# Patient Record
Sex: MALE | Employment: UNEMPLOYED | URBAN - METROPOLITAN AREA
[De-identification: names, ages, dates, MRNs, and addresses within clinical notes are randomized per-mention and may not be internally consistent; named-entity substitution may affect disease eponyms.]

---

## 2018-02-15 ENCOUNTER — COMMUNICATION - HEALTHEAST (OUTPATIENT)
Dept: SCHEDULING | Facility: CLINIC | Age: 43
End: 2018-02-15

## 2018-02-22 ENCOUNTER — OFFICE VISIT - HEALTHEAST (OUTPATIENT)
Dept: FAMILY MEDICINE | Facility: CLINIC | Age: 43
End: 2018-02-22

## 2018-02-22 DIAGNOSIS — Z23 NEED FOR VACCINATION: ICD-10-CM

## 2018-02-22 DIAGNOSIS — I10 ESSENTIAL HYPERTENSION: ICD-10-CM

## 2018-02-22 DIAGNOSIS — R79.89 ELEVATED LFTS: ICD-10-CM

## 2018-02-22 LAB
ALBUMIN SERPL-MCNC: 4.5 G/DL (ref 3.5–5)
ALP SERPL-CCNC: 105 U/L (ref 45–120)
ALT SERPL W P-5'-P-CCNC: 235 U/L (ref 0–45)
AST SERPL W P-5'-P-CCNC: 265 U/L (ref 0–40)
BILIRUB DIRECT SERPL-MCNC: 0.2 MG/DL
BILIRUB SERPL-MCNC: 0.5 MG/DL (ref 0–1)
PROT SERPL-MCNC: 8.1 G/DL (ref 6–8)

## 2018-02-22 RX ORDER — ALBUTEROL SULFATE 90 UG/1
2 AEROSOL, METERED RESPIRATORY (INHALATION) EVERY 4 HOURS PRN
Status: SHIPPED | COMMUNITY
Start: 2015-11-22 | End: 2022-05-04

## 2018-02-22 RX ORDER — METHADONE HYDROCHLORIDE 10 MG/ML
84 CONCENTRATE ORAL DAILY
Status: ON HOLD | COMMUNITY
Start: 2018-02-22 | End: 2021-12-06

## 2018-02-22 ASSESSMENT — MIFFLIN-ST. JEOR: SCORE: 1735.39

## 2018-02-23 ENCOUNTER — COMMUNICATION - HEALTHEAST (OUTPATIENT)
Dept: FAMILY MEDICINE | Facility: CLINIC | Age: 43
End: 2018-02-23

## 2018-02-23 LAB
HBV SURFACE AG SERPL QL IA: NEGATIVE
HCV AB SERPL QL IA: NEGATIVE

## 2018-03-01 ENCOUNTER — COMMUNICATION - HEALTHEAST (OUTPATIENT)
Dept: TELEHEALTH | Facility: CLINIC | Age: 43
End: 2018-03-01

## 2018-03-01 ENCOUNTER — HOSPITAL ENCOUNTER (OUTPATIENT)
Dept: ULTRASOUND IMAGING | Facility: CLINIC | Age: 43
Discharge: HOME | End: 2018-03-01

## 2018-03-01 DIAGNOSIS — R79.89 ELEVATED LFTS: ICD-10-CM

## 2018-03-05 ENCOUNTER — COMMUNICATION - HEALTHEAST (OUTPATIENT)
Dept: FAMILY MEDICINE | Facility: CLINIC | Age: 43
End: 2018-03-05

## 2018-03-06 ENCOUNTER — COMMUNICATION - HEALTHEAST (OUTPATIENT)
Dept: FAMILY MEDICINE | Facility: CLINIC | Age: 43
End: 2018-03-06

## 2018-03-22 ENCOUNTER — COMMUNICATION - HEALTHEAST (OUTPATIENT)
Dept: FAMILY MEDICINE | Facility: CLINIC | Age: 43
End: 2018-03-22

## 2018-03-22 DIAGNOSIS — I10 ESSENTIAL HYPERTENSION: ICD-10-CM

## 2018-05-21 ENCOUNTER — COMMUNICATION - HEALTHEAST (OUTPATIENT)
Dept: FAMILY MEDICINE | Facility: CLINIC | Age: 43
End: 2018-05-21

## 2018-05-21 DIAGNOSIS — I10 ESSENTIAL HYPERTENSION: ICD-10-CM

## 2018-05-22 RX ORDER — LISINOPRIL 40 MG/1
40 TABLET ORAL DAILY
Qty: 90 TABLET | Refills: 0 | Status: ON HOLD | OUTPATIENT
Start: 2018-05-22 | End: 2021-12-06

## 2018-08-27 ENCOUNTER — COMMUNICATION - HEALTHEAST (OUTPATIENT)
Dept: FAMILY MEDICINE | Facility: CLINIC | Age: 43
End: 2018-08-27

## 2018-08-27 DIAGNOSIS — I10 ESSENTIAL HYPERTENSION: ICD-10-CM

## 2019-02-06 ENCOUNTER — COMMUNICATION - HEALTHEAST (OUTPATIENT)
Dept: SCHEDULING | Facility: CLINIC | Age: 44
End: 2019-02-06

## 2021-05-28 ENCOUNTER — RECORDS - HEALTHEAST (OUTPATIENT)
Dept: ADMINISTRATIVE | Facility: CLINIC | Age: 46
End: 2021-05-28

## 2021-06-01 VITALS — WEIGHT: 183.7 LBS | HEIGHT: 71 IN | BODY MASS INDEX: 25.72 KG/M2

## 2021-06-16 PROBLEM — G89.29 CHRONIC BACK PAIN: Status: ACTIVE | Noted: 2018-02-22

## 2021-06-16 PROBLEM — I10 ESSENTIAL HYPERTENSION: Status: ACTIVE | Noted: 2018-02-22

## 2021-06-16 PROBLEM — M54.9 CHRONIC BACK PAIN: Status: ACTIVE | Noted: 2018-02-22

## 2021-06-16 PROBLEM — R79.89 ELEVATED LFTS: Status: ACTIVE | Noted: 2018-02-22

## 2021-06-16 NOTE — PROGRESS NOTES
"Assessment/Plan:     1. Essential hypertension  Blood pressure continues to be elevated.  Increase dose to 20 mg once daily.  Follow-up in 3-4 weeks for recheck.  - lisinopril (PRINIVIL,ZESTRIL) 20 MG tablet; Take 1 tablet (20 mg total) by mouth daily.  Dispense: 30 tablet; Refill: 1    2. Elevated LFTs  Repeat LFTs and screen for hepatitis B and C.  I did encourage patient to decrease his alcohol consumption significantly.  - Hepatic Profile  - Hepatitis C Antibody (Anti-HCV)  - Hepatitis B Surface Antigen (HBsAG)    3. Need for vaccination  - Tdap vaccine,  6yo or older,  IM        Subjective:     Jared North is a 42 y.o. male who presents for follow-up regarding recent ER visit.  Patient presented to the ER with complaints of chest pain and high blood pressure.  They ruled out ACS.  His EKG showed an incomplete right bundle branch block.  Patient states that he gets left-sided chest pain that comes and goes.  Symptoms usually start with an irregular heartbeat, then he gets \"jabs\" of pains to the left chest.  This lasts for approximately 1 hour, and then goes away.  He currently denies any chest pain.  Denies any diaphoresis or shortness of breath.  Patient was started on lisinopril, 5 mg in the ED.  He is tolerating this well.  He has never previously been on hypertensive medications.    Patient goes to a pain clinic [facility] in [place] for methadone treatment.  He receives 84 mg daily.  Patient denies any illegal drug use.  He drinks 3 passes of wine per day as a stress reliever.  Patient works as an .    Patient was found to have elevated LFTs incidentally in the ED.  No previous lab work to compare.  He denies any abdominal pain, jaundice, or N/V/D.  He denies any IV drug use, and receives regular drug testing at the methadone clinic.      The following portions of the patient's history were reviewed and updated as appropriate: allergies, current medications, past family " "history, past medical history, past social history, past surgical history and problem list.    Review of Systems  Pertinent items are noted in HPI.     Objective:     BP (!) 139/103 (Patient Site: Right Arm, Patient Position: Sitting, Cuff Size: Adult Large)  Pulse (!) 106  Temp 98.9  F (37.2  C) (Oral)   Ht 5' 11\" (1.803 m)  Wt 183 lb 11.2 oz (83.3 kg)  SpO2 99%  BMI 25.62 kg/m2    General Appearance: Alert, cooperative, no distress, appears stated age  Lungs: Clear to auscultation bilaterally, respirations unlabored  Heart: Regular rate and rhythm, S1 and S2 normal, no murmur, rub, or gallop   Abdomen: Soft, non-tender, bowel sounds active all four quadrants,  no masses, no organomegaly  Psychologic: appropriate affective, answers all of my questions appropriately. No hallucinations, delusion, or suicidal ideations.    Chloe Bennett, NP-C    "

## 2021-06-23 NOTE — TELEPHONE ENCOUNTER
Triage call: has previously seen Chloe Bennett    Patient calling with high blood pressure, has stopped taking his Lisinopril on his own and currently not taking any medication for blood pressure  184/124- yesterday morning   At lunch today 160/110   Intermittent chest pain and dizzy and shortness of breath- symptoms present now.     Triaged to be seen in the ER, reviewed location of ER with patient and provided address to [facility] and told him to go there now since he is having cardiac symptoms. Unsure if patient will follow advice as he would not state clearly that he was going but tried to impress upon him the importance of getting there as soon as he could.     Anastasia Alvarez RN BSBA [facility] Triage/Med Refill 2/6/2019 12:37 PM    Reason for Disposition    Systolic BP >= 160 OR Diastolic >= 100, and any cardiac or neurologic symptoms (e.g., chest pain, difficulty breathing, unsteady gait, blurred vision)    Protocols used: HIGH BLOOD PRESSURE-A-OH

## 2021-07-03 NOTE — ADDENDUM NOTE
Addendum Note by Chloe Bennett NP at 2/23/2018 10:30 AM     Author: Chloe Bennett NP Service: -- Author Type: Nurse Practitioner    Filed: 2/23/2018 10:30 AM Encounter Date: 2/22/2018 Status: Signed    : Chloe Bennett NP (Nurse Practitioner)    Addended by: CHLOE BENNETT on: 2/23/2018 10:30 AM        Modules accepted: Orders

## 2021-07-24 ENCOUNTER — HEALTH MAINTENANCE LETTER (OUTPATIENT)
Age: 46
End: 2021-07-24

## 2021-09-18 ENCOUNTER — HEALTH MAINTENANCE LETTER (OUTPATIENT)
Age: 46
End: 2021-09-18

## 2021-11-24 ENCOUNTER — APPOINTMENT (OUTPATIENT)
Dept: CT IMAGING | Facility: CLINIC | Age: 46
End: 2021-11-24
Attending: EMERGENCY MEDICINE
Payer: COMMERCIAL

## 2021-11-24 ENCOUNTER — HOSPITAL ENCOUNTER (INPATIENT)
Facility: CLINIC | Age: 46
LOS: 11 days | Discharge: OTHER INSTITUTION | End: 2021-12-05
Attending: EMERGENCY MEDICINE | Admitting: INTERNAL MEDICINE
Payer: COMMERCIAL

## 2021-11-24 DIAGNOSIS — U07.1 PNEUMONIA DUE TO 2019 NOVEL CORONAVIRUS: ICD-10-CM

## 2021-11-24 DIAGNOSIS — J12.82 PNEUMONIA DUE TO 2019 NOVEL CORONAVIRUS: ICD-10-CM

## 2021-11-24 DIAGNOSIS — J96.01 ACUTE RESPIRATORY FAILURE WITH HYPOXIA (H): ICD-10-CM

## 2021-11-24 LAB
ABO/RH(D): NORMAL
ALBUMIN SERPL-MCNC: 2.9 G/DL (ref 3.5–5)
ALP SERPL-CCNC: 81 U/L (ref 45–120)
ALT SERPL W P-5'-P-CCNC: 53 U/L (ref 0–45)
ANION GAP SERPL CALCULATED.3IONS-SCNC: 8 MMOL/L (ref 5–18)
APTT PPP: 35 SECONDS (ref 22–38)
AST SERPL W P-5'-P-CCNC: 105 U/L (ref 0–40)
BASOPHILS # BLD AUTO: 0 10E3/UL (ref 0–0.2)
BASOPHILS NFR BLD AUTO: 0 %
BILIRUB DIRECT SERPL-MCNC: 0.2 MG/DL
BILIRUB SERPL-MCNC: 0.5 MG/DL (ref 0–1)
BUN SERPL-MCNC: 15 MG/DL (ref 8–22)
C REACTIVE PROTEIN LHE: 12.3 MG/DL (ref 0–0.8)
CALCIUM SERPL-MCNC: 8.1 MG/DL (ref 8.5–10.5)
CHLORIDE BLD-SCNC: 95 MMOL/L (ref 98–107)
CO2 SERPL-SCNC: 28 MMOL/L (ref 22–31)
CREAT SERPL-MCNC: 0.99 MG/DL (ref 0.7–1.3)
D DIMER PPP FEU-MCNC: 1.24 UG/ML FEU (ref 0–0.5)
EOSINOPHIL # BLD AUTO: 0 10E3/UL (ref 0–0.7)
EOSINOPHIL NFR BLD AUTO: 0 %
ERYTHROCYTE [DISTWIDTH] IN BLOOD BY AUTOMATED COUNT: 13.1 % (ref 10–15)
FIBRINOGEN PPP-MCNC: 695 MG/DL (ref 170–490)
GFR SERPL CREATININE-BSD FRML MDRD: >90 ML/MIN/1.73M2
GLUCOSE BLD-MCNC: 139 MG/DL (ref 70–125)
GLUCOSE BLDC GLUCOMTR-MCNC: 165 MG/DL (ref 70–99)
HCT VFR BLD AUTO: 42.5 % (ref 40–53)
HGB BLD-MCNC: 14.4 G/DL (ref 13.3–17.7)
HOLD SPECIMEN: NORMAL
IMM GRANULOCYTES # BLD: 0.1 10E3/UL
IMM GRANULOCYTES NFR BLD: 1 %
INR PPP: 1.08 (ref 0.85–1.15)
LYMPHOCYTES # BLD AUTO: 0.4 10E3/UL (ref 0.8–5.3)
LYMPHOCYTES NFR BLD AUTO: 8 %
MCH RBC QN AUTO: 29.3 PG (ref 26.5–33)
MCHC RBC AUTO-ENTMCNC: 33.9 G/DL (ref 31.5–36.5)
MCV RBC AUTO: 87 FL (ref 78–100)
MONOCYTES # BLD AUTO: 0.4 10E3/UL (ref 0–1.3)
MONOCYTES NFR BLD AUTO: 9 %
NEUTROPHILS # BLD AUTO: 3.7 10E3/UL (ref 1.6–8.3)
NEUTROPHILS NFR BLD AUTO: 82 %
NRBC # BLD AUTO: 0 10E3/UL
NRBC BLD AUTO-RTO: 0 /100
PLATELET # BLD AUTO: 128 10E3/UL (ref 150–450)
POTASSIUM BLD-SCNC: 4 MMOL/L (ref 3.5–5)
PROCALCITONIN SERPL-MCNC: 0.5 NG/ML (ref 0–0.49)
PROT SERPL-MCNC: 6.5 G/DL (ref 6–8)
RBC # BLD AUTO: 4.91 10E6/UL (ref 4.4–5.9)
SODIUM SERPL-SCNC: 131 MMOL/L (ref 136–145)
SPECIMEN EXPIRATION DATE: NORMAL
TROPONIN I SERPL-MCNC: 0.01 NG/ML (ref 0–0.29)
WBC # BLD AUTO: 4.5 10E3/UL (ref 4–11)

## 2021-11-24 PROCEDURE — 71275 CT ANGIOGRAPHY CHEST: CPT

## 2021-11-24 PROCEDURE — 86141 C-REACTIVE PROTEIN HS: CPT | Performed by: INTERNAL MEDICINE

## 2021-11-24 PROCEDURE — 86141 C-REACTIVE PROTEIN HS: CPT | Performed by: EMERGENCY MEDICINE

## 2021-11-24 PROCEDURE — 250N000009 HC RX 250: Performed by: EMERGENCY MEDICINE

## 2021-11-24 PROCEDURE — 84484 ASSAY OF TROPONIN QUANT: CPT | Performed by: EMERGENCY MEDICINE

## 2021-11-24 PROCEDURE — 999N000205 HC STATISTICAL VASC ACCESS NURSE TIME, 46-60 MINUTES

## 2021-11-24 PROCEDURE — 99223 1ST HOSP IP/OBS HIGH 75: CPT | Performed by: INTERNAL MEDICINE

## 2021-11-24 PROCEDURE — 96367 TX/PROPH/DG ADDL SEQ IV INF: CPT

## 2021-11-24 PROCEDURE — 36569 INSJ PICC 5 YR+ W/O IMAGING: CPT

## 2021-11-24 PROCEDURE — 85025 COMPLETE CBC W/AUTO DIFF WBC: CPT | Performed by: EMERGENCY MEDICINE

## 2021-11-24 PROCEDURE — 83615 LACTATE (LD) (LDH) ENZYME: CPT | Performed by: INTERNAL MEDICINE

## 2021-11-24 PROCEDURE — 83036 HEMOGLOBIN GLYCOSYLATED A1C: CPT | Performed by: INTERNAL MEDICINE

## 2021-11-24 PROCEDURE — 250N000011 HC RX IP 250 OP 636: Performed by: EMERGENCY MEDICINE

## 2021-11-24 PROCEDURE — 85379 FIBRIN DEGRADATION QUANT: CPT | Performed by: INTERNAL MEDICINE

## 2021-11-24 PROCEDURE — 999N000157 HC STATISTIC RCP TIME EA 10 MIN

## 2021-11-24 PROCEDURE — 96365 THER/PROPH/DIAG IV INF INIT: CPT

## 2021-11-24 PROCEDURE — 96375 TX/PRO/DX INJ NEW DRUG ADDON: CPT

## 2021-11-24 PROCEDURE — 85025 COMPLETE CBC W/AUTO DIFF WBC: CPT | Performed by: INTERNAL MEDICINE

## 2021-11-24 PROCEDURE — 85610 PROTHROMBIN TIME: CPT | Performed by: INTERNAL MEDICINE

## 2021-11-24 PROCEDURE — XW043E5 INTRODUCTION OF REMDESIVIR ANTI-INFECTIVE INTO CENTRAL VEIN, PERCUTANEOUS APPROACH, NEW TECHNOLOGY GROUP 5: ICD-10-PCS | Performed by: FAMILY MEDICINE

## 2021-11-24 PROCEDURE — 93005 ELECTROCARDIOGRAM TRACING: CPT | Performed by: EMERGENCY MEDICINE

## 2021-11-24 PROCEDURE — 84145 PROCALCITONIN (PCT): CPT | Performed by: EMERGENCY MEDICINE

## 2021-11-24 PROCEDURE — 999N000185 HC STATISTIC TRANSPORT TIME EA 15 MIN

## 2021-11-24 PROCEDURE — 94660 CPAP INITIATION&MGMT: CPT

## 2021-11-24 PROCEDURE — 99291 CRITICAL CARE FIRST HOUR: CPT | Mod: 25

## 2021-11-24 PROCEDURE — 3E0G76Z INTRODUCTION OF NUTRITIONAL SUBSTANCE INTO UPPER GI, VIA NATURAL OR ARTIFICIAL OPENING: ICD-10-PCS | Performed by: FAMILY MEDICINE

## 2021-11-24 PROCEDURE — 272N000452 HC KIT SHRLOCK 5FR POWER PICC TRIPLE LUMEN

## 2021-11-24 PROCEDURE — 250N000009 HC RX 250: Performed by: INTERNAL MEDICINE

## 2021-11-24 PROCEDURE — 85379 FIBRIN DEGRADATION QUANT: CPT | Performed by: EMERGENCY MEDICINE

## 2021-11-24 PROCEDURE — 200N000001 HC R&B ICU

## 2021-11-24 PROCEDURE — 258N000003 HC RX IP 258 OP 636: Performed by: EMERGENCY MEDICINE

## 2021-11-24 PROCEDURE — 36415 COLL VENOUS BLD VENIPUNCTURE: CPT | Performed by: EMERGENCY MEDICINE

## 2021-11-24 PROCEDURE — 250N000011 HC RX IP 250 OP 636: Performed by: INTERNAL MEDICINE

## 2021-11-24 PROCEDURE — 80053 COMPREHEN METABOLIC PANEL: CPT | Performed by: INTERNAL MEDICINE

## 2021-11-24 PROCEDURE — 86900 BLOOD TYPING SEROLOGIC ABO: CPT | Performed by: INTERNAL MEDICINE

## 2021-11-24 PROCEDURE — 80053 COMPREHEN METABOLIC PANEL: CPT | Performed by: EMERGENCY MEDICINE

## 2021-11-24 PROCEDURE — 85730 THROMBOPLASTIN TIME PARTIAL: CPT | Performed by: INTERNAL MEDICINE

## 2021-11-24 PROCEDURE — 85384 FIBRINOGEN ACTIVITY: CPT | Performed by: INTERNAL MEDICINE

## 2021-11-24 PROCEDURE — 258N000003 HC RX IP 258 OP 636: Performed by: INTERNAL MEDICINE

## 2021-11-24 RX ORDER — MULTIPLE VITAMINS W/ MINERALS TAB 9MG-400MCG
1 TAB ORAL DAILY
COMMUNITY
End: 2022-08-11

## 2021-11-24 RX ORDER — BUPRENORPHINE AND NALOXONE 8; 2 MG/1; MG/1
1 FILM, SOLUBLE BUCCAL; SUBLINGUAL DAILY
Status: ON HOLD | COMMUNITY
End: 2022-01-24

## 2021-11-24 RX ORDER — LISINOPRIL 40 MG/1
40 TABLET ORAL DAILY
Status: DISCONTINUED | OUTPATIENT
Start: 2021-11-25 | End: 2021-11-25

## 2021-11-24 RX ORDER — ONDANSETRON 4 MG/1
4 TABLET, ORALLY DISINTEGRATING ORAL EVERY 6 HOURS PRN
Status: DISCONTINUED | OUTPATIENT
Start: 2021-11-24 | End: 2021-12-05 | Stop reason: HOSPADM

## 2021-11-24 RX ORDER — NICOTINE POLACRILEX 4 MG
15-30 LOZENGE BUCCAL
Status: DISCONTINUED | OUTPATIENT
Start: 2021-11-24 | End: 2021-12-05 | Stop reason: HOSPADM

## 2021-11-24 RX ORDER — DEXAMETHASONE SODIUM PHOSPHATE 10 MG/ML
6 INJECTION, SOLUTION INTRAMUSCULAR; INTRAVENOUS DAILY
Status: COMPLETED | OUTPATIENT
Start: 2021-11-25 | End: 2021-12-04

## 2021-11-24 RX ORDER — IOPAMIDOL 755 MG/ML
100 INJECTION, SOLUTION INTRAVASCULAR ONCE
Status: COMPLETED | OUTPATIENT
Start: 2021-11-24 | End: 2021-11-24

## 2021-11-24 RX ORDER — BUPROPION HYDROCHLORIDE 300 MG/1
300 TABLET ORAL EVERY MORNING
Status: ON HOLD | COMMUNITY
End: 2022-01-24

## 2021-11-24 RX ORDER — ALBUTEROL SULFATE 90 UG/1
2-4 AEROSOL, METERED RESPIRATORY (INHALATION) EVERY 4 HOURS PRN
Status: DISCONTINUED | OUTPATIENT
Start: 2021-11-24 | End: 2021-12-05 | Stop reason: HOSPADM

## 2021-11-24 RX ORDER — DEXAMETHASONE SODIUM PHOSPHATE 10 MG/ML
6 INJECTION, SOLUTION INTRAMUSCULAR; INTRAVENOUS ONCE
Status: COMPLETED | OUTPATIENT
Start: 2021-11-24 | End: 2021-11-24

## 2021-11-24 RX ORDER — ONDANSETRON 2 MG/ML
4 INJECTION INTRAMUSCULAR; INTRAVENOUS EVERY 6 HOURS PRN
Status: DISCONTINUED | OUTPATIENT
Start: 2021-11-24 | End: 2021-12-05 | Stop reason: HOSPADM

## 2021-11-24 RX ORDER — DEXTROSE MONOHYDRATE 25 G/50ML
25-50 INJECTION, SOLUTION INTRAVENOUS
Status: DISCONTINUED | OUTPATIENT
Start: 2021-11-24 | End: 2021-12-05 | Stop reason: HOSPADM

## 2021-11-24 RX ORDER — ARIPIPRAZOLE 5 MG/1
5 TABLET ORAL DAILY
Status: ON HOLD | COMMUNITY
End: 2021-12-31

## 2021-11-24 RX ORDER — GABAPENTIN 400 MG/1
800 CAPSULE ORAL 3 TIMES DAILY
Status: ON HOLD | COMMUNITY
End: 2022-01-24

## 2021-11-24 RX ORDER — METHADONE HYDROCHLORIDE 10 MG/ML
84 CONCENTRATE ORAL DAILY
Status: CANCELLED | OUTPATIENT
Start: 2021-11-24

## 2021-11-24 RX ORDER — HYDROXYZINE PAMOATE 25 MG/1
25-50 CAPSULE ORAL
Status: ON HOLD | COMMUNITY
End: 2022-01-24

## 2021-11-24 RX ORDER — LIDOCAINE 40 MG/G
CREAM TOPICAL
Status: ACTIVE | OUTPATIENT
Start: 2021-11-24 | End: 2021-11-27

## 2021-11-24 RX ADMIN — TOCILIZUMAB 800 MG: 20 INJECTION, SOLUTION, CONCENTRATE INTRAVENOUS at 16:38

## 2021-11-24 RX ADMIN — FAMOTIDINE 20 MG: 10 INJECTION, SOLUTION INTRAVENOUS at 20:20

## 2021-11-24 RX ADMIN — REMDESIVIR 200 MG: 100 INJECTION, POWDER, LYOPHILIZED, FOR SOLUTION INTRAVENOUS at 17:34

## 2021-11-24 RX ADMIN — SODIUM CHLORIDE 50 ML: 9 INJECTION, SOLUTION INTRAVENOUS at 18:19

## 2021-11-24 RX ADMIN — ENOXAPARIN SODIUM 50 MG: 100 INJECTION SUBCUTANEOUS at 23:40

## 2021-11-24 RX ADMIN — DEXAMETHASONE SODIUM PHOSPHATE 6 MG: 10 INJECTION INTRAMUSCULAR; INTRAVENOUS at 13:47

## 2021-11-24 RX ADMIN — IOPAMIDOL 100 ML: 755 INJECTION, SOLUTION INTRAVENOUS at 14:31

## 2021-11-24 RX ADMIN — LIDOCAINE HYDROCHLORIDE 3 ML: 10 INJECTION, SOLUTION EPIDURAL; INFILTRATION; INTRACAUDAL; PERINEURAL at 17:55

## 2021-11-24 ASSESSMENT — ACTIVITIES OF DAILY LIVING (ADL)
DRESSING/BATHING_DIFFICULTY: NO
DIFFICULTY_COMMUNICATING: NO
FALL_HISTORY_WITHIN_LAST_SIX_MONTHS: NO
DEPENDENT_IADLS:: INDEPENDENT
HEARING_DIFFICULTY_OR_DEAF: NO
ADLS_ACUITY_SCORE: 7
DIFFICULTY_EATING/SWALLOWING: NO
ADLS_ACUITY_SCORE: 7
WEAR_GLASSES_OR_BLIND: NO
ADLS_ACUITY_SCORE: 7
ADLS_ACUITY_SCORE: 3
ADLS_ACUITY_SCORE: 7
TOILETING_ISSUES: NO
CONCENTRATING,_REMEMBERING_OR_MAKING_DECISIONS_DIFFICULTY: NO
ADLS_ACUITY_SCORE: 7
DOING_ERRANDS_INDEPENDENTLY_DIFFICULTY: NO
ADLS_ACUITY_SCORE: 7
WALKING_OR_CLIMBING_STAIRS_DIFFICULTY: NO

## 2021-11-24 ASSESSMENT — MIFFLIN-ST. JEOR: SCORE: 1793.9

## 2021-11-24 NOTE — ED NOTES
PICC at bedside.  Patient states that he doesn't like wearing the bipap mask. Encourage patient to continue to tolerate mask due to he de sats very quickly with out it. States understanding

## 2021-11-24 NOTE — ED PROVIDER NOTES
EMERGENCY DEPARTMENT ENCOUNTER      NAME: Jared North  AGE: 45 year old male  YOB: 1975  MRN: [id number]  EVALUATION DATE & TIME: 2021  1:19 PM    PCP: Sal Marie    ED PROVIDER: Mele Herrera M.D.      Chief Complaint   Patient presents with     Covid Concern     Shortness of Breath       FINAL IMPRESSION:  1. Acute respiratory failure with hypoxia (H)    2. Pneumonia due to 2019 novel coronavirus        ED COURSE & MEDICAL DECISION MAKIN year old male presents to the Emergency Department for evaluation of shortness of breath.  He is severely hypoxemic when he arrives to the emergency department with oxygen saturations in the 40s in triage.  He was placed on BiPAP with improvement to mid 90s on fairly high settings with 100% FiO2 and  pressures of 12/6.  CT confirms Covid pneumonia consistent with his diagnosis on .  Lab evaluation generally stable with nothing much to remarked upon.  Procalcitonin on the upper limit of normal however nothing suggestive of bacterial pneumonia on his chest CT.  He was given a dose of dexamethasone as well as remdesivir and Tocilizumab after consultation with the intensivist.  there are no immediate ICU beds available, we are expecting he likely will be admitted here later in the evening.  Patient generally stable on high settings of BiPAP, will continue this for now and continue serial exams. Given likely prolonged ICU course, need for frequent blood draws and robust IV access, requested PICC line placement. Patient will be signed out to oncoming ED provider pending hospital bed availability.    1:14 PM I met with the patient, obtained history, performed an initial exam, and discussed options and plan for diagnostics and treatment here in the ED. PPE worn including N95 mask, face shield, surgical gloves, surgical gown.  3:53 PM I spoke with the intensivist Dr. Redman.    Critical Care  Performed by: Mele Herrera MD  Authorized by:  Mele Herrera MD     Total critical care time: 50 minutes  Critical care time was exclusive of separately billable procedures and treating other patients.  Critical care was necessary to treat or prevent imminent or life-threatening deterioration of the following conditions: Respiratory failure requiring noninvasive ventilation  Critical care was time spent personally by me on the following activities: development of treatment plan with patient or surrogate, discussions with consultants, examination of patient, evaluation of patient's response to treatment, obtaining history from patient or surrogate, ordering and performing treatments and interventions, ordering and review of laboratory studies, ordering and review of radiographic studies and re-evaluation of patient's condition, this excludes any separately billable procedures.      MEDICATIONS GIVEN IN THE EMERGENCY:  Medications   tocilizumab (ACTEMRA) 800 mg in sodium chloride 0.9 % 140 mL infusion (800 mg Intravenous New Bag 11/24/21 1638)   remdesivir 200 mg in sodium chloride 0.9 % 250 mL intermittent infusion (has no administration in time range)     Followed by   0.9% sodium chloride BOLUS (has no administration in time range)   remdesivir 100 mg in sodium chloride 0.9 % 250 mL intermittent infusion (has no administration in time range)     And   0.9% sodium chloride BOLUS (has no administration in time range)   lidocaine 1 % 0.1-5 mL (has no administration in time range)   lidocaine (LMX4) cream (has no administration in time range)   sodium chloride (PF) 0.9% PF flush 10-40 mL (has no administration in time range)   dexamethasone PF (DECADRON) injection 6 mg (6 mg Intravenous Given 11/24/21 1347)   iopamidol (ISOVUE-370) solution 100 mL (100 mLs Intravenous Given 11/24/21 1431)       NEW PRESCRIPTIONS STARTED AT TODAY'S ER VISIT  New Prescriptions    No medications on file           =================================================================    HPI    Patient information was obtained from: Patient    Use of : N/A        Jared North is a 45 year old male with a pertinent history of opioid dependence, hypertension who presents to this ED by walk in for evaluation of shortness of breath. Patient reports that for the past week he has been experiencing progressively worsening shortness of breath, weakness, and is not eating or drinking much. Patient was tested positive for COVID-19 on 11/19 (5 days ago) at [facility]. He currently endorses chest tightness, but denies explicit chest pain.    Endorses shortness of breath, weakness, loss of appetite (decreased food and fluid intake), chest tightness.    Denies chest pain, leg pain or swelling, vomiting.      REVIEW OF SYSTEMS   All systems reviewed and negative except as noted in HPI.    PAST MEDICAL HISTORY:  History reviewed. No pertinent past medical history.    PAST SURGICAL HISTORY:  History reviewed. No pertinent surgical history.        CURRENT MEDICATIONS:    Current Facility-Administered Medications   Medication     remdesivir 200 mg in sodium chloride 0.9 % 250 mL intermittent infusion    Followed by     0.9% sodium chloride BOLUS     [START ON 11/25/2021] remdesivir 100 mg in sodium chloride 0.9 % 250 mL intermittent infusion    And     [START ON 11/25/2021] 0.9% sodium chloride BOLUS     lidocaine (LMX4) cream     lidocaine 1 % 0.1-5 mL     sodium chloride (PF) 0.9% PF flush 10-40 mL     tocilizumab (ACTEMRA) 800 mg in sodium chloride 0.9 % 140 mL infusion     Current Outpatient Medications   Medication     albuterol (PROAIR HFA;PROVENTIL HFA;VENTOLIN HFA) 90 mcg/actuation inhaler     lisinopril (PRINIVIL,ZESTRIL) 40 MG tablet     methadone (DOLOPHINE) 50 mg/5 mL solution         ALLERGIES:  No Known Allergies    FAMILY HISTORY:  Family History   Problem Relation Age of Onset     Diabetes Mother      Heart Disease  Mother      Hypertension Mother      Hypertension Maternal Grandmother      Diabetes Maternal Grandfather      Hypertension Maternal Grandfather        SOCIAL HISTORY:   Social History     Socioeconomic History     Marital status:      Spouse name: Not on file     Number of children: Not on file     Years of education: Not on file     Highest education level: Not on file   Occupational History     Not on file   Tobacco Use     Smoking status: Never Smoker     Smokeless tobacco: Current User   Substance and Sexual Activity     Alcohol use: Yes     Alcohol/week: 21.0 standard drinks     Drug use: No     Sexual activity: Not on file   Other Topics Concern     Not on file   Social History Narrative     Not on file     Social Determinants of Health     Financial Resource Strain: Not on file   Food Insecurity: Not on file   Transportation Needs: Not on file   Physical Activity: Not on file   Stress: Not on file   Social Connections: Not on file   Intimate Partner Violence: Not on file   Housing Stability: Not on file       VITALS:  /72   Pulse 80   Temp 100.2  F (37.9  C) (Temporal)   Resp 29   Wt 97.5 kg (215 lb)   SpO2 93%   BMI 29.99 kg/m      PHYSICAL EXAM    Constitutional: Ill-appearing young male patient, sitting up in bed, mild respiratory distress  HENT: Normocephalic, Atraumatic. Neck Supple.  Eyes: EOMI, Conjunctiva normal.  Respiratory: Mildly tachypneic on supplemental oxygen via nonrebreather.  Coarse lung sounds bilateral bases.  Cardiovascular: Normal heart rate, Regular rhythm. No peripheral edema.  Abdomen: Soft, nontender.  Musculoskeletal: Good range of motion in all major joints. No major deformities noted.  Integument: Warm, Dry.  Neurologic: Alert & awake, Normal motor function, Normal sensory function, No focal deficits noted.   Psychiatric: Cooperative. Affect appropriate.     LAB:  All pertinent labs reviewed and interpreted.  Labs Ordered and Resulted from Time of ED Arrival  to Time of ED Departure   BASIC METABOLIC PANEL - Abnormal       Result Value    Sodium 131 (*)     Potassium 4.0      Chloride 95 (*)     Carbon Dioxide (CO2) 28      Anion Gap 8      Urea Nitrogen 15      Creatinine 0.99      Calcium 8.1 (*)     Glucose 139 (*)     GFR Estimate >90     HEPATIC FUNCTION PANEL - Abnormal    Bilirubin Total 0.5      Bilirubin Direct 0.2      Protein Total 6.5      Albumin 2.9 (*)     Alkaline Phosphatase 81       (*)     ALT 53 (*)    CRP INFLAMMATION - Abnormal    CRP 12.3 (*)    PROCALCITONIN - Abnormal    Procalcitonin 0.50 (*)    D DIMER QUANTITATIVE - Abnormal    D-Dimer Quantitative 1.24 (*)    CBC WITH PLATELETS AND DIFFERENTIAL - Abnormal    WBC Count 4.5      RBC Count 4.91      Hemoglobin 14.4      Hematocrit 42.5      MCV 87      MCH 29.3      MCHC 33.9      RDW 13.1      Platelet Count 128 (*)     % Neutrophils 82      % Lymphocytes 8      % Monocytes 9      % Eosinophils 0      % Basophils 0      % Immature Granulocytes 1      NRBCs per 100 WBC 0      Absolute Neutrophils 3.7      Absolute Lymphocytes 0.4 (*)     Absolute Monocytes 0.4      Absolute Eosinophils 0.0      Absolute Basophils 0.0      Absolute Immature Granulocytes 0.1 (*)     Absolute NRBCs 0.0     TROPONIN I - Normal    Troponin I 0.01         RADIOLOGY:  Reviewed all pertinent imaging. Please see official radiology report.  CT Chest Pulmonary Embolism w Contrast   Final Result   IMPRESSION:   1.  No pulmonary embolus.   2.  COVID pneumonia.   3.  Likely reactive mediastinal lymph nodes.   4.  Hepatic steatosis.          EKG:    Performed at: 1319    Impression: Sinus tachycardia, otherwise normal ECG    Rate: 102  Rhythm: Sinus  Axis: Normal  TX Interval: 144  QRS Interval: 94  QTc Interval: 471  ST Changes: None significant  Comparison: Compared to February 15, 2018, no significant change    I have independently reviewed and interpreted the EKG(s) documented above.        IBenedict  Pedro, am serving as a scribe to document services personally performed by Dr. Mele Herrera, based on my observation and the provider's statements to me. I, Mele Herrera MD attest that Benedict Vasquez is acting in a scribe capacity, has observed my performance of the services and has documented them in accordance with my direction.    Mele Herrera M.D.  Emergency Medicine  [facility] EMERGENCY ROOM  [address] [address]  [phone]  Dept: [phone]     Mele Herrera MD  11/24/21 8919

## 2021-11-24 NOTE — ED TRIAGE NOTES
Patient Covid + on nov 19th, unable to eat, headache, hypoxic, RT called for patient.  44% on RA in triage, patient placed on Oxymask 25L in triage.  Aiyana James RN.......11/24/2021 1:19 PM

## 2021-11-24 NOTE — PROGRESS NOTES
Called to assess pt.  Upon arrival, pt on 15 LPM Oxymask, o2 sats 94%, RR-38.  Pt placed on BIPAP, 12/6, 16, 100%, o2 sats 93%  Will continue to monitor pt.  Pt transported to CT on BIPAP and back to ER without incident.

## 2021-11-24 NOTE — ED NOTES
Patient states ok to speak with Jama North [phone] which is his exwife. Updated on plan of care.  Complaining of nares being dry and running. O2 sats 95% on bipap. Briefly removed mask so patient could blow his nose and sats fell to 84-88%.  Returned to 95% with reapplication of bipap mask

## 2021-11-25 LAB
ALBUMIN SERPL-MCNC: 2.6 G/DL (ref 3.5–5)
ALBUMIN SERPL-MCNC: 2.7 G/DL (ref 3.5–5)
ALP SERPL-CCNC: 69 U/L (ref 45–120)
ALP SERPL-CCNC: 72 U/L (ref 45–120)
ALT SERPL W P-5'-P-CCNC: 57 U/L (ref 0–45)
ALT SERPL W P-5'-P-CCNC: 59 U/L (ref 0–45)
ANION GAP SERPL CALCULATED.3IONS-SCNC: 12 MMOL/L (ref 5–18)
ANION GAP SERPL CALCULATED.3IONS-SCNC: 8 MMOL/L (ref 5–18)
AST SERPL W P-5'-P-CCNC: 101 U/L (ref 0–40)
AST SERPL W P-5'-P-CCNC: 88 U/L (ref 0–40)
BASOPHILS # BLD AUTO: 0 10E3/UL (ref 0–0.2)
BASOPHILS NFR BLD AUTO: 0 %
BILIRUB DIRECT SERPL-MCNC: 0.2 MG/DL
BILIRUB SERPL-MCNC: 0.4 MG/DL (ref 0–1)
BILIRUB SERPL-MCNC: 0.4 MG/DL (ref 0–1)
BUN SERPL-MCNC: 16 MG/DL (ref 8–22)
BUN SERPL-MCNC: 20 MG/DL (ref 8–22)
C REACTIVE PROTEIN LHE: 10.4 MG/DL (ref 0–0.8)
C REACTIVE PROTEIN LHE: 11.7 MG/DL (ref 0–0.8)
CALCIUM SERPL-MCNC: 8.1 MG/DL (ref 8.5–10.5)
CALCIUM SERPL-MCNC: 8.2 MG/DL (ref 8.5–10.5)
CHLORIDE BLD-SCNC: 100 MMOL/L (ref 98–107)
CHLORIDE BLD-SCNC: 99 MMOL/L (ref 98–107)
CO2 SERPL-SCNC: 28 MMOL/L (ref 22–31)
CO2 SERPL-SCNC: 30 MMOL/L (ref 22–31)
CREAT SERPL-MCNC: 0.76 MG/DL (ref 0.7–1.3)
CREAT SERPL-MCNC: 0.79 MG/DL (ref 0.7–1.3)
D DIMER PPP FEU-MCNC: 0.9 UG/ML FEU (ref 0–0.5)
D DIMER PPP FEU-MCNC: 0.93 UG/ML FEU (ref 0–0.5)
EOSINOPHIL # BLD AUTO: 0 10E3/UL (ref 0–0.7)
EOSINOPHIL NFR BLD AUTO: 0 %
ERYTHROCYTE [DISTWIDTH] IN BLOOD BY AUTOMATED COUNT: 13.2 % (ref 10–15)
ERYTHROCYTE [DISTWIDTH] IN BLOOD BY AUTOMATED COUNT: 13.2 % (ref 10–15)
FIBRINOGEN PPP-MCNC: 708 MG/DL (ref 170–490)
GFR SERPL CREATININE-BSD FRML MDRD: >90 ML/MIN/1.73M2
GFR SERPL CREATININE-BSD FRML MDRD: >90 ML/MIN/1.73M2
GLUCOSE BLD-MCNC: 136 MG/DL (ref 70–125)
GLUCOSE BLD-MCNC: 142 MG/DL (ref 70–125)
GLUCOSE BLDC GLUCOMTR-MCNC: 124 MG/DL (ref 70–99)
GLUCOSE BLDC GLUCOMTR-MCNC: 126 MG/DL (ref 70–99)
GLUCOSE BLDC GLUCOMTR-MCNC: 127 MG/DL (ref 70–99)
GLUCOSE BLDC GLUCOMTR-MCNC: 132 MG/DL (ref 70–99)
GLUCOSE BLDC GLUCOMTR-MCNC: 144 MG/DL (ref 70–99)
GLUCOSE BLDC GLUCOMTR-MCNC: 151 MG/DL (ref 70–99)
HBA1C MFR BLD: 5.8 %
HCT VFR BLD AUTO: 41.2 % (ref 40–53)
HCT VFR BLD AUTO: 41.9 % (ref 40–53)
HGB BLD-MCNC: 14 G/DL (ref 13.3–17.7)
HGB BLD-MCNC: 14.1 G/DL (ref 13.3–17.7)
IMM GRANULOCYTES # BLD: 0 10E3/UL
IMM GRANULOCYTES NFR BLD: 1 %
LDH SERPL L TO P-CCNC: 1033 U/L (ref 125–220)
LYMPHOCYTES # BLD AUTO: 0.4 10E3/UL (ref 0.8–5.3)
LYMPHOCYTES NFR BLD AUTO: 17 %
MCH RBC QN AUTO: 29.2 PG (ref 26.5–33)
MCH RBC QN AUTO: 29.6 PG (ref 26.5–33)
MCHC RBC AUTO-ENTMCNC: 33.4 G/DL (ref 31.5–36.5)
MCHC RBC AUTO-ENTMCNC: 34.2 G/DL (ref 31.5–36.5)
MCV RBC AUTO: 87 FL (ref 78–100)
MCV RBC AUTO: 88 FL (ref 78–100)
MONOCYTES # BLD AUTO: 0.2 10E3/UL (ref 0–1.3)
MONOCYTES NFR BLD AUTO: 9 %
NEUTROPHILS # BLD AUTO: 1.8 10E3/UL (ref 1.6–8.3)
NEUTROPHILS NFR BLD AUTO: 73 %
NRBC # BLD AUTO: 0 10E3/UL
NRBC BLD AUTO-RTO: 0 /100
PLATELET # BLD AUTO: 131 10E3/UL (ref 150–450)
PLATELET # BLD AUTO: 134 10E3/UL (ref 150–450)
POTASSIUM BLD-SCNC: 4.6 MMOL/L (ref 3.5–5)
POTASSIUM BLD-SCNC: 4.6 MMOL/L (ref 3.5–5)
PROT SERPL-MCNC: 6.1 G/DL (ref 6–8)
PROT SERPL-MCNC: 6.2 G/DL (ref 6–8)
RBC # BLD AUTO: 4.76 10E6/UL (ref 4.4–5.9)
RBC # BLD AUTO: 4.79 10E6/UL (ref 4.4–5.9)
SODIUM SERPL-SCNC: 137 MMOL/L (ref 136–145)
SODIUM SERPL-SCNC: 140 MMOL/L (ref 136–145)
TROPONIN I SERPL-MCNC: <0.01 NG/ML (ref 0–0.29)
WBC # BLD AUTO: 2.4 10E3/UL (ref 4–11)
WBC # BLD AUTO: 3 10E3/UL (ref 4–11)

## 2021-11-25 PROCEDURE — 999N000157 HC STATISTIC RCP TIME EA 10 MIN

## 2021-11-25 PROCEDURE — 200N000001 HC R&B ICU

## 2021-11-25 PROCEDURE — 99291 CRITICAL CARE FIRST HOUR: CPT | Performed by: INTERNAL MEDICINE

## 2021-11-25 PROCEDURE — 94660 CPAP INITIATION&MGMT: CPT

## 2021-11-25 PROCEDURE — 99233 SBSQ HOSP IP/OBS HIGH 50: CPT | Performed by: INTERNAL MEDICINE

## 2021-11-25 PROCEDURE — 250N000011 HC RX IP 250 OP 636: Performed by: INTERNAL MEDICINE

## 2021-11-25 PROCEDURE — 85027 COMPLETE CBC AUTOMATED: CPT | Performed by: INTERNAL MEDICINE

## 2021-11-25 PROCEDURE — 250N000009 HC RX 250: Performed by: INTERNAL MEDICINE

## 2021-11-25 PROCEDURE — 250N000013 HC RX MED GY IP 250 OP 250 PS 637: Performed by: INTERNAL MEDICINE

## 2021-11-25 PROCEDURE — 82248 BILIRUBIN DIRECT: CPT | Performed by: INTERNAL MEDICINE

## 2021-11-25 PROCEDURE — 258N000003 HC RX IP 258 OP 636: Performed by: INTERNAL MEDICINE

## 2021-11-25 PROCEDURE — 85384 FIBRINOGEN ACTIVITY: CPT | Performed by: INTERNAL MEDICINE

## 2021-11-25 PROCEDURE — 36592 COLLECT BLOOD FROM PICC: CPT | Performed by: INTERNAL MEDICINE

## 2021-11-25 PROCEDURE — 80053 COMPREHEN METABOLIC PANEL: CPT | Performed by: INTERNAL MEDICINE

## 2021-11-25 PROCEDURE — 86141 C-REACTIVE PROTEIN HS: CPT | Performed by: INTERNAL MEDICINE

## 2021-11-25 PROCEDURE — 85379 FIBRIN DEGRADATION QUANT: CPT | Performed by: INTERNAL MEDICINE

## 2021-11-25 PROCEDURE — 250N000012 HC RX MED GY IP 250 OP 636 PS 637: Performed by: INTERNAL MEDICINE

## 2021-11-25 PROCEDURE — 84484 ASSAY OF TROPONIN QUANT: CPT | Performed by: INTERNAL MEDICINE

## 2021-11-25 RX ORDER — ARIPIPRAZOLE 5 MG/1
5 TABLET ORAL DAILY
Status: DISCONTINUED | OUTPATIENT
Start: 2021-11-25 | End: 2021-12-05 | Stop reason: HOSPADM

## 2021-11-25 RX ORDER — FUROSEMIDE 10 MG/ML
20 INJECTION INTRAMUSCULAR; INTRAVENOUS ONCE
Status: COMPLETED | OUTPATIENT
Start: 2021-11-25 | End: 2021-11-25

## 2021-11-25 RX ORDER — GABAPENTIN 400 MG/1
800 CAPSULE ORAL 3 TIMES DAILY
Status: DISCONTINUED | OUTPATIENT
Start: 2021-11-25 | End: 2021-11-28

## 2021-11-25 RX ORDER — BUPROPION HYDROCHLORIDE 150 MG/1
300 TABLET ORAL EVERY MORNING
Status: DISCONTINUED | OUTPATIENT
Start: 2021-11-25 | End: 2021-11-28

## 2021-11-25 RX ORDER — BUPRENORPHINE AND NALOXONE 8; 2 MG/1; MG/1
1 FILM, SOLUBLE BUCCAL; SUBLINGUAL DAILY
Status: DISCONTINUED | OUTPATIENT
Start: 2021-11-25 | End: 2021-12-05

## 2021-11-25 RX ORDER — HYDROXYZINE HYDROCHLORIDE 25 MG/1
25-50 TABLET, FILM COATED ORAL
Status: DISCONTINUED | OUTPATIENT
Start: 2021-11-25 | End: 2021-12-05 | Stop reason: HOSPADM

## 2021-11-25 RX ORDER — NOREPINEPHRINE BITARTRATE 0.02 MG/ML
.01-.6 INJECTION, SOLUTION INTRAVENOUS CONTINUOUS
Status: DISCONTINUED | OUTPATIENT
Start: 2021-11-25 | End: 2021-11-29

## 2021-11-25 RX ORDER — LISINOPRIL 10 MG/1
10 TABLET ORAL DAILY
Status: DISCONTINUED | OUTPATIENT
Start: 2021-11-26 | End: 2021-12-05

## 2021-11-25 RX ADMIN — DEXAMETHASONE SODIUM PHOSPHATE 6 MG: 10 INJECTION INTRAMUSCULAR; INTRAVENOUS at 13:07

## 2021-11-25 RX ADMIN — REMDESIVIR 100 MG: 100 INJECTION, POWDER, LYOPHILIZED, FOR SOLUTION INTRAVENOUS at 18:06

## 2021-11-25 RX ADMIN — FAMOTIDINE 20 MG: 10 INJECTION, SOLUTION INTRAVENOUS at 08:51

## 2021-11-25 RX ADMIN — FUROSEMIDE 20 MG: 10 INJECTION, SOLUTION INTRAMUSCULAR; INTRAVENOUS at 16:33

## 2021-11-25 RX ADMIN — IPRATROPIUM BROMIDE AND ALBUTEROL 2 PUFF: 20; 100 SPRAY, METERED RESPIRATORY (INHALATION) at 13:07

## 2021-11-25 RX ADMIN — GABAPENTIN 800 MG: 400 CAPSULE ORAL at 21:08

## 2021-11-25 RX ADMIN — SODIUM CHLORIDE 500 ML: 9 INJECTION, SOLUTION INTRAVENOUS at 15:13

## 2021-11-25 RX ADMIN — GABAPENTIN 800 MG: 400 CAPSULE ORAL at 08:51

## 2021-11-25 RX ADMIN — SODIUM CHLORIDE 50 ML: 9 INJECTION, SOLUTION INTRAVENOUS at 18:07

## 2021-11-25 RX ADMIN — INSULIN ASPART 1 UNITS: 100 INJECTION, SOLUTION INTRAVENOUS; SUBCUTANEOUS at 00:17

## 2021-11-25 RX ADMIN — ENOXAPARIN SODIUM 50 MG: 100 INJECTION SUBCUTANEOUS at 13:07

## 2021-11-25 RX ADMIN — INSULIN ASPART 1 UNITS: 100 INJECTION, SOLUTION INTRAVENOUS; SUBCUTANEOUS at 04:34

## 2021-11-25 RX ADMIN — IPRATROPIUM BROMIDE AND ALBUTEROL 2 PUFF: 20; 100 SPRAY, METERED RESPIRATORY (INHALATION) at 21:09

## 2021-11-25 RX ADMIN — BUPROPION HYDROCHLORIDE 300 MG: 150 TABLET, EXTENDED RELEASE ORAL at 08:51

## 2021-11-25 RX ADMIN — BUPRENORPHINE AND NALOXONE 1 FILM: 8; 2 FILM, SOLUBLE BUCCAL; SUBLINGUAL at 08:51

## 2021-11-25 RX ADMIN — GABAPENTIN 800 MG: 400 CAPSULE ORAL at 13:07

## 2021-11-25 RX ADMIN — INSULIN ASPART 1 UNITS: 100 INJECTION, SOLUTION INTRAVENOUS; SUBCUTANEOUS at 23:58

## 2021-11-25 RX ADMIN — IPRATROPIUM BROMIDE AND ALBUTEROL 2 PUFF: 20; 100 SPRAY, METERED RESPIRATORY (INHALATION) at 16:34

## 2021-11-25 ASSESSMENT — ACTIVITIES OF DAILY LIVING (ADL)
ADLS_ACUITY_SCORE: 5
ADLS_ACUITY_SCORE: 6
ADLS_ACUITY_SCORE: 3
ADLS_ACUITY_SCORE: 5
ADLS_ACUITY_SCORE: 6
ADLS_ACUITY_SCORE: 6
ADLS_ACUITY_SCORE: 5
ADLS_ACUITY_SCORE: 6
ADLS_ACUITY_SCORE: 6
ADLS_ACUITY_SCORE: 3
ADLS_ACUITY_SCORE: 6
ADLS_ACUITY_SCORE: 5
ADLS_ACUITY_SCORE: 5
ADLS_ACUITY_SCORE: 6
ADLS_ACUITY_SCORE: 5
ADLS_ACUITY_SCORE: 3
ADLS_ACUITY_SCORE: 6
ADLS_ACUITY_SCORE: 5

## 2021-11-25 ASSESSMENT — MIFFLIN-ST. JEOR
SCORE: 1788.9
SCORE: 1802.51

## 2021-11-25 NOTE — PROGRESS NOTES
Patient remains on BiPAP 12/6, RR 16, FiO2 100%. Saturations 92-94%     Will continue to monitor.      Che Barksdale, RT

## 2021-11-25 NOTE — PLAN OF CARE
A&O. Remains on BiPAP 100%, satting 90-93%. Desats quickly to low 80s when off mask. Hypotensive this afternoon (MAPs 60-67), 500ml NS bolus given. Lisinopril held. Accucheck q4, no coverage. NPO except ice chips, meds. Using urinal. R PICC. Agreeable to laying prone, has been high on sides.  Ex-wife Bertram updated; she is the only person of contact that pt approved to give info (mom and sister have called).

## 2021-11-25 NOTE — PROGRESS NOTES
"[facility] ACUTE PAIN SERVICE Chart Check     Date of Admission:  11/24/2021  Date of Consult (When I saw the patient): 11/25/21  Physician requesting consult: Dr. Hidalgo   Reason for consult: suboxone patient   Primary Care Physician: Sal sam     Assessment/Plan:     Jared North is a 46 year old male who was admitted on 11/24/2021.  Pain team was asked to see the patient for methadone? suboxone use. History of chronic pain, methadone use (unsure if current?), suboxone use, HTN  presented with worsening shortness of breath, positive for Covid.     Covid pneumonia with severe acute respiratory failure hypoxia .   Per Med rec:  Methadone_unable to verify if pt is currently taking. According to his ex-wife, couldn't find a prescription bottle at home. We called [facility] and there was no record of methadone. Clinic summary note from 3/21 \"former addict/on Methadone\" The nurse asked pt if he takes Methadone, pt replied only takes \"Suboxone not methadone\"  GgggGGGGGGGg    Opioid Induced Respiratory Depression Risk Assessment:?high: hypoxia currently  (Low 0-1; Moderate 2-4; or High >4 or >/= 3 if two of the risk factors are age > 60 and opioid naive) due to the following risk factors: ROSSY, COPD/Asthma/pulmonary disease, CHF, renal dysfunction, hepatic dysfunction, Obesity, Smoker, Age>60, >2 opioid therapies, concomitant CNS depressants, opioid naive status, or post surgical ?     Will not see today:  PAIN MANAGEMENT SERVICE CHART CHECK  This patient's chart has been reviewed by the Pain Service. It has been determined that no change is necessary to the pain management regimen at this time.   Unable to speak with patient due to BIPAP. Did speak to ERYN Csepedes who reports that patient having no severe pain cues. Home Suboxone reordered. It appears that patient used to be on methadone but is now on Suboxone. Indication unclear (reports that this is for pain vs opioid dependence. However, previous " dose of methadone of 84mg daily would lean towards opioid dependence dosing, as well as his current Suboxone Rx from addiction clinic, below).  Will discuss more with patient as he is able to talk. Did confirm with exwife, as did med rec [facility], that patient is not apparently on methadone currently.  Methadone dispense report in Epic requires patient consent to be signed.  No s/s of opioid withdrawal this AM per Rubina. Maintain currently ordered meds. PMT will revisit tomorrow.     PLAN; continue current home medications, PMT will call [place] and surrounding methadone clinics tomorrow to ensure that is is correct that patient is not currently on methadone  Per Suboxone Rx, patient sees     [facility] (addiction services) for his Suboxone  [phone]  [email or web address]  [address] [address]  [phone]  Hours: 8 AM-5 PM      1) Pain is consistent with acute on chronic pain..  Labs and imaging indicated elevated LFT. Treatment plan includes standard COVID treatement.. Patient is understanding of the plan. All questions and concerns addressed to patient's satisfaction.   2)Multimodal Medication Therapy  Topical:  none  NSAID'S: CrCl 133ml/min. None, is on dexamthasone daily  Muscle Relaxants:  none  Adjuvants: gabapentin 800mg TID, atarax 25-50mg qhsprn  Antidepressants/anxiolytics:  Wellbutrin  qam, abilify daily  Opioids :Suboxone 8-2: 1 film daily  IV Pain medication:  none  Pharmacy recommendations:    3)Non-medication interventions:   Acupuncture consult, Integrative consult - if patient agreeable   4)Constipation Prophylaxis: Scheduled and prn:  None current  5) Care Teams:    -Opioid prescriber has been [employer] [place] PMP pulled from system on 11/25/21. This indicates chronic gabapentin and Suboxone use. Most recent dispense of both = 11/11/21.   Discharge Recommendations - We recommend prescribing the following at the time of discharge: TBD.  Do not  recommend opioids for ongoing management, if warranted would limit to < 3-5 day supply at discharge, continue adjuvants/topicals if effective for acute pain. Intranasal Naloxone : not dispensed per records  Follow up PCP.       History of Present Illness (HPI):       Jared North is a 46 year old old male who presented for COVID pneumonia.  Past medical history as above. The pain is reported to be acute  on chronic  Per [place]  review, the patient does  have an opioid tolerance. Opioid induced side effects noted and include: sedation, nausea, and constipation, sleep dysfunction, sleep apnea, and disease of addiction: denies, was on methadone, now on suboxone for pain?.      Reviewed medical record, labs, imaging, ED note, and care everywhere.   Per ED visit to [facility] on 11/19:   Your symptoms today are due to a positive COVID-19 infection.    Your blood work was otherwise unremarkable. Your head CT was unremarkable. Your chest x-ray was clear.    For treatment of fever and pain:  Tylenol 1000 mg 4 times a day (every 6 hours) - no more than 4000 mg per day  Ibuprofen 600 mg 4 times a day (every 6 hours) - no more than 3200mg per day  I would suggest you alternate the Tylenol and Ibuprofen so you are taking of the medications every 3 hours          Past pain treatments have included: gabapentin, methadone      Home pain medications/psych medications/anticoagulation medications include: Suboxone, gabapentin, Wellbutrin, Abilify    Last UDS:  None recent     Medical History   PAST MEDICAL HISTORY: History reviewed. No pertinent past medical history.    PAST SURGICAL HISTORY:   Past Surgical History:   Procedure Laterality Date     PICC TRIPLE LUMEN PLACEMENT  11/24/2021            FAMILY HISTORY:   Family History   Problem Relation Age of Onset     Diabetes Mother      Heart Disease Mother      Hypertension Mother      Hypertension Maternal Grandmother      Diabetes Maternal Grandfather      Hypertension Maternal  Grandfather        SOCIAL HISTORY:   Social History     Tobacco Use     Smoking status: Never Smoker     Smokeless tobacco: Current User   Substance Use Topics     Alcohol use: Yes     Alcohol/week: 21.0 standard drinks        [facility]  Tobacco:  reports that he has never smoked. He uses smokeless tobacco.  Alcohol:  reports current alcohol use of about 21.0 standard drinks of alcohol per week.  Illicit drugs:  reports no history of drug use.    Allergies  No Known Allergies      Labs: Reviewed   Recent Results (from the past 24 hour(s))   D dimer quantitative    Collection Time: 11/24/21  1:40 PM   Result Value Ref Range    D-Dimer Quantitative 1.24 (H) 0.00 - 0.50 ug/mL FEU   Extra Blue Top Tube    Collection Time: 11/24/21  1:40 PM   Result Value Ref Range    Hold Specimen     Basic metabolic panel    Collection Time: 11/24/21  1:41 PM   Result Value Ref Range    Sodium 131 (L) 136 - 145 mmol/L    Potassium 4.0 3.5 - 5.0 mmol/L    Chloride 95 (L) 98 - 107 mmol/L    Carbon Dioxide (CO2) 28 22 - 31 mmol/L    Anion Gap 8 5 - 18 mmol/L    Urea Nitrogen 15 8 - 22 mg/dL    Creatinine 0.99 0.70 - 1.30 mg/dL    Calcium 8.1 (L) 8.5 - 10.5 mg/dL    Glucose 139 (H) 70 - 125 mg/dL    GFR Estimate >90 >60 mL/min/1.73m2   Hepatic function panel    Collection Time: 11/24/21  1:41 PM   Result Value Ref Range    Bilirubin Total 0.5 0.0 - 1.0 mg/dL    Bilirubin Direct 0.2 <=0.5 mg/dL    Protein Total 6.5 6.0 - 8.0 g/dL    Albumin 2.9 (L) 3.5 - 5.0 g/dL    Alkaline Phosphatase 81 45 - 120 U/L     (H) 0 - 40 U/L    ALT 53 (H) 0 - 45 U/L   Troponin I (now)    Collection Time: 11/24/21  1:41 PM   Result Value Ref Range    Troponin I 0.01 0.00 - 0.29 ng/mL   CRP inflammation    Collection Time: 11/24/21  1:41 PM   Result Value Ref Range    CRP 12.3 (H) 0.0-<0.8 mg/dL   Extra Blood Culture Bottle    Collection Time: 11/24/21  1:41 PM   Result Value Ref Range    Hold Specimen JIC    Extra Green Top (Lithium  Heparin) Tube    Collection Time: 11/24/21  1:41 PM   Result Value Ref Range    Hold Specimen     Extra Purple Top Tube    Collection Time: 11/24/21  1:41 PM   Result Value Ref Range    Hold Specimen     Extra Heparinized Syringe    Collection Time: 11/24/21  1:41 PM   Result Value Ref Range    Hold Specimen JIC    Extra Green Top (Lithium Heparin) ON ICE    Collection Time: 11/24/21  1:41 PM   Result Value Ref Range    Hold Specimen JIC    CBC with platelets and differential    Collection Time: 11/24/21  1:41 PM   Result Value Ref Range    WBC Count 4.5 4.0 - 11.0 10e3/uL    RBC Count 4.91 4.40 - 5.90 10e6/uL    Hemoglobin 14.4 13.3 - 17.7 g/dL    Hematocrit 42.5 40.0 - 53.0 %    MCV 87 78 - 100 fL    MCH 29.3 26.5 - 33.0 pg    MCHC 33.9 31.5 - 36.5 g/dL    RDW 13.1 10.0 - 15.0 %    Platelet Count 128 (L) 150 - 450 10e3/uL    % Neutrophils 82 %    % Lymphocytes 8 %    % Monocytes 9 %    % Eosinophils 0 %    % Basophils 0 %    % Immature Granulocytes 1 %    NRBCs per 100 WBC 0 <1 /100    Absolute Neutrophils 3.7 1.6 - 8.3 10e3/uL    Absolute Lymphocytes 0.4 (L) 0.8 - 5.3 10e3/uL    Absolute Monocytes 0.4 0.0 - 1.3 10e3/uL    Absolute Eosinophils 0.0 0.0 - 0.7 10e3/uL    Absolute Basophils 0.0 0.0 - 0.2 10e3/uL    Absolute Immature Granulocytes 0.1 (H) <=0.0 10e3/uL    Absolute NRBCs 0.0 10e3/uL   Procalcitonin    Collection Time: 11/24/21  1:44 PM   Result Value Ref Range    Procalcitonin 0.50 (H) 0.00 - 0.49 ng/mL   Glucose by meter    Collection Time: 11/24/21 10:16 PM   Result Value Ref Range    GLUCOSE BY METER POCT 165 (H) 70 - 99 mg/dL   Comprehensive metabolic panel    Collection Time: 11/24/21 11:37 PM   Result Value Ref Range    Sodium 137 136 - 145 mmol/L    Potassium 4.6 3.5 - 5.0 mmol/L    Chloride 99 98 - 107 mmol/L    Carbon Dioxide (CO2) 30 22 - 31 mmol/L    Anion Gap 8 5 - 18 mmol/L    Urea Nitrogen 16 8 - 22 mg/dL    Creatinine 0.76 0.70 - 1.30 mg/dL    Calcium 8.1 (L) 8.5 - 10.5 mg/dL    Glucose  142 (H) 70 - 125 mg/dL    Alkaline Phosphatase 72 45 - 120 U/L     (H) 0 - 40 U/L    ALT 59 (H) 0 - 45 U/L    Protein Total 6.2 6.0 - 8.0 g/dL    Albumin 2.7 (L) 3.5 - 5.0 g/dL    Bilirubin Total 0.4 0.0 - 1.0 mg/dL    GFR Estimate >90 >60 mL/min/1.73m2   CRP inflammation    Collection Time: 11/24/21 11:37 PM   Result Value Ref Range    CRP 11.7 (H) 0.0-<0.8 mg/dL   ABO and Rh    Collection Time: 11/24/21 11:37 PM   Result Value Ref Range    ABO/RH(D) A POS     SPECIMEN EXPIRATION DATE [id number]    CBC with platelets and differential    Collection Time: 11/24/21 11:37 PM   Result Value Ref Range    WBC Count 2.4 (L) 4.0 - 11.0 10e3/uL    RBC Count 4.76 4.40 - 5.90 10e6/uL    Hemoglobin 14.1 13.3 - 17.7 g/dL    Hematocrit 41.2 40.0 - 53.0 %    MCV 87 78 - 100 fL    MCH 29.6 26.5 - 33.0 pg    MCHC 34.2 31.5 - 36.5 g/dL    RDW 13.2 10.0 - 15.0 %    Platelet Count 134 (L) 150 - 450 10e3/uL    % Neutrophils 73 %    % Lymphocytes 17 %    % Monocytes 9 %    % Eosinophils 0 %    % Basophils 0 %    % Immature Granulocytes 1 %    NRBCs per 100 WBC 0 <1 /100    Absolute Neutrophils 1.8 1.6 - 8.3 10e3/uL    Absolute Lymphocytes 0.4 (L) 0.8 - 5.3 10e3/uL    Absolute Monocytes 0.2 0.0 - 1.3 10e3/uL    Absolute Eosinophils 0.0 0.0 - 0.7 10e3/uL    Absolute Basophils 0.0 0.0 - 0.2 10e3/uL    Absolute Immature Granulocytes 0.0 <=0.0 10e3/uL    Absolute NRBCs 0.0 10e3/uL   INR    Collection Time: 11/24/21 11:39 PM   Result Value Ref Range    INR 1.08 0.85 - 1.15   Partial thromboplastin time    Collection Time: 11/24/21 11:39 PM   Result Value Ref Range    aPTT 35 22 - 38 Seconds   Fibrinogen activity    Collection Time: 11/24/21 11:39 PM   Result Value Ref Range    Fibrinogen Activity 695 (H) 170 - 490 mg/dL   D dimer quantitative    Collection Time: 11/24/21 11:39 PM   Result Value Ref Range    D-Dimer Quantitative 0.93 (H) 0.00 - 0.50 ug/mL FEU   Glucose by meter    Collection Time: 11/25/21 12:15 AM   Result Value Ref  Range    GLUCOSE BY METER POCT 151 (H) 70 - 99 mg/dL   Glucose by meter    Collection Time: 11/25/21  4:31 AM   Result Value Ref Range    GLUCOSE BY METER POCT 144 (H) 70 - 99 mg/dL   Fibrinogen activity    Collection Time: 11/25/21  4:33 AM   Result Value Ref Range    Fibrinogen Activity 708 (H) 170 - 490 mg/dL   D dimer quantitative    Collection Time: 11/25/21  4:33 AM   Result Value Ref Range    D-Dimer Quantitative 0.90 (H) 0.00 - 0.50 ug/mL FEU   Comprehensive metabolic panel    Collection Time: 11/25/21  4:33 AM   Result Value Ref Range    Sodium 140 136 - 145 mmol/L    Potassium 4.6 3.5 - 5.0 mmol/L    Chloride 100 98 - 107 mmol/L    Carbon Dioxide (CO2) 28 22 - 31 mmol/L    Anion Gap 12 5 - 18 mmol/L    Urea Nitrogen 20 8 - 22 mg/dL    Creatinine 0.79 0.70 - 1.30 mg/dL    Calcium 8.2 (L) 8.5 - 10.5 mg/dL    Glucose 136 (H) 70 - 125 mg/dL    Alkaline Phosphatase 69 45 - 120 U/L    AST 88 (H) 0 - 40 U/L    ALT 57 (H) 0 - 45 U/L    Protein Total 6.1 6.0 - 8.0 g/dL    Albumin 2.6 (L) 3.5 - 5.0 g/dL    Bilirubin Total 0.4 0.0 - 1.0 mg/dL    GFR Estimate >90 >60 mL/min/1.73m2   CBC with platelets    Collection Time: 11/25/21  4:33 AM   Result Value Ref Range    WBC Count 3.0 (L) 4.0 - 11.0 10e3/uL    RBC Count 4.79 4.40 - 5.90 10e6/uL    Hemoglobin 14.0 13.3 - 17.7 g/dL    Hematocrit 41.9 40.0 - 53.0 %    MCV 88 78 - 100 fL    MCH 29.2 26.5 - 33.0 pg    MCHC 33.4 31.5 - 36.5 g/dL    RDW 13.2 10.0 - 15.0 %    Platelet Count 131 (L) 150 - 450 10e3/uL   CRP inflammation    Collection Time: 11/25/21  4:33 AM   Result Value Ref Range    CRP 10.4 (H) 0.0-<0.8 mg/dL   Bilirubin direct    Collection Time: 11/25/21  4:33 AM   Result Value Ref Range    Bilirubin Direct 0.2 <=0.5 mg/dL     Angely Green, PHarmD  Acute Care Pain Management Program  [facility] ([place], [place], [place])  Monday-Friday 8a-4p   Page via online paging system or call [phone]

## 2021-11-25 NOTE — CONSULTS
[facility]:  CRITICAL CARE CONSULT NOTE     Date / Time of Admission:  11/24/2021  1:19 PM    ID: Jared North is a 46 year old male, unvaccinated against COVID-19, with history of polysubstance abuse and chronic pain/lower back pain now on Suboxone, mood disorder/depression, essential hypertension, sleep disorder breathing, mild intermittent asthma, knee pain, and recent diagnosis of COVID-19 viral infection on 11/19/2021 who presented to [facility] on 11/24/2021 with worsening shortness of breath, admitted to the ICU with COVID-19 viral pneumonia and severe acute respiratory failure with hypoxia requiring noninvasive ventilation.          Changes for today: 1.   Transferred to the ICU from the ED last night, dependence on NIPPV.  2. Discussed proning with the patient.  3. If remains completely dependent on NIPPV in the next 24/48 hours, will plan for intubation.  4. Keep NPO - discussed with patient.        Assessment/Plan:   Neurologic: History of polysubstance abuse and chronic pain/lower back pain now on Suboxone.  Mood disorder/depression.    Continue home meds: abilify, suboxone 8-2 mg, gabapentin 3x/day    Pain meds PRN     Pulmonary: Acute respiratory failure with hypoxia, acute respiratory distress syndrome 2/2 COVID-19.  CT chest 11/24 extensive groundglass consolidative bilateral pulmonary opacities.  COVID-19 viral pneumonia.  Mild intermittent asthma.  He reports using inhaler once weekly at baseline. Trace left pleural effusion.  Noted on CT scan. Give lasix 11/25.    Wean supplemental O2 as tolerated; goal O2 sat > 92%.  HOB > 30 degrees to limit aspiration risk.      NIPPV - 12/6, FiO2 100%.      High risk of intubation, discussed with the patient and ex-wife    Continue Combivent 2 puffs 4 times per day.  If unable to tolerate inhaler, will switch to nebulizer.    abx and diuresis as below.     Cardiovascular: Benign essential hypertension.  On lisinopril at  baseline.    Cardiac monitoring.  SBP >= 90 mmHg, MAP >= 65 mmHg.  EKG PRN.    Continue home lisinopril      GI/: Transaminitis.  Likely from the viral infection.    Nutrition: NPO.     Last BM:  None..  Meds: None.    GI prophylaxis: PPI.     Renal: No issues.      Monitor I/O's.  Electrolyte repletion PRN.  Avoid/limit nephrotoxic agents.    IVFs: Saline lock.    Had trace left pleural effusion on CT - give Lasix 20 mg IV x 1.     Heme/Onc: Leukopenia.,  Thrombocytopenia.  COVID-19 associated coagulopathy.  D-dimer 1.24 on 11/24.    DVT prophylaxis: SCDs.  Lovenox 0.5 mg/kg subcu 2X/day.     Endocrine: Hyperglycemia, mild.  No history of diabetes.  Secondary to stress and steroid use.  Hemoglobin A1c 5.8% 11/24.    FSBG Q4H, Aspart insulin SS (high insulin resistance).  Hypoglycemia protocol.     Infectious diseases: COVID-19 viral infection.  PCR positive on 11/19.  Hospitalized on 11/24.  CRP 12.3, PCT 0.5 on 11/24.  Patient received tocilizumab 8 mg/kg on 11/24.    Procalcitonin equivocal.  Repeat procalcitonin tomorrow.  If has fever, initiate broad-spectrum antibiotics.    Continue dexamethasone 6 mg daily x10 days (start 11/24)    Continue remdesivir daily x5 days (start 11/24)    Rheum/Musculoskeletal: Chronic lumbar pain and knee pain.  Patient being evaluated by orthopedic surgery for surgical intervention.    Activity:  Bedrest    Risk Factors Present on Admission:  Clinically Significant Risk Factors Present on Admission                   Lines/Drains/Tubes:  RUE PICC central line, placed 11/24     Code Status:  Full Code     The patient and/or the family was educated about the above plan of care and indicated understanding.    I extensively discussed with the patient and separately with his ex-wife, who is his next decision maker.  High risk of potential intubation.     This patient is considered critically ill and requires ICU level of care due to acute respiratory failure with hypoxia requiring  noninvasive ventilation.     Total Critical Care time, not including separate billable procedure time: 65 minutes.    Kendra Redman MD, MPH  Pulmonary/Critical Care Medicine  11/25/2021   1:59 PM         ICU DAILY CHECKLIST:   ICU DAILY CHECKLIST           Can patient transfer out of MICU? no    FAST HUG:    Feeding:  no.  Patient is receiving NPO    Wei: no  Analgesia/Sedation: yes; PRN   Thromboembolic prophylaxis: yes; Mode:  Lovenox and SCDs  HOB>30:  yes  Stress Ulcer Protocol Active: yes; Mode: PPI  Glycemic Control: Any glucose > 180 no; Mode of Insulin Therapy: Sliding Scale Insulin    INTUBATED:  Can patient have daily waking:  n/a  Can patient have spontaneous breathing trial:  n/a    Restraints? no    PHYSICAL THERAPY AND MOBILITY:  Can patient have PT and mobility trial: no  Activity: Bedrest     Chief Complaint Chief Complaint   Patient presents with     Covid Concern     Shortness of Breath             HPI:   Jared North is a 46 year old male, unvaccinated against COVID-19, with history of polysubstance abuse and chronic pain/lower back pain now on Suboxone, mood disorder/depression, essential hypertension, sleep disorder breathing, mild intermittent asthma, knee pain, and recent diagnosis of COVID-19 viral infection on 11/19/2021 who presented to [facility] on 11/24/2021 with worsening shortness of breath, admitted to the ICU with COVID-19 viral pneumonia and severe acute respiratory failure with hypoxia requiring noninvasive ventilation.  History is provided by the patient, review of medical records.    In brief, patient has been sick for about 1 week prior to evaluation.  He endorsed having significant fatigue, mild shortness of breath, intermittent cough.  Occasional wheezing.  He was seen at the [place] ED on 11/19, tested positive for Covid 19 at that time.  Ex-wife endorsed concerns, patient came to [facility] ED for evaluation.  O2 sat 44% on room air in triage.  Initially placed  on oxygen mask, then noninvasive ventilation.  CT scan with extensive groundglass opacities, no pulmonary embolism.  He was given Decadron, remdesivir, and I provided dose of tocilizumab during his ED course.  PICC line placed.  He was transferred to the ICU for continued care.  Overnight, had declined proning.  Remains on FiO2 100%, noninvasive ventilation.        Review of Systems:   Review of Systems:  The Review of Systems is negative other than noted in the HPI        Medical/Surgical History:   History reviewed. No pertinent past medical history.   Past Surgical History:   Procedure Laterality Date     PICC TRIPLE LUMEN PLACEMENT  11/24/2021                 Allergies/Medications:   Allergies:   No Known Allergies    OUTPATIENT Medications:  Medications Prior to Admission   Medication Sig Dispense Refill Last Dose     albuterol (PROAIR HFA;PROVENTIL HFA;VENTOLIN HFA) 90 mcg/actuation inhaler Inhale 2 puffs into the lungs every 4 hours as needed    11/23/2021 at Unknown time     ARIPiprazole (ABILIFY) 5 MG tablet Take 5 mg by mouth daily   11/23/2021 at Unknown time     buprenorphine HCl-naloxone HCl (SUBOXONE) 8-2 MG per film Place 1 Film under the tongue daily   11/24/2021 at Unknown time     buPROPion (WELLBUTRIN XL) 300 MG 24 hr tablet Take 300 mg by mouth every morning   11/23/2021 at Unknown time     gabapentin (NEURONTIN) 400 MG capsule Take 800 mg by mouth 3 times daily   11/23/2021 at Unknown time     hydrOXYzine (VISTARIL) 25 MG capsule Take 25-50 mg by mouth nightly as needed for itching   Past Week at Unknown time     lisinopril (PRINIVIL,ZESTRIL) 40 MG tablet [LISINOPRIL (PRINIVIL,ZESTRIL) 40 MG TABLET] Take 1 tablet (40 mg total) by mouth daily. (Patient taking differently: Take 10 mg by mouth daily ) 90 tablet 0 11/23/2021 at Unknown time     multivitamin w/minerals (CERTAVITE/ANTIOXIDANTS) tablet Take 1 tablet by mouth daily   11/23/2021 at Unknown time     methadone (DOLOPHINE) 50 mg/5 mL  solution [METHADONE (DOLOPHINE) 50 MG/5 ML SOLUTION] Take 84 mg by mouth daily. (Patient not taking: Reported on 2021)   Not Taking at Unknown time       INPATIENT Medications: Continuous Infusions:    INPATIENT Medications: Scheduled Medications:    remdesivir  100 mg Intravenous Q24H    And     sodium chloride 0.9%  50 mL Intravenous Q24H     ARIPiprazole  5 mg Oral Daily     buprenorphine HCl-naloxone HCl  1 Film Sublingual Daily     buPROPion  300 mg Oral QAM     dexamethasone  6 mg Intravenous Daily     enoxaparin ANTICOAGULANT  0.5 mg/kg Subcutaneous Q12H     gabapentin  800 mg Oral TID     insulin aspart  1-12 Units Subcutaneous Q4H     ipratropium-albuterol  2 puff Inhalation 4x Daily     lisinopril  40 mg Oral Daily     pantoprazole (PROTONIX) IV  40 mg Intravenous Daily with breakfast         Family History:     Social History:   Reviewed:    Parents: Father .  Mother alive.    Siblings: 1 sibling, alive.    Children: 2 kids, alive.    Other: No history of cancer, asthma/COPD, autoimmune disorder, venous thromboembolic disease  Family History   Problem Relation Age of Onset     Diabetes Mother      Heart Disease Mother      Hypertension Mother      Hypertension Maternal Grandmother      Diabetes Maternal Grandfather      Hypertension Maternal Grandfather     Reviewed:    Tobacco: Denies current/prior use    Alcohol: Prior heavy use, quit 1.5 years ago    Drugs: Prior drug abuse    Other: Not working.  .  Social History     Socioeconomic History     Marital status:      Spouse name: Not on file     Number of children: Not on file     Years of education: Not on file     Highest education level: Not on file   Occupational History     Not on file   Tobacco Use     Smoking status: Never Smoker     Smokeless tobacco: Current User   Substance and Sexual Activity     Alcohol use: Yes     Alcohol/week: 21.0 standard drinks     Drug use: No     Sexual activity: Not on file   Other  "Topics Concern     Not on file   Social History Narrative     Not on file     Social Determinants of Health     Financial Resource Strain: Not on file   Food Insecurity: Not on file   Transportation Needs: Not on file   Physical Activity: Not on file   Stress: Not on file   Social Connections: Not on file   Intimate Partner Violence: Not on file   Housing Stability: Not on file              Objective:   Vitals:  /64   Pulse 64   Temp 97.3  F (36.3  C) (Axillary)   Resp 26   Ht 1.803 m (5' 11\")   Wt 90.5 kg (199 lb 9.6 oz)   SpO2 93%   BMI 27.84 kg/m    Vent: FiO2 (%): 100 %  Resp: 26    GEN: Pleasant male, sitting up in the bed, no acute distress  HEENT: Normocephalic, atraumatic.  Extraoccular eye movements intact, anicteric sclera. Moist mucous membranes.  On full facemask.  NECK: Supple.    PULM: On noninvasive ventilation.  Non-labored breathing.  No use of accessory muscles.  Diminished breath sounds at bases, fine and story rales at bases.  CVS: Regular rate and rhythm.  Normal S1, S2.  No rubs, murmurs, or gallops.    ABDOMEN: Normoactive bowel sounds.  Non-tender to palpation.  Non-distended.    EXTREMITES:  No clubbing, cyanosis, or edema.    NEURO:  Awake.  Oriented to person, place, time and situation.  Cranial nerves 2-12 grossly intact.  Moving all extremities.      Intake/Output: I/O last 3 completed shifts:  In: 440 [IV Piggyback:440]  Out: 500 [Urine:500]        Pertinent Studies:   All laboratory data reviewed  Serum Glucose range:   Recent Labs   Lab 11/25/21  1309 11/25/21  0854 11/25/21  0433 11/25/21  0431   * 126* 136* 144*     ABG: No lab results found in last 7 days.  CBC:   Recent Labs   Lab 11/25/21  0433 11/24/21  2337 11/24/21  1341   WBC 3.0* 2.4* 4.5   HGB 14.0 14.1 14.4   HCT 41.9 41.2 42.5   MCV 88 87 87   * 134* 128*   NEUTROPHIL  --  73 82   LYMPH  --  17 8   MONOCYTE  --  9 9   EOSINOPHIL  --  0 0     Chemistry:   Recent Labs   Lab 11/25/21  0433 " 11/24/21  2337 11/24/21  1341    137 131*   POTASSIUM 4.6 4.6 4.0   CHLORIDE 100 99 95*   CO2 28 30 28   BUN 20 16 15   CR 0.79 0.76 0.99   GFRESTIMATED >90 >90 >90   TRUONG 8.2* 8.1* 8.1*   PROTTOTAL 6.1 6.2 6.5   ALBUMIN 2.6* 2.7* 2.9*   AST 88* 101* 105*   ALT 57* 59* 53*   ALKPHOS 69 72 81   BILITOTAL 0.4 0.4 0.5     Coags:  Recent Labs   Lab 11/24/21  2339   INR 1.08   PTT 35     Cardiac Markers:  Recent Labs   Lab 11/25/21  0433   TROPONINI <0.01        Microbiology:  COVID-19 PCR, 11/19: Positive        Cardiology/Radiology:   Cardiac: All cardiac studies reviewed by me.    EKG: Reviewed    TTE: None    Radiology:  All imaging studies reviewed by me.    CT chest PE study, 11/24:  FINDINGS: ANGIOGRAM CHEST: No pulmonary artery filling defect. Aorta is normal in caliber.  LUNGS AND PLEURA: Extensive groundglass consolidative bilateral pulmonary opacities. Trace left effusion. MEDIASTINUM/AXILLAE: Heart size is normal scattered borderline enlarged mediastinal lymph nodes with the largest in the right lower paratracheal chain measuring 1.1 x 2.2 cm (series 5/29).  CORONARY ARTERY CALCIFICATION: None.  UPPER ABDOMEN: Hepatic steatosis.  MUSCULOSKELETAL: Normal. IMPRESSION: 1.  No pulmonary embolus. 2.  COVID pneumonia. 3.  Likely reactive mediastinal  lymph nodes. 4.  Hepatic steatosis.

## 2021-11-25 NOTE — CONSULTS
Care Management Initial Consult    General Information  Assessment completed with: Spouse or significant other, Spoke with ex-wife Jama  Type of CM/SW Visit: Initial Assessment    Primary Care Provider verified and updated as needed: Yes   Readmission within the last 30 days:        Reason for Consult: discharge planning  Advance Care Planning: Advance Care Planning Reviewed:  (Declined [employer] information)     General Information Comments: Lives with ex-wife Jama [phone]    Communication Assessment  Patient's communication style: spoken language (English or Bilingual)    Hearing Difficulty or Deaf: no   Wear Glasses or Blind: no    Cognitive  Cognitive/Neuro/Behavioral: WDL                      Living Environment:   People in home: significant other  Ex-wife Jama  Current living Arrangements: house      Able to return to prior arrangements: other (see comments) (Pending clinical progress)       Family/Social Support:  Care provided by: self  Provides care for: no one, unable/limited ability to care for self  Marital Status:   Significant Other       Jama  Description of Support System: Supportive,Involved    Support Assessment: Adequate family and caregiver support    Current Resources:   Patient receiving home care services: No     Community Resources: [employer],Financial/Insurance  Equipment currently used at home: none  Supplies currently used at home: None    Employment/Financial:  Employment Status: unemployed        Financial Concerns: No concerns identified   Referral to Financial Counselor: No       Lifestyle & Psychosocial Needs:  Social Determinants of Health     Tobacco Use: High Risk     Smoking Tobacco Use: Never Smoker     Smokeless Tobacco Use: Current User   Alcohol Use: Not on file   Financial Resource Strain: Not on file   Food Insecurity: Not on file   Transportation Needs: Not on file   Physical Activity: Not on file   Stress: Not on file   Social  Connections: Not on file   Intimate Partner Violence: Not on file   Depression: Not on file   Housing Stability: Not on file       Functional Status:  Prior to admission patient needed assistance:   Dependent ADLs:: Independent  Dependent IADLs:: Independent  Assesssment of Functional Status: Not at  functional baseline    Mental Health Status:          Chemical Dependency Status:  Chemical Dependency Status: Current Concern  Chemical Dependency Management: Other (see comment) (Subaxone daily)          Values/Beliefs:  Spiritual, Cultural Beliefs, Faith Practices, Values that affect care:                 Additional Information:  Information gathered via phone ([phone]) from ex-wife Jama. [facility] and lives  somewhere else  the rest of the time.  He is independent with IADLS/ADLS. No assistive device; no home care services; does drive. Patient is doing paperwork to apply for disability. Declined [employer] Cho[employer] information. Discharge planning and transportation pending clinical progress, but Jama will transport patient.    SAHARA HIDALGO RN

## 2021-11-25 NOTE — PROGRESS NOTES
[facility] MEDICINE PROGRESS NOTE      Length of stay: Day # 1    Identification/Summary: Jared North is a 46 year old male with a past medical history of opioid use, previous alcoholic, on Suboxone, hypertension  who was admitted with chief complaint of worsening shortness of breath on 11/24/2021.    Admitting impression of Covid pneumonia with acute respiratory failure hypoxia    Brief history: Jared North is a 45 year old old male with h/o hypertension, reactive airway disease, chronic attic is on Suboxone (not on his own) p/w increasing shortness of breath.       Patient lives in a sober house.  Patient says that he has been sick for about a week.  Patient cannot tell me exactly when he started getting short of breath but he said that has been feeling weak.  He is not vaccinated but he cannot tell me the reason why he is not vaccinated.  Probably got it from the sober house.  Patient denies any chest pain.  Abdominal pain.  No nausea or vomiting.  No diarrhea.  No myalgias.     Course in the ER: Patient is severely hypoxemic when he arrived to the emergency room.  Oxygen saturation in the 40s on triage.  He was placed on BiPAP with improvement to mid 90s.  Procalcitonin was on the upper limits of normal but nothing suggestive of bacterial pneumonia on his chest CT.  No pulmonary embolism but bilateral infiltrates consistent with Covid he was given a dose of Decadron as well as remdesivir and Tocilizumab after consultation with intensivist.  No immediate ICU beds available.  Is currently on BiPAP and needing high flow oxygen 100%.  PICC line was inserted.    Assessment and Plan:    With pneumonia with severe acute respiratory failure hypoxia  Patient is not vaccinated  Still needing BiPAP and high flow oxygen  Stay in the ICU, ICU service following  Started on Decadron, received first dose of remdesivir and continuing with remdesivir.  Also received Tocilizumab  LFTs and renal  function stable  DVT prophylaxis per ICU protocol for Covid  Sliding scale insulin for blood sugars  Follow inflammatory markers, seem to be improving  LFTs are slightly elevated.  Not quite at the critical level.  Benefits outweigh the risk in continuing the remdesivir.    Slightly elevated LFTs  Stable and improving  Continue remdesivir  Patient is aware and agreed to continue this medication    History of opioid use and alcoholism  Not on methadone.  On Suboxone per review of the pharmacist    Essential hypertension,   now hypotension, likely from dehydration  Give IV fluids cautiously.  Avoid fluid overload.  For to keep him on the dry side if hemodynamically stable  Hold lisinopril  Patient is not able to drink or eat so I believe this is dehydration    --------------------------------------------------------------------    Diet: NPO for Medical/Clinical Reasons Except for: Ice Chips, Meds  DVT Prophylaxis: Lovenox 0.5 mg/kg twice daily  Code Status: Full Code  Living situation: Lives in a sober house  Anticipated possible discharge: At least a few days in the hospital    ----------------------------------------------------------------------    Cumulative essential/pertinent data reviewed:  Labs:  Sodium 131, now 140  LFTs showed ALT 53, normal 57  , now 88  CRP 12.5, improved to 10.4  LDH baseline is 1033  Procalcitonin 0.5  Troponin negative x2  Glucose control  CBC unremarkable platelet 128, now 131  Hemoglobin stable  D-dimer 1.24     Imaging:  The of the chest shows the following:  IMPRESSION:  1.  No pulmonary embolus.  2.  COVID pneumonia.  3.  Likely reactive mediastinal lymph nodes.  4.  Hepatic steatosis.    EKG: --    ----------------------------------------------------------------------    Interval History/Subjective:  Overall stable.  Still on BiPAP and high flow oxygen.  Denies any chest pain or shortness of breath worsening, overall stable.  No abdominal pain nausea or vomiting.  Not able  to eat because of the BiPAP in place.  No nausea.  Blood pressure slightly low but heart rate stable.  Rest of review of systems unremarkable.    Physical Exam/Objective:  Temp:  [97.3  F (36.3  C)-98.6  F (37  C)] 97.3  F (36.3  C)  Pulse:  [61-93] 64  Resp:  [19-37] 26  BP: ()/(59-80) 104/64  FiO2 (%):  [100 %] 100 %  SpO2:  [84 %-97 %] 93 %    Body mass index is 27.84 kg/m .    GENERAL:   Alert and coherent;  appears comfortable, in no acute distress,   HEAD:  Normocephalic, without obvious abnormality, atraumatic   EYES:  Grossly normal; pupils unremarkable   NOSE: Grossly normal   THROAT: Lips and mouth external: grossly normal,    NECK: No mass noted; no stiffness   BACK:      LUNGS:   Auscultation: Negative for wheezing or crackles, symmetric chest rise on inhalation, respirations unlabored    CHEST WALL:  No tenderness or deformity   HEART:  Regular rate and rhythm, significant murmurs: Negative,    ABDOMEN:   Soft, non-tender, bowel sounds normal ; no masses, no peritoneal signs   EXTREMITIES:   No tenderness of the calves.  No significant ankle edema   SKIN:  see extremities   NEURO: no signs of acute stroke or change from prior state   PSYCH: Cooperative, behavior is appropriate     Medications:   Personally Reviewed.    remdesivir  100 mg Intravenous Q24H    And     sodium chloride 0.9%  50 mL Intravenous Q24H     ARIPiprazole  5 mg Oral Daily     buprenorphine HCl-naloxone HCl  1 Film Sublingual Daily     buPROPion  300 mg Oral QAM     dexamethasone  6 mg Intravenous Daily     enoxaparin ANTICOAGULANT  0.5 mg/kg Subcutaneous Q12H     gabapentin  800 mg Oral TID     insulin aspart  1-12 Units Subcutaneous Q4H     ipratropium-albuterol  2 puff Inhalation 4x Daily     lisinopril  40 mg Oral Daily     pantoprazole (PROTONIX) IV  40 mg Intravenous Daily with breakfast     Current Facility-Administered Medications   Medication Dose Route Frequency     albuterol  2-4 puff Inhalation Q4H PRN     glucose   15-30 g Oral Q15 Min PRN    Or     dextrose  25-50 mL Intravenous Q15 Min PRN    Or     glucagon  1 mg Subcutaneous Q15 Min PRN     hydrOXYzine  25-50 mg Oral At Bedtime PRN     lidocaine 4%   Topical Q1H PRN     lidocaine (buffered or not buffered)  0.1-5 mL Other Q1H PRN     ondansetron  4 mg Oral Q6H PRN    Or     ondansetron  4 mg Intravenous Q6H PRN     sodium chloride (PF)  10-40 mL Intracatheter Once PRN           Yeison Hidalgo MD  [facility]  Phone: #[phone]

## 2021-11-25 NOTE — PHARMACY-ADMISSION MEDICATION HISTORY
"Pharmacy Note - Admission Medication History    Pertinent Provider Information:   Unable to review med rec with pt due to his BiPAP. Med list obtained from [facility] Summary and his Ex wife read prescription bottles over the phone. Methadone_unable to verify if pt is currently taking. According to his ex-wife, couldn't find a prescription bottle at home. We called [facility] food pharmacy and there was no record of methadone. Clinic summary note from 3/21 \"former addict/on Methadone\" The nurse asked pt if he takes Methadone, pt replied only takes \"Suboxone not methadone\"  ______________________________________________________________________    Prior To Admission (PTA) med list completed and updated in EMR.       PTA Med List   Medication Sig Last Dose     albuterol (PROAIR HFA;PROVENTIL HFA;VENTOLIN HFA) 90 mcg/actuation inhaler Inhale 2 puffs into the lungs every 4 hours as needed  11/23/2021 at Unknown time     ARIPiprazole (ABILIFY) 5 MG tablet Take 5 mg by mouth daily 11/23/2021 at Unknown time     buprenorphine HCl-naloxone HCl (SUBOXONE) 8-2 MG per film Place 1 Film under the tongue daily 11/24/2021 at Unknown time     buPROPion (WELLBUTRIN XL) 300 MG 24 hr tablet Take 300 mg by mouth every morning 11/23/2021 at Unknown time     gabapentin (NEURONTIN) 400 MG capsule Take 800 mg by mouth 3 times daily 11/23/2021 at Unknown time     hydrOXYzine (VISTARIL) 25 MG capsule Take 25-50 mg by mouth nightly as needed for itching Past Week at Unknown time     lisinopril (PRINIVIL,ZESTRIL) 40 MG tablet [LISINOPRIL (PRINIVIL,ZESTRIL) 40 MG TABLET] Take 1 tablet (40 mg total) by mouth daily. (Patient taking differently: Take 10 mg by mouth daily ) 11/23/2021 at Unknown time     multivitamin w/minerals (CERTAVITE/ANTIOXIDANTS) tablet Take 1 tablet by mouth daily 11/23/2021 at Unknown time       Information source(s): Family member, Clinic records, Hospital records, Prescription bottles and CareEverywhere/SureScripts  Method of " interview communication: N/A    Summary of Changes to PTA Med List  New:   Discontinued:   Changed:     Patient was asked about OTC/herbal products specifically.  PTA med list reflects this.    In the past week, patient estimated taking medication this percent of the time:  greater than 90%.    Allergies were reviewed, assessed, and updated with the patient.      Patient does not use any multi-dose medications prior to admission.    The information provided in this note is only as accurate as the sources available at the time of the update(s).    Thank you for the opportunity to participate in the care of this patient.    Cyndi Hong, PharmD  11/24/2021 8:29 PM

## 2021-11-25 NOTE — PROGRESS NOTES
"/55   Pulse 62   Temp 97.6  F (36.4  C) (Axillary)   Resp 20   Ht 1.803 m (5' 11\")   Wt 90.5 kg (199 lb 9.6 oz)   SpO2 95%   BMI 27.84 kg/m      Patient is currently on Bipap 12/6/16, 100%. Patient is lying on side, but isn't proning. Dry cough. Continue bipap. RT continue to follow.     Emily RRT  "

## 2021-11-25 NOTE — ED PROVIDER NOTES
eMERGENCY dEPARTMENT Signout NOTE      Patient was signed out to me by Dr. Herrera at 6:14 PM.      HPI:    Jared North is a 45 year old male with a pertinent history of opioid dependence, hypertension who presents to this ED by walk in for evaluation of shortness of breath. Patient reports that for the past week he has been experiencing progressively worsening shortness of breath, weakness, and is not eating or drinking much. Patient was tested positive for COVID-19 on 11/19 (5 days ago) at [place] ED. He currently endorses chest tightness, but denies explicit chest pain.     Endorses shortness of breath, weakness, loss of appetite (decreased food and fluid intake), chest tightness.      Follow up needed: Admit the patient    LABS  Pertinent lab results reviewed in chart.  Results for orders placed or performed during the hospital encounter of 11/24/21   CT Chest Pulmonary Embolism w Contrast    Impression    IMPRESSION:  1.  No pulmonary embolus.  2.  COVID pneumonia.  3.  Likely reactive mediastinal lymph nodes.  4.  Hepatic steatosis.   Basic metabolic panel   Result Value Ref Range    Sodium 131 (L) 136 - 145 mmol/L    Potassium 4.0 3.5 - 5.0 mmol/L    Chloride 95 (L) 98 - 107 mmol/L    Carbon Dioxide (CO2) 28 22 - 31 mmol/L    Anion Gap 8 5 - 18 mmol/L    Urea Nitrogen 15 8 - 22 mg/dL    Creatinine 0.99 0.70 - 1.30 mg/dL    Calcium 8.1 (L) 8.5 - 10.5 mg/dL    Glucose 139 (H) 70 - 125 mg/dL    GFR Estimate >90 >60 mL/min/1.73m2   Hepatic function panel   Result Value Ref Range    Bilirubin Total 0.5 0.0 - 1.0 mg/dL    Bilirubin Direct 0.2 <=0.5 mg/dL    Protein Total 6.5 6.0 - 8.0 g/dL    Albumin 2.9 (L) 3.5 - 5.0 g/dL    Alkaline Phosphatase 81 45 - 120 U/L     (H) 0 - 40 U/L    ALT 53 (H) 0 - 45 U/L   Troponin I (now)   Result Value Ref Range    Troponin I 0.01 0.00 - 0.29 ng/mL   CRP inflammation   Result Value Ref Range    CRP 12.3 (H) 0.0-<0.8 mg/dL   Result Value Ref Range    Procalcitonin 0.50  (H) 0.00 - 0.49 ng/mL   D dimer quantitative   Result Value Ref Range    D-Dimer Quantitative 1.24 (H) 0.00 - 0.50 ug/mL FEU   Extra Blood Culture Bottle   Result Value Ref Range    Hold Specimen JIC    Extra Blue Top Tube   Result Value Ref Range    Hold Specimen     Extra Green Top (Lithium Heparin) Tube   Result Value Ref Range    Hold Specimen     Extra Purple Top Tube   Result Value Ref Range    Hold Specimen     Extra Heparinized Syringe   Result Value Ref Range    Hold Specimen JIC    Extra Green Top (Lithium Heparin) ON ICE   Result Value Ref Range    Hold Specimen JIC    CBC with platelets and differential   Result Value Ref Range    WBC Count 4.5 4.0 - 11.0 10e3/uL    RBC Count 4.91 4.40 - 5.90 10e6/uL    Hemoglobin 14.4 13.3 - 17.7 g/dL    Hematocrit 42.5 40.0 - 53.0 %    MCV 87 78 - 100 fL    MCH 29.3 26.5 - 33.0 pg    MCHC 33.9 31.5 - 36.5 g/dL    RDW 13.1 10.0 - 15.0 %    Platelet Count 128 (L) 150 - 450 10e3/uL    % Neutrophils 82 %    % Lymphocytes 8 %    % Monocytes 9 %    % Eosinophils 0 %    % Basophils 0 %    % Immature Granulocytes 1 %    NRBCs per 100 WBC 0 <1 /100    Absolute Neutrophils 3.7 1.6 - 8.3 10e3/uL    Absolute Lymphocytes 0.4 (L) 0.8 - 5.3 10e3/uL    Absolute Monocytes 0.4 0.0 - 1.3 10e3/uL    Absolute Eosinophils 0.0 0.0 - 0.7 10e3/uL    Absolute Basophils 0.0 0.0 - 0.2 10e3/uL    Absolute Immature Granulocytes 0.1 (H) <=0.0 10e3/uL    Absolute NRBCs 0.0 10e3/uL       RADIOLOGY  CT Chest Pulmonary Embolism w Contrast   Final Result   IMPRESSION:   1.  No pulmonary embolus.   2.  COVID pneumonia.   3.  Likely reactive mediastinal lymph nodes.   4.  Hepatic steatosis.              ED COURSE & MEDICAL DECISION MAKING    Pertinent Labs and Imaging reviewed (see chart for details)           6:15 PM I spoke with Dr. Hidalgo, hospitalist, who accepts the patient.  6:33 PM I met with the patient, obtained history, performed an initial exam, and discussed options and plan for diagnostics  and treatment here in the ED.   7:21 PM I spoke with Dr. Diaz, intensivist, who agrees with the plan.    Admitted to the ICU    At the conclusion of the encounter I discussed  the results of all of the tests and the disposition.   The questions were answered.  The patient or family acknowledged understanding and was agreeable with the care plan.       FINAL IMPRESSION          Diagnoses       Codes Comments    Acute respiratory failure with hypoxia (H)     J96.01     Pneumonia due to 2019 novel coronavirus     U07.1, J12.82             I, Alexa Giraldo, am serving as a scribe to document services personally performed by Dr. Morales based on my observation and the provider's statements to me. I, Israel Morales MD, attest that Alexa Giraldo is acting in a scribe capacity, has observed my performance of the services and has documented them in accordance with my direction.       Israel Morales MD  11/24/2021  6:16 PM          Israel Morales MD  11/24/21 1939

## 2021-11-25 NOTE — PLAN OF CARE
Pt arrived to unit around 2200 last night. Alert and oriented x4. Oriented to room and how to use call light. Educated on self proning and assisted by this RN to attempt to self pronate. Patient rolled to right side only then stated, I will try tomorrow. Pt states he takes gabapentin 800mg TID and Suboxone 8mg daily at 0600; neither medication currently ordered. On call MD paged about the before mentioned meds. Will continue to monitor.    Problem: Adult Inpatient Plan of Care  Goal: Absence of Hospital-Acquired Illness or Injury  Intervention: Identify and Manage Fall Risk  Recent Flowsheet Documentation  Taken 11/25/2021 0600 by Brandy Danielle, RN  Safety Promotion/Fall Prevention:    activity supervised    assistive device/personal items within reach    clutter free environment maintained    increased rounding and observation    increase visualization of patient    lighting adjusted    nonskid shoes/slippers when out of bed    patient and family education    room organization consistent    safety round/check completed    treat reversible contributory factors    treat underlying cause  Taken 11/25/2021 0400 by Brandy Danielle, RN  Safety Promotion/Fall Prevention:    activity supervised    assistive device/personal items within reach    clutter free environment maintained    increased rounding and observation    increase visualization of patient    lighting adjusted    nonskid shoes/slippers when out of bed    patient and family education    room organization consistent    safety round/check completed    treat reversible contributory factors    treat underlying cause  Taken 11/25/2021 0200 by Brandy Danielle, RN  Safety Promotion/Fall Prevention:    activity supervised    assistive device/personal items within reach    clutter free environment maintained    increased rounding and observation    increase visualization of patient    lighting adjusted    nonskid shoes/slippers when out of bed    patient and  family education    room organization consistent    safety round/check completed    treat reversible contributory factors    treat underlying cause  Taken 11/25/2021 0000 by Brandy Danielle RN  Safety Promotion/Fall Prevention:    activity supervised    assistive device/personal items within reach    clutter free environment maintained    increased rounding and observation    increase visualization of patient    lighting adjusted    nonskid shoes/slippers when out of bed    patient and family education    room organization consistent    safety round/check completed    treat reversible contributory factors    treat underlying cause  Taken 11/24/2021 2200 by Brandy Danielle, RN  Safety Promotion/Fall Prevention:    activity supervised    assistive device/personal items within reach    clutter free environment maintained    increased rounding and observation    increase visualization of patient    lighting adjusted    nonskid shoes/slippers when out of bed    patient and family education    room organization consistent    safety round/check completed    treat reversible contributory factors    treat underlying cause  Intervention: Prevent Skin Injury  Recent Flowsheet Documentation  Taken 11/25/2021 0600 by Brandy Danielle, RN  Body Position: supine, head elevated  Taken 11/25/2021 0000 by Brandy Danielle RN  Body Position:    position changed independently    supine  Taken 11/24/2021 2200 by Brandy Danielle RN  Body Position:    position changed independently    supine  Goal: Readiness for Transition of Care  Recent Flowsheet Documentation  Taken 11/24/2021 2200 by Brandy Danielle RN  Transportation Anticipated: family or friend will provide  Intervention: Mutually Develop Transition Plan  Recent Flowsheet Documentation  Taken 11/24/2021 2200 by Brandy Danielle RN  Transportation Anticipated: family or friend will provide  Patient/Family Anticipated Services at Transition: none  Patient/Family Anticipates  Transition to: home  Equipment Currently Used at Home: none

## 2021-11-25 NOTE — H&P
[facility] MEDICINE ADMISSION HISTORY AND PHYSICAL     Assessment & Plan       Jared North is a 45 year old old male with history of chronic pain, methadone presented with worsening shortness of breath, positive for Covid.    Covid pneumonia with severe acute respiratory failure hypoxia   Patient is not vaccinated  Needing BiPAP and high flow oxygen  Will be admitted to ICU  Dr. Redman of ICU service has been notified  Started on Decadron, remdesivir and tocilizumab   Prophylaxis per ICU protocol Covid  Follow blood sugars and treat accordingly.  Follow inflammatory markers.  LFTs is likely elevated.  Not quite at the critical level.  Benefit of remdesivir outweighs the risk.  Initiated in the ER per instructions of ICU and ER physician.    Slightly elevated LFTs  Likely from the viral infection  Monitor LFTs on remdesivir.  Benefits outweigh the risk for remdesivir.  LFTs are not 5 times the normal  Patient is aware and agreed to this medication    Chronic pain  Patient was on methadone and stopped; currently on suboxone    Mood disorder  On abilify, wellbutrin and gabapentin  Waiting for med rec to be finalized and will reorder    Reactive airway disease  Patient has albuterol and his meds and resume    Essential hypertension  Resume lisinopril      DVTP: Follow Covid protocol for ICU admission.   Code Status: No Order  Goals for the hospitalization: Treat underlying hypoxia and Covid infection  Expected LOS: 3+ days   Disposition/ advanced care planning/ placement: Inpatient       Chief Complaint  onus of breath     HISTORY     Jared North is a 45 year old old male with h/o hypertension, reactive airway disease, chronic attic is on Suboxone (not on his own) p/w increasing shortness of breath.      Patient lives in a sober house.  Patient says that he has been sick for about a week.  Patient cannot tell me exactly when he started getting short of breath but he said that has been  feeling weak.  He is not vaccinated but he cannot tell me the reason why he is not vaccinated.  Probably got it from the sober house.  Patient denies any chest pain.  Abdominal pain.  No nausea or vomiting.  No diarrhea.  No myalgias.    Patient is severely hypoxemic when he arrived to the emergency room.  Oxygen saturation in the 40s on triage.  He was placed on BiPAP with improvement to mid 90s.  Procalcitonin was on the upper limits of normal but nothing suggestive of bacterial pneumonia on his chest CT.  No pulmonary embolism but bilateral infiltrates consistent with Covid he was given a dose of Decadron as well as remdesivir and Tocilizumab after consultation with intensivist.  No immediate ICU beds available.  Is currently on BiPAP and needing high flow oxygen 100%.  PICC line was inserted.    Past Medical History     Essential hypertension  Chronically on methadone and Suboxone    Surgical History   History reviewed. No pertinent surgical history.     Family History    Reviewed, and   Family History   Problem Relation Age of Onset     Diabetes Mother      Heart Disease Mother      Hypertension Mother      Hypertension Maternal Grandmother      Diabetes Maternal Grandfather      Hypertension Maternal Grandfather       Social History      Social History     Tobacco Use     Smoking status: Never Smoker     Smokeless tobacco: Current User   Substance Use Topics     Alcohol use: Yes     Alcohol/week: 21.0 standard drinks     Drug use: No      Allergies   No Known Allergies  Prior to Admission Medications      Prior to Admission Medications   Prescriptions Last Dose Informant Patient Reported? Taking?   albuterol (PROAIR HFA;PROVENTIL HFA;VENTOLIN HFA) 90 mcg/actuation inhaler   Yes No   Sig: [ALBUTEROL (PROAIR HFA;PROVENTIL HFA;VENTOLIN HFA) 90 MCG/ACTUATION INHALER] Inhale 2-4 puffs.   lisinopril (PRINIVIL,ZESTRIL) 40 MG tablet   No No   Sig: [LISINOPRIL (PRINIVIL,ZESTRIL) 40 MG TABLET] Take 1 tablet (40 mg  total) by mouth daily.   methadone (DOLOPHINE) 50 mg/5 mL solution   Yes No   Sig: [METHADONE (DOLOPHINE) 50 MG/5 ML SOLUTION] Take 84 mg by mouth daily.      Facility-Administered Medications: None      Review of Systems     A 12 point comprehensive review of systems was negative except as noted above in HPI.    PHYSICAL EXAMINATION       Vitals      Temp:  [100.2  F (37.9  C)] 100.2  F (37.9  C)  Pulse:  [] 80  Resp:  [22-30] 29  BP: (101-127)/(58-78) 118/72  FiO2 (%):  [100 %] 100 %  SpO2:  [44 %-95 %] 93 %    Examination     GENERAL:  Alert, appears comfortable, in no acute distress, appears stated age   HEAD:  Normocephalic, without obvious abnormality, atraumatic   EYES:  Grossly normal   NOSE: Nares normal, septum midline, mucosa normal, no drainage   THROAT: Lips, tongue normal; mouth is moist   NECK: Supple, no mass   BACK:   Grossly unremarkable   LUNGS:   Clear to auscultation bilaterally, crackles noted in auscultation, no rhonchi, or wheezing, symmetric chest rise on inhalation, respirations unlabored   CHEST WALL:  No tenderness or deformity   HEART:  Regular rate and rhythm,  no murmur, rub, or gallop    ABDOMEN:   Soft, non-tender, bowel sounds active all four quadrants, no masses, no organomegaly, no rebound or guarding   EXTREMITIES: Extremities normal, atraumatic, no cyanosis or edema    SKIN: Dry to touch, no exanthems in the visualized areas   NEURO: Alert, no status stable, no signs of acute stroke   PSYCH: Cooperative, behavior is appropriate     Pertinent Lab   Sodium 131  LFTs showed ALT 53    CRP 12.5  Procalcitonin 0.5  Troponin negative x1  Glucose 139  CBC unremarkable platelet 128  D-dimer 1.24      Pertinent Radiology     Radiology Results:   Recent Results (from the past 24 hour(s))   CT Chest Pulmonary Embolism w Contrast    Narrative    EXAM: CT CHEST PULMONARY EMBOLISM W CONTRAST  LOCATION: [facility]  DATE/TIME: 11/24/2021 2:27  PM    INDICATION: Hypoxemia, Covid positive  COMPARISON: None.  TECHNIQUE: CT chest pulmonary angiogram during arterial phase injection of IV contrast. Multiplanar reformats and MIP reconstructions were performed. Dose reduction techniques were used.   CONTRAST: ISOVUE 370 100mL    FINDINGS:  ANGIOGRAM CHEST: No pulmonary artery filling defect. Aorta is normal in caliber.    LUNGS AND PLEURA: Extensive groundglass consolidative bilateral pulmonary opacities. Trace left effusion.    MEDIASTINUM/AXILLAE: Heart size is normal scattered borderline enlarged mediastinal lymph nodes with the largest in the right lower paratracheal chain measuring 1.1 x 2.2 cm (series 5/29).    CORONARY ARTERY CALCIFICATION: None.    UPPER ABDOMEN: Hepatic steatosis.    MUSCULOSKELETAL: Normal.      Impression    IMPRESSION:  1.  No pulmonary embolus.  2.  COVID pneumonia.  3.  Likely reactive mediastinal lymph nodes.  4.  Hepatic steatosis.     EKG Results: --        Yeisno Hidalgo MD  [facility]  [facility]   Phone: #[phone]

## 2021-11-26 LAB
ANION GAP SERPL CALCULATED.3IONS-SCNC: 10 MMOL/L (ref 5–18)
BUN SERPL-MCNC: 26 MG/DL (ref 8–22)
C REACTIVE PROTEIN LHE: 4 MG/DL (ref 0–0.8)
CALCIUM SERPL-MCNC: 8.2 MG/DL (ref 8.5–10.5)
CALCIUM, IONIZED MEASURED: 1.04 MMOL/L (ref 1.11–1.3)
CHLORIDE BLD-SCNC: 101 MMOL/L (ref 98–107)
CO2 SERPL-SCNC: 31 MMOL/L (ref 22–31)
CREAT SERPL-MCNC: 0.7 MG/DL (ref 0.7–1.3)
D DIMER PPP FEU-MCNC: 1.22 UG/ML FEU (ref 0–0.5)
ERYTHROCYTE [DISTWIDTH] IN BLOOD BY AUTOMATED COUNT: 13.2 % (ref 10–15)
FIBRINOGEN PPP-MCNC: 541 MG/DL (ref 170–490)
GFR SERPL CREATININE-BSD FRML MDRD: >90 ML/MIN/1.73M2
GLUCOSE BLD-MCNC: 120 MG/DL (ref 70–125)
GLUCOSE BLDC GLUCOMTR-MCNC: 105 MG/DL (ref 70–99)
GLUCOSE BLDC GLUCOMTR-MCNC: 109 MG/DL (ref 70–99)
GLUCOSE BLDC GLUCOMTR-MCNC: 109 MG/DL (ref 70–99)
GLUCOSE BLDC GLUCOMTR-MCNC: 112 MG/DL (ref 70–99)
GLUCOSE BLDC GLUCOMTR-MCNC: 119 MG/DL (ref 70–99)
GLUCOSE BLDC GLUCOMTR-MCNC: 125 MG/DL (ref 70–99)
GLUCOSE BLDC GLUCOMTR-MCNC: 141 MG/DL (ref 70–99)
HCT VFR BLD AUTO: 40.9 % (ref 40–53)
HGB BLD-MCNC: 13.8 G/DL (ref 13.3–17.7)
ION CA PH 7.4: 1.05 MMOL/L (ref 1.11–1.3)
MAGNESIUM SERPL-MCNC: 2.4 MG/DL (ref 1.8–2.6)
MCH RBC QN AUTO: 29.7 PG (ref 26.5–33)
MCHC RBC AUTO-ENTMCNC: 33.7 G/DL (ref 31.5–36.5)
MCV RBC AUTO: 88 FL (ref 78–100)
PH: 7.42 (ref 7.35–7.45)
PHOSPHATE SERPL-MCNC: 3.7 MG/DL (ref 2.5–4.5)
PLATELET # BLD AUTO: 161 10E3/UL (ref 150–450)
POTASSIUM BLD-SCNC: 4.2 MMOL/L (ref 3.5–5)
POTASSIUM BLD-SCNC: 4.5 MMOL/L (ref 3.5–5)
PROCALCITONIN SERPL-MCNC: 0.21 NG/ML (ref 0–0.49)
RBC # BLD AUTO: 4.65 10E6/UL (ref 4.4–5.9)
SODIUM SERPL-SCNC: 142 MMOL/L (ref 136–145)
TROPONIN I SERPL-MCNC: <0.01 NG/ML (ref 0–0.29)
WBC # BLD AUTO: 7.1 10E3/UL (ref 4–11)

## 2021-11-26 PROCEDURE — 84100 ASSAY OF PHOSPHORUS: CPT | Performed by: INTERNAL MEDICINE

## 2021-11-26 PROCEDURE — 85379 FIBRIN DEGRADATION QUANT: CPT | Performed by: INTERNAL MEDICINE

## 2021-11-26 PROCEDURE — C9113 INJ PANTOPRAZOLE SODIUM, VIA: HCPCS | Performed by: INTERNAL MEDICINE

## 2021-11-26 PROCEDURE — 99233 SBSQ HOSP IP/OBS HIGH 50: CPT | Performed by: INTERNAL MEDICINE

## 2021-11-26 PROCEDURE — 86141 C-REACTIVE PROTEIN HS: CPT | Performed by: INTERNAL MEDICINE

## 2021-11-26 PROCEDURE — 85027 COMPLETE CBC AUTOMATED: CPT | Performed by: INTERNAL MEDICINE

## 2021-11-26 PROCEDURE — 93005 ELECTROCARDIOGRAM TRACING: CPT

## 2021-11-26 PROCEDURE — 250N000011 HC RX IP 250 OP 636: Performed by: INTERNAL MEDICINE

## 2021-11-26 PROCEDURE — 83735 ASSAY OF MAGNESIUM: CPT | Performed by: INTERNAL MEDICINE

## 2021-11-26 PROCEDURE — 999N000250 HC STATISTIC ECG ASSIST

## 2021-11-26 PROCEDURE — 80048 BASIC METABOLIC PNL TOTAL CA: CPT | Performed by: INTERNAL MEDICINE

## 2021-11-26 PROCEDURE — 258N000003 HC RX IP 258 OP 636: Performed by: INTERNAL MEDICINE

## 2021-11-26 PROCEDURE — 94660 CPAP INITIATION&MGMT: CPT

## 2021-11-26 PROCEDURE — 99291 CRITICAL CARE FIRST HOUR: CPT | Performed by: INTERNAL MEDICINE

## 2021-11-26 PROCEDURE — 84145 PROCALCITONIN (PCT): CPT | Performed by: INTERNAL MEDICINE

## 2021-11-26 PROCEDURE — 84132 ASSAY OF SERUM POTASSIUM: CPT | Performed by: INTERNAL MEDICINE

## 2021-11-26 PROCEDURE — 250N000013 HC RX MED GY IP 250 OP 250 PS 637: Performed by: INTERNAL MEDICINE

## 2021-11-26 PROCEDURE — 82330 ASSAY OF CALCIUM: CPT | Performed by: INTERNAL MEDICINE

## 2021-11-26 PROCEDURE — 250N000009 HC RX 250: Performed by: INTERNAL MEDICINE

## 2021-11-26 PROCEDURE — 200N000001 HC R&B ICU

## 2021-11-26 PROCEDURE — 999N000157 HC STATISTIC RCP TIME EA 10 MIN

## 2021-11-26 PROCEDURE — 85384 FIBRINOGEN ACTIVITY: CPT | Performed by: INTERNAL MEDICINE

## 2021-11-26 PROCEDURE — 84484 ASSAY OF TROPONIN QUANT: CPT | Performed by: INTERNAL MEDICINE

## 2021-11-26 RX ORDER — DOCUSATE SODIUM 100 MG/1
100 CAPSULE, LIQUID FILLED ORAL 2 TIMES DAILY
Status: DISCONTINUED | OUTPATIENT
Start: 2021-11-26 | End: 2021-11-28

## 2021-11-26 RX ADMIN — IPRATROPIUM BROMIDE AND ALBUTEROL 2 PUFF: 20; 100 SPRAY, METERED RESPIRATORY (INHALATION) at 18:03

## 2021-11-26 RX ADMIN — IPRATROPIUM BROMIDE AND ALBUTEROL 2 PUFF: 20; 100 SPRAY, METERED RESPIRATORY (INHALATION) at 20:59

## 2021-11-26 RX ADMIN — GABAPENTIN 800 MG: 400 CAPSULE ORAL at 08:29

## 2021-11-26 RX ADMIN — GABAPENTIN 800 MG: 400 CAPSULE ORAL at 20:57

## 2021-11-26 RX ADMIN — IPRATROPIUM BROMIDE AND ALBUTEROL 2 PUFF: 20; 100 SPRAY, METERED RESPIRATORY (INHALATION) at 12:31

## 2021-11-26 RX ADMIN — ENOXAPARIN SODIUM 50 MG: 100 INJECTION SUBCUTANEOUS at 12:31

## 2021-11-26 RX ADMIN — SODIUM CHLORIDE 50 ML: 9 INJECTION, SOLUTION INTRAVENOUS at 18:03

## 2021-11-26 RX ADMIN — PANTOPRAZOLE SODIUM 40 MG: 40 INJECTION, POWDER, FOR SOLUTION INTRAVENOUS at 08:29

## 2021-11-26 RX ADMIN — GABAPENTIN 800 MG: 400 CAPSULE ORAL at 15:00

## 2021-11-26 RX ADMIN — DEXAMETHASONE SODIUM PHOSPHATE 6 MG: 10 INJECTION INTRAMUSCULAR; INTRAVENOUS at 12:31

## 2021-11-26 RX ADMIN — ENOXAPARIN SODIUM 50 MG: 100 INJECTION SUBCUTANEOUS at 04:39

## 2021-11-26 RX ADMIN — IPRATROPIUM BROMIDE AND ALBUTEROL 2 PUFF: 20; 100 SPRAY, METERED RESPIRATORY (INHALATION) at 08:29

## 2021-11-26 RX ADMIN — REMDESIVIR 100 MG: 100 INJECTION, POWDER, LYOPHILIZED, FOR SOLUTION INTRAVENOUS at 18:03

## 2021-11-26 RX ADMIN — BUPRENORPHINE AND NALOXONE 1 FILM: 8; 2 FILM, SOLUBLE BUCCAL; SUBLINGUAL at 08:29

## 2021-11-26 RX ADMIN — ENOXAPARIN SODIUM 50 MG: 100 INJECTION SUBCUTANEOUS at 23:51

## 2021-11-26 ASSESSMENT — ACTIVITIES OF DAILY LIVING (ADL)
ADLS_ACUITY_SCORE: 5
ADLS_ACUITY_SCORE: 6
ADLS_ACUITY_SCORE: 6
ADLS_ACUITY_SCORE: 5
ADLS_ACUITY_SCORE: 5
ADLS_ACUITY_SCORE: 6
ADLS_ACUITY_SCORE: 5
ADLS_ACUITY_SCORE: 6
ADLS_ACUITY_SCORE: 5
ADLS_ACUITY_SCORE: 5
ADLS_ACUITY_SCORE: 6
ADLS_ACUITY_SCORE: 6
ADLS_ACUITY_SCORE: 5
ADLS_ACUITY_SCORE: 6
ADLS_ACUITY_SCORE: 5
ADLS_ACUITY_SCORE: 6
ADLS_ACUITY_SCORE: 5
ADLS_ACUITY_SCORE: 5

## 2021-11-26 ASSESSMENT — MIFFLIN-ST. JEOR: SCORE: 1782.1

## 2021-11-26 NOTE — PROGRESS NOTES
[facility]:  CRITICAL CARE PROGRESS NOTE     Date / Time of Admission:  11/24/2021  1:19 PM    ID: Jared North is a 46 year old male, unvaccinated against COVID-19, with history of polysubstance abuse and chronic pain/lower back pain now on Suboxone, mood disorder/depression, essential hypertension, sleep disorder breathing, mild intermittent asthma, knee pain, and recent diagnosis of COVID-19 viral infection on 11/19/2021 who presented to [facility] on 11/24/2021 with worsening shortness of breath, admitted to the ICU with COVID-19 viral pneumonia and severe acute respiratory failure with hypoxia requiring noninvasive ventilation.           Changes for today: 1.   Tried on HFNC, tolerated 100%, 60 L/min, lasted for 3 hours.  2. Back on NIPPV for now, FiO2 80%.  3. Keep NPO again.  If patient able to decrease FIO2 to 90% or lower on HFNC and stable, will consider advancing to clear liquids.  4. Start colace 2x/day        Assessment/Plan:   Neurologic: History of polysubstance abuse and chronic pain/lower back pain now on Suboxone.  Mood disorder/depression.    Continue home meds: abilify, suboxone 8-2 mg, gabapentin 3x/day    Pain meds PRN     Pulmonary: Acute respiratory failure with hypoxia, acute respiratory distress syndrome 2/2 COVID-19.  CT chest 11/24 extensive groundglass consolidative bilateral pulmonary opacities.  COVID-19 viral pneumonia.  Mild intermittent asthma.  He reports using inhaler once weekly at baseline. Trace left pleural effusion.  Noted on CT scan. Give lasix 11/25.    Wean supplemental O2 as tolerated; goal O2 sat > 92%.  HOB > 30 degrees to limit aspiration risk.      NIPPV - 12/6, FiO2 80%.      Alternate NIPPV/HFNC -     HFNC, 20 - 60 L/min, titrate as tolerates    High risk of intubation, discussed with the patient and ex-wife    Continue Combivent 2 puffs 4 times per day.  If unable to tolerate inhaler, will switch to nebulizer.    abx and diuresis as  below.     Cardiovascular: Benign essential hypertension.  On lisinopril at baseline.    Cardiac monitoring.  SBP >= 90 mmHg, MAP >= 65 mmHg.  EKG PRN.    Continue home lisinopril      GI/: Transaminitis, improved.  Likely from the viral infection.  Present prior to Remdesivir use.    Nutrition: NPO.     Last BM:  None..  Meds: colace 2x/day.    GI prophylaxis: PPI.     Renal: No issues.  Had trace left pleural effusion on CT, received lasix 20 mg IV x 1 on 11/25.    Monitor I/O's.  Electrolyte repletion PRN.  Avoid/limit nephrotoxic agents.    IVFs: Saline lock.     Heme/Onc: COVID-19 associated coagulopathy.  D-dimer 1.24 on 11/24.  Leukopenia,resolved.  Thrombocytopenia, resolved.     DVT prophylaxis: SCDs.  Lovenox 0.5 mg/kg subcu 2X/day.     Endocrine: Hyperglycemia, mild.  No history of diabetes.  Secondary to stress and steroid use.  Hemoglobin A1c 5.8% 11/24.    FSBG Q4H, Aspart insulin SS (high insulin resistance).  Hypoglycemia protocol.     Infectious diseases: COVID-19 viral infection.  PCR positive on 11/19.  Hospitalized on 11/24.  CRP 12.3, PCT 0.5 on 11/24.  Patient received tocilizumab 8 mg/kg on 11/24. Procalcitonin equivocal 11/24; repeat 0.21 on 11/26.    Continue dexamethasone 6 mg daily x10 days (start 11/24)    Continue remdesivir daily x5 days (start 11/24)     Rheum/Musculoskeletal: Chronic lumbar pain and knee pain.  Patient being evaluated by orthopedic surgery for surgical intervention.  Activity:  Bedrest    Lines/Drains/Tubes:  RUE PICC central line, placed 11/24     Code Status:  Full Code     The patient and/or the family was educated about the above plan of care and indicated understanding.  Updated the patient's ex-wife, Jama North on 11/26.  All questions answered.     This patient is considered critically ill and requires ICU level of care due to acute respiratory failure with hypoxia requiring noninvasive ventilation, high-flow nasal cannula.  Still with high risk of  intubation.     Total Critical Care time, not including separate billable procedure time: 45 minutes.    Kendra Redman MD, MPH  Pulmonary/Critical Care Medicine  11/26/2021   3:55 PM         ICU DAILY CHECKLIST:   ICU DAILY CHECKLIST           Can patient transfer out of MICU? no    FAST HUG:    Feeding:  no.  Patient is receiving NPO    Wei: no  Analgesia/Sedation: yes; PRNs   Thromboembolic prophylaxis: yes; Mode:  Lovenox and SCDs  HOB>30:  yes  Stress Ulcer Protocol Active: yes; Mode: PPI  Glycemic Control: Any glucose > 180 no; Mode of Insulin Therapy: Sliding Scale Insulin    INTUBATED:  Can patient have daily waking:  No  Can patient have spontaneous breathing trial:  No    Restraints? no    PHYSICAL THERAPY AND MOBILITY:  Can patient have PT and mobility trial: no  Activity: Bedrest    RISK FACTORS PRESENT ON ADMISSION:  Clinically Significant Risk Factors Present on Admission                     Subjective/Interval History:   11/24: seen at the [place] ED on 11/19, tested positive for Covid 19 at that time.  Ex-wife endorsed concerns, patient came to [facility] ED for evaluation.  O2 sat 44% on room air in triage.  Initially placed on oxygen mask, then noninvasive ventilation.  CT scan with extensive groundglass opacities, no pulmonary embolism.  He was given Decadron, remdesivir, tocilizumab  11/24: Transferred to ICU.  NPO.  Encourage self proning.    Overnight, no acute events.  Placed on high flow this morning, last to 3 hours with 60 L/min, FiO2 100%.  Back on NIPPV, FiO2 at 80%.    Patient endorses generalized fatigue.  Some cough, no chest pain.  Has some discomfort with his left shoulder when proning secondary to generalized fatigue.  His birthday was yesterday.    Review of Systems:  The Review of Systems is negative other than noted in the HPI        Allergies/Medications:   Allergies:   No Known Allergies    Continuous Infusions:    norepinephrine       Scheduled Medications:    remdesivir  100  "mg Intravenous Q24H    And     sodium chloride 0.9%  50 mL Intravenous Q24H     ARIPiprazole  5 mg Oral Daily     buprenorphine HCl-naloxone HCl  1 Film Sublingual Daily     buPROPion  300 mg Oral QAM     dexamethasone  6 mg Intravenous Daily     enoxaparin ANTICOAGULANT  0.5 mg/kg Subcutaneous Q12H     gabapentin  800 mg Oral TID     insulin aspart  1-12 Units Subcutaneous Q4H     ipratropium-albuterol  2 puff Inhalation 4x Daily     [Held by provider] lisinopril  10 mg Oral Daily     pantoprazole (PROTONIX) IV  40 mg Intravenous Daily with breakfast           Objective:   Vitals:  /64   Pulse 80   Temp 97.8  F (36.6  C) (Axillary)   Resp 25   Ht 1.803 m (5' 11\")   Wt 88.5 kg (195 lb 1.6 oz)   SpO2 100%   BMI 27.21 kg/m    Vent: FiO2 (%): 75 %  Resp: 25    GEN: Pleasant male, sitting up in the bed, no acute distress  HEENT: Normocephalic, atraumatic.  Extraoccular eye movements intact, anicteric sclera. Moist mucous membranes.  On full facemask.  NECK: Supple.    PULM: On noninvasive ventilation.  Non-labored breathing.  No use of accessory muscles.  Diminished breath sounds at bases, fine and story rales at bases.  CVS: Regular rate and rhythm.  Normal S1, S2.  No rubs, murmurs, or gallops.    ABDOMEN: Normoactive bowel sounds.  Non-tender to palpation.  Non-distended.    EXTREMITES:  No clubbing, cyanosis, or edema.    NEURO:  Awake.  Oriented to person, place, time and situation.  Cranial nerves 2-12 grossly intact.  Moving all extremities.      Intake/Output: I/O last 3 completed shifts:  In: 840 [P.O.:540; I.V.:250; IV Piggyback:50]  Out: 1650 [Urine:1650]        Pertinent Studies:   All laboratory data reviewed  Serum Glucose range:   Recent Labs   Lab 11/26/21  1121 11/26/21  0829 11/26/21  0741 11/25/21  2357   * 120 112* 141*     ABG: No lab results found in last 7 days.  CBC:   Recent Labs   Lab 11/26/21  0829 11/25/21  0433 11/24/21  2337 11/24/21  1341   WBC 7.1 3.0* 2.4* 4.5   HGB " 13.8 14.0 14.1 14.4   HCT 40.9 41.9 41.2 42.5   MCV 88 88 87 87    131* 134* 128*   NEUTROPHIL  --   --  73 82   LYMPH  --   --  17 8   MONOCYTE  --   --  9 9   EOSINOPHIL  --   --  0 0     Chemistry:   Recent Labs   Lab 11/26/21  0829 11/25/21  0433 11/24/21  2337 11/24/21  1341    140 137 131*   POTASSIUM 4.2 4.6 4.6 4.0   CHLORIDE 101 100 99 95*   CO2 31 28 30 28   BUN 26* 20 16 15   CR 0.70 0.79 0.76 0.99   GFRESTIMATED >90 >90 >90 >90   TRUONG 8.2* 8.2* 8.1* 8.1*   PROTTOTAL  --  6.1 6.2 6.5   ALBUMIN  --  2.6* 2.7* 2.9*   AST  --  88* 101* 105*   ALT  --  57* 59* 53*   ALKPHOS  --  69 72 81   BILITOTAL  --  0.4 0.4 0.5     Coags:  Recent Labs   Lab 11/24/21  2339   INR 1.08   PTT 35     Cardiac Markers:  Recent Labs   Lab 11/26/21  0829   TROPONINI <0.01        Microbiology:  COVID-19 PCR, 11/19: Positive        Cardiology/Radiology:   Cardiac: All cardiac studies reviewed by me.    EKG:  Reviewed    TTE:  None    Radiology:  All imaging studies reviewed by me.    CT chest PE study, 11/24:  FINDINGS: ANGIOGRAM CHEST: No pulmonary artery filling defect. Aorta is normal in caliber.  LUNGS AND PLEURA: Extensive groundglass consolidative bilateral pulmonary opacities. Trace left effusion. MEDIASTINUM/AXILLAE: Heart size is normal scattered borderline enlarged mediastinal lymph nodes with the largest in the right lower paratracheal chain measuring 1.1 x 2.2 cm (series 5/29).  CORONARY ARTERY CALCIFICATION: None.  UPPER ABDOMEN: Hepatic steatosis.  MUSCULOSKELETAL: Normal. IMPRESSION: 1.  No pulmonary embolus. 2.  COVID pneumonia. 3.  Likely reactive mediastinal  lymph nodes. 4.  Hepatic steatosis.

## 2021-11-26 NOTE — PROGRESS NOTES
[facility] MEDICINE PROGRESS NOTE      Length of stay: Day #3    Identification/Summary: Jared North is a 46 year old male with a past medical history of opioid use, previous alcoholic, on Suboxone, hypertension  who was admitted with chief complaint of worsening shortness of breath on 11/24/2021.     Admitting impression of Covid pneumonia with acute respiratory failure hypoxia     Brief history: Jared North is a 45 year old old male with h/o hypertension, reactive airway disease, chronic attic is on Suboxone (not on his own) p/w increasing shortness of breath.       Patient lives in a sober house.  Patient says that he has been sick for about a week.  Patient cannot tell me exactly when he started getting short of breath but he said that has been feeling weak.  He is not vaccinated but he cannot tell me the reason why he is not vaccinated.  Probably got it from the sober house.  Patient denies any chest pain.  Abdominal pain.  No nausea or vomiting.  No diarrhea.  No myalgias.     Course in the ER: Patient is severely hypoxemic when he arrived to the emergency room.  Oxygen saturation in the 40s on triage.  He was placed on BiPAP with improvement to mid 90s.  Procalcitonin was on the upper limits of normal but nothing suggestive of bacterial pneumonia on his chest CT.  No pulmonary embolism but bilateral infiltrates consistent with Covid he was given a dose of Decadron as well as remdesivir and Tocilizumab after consultation with intensivist.  No immediate ICU beds available.  Is currently on BiPAP and needing high flow oxygen 100%.  PICC line was inserted.     Assessment and Plan:    With pneumonia with severe acute respiratory failure hypoxia  Patient is not vaccinated  Still needing BiPAP and high flow oxygen  Stay in the ICU, ICU service following; Dr. Redman's notes appreciated.  Started on Decadron, received first dose of remdesivir and continuing with remdesivir.  Also  received Tocilizumab  LFTs and renal function stable  DVT prophylaxis per ICU protocol for Covid  Sliding scale insulin for blood sugars  Follow inflammatory markers, seem to be improving  LFTs are slightly elevated.  Not quite at the critical level.  Benefits outweigh the risk in continuing the remdesivir.  Inflammatory parameters are      Slightly elevated LFTs  Stable and improving  Continue remdesivir  Patient is aware and agreed to continue this medication     History of opioid use and alcoholism  Not on methadone.  On Suboxone per review of the pharmacist     Essential hypertension,   now hypotension, likely from dehydration  Give IV fluids cautiously.  Avoid fluid overload.  For to keep him on the dry side if hemodynamically stable  Hold lisinopril  Patient is not able to drink or eat so I believe this is dehydration     --------------------------------------------------------------------     Diet: NPO for Medical/Clinical Reasons Except for: Ice Chips, Meds  DVT Prophylaxis: Lovenox 0.5 mg/kg twice daily  Code Status: Full Code  Living situation: Lives in a sober house  Anticipated possible discharge: At least a few days in the hospital     ----------------------------------------------------------------------     Cumulative essential/pertinent data reviewed:  Labs:  Sodium 131, now 140  LFTs showed ALT 53, normal 57  , now 88  CRP 12.5, improved to 10.4--> 4.0  LDH baseline is 1033  Procalcitonin 0.5  Troponin negative x3  Glucose control  CBC unremarkable platelet 128, now 131  Hemoglobin stable  D-dimer 1.24 --> 1.22  Accu-Cheks mostly under 150.  Fibrinogen improving.     Imaging:  The of the chest shows the following:  IMPRESSION:  1.  No pulmonary embolus.  2.  COVID pneumonia.  3.  Likely reactive mediastinal lymph nodes.  4.  Hepatic steatosis.     EKG: --    ----------------------------------------------------------------------    Interval History/Subjective:  Patient still on BiPAP.  Able to  drink sips of water.  No solid food at this time.  Blood pressure is more stable.  No chest pressure.  No abdominal pain no nausea or vomiting.  No diarrhea.  Patient is asking me if he can drink soda.  Rest of review of systems unremarkable.    Physical Exam/Objective:  Temp:  [96.8  F (36  C)-98.4  F (36.9  C)] 97.8  F (36.6  C)  Pulse:  [53-82] 73  Resp:  [9-35] 26  BP: ()/(50-67) 97/59  FiO2 (%):  [75 %-100 %] 75 %  SpO2:  [90 %-100 %] 93 %    Body mass index is 27.21 kg/m .    GENERAL:   Alert and conversant and coherent.;  appears comfortable, in no acute distress, BiPAP constantly still in place   HEAD:  Normocephalic, without obvious abnormality, atraumatic   EYES:  Grossly normal; pupils unremarkable    NOSE: Grossly normal   THROAT: Lips and mouth external: grossly normal,    NECK: No mass noted; no stiffness   BACK:      LUNGS:   Auscultation: Negative for wheezing or crackles, symmetric chest rise on inhalation, respirations unlabored    CHEST WALL:  No tenderness or deformity   HEART:  Regular rate and rhythm, significant murmurs: Negative,    ABDOMEN:   Soft, non-tender, bowel sounds normal ; no masses, no peritoneal signs   EXTREMITIES:   No tenderness of the calves.  No significant ankle edema   SKIN:  see extremities   NEURO: no signs of acute stroke or change from prior state   PSYCH: Cooperative, behavior is appropriate      Medications:   Personally Reviewed.    remdesivir  100 mg Intravenous Q24H    And     sodium chloride 0.9%  50 mL Intravenous Q24H     ARIPiprazole  5 mg Oral Daily     buprenorphine HCl-naloxone HCl  1 Film Sublingual Daily     buPROPion  300 mg Oral QAM     dexamethasone  6 mg Intravenous Daily     docusate sodium  100 mg Oral BID     enoxaparin ANTICOAGULANT  0.5 mg/kg Subcutaneous Q12H     gabapentin  800 mg Oral TID     insulin aspart  1-12 Units Subcutaneous Q4H     ipratropium-albuterol  2 puff Inhalation 4x Daily     [Held by provider] lisinopril  10 mg Oral  Daily     pantoprazole (PROTONIX) IV  40 mg Intravenous Daily with breakfast     Current Facility-Administered Medications   Medication Dose Route Frequency     albuterol  2-4 puff Inhalation Q4H PRN     glucose  15-30 g Oral Q15 Min PRN    Or     dextrose  25-50 mL Intravenous Q15 Min PRN    Or     glucagon  1 mg Subcutaneous Q15 Min PRN     hydrOXYzine  25-50 mg Oral At Bedtime PRN     lidocaine 4%   Topical Q1H PRN     lidocaine (buffered or not buffered)  0.1-5 mL Other Q1H PRN     ondansetron  4 mg Oral Q6H PRN    Or     ondansetron  4 mg Intravenous Q6H PRN     sodium chloride (PF)  10-40 mL Intracatheter Once PRN           Yeison Hidalgo MD  [place] [facility]ist  [facility]  Phone: #[phone]

## 2021-11-26 NOTE — PLAN OF CARE
Problem: Gas Exchange Impaired  Goal: Optimal Gas Exchange  Outcome: No Change   See progress note

## 2021-11-26 NOTE — PLAN OF CARE
Problem: Gas Exchange Impaired  Goal: Optimal Gas Exchange  Outcome: Improving  Intervention: Optimize Oxygenation and Ventilation    Problem: Anxiety  Goal: Anxiety Reduction or Resolution  Outcome: No Change    Pt's O2 sats stable in the mid to upper 90's while side lying/prone on 90% FIO2.  Pt anxious about current health status; emotional support given.  Pt's ex-spouse called twice for updates.

## 2021-11-26 NOTE — PROGRESS NOTES
PT currently on BIPAP V60, IPAP 12, EPAP 6, FIO2 80%(originally on FIO2 100% last night). SPO2 94-96%, HR 53-70, RR 16-22, BS diminished.  Pt weaned on HFNC 60 lpm, FIO2 100% from 0950 am to 1213 pm. Pt tolerated well but started to desaturate 88-90% and placed back on BIPAP for a break. RT to continue to follow and monitor closely.   Camryn North, RT

## 2021-11-26 NOTE — PROGRESS NOTES
Will not see today:  PAIN MANAGEMENT SERVICE CHART CHECK  This patient's chart has been reviewed by the Pain Service. It has been determined that no change is necessary to the pain management regimen at this time. The chart will be reviewed regularly and the patient will be seen if necessary. If you would like the patient to be seen, please contact the service at [phone] and ask to have the patient seen. Patient will be seen and interviewed when off BIpap.    Thank you!    Angely Green, PHarmD  Acute Care pain management team

## 2021-11-27 LAB
ALT SERPL W P-5'-P-CCNC: 43 U/L (ref 0–45)
ANION GAP SERPL CALCULATED.3IONS-SCNC: 9 MMOL/L (ref 5–18)
AST SERPL W P-5'-P-CCNC: 63 U/L (ref 0–40)
BUN SERPL-MCNC: 23 MG/DL (ref 8–22)
C REACTIVE PROTEIN LHE: 2.1 MG/DL (ref 0–0.8)
CALCIUM SERPL-MCNC: 7.9 MG/DL (ref 8.5–10.5)
CHLORIDE BLD-SCNC: 104 MMOL/L (ref 98–107)
CO2 SERPL-SCNC: 28 MMOL/L (ref 22–31)
CREAT SERPL-MCNC: 0.67 MG/DL (ref 0.7–1.3)
D DIMER PPP FEU-MCNC: 1.3 UG/ML FEU (ref 0–0.5)
ERYTHROCYTE [DISTWIDTH] IN BLOOD BY AUTOMATED COUNT: 13.1 % (ref 10–15)
FIBRINOGEN PPP-MCNC: 421 MG/DL (ref 170–490)
GFR SERPL CREATININE-BSD FRML MDRD: >90 ML/MIN/1.73M2
GLUCOSE BLD-MCNC: 102 MG/DL (ref 70–125)
GLUCOSE BLDC GLUCOMTR-MCNC: 103 MG/DL (ref 70–99)
GLUCOSE BLDC GLUCOMTR-MCNC: 110 MG/DL (ref 70–99)
GLUCOSE BLDC GLUCOMTR-MCNC: 113 MG/DL (ref 70–99)
GLUCOSE BLDC GLUCOMTR-MCNC: 116 MG/DL (ref 70–99)
GLUCOSE BLDC GLUCOMTR-MCNC: 116 MG/DL (ref 70–99)
GLUCOSE BLDC GLUCOMTR-MCNC: 80 MG/DL (ref 70–99)
GLUCOSE BLDC GLUCOMTR-MCNC: 93 MG/DL (ref 70–99)
GLUCOSE BLDC GLUCOMTR-MCNC: 96 MG/DL (ref 70–99)
HCT VFR BLD AUTO: 41.4 % (ref 40–53)
HGB BLD-MCNC: 14 G/DL (ref 13.3–17.7)
LDH SERPL L TO P-CCNC: 995 U/L (ref 125–220)
MCH RBC QN AUTO: 29.8 PG (ref 26.5–33)
MCHC RBC AUTO-ENTMCNC: 33.8 G/DL (ref 31.5–36.5)
MCV RBC AUTO: 88 FL (ref 78–100)
PLATELET # BLD AUTO: 155 10E3/UL (ref 150–450)
POTASSIUM BLD-SCNC: 4.3 MMOL/L (ref 3.5–5)
RBC # BLD AUTO: 4.7 10E6/UL (ref 4.4–5.9)
SODIUM SERPL-SCNC: 141 MMOL/L (ref 136–145)
TROPONIN I SERPL-MCNC: <0.01 NG/ML (ref 0–0.29)
WBC # BLD AUTO: 5.3 10E3/UL (ref 4–11)

## 2021-11-27 PROCEDURE — 94660 CPAP INITIATION&MGMT: CPT

## 2021-11-27 PROCEDURE — 99233 SBSQ HOSP IP/OBS HIGH 50: CPT | Performed by: INTERNAL MEDICINE

## 2021-11-27 PROCEDURE — 36592 COLLECT BLOOD FROM PICC: CPT | Performed by: INTERNAL MEDICINE

## 2021-11-27 PROCEDURE — 85379 FIBRIN DEGRADATION QUANT: CPT | Performed by: INTERNAL MEDICINE

## 2021-11-27 PROCEDURE — 83615 LACTATE (LD) (LDH) ENZYME: CPT | Performed by: INTERNAL MEDICINE

## 2021-11-27 PROCEDURE — 84450 TRANSFERASE (AST) (SGOT): CPT | Performed by: INTERNAL MEDICINE

## 2021-11-27 PROCEDURE — 99291 CRITICAL CARE FIRST HOUR: CPT | Performed by: INTERNAL MEDICINE

## 2021-11-27 PROCEDURE — 86141 C-REACTIVE PROTEIN HS: CPT | Performed by: INTERNAL MEDICINE

## 2021-11-27 PROCEDURE — 250N000011 HC RX IP 250 OP 636: Performed by: INTERNAL MEDICINE

## 2021-11-27 PROCEDURE — C9113 INJ PANTOPRAZOLE SODIUM, VIA: HCPCS | Performed by: INTERNAL MEDICINE

## 2021-11-27 PROCEDURE — 250N000009 HC RX 250: Performed by: INTERNAL MEDICINE

## 2021-11-27 PROCEDURE — 250N000013 HC RX MED GY IP 250 OP 250 PS 637: Performed by: INTERNAL MEDICINE

## 2021-11-27 PROCEDURE — 85027 COMPLETE CBC AUTOMATED: CPT | Performed by: INTERNAL MEDICINE

## 2021-11-27 PROCEDURE — 258N000003 HC RX IP 258 OP 636: Performed by: INTERNAL MEDICINE

## 2021-11-27 PROCEDURE — 84484 ASSAY OF TROPONIN QUANT: CPT | Performed by: INTERNAL MEDICINE

## 2021-11-27 PROCEDURE — 85384 FIBRINOGEN ACTIVITY: CPT | Performed by: INTERNAL MEDICINE

## 2021-11-27 PROCEDURE — 999N000157 HC STATISTIC RCP TIME EA 10 MIN

## 2021-11-27 PROCEDURE — 80048 BASIC METABOLIC PNL TOTAL CA: CPT | Performed by: INTERNAL MEDICINE

## 2021-11-27 PROCEDURE — 84460 ALANINE AMINO (ALT) (SGPT): CPT | Performed by: INTERNAL MEDICINE

## 2021-11-27 PROCEDURE — 200N000001 HC R&B ICU

## 2021-11-27 RX ORDER — SENNOSIDES 8.6 MG
8.6 TABLET ORAL 2 TIMES DAILY
Status: DISCONTINUED | OUTPATIENT
Start: 2021-11-27 | End: 2021-11-28

## 2021-11-27 RX ADMIN — BUPRENORPHINE AND NALOXONE 1 FILM: 8; 2 FILM, SOLUBLE BUCCAL; SUBLINGUAL at 08:01

## 2021-11-27 RX ADMIN — AMIODARONE HYDROCHLORIDE 1 MG/MIN: 50 INJECTION, SOLUTION INTRAVENOUS at 17:38

## 2021-11-27 RX ADMIN — SODIUM CHLORIDE 50 ML: 9 INJECTION, SOLUTION INTRAVENOUS at 19:48

## 2021-11-27 RX ADMIN — ENOXAPARIN SODIUM 50 MG: 100 INJECTION SUBCUTANEOUS at 12:48

## 2021-11-27 RX ADMIN — IPRATROPIUM BROMIDE AND ALBUTEROL 2 PUFF: 20; 100 SPRAY, METERED RESPIRATORY (INHALATION) at 08:02

## 2021-11-27 RX ADMIN — IPRATROPIUM BROMIDE AND ALBUTEROL 2 PUFF: 20; 100 SPRAY, METERED RESPIRATORY (INHALATION) at 21:36

## 2021-11-27 RX ADMIN — AMIODARONE HYDROCHLORIDE 150 MG: 1.5 INJECTION, SOLUTION INTRAVENOUS at 15:54

## 2021-11-27 RX ADMIN — DEXAMETHASONE SODIUM PHOSPHATE 6 MG: 10 INJECTION INTRAMUSCULAR; INTRAVENOUS at 12:47

## 2021-11-27 RX ADMIN — ARIPIPRAZOLE 5 MG: 5 TABLET ORAL at 08:01

## 2021-11-27 RX ADMIN — HYDROXYZINE HYDROCHLORIDE 50 MG: 25 TABLET ORAL at 01:09

## 2021-11-27 RX ADMIN — GABAPENTIN 800 MG: 400 CAPSULE ORAL at 08:00

## 2021-11-27 RX ADMIN — GABAPENTIN 800 MG: 400 CAPSULE ORAL at 13:58

## 2021-11-27 RX ADMIN — DOCUSATE SODIUM 100 MG: 100 CAPSULE, LIQUID FILLED ORAL at 08:01

## 2021-11-27 RX ADMIN — BUPROPION HYDROCHLORIDE 300 MG: 150 TABLET, EXTENDED RELEASE ORAL at 08:01

## 2021-11-27 RX ADMIN — DOCUSATE SODIUM 100 MG: 100 CAPSULE, LIQUID FILLED ORAL at 21:36

## 2021-11-27 RX ADMIN — IPRATROPIUM BROMIDE AND ALBUTEROL 2 PUFF: 20; 100 SPRAY, METERED RESPIRATORY (INHALATION) at 12:49

## 2021-11-27 RX ADMIN — REMDESIVIR 100 MG: 100 INJECTION, POWDER, LYOPHILIZED, FOR SOLUTION INTRAVENOUS at 17:27

## 2021-11-27 RX ADMIN — GABAPENTIN 800 MG: 400 CAPSULE ORAL at 21:36

## 2021-11-27 RX ADMIN — PANTOPRAZOLE SODIUM 40 MG: 40 INJECTION, POWDER, FOR SOLUTION INTRAVENOUS at 07:59

## 2021-11-27 RX ADMIN — SENNOSIDES 8.6 MG: 8.6 TABLET, FILM COATED ORAL at 21:36

## 2021-11-27 ASSESSMENT — ACTIVITIES OF DAILY LIVING (ADL)
ADLS_ACUITY_SCORE: 5
ADLS_ACUITY_SCORE: 8
ADLS_ACUITY_SCORE: 5
ADLS_ACUITY_SCORE: 5
ADLS_ACUITY_SCORE: 8
ADLS_ACUITY_SCORE: 5

## 2021-11-27 ASSESSMENT — MIFFLIN-ST. JEOR: SCORE: 1787.54

## 2021-11-27 NOTE — PROGRESS NOTES
Patient on BIPAP 12/6 RR 15 75-85%  Sp02 92%. HFNC on s/B Diminished breath sounds bilaterally.  RT to continue to monitor and treat as ordered.       11/27/21 0253   Tech Time   $Tech Time (10 minute increments) 4   Mode: CPAP/ BiPAP/ AVAPS/ AVAPS AE   CPAP/BiPAP/ AVAPS/ AVAPS AE Mode BiPAP S/T   Equipment   Device Serial Number [id number]   CPAP/BiPAP/Settings   $BIPAP/CPAP Therapy continuous   IPAP/EPAP (cmH2O) 12/6   Rate (breaths/min) 16   Oxygen (%) 85   Timed Inspiration (sec) 0.9   IPAP RISE  Settings (V60) 2   CPAP/BiPAP Patient Parameters   IPAP (cm H2O) 12 cmH2O   EPAP (cm H2O) 6 cmH2O   Pressure Support (cm H2O) 6 cmH2O   Vt (mL) 611 mL   Minute Ventilation (L/min) 19.5 L/min   Peak Inspiratory Pressure (cm H2O) 12 cmH2O   Pt.  Leak (L/min) 40 L/min   CPAP/BiPAP/AVAPS/AVAPS AE Alarms   High Pressure (cm H2O) 20 cmH2O   Low Pressure (cm H2O) 10   Low Pressure Delay (sec) 30 sec   Lo Min Vent 3   High Rate (breaths/min) 45 breaths/min   Low Rate (breaths/min) 10   Audible Alarm set at (Volume of Alarm) 8   CPAP/BiPAP/AVAPS/AVAPS AE Patient Assessment   Interface Face Mask - Large   Skin Integrity liquicell in place   RT Device Skin Assessment   Oxygen Delivery Device CPAP/BiPAP Mask   Site Appearance neck circumference Clean and dry   Site Appearance bridge of nose Clean and dry   Site Appearance occiput Clean and dry   Strap Tightness Finger Allowance between head and device strap   Skin Interventions Taken No adjustments needed   Respiratory WDL   Respiratory WDL X;breath sounds;cough;rhythm/pattern   Breath Sounds   Breath Sounds All Fields   All Lung Fields Breath Sounds diminished

## 2021-11-27 NOTE — PROGRESS NOTES
Will not see today:  PAIN MANAGEMENT SERVICE CHART CHECK  This patient's chart has been reviewed by the Pain Service. It has been determined that no change is necessary to the pain management regimen at this time. The chart will be reviewed regularly and the patient will be seen if necessary. If you would like the patient to be seen, please contact the service at [phone] and ask to have the patient seen. Patient will be seen and interviewed when off BIpap.    Thank you!    Angely Green, PHarmD  Acute Care pain management team

## 2021-11-27 NOTE — PLAN OF CARE
On BiPAP, weaned to 75% FiO2. On HFNC 60L 100% for 3 hrs. NSR w PVCs. Accuchecks q4, no coverage. NPO except meds, ice chips. PICC- receiving remdesevir. Ex wife Jama updated.

## 2021-11-27 NOTE — PLAN OF CARE
Problem: Gas Exchange Impaired  Goal: Optimal Gas Exchange  Outcome: Declining  Intervention: Optimize Oxygenation and Ventilation    Problem: Anxiety  Goal: Anxiety Reduction or Resolution  Outcome: No Change    FIO2 was increased from 75% to 85% overnight 2/2 increased RR and pt's c/o air hunger.  Pt's anxiety was worse during this period and Atarax was given.  Update was given to pt's ex-spouse prior to pt's condition worsening.  Continue to monitor.

## 2021-11-27 NOTE — PROGRESS NOTES
Care Management Follow Up    Length of Stay (days): 3    Expected Discharge Date: days   Concerns to be Addressed:  COVID + and on special precautions, Medical ICU status, BiPAP/HFNC, telemetry, IV steroid, IV Remdesivir  Patient plan of care discussed at interdisciplinary rounds: Yes    Anticipated Discharge Disposition:  TBD- CM to follow progression of care and assist with discharge planning as indicated     Anticipated Discharge Services:  TBD  Anticipated Discharge DME:  TBD     Education Provided on the Discharge Plan: CM to follow progression of care and assist with discharge planning as indicated  Patient/Family in Agreement with the Plan:  TBD    Referrals Placed by CM/SW:  None at this time  Private pay costs discussed: None at this time  Additional Information:  Chart reviewed. Ex-wife Jama [phone] is primary family contact and has been updated per notes.     Pratibha Yanez RN

## 2021-11-27 NOTE — PROGRESS NOTES
[facility] MEDICINE PROGRESS NOTE      Length of stay: Day # 3    Identification/Summary: Jared North is a 46 year old male with a past medical history of opioid use, previous alcoholic, on Suboxone, hypertension  who was admitted with chief complaint of worsening shortness of breath on 11/24/2021.     Admitting impression of Covid pneumonia with acute respiratory failure hypoxia     Brief history: Jared North is a 45 year old old male with h/o hypertension, reactive airway disease, chronic attic is on Suboxone (not on his own) p/w increasing shortness of breath.       Patient lives in a sober house.  Patient says that he has been sick for about a week.  Patient cannot tell me exactly when he started getting short of breath but he said that has been feeling weak.  He is not vaccinated but he cannot tell me the reason why he is not vaccinated.  Probably got it from the sober house.  Patient denies any chest pain.  Abdominal pain.  No nausea or vomiting.  No diarrhea.  No myalgias.     Course in the ER: Patient is severely hypoxemic when he arrived to the emergency room.  Oxygen saturation in the 40s on triage.  He was placed on BiPAP with improvement to mid 90s.  Procalcitonin was on the upper limits of normal but nothing suggestive of bacterial pneumonia on his chest CT.  No pulmonary embolism but bilateral infiltrates consistent with Covid he was given a dose of Decadron as well as remdesivir and Tocilizumab after consultation with intensivist.  No immediate ICU beds available.  Is currently on BiPAP and needing high flow oxygen 100%.  PICC line was inserted.     Assessment and Plan:    Covid pneumonia with severe acute respiratory failure hypoxia  Patient is not vaccinated  Was on BiPAP and high flow oxygen 60 L/min FiO2 95%--> he is off the BiPAP now.  Stay in the ICU, ICU service following; Dr. Redman's notes appreciated.  Started on Decadron today is day 4  Continuing with  remdesivir day 4.  LFTs and creatinine stable  Also received Tocilizumab  LFTs and renal function stable  DVT prophylaxis per ICU protocol for Covid  Sliding scale insulin for blood sugars  Follow inflammatory markers, seem to be improving  LFTs are slightly elevated.  Not quite at the critical level.  Benefits outweigh the risk in continuing the remdesivir.  Inflammatory parameters are     New onset atrial fibrillation, mild RVR  At rate mostly less than 110, blood pressure in the 90s  On the hypotensive side so we will avoid any beta-blocker or Cardizem at this time unless his heart rate goes very high.  Get echocardiogram.  Rule out Covid cardiomyopathy  Troponin negative x3  Denies any chest pain     Slightly elevated LFTs, improving  Continue remdesivir  Patient is aware and agreed to continue this medication     History of opioid use and alcoholism  Not on methadone.  On Suboxone per review of the pharmacist     Essential hypertension,   now hypotension, likely from dehydration  Give IV fluids cautiously.  Avoid fluid overload.  For to keep him on the dry side if hemodynamically stable  Hold lisinopril  Patient is not able to drink or eat so I believe this is dehydration     --------------------------------------------------------------------     Diet: NPO for Medical/Clinical Reasons Except for: Ice Chips, Meds  DVT Prophylaxis: Lovenox 0.5 mg/kg twice daily  Code Status: Full Code  Living situation: Lives in a sober house  Anticipated possible discharge: At least a few days in the hospital     ----------------------------------------------------------------------     Cumulative essential/pertinent data reviewed:  Labs:  Sodium 131, now 140  LFTs showed ALT 53, normal 57  , now 88  Procalcitonin 0.5  Troponin negative x3  Glucose control  CBC unremarkable and stable  Hemoglobin stable  D-dimer 1.24 --> 1.22  Accu-Cheks mostly under 150.  Fibrinogen improving.  CRP 12.5, improved to 10.4--> 4.0-->  2.1  LDH baseline is 1033--> 995     Imaging:  The of the chest shows the following:  IMPRESSION:  1.  No pulmonary embolus.  2.  COVID pneumonia.  3.  Likely reactive mediastinal lymph nodes.  4.  Hepatic steatosis.     EKG: --  Telemetry reportedly showed episode of atrial fibrillation with RVR.  Patient still in A. fib with heart rate in the 100-110      ----------------------------------------------------------------------    Interval History/Subjective:  Alert and coherent.  Looks comfortable.  He is on a nasal cannula at this time.  Not on a BiPAP.  Denies any chest pressure.  No abdominal pain.  Able to eat better now that the BiPAP is gone.  Able to drink fluids.  Denies any nausea vomiting or diarrhea.  Rest of review of systems unremarkable.    Physical Exam/Objective:  Temp:  [96.9  F (36.1  C)-98.6  F (37  C)] 97.7  F (36.5  C)  Pulse:  [] 99  Resp:  [20-37] 24  BP: ()/(57-74) 99/62  FiO2 (%):  [75 %-95 %] 95 %  SpO2:  [90 %-100 %] 91 %    Body mass index is 27.38 kg/m .    GENERAL:   Alert coherent and conversant, wearing nasal cannula.;  appears comfortable, in no acute distress,   HEAD:  Normocephalic, without obvious abnormality, atraumatic   EYES:  Grossly normal; pupils unremarkable    NOSE: Grossly normal   THROAT: Lips and mouth external: grossly normal,    NECK: No mass noted; no stiffness   BACK:      LUNGS:   Auscultation: Negative for wheezing or crackles, symmetric chest rise on inhalation, respirations unlabored    CHEST WALL:  No tenderness or deformity   HEART:  Regular rate and rhythm, significant murmurs: Negative,    ABDOMEN:   Soft, non-tender, bowel sounds normal ; no masses, no peritoneal signs   EXTREMITIES:   No tenderness of the calves.  No significant ankle edema   SKIN:  see extremities   NEURO: no signs of acute stroke or change from prior state   PSYCH: Cooperative, behavior is appropriate     Medications:   Personally Reviewed.    remdesivir  100 mg Intravenous  Q24H    And     sodium chloride 0.9%  50 mL Intravenous Q24H     ARIPiprazole  5 mg Oral Daily     buprenorphine HCl-naloxone HCl  1 Film Sublingual Daily     buPROPion  300 mg Oral QAM     dexamethasone  6 mg Intravenous Daily     docusate sodium  100 mg Oral BID     enoxaparin ANTICOAGULANT  0.5 mg/kg Subcutaneous Q12H     gabapentin  800 mg Oral TID     insulin aspart  1-12 Units Subcutaneous Q4H     ipratropium-albuterol  2 puff Inhalation 4x Daily     [Held by provider] lisinopril  10 mg Oral Daily     pantoprazole (PROTONIX) IV  40 mg Intravenous Daily with breakfast     sennosides  8.6 mg Oral BID     Current Facility-Administered Medications   Medication Dose Route Frequency     albuterol  2-4 puff Inhalation Q4H PRN     glucose  15-30 g Oral Q15 Min PRN    Or     dextrose  25-50 mL Intravenous Q15 Min PRN    Or     glucagon  1 mg Subcutaneous Q15 Min PRN     hydrOXYzine  25-50 mg Oral At Bedtime PRN     lidocaine 4%   Topical Q1H PRN     lidocaine (buffered or not buffered)  0.1-5 mL Other Q1H PRN     ondansetron  4 mg Oral Q6H PRN    Or     ondansetron  4 mg Intravenous Q6H PRN     sodium chloride (PF)  10-40 mL Intracatheter Once PRN           Yeison Hidalgo MD  [facility]  [facility]  Phone: #[phone]

## 2021-11-27 NOTE — PROGRESS NOTES
[facility]:  CRITICAL CARE PROGRESS NOTE     Date / Time of Admission:  11/24/2021  1:19 PM    ID: Jared North is a 46 year old male, unvaccinated against COVID-19, with history of polysubstance abuse and chronic pain/lower back pain now on Suboxone, mood disorder/depression, essential hypertension, sleep disorder breathing, mild intermittent asthma, knee pain, and recent diagnosis of COVID-19 viral infection on 11/19/2021 who presented to [facility] on 11/24/2021 with worsening shortness of breath, admitted to the ICU with COVID-19 viral pneumonia and severe acute respiratory failure with hypoxia requiring noninvasive ventilation.           Changes for today: 1.   Place on HFNC this morning.  On FiO2 95%.   Try to alternative several times today between HFNC/NIPPV.    2. Atrial fibrillation, Hrs high 90s - 100s.  BP sluggish, will begin amiodarone bolus/infusion.  3. Mental (infusion as needed for BP goals.  Holding lisinopril.  4. Will try to give some small amounts of ensure clear supplements for nutritional support and see how he tolerates.   5. No BM.  Give senna 2x/day.    6. Keep on bedrest.         Assessment/Plan:   Neurologic: History of polysubstance abuse and chronic pain/lower back pain now on Suboxone.  Mood disorder/depression.    Continue home meds: abilify, suboxone 8-2 mg, gabapentin 3x/day    Pain meds PRN     Pulmonary: Acute respiratory failure with hypoxia, acute respiratory distress syndrome 2/2 COVID-19.  CT chest 11/24 extensive groundglass consolidative bilateral pulmonary opacities.  COVID-19 viral pneumonia.  Mild intermittent asthma.  He reports using inhaler once weekly at baseline. Trace left pleural effusion.  Noted on CT scan. Give lasix 11/25.    Wean supplemental O2 as tolerated; goal O2 sat > 92%.  HOB > 30 degrees to limit aspiration risk.      NIPPV - 12/6, FiO2 85%.      Alternate NIPPV/HFNC today - try to extend HFNC use.    HFNC, 20 - 60 L/min,  titrate as tolerates    High risk of intubation, discussed with the patient and ex-wife    Encourage self-proning or side proning as tolerates:  Assist patient with self-proning as long as tolerated day and night.  Goal between 30 min - 4 hours each time, 2-4 times/day.  Patient SHOULD NOT self-prone within 60 minutes of eating.  HOLD any proning attempts if there is hemodynamic instability or active nausea/vomiting.    Continue Combivent 2 puffs 4 times per day.  If unable to tolerate inhaler, will switch to nebulizer.    abx and diuresis as below.     Cardiovascular:  Atrial fibrillation with intermittent RVR.  Went into A. fib on 11/26, initially rate controlled.  Now with heart rates in the 90s to 110s.  Beginning amiodarone on 11/27.  Benign essential hypertension.  On lisinopril at baseline.    Cardiac monitoring.  SBP >= 90 mmHg, MAP >= 65 mmHg.  EKG PRN.    Hold home lisinopril  due to low normal BP.    Initiate amiodarone bolus plus infusion.    Phenylephrine infusion as needed for BP goals.     GI/: Transaminitis, improved.  Likely from the viral infection.  Present prior to Remdesivir use.    Follow-up AST, ALT level    Nutrition: NPO.  Will try some ensure clear for nutrition and see how he tolerates.    Last BM:  None..  Meds: colace 2x/day.  Start Senna 2x/day.    GI prophylaxis: PPI.     Renal: No issues.  Had trace left pleural effusion on CT, received lasix 20 mg IV x 1 on 11/25.    Monitor I/O's.  Electrolyte repletion PRN.  Avoid/limit nephrotoxic agents.    IVFs: Saline lock.     Heme/Onc: COVID-19 associated coagulopathy.  D-dimer 1.24 on 11/24.  Leukopenia,resolved.  Thrombocytopenia, resolved.     DVT prophylaxis: SCDs.  Lovenox 0.5 mg/kg subcu 2X/day.     Endocrine: Hyperglycemia, mild.  No history of diabetes.  Secondary to stress and steroid use.  Hemoglobin A1c 5.8% 11/24.    FSBG Q4H, Aspart insulin SS (high insulin resistance).  Hypoglycemia protocol.     Infectious diseases: COVID-19  viral infection.  PCR positive on 11/19.  Hospitalized on 11/24.  CRP 12.3, PCT 0.5 on 11/24.  Patient received tocilizumab 8 mg/kg on 11/24. Procalcitonin equivocal 11/24; repeat 0.21 on 11/26.    Continue dexamethasone 6 mg daily x10 days (start 11/24)    Continue remdesivir daily x5 days (start 11/24)     Rheum/Musculoskeletal: Chronic lumbar pain and knee pain.  Patient being evaluated by orthopedic surgery for surgical intervention.    Activity:  Bedrest    Lines/Drains/Tubes:  ANA PICC central line, placed 11/24     Code Status:  Full Code     The patient and/or the family was educated about the above plan of care and indicated understanding.  Updated the patient's ex-wife, Jama North on 11/27.  Conversation truncated due to ongoing emergencies in ICU.     This patient is considered critically ill and requires ICU level of care due to acute respiratory failure with hypoxia requiring noninvasive ventilation, high-flow nasal cannula.  Still with high risk of intubation.     Total Critical Care time, not including separate billable procedure time: 40 minutes.    Kendra Redman MD, MPH  Pulmonary/Critical Care Medicine  11/27/2021   3:18 PM         ICU DAILY CHECKLIST:   ICU DAILY CHECKLIST           Can patient transfer out of MICU? no    FAST HUG:    Feeding:  no.  Patient is receiving NPO    Wei: no  Analgesia/Sedation: yes; PRNs   Thromboembolic prophylaxis: yes; Mode:  Lovenox and SCDs  HOB>30:  yes  Stress Ulcer Protocol Active: yes; Mode: PPI  Glycemic Control: Any glucose > 180 no; Mode of Insulin Therapy: Sliding Scale Insulin    INTUBATED:  Can patient have daily waking:  No  Can patient have spontaneous breathing trial:  No    Restraints? no    PHYSICAL THERAPY AND MOBILITY:  Can patient have PT and mobility trial: no  Activity: Bedrest    RISK FACTORS PRESENT ON ADMISSION:  Clinically Significant Risk Factors Present on Admission                     Subjective/Interval History:   11/24: seen at the  "[facility] ED on 11/19, tested positive for Covid 19 at that time.  Ex-wife endorsed concerns, patient came to [facility] ED for evaluation.  O2 sat 44% on room air in triage.  Initially placed on oxygen mask, then noninvasive ventilation.  CT scan with extensive groundglass opacities, no pulmonary embolism.  He was given Decadron, remdesivir, tocilizumab  11/24: Transferred to ICU.  NPO.  Encourage self proning.  11/25:  NIPPV, 60 L/min, FiO2 100%.  Back on NIPPV, FiO2 at 80%.  11/26: Tried HFNC, 100%, 60 L/min for 3 hours.  Then back in NIPPV, FiO2 80%-85%.     No overnight events.  On noninvasive ventilation.    Placed on high flow nasal cannula this morning.  Attitude/spirits improved.    Review of Systems:  The Review of Systems is negative other than noted in the HPI        Allergies/Medications:   Allergies:   No Known Allergies    Continuous Infusions:    norepinephrine       Scheduled Medications:    remdesivir  100 mg Intravenous Q24H    And     sodium chloride 0.9%  50 mL Intravenous Q24H     ARIPiprazole  5 mg Oral Daily     buprenorphine HCl-naloxone HCl  1 Film Sublingual Daily     buPROPion  300 mg Oral QAM     dexamethasone  6 mg Intravenous Daily     docusate sodium  100 mg Oral BID     enoxaparin ANTICOAGULANT  0.5 mg/kg Subcutaneous Q12H     gabapentin  800 mg Oral TID     insulin aspart  1-12 Units Subcutaneous Q4H     ipratropium-albuterol  2 puff Inhalation 4x Daily     [Held by provider] lisinopril  10 mg Oral Daily     pantoprazole (PROTONIX) IV  40 mg Intravenous Daily with breakfast     sennosides  8.6 mg Oral BID           Objective:   Vitals:  BP 99/62 (BP Location: Left arm)   Pulse 100   Temp 97.7  F (36.5  C)   Resp 29   Ht 1.803 m (5' 11\")   Wt 89 kg (196 lb 4.8 oz)   SpO2 90%   BMI 27.38 kg/m    Vent: FiO2 (%): 85 %  Resp: 29    GEN: Pleasant male, sitting up in the bed, no acute distress  HEENT: Normocephalic, atraumatic.  Extraoccular eye movements intact, anicteric sclera. Moist " mucous membranes.  On high flow nasal cannula.  NECK: Supple.    PULM: Non-labored breathing.  No use of accessory muscles.  Diminished breath sounds at bases, fine inspiratory rales at bases.  CVS: Regular rate and rhythm.  Normal S1, S2.  No rubs, murmurs, or gallops.    ABDOMEN: Normoactive bowel sounds.  Non-tender to palpation.  Non-distended.    EXTREMITES:  No clubbing, cyanosis, or edema.    NEURO:  Awake.  Oriented to person, place, time and situation.  Cranial nerves 2-12 grossly intact.  Moving all extremities.      Intake/Output: I/O last 3 completed shifts:  In: 470 [P.O.:170; I.V.:300]  Out: 1125 [Urine:1125]        Pertinent Studies:   All laboratory data reviewed  Serum Glucose range:   Recent Labs   Lab 11/27/21  1236 11/27/21  0821 11/27/21 0456 11/27/21  0422   GLC 93 80 102 96     ABG:   Recent Labs   Lab 11/26/21  1859   PH 7.42     CBC:   Recent Labs   Lab 11/27/21 0456 11/26/21  0829 11/25/21  0433 11/24/21 2337 11/24/21  1341   WBC 5.3 7.1 3.0* 2.4* 4.5   HGB 14.0 13.8 14.0 14.1 14.4   HCT 41.4 40.9 41.9 41.2 42.5   MCV 88 88 88 87 87    161 131* 134* 128*   NEUTROPHIL  --   --   --  73 82   LYMPH  --   --   --  17 8   MONOCYTE  --   --   --  9 9   EOSINOPHIL  --   --   --  0 0     Chemistry:   Recent Labs   Lab 11/27/21  0456 11/26/21  1859 11/26/21  0829 11/25/21  0433 11/24/21  2337 11/24/21  1341     --  142 140 137 131*   POTASSIUM 4.3 4.5 4.2 4.6 4.6 4.0   CHLORIDE 104  --  101 100 99 95*   CO2 28  --  31 28 30 28   BUN 23*  --  26* 20 16 15   CR 0.67*  --  0.70 0.79 0.76 0.99   GFRESTIMATED >90  --  >90 >90 >90 >90   TRUONG 7.9*  --  8.2* 8.2* 8.1* 8.1*   MAG  --  2.4  --   --   --   --    PROTTOTAL  --   --   --  6.1 6.2 6.5   ALBUMIN  --   --   --  2.6* 2.7* 2.9*   AST 63*  --   --  88* 101* 105*   ALT 43  --   --  57* 59* 53*   ALKPHOS  --   --   --  69 72 81   BILITOTAL  --   --   --  0.4 0.4 0.5     Coags:  Recent Labs   Lab 11/24/21  2339   INR 1.08   PTT 35      Cardiac Markers:  Recent Labs   Lab 11/27/21  0456   TROPONINI <0.01        Microbiology:  COVID-19 PCR, 11/19: Positive        Cardiology/Radiology:   Cardiac: All cardiac studies reviewed by me.    EKG:  Reviewed    TTE:  None    Radiology:  All imaging studies reviewed by me.    CT chest PE study, 11/24:  FINDINGS: ANGIOGRAM CHEST: No pulmonary artery filling defect. Aorta is normal in caliber.  LUNGS AND PLEURA: Extensive groundglass consolidative bilateral pulmonary opacities. Trace left effusion. MEDIASTINUM/AXILLAE: Heart size is normal scattered borderline enlarged mediastinal lymph nodes with the largest in the right lower paratracheal chain measuring 1.1 x 2.2 cm (series 5/29).  CORONARY ARTERY CALCIFICATION: None.  UPPER ABDOMEN: Hepatic steatosis.  MUSCULOSKELETAL: Normal. IMPRESSION: 1.  No pulmonary embolus. 2.  COVID pneumonia. 3.  Likely reactive mediastinal  lymph nodes. 4.  Hepatic steatosis.

## 2021-11-27 NOTE — PROVIDER NOTIFICATION
Pt had been NSR all day; this evening noticed some ekg changes on monitor. 12-lead obtained; shows a-fib. Rate 80s. Dr Redman notified, labs ordered. Will continue to monitor.

## 2021-11-28 ENCOUNTER — ANESTHESIA EVENT (OUTPATIENT)
Dept: INTENSIVE CARE | Facility: CLINIC | Age: 46
End: 2021-11-28
Payer: COMMERCIAL

## 2021-11-28 ENCOUNTER — ANESTHESIA (OUTPATIENT)
Dept: INTENSIVE CARE | Facility: CLINIC | Age: 46
End: 2021-11-28
Payer: COMMERCIAL

## 2021-11-28 ENCOUNTER — APPOINTMENT (OUTPATIENT)
Dept: RADIOLOGY | Facility: CLINIC | Age: 46
End: 2021-11-28
Attending: INTERNAL MEDICINE
Payer: COMMERCIAL

## 2021-11-28 ENCOUNTER — APPOINTMENT (OUTPATIENT)
Dept: CARDIOLOGY | Facility: CLINIC | Age: 46
End: 2021-11-28
Attending: INTERNAL MEDICINE
Payer: COMMERCIAL

## 2021-11-28 LAB
ALBUMIN SERPL-MCNC: 2.7 G/DL (ref 3.5–5)
ALP SERPL-CCNC: 63 U/L (ref 45–120)
ALT SERPL W P-5'-P-CCNC: 75 U/L (ref 0–45)
ANION GAP SERPL CALCULATED.3IONS-SCNC: 9 MMOL/L (ref 5–18)
AST SERPL W P-5'-P-CCNC: 92 U/L (ref 0–40)
BASE EXCESS BLDA CALC-SCNC: -1.3 MMOL/L
BILIRUB SERPL-MCNC: 0.6 MG/DL (ref 0–1)
BUN SERPL-MCNC: 22 MG/DL (ref 8–22)
C REACTIVE PROTEIN LHE: 1 MG/DL (ref 0–0.8)
CALCIUM SERPL-MCNC: 7.5 MG/DL (ref 8.5–10.5)
CHLORIDE BLD-SCNC: 108 MMOL/L (ref 98–107)
CO2 SERPL-SCNC: 26 MMOL/L (ref 22–31)
COHGB MFR BLD: 92.9 % (ref 96–97)
CREAT SERPL-MCNC: 0.72 MG/DL (ref 0.7–1.3)
ERYTHROCYTE [DISTWIDTH] IN BLOOD BY AUTOMATED COUNT: 13.2 % (ref 10–15)
GFR SERPL CREATININE-BSD FRML MDRD: >90 ML/MIN/1.73M2
GLUCOSE BLD-MCNC: 101 MG/DL (ref 70–125)
GLUCOSE BLDC GLUCOMTR-MCNC: 107 MG/DL (ref 70–99)
GLUCOSE BLDC GLUCOMTR-MCNC: 116 MG/DL (ref 70–99)
GLUCOSE BLDC GLUCOMTR-MCNC: 131 MG/DL (ref 70–99)
GLUCOSE BLDC GLUCOMTR-MCNC: 141 MG/DL (ref 70–99)
GLUCOSE BLDC GLUCOMTR-MCNC: 144 MG/DL (ref 70–99)
GLUCOSE BLDC GLUCOMTR-MCNC: 88 MG/DL (ref 70–99)
HCO3 BLD-SCNC: 23 MMOL/L (ref 23–29)
HCT VFR BLD AUTO: 44.5 % (ref 40–53)
HGB BLD-MCNC: 14.9 G/DL (ref 13.3–17.7)
LDH SERPL L TO P-CCNC: 1018 U/L (ref 125–220)
LVEF ECHO: NORMAL
MAGNESIUM SERPL-MCNC: 2.1 MG/DL (ref 1.8–2.6)
MCH RBC QN AUTO: 29.5 PG (ref 26.5–33)
MCHC RBC AUTO-ENTMCNC: 33.5 G/DL (ref 31.5–36.5)
MCV RBC AUTO: 88 FL (ref 78–100)
OXYHGB MFR BLD: 91.9 % (ref 96–97)
PCO2 BLD: 46 MM HG (ref 35–45)
PH BLD: 7.34 [PH] (ref 7.37–7.44)
PLATELET # BLD AUTO: 175 10E3/UL (ref 150–450)
PO2 BLD: 75 MM HG (ref 80–90)
POTASSIUM BLD-SCNC: 4.2 MMOL/L (ref 3.5–5)
PROT SERPL-MCNC: 5.4 G/DL (ref 6–8)
RBC # BLD AUTO: 5.05 10E6/UL (ref 4.4–5.9)
SODIUM SERPL-SCNC: 143 MMOL/L (ref 136–145)
TEMPERATURE: 37 DEGREES C
WBC # BLD AUTO: 8.5 10E3/UL (ref 4–11)

## 2021-11-28 PROCEDURE — 250N000013 HC RX MED GY IP 250 OP 250 PS 637: Performed by: INTERNAL MEDICINE

## 2021-11-28 PROCEDURE — 83735 ASSAY OF MAGNESIUM: CPT | Performed by: INTERNAL MEDICINE

## 2021-11-28 PROCEDURE — 99207 PR CDG-CHARGE REQUIRED MANUAL ENTRY: CPT | Performed by: INTERNAL MEDICINE

## 2021-11-28 PROCEDURE — 250N000011 HC RX IP 250 OP 636

## 2021-11-28 PROCEDURE — 94644 CONT INHLJ TX 1ST HOUR: CPT

## 2021-11-28 PROCEDURE — 370N000003 HC ANESTHESIA WARD SERVICE

## 2021-11-28 PROCEDURE — 36620 INSERTION CATHETER ARTERY: CPT | Performed by: INTERNAL MEDICINE

## 2021-11-28 PROCEDURE — 99292 CRITICAL CARE ADDL 30 MIN: CPT | Mod: 25 | Performed by: INTERNAL MEDICINE

## 2021-11-28 PROCEDURE — 255N000002 HC RX 255 OP 636: Performed by: HOSPITALIST

## 2021-11-28 PROCEDURE — 200N000001 HC R&B ICU

## 2021-11-28 PROCEDURE — 83615 LACTATE (LD) (LDH) ENZYME: CPT | Performed by: INTERNAL MEDICINE

## 2021-11-28 PROCEDURE — 250N000009 HC RX 250: Performed by: INTERNAL MEDICINE

## 2021-11-28 PROCEDURE — 99291 CRITICAL CARE FIRST HOUR: CPT | Mod: 25 | Performed by: INTERNAL MEDICINE

## 2021-11-28 PROCEDURE — 250N000011 HC RX IP 250 OP 636: Performed by: INTERNAL MEDICINE

## 2021-11-28 PROCEDURE — 94645 CONT INHLJ TX EACH ADDL HOUR: CPT

## 2021-11-28 PROCEDURE — 82040 ASSAY OF SERUM ALBUMIN: CPT | Performed by: INTERNAL MEDICINE

## 2021-11-28 PROCEDURE — 250N000011 HC RX IP 250 OP 636: Performed by: NURSE ANESTHETIST, CERTIFIED REGISTERED

## 2021-11-28 PROCEDURE — 86141 C-REACTIVE PROTEIN HS: CPT | Performed by: INTERNAL MEDICINE

## 2021-11-28 PROCEDURE — 5A1955Z RESPIRATORY VENTILATION, GREATER THAN 96 CONSECUTIVE HOURS: ICD-10-PCS | Performed by: INTERNAL MEDICINE

## 2021-11-28 PROCEDURE — 71045 X-RAY EXAM CHEST 1 VIEW: CPT

## 2021-11-28 PROCEDURE — 999N000157 HC STATISTIC RCP TIME EA 10 MIN

## 2021-11-28 PROCEDURE — 93306 TTE W/DOPPLER COMPLETE: CPT | Mod: 26 | Performed by: INTERNAL MEDICINE

## 2021-11-28 PROCEDURE — 82565 ASSAY OF CREATININE: CPT | Performed by: INTERNAL MEDICINE

## 2021-11-28 PROCEDURE — 85027 COMPLETE CBC AUTOMATED: CPT | Performed by: INTERNAL MEDICINE

## 2021-11-28 PROCEDURE — 250N000009 HC RX 250

## 2021-11-28 PROCEDURE — 74018 RADEX ABDOMEN 1 VIEW: CPT

## 2021-11-28 PROCEDURE — C9113 INJ PANTOPRAZOLE SODIUM, VIA: HCPCS | Performed by: INTERNAL MEDICINE

## 2021-11-28 PROCEDURE — 258N000003 HC RX IP 258 OP 636: Performed by: INTERNAL MEDICINE

## 2021-11-28 PROCEDURE — 94002 VENT MGMT INPAT INIT DAY: CPT

## 2021-11-28 PROCEDURE — 3E043XZ INTRODUCTION OF VASOPRESSOR INTO CENTRAL VEIN, PERCUTANEOUS APPROACH: ICD-10-PCS | Performed by: INTERNAL MEDICINE

## 2021-11-28 PROCEDURE — 0BH17EZ INSERTION OF ENDOTRACHEAL AIRWAY INTO TRACHEA, VIA NATURAL OR ARTIFICIAL OPENING: ICD-10-PCS | Performed by: INTERNAL MEDICINE

## 2021-11-28 PROCEDURE — 82805 BLOOD GASES W/O2 SATURATION: CPT | Performed by: INTERNAL MEDICINE

## 2021-11-28 PROCEDURE — 99232 SBSQ HOSP IP/OBS MODERATE 35: CPT | Performed by: HOSPITALIST

## 2021-11-28 PROCEDURE — 94660 CPAP INITIATION&MGMT: CPT

## 2021-11-28 RX ORDER — MIDAZOLAM HCL IN 0.9 % NACL/PF 1 MG/ML
1-12 PLASTIC BAG, INJECTION (ML) INTRAVENOUS CONTINUOUS
Status: DISCONTINUED | OUTPATIENT
Start: 2021-11-28 | End: 2021-12-05 | Stop reason: HOSPADM

## 2021-11-28 RX ORDER — VECURONIUM BROMIDE 1 MG/ML
10 INJECTION, POWDER, LYOPHILIZED, FOR SOLUTION INTRAVENOUS EVERY 4 HOURS PRN
Status: DISCONTINUED | OUTPATIENT
Start: 2021-11-28 | End: 2021-12-01

## 2021-11-28 RX ORDER — AMINO AC/PROTEIN HYDR/WHEY PRO 10G-100/30
2 LIQUID (ML) ORAL
Status: DISCONTINUED | OUTPATIENT
Start: 2021-11-28 | End: 2021-11-30

## 2021-11-28 RX ORDER — BUPROPION HYDROCHLORIDE 75 MG/1
150 TABLET ORAL 2 TIMES DAILY
Status: DISCONTINUED | OUTPATIENT
Start: 2021-11-29 | End: 2021-12-05 | Stop reason: HOSPADM

## 2021-11-28 RX ORDER — DEXMEDETOMIDINE HYDROCHLORIDE 4 UG/ML
0.2-0.7 INJECTION, SOLUTION INTRAVENOUS CONTINUOUS
Status: DISCONTINUED | OUTPATIENT
Start: 2021-11-28 | End: 2021-11-28

## 2021-11-28 RX ORDER — VECURONIUM BROMIDE 1 MG/ML
10 INJECTION, POWDER, LYOPHILIZED, FOR SOLUTION INTRAVENOUS ONCE
Status: COMPLETED | OUTPATIENT
Start: 2021-11-28 | End: 2021-11-28

## 2021-11-28 RX ORDER — CHLORHEXIDINE GLUCONATE ORAL RINSE 1.2 MG/ML
15 SOLUTION DENTAL EVERY 12 HOURS
Status: DISCONTINUED | OUTPATIENT
Start: 2021-11-28 | End: 2021-12-05 | Stop reason: HOSPADM

## 2021-11-28 RX ORDER — PROPOFOL 10 MG/ML
INJECTION, EMULSION INTRAVENOUS PRN
Status: DISCONTINUED | OUTPATIENT
Start: 2021-11-28 | End: 2021-11-28

## 2021-11-28 RX ORDER — GABAPENTIN 250 MG/5ML
800 SOLUTION ORAL
Status: DISCONTINUED | OUTPATIENT
Start: 2021-11-28 | End: 2021-12-05 | Stop reason: HOSPADM

## 2021-11-28 RX ORDER — LACTULOSE 10 G/15ML
20 SOLUTION ORAL ONCE
Status: COMPLETED | OUTPATIENT
Start: 2021-11-28 | End: 2021-11-28

## 2021-11-28 RX ORDER — PROPOFOL 10 MG/ML
5-75 INJECTION, EMULSION INTRAVENOUS CONTINUOUS
Status: DISCONTINUED | OUTPATIENT
Start: 2021-11-28 | End: 2021-12-05 | Stop reason: HOSPADM

## 2021-11-28 RX ORDER — VECURONIUM BROMIDE 1 MG/ML
INJECTION, POWDER, LYOPHILIZED, FOR SOLUTION INTRAVENOUS
Status: DISCONTINUED
Start: 2021-11-28 | End: 2021-11-28 | Stop reason: WASHOUT

## 2021-11-28 RX ORDER — FENTANYL CITRATE 50 UG/ML
INJECTION, SOLUTION INTRAMUSCULAR; INTRAVENOUS PRN
Status: DISCONTINUED | OUTPATIENT
Start: 2021-11-28 | End: 2021-11-28

## 2021-11-28 RX ORDER — PROPOFOL 10 MG/ML
INJECTION, EMULSION INTRAVENOUS
Status: COMPLETED
Start: 2021-11-28 | End: 2021-11-28

## 2021-11-28 RX ORDER — WATER 10 ML/10ML
INJECTION INTRAMUSCULAR; INTRAVENOUS; SUBCUTANEOUS
Status: COMPLETED
Start: 2021-11-28 | End: 2021-11-28

## 2021-11-28 RX ADMIN — Medication 20 NG/KG/MIN: at 16:58

## 2021-11-28 RX ADMIN — Medication 50 MG: at 16:22

## 2021-11-28 RX ADMIN — SENNOSIDES 8.6 MG: 8.6 TABLET, FILM COATED ORAL at 08:46

## 2021-11-28 RX ADMIN — ENOXAPARIN SODIUM 50 MG: 100 INJECTION SUBCUTANEOUS at 00:06

## 2021-11-28 RX ADMIN — DEXMEDETOMIDINE HYDROCHLORIDE 0.2 MCG/KG/HR: 400 INJECTION INTRAVENOUS at 03:05

## 2021-11-28 RX ADMIN — VECURONIUM BROMIDE 10 MG: 1 INJECTION, POWDER, LYOPHILIZED, FOR SOLUTION INTRAVENOUS at 16:04

## 2021-11-28 RX ADMIN — VECURONIUM BROMIDE 10 MG: 1 INJECTION, POWDER, LYOPHILIZED, FOR SOLUTION INTRAVENOUS at 18:05

## 2021-11-28 RX ADMIN — REMDESIVIR 100 MG: 100 INJECTION, POWDER, LYOPHILIZED, FOR SOLUTION INTRAVENOUS at 18:40

## 2021-11-28 RX ADMIN — Medication 0.03 MCG/KG/MIN: at 14:20

## 2021-11-28 RX ADMIN — PANTOPRAZOLE SODIUM 40 MG: 40 INJECTION, POWDER, FOR SOLUTION INTRAVENOUS at 08:41

## 2021-11-28 RX ADMIN — PROPOFOL 40 MCG/KG/MIN: 10 INJECTION, EMULSION INTRAVENOUS at 14:19

## 2021-11-28 RX ADMIN — BUPROPION HYDROCHLORIDE 300 MG: 150 TABLET, EXTENDED RELEASE ORAL at 08:46

## 2021-11-28 RX ADMIN — Medication 100 MG: at 14:17

## 2021-11-28 RX ADMIN — Medication 100 MCG: at 15:48

## 2021-11-28 RX ADMIN — CHLORHEXIDINE GLUCONATE 15 ML: 1.2 RINSE ORAL at 20:52

## 2021-11-28 RX ADMIN — VECURONIUM BROMIDE 10 MG: 1 INJECTION, POWDER, LYOPHILIZED, FOR SOLUTION INTRAVENOUS at 14:38

## 2021-11-28 RX ADMIN — PERFLUTREN 3 ML: 6.52 INJECTION, SUSPENSION INTRAVENOUS at 11:00

## 2021-11-28 RX ADMIN — Medication 100 MCG: at 14:39

## 2021-11-28 RX ADMIN — PROPOFOL 65 MCG/KG/MIN: 10 INJECTION, EMULSION INTRAVENOUS at 21:30

## 2021-11-28 RX ADMIN — PROPOFOL 75 MCG/KG/MIN: 10 INJECTION, EMULSION INTRAVENOUS at 19:02

## 2021-11-28 RX ADMIN — CISATRACURIUM BESYLATE 3 MCG/KG/MIN: 10 INJECTION INTRAVENOUS at 18:44

## 2021-11-28 RX ADMIN — MINERAL OIL AND WHITE PETROLATUM 1 G: 30; 940 OINTMENT OPHTHALMIC at 23:55

## 2021-11-28 RX ADMIN — WATER: 1 INJECTION INTRAMUSCULAR; INTRAVENOUS; SUBCUTANEOUS at 17:05

## 2021-11-28 RX ADMIN — Medication 2 MG/HR: at 14:24

## 2021-11-28 RX ADMIN — MIDAZOLAM HYDROCHLORIDE 2 MG: 1 INJECTION, SOLUTION INTRAMUSCULAR; INTRAVENOUS at 15:50

## 2021-11-28 RX ADMIN — PROPOFOL 100 MG: 10 INJECTION, EMULSION INTRAVENOUS at 14:17

## 2021-11-28 RX ADMIN — GABAPENTIN 800 MG: 250 SUSPENSION ORAL at 16:43

## 2021-11-28 RX ADMIN — LACTULOSE 10 G: 10 SOLUTION ORAL at 16:42

## 2021-11-28 RX ADMIN — ENOXAPARIN SODIUM 50 MG: 100 INJECTION SUBCUTANEOUS at 11:51

## 2021-11-28 RX ADMIN — Medication 50 MG: at 14:40

## 2021-11-28 RX ADMIN — MIDAZOLAM HYDROCHLORIDE 2 MG: 1 INJECTION, SOLUTION INTRAMUSCULAR; INTRAVENOUS at 14:26

## 2021-11-28 RX ADMIN — DEXAMETHASONE SODIUM PHOSPHATE 6 MG: 10 INJECTION INTRAMUSCULAR; INTRAVENOUS at 11:53

## 2021-11-28 RX ADMIN — INSULIN ASPART 1 UNITS: 100 INJECTION, SOLUTION INTRAVENOUS; SUBCUTANEOUS at 16:44

## 2021-11-28 RX ADMIN — SODIUM CHLORIDE 50 ML: 9 INJECTION, SOLUTION INTRAVENOUS at 19:30

## 2021-11-28 RX ADMIN — Medication 75 MCG/HR: at 14:21

## 2021-11-28 RX ADMIN — BUPRENORPHINE AND NALOXONE 1 FILM: 8; 2 FILM, SOLUBLE BUCCAL; SUBLINGUAL at 08:43

## 2021-11-28 RX ADMIN — INSULIN ASPART 1 UNITS: 100 INJECTION, SOLUTION INTRAVENOUS; SUBCUTANEOUS at 23:55

## 2021-11-28 RX ADMIN — Medication 20 MG: at 15:48

## 2021-11-28 RX ADMIN — DOCUSATE SODIUM 100 MG: 50 LIQUID ORAL at 16:42

## 2021-11-28 RX ADMIN — GABAPENTIN 800 MG: 400 CAPSULE ORAL at 08:46

## 2021-11-28 RX ADMIN — DOCUSATE SODIUM 100 MG: 100 CAPSULE, LIQUID FILLED ORAL at 08:46

## 2021-11-28 RX ADMIN — IPRATROPIUM BROMIDE AND ALBUTEROL 2 PUFF: 20; 100 SPRAY, METERED RESPIRATORY (INHALATION) at 11:53

## 2021-11-28 RX ADMIN — PROPOFOL 75 MCG/KG/MIN: 10 INJECTION, EMULSION INTRAVENOUS at 16:23

## 2021-11-28 RX ADMIN — PROPOFOL 50 MG: 10 INJECTION, EMULSION INTRAVENOUS at 14:18

## 2021-11-28 RX ADMIN — ARIPIPRAZOLE 5 MG: 5 TABLET ORAL at 08:46

## 2021-11-28 RX ADMIN — DEXMEDETOMIDINE HYDROCHLORIDE 0.6 MCG/KG/HR: 400 INJECTION INTRAVENOUS at 11:06

## 2021-11-28 RX ADMIN — Medication 2 PACKET: at 18:44

## 2021-11-28 ASSESSMENT — ACTIVITIES OF DAILY LIVING (ADL)
ADLS_ACUITY_SCORE: 7
ADLS_ACUITY_SCORE: 7
ADLS_ACUITY_SCORE: 9
ADLS_ACUITY_SCORE: 8
ADLS_ACUITY_SCORE: 7
ADLS_ACUITY_SCORE: 9
ADLS_ACUITY_SCORE: 8
ADLS_ACUITY_SCORE: 7
ADLS_ACUITY_SCORE: 8
ADLS_ACUITY_SCORE: 8
ADLS_ACUITY_SCORE: 7

## 2021-11-28 ASSESSMENT — MIFFLIN-ST. JEOR: SCORE: 1769.4

## 2021-11-28 NOTE — PLAN OF CARE
Problem: Risk for Delirium  Goal: Improved Sleep  Outcome: No Change     Problem: Adult Inpatient Plan of Care  Goal: Plan of Care Review  Outcome: Declining     Problem: Gas Exchange Impaired  Goal: Optimal Gas Exchange  Outcome: Declining  Intervention: Optimize Oxygenation and Ventilation  Recent Flowsheet Documentation  Taken 11/28/2021 1200 by Uma Verduzco, RN  Head of Bed (HOB) Positioning: HOB at 20-30 degrees  Taken 11/28/2021 0900 by Uma Verduzco RN  Head of Bed (HOB) Positioning: HOB at 20-30 degrees  Pt was  on the bipap for most of the day, intubated at 1400, See mar for meds . sedated blood sugars WNL /OG placed

## 2021-11-28 NOTE — ADDENDUM NOTE
Addendum  created 11/28/21 1512 by Delphine Burgos MD    Attestation recorded in Intraprocedure, Intraprocedure Attestations filed

## 2021-11-28 NOTE — PROGRESS NOTES
Pt remains on and off Bipap. 12/6 16 85%. HFNC 60lpm 94%. RT will continue to monitor.    Alexa Brush, RT

## 2021-11-28 NOTE — PROCEDURES
[facility]    Arterial line placement    Date/Time: 11/28/2021 3:15 PM  Performed by: Kendra Redman MD  Authorized by: Kendra Redman MD       UNIVERSAL PROTOCOL   Site Marked: No  Prior Images Obtained and Reviewed:  Yes  Required items: Required blood products, implants, devices and special equipment available    Patient identity confirmed:  Arm band and hospital-assigned identification number  Patient was reevaluated immediately before administering moderate or deep sedation or anesthesia  Confirmation Checklist:  Patient's identity using two indicators, relevant allergies, correct equipment/implants were available and procedure was appropriate and matched the consent or emergent situation  Time out: Immediately prior to the procedure a time out was called    Universal Protocol: the Joint Commission Universal Protocol was followed      PROCEDURE    Length of time physician/provider present for 1:1 monitoring during sedation: 0        ARTERIAL LINE INSERTION PROCEDURE NOTE  (NON-OR)    Procedure Date: 11/28/2021   Performing Physician: Kendra Redman MD, MPH    Procedure:  Insertion of Arterial Line/Catheter:  Left Radial Artery with Ultrasound Guidance.    Pre-Procedure Diagnosis:  SEPTIC SHOCK and RESPIRATORY FAILURE  Post-Procedure Diagnosis:  Same as Pre-Procedure Diagnosis    Indications:  HYPOTENSION, RESPIRATORY FAILURE and HEMODYNAMIC SHOCK    Consent:  Procedure was done as an emergency : Yes  The risks, benefits, complications, treatment options, and expected outcomes were NOT discussed with the patient/family due to the emergent nature of the procedure.  The risks and potential complications of their problem and purposed procedure include but are not limited to infection, bleeding, pain,  the need for additional procedures, and nerve and vessel injury.      Procedure Details:   A Time Out was performed. The patient was identified as Jared North with Date of Birth  1975, and MRN [id number].  The procedure was verified as Arterial Line Insertion..  The site of the procedure was properly noted/marked.     An Crow test was not indicated before the procedure.  Under sterile conditions the skin over the LEFT RADIAL ARTERY was prepped with Chlorhexidine and covered with a sterile drape.  Strict sterile conditions were maintained, and  work cap, mask, sterile gown, and sterile gloves for the procedure.  Local anesthetic with Lidocaine 1% was NOT infiltrated into the skin and subcutaneous tissues.  Using ultrasound guidance in the sterile field, an 18-gauge needle was then inserted into the artery.  A guide wire was then passed easily through the needle and the needle was removed.  The guide wire was visualized within the artery using ultrasound in both the vertical and longitudinal directions. A 20 gauge single lumen catheter was then inserted into the vessel over the guide wire.  The guide wire was removed, pulsatile blood return and catheter was attached to the A-line circuit.  The catheter was sutured into place at 12 cm and an occlusive sterile dressing applied.      Number of attempts:  1    Estimated Blood Loss:  3 mL.    Complications:  NONE.  The patient tolerated the procedure well.     Condition: unstable    ________________________________________________________________________  Kendra Redman MD, MPH  Pulmonary & Critical Care Medicine  Pager: [phone]  11/28/2021  3:30 PM

## 2021-11-28 NOTE — ANESTHESIA PROCEDURE NOTES
Airway       Patient location during procedure: ICU       Procedure Start/Stop Times: 11/28/2021 2:19 PM and 11/28/2021 2:22 PM  Staff -        Anesthesiologist:  Delphine Burgos MD       CRNA: Freda Blandon APRN CRNA       Performed By: CRNA  Consent for Airway        Urgency: emergent       Consent: The procedure was performed in an emergent situation.  Report Obtained from Primary Care Team       History regarding most recent potassium obtained: Yes       History regarding presence/absence of renal failure obtained:Yes       History regarding stroke/CVA obtained:Yes       History regarding presence/absence of NM disorder: YesIndications and Patient Condition       Indications for airway management: respiratory insufficiency       Mallampati: Not Assessed     Induction type:intravenous       Mask difficulty assessment: 0 - not attempted    Final Airway Details       Final airway type: endotracheal airway       Successful airway: ETT - single  Endotracheal Airway Details        ETT size (mm): 8.0       Cuffed: yes       Successful intubation technique: video laryngoscopy       VL Blade Size: Glidescope 4       Grade View of Cords: 1       Position: Center       Measured from: gums/teeth       Secured at (cm): 22    Post intubation assessment        ETT secured, Vent settings by primary/ICU team, Primary/ICU team to review CXR, Sedation to be ordered by primary/ICU team and No apparent complications       Placement verified by: capnometry, equal breath sounds and chest rise        Number of attempts at approach: 1       Secured with: commercial tube martinez       Ease of procedure: easy       Dentition: Intact

## 2021-11-28 NOTE — PROGRESS NOTES
[facility]:  CRITICAL CARE PROGRESS NOTE     Date / Time of Admission:  11/24/2021  1:19 PM    ID: Jared North is a 46 year old male, unvaccinated against COVID-19, with history of polysubstance abuse and chronic pain/lower back pain now on Suboxone, mood disorder/depression, essential hypertension, sleep disorder breathing, mild intermittent asthma, knee pain, and recent diagnosis of COVID-19 viral infection on 11/19/2021 who presented to [facility] on 11/24/2021 with worsening shortness of breath, admitted to the ICU with COVID-19 viral pneumonia and severe acute respiratory failure with hypoxia requiring noninvasive ventilation.           Changes for today: 1.   Unable to tolerate HFNC, placed back on NIPPV requiring Precedex infusion for management.  2. Due to persistent NIPPV use with minimal HFNC tolerance, intubated today.  3. No difficulties with intubation, tolerated.    4. Propofol, versed, fentanyl gtt, initiated.  Vecuronium 10 mg IV x 1 also given post procedure.  5. ETT appeared high and I could hear an air leak.  We advanced it to 26 cm at the teeth.  Had to reinflate the balloon twice.    6. Hypoxic with FIO2 100%, start inhaled Veletri.  7. Placed arterial line.  8. Norepinephrine gtt started for BP goals.   9. Stopped amiodarone - in sinus rhythm, Hrs 60s.  10. P/F ratio 76 on ABG.  Initiate prone ventilation.  11. No BM.  Give lactulose x 1.        Assessment/Plan:   Neurologic:  Acute metabolic encephalopathy.   In the setting of therapeutic sedation.  History of polysubstance abuse and chronic pain/lower back pain now on Suboxone.  Hold suboxone on 11/28 after intubation. Mood disorder/depression.    Vecuronium 10 mg IV Q4H PRN for ventilator synchrony.  May need Nimbex gtt.     Propofol, fentanyl, midazolam gtt as needed for sedation.  RASS goal -4/-5.    Continue home meds: abilify, gabapentin 3x/day    Pain meds PRN     Pulmonary: Acute respiratory failure with  hypoxia, acute respiratory distress syndrome 2/2 COVID-19.  CT chest 11/24 extensive groundglass consolidative bilateral pulmonary opacities.  On NIPPV.  Had trace left pleural effusion on CT, received lasix 20 mg IV x 1 on 11/25.  COVID-19 viral pneumonia.  Mild intermittent asthma.  He reports using inhaler once weekly at baseline. Trace left pleural effusion.  Noted on CT scan. Give lasix 11/25.    Wean supplemental O2 as tolerated; goal O2 sat > 92%.  HOB > 30 degrees to limit aspiration risk.      Vent: CMV, RR 24,  mL (6 mL/kg IBW), PEEP 14, FiO2 100%.     Goal Pplat less than 30, goal driving pressure less than 15.    ABG now, P/F ratio 75.      Initiate inhaled epoprostenol.  Initiate prone ventilation.    Discontinue Combivent.    abx and diuresis as below.     Cardiovascular:  Circulatory shock.  In the setting of sepsis, vasoplegia from therapeutic sedation.  Has history of essential hypertension, on lisinopril at baseline.  Atrial fibrillation with intermittent RVR, resolved.  Went into A. fib on 11/26, initially rate controlled.  Initiated amiodarone on 11/27.  Converted to normal sinus rhythm after intubation on 11/28.      Cardiac monitoring.  SBP >= 90 mmHg, MAP >= 65 mmHg.  EKG PRN.    Norepinephrine as needed for BP goals.    Discontinue amiodarone for now.    TTE obtained from primary team earlier due to A. fib, follow-up study.     GI/: Transaminitis, improved.  Likely from the viral infection.  Present prior to Remdesivir use.  Slow transit constipation.    OG tube evaluated, in appropriate position.    Nutrition: NPO.  Nutrition consult for trickle feeding.  FWF 60 mL Q4H.    Last BM:  None..  Meds: colace 2x/day.  Senna 2x/day.  Give lactulose.    GI prophylaxis: PPI.     Renal:  Hyperchloremia.      Monitor I/O's.  Electrolyte repletion PRN.  Avoid/limit nephrotoxic agents.    IVFs: Saline lock.    FWF as above.     Heme/Onc: COVID-19 associated coagulopathy.  D-dimer 1.24 on 11/24.   Leukopenia,resolved.  Thrombocytopenia, resolved.     DVT prophylaxis: SCDs.  Lovenox 0.5 mg/kg subcu 2X/day.     Endocrine: Hyperglycemia, mild.  No history of diabetes.  Secondary to stress and steroid use.  Hemoglobin A1c 5.8% 11/24.    FSBG Q4H, Aspart insulin SS (high insulin resistance).  Hypoglycemia protocol.     Infectious diseases: COVID-19 viral infection.  PCR positive on 11/19.  Hospitalized on 11/24.  CRP 12.3, PCT 0.5 on 11/24.  Patient received tocilizumab 8 mg/kg on 11/24. Procalcitonin equivocal 11/24; repeat 0.21 on 11/26.    Continue dexamethasone 6 mg daily x10 days (start 11/24)    Continue remdesivir daily x5 days (start 11/24)     Rheum/Musculoskeletal: Chronic lumbar pain and knee pain.  Patient being evaluated by orthopedic surgery for surgical intervention.    Activity:  Bedrest    Lines/Drains/Tubes:  RUE PICC central line, placed 11/24     Code Status:  Full Code     The patient and/or the family was educated about the above plan of care and indicated understanding.  Updated the patient's ex-wife, Jama North on 11/28 both before and after intubation.     This patient is considered critically ill and requires ICU level of care due to acute respiratory failure with hypoxia, acute respiratory distress syndrome requiring mechanical ventilation and proning; circulatory shock on vasopressors.     Total Critical Care time, not including separate billable procedure time: 90 minutes.    Kendra Redman MD, MPH  Pulmonary/Critical Care Medicine  11/28/2021   3:34 PM         ICU DAILY CHECKLIST:   ICU DAILY CHECKLIST           Can patient transfer out of MICU? no    FAST HUG:    Feeding:  no.  Patient is receiving NPO    Wei: no  Analgesia/Sedation: yes; Propofol, Midazolam and Fentanyl   Thromboembolic prophylaxis: yes; Mode:  Lovenox and SCDs  HOB>30:  yes  Stress Ulcer Protocol Active: yes; Mode: PPI  Glycemic Control: Any glucose > 180 no; Mode of Insulin Therapy: Sliding Scale  Insulin    INTUBATED:  Can patient have daily waking:  No  Can patient have spontaneous breathing trial:  No    Restraints? yes    PHYSICAL THERAPY AND MOBILITY:  Can patient have PT and mobility trial: no  Activity: Bedrest    RISK FACTORS PRESENT ON ADMISSION:  Clinically Significant Risk Factors Present on Admission                     Subjective/Interval History:   11/24: seen at the [place] ED on 11/19, tested positive for Covid 19 at that time.  Ex-wife endorsed concerns, patient came to [facility] ED for evaluation.  O2 sat 44% on room air in triage.  Initially placed on oxygen mask, then noninvasive ventilation.  CT scan with extensive groundglass opacities, no pulmonary embolism.  He was given Decadron, remdesivir, tocilizumab  11/24: Transferred to ICU.  NPO.  Encourage self proning.  11/25:  NIPPV, 60 L/min, FiO2 100%.  Back on NIPPV, FiO2 at 80%.  11/26: Tried HFNC, 100%, 60 L/min for 3 hours.  Then back in NIPPV, FiO2 80%-85%.   Flipped into rate-controlled Afib.  11/27:  HFNC 95%, 60 L/min for about 6 hours.  Back on NIPPV in early evening.  Started amiodarone bolus/gtt.     Overnight, was on HFNC as he refused to go back on NIPPV.  O2 sats to 86%.  Placed on NIPPV.  Precedex gtt started.      Discussed intubation with patient, extensive discussions with family also.   Intubated with anesthesia team.    A line placed.  ETT seemed high, we advanced it.  Had to reinflate balloon a couple times.    OG tube placed. Wei catheter placed.  Sedating patient for goal to prone.  Started epoprostenol.   Followedup with ex-wife afterwards.    Review of Systems:  The Review of Systems is negative other than noted in the HPI        Allergies/Medications:   Allergies:   No Known Allergies    Continuous Infusions:    amiodarone 0.5 mg/min (11/28/21 0842)     dexmedetomidine 0.6 mcg/kg/hr (11/28/21 1106)     epoprostenol (VELETRI) 20 mcg/mL in sterile water inhalation solution       fentaNYL 75 mcg/hr (11/28/21 3625)  "    propofol (DIPRIVAN) infusion 40 mcg/kg/min (11/28/21 1419)    And     - MEDICATION INSTRUCTIONS -       midazolam 2 mg/hr (11/28/21 1424)     norepinephrine 0.06 mcg/kg/min (11/28/21 1432)     phenylephrine       Scheduled Medications:    remdesivir  100 mg Intravenous Q24H    And     sodium chloride 0.9%  50 mL Intravenous Q24H     ARIPiprazole  5 mg Oral Daily     artificial tears  1 inch Both Eyes Q8H     [Held by provider] buprenorphine HCl-naloxone HCl  1 Film Sublingual Daily     [START ON 11/29/2021] buPROPion  150 mg Oral or Feeding Tube BID     chlorhexidine  15 mL Mouth/Throat Q12H     dexamethasone  6 mg Intravenous Daily     docusate  100 mg Oral or Feeding Tube BID     enoxaparin ANTICOAGULANT  0.5 mg/kg Subcutaneous Q12H     gabapentin  800 mg Oral TID     insulin aspart  1-12 Units Subcutaneous Q4H     ipratropium-albuterol  2 puff Inhalation 4x Daily     [Held by provider] lisinopril  10 mg Oral Daily     [START ON 11/29/2021] pantoprazole  40 mg Per Feeding Tube QAM AC    Or     [START ON 11/29/2021] pantoprazole (PROTONIX) IV  40 mg Intravenous QAM AC     sennosides  8.6 mg Oral BID           Objective:   Vitals:  BP 96/64   Pulse 54   Temp 98.4  F (36.9  C) (Oral)   Resp 28   Ht 1.803 m (5' 11\")   Wt 87.2 kg (192 lb 4.8 oz)   SpO2 91%   BMI 26.82 kg/m    Vent: Ventilation Mode: CMV/AC  (Continuous Mandatory Ventilation/ Assist Control)  FiO2 (%): 85 %  Rate Set (breaths/minute): 24 breaths/min  Tidal Volume Set (mL): 450 mL  PEEP (cm H2O): 14 cmH2O  Oxygen Concentration (%): 100 %  Resp: 28    GEN: Intubated, sedated.    HEENT: Normocephalic, atraumatic.  Pupils small/reactive, anicteric sclera. Endotracheal tube.  NECK: Supple.    PULM:  Mechanical ventilation.  Induced tachypnea.  Diminished breath sounds, no wheezing.   CVS: Regular rate and rhythm.  Normal S1, S2.  No rubs, murmurs, or gallops.    ABDOMEN: Hypoactive bowel sounds.  Non-distended.    EXTREMITES:  No clubbing, " cyanosis, or edema.    NEURO:   Intubated, sedated.  Prior to intubation, A&O x4, moving all extremities.    Intake/Output: I/O last 3 completed shifts:  In: 883.37 [P.O.:300; I.V.:583.37]  Out: 350 [Urine:350]        Pertinent Studies:   All laboratory data reviewed  Serum Glucose range:   Recent Labs   Lab 11/28/21  1200 11/28/21  0755 11/28/21  0509 11/28/21  0351   * 107* 101 88     ABG:   Recent Labs   Lab 11/26/21 1859   PH 7.42     CBC:   Recent Labs   Lab 11/28/21  0509 11/27/21  0456 11/26/21  0829 11/25/21  0433 11/24/21  2337 11/24/21  1341   WBC 8.5 5.3 7.1   < > 2.4* 4.5   HGB 14.9 14.0 13.8   < > 14.1 14.4   HCT 44.5 41.4 40.9   < > 41.2 42.5   MCV 88 88 88   < > 87 87    155 161   < > 134* 128*   NEUTROPHIL  --   --   --   --  73 82   LYMPH  --   --   --   --  17 8   MONOCYTE  --   --   --   --  9 9   EOSINOPHIL  --   --   --   --  0 0    < > = values in this interval not displayed.     Chemistry:   Recent Labs   Lab 11/28/21  0509 11/27/21 0456 11/26/21 1859 11/26/21  0829 11/25/21  0433 11/24/21  2337    141  --  142 140 137   POTASSIUM 4.2 4.3 4.5 4.2 4.6 4.6   CHLORIDE 108* 104  --  101 100 99   CO2 26 28  --  31 28 30   BUN 22 23*  --  26* 20 16   CR 0.72 0.67*  --  0.70 0.79 0.76   GFRESTIMATED >90 >90  --  >90 >90 >90   TRUONG 7.5* 7.9*  --  8.2* 8.2* 8.1*   MAG 2.1  --  2.4  --   --   --    PROTTOTAL 5.4*  --   --   --  6.1 6.2   ALBUMIN 2.7*  --   --   --  2.6* 2.7*   AST 92* 63*  --   --  88* 101*   ALT 75* 43  --   --  57* 59*   ALKPHOS 63  --   --   --  69 72   BILITOTAL 0.6  --   --   --  0.4 0.4     Coags:  Recent Labs   Lab 11/24/21  2339   INR 1.08   PTT 35     Cardiac Markers:  Recent Labs   Lab 11/27/21  0456   TROPONINI <0.01        Microbiology:  COVID-19 PCR, 11/19: Positive        Cardiology/Radiology:   Cardiac: All cardiac studies reviewed by me.    EKG:  Reviewed    TTE, 11/28:  Pending    Radiology:  All imaging studies reviewed by me.    CXR, 11/28:  Single AP view of the chest was obtained. Endotracheal tube tip projects over mid thoracic trachea approximately 4 cm from the felipa. Enteric tube crosses the diaphragm with the distal tip outside the field-of-view. Right upper extremity PICC tip projects over low SVC. Stable cardiomediastinal silhouette. Bilateral basilar predominant patchy airspace pulmonary opacities, worrisome for infection including atypical infection like COVID-19, significantly worsened as compared to 11/19/2021 x-ray. No significant pleural effusion or pneumothorax.    Abdomen X-Ray, 11/28: Enteric suction tube with the tip and side-port within the mid gastric lumen. Nonobstructive bowel gas pattern. No definite free air. Persistent diffuse bibasilar pulmonary airspace opacities.     CT chest PE study, 11/24:  FINDINGS: ANGIOGRAM CHEST: No pulmonary artery filling defect. Aorta is normal in caliber.  LUNGS AND PLEURA: Extensive groundglass consolidative bilateral pulmonary opacities. Trace left effusion. MEDIASTINUM/AXILLAE: Heart size is normal scattered borderline enlarged mediastinal lymph nodes with the largest in the right lower paratracheal chain measuring 1.1 x 2.2 cm (series 5/29).  CORONARY ARTERY CALCIFICATION: None.  UPPER ABDOMEN: Hepatic steatosis.  MUSCULOSKELETAL: Normal. IMPRESSION: 1.  No pulmonary embolus. 2.  COVID pneumonia. 3.  Likely reactive mediastinal  lymph nodes. 4.  Hepatic steatosis.

## 2021-11-28 NOTE — PROGRESS NOTES
[facility] MEDICINE PROGRESS NOTE      Length of stay: Day # 3    Identification/Summary: Jared North is a 46 year old male with a past medical history of opioid use, previous alcoholic, on Suboxone, hypertension  who was admitted with chief complaint of worsening shortness of breath on 11/24/2021.     Admitting impression of Covid pneumonia with acute respiratory failure hypoxia     Brief history: Jared North is a 45 year old old male with h/o hypertension, reactive airway disease, chronic attic is on Suboxone (not on his own) p/w increasing shortness of breath.       Patient lives in a sober house.  Patient says that he has been sick for about a week.  Patient cannot tell me exactly when he started getting short of breath but he said that has been feeling weak.  He is not vaccinated but he cannot tell me the reason why he is not vaccinated.  Probably got it from the sober house.  Patient denies any chest pain.  Abdominal pain.  No nausea or vomiting.  No diarrhea.  No myalgias.     Course in the ER: Patient is severely hypoxemic when he arrived to the emergency room.  Oxygen saturation in the 40s on triage.  He was placed on BiPAP with improvement to mid 90s.  Procalcitonin was on the upper limits of normal but nothing suggestive of bacterial pneumonia on his chest CT.  No pulmonary embolism but bilateral infiltrates consistent with Covid he was given a dose of Decadron as well as remdesivir and Tocilizumab after consultation with intensivist.  No immediate ICU beds available.  PICC line was inserted.    Patient new to me today. [facility] [place] [place] will continue to follow patient peripherally until patient is stable enough to be transferred out of ICU. Remains BiPAP-dependent and now at 85% FIO2. Discussed with intensivist, RT, RN; plan by ICU is for intubation later today. Patient is very anxious.      Assessment and Plan:    Covid pneumonia with severe acute respiratory failure  hypoxia  Patient is not vaccinated  Was on BiPAP and high flow oxygen 60 L/min FiO2 95%--> he is off the BiPAP now.  Stay in the ICU, ICU service following; Dr. Redman's notes appreciated.  Started on Decadron today is day 4  Continuing with remdesivir day 4.  LFTs and creatinine stable  Also received Tocilizumab  LFTs and renal function stable  DVT prophylaxis per ICU protocol for Covid  Sliding scale insulin for blood sugars  Follow inflammatory markers, seem to be improving  LFTs are slightly elevated but well less than x5 ULN. Benefits outweigh the risk in continuing the remdesivir.  Inflammatory parameters are   Remains BiPAP-dependent and now at 85% FIO2.    New onset atrial fibrillation, mild RVR  At rate mostly less than 110, blood pressure in the 90s  On the hypotensive side so we will avoid any beta-blocker or Cardizem at this time unless his heart rate goes very high.  Get echocardiogram.  Rule out Covid cardiomyopathy  Troponin negative x3  Denies any chest pain     Slightly elevated LFTs, improving  Continue remdesivir  LFTs are slightly elevated but well less than x5 ULN. Benefits outweigh the risk in continuing the remdesivir. Patient is aware and agreed to continue this medication     History of opioid use and alcoholism  Not on methadone.  On Suboxone per review of the pharmacist     Essential hypertension,   now hypotension, likely from dehydration  Give IV fluids cautiously.  Avoid fluid overload.  For to keep him on the dry side if hemodynamically stable  Hold lisinopril  Patient is not able to drink or eat so I believe this is dehydration     --------------------------------------------------------------------     Diet: NPO for Medical/Clinical Reasons Except for: Ice Chips, Meds  DVT Prophylaxis: Lovenox 0.5 mg/kg twice daily  Code Status: Full Code  Living situation: Lives in a sober house  Anticipated possible discharge: At least a few days in the  hospital     ----------------------------------------------------------------------     Cumulative essential/pertinent data reviewed:  Labs:  Procalcitonin 0.5  Troponin negative x3  Glucose control  CBC unremarkable and stable  Hemoglobin stable  D-dimer 1.24 --> 1.22  Accu-Cheks mostly under 150.  Fibrinogen improving.  CRP 12.5, improved to 10.4--> 4.0--> 2.1  LDH baseline is 1033--> 995     Imaging:  The of the chest shows the following:  IMPRESSION:  1.  No pulmonary embolus.  2.  COVID pneumonia.  3.  Likely reactive mediastinal lymph nodes.  4.  Hepatic steatosis.     EKG: --  Telemetry reportedly showed episode of atrial fibrillation with RVR.  Patient still in A. fib with heart rate in the 100-110      ----------------------------------------------------------------------    Interval History/Subjective:  Ongoing agitation and restlessness reported, and O2 needs increasing,    Discussed with intensivist, RT, RN; plan by ICU is for intubation later today. Patient is very anxious while on BiPAP.     Physical Exam/Objective:  Temp:  [97.5  F (36.4  C)-98.9  F (37.2  C)] 98.2  F (36.8  C)  Pulse:  [] 80  Resp:  [8-58] 27  BP: ()/(60-75) 102/70  FiO2 (%):  [85 %-100 %] 85 %  SpO2:  [81 %-96 %] 92 %    Body mass index is 26.82 kg/m .    GENERAL:  Alert, appears comfortable, in no acute distress, appears stated age, anxious, on BiPAP   HEAD:  Normocephalic, without obvious abnormality, atraumatic   EYES:  Conjunctiva/corneas clear, no scleral icterus, EOM's appears intact grossly   NOSE: Nares normal, septum midline, mucosa normal, no drainage   THROAT: Lips, mucosa, and tongue appear normal   NECK: Symmetrical, trachea appears midline   BACK:   Symmetric, no curvature noted   LUNGS:   Symmetric chest rise on inhalation, respirations unlabored   CHEST WALL:  No apparent deformity   HEART:  No thrills or heaves visualized   ABDOMEN:   No masses or organomegaly apparent; non-scaphoid   EXTREMITIES:  Extremities appear normal, atraumatic, no cyanosis   SKIN: No exanthems in the visualized areas   NEURO: Alert and at baseline reported mentation, moves all four extremities freely and spontaneously   PSYCH: Cooperative, behavior is notable for severe anxiety       Personally Reviewed.    remdesivir  100 mg Intravenous Q24H    And     sodium chloride 0.9%  50 mL Intravenous Q24H     ARIPiprazole  5 mg Oral Daily     buprenorphine HCl-naloxone HCl  1 Film Sublingual Daily     buPROPion  300 mg Oral QAM     dexamethasone  6 mg Intravenous Daily     docusate sodium  100 mg Oral BID     enoxaparin ANTICOAGULANT  0.5 mg/kg Subcutaneous Q12H     gabapentin  800 mg Oral TID     insulin aspart  1-12 Units Subcutaneous Q4H     ipratropium-albuterol  2 puff Inhalation 4x Daily     [Held by provider] lisinopril  10 mg Oral Daily     pantoprazole (PROTONIX) IV  40 mg Intravenous Daily with breakfast     sennosides  8.6 mg Oral BID     Current Facility-Administered Medications   Medication Dose Route Frequency     albuterol  2-4 puff Inhalation Q4H PRN     glucose  15-30 g Oral Q15 Min PRN    Or     dextrose  25-50 mL Intravenous Q15 Min PRN    Or     glucagon  1 mg Subcutaneous Q15 Min PRN     hydrOXYzine  25-50 mg Oral At Bedtime PRN     ondansetron  4 mg Oral Q6H PRN    Or     ondansetron  4 mg Intravenous Q6H PRN           Yefri Diehl MD  Internal Medicine  [facility]  Phone: #[phone]

## 2021-11-28 NOTE — PLAN OF CARE
Problem: Adult Inpatient Plan of Care  Goal: Absence of Hospital-Acquired Illness or Injury  Intervention: Identify and Manage Fall Risk  Recent Flowsheet Documentation  Taken 11/27/2021 0800 by Radha Oscar, RN  Safety Promotion/Fall Prevention: room near nurse's station     Problem: Adult Inpatient Plan of Care  Goal: Absence of Hospital-Acquired Illness or Injury  Intervention: Prevent Skin Injury  Recent Flowsheet Documentation  Taken 11/27/2021 0800 by Radha Oscar, RN  Body Position:   left   position changed independently  Pt on Hi Flow at 60L and 75%O2. Pt did dangle at bedside o2 sat maintained at 90%. Did stand with pt at bedside and sats decreased to 84%. Turning pt every few hours to keep off back. Pt does have intermittent non productive cough. Pt tolerating a liquid diet would appreciate regular diet tomorrow. Pt using purwick and had multiple bm's today after lactulose. Plan for pt to be on BIPAP overnight.

## 2021-11-28 NOTE — CONSULTS
"CLINICAL NUTRITION SERVICES  -  ASSESSMENT NOTE    RECOMMENDATIONS FOR MD/PROVIDER TO ORDER: None   Recommendations Ordered by Registered Dietitian (RD): Trophic feeds   Future/Additional Recommendations:   Adjust TF & modulars per Propofol rate & Volume   Malnutrition:  Moderate Malnutrition     REASON FOR ASSESSMENT  Jared North  is a 46 year old male seen by Registered Dietitian for Provider Order - Registered Dietitian to Assess and Order TF per Medical Nutrition Therapy Protocol  Dietitian consult.  Initiate trickle feeding.    Current propofol order provides 1035 Kcals/day    CURRENT TF  ORDER  Diet Order: NPO - Start TF  Nutrition Support:     Vital High Protein 1.0 TF at 15  ml/hr + free water flushes 60 mls every 4 hours    Prosource 6 packets daily     Propofol running 39.2 mls/hour        CHO PRO FIBER Free H2O CALORIES   TF 40 31 0 301 360   Prosource TF  x 6 - 66 -   240   Water flushes       360 -   Propofol - - -   1035   TOTALS 40 97 g 0 661 1635     TF, modulars &  Diprivan  meets 82% assessed (minimum) calorie needs and 100% (minimum) assessed protein needs    LABS  BG  mg/dL    MEDICATIONS  Medications reviewed    ANTHROPOMETRICS  Height: 180.3 cm (5' 11\")  Most Recent Weight: 87.2 kg (192 lb 4.8 oz)    IBW: 172 lbs (78 kg)  % IBW: 112%  BMI: 26.8 Overweight BMI 25-29.9    Weight History: Significant weight loss of 30 lbs past 9 months (13 %BW)   Wt Readings from Last 20 Encounters:   11/28/21 87.2 kg (192 lb 4.8 oz)   02/22/18 83.3 kg (183 lb 11.2 oz)   03/03/21         101.1 kg (222 lb 12.8 oz)   02/04/21          99.1 kg  (218 lb 8 oz)     Dosing Weight:  80 kg (adjusted weight)      ASSESSED NUTRITION NEEDS  Estimated Energy Needs: 2000 - 2400  kcals/day (20 - 25 kcals/kg)  Justification: Critical illness  Estimated Protein Needs: 96 -120 grams protein/day (1.2 - 1.5 grams of pro/kg)  Justification: Critical illness  Estimated Fluid Needs: 2400 + mL/day (30 mL/kg)   Justification: " Maintenance    PHYSICAL FINDINGS  See malnutrition section below.  NFPE not performed - Patient in COVID Isolation     MALNUTRITION:  % Weight Loss:  > 13% in 9 months (moderate malnutrition)  % Intake:  </= 50% for >/= 5 days (severe malnutrition)  Subcutaneous Fat Loss:  Not assessed  Muscle Loss:  Not assessed  Fluid Retention:  None noted    Malnutrition Diagnosis: Moderate malnutrition  In Context of:  Acute illness or injury    NUTRITION DIAGNOSIS    Difficulty swallowing and inadequate oral intake related to Mechanical ventilation as evidenced by patient sedated on Vent requiring enteral TF nutrition support      Malnutrition related to negligible PO intake with Acute illness as evidenced by documented poor PO intake since admit and significant weight loss of 30 lbs past 9 months (13 %BW)     INTERVENTIONS  Implementation  EN Composition, EN Schedule and Feeding Tube Flush  Enteral Nutrition - Initiate Trickle feeds     Goals    Patient will tolerate enteral TF without s&s of aspiration  Patient will tolerate enteral TF as evidenced by BG < 150 mg/dL and RV less than 500 mls  Meet assessed needs via enteral TF    Maintain weight while hospitalized     MONITORING AND EVALUATION:  Progress towards goals will be monitored and evaluated per protocol and Practice Guidelines   Monitor Enteral Nutrition intake, Weight and Biochemical data

## 2021-11-28 NOTE — PLAN OF CARE
Problem: Anxiety  Goal: Anxiety Reduction or Resolution  Outcome: No Change  Started on Precedex drip for anxiety when wearing Bipap mask continuously.  Currently running at 0.6 mcg/kg/hr with little effect on his anxiety level.       Problem: Dysrhythmia  Goal: Normalized Cardiac Rhythm  Outcome: No Change  Patient a-fib on tele monitor.  Amiodorone drip running at 0.5 mg/min.    Problem: Gas Exchange Impaired  Goal: Optimal Gas Exchange  Outcome: Declining  Patient is to wear Bipap continuously per MD order as he no longer can tolerate HFNC without his sats dropping and his respiratory rate climbing up into the 40's.      All cares performed as ordered and explained.  Will continue to follow plan of care.

## 2021-11-28 NOTE — PROGRESS NOTES
Patient unable to tolerate HFNC with spo2 85%.  Pt on BIPAP 12/6 75%.  Attempted to increase pressure to 12/8 pt became agitated stating he couldn't breath.  Pt started on sedation.  RT to continue to monitor and treat as ordered.           11/28/21 0315   Tech Time   $Tech Time (10 minute increments) 4   Mode: CPAP/ BiPAP/ AVAPS/ AVAPS AE   CPAP/BiPAP/ AVAPS/ AVAPS AE Mode BiPAP S/T   Equipment   Device Serial Number [id number]   CPAP/BiPAP/Settings   $BIPAP/CPAP Therapy continuous   IPAP/EPAP (cmH2O) 12/6   Rate (breaths/min) 16   Oxygen (%) 85   Timed Inspiration (sec) 0.9   IPAP RISE  Settings (V60) 2   CPAP/BiPAP Patient Parameters   IPAP (cm H2O) 12 cmH2O   EPAP (cm H2O) 6 cmH2O   Pressure Support (cm H2O) 6 cmH2O   RR Total (breaths/min) 33 breaths/min   Vt (mL) 700 mL   Minute Ventilation (L/min) 23 L/min   Peak Inspiratory Pressure (cm H2O) 13 cmH2O   Pt.  Leak (L/min) 32 L/min   CPAP/BiPAP/AVAPS/AVAPS AE Alarms   High Pressure (cm H2O) 20 cmH2O   Low Pressure (cm H2O) 10   Low Pressure Delay (sec) 30 sec   Lo Min Vent 3   High Rate (breaths/min) 45 breaths/min   Low Rate (breaths/min) 10   Audible Alarm set at (Volume of Alarm) 8   CPAP/BiPAP/AVAPS/AVAPS AE Patient Assessment   Interface Face Mask - Large   Skin Integrity   (gelpad in place)   RT Device Skin Assessment   Oxygen Delivery Device CPAP/BiPAP Mask   Site Appearance neck circumference Clean and dry   Site Appearance nares (outer) Clean and dry   Site Appearance nares (inner) Clean and dry   Site Appearance bridge of nose Clean and dry   Site Appearance of ears Clean and dry   Site Appearance occiput Clean and dry   Strap Tightness Finger Allowance between head and device strap   Skin Interventions Taken Strap adjusted to allow 1 finger between skin and device strap   Respiratory WDL   Respiratory WDL X;breath sounds;cough;effort/expansion;rhythm/pattern   Rhythm/Pattern, Respiratory dyspnea on exertion;tachypneic   Breath Sounds   Breath Sounds All  Fields   All Lung Fields Breath Sounds diminished

## 2021-11-28 NOTE — PLAN OF CARE
Problem: Dysrhythmia  Goal: Normalized Cardiac Rhythm  Outcome: No Change  Intervention: Monitor and Manage Cardiac Rhythm Effect    Problem: Gas Exchange Impaired  Goal: Optimal Gas Exchange  Outcome: No Change  Intervention: Optimize Oxygenation and Ventilation    Problem: Oral Intake Inadequate  Goal: Improved Oral Intake  Outcome: No Change    Pt remains in A-fib.  Amiodarone now infusing at 0.5 mg/min.  Pt currently on HFNC 100%/60L, but plans to go back on Bipap later tonight.  O2 sats stable if pt is either side lying or prone.  Pt currently drinking water only.  Continue to monitor.

## 2021-11-28 NOTE — PLAN OF CARE
Problem: Adult Inpatient Plan of Care  Goal: Absence of Hospital-Acquired Illness or Injury  Intervention: Prevent Skin Injury  Recent Flowsheet Documentation  Taken 11/27/2021 0800 by Radha Oscar RN  Body Position:   left   position changed independently     Problem: Adult Inpatient Plan of Care  Goal: Plan of Care Review  Outcome: Improving   Pt maintaining O2 between 89-92% between High flow and BIPAP.Pt does desat very quickly with ay movement or even switching O2 devices. Pt able to drink ensure now along with ice chips. Pt did tolerate high flow for about 5 hours. Pt remains bedrest but is able to reposition self.

## 2021-11-29 PROBLEM — I48.91 ATRIAL FIBRILLATION WITH RAPID VENTRICULAR RESPONSE (H): Status: ACTIVE | Noted: 2021-11-29

## 2021-11-29 LAB
ANION GAP SERPL CALCULATED.3IONS-SCNC: 11 MMOL/L (ref 5–18)
ATRIAL RATE - MUSE: 80 BPM
BASE EXCESS BLDA CALC-SCNC: -0.7 MMOL/L
BASE EXCESS BLDA CALC-SCNC: -0.7 MMOL/L
BASE EXCESS BLDA CALC-SCNC: 0 MMOL/L
BUN SERPL-MCNC: 27 MG/DL (ref 8–22)
C REACTIVE PROTEIN LHE: 0.6 MG/DL (ref 0–0.8)
CALCIUM SERPL-MCNC: 8.3 MG/DL (ref 8.5–10.5)
CHLORIDE BLD-SCNC: 109 MMOL/L (ref 98–107)
CO2 SERPL-SCNC: 21 MMOL/L (ref 22–31)
COHGB MFR BLD: 100 % (ref 96–97)
COHGB MFR BLD: 96.2 % (ref 96–97)
COHGB MFR BLD: 99.5 % (ref 96–97)
CREAT SERPL-MCNC: 0.76 MG/DL (ref 0.7–1.3)
DIASTOLIC BLOOD PRESSURE - MUSE: NORMAL MMHG
ERYTHROCYTE [DISTWIDTH] IN BLOOD BY AUTOMATED COUNT: 13.6 % (ref 10–15)
GFR SERPL CREATININE-BSD FRML MDRD: >90 ML/MIN/1.73M2
GLUCOSE BLD-MCNC: 135 MG/DL (ref 70–125)
GLUCOSE BLDC GLUCOMTR-MCNC: 121 MG/DL (ref 70–99)
GLUCOSE BLDC GLUCOMTR-MCNC: 122 MG/DL (ref 70–99)
GLUCOSE BLDC GLUCOMTR-MCNC: 128 MG/DL (ref 70–99)
GLUCOSE BLDC GLUCOMTR-MCNC: 146 MG/DL (ref 70–99)
GLUCOSE BLDC GLUCOMTR-MCNC: 165 MG/DL (ref 70–99)
HCO3 BLD-SCNC: 23 MMOL/L (ref 23–29)
HCO3 BLD-SCNC: 24 MMOL/L (ref 23–29)
HCO3 BLD-SCNC: 24 MMOL/L (ref 23–29)
HCT VFR BLD AUTO: 47.1 % (ref 40–53)
HGB BLD-MCNC: 16.3 G/DL (ref 13.3–17.7)
INTERPRETATION ECG - MUSE: NORMAL
MAGNESIUM SERPL-MCNC: 2.4 MG/DL (ref 1.8–2.6)
MCH RBC QN AUTO: 30.5 PG (ref 26.5–33)
MCHC RBC AUTO-ENTMCNC: 34.6 G/DL (ref 31.5–36.5)
MCV RBC AUTO: 88 FL (ref 78–100)
O2/TOTAL GAS SETTING VFR VENT: 100 %
O2/TOTAL GAS SETTING VFR VENT: 100 %
O2/TOTAL GAS SETTING VFR VENT: 90 %
OXYHGB MFR BLD: 95 % (ref 96–97)
OXYHGB MFR BLD: 98.4 % (ref 96–97)
OXYHGB MFR BLD: >98.5 % (ref 96–97)
P AXIS - MUSE: NORMAL DEGREES
PCO2 BLD: 47 MM HG (ref 35–45)
PCO2 BLD: 47 MM HG (ref 35–45)
PCO2 BLD: 52 MM HG (ref 35–45)
PEEP: 14 CM H2O
PEEP: 14 CM H2O
PEEP: 16 CM H2O
PH BLD: 7.3 [PH] (ref 7.37–7.44)
PH BLD: 7.34 [PH] (ref 7.37–7.44)
PH BLD: 7.35 [PH] (ref 7.37–7.44)
PLATELET # BLD AUTO: 205 10E3/UL (ref 150–450)
PO2 BLD: 157 MM HG (ref 80–90)
PO2 BLD: 325 MM HG (ref 80–90)
PO2 BLD: 92 MM HG (ref 80–90)
POTASSIUM BLD-SCNC: 4.8 MMOL/L (ref 3.5–5)
PR INTERVAL - MUSE: NORMAL MS
PROCALCITONIN SERPL-MCNC: 0.17 NG/ML (ref 0–0.49)
QRS DURATION - MUSE: 96 MS
QT - MUSE: 406 MS
QTC - MUSE: 496 MS
R AXIS - MUSE: -10 DEGREES
RATE: 24 RR/MIN
RATE: 24 RR/MIN
RATE: 26 RR/MIN
RBC # BLD AUTO: 5.34 10E6/UL (ref 4.4–5.9)
SODIUM SERPL-SCNC: 141 MMOL/L (ref 136–145)
SYSTOLIC BLOOD PRESSURE - MUSE: NORMAL MMHG
T AXIS - MUSE: 15 DEGREES
TEMPERATURE: 37 DEGREES C
VENTILATION MODE: ABNORMAL
VENTILATION MODE: AC
VENTILATION MODE: AC
VENTILATOR TIDAL VOLUME: 450 ML
VENTRICULAR RATE- MUSE: 90 BPM
WBC # BLD AUTO: 13.3 10E3/UL (ref 4–11)

## 2021-11-29 PROCEDURE — 250N000009 HC RX 250: Performed by: HOSPITALIST

## 2021-11-29 PROCEDURE — 83735 ASSAY OF MAGNESIUM: CPT | Performed by: HOSPITALIST

## 2021-11-29 PROCEDURE — 250N000011 HC RX IP 250 OP 636: Performed by: INTERNAL MEDICINE

## 2021-11-29 PROCEDURE — 250N000009 HC RX 250: Performed by: INTERNAL MEDICINE

## 2021-11-29 PROCEDURE — 36592 COLLECT BLOOD FROM PICC: CPT | Performed by: INTERNAL MEDICINE

## 2021-11-29 PROCEDURE — 80048 BASIC METABOLIC PNL TOTAL CA: CPT | Performed by: INTERNAL MEDICINE

## 2021-11-29 PROCEDURE — 82805 BLOOD GASES W/O2 SATURATION: CPT | Performed by: INTERNAL MEDICINE

## 2021-11-29 PROCEDURE — 84145 PROCALCITONIN (PCT): CPT | Performed by: INTERNAL MEDICINE

## 2021-11-29 PROCEDURE — 258N000003 HC RX IP 258 OP 636: Performed by: INTERNAL MEDICINE

## 2021-11-29 PROCEDURE — 200N000001 HC R&B ICU

## 2021-11-29 PROCEDURE — 99233 SBSQ HOSP IP/OBS HIGH 50: CPT | Performed by: HOSPITALIST

## 2021-11-29 PROCEDURE — 258N000003 HC RX IP 258 OP 636: Performed by: HOSPITALIST

## 2021-11-29 PROCEDURE — 999N000157 HC STATISTIC RCP TIME EA 10 MIN

## 2021-11-29 PROCEDURE — 86141 C-REACTIVE PROTEIN HS: CPT | Performed by: INTERNAL MEDICINE

## 2021-11-29 PROCEDURE — C9113 INJ PANTOPRAZOLE SODIUM, VIA: HCPCS | Performed by: INTERNAL MEDICINE

## 2021-11-29 PROCEDURE — 94003 VENT MGMT INPAT SUBQ DAY: CPT

## 2021-11-29 PROCEDURE — 85027 COMPLETE CBC AUTOMATED: CPT | Performed by: INTERNAL MEDICINE

## 2021-11-29 PROCEDURE — 99291 CRITICAL CARE FIRST HOUR: CPT | Performed by: INTERNAL MEDICINE

## 2021-11-29 PROCEDURE — 250N000013 HC RX MED GY IP 250 OP 250 PS 637: Performed by: INTERNAL MEDICINE

## 2021-11-29 PROCEDURE — 250N000013 HC RX MED GY IP 250 OP 250 PS 637: Performed by: HOSPITALIST

## 2021-11-29 PROCEDURE — 94645 CONT INHLJ TX EACH ADDL HOUR: CPT

## 2021-11-29 RX ORDER — POLYETHYLENE GLYCOL 3350 17 G/17G
17 POWDER, FOR SOLUTION ORAL DAILY
Status: DISCONTINUED | OUTPATIENT
Start: 2021-11-29 | End: 2021-12-05 | Stop reason: HOSPADM

## 2021-11-29 RX ORDER — FUROSEMIDE 10 MG/ML
20 INJECTION INTRAMUSCULAR; INTRAVENOUS ONCE
Status: COMPLETED | OUTPATIENT
Start: 2021-11-29 | End: 2021-11-29

## 2021-11-29 RX ORDER — LACTULOSE 10 G/15ML
10 SOLUTION ORAL ONCE
Status: COMPLETED | OUTPATIENT
Start: 2021-11-29 | End: 2021-11-29

## 2021-11-29 RX ORDER — BISACODYL 10 MG
10 SUPPOSITORY, RECTAL RECTAL ONCE
Status: COMPLETED | OUTPATIENT
Start: 2021-11-29 | End: 2021-11-29

## 2021-11-29 RX ORDER — BISACODYL 10 MG
10 SUPPOSITORY, RECTAL RECTAL DAILY PRN
Status: DISCONTINUED | OUTPATIENT
Start: 2021-11-29 | End: 2021-12-05 | Stop reason: HOSPADM

## 2021-11-29 RX ORDER — METOCLOPRAMIDE HYDROCHLORIDE 5 MG/ML
10 INJECTION INTRAMUSCULAR; INTRAVENOUS EVERY 6 HOURS
Status: DISCONTINUED | OUTPATIENT
Start: 2021-11-29 | End: 2021-12-05 | Stop reason: HOSPADM

## 2021-11-29 RX ORDER — LACTULOSE 10 G/15ML
20 SOLUTION ORAL ONCE
Status: COMPLETED | OUTPATIENT
Start: 2021-11-29 | End: 2021-11-29

## 2021-11-29 RX ADMIN — LACTULOSE 20 G: 10 SOLUTION ORAL at 10:04

## 2021-11-29 RX ADMIN — PROPOFOL 50 MCG/KG/MIN: 10 INJECTION, EMULSION INTRAVENOUS at 01:16

## 2021-11-29 RX ADMIN — PANTOPRAZOLE SODIUM 40 MG: 40 INJECTION, POWDER, FOR SOLUTION INTRAVENOUS at 06:45

## 2021-11-29 RX ADMIN — CHLORHEXIDINE GLUCONATE 15 ML: 1.2 RINSE ORAL at 19:11

## 2021-11-29 RX ADMIN — ENOXAPARIN SODIUM 50 MG: 100 INJECTION SUBCUTANEOUS at 00:45

## 2021-11-29 RX ADMIN — Medication 100 MCG: at 02:13

## 2021-11-29 RX ADMIN — Medication 2 PACKET: at 12:47

## 2021-11-29 RX ADMIN — SENNOSIDES 5 ML: 8.8 LIQUID ORAL at 08:01

## 2021-11-29 RX ADMIN — PROPOFOL 55 MCG/KG/MIN: 10 INJECTION, EMULSION INTRAVENOUS at 16:34

## 2021-11-29 RX ADMIN — METOCLOPRAMIDE HYDROCHLORIDE 10 MG: 5 INJECTION INTRAMUSCULAR; INTRAVENOUS at 20:57

## 2021-11-29 RX ADMIN — SENNOSIDES 5 ML: 8.8 LIQUID ORAL at 02:04

## 2021-11-29 RX ADMIN — PROPOFOL 50 MCG/KG/MIN: 10 INJECTION, EMULSION INTRAVENOUS at 23:47

## 2021-11-29 RX ADMIN — DOCUSATE SODIUM 100 MG: 50 LIQUID ORAL at 08:01

## 2021-11-29 RX ADMIN — FUROSEMIDE 20 MG: 10 INJECTION, SOLUTION INTRAMUSCULAR; INTRAVENOUS at 17:32

## 2021-11-29 RX ADMIN — Medication 20 NG/KG/MIN: at 03:31

## 2021-11-29 RX ADMIN — METOCLOPRAMIDE HYDROCHLORIDE 10 MG: 5 INJECTION INTRAMUSCULAR; INTRAVENOUS at 15:37

## 2021-11-29 RX ADMIN — DEXAMETHASONE SODIUM PHOSPHATE 6 MG: 10 INJECTION INTRAMUSCULAR; INTRAVENOUS at 12:46

## 2021-11-29 RX ADMIN — PROPOFOL 55 MCG/KG/MIN: 10 INJECTION, EMULSION INTRAVENOUS at 09:43

## 2021-11-29 RX ADMIN — SODIUM CHLORIDE 500 ML: 9 INJECTION, SOLUTION INTRAVENOUS at 13:24

## 2021-11-29 RX ADMIN — METOCLOPRAMIDE HYDROCHLORIDE 10 MG: 5 INJECTION INTRAMUSCULAR; INTRAVENOUS at 10:15

## 2021-11-29 RX ADMIN — ENOXAPARIN SODIUM 50 MG: 100 INJECTION SUBCUTANEOUS at 12:46

## 2021-11-29 RX ADMIN — MINERAL OIL AND WHITE PETROLATUM 1 G: 30; 940 OINTMENT OPHTHALMIC at 20:06

## 2021-11-29 RX ADMIN — PROPOFOL 50 MCG/KG/MIN: 10 INJECTION, EMULSION INTRAVENOUS at 20:15

## 2021-11-29 RX ADMIN — GABAPENTIN 800 MG: 250 SUSPENSION ORAL at 08:01

## 2021-11-29 RX ADMIN — GABAPENTIN 800 MG: 250 SUSPENSION ORAL at 12:46

## 2021-11-29 RX ADMIN — PROPOFOL 60 MCG/KG/MIN: 10 INJECTION, EMULSION INTRAVENOUS at 06:43

## 2021-11-29 RX ADMIN — Medication 200 MCG/HR: at 02:12

## 2021-11-29 RX ADMIN — Medication 9 MG/HR: at 09:03

## 2021-11-29 RX ADMIN — Medication 225 MCG/HR: at 13:23

## 2021-11-29 RX ADMIN — MINERAL OIL AND WHITE PETROLATUM 1 G: 30; 940 OINTMENT OPHTHALMIC at 04:28

## 2021-11-29 RX ADMIN — GABAPENTIN 800 MG: 250 SUSPENSION ORAL at 17:51

## 2021-11-29 RX ADMIN — INSULIN ASPART 1 UNITS: 100 INJECTION, SOLUTION INTRAVENOUS; SUBCUTANEOUS at 15:42

## 2021-11-29 RX ADMIN — Medication 9 MG/HR: at 19:11

## 2021-11-29 RX ADMIN — Medication 2 PACKET: at 17:32

## 2021-11-29 RX ADMIN — ARIPIPRAZOLE 5 MG: 5 TABLET ORAL at 08:01

## 2021-11-29 RX ADMIN — BISACODYL 10 MG: 10 SUPPOSITORY RECTAL at 10:04

## 2021-11-29 RX ADMIN — MINERAL OIL AND WHITE PETROLATUM 1 G: 30; 940 OINTMENT OPHTHALMIC at 12:46

## 2021-11-29 RX ADMIN — NOREPINEPHRINE BITARTRATE 0.01 MCG/KG/MIN: 1 INJECTION, SOLUTION, CONCENTRATE INTRAVENOUS at 19:31

## 2021-11-29 RX ADMIN — BUPROPION HYDROCHLORIDE 150 MG: 75 TABLET, FILM COATED ORAL at 20:07

## 2021-11-29 RX ADMIN — ENOXAPARIN SODIUM 50 MG: 100 INJECTION SUBCUTANEOUS at 23:47

## 2021-11-29 RX ADMIN — INSULIN ASPART 2 UNITS: 100 INJECTION, SOLUTION INTRAVENOUS; SUBCUTANEOUS at 19:18

## 2021-11-29 RX ADMIN — MIDAZOLAM HYDROCHLORIDE 2 MG: 1 INJECTION, SOLUTION INTRAMUSCULAR; INTRAVENOUS at 02:20

## 2021-11-29 RX ADMIN — Medication 2 PACKET: at 08:01

## 2021-11-29 RX ADMIN — LACTULOSE 10 G: 10 SOLUTION ORAL at 10:15

## 2021-11-29 RX ADMIN — Medication 8 MG/HR: at 00:10

## 2021-11-29 RX ADMIN — PROPOFOL 65 MCG/KG/MIN: 10 INJECTION, EMULSION INTRAVENOUS at 04:18

## 2021-11-29 RX ADMIN — CISATRACURIUM BESYLATE 4 MCG/KG/MIN: 10 INJECTION INTRAVENOUS at 13:12

## 2021-11-29 RX ADMIN — Medication 20 NG/KG/MIN: at 16:01

## 2021-11-29 RX ADMIN — PROPOFOL 55 MCG/KG/MIN: 10 INJECTION, EMULSION INTRAVENOUS at 13:18

## 2021-11-29 RX ADMIN — CHLORHEXIDINE GLUCONATE 15 ML: 1.2 RINSE ORAL at 07:57

## 2021-11-29 RX ADMIN — POLYETHYLENE GLYCOL 3350 17 G: 17 POWDER, FOR SOLUTION ORAL at 10:04

## 2021-11-29 ASSESSMENT — ACTIVITIES OF DAILY LIVING (ADL)
ADLS_ACUITY_SCORE: 13
ADLS_ACUITY_SCORE: 11
ADLS_ACUITY_SCORE: 13
ADLS_ACUITY_SCORE: 13
ADLS_ACUITY_SCORE: 9
ADLS_ACUITY_SCORE: 9
ADLS_ACUITY_SCORE: 13
ADLS_ACUITY_SCORE: 11
ADLS_ACUITY_SCORE: 11
ADLS_ACUITY_SCORE: 9
ADLS_ACUITY_SCORE: 11
ADLS_ACUITY_SCORE: 13
ADLS_ACUITY_SCORE: 9
ADLS_ACUITY_SCORE: 13
ADLS_ACUITY_SCORE: 9

## 2021-11-29 ASSESSMENT — MIFFLIN-ST. JEOR: SCORE: 1776.13

## 2021-11-29 NOTE — PROGRESS NOTES
Will not see today:  PAIN MANAGEMENT SERVICE CHART CHECK    This patient's chart has been reviewed by the Pain Service. Patient currently in ICU, intubated, Covid positive.  Pain Service will sign off.  Please re consult if we can be of additional assistance.  Thank you!    Marina NAGEL, CNS-BC, DNP  Acute Care Pain Management Program  [facility] ([facility], [place], [place])   With questions call [phone]  Preference if for [facility]  Click HERE to page [facility]

## 2021-11-29 NOTE — PHARMACY-CONSULT NOTE
Pharmacy Tube Feeding Consult    Medication reviewed for administration by feeding tube and for potential food/drug interactions.    Recommendation: No changes are needed at this time.     Pharmacy will continue to follow as new medications are ordered.    Sanjuana Levi PharmD

## 2021-11-29 NOTE — PLAN OF CARE
Pt on paralytic Nimbex and sedation fentanyl gtt , propofol gtt and versed gtt since intubated yesterday 11/28/2021; pt proned, BIS upper 20-30s.

## 2021-11-29 NOTE — PROGRESS NOTES
CLINICAL NUTRITION SERVICES - REASSESSMENT NOTE      EVALUATION OF THE PROGRESS TOWARD GOALS   Diet: NPO  Order: NPO - Start TF  Nutrition Support:     Vital High Protein 1.0 TF at 15  ml/hr + free water flushes 60 mls every 4 hours    Prosource 6 packets daily     Propofol running 28.8ml/hr       CHO PRO FIBER Free H2O CALORIES   TF 40 31 0 301 360   Prosource TF  x 6 - 66 -   240   Water flushes       360 -   Propofol - - -   760   TOTALS 40 97 g 0 661 1360        pt is proning     ANTHROPOMETRICS  Admission wt: 98kg  Most Recent Weight: 89kg      GI Status  Hypoactive   No BM documented       LABS  Reviewed; BUN-27, Ca-8.3, Gluc-135, 121    MEDICATIONS  Reviewed      NUTRITION DIAGNOSIS    Difficulty swallowing and inadequate oral intake related to Mechanical ventilation as evidenced by patient sedated on Vent requiring enteral TF nutrition support       Malnutrition related to negligible PO intake with Acute illness as evidenced by documented poor PO intake since admit and significant weight loss of 30 lbs past 9 months (13 %BW      Goals       Patient will tolerate enteral TF without s&s of aspiration-met    Patient will tolerate enteral TF as evidenced by BG < 150 mg/dL and RV less than 500 mls-met    Meet assessed needs via enteral TF  -progressing    Maintain weight while hospitalized -wt is down       INTERVENTIONS  Implementation  Plan to keep TF at current rate until having regular BMs per ICU rounds       Monitoring/Evaluation  Progress toward goals will be monitored and evaluated per protocol.

## 2021-11-29 NOTE — PROGRESS NOTES
RT PROGRESS NOTE    VENT DAY# 2     CURRENT SETTINGS:   Ventilation Mode: CMV/AC  (Continuous Mandatory Ventilation/ Assist Control)  FiO2 (%): 100 %  Rate Set (breaths/minute): 24 breaths/min  Tidal Volume Set (mL): 450 mL  PEEP (cm H2O): 14 cmH2O  Oxygen Concentration (%): (S) 100 %  Resp: 24      PATIENT PARAMETERS:  PIP 28  Pplat:  24  Pmean:  19  Secretions:  Scant cloudy/clear.   02 Sats:  94%    ETT SIZE 8.0 Secured at 26 cm at teeth/gums    Respiratory Medications: Veletri continuous     ABG: @1809 pH 7.5; pCO2 47; pO2 157; HCO3 24, %O2 Sat 98,  on 90%.    NOTE / SHIFT SUMMARY:   Patient was supined today around 11 am. Patient was retaped. Patient was reproned. RT continue to follow.     Emily Torres, RT

## 2021-11-29 NOTE — PLAN OF CARE
Problem: Adult Inpatient Plan of Care  Goal: Absence of Hospital-Acquired Illness or Injury  Intervention: Identify and Manage Fall Risk  Recent Flowsheet Documentation  Taken 11/29/2021 1200 by Snow Lane RN  Safety Promotion/Fall Prevention:   clutter free environment maintained   fall prevention program maintained   room near nurse's station  Taken 11/29/2021 0800 by Snow Lane RN  Safety Promotion/Fall Prevention:   clutter free environment maintained   fall prevention program maintained   room near nurse's station  Taken 11/29/2021 0400 by Snow Lane RN  Safety Promotion/Fall Prevention:   clutter free environment maintained   fall prevention program maintained   room near nurse's station   Pt deeply sedated/ on paralytic; no falls this shift      Problem: Adult Inpatient Plan of Care  Goal: Absence of Hospital-Acquired Illness or Injury  Intervention: Prevent Skin Injury  Recent Flowsheet Documentation  Taken 11/29/2021 1345 by Snow Lane RN  Body Position:   turned   left  Taken 11/29/2021 1230 by Snow Lane RN  Body Position:   turned   right  Taken 11/29/2021 1200 by Snow Lane RN  Body Position:   prone   head repositioned, left  Taken 11/29/2021 1045 by Snow Lane RN  Body Position: supine  Taken 11/29/2021 1000 by Snow Lane RN  Body Position:   prone   head repositioned, right  Taken 11/29/2021 0800 by Snow Lane RN  Body Position:   prone   head repositioned, left  Taken 11/29/2021 0600 by Snow Lane RN  Body Position:   prone   head repositioned, right   turned  Taken 11/29/2021 0400 by Snow Lane RN  Body Position:   prone   head repositioned, left   Skin intact, repositioning at least q 2hr and prn      Problem: Gas Exchange Impaired  Goal: Optimal Gas Exchange  Outcome: Declining  Intervention: Optimize Oxygenation and Ventilation  Recent Flowsheet Documentation  Taken 11/29/2021 1345 by Snow Lane  RN  Head of Bed ([facility]) Positioning: HOB at 30 degrees  Taken 11/29/2021 1230 by Snow Lane RN  Head of Bed ([facility]) Positioning: HOB at 30 degrees  Taken 11/29/2021 1045 by Snow Lane RN  Head of Bed ([facility]) Positioning: HOB at 30 degrees   Pt remains intubated;  observe with repositioning in bed/turns, that pt drops BP, requiring Levophed gtt to keep MAP= or >65; unable to wean Levophed off.  With activity of perineal care/ cleaning pt after incontinent of BM, pt desat to 87% on 80% FIO2;  RT increased FIO2 to 100% to get SpO2 up to 90-92%.  ABG drawn around 1400 done on 100% FIO2.       Problem: Dysrhythmia  Goal: Normalized Cardiac Rhythm  Outcome: No Change   Pt remain in normal sinus rhythm; no ectopy observed.      Problem: Constipation  Goal: Effective Bowel Elimination  Outcome: Improving   Pt had not had a BM since admission; dr Redman aware; receive orders for lactulose, miralax and suppository, in addition to stool softeners ordered.  This afternoon, pt incontinent of moderate size loose brown BM.

## 2021-11-29 NOTE — PROGRESS NOTES
[facility]:  CRITICAL CARE PROGRESS NOTE     Date / Time of Admission:  11/24/2021  1:19 PM    ID: Jared North is a 46 year old male, unvaccinated against COVID-19, with history of polysubstance abuse and chronic pain/lower back pain now on Suboxone, mood disorder/depression, essential hypertension, sleep disorder breathing, mild intermittent asthma, knee pain, and recent diagnosis of COVID-19 viral infection on 11/19/2021 who presented to [facility] on 11/24/2021 with worsening shortness of breath, admitted to the ICU with COVID-19 viral pneumonia and severe acute respiratory failure with hypoxia requiring noninvasive ventilation.           Changes for today: 1.   Supinated today, FiO2 up from 80% to 100%.  Proning this afternoon.  2. Increase PEEP from 14 to 16, increase RR from 24 to 26.  3. Check triglyceride level in AM.  4. Give Lasix 20 mg IV x 1 given RV findings.  Goal I/O negative overnight.  5. Initiated enteral feeding -trickle feeding.    6. No BM.  Give suppository, lactulose.  Reglan 10 mg IV x 1.   7. WBC up - check CRP, ET aspirate.  8. If becomes febrile, will begin empiric Abx and send blood cultures.        Assessment/Plan:   Neurologic:  Acute metabolic encephalopathy.   In the setting of therapeutic sedation.  History of polysubstance abuse and chronic pain/lower back pain now on Suboxone.  Hold suboxone on 11/28 after intubation. Mood disorder/depression.    Vecuronium 10 mg IV Q4H PRN for ventilator synchrony.  May need Nimbex gtt.     Propofol, fentanyl, midazolam gtt as needed for sedation.  RASS goal -4/-5.    Continue home meds: abilify, gabapentin 3x/day    Pain meds PRN     Pulmonary: Acute respiratory failure with hypoxia, acute respiratory distress syndrome 2/2 COVID-19.  CT chest 11/24 extensive groundglass consolidative bilateral pulmonary opacities.  On NIPPV.  Had trace left pleural effusion on CT, received lasix 20 mg IV x 1 on 11/25.  COVID-19  viral pneumonia.  Mild intermittent asthma.  He reports using inhaler once weekly at baseline. Trace left pleural effusion.  Noted on CT scan. Give lasix 11/25.    Wean supplemental O2 as tolerated; goal O2 sat > 92%.  HOB > 30 degrees to limit aspiration risk.      Vent: CMV, RR 24,  mL (6 mL/kg IBW), PEEP 14, FiO2 100%.     Goal Pplat less than 30, goal driving pressure less than 15.    Increase PEEP to 16 and RR to 26.    ABG 2x/day.  Supine this morning.  Given rise in FiO2, will plan to reprone.    Continue inhaled epoprostenol - full strength.     abx and diuresis as below.     Cardiovascular:  Circulatory shock.  In the setting of sepsis, vasoplegia from therapeutic sedation.  Has history of essential hypertension, on lisinopril at baseline.  Paroxysmal atrial fibrillation.  Went into A. fib on 11/26, initially rate controlled.  Initiated amiodarone on 11/27.  Converted to normal sinus rhythm after intubation on 11/28.  Amio gtt stopped.  Mild RV dysfunction.  TTE 11/28 with + pressure overload, moderate RV dilation, mild RV dysfunction.  LVEF 60-65%.    Cardiac monitoring.  SBP >= 90 mmHg, MAP >= 65 mmHg.  EKG PRN.    Norepinephrine as needed for BP goals.     GI/: Transaminitis, improved.  Likely from the viral infection.  Present prior to Remdesivir use. Moderate protein-calorie malnutrition.  Slow transit constipation.    Monitor for escalating gastric residuals due to paralytic use.    Nutrition: Enteral feeding - initiate trickle feeding.  FWF 60 mL Q4H.    Last BM:  None..  Meds: colace 2x/day.  Senna 2x/day.  Give lactulose, senna.  Reglan x 1.    GI prophylaxis: PPI.     Renal:  Hyperchloremia.      Monitor I/O's.  Electrolyte repletion PRN.  Avoid/limit nephrotoxic agents.    IVFs: Saline lock.    Give Lasix 20 mg IV x 1 given RV findings.  Goal I/O negative overnight.     Heme/Onc: COVID-19 associated coagulopathy.  D-dimer 1.24 on 11/24.  Leukopenia,resolved.  Thrombocytopenia, resolved.      DVT prophylaxis: SCDs.  Lovenox 0.5 mg/kg subcu 2X/day.     Endocrine: Hyperglycemia, mild.  No history of diabetes.  Secondary to stress and steroid use.  Hemoglobin A1c 5.8% 11/24.    FSBG Q4H, Aspart insulin SS (high insulin resistance).  Hypoglycemia protocol.     Infectious diseases: COVID-19 viral infection.  PCR positive on 11/19.  Hospitalized on 11/24.  CRP 12.3, PCT 0.5 on 11/24.  Patient received tocilizumab 8 mg/kg on 11/24. Procalcitonin equivocal 11/24; repeat 0.21 on 11/26.    Continue dexamethasone 6 mg daily x10 days (start 11/24)    Continue remdesivir daily x5 days (start 11/24)    Check ET aspirate, CRP, PCT.     Rheum/Musculoskeletal: Chronic lumbar pain and knee pain.  Patient being evaluated by orthopedic surgery for surgical intervention.    Activity:  Bedrest    Lines/Drains/Tubes:  8.0 mm endotracheal tube, placed 11/29  RUE PICC central line, placed 11/24  Left radial A-line, placed 11/28  OG enteral tube, placed 11/28  Wei catheter, placed 11/28     Code Status:  Full Code     The patient and/or the family was educated about the above plan of care and indicated understanding.  Updated the patient's ex-wife, Jama North on 11/29.  All questions answered.  We discussed ECMO, patient not currently requiring this but if clinically worsens, I would call ECMO team at [facility] for evaluation.     This patient is considered critically ill and requires ICU level of care due to acute respiratory failure with hypoxia, acute respiratory distress syndrome requiring mechanical ventilation and proning; circulatory shock on vasopressors.     Total Critical Care time, not including separate billable procedure time: 55 minutes.    Kendra Redman MD, MPH  Pulmonary/Critical Care Medicine  11/29/2021   3:37 PM         ICU DAILY CHECKLIST:   ICU DAILY CHECKLIST           Can patient transfer out of MICU? no    FAST HUG:    Feeding:  yes.  Patient is receiving TUBE FEEDS    Wei: no  Analgesia/Sedation: yes;  Propofol, Midazolam and Fentanyl   Thromboembolic prophylaxis: yes; Mode:  Lovenox and SCDs  HOB>30:  yes  Stress Ulcer Protocol Active: yes; Mode: PPI  Glycemic Control: Any glucose > 180 no; Mode of Insulin Therapy: Sliding Scale Insulin    INTUBATED:  Can patient have daily waking:  No  Can patient have spontaneous breathing trial:  No    Restraints? no    PHYSICAL THERAPY AND MOBILITY:  Can patient have PT and mobility trial: no  Activity: Bedrest    RISK FACTORS PRESENT ON ADMISSION:  Clinically Significant Risk Factors Present on Admission                    Subjective/Interval History:   11/24: seen at the [place] ED on 11/19, tested positive for Covid 19 at that time.  Ex-wife endorsed concerns, patient came to [facility] ED for evaluation.  O2 sat 44% on room air in triage.  Initially placed on oxygen mask, then noninvasive ventilation.  CT scan with extensive groundglass opacities, no pulmonary embolism.  He was given Decadron, remdesivir, tocilizumab  11/24: Transferred to ICU.  NPO.  Encourage self proning.  11/25:  NIPPV, 60 L/min, FiO2 100%.  Back on NIPPV, FiO2 at 80%.  11/26: Tried HFNC, 100%, 60 L/min for 3 hours.  Then back in NIPPV, FiO2 80%-85%.   Flipped into rate-controlled Afib.  11/27:  HFNC 95%, 60 L/min for about 6 hours.  Back on NIPPV in early evening.  Started amiodarone bolus/gtt.   11/28:  Intubated.  Started Nimbex gtt.  Started Veletri.  Proned.  OG tube placed.      Overnight, gastric residuals around 200 mL.  On trickle feeding.      Review of Systems:  The Review of Systems is negative other than noted in the HPI        Allergies/Medications:   Allergies:   No Known Allergies    Continuous Infusions:    cisatracurium 4 mcg/kg/min (11/29/21 1312)     epoprostenol (VELETRI) 20 mcg/mL in sterile water inhalation solution 20 ng/kg/min (11/29/21 0331)     fentaNYL 225 mcg/hr (11/29/21 1323)     propofol (DIPRIVAN) infusion 55 mcg/kg/min (11/29/21 1318)    And     - MEDICATION  "INSTRUCTIONS -       midazolam 9 mg/hr (11/29/21 1000)     norepinephrine 0.02 mcg/kg/min (11/29/21 1400)     phenylephrine       vasopressin       Scheduled Medications:    ARIPiprazole  5 mg Oral Daily     artificial tears  1 inch Both Eyes Q8H     [Held by provider] buprenorphine HCl-naloxone HCl  1 Film Sublingual Daily     buPROPion  150 mg Oral or Feeding Tube BID     chlorhexidine  15 mL Mouth/Throat Q12H     dexamethasone  6 mg Intravenous Daily     docusate  100 mg Oral or Feeding Tube BID     enoxaparin ANTICOAGULANT  0.5 mg/kg Subcutaneous Q12H     gabapentin  800 mg Oral or Feeding Tube TID     insulin aspart  1-12 Units Subcutaneous Q4H     [Held by provider] lisinopril  10 mg Oral Daily     metoclopramide  10 mg Intravenous Q6H     pantoprazole  40 mg Per Feeding Tube QAM AC    Or     pantoprazole (PROTONIX) IV  40 mg Intravenous QAM AC     polyethylene glycol  17 g Oral or Feeding Tube Daily     protein modular  2 packet Per Feeding Tube TID w/meals     sennosides  10 mL Oral BID           Objective:   Vitals:  /70   Pulse 92   Temp 100  F (37.8  C)   Resp 24   Ht 1.803 m (5' 11\")   Wt 87.9 kg (193 lb 12.6 oz)   SpO2 93%   BMI 27.03 kg/m    Vent: Ventilation Mode: CMV/AC  (Continuous Mandatory Ventilation/ Assist Control)  FiO2 (%): 100 %  Rate Set (breaths/minute): 24 breaths/min  Tidal Volume Set (mL): 450 mL  PEEP (cm H2O): 14 cmH2O  Oxygen Concentration (%): (S) 100 %  Resp: 24    GEN: Intubated, sedated.    HEENT: Normocephalic, atraumatic.  Pupils small/reactive, anicteric sclera. Endotracheal tube.  NECK: Supple.    PULM:  Mechanical ventilation.  Induced tachypnea.  Diminished breath sounds, no wheezing.   CVS: Regular rate and rhythm.  Normal S1, S2.  No rubs, murmurs, or gallops.    ABDOMEN: Hypoactive bowel sounds.  Non-distended.    EXTREMITES:  No clubbing, cyanosis, or edema.    NEURO:   Intubated, sedated.      Intake/Output: I/O last 3 completed shifts:  In: 8360.65 " [I.V.:1652.65; NG/GT:380; IV Piggyback:500]  Out: 1770 [Urine:1770]        Pertinent Studies:   All laboratory data reviewed  Serum Glucose range:   Recent Labs   Lab 11/29/21  1258 11/29/21  0751 11/29/21  0543 11/29/21  0350   * 121* 135* 128*     ABG:   Recent Labs   Lab 11/29/21  1358 11/29/21  0544 11/28/21  1534   PH 7.30* 7.34* 7.34*   PCO2 52* 47* 46*   PO2 92* 325* 75*   HCO3 23 24 23   O2PER 100 100  --      CBC:   Recent Labs   Lab 11/29/21  0543 11/28/21  0509 11/27/21  0456 11/25/21  0433 11/24/21  2337 11/24/21  1341   WBC 13.3* 8.5 5.3   < > 2.4* 4.5   HGB 16.3 14.9 14.0   < > 14.1 14.4   HCT 47.1 44.5 41.4   < > 41.2 42.5   MCV 88 88 88   < > 87 87    175 155   < > 134* 128*   NEUTROPHIL  --   --   --   --  73 82   LYMPH  --   --   --   --  17 8   MONOCYTE  --   --   --   --  9 9   EOSINOPHIL  --   --   --   --  0 0    < > = values in this interval not displayed.     Chemistry:   Recent Labs   Lab 11/29/21  0543 11/28/21  0509 11/27/21  0456 11/26/21  1859 11/26/21  0829 11/25/21  0433 11/24/21  2337    143 141  --    < > 140 137   POTASSIUM 4.8 4.2 4.3 4.5   < > 4.6 4.6   CHLORIDE 109* 108* 104  --    < > 100 99   CO2 21* 26 28  --    < > 28 30   BUN 27* 22 23*  --    < > 20 16   CR 0.76 0.72 0.67*  --    < > 0.79 0.76   GFRESTIMATED >90 >90 >90  --    < > >90 >90   TRUONG 8.3* 7.5* 7.9*  --    < > 8.2* 8.1*   MAG 2.4 2.1  --  2.4  --   --   --    PROTTOTAL  --  5.4*  --   --   --  6.1 6.2   ALBUMIN  --  2.7*  --   --   --  2.6* 2.7*   AST  --  92* 63*  --   --  88* 101*   ALT  --  75* 43  --   --  57* 59*   ALKPHOS  --  63  --   --   --  69 72   BILITOTAL  --  0.6  --   --   --  0.4 0.4    < > = values in this interval not displayed.     Coags:  Recent Labs   Lab 11/24/21  2339   INR 1.08   PTT 35     Cardiac Markers:  Recent Labs   Lab 11/27/21  0456   TROPONINI <0.01        Microbiology:  COVID-19 PCR, 11/19: Positive        Cardiology/Radiology:   Cardiac: All cardiac studies  reviewed by me.    EKG:  Reviewed    TTE, 11/28:  Summary: 1. Left ventricular size, wall motion and function are normal. The ejection fraction is 60-65%. 2. Flattened septum is consistent with RV pressure/volume overload. 3. Mildly decreased right ventricular systolic function The right ventricle is moderately dilated. Right ventricular systolic pressure could not be approximated due to inadequate tricuspid regurgitation. No tricuspid regurgitation. RA pressure of 8 mmHg.    Radiology:  All imaging studies reviewed by me.    CXR, 11/28: Single AP view of the chest was obtained. Endotracheal tube tip projects over mid thoracic trachea approximately 4 cm from the felipa. Enteric tube crosses the diaphragm with the distal tip outside the field-of-view. Right upper extremity PICC tip projects over low SVC. Stable cardiomediastinal silhouette. Bilateral basilar predominant patchy airspace pulmonary opacities, worrisome for infection including atypical infection like COVID-19, significantly worsened as compared to 11/19/2021 x-ray. No significant pleural effusion or pneumothorax.    Abdomen X-Ray, 11/28: Enteric suction tube with the tip and side-port within the mid gastric lumen. Nonobstructive bowel gas pattern. No definite free air. Persistent diffuse bibasilar pulmonary airspace opacities.     CT chest PE study, 11/24:  FINDINGS: ANGIOGRAM CHEST: No pulmonary artery filling defect. Aorta is normal in caliber.  LUNGS AND PLEURA: Extensive groundglass consolidative bilateral pulmonary opacities. Trace left effusion. MEDIASTINUM/AXILLAE: Heart size is normal scattered borderline enlarged mediastinal lymph nodes with the largest in the right lower paratracheal chain measuring 1.1 x 2.2 cm (series 5/29).  CORONARY ARTERY CALCIFICATION: None.  UPPER ABDOMEN: Hepatic steatosis.  MUSCULOSKELETAL: Normal. IMPRESSION: 1.  No pulmonary embolus. 2.  COVID pneumonia. 3.  Likely reactive mediastinal  lymph nodes. 4.  Hepatic  steatosis.

## 2021-11-29 NOTE — PROGRESS NOTES
[facility] MEDICINE PROGRESS NOTE      Identification/Summary: Jared North is a 46 year old male with a past medical history of opioid use, previous alcoholic, on Suboxone, hypertension  who was admitted with chief complaint of worsening shortness of breath on 11/24/2021.     Admitting impression of Covid pneumonia with acute respiratory failure hypoxia     Brief history: Jared North is a 45 year old old male with h/o hypertension, reactive airway disease, chronic attic is on Suboxone (not on his own) p/w increasing shortness of breath.       Patient lives in a sober house.  Patient says that he has been sick for about a week.  Patient cannot tell me exactly when he started getting short of breath but he said that has been feeling weak.  He is not vaccinated but he cannot tell me the reason why he is not vaccinated.  Probably got it from the sober house.  Patient denies any chest pain.  Abdominal pain.  No nausea or vomiting.  No diarrhea.  No myalgias.     Course in the ER: Patient is severely hypoxemic when he arrived to the emergency room.  Oxygen saturation in the 40s on triage.  He was placed on BiPAP with improvement to mid 90s.  Procalcitonin was on the upper limits of normal but nothing suggestive of bacterial pneumonia on his chest CT.  No pulmonary embolism but bilateral infiltrates consistent with Covid he was given a dose of Decadron as well as remdesivir and Tocilizumab after consultation with intensivist.  No immediate ICU beds available.  PICC line was inserted.    [facility] [place], [place] will continue to follow patient peripherally until patient is stable enough to be transferred out of ICU. Intubated on 11/28. Now at 100% FiO2 with norepeinephrine, propofol, versed, and fentanyl gtts, proned. Discussed with acute pain management team, RN, intensivist, RT, and CM/SW.     Assessment and Plan:    Covid pneumonia with severe acute respiratory failure hypoxia requiring intubation and  "mechanical ventilation on 11/28  Patient is not vaccinated  Stay in the ICU, ICU service following; Dr. Redman's notes appreciated.  Started on Decadron  LFTs and creatinine stable  Also received Tocilizumab  LFTs and renal function stable  DVT prophylaxis per ICU protocol for Covid  Sliding scale insulin for blood sugars  Now at 100% FiO2 with norepeinephrine, propofol, versed, and fentanyl gtts, proned now.    New onset atrial fibrillation, mild RVR  At rate mostly less than 110, blood pressure in the 90s  On the hypotensive side so we will avoid any beta-blocker or Cardizem at this time unless his heart rate goes very high.  TTE  Troponin negative x3  Amiodarone utilized transient and now discontinued.   On norepinephrine.     Slightly elevated LFTs, improving  Continue remdesivir  LFTs are slightly elevated but well less than x5 ULN. Benefits outweigh the risk.     History of opioid use and alcoholism  Not on methadone.  On Suboxone per review of the pharmacist     Essential hypertension,   now hypotension, likely from dehydration  Give IV fluids cautiously.  Avoid fluid overload.  For to keep him on the dry side if hemodynamically stable     Moderate Protein-Calorie Malnutrition\  Clinical Indicators found within the medical record:  RD consult on 11/28/21 ([facility]) lists\" Moderate malnutrition\".  % Weight Loss:  > 13% in 9 months (moderate malnutrition)  % Intake:  </= 50% for >/= 5 days (severe malnutrition)  Drugs/Tx:  TPN/Tube Feedings  Procedure:    Nutrition Consult      --------------------------------------------------------------------     Diet: NPO for Medical/Clinical Reasons Except for: Ice Chips, Meds  DVT Prophylaxis: Lovenox 0.5 mg/kg twice daily per COVID protocol  Code Status: Full Code  Living situation: Lives in a sober house  Anticipated possible discharge: At least a few days in the hospital     ----------------------------------------------------------------------     Cumulative " essential/pertinent data reviewed:  Labs:  Procalcitonin 0.5  Troponin negative x3  Glucose control  CBC unremarkable and stable  Hemoglobin stable  D-dimer 1.24 --> 1.22  Accu-Cheks mostly under 150.  Fibrinogen improving.  CRP 12.5, improved to 10.4--> 4.0--> 2.1  LDH baseline is 1033--> 995     Imaging:  The of the chest shows the following:  IMPRESSION:  1.  No pulmonary embolus.  2.  COVID pneumonia.  3.  Likely reactive mediastinal lymph nodes.  4.  Hepatic steatosis.     EKG: --  Telemetry reportedly showed episode of atrial fibrillation with RVR.  Patient still in A. fib with heart rate in the 100-110      ----------------------------------------------------------------------    Interval History/Subjective:  Now intubated, on mechanical ventilation. Now at 100% FiO2 with norepeinephrine, propofol, versed, and fentanyl gtts, proned now.    Physical Exam/Objective:  Temp:  [98.1  F (36.7  C)-99.3  F (37.4  C)] 99.1  F (37.3  C)  Pulse:  [41-96] 96  Resp:  [17-48] 24  BP: ()/(44-91) 119/77  MAP:  [66 mmHg-110 mmHg] 89 mmHg  Arterial Line BP: ()/(55-88) 117/76  FiO2 (%):  [80 %-100 %] 80 %  SpO2:  [83 %-100 %] 98 %    Body mass index is 27.33 kg/m .  GENERAL:  Intubated and sedated, in no apparent distress, appears stated age   HEAD:  Normocephalic, without obvious abnormality, atraumatic   EYES:  Eyelids closed, no obvious periorbital trauma or abnormalities    NOSE: Nares appears normal, septum appears midline   THROAT: Lips, mucosa, and tongue appear normal with endotracheal tube securely in-place   NECK: Symmetrical, trachea appears midline   BACK:   Symmetric, no curvature noted   LUNGS:   Symmetric chest rise on inhalation with ventilator, respirations unlabored   CHEST WALL:  No apparent deformities   HEART:  No thrills or heaves visualized; rate appears regular on monitor   ABDOMEN:   No masses or organomegaly apparent; non-scaphoid   EXTREMITIES: Extremities appear normal, atraumatic, no  cyanosis   SKIN: No exanthems in the visualized areas   NEURO: Sedated. Moves all four extremities freely and spontaneously   PSYCH: Intubated and sedated, deferred.     Personally Reviewed.    ARIPiprazole  5 mg Oral Daily     artificial tears  1 inch Both Eyes Q8H     [Held by provider] buprenorphine HCl-naloxone HCl  1 Film Sublingual Daily     buPROPion  150 mg Oral or Feeding Tube BID     chlorhexidine  15 mL Mouth/Throat Q12H     dexamethasone  6 mg Intravenous Daily     docusate  100 mg Oral or Feeding Tube BID     enoxaparin ANTICOAGULANT  0.5 mg/kg Subcutaneous Q12H     gabapentin  800 mg Oral or Feeding Tube TID     insulin aspart  1-12 Units Subcutaneous Q4H     [Held by provider] lisinopril  10 mg Oral Daily     pantoprazole  40 mg Per Feeding Tube QAM AC    Or     pantoprazole (PROTONIX) IV  40 mg Intravenous QAM AC     protein modular  2 packet Per Feeding Tube TID w/meals     sennosides  5 mL Oral BID     Current Facility-Administered Medications   Medication Dose Route Frequency     albuterol  2-4 puff Inhalation Q4H PRN     glucose  15-30 g Oral Q15 Min PRN    Or     dextrose  25-50 mL Intravenous Q15 Min PRN    Or     glucagon  1 mg Subcutaneous Q15 Min PRN     fentaNYL   mcg Intravenous Q1H PRN     hydrOXYzine  25-50 mg Oral At Bedtime PRN     propofol  10-20 mg Intravenous Q30 Min PRN    And     - MEDICATION INSTRUCTIONS -   Does not apply Continuous PRN     midazolam  1-3 mg Intravenous Q1H PRN     ondansetron  4 mg Oral Q6H PRN    Or     ondansetron  4 mg Intravenous Q6H PRN     vecuronium  10 mg Intravenous Q4H PRN           Yefri Diehl MD  Internal Medicine  [facility]  Phone: #[phone]

## 2021-11-29 NOTE — PROGRESS NOTES
RT PROGRESS NOTE    VENT DAY# 2    CURRENT SETTINGS:   Ventilation Mode: CMV/AC  (Continuous Mandatory Ventilation/ Assist Control)  FiO2 (%): 100 %  Rate Set (breaths/minute): 24 breaths/min  Tidal Volume Set (mL): 450 mL  PEEP (cm H2O): 14 cmH2O  Oxygen Concentration (%): 100 %  Resp: 24      PATIENT PARAMETERS:  PIP 32  Pplat:  25  Pmean:  18  Compliance: 38  Secretions:  Scant to small pink  02 Sats:  99%    ETT SIZE 8 Secured at 26 cm at teeth/gums    Respiratory Medications: veletri     ABG: @1535 11/28 pH 7.34; pCO2 46; pO2 75; HCO3 23, %O2 Sat 93%, BE -1.3 on 100    NOTE / SHIFT SUMMARY:   Patient remains prone this shift.  Swim q 2 hours, ETT patent and secure with cloth tape.  Diminished bilateral breath sounds.  RT to continue to monitor and treat as ordered.     11/29/21 0350   Tech Time   $Tech Time (10 minute increments) 4   Ventilator Data   Vent Owner On Site Equipment   Vent Brand [id number]   Vent ID Vent #[id number]   Ventilation Method Invasive   Ventilation Day(s) 2   Ventilator   Ventilator Adult   Ventilator  - Settings    Ventilation Mode CMV/AC  (Continuous Mandatory Ventilation/ Assist Control)   Rate Set (breaths/minute) 24 breaths/min   Tidal Volume Set (mL) 450 mL   PEEP (cm H2O) 14 cmH2O   Oxygen Concentration (%) 100 %   Inspiratory Time (seconds) 0.8 sec   Ventilator - Patient    Patient Resp Rate  24 breaths/min   Inspiratory Pressure (cm H2O) 32 cmH2O   Mean Airway Pressure (cm H2O) 18 cmH2O   Expiratory Vt  mL 451   Minute Volume L/min 10.4 L/min   Additional Vent Settings   Plateau Pressure (cm H2O) 25 cmH2O   PEEPi (cm H2O) 14.6 cm   Ventilator Alarms   High Inspiratory Pressure Alarm (cmH2O) 10 cm H2O   High Respiratory Rate (breaths/min) 40 breaths/min   Minute Ventilation High (L) 14 L/min   Minute Ventilation Low (L) 5 L/min   Ventilator Circuit   Ventilator Humidifier  HME   Heater Temperature 37   ETT Cuffed Single 8 mm   Placement Date/Time: 11/28/21 (c) 5594   Mask Ventilation: Not  attempted  Induction Type: Intravenous  Ease of Intubation: Easy  Laryngoscopy Technique: Video laryngoscopy  Cuffed: Cuffed  ETT Type: Single  Single Lumen Tube Size: 8 mm  VL Blade Type:...   Secured at (cm) 26 cm   Measured from Teeth/Gums   Position Center   Secured by Cloth tape   Bite Block Included with Commercial tube martinez   Site Appearance Clean   Cuff Assessment Minimal leak technique  ( leak 12%)   Respiratory Secretions Assessment   Secretions Amount scant   Respiratory WDL   Respiratory WDL breath sounds   Rhythm/Pattern, Respiratory assisted mechanically   Breath Sounds   Breath Sounds All Fields   All Lung Fields Breath Sounds diminished   Oxygen Therapy   O2 Device Mechanical Ventilator   FiO2 (%) 100 %       Jolanta Thibodeaux, RT

## 2021-11-29 NOTE — PLAN OF CARE
Problem: Gas Exchange Impaired  Goal: Optimal Gas Exchange  Outcome: No Change     Problem: Device-Related Complication Risk (Mechanical Ventilation, Invasive)  Goal: Optimal Device Function  Outcome: No Change     Problem: Inability to Wean (Mechanical Ventilation, Invasive)  Goal: Mechanical Ventilation Liberation  Outcome: No Change     Problem: Ventilator-Induced Lung Injury (Mechanical Ventilation, Invasive)  Goal: Absence of Ventilator-Induced Lung Injury  Outcome: No Change

## 2021-11-30 LAB
ANION GAP SERPL CALCULATED.3IONS-SCNC: 10 MMOL/L (ref 5–18)
BASE EXCESS BLDA CALC-SCNC: 2.4 MMOL/L
BASE EXCESS BLDA CALC-SCNC: 3.2 MMOL/L
BASOPHILS # BLD AUTO: 0 10E3/UL (ref 0–0.2)
BASOPHILS NFR BLD AUTO: 0 %
BUN SERPL-MCNC: 27 MG/DL (ref 8–22)
CALCIUM SERPL-MCNC: 8.2 MG/DL (ref 8.5–10.5)
CHLORIDE BLD-SCNC: 107 MMOL/L (ref 98–107)
CO2 SERPL-SCNC: 25 MMOL/L (ref 22–31)
COHGB MFR BLD: 99.5 % (ref 96–97)
COHGB MFR BLD: 99.7 % (ref 96–97)
CREAT SERPL-MCNC: 0.84 MG/DL (ref 0.7–1.3)
EOSINOPHIL # BLD AUTO: 0.2 10E3/UL (ref 0–0.7)
EOSINOPHIL NFR BLD AUTO: 1 %
ERYTHROCYTE [DISTWIDTH] IN BLOOD BY AUTOMATED COUNT: 13.5 % (ref 10–15)
FLOW: 70 LPM
GFR SERPL CREATININE-BSD FRML MDRD: >90 ML/MIN/1.73M2
GLUCOSE BLD-MCNC: 140 MG/DL (ref 70–125)
GLUCOSE BLDC GLUCOMTR-MCNC: 123 MG/DL (ref 70–99)
GLUCOSE BLDC GLUCOMTR-MCNC: 130 MG/DL (ref 70–99)
GLUCOSE BLDC GLUCOMTR-MCNC: 131 MG/DL (ref 70–99)
GLUCOSE BLDC GLUCOMTR-MCNC: 135 MG/DL (ref 70–99)
GLUCOSE BLDC GLUCOMTR-MCNC: 140 MG/DL (ref 70–99)
GLUCOSE BLDC GLUCOMTR-MCNC: 154 MG/DL (ref 70–99)
HCO3 BLD-SCNC: 26 MMOL/L (ref 23–29)
HCO3 BLD-SCNC: 27 MMOL/L (ref 23–29)
HCT VFR BLD AUTO: 46.1 % (ref 40–53)
HGB BLD-MCNC: 15.1 G/DL (ref 13.3–17.7)
IMM GRANULOCYTES # BLD: 0.1 10E3/UL
IMM GRANULOCYTES NFR BLD: 1 %
LYMPHOCYTES # BLD AUTO: 0.6 10E3/UL (ref 0.8–5.3)
LYMPHOCYTES NFR BLD AUTO: 4 %
MAGNESIUM SERPL-MCNC: 2.2 MG/DL (ref 1.8–2.6)
MCH RBC QN AUTO: 29.3 PG (ref 26.5–33)
MCHC RBC AUTO-ENTMCNC: 32.8 G/DL (ref 31.5–36.5)
MCV RBC AUTO: 89 FL (ref 78–100)
MONOCYTES # BLD AUTO: 0.3 10E3/UL (ref 0–1.3)
MONOCYTES NFR BLD AUTO: 2 %
NEUTROPHILS # BLD AUTO: 13 10E3/UL (ref 1.6–8.3)
NEUTROPHILS NFR BLD AUTO: 92 %
NRBC # BLD AUTO: 0 10E3/UL
NRBC BLD AUTO-RTO: 0 /100
O2/TOTAL GAS SETTING VFR VENT: 70 %
O2/TOTAL GAS SETTING VFR VENT: 80 %
OXYHGB MFR BLD: 98.3 % (ref 96–97)
OXYHGB MFR BLD: 98.4 % (ref 96–97)
PCO2 BLD: 47 MM HG (ref 35–45)
PCO2 BLD: 50 MM HG (ref 35–45)
PEEP: 16 CM H2O
PEEP: 16 CM H2O
PH BLD: 7.36 [PH] (ref 7.37–7.44)
PH BLD: 7.39 [PH] (ref 7.37–7.44)
PLATELET # BLD AUTO: 228 10E3/UL (ref 150–450)
PO2 BLD: 161 MM HG (ref 80–90)
PO2 BLD: 221 MM HG (ref 80–90)
POTASSIUM BLD-SCNC: 4.5 MMOL/L (ref 3.5–5)
RATE: 26 RR/MIN
RATE: 26 RR/MIN
RBC # BLD AUTO: 5.16 10E6/UL (ref 4.4–5.9)
SODIUM SERPL-SCNC: 142 MMOL/L (ref 136–145)
TEMPERATURE: 37 DEGREES C
TEMPERATURE: 37 DEGREES C
TRIGL SERPL-MCNC: 362 MG/DL
VENTILATION MODE: ABNORMAL
VENTILATION MODE: AC
VENTILATOR TIDAL VOLUME: 450 ML
VENTILATOR TIDAL VOLUME: 450 ML
WBC # BLD AUTO: 14.3 10E3/UL (ref 4–11)

## 2021-11-30 PROCEDURE — 250N000011 HC RX IP 250 OP 636: Performed by: INTERNAL MEDICINE

## 2021-11-30 PROCEDURE — 258N000003 HC RX IP 258 OP 636: Performed by: HOSPITALIST

## 2021-11-30 PROCEDURE — 87040 BLOOD CULTURE FOR BACTERIA: CPT | Performed by: INTERNAL MEDICINE

## 2021-11-30 PROCEDURE — 250N000013 HC RX MED GY IP 250 OP 250 PS 637: Performed by: INTERNAL MEDICINE

## 2021-11-30 PROCEDURE — 83735 ASSAY OF MAGNESIUM: CPT | Performed by: HOSPITALIST

## 2021-11-30 PROCEDURE — 999N000157 HC STATISTIC RCP TIME EA 10 MIN

## 2021-11-30 PROCEDURE — 250N000009 HC RX 250: Performed by: INTERNAL MEDICINE

## 2021-11-30 PROCEDURE — 99291 CRITICAL CARE FIRST HOUR: CPT | Performed by: INTERNAL MEDICINE

## 2021-11-30 PROCEDURE — C9113 INJ PANTOPRAZOLE SODIUM, VIA: HCPCS | Performed by: INTERNAL MEDICINE

## 2021-11-30 PROCEDURE — 94645 CONT INHLJ TX EACH ADDL HOUR: CPT

## 2021-11-30 PROCEDURE — 94640 AIRWAY INHALATION TREATMENT: CPT | Mod: 76

## 2021-11-30 PROCEDURE — 99232 SBSQ HOSP IP/OBS MODERATE 35: CPT | Performed by: HOSPITALIST

## 2021-11-30 PROCEDURE — 85025 COMPLETE CBC W/AUTO DIFF WBC: CPT | Performed by: INTERNAL MEDICINE

## 2021-11-30 PROCEDURE — 94003 VENT MGMT INPAT SUBQ DAY: CPT

## 2021-11-30 PROCEDURE — 82805 BLOOD GASES W/O2 SATURATION: CPT | Performed by: INTERNAL MEDICINE

## 2021-11-30 PROCEDURE — 250N000011 HC RX IP 250 OP 636: Performed by: HOSPITALIST

## 2021-11-30 PROCEDURE — 258N000003 HC RX IP 258 OP 636: Performed by: INTERNAL MEDICINE

## 2021-11-30 PROCEDURE — 84478 ASSAY OF TRIGLYCERIDES: CPT | Performed by: INTERNAL MEDICINE

## 2021-11-30 PROCEDURE — 200N000001 HC R&B ICU

## 2021-11-30 PROCEDURE — 80048 BASIC METABOLIC PNL TOTAL CA: CPT | Performed by: INTERNAL MEDICINE

## 2021-11-30 RX ORDER — FUROSEMIDE 10 MG/ML
20 INJECTION INTRAMUSCULAR; INTRAVENOUS ONCE
Status: COMPLETED | OUTPATIENT
Start: 2021-11-30 | End: 2021-11-30

## 2021-11-30 RX ADMIN — METOCLOPRAMIDE HYDROCHLORIDE 10 MG: 5 INJECTION INTRAMUSCULAR; INTRAVENOUS at 20:44

## 2021-11-30 RX ADMIN — GABAPENTIN 800 MG: 250 SUSPENSION ORAL at 12:47

## 2021-11-30 RX ADMIN — PROPOFOL 50 MCG/KG/MIN: 10 INJECTION, EMULSION INTRAVENOUS at 06:36

## 2021-11-30 RX ADMIN — Medication 250 MCG/HR: at 09:36

## 2021-11-30 RX ADMIN — BUPROPION HYDROCHLORIDE 150 MG: 75 TABLET, FILM COATED ORAL at 09:43

## 2021-11-30 RX ADMIN — ENOXAPARIN SODIUM 50 MG: 100 INJECTION SUBCUTANEOUS at 23:39

## 2021-11-30 RX ADMIN — PROPOFOL 20 MCG/KG/MIN: 10 INJECTION, EMULSION INTRAVENOUS at 22:47

## 2021-11-30 RX ADMIN — Medication 225 MCG/HR: at 00:19

## 2021-11-30 RX ADMIN — Medication 9 MG/HR: at 05:26

## 2021-11-30 RX ADMIN — FUROSEMIDE 20 MG: 10 INJECTION, SOLUTION INTRAMUSCULAR; INTRAVENOUS at 02:55

## 2021-11-30 RX ADMIN — PROPOFOL 50 MCG/KG/MIN: 10 INJECTION, EMULSION INTRAVENOUS at 10:24

## 2021-11-30 RX ADMIN — Medication 20 NG/KG/MIN: at 12:20

## 2021-11-30 RX ADMIN — GABAPENTIN 800 MG: 250 SUSPENSION ORAL at 09:43

## 2021-11-30 RX ADMIN — CEFEPIME HYDROCHLORIDE 2 G: 2 INJECTION, POWDER, FOR SOLUTION INTRAVENOUS at 23:38

## 2021-11-30 RX ADMIN — ARIPIPRAZOLE 5 MG: 5 TABLET ORAL at 09:44

## 2021-11-30 RX ADMIN — BUPROPION HYDROCHLORIDE 150 MG: 75 TABLET, FILM COATED ORAL at 20:44

## 2021-11-30 RX ADMIN — Medication 2 PACKET: at 09:44

## 2021-11-30 RX ADMIN — MINERAL OIL AND WHITE PETROLATUM 1 G: 30; 940 OINTMENT OPHTHALMIC at 04:27

## 2021-11-30 RX ADMIN — PANTOPRAZOLE SODIUM 40 MG: 40 INJECTION, POWDER, FOR SOLUTION INTRAVENOUS at 06:36

## 2021-11-30 RX ADMIN — MINERAL OIL AND WHITE PETROLATUM 1 G: 30; 940 OINTMENT OPHTHALMIC at 13:03

## 2021-11-30 RX ADMIN — ENOXAPARIN SODIUM 50 MG: 100 INJECTION SUBCUTANEOUS at 12:47

## 2021-11-30 RX ADMIN — CHLORHEXIDINE GLUCONATE 15 ML: 1.2 RINSE ORAL at 09:44

## 2021-11-30 RX ADMIN — VANCOMYCIN HYDROCHLORIDE 1250 MG: 5 INJECTION, POWDER, LYOPHILIZED, FOR SOLUTION INTRAVENOUS at 17:33

## 2021-11-30 RX ADMIN — MINERAL OIL AND WHITE PETROLATUM 1 G: 30; 940 OINTMENT OPHTHALMIC at 20:44

## 2021-11-30 RX ADMIN — GABAPENTIN 800 MG: 250 SUSPENSION ORAL at 16:01

## 2021-11-30 RX ADMIN — PROPOFOL 20 MCG/KG/MIN: 10 INJECTION, EMULSION INTRAVENOUS at 14:55

## 2021-11-30 RX ADMIN — CHLORHEXIDINE GLUCONATE 15 ML: 1.2 RINSE ORAL at 20:43

## 2021-11-30 RX ADMIN — CEFEPIME HYDROCHLORIDE 2 G: 2 INJECTION, POWDER, FOR SOLUTION INTRAVENOUS at 15:57

## 2021-11-30 RX ADMIN — METOCLOPRAMIDE HYDROCHLORIDE 10 MG: 5 INJECTION INTRAMUSCULAR; INTRAVENOUS at 09:44

## 2021-11-30 RX ADMIN — INSULIN ASPART 1 UNITS: 100 INJECTION, SOLUTION INTRAVENOUS; SUBCUTANEOUS at 20:44

## 2021-11-30 RX ADMIN — METOCLOPRAMIDE HYDROCHLORIDE 10 MG: 5 INJECTION INTRAMUSCULAR; INTRAVENOUS at 14:31

## 2021-11-30 RX ADMIN — METOCLOPRAMIDE HYDROCHLORIDE 10 MG: 5 INJECTION INTRAMUSCULAR; INTRAVENOUS at 02:55

## 2021-11-30 RX ADMIN — PROPOFOL 50 MCG/KG/MIN: 10 INJECTION, EMULSION INTRAVENOUS at 03:32

## 2021-11-30 RX ADMIN — CISATRACURIUM BESYLATE 3 MCG/KG/MIN: 10 INJECTION INTRAVENOUS at 00:19

## 2021-11-30 RX ADMIN — DEXAMETHASONE SODIUM PHOSPHATE 6 MG: 10 INJECTION INTRAMUSCULAR; INTRAVENOUS at 12:47

## 2021-11-30 RX ADMIN — Medication 11 MG/HR: at 15:30

## 2021-11-30 RX ADMIN — INSULIN ASPART 1 UNITS: 100 INJECTION, SOLUTION INTRAVENOUS; SUBCUTANEOUS at 15:31

## 2021-11-30 RX ADMIN — Medication 20 NG/KG/MIN: at 02:36

## 2021-11-30 RX ADMIN — Medication 20 NG/KG/MIN: at 23:39

## 2021-11-30 RX ADMIN — Medication 300 MCG/HR: at 18:25

## 2021-11-30 ASSESSMENT — ACTIVITIES OF DAILY LIVING (ADL)
ADLS_ACUITY_SCORE: 13
ADLS_ACUITY_SCORE: 15
ADLS_ACUITY_SCORE: 13
ADLS_ACUITY_SCORE: 15
ADLS_ACUITY_SCORE: 13
ADLS_ACUITY_SCORE: 15
ADLS_ACUITY_SCORE: 13
ADLS_ACUITY_SCORE: 15
ADLS_ACUITY_SCORE: 15
ADLS_ACUITY_SCORE: 13
ADLS_ACUITY_SCORE: 15
ADLS_ACUITY_SCORE: 13

## 2021-11-30 NOTE — PROGRESS NOTES
"[facility] MEDICINE PROGRESS NOTE      Identification/Summary: Jared North is a 46 year old male with a past medical history of opioid use on Suboxone, previous alcoholic, on Suboxone, hypertension who was presented with worsening shortness of breath on 11/24/2021 and admitted for COVID19 vral pneumonia with acute respiratory failure hypoxia.    Intubated on 11/28 with worsening hypoxic respiratory failure as well as agitation. In addition to intensivist/pulmonologist, acute pain management team consulting given polysubstance abuse history and opioid dependence and suboxone. Today 80% FiO2 with cisatracurium, fentanyl, midazolam, and propofol infusions; off norepinephrine. Discussed with acute pain management team, RN, intensivist, RT, and CM/SW[place] [place]ally until patient is stable enough to be transferred out of ICU.     Assessment and Plan:  COVID pneumonia with severe acute respiratory failure hypoxia requiring intubation and mechanical ventilation on 11/28  Patient was not vaccinated  Started on Decadron  Also received Tocilizumab  DVT prophylaxis per ICU protocol for COVID  Sliding scale insulin for blood sugars  Today 80% FiO2.  On cisatracurium, fentanyl, midazolam, and propofol infusions; off norepinephrine.     New onset atrial fibrillation, mild RVR - resolved  Troponin negative x3  Amiodarone utilized transient and now discontinued.   No off norepinephrine.   Telemetry.    Slightly elevated LFTs, improving  Continue remdesivir.  LFTs are slightly elevated but well less than x5 ULN. Benefits outweigh the risk.     History of opioid use and alcoholism  Not on methadone.  On Suboxone per review of the pharmacist.  Acute pain team following.      Moderate Protein-Calorie Malnutrition\  Clinical Indicators found within the medical record:  RD consult on 11/28/21 ([facility]) lists\" Moderate malnutrition\".  % Weight Loss:  > 13% in 9 months " (moderate malnutrition)  % Intake:  </= 50% for >/= 5 days (severe malnutrition)  Drugs/Tx:  TPN/Tube Feedings  Procedure:    Nutrition Consult    --------------------------------------------------------------------     Diet: NPO for Medical/Clinical Reasons Except for: Ice Chips, Meds  DVT Prophylaxis: Lovenox 0.5 mg/kg twice daily per COVID protocol  Code Status: Full Code  Living situation: Lives in a sober house  Anticipated possible discharge: At least a few days in the hospital     ----------------------------------------------------------------------     Cumulative essential/pertinent data reviewed:  Labs:  Procalcitonin 0.5  Troponin negative x3  Glucose control  Hemoglobin stable  D-dimer 1.24 --> 1.22  Accu-Cheks mostly under 150.  Fibrinogen improving.  CRP 12.5, improved to 10.4--> 4.0--> 2.1  LDH baseline is 1033--> 995  CBC 14.3      Imaging:  The of the chest shows the following:  IMPRESSION:  1.  No pulmonary embolus.  2.  COVID pneumonia.  3.  Likely reactive mediastinal lymph nodes.  4.  Hepatic steatosis.     EKG: --  Telemetry reportedly showed episode of atrial fibrillation with RVR.  Patient still in A. fib with heart rate in the 100-110      ----------------------------------------------------------------------    Interval History/Subjective:  Remains intubated on mechanical ventilation. Sedated. Paralyzed.   Today 80% FiO2.    Physical Exam/Objective:  Temp:  [98.6  F (37  C)-100.6  F (38.1  C)] 98.6  F (37  C)  Pulse:  [69-97] 69  Resp:  [24-26] 26  BP: (103-130)/(69-75) 108/70  MAP:  [66 mmHg-90 mmHg] 78 mmHg  Arterial Line BP: ()/(55-73) 107/63  FiO2 (%):  [80 %-100 %] 80 %  SpO2:  [91 %-99 %] 97 %    Body mass index is 27.03 kg/m .  GENERAL:  Intubated and sedated, in no apparent distress, appears stated age   HEAD:  Normocephalic, without obvious abnormality, atraumatic   EYES:  Eyelids closed, no obvious periorbital trauma or abnormalities    NOSE: Nares appears normal, septum  appears midline   THROAT: Lips, mucosa, and tongue appear normal with endotracheal tube securely in-place   NECK: Symmetrical, trachea appears midline   BACK:   Symmetric, no curvature noted   LUNGS:   Symmetric chest rise on inhalation with ventilator, respirations unlabored   CHEST WALL:  No apparent deformities   HEART:  No thrills or heaves visualized; rate appears regular on monitor   ABDOMEN:   No masses or organomegaly apparent; non-scaphoid   EXTREMITIES: Extremities appear normal, atraumatic, no cyanosis   SKIN: No exanthems in the visualized areas   NEURO: Sedated. Moves all four extremities freely and spontaneously   PSYCH: Intubated and sedated, deferred.     Personally Reviewed.    ARIPiprazole  5 mg Oral Daily     artificial tears  1 inch Both Eyes Q8H     [Held by provider] buprenorphine HCl-naloxone HCl  1 Film Sublingual Daily     buPROPion  150 mg Oral or Feeding Tube BID     chlorhexidine  15 mL Mouth/Throat Q12H     dexamethasone  6 mg Intravenous Daily     docusate  100 mg Oral or Feeding Tube BID     enoxaparin ANTICOAGULANT  0.5 mg/kg Subcutaneous Q12H     gabapentin  800 mg Oral or Feeding Tube TID     insulin aspart  1-12 Units Subcutaneous Q4H     [Held by provider] lisinopril  10 mg Oral Daily     metoclopramide  10 mg Intravenous Q6H     pantoprazole  40 mg Per Feeding Tube QAM AC    Or     pantoprazole (PROTONIX) IV  40 mg Intravenous QAM AC     polyethylene glycol  17 g Oral or Feeding Tube Daily     protein modular  2 packet Per Feeding Tube TID w/meals     sennosides  10 mL Oral BID     Current Facility-Administered Medications   Medication Dose Route Frequency     albuterol  2-4 puff Inhalation Q4H PRN     bisacodyl  10 mg Rectal Daily PRN     glucose  15-30 g Oral Q15 Min PRN    Or     dextrose  25-50 mL Intravenous Q15 Min PRN    Or     glucagon  1 mg Subcutaneous Q15 Min PRN     fentaNYL   mcg Intravenous Q1H PRN     hydrOXYzine  25-50 mg Oral At Bedtime PRN     propofol   10-20 mg Intravenous Q30 Min PRN    And     - MEDICATION INSTRUCTIONS -   Does not apply Continuous PRN     midazolam  1-3 mg Intravenous Q1H PRN     ondansetron  4 mg Oral Q6H PRN    Or     ondansetron  4 mg Intravenous Q6H PRN     vecuronium  10 mg Intravenous Q4H PRN           Yefri Diehl MD  Internal Medicine  [facility]  Phone: #[phone]

## 2021-11-30 NOTE — PROVIDER NOTIFICATION
Remains on 26/450/+16/currently 70%, 8.0 ETT 24 at the teeth. Sx'ing scant to nothing. Supined this AM and reproned this evening with no complications.       11/30/21 1730   Tech Time   $Tech Time (10 minute increments) 3   Ventilator Data   Vent Owner On Site Equipment   Vent Brand Drager   Vent ID [id number]   Ventilation Method Invasive   Ventilator  - Settings    Ventilation Mode CMV/AC  (Continuous Mandatory Ventilation/ Assist Control)   Rate Set (breaths/minute) 26 breaths/min   Tidal Volume Set (mL) 450 mL   PEEP (cm H2O) 16 cmH2O   Oxygen Concentration (%) 70 %   Inspiratory Time (seconds) 0.8 sec   Ventilator - Patient    Inspiratory Pressure (cm H2O) 31 cmH2O   Mean Airway Pressure (cm H2O) 22 cmH2O   Expiratory Vt  mL 454   Minute Volume L/min 11.2 L/min   Ventilator Alarms   High Inspiratory Pressure Alarm (cmH2O) 40 cm H2O   High Respiratory Rate (breaths/min) 40 breaths/min   Minute Ventilation High (L) 14 L/min   Minute Ventilation Low (L) 5 L/min   Ventilator Circuit   Ventilator Humidifier  Heater   Heater Temperature 34   ETT Cuffed Single 8 mm   Placement Date/Time: 11/28/21 (c) 1433   Mask Ventilation: Not attempted  Induction Type: Intravenous  Ease of Intubation: Easy  Laryngoscopy Technique: Video laryngoscopy  Cuffed: Cuffed  ETT Type: Single  Single Lumen Tube Size: 8 mm  VL Blade Type:...   Secured at (cm) 26 cm   Measured from Teeth/Gums   Position Center   Secured by Cloth tape   Bite Block None Present   Adult Pressure Support Trial   Pulse 80   Oxygen Therapy   SpO2 90 %   O2 Device Mechanical Ventilator   FiO2 (%) 70 %

## 2021-11-30 NOTE — PLAN OF CARE
Problem: Gas Exchange Impaired  Goal: Optimal Gas Exchange  Outcome: No Change     Problem: Device-Related Complication Risk (Mechanical Ventilation, Invasive)  Goal: Optimal Device Function  Outcome: No Change     Problem: Inability to Wean (Mechanical Ventilation, Invasive)  Goal: Mechanical Ventilation Liberation  Outcome: No Change

## 2021-11-30 NOTE — PHARMACY-VANCOMYCIN DOSING SERVICE
Pharmacy Vancomycin Initial Note  Date of Service 2021  Patient's  1975  46 year old, male    Indication: Healthcare-Associated Pneumonia    Current estimated CrCl = Estimated Creatinine Clearance: 135.2 mL/min (based on SCr of 0.84 mg/dL).    Creatinine for last 3 days  2021:  5:09 AM Creatinine 0.72 mg/dL  2021:  5:43 AM Creatinine 0.76 mg/dL  2021:  5:08 AM Creatinine 0.84 mg/dL    Recent Vancomycin Level(s) for last 3 days  No results found for requested labs within last 72 hours.      Vancomycin IV Administrations (past 72 hours)      No vancomycin orders with administrations in past 72 hours.                Nephrotoxins and other renal medications (From now, onward)    Start     Dose/Rate Route Frequency Ordered Stop    21 1800  vancomycin 1250 mg in 0.9% NaCl 250 mL intermittent infusion 1,250 mg         1,250 mg  over 90 Minutes Intravenous EVERY 12 HOURS 21 1613      21 1300  vasopressin (VASOSTRICT) 20 Units in sodium chloride 0.9 % 100 mL standard conc infusion         2.4 Units/hr  12 mL/hr  Intravenous CONTINUOUS 21 1247      21 1230  norepinephrine (LEVOPHED) 16 mg in sodium chloride 0.9 % 250 mL infusion CENTRAL         0.01-0.6 mcg/kg/min × 90.5 kg  0.8-50.9 mL/hr  Intravenous CONTINUOUS 21 1215      21 1600  phenylephrine (JF-SYNEPHRINE) 50 mg in sodium chloride 0.9 % 250 mL infusion         0.1-6 mcg/kg/min × 89 kg  2.7-160.2 mL/hr  Intravenous CONTINUOUS 21 1559      21 0900  [Held by provider]  lisinopril (ZESTRIL) tablet 10 mg        (Held by provider since Thu 2021 at 1500 by Kendra Redman MD.Hold Reason: Change in Vitals.)    10 mg Oral DAILY 21 1415            Contrast Orders - past 72 hours (72h ago, onward)            Start     Dose/Rate Route Frequency Ordered Stop    21 1100  perflutren lipid microsphere (DEFINITY) injection SUSP 3 mL         3 mL Intravenous ONCE 21  1145 11/28/21 1100          [employer] Prediction of Planned Initial Vancomycin Regimen    Regimen: 1250 mg IV every 12 hours.  Start time: 16:13 on 11/30/2021  Exposure target: AUC24 (range)400-600 mg/L.hr   AUC24,ss: 495 mg/L.hr  Probability of AUC24 > 400: 72 %  Ctrough,ss: 15 mg/L  Probability of Ctrough,ss > 20: 27 %  Probability of nephrotoxicity (Lodise MOISES 2009): 10 %        Plan:  1. Start vancomycin  1250mg iv q12hr  2. Vancomycin monitoring method: AUC  3. Vancomycin therapeutic monitoring goal: 400-600 mg*h/L  4. Pharmacy will check vancomycin levels as appropriate in 1-3 Days.    5. Serum creatinine levels will be ordered daily for the first week of therapy and at least twice weekly for subsequent weeks.      Paras Allen [facility]

## 2021-11-30 NOTE — PLAN OF CARE
Problem: Gas Exchange Impaired  Goal: Optimal Gas Exchange  Outcome: No Change  Intervention: Optimize Oxygenation and Ventilation    Problem: Oral Intake Inadequate  Goal: Improved Oral Intake  Outcome: No Change     Problem: Diarrhea  Goal: Fluid and Electrolyte Balance  Outcome: Improving  Intervention: Manage Diarrhea    Problem: Cardiac Output Decreased  Goal: Effective Cardiac Output  Outcome: No Change  Intervention: Optimize Cardiac Output    Pt remains in prone position.  When swimming left, pt's BP drops and he requires more Levophed.  FIO2 was decreased to 80%.  O2 sats stable.  Pt's tube feeding remains at goal rate of 15 mL/hr with 60 mL sterile H2O flushes every 4 hours.  Tube feed residuals remain low.  Pt had several loose/watery stools.  PM stool softeners held.  Pt had good urine output 2/2 scheduled Lasix.  Continue to monitor.

## 2021-11-30 NOTE — PROGRESS NOTES
Care Management Follow Up    Length of Stay (days): 6    Expected Discharge Date: 12/06/2021     Concerns to be Addressed:       Patient plan of care discussed at interdisciplinary rounds: Yes    Anticipated Discharge Disposition:       Anticipated Discharge Services:    Anticipated Discharge DME:      Patient/family educated on Medicare website which has current facility and service quality ratings:    Education Provided on the Discharge Plan:    Patient/Family in Agreement with the Plan:      Referrals Placed by CM/SW:    Private pay costs discussed: Not applicable    Additional Information:  11:08 AM  Chart reviewed.  Pt remains vented.  CM will continue to following along and assist with discharge planning.      LINDSEY Fulton

## 2021-11-30 NOTE — PROGRESS NOTES
CLINICAL NUTRITION SERVICES - REASSESSMENT NOTE      EVALUATION OF THE PROGRESS TOWARD GOALS   Diet: NPO  Nutrition Supplement/Support  Vital HP at 15ml/hr  Pt proning  Propofol=691kal      NEW FINDINGS   Plan to advance TF rate today     ANTHROPOMETRICS  Admission wt: 97kg  Most Recent Weight: 87kg      GI Status  WDL  Had loose stools yesterday with bowel regimen    LABS  Reviewed; [id number]    MEDICATIONS  Reviewed      NUTRITION DIAGNOSIS    Difficulty swallowing and inadequate oral intake related to Mechanical ventilation as evidenced by patient sedated on Vent requiring enteral TF nutrition support       Malnutrition related to negligible PO intake with Acute illness as evidenced by documented poor PO intake since admit and significant weight loss of 30 lbs past 9 months (13 %BW       Goals:    Patient will tolerate enteral TF without s&s of aspiration-met    Patient will tolerate enteral TF as evidenced by BG < 150 mg/dL and RV less than 500 mls-met    Meet assessed needs via enteral TF  -progressing    Maintain weight while hospitalized -wt is down        INTERVENTIONS  Implementation  Continue vital HP    Increase TF by 15ml q 8hrs to goal rate of 65ml/hr and hold 1 hour prior to repositioning with 100ml fluid flush tid   =1430kcal + 691kcal via propofol=2121kcal   =124g protein  =158g CHO  =1195ml fluid + 1495ml fluid with flushes    Also give 2 packets fiber daily once tolerating goal rate     Monitoring/Evaluation  Progress toward goals will be monitored and evaluated per protocol.

## 2021-11-30 NOTE — PROGRESS NOTES
Patient remains on Vent, currently proned, and continue to receive Veletri.  Turned head every 2 hours.     Ventilation Mode: CMV/AC  (Continuous Mandatory Ventilation/ Assist Control)  FiO2 (%): 80 %  Rate Set (breaths/minute): 26 breaths/min  Tidal Volume Set (mL): 450 mL  PEEP (cm H2O): 16 cmH2O  Oxygen Concentration (%): 80 %  Resp: 26    PIP 29  Plat 26  Mean 26    Suctioned scant amount.      Recent Labs   Lab 11/29/21  1809 11/29/21  1358 11/29/21  0544 11/28/21  1534   PH 7.35* 7.30* 7.34* 7.34*   PCO2 47* 52* 47* 46*   PO2 157* 92* 325* 75*   HCO3 24 23 24 23   O2PER 90 100 100  --      Will continue to monitor.      Che [place]ks[place], RT

## 2021-11-30 NOTE — PROGRESS NOTES
[facility] Pulmonary/Critical Care Consult Team Note    Jared North,  1975, MRN [id number]  Admitting Dx: Acute respiratory failure with hypoxia (H) [J96.01]  Pneumonia due to 2019 novel coronavirus [U07.1, J12.82]  Date / Time of Admission:  2021  1:19 PM    Overnight Events:  Intake/Output last 3 shifts:  I/O last 3 completed shifts:  In: 3022.27 [I.V.:1447.27; NG/GT:695; IV Piggyback:500]  Out: 2395 [Urine:2395]  Had a BM overnight   Remains sedated and paralyzed on vent    Assessment/Plan: Jared North is a 46 year old male unvaccinated against COVID-19, with history of polysubstance abuse and chronic pain/lower back pain now on Suboxone, mood disorder/depression, essential hypertension, sleep disorder breathing, mild intermittent asthma, knee pain, and recent diagnosis of COVID-19 viral infection on 2021 who presented to [facility] on 2021 with worsening shortness of breath, admitted to the ICU with COVID-19 viral pneumonia and severe acute respiratory failure with hypoxia requiring noninvasive ventilation.      PULM/ID: COVID-19 viral pneumonia.  Mild intermittent asthma   - LAST VENT SETTINGS: Ventilation Mode: CMV/AC  (Continuous Mandatory Ventilation/ Assist Control)  FiO2 (%): (S) 70 %  Rate Set (breaths/minute): 26 breaths/min  Tidal Volume Set (mL): 450 mL  PEEP (cm H2O): 16 cmH2O  Oxygen Concentration (%): (S) 70 %  Resp: 26  - Continue inhaled epoprostenol - full strength.   - abx and diuresis as below.  - Patient received tocilizumab 8 mg/kg on   - dexamethasone 6 mg daily x10 days (start )  - remdesivir daily x5 days (start     CV: circulatory shock   - titrate levophed  - afib on , now sinus  - Monitor on tele    GI: NPO, tube feeds  -     - GI proph    Heme/Onc: COVID-19 associated coagulopathy.  D-dimer 1.24 on .   - DVT prophylaxis: SCDs.  Lovenox 0.5 mg/kg subcu 2X/day    RENAL:   - Follow BUN/Creatinine  - strict I/O's    NEURO:  Acute metabolic encephalopathy.   In the setting of therapeutic sedation.  History of polysubstance abuse and chronic pain/lower back pain now on Suboxone.  Hold suboxone on 11/28 after intubation. Mood disorder/depression.  - Vecuronium 10 mg IV Q4H PRN for ventilator synchrony.  May need Nimbex gtt.   - Propofol, fentanyl, midazolam gtt as needed for sedation.  RASS goal -4/-5.  - Continue home meds: abilify, gabapentin 3x/day    ENDO:-   - Critical Care hyperglycemic protocol  - Accuchecks and sliding scale q4    Pt has critical illness and impairs breathing on vent such as there is high probability of imminent of life threatening deterioration in the patient's condition.    Code Status: FULL CODE    Infusions:    cisatracurium 3 mcg/kg/min (11/30/21 1254)     epoprostenol (VELETRI) 20 mcg/mL in sterile water inhalation solution 20 ng/kg/min (11/30/21 1220)     fentaNYL 300 mcg/hr (11/30/21 1254)     propofol (DIPRIVAN) infusion 30 mcg/kg/min (11/30/21 1255)    And     - MEDICATION INSTRUCTIONS -       midazolam 9 mg/hr (11/30/21 1200)     norepinephrine 0.04 mcg/kg/min (11/30/21 1200)     phenylephrine       vasopressin       Lines/Drains/Tubes:  8.0 mm endotracheal tube, placed 11/29  RUE PICC central line, placed 11/24  Left radial A-line, placed 11/28  OG enteral tube, placed 11/28  Wei catheter, placed 11/28    ICU DAILY CHECKLIST           Can patient transfer out of MICU? no     FAST HUG:     Feeding:  yes.  Patient is receiving TUBE FEEDS    Wei: no  Analgesia/Sedation: yes; Propofol, Midazolam and Fentanyl   Thromboembolic prophylaxis: yes; Mode:  Lovenox and SCDs  HOB>30:  yes  Stress Ulcer Protocol Active: yes; Mode: PPI  Glycemic Control: Any glucose > 180 no; Mode of Insulin Therapy: Sliding Scale Insulin     INTUBATED:  Can patient have daily waking:  No  Can patient have spontaneous breathing trial:  No     INTUBATED:  Can patient have daily waking: no  Can patient have spontaneous breathing  "trial: no;  Does pt have restraints: MD RESTRAINT FOR NON-VIOLENT BEHAVIOR FACE TO FACE EVALUATION Patient's Immediate Situation: Patient demonstrated the following behaviors: Impulsive behavior, grabbing at support tubes/lines.  Patient's Reaction to the intervention Does patient understand the reason for restraint/seclusion? Unable to Express Medical Condition Is there any evidence of compromise of Skin integrity, Respiratory, Cardiovascular, Musculoskeletal, Hydration? No Behavioral ConditionIn consultation with the RN, is there a need to continue this restraint or seclusion? Yes See Restraint Flowsheet for complete restraint documentation and assessment.  PHYSICAL THERAPY AND MOBILITY: Can patient have PT and mobility trial: no Activity: ICU mobility protocol    Critical Care Time excluding procedures and family discussions greater than: 1 Hour    Risk Factors Present on Admission:  Clinically Significant Risk Factors Present on Admission                  Carlotta Cage DO  Pulmonary and Critical Care Attending  pgr [phone]    No Known Allergies    Meds: See MAR    Physical Exam:  /68   Pulse 84   Temp 99.7  F (37.6  C)   Resp 26   Ht 1.803 m (5' 11\")   Wt 87.9 kg (193 lb 12.6 oz)   SpO2 95%   BMI 27.03 kg/m    Intake/Output this shift:  I/O this shift:  In: 347.31 [I.V.:287.31; NG/GT:60]  Out: 330 [Urine:330]  GEN: Intubated and sedated, NAD  HEENT: et tube in place, OG tube in place  CVS: regular rhythm, no murmurs  RESP: CTA BL   ABD: Soft, No abdominal pain with palpation, no guarding, no rigidity  EXT: Warm, well perfused, no edema  NEURO:  sedated  PSYCH: sedated    Pertinent Labs: Latest lab results in EHR personally reviewed.   CMP  Recent Labs   Lab 11/30/21  0802 11/30/21  0508 11/30/21  0429 11/29/21  2350 11/29/21  0751 11/29/21  0543 11/28/21  0755 11/28/21  0509 11/27/21  0821 11/27/21  0456 11/26/21 2018 11/26/21  1859 11/25/21  0854 11/25/21  0433 11/25/21  0015 11/24/21  2337 " 11/24/21  2216 11/24/21  1341   NA  --  142  --   --   --  141  --  143  --  141  --   --    < > 140  --  137  --  131*   POTASSIUM  --  4.5  --   --   --  4.8  --  4.2  --  4.3  --  4.5   < > 4.6  --  4.6  --  4.0   CHLORIDE  --  107  --   --   --  109*  --  108*  --  104  --   --    < > 100  --  99  --  95*   CO2  --  25  --   --   --  21*  --  26  --  28  --   --    < > 28  --  30  --  28   ANIONGAP  --  10  --   --   --  11  --  9  --  9  --   --    < > 12  --  8  --  8   * 140* 131* 135*   < > 135*   < > 101   < > 102   < >  --    < > 136*   < > 142*   < > 139*   BUN  --  27*  --   --   --  27*  --  22  --  23*  --   --    < > 20  --  16  --  15   CR  --  0.84  --   --   --  0.76  --  0.72  --  0.67*  --   --    < > 0.79  --  0.76  --  0.99   GFRESTIMATED  --  >90  --   --   --  >90  --  >90  --  >90  --   --    < > >90  --  >90  --  >90   TRUONG  --  8.2*  --   --   --  8.3*  --  7.5*  --  7.9*  --   --    < > 8.2*  --  8.1*  --  8.1*   MAG  --  2.2  --   --   --  2.4  --  2.1  --   --   --  2.4  --   --   --   --   --   --    PHOS  --   --   --   --   --   --   --   --   --   --   --  3.7  --   --   --   --   --   --    PROTTOTAL  --   --   --   --   --   --   --  5.4*  --   --   --   --   --  6.1  --  6.2  --  6.5   ALBUMIN  --   --   --   --   --   --   --  2.7*  --   --   --   --   --  2.6*  --  2.7*  --  2.9*   BILITOTAL  --   --   --   --   --   --   --  0.6  --   --   --   --   --  0.4  --  0.4  --  0.5   ALKPHOS  --   --   --   --   --   --   --  63  --   --   --   --   --  69  --  72  --  81   AST  --   --   --   --   --   --   --  92*  --  63*  --   --   --  88*  --  101*  --  105*   ALT  --   --   --   --   --   --   --  75*  --  43  --   --   --  57*  --  59*  --  53*    < > = values in this interval not displayed.     CBC  Recent Labs   Lab 11/30/21  0508 11/29/21  0543 11/28/21  0509 11/27/21  0456   WBC 14.3* 13.3* 8.5 5.3   RBC 5.16 5.34 5.05 4.70   HGB 15.1 16.3 14.9 14.0   HCT 46.1 47.1  44.5 41.4   MCV 89 88 88 88   MCH 29.3 30.5 29.5 29.8   MCHC 32.8 34.6 33.5 33.8   RDW 13.5 13.6 13.2 13.1    205 175 155     INR  Recent Labs   Lab 11/24/21  2339   INR 1.08     Arterial Blood Gas  Recent Labs   Lab 11/30/21  0507 11/29/21  1809 11/29/21  1358 11/29/21  0544   PH 7.39 7.35* 7.30* 7.34*   PCO2 47* 47* 52* 47*   PO2 221* 157* 92* 325*   HCO3 27 24 23 24   O2PER 80 90 100 100       Imaging: personally reviewed.   Results for orders placed or performed during the hospital encounter of 11/24/21   CT Chest Pulmonary Embolism w Contrast    Narrative    EXAM: CT CHEST PULMONARY EMBOLISM W CONTRAST  LOCATION: [facility]  DATE/TIME: 11/24/2021 2:27 PM    INDICATION: Hypoxemia, Covid positive  COMPARISON: None.  TECHNIQUE: CT chest pulmonary angiogram during arterial phase injection of IV contrast. Multiplanar reformats and MIP reconstructions were performed. Dose reduction techniques were used.   CONTRAST: ISOVUE 370 100mL    FINDINGS:  ANGIOGRAM CHEST: No pulmonary artery filling defect. Aorta is normal in caliber.    LUNGS AND PLEURA: Extensive groundglass consolidative bilateral pulmonary opacities. Trace left effusion.    MEDIASTINUM/AXILLAE: Heart size is normal scattered borderline enlarged mediastinal lymph nodes with the largest in the right lower paratracheal chain measuring 1.1 x 2.2 cm (series 5/29).    CORONARY ARTERY CALCIFICATION: None.    UPPER ABDOMEN: Hepatic steatosis.    MUSCULOSKELETAL: Normal.      Impression    IMPRESSION:  1.  No pulmonary embolus.  2.  COVID pneumonia.  3.  Likely reactive mediastinal lymph nodes.  4.  Hepatic steatosis.   XR Chest Port 1 View    Narrative    EXAM: XR CHEST PORTABLE 1 VIEW  LOCATION: [facility]  DATE/TIME: 11/28/2021, 3:08 PM    INDICATION: Endotracheal tube positioning.  RN TO CALL WHEN READY.  COMPARISON: Chest CT on 11/24/2021 and chest x-ray on 11/19/2021.      Impression    IMPRESSION:  Single AP view of the chest was obtained. Endotracheal tube tip projects over mid thoracic trachea approximately 4 cm from the felipa. Enteric tube crosses the diaphragm with the distal tip outside the field-of-view. Right upper extremity   PICC tip projects over low SVC. Stable cardiomediastinal silhouette. Bilateral basilar predominant patchy airspace pulmonary opacities, worrisome for infection including atypical infection like COVID-19, significantly worsened as compared to 2021   x-ray. No significant pleural effusion or pneumothorax.     XR Abdomen Port 1 View    Narrative    EXAM: XR ABDOMEN PORT 1 VIEWS  LOCATION: [facility]  DATE/TIME: 2021 3:09 PM    INDICATION: Enteric tube placement, evaluate position  COMPARISON: Chest x-ray the same date      Impression    IMPRESSION: Enteric suction tube with the tip and side-port within the mid gastric lumen. Nonobstructive bowel gas pattern. No definite free air. Persistent diffuse bibasilar pulmonary airspace opacities.    Echocardiogram Complete     Value    LVEF  60-65%    Narrative    [id number]  [id number]  [id number]  [id number]^AZUCENA^MYESHA^[place] [address]     Name: TOBIAS BELL  MRN: [id number]  : 1975  Study Date: 2021 10:50 AM  Age: 46 yrs  Gender: Male  Patient Location: [facility]  Reason For Study: Atrial Fibrillation  Ordering Physician: MYESHA ZENG  Performed By: HARRY     BSA: 2.1 m2  Height: 71 in  Weight: 192 lb  HR: 70  ______________________________________________________________________________  Procedure  Definity ([facility] #31141-870) given intravenously.  ______________________________________________________________________________  Interpretation Summary     1. Left ventricular size, wall motion and function are normal. The ejection  fraction is 60-65%.  2. Flattened septum is consistent with RV pressure/volume overload.  3. Mildly decreased  right ventricular systolic function The right ventricle is  moderately dilated. Right ventricular systolic pressure could not be  approximated due to inadequate tricuspid regurgitation. No tricuspid  regurgitation. RA pressure of 8 mmHg.  ______________________________________________________________________________  Left Ventricle  Left ventricular size, wall motion and function are normal. The ejection  fraction is 60-65%. There is normal left ventricular wall thickness. Normal  left ventricular wall motion. Flattened septum is consistent with RV  pressure/volume overload.     Right Ventricle  The right ventricle is moderately dilated. Mildly decreased right ventricular  systolic function.     Atria  Normal left atrial size. The right atrium is mildly dilated.     Mitral Valve  The mitral valve is not well visualized. Mitral valve leaflets appear normal.  There is trace mitral regurgitation. There is no mitral valve stenosis.     Tricuspid Valve  Right ventricular systolic pressure could not be approximated due to  inadequate tricuspid regurgitation. No tricuspid regurgitation. IVC diameter  and respiratory changes fall into an intermediate range suggesting an RA  pressure of 8 mmHg. There is no tricuspid stenosis.     Aortic Valve  Aortic valve leaflets appear normal. There is no evidence of aortic stenosis  or clinically significant aortic regurgitation.     Pulmonic Valve  The pulmonic valve is not well seen, but is grossly normal.     Vessels  The aorta root is normal. Normal size ascending aorta. IVC diameter and  respiratory changes fall into an intermediate range suggesting an RA pressure  of 8 mmHg.     Pericardium  There is no pericardial effusion.     ______________________________________________________________________________  MMode/2D Measurements & Calculations  IVSd: 0.96 cm  LVIDd: 4.1 cm  LVIDs: 2.6 cm  LVPWd: 0.99 cm     FS: 36.4 %  LV mass(C)d: 128.8 grams  LV mass(C)dI: 62.1 grams/m2  Ao root  diam: 3.2 cm  LA dimension: 3.4 cm  asc Aorta Diam: 2.8 cm  LA/Ao: 1.0  LVOT diam: 2.1 cm  LVOT area: 3.4 cm2  LA Volume Indexed (AL/bp): 28.3 ml/m2  RWT: 0.48     Doppler Measurements & Calculations  MV E max mike: 47.7 cm/sec  MV A max mike: 40.4 cm/sec  MV E/A: 1.2     MV dec slope: 159.5 cm/sec2  MV dec time: 0.30 sec  LV V1 max P.9 mmHg  LV V1 max: 84.4 cm/sec  LV V1 VTI: 18.3 cm  SV(LVOT): 62.3 ml  SI(LVOT): 30.1 ml/m2  PA acc time: 0.07 sec  E/E': 5.3  E/E' av.7  Lateral E/e': 5.6  Medial E/e': 5.7  Peak E' Mike: 9.0 cm/sec     ______________________________________________________________________________  Report approved by: Ana Olivares 2021 12:50 PM             Patient Active Problem List   Diagnosis     Chronic back pain     Essential hypertension     Elevated LFTs     Acute respiratory failure with hypoxia (H)     Pneumonia due to 2019 novel coronavirus     Atrial fibrillation with rapid ventricular response (H)       Carlotta Cage DO  Pulmonary and Critical Care Attending  pgr [phone]

## 2021-12-01 ENCOUNTER — APPOINTMENT (OUTPATIENT)
Dept: RADIOLOGY | Facility: CLINIC | Age: 46
End: 2021-12-01
Attending: INTERNAL MEDICINE
Payer: COMMERCIAL

## 2021-12-01 LAB
ANION GAP SERPL CALCULATED.3IONS-SCNC: 6 MMOL/L (ref 5–18)
BASE EXCESS BLDA CALC-SCNC: 5.1 MMOL/L
BASE EXCESS BLDA CALC-SCNC: 5.2 MMOL/L
BUN SERPL-MCNC: 26 MG/DL (ref 8–22)
CALCIUM SERPL-MCNC: 7.6 MG/DL (ref 8.5–10.5)
CHLORIDE BLD-SCNC: 108 MMOL/L (ref 98–107)
CO2 SERPL-SCNC: 26 MMOL/L (ref 22–31)
COHGB MFR BLD: 96.4 % (ref 96–97)
COHGB MFR BLD: 98.7 % (ref 96–97)
CREAT SERPL-MCNC: 0.66 MG/DL (ref 0.7–1.3)
ERYTHROCYTE [DISTWIDTH] IN BLOOD BY AUTOMATED COUNT: 13.9 % (ref 10–15)
FLOW: 65 LPM
GFR SERPL CREATININE-BSD FRML MDRD: >90 ML/MIN/1.73M2
GLUCOSE BLD-MCNC: 129 MG/DL (ref 70–125)
GLUCOSE BLDC GLUCOMTR-MCNC: 113 MG/DL (ref 70–99)
GLUCOSE BLDC GLUCOMTR-MCNC: 125 MG/DL (ref 70–99)
GLUCOSE BLDC GLUCOMTR-MCNC: 139 MG/DL (ref 70–99)
GLUCOSE BLDC GLUCOMTR-MCNC: 139 MG/DL (ref 70–99)
GLUCOSE BLDC GLUCOMTR-MCNC: 151 MG/DL (ref 70–99)
GLUCOSE BLDC GLUCOMTR-MCNC: 154 MG/DL (ref 70–99)
HCO3 BLD-SCNC: 28 MMOL/L (ref 23–29)
HCO3 BLD-SCNC: 28 MMOL/L (ref 23–29)
HCT VFR BLD AUTO: 41 % (ref 40–53)
HGB BLD-MCNC: 13.2 G/DL (ref 13.3–17.7)
MAGNESIUM SERPL-MCNC: 2.2 MG/DL (ref 1.8–2.6)
MCH RBC QN AUTO: 29.1 PG (ref 26.5–33)
MCHC RBC AUTO-ENTMCNC: 32.2 G/DL (ref 31.5–36.5)
MCV RBC AUTO: 91 FL (ref 78–100)
O2/TOTAL GAS SETTING VFR VENT: 70 %
OXYHGB MFR BLD: 94.8 % (ref 96–97)
OXYHGB MFR BLD: 97.1 % (ref 96–97)
PCO2 BLD: 50 MM HG (ref 35–45)
PCO2 BLD: 51 MM HG (ref 35–45)
PEEP: 16 CM H2O
PEEP: 16 CM H2O
PH BLD: 7.39 [PH] (ref 7.37–7.44)
PH BLD: 7.39 [PH] (ref 7.37–7.44)
PLATELET # BLD AUTO: 206 10E3/UL (ref 150–450)
PO2 BLD: 118 MM HG (ref 80–90)
PO2 BLD: 79 MM HG (ref 80–90)
POTASSIUM BLD-SCNC: 4.8 MMOL/L (ref 3.5–5)
RATE: 26 RR/MIN
RATE: 26 RR/MIN
RBC # BLD AUTO: 4.53 10E6/UL (ref 4.4–5.9)
SODIUM SERPL-SCNC: 140 MMOL/L (ref 136–145)
TEMPERATURE: 37 DEGREES C
TEMPERATURE: 37 DEGREES C
VENTILATION MODE: ABNORMAL
VENTILATOR TIDAL VOLUME: 450 ML
VENTILATOR TIDAL VOLUME: 450 ML
WBC # BLD AUTO: 11.3 10E3/UL (ref 4–11)

## 2021-12-01 PROCEDURE — 200N000001 HC R&B ICU

## 2021-12-01 PROCEDURE — 83735 ASSAY OF MAGNESIUM: CPT | Performed by: INTERNAL MEDICINE

## 2021-12-01 PROCEDURE — 250N000011 HC RX IP 250 OP 636: Performed by: INTERNAL MEDICINE

## 2021-12-01 PROCEDURE — 94640 AIRWAY INHALATION TREATMENT: CPT | Mod: 76

## 2021-12-01 PROCEDURE — 99291 CRITICAL CARE FIRST HOUR: CPT | Performed by: INTERNAL MEDICINE

## 2021-12-01 PROCEDURE — 94003 VENT MGMT INPAT SUBQ DAY: CPT

## 2021-12-01 PROCEDURE — 80048 BASIC METABOLIC PNL TOTAL CA: CPT | Performed by: INTERNAL MEDICINE

## 2021-12-01 PROCEDURE — 258N000003 HC RX IP 258 OP 636: Performed by: INTERNAL MEDICINE

## 2021-12-01 PROCEDURE — 94645 CONT INHLJ TX EACH ADDL HOUR: CPT

## 2021-12-01 PROCEDURE — 82805 BLOOD GASES W/O2 SATURATION: CPT | Performed by: INTERNAL MEDICINE

## 2021-12-01 PROCEDURE — 250N000011 HC RX IP 250 OP 636: Performed by: HOSPITALIST

## 2021-12-01 PROCEDURE — 87186 SC STD MICRODIL/AGAR DIL: CPT | Performed by: INTERNAL MEDICINE

## 2021-12-01 PROCEDURE — 258N000003 HC RX IP 258 OP 636: Performed by: HOSPITALIST

## 2021-12-01 PROCEDURE — C9113 INJ PANTOPRAZOLE SODIUM, VIA: HCPCS | Performed by: INTERNAL MEDICINE

## 2021-12-01 PROCEDURE — 250N000009 HC RX 250: Performed by: INTERNAL MEDICINE

## 2021-12-01 PROCEDURE — 85027 COMPLETE CBC AUTOMATED: CPT | Performed by: INTERNAL MEDICINE

## 2021-12-01 PROCEDURE — 99233 SBSQ HOSP IP/OBS HIGH 50: CPT | Performed by: FAMILY MEDICINE

## 2021-12-01 PROCEDURE — 250N000013 HC RX MED GY IP 250 OP 250 PS 637: Performed by: INTERNAL MEDICINE

## 2021-12-01 PROCEDURE — 71045 X-RAY EXAM CHEST 1 VIEW: CPT

## 2021-12-01 PROCEDURE — 999N000157 HC STATISTIC RCP TIME EA 10 MIN

## 2021-12-01 PROCEDURE — 99207 PR CDG-CORRECTLY CODED, REVIEWED AND AGREE: CPT | Performed by: FAMILY MEDICINE

## 2021-12-01 RX ADMIN — MINERAL OIL AND WHITE PETROLATUM 1 G: 30; 940 OINTMENT OPHTHALMIC at 12:22

## 2021-12-01 RX ADMIN — MINERAL OIL AND WHITE PETROLATUM 1 G: 30; 940 OINTMENT OPHTHALMIC at 20:41

## 2021-12-01 RX ADMIN — VANCOMYCIN HYDROCHLORIDE 1250 MG: 5 INJECTION, POWDER, LYOPHILIZED, FOR SOLUTION INTRAVENOUS at 18:29

## 2021-12-01 RX ADMIN — Medication 12 MG/HR: at 17:16

## 2021-12-01 RX ADMIN — Medication 20 NG/KG/MIN: at 10:46

## 2021-12-01 RX ADMIN — Medication 300 MCG/HR: at 02:48

## 2021-12-01 RX ADMIN — CEFEPIME HYDROCHLORIDE 2 G: 2 INJECTION, POWDER, FOR SOLUTION INTRAVENOUS at 08:02

## 2021-12-01 RX ADMIN — ARIPIPRAZOLE 5 MG: 5 TABLET ORAL at 08:02

## 2021-12-01 RX ADMIN — VANCOMYCIN HYDROCHLORIDE 1250 MG: 5 INJECTION, POWDER, LYOPHILIZED, FOR SOLUTION INTRAVENOUS at 06:16

## 2021-12-01 RX ADMIN — PANTOPRAZOLE SODIUM 40 MG: 40 INJECTION, POWDER, FOR SOLUTION INTRAVENOUS at 06:56

## 2021-12-01 RX ADMIN — BUPROPION HYDROCHLORIDE 150 MG: 75 TABLET, FILM COATED ORAL at 20:49

## 2021-12-01 RX ADMIN — CEFEPIME HYDROCHLORIDE 2 G: 2 INJECTION, POWDER, FOR SOLUTION INTRAVENOUS at 17:16

## 2021-12-01 RX ADMIN — DEXAMETHASONE SODIUM PHOSPHATE 6 MG: 10 INJECTION INTRAMUSCULAR; INTRAVENOUS at 12:22

## 2021-12-01 RX ADMIN — PROPOFOL 20 MCG/KG/MIN: 10 INJECTION, EMULSION INTRAVENOUS at 17:15

## 2021-12-01 RX ADMIN — CHLORHEXIDINE GLUCONATE 15 ML: 1.2 RINSE ORAL at 08:02

## 2021-12-01 RX ADMIN — CISATRACURIUM BESYLATE 4.5 MCG/KG/MIN: 10 INJECTION INTRAVENOUS at 22:26

## 2021-12-01 RX ADMIN — POLYETHYLENE GLYCOL 3350 17 G: 17 POWDER, FOR SOLUTION ORAL at 08:02

## 2021-12-01 RX ADMIN — ENOXAPARIN SODIUM 50 MG: 100 INJECTION SUBCUTANEOUS at 12:22

## 2021-12-01 RX ADMIN — CISATRACURIUM BESYLATE 3 MCG/KG/MIN: 10 INJECTION INTRAVENOUS at 12:34

## 2021-12-01 RX ADMIN — METOCLOPRAMIDE HYDROCHLORIDE 10 MG: 5 INJECTION INTRAMUSCULAR; INTRAVENOUS at 02:49

## 2021-12-01 RX ADMIN — Medication 11 MG/HR: at 00:32

## 2021-12-01 RX ADMIN — BUPROPION HYDROCHLORIDE 150 MG: 75 TABLET, FILM COATED ORAL at 08:02

## 2021-12-01 RX ADMIN — PROPOFOL 20 MCG/KG/MIN: 10 INJECTION, EMULSION INTRAVENOUS at 07:55

## 2021-12-01 RX ADMIN — CHLORHEXIDINE GLUCONATE 15 ML: 1.2 RINSE ORAL at 20:46

## 2021-12-01 RX ADMIN — SENNOSIDES 10 ML: 8.8 LIQUID ORAL at 08:02

## 2021-12-01 RX ADMIN — GABAPENTIN 800 MG: 250 SUSPENSION ORAL at 17:14

## 2021-12-01 RX ADMIN — Medication 20 NG/KG/MIN: at 21:54

## 2021-12-01 RX ADMIN — CISATRACURIUM BESYLATE 3 MCG/KG/MIN: 10 INJECTION INTRAVENOUS at 00:31

## 2021-12-01 RX ADMIN — MINERAL OIL AND WHITE PETROLATUM 1 G: 30; 940 OINTMENT OPHTHALMIC at 04:24

## 2021-12-01 RX ADMIN — Medication 300 MCG/HR: at 09:41

## 2021-12-01 RX ADMIN — GABAPENTIN 800 MG: 250 SUSPENSION ORAL at 08:02

## 2021-12-01 RX ADMIN — Medication 300 MCG/HR: at 17:14

## 2021-12-01 RX ADMIN — GABAPENTIN 800 MG: 250 SUSPENSION ORAL at 12:22

## 2021-12-01 RX ADMIN — DOCUSATE SODIUM 100 MG: 50 LIQUID ORAL at 20:49

## 2021-12-01 RX ADMIN — METOCLOPRAMIDE HYDROCHLORIDE 10 MG: 5 INJECTION INTRAMUSCULAR; INTRAVENOUS at 08:02

## 2021-12-01 RX ADMIN — Medication 12 MG/HR: at 08:28

## 2021-12-01 RX ADMIN — DOCUSATE SODIUM 100 MG: 50 LIQUID ORAL at 08:02

## 2021-12-01 ASSESSMENT — ACTIVITIES OF DAILY LIVING (ADL)
ADLS_ACUITY_SCORE: 15
ADLS_ACUITY_SCORE: 13
ADLS_ACUITY_SCORE: 15
ADLS_ACUITY_SCORE: 15
ADLS_ACUITY_SCORE: 13
ADLS_ACUITY_SCORE: 15
ADLS_ACUITY_SCORE: 13
ADLS_ACUITY_SCORE: 15
ADLS_ACUITY_SCORE: 13
ADLS_ACUITY_SCORE: 13
ADLS_ACUITY_SCORE: 15
ADLS_ACUITY_SCORE: 15
ADLS_ACUITY_SCORE: 13
ADLS_ACUITY_SCORE: 15
ADLS_ACUITY_SCORE: 15

## 2021-12-01 ASSESSMENT — MIFFLIN-ST. JEOR: SCORE: 1771.67

## 2021-12-01 NOTE — PROGRESS NOTES
[facility] Pulmonary/Critical Care Consult Team Note    Jared North,  1975, MRN [id number]  Admitting Dx: Acute respiratory failure with hypoxia (H) [J96.01]  Pneumonia due to 2019 novel coronavirus [U07.1, J12.82]  Date / Time of Admission:  2021  1:19 PM    Overnight Events:  Intake/Output last 3 shifts:  I/O last 3 completed shifts:  In: 2167.11 [I.V.:1107.11; NG/GT:330]  Out: 1520 [Urine:1520]  Remains sedated and paralyzed on vent  FiO2 titrated down to 70%    Assessment/Plan: Jared North is a 46 year old male unvaccinated against COVID-19, with history of polysubstance abuse and chronic pain/lower back pain now on Suboxone, mood disorder/depression, essential hypertension, sleep disorder breathing, mild intermittent asthma, knee pain, and recent diagnosis of COVID-19 viral infection on 2021 who presented to [facility] on 2021 with worsening shortness of breath, admitted to the ICU with COVID-19 viral pneumonia and severe acute respiratory failure s/p intubation .    PULM/ID: COVID-19 viral pneumonia.  Mild intermittent asthma, Acute resp failure with hypoxia s/p intubation .  - LAST VENT SETTINGS:   Ventilation Mode: CMV/AC  (Continuous Mandatory Ventilation/ Assist Control)  FiO2 (%): 65 %  Rate Set (breaths/minute): 26 breaths/min  Tidal Volume Set (mL): 450 mL  PEEP (cm H2O): 16 cmH2O  Oxygen Concentration (%): (S) 65 %  Resp: 26  - Continue inhaled epoprostenol - full strength.   - Patient received tocilizumab 8 mg/kg on   - dexamethasone 6 mg daily x10 days (start )  - remdesivir daily x5 days (start   - PF ratio is 280  - will supine this am  - started on Cefepime and Vanc  for concerns for VAP  - will get a CXR    CV: circulatory shock   - titrate levophed, MAP >60  - afib on , now sinus  - Monitor on tele    GI: NPO, tube feeds  -     - GI proph    Heme/Onc: COVID-19 associated coagulopathy.  D-dimer 1.24 on .   - DVT  prophylaxis: SCDs.  Lovenox 0.5 mg/kg subcu 2X/day    RENAL:   - Follow BUN/Creatinine  - strict I/O's    NEURO: Acute metabolic encephalopathy.   In the setting of therapeutic sedation.  History of polysubstance abuse and chronic pain/lower back pain now on Suboxone.  Hold suboxone on 11/28 after intubation. Mood disorder/depression.  - Vecuronium 10 mg IV Q4H PRN for ventilator synchrony   - Propofol, fentanyl, midazolam gtt as needed for sedation.  RASS goal -4/-5.  - Continue home meds: abilify, gabapentin 3x/day    ENDO:-   - Critical Care hyperglycemic protocol  - Accuchecks and sliding scale q6    Pt has critical illness and impairs breathing on vent such as there is high probability of imminent of life threatening deterioration in the patient's condition.    Code Status: FULL CODE    Infusions:    cisatracurium 3 mcg/kg/min (12/01/21 0800)     epoprostenol (VELETRI) 20 mcg/mL in sterile water inhalation solution 20 ng/kg/min (12/01/21 1046)     fentaNYL 300 mcg/hr (12/01/21 0941)     propofol (DIPRIVAN) infusion 20 mcg/kg/min (12/01/21 0800)    And     - MEDICATION INSTRUCTIONS -       midazolam 12 mg/hr (12/01/21 0828)     norepinephrine 0.03 mcg/kg/min (12/01/21 0800)     phenylephrine       vasopressin       Lines/Drains/Tubes:  8.0 mm endotracheal tube, placed 11/29  RUE PICC central line, placed 11/24  Left radial A-line, placed 11/28  OG enteral tube, placed 11/28  Wei catheter, placed 11/28    ICU DAILY CHECKLIST           Can patient transfer out of MICU? no     FAST HUG:     Feeding:  yes.  Patient is receiving TUBE FEEDS    Wei: no  Analgesia/Sedation: yes; Propofol, Midazolam and Fentanyl   Thromboembolic prophylaxis: yes; Mode:  Lovenox and SCDs  HOB>30:  yes  Stress Ulcer Protocol Active: yes; Mode: PPI  Glycemic Control: Any glucose > 180 no; Mode of Insulin Therapy: Sliding Scale Insulin     INTUBATED:  Can patient have daily waking:  No  Can patient have spontaneous breathing trial:   "No     INTUBATED:  Can patient have daily waking: no  Can patient have spontaneous breathing trial: no;  Does pt have restraints: MD RESTRAINT FOR NON-VIOLENT BEHAVIOR FACE TO FACE EVALUATION Patient's Immediate Situation: Patient demonstrated the following behaviors: Impulsive behavior, grabbing at support tubes/lines.  Patient's Reaction to the intervention Does patient understand the reason for restraint/seclusion? Unable to Express Medical Condition Is there any evidence of compromise of Skin integrity, Respiratory, Cardiovascular, Musculoskeletal, Hydration? No Behavioral ConditionIn consultation with the RN, is there a need to continue this restraint or seclusion? Yes See Restraint Flowsheet for complete restraint documentation and assessment.  PHYSICAL THERAPY AND MOBILITY: Can patient have PT and mobility trial: no Activity: ICU mobility protocol    Critical Care Time excluding procedures and family discussions greater than: 1 Hour    Risk Factors Present on Admission:  Clinically Significant Risk Factors Present on Admission                  Carlotta Cage DO  Pulmonary and Critical Care Attending  pgr [phone]    No Known Allergies    Meds: See MAR    Physical Exam:  BP (!) 168/90   Pulse 70   Temp 97.5  F (36.4  C)   Resp 26   Ht 1.803 m (5' 11\")   Wt 87.5 kg (192 lb 12.8 oz)   SpO2 97%   BMI 26.89 kg/m    Intake/Output this shift:  I/O this shift:  In: 334.68 [I.V.:104.68; NG/GT:100]  Out: 150 [Urine:150]  GEN: Intubated and sedated, NAD  HEENT: et tube in place, OG tube in place  CVS: regular rhythm, no murmurs  RESP: CTA BL   ABD: Soft, No abdominal pain with palpation, no guarding, no rigidity  EXT: Warm, well perfused, no edema  NEURO:  sedated  PSYCH: sedated    Pertinent Labs: Latest lab results in EHR personally reviewed.   CMP  Recent Labs   Lab 12/01/21  0432 12/01/21  0351 11/30/21  2342 11/30/21  2024 11/30/21  0802 11/30/21  0508 11/29/21  0751 11/29/21  0543 11/28/21  0755 " 11/28/21  0509 11/27/21  0821 11/27/21  0456 11/26/21 2018 11/26/21  1859 11/25/21  0854 11/25/21  0433 11/25/21  0015 11/24/21  2337 11/24/21  2216 11/24/21  1341     --   --   --   --  142  --  141  --  143  --  141  --   --    < > 140  --  137  --  131*   POTASSIUM 4.8  --   --   --   --  4.5  --  4.8  --  4.2  --  4.3  --  4.5   < > 4.6  --  4.6  --  4.0   CHLORIDE 108*  --   --   --   --  107  --  109*  --  108*  --  104  --   --    < > 100  --  99  --  95*   CO2 26  --   --   --   --  25  --  21*  --  26  --  28  --   --    < > 28  --  30  --  28   ANIONGAP 6  --   --   --   --  10  --  11  --  9  --  9  --   --    < > 12  --  8  --  8   * 139* 130* 140*   < > 140*   < > 135*   < > 101   < > 102   < >  --    < > 136*   < > 142*   < > 139*   BUN 26*  --   --   --   --  27*  --  27*  --  22  --  23*  --   --    < > 20  --  16  --  15   CR 0.66*  --   --   --   --  0.84  --  0.76  --  0.72  --  0.67*  --   --    < > 0.79  --  0.76  --  0.99   GFRESTIMATED >90  --   --   --   --  >90  --  >90  --  >90  --  >90  --   --    < > >90  --  >90  --  >90   TRUONG 7.6*  --   --   --   --  8.2*  --  8.3*  --  7.5*  --  7.9*  --   --    < > 8.2*  --  8.1*  --  8.1*   MAG 2.2  --   --   --   --  2.2  --  2.4  --  2.1  --   --   --  2.4   < >  --   --   --   --   --    PHOS  --   --   --   --   --   --   --   --   --   --   --   --   --  3.7  --   --   --   --   --   --    PROTTOTAL  --   --   --   --   --   --   --   --   --  5.4*  --   --   --   --   --  6.1  --  6.2  --  6.5   ALBUMIN  --   --   --   --   --   --   --   --   --  2.7*  --   --   --   --   --  2.6*  --  2.7*  --  2.9*   BILITOTAL  --   --   --   --   --   --   --   --   --  0.6  --   --   --   --   --  0.4  --  0.4  --  0.5   ALKPHOS  --   --   --   --   --   --   --   --   --  63  --   --   --   --   --  69  --  72  --  81   AST  --   --   --   --   --   --   --   --   --  92*  --  63*  --   --   --  88*  --  101*  --  105*   ALT  --   --   --    --   --   --   --   --   --  75*  --  43  --   --   --  57*  --  59*  --  53*    < > = values in this interval not displayed.     CBC  Recent Labs   Lab 12/01/21  0432 11/30/21  0508 11/29/21  0543 11/28/21  0509   WBC 11.3* 14.3* 13.3* 8.5   RBC 4.53 5.16 5.34 5.05   HGB 13.2* 15.1 16.3 14.9   HCT 41.0 46.1 47.1 44.5   MCV 91 89 88 88   MCH 29.1 29.3 30.5 29.5   MCHC 32.2 32.8 34.6 33.5   RDW 13.9 13.5 13.6 13.2    228 205 175     INR  Recent Labs   Lab 11/24/21  2339   INR 1.08     Arterial Blood Gas  Recent Labs   Lab 12/01/21  0432 11/30/21  1416 11/30/21  0507 11/29/21  1809   PH 7.39 7.36* 7.39 7.35*   PCO2 50* 50* 47* 47*   PO2 118* 161* 221* 157*   HCO3 28 26 27 24   O2PER 70 70 80 90       Imaging: personally reviewed.   Results for orders placed or performed during the hospital encounter of 11/24/21   CT Chest Pulmonary Embolism w Contrast    Narrative    EXAM: CT CHEST PULMONARY EMBOLISM W CONTRAST  LOCATION: [facility]  DATE/TIME: 11/24/2021 2:27 PM    INDICATION: Hypoxemia, Covid positive  COMPARISON: None.  TECHNIQUE: CT chest pulmonary angiogram during arterial phase injection of IV contrast. Multiplanar reformats and MIP reconstructions were performed. Dose reduction techniques were used.   CONTRAST: ISOVUE 370 100mL    FINDINGS:  ANGIOGRAM CHEST: No pulmonary artery filling defect. Aorta is normal in caliber.    LUNGS AND PLEURA: Extensive groundglass consolidative bilateral pulmonary opacities. Trace left effusion.    MEDIASTINUM/AXILLAE: Heart size is normal scattered borderline enlarged mediastinal lymph nodes with the largest in the right lower paratracheal chain measuring 1.1 x 2.2 cm (series 5/29).    CORONARY ARTERY CALCIFICATION: None.    UPPER ABDOMEN: Hepatic steatosis.    MUSCULOSKELETAL: Normal.      Impression    IMPRESSION:  1.  No pulmonary embolus.  2.  COVID pneumonia.  3.  Likely reactive mediastinal lymph nodes.  4.  Hepatic steatosis.   XR Chest  Port 1 View    Narrative    EXAM: XR CHEST PORTABLE 1 VIEW  LOCATION: [facility]  DATE/TIME: 2021, 3:08 PM    INDICATION: Endotracheal tube positioning.  RN TO CALL WHEN READY.  COMPARISON: Chest CT on 2021 and chest x-ray on 2021.      Impression    IMPRESSION: Single AP view of the chest was obtained. Endotracheal tube tip projects over mid thoracic trachea approximately 4 cm from the felipa. Enteric tube crosses the diaphragm with the distal tip outside the field-of-view. Right upper extremity   PICC tip projects over low SVC. Stable cardiomediastinal silhouette. Bilateral basilar predominant patchy airspace pulmonary opacities, worrisome for infection including atypical infection like COVID-19, significantly worsened as compared to 2021   x-ray. No significant pleural effusion or pneumothorax.     XR Abdomen Port 1 View    Narrative    EXAM: XR ABDOMEN PORT 1 VIEWS  LOCATION: [facility]  DATE/TIME: 2021 3:09 PM    INDICATION: Enteric tube placement, evaluate position  COMPARISON: Chest x-ray the same date      Impression    IMPRESSION: Enteric suction tube with the tip and side-port within the mid gastric lumen. Nonobstructive bowel gas pattern. No definite free air. Persistent diffuse bibasilar pulmonary airspace opacities.    Echocardiogram Complete     Value    LVEF  60-65%    Narrative    [id number]  [id number]  [id number]  [id number]^AZUCENA^MYESHA^[place] [address]     Name: TOBIAS BELL  MRN: [id number]  : 1975  Study Date: 2021 10:50 AM  Age: 46 yrs  Gender: Male  Patient Location: [facility]  Reason For Study: Atrial Fibrillation  Ordering Physician: MYESHA ZENG  Performed By: HARRY     BSA: 2.1 m2  Height: 71 in  Weight: 192 lb  HR: 70  ______________________________________________________________________________  Procedure  Definity ([facility] #[id number]) given  intravenously.  ______________________________________________________________________________  Interpretation Summary     1. Left ventricular size, wall motion and function are normal. The ejection  fraction is 60-65%.  2. Flattened septum is consistent with RV pressure/volume overload.  3. Mildly decreased right ventricular systolic function The right ventricle is  moderately dilated. Right ventricular systolic pressure could not be  approximated due to inadequate tricuspid regurgitation. No tricuspid  regurgitation. RA pressure of 8 mmHg.  ______________________________________________________________________________  Left Ventricle  Left ventricular size, wall motion and function are normal. The ejection  fraction is 60-65%. There is normal left ventricular wall thickness. Normal  left ventricular wall motion. Flattened septum is consistent with RV  pressure/volume overload.     Right Ventricle  The right ventricle is moderately dilated. Mildly decreased right ventricular  systolic function.     Atria  Normal left atrial size. The right atrium is mildly dilated.     Mitral Valve  The mitral valve is not well visualized. Mitral valve leaflets appear normal.  There is trace mitral regurgitation. There is no mitral valve stenosis.     Tricuspid Valve  Right ventricular systolic pressure could not be approximated due to  inadequate tricuspid regurgitation. No tricuspid regurgitation. IVC diameter  and respiratory changes fall into an intermediate range suggesting an RA  pressure of 8 mmHg. There is no tricuspid stenosis.     Aortic Valve  Aortic valve leaflets appear normal. There is no evidence of aortic stenosis  or clinically significant aortic regurgitation.     Pulmonic Valve  The pulmonic valve is not well seen, but is grossly normal.     Vessels  The aorta root is normal. Normal size ascending aorta. IVC diameter and  respiratory changes fall into an intermediate range suggesting an RA pressure  of 8 mmHg.      Pericardium  There is no pericardial effusion.     ______________________________________________________________________________  MMode/2D Measurements & Calculations  IVSd: 0.96 cm  LVIDd: 4.1 cm  LVIDs: 2.6 cm  LVPWd: 0.99 cm     FS: 36.4 %  LV mass(C)d: 128.8 grams  LV mass(C)dI: 62.1 grams/m2  Ao root diam: 3.2 cm  LA dimension: 3.4 cm  asc Aorta Diam: 2.8 cm  LA/Ao: 1.0  LVOT diam: 2.1 cm  LVOT area: 3.4 cm2  LA Volume Indexed (AL/bp): 28.3 ml/m2  RWT: 0.48     Doppler Measurements & Calculations  MV E max mike: 47.7 cm/sec  MV A max mike: 40.4 cm/sec  MV E/A: 1.2     MV dec slope: 159.5 cm/sec2  MV dec time: 0.30 sec  LV V1 max P.9 mmHg  LV V1 max: 84.4 cm/sec  LV V1 VTI: 18.3 cm  SV(LVOT): 62.3 ml  SI(LVOT): 30.1 ml/m2  PA acc time: 0.07 sec  E/E': 5.3  E/E' av.7  Lateral E/e': 5.6  Medial E/e': 5.7  Peak E' Mike: 9.0 cm/sec     ______________________________________________________________________________  Report approved by: Ana Olivares 2021 12:50 PM             Patient Active Problem List   Diagnosis     Chronic back pain     Essential hypertension     Elevated LFTs     Acute respiratory failure with hypoxia (H)     Pneumonia due to 2019 novel coronavirus     Atrial fibrillation with rapid ventricular response (H)       Carlotta Cage,   Pulmonary and Critical Care Attending  pgr [phone]

## 2021-12-01 NOTE — PLAN OF CARE
Problem: Cardiac Output Decreased  Goal: Effective Cardiac Output  Outcome: No Change  Intervention: Optimize Cardiac Output    Problem: Diarrhea  Goal: Fluid and Electrolyte Balance  Outcome: Improving  Intervention: Manage Diarrhea    Problem: Oral Intake Inadequate  Goal: Improved Oral Intake  Outcome: Improving     Problem: Gas Exchange Impaired  Goal: Optimal Gas Exchange  Outcome: No Change  Intervention: Optimize Oxygenation and Ventilation    Pt remained on the same vent settings through the night.  O2 sats mid 90's.  Lung sounds coarse/diminished; scant amount of sputum suctioned.  Pt had one loose stool last evening.  Stool softeners were held.  TF now at goal rate of 65 mL/hr; residuals low.  Pt's BP usually drops temporarily with repositioning.  During the 0600 repositioning, pt's HR dropped into the 40's and BP became elevated.  Levophed was decreased and sedation was temporarily increased.  Pt recovered after a short period.  Continue to monitor.

## 2021-12-01 NOTE — PROGRESS NOTES
Patient remains on vent, prone position.  He is receiving Veletri and head turns every 2 hours.  Suctioning small amount     Ventilation Mode: CMV/AC  (Continuous Mandatory Ventilation/ Assist Control)  FiO2 (%): 70 %  Rate Set (breaths/minute): 26 breaths/min  Tidal Volume Set (mL): 450 mL  PEEP (cm H2O): 16 cmH2O  Oxygen Concentration (%): 70 %  Resp: 26    Recent Labs   Lab 12/01/21  0432 11/30/21  1416 11/30/21  0507 11/29/21  1809   PH 7.39 7.36* 7.39 7.35*   PCO2 50* 50* 47* 47*   PO2 118* 161* 221* 157*   HCO3 28 26 27 24   O2PER 70 70 80 90      Will continue to monitor.      Che Barksdale, RT

## 2021-12-01 NOTE — PLAN OF CARE
Problem: Gas Exchange Impaired  Goal: Optimal Gas Exchange  Outcome: Improving    Problem: Constipation  Goal: Effective Bowel Elimination  Outcome: Improving     Patient paralyzed/sedated. Rass -5. +bowel movements overnight last night, no BM on day shift today. Patient repositioned to supine from prone position this morning around 10am and repositioned back to prone this evening around 1700. Oxygen saturations >90% on 70% FiO2. Patient tolerated repositions and swimming movements well. Febrile this afternoon, IV antibiotics ordered and started. Tube feeds increased to 30mL/hr this afternoon with a goal of 65ml/hr; patient tolerating well. Wei CDI, patent, adequate output.

## 2021-12-01 NOTE — PROGRESS NOTES
RT PROGRESS NOTE    VENT DAY# 4    CURRENT SETTINGS:   Ventilation Mode: CMV/AC  (Continuous Mandatory Ventilation/ Assist Control)  FiO2 (%): 65 %  Rate Set (breaths/minute): 26 breaths/min  Tidal Volume Set (mL): 450 mL  PEEP (cm H2O): 16 cmH2O  Oxygen Concentration (%): 65 %  Resp: 26      PATIENT PARAMETERS:  PIP 30  Pplat:  25  Pmean:  20  Secretions:  Cloudy/tan   02 Sats:  92%    ETT SIZE 8.0 Secured at 26 cm at teeth/gums    Respiratory Medications: Veletri continuous     ABG: @1424 pH 7.39; pCO2 51; pO2 79; HCO3 28, %O2 Sat 94.8, BE 5.2 on 65%    NOTE / SHIFT SUMMARY:   Patient was supined this morning, retaped and reproned around 1700. Breath sounds are clear/diminished. RT continue to follow.     Emily Torres, RT

## 2021-12-01 NOTE — PROGRESS NOTES
CLINICAL NUTRITION SERVICES - REASSESSMENT NOTE      EVALUATION OF THE PROGRESS TOWARD GOALS   TF now  Vital HP at 65ml/hr and hold 1 hour prior to repositioning with 100ml fluid flush tid   =1430kcal (with holds) 1560kcal without holds + 277kcal via propofol=1707kcal (propofol down from yesterday)  =124g protein  =158g CHO  =1195ml fluid + 1495ml fluid with flushes     Also give 2 packets fiber daily once tolerating goal rate        ANTHROPOMETRICS  Admission wt: 97kg  Most Recent Weight: 87kg      GI Status  Smear of stool noted via nursing report last night     LABS  Reviewed; BUN-26, Cr-.66, Gluc-139,129    MEDICATIONS  Reviewed  Drips propofol, norepinephrine    NUTRITION DIAGNOSIS    Difficulty swallowing and inadequate oral intake related to Mechanical ventilation as evidenced by patient sedated on Vent requiring enteral TF nutrition support       Malnutrition related to negligible PO intake with Acute illness as evidenced by documented poor PO intake since admit and significant weight loss of 30 lbs past 9 months (13 %BW        Goals:    Patient will tolerate enteral TF without s&s of aspiration-met    Patient will tolerate enteral TF as evidenced by BG < 150 mg/dL and RV less than 500 mls-met    Meet assessed needs via enteral TF  -progressing    Maintain weight while hospitalized -wt is down       INTERVENTIONS  Implementation  Plan to start reglan if not having regular bowel movements   Due to decrease in propofol plan to switch to promote to meet est needs   Change to promote at 65ml/hr and increase to 85ml/hr once has a BM    If continuous   =2040kcal  =126g protein  =265g carb  =53g fat  =0g fiber  =1711ml fluid (2111ml fluid with flushes)     Change to a formulation with fiber when having regular bowel movements     Monitoring/Evaluation  Progress toward goals will be monitored and evaluated per protocol.

## 2021-12-01 NOTE — PROGRESS NOTES
[facility] MEDICINE PROGRESS NOTE      Identification/Summary:   Jared North is a 46 year old male with a past medical history of opioid use on Suboxone, previous alcoholic, on Suboxone, hypertension who was presented with worsening shortness of breath on 11/24/2021 and admitted for COVID19 vral pneumonia with acute respiratory failure hypoxia.     Intubated on 11/28 with worsening hypoxic respiratory failure as well as agitation. In addition to intensivist/pulmonologist, acute pain management team consulting given polysubstance abuse history and opioid dependence and suboxone. Improving FiO2 with cisatracurium, fentanyl, midazolam, and propofol infusions; off norepinephrine. Discussed with acute pain management team, RN, intensivist, RT, and CM/SW.[facility] [place] continue to follow patient peripherally until patient is stable enough to be transferred out of ICU.     Assessment and Plan:  COVID pneumonia with severe acute respiratory failure hypoxia requiring intubation and mechanical ventilation on 11/28  Patient was not vaccinated  Started on Decadron  Also received Tocilizumab  DVT prophylaxis per ICU protocol for COVID  Sliding scale insulin for blood sugars  Improved FiO2.  On cisatracurium, fentanyl, midazolam, and propofol infusions; off norepinephrine.      New onset atrial fibrillation, mild RVR - resolved  Troponin negative x3  Amiodarone utilized transient and now discontinued.   No off norepinephrine.   Telemetry.     Slightly elevated LFTs, improving  Continue remdesivir.  LFTs are slightly elevated but well less than x5 ULN. Benefits outweigh the risk.     History of opioid use and alcoholism  Not on methadone.  On Suboxone per review of the pharmacist.  Acute pain team following.      Moderate Protein-Calorie Malnutrition    Diet: NPO for Medical/Clinical Reasons Except for: NPO but receiving Tube Feeding  Adult Formula Drip Feeding: Continuous Promote; Orogastric tube; Goal  Rate: keep at 65 but once has a BM increase to 85ml/hr; mL/hr; Medication - Feeding Tube Flush Frequency: At least 15-30 mL water before and after medication administration and ...  DVT Prophylaxis: Lovenox  Code Status: Full Code    Anticipated possible discharge in several days    Tomy Snell MD  Hospitalist  [facility]  [facility]  Phone: #[phone]    Interval History/Subjective:  Discussed directly with nursing staff.  Slightly improved.  Paralyzed.  Sedated.    Physical Exam/Objective:  Temp:  [97  F (36.1  C)-100.6  F (38.1  C)] 98.8  F (37.1  C)  Pulse:  [48-83] 69  Resp:  [26-31] 26  BP: (118-168)/(71-90) 168/90  MAP:  [43 mmHg-113 mmHg] 77 mmHg  Arterial Line BP: ()/(29-89) 114/61  FiO2 (%):  [65 %-70 %] 65 %  SpO2:  [90 %-99 %] 91 %  Body mass index is 26.89 kg/m .    GENERAL:  Alert, appears comfortable, in no acute distress, appears stated age   HEAD:  Normocephalic, without obvious abnormality, atraumatic   NECK: Supple, symmetrical, trachea midline   LUNGS:   Clear to auscultation bilaterally, no rales, rhonchi, or wheezing, symmetric chest rise on inhalation, respirations unlabored   HEART:  Regular rate and rhythm, S1 and S2 normal, no murmur, rub, or gallop    EXTREMITIES: Extremities normal, atraumatic, no cyanosis or edema    SKIN: Dry to touch, no exanthems in the visualized areas     Data reviewed today: I personally reviewed all new medications, labs, imaging/diagnostics reports over the past 24 hours. Pertinent findings include:    Imaging:   Recent Results (from the past 24 hour(s))   XR Chest Port 1 View    Narrative    EXAM: XR CHEST PORT 1 VIEW  LOCATION: [facility]  DATE/TIME: 12/1/2021 12:27 PM    INDICATION: new PNA?  COMPARISON: 11/28/2021       Impression    IMPRESSION: An endotracheal tube tip is 3.8 cm from the felipa. An NG or OG tube terminates off the field-of-view. A right PICC is present. Increased  opacities at the right lung base are concerning for new pneumonia. Left lung opacities have decreased.   No pneumothorax. The heart is not well assessed.        Labs:  Most Recent 3 CBC's:Recent Labs   Lab Test 12/01/21  0432 11/30/21  0508 11/29/21  0543   WBC 11.3* 14.3* 13.3*   HGB 13.2* 15.1 16.3   MCV 91 89 88    228 205     Most Recent 3 BMP's:Recent Labs   Lab Test 12/01/21  1424 12/01/21  0754 12/01/21  0432 11/30/21  0802 11/30/21  0508 11/29/21  0751 11/29/21  0543   NA  --   --  140  --  142  --  141   POTASSIUM  --   --  4.8  --  4.5  --  4.8   CHLORIDE  --   --  108*  --  107  --  109*   CO2  --   --  26  --  25  --  21*   BUN  --   --  26*  --  27*  --  27*   CR  --   --  0.66*  --  0.84  --  0.76   ANIONGAP  --   --  6  --  10  --  11   TRUONG  --   --  7.6*  --  8.2*  --  8.3*   * 113* 129*   < > 140*   < > 135*    < > = values in this interval not displayed.     Most Recent 2 LFT's:Recent Labs   Lab Test 11/28/21  0509 11/27/21  0456 11/25/21  0433   AST 92* 63* 88*   ALT 75* 43 57*   ALKPHOS 63  --  69   BILITOTAL 0.6  --  0.4     Most Recent 3 INR's:Recent Labs   Lab Test 11/24/21  2339   INR 1.08       Medications:   Personally Reviewed.  Medications     cisatracurium 4 mcg/kg/min (12/01/21 1440)     epoprostenol (VELETRI) 20 mcg/mL in sterile water inhalation solution 20 ng/kg/min (12/01/21 1046)     fentaNYL 300 mcg/hr (12/01/21 1400)     propofol (DIPRIVAN) infusion 20 mcg/kg/min (12/01/21 1400)    And     - MEDICATION INSTRUCTIONS -       midazolam 12 mg/hr (12/01/21 1400)     norepinephrine 0.03 mcg/kg/min (12/01/21 1400)     vasopressin         ARIPiprazole  5 mg Oral Daily     artificial tears  1 inch Both Eyes Q8H     [Held by provider] buprenorphine HCl-naloxone HCl  1 Film Sublingual Daily     buPROPion  150 mg Oral or Feeding Tube BID     ceFEPIme (MAXIPIME) IV  2 g Intravenous Q8H     chlorhexidine  15 mL Mouth/Throat Q12H     dexamethasone  6 mg Intravenous Daily      docusate  100 mg Oral or Feeding Tube BID     enoxaparin ANTICOAGULANT  0.5 mg/kg Subcutaneous Q12H     gabapentin  800 mg Oral or Feeding Tube TID     insulin aspart  1-12 Units Subcutaneous Q6H     [Held by provider] lisinopril  10 mg Oral Daily     [Held by provider] metoclopramide  10 mg Intravenous Q6H     pantoprazole  40 mg Per Feeding Tube QAM AC    Or     pantoprazole (PROTONIX) IV  40 mg Intravenous QAM AC     polyethylene glycol  17 g Oral or Feeding Tube Daily     sennosides  10 mL Oral BID     vancomycin  1,250 mg Intravenous Q12H

## 2021-12-02 LAB
ALBUMIN SERPL-MCNC: 2.4 G/DL (ref 3.5–5)
ALP SERPL-CCNC: 47 U/L (ref 45–120)
ALT SERPL W P-5'-P-CCNC: 31 U/L (ref 0–45)
ANION GAP SERPL CALCULATED.3IONS-SCNC: 6 MMOL/L (ref 5–18)
AST SERPL W P-5'-P-CCNC: 26 U/L (ref 0–40)
BASE EXCESS BLDA CALC-SCNC: 7.5 MMOL/L
BASE EXCESS BLDA CALC-SCNC: 9.2 MMOL/L
BILIRUB DIRECT SERPL-MCNC: 0.1 MG/DL
BILIRUB SERPL-MCNC: 0.3 MG/DL (ref 0–1)
BUN SERPL-MCNC: 20 MG/DL (ref 8–22)
CALCIUM SERPL-MCNC: 7.6 MG/DL (ref 8.5–10.5)
CHLORIDE BLD-SCNC: 109 MMOL/L (ref 98–107)
CO2 SERPL-SCNC: 29 MMOL/L (ref 22–31)
COHGB MFR BLD: 97.9 % (ref 96–97)
COHGB MFR BLD: 98.8 % (ref 96–97)
CREAT SERPL-MCNC: 0.6 MG/DL (ref 0.7–1.3)
ERYTHROCYTE [DISTWIDTH] IN BLOOD BY AUTOMATED COUNT: 14.3 % (ref 10–15)
GFR SERPL CREATININE-BSD FRML MDRD: >90 ML/MIN/1.73M2
GLUCOSE BLD-MCNC: 132 MG/DL (ref 70–125)
GLUCOSE BLDC GLUCOMTR-MCNC: 127 MG/DL (ref 70–99)
GLUCOSE BLDC GLUCOMTR-MCNC: 131 MG/DL (ref 70–99)
GLUCOSE BLDC GLUCOMTR-MCNC: 133 MG/DL (ref 70–99)
GLUCOSE BLDC GLUCOMTR-MCNC: 147 MG/DL (ref 70–99)
GLUCOSE BLDC GLUCOMTR-MCNC: 153 MG/DL (ref 70–99)
HCO3 BLD-SCNC: 30 MMOL/L (ref 23–29)
HCO3 BLD-SCNC: 32 MMOL/L (ref 23–29)
HCT VFR BLD AUTO: 38.6 % (ref 40–53)
HGB BLD-MCNC: 12.2 G/DL (ref 13.3–17.7)
MAGNESIUM SERPL-MCNC: 2.2 MG/DL (ref 1.8–2.6)
MCH RBC QN AUTO: 29.3 PG (ref 26.5–33)
MCHC RBC AUTO-ENTMCNC: 31.6 G/DL (ref 31.5–36.5)
MCV RBC AUTO: 93 FL (ref 78–100)
O2/TOTAL GAS SETTING VFR VENT: 65 %
O2/TOTAL GAS SETTING VFR VENT: 65 %
OXYHGB MFR BLD: 96.4 % (ref 96–97)
OXYHGB MFR BLD: 97.4 % (ref 96–97)
PCO2 BLD: 50 MM HG (ref 35–45)
PCO2 BLD: 54 MM HG (ref 35–45)
PEEP: 16 CM H2O
PEEP: 16 CM H2O
PH BLD: 7.39 [PH] (ref 7.37–7.44)
PH BLD: 7.44 [PH] (ref 7.37–7.44)
PLATELET # BLD AUTO: 205 10E3/UL (ref 150–450)
PO2 BLD: 122 MM HG (ref 80–90)
PO2 BLD: 92 MM HG (ref 80–90)
POTASSIUM BLD-SCNC: 4.9 MMOL/L (ref 3.5–5)
PROT SERPL-MCNC: 5 G/DL (ref 6–8)
RATE: 26 RR/MIN
RATE: 26 RR/MIN
RBC # BLD AUTO: 4.16 10E6/UL (ref 4.4–5.9)
SODIUM SERPL-SCNC: 144 MMOL/L (ref 136–145)
TEMPERATURE: 37 DEGREES C
TEMPERATURE: 37 DEGREES C
VANCOMYCIN SERPL-MCNC: 8.1 MG/L
VENTILATION MODE: ABNORMAL
VENTILATOR TIDAL VOLUME: 450 ML
VENTILATOR TIDAL VOLUME: 450 ML
WBC # BLD AUTO: 8.1 10E3/UL (ref 4–11)

## 2021-12-02 PROCEDURE — 99291 CRITICAL CARE FIRST HOUR: CPT | Performed by: INTERNAL MEDICINE

## 2021-12-02 PROCEDURE — 82805 BLOOD GASES W/O2 SATURATION: CPT | Performed by: INTERNAL MEDICINE

## 2021-12-02 PROCEDURE — C9113 INJ PANTOPRAZOLE SODIUM, VIA: HCPCS | Performed by: INTERNAL MEDICINE

## 2021-12-02 PROCEDURE — 258N000003 HC RX IP 258 OP 636: Performed by: INTERNAL MEDICINE

## 2021-12-02 PROCEDURE — 99232 SBSQ HOSP IP/OBS MODERATE 35: CPT | Performed by: FAMILY MEDICINE

## 2021-12-02 PROCEDURE — 85027 COMPLETE CBC AUTOMATED: CPT | Performed by: INTERNAL MEDICINE

## 2021-12-02 PROCEDURE — 258N000003 HC RX IP 258 OP 636: Performed by: HOSPITALIST

## 2021-12-02 PROCEDURE — 250N000013 HC RX MED GY IP 250 OP 250 PS 637: Performed by: INTERNAL MEDICINE

## 2021-12-02 PROCEDURE — 94003 VENT MGMT INPAT SUBQ DAY: CPT

## 2021-12-02 PROCEDURE — 94640 AIRWAY INHALATION TREATMENT: CPT | Mod: 76

## 2021-12-02 PROCEDURE — 250N000011 HC RX IP 250 OP 636: Performed by: INTERNAL MEDICINE

## 2021-12-02 PROCEDURE — 82248 BILIRUBIN DIRECT: CPT | Performed by: FAMILY MEDICINE

## 2021-12-02 PROCEDURE — 83735 ASSAY OF MAGNESIUM: CPT | Performed by: INTERNAL MEDICINE

## 2021-12-02 PROCEDURE — 250N000009 HC RX 250: Performed by: INTERNAL MEDICINE

## 2021-12-02 PROCEDURE — 200N000001 HC R&B ICU

## 2021-12-02 PROCEDURE — 999N000157 HC STATISTIC RCP TIME EA 10 MIN

## 2021-12-02 PROCEDURE — 250N000011 HC RX IP 250 OP 636: Performed by: HOSPITALIST

## 2021-12-02 PROCEDURE — 80202 ASSAY OF VANCOMYCIN: CPT | Performed by: FAMILY MEDICINE

## 2021-12-02 RX ORDER — OXYCODONE HCL 20 MG/ML
10 CONCENTRATE, ORAL ORAL EVERY 4 HOURS
Status: DISCONTINUED | OUTPATIENT
Start: 2021-12-02 | End: 2021-12-05 | Stop reason: HOSPADM

## 2021-12-02 RX ORDER — FUROSEMIDE 10 MG/ML
40 INJECTION INTRAMUSCULAR; INTRAVENOUS ONCE
Status: COMPLETED | OUTPATIENT
Start: 2021-12-02 | End: 2021-12-02

## 2021-12-02 RX ORDER — LORAZEPAM 2 MG/ML
4 CONCENTRATE ORAL EVERY 4 HOURS
Status: DISCONTINUED | OUTPATIENT
Start: 2021-12-02 | End: 2021-12-05 | Stop reason: HOSPADM

## 2021-12-02 RX ADMIN — OXYCODONE HYDROCHLORIDE 10 MG: 100 SOLUTION ORAL at 11:42

## 2021-12-02 RX ADMIN — LORAZEPAM 4 MG: 2 LIQUID ORAL at 19:41

## 2021-12-02 RX ADMIN — CHLORHEXIDINE GLUCONATE 15 ML: 1.2 RINSE ORAL at 19:41

## 2021-12-02 RX ADMIN — BUPROPION HYDROCHLORIDE 150 MG: 75 TABLET, FILM COATED ORAL at 08:06

## 2021-12-02 RX ADMIN — Medication 300 MCG/HR: at 09:36

## 2021-12-02 RX ADMIN — CHLORHEXIDINE GLUCONATE 15 ML: 1.2 RINSE ORAL at 08:05

## 2021-12-02 RX ADMIN — Medication 20 NG/KG/MIN: at 09:38

## 2021-12-02 RX ADMIN — PROPOFOL 20 MCG/KG/MIN: 10 INJECTION, EMULSION INTRAVENOUS at 23:44

## 2021-12-02 RX ADMIN — GABAPENTIN 800 MG: 250 SUSPENSION ORAL at 08:06

## 2021-12-02 RX ADMIN — CEFEPIME HYDROCHLORIDE 2 G: 2 INJECTION, POWDER, FOR SOLUTION INTRAVENOUS at 15:17

## 2021-12-02 RX ADMIN — CISATRACURIUM BESYLATE 4 MCG/KG/MIN: 10 INJECTION INTRAVENOUS at 06:08

## 2021-12-02 RX ADMIN — DOCUSATE SODIUM 100 MG: 50 LIQUID ORAL at 20:13

## 2021-12-02 RX ADMIN — DOCUSATE SODIUM 100 MG: 50 LIQUID ORAL at 08:05

## 2021-12-02 RX ADMIN — OXYCODONE HYDROCHLORIDE 10 MG: 100 SOLUTION ORAL at 15:17

## 2021-12-02 RX ADMIN — Medication 20 NG/KG/MIN: at 20:00

## 2021-12-02 RX ADMIN — DEXAMETHASONE SODIUM PHOSPHATE 6 MG: 10 INJECTION INTRAMUSCULAR; INTRAVENOUS at 12:42

## 2021-12-02 RX ADMIN — CEFEPIME HYDROCHLORIDE 2 G: 2 INJECTION, POWDER, FOR SOLUTION INTRAVENOUS at 00:06

## 2021-12-02 RX ADMIN — BUPROPION HYDROCHLORIDE 150 MG: 75 TABLET, FILM COATED ORAL at 20:13

## 2021-12-02 RX ADMIN — MINERAL OIL AND WHITE PETROLATUM 1 G: 30; 940 OINTMENT OPHTHALMIC at 19:51

## 2021-12-02 RX ADMIN — ENOXAPARIN SODIUM 50 MG: 100 INJECTION SUBCUTANEOUS at 11:42

## 2021-12-02 RX ADMIN — Medication 300 MCG/HR: at 01:47

## 2021-12-02 RX ADMIN — Medication 12 MG/HR: at 00:37

## 2021-12-02 RX ADMIN — PROPOFOL 20 MCG/KG/MIN: 10 INJECTION, EMULSION INTRAVENOUS at 00:05

## 2021-12-02 RX ADMIN — ENOXAPARIN SODIUM 50 MG: 100 INJECTION SUBCUTANEOUS at 00:06

## 2021-12-02 RX ADMIN — VANCOMYCIN HYDROCHLORIDE 1250 MG: 5 INJECTION, POWDER, LYOPHILIZED, FOR SOLUTION INTRAVENOUS at 05:09

## 2021-12-02 RX ADMIN — MINERAL OIL AND WHITE PETROLATUM 1 G: 30; 940 OINTMENT OPHTHALMIC at 04:58

## 2021-12-02 RX ADMIN — OXYCODONE HYDROCHLORIDE 10 MG: 100 SOLUTION ORAL at 23:44

## 2021-12-02 RX ADMIN — VANCOMYCIN HYDROCHLORIDE 1750 MG: 5 INJECTION, POWDER, LYOPHILIZED, FOR SOLUTION INTRAVENOUS at 17:22

## 2021-12-02 RX ADMIN — CISATRACURIUM BESYLATE 5.5 MCG/KG/MIN: 10 INJECTION INTRAVENOUS at 22:23

## 2021-12-02 RX ADMIN — PROPOFOL 20 MCG/KG/MIN: 10 INJECTION, EMULSION INTRAVENOUS at 06:08

## 2021-12-02 RX ADMIN — Medication 11 MG/HR: at 09:38

## 2021-12-02 RX ADMIN — Medication 300 MCG/HR: at 17:20

## 2021-12-02 RX ADMIN — LORAZEPAM 4 MG: 2 LIQUID ORAL at 23:43

## 2021-12-02 RX ADMIN — OXYCODONE HYDROCHLORIDE 10 MG: 100 SOLUTION ORAL at 19:41

## 2021-12-02 RX ADMIN — MINERAL OIL AND WHITE PETROLATUM 1 G: 30; 940 OINTMENT OPHTHALMIC at 11:49

## 2021-12-02 RX ADMIN — PROPOFOL 20 MCG/KG/MIN: 10 INJECTION, EMULSION INTRAVENOUS at 15:27

## 2021-12-02 RX ADMIN — SENNOSIDES 10 ML: 8.8 LIQUID ORAL at 09:33

## 2021-12-02 RX ADMIN — FUROSEMIDE 40 MG: 10 INJECTION, SOLUTION INTRAVENOUS at 12:42

## 2021-12-02 RX ADMIN — BISACODYL 10 MG: 10 SUPPOSITORY RECTAL at 07:31

## 2021-12-02 RX ADMIN — ARIPIPRAZOLE 5 MG: 5 TABLET ORAL at 08:06

## 2021-12-02 RX ADMIN — LORAZEPAM 4 MG: 2 LIQUID ORAL at 11:24

## 2021-12-02 RX ADMIN — GABAPENTIN 800 MG: 250 SUSPENSION ORAL at 16:17

## 2021-12-02 RX ADMIN — LORAZEPAM 4 MG: 2 LIQUID ORAL at 15:17

## 2021-12-02 RX ADMIN — SENNOSIDES 10 ML: 8.8 LIQUID ORAL at 20:13

## 2021-12-02 RX ADMIN — PANTOPRAZOLE SODIUM 40 MG: 40 INJECTION, POWDER, FOR SOLUTION INTRAVENOUS at 06:07

## 2021-12-02 RX ADMIN — GABAPENTIN 800 MG: 250 SUSPENSION ORAL at 11:42

## 2021-12-02 RX ADMIN — CEFEPIME HYDROCHLORIDE 2 G: 2 INJECTION, POWDER, FOR SOLUTION INTRAVENOUS at 08:06

## 2021-12-02 RX ADMIN — CISATRACURIUM BESYLATE 4.5 MCG/KG/MIN: 10 INJECTION INTRAVENOUS at 15:31

## 2021-12-02 RX ADMIN — Medication 50 MCG: at 13:13

## 2021-12-02 RX ADMIN — Medication 11 MG/HR: at 17:20

## 2021-12-02 RX ADMIN — POLYETHYLENE GLYCOL 3350 17 G: 17 POWDER, FOR SOLUTION ORAL at 08:06

## 2021-12-02 ASSESSMENT — ACTIVITIES OF DAILY LIVING (ADL)
ADLS_ACUITY_SCORE: 13
ADLS_ACUITY_SCORE: 13
ADLS_ACUITY_SCORE: 15
ADLS_ACUITY_SCORE: 13

## 2021-12-02 NOTE — PROGRESS NOTES
[facility] Pulmonary/Critical Care Consult Team Note    Jared North,  1975, MRN [id number]  Admitting Dx: Acute respiratory failure with hypoxia (H) [J96.01]  Pneumonia due to 2019 novel coronavirus [U07.1, J12.82]  Date / Time of Admission:  2021  1:19 PM    Overnight Events:  Intake/Output last 3 shifts:  I/O last 3 completed shifts:  In: 3141.6 [I.V.:1101.6; NG/GT:480]  Out: 1465 [Urine:1465]  Remains sedated and paralyzed on vent  FiO2 titrated down to 65%  Prone overnight  Remains on levophed    Assessment/Plan: Jared North is a 46 year old male unvaccinated against COVID-19, with history of polysubstance abuse and chronic pain/lower back pain now on Suboxone, mood disorder/depression, essential hypertension, sleep disorder breathing, mild intermittent asthma, knee pain, and recent diagnosis of COVID-19 viral infection on 2021 who presented to [facility] on 2021 with worsening shortness of breath, admitted to the ICU with COVID-19 viral pneumonia and severe acute respiratory failure s/p intubation .    PULM/ID: COVID-19 viral pneumonia.  Mild intermittent asthma, Acute resp failure with hypoxia s/p intubation .  - LAST VENT SETTINGS:   Ventilation Mode: CMV/AC  (Continuous Mandatory Ventilation/ Assist Control)  FiO2 (%): 65 %  Rate Set (breaths/minute): 26 breaths/min  Tidal Volume Set (mL): 450 mL  PEEP (cm H2O): 16 cmH2O  Oxygen Concentration (%): 65 %  Resp: 26  - Continue inhaled epoprostenol - full strength.   - Patient received tocilizumab 8 mg/kg on   - dexamethasone 6 mg daily x10 days (start )  - remdesivir daily x5 days (start   - PF ratio is 187  - will supine this am  - started on Cefepime and Vanc  for concerns for VAP  - CXR with increased infiltrate on right    CV: circulatory shock   - titrate levophed, MAP >65  - afib on , now sinus  - Monitor on tele    GI: NPO, tube feeds  -     - GI proph    Heme/Onc: COVID-19  associated coagulopathy.  D-dimer 1.24 on 11/24.   - DVT prophylaxis: SCDs.  Lovenox 0.5 mg/kg subcu 2X/day    RENAL:   - Follow BUN/Creatinine  - strict I/O's  - will give one dose of lasix 40mg now    NEURO: Acute metabolic encephalopathy.   In the setting of therapeutic sedation.  History of polysubstance abuse and chronic pain/lower back pain now on Suboxone.  Hold suboxone on 11/28 after intubation. Mood disorder/depression.  - Propofol, fentanyl, midazolam gtt as needed for sedation.    - RASS goal -4/-5.  - Continue home meds: abilify, gabapentin 3x/day  - adding Oxy and ativan through OG tube    ENDO:-   - Critical Care hyperglycemic protocol  - Accuchecks and sliding scale q6    Pt has critical illness and impairs breathing on vent such as there is high probability of imminent of life threatening deterioration in the patient's condition.    Code Status: FULL CODE    Infusions:    cisatracurium 4.5 mcg/kg/min (12/02/21 1000)     epoprostenol (VELETRI) 20 mcg/mL in sterile water inhalation solution 20 ng/kg/min (12/02/21 0938)     fentaNYL 300 mcg/hr (12/02/21 1000)     propofol (DIPRIVAN) infusion 20 mcg/kg/min (12/02/21 1000)    And     - MEDICATION INSTRUCTIONS -       midazolam 11 mg/hr (12/02/21 1000)     norepinephrine 0.03 mcg/kg/min (12/02/21 1000)     vasopressin       Lines/Drains/Tubes:  8.0 mm endotracheal tube, placed 11/29  RUE PICC central line, placed 11/24  Left radial A-line, placed 11/28  OG enteral tube, placed 11/28  Wei catheter, placed 11/28    ICU DAILY CHECKLIST           Can patient transfer out of MICU? no     FAST HUG:     Feeding:  yes.  Patient is receiving TUBE FEEDS    Wei: no  Analgesia/Sedation: yes; Propofol, Midazolam and Fentanyl   Thromboembolic prophylaxis: yes; Mode:  Lovenox and SCDs  HOB>30:  yes  Stress Ulcer Protocol Active: yes; Mode: PPI  Glycemic Control: Any glucose > 180 no; Mode of Insulin Therapy: Sliding Scale Insulin     INTUBATED:  Can patient have  "daily waking:  No  Can patient have spontaneous breathing trial:  No     INTUBATED:  Can patient have daily waking: no  Can patient have spontaneous breathing trial: no;  Does pt have restraints: MD RESTRAINT FOR NON-VIOLENT BEHAVIOR FACE TO FACE EVALUATION Patient's Immediate Situation: Patient demonstrated the following behaviors: Impulsive behavior, grabbing at support tubes/lines.  Patient's Reaction to the intervention Does patient understand the reason for restraint/seclusion? Unable to Express Medical Condition Is there any evidence of compromise of Skin integrity, Respiratory, Cardiovascular, Musculoskeletal, Hydration? No Behavioral ConditionIn consultation with the RN, is there a need to continue this restraint or seclusion? Yes See Restraint Flowsheet for complete restraint documentation and assessment.  PHYSICAL THERAPY AND MOBILITY: Can patient have PT and mobility trial: no Activity: ICU mobility protocol    Critical Care Time excluding procedures and family discussions greater than: 1 Hour    Risk Factors Present on Admission:  Clinically Significant Risk Factors Present on Admission                  Carlotta Cage DO  Pulmonary and Critical Care Attending  pgr [phone]    No Known Allergies    Meds: See MAR    Physical Exam:  BP (!) 168/90   Pulse 64   Temp 98.4  F (36.9  C) (Esophageal)   Resp 26   Ht 1.803 m (5' 11\")   Wt 87.5 kg (192 lb 12.8 oz)   SpO2 98%   BMI 26.89 kg/m    Intake/Output this shift:  I/O this shift:  In: 612.82 [I.V.:165.32; NG/GT:220]  Out: 280 [Urine:280]  GEN: Intubated and sedated, NAD  HEENT: et tube in place, OG tube in place  CVS: regular rhythm, no murmurs  RESP: CTA BL   ABD: Soft, No abdominal pain with palpation, no guarding, no rigidity  EXT: Warm, well perfused, 1+edema  NEURO:  sedated  PSYCH: sedated    Pertinent Labs: Latest lab results in EHR personally reviewed.   CMP  Recent Labs   Lab 12/02/21  0806 12/02/21  0506 12/02/21  0416 12/02/21  0021 " 12/01/21  0754 12/01/21  0432 11/30/21  0802 11/30/21  0508 11/29/21 0751 11/29/21  0543 11/28/21  0755 11/28/21  0509 11/27/21 0821 11/27/21 0456 11/26/21 2018 11/26/21  1859   NA  --  144  --   --   --  140  --  142  --  141  --  143  --  141  --   --    POTASSIUM  --  4.9  --   --   --  4.8  --  4.5  --  4.8  --  4.2  --  4.3  --  4.5   CHLORIDE  --  109*  --   --   --  108*  --  107  --  109*  --  108*  --  104  --   --    CO2  --  29  --   --   --  26  --  25  --  21*  --  26  --  28  --   --    ANIONGAP  --  6  --   --   --  6  --  10  --  11  --  9  --  9  --   --    * 132* 127* 131*   < > 129*   < > 140*   < > 135*   < > 101   < > 102   < >  --    BUN  --  20  --   --   --  26*  --  27*  --  27*  --  22  --  23*  --   --    CR  --  0.60*  --   --   --  0.66*  --  0.84  --  0.76  --  0.72  --  0.67*  --   --    GFRESTIMATED  --  >90  --   --   --  >90  --  >90  --  >90  --  >90  --  >90  --   --    TRUONG  --  7.6*  --   --   --  7.6*  --  8.2*  --  8.3*  --  7.5*  --  7.9*  --   --    MAG  --  2.2  --   --   --  2.2  --  2.2  --  2.4  --  2.1  --   --   --  2.4   PHOS  --   --   --   --   --   --   --   --   --   --   --   --   --   --   --  3.7   PROTTOTAL  --  5.0*  --   --   --   --   --   --   --   --   --  5.4*  --   --   --   --    ALBUMIN  --  2.4*  --   --   --   --   --   --   --   --   --  2.7*  --   --   --   --    BILITOTAL  --  0.3  --   --   --   --   --   --   --   --   --  0.6  --   --   --   --    ALKPHOS  --  47  --   --   --   --   --   --   --   --   --  63  --   --   --   --    AST  --  26  --   --   --   --   --   --   --   --   --  92*  --  63*  --   --    ALT  --  31  --   --   --   --   --   --   --   --   --  75*  --  43  --   --     < > = values in this interval not displayed.     CBC  Recent Labs   Lab 12/02/21  0506 12/01/21  0432 11/30/21  0508 11/29/21  0543   WBC 8.1 11.3* 14.3* 13.3*   RBC 4.16* 4.53 5.16 5.34   HGB 12.2* 13.2* 15.1 16.3   HCT 38.6* 41.0 46.1 47.1    MCV 93 91 89 88   MCH 29.3 29.1 29.3 30.5   MCHC 31.6 32.2 32.8 34.6   RDW 14.3 13.9 13.5 13.6    206 228 205     INR  No lab results found in last 7 days.  Arterial Blood Gas  Recent Labs   Lab 12/02/21  0506 12/01/21  1428 12/01/21  0432 11/30/21  1416 11/30/21  0507   PH 7.39 7.39 7.39 7.36* 7.39   PCO2 54* 51* 50* 50* 47*   PO2 122* 79* 118* 161* 221*   HCO3 30* 28 28 26 27   O2PER 65  --  70 70 80       Imaging: personally reviewed.   Results for orders placed or performed during the hospital encounter of 11/24/21   CT Chest Pulmonary Embolism w Contrast    Narrative    EXAM: CT CHEST PULMONARY EMBOLISM W CONTRAST  LOCATION: [facility]  DATE/TIME: 11/24/2021 2:27 PM    INDICATION: Hypoxemia, Covid positive  COMPARISON: None.  TECHNIQUE: CT chest pulmonary angiogram during arterial phase injection of IV contrast. Multiplanar reformats and MIP reconstructions were performed. Dose reduction techniques were used.   CONTRAST: ISOVUE 370 100mL    FINDINGS:  ANGIOGRAM CHEST: No pulmonary artery filling defect. Aorta is normal in caliber.    LUNGS AND PLEURA: Extensive groundglass consolidative bilateral pulmonary opacities. Trace left effusion.    MEDIASTINUM/AXILLAE: Heart size is normal scattered borderline enlarged mediastinal lymph nodes with the largest in the right lower paratracheal chain measuring 1.1 x 2.2 cm (series 5/29).    CORONARY ARTERY CALCIFICATION: None.    UPPER ABDOMEN: Hepatic steatosis.    MUSCULOSKELETAL: Normal.      Impression    IMPRESSION:  1.  No pulmonary embolus.  2.  COVID pneumonia.  3.  Likely reactive mediastinal lymph nodes.  4.  Hepatic steatosis.   XR Chest Port 1 View    Narrative    EXAM: XR CHEST PORTABLE 1 VIEW  LOCATION: [facility]  DATE/TIME: 11/28/2021, 3:08 PM    INDICATION: Endotracheal tube positioning.  RN TO CALL WHEN READY.  COMPARISON: Chest CT on 11/24/2021 and chest x-ray on 11/19/2021.      Impression     IMPRESSION: Single AP view of the chest was obtained. Endotracheal tube tip projects over mid thoracic trachea approximately 4 cm from the felipa. Enteric tube crosses the diaphragm with the distal tip outside the field-of-view. Right upper extremity   PICC tip projects over low SVC. Stable cardiomediastinal silhouette. Bilateral basilar predominant patchy airspace pulmonary opacities, worrisome for infection including atypical infection like COVID-19, significantly worsened as compared to 2021   x-ray. No significant pleural effusion or pneumothorax.     XR Abdomen Port 1 View    Narrative    EXAM: XR ABDOMEN PORT 1 VIEWS  LOCATION: [facility]  DATE/TIME: 2021 3:09 PM    INDICATION: Enteric tube placement, evaluate position  COMPARISON: Chest x-ray the same date      Impression    IMPRESSION: Enteric suction tube with the tip and side-port within the mid gastric lumen. Nonobstructive bowel gas pattern. No definite free air. Persistent diffuse bibasilar pulmonary airspace opacities.    Echocardiogram Complete     Value    LVEF  60-65%    Narrative    [id number]  [id number]  [id number]  [id number]^AZUCENA^MYESHA^[place] [address]     Name: TOBIAS BELL  MRN: [id number]  : 1975  Study Date: 2021 10:50 AM  Age: 46 yrs  Gender: Male  Patient Location: [facility]  Reason For Study: Atrial Fibrillation  Ordering Physician: MYESHA ZENG  Performed By: HARRY     BSA: 2.1 m2  Height: 71 in  Weight: 192 lb  HR: 70  ______________________________________________________________________________  Procedure  Definity (NDC #59900-549) given intravenously.  ______________________________________________________________________________  Interpretation Summary     1. Left ventricular size, wall motion and function are normal. The ejection  fraction is 60-65%.  2. Flattened septum is consistent with RV pressure/volume overload.  3. Mildly  decreased right ventricular systolic function The right ventricle is  moderately dilated. Right ventricular systolic pressure could not be  approximated due to inadequate tricuspid regurgitation. No tricuspid  regurgitation. RA pressure of 8 mmHg.  ______________________________________________________________________________  Left Ventricle  Left ventricular size, wall motion and function are normal. The ejection  fraction is 60-65%. There is normal left ventricular wall thickness. Normal  left ventricular wall motion. Flattened septum is consistent with RV  pressure/volume overload.     Right Ventricle  The right ventricle is moderately dilated. Mildly decreased right ventricular  systolic function.     Atria  Normal left atrial size. The right atrium is mildly dilated.     Mitral Valve  The mitral valve is not well visualized. Mitral valve leaflets appear normal.  There is trace mitral regurgitation. There is no mitral valve stenosis.     Tricuspid Valve  Right ventricular systolic pressure could not be approximated due to  inadequate tricuspid regurgitation. No tricuspid regurgitation. IVC diameter  and respiratory changes fall into an intermediate range suggesting an RA  pressure of 8 mmHg. There is no tricuspid stenosis.     Aortic Valve  Aortic valve leaflets appear normal. There is no evidence of aortic stenosis  or clinically significant aortic regurgitation.     Pulmonic Valve  The pulmonic valve is not well seen, but is grossly normal.     Vessels  The aorta root is normal. Normal size ascending aorta. IVC diameter and  respiratory changes fall into an intermediate range suggesting an RA pressure  of 8 mmHg.     Pericardium  There is no pericardial effusion.     ______________________________________________________________________________  MMode/2D Measurements & Calculations  IVSd: 0.96 cm  LVIDd: 4.1 cm  LVIDs: 2.6 cm  LVPWd: 0.99 cm     FS: 36.4 %  LV mass(C)d: 128.8 grams  LV mass(C)dI: 62.1  grams/m2  Ao root diam: 3.2 cm  LA dimension: 3.4 cm  asc Aorta Diam: 2.8 cm  LA/Ao: 1.0  LVOT diam: 2.1 cm  LVOT area: 3.4 cm2  LA Volume Indexed (AL/bp): 28.3 ml/m2  RWT: 0.48     Doppler Measurements & Calculations  MV E max mike: 47.7 cm/sec  MV A max mike: 40.4 cm/sec  MV E/A: 1.2     MV dec slope: 159.5 cm/sec2  MV dec time: 0.30 sec  LV V1 max P.9 mmHg  LV V1 max: 84.4 cm/sec  LV V1 VTI: 18.3 cm  SV(LVOT): 62.3 ml  SI(LVOT): 30.1 ml/m2  PA acc time: 0.07 sec  E/E': 5.3  E/E' av.7  Lateral E/e': 5.6  Medial E/e': 5.7  Peak E' Mike: 9.0 cm/sec     ______________________________________________________________________________  Report approved by: Ana Olivares 2021 12:50 PM             Patient Active Problem List   Diagnosis     Chronic back pain     Essential hypertension     Elevated LFTs     Acute respiratory failure with hypoxia (H)     Pneumonia due to 2019 novel coronavirus     Atrial fibrillation with rapid ventricular response (H)       Carlotta Cage DO  Pulmonary and Critical Care Attending  pgr [phone]

## 2021-12-02 NOTE — PLAN OF CARE
Problem: Gas Exchange Impaired  Goal: Optimal Gas Exchange  Outcome: Improving    Problem: Constipation  Goal: Effective Bowel Elimination  Outcome: No Change     Patient paralyzed/sedated. Rass -5. No BM on day shift, bowel regimen medications given. Patient repositioned to supine from prone position this morning around 10am and repositioned back to prone this evening around 1715. Oxygen saturations >90% now on 65% FiO2. Patient tolerated repositions and swimming movements well this morning. After prone reposition this evening patient did have a hypotension event that lasted ~20 minutes. Vitally stable. Afebrile today. Tube feeds at rate of 65ml/hr and to be increased to goal rate of 85ml/hr after BM. Ewi CDI, patent, adequate output.

## 2021-12-02 NOTE — PLAN OF CARE
Problem: Adult Inpatient Plan of Care  Goal: Readiness for Transition of Care  Outcome: No Change     Problem: Gas Exchange Impaired  Goal: Optimal Gas Exchange  Outcome: No Change     Problem: Constipation  Goal: Effective Bowel Elimination  Outcome: No Change     Patient remains intubated, sedated, and paralyzed.  Gtts being titrated to maintain RASS -5 and TOF 2/4.  Added oxy and ativan scheduled.  MAP goal >65.  Norepi being titrated.  Vent settings per intensivist.  Minimal secretions.  Wei in place with adequate output.  1x lasix given.  PRN suppository given this AM with scheduled bowel meds. Had one loose stool today.  OG with TF running at increased rate of 85/hr.  100cc flushes q8h.  Blood sugars q6h. No skin issues.  Supinated at ~1100.  [id number].  Tmax 100.2 this evening.  Family on ipad and updated throughout shift.

## 2021-12-02 NOTE — PHARMACY-VANCOMYCIN DOSING SERVICE
Pharmacy Vancomycin Note  Date of Service 2021  Patient's  1975   46 year old, male    Indication: Healthcare-Associated Pneumonia  Day of Therapy: 3  Current vancomycin regimen:  1250 mg IV q12h  Current vancomycin monitoring method: AUC  Current vancomycin therapeutic monitoring goal: 400-600 mg*h/L    Insight[employer] Prediction of Current Vancomycin Regimen  Loading dose: N/A  Regimen: 1250 mg IV every 12 hours.  Start time: 17:09 on 2021  Exposure target: AUC24 (range)400-600 mg/L.hr   AUC24,ss: 322 mg/L.hr  Probability of AUC24 > 400: 14 %  Ctrough,ss: 7 mg/L  Probability of Ctrough,ss > 20: 0 %  Probability of nephrotoxicity (Lodise MOISES ): 4 %      Current estimated CrCl = Estimated Creatinine Clearance: 188.4 mL/min (A) (based on SCr of 0.6 mg/dL (L)).    Creatinine for last 3 days  2021:  5:08 AM Creatinine 0.84 mg/dL  2021:  4:32 AM Creatinine 0.66 mg/dL  2021:  5:06 AM Creatinine 0.60 mg/dL    Recent Vancomycin Levels (past 3 days)  2021: 11:48 AM Vancomycin 8.1 mg/L    Vancomycin IV Administrations (past 72 hours)                   vancomycin 1250 mg in 0.9% NaCl 250 mL intermittent infusion 1,250 mg (mg) 1,250 mg New Bag 21 0509     1,250 mg New Bag 21 1829     1,250 mg New Bag  0616     1,250 mg New Bag 21 1733                Nephrotoxins and other renal medications (From now, onward)    Start     Dose/Rate Route Frequency Ordered Stop    21 1200  furosemide (LASIX) injection 40 mg         40 mg  over 1-3 Minutes Intravenous ONCE 21 1156      21 1800  vancomycin 1250 mg in 0.9% NaCl 250 mL intermittent infusion 1,250 mg         1,250 mg  over 90 Minutes Intravenous EVERY 12 HOURS 21 1613      21 1300  vasopressin (VASOSTRICT) 20 Units in sodium chloride 0.9 % 100 mL standard conc infusion         2.4 Units/hr  12 mL/hr  Intravenous CONTINUOUS 21 1247      21 1230  norepinephrine (LEVOPHED) 16 mg in  sodium chloride 0.9 % 250 mL infusion CENTRAL         0.01-0.6 mcg/kg/min × 90.5 kg  0.8-50.9 mL/hr  Intravenous CONTINUOUS 11/29/21 1215      11/26/21 0900  [Held by provider]  lisinopril (ZESTRIL) tablet 10 mg        (Held by provider since u 11/25/2021 at 1500 by Kendra Redman MD.Hold Reason: Change in Vitals.)    10 mg Oral DAILY 11/25/21 1415               Contrast Orders - past 72 hours (72h ago, onward)            None          Interpretation of levels and current regimen:  Vancomycin level is reflective of AUC less than 400 (322)    Has serum creatinine changed greater than 50% in last 72 hours: No    Urine output:  unable to determine    Renal Function: Stable    InsightRX Prediction of Planned New Vancomycin Regimen  Loading dose: N/A  Regimen: 1750 mg IV every 12 hours.  Start time: 17:09 on 12/02/2021  Exposure target: AUC24 (range)400-600 mg/L.hr   AUC24,ss: 451 mg/L.hr  Probability of AUC24 > 400: 73 %  Ctrough,ss: 10.2 mg/L  Probability of Ctrough,ss > 20: 3 %  Probability of nephrotoxicity (Lodise MOISES 2009): 6 %      Plan:  1. Increase Dose to 1750mg Q12H  2. Vancomycin monitoring method: AUC  3. Vancomycin therapeutic monitoring goal: 400-600 mg*h/L  4. Pharmacy will check vancomycin levels as appropriate in 1-3 Days.  5. Serum creatinine levels will be ordered daily for the first week of therapy and at least twice weekly for subsequent weeks.    [facility], located within [facility]

## 2021-12-02 NOTE — PROGRESS NOTES
[facility] MEDICINE PROGRESS NOTE      Identification/Summary:   Jared North is a 46 year old male with a past medical history of opioid use on Suboxone, previous alcoholic, on Suboxone, hypertension who was presented with worsening shortness of breath on 11/24/2021 and admitted for COVID19 vral pneumonia with acute respiratory failure hypoxia.     Intubated on 11/28 with worsening hypoxic respiratory failure as well as agitation. In addition to intensivist/pulmonologist, acute pain management team consulting given polysubstance abuse history and opioid dependence and suboxone. Improving FiO2 with cisatracurium, fentanyl, midazolam, and propofol infusions; off norepinephrine. Discussed with acute pain management team, RN, intensivist, RT, and CM/SW.[facility] will continue to follow patient peripherally until patient is stable enough to be transferred out of ICU. Per nursing staff improving slowly.     Assessment and Plan:  COVID pneumonia with severe acute respiratory failure hypoxia requiring intubation and mechanical ventilation on 11/28  Patient was not vaccinated  Started on Decadron  Also received Tocilizumab  DVT prophylaxis per ICU protocol for COVID  Sliding scale insulin for blood sugars  Improved FiO2.       New onset atrial fibrillation, mild RVR - resolved  Troponin negative x3  Amiodarone utilized transient and now discontinued.   No off norepinephrine.   Telemetry.     Slightly elevated LFTs, improving  Continue remdesivir.  LFTs are slightly elevated but well less than x5 ULN. Benefits outweigh the risk.     History of opioid use and alcoholism  Not on methadone.  On Suboxone per review of the pharmacist.  Acute pain team following.      Moderate Protein-Calorie Malnutrition      Diet: NPO for Medical/Clinical Reasons Except for: NPO but receiving Tube Feeding  Adult Formula Drip Feeding: Continuous Promote; Orogastric tube; Goal Rate: keep at 65 but once has a BM increase to  85ml/hr; mL/hr; Medication - Feeding Tube Flush Frequency: At least 15-30 mL water before and after medication administration and ...  DVT Prophylaxis: Lovenox  Code Status: Full Code    Anticipated possible discharge in several days    Tomy Snell MD  Hospitalist  [facility]  [facility]  Phone: #[phone]    Interval History/Subjective:  Attended rounds and discussed directly with nursing staff. Still on antibiotics for presumed VAT. Getting diuresis. Still on with 4 pressors etc.    Physical Exam/Objective:  Temp:  [97.9  F (36.6  C)-100.4  F (38  C)] 97.9  F (36.6  C)  Pulse:  [64-81] 76  Resp:  [26] 26  MAP:  [66 mmHg-94 mmHg] 69 mmHg  Arterial Line BP: (105-143)/(50-73) 114/54  FiO2 (%):  [65 %] 65 %  SpO2:  [91 %-98 %] 98 %  Body mass index is 26.89 kg/m .    GENERAL:  Alert, appears comfortable, in no acute distress, appears stated age   HEAD:  Normocephalic, without obvious abnormality, atraumatic   NECK: Supple, symmetrical, trachea midline   BACK:   Symmetric, no curvature, ROM normal   LUNGS:   Clear to auscultation bilaterally, no rales, rhonchi, or wheezing, symmetric chest rise on inhalation, respirations unlabored   CHEST WALL:  No tenderness or deformity   HEART:  Regular rate and rhythm, S1 and S2 normal, no murmur, rub, or gallop    ABDOMEN:   Soft, non-tender, bowel sounds active all four quadrants, no masses, no organomegaly, no rebound or guarding   EXTREMITIES: Extremities normal, atraumatic, no cyanosis or edema    SKIN: Dry to touch, no exanthems in the visualized areas     Data reviewed today: I personally reviewed all new medications, labs, imaging/diagnostics reports over the past 24 hours. Pertinent findings include:    Imaging:   Recent Results (from the past 24 hour(s))   XR Chest Port 1 View    Narrative    EXAM: XR CHEST PORT 1 VIEW  LOCATION: [facility]  DATE/TIME: 12/1/2021 12:27 PM    INDICATION: new  PNA?  COMPARISON: 11/28/2021       Impression    IMPRESSION: An endotracheal tube tip is 3.8 cm from the felipa. An NG or OG tube terminates off the field-of-view. A right PICC is present. Increased opacities at the right lung base are concerning for new pneumonia. Left lung opacities have decreased.   No pneumothorax. The heart is not well assessed.        Labs:  Most Recent 3 CBC's:Recent Labs   Lab Test 12/02/21  0506 12/01/21  0432 11/30/21  0508   WBC 8.1 11.3* 14.3*   HGB 12.2* 13.2* 15.1   MCV 93 91 89    206 228     Most Recent 3 BMP's:Recent Labs   Lab Test 12/02/21  0806 12/02/21  0506 12/02/21  0416 12/01/21  0754 12/01/21  0432 11/30/21  0802 11/30/21  0508   NA  --  144  --   --  140  --  142   POTASSIUM  --  4.9  --   --  4.8  --  4.5   CHLORIDE  --  109*  --   --  108*  --  107   CO2  --  29  --   --  26  --  25   BUN  --  20  --   --  26*  --  27*   CR  --  0.60*  --   --  0.66*  --  0.84   ANIONGAP  --  6  --   --  6  --  10   TRUONG  --  7.6*  --   --  7.6*  --  8.2*   * 132* 127*   < > 129*   < > 140*    < > = values in this interval not displayed.     Most Recent 2 LFT's:Recent Labs   Lab Test 12/02/21  0506 11/28/21  0509   AST 26 92*   ALT 31 75*   ALKPHOS 47 63   BILITOTAL 0.3 0.6     Most Recent 3 INR's:Recent Labs   Lab Test 11/24/21  2339   INR 1.08       Medications:   Personally Reviewed.  Medications     cisatracurium 4.5 mcg/kg/min (12/02/21 1000)     epoprostenol (VELETRI) 20 mcg/mL in sterile water inhalation solution 20 ng/kg/min (12/02/21 0938)     fentaNYL 300 mcg/hr (12/02/21 1000)     propofol (DIPRIVAN) infusion 20 mcg/kg/min (12/02/21 1000)    And     - MEDICATION INSTRUCTIONS -       midazolam 11 mg/hr (12/02/21 1000)     norepinephrine 0.02 mcg/kg/min (12/02/21 1122)     vasopressin         ARIPiprazole  5 mg Oral Daily     artificial tears  1 inch Both Eyes Q8H     [Held by provider] buprenorphine HCl-naloxone HCl  1 Film Sublingual Daily     buPROPion  150 mg  Oral or Feeding Tube BID     ceFEPIme (MAXIPIME) IV  2 g Intravenous Q8H     chlorhexidine  15 mL Mouth/Throat Q12H     dexamethasone  6 mg Intravenous Daily     docusate  100 mg Oral or Feeding Tube BID     enoxaparin ANTICOAGULANT  0.5 mg/kg Subcutaneous Q12H     furosemide  40 mg Intravenous Once     gabapentin  800 mg Oral or Feeding Tube TID     insulin aspart  1-12 Units Subcutaneous Q6H     [Held by provider] lisinopril  10 mg Oral Daily     LORazepam  4 mg Oral Q4H     [Held by provider] metoclopramide  10 mg Intravenous Q6H     oxyCODONE  10 mg Oral Q4H     pantoprazole  40 mg Per Feeding Tube QAM AC    Or     pantoprazole (PROTONIX) IV  40 mg Intravenous QAM AC     polyethylene glycol  17 g Oral or Feeding Tube Daily     sennosides  10 mL Oral BID     vancomycin  1,250 mg Intravenous Q12H

## 2021-12-02 NOTE — PROGRESS NOTES
RT PROGRESS NOTE    VENT DAY# 5    CURRENT SETTINGS:   Ventilation Mode: CMV/AC  (Continuous Mandatory Ventilation/ Assist Control)  FiO2 (%): 65 %  Rate Set (breaths/minute): 26 breaths/min  Tidal Volume Set (mL): 450 mL  PEEP (cm H2O): 16 cmH2O  Oxygen Concentration (%): 65 %  Resp: 26      PATIENT PARAMETERS:  PIP 30  Pplat:  26  Pmean:  20  Secretions:  Small amount of cloudy/tan secrections.  02 Sats:  95%    ETT SIZE 8 Secured at 26 cm at teeth/gums      ABG: @1358 pH 7.44; pCO2 50; pO2 92; HCO3 32, %O2 Sat 96, BE 9.2 on 65%    NOTE / SHIFT SUMMARY:   Patient was supined this morning. Breath sounds are diminished. Veletri continuous full strength. RT continue to follow.     Emily Torres, RT

## 2021-12-02 NOTE — PROVIDER NOTIFICATION
Pt proned and head turned Q2     12/02/21 0500   Tech Time   $Tech Time (10 minute increments) 3   Ventilator Data   Vent Owner On Site Equipment   Vent Brand Nadine   Vent ID [id number]   Ventilation Method Invasive   Ventilator  - Settings    Ventilation Mode CMV/AC  (Continuous Mandatory Ventilation/ Assist Control)   Rate Set (breaths/minute) 26 breaths/min   Tidal Volume Set (mL) 450 mL   PEEP (cm H2O) 16 cmH2O   Oxygen Concentration (%) 65 %   Inspiratory Time (seconds) 0.8 sec   Ventilator - Patient    Patient Resp Rate  26 breaths/min   Inspiratory Pressure (cm H2O) 29 cmH2O   Mean Airway Pressure (cm H2O) 20 cmH2O   Expiratory Vt  mL 454   Minute Volume L/min 10.7 L/min   Additional Vent Settings   Plateau Pressure (cm H2O) 26 cmH2O   Ventilator Arm In Place Yes   Ventilator Alarms   High Inspiratory Pressure Alarm (cmH2O) 40 cm H2O   High Respiratory Rate (breaths/min) 40 breaths/min   Minute Ventilation High (L) 14 L/min   Minute Ventilation Low (L) 5 L/min   Ventilator Circuit   Ventilator Humidifier  Heater   Heater Temperature 36.1   ETT Cuffed Single 8 mm   Placement Date/Time: 11/28/21 (c) 1704   Mask Ventilation: Not attempted  Induction Type: Intravenous  Ease of Intubation: Easy  Laryngoscopy Technique: Video laryngoscopy  Cuffed: Cuffed  ETT Type: Single  Single Lumen Tube Size: 8 mm  VL Blade Type:...   Secured at (cm) 26 cm   Measured from Teeth/Gums   Position Right   Secured by Cloth tape   Bite Block None Present   Site Appearance Clean;Dry   Suction   Suction Method ETT;oral   Oral Suction mouth   Breath Sounds   Breath Sounds All Fields   All Lung Fields Breath Sounds Posterior:;coarse;diminished   Adult Pressure Support Trial   Pulse 71   Oxygen Therapy   SpO2 96 %

## 2021-12-03 LAB
ANION GAP SERPL CALCULATED.3IONS-SCNC: 3 MMOL/L (ref 5–18)
BASE EXCESS BLDA CALC-SCNC: 9.5 MMOL/L
BASE EXCESS BLDA CALC-SCNC: 9.6 MMOL/L
BUN SERPL-MCNC: 25 MG/DL (ref 8–22)
CALCIUM SERPL-MCNC: 8 MG/DL (ref 8.5–10.5)
CHLORIDE BLD-SCNC: 107 MMOL/L (ref 98–107)
CO2 SERPL-SCNC: 32 MMOL/L (ref 22–31)
COHGB MFR BLD: 95.7 % (ref 96–97)
COHGB MFR BLD: 97.7 % (ref 96–97)
CREAT SERPL-MCNC: 0.62 MG/DL (ref 0.7–1.3)
ERYTHROCYTE [DISTWIDTH] IN BLOOD BY AUTOMATED COUNT: 14.5 % (ref 10–15)
GFR SERPL CREATININE-BSD FRML MDRD: >90 ML/MIN/1.73M2
GLUCOSE BLD-MCNC: 147 MG/DL (ref 70–125)
GLUCOSE BLDC GLUCOMTR-MCNC: 101 MG/DL (ref 70–99)
GLUCOSE BLDC GLUCOMTR-MCNC: 112 MG/DL (ref 70–99)
GLUCOSE BLDC GLUCOMTR-MCNC: 133 MG/DL (ref 70–99)
GLUCOSE BLDC GLUCOMTR-MCNC: 137 MG/DL (ref 70–99)
HCO3 BLD-SCNC: 32 MMOL/L (ref 23–29)
HCO3 BLD-SCNC: 32 MMOL/L (ref 23–29)
HCT VFR BLD AUTO: 35.5 % (ref 40–53)
HGB BLD-MCNC: 11 G/DL (ref 13.3–17.7)
MAGNESIUM SERPL-MCNC: 2 MG/DL (ref 1.8–2.6)
MCH RBC QN AUTO: 29.2 PG (ref 26.5–33)
MCHC RBC AUTO-ENTMCNC: 31 G/DL (ref 31.5–36.5)
MCV RBC AUTO: 94 FL (ref 78–100)
O2/TOTAL GAS SETTING VFR VENT: 0.8 %
O2/TOTAL GAS SETTING VFR VENT: 65 %
OXYHGB MFR BLD: 94.2 % (ref 96–97)
OXYHGB MFR BLD: 96 % (ref 96–97)
PCO2 BLD: 55 MM HG (ref 35–45)
PCO2 BLD: 57 MM HG (ref 35–45)
PEEP: 16 CM H2O
PEEP: 16 CM H2O
PH BLD: 7.39 [PH] (ref 7.37–7.44)
PH BLD: 7.4 [PH] (ref 7.37–7.44)
PLATELET # BLD AUTO: 161 10E3/UL (ref 150–450)
PO2 BLD: 75 MM HG (ref 80–90)
PO2 BLD: 96 MM HG (ref 80–90)
POTASSIUM BLD-SCNC: 4.4 MMOL/L (ref 3.5–5)
RATE: 24 RR/MIN
RATE: 26 RR/MIN
RBC # BLD AUTO: 3.77 10E6/UL (ref 4.4–5.9)
SODIUM SERPL-SCNC: 142 MMOL/L (ref 136–145)
TEMPERATURE: 37 DEGREES C
TEMPERATURE: 37 DEGREES C
VENTILATION MODE: ABNORMAL
VENTILATOR TIDAL VOLUME: 450 ML
WBC # BLD AUTO: 7.3 10E3/UL (ref 4–11)

## 2021-12-03 PROCEDURE — 250N000013 HC RX MED GY IP 250 OP 250 PS 637: Performed by: INTERNAL MEDICINE

## 2021-12-03 PROCEDURE — 94003 VENT MGMT INPAT SUBQ DAY: CPT

## 2021-12-03 PROCEDURE — 82805 BLOOD GASES W/O2 SATURATION: CPT | Performed by: INTERNAL MEDICINE

## 2021-12-03 PROCEDURE — 250N000009 HC RX 250: Performed by: INTERNAL MEDICINE

## 2021-12-03 PROCEDURE — 999N000157 HC STATISTIC RCP TIME EA 10 MIN

## 2021-12-03 PROCEDURE — 85027 COMPLETE CBC AUTOMATED: CPT | Performed by: INTERNAL MEDICINE

## 2021-12-03 PROCEDURE — 250N000011 HC RX IP 250 OP 636: Performed by: INTERNAL MEDICINE

## 2021-12-03 PROCEDURE — 200N000001 HC R&B ICU

## 2021-12-03 PROCEDURE — 94645 CONT INHLJ TX EACH ADDL HOUR: CPT

## 2021-12-03 PROCEDURE — 99291 CRITICAL CARE FIRST HOUR: CPT | Performed by: INTERNAL MEDICINE

## 2021-12-03 PROCEDURE — 258N000003 HC RX IP 258 OP 636: Performed by: INTERNAL MEDICINE

## 2021-12-03 PROCEDURE — 250N000011 HC RX IP 250 OP 636: Performed by: FAMILY MEDICINE

## 2021-12-03 PROCEDURE — 99232 SBSQ HOSP IP/OBS MODERATE 35: CPT | Performed by: FAMILY MEDICINE

## 2021-12-03 PROCEDURE — 83735 ASSAY OF MAGNESIUM: CPT | Performed by: FAMILY MEDICINE

## 2021-12-03 PROCEDURE — 80048 BASIC METABOLIC PNL TOTAL CA: CPT | Performed by: INTERNAL MEDICINE

## 2021-12-03 PROCEDURE — C9113 INJ PANTOPRAZOLE SODIUM, VIA: HCPCS | Performed by: INTERNAL MEDICINE

## 2021-12-03 RX ORDER — FUROSEMIDE 10 MG/ML
40 INJECTION INTRAMUSCULAR; INTRAVENOUS EVERY 24 HOURS
Status: DISCONTINUED | OUTPATIENT
Start: 2021-12-03 | End: 2021-12-05 | Stop reason: HOSPADM

## 2021-12-03 RX ORDER — MAGNESIUM SULFATE HEPTAHYDRATE 40 MG/ML
2 INJECTION, SOLUTION INTRAVENOUS ONCE
Status: COMPLETED | OUTPATIENT
Start: 2021-12-03 | End: 2021-12-03

## 2021-12-03 RX ADMIN — PROPOFOL 20 MCG/KG/MIN: 10 INJECTION, EMULSION INTRAVENOUS at 20:37

## 2021-12-03 RX ADMIN — VANCOMYCIN HYDROCHLORIDE 1750 MG: 5 INJECTION, POWDER, LYOPHILIZED, FOR SOLUTION INTRAVENOUS at 06:01

## 2021-12-03 RX ADMIN — BUPROPION HYDROCHLORIDE 150 MG: 75 TABLET, FILM COATED ORAL at 08:18

## 2021-12-03 RX ADMIN — CEFEPIME HYDROCHLORIDE 2 G: 2 INJECTION, POWDER, FOR SOLUTION INTRAVENOUS at 08:19

## 2021-12-03 RX ADMIN — LORAZEPAM 4 MG: 2 LIQUID ORAL at 15:30

## 2021-12-03 RX ADMIN — CEFEPIME HYDROCHLORIDE 2 G: 2 INJECTION, POWDER, FOR SOLUTION INTRAVENOUS at 15:30

## 2021-12-03 RX ADMIN — GABAPENTIN 800 MG: 250 SUSPENSION ORAL at 17:29

## 2021-12-03 RX ADMIN — LORAZEPAM 4 MG: 2 LIQUID ORAL at 06:32

## 2021-12-03 RX ADMIN — ENOXAPARIN SODIUM 50 MG: 100 INJECTION SUBCUTANEOUS at 00:14

## 2021-12-03 RX ADMIN — LORAZEPAM 4 MG: 2 LIQUID ORAL at 11:48

## 2021-12-03 RX ADMIN — LORAZEPAM 4 MG: 2 LIQUID ORAL at 20:36

## 2021-12-03 RX ADMIN — OXYCODONE HYDROCHLORIDE 10 MG: 100 SOLUTION ORAL at 20:35

## 2021-12-03 RX ADMIN — OXYCODONE HYDROCHLORIDE 10 MG: 100 SOLUTION ORAL at 06:32

## 2021-12-03 RX ADMIN — CISATRACURIUM BESYLATE 5.5 MCG/KG/MIN: 10 INJECTION INTRAVENOUS at 19:12

## 2021-12-03 RX ADMIN — VANCOMYCIN HYDROCHLORIDE 1750 MG: 5 INJECTION, POWDER, LYOPHILIZED, FOR SOLUTION INTRAVENOUS at 17:29

## 2021-12-03 RX ADMIN — Medication 300 MCG/HR: at 18:32

## 2021-12-03 RX ADMIN — MINERAL OIL AND WHITE PETROLATUM 1 G: 30; 940 OINTMENT OPHTHALMIC at 20:32

## 2021-12-03 RX ADMIN — Medication 300 MCG/HR: at 09:43

## 2021-12-03 RX ADMIN — PROPOFOL 20 MCG/KG/MIN: 10 INJECTION, EMULSION INTRAVENOUS at 13:10

## 2021-12-03 RX ADMIN — CEFEPIME HYDROCHLORIDE 2 G: 2 INJECTION, POWDER, FOR SOLUTION INTRAVENOUS at 00:15

## 2021-12-03 RX ADMIN — MAGNESIUM SULFATE HEPTAHYDRATE 2 G: 40 INJECTION, SOLUTION INTRAVENOUS at 08:18

## 2021-12-03 RX ADMIN — CISATRACURIUM BESYLATE 5.5 MCG/KG/MIN: 10 INJECTION INTRAVENOUS at 11:49

## 2021-12-03 RX ADMIN — MINERAL OIL AND WHITE PETROLATUM 1 G: 30; 940 OINTMENT OPHTHALMIC at 04:13

## 2021-12-03 RX ADMIN — BUPROPION HYDROCHLORIDE 150 MG: 75 TABLET, FILM COATED ORAL at 20:35

## 2021-12-03 RX ADMIN — DEXAMETHASONE SODIUM PHOSPHATE 6 MG: 10 INJECTION INTRAMUSCULAR; INTRAVENOUS at 13:11

## 2021-12-03 RX ADMIN — OXYCODONE HYDROCHLORIDE 10 MG: 100 SOLUTION ORAL at 11:48

## 2021-12-03 RX ADMIN — Medication 20 NG/KG/MIN: at 08:27

## 2021-12-03 RX ADMIN — PANTOPRAZOLE SODIUM 40 MG: 40 INJECTION, POWDER, FOR SOLUTION INTRAVENOUS at 06:32

## 2021-12-03 RX ADMIN — Medication 11 MG/HR: at 01:42

## 2021-12-03 RX ADMIN — OXYCODONE HYDROCHLORIDE 10 MG: 100 SOLUTION ORAL at 02:30

## 2021-12-03 RX ADMIN — Medication 12 MG/HR: at 19:12

## 2021-12-03 RX ADMIN — OXYCODONE HYDROCHLORIDE 10 MG: 100 SOLUTION ORAL at 15:30

## 2021-12-03 RX ADMIN — DOCUSATE SODIUM 100 MG: 50 LIQUID ORAL at 20:45

## 2021-12-03 RX ADMIN — Medication 20 NG/KG/MIN: at 17:41

## 2021-12-03 RX ADMIN — LORAZEPAM 4 MG: 2 LIQUID ORAL at 02:30

## 2021-12-03 RX ADMIN — FUROSEMIDE 40 MG: 10 INJECTION, SOLUTION INTRAMUSCULAR; INTRAVENOUS at 15:16

## 2021-12-03 RX ADMIN — CHLORHEXIDINE GLUCONATE 15 ML: 1.2 RINSE ORAL at 08:18

## 2021-12-03 RX ADMIN — MINERAL OIL AND WHITE PETROLATUM 1 G: 30; 940 OINTMENT OPHTHALMIC at 11:49

## 2021-12-03 RX ADMIN — ARIPIPRAZOLE 5 MG: 5 TABLET ORAL at 08:18

## 2021-12-03 RX ADMIN — GABAPENTIN 800 MG: 250 SUSPENSION ORAL at 11:48

## 2021-12-03 RX ADMIN — PROPOFOL 20 MCG/KG/MIN: 10 INJECTION, EMULSION INTRAVENOUS at 06:18

## 2021-12-03 RX ADMIN — CHLORHEXIDINE GLUCONATE 15 ML: 1.2 RINSE ORAL at 20:35

## 2021-12-03 RX ADMIN — ENOXAPARIN SODIUM 50 MG: 100 INJECTION SUBCUTANEOUS at 11:48

## 2021-12-03 RX ADMIN — Medication 300 MCG/HR: at 01:39

## 2021-12-03 RX ADMIN — SENNOSIDES 10 ML: 8.8 LIQUID ORAL at 20:35

## 2021-12-03 RX ADMIN — POLYETHYLENE GLYCOL 3350 17 G: 17 POWDER, FOR SOLUTION ORAL at 08:18

## 2021-12-03 RX ADMIN — Medication 11 MG/HR: at 09:43

## 2021-12-03 RX ADMIN — CISATRACURIUM BESYLATE 5.5 MCG/KG/MIN: 10 INJECTION INTRAVENOUS at 05:03

## 2021-12-03 RX ADMIN — GABAPENTIN 800 MG: 250 SUSPENSION ORAL at 09:43

## 2021-12-03 ASSESSMENT — ACTIVITIES OF DAILY LIVING (ADL)
ADLS_ACUITY_SCORE: 15

## 2021-12-03 ASSESSMENT — MIFFLIN-ST. JEOR: SCORE: 1783

## 2021-12-03 NOTE — PROGRESS NOTES
CLINICAL NUTRITION SERVICES - REASSESSMENT NOTE     Nutrition Prescription    RECOMMENDATIONS FOR MDs/PROVIDERS TO ORDER:  If stools loosen up over the weekend change formula to Promote with fiber        EVALUATION OF THE PROGRESS TOWARD GOALS   Diet: NPO  Nutrition Supplement/Support Promote at 85ml/hr  Intake:   If continuous   =2040kcal  =126g protein  =265g carb  =53g fat  =0g fiber  =1711ml fluid (2111ml fluid with flushes)           ANTHROPOMETRICS  Admission wt: 97kg  Most Recent Weight: 88kg      GI Status  Had a BM yesterday     LABS  Reviewed; BUN-25, Cr-.62    MEDICATIONS  Reviewed      NUTRITION DIAGNOSIS    Difficulty swallowing and inadequate oral intake related to Mechanical ventilation as evidenced by patient sedated on Vent requiring enteral TF nutrition support       Malnutrition related to negligible PO intake with Acute illness as evidenced by documented poor PO intake since admit and significant weight loss of 30 lbs past 9 months (13 %BW        Goals:    Patient will tolerate enteral TF without s&s of aspiration-met    Patient will tolerate enteral TF as evidenced by BG < 150 mg/dL and RV less than 500 mls-met    Meet assessed needs via enteral TF  -met    Maintain weight while hospitalized -wt is down       INTERVENTIONS  Implementation  No changes at this time. Once having regular BM will switch to promote with fiber       Monitoring/Evaluation  Progress toward goals will be monitored and evaluated per protocol.

## 2021-12-03 NOTE — PROGRESS NOTES
[facility] Pulmonary/Critical Care Consult Team Note    Jared North,  1975, MRN [id number]  Admitting Dx: Acute respiratory failure with hypoxia (H) [J96.01]  Pneumonia due to 2019 novel coronavirus [U07.1, J12.82]  Date / Time of Admission:  2021  1:19 PM    Overnight Events:  Intake/Output last 3 shifts:  I/O last 3 completed shifts:  In: 3655.47 [I.V.:1502.97; NG/GT:530]  Out: 2345 [Urine:2345]  Remains sedated and paralyzed on vent  FiO2 titrated down to 65%  supined yesterday morning and stayed supined overnight, will prone this am  Remains on levophed    Assessment/Plan: Jared North is a 46 year old male unvaccinated against COVID-19, with history of polysubstance abuse and chronic pain/lower back pain now on Suboxone, mood disorder/depression, essential hypertension, sleep disorder breathing, mild intermittent asthma, knee pain, and recent diagnosis of COVID-19 viral infection on 2021 who presented to [facility] on 2021 with worsening shortness of breath, admitted to the ICU with COVID-19 viral pneumonia and severe acute respiratory failure s/p intubation .    PULM/ID: COVID-19 viral pneumonia.  Mild intermittent asthma, Acute resp failure with hypoxia s/p intubation .  - LAST VENT SETTINGS:   Ventilation Mode: CMV/AC  (Continuous Mandatory Ventilation/ Assist Control)  FiO2 (%): 75 %  Rate Set (breaths/minute): 26 breaths/min  Tidal Volume Set (mL): 450 mL  PEEP (cm H2O): 16 cmH2O  Oxygen Concentration (%): 80 %  Peak Inspiratory Pressure (cm H2O) (Drager Kristen): 32  Resp: 26  - Continue inhaled epoprostenol - full strength.   - Patient received tocilizumab 8 mg/kg on   - dexamethasone 6 mg daily x10 days (start )  - remdesivir daily x5 days (start   - PF ratio is 115  - will prone this am  - started on Cefepime and Vanc  for concerns for VAP  - CXR with increased infiltrate on right    CV: circulatory shock   - titrate levophed, MAP  >65  - afib on 11/26, now sinus  - Monitor on tele    GI: NPO, tube feeds  -     - GI proph    Heme/Onc: COVID-19 associated coagulopathy.  D-dimer 1.24 on 11/24.   - DVT prophylaxis: SCDs.  Lovenox 0.5 mg/kg subcu 2X/day    RENAL:   - Follow BUN/Creatinine  - strict I/O's  - will start lasix 40mg daily    NEURO: Acute metabolic encephalopathy.   In the setting of therapeutic sedation.  History of polysubstance abuse and chronic pain/lower back pain now on Suboxone.  Hold suboxone on 11/28 after intubation. Mood disorder/depression.  - Propofol, fentanyl, midazolam gtt as needed for sedation.    - RASS goal -4/-5.  - Continue home meds: abilify, gabapentin 3x/day  - continue Oxy and ativan through OG tube    ENDO:-   - Critical Care hyperglycemic protocol  - Accuchecks and sliding scale q6    Pt has critical illness and impairs breathing on vent such as there is high probability of imminent of life threatening deterioration in the patient's condition.    Code Status: FULL CODE    Infusions:    cisatracurium 5.5 mcg/kg/min (12/03/21 1149)     epoprostenol (VELETRI) 20 mcg/mL in sterile water inhalation solution 20 ng/kg/min (12/03/21 0827)     fentaNYL 300 mcg/hr (12/03/21 0943)     propofol (DIPRIVAN) infusion 20 mcg/kg/min (12/03/21 1310)    And     - MEDICATION INSTRUCTIONS -       midazolam 12 mg/hr (12/03/21 0955)     norepinephrine 0.04 mcg/kg/min (12/03/21 1206)     vasopressin       Lines/Drains/Tubes:  8.0 mm endotracheal tube, placed 11/29  RUE PICC central line, placed 11/24  Left radial A-line, placed 11/28  OG enteral tube, placed 11/28  Wei catheter, placed 11/28    ICU DAILY CHECKLIST           Can patient transfer out of MICU? no     FAST HUG:     Feeding:  yes.  Patient is receiving TUBE FEEDS    Wei: no  Analgesia/Sedation: yes; Propofol, Midazolam and Fentanyl   Thromboembolic prophylaxis: yes; Mode:  Lovenox and SCDs  HOB>30:  yes  Stress Ulcer Protocol Active: yes; Mode: PPI  Glycemic  "Control: Any glucose > 180 no; Mode of Insulin Therapy: Sliding Scale Insulin     INTUBATED:  Can patient have daily waking:  No  Can patient have spontaneous breathing trial:  No     INTUBATED:  Can patient have daily waking: no  Can patient have spontaneous breathing trial: no;  Does pt have restraints: MD RESTRAINT FOR NON-VIOLENT BEHAVIOR FACE TO FACE EVALUATION Patient's Immediate Situation: Patient demonstrated the following behaviors: Impulsive behavior, grabbing at support tubes/lines.  Patient's Reaction to the intervention Does patient understand the reason for restraint/seclusion? Unable to Express Medical Condition Is there any evidence of compromise of Skin integrity, Respiratory, Cardiovascular, Musculoskeletal, Hydration? No Behavioral ConditionIn consultation with the RN, is there a need to continue this restraint or seclusion? Yes See Restraint Flowsheet for complete restraint documentation and assessment.  PHYSICAL THERAPY AND MOBILITY: Can patient have PT and mobility trial: no Activity: ICU mobility protocol    Critical Care Time excluding procedures and family discussions greater than: 1 Hour    Risk Factors Present on Admission:  Clinically Significant Risk Factors Present on Admission                  Carlotta Cage DO  Pulmonary and Critical Care Attending  pgr [phone]    No Known Allergies    Meds: See MAR    Physical Exam:  /55   Pulse 75   Temp 99.4  F (37.4  C) (Axillary)   Resp 26   Ht 1.803 m (5' 11\")   Wt 88.6 kg (195 lb 4.8 oz)   SpO2 95%   BMI 27.24 kg/m    Intake/Output this shift:  I/O this shift:  In: 918.75 [I.V.:458.75; NG/GT:120]  Out: 295 [Urine:295]  GEN: Intubated and sedated, NAD  HEENT: et tube in place, OG tube in place  CVS: regular rhythm, no murmurs faint heart sounds posteriorly   RESP: CTA BL posteriorly  ABD: proned  EXT: Warm, well perfused, 1+edema  NEURO:  sedated  PSYCH: sedated    Pertinent Labs: Latest lab results in EHR personally reviewed. "   CMP  Recent Labs   Lab 12/03/21  0500 12/03/21  0219 12/02/21  1935 12/02/21  1427 12/02/21  0806 12/02/21  0506 12/01/21  0754 12/01/21  0432 11/30/21  0802 11/30/21  0508 11/28/21  0755 11/28/21  0509 11/27/21 0821 11/27/21 0456 11/26/21 2018 11/26/21  1859     --   --   --   --  144  --  140  --  142   < > 143  --  141   < >  --    POTASSIUM 4.4  --   --   --   --  4.9  --  4.8  --  4.5   < > 4.2  --  4.3  --  4.5   CHLORIDE 107  --   --   --   --  109*  --  108*  --  107   < > 108*  --  104   < >  --    CO2 32*  --   --   --   --  29  --  26  --  25   < > 26  --  28   < >  --    ANIONGAP 3*  --   --   --   --  6  --  6  --  10   < > 9  --  9   < >  --    * 137* 153* 133*   < > 132*   < > 129*   < > 140*   < > 101   < > 102   < >  --    BUN 25*  --   --   --   --  20  --  26*  --  27*   < > 22  --  23*   < >  --    CR 0.62*  --   --   --   --  0.60*  --  0.66*  --  0.84   < > 0.72  --  0.67*   < >  --    GFRESTIMATED >90  --   --   --   --  >90  --  >90  --  >90   < > >90  --  >90   < >  --    TRUONG 8.0*  --   --   --   --  7.6*  --  7.6*  --  8.2*   < > 7.5*  --  7.9*   < >  --    MAG 2.0  --   --   --   --  2.2  --  2.2  --  2.2   < > 2.1  --   --   --  2.4   PHOS  --   --   --   --   --   --   --   --   --   --   --   --   --   --   --  3.7   PROTTOTAL  --   --   --   --   --  5.0*  --   --   --   --   --  5.4*  --   --   --   --    ALBUMIN  --   --   --   --   --  2.4*  --   --   --   --   --  2.7*  --   --   --   --    BILITOTAL  --   --   --   --   --  0.3  --   --   --   --   --  0.6  --   --   --   --    ALKPHOS  --   --   --   --   --  47  --   --   --   --   --  63  --   --   --   --    AST  --   --   --   --   --  26  --   --   --   --   --  92*  --  63*  --   --    ALT  --   --   --   --   --  31  --   --   --   --   --  75*  --  43  --   --     < > = values in this interval not displayed.     CBC  Recent Labs   Lab 12/03/21  0500 12/02/21  0506 12/01/21  0432 11/30/21  0508   WBC 7.3  8.1 11.3* 14.3*   RBC 3.77* 4.16* 4.53 5.16   HGB 11.0* 12.2* 13.2* 15.1   HCT 35.5* 38.6* 41.0 46.1   MCV 94 93 91 89   MCH 29.2 29.3 29.1 29.3   MCHC 31.0* 31.6 32.2 32.8   RDW 14.5 14.3 13.9 13.5    205 206 228     INR  No lab results found in last 7 days.  Arterial Blood Gas  Recent Labs   Lab 12/03/21  0500 12/02/21  1358 12/02/21  0506 12/01/21  1428 12/01/21  0432   PH 7.40 7.44 7.39 7.39 7.39   PCO2 55* 50* 54* 51* 50*   PO2 75* 92* 122* 79* 118*   HCO3 32* 32* 30* 28 28   O2PER 65 65 65  --  70       Imaging: personally reviewed.   Results for orders placed or performed during the hospital encounter of 11/24/21   CT Chest Pulmonary Embolism w Contrast    Narrative    EXAM: CT CHEST PULMONARY EMBOLISM W CONTRAST  LOCATION: [facility]  DATE/TIME: 11/24/2021 2:27 PM    INDICATION: Hypoxemia, Covid positive  COMPARISON: None.  TECHNIQUE: CT chest pulmonary angiogram during arterial phase injection of IV contrast. Multiplanar reformats and MIP reconstructions were performed. Dose reduction techniques were used.   CONTRAST: ISOVUE 370 100mL    FINDINGS:  ANGIOGRAM CHEST: No pulmonary artery filling defect. Aorta is normal in caliber.    LUNGS AND PLEURA: Extensive groundglass consolidative bilateral pulmonary opacities. Trace left effusion.    MEDIASTINUM/AXILLAE: Heart size is normal scattered borderline enlarged mediastinal lymph nodes with the largest in the right lower paratracheal chain measuring 1.1 x 2.2 cm (series 5/29).    CORONARY ARTERY CALCIFICATION: None.    UPPER ABDOMEN: Hepatic steatosis.    MUSCULOSKELETAL: Normal.      Impression    IMPRESSION:  1.  No pulmonary embolus.  2.  COVID pneumonia.  3.  Likely reactive mediastinal lymph nodes.  4.  Hepatic steatosis.   XR Chest Port 1 View    Narrative    EXAM: XR CHEST PORTABLE 1 VIEW  LOCATION: [facility]  DATE/TIME: 11/28/2021, 3:08 PM    INDICATION: Endotracheal tube positioning.  RN TO  CALL WHEN READY.  COMPARISON: Chest CT on 2021 and chest x-ray on 2021.      Impression    IMPRESSION: Single AP view of the chest was obtained. Endotracheal tube tip projects over mid thoracic trachea approximately 4 cm from the felipa. Enteric tube crosses the diaphragm with the distal tip outside the field-of-view. Right upper extremity   PICC tip projects over low SVC. Stable cardiomediastinal silhouette. Bilateral basilar predominant patchy airspace pulmonary opacities, worrisome for infection including atypical infection like COVID-19, significantly worsened as compared to 2021   x-ray. No significant pleural effusion or pneumothorax.     XR Abdomen Port 1 View    Narrative    EXAM: XR ABDOMEN PORT 1 VIEWS  LOCATION: [facility]  DATE/TIME: 2021 3:09 PM    INDICATION: Enteric tube placement, evaluate position  COMPARISON: Chest x-ray the same date      Impression    IMPRESSION: Enteric suction tube with the tip and side-port within the mid gastric lumen. Nonobstructive bowel gas pattern. No definite free air. Persistent diffuse bibasilar pulmonary airspace opacities.    Echocardiogram Complete     Value    LVEF  60-65%    Narrative    [id number]  [id number]  [id number]  [id number]^AZUCENA^MYESHA^[place] [address]     Name: TOBIAS BELL  MRN: [id number]  : 1975  Study Date: 2021 10:50 AM  Age: 46 yrs  Gender: Male  Patient Location: [facility]  Reason For Study: Atrial Fibrillation  Ordering Physician: MYESHA ZENG  Performed By: HARRY     BSA: 2.1 m2  Height: 71 in  Weight: 192 lb  HR: 70  ______________________________________________________________________________  Procedure  Definity (NDC #[id number]) given intravenously.  ______________________________________________________________________________  Interpretation Summary     1. Left ventricular size, wall motion and function are normal. The  ejection  fraction is 60-65%.  2. Flattened septum is consistent with RV pressure/volume overload.  3. Mildly decreased right ventricular systolic function The right ventricle is  moderately dilated. Right ventricular systolic pressure could not be  approximated due to inadequate tricuspid regurgitation. No tricuspid  regurgitation. RA pressure of 8 mmHg.  ______________________________________________________________________________  Left Ventricle  Left ventricular size, wall motion and function are normal. The ejection  fraction is 60-65%. There is normal left ventricular wall thickness. Normal  left ventricular wall motion. Flattened septum is consistent with RV  pressure/volume overload.     Right Ventricle  The right ventricle is moderately dilated. Mildly decreased right ventricular  systolic function.     Atria  Normal left atrial size. The right atrium is mildly dilated.     Mitral Valve  The mitral valve is not well visualized. Mitral valve leaflets appear normal.  There is trace mitral regurgitation. There is no mitral valve stenosis.     Tricuspid Valve  Right ventricular systolic pressure could not be approximated due to  inadequate tricuspid regurgitation. No tricuspid regurgitation. IVC diameter  and respiratory changes fall into an intermediate range suggesting an RA  pressure of 8 mmHg. There is no tricuspid stenosis.     Aortic Valve  Aortic valve leaflets appear normal. There is no evidence of aortic stenosis  or clinically significant aortic regurgitation.     Pulmonic Valve  The pulmonic valve is not well seen, but is grossly normal.     Vessels  The aorta root is normal. Normal size ascending aorta. IVC diameter and  respiratory changes fall into an intermediate range suggesting an RA pressure  of 8 mmHg.     Pericardium  There is no pericardial effusion.     ______________________________________________________________________________  MMode/2D Measurements & Calculations  IVSd: 0.96  cm  LVIDd: 4.1 cm  LVIDs: 2.6 cm  LVPWd: 0.99 cm     FS: 36.4 %  LV mass(C)d: 128.8 grams  LV mass(C)dI: 62.1 grams/m2  Ao root diam: 3.2 cm  LA dimension: 3.4 cm  asc Aorta Diam: 2.8 cm  LA/Ao: 1.0  LVOT diam: 2.1 cm  LVOT area: 3.4 cm2  LA Volume Indexed (AL/bp): 28.3 ml/m2  RWT: 0.48     Doppler Measurements & Calculations  MV E max mike: 47.7 cm/sec  MV A max mike: 40.4 cm/sec  MV E/A: 1.2     MV dec slope: 159.5 cm/sec2  MV dec time: 0.30 sec  LV V1 max P.9 mmHg  LV V1 max: 84.4 cm/sec  LV V1 VTI: 18.3 cm  SV(LVOT): 62.3 ml  SI(LVOT): 30.1 ml/m2  PA acc time: 0.07 sec  E/E': 5.3  E/E' av.7  Lateral E/e': 5.6  Medial E/e': 5.7  Peak E' Mike: 9.0 cm/sec     ______________________________________________________________________________  Report approved by: Ana Olivares 2021 12:50 PM             Patient Active Problem List   Diagnosis     Chronic back pain     Essential hypertension     Elevated LFTs     Acute respiratory failure with hypoxia (H)     Pneumonia due to 2019 novel coronavirus     Atrial fibrillation with rapid ventricular response (H)       Carlotta Cage DO  Pulmonary and Critical Care Attending  pgr [phone]

## 2021-12-03 NOTE — PROGRESS NOTES
RT PROGRESS NOTE    VENT DAY# 6    CURRENT SETTINGS:   Ventilation Mode: CMV/AC  (Continuous Mandatory Ventilation/ Assist Control)  FiO2 (%): 65 %  Rate Set (breaths/minute): 26 breaths/min  Tidal Volume Set (mL): 450 mL  PEEP (cm H2O): 16 cmH2O  Oxygen Concentration (%): 65 %  Resp: 26      PATIENT PARAMETERS:  PIP 34  Pplat:  29  Pmean:  21  Compliance: 29.3  Secretions:  Scant tan/yellow thick  02 Sats:  95%    ETT SIZE 8.0 Secured at 26 cm at teeth/gums    Respiratory Medications: Veletri     Results for TOBIAS BELL SOLA (MRN [id number]) as of 12/3/2021 06:00   Ref. Range 12/3/2021 05:00   pH Arterial Latest Ref Range: 7.37 - 7.44  7.40   pCO2 Arterial Latest Ref Range: 35 - 45 mm Hg 55 (H)   PO2 Arterial Latest Ref Range: 80 - 90 mm Hg 75 (L)   Bicarbonate Arterial Latest Ref Range: 23 - 29 mmol/L 32 (H)   Base Excess Art Latest Ref Range:   mmol/L 9.5   FIO2 Unknown 65   Oxyhemoglobin Latest Ref Range: 96.0 - 97.0 % 94.2 (L)       NOTE / SHIFT SUMMARY:   Pt remains supine and resting comfortably. RT to continue to follow  Sarita Clemens RT

## 2021-12-03 NOTE — PLAN OF CARE
Problem: Adult Inpatient Plan of Care  Goal: Plan of Care Review  Outcome: Improving     Problem: Constipation  Goal: Effective Bowel Elimination  Outcome: Improving   Pt remains intubated/sedated/paralyzed. Titrating versed and prop to RASS goal of -4/-5 and BISS goal 20-60. Titrating levo to map goal 65. Titrating fent to CPOT goal. Titrating nimbex to TOF goal of 2/4. See MAR. NSR on monitor. TF running at goal 85mL/hr. Accuchecks completed q6. Wei draining. Turning q2 to promote skin integrity and prevent skin breakdown.

## 2021-12-03 NOTE — PROGRESS NOTES
12/03/21 1225   Ventilator Data   Vent Owner On Site Equipment   Vent Brand Drager   Vent ID [id number]   Ventilation Method Invasive   Ventilator Start - Date 11/27/21   Ventilator   Ventilator Adult   Ventilator  - Settings    Ventilation Mode CMV/AC  (Continuous Mandatory Ventilation/ Assist Control)   Rate Set (breaths/minute) 26 breaths/min   Tidal Volume Set (mL) 450 mL   PEEP (cm H2O) 16 cmH2O   Oxygen Concentration (%) 80 %   Peak Inspiratory Pressure (cm H2O) (Drager Kristen) 32   Inspiratory Time (seconds) 0.75 sec   Ventilator - Patient    Patient Resp Rate  26 breaths/min   Inspiratory Pressure (cm H2O) 32 cmH2O   Mean Airway Pressure (cm H2O) 21 cmH2O   Expiratory Vt  mL 448   Minute Volume L/min 11.3 L/min   Additional Vent Settings   Plateau Pressure (cm H2O) 28 cmH2O   Ventilator Alarms   High Inspiratory Pressure Alarm (cmH2O) 45 cm H2O   Low Inspiratory Pressure (cm H2O) 31 cm H2O   High Respiratory Rate (breaths/min) 40 breaths/min   Minute Ventilation High (L) 14 L/min   Minute Ventilation Low (L) 5 L/min   Ventilator Circuit   Heater Temperature 36.9   ETT Cuffed Single 8 mm   Placement Date/Time: 11/28/21 (c) 1432   Mask Ventilation: Not attempted  Induction Type: Intravenous  Ease of Intubation: Easy  Laryngoscopy Technique: Video laryngoscopy  Cuffed: Cuffed  ETT Type: Single  Single Lumen Tube Size: 8 mm  VL Blade Type:...   Secured at (cm) 26 cm   Measured from Teeth/Gums   Position Center   Secured by Cloth tape   Breath Sounds   Breath Sounds All Fields   All Lung Fields Breath Sounds coarse;diminished    Veletri changed approx 0830. Pt stable t/o this shift. Pt proned approx 1130.Elly Ball, RT

## 2021-12-04 LAB
ANION GAP SERPL CALCULATED.3IONS-SCNC: 4 MMOL/L (ref 5–18)
BACTERIA ASPIRATE CULT: ABNORMAL
BACTERIA ASPIRATE CULT: ABNORMAL
BASE EXCESS BLDA CALC-SCNC: 10.3 MMOL/L
BASE EXCESS BLDA CALC-SCNC: 10.7 MMOL/L
BUN SERPL-MCNC: 23 MG/DL (ref 8–22)
CALCIUM SERPL-MCNC: 7.8 MG/DL (ref 8.5–10.5)
CHLORIDE BLD-SCNC: 103 MMOL/L (ref 98–107)
CO2 SERPL-SCNC: 33 MMOL/L (ref 22–31)
COHGB MFR BLD: 96 % (ref 96–97)
COHGB MFR BLD: 98.4 % (ref 96–97)
CREAT SERPL-MCNC: 0.56 MG/DL (ref 0.7–1.3)
ERYTHROCYTE [DISTWIDTH] IN BLOOD BY AUTOMATED COUNT: 14.5 % (ref 10–15)
GFR SERPL CREATININE-BSD FRML MDRD: >90 ML/MIN/1.73M2
GLUCOSE BLD-MCNC: 144 MG/DL (ref 70–125)
GLUCOSE BLDC GLUCOMTR-MCNC: 114 MG/DL (ref 70–99)
GLUCOSE BLDC GLUCOMTR-MCNC: 120 MG/DL (ref 70–99)
GLUCOSE BLDC GLUCOMTR-MCNC: 134 MG/DL (ref 70–99)
GLUCOSE BLDC GLUCOMTR-MCNC: 135 MG/DL (ref 70–99)
HCO3 BLD-SCNC: 33 MMOL/L (ref 23–29)
HCO3 BLD-SCNC: 33 MMOL/L (ref 23–29)
HCT VFR BLD AUTO: 32.7 % (ref 40–53)
HGB BLD-MCNC: 10.9 G/DL (ref 13.3–17.7)
MAGNESIUM SERPL-MCNC: 2.2 MG/DL (ref 1.8–2.6)
MCH RBC QN AUTO: 31.1 PG (ref 26.5–33)
MCHC RBC AUTO-ENTMCNC: 33.3 G/DL (ref 31.5–36.5)
MCV RBC AUTO: 93 FL (ref 78–100)
O2/TOTAL GAS SETTING VFR VENT: 0.6 %
O2/TOTAL GAS SETTING VFR VENT: 0.65 %
OXYHGB MFR BLD: 94.4 % (ref 96–97)
OXYHGB MFR BLD: 96.8 % (ref 96–97)
PCO2 BLD: 51 MM HG (ref 35–45)
PCO2 BLD: 52 MM HG (ref 35–45)
PEEP: 16 CM H2O
PEEP: 16 CM H2O
PH BLD: 7.44 [PH] (ref 7.37–7.44)
PH BLD: 7.44 [PH] (ref 7.37–7.44)
PLATELET # BLD AUTO: 162 10E3/UL (ref 150–450)
PO2 BLD: 75 MM HG (ref 80–90)
PO2 BLD: 96 MM HG (ref 80–90)
POTASSIUM BLD-SCNC: 4.6 MMOL/L (ref 3.5–5)
RATE: 24 RR/MIN
RATE: 24 RR/MIN
RBC # BLD AUTO: 3.51 10E6/UL (ref 4.4–5.9)
SODIUM SERPL-SCNC: 140 MMOL/L (ref 136–145)
TEMPERATURE: 37 DEGREES C
TEMPERATURE: 37 DEGREES C
VENTILATOR TIDAL VOLUME: 450 ML
VENTILATOR TIDAL VOLUME: 450 ML
WBC # BLD AUTO: 8.8 10E3/UL (ref 4–11)

## 2021-12-04 PROCEDURE — 258N000003 HC RX IP 258 OP 636: Performed by: INTERNAL MEDICINE

## 2021-12-04 PROCEDURE — 250N000013 HC RX MED GY IP 250 OP 250 PS 637: Performed by: INTERNAL MEDICINE

## 2021-12-04 PROCEDURE — 250N000011 HC RX IP 250 OP 636: Performed by: INTERNAL MEDICINE

## 2021-12-04 PROCEDURE — 94003 VENT MGMT INPAT SUBQ DAY: CPT

## 2021-12-04 PROCEDURE — 82805 BLOOD GASES W/O2 SATURATION: CPT | Performed by: INTERNAL MEDICINE

## 2021-12-04 PROCEDURE — 83735 ASSAY OF MAGNESIUM: CPT | Performed by: FAMILY MEDICINE

## 2021-12-04 PROCEDURE — 250N000009 HC RX 250: Performed by: INTERNAL MEDICINE

## 2021-12-04 PROCEDURE — 99232 SBSQ HOSP IP/OBS MODERATE 35: CPT | Performed by: FAMILY MEDICINE

## 2021-12-04 PROCEDURE — 99291 CRITICAL CARE FIRST HOUR: CPT | Performed by: INTERNAL MEDICINE

## 2021-12-04 PROCEDURE — 94644 CONT INHLJ TX 1ST HOUR: CPT

## 2021-12-04 PROCEDURE — 94645 CONT INHLJ TX EACH ADDL HOUR: CPT

## 2021-12-04 PROCEDURE — 200N000001 HC R&B ICU

## 2021-12-04 PROCEDURE — C9113 INJ PANTOPRAZOLE SODIUM, VIA: HCPCS | Performed by: INTERNAL MEDICINE

## 2021-12-04 PROCEDURE — 85027 COMPLETE CBC AUTOMATED: CPT | Performed by: INTERNAL MEDICINE

## 2021-12-04 PROCEDURE — 80048 BASIC METABOLIC PNL TOTAL CA: CPT | Performed by: INTERNAL MEDICINE

## 2021-12-04 PROCEDURE — 999N000157 HC STATISTIC RCP TIME EA 10 MIN

## 2021-12-04 RX ADMIN — OXYCODONE HYDROCHLORIDE 10 MG: 100 SOLUTION ORAL at 11:58

## 2021-12-04 RX ADMIN — Medication 300 MCG/HR: at 09:37

## 2021-12-04 RX ADMIN — LORAZEPAM 4 MG: 2 LIQUID ORAL at 05:05

## 2021-12-04 RX ADMIN — LORAZEPAM 4 MG: 2 LIQUID ORAL at 08:32

## 2021-12-04 RX ADMIN — OXYCODONE HYDROCHLORIDE 10 MG: 100 SOLUTION ORAL at 16:21

## 2021-12-04 RX ADMIN — ENOXAPARIN SODIUM 50 MG: 100 INJECTION SUBCUTANEOUS at 00:29

## 2021-12-04 RX ADMIN — Medication 300 MCG/HR: at 17:27

## 2021-12-04 RX ADMIN — GABAPENTIN 800 MG: 250 SUSPENSION ORAL at 16:21

## 2021-12-04 RX ADMIN — LORAZEPAM 4 MG: 2 LIQUID ORAL at 00:28

## 2021-12-04 RX ADMIN — CISATRACURIUM BESYLATE 5.5 MCG/KG/MIN: 10 INJECTION INTRAVENOUS at 17:26

## 2021-12-04 RX ADMIN — POLYETHYLENE GLYCOL 3350 17 G: 17 POWDER, FOR SOLUTION ORAL at 08:32

## 2021-12-04 RX ADMIN — MINERAL OIL AND WHITE PETROLATUM 1 G: 30; 940 OINTMENT OPHTHALMIC at 21:56

## 2021-12-04 RX ADMIN — MINERAL OIL AND WHITE PETROLATUM 1 G: 30; 940 OINTMENT OPHTHALMIC at 12:21

## 2021-12-04 RX ADMIN — CEFEPIME HYDROCHLORIDE 2 G: 2 INJECTION, POWDER, FOR SOLUTION INTRAVENOUS at 08:32

## 2021-12-04 RX ADMIN — CHLORHEXIDINE GLUCONATE 15 ML: 1.2 RINSE ORAL at 21:57

## 2021-12-04 RX ADMIN — ENOXAPARIN SODIUM 50 MG: 100 INJECTION SUBCUTANEOUS at 11:56

## 2021-12-04 RX ADMIN — PROPOFOL 40 MCG/KG/MIN: 10 INJECTION, EMULSION INTRAVENOUS at 02:46

## 2021-12-04 RX ADMIN — Medication 20 NG/KG/MIN: at 16:22

## 2021-12-04 RX ADMIN — SENNOSIDES 10 ML: 8.8 LIQUID ORAL at 21:58

## 2021-12-04 RX ADMIN — OXYCODONE HYDROCHLORIDE 10 MG: 100 SOLUTION ORAL at 00:28

## 2021-12-04 RX ADMIN — LORAZEPAM 4 MG: 2 LIQUID ORAL at 16:21

## 2021-12-04 RX ADMIN — Medication 20 NG/KG/MIN: at 05:07

## 2021-12-04 RX ADMIN — Medication 300 MCG/HR: at 01:15

## 2021-12-04 RX ADMIN — OXYCODONE HYDROCHLORIDE 10 MG: 100 SOLUTION ORAL at 05:05

## 2021-12-04 RX ADMIN — GABAPENTIN 800 MG: 250 SUSPENSION ORAL at 11:56

## 2021-12-04 RX ADMIN — LORAZEPAM 4 MG: 2 LIQUID ORAL at 11:56

## 2021-12-04 RX ADMIN — SENNOSIDES 10 ML: 8.8 LIQUID ORAL at 08:32

## 2021-12-04 RX ADMIN — DOCUSATE SODIUM 100 MG: 50 LIQUID ORAL at 08:32

## 2021-12-04 RX ADMIN — FUROSEMIDE 40 MG: 10 INJECTION, SOLUTION INTRAMUSCULAR; INTRAVENOUS at 13:45

## 2021-12-04 RX ADMIN — MINERAL OIL AND WHITE PETROLATUM 1 G: 30; 940 OINTMENT OPHTHALMIC at 05:04

## 2021-12-04 RX ADMIN — PROPOFOL 40 MCG/KG/MIN: 10 INJECTION, EMULSION INTRAVENOUS at 22:00

## 2021-12-04 RX ADMIN — PROPOFOL 40 MCG/KG/MIN: 10 INJECTION, EMULSION INTRAVENOUS at 09:32

## 2021-12-04 RX ADMIN — PANTOPRAZOLE SODIUM 40 MG: 40 INJECTION, POWDER, FOR SOLUTION INTRAVENOUS at 21:57

## 2021-12-04 RX ADMIN — BUPROPION HYDROCHLORIDE 150 MG: 75 TABLET, FILM COATED ORAL at 09:33

## 2021-12-04 RX ADMIN — ARIPIPRAZOLE 5 MG: 5 TABLET ORAL at 09:33

## 2021-12-04 RX ADMIN — OXYCODONE HYDROCHLORIDE 10 MG: 100 SOLUTION ORAL at 08:32

## 2021-12-04 RX ADMIN — BUPROPION HYDROCHLORIDE 150 MG: 75 TABLET, FILM COATED ORAL at 21:58

## 2021-12-04 RX ADMIN — CEFEPIME HYDROCHLORIDE 2 G: 2 INJECTION, POWDER, FOR SOLUTION INTRAVENOUS at 16:21

## 2021-12-04 RX ADMIN — CEFEPIME HYDROCHLORIDE 2 G: 2 INJECTION, POWDER, FOR SOLUTION INTRAVENOUS at 00:22

## 2021-12-04 RX ADMIN — PROPOFOL 40 MCG/KG/MIN: 10 INJECTION, EMULSION INTRAVENOUS at 06:42

## 2021-12-04 RX ADMIN — LORAZEPAM 4 MG: 2 LIQUID ORAL at 21:58

## 2021-12-04 RX ADMIN — DOCUSATE SODIUM 100 MG: 50 LIQUID ORAL at 21:57

## 2021-12-04 RX ADMIN — VANCOMYCIN HYDROCHLORIDE 1750 MG: 5 INJECTION, POWDER, LYOPHILIZED, FOR SOLUTION INTRAVENOUS at 05:57

## 2021-12-04 RX ADMIN — OXYCODONE HYDROCHLORIDE 10 MG: 100 SOLUTION ORAL at 21:58

## 2021-12-04 RX ADMIN — GABAPENTIN 800 MG: 250 SUSPENSION ORAL at 08:32

## 2021-12-04 RX ADMIN — CISATRACURIUM BESYLATE 5.5 MCG/KG/MIN: 10 INJECTION INTRAVENOUS at 02:42

## 2021-12-04 RX ADMIN — PROPOFOL 40 MCG/KG/MIN: 10 INJECTION, EMULSION INTRAVENOUS at 13:45

## 2021-12-04 RX ADMIN — Medication 12 MG/HR: at 20:52

## 2021-12-04 RX ADMIN — CHLORHEXIDINE GLUCONATE 15 ML: 1.2 RINSE ORAL at 08:32

## 2021-12-04 RX ADMIN — DEXAMETHASONE SODIUM PHOSPHATE 6 MG: 10 INJECTION INTRAMUSCULAR; INTRAVENOUS at 13:45

## 2021-12-04 RX ADMIN — PROPOFOL 40 MCG/KG/MIN: 10 INJECTION, EMULSION INTRAVENOUS at 17:27

## 2021-12-04 RX ADMIN — Medication 12 MG/HR: at 09:38

## 2021-12-04 RX ADMIN — Medication 12 MG/HR: at 02:47

## 2021-12-04 ASSESSMENT — ACTIVITIES OF DAILY LIVING (ADL)
ADLS_ACUITY_SCORE: 15

## 2021-12-04 NOTE — PROVIDER NOTIFICATION
12/04/21 1531   Ventilator  - Settings    Ventilation Mode CMV/AC  (Continuous Mandatory Ventilation/ Assist Control)   Rate Set (breaths/minute) 26 breaths/min   Tidal Volume Set (mL) 450 mL   PEEP (cm H2O) 16 cmH2O   Oxygen Concentration (%) 60 %   Ventilator - Patient    Patient Resp Rate  26 breaths/min   Inspiratory Pressure (cm H2O) 31 cmH2O   Mean Airway Pressure (cm H2O) 21 cmH2O   Expiratory Vt  mL 459   Minute Volume L/min 11.6 L/min   Adult Pressure Support Trial   Pulse 65   Oxygen Therapy   SpO2 97 %     The patient continues on mechanical ventilation. We were able to supinate for a couple hours, he is back proned now.

## 2021-12-04 NOTE — PLAN OF CARE
RASS -5. Fent @ 300, versed @ 12, prop @ 40. BISS 30s. Nimbex @ 5.5.  FiO2 on vent weaned to 60% and satting mid 90s. NSR 60-70s. ART line positional.  Norepi back on @ 0.01 after proning  OG- TF @ goal 85ml/hr w 100ml H2O q8.   Supine [id number] and currently re-proned.  Ex-wife Jama updated.

## 2021-12-04 NOTE — PROGRESS NOTES
[facility] MEDICINE PROGRESS NOTE      Identification/Summary:   Jared North is a 46 year old male with a past medical history of opioid use on Suboxone, previous alcoholic, on Suboxone, hypertension who was presented with worsening shortness of breath on 11/24/2021 and admitted for COVID19 vral pneumonia with acute respiratory failure hypoxia.     Intubated on 11/28 with worsening hypoxic respiratory failure as well as agitation. In addition to intensivist/pulmonologist, acute pain management team consulting given polysubstance abuse history and opioid dependence and suboxone. Improving FiO2 with cisatracurium, fentanyl, midazolam, and propofol infusions; off norepinephrine. Discussed with acute pain management team, RN, intensivist, RT, and CM/SW.[facility] [place]ally until patient is stable enough to be transferred out of ICU. Per nursing staff improving slowly.  Discussed directly with intensivist.     Assessment and Plan:  COVID pneumonia with severe acute respiratory failure hypoxia requiring intubation and mechanical ventilation on 11/28  Patient was not vaccinated  Started on Decadron  Also received Tocilizumab  DVT prophylaxis per ICU protocol for COVID  Sliding scale insulin for blood sugars  Improved FiO2.  Proned again     New onset atrial fibrillation, mild RVR - resolved  Troponin negative x3  Amiodarone utilized transient and now discontinued.   No off norepinephrine.   Telemetry.     Slightly elevated LFTs, improving  Continue remdesivir.  LFTs are slightly elevated but well less than x5 ULN. Benefits outweigh the risk.     History of opioid use and alcoholism  Not on methadone.  On Suboxone per review of the pharmacist.  Acute pain team following.      Moderate Protein-Calorie Malnutrition    Diet: NPO for Medical/Clinical Reasons Except for: NPO but receiving Tube Feeding  Adult Formula Drip Feeding: Continuous Promote; Orogastric tube;  Goal Rate: keep at 65 but once has a BM increase to 85ml/hr; mL/hr; Medication - Feeding Tube Flush Frequency: At least 15-30 mL water before and after medication administration and ...  DVT Prophylaxis:  Enoxaparin (Lovenox) SQ  Code Status: Full Code    Anticipated possible discharge in several    Tomy Snell MD  [facility]  Phone: #[phone]    Interval History/Subjective:  Intubated sedated.  Discussed directly.    Physical Exam/Objective:  Temp:  [98  F (36.7  C)-99.3  F (37.4  C)] 98.4  F (36.9  C)  Pulse:  [56-91] 56  Resp:  [22-26] 22  MAP:  [70 mmHg-115 mmHg] 104 mmHg  Arterial Line BP: (115-178)/(54-84) 146/79  FiO2 (%):  [60 %-80 %] 60 %  SpO2:  [95 %-99 %] 97 %  Body mass index is 27.24 kg/m .    GENERAL:  Alert, appears comfortable, in no acute distress, appears stated age   HEAD:  Normocephalic, without obvious abnormality, atraumatic   EYES:  PERRL, conjunctiva/corneas clear, no scleral icterus, EOM's intact   THROAT: Lips, mucosa, and tongue normal; teeth and gums normal, mouth moist   NECK: Supple, symmetrical, trachea midline   BACK:   Symmetric, no curvature, ROM normal   LUNGS:   Clear to auscultation bilaterally, no rales, rhonchi, or wheezing, symmetric chest rise on inhalation, respirations unlabored   HEART:  Regular rate and rhythm, S1 and S2 normal, no murmur, rub, or gallop    ABDOMEN:   Soft, non-tender, bowel sounds active all four quadrants, no masses, no organomegaly, no rebound or guarding   EXTREMITIES: Extremities normal, atraumatic, no cyanosis or edema    SKIN: Dry to touch, no exanthems in the visualized areas   NEURO: Alert, oriented x3, moves all four extremities freely   PSYCH: Cooperative, behavior is appropriate      Data reviewed today: I personally reviewed all new medications, labs, imaging/diagnostics reports over the past 24 hours. Pertinent findings include:    Imaging:   No results found for this or  any previous visit (from the past 24 hour(s)).    Labs:  Most Recent 3 CBC's:Recent Labs   Lab Test 12/04/21  0521 12/03/21  0500 12/02/21  0506   WBC 8.8 7.3 8.1   HGB 10.9* 11.0* 12.2*   MCV 93 94 93    161 205     Most Recent 3 BMP's:Recent Labs   Lab Test 12/04/21  1342 12/04/21  0617 12/04/21  0521 12/03/21  1310 12/03/21  0500 12/02/21  0806 12/02/21  0506   NA  --   --  140  --  142  --  144   POTASSIUM  --   --  4.6  --  4.4  --  4.9   CHLORIDE  --   --  103  --  107  --  109*   CO2  --   --  33*  --  32*  --  29   BUN  --   --  23*  --  25*  --  20   CR  --   --  0.56*  --  0.62*  --  0.60*   ANIONGAP  --   --  4*  --  3*  --  6   TRUONG  --   --  7.8*  --  8.0*  --  7.6*   * 120* 144*   < > 147*   < > 132*    < > = values in this interval not displayed.     Most Recent 2 LFT's:Recent Labs   Lab Test 12/02/21  0506 11/28/21  0509   AST 26 92*   ALT 31 75*   ALKPHOS 47 63   BILITOTAL 0.3 0.6     Most Recent 3 INR's:Recent Labs   Lab Test 11/24/21  2339   INR 1.08       Medications:   Personally Reviewed.  Medications     cisatracurium 5.5 mcg/kg/min (12/04/21 0600)     epoprostenol (VELETRI) 20 mcg/mL in sterile water inhalation solution 20 ng/kg/min (12/04/21 0507)     fentaNYL 300 mcg/hr (12/04/21 0937)     propofol (DIPRIVAN) infusion 40 mcg/kg/min (12/04/21 1345)    And     - MEDICATION INSTRUCTIONS -       midazolam 12 mg/hr (12/04/21 0938)     norepinephrine Stopped (12/03/21 2053)     vasopressin         ARIPiprazole  5 mg Oral Daily     artificial tears  1 inch Both Eyes Q8H     [Held by provider] buprenorphine HCl-naloxone HCl  1 Film Sublingual Daily     buPROPion  150 mg Oral or Feeding Tube BID     ceFEPIme (MAXIPIME) IV  2 g Intravenous Q8H     chlorhexidine  15 mL Mouth/Throat Q12H     docusate  100 mg Oral or Feeding Tube BID     enoxaparin ANTICOAGULANT  0.5 mg/kg Subcutaneous Q12H     furosemide  40 mg Intravenous Q24H     gabapentin  800 mg Oral or Feeding Tube TID     insulin  aspart  1-12 Units Subcutaneous Q6H     [Held by provider] lisinopril  10 mg Oral Daily     LORazepam  4 mg Oral Q4H     [Held by provider] metoclopramide  10 mg Intravenous Q6H     oxyCODONE  10 mg Oral Q4H     pantoprazole  40 mg Per Feeding Tube QAM AC    Or     pantoprazole (PROTONIX) IV  40 mg Intravenous QAM AC     polyethylene glycol  17 g Oral or Feeding Tube Daily     sennosides  10 mL Oral BID     1}

## 2021-12-04 NOTE — PROGRESS NOTES
[facility] Pulmonary/Critical Care Consult Team Note    Jared North,  1975, MRN [id number]  Admitting Dx: Acute respiratory failure with hypoxia (H) [J96.01]  Pneumonia due to 2019 novel coronavirus [U07.1, J12.82]  Date / Time of Admission:  2021  1:19 PM    Overnight Events:  Intake/Output last 3 shifts:  I/O last 3 completed shifts:  In: 4540.86 [I.V.:2480.86; NG/GT:1210]  Out: 3035 [Urine:3035]  Remains sedated and paralyzed on vent  FiO2 titrated down to 65%  proned overnight  Remains on levophed    Assessment/Plan: Jared North is a 46 year old male unvaccinated against COVID-19, with history of polysubstance abuse and chronic pain/lower back pain now on Suboxone, mood disorder/depression, essential hypertension, sleep disorder breathing, mild intermittent asthma, knee pain, and recent diagnosis of COVID-19 viral infection on 2021 who presented to [facility] on 2021 with worsening shortness of breath, admitted to the ICU with COVID-19 viral pneumonia and severe acute respiratory failure s/p intubation .    PULM/ID: COVID-19 viral pneumonia.  Mild intermittent asthma, Acute resp failure with hypoxia s/p intubation .  - LAST VENT SETTINGS:   Ventilation Mode: CMV/AC  (Continuous Mandatory Ventilation/ Assist Control)  FiO2 (%): 60 %  Rate Set (breaths/minute): 26 breaths/min  Tidal Volume Set (mL): 450 mL  PEEP (cm H2O): 16 cmH2O  Oxygen Concentration (%): 65 %  Peak Inspiratory Pressure (cm H2O) (Drager Kristen): 32  Resp: 26  - Continue inhaled epoprostenol - full strength.   - Patient received tocilizumab 8 mg/kg on   - dexamethasone 6 mg daily x10 days (start )  - remdesivir daily x5 days (start   - PF ratio is 148  - will supine this am  - started on Cefepime and Vanc  for concerns for VAP - will discontinue Vanc  - culture with E Coli     CV: circulatory shock   - titrate levophed, MAP >65  - afib on , now sinus  - Monitor on  tele    GI: NPO, tube feeds  -     - GI proph    Heme/Onc: COVID-19 associated coagulopathy.  D-dimer 1.24 on 11/24.   - DVT prophylaxis: SCDs.  Lovenox 0.5 mg/kg subcu 2X/day    RENAL:   - Follow BUN/Creatinine  - strict I/O's  - continue lasix 40mg daily (12/3)    NEURO: Acute metabolic encephalopathy.   In the setting of therapeutic sedation.  History of polysubstance abuse and chronic pain/lower back pain now on Suboxone.  Hold suboxone on 11/28 after intubation. Mood disorder/depression.  - Propofol, fentanyl, midazolam gtt as needed for sedation.    - RASS goal -4/-5.  - Continue home meds: abilify, gabapentin 3x/day  - continue Oxy and ativan through OG tube    ENDO:-   - Critical Care hyperglycemic protocol  - Accuchecks and sliding scale q6    Pt has critical illness and impairs breathing on vent such as there is high probability of imminent of life threatening deterioration in the patient's condition.    Code Status: FULL CODE    Infusions:    cisatracurium 5.5 mcg/kg/min (12/04/21 0600)     epoprostenol (VELETRI) 20 mcg/mL in sterile water inhalation solution 20 ng/kg/min (12/04/21 0507)     fentaNYL 300 mcg/hr (12/04/21 0937)     propofol (DIPRIVAN) infusion 40 mcg/kg/min (12/04/21 0932)    And     - MEDICATION INSTRUCTIONS -       midazolam 12 mg/hr (12/04/21 0938)     norepinephrine Stopped (12/03/21 2053)     vasopressin       Lines/Drains/Tubes:  8.0 mm endotracheal tube, placed 11/29  RUE PICC central line, placed 11/24  Left radial A-line, placed 11/28  OG enteral tube, placed 11/28  Wei catheter, placed 11/28    ICU DAILY CHECKLIST           Can patient transfer out of MICU? no     FAST HUG:     Feeding:  yes.  Patient is receiving TUBE FEEDS    Wei: no  Analgesia/Sedation: yes; Propofol, Midazolam and Fentanyl   Thromboembolic prophylaxis: yes; Mode:  Lovenox and SCDs  HOB>30:  yes  Stress Ulcer Protocol Active: yes; Mode: PPI  Glycemic Control: Any glucose > 180 no; Mode of Insulin Therapy:  "Sliding Scale Insulin     INTUBATED:  Can patient have daily waking:  No  Can patient have spontaneous breathing trial:  No     INTUBATED:  Can patient have daily waking: no  Can patient have spontaneous breathing trial: no;  Does pt have restraints: MD RESTRAINT FOR NON-VIOLENT BEHAVIOR FACE TO FACE EVALUATION Patient's Immediate Situation: Patient demonstrated the following behaviors: Impulsive behavior, grabbing at support tubes/lines.  Patient's Reaction to the intervention Does patient understand the reason for restraint/seclusion? Unable to Express Medical Condition Is there any evidence of compromise of Skin integrity, Respiratory, Cardiovascular, Musculoskeletal, Hydration? No Behavioral ConditionIn consultation with the RN, is there a need to continue this restraint or seclusion? Yes See Restraint Flowsheet for complete restraint documentation and assessment.  PHYSICAL THERAPY AND MOBILITY: Can patient have PT and mobility trial: no Activity: ICU mobility protocol    Critical Care Time excluding procedures and family discussions greater than: 1 Hour    Risk Factors Present on Admission:  Clinically Significant Risk Factors Present on Admission                  Carlotta Cage DO  Pulmonary and Critical Care Attending  pgr [phone]    No Known Allergies    Meds: See MAR    Physical Exam:  /55   Pulse 64   Temp 98  F (36.7  C) (Axillary)   Resp 26   Ht 1.803 m (5' 11\")   Wt 88.6 kg (195 lb 4.8 oz)   SpO2 97%   BMI 27.24 kg/m    Intake/Output this shift:  I/O this shift:  In: 925.51 [I.V.:315.51; NG/GT:100]  Out: 250 [Urine:250]  GEN: Intubated and sedated, NAD  HEENT: et tube in place, OG tube in place  CVS: regular rhythm, no murmurs faint heart sounds posteriorly   RESP: CTA BL posteriorly  ABD: proned  EXT: Warm, well perfused, 1+edema  NEURO:  sedated  PSYCH: sedated    Pertinent Labs: Latest lab results in EHR personally reviewed.   CMP  Recent Labs   Lab 12/04/21  0617 12/04/21  0521 " 12/04/21  0005 12/03/21 2052 12/03/21  1310 12/03/21  0500 12/02/21  0806 12/02/21  0506 12/01/21  0754 12/01/21  0432 11/28/21  0755 11/28/21  0509   NA  --  140  --   --   --  142  --  144  --  140   < > 143   POTASSIUM  --  4.6  --   --   --  4.4  --  4.9  --  4.8   < > 4.2   CHLORIDE  --  103  --   --   --  107  --  109*  --  108*   < > 108*   CO2  --  33*  --   --   --  32*  --  29  --  26   < > 26   ANIONGAP  --  4*  --   --   --  3*  --  6  --  6   < > 9   * 144* 135* 133*   < > 147*   < > 132*   < > 129*   < > 101   BUN  --  23*  --   --   --  25*  --  20  --  26*   < > 22   CR  --  0.56*  --   --   --  0.62*  --  0.60*  --  0.66*   < > 0.72   GFRESTIMATED  --  >90  --   --   --  >90  --  >90  --  >90   < > >90   TRUONG  --  7.8*  --   --   --  8.0*  --  7.6*  --  7.6*   < > 7.5*   MAG  --  2.2  --   --   --  2.0  --  2.2  --  2.2   < > 2.1   PROTTOTAL  --   --   --   --   --   --   --  5.0*  --   --   --  5.4*   ALBUMIN  --   --   --   --   --   --   --  2.4*  --   --   --  2.7*   BILITOTAL  --   --   --   --   --   --   --  0.3  --   --   --  0.6   ALKPHOS  --   --   --   --   --   --   --  47  --   --   --  63   AST  --   --   --   --   --   --   --  26  --   --   --  92*   ALT  --   --   --   --   --   --   --  31  --   --   --  75*    < > = values in this interval not displayed.     CBC  Recent Labs   Lab 12/04/21  0521 12/03/21  0500 12/02/21  0506 12/01/21  0432   WBC 8.8 7.3 8.1 11.3*   RBC 3.51* 3.77* 4.16* 4.53   HGB 10.9* 11.0* 12.2* 13.2*   HCT 32.7* 35.5* 38.6* 41.0   MCV 93 94 93 91   MCH 31.1 29.2 29.3 29.1   MCHC 33.3 31.0* 31.6 32.2   RDW 14.5 14.5 14.3 13.9    161 205 206     INR  No lab results found in last 7 days.  Arterial Blood Gas  Recent Labs   Lab 12/04/21  0828 12/03/21  1310 12/03/21  0500 12/02/21  1358   PH 7.44 7.39 7.40 7.44   PCO2 52* 57* 55* 50*   PO2 96* 96* 75* 92*   HCO3 33* 32* 32* 32*   O2PER 0.65 0.8 65 65       Imaging: personally reviewed.   Results for  orders placed or performed during the hospital encounter of 11/24/21   CT Chest Pulmonary Embolism w Contrast    Narrative    EXAM: CT CHEST PULMONARY EMBOLISM W CONTRAST  LOCATION: [facility]  DATE/TIME: 11/24/2021 2:27 PM    INDICATION: Hypoxemia, Covid positive  COMPARISON: None.  TECHNIQUE: CT chest pulmonary angiogram during arterial phase injection of IV contrast. Multiplanar reformats and MIP reconstructions were performed. Dose reduction techniques were used.   CONTRAST: ISOVUE 370 100mL    FINDINGS:  ANGIOGRAM CHEST: No pulmonary artery filling defect. Aorta is normal in caliber.    LUNGS AND PLEURA: Extensive groundglass consolidative bilateral pulmonary opacities. Trace left effusion.    MEDIASTINUM/AXILLAE: Heart size is normal scattered borderline enlarged mediastinal lymph nodes with the largest in the right lower paratracheal chain measuring 1.1 x 2.2 cm (series 5/29).    CORONARY ARTERY CALCIFICATION: None.    UPPER ABDOMEN: Hepatic steatosis.    MUSCULOSKELETAL: Normal.      Impression    IMPRESSION:  1.  No pulmonary embolus.  2.  COVID pneumonia.  3.  Likely reactive mediastinal lymph nodes.  4.  Hepatic steatosis.   XR Chest Port 1 View    Narrative    EXAM: XR CHEST PORTABLE 1 VIEW  LOCATION: [facility]  DATE/TIME: 11/28/2021, 3:08 PM    INDICATION: Endotracheal tube positioning.  RN TO CALL WHEN READY.  COMPARISON: Chest CT on 11/24/2021 and chest x-ray on 11/19/2021.      Impression    IMPRESSION: Single AP view of the chest was obtained. Endotracheal tube tip projects over mid thoracic trachea approximately 4 cm from the felipa. Enteric tube crosses the diaphragm with the distal tip outside the field-of-view. Right upper extremity   PICC tip projects over low SVC. Stable cardiomediastinal silhouette. Bilateral basilar predominant patchy airspace pulmonary opacities, worrisome for infection including atypical infection like COVID-19,  significantly worsened as compared to 11/19/2021   x-ray. No significant pleural effusion or pneumothorax.     XR Abdomen Port 1 View    Narrative    EXAM: XR ABDOMEN PORT 1 VIEWS  LOCATION: [facility]  DATE/TIME: 11/28/2021 3:09 PM    INDICATION: Enteric tube placement, evaluate position  COMPARISON: Chest x-ray the same date      Impression    IMPRESSION: Enteric suction tube with the tip and side-port within the mid gastric lumen. Nonobstructive bowel gas pattern. No definite free air. Persistent diffuse bibasilar pulmonary airspace opacities.                            Patient Active Problem List   Diagnosis     Chronic back pain     Essential hypertension     Elevated LFTs     Acute respiratory failure with hypoxia (H)     Pneumonia due to 2019 novel coronavirus     Atrial fibrillation with rapid ventricular response (H)       Carlotta Cage DO  Pulmonary and Critical Care Attending  pgr [phone]

## 2021-12-04 NOTE — PROGRESS NOTES
Pt remain on vent with settings;          12/04/21 0584   Tech Time   $Tech Time (10 minute increments) 6   Ventilator Data   Vent Owner On Site Equipment   Vent Brand CARRIE   Vent ID [id number]   Ventilation Method Invasive   Ventilation Day(s) 7   Ventilator   Ventilator Adult   Ventilator  - Settings    Ventilation Mode CMV/AC  (Continuous Mandatory Ventilation/ Assist Control)   Rate Set (breaths/minute) 26 breaths/min   Tidal Volume Set (mL) 450 mL   PEEP (cm H2O) 16 cmH2O   Oxygen Concentration (%) 65 %   Inspiratory Time (seconds) 0.75 sec   Ventilator - Patient    Patient Resp Rate  26 breaths/min   Inspiratory Pressure (cm H2O) 32 cmH2O   Mean Airway Pressure (cm H2O) 22 cmH2O   Expiratory Vt  mL 456   Minute Volume L/min 9.17 L/min   Additional Vent Settings   Plateau Pressure (cm H2O) 28 cmH2O   Ventilator Arm In Place Yes   Ventilator Alarms   High Inspiratory Pressure Alarm (cmH2O) 45 cm H2O   High Respiratory Rate (breaths/min) 40 breaths/min   Minute Ventilation High (L) 14 L/min   Minute Ventilation Low (L) 5 L/min   Ventilator Circuit   Ventilator Humidifier  Heater   Heater Temperature 37.2   ETT Cuffed Single 8 mm   Placement Date/Time: 11/28/21 (c) 2095   Mask Ventilation: Not attempted  Induction Type: Intravenous  Ease of Intubation: Easy  Laryngoscopy Technique: Video laryngoscopy  Cuffed: Cuffed  ETT Type: Single  Single Lumen Tube Size: 8 mm  VL Blade Type:...   Secured at (cm) 26 cm   Measured from Teeth/Gums   Position Center   Secured by Cloth tape   Bite Block None Present   Site Appearance Clean   Cuff Assessment Minimal leak technique   Safety Measures Manual resuscitator/PEEP valve in room   RT Device Skin Assessment   Oxygen Delivery Device ETT [place]   Site Appearance neck circumference Clean and dry   Site Appearance nares (outer) Clean and dry   Site Appearance nares (inner) Clean and dry   Site Appearance lips Clean and dry   Site Appearance bridge of nose Clean and dry   Site  Appearance occiput Clean and dry   Strap Tightness No Finger Allowance between skin and device strap   Skin Interventions Taken No adjustments needed   Suction   Suction Method ETT   Endotrachial Suction in-line suction catheter (closed)   Oxygen with Suction (%) 100   Signs of Intolerance (Suction) none   Respiratory Secretions Assessment   Secretions Amount small   Secretions Color tan;yellow   Respiratory WDL   Ambu at Bedside present   Breath Sounds   Breath Sounds All Fields   All Lung Fields Breath Sounds coarse   Adult Weaning Assessment   Head of bed - greater than or equal to 30 degrees Yes     .Katheryn Rivera, RT

## 2021-12-05 ENCOUNTER — APPOINTMENT (OUTPATIENT)
Dept: RADIOLOGY | Facility: HOSPITAL | Age: 46
End: 2021-12-05
Attending: INTERNAL MEDICINE
Payer: COMMERCIAL

## 2021-12-05 ENCOUNTER — HOSPITAL ENCOUNTER (INPATIENT)
Facility: HOSPITAL | Age: 46
LOS: 26 days | Discharge: NURSING, REHAB OR LONG-TERM CARE | End: 2021-12-31
Attending: INTERNAL MEDICINE | Admitting: INTERNAL MEDICINE
Payer: COMMERCIAL

## 2021-12-05 VITALS
HEART RATE: 65 BPM | OXYGEN SATURATION: 98 % | SYSTOLIC BLOOD PRESSURE: 112 MMHG | BODY MASS INDEX: 27.61 KG/M2 | HEIGHT: 71 IN | WEIGHT: 197.2 LBS | DIASTOLIC BLOOD PRESSURE: 55 MMHG | RESPIRATION RATE: 26 BRPM | TEMPERATURE: 97 F

## 2021-12-05 DIAGNOSIS — U07.1 ACUTE RESPIRATORY DISTRESS SYNDROME (ARDS) DUE TO COVID-19 VIRUS (H): Primary | ICD-10-CM

## 2021-12-05 DIAGNOSIS — J80 ACUTE RESPIRATORY DISTRESS SYNDROME (ARDS) DUE TO COVID-19 VIRUS (H): Primary | ICD-10-CM

## 2021-12-05 LAB
ANION GAP SERPL CALCULATED.3IONS-SCNC: 6 MMOL/L (ref 5–18)
BACTERIA BLD CULT: NO GROWTH
BASE EXCESS BLDA CALC-SCNC: 11.5 MMOL/L
BASE EXCESS BLDA CALC-SCNC: 13.4 MMOL/L
BASE EXCESS BLDA CALC-SCNC: 13.5 MMOL/L
BUN SERPL-MCNC: 25 MG/DL (ref 8–22)
CALCIUM SERPL-MCNC: 8.1 MG/DL (ref 8.5–10.5)
CALCIUM, IONIZED MEASURED: 1.09 MMOL/L (ref 1.11–1.3)
CHLORIDE BLD-SCNC: 102 MMOL/L (ref 98–107)
CO2 SERPL-SCNC: 32 MMOL/L (ref 22–31)
COHGB MFR BLD: 98.4 % (ref 96–97)
COHGB MFR BLD: 98.6 % (ref 96–97)
COHGB MFR BLD: 99 % (ref 96–97)
CREAT SERPL-MCNC: 0.56 MG/DL (ref 0.7–1.3)
ERYTHROCYTE [DISTWIDTH] IN BLOOD BY AUTOMATED COUNT: 14.2 % (ref 10–15)
GFR SERPL CREATININE-BSD FRML MDRD: >90 ML/MIN/1.73M2
GLUCOSE BLD-MCNC: 134 MG/DL (ref 70–125)
GLUCOSE BLDC GLUCOMTR-MCNC: 121 MG/DL (ref 70–99)
GLUCOSE BLDC GLUCOMTR-MCNC: 130 MG/DL (ref 70–99)
GLUCOSE BLDC GLUCOMTR-MCNC: 136 MG/DL (ref 70–99)
HCO3 BLD-SCNC: 34 MMOL/L (ref 23–29)
HCO3 BLD-SCNC: 35 MMOL/L (ref 23–29)
HCO3 BLD-SCNC: 36 MMOL/L (ref 23–29)
HCT VFR BLD AUTO: 33.8 % (ref 40–53)
HGB BLD-MCNC: 10.6 G/DL (ref 13.3–17.7)
ION CA PH 7.4: 1.1 MMOL/L (ref 1.11–1.3)
LACTATE SERPL-SCNC: 0.8 MMOL/L (ref 0.7–2)
MAGNESIUM SERPL-MCNC: 2.2 MG/DL (ref 1.8–2.6)
MCH RBC QN AUTO: 29.1 PG (ref 26.5–33)
MCHC RBC AUTO-ENTMCNC: 31.4 G/DL (ref 31.5–36.5)
MCV RBC AUTO: 93 FL (ref 78–100)
O2/TOTAL GAS SETTING VFR VENT: 100 %
O2/TOTAL GAS SETTING VFR VENT: 50 %
O2/TOTAL GAS SETTING VFR VENT: 55 %
OXYHGB MFR BLD: 96.9 % (ref 96–97)
OXYHGB MFR BLD: 97 % (ref 96–97)
OXYHGB MFR BLD: 97.2 % (ref 96–97)
PCO2 BLD: 51 MM HG (ref 35–45)
PCO2 BLD: 57 MM HG (ref 35–45)
PCO2 BLD: 60 MM HG (ref 35–45)
PEEP: 16 CM H2O
PH BLD: 7.41 [PH] (ref 7.37–7.44)
PH BLD: 7.41 [PH] (ref 7.37–7.44)
PH BLD: 7.47 [PH] (ref 7.37–7.44)
PH: 7.42 (ref 7.35–7.45)
PLATELET # BLD AUTO: 174 10E3/UL (ref 150–450)
PO2 BLD: 110 MM HG (ref 80–90)
PO2 BLD: 115 MM HG (ref 80–90)
PO2 BLD: 96 MM HG (ref 80–90)
POTASSIUM BLD-SCNC: 4.9 MMOL/L (ref 3.5–5)
RATE: 16 RR/MIN
RATE: 26 RR/MIN
RATE: 26 RR/MIN
RBC # BLD AUTO: 3.64 10E6/UL (ref 4.4–5.9)
SODIUM SERPL-SCNC: 140 MMOL/L (ref 136–145)
TEMPERATURE: 37 DEGREES C
VENTILATION MODE: ABNORMAL
VENTILATOR TIDAL VOLUME: 450 ML
WBC # BLD AUTO: 10.5 10E3/UL (ref 4–11)

## 2021-12-05 PROCEDURE — 272N000272 HC CONTINUOUS NEBULIZER MICRO PUMP

## 2021-12-05 PROCEDURE — 80048 BASIC METABOLIC PNL TOTAL CA: CPT | Performed by: INTERNAL MEDICINE

## 2021-12-05 PROCEDURE — 94645 CONT INHLJ TX EACH ADDL HOUR: CPT

## 2021-12-05 PROCEDURE — 99291 CRITICAL CARE FIRST HOUR: CPT | Performed by: INTERNAL MEDICINE

## 2021-12-05 PROCEDURE — 82330 ASSAY OF CALCIUM: CPT | Performed by: INTERNAL MEDICINE

## 2021-12-05 PROCEDURE — C9113 INJ PANTOPRAZOLE SODIUM, VIA: HCPCS | Performed by: INTERNAL MEDICINE

## 2021-12-05 PROCEDURE — 99207 PR APP CREDIT; MD BILLING SHARED VISIT: CPT | Performed by: INTERNAL MEDICINE

## 2021-12-05 PROCEDURE — 250N000013 HC RX MED GY IP 250 OP 250 PS 637: Performed by: INTERNAL MEDICINE

## 2021-12-05 PROCEDURE — 71045 X-RAY EXAM CHEST 1 VIEW: CPT

## 2021-12-05 PROCEDURE — 250N000011 HC RX IP 250 OP 636: Performed by: INTERNAL MEDICINE

## 2021-12-05 PROCEDURE — 250N000009 HC RX 250: Performed by: INTERNAL MEDICINE

## 2021-12-05 PROCEDURE — 250N000009 HC RX 250: Performed by: HOSPITALIST

## 2021-12-05 PROCEDURE — 99232 SBSQ HOSP IP/OBS MODERATE 35: CPT | Performed by: FAMILY MEDICINE

## 2021-12-05 PROCEDURE — 82805 BLOOD GASES W/O2 SATURATION: CPT | Performed by: INTERNAL MEDICINE

## 2021-12-05 PROCEDURE — 83735 ASSAY OF MAGNESIUM: CPT | Performed by: INTERNAL MEDICINE

## 2021-12-05 PROCEDURE — 258N000003 HC RX IP 258 OP 636: Performed by: INTERNAL MEDICINE

## 2021-12-05 PROCEDURE — 999N000157 HC STATISTIC RCP TIME EA 10 MIN

## 2021-12-05 PROCEDURE — 258N000003 HC RX IP 258 OP 636: Performed by: HOSPITALIST

## 2021-12-05 PROCEDURE — 83605 ASSAY OF LACTIC ACID: CPT | Performed by: INTERNAL MEDICINE

## 2021-12-05 PROCEDURE — 200N000001 HC R&B ICU

## 2021-12-05 PROCEDURE — 85027 COMPLETE CBC AUTOMATED: CPT | Performed by: INTERNAL MEDICINE

## 2021-12-05 PROCEDURE — 84145 PROCALCITONIN (PCT): CPT | Performed by: INTERNAL MEDICINE

## 2021-12-05 PROCEDURE — 85610 PROTHROMBIN TIME: CPT | Performed by: INTERNAL MEDICINE

## 2021-12-05 PROCEDURE — 36592 COLLECT BLOOD FROM PICC: CPT | Performed by: INTERNAL MEDICINE

## 2021-12-05 PROCEDURE — 94003 VENT MGMT INPAT SUBQ DAY: CPT

## 2021-12-05 RX ORDER — DEXTROSE MONOHYDRATE 25 G/50ML
25-50 INJECTION, SOLUTION INTRAVENOUS
Status: DISCONTINUED | OUTPATIENT
Start: 2021-12-05 | End: 2021-12-21

## 2021-12-05 RX ORDER — GABAPENTIN 250 MG/5ML
800 SOLUTION ORAL
Status: CANCELLED | OUTPATIENT
Start: 2021-12-05

## 2021-12-05 RX ORDER — NICOTINE POLACRILEX 4 MG
15-30 LOZENGE BUCCAL
Status: DISCONTINUED | OUTPATIENT
Start: 2021-12-05 | End: 2021-12-21

## 2021-12-05 RX ORDER — BISACODYL 10 MG
10 SUPPOSITORY, RECTAL RECTAL DAILY PRN
Status: DISCONTINUED | OUTPATIENT
Start: 2021-12-05 | End: 2021-12-31 | Stop reason: HOSPADM

## 2021-12-05 RX ORDER — POLYETHYLENE GLYCOL 3350 17 G/17G
17 POWDER, FOR SOLUTION ORAL DAILY
Status: CANCELLED | OUTPATIENT
Start: 2021-12-05

## 2021-12-05 RX ORDER — ONDANSETRON 4 MG/1
4 TABLET, ORALLY DISINTEGRATING ORAL EVERY 6 HOURS PRN
Status: DISCONTINUED | OUTPATIENT
Start: 2021-12-05 | End: 2021-12-31 | Stop reason: HOSPADM

## 2021-12-05 RX ORDER — ARIPIPRAZOLE 5 MG/1
5 TABLET ORAL DAILY
Status: CANCELLED | OUTPATIENT
Start: 2021-12-05

## 2021-12-05 RX ORDER — NICOTINE POLACRILEX 4 MG
15-30 LOZENGE BUCCAL
Status: CANCELLED | OUTPATIENT
Start: 2021-12-05

## 2021-12-05 RX ORDER — DEXMEDETOMIDINE HYDROCHLORIDE 4 UG/ML
.2-1.5 INJECTION, SOLUTION INTRAVENOUS CONTINUOUS
Status: DISCONTINUED | OUTPATIENT
Start: 2021-12-06 | End: 2021-12-08

## 2021-12-05 RX ORDER — FUROSEMIDE 10 MG/ML
40 INJECTION INTRAMUSCULAR; INTRAVENOUS EVERY 24 HOURS
Status: DISCONTINUED | OUTPATIENT
Start: 2021-12-06 | End: 2021-12-10

## 2021-12-05 RX ORDER — HYDROXYZINE HYDROCHLORIDE 25 MG/1
25-50 TABLET, FILM COATED ORAL
Status: CANCELLED | OUTPATIENT
Start: 2021-12-05

## 2021-12-05 RX ORDER — GABAPENTIN 250 MG/5ML
800 SOLUTION ORAL
Status: DISCONTINUED | OUTPATIENT
Start: 2021-12-06 | End: 2021-12-06

## 2021-12-05 RX ORDER — BUPROPION HYDROCHLORIDE 150 MG/1
150 TABLET, EXTENDED RELEASE ORAL 2 TIMES DAILY
Status: DISCONTINUED | OUTPATIENT
Start: 2021-12-06 | End: 2021-12-06 | Stop reason: CLARIF

## 2021-12-05 RX ORDER — OXYCODONE HCL 5 MG/5 ML
10 SOLUTION, ORAL ORAL EVERY 4 HOURS
Status: DISCONTINUED | OUTPATIENT
Start: 2021-12-06 | End: 2021-12-06

## 2021-12-05 RX ORDER — BISACODYL 10 MG
10 SUPPOSITORY, RECTAL RECTAL DAILY PRN
Status: CANCELLED | OUTPATIENT
Start: 2021-12-05

## 2021-12-05 RX ORDER — DEXTROSE MONOHYDRATE 25 G/50ML
25-50 INJECTION, SOLUTION INTRAVENOUS
Status: CANCELLED | OUTPATIENT
Start: 2021-12-05

## 2021-12-05 RX ORDER — PROPOFOL 10 MG/ML
5-75 INJECTION, EMULSION INTRAVENOUS CONTINUOUS
Status: CANCELLED | OUTPATIENT
Start: 2021-12-05

## 2021-12-05 RX ORDER — METOCLOPRAMIDE HYDROCHLORIDE 5 MG/ML
10 INJECTION INTRAMUSCULAR; INTRAVENOUS EVERY 6 HOURS
Status: CANCELLED | OUTPATIENT
Start: 2021-12-05

## 2021-12-05 RX ORDER — LORAZEPAM 2 MG/ML
4 CONCENTRATE ORAL EVERY 4 HOURS
Status: DISCONTINUED | OUTPATIENT
Start: 2021-12-06 | End: 2021-12-06

## 2021-12-05 RX ORDER — CHLORHEXIDINE GLUCONATE ORAL RINSE 1.2 MG/ML
15 SOLUTION DENTAL EVERY 12 HOURS
Status: CANCELLED | OUTPATIENT
Start: 2021-12-05

## 2021-12-05 RX ORDER — DEXTROSE MONOHYDRATE 25 G/50ML
25-50 INJECTION, SOLUTION INTRAVENOUS
Status: DISCONTINUED | OUTPATIENT
Start: 2021-12-05 | End: 2021-12-31 | Stop reason: HOSPADM

## 2021-12-05 RX ORDER — NICOTINE POLACRILEX 4 MG
15-30 LOZENGE BUCCAL
Status: DISCONTINUED | OUTPATIENT
Start: 2021-12-05 | End: 2021-12-31 | Stop reason: HOSPADM

## 2021-12-05 RX ORDER — CHLORHEXIDINE GLUCONATE ORAL RINSE 1.2 MG/ML
15 SOLUTION DENTAL EVERY 12 HOURS
Status: DISCONTINUED | OUTPATIENT
Start: 2021-12-06 | End: 2021-12-15

## 2021-12-05 RX ORDER — OXYCODONE HCL 20 MG/ML
10 CONCENTRATE, ORAL ORAL EVERY 4 HOURS
Status: CANCELLED | OUTPATIENT
Start: 2021-12-05

## 2021-12-05 RX ORDER — PROCHLORPERAZINE 25 MG
25 SUPPOSITORY, RECTAL RECTAL EVERY 12 HOURS PRN
Status: DISCONTINUED | OUTPATIENT
Start: 2021-12-05 | End: 2021-12-31 | Stop reason: HOSPADM

## 2021-12-05 RX ORDER — MIDAZOLAM HCL IN 0.9 % NACL/PF 1 MG/ML
1-12 PLASTIC BAG, INJECTION (ML) INTRAVENOUS CONTINUOUS
Status: CANCELLED | OUTPATIENT
Start: 2021-12-05

## 2021-12-05 RX ORDER — PROCHLORPERAZINE MALEATE 10 MG
10 TABLET ORAL EVERY 6 HOURS PRN
Status: DISCONTINUED | OUTPATIENT
Start: 2021-12-05 | End: 2021-12-31 | Stop reason: HOSPADM

## 2021-12-05 RX ORDER — POLYETHYLENE GLYCOL 3350 17 G/17G
17 POWDER, FOR SOLUTION ORAL DAILY PRN
Status: DISCONTINUED | OUTPATIENT
Start: 2021-12-05 | End: 2021-12-31 | Stop reason: HOSPADM

## 2021-12-05 RX ORDER — AMOXICILLIN 250 MG
2 CAPSULE ORAL 2 TIMES DAILY
Status: DISCONTINUED | OUTPATIENT
Start: 2021-12-06 | End: 2021-12-06 | Stop reason: ALTCHOICE

## 2021-12-05 RX ORDER — ALBUTEROL SULFATE 90 UG/1
2-4 AEROSOL, METERED RESPIRATORY (INHALATION) EVERY 4 HOURS PRN
Status: CANCELLED | OUTPATIENT
Start: 2021-12-05

## 2021-12-05 RX ORDER — FUROSEMIDE 10 MG/ML
40 INJECTION INTRAMUSCULAR; INTRAVENOUS EVERY 24 HOURS
Status: CANCELLED | OUTPATIENT
Start: 2021-12-06

## 2021-12-05 RX ORDER — ONDANSETRON 4 MG/1
4 TABLET, ORALLY DISINTEGRATING ORAL EVERY 6 HOURS PRN
Status: CANCELLED | OUTPATIENT
Start: 2021-12-05

## 2021-12-05 RX ORDER — ONDANSETRON 2 MG/ML
4 INJECTION INTRAMUSCULAR; INTRAVENOUS EVERY 6 HOURS PRN
Status: DISCONTINUED | OUTPATIENT
Start: 2021-12-05 | End: 2021-12-31 | Stop reason: HOSPADM

## 2021-12-05 RX ORDER — MIDAZOLAM HCL IN 0.9 % NACL/PF 1 MG/ML
1-12 PLASTIC BAG, INJECTION (ML) INTRAVENOUS CONTINUOUS
Status: DISCONTINUED | OUTPATIENT
Start: 2021-12-06 | End: 2021-12-08

## 2021-12-05 RX ORDER — NOREPINEPHRINE BITARTRATE 0.02 MG/ML
.01-.6 INJECTION, SOLUTION INTRAVENOUS CONTINUOUS
Status: DISCONTINUED | OUTPATIENT
Start: 2021-12-06 | End: 2021-12-08

## 2021-12-05 RX ORDER — BUPROPION HYDROCHLORIDE 75 MG/1
150 TABLET ORAL 2 TIMES DAILY
Status: CANCELLED | OUTPATIENT
Start: 2021-12-05

## 2021-12-05 RX ORDER — LORAZEPAM 2 MG/ML
4 CONCENTRATE ORAL EVERY 4 HOURS
Status: CANCELLED | OUTPATIENT
Start: 2021-12-05

## 2021-12-05 RX ORDER — ONDANSETRON 2 MG/ML
4 INJECTION INTRAMUSCULAR; INTRAVENOUS EVERY 6 HOURS PRN
Status: CANCELLED | OUTPATIENT
Start: 2021-12-05

## 2021-12-05 RX ORDER — ARIPIPRAZOLE ORAL 1 MG/ML
5 SOLUTION ORAL DAILY
Status: DISCONTINUED | OUTPATIENT
Start: 2021-12-06 | End: 2021-12-06

## 2021-12-05 RX ADMIN — LORAZEPAM 4 MG: 2 LIQUID ORAL at 20:19

## 2021-12-05 RX ADMIN — Medication 12 MG/HR: at 04:42

## 2021-12-05 RX ADMIN — MINERAL OIL AND WHITE PETROLATUM 1 G: 30; 940 OINTMENT OPHTHALMIC at 20:17

## 2021-12-05 RX ADMIN — CHLORHEXIDINE GLUCONATE 15 ML: 1.2 RINSE ORAL at 08:11

## 2021-12-05 RX ADMIN — PROPOFOL 40 MCG/KG/MIN: 10 INJECTION, EMULSION INTRAVENOUS at 14:39

## 2021-12-05 RX ADMIN — NOREPINEPHRINE BITARTRATE 0.02 MCG/KG/MIN: 1 INJECTION, SOLUTION, CONCENTRATE INTRAVENOUS at 20:32

## 2021-12-05 RX ADMIN — SENNOSIDES 10 ML: 8.8 LIQUID ORAL at 08:12

## 2021-12-05 RX ADMIN — CISATRACURIUM BESYLATE 5.5 MCG/KG/MIN: 10 INJECTION INTRAVENOUS at 01:48

## 2021-12-05 RX ADMIN — GABAPENTIN 800 MG: 250 SUSPENSION ORAL at 08:11

## 2021-12-05 RX ADMIN — PROPOFOL 40 MCG/KG/MIN: 10 INJECTION, EMULSION INTRAVENOUS at 03:46

## 2021-12-05 RX ADMIN — CISATRACURIUM BESYLATE 5.5 MCG/KG/MIN: 10 INJECTION INTRAVENOUS at 20:17

## 2021-12-05 RX ADMIN — MINERAL OIL AND WHITE PETROLATUM 1 G: 30; 940 OINTMENT OPHTHALMIC at 04:41

## 2021-12-05 RX ADMIN — CEFEPIME HYDROCHLORIDE 2 G: 2 INJECTION, POWDER, FOR SOLUTION INTRAVENOUS at 00:08

## 2021-12-05 RX ADMIN — PROPOFOL 40 MCG/KG/MIN: 10 INJECTION, EMULSION INTRAVENOUS at 10:21

## 2021-12-05 RX ADMIN — Medication 12 MG/HR: at 20:38

## 2021-12-05 RX ADMIN — PROPOFOL 40 MCG/KG/MIN: 10 INJECTION, EMULSION INTRAVENOUS at 06:40

## 2021-12-05 RX ADMIN — Medication 20 NG/KG/MIN: at 03:31

## 2021-12-05 RX ADMIN — CHLORHEXIDINE GLUCONATE 15 ML: 1.2 RINSE ORAL at 20:19

## 2021-12-05 RX ADMIN — BUPROPION HYDROCHLORIDE 150 MG: 75 TABLET, FILM COATED ORAL at 20:20

## 2021-12-05 RX ADMIN — LORAZEPAM 4 MG: 2 LIQUID ORAL at 16:04

## 2021-12-05 RX ADMIN — Medication 300 MCG/HR: at 08:41

## 2021-12-05 RX ADMIN — POLYETHYLENE GLYCOL 3350 17 G: 17 POWDER, FOR SOLUTION ORAL at 08:11

## 2021-12-05 RX ADMIN — CISATRACURIUM BESYLATE 5.5 MCG/KG/MIN: 10 INJECTION INTRAVENOUS at 13:13

## 2021-12-05 RX ADMIN — ARIPIPRAZOLE 5 MG: 5 TABLET ORAL at 08:11

## 2021-12-05 RX ADMIN — OXYCODONE HYDROCHLORIDE 10 MG: 100 SOLUTION ORAL at 11:24

## 2021-12-05 RX ADMIN — DOCUSATE SODIUM 100 MG: 50 LIQUID ORAL at 20:19

## 2021-12-05 RX ADMIN — LORAZEPAM 4 MG: 2 LIQUID ORAL at 11:24

## 2021-12-05 RX ADMIN — OXYCODONE HYDROCHLORIDE 10 MG: 100 SOLUTION ORAL at 20:19

## 2021-12-05 RX ADMIN — BUPROPION HYDROCHLORIDE 150 MG: 75 TABLET, FILM COATED ORAL at 08:11

## 2021-12-05 RX ADMIN — GABAPENTIN 800 MG: 250 SUSPENSION ORAL at 11:24

## 2021-12-05 RX ADMIN — Medication 20 NG/KG/MIN: at 15:27

## 2021-12-05 RX ADMIN — CEFEPIME HYDROCHLORIDE 2 G: 2 INJECTION, POWDER, FOR SOLUTION INTRAVENOUS at 16:04

## 2021-12-05 RX ADMIN — LORAZEPAM 4 MG: 2 LIQUID ORAL at 04:42

## 2021-12-05 RX ADMIN — Medication 12 MG/HR: at 12:23

## 2021-12-05 RX ADMIN — CISATRACURIUM BESYLATE 5.5 MCG/KG/MIN: 10 INJECTION INTRAVENOUS at 06:41

## 2021-12-05 RX ADMIN — OXYCODONE HYDROCHLORIDE 10 MG: 100 SOLUTION ORAL at 00:13

## 2021-12-05 RX ADMIN — LORAZEPAM 4 MG: 2 LIQUID ORAL at 08:12

## 2021-12-05 RX ADMIN — PROPOFOL 40 MCG/KG/MIN: 10 INJECTION, EMULSION INTRAVENOUS at 22:27

## 2021-12-05 RX ADMIN — CEFEPIME HYDROCHLORIDE 2 G: 2 INJECTION, POWDER, FOR SOLUTION INTRAVENOUS at 08:12

## 2021-12-05 RX ADMIN — MINERAL OIL AND WHITE PETROLATUM 1 G: 30; 940 OINTMENT OPHTHALMIC at 11:19

## 2021-12-05 RX ADMIN — DOCUSATE SODIUM 100 MG: 50 LIQUID ORAL at 08:11

## 2021-12-05 RX ADMIN — Medication 300 MCG/HR: at 00:47

## 2021-12-05 RX ADMIN — ENOXAPARIN SODIUM 50 MG: 100 INJECTION SUBCUTANEOUS at 00:07

## 2021-12-05 RX ADMIN — OXYCODONE HYDROCHLORIDE 10 MG: 100 SOLUTION ORAL at 08:12

## 2021-12-05 RX ADMIN — OXYCODONE HYDROCHLORIDE 10 MG: 100 SOLUTION ORAL at 16:04

## 2021-12-05 RX ADMIN — LORAZEPAM 4 MG: 2 LIQUID ORAL at 00:14

## 2021-12-05 RX ADMIN — PANTOPRAZOLE SODIUM 40 MG: 40 INJECTION, POWDER, FOR SOLUTION INTRAVENOUS at 06:40

## 2021-12-05 RX ADMIN — ENOXAPARIN SODIUM 50 MG: 100 INJECTION SUBCUTANEOUS at 11:10

## 2021-12-05 RX ADMIN — FUROSEMIDE 40 MG: 10 INJECTION, SOLUTION INTRAMUSCULAR; INTRAVENOUS at 13:04

## 2021-12-05 RX ADMIN — GABAPENTIN 800 MG: 250 SUSPENSION ORAL at 16:11

## 2021-12-05 RX ADMIN — Medication 300 MCG/HR: at 16:22

## 2021-12-05 RX ADMIN — SENNOSIDES 10 ML: 8.8 LIQUID ORAL at 20:19

## 2021-12-05 RX ADMIN — PROPOFOL 40 MCG/KG/MIN: 10 INJECTION, EMULSION INTRAVENOUS at 18:50

## 2021-12-05 RX ADMIN — OXYCODONE HYDROCHLORIDE 10 MG: 100 SOLUTION ORAL at 04:42

## 2021-12-05 ASSESSMENT — ACTIVITIES OF DAILY LIVING (ADL)
ADLS_ACUITY_SCORE: 15

## 2021-12-05 ASSESSMENT — MIFFLIN-ST. JEOR: SCORE: 1791.62

## 2021-12-05 NOTE — PROGRESS NOTES
[facility] MEDICINE PROGRESS NOTE      Identification/Summary:   Jared North is a 46 year old male with a past medical history of opioid use on Suboxone, previous alcoholic, on Suboxone, hypertension who was presented with worsening shortness of breath on 11/24/2021 and admitted for COVID19 vral pneumonia with acute respiratory failure hypoxia.     Intubated on 11/28 with worsening hypoxic respiratory failure as well as agitation. In addition to intensivist/pulmonologist, acute pain management team consulting given polysubstance abuse history and opioid dependence and suboxone. Improving FiO2 with cisatracurium, fentanyl, midazolam, and propofol infusions; off norepinephrine. Discussed with acute pain management team, RN, intensivist, RT, and CM/SW.[facility] will continue to follow patient peripherally until patient is stable enough to be transferred out of ICU. Per nursing staff improving slowly.  Discussed directly with intensivist.     Assessment and Plan:  COVID pneumonia with severe acute respiratory failure hypoxia requiring intubation and mechanical ventilation on 11/28  Patient was not vaccinated  Started on Decadron  Also received Tocilizumab  DVT prophylaxis per ICU protocol for COVID  Sliding scale insulin for blood sugars  Improved FiO2.  Proned again     New onset atrial fibrillation, mild RVR - resolved  Troponin negative x3  Amiodarone utilized transient and now discontinued.   No off norepinephrine.   Telemetry.     Slightly elevated LFTs, improving  Continue remdesivir.  LFTs are slightly elevated but well less than x5 ULN. Benefits outweigh the risk.     History of opioid use and alcoholism  Not on methadone.  On Suboxone per review of the pharmacist.  Acute pain team following.      Moderate Protein-Calorie Malnutrition     Diet: NPO for Medical/Clinical Reasons Except for: NPO but receiving Tube Feeding  Adult Formula Drip Feeding: Continuous Promote; Orogastric tube;  Goal Rate: keep at 65 but once has a BM increase to 85ml/hr; mL/hr; Medication - Feeding Tube Flush Frequency: At least 15-30 mL water before and after medication administration and ...  DVT Prophylaxis:  Enoxaparin (Lovenox) SQ  Code Status: Full Code     Anticipated possible discharge in several  Diet: NPO for Medical/Clinical Reasons Except for: NPO but receiving Tube Feeding  Adult Formula Drip Feeding: Continuous Promote; Orogastric tube; Goal Rate: keep at 65 but once has a BM increase to 85ml/hr; mL/hr; Medication - Feeding Tube Flush Frequency: At least 15-30 mL water before and after medication administration and ...  DVT Prophylaxis:  Enoxaparin (Lovenox) SQ  Code Status: Full Code    Anticipated possible discharge in several days    Tomy Snell MD  [facility]  Phone: #[phone]    Interval History/Subjective:  Patient is doing okay with minimal change per ICU team.  Pronating and supinating essentially daily.  Discussed directly with nursing.    Physical Exam/Objective:  Temp:  [98.7  F (37.1  C)-99.3  F (37.4  C)] 99.1  F (37.3  C)  Pulse:  [56-68] 66  Resp:  [22-26] 26  MAP:  [55 mmHg-104 mmHg] 67 mmHg  Arterial Line BP: ()/(43-79) 118/52  FiO2 (%):  [60 %] 60 %  SpO2:  [96 %-100 %] 96 %  Body mass index is 27.5 kg/m .    GENERAL:  Alert, appears comfortable, in no acute distress, appears stated age   HEAD:  Normocephalic, without obvious abnormality, atraumatic   EYES:  PERRL, conjunctiva/corneas clear, no scleral icterus, EOM's intact   NOSE: Nares normal, septum midline, mucosa normal, no drainage   NECK: Supple, symmetrical, trachea midline   BACK:   Symmetric, no curvature, ROM normal   LUNGS:   Clear to auscultation bilaterally, no rales, rhonchi, or wheezing, symmetric chest rise on inhalation, respirations unlabored   CHEST WALL:  No tenderness or deformity   HEART:  Regular rate and rhythm, S1 and S2 normal, no murmur,  rub, or gallop    ABDOMEN:   Soft, non-tender, bowel sounds active all four quadrants, no masses, no organomegaly, no rebound or guarding   EXTREMITIES: Extremities normal, atraumatic, no cyanosis or edema    SKIN: Dry to touch, no exanthems in the visualized areas   NEURO: Alert, oriented x3, moves all four extremities freely   PSYCH: Cooperative, behavior is appropriate      Data reviewed today: I personally reviewed all new medications, labs, imaging/diagnostics reports over the past 24 hours. Pertinent findings include:    Imaging:   No results found for this or any previous visit (from the past 24 hour(s)).    Labs:  Most Recent 3 CBC's:Recent Labs   Lab Test 12/05/21  0354 12/04/21  0521 12/03/21  0500   WBC 10.5 8.8 7.3   HGB 10.6* 10.9* 11.0*   MCV 93 93 94    162 161     Most Recent 3 BMP's:Recent Labs   Lab Test 12/05/21  0820 12/05/21  0354 12/05/21  0209 12/04/21  0617 12/04/21  0521 12/03/21  1310 12/03/21  0500   NA  --  140  --   --  140  --  142   POTASSIUM  --  4.9  --   --  4.6  --  4.4   CHLORIDE  --  102  --   --  103  --  107   CO2  --  32*  --   --  33*  --  32*   BUN  --  25*  --   --  23*  --  25*   CR  --  0.56*  --   --  0.56*  --  0.62*   ANIONGAP  --  6  --   --  4*  --  3*   TRUONG  --  8.1*  --   --  7.8*  --  8.0*   * 134* 136*   < > 144*   < > 147*    < > = values in this interval not displayed.     Most Recent 2 LFT's:Recent Labs   Lab Test 12/02/21  0506 11/28/21  0509   AST 26 92*   ALT 31 75*   ALKPHOS 47 63   BILITOTAL 0.3 0.6     Most Recent 3 INR's:Recent Labs   Lab Test 11/24/21  2339   INR 1.08       Medications:   Personally Reviewed.  Medications     cisatracurium 5.5 mcg/kg/min (12/05/21 0800)     epoprostenol (VELETRI) 20 mcg/mL in sterile water inhalation solution 20 ng/kg/min (12/05/21 0331)     fentaNYL 300 mcg/hr (12/05/21 0841)     propofol (DIPRIVAN) infusion 40 mcg/kg/min (12/05/21 1021)    And     - MEDICATION INSTRUCTIONS -       midazolam 12 mg/hr  (12/05/21 1223)     norepinephrine 0.01 mcg/kg/min (12/05/21 0800)     vasopressin         ARIPiprazole  5 mg Oral Daily     artificial tears  1 inch Both Eyes Q8H     [Held by provider] buprenorphine HCl-naloxone HCl  1 Film Sublingual Daily     buPROPion  150 mg Oral or Feeding Tube BID     ceFEPIme (MAXIPIME) IV  2 g Intravenous Q8H     chlorhexidine  15 mL Mouth/Throat Q12H     docusate  100 mg Oral or Feeding Tube BID     enoxaparin ANTICOAGULANT  0.5 mg/kg Subcutaneous Q12H     furosemide  40 mg Intravenous Q24H     gabapentin  800 mg Oral or Feeding Tube TID     insulin aspart  1-12 Units Subcutaneous Q6H     [Held by provider] lisinopril  10 mg Oral Daily     LORazepam  4 mg Oral Q4H     [Held by provider] metoclopramide  10 mg Intravenous Q6H     oxyCODONE  10 mg Oral Q4H     pantoprazole  40 mg Per Feeding Tube QAM AC    Or     pantoprazole (PROTONIX) IV  40 mg Intravenous QAM AC     polyethylene glycol  17 g Oral or Feeding Tube Daily     sennosides  10 mL Oral BID

## 2021-12-05 NOTE — PROGRESS NOTES
Pt remain on vent with settings;     12/05/21 0325   Tech Time   $Tech Time (10 minute increments) 6   Ventilator Data   Vent Owner On Site Equipment   Vent Brand CARRIE   Vent ID [id number]   Ventilation Method Invasive   Ventilation Day(s) 8   $Vent Subsequent Subsequent   Ventilator   Ventilator Adult   Ventilator  - Settings    Ventilation Mode CMV/AC  (Continuous Mandatory Ventilation/ Assist Control)   Rate Set (breaths/minute) 26 breaths/min   Tidal Volume Set (mL) 450 mL   PEEP (cm H2O) 16 cmH2O   Oxygen Concentration (%) 55 %   Inspiratory Time (seconds) 0.75 sec   Ventilator - Patient    Patient Resp Rate  26 breaths/min   Inspiratory Pressure (cm H2O) 31 cmH2O   Mean Airway Pressure (cm H2O) 21 cmH2O   Expiratory Vt  mL 439   Minute Volume L/min 11.6 L/min   Additional Vent Settings   Plateau Pressure (cm H2O) 27 cmH2O   Ventilator Arm In Place Yes   Ventilator Alarms   High Inspiratory Pressure Alarm (cmH2O) 45 cm H2O   High Respiratory Rate (breaths/min) 40 breaths/min   Minute Ventilation High (L) 14 L/min   Minute Ventilation Low (L) 5 L/min   Ventilator Circuit   Ventilator Humidifier  Heater   ETT Cuffed Single 8 mm   Placement Date/Time: 11/28/21 (c) 0380   Mask Ventilation: Not attempted  Induction Type: Intravenous  Ease of Intubation: Easy  Laryngoscopy Technique: Video laryngoscopy  Cuffed: Cuffed  ETT Type: Single  Single Lumen Tube Size: 8 mm  VL Blade Type:...   Secured at (cm) 26 cm   Measured from Teeth/Gums   Position Center   Secured by Cloth tape   Bite Block None Present   Site Appearance Clean   Cuff Assessment Minimal leak technique   Safety Measures Manual resuscitator/PEEP valve in room   RT Device Skin Assessment   Oxygen Delivery Device ETT [place]   Site Appearance neck circumference Clean and dry   Site Appearance nares (outer) Clean and dry   Site Appearance nares (inner) Clean and dry   Site Appearance lips Clean and dry   Site Appearance bridge of nose Clean and dry   Site  Appearance of ears Clean and dry   Site Appearance occiput Clean and dry   Strap Tightness No Finger Allowance between skin and device strap   Skin Interventions Taken No adjustments needed   Suction   Suction Method ETT   Endotrachial Suction in-line suction catheter (closed)   Oxygen with Suction (%) 100   Signs of Intolerance (Suction) none   Respiratory Secretions Assessment   Secretions Amount small   Secretions Color tan;white   Secretions Characteristics thick   Respiratory WDL   Ambu at Bedside present   Breath Sounds   Breath Sounds All Fields   All Lung Fields Breath Sounds coarse;diminished   Adult Weaning Assessment   Head of bed - greater than or equal to 30 degrees Yes     Will continue to monitor and assess pt.    Katheryn Rivera, RT

## 2021-12-05 NOTE — PROGRESS NOTES
[facility] Pulmonary/Critical Care Consult Team Note    Jared North,  1975, MRN [id number]  Admitting Dx: Acute respiratory failure with hypoxia (H) [J96.01]  Pneumonia due to 2019 novel coronavirus [U07.1, J12.82]  Date / Time of Admission:  2021  1:19 PM    Overnight Events:  Intake/Output last 3 shifts:  I/O last 3 completed shifts:  In: 3723.35 [I.V.:1383.35; NG/GT:1490]  Out: 3130 [Urine:3130]  Remains sedated and paralyzed on vent  FiO2 titrated down to 55%  proned overnight  Remains on levophed    Assessment/Plan: Jared North is a 46 year old male unvaccinated against COVID-19, with history of polysubstance abuse and chronic pain/lower back pain now on Suboxone, mood disorder/depression, essential hypertension, sleep disorder breathing, mild intermittent asthma, knee pain, and recent diagnosis of COVID-19 viral infection on 2021 who presented to [facility] on 2021 with worsening shortness of breath, admitted to the ICU with COVID-19 viral pneumonia and severe acute respiratory failure s/p intubation .    PULM/ID: COVID-19 viral pneumonia.  Mild intermittent asthma, Acute resp failure with hypoxia s/p intubation .  - LAST VENT SETTINGS:   Ventilation Mode: CMV/AC  (Continuous Mandatory Ventilation/ Assist Control)  FiO2 (%): 60 %  Rate Set (breaths/minute): 26 breaths/min  Tidal Volume Set (mL): 450 mL  PEEP (cm H2O): 16 cmH2O  Oxygen Concentration (%): (S) 55 %  Resp: 26  - Continue inhaled epoprostenol - full strength.   - Patient received tocilizumab 8 mg/kg on   - dexamethasone 6 mg daily x10 days (start )  - remdesivir daily x5 days (start   - PF ratio is 209  - will supine this am  - started on Cefepime and Vanc  for concerns for VAP - discontinued Vanc   - culture with E Coli and Proteus     CV: circulatory shock   - titrate levophed, MAP >65  - afib on , now sinus  - Monitor on tele    GI: NPO, tube feeds  -     - GI  proph    Heme/Onc: COVID-19 associated coagulopathy.  D-dimer 1.24 on 11/24.   - DVT prophylaxis: SCDs.  Lovenox 0.5 mg/kg subcu 2X/day    RENAL:   - Follow BUN/Creatinine  - strict I/O's  - continue lasix 40mg daily (12/3)    NEURO: therapeutic sedation.    - History of polysubstance abuse and chronic pain/lower back pain now on Suboxone.  Hold suboxone on 11/28 after intubation. Mood disorder/depression.  - Propofol, fentanyl, midazolam gtt as needed for sedation.    - nimbex gtt   - RASS goal -4/-5.  - Continue home meds: abilify, gabapentin 3x/day  - continue Oxy and ativan through OG tube    ENDO:-   - Critical Care hyperglycemic protocol  - Accuchecks and sliding scale q6    Pt has critical illness and impairs breathing on vent such as there is high probability of imminent of life threatening deterioration in the patient's condition.    Code Status: FULL CODE    Infusions:    cisatracurium 5.5 mcg/kg/min (12/05/21 0800)     epoprostenol (VELETRI) 20 mcg/mL in sterile water inhalation solution 20 ng/kg/min (12/05/21 0331)     fentaNYL 300 mcg/hr (12/05/21 0841)     propofol (DIPRIVAN) infusion 40 mcg/kg/min (12/05/21 1021)    And     - MEDICATION INSTRUCTIONS -       midazolam 12 mg/hr (12/05/21 0800)     norepinephrine 0.01 mcg/kg/min (12/05/21 0800)     vasopressin       Lines/Drains/Tubes:  8.0 mm endotracheal tube, placed 11/29  RUE PICC central line, placed 11/24  Left radial A-line, placed 11/28  OG enteral tube, placed 11/28  Wei catheter, placed 11/28    ICU DAILY CHECKLIST           Can patient transfer out of MICU? no     FAST HUG:     Feeding:  yes.  Patient is receiving TUBE FEEDS    Wei: no  Analgesia/Sedation: yes; Propofol, Midazolam and Fentanyl   Thromboembolic prophylaxis: yes; Mode:  Lovenox and SCDs  HOB>30:  yes  Stress Ulcer Protocol Active: yes; Mode: PPI  Glycemic Control: Any glucose > 180 no; Mode of Insulin Therapy: Sliding Scale Insulin     INTUBATED:  Can patient have daily  "waking:  No  Can patient have spontaneous breathing trial:  No     INTUBATED:  Can patient have daily waking: no  Can patient have spontaneous breathing trial: no;  Does pt have restraints: MD RESTRAINT FOR NON-VIOLENT BEHAVIOR FACE TO FACE EVALUATION Patient's Immediate Situation: Patient demonstrated the following behaviors: Impulsive behavior, grabbing at support tubes/lines.  Patient's Reaction to the intervention Does patient understand the reason for restraint/seclusion? Unable to Express Medical Condition Is there any evidence of compromise of Skin integrity, Respiratory, Cardiovascular, Musculoskeletal, Hydration? No Behavioral ConditionIn consultation with the RN, is there a need to continue this restraint or seclusion? Yes See Restraint Flowsheet for complete restraint documentation and assessment.  PHYSICAL THERAPY AND MOBILITY: Can patient have PT and mobility trial: no Activity: ICU mobility protocol    Critical Care Time excluding procedures and family discussions greater than: 1 Hour    Risk Factors Present on Admission:  Clinically Significant Risk Factors Present on Admission                  Carlotta Cage DO  Pulmonary and Critical Care Attending  pgr [phone]    No Known Allergies    Meds: See MAR    Physical Exam:  /55   Pulse 66   Temp 99.1  F (37.3  C) (Axillary)   Resp 26   Ht 1.803 m (5' 11\")   Wt 89.4 kg (197 lb 3.2 oz)   SpO2 96%   BMI 27.50 kg/m    Intake/Output this shift:  I/O this shift:  In: 296.01 [I.V.:111.01; NG/GT:100]  Out: 250 [Urine:250]  GEN: Intubated and sedated, NAD  HEENT: et tube in place, OG tube in place  CVS: a-line with waveform  RESP: CTA BL posteriorly  ABD: proned  EXT: Warm, well perfused, 1+edema  NEURO:  sedated  PSYCH: sedated    Pertinent Labs: Latest lab results in EHR personally reviewed.   CMP  Recent Labs   Lab 12/05/21  0833 12/05/21  0820 12/05/21  0354 12/05/21  0209 12/04/21  2022 12/04/21  0617 12/04/21  0521 12/03/21  1310 " 12/03/21  0500 12/02/21  0806 12/02/21  0506   NA  --   --  140  --   --   --  140  --  142  --  144   POTASSIUM  --   --  4.9  --   --   --  4.6  --  4.4  --  4.9   CHLORIDE  --   --  102  --   --   --  103  --  107  --  109*   CO2  --   --  32*  --   --   --  33*  --  32*  --  29   ANIONGAP  --   --  6  --   --   --  4*  --  3*  --  6   GLC  --  121* 134* 136* 134*   < > 144*   < > 147*   < > 132*   BUN  --   --  25*  --   --   --  23*  --  25*  --  20   CR  --   --  0.56*  --   --   --  0.56*  --  0.62*  --  0.60*   GFRESTIMATED  --   --  >90  --   --   --  >90  --  >90  --  >90   TRUONG  --   --  8.1*  --   --   --  7.8*  --  8.0*  --  7.6*   MAG 2.2  --   --   --   --   --  2.2  --  2.0  --  2.2   PROTTOTAL  --   --   --   --   --   --   --   --   --   --  5.0*   ALBUMIN  --   --   --   --   --   --   --   --   --   --  2.4*   BILITOTAL  --   --   --   --   --   --   --   --   --   --  0.3   ALKPHOS  --   --   --   --   --   --   --   --   --   --  47   AST  --   --   --   --   --   --   --   --   --   --  26   ALT  --   --   --   --   --   --   --   --   --   --  31    < > = values in this interval not displayed.     CBC  Recent Labs   Lab 12/05/21  0354 12/04/21 0521 12/03/21  0500 12/02/21  0506   WBC 10.5 8.8 7.3 8.1   RBC 3.64* 3.51* 3.77* 4.16*   HGB 10.6* 10.9* 11.0* 12.2*   HCT 33.8* 32.7* 35.5* 38.6*   MCV 93 93 94 93   MCH 29.1 31.1 29.2 29.3   MCHC 31.4* 33.3 31.0* 31.6   RDW 14.2 14.5 14.5 14.3    162 161 205     INR  No lab results found in last 7 days.  Arterial Blood Gas  Recent Labs   Lab 12/05/21  0833 12/04/21  1343 12/04/21  0828 12/03/21  1310   PH 7.41 7.44 7.44 7.39   PCO2 57* 51* 52* 57*   PO2 115* 75* 96* 96*   HCO3 34* 33* 33* 32*   O2PER 55 0.6 0.65 0.8       Imaging: personally reviewed.   Results for orders placed or performed during the hospital encounter of 11/24/21   CT Chest Pulmonary Embolism w Contrast    Narrative    EXAM: CT CHEST PULMONARY EMBOLISM W CONTRAST  LOCATION:  [facility]  DATE/TIME: 11/24/2021 2:27 PM    INDICATION: Hypoxemia, Covid positive  COMPARISON: None.  TECHNIQUE: CT chest pulmonary angiogram during arterial phase injection of IV contrast. Multiplanar reformats and MIP reconstructions were performed. Dose reduction techniques were used.   CONTRAST: ISOVUE 370 100mL    FINDINGS:  ANGIOGRAM CHEST: No pulmonary artery filling defect. Aorta is normal in caliber.    LUNGS AND PLEURA: Extensive groundglass consolidative bilateral pulmonary opacities. Trace left effusion.    MEDIASTINUM/AXILLAE: Heart size is normal scattered borderline enlarged mediastinal lymph nodes with the largest in the right lower paratracheal chain measuring 1.1 x 2.2 cm (series 5/29).    CORONARY ARTERY CALCIFICATION: None.    UPPER ABDOMEN: Hepatic steatosis.    MUSCULOSKELETAL: Normal.      Impression    IMPRESSION:  1.  No pulmonary embolus.  2.  COVID pneumonia.  3.  Likely reactive mediastinal lymph nodes.  4.  Hepatic steatosis.   XR Chest Port 1 View    Narrative    EXAM: XR CHEST PORTABLE 1 VIEW  LOCATION: [facility]  DATE/TIME: 11/28/2021, 3:08 PM    INDICATION: Endotracheal tube positioning.  RN TO CALL WHEN READY.  COMPARISON: Chest CT on 11/24/2021 and chest x-ray on 11/19/2021.      Impression    IMPRESSION: Single AP view of the chest was obtained. Endotracheal tube tip projects over mid thoracic trachea approximately 4 cm from the felipa. Enteric tube crosses the diaphragm with the distal tip outside the field-of-view. Right upper extremity   PICC tip projects over low SVC. Stable cardiomediastinal silhouette. Bilateral basilar predominant patchy airspace pulmonary opacities, worrisome for infection including atypical infection like COVID-19, significantly worsened as compared to 11/19/2021   x-ray. No significant pleural effusion or pneumothorax.     XR Abdomen Port 1 View    Narrative    EXAM: XR ABDOMEN PORT 1 VIEWS  LOCATION:  [facility]  DATE/TIME: 11/28/2021 3:09 PM    INDICATION: Enteric tube placement, evaluate position  COMPARISON: Chest x-ray the same date      Impression    IMPRESSION: Enteric suction tube with the tip and side-port within the mid gastric lumen. Nonobstructive bowel gas pattern. No definite free air. Persistent diffuse bibasilar pulmonary airspace opacities.                            Patient Active Problem List   Diagnosis     Chronic back pain     Essential hypertension     Elevated LFTs     Acute respiratory failure with hypoxia (H)     Pneumonia due to 2019 novel coronavirus     Atrial fibrillation with rapid ventricular response (H)       Carlotta Cage DO  Pulmonary and Critical Care Attending  pgr [phone]

## 2021-12-06 LAB
ANION GAP SERPL CALCULATED.3IONS-SCNC: 6 MMOL/L (ref 5–18)
ANION GAP SERPL CALCULATED.3IONS-SCNC: 7 MMOL/L (ref 5–18)
ATRIAL RATE - MUSE: 102 BPM
BASE EXCESS BLDA CALC-SCNC: 12.8 MMOL/L
BASE EXCESS BLDA CALC-SCNC: 13.3 MMOL/L
BUN SERPL-MCNC: 25 MG/DL (ref 8–22)
BUN SERPL-MCNC: 26 MG/DL (ref 8–22)
CALCIUM SERPL-MCNC: 8 MG/DL (ref 8.5–10.5)
CALCIUM SERPL-MCNC: 8.6 MG/DL (ref 8.5–10.5)
CALCIUM, IONIZED MEASURED: 1.15 MMOL/L (ref 1.11–1.3)
CHLORIDE BLD-SCNC: 100 MMOL/L (ref 98–107)
CHLORIDE BLD-SCNC: 100 MMOL/L (ref 98–107)
CO2 SERPL-SCNC: 34 MMOL/L (ref 22–31)
CO2 SERPL-SCNC: 35 MMOL/L (ref 22–31)
COHGB MFR BLD: 98.9 % (ref 96–97)
COHGB MFR BLD: 99.7 % (ref 96–97)
CREAT SERPL-MCNC: 0.58 MG/DL (ref 0.7–1.3)
CREAT SERPL-MCNC: 0.62 MG/DL (ref 0.7–1.3)
DIASTOLIC BLOOD PRESSURE - MUSE: NORMAL MMHG
ERYTHROCYTE [DISTWIDTH] IN BLOOD BY AUTOMATED COUNT: 14.2 % (ref 10–15)
ERYTHROCYTE [DISTWIDTH] IN BLOOD BY AUTOMATED COUNT: 14.6 % (ref 10–15)
GFR SERPL CREATININE-BSD FRML MDRD: >90 ML/MIN/1.73M2
GFR SERPL CREATININE-BSD FRML MDRD: >90 ML/MIN/1.73M2
GLUCOSE BLD-MCNC: 95 MG/DL (ref 70–125)
GLUCOSE BLD-MCNC: 98 MG/DL (ref 70–125)
GLUCOSE BLDC GLUCOMTR-MCNC: 105 MG/DL (ref 70–99)
GLUCOSE BLDC GLUCOMTR-MCNC: 115 MG/DL (ref 70–99)
GLUCOSE BLDC GLUCOMTR-MCNC: 119 MG/DL (ref 70–99)
GLUCOSE BLDC GLUCOMTR-MCNC: 123 MG/DL (ref 70–99)
GLUCOSE BLDC GLUCOMTR-MCNC: 161 MG/DL (ref 70–99)
GLUCOSE BLDC GLUCOMTR-MCNC: 96 MG/DL (ref 70–99)
GLUCOSE BLDC GLUCOMTR-MCNC: 96 MG/DL (ref 70–99)
HCO3 BLD-SCNC: 35 MMOL/L (ref 23–29)
HCO3 BLD-SCNC: 35 MMOL/L (ref 23–29)
HCT VFR BLD AUTO: 34.2 % (ref 40–53)
HCT VFR BLD AUTO: 35.1 % (ref 40–53)
HGB BLD-MCNC: 11 G/DL (ref 13.3–17.7)
HGB BLD-MCNC: 11.2 G/DL (ref 13.3–17.7)
INR PPP: 0.96 (ref 0.85–1.15)
INTERPRETATION ECG - MUSE: NORMAL
ION CA PH 7.4: 1.18 MMOL/L (ref 1.11–1.3)
MAGNESIUM SERPL-MCNC: 2.1 MG/DL (ref 1.8–2.6)
MAGNESIUM SERPL-MCNC: 2.2 MG/DL (ref 1.8–2.6)
MCH RBC QN AUTO: 29.6 PG (ref 26.5–33)
MCH RBC QN AUTO: 30.3 PG (ref 26.5–33)
MCHC RBC AUTO-ENTMCNC: 31.9 G/DL (ref 31.5–36.5)
MCHC RBC AUTO-ENTMCNC: 32.2 G/DL (ref 31.5–36.5)
MCV RBC AUTO: 93 FL (ref 78–100)
MCV RBC AUTO: 94 FL (ref 78–100)
O2/TOTAL GAS SETTING VFR VENT: 0.5 %
O2/TOTAL GAS SETTING VFR VENT: 80 %
OXYHGB MFR BLD: 97.6 % (ref 96–97)
OXYHGB MFR BLD: 97.7 % (ref 96–97)
P AXIS - MUSE: 53 DEGREES
PCO2 BLD: 51 MM HG (ref 35–45)
PCO2 BLD: 57 MM HG (ref 35–45)
PEEP: 16 CM H2O
PEEP: 16 CM H2O
PH BLD: 7.43 [PH] (ref 7.37–7.44)
PH BLD: 7.46 [PH] (ref 7.37–7.44)
PH: 7.44 (ref 7.35–7.45)
PLATELET # BLD AUTO: 177 10E3/UL (ref 150–450)
PLATELET # BLD AUTO: 180 10E3/UL (ref 150–450)
PO2 BLD: 120 MM HG (ref 80–90)
PO2 BLD: 152 MM HG (ref 80–90)
POTASSIUM BLD-SCNC: 4.3 MMOL/L (ref 3.5–5)
POTASSIUM BLD-SCNC: 4.3 MMOL/L (ref 3.5–5)
PR INTERVAL - MUSE: 144 MS
PROCALCITONIN SERPL-MCNC: 0.07 NG/ML (ref 0–0.49)
QRS DURATION - MUSE: 94 MS
QT - MUSE: 362 MS
QTC - MUSE: 471 MS
R AXIS - MUSE: -21 DEGREES
RATE: 24 RR/MIN
RATE: 26 RR/MIN
RBC # BLD AUTO: 3.63 10E6/UL (ref 4.4–5.9)
RBC # BLD AUTO: 3.78 10E6/UL (ref 4.4–5.9)
SODIUM SERPL-SCNC: 140 MMOL/L (ref 136–145)
SODIUM SERPL-SCNC: 142 MMOL/L (ref 136–145)
SYSTOLIC BLOOD PRESSURE - MUSE: NORMAL MMHG
T AXIS - MUSE: 34 DEGREES
TEMPERATURE: 37 DEGREES C
TEMPERATURE: 37 DEGREES C
VENTILATION MODE: ABNORMAL
VENTILATION MODE: AC
VENTILATOR TIDAL VOLUME: 450 ML
VENTILATOR TIDAL VOLUME: 450 ML
VENTRICULAR RATE- MUSE: 102 BPM
WBC # BLD AUTO: 10.6 10E3/UL (ref 4–11)
WBC # BLD AUTO: 9.6 10E3/UL (ref 4–11)

## 2021-12-06 PROCEDURE — 85027 COMPLETE CBC AUTOMATED: CPT | Performed by: INTERNAL MEDICINE

## 2021-12-06 PROCEDURE — 200N000001 HC R&B ICU

## 2021-12-06 PROCEDURE — 250N000011 HC RX IP 250 OP 636: Performed by: INTERNAL MEDICINE

## 2021-12-06 PROCEDURE — 250N000012 HC RX MED GY IP 250 OP 636 PS 637: Performed by: INTERNAL MEDICINE

## 2021-12-06 PROCEDURE — 258N000003 HC RX IP 258 OP 636: Performed by: INTERNAL MEDICINE

## 2021-12-06 PROCEDURE — 94003 VENT MGMT INPAT SUBQ DAY: CPT

## 2021-12-06 PROCEDURE — 80048 BASIC METABOLIC PNL TOTAL CA: CPT | Performed by: INTERNAL MEDICINE

## 2021-12-06 PROCEDURE — 999N000157 HC STATISTIC RCP TIME EA 10 MIN

## 2021-12-06 PROCEDURE — 83735 ASSAY OF MAGNESIUM: CPT | Performed by: INTERNAL MEDICINE

## 2021-12-06 PROCEDURE — 250N000013 HC RX MED GY IP 250 OP 250 PS 637: Performed by: INTERNAL MEDICINE

## 2021-12-06 PROCEDURE — 82805 BLOOD GASES W/O2 SATURATION: CPT | Performed by: INTERNAL MEDICINE

## 2021-12-06 PROCEDURE — 250N000009 HC RX 250: Performed by: INTERNAL MEDICINE

## 2021-12-06 PROCEDURE — 82330 ASSAY OF CALCIUM: CPT | Performed by: INTERNAL MEDICINE

## 2021-12-06 PROCEDURE — 94645 CONT INHLJ TX EACH ADDL HOUR: CPT

## 2021-12-06 PROCEDURE — 99291 CRITICAL CARE FIRST HOUR: CPT | Performed by: INTERNAL MEDICINE

## 2021-12-06 RX ORDER — POLYETHYLENE GLYCOL 3350 17 G/17G
17 POWDER, FOR SOLUTION ORAL 2 TIMES DAILY
Status: DISCONTINUED | OUTPATIENT
Start: 2021-12-06 | End: 2021-12-31 | Stop reason: HOSPADM

## 2021-12-06 RX ORDER — DEXTROSE MONOHYDRATE 100 MG/ML
INJECTION, SOLUTION INTRAVENOUS CONTINUOUS PRN
Status: DISCONTINUED | OUTPATIENT
Start: 2021-12-06 | End: 2021-12-31 | Stop reason: HOSPADM

## 2021-12-06 RX ORDER — BUPROPION HYDROCHLORIDE 100 MG/1
100 TABLET ORAL 3 TIMES DAILY
Status: DISCONTINUED | OUTPATIENT
Start: 2021-12-06 | End: 2021-12-09

## 2021-12-06 RX ORDER — OXYCODONE HCL 5 MG/5 ML
10 SOLUTION, ORAL ORAL EVERY 4 HOURS
Status: DISCONTINUED | OUTPATIENT
Start: 2021-12-06 | End: 2021-12-09

## 2021-12-06 RX ORDER — GABAPENTIN 250 MG/5ML
800 SOLUTION ORAL
Status: DISCONTINUED | OUTPATIENT
Start: 2021-12-06 | End: 2021-12-16

## 2021-12-06 RX ORDER — LORAZEPAM 2 MG/ML
2 CONCENTRATE ORAL EVERY 4 HOURS
Status: DISCONTINUED | OUTPATIENT
Start: 2021-12-06 | End: 2021-12-06

## 2021-12-06 RX ORDER — LISINOPRIL 10 MG/1
10 TABLET ORAL DAILY
Status: ON HOLD | COMMUNITY
End: 2022-01-24

## 2021-12-06 RX ORDER — ACETAMINOPHEN 325 MG/1
650 TABLET ORAL EVERY 4 HOURS PRN
Status: DISCONTINUED | OUTPATIENT
Start: 2021-12-06 | End: 2021-12-31 | Stop reason: HOSPADM

## 2021-12-06 RX ORDER — CALCIUM CHLORIDE 100 MG/ML
1 INJECTION INTRAVENOUS; INTRAVENTRICULAR ONCE
Status: COMPLETED | OUTPATIENT
Start: 2021-12-06 | End: 2021-12-06

## 2021-12-06 RX ORDER — LACTULOSE 10 G/15ML
20 SOLUTION ORAL ONCE
Status: COMPLETED | OUTPATIENT
Start: 2021-12-06 | End: 2021-12-06

## 2021-12-06 RX ORDER — ARIPIPRAZOLE ORAL 1 MG/ML
5 SOLUTION ORAL DAILY
Status: DISCONTINUED | OUTPATIENT
Start: 2021-12-07 | End: 2021-12-09

## 2021-12-06 RX ORDER — LORAZEPAM 2 MG/ML
2 CONCENTRATE ORAL EVERY 4 HOURS
Status: DISCONTINUED | OUTPATIENT
Start: 2021-12-06 | End: 2021-12-09

## 2021-12-06 RX ORDER — ACETAMINOPHEN 650 MG/1
650 SUPPOSITORY RECTAL EVERY 4 HOURS PRN
Status: DISCONTINUED | OUTPATIENT
Start: 2021-12-06 | End: 2021-12-31 | Stop reason: HOSPADM

## 2021-12-06 RX ORDER — CEFTRIAXONE 2 G/1
2 INJECTION, POWDER, FOR SOLUTION INTRAMUSCULAR; INTRAVENOUS EVERY 24 HOURS
Status: COMPLETED | OUTPATIENT
Start: 2021-12-06 | End: 2021-12-10

## 2021-12-06 RX ADMIN — POLYETHYLENE GLYCOL 3350 17 G: 17 POWDER, FOR SOLUTION ORAL at 20:41

## 2021-12-06 RX ADMIN — DOCUSATE SODIUM 100 MG: 50 LIQUID ORAL at 20:42

## 2021-12-06 RX ADMIN — LORAZEPAM 4 MG: 2 LIQUID ORAL at 01:46

## 2021-12-06 RX ADMIN — LORAZEPAM 2 MG: 2 LIQUID ORAL at 20:04

## 2021-12-06 RX ADMIN — Medication 100 MCG: at 13:11

## 2021-12-06 RX ADMIN — MINERAL OIL AND WHITE PETROLATUM: 150; 830 OINTMENT OPHTHALMIC at 13:53

## 2021-12-06 RX ADMIN — DEXMEDETOMIDINE HYDROCHLORIDE 0.6 MCG/KG/HR: 400 INJECTION INTRAVENOUS at 08:18

## 2021-12-06 RX ADMIN — DEXTROSE MONOHYDRATE 5.5 MCG/KG/MIN: 50 INJECTION, SOLUTION INTRAVENOUS at 03:44

## 2021-12-06 RX ADMIN — Medication 50 MCG: at 20:49

## 2021-12-06 RX ADMIN — OXYCODONE HYDROCHLORIDE 10 MG: 5 SOLUTION ORAL at 01:56

## 2021-12-06 RX ADMIN — Medication 100 MCG: at 17:58

## 2021-12-06 RX ADMIN — LORAZEPAM 2 MG: 2 LIQUID ORAL at 16:33

## 2021-12-06 RX ADMIN — Medication 300 MCG/HR: at 09:41

## 2021-12-06 RX ADMIN — Medication 20 NG/KG/MIN: at 01:34

## 2021-12-06 RX ADMIN — Medication 40 MG: at 06:44

## 2021-12-06 RX ADMIN — Medication 11 MG/HR: at 05:05

## 2021-12-06 RX ADMIN — DEXTROSE MONOHYDRATE 5.5 MCG/KG/MIN: 50 INJECTION, SOLUTION INTRAVENOUS at 14:03

## 2021-12-06 RX ADMIN — FUROSEMIDE 40 MG: 10 INJECTION, SOLUTION INTRAMUSCULAR; INTRAVENOUS at 08:17

## 2021-12-06 RX ADMIN — LORAZEPAM 4 MG: 2 LIQUID ORAL at 04:54

## 2021-12-06 RX ADMIN — Medication 300 MCG/HR: at 16:46

## 2021-12-06 RX ADMIN — Medication 100 MCG: at 09:35

## 2021-12-06 RX ADMIN — LORAZEPAM 4 MG: 2 LIQUID ORAL at 13:13

## 2021-12-06 RX ADMIN — Medication 300 MCG/HR: at 23:44

## 2021-12-06 RX ADMIN — OXYCODONE HYDROCHLORIDE 10 MG: 5 SOLUTION ORAL at 22:06

## 2021-12-06 RX ADMIN — MINERAL OIL AND WHITE PETROLATUM: 150; 830 OINTMENT OPHTHALMIC at 20:03

## 2021-12-06 RX ADMIN — Medication 800 MG: at 13:27

## 2021-12-06 RX ADMIN — BUPROPION HYDROCHLORIDE 100 MG: 100 TABLET, FILM COATED ORAL at 20:42

## 2021-12-06 RX ADMIN — CHLORHEXIDINE GLUCONATE 0.12% ORAL RINSE 15 ML: 1.2 LIQUID ORAL at 20:04

## 2021-12-06 RX ADMIN — DEXMEDETOMIDINE HYDROCHLORIDE 0.8 MCG/KG/HR: 400 INJECTION INTRAVENOUS at 15:13

## 2021-12-06 RX ADMIN — DEXMEDETOMIDINE HYDROCHLORIDE 1 MCG/KG/HR: 400 INJECTION INTRAVENOUS at 20:03

## 2021-12-06 RX ADMIN — Medication 12 MG/HR: at 13:27

## 2021-12-06 RX ADMIN — Medication 100 MCG: at 08:00

## 2021-12-06 RX ADMIN — Medication 0.01 MCG/KG/MIN: at 09:45

## 2021-12-06 RX ADMIN — CEFEPIME HYDROCHLORIDE 2 G: 2 INJECTION, POWDER, FOR SOLUTION INTRAVENOUS at 01:39

## 2021-12-06 RX ADMIN — ACETAMINOPHEN 650 MG: 650 SOLUTION ORAL at 13:13

## 2021-12-06 RX ADMIN — OXYCODONE HYDROCHLORIDE 10 MG: 5 SOLUTION ORAL at 13:13

## 2021-12-06 RX ADMIN — LORAZEPAM 2 MG: 2 LIQUID ORAL at 23:47

## 2021-12-06 RX ADMIN — Medication 300 MCG/HR: at 02:03

## 2021-12-06 RX ADMIN — MIDAZOLAM HYDROCHLORIDE 2 MG: 1 INJECTION, SOLUTION INTRAMUSCULAR; INTRAVENOUS at 16:39

## 2021-12-06 RX ADMIN — Medication 800 MG: at 10:30

## 2021-12-06 RX ADMIN — POLYETHYLENE GLYCOL 3350 17 G: 17 POWDER, FOR SOLUTION ORAL at 08:17

## 2021-12-06 RX ADMIN — MIDAZOLAM HYDROCHLORIDE 2 MG: 1 INJECTION, SOLUTION INTRAMUSCULAR; INTRAVENOUS at 20:49

## 2021-12-06 RX ADMIN — ACETAMINOPHEN 650 MG: 650 SOLUTION ORAL at 08:49

## 2021-12-06 RX ADMIN — DEXMEDETOMIDINE HYDROCHLORIDE 0.2 MCG/KG/HR: 400 INJECTION INTRAVENOUS at 01:29

## 2021-12-06 RX ADMIN — MIDAZOLAM HYDROCHLORIDE 2 MG: 1 INJECTION, SOLUTION INTRAMUSCULAR; INTRAVENOUS at 18:05

## 2021-12-06 RX ADMIN — Medication 12 MG/HR: at 21:32

## 2021-12-06 RX ADMIN — OXYCODONE HYDROCHLORIDE 10 MG: 5 SOLUTION ORAL at 17:36

## 2021-12-06 RX ADMIN — SENNOSIDES 10 ML: 8.8 LIQUID ORAL at 10:54

## 2021-12-06 RX ADMIN — GABAPENTIN 800 MG: 250 SUSPENSION ORAL at 16:34

## 2021-12-06 RX ADMIN — OXYCODONE HYDROCHLORIDE 10 MG: 5 SOLUTION ORAL at 04:55

## 2021-12-06 RX ADMIN — DOCUSATE SODIUM 100 MG: 50 LIQUID ORAL at 10:54

## 2021-12-06 RX ADMIN — ENOXAPARIN SODIUM 50 MG: 100 INJECTION SUBCUTANEOUS at 21:33

## 2021-12-06 RX ADMIN — INSULIN ASPART 1 UNITS: 100 INJECTION, SOLUTION INTRAVENOUS; SUBCUTANEOUS at 13:27

## 2021-12-06 RX ADMIN — CEFTRIAXONE SODIUM 2 G: 2 INJECTION, POWDER, FOR SOLUTION INTRAMUSCULAR; INTRAVENOUS at 09:41

## 2021-12-06 RX ADMIN — Medication 10 NG/KG/MIN: at 16:26

## 2021-12-06 RX ADMIN — CHLORHEXIDINE GLUCONATE 0.12% ORAL RINSE 15 ML: 1.2 LIQUID ORAL at 08:17

## 2021-12-06 RX ADMIN — LACTULOSE 20 G: 10 SOLUTION ORAL at 09:40

## 2021-12-06 RX ADMIN — MINERAL OIL AND WHITE PETROLATUM: 150; 830 OINTMENT OPHTHALMIC at 03:31

## 2021-12-06 RX ADMIN — CALCIUM CHLORIDE 1 G: 100 INJECTION, SOLUTION INTRAVENOUS at 02:02

## 2021-12-06 RX ADMIN — ENOXAPARIN SODIUM 50 MG: 100 INJECTION SUBCUTANEOUS at 10:54

## 2021-12-06 RX ADMIN — LORAZEPAM 4 MG: 2 LIQUID ORAL at 08:17

## 2021-12-06 RX ADMIN — SENNOSIDES 10 ML: 8.8 LIQUID ORAL at 20:42

## 2021-12-06 RX ADMIN — OXYCODONE HYDROCHLORIDE 10 MG: 5 SOLUTION ORAL at 08:17

## 2021-12-06 RX ADMIN — MIDAZOLAM HYDROCHLORIDE 2 MG: 1 INJECTION, SOLUTION INTRAMUSCULAR; INTRAVENOUS at 13:11

## 2021-12-06 RX ADMIN — ARIPIPRAZOLE 5 MG: 1 SOLUTION ORAL at 10:30

## 2021-12-06 RX ADMIN — Medication 100 MCG: at 16:38

## 2021-12-06 RX ADMIN — DEXTROSE MONOHYDRATE 6.5 MCG/KG/MIN: 50 INJECTION, SOLUTION INTRAVENOUS at 17:48

## 2021-12-06 RX ADMIN — DEXTROSE MONOHYDRATE 7 MCG/KG/MIN: 50 INJECTION, SOLUTION INTRAVENOUS at 23:47

## 2021-12-06 ASSESSMENT — ACTIVITIES OF DAILY LIVING (ADL)
ADLS_ACUITY_SCORE: 22
ADLS_ACUITY_SCORE: 12
DEPENDENT_IADLS:: INDEPENDENT
ADLS_ACUITY_SCORE: 24
ADLS_ACUITY_SCORE: 22
ADLS_ACUITY_SCORE: 24
ADLS_ACUITY_SCORE: 24
ADLS_ACUITY_SCORE: 22
ADLS_ACUITY_SCORE: 24
ADLS_ACUITY_SCORE: 22
ADLS_ACUITY_SCORE: 22
ADLS_ACUITY_SCORE: 24
ADLS_ACUITY_SCORE: 22
ADLS_ACUITY_SCORE: 24
ADLS_ACUITY_SCORE: 12
ADLS_ACUITY_SCORE: 24
ADLS_ACUITY_SCORE: 22
ADLS_ACUITY_SCORE: 24
ADLS_ACUITY_SCORE: 22

## 2021-12-06 ASSESSMENT — MIFFLIN-ST. JEOR: SCORE: 1885.13

## 2021-12-06 NOTE — PLAN OF CARE
NSR. Lungs course/diminished. Patient remains intubated, sedated, and paralyzed. BIS within goal range. Turned supine this afternoon and patient tolerated well. Wei catheter intact. OG with tube feeding at goal; patient tolerating feedings. Plan to transfer to [facility] this evening. Report given to oncoming RN. Will continue to monitor closely.

## 2021-12-06 NOTE — PROVIDER NOTIFICATION
Pt BP and HR are quite labile. Temp 100.2. Dr. Redman notified in rounds. Tylenol given and increased sedation and fentanyl.

## 2021-12-06 NOTE — CONSULTS
"CLINICAL NUTRITION SERVICES - ASSESSMENT NOTE     Nutrition Prescription    RECOMMENDATIONS FOR MDs/PROVIDERS TO ORDER:      Malnutrition Status:    Moderate noted 11/28 at     Recommendations already ordered by Registered Dietitian (RD):  After pt has BM can start trickle feeding.  Promote at 15 ml/hr continuous.  FWF 30 ml every 6 hrs..  This to provide  360 ml formula, 360 calories, 22 g protein, 47 g carb, 302 ml free water, 120 ml flush, 422 ml total free water    Future/Additional Recommendations:  Ability to increase TF.     REASON FOR ASSESSMENT  Jared North is a/an 46 year old male assessed by the dietitian for Provider Order - Registered Dietitian to Assess and Order TF per Medical Nutrition Therapy Protocol    NUTRITION HISTORY  Pt transferred from  on 12/6.  Pt intubated 11/28, trickle TF started 11/29 and advanced toward goal the following day.  RD noted moderate malnutrition 11/28/21.  Gastric FT placed 11/28.  Pt prone this am.      CURRENT NUTRITION ORDERS  Diet: NPO      LABS  Na 140  K 4.3  Creat 0.58  Labs reviewed    MEDICATIONS  IVs-nimbex, precedes, veletri, fentanyl, versed  Lasix, miralax, senokot, colace, oxycodone  Medications reviewed    ANTHROPOMETRICS  Height: 180.3 cm (5' 11\")  Most Recent Weight: 98.8 kg (217 lb 13 oz)  12/6  Note 20 lb difference in wt from  on 12/5 to [facility] 12/6.  BMI: Obesity Grade I BMI 30-34.9  Weight History:   Wt Readings from Last 6 Encounters:   12/06/21 98.8 kg (217 lb 13 oz)   12/05/21 89.4 kg (197 lb 3.2 oz)   02/22/18 83.3 kg (183 lb 11.2 oz)   196 lb 9.6 oz 11/24/21- admission  197 lb 3.2 oz 12/5/21-WW.  222 lb 12.8 oz 3/3/21    Dosing Weight: 78.2 kg, IBW    ASSESSED NUTRITION NEEDS  Estimated Energy Needs: 7576-0642 kcals/day (22-25)  Justification: critically ill and Obese  Estimated Protein Needs: 117-156 grams protein/day (1.5 - 2 grams of pro/kg)  Justification: critically ill and Obesity guidelines  Estimated Fluid Needs: 4209-0002 " mL/day (1 mL/kcal)   Justification: Maintenance    PHYSICAL FINDINGS  See malnutrition section below.  Pt in COVID isolation precautions.    Per 12/6 chest x-ray: Enteric tube tip below the diaphragm.        MALNUTRITION: notd 11/28  % Weight Loss:  13% in 9 months  % Intake:  </= 50% for >/= 5 days (severe malnutrition)  Subcutaneous Fat Loss:  unable  Muscle Loss:  unable  Fluid Retention:  Trace per charting    Malnutrition Diagnosis: Moderate malnutrition  In Context of:  Acute illness or injury    NUTRITION DIAGNOSIS  Malnutrition related to acute illness as evidenced by wt loss, decreased po intake  Swallow difficulty r/t acute illness evidenced by intubation      INTERVENTIONS  Implementation     Enteral Nutrition - Initiate trickle feeding today after pt has a BM.      Goals  Tolerate TF at goal rate  Meet nutrition needs  Electrolytes WNL     Monitoring/Evaluation  Progress toward goals will be monitored and evaluated per protocol.

## 2021-12-06 NOTE — CONSULTS
Care Management Initial Consult    General Information  Assessment completed with: Other (Ex spouse)Jama  Type of CM/SW Visit: Initial Assessment    Primary Care Provider verified and updated as needed: Yes   Readmission within the last 30 days: other (see comments) (Transferred from [facility])      Reason for Consult: discharge planning  Advance Care Planning: Advance Care Planning Reviewed: other (comment) (Intubated, need to follow up when appropriate )          Communication Assessment  Patient's communication style:               Cognitive  Cognitive/Neuro/Behavioral: .WDL except (Intubated )  Level of Consciousness: sedated  Arousal Level: unresponsive           Speech: endotracheal tube    Living Environment:   People in home: other (see comments) (Lives with ex spouse half of the time)     Current living Arrangements: house      Able to return to prior arrangements: other (see comments) (Pending needs at discharge)       Family/Social Support:  Care provided by: self  Provides care for: no one  Marital Status:   Other (specify) (Ex-spouse)          Description of Support System: Supportive,Involved    Support Assessment: Adequate family and caregiver support    Current Resources:   Patient receiving home care services: No     Community Resources: None  Equipment currently used at home: none  Supplies currently used at home: None    Employment/Financial:  Employment Status: other (see comments) (Working on disability paperwork prior to intubation)        Financial Concerns: No concerns identified   Referral to Financial Counselor: No       Lifestyle & Psychosocial Needs:  Social Determinants of Health     Tobacco Use: High Risk     Smoking Tobacco Use: Never Smoker     Smokeless Tobacco Use: Current User   Alcohol Use: Not on file   Financial Resource Strain: Not on file   Food Insecurity: Not on file   Transportation Needs: Not on file   Physical Activity: Not on file   Stress: Not on file   Social  Connections: Not on file   Intimate Partner Violence: Not on file   Depression: Not on file   Housing Stability: Not on file       Functional Status:  Prior to admission patient needed assistance:   Dependent ADLs:: Independent  Dependent IADLs:: Independent       Mental Health Status:          Chemical Dependency Status:                Values/Beliefs:  Spiritual, Cultural Beliefs, Bahai Practices, Values that affect care: no               Additional Information:  Patient transferred from [facility]. COVID-19 positive, Intubated and vented. Spoke with patients Ex-spouse to obtain baseline data. Patient lives with Ex-spouse Jaam for part of the time. She reports he is normally independent at baseline. If able Jama is able to provide care in her home post discharge. Discussed all potential discharge options including LTACH, TCU/Acute Rehab, or home with home care depending on Robbie progressions and needs at discharge. No further questions from Jama at this time. Will keep Jama updated once closer to discharge and better idea of plan is known. Transport to be determined. Care manager to follow.     Agnes Starkey RN

## 2021-12-06 NOTE — PROGRESS NOTES
Patient to transport to [facility], report given to charge RT at [facility].  Pt discharged to [facility].     12/05/21 5800   Tech Time   $Tech Time (10 minute increments) 6   Ventilator Data   Vent Owner On Site Equipment   Vent Brand CARRIE   Vent ID [id number]   Ventilation Method Invasive   Ventilator   Ventilator Adult   Ventilator  - Settings    Ventilation Mode CMV/AC  (Continuous Mandatory Ventilation/ Assist Control)   Rate Set (breaths/minute) 26 breaths/min   Tidal Volume Set (mL) 450 mL   PEEP (cm H2O) 16 cmH2O   Oxygen Concentration (%) 50 %   Inspiratory Time (seconds) 0.75 sec   Ventilator - Patient    Patient Resp Rate  26 breaths/min   Inspiratory Pressure (cm H2O) 34 cmH2O   Mean Airway Pressure (cm H2O) 22 cmH2O   Expiratory Vt  mL 460   Minute Volume L/min 11.7 L/min   Additional Vent Settings   Plateau Pressure (cm H2O) 28 cmH2O   Ventilator Arm In Place Yes   PEEPi (cm H2O) 17 cm   Ventilator Alarms   High Inspiratory Pressure Alarm (cmH2O) 45 cm H2O   High Respiratory Rate (breaths/min) 40 breaths/min   Minute Ventilation High (L) 14 L/min   Minute Ventilation Low (L) 5 L/min   Ventilator Circuit   Ventilator Humidifier  Heater   Heater Temperature 37   ETT Cuffed Single 8 mm   Placement Date/Time: 11/28/21 (c) 9089   Mask Ventilation: Not attempted  Induction Type: Intravenous  Ease of Intubation: Easy  Laryngoscopy Technique: Video laryngoscopy  Cuffed: Cuffed  ETT Type: Single  Single Lumen Tube Size: 8 mm  VL Blade Type:...   Secured at (cm) 26 cm   Measured from Teeth/Gums   Position Center   Secured by Commercial tube soto   Bite Block None Present   Site Appearance Clean   Tube Care Site care done   Cuff Assessment Minimal leak technique   RT Device Skin Assessment   Oxygen Delivery Device ETT Soto   Site Appearance neck circumference Clean and dry   Site Appearance nares (outer) Clean and dry   Site Appearance nares (inner) Clean and dry   Site Appearance lips Clean and dry   Site Appearance bridge  of nose Clean and dry   Site Appearance of ears Clean and dry   Site Appearance occiput Clean and dry   Strap Tightness Finger Allowance between head and device strap   Skin Interventions Taken Strap adjusted to allow 1 finger between skin and device strap   Suction   Suction Method ETT   Nasal Suction nare, left;nare, right;suction catheter (open)   Oral Suction mouth   Endotrachial Suction in-line suction catheter (closed)   Oxygen with Suction (%) 100   Signs of Intolerance (Suction) none   Respiratory Secretions Assessment   Secretions Amount small   Secretions Color cloudy   Secretions Characteristics thick   Breath Sounds   Breath Sounds All Fields   All Lung Fields Breath Sounds diminished  (scattered coarse)   Adult Pressure Support Trial   Pulse 65   Oxygen Therapy   SpO2 97 %

## 2021-12-06 NOTE — PLAN OF CARE
RT PROGRESS NOTE    VENT DAY# 9    CURRENT SETTINGS:   Ventilation Mode: CMV/AC  (Continuous Mandatory Ventilation/ Assist Control)  FiO2 (%): (S) 50 %  Rate Set (breaths/minute): 24 breaths/min  Tidal Volume Set (mL): 450 mL  PEEP (cm H2O): 16 cmH2O  Oxygen Concentration (%): 50 %  Resp: 24    12/6 changes made today per MD.  RR decreased from 26 to 24  Decreased Veletri to quater  strength    FIO2 is down to 50%, Sats mid to high 90's.    PATIENT PARAMETERS:  PIP 32  Pplat:  28  Pmean:  21  SBT: No     Secretions:  Moderate white  02 Sats:  97%    ETT SIZE 8.0 Secured at 26 cm at teeth/gums    Respiratory Medications: Half strength Veletri     ABG: @1520 pH 7.43; pCO2 57; pO2 120; HCO3 35, on 50%    NOTE / SHIFT SUMMARY:   Patient remains on full ventilator support. No weaning done this shift. Patient remains proned, on PEEP of 16, half strength veletri. Will continue with current therapy and assess as needed.    Santo Jerez, RT

## 2021-12-06 NOTE — PLAN OF CARE
Problem: Risk for Delirium  Goal: Optimal Coping  Outcome: No Change  Goal: Improved Behavioral Control  Outcome: No Change  Goal: Improved Attention and Thought Clarity  Outcome: No Change

## 2021-12-06 NOTE — PROGRESS NOTES
Brief intensivist note  12/5/2021     Patient arrived to the ICU at [facility], supine, SpO2 in upper 90s on 100% FiO2, PEEP 16.   No issues during transfer.    HPI: 45 yo unvaccinated man w/ h/o chronic pain, opioid abuse on suboxone, depression, & positive COVID test on 11/19, admitted to [facility] on 11/24 w/ severe COVID pneumonia. He was intubated 11/28 w/ COVID ARDS, & was started on neuromuscular blockade, pronation, & Flolan. He has been getting diuresed & fed enterally. He was diagnosed w/ VAP on 12/1 & started on cefepime + vancomycin. His sputum resulted w/ pan-sensitive P.mirabilis & E.coli, & vancomycin was stopped 12/4. He was supinated on the morning of 12/5 ().     There were no vitals taken for this visit.  General: intubated  man, no distress  Pulm: coarse b/l mechanical breath sounds  CV: RRR, no m/r/g, extremities warm to touch  Abd: soft, ND, normoactive bowel sounds  Extremities: no peripheral edema  Neuro: sedated, paralyzed    Recommendations/Plan:     RASS -5, continue w/ fentanyl + midazolam, exchange propofol to Precedex, continue scheduled oxycodone & lorazepam    Continue Nimbex gtt    Check CXR & AXR to make sure that his tubes have not migrated    Check ABG    Anticipate proning overnight    Additional critical care time of 30 minutes spent on direct patient care this evening.    Sofía Quintana MD  Pulmonary and Critical Care

## 2021-12-06 NOTE — PROGRESS NOTES
Respiratory Care Note     Patient arrived outside hospital intubated with ET tube 8.0 tied 26 @ teeth. Confirmed with bilateral breath sound. CXR and arterial blod gas post transport is pending.     Placed on mechanical ventilator with the same settings.Patinet tolerating well. Based on result of blood gas and CXR may or may not prone per provider plan.    Ventilation Mode: CMV/AC  (Continuous Mandatory Ventilation/ Assist Control)  FiO2 (%): 80 %  Rate Set (breaths/minute): 26 breaths/min  Tidal Volume Set (mL): 450 mL  PEEP (cm H2O): 16 cmH2O  Oxygen Concentration (%): 80 %  Resp: 26

## 2021-12-06 NOTE — PROGRESS NOTES
ICU Update Note    An ICU Bed was found available to take a transfer at [facility]. We are over capacity at our facility and will therefore transfer the pt to that open and staffed ICU bed.   Discussed the rationale with pts decision maker Jama including the risks of transfer.  She agrees to the transfer.    She requests a phone call when the pt arrives at the other facility  Sign out given to accepting ICU MD.    Carlotta Cage DO  Pulmonary and Critical Care Attending  [facility] [phone]

## 2021-12-06 NOTE — PHARMACY-ADMISSION MEDICATION HISTORY
PTA med list was updated at [facility] on 11/24/21.  Contact pharmacy if any questions/concerns.      Sheila BoydD

## 2021-12-06 NOTE — PROGRESS NOTES
Family was updated on status of patient  post transport from [facility] to [facility].

## 2021-12-06 NOTE — PROGRESS NOTES
[facility]:  CRITICAL CARE PROGRESS NOTE     Date / Time of Admission:  11/24/2021  1:19 PM    ID: Jared North is a 46 year old male, unvaccinated against COVID-19, with history of polysubstance abuse and chronic pain/lower back pain now on Suboxone, mood disorder/depression, essential hypertension, sleep disorder breathing, mild intermittent asthma, knee pain, and recent diagnosis of COVID-19 viral infection on 11/19/2021 who presented to [facility] on 11/24/2021 with worsening shortness of breath, admitted to the ICU with COVID-19 viral pneumonia and severe acute respiratory failure with hypoxia requiring noninvasive ventilation.  Course complicated by ARDS requiring intubation 11/28, multi-organism bacterial VAP (Proteus, E. Coli) and circulatory shock.         Changes for today: 1.   Keep prone today - was proned at 3 am.  2. P/F ratio 217 on FiO2 70%.  Reduce Veletri to 1/2 strength.   3. Reduce RR from 26 to 24.   Repeat ABG this afternoon.  4. Switch cefepime to ceftriaxone x 5 more days, complete 10 day course.  5. Start Miralax 2x/day, give lactulose x 1.   6. Nutrition consult - will initiate enteral feeding if has BM.        Assessment/Plan:   Neurologic:  Acute metabolic encephalopathy.   In the setting of therapeutic sedation.  History of polysubstance abuse and chronic pain/lower back pain now on Suboxone.  Hold suboxone on 11/28 after intubation. Mood disorder/depression.    Continue Nimbex gtt. TOF goal 2/4. BIS monitoring.      Precedex, fentanyl, midazolam gtt as needed for sedation.  RASS goal -4/-5.    Continue home meds: abilify, bupropion, gabapentin 3x/day    Adjust ativan from 4 mg to 2 mg Q4H, continue oxycodone 10 mg Q4H    Pain meds PRN     Pulmonary: Acute respiratory failure with hypoxia, acute respiratory distress syndrome.  Secondary to COVID19 and superimposed VAP.   CT chest 11/24 extensive groundglass consolidative bilateral pulmonary opacities.   Failed HFNC/NIPPV, intubated 11/28.  Had trace left pleural effusion on CT, diuresed.  COVID-19 viral pneumonia.  Mild intermittent asthma without exacerbation.  He reports using inhaler once weekly at baseline. Trace left pleural effusion.  Noted on CT scan, diuresed.     Wean supplemental O2 as tolerated; goal O2 sat 92-96%.  HOB > 30 degrees to limit aspiration risk.      Vent: CMV, RR 26 => 24,  mL (6 mL/kg IBW), PEEP 16, FiO2 70%.     Goal Pplat less than 30, goal driving pressure less than 15.    Reduce RR from 26 to 24.    ABG 2x/day.  Keep prone today.    Continue inhaled epoprostenol - reduced from full strength to 1/2 strength today    abx and diuresis as below.     Cardiovascular:  Circulatory shock.  In the setting of sepsis, vasoplegia from therapeutic sedation.  Has history of essential hypertension, on lisinopril at baseline.  Paroxysmal atrial fibrillation.  Went into A. fib on 11/26, initially rate controlled.  Initiated amiodarone on 11/27.  Converted to normal sinus rhythm after intubation on 11/28.  Amio gtt stopped.  Mild RV dysfunction.  TTE 11/28 with + pressure overload, moderate RV dilation, mild RV dysfunction.  LVEF 60-65%.    Cardiac monitoring.  SBP >= 90 mmHg, MAP >= 65 mmHg.  EKG PRN.    Norepinephrine as needed for BP goals.     GI/: Transaminitis, resolved.  Likely from the viral infection.  Present prior to Remdesivir use. Moderate protein-calorie malnutrition.  Slow transit constipation.    Monitor for escalating gastric residuals due to paralytic use.    Nutrition: Enteral feeding - initiate trickle feeding of has BM today.  FWF 30 mL Q6H.    Last BM:  12/2 Meds: colace 2x/day.  Senna 2x/day.  Give lactulose, start miralax 2x/day.    GI prophylaxis: PPI.     Renal:  Metabolic alkalosis.      Monitor I/O's.  Electrolyte repletion PRN.  Avoid/limit nephrotoxic agents.    IVFs: Saline lock.    Continue Lasix 40 mg IV daily; monitor for contraction alkalosis.     Heme/Onc:  COVID-19 associated coagulopathy.  D-dimer 1.24 on 11/24.  Leukopenia,resolved.  Thrombocytopenia, resolved.     DVT prophylaxis: SCDs.  Lovenox 0.5 mg/kg subcu 2X/day.     Endocrine: Hyperglycemia, mild.  No history of diabetes.  Secondary to stress and steroid use.  Hemoglobin A1c 5.8% 11/24.    FSBG Q4H, Aspart insulin SS (high insulin resistance).  Hypoglycemia protocol.     Infectious diseases: COVID-19 viral infection.  PCR positive on 11/19.  Hospitalized on 11/24.  CRP 12.3, PCT 0.5 on 11/24.  s/p decadron x 10 days (start 11/24), Remdesivir x 5 days (start 11/24), s/p tocilizumab 8 mg/kg on 11/24.  Multi-organism ventilator associated pneumonia.  ET aspirate 12/1 + 1+ E. Coli and 1+ Proteus mirabalis.  Cefepime IV on 12/1.    Follow-up cultures.    Discontinue cefepime, start ceftriaxone (start 12/6) x 5 more days to complete 10-day course.     Rheum/Musculoskeletal: Chronic lumbar pain and knee pain.  Patient being evaluated by orthopedic surgery for surgical intervention.    Activity:  Bedrest    Lines/Drains/Tubes:  8.0 mm endotracheal tube, placed 11/28  RUE PICC central line, placed 11/24  Left radial A-line, placed 11/28  OG enteral tube, placed 11/28  Wei catheter, placed 11/28     Code Status:  Full Code     The patient and/or the family was educated about the above plan of care and indicated understanding.  Updated the patient's ex-wife, Jama North on 12/6  All questions answered.  Will follow-up tomorrow.     This patient is considered critically ill and requires ICU level of care due to acute respiratory failure with hypoxia, acute respiratory distress syndrome requiring mechanical ventilation and proning; circulatory shock on vasopressors.     Total Critical Care time, not including separate billable procedure time: 60 minutes.    Kendra Redman MD, MPH  Pulmonary/Critical Care Medicine  12/06/2021   2:27 PM         ICU DAILY CHECKLIST:   ICU DAILY CHECKLIST           Can patient transfer out  of MICU? no    FAST HUG:    Feeding:  yes.  Patient is receiving NPO    Wei: no  Analgesia/Sedation: yes; Dexmedetomidine, Midazolam and Fentanyl   Thromboembolic prophylaxis: yes; Mode:  Lovenox and SCDs  HOB>30:  yes  Stress Ulcer Protocol Active: yes; Mode: PPI  Glycemic Control: Any glucose > 180 no; Mode of Insulin Therapy: Sliding Scale Insulin    INTUBATED:  Can patient have daily waking:  No  Can patient have spontaneous breathing trial:  No    Restraints? no    PHYSICAL THERAPY AND MOBILITY:  Can patient have PT and mobility trial: no  Activity: Bedrest    RISK FACTORS PRESENT ON ADMISSION:  Clinically Significant Risk Factors Present on Admission              # Non-Severe Malnutrition, POA: based on Weight loss;Reduced intake (12/06/21 1300)         Subjective/Interval History:   11/24: seen at the [place] ED on 11/19, tested positive for Covid 19 at that time.  Ex-wife endorsed concerns, patient came to [facility] ED for evaluation.  O2 sat 44% on room air in triage.  Initially placed on oxygen mask, then noninvasive ventilation.  CT scan with extensive groundglass opacities, no pulmonary embolism.  He was given Decadron, remdesivir, tocilizumab  11/24: Transferred to ICU.  NPO.  Encourage self proning.  11/25:  NIPPV, 60 L/min, FiO2 100%.  Back on NIPPV, FiO2 at 80%.  11/26: Tried HFNC, 100%, 60 L/min for 3 hours.  Then back in NIPPV, FiO2 80%-85%.   Flipped into rate-controlled Afib.  11/27:  HFNC 95%, 60 L/min for about 6 hours.  Back on NIPPV in early evening.  Started amiodarone bolus/gtt.   11/28:  Intubated.  Started Nimbex gtt.  Started Veletri.  Proned.  OG tube placed.    11/29: Gastric feeding held.   11/30: WBC rising, check BCx.  Started vanco, cefepime.  12/1:  ET aspirate + 1+ Proteus mirabilis, 1+ Escherichia coli.  Started Cefepime/Vanco.    Patient transferred from [facility] ICU to [facility] ICU last night.  CXR obtained.  Patient proned at 3 am.      Review of Systems:  The  "Review of Systems is negative other than noted in the HPI        Allergies/Medications:   Allergies:   No Known Allergies    Continuous Infusions:    cisatracurium 5.5 mcg/kg/min (12/06/21 1403)     dexmedetomidine 0.8 mcg/kg/hr (12/06/21 1400)     dextrose       epoprostenol (VELETRI) 20 mcg/mL in sterile water inhalation solution 10 ng/kg/min (12/06/21 0815)     fentaNYL 300 mcg/hr (12/06/21 1400)     midazolam 12 mg/hr (12/06/21 1400)     norepinephrine 0.02 mcg/kg/min (12/06/21 1400)     - MEDICATION INSTRUCTIONS -       Scheduled Medications:    ARIPiprazole  5 mg Oral Daily     artificial tears   Both Eyes Q8H     buPROPion  150 mg Oral BID     cefTRIAXone  2 g Intravenous Q24H     chlorhexidine  15 mL Mouth/Throat Q12H     sennosides  10 mL Per Feeding Tube BID    And     docusate  100 mg Per Feeding Tube BID     enoxaparin ANTICOAGULANT  50 mg Subcutaneous Q12H     furosemide  40 mg Intravenous Q24H     gabapentin  800 mg Oral TID     [START ON 12/7/2021] influenza quadrivalent (PF) vacc  0.5 mL Intramuscular Prior to discharge     insulin aspart  1-12 Units Subcutaneous Q4H     LORazepam  4 mg Oral Q4H     oxyCODONE  10 mg Oral Q4H     pantoprazole  40 mg Per Feeding Tube QAM AC    Or     pantoprazole (PROTONIX) IV  40 mg Intravenous QAM AC     polyethylene glycol  17 g Oral or Feeding Tube BID           Objective:   Vitals:  /69   Pulse 61   Temp 99.9  F (37.7  C) (Oral)   Resp 24   Ht 1.803 m (5' 11\")   Wt 98.8 kg (217 lb 13 oz)   SpO2 95%   BMI 30.38 kg/m    Vent: Ventilation Mode: CMV/AC  (Continuous Mandatory Ventilation/ Assist Control)  FiO2 (%): (S) 50 %  Rate Set (breaths/minute): 24 breaths/min  Tidal Volume Set (mL): 450 mL  PEEP (cm H2O): 16 cmH2O  Oxygen Concentration (%): 50 %  Resp: 24    GEN: Intubated, sedated, proned.  HEENT: Normocephalic, atraumatic.  Pupils small/reactive, anicteric sclera. Endotracheal tube.  NECK: Supple.    PULM:  Mechanical ventilation.   Diminished " breath sounds, no wheezing.   CVS: Regular rate and rhythm.  Normal S1, S2.  No rubs, murmurs, or gallops.    ABDOMEN: Hypoactive bowel sounds.  Non-distended.    EXTREMITES:  No clubbing, cyanosis.  1+ pitting edema of feet.   NEURO:   Intubated, sedated.      Intake/Output: I/O last 3 completed shifts:  In: 270.35 [I.V.:240.35; NG/GT:30]  Out: 870 [Urine:870]        Pertinent Studies:   All laboratory data reviewed  Serum Glucose range:   Recent Labs   Lab 12/06/21  1310 12/06/21  0800 12/06/21  0516 12/06/21  0405   * 96 95 96     ABG:   Recent Labs   Lab 12/06/21  0516 12/06/21 0359 12/05/21 2347 12/05/21 2346 12/05/21  1550   PH 7.44 7.46* 7.42 7.41 7.47*   PCO2  --  51*  --  60* 51*   PO2  --  152*  --  110* 96*   HCO3  --  35*  --  35* 36*   O2PER  --  80  --  100 50     CBC:   Recent Labs   Lab 12/06/21  0516 12/05/21  2347 12/05/21 0354 12/01/21  0432 11/30/21  0508   WBC 9.6 10.6 10.5   < > 14.3*   HGB 11.2* 11.0* 10.6*   < > 15.1   HCT 35.1* 34.2* 33.8*   < > 46.1   MCV 93 94 93   < > 89    180 174   < > 228   NEUTROPHIL  --   --   --   --  92   LYMPH  --   --   --   --  4   MONOCYTE  --   --   --   --  2   EOSINOPHIL  --   --   --   --  1    < > = values in this interval not displayed.     Chemistry:   Recent Labs   Lab 12/06/21  0516 12/05/21  2347 12/05/21  0833 12/05/21  0354 12/03/21  0500 12/02/21  0506    142  --  140   < > 144   POTASSIUM 4.3 4.3  --  4.9   < > 4.9   CHLORIDE 100 100  --  102   < > 109*   CO2 34* 35*  --  32*   < > 29   BUN 25* 26*  --  25*   < > 20   CR 0.58* 0.62*  --  0.56*   < > 0.60*   GFRESTIMATED >90 >90  --  >90   < > >90   TRUONG 8.6 8.0*  --  8.1*   < > 7.6*   MAG 2.1 2.2 2.2  --    < > 2.2   PROTTOTAL  --   --   --   --   --  5.0*   ALBUMIN  --   --   --   --   --  2.4*   AST  --   --   --   --   --  26   ALT  --   --   --   --   --  31   ALKPHOS  --   --   --   --   --  47   BILITOTAL  --   --   --   --   --  0.3    < > = values in this interval  not displayed.     Coags:  Recent Labs   Lab 12/05/21  2346   INR 0.96     Cardiac Markers:  No results for input(s): CKTOTAL, TROPONINI in the last 168 hours.     Microbiology:  ET aspirate, 12/1: 1+ Proteus mirabilis, 1+ Escherichia coli   Blood culture x 1, 11/30: negative.  COVID-19 PCR, 11/19: Positive        Cardiology/Radiology:   Cardiac: All cardiac studies reviewed by me.    EKG:  Reviewed    TTE, 11/28:  Summary: 1. Left ventricular size, wall motion and function are normal. The ejection fraction is 60-65%. 2. Flattened septum is consistent with RV pressure/volume overload. 3. Mildly decreased right ventricular systolic function The right ventricle is moderately dilated. Right ventricular systolic pressure could not be approximated due to inadequate tricuspid regurgitation. No tricuspid regurgitation. RA pressure of 8 mmHg.    Radiology:  All imaging studies reviewed by me.    CXR, 12/6: Endotracheal tube tip 4.5 cm above the felipa. Enteric tube tip below the diaphragm. Right PICC line tip over the low SVC. Heart size within normal limits. Diffuse bilateral pulmonary infiltrates. Overall, the infiltrates appear mildly progressed compared to the prior study. No significant bony abnormalities. Remainder unremarkable.    Abdomen X-Ray, 11/28: Enteric suction tube with the tip and side-port within the mid gastric lumen. Nonobstructive bowel gas pattern. No definite free air. Persistent diffuse bibasilar pulmonary airspace opacities.     CT chest PE study, 11/24:  FINDINGS: ANGIOGRAM CHEST: No pulmonary artery filling defect. Aorta is normal in caliber.  LUNGS AND PLEURA: Extensive groundglass consolidative bilateral pulmonary opacities. Trace left effusion. MEDIASTINUM/AXILLAE: Heart size is normal scattered borderline enlarged mediastinal lymph nodes with the largest in the right lower paratracheal chain measuring 1.1 x 2.2 cm (series 5/29).  CORONARY ARTERY CALCIFICATION: None.  UPPER ABDOMEN: Hepatic  steatosis.  MUSCULOSKELETAL: Normal. IMPRESSION: 1.  No pulmonary embolus. 2.  COVID pneumonia. 3.  Likely reactive mediastinal  lymph nodes. 4.  Hepatic steatosis.

## 2021-12-07 LAB
ALBUMIN SERPL-MCNC: 2.7 G/DL (ref 3.5–5)
ALP SERPL-CCNC: 54 U/L (ref 45–120)
ALT SERPL W P-5'-P-CCNC: 111 U/L (ref 0–45)
ANION GAP SERPL CALCULATED.3IONS-SCNC: 3 MMOL/L (ref 5–18)
AST SERPL W P-5'-P-CCNC: 46 U/L (ref 0–40)
BASE EXCESS BLDA CALC-SCNC: 11.2 MMOL/L
BASE EXCESS BLDA CALC-SCNC: 12.2 MMOL/L
BILIRUB DIRECT SERPL-MCNC: 0.2 MG/DL
BILIRUB SERPL-MCNC: 0.6 MG/DL (ref 0–1)
BUN SERPL-MCNC: 25 MG/DL (ref 8–22)
CALCIUM SERPL-MCNC: 8.2 MG/DL (ref 8.5–10.5)
CHLORIDE BLD-SCNC: 101 MMOL/L (ref 98–107)
CO2 SERPL-SCNC: 35 MMOL/L (ref 22–31)
COHGB MFR BLD: 98.6 % (ref 96–97)
COHGB MFR BLD: 98.7 % (ref 96–97)
CREAT SERPL-MCNC: 0.59 MG/DL (ref 0.7–1.3)
ERYTHROCYTE [DISTWIDTH] IN BLOOD BY AUTOMATED COUNT: 13.8 % (ref 10–15)
ERYTHROCYTE [DISTWIDTH] IN BLOOD BY AUTOMATED COUNT: 14.5 % (ref 10–15)
GFR SERPL CREATININE-BSD FRML MDRD: >90 ML/MIN/1.73M2
GLUCOSE BLD-MCNC: 123 MG/DL (ref 70–125)
GLUCOSE BLDC GLUCOMTR-MCNC: 111 MG/DL (ref 70–99)
GLUCOSE BLDC GLUCOMTR-MCNC: 120 MG/DL (ref 70–99)
GLUCOSE BLDC GLUCOMTR-MCNC: 122 MG/DL (ref 70–99)
GLUCOSE BLDC GLUCOMTR-MCNC: 129 MG/DL (ref 70–99)
GLUCOSE BLDC GLUCOMTR-MCNC: 132 MG/DL (ref 70–99)
GLUCOSE BLDC GLUCOMTR-MCNC: 158 MG/DL (ref 70–99)
HCO3 BLD-SCNC: 33 MMOL/L (ref 23–29)
HCO3 BLD-SCNC: 34 MMOL/L (ref 23–29)
HCT VFR BLD AUTO: 36.4 % (ref 40–53)
HCT VFR BLD AUTO: 38.9 % (ref 40–53)
HGB BLD-MCNC: 11.9 G/DL (ref 13.3–17.7)
HGB BLD-MCNC: 12.3 G/DL (ref 13.3–17.7)
LACTATE SERPL-SCNC: 1.2 MMOL/L (ref 0.7–2)
MAGNESIUM SERPL-MCNC: 2.3 MG/DL (ref 1.8–2.6)
MCH RBC QN AUTO: 29.3 PG (ref 26.5–33)
MCH RBC QN AUTO: 30 PG (ref 26.5–33)
MCHC RBC AUTO-ENTMCNC: 31.6 G/DL (ref 31.5–36.5)
MCHC RBC AUTO-ENTMCNC: 32.7 G/DL (ref 31.5–36.5)
MCV RBC AUTO: 92 FL (ref 78–100)
MCV RBC AUTO: 93 FL (ref 78–100)
O2/TOTAL GAS SETTING VFR VENT: 0.5 %
O2/TOTAL GAS SETTING VFR VENT: 50 %
OXYHGB MFR BLD: 96.7 % (ref 96–97)
OXYHGB MFR BLD: 97.3 % (ref 96–97)
PCO2 BLD: 52 MM HG (ref 35–45)
PCO2 BLD: 62 MM HG (ref 35–45)
PEEP: 16 CM H2O
PEEP: 16 CM H2O
PH BLD: 7.38 [PH] (ref 7.37–7.44)
PH BLD: 7.45 [PH] (ref 7.37–7.44)
PLATELET # BLD AUTO: 201 10E3/UL (ref 150–450)
PLATELET # BLD AUTO: 244 10E3/UL (ref 150–450)
PO2 BLD: 104 MM HG (ref 80–90)
PO2 BLD: 118 MM HG (ref 80–90)
POTASSIUM BLD-SCNC: 4.5 MMOL/L (ref 3.5–5)
POTASSIUM BLD-SCNC: 4.9 MMOL/L (ref 3.5–5)
PROT SERPL-MCNC: 5.5 G/DL (ref 6–8)
RATE: 20 RR/MIN
RATE: 24 RR/MIN
RBC # BLD AUTO: 3.97 10E6/UL (ref 4.4–5.9)
RBC # BLD AUTO: 4.2 10E6/UL (ref 4.4–5.9)
SODIUM SERPL-SCNC: 139 MMOL/L (ref 136–145)
TEMPERATURE: 37 DEGREES C
TEMPERATURE: 37 DEGREES C
VENTILATION MODE: ABNORMAL
VENTILATION MODE: AC
VENTILATOR TIDAL VOLUME: 450 ML
VENTILATOR TIDAL VOLUME: 450 ML
WBC # BLD AUTO: 11.1 10E3/UL (ref 4–11)
WBC # BLD AUTO: 8.2 10E3/UL (ref 4–11)

## 2021-12-07 PROCEDURE — 250N000013 HC RX MED GY IP 250 OP 250 PS 637: Performed by: INTERNAL MEDICINE

## 2021-12-07 PROCEDURE — 258N000003 HC RX IP 258 OP 636: Performed by: INTERNAL MEDICINE

## 2021-12-07 PROCEDURE — 83735 ASSAY OF MAGNESIUM: CPT | Performed by: INTERNAL MEDICINE

## 2021-12-07 PROCEDURE — 83605 ASSAY OF LACTIC ACID: CPT | Performed by: INTERNAL MEDICINE

## 2021-12-07 PROCEDURE — 200N000001 HC R&B ICU

## 2021-12-07 PROCEDURE — 82805 BLOOD GASES W/O2 SATURATION: CPT | Performed by: INTERNAL MEDICINE

## 2021-12-07 PROCEDURE — 250N000009 HC RX 250: Performed by: INTERNAL MEDICINE

## 2021-12-07 PROCEDURE — 85027 COMPLETE CBC AUTOMATED: CPT | Performed by: INTERNAL MEDICINE

## 2021-12-07 PROCEDURE — 250N000009 HC RX 250

## 2021-12-07 PROCEDURE — 999N000157 HC STATISTIC RCP TIME EA 10 MIN

## 2021-12-07 PROCEDURE — 94003 VENT MGMT INPAT SUBQ DAY: CPT

## 2021-12-07 PROCEDURE — 250N000011 HC RX IP 250 OP 636: Performed by: INTERNAL MEDICINE

## 2021-12-07 PROCEDURE — 84132 ASSAY OF SERUM POTASSIUM: CPT | Performed by: INTERNAL MEDICINE

## 2021-12-07 PROCEDURE — 82248 BILIRUBIN DIRECT: CPT | Performed by: INTERNAL MEDICINE

## 2021-12-07 PROCEDURE — 94645 CONT INHLJ TX EACH ADDL HOUR: CPT

## 2021-12-07 PROCEDURE — 99291 CRITICAL CARE FIRST HOUR: CPT | Performed by: INTERNAL MEDICINE

## 2021-12-07 PROCEDURE — 80053 COMPREHEN METABOLIC PANEL: CPT | Performed by: INTERNAL MEDICINE

## 2021-12-07 RX ORDER — LISINOPRIL 5 MG/1
10 TABLET ORAL DAILY
Status: DISCONTINUED | OUTPATIENT
Start: 2021-12-07 | End: 2021-12-10

## 2021-12-07 RX ORDER — HYDRALAZINE HYDROCHLORIDE 20 MG/ML
10 INJECTION INTRAMUSCULAR; INTRAVENOUS EVERY 4 HOURS PRN
Status: DISCONTINUED | OUTPATIENT
Start: 2021-12-07 | End: 2021-12-15

## 2021-12-07 RX ORDER — METOPROLOL TARTRATE 1 MG/ML
5 INJECTION, SOLUTION INTRAVENOUS EVERY 4 HOURS PRN
Status: DISCONTINUED | OUTPATIENT
Start: 2021-12-07 | End: 2021-12-08

## 2021-12-07 RX ORDER — FUROSEMIDE 10 MG/ML
40 INJECTION INTRAMUSCULAR; INTRAVENOUS ONCE
Status: DISCONTINUED | OUTPATIENT
Start: 2021-12-07 | End: 2021-12-08

## 2021-12-07 RX ORDER — METOPROLOL TARTRATE 1 MG/ML
INJECTION, SOLUTION INTRAVENOUS
Status: COMPLETED
Start: 2021-12-07 | End: 2021-12-07

## 2021-12-07 RX ORDER — METOPROLOL TARTRATE 25 MG/1
25 TABLET, FILM COATED ORAL 2 TIMES DAILY
Status: DISCONTINUED | OUTPATIENT
Start: 2021-12-07 | End: 2021-12-10

## 2021-12-07 RX ADMIN — DEXTROSE MONOHYDRATE 7 MCG/KG/MIN: 50 INJECTION, SOLUTION INTRAVENOUS at 22:06

## 2021-12-07 RX ADMIN — LORAZEPAM 2 MG: 2 LIQUID ORAL at 19:40

## 2021-12-07 RX ADMIN — OXYCODONE HYDROCHLORIDE 10 MG: 5 SOLUTION ORAL at 17:40

## 2021-12-07 RX ADMIN — POLYETHYLENE GLYCOL 3350 17 G: 17 POWDER, FOR SOLUTION ORAL at 08:04

## 2021-12-07 RX ADMIN — ENOXAPARIN SODIUM 50 MG: 100 INJECTION SUBCUTANEOUS at 22:00

## 2021-12-07 RX ADMIN — DEXMEDETOMIDINE HYDROCHLORIDE 1.5 MCG/KG/HR: 400 INJECTION INTRAVENOUS at 14:19

## 2021-12-07 RX ADMIN — MIDAZOLAM HYDROCHLORIDE 2 MG: 1 INJECTION, SOLUTION INTRAMUSCULAR; INTRAVENOUS at 08:58

## 2021-12-07 RX ADMIN — DEXMEDETOMIDINE HYDROCHLORIDE 1.5 MCG/KG/HR: 400 INJECTION INTRAVENOUS at 16:45

## 2021-12-07 RX ADMIN — Medication 300 MCG/HR: at 08:11

## 2021-12-07 RX ADMIN — Medication 100 MCG: at 10:11

## 2021-12-07 RX ADMIN — Medication 100 MCG: at 08:58

## 2021-12-07 RX ADMIN — DEXTROSE MONOHYDRATE 7 MCG/KG/MIN: 50 INJECTION, SOLUTION INTRAVENOUS at 05:57

## 2021-12-07 RX ADMIN — METOPROLOL TARTRATE 25 MG: 25 TABLET, FILM COATED ORAL at 12:32

## 2021-12-07 RX ADMIN — SENNOSIDES 10 ML: 8.8 LIQUID ORAL at 20:04

## 2021-12-07 RX ADMIN — DEXMEDETOMIDINE HYDROCHLORIDE 1.5 MCG/KG/HR: 400 INJECTION INTRAVENOUS at 22:46

## 2021-12-07 RX ADMIN — INSULIN ASPART 1 UNITS: 100 INJECTION, SOLUTION INTRAVENOUS; SUBCUTANEOUS at 12:44

## 2021-12-07 RX ADMIN — Medication 12 MG/HR: at 05:58

## 2021-12-07 RX ADMIN — FUROSEMIDE 40 MG: 10 INJECTION, SOLUTION INTRAMUSCULAR; INTRAVENOUS at 08:05

## 2021-12-07 RX ADMIN — METOPROLOL TARTRATE 5 MG: 5 INJECTION INTRAVENOUS at 09:29

## 2021-12-07 RX ADMIN — Medication 12 MG/HR: at 22:16

## 2021-12-07 RX ADMIN — OXYCODONE HYDROCHLORIDE 10 MG: 5 SOLUTION ORAL at 05:57

## 2021-12-07 RX ADMIN — MINERAL OIL AND WHITE PETROLATUM: 150; 830 OINTMENT OPHTHALMIC at 12:32

## 2021-12-07 RX ADMIN — Medication 12 MG/HR: at 14:19

## 2021-12-07 RX ADMIN — Medication 100 MCG: at 19:46

## 2021-12-07 RX ADMIN — Medication 100 MCG: at 07:30

## 2021-12-07 RX ADMIN — MIDAZOLAM HYDROCHLORIDE 2 MG: 1 INJECTION, SOLUTION INTRAMUSCULAR; INTRAVENOUS at 06:17

## 2021-12-07 RX ADMIN — LORAZEPAM 2 MG: 2 LIQUID ORAL at 08:05

## 2021-12-07 RX ADMIN — CEFTRIAXONE SODIUM 2 G: 2 INJECTION, POWDER, FOR SOLUTION INTRAMUSCULAR; INTRAVENOUS at 09:57

## 2021-12-07 RX ADMIN — MIDAZOLAM HYDROCHLORIDE 2 MG: 1 INJECTION, SOLUTION INTRAMUSCULAR; INTRAVENOUS at 10:11

## 2021-12-07 RX ADMIN — DOCUSATE SODIUM 100 MG: 50 LIQUID ORAL at 08:05

## 2021-12-07 RX ADMIN — Medication 100 MCG: at 18:27

## 2021-12-07 RX ADMIN — MINERAL OIL AND WHITE PETROLATUM: 150; 830 OINTMENT OPHTHALMIC at 19:40

## 2021-12-07 RX ADMIN — HYDRALAZINE HYDROCHLORIDE 10 MG: 20 INJECTION INTRAMUSCULAR; INTRAVENOUS at 09:57

## 2021-12-07 RX ADMIN — DOCUSATE SODIUM 100 MG: 50 LIQUID ORAL at 20:04

## 2021-12-07 RX ADMIN — GABAPENTIN 800 MG: 250 SUSPENSION ORAL at 16:36

## 2021-12-07 RX ADMIN — Medication 300 MCG/HR: at 22:15

## 2021-12-07 RX ADMIN — DEXMEDETOMIDINE HYDROCHLORIDE 1.2 MCG/KG/HR: 400 INJECTION INTRAVENOUS at 00:39

## 2021-12-07 RX ADMIN — CHLORHEXIDINE GLUCONATE 0.12% ORAL RINSE 15 ML: 1.2 LIQUID ORAL at 19:40

## 2021-12-07 RX ADMIN — BUPROPION HYDROCHLORIDE 100 MG: 100 TABLET, FILM COATED ORAL at 14:19

## 2021-12-07 RX ADMIN — OXYCODONE HYDROCHLORIDE 10 MG: 5 SOLUTION ORAL at 22:00

## 2021-12-07 RX ADMIN — OXYCODONE HYDROCHLORIDE 10 MG: 5 SOLUTION ORAL at 01:48

## 2021-12-07 RX ADMIN — CHLORHEXIDINE GLUCONATE 0.12% ORAL RINSE 15 ML: 1.2 LIQUID ORAL at 08:05

## 2021-12-07 RX ADMIN — LISINOPRIL 10 MG: 5 TABLET ORAL at 08:52

## 2021-12-07 RX ADMIN — DEXTROSE MONOHYDRATE 7 MCG/KG/MIN: 50 INJECTION, SOLUTION INTRAVENOUS at 10:41

## 2021-12-07 RX ADMIN — LORAZEPAM 2 MG: 2 LIQUID ORAL at 12:32

## 2021-12-07 RX ADMIN — DEXTROSE MONOHYDRATE 7 MCG/KG/MIN: 50 INJECTION, SOLUTION INTRAVENOUS at 16:33

## 2021-12-07 RX ADMIN — MIDAZOLAM HYDROCHLORIDE 2 MG: 1 INJECTION, SOLUTION INTRAMUSCULAR; INTRAVENOUS at 07:40

## 2021-12-07 RX ADMIN — Medication 300 MCG/HR: at 14:19

## 2021-12-07 RX ADMIN — Medication 50 MCG: at 11:02

## 2021-12-07 RX ADMIN — ARIPIPRAZOLE 5 MG: 1 SOLUTION ORAL at 08:52

## 2021-12-07 RX ADMIN — LORAZEPAM 2 MG: 2 LIQUID ORAL at 16:35

## 2021-12-07 RX ADMIN — POLYETHYLENE GLYCOL 3350 17 G: 17 POWDER, FOR SOLUTION ORAL at 20:04

## 2021-12-07 RX ADMIN — Medication 0.1 MCG/KG/MIN: at 15:40

## 2021-12-07 RX ADMIN — SENNOSIDES 10 ML: 8.8 LIQUID ORAL at 08:05

## 2021-12-07 RX ADMIN — DEXMEDETOMIDINE HYDROCHLORIDE 1.5 MCG/KG/HR: 400 INJECTION INTRAVENOUS at 19:45

## 2021-12-07 RX ADMIN — DEXMEDETOMIDINE HYDROCHLORIDE 1 MCG/KG/HR: 400 INJECTION INTRAVENOUS at 04:15

## 2021-12-07 RX ADMIN — MIDAZOLAM HYDROCHLORIDE 2 MG: 1 INJECTION, SOLUTION INTRAMUSCULAR; INTRAVENOUS at 12:30

## 2021-12-07 RX ADMIN — OXYCODONE HYDROCHLORIDE 10 MG: 5 SOLUTION ORAL at 09:57

## 2021-12-07 RX ADMIN — BUPROPION HYDROCHLORIDE 100 MG: 100 TABLET, FILM COATED ORAL at 20:04

## 2021-12-07 RX ADMIN — DEXMEDETOMIDINE HYDROCHLORIDE 1.5 MCG/KG/HR: 400 INJECTION INTRAVENOUS at 11:01

## 2021-12-07 RX ADMIN — LORAZEPAM 2 MG: 2 LIQUID ORAL at 04:16

## 2021-12-07 RX ADMIN — ENOXAPARIN SODIUM 50 MG: 100 INJECTION SUBCUTANEOUS at 11:01

## 2021-12-07 RX ADMIN — MINERAL OIL AND WHITE PETROLATUM: 150; 830 OINTMENT OPHTHALMIC at 04:30

## 2021-12-07 RX ADMIN — BUPROPION HYDROCHLORIDE 100 MG: 100 TABLET, FILM COATED ORAL at 09:28

## 2021-12-07 RX ADMIN — OXYCODONE HYDROCHLORIDE 10 MG: 5 SOLUTION ORAL at 14:19

## 2021-12-07 RX ADMIN — Medication 40 MG: at 08:05

## 2021-12-07 RX ADMIN — Medication 100 MCG: at 12:30

## 2021-12-07 RX ADMIN — SODIUM CHLORIDE 150 ML: 9 INJECTION, SOLUTION INTRAVENOUS at 15:35

## 2021-12-07 RX ADMIN — GABAPENTIN 800 MG: 250 SUSPENSION ORAL at 09:30

## 2021-12-07 RX ADMIN — DEXMEDETOMIDINE HYDROCHLORIDE 1.2 MCG/KG/HR: 400 INJECTION INTRAVENOUS at 08:11

## 2021-12-07 RX ADMIN — GABAPENTIN 800 MG: 250 SUSPENSION ORAL at 12:34

## 2021-12-07 ASSESSMENT — ACTIVITIES OF DAILY LIVING (ADL)
ADLS_ACUITY_SCORE: 24
ADLS_ACUITY_SCORE: 22
ADLS_ACUITY_SCORE: 24

## 2021-12-07 NOTE — PROGRESS NOTES
ICU Update:    Patient suddenly hypotensive without medication or position change. Given 150cc bolus, levophed started, sedation temporarily held and then resumed.    Will hold levophed, check lactate, CBC.    Tatianna Murillo MD

## 2021-12-07 NOTE — PLAN OF CARE
Problem: Dysrhythmia (Heart Failure)  Goal: Stable Heart Rate and Rhythm  Outcome: No Change     Problem: Hypertension Acute  Goal: Blood Pressure Within Desired Range  Outcome: No Change     Problem: Ventilator-Induced Lung Injury (Mechanical Ventilation, Invasive)  Goal: Absence of Ventilator-Induced Lung Injury  Outcome: No Change  Intervention: Prevent Ventilator-Associated Pneumonia  Recent Flowsheet Documentation  Taken 12/7/2021 0500 by Cass Lewis, RN  Head of Bed (HOB) Positioning: HOB at 30 degrees  Taken 12/7/2021 0400 by Cass Lewis, RN  VAP Prevention Bundle:   HOB elevation maintained   oral care regularly provided   stress ulcer prophylaxis provided   VTE prophylaxis provided   vent circuit breaks minimized  Oral Care:   swabbed with antiseptic solution   lip lubricant applied  VAP Prevention Contraindications: neuromuscular blockade in use  VAP Prevention Measures: not completed (see contraindications)  Taken 12/7/2021 0200 by Cass Lewis, RN    Pt remains,intubated, sedated and paralyzed.  Maintaining O2 sat 96-97 with head turns but BP remains labile. SBP brief 200s but non-sustained. Remains SR- ST to one-teens. Stooling in small amounts. Afebrile.

## 2021-12-07 NOTE — PLAN OF CARE
Problem: Adult Inpatient Plan of Care  Goal: Plan of Care Review  Outcome: Improving  Flowsheets (Taken 12/6/2021 1819)  Outcome Summary: Down to 50% FiO2 and 1/4 strength veletri. BP/HR very labile, especially with cares and head turn to the right. Better with increased precedex and bolus with fent and versed prior to cares. Updated Jama, ex spouse this am. IPad set up in room.

## 2021-12-07 NOTE — PROGRESS NOTES
Care Management Follow Up    Length of Stay (days): 2    Expected Discharge Date: pending     Concerns to be Addressed: covid care        Patient plan of care discussed at interdisciplinary rounds: Yes    Anticipated Discharge Disposition:  TBD     Anticipated Discharge Services:  TBD  Anticipated Discharge DME:  [facility]         Additional Information:  Patient covid positive 11/19. Intubated. History of polysubstance abuse and chronic pain on Suboxone.    Covid unvaccinated.    Assessment History: Patient lives at ex-spouse [facility] half time and lives  somewhere else  the rest of the time.  He is independent with IADLS/ADLS. No assistive device; no home care services; does drive. Patient was working on paperwork to apply for disability.     Final discharge plan pending progression and recommendations.         Leena Jordan RN

## 2021-12-07 NOTE — SIGNIFICANT EVENT
Sudden onset hypotension, systolics in 50s. Started NS bolus, levophed and placed sedation on hold per Dr. Murillo.     Remains on levo, sedation restarted. Labs sent.

## 2021-12-07 NOTE — PLAN OF CARE
Problem: Communication Impairment (Mechanical Ventilation, Invasive)  Goal: Effective Communication  Outcome: No Change     Problem: Device-Related Complication Risk (Mechanical Ventilation, Invasive)  Goal: Optimal Device Function  Outcome: No Change     Problem: Inability to Wean (Mechanical Ventilation, Invasive)  Goal: Mechanical Ventilation Liberation  Outcome: No Change     Problem: Nutrition Impairment (Mechanical Ventilation, Invasive)  Goal: Optimal Nutrition Delivery  Outcome: No Change     Problem: Skin and Tissue Injury (Mechanical Ventilation, Invasive)  Goal: Absence of Device-Related Skin and Tissue Injury  Outcome: No Change     Problem: Ventilator-Induced Lung Injury (Mechanical Ventilation, Invasive)  Goal: Absence of Ventilator-Induced Lung Injury  Outcome: No Change     Problem: ARDS (Acute Respiratory Distress Syndrome)  Goal: Effective Oxygenation  Outcome: No Change  Piter Monsalve, RT

## 2021-12-07 NOTE — PROGRESS NOTES
[facility]:  CRITICAL CARE PROGRESS NOTE     Date / Time of Admission:  11/24/2021  1:19 PM    ID: Jared North is a 46 year old male, unvaccinated against COVID-19, with history of polysubstance abuse and chronic pain/lower back pain now on Suboxone, mood disorder/depression, essential hypertension, sleep disorder breathing, mild intermittent asthma, knee pain, and recent diagnosis of COVID-19 viral infection on 11/19/2021 who presented to [facility] on 11/24/2021 with worsening shortness of breath, admitted to the ICU with COVID-19 viral pneumonia and severe acute respiratory failure with hypoxia requiring noninvasive ventilation.  Course complicated by ARDS requiring intubation 11/28, multi-organism bacterial VAP (Proteus, E. Coli) and circulatory shock.         Changes for today: 1.   Supinate this morning.  Recheck ABG @ 3 pm, anticipate reprone this afternoon.  2. P/F ratio 208.  Stop Veletri.  Keep PEEP 16 for now.  3. Decrease RR from 24 to 20.    4. Restart lisinopril 10 mg daily per home dose.   5. Metoprolol IV, hydralazine IV PRN ordered - still hypertensive.  Started metoprolol 25 mg 2x/day.  6. Versed 2 mg, fentanyl 50 mcg IV x 1 given for hypertension also.  7. Keeping trickle feeding for today, monitor residuals.  8. Don't hold stool softeners today.  9. Give additional Lasix dose this afternoon.        Assessment/Plan:   Neurologic:  Acute metabolic encephalopathy.   In the setting of therapeutic sedation.  History of polysubstance abuse and chronic pain/lower back pain now on Suboxone.  Hold suboxone on 11/28 after intubation. Mood disorder/depression.    Continue Nimbex gtt. TOF goal 2/4. BIS monitoring.      Precedex, fentanyl, midazolam gtt as needed for sedation.  RASS goal -4/-5.    Continue home meds: abilify, bupropion, gabapentin 3x/day    Continue ativan 2 mg Q4H, continue oxycodone 10 mg Q4H    Pain meds PRN     Pulmonary: Acute respiratory failure with  hypoxia, acute respiratory distress syndrome.  Secondary to COVID19 and superimposed VAP.   CT chest 11/24 extensive groundglass consolidative bilateral pulmonary opacities.  Failed HFNC/NIPPV, intubated 11/28.  Had trace left pleural effusion on CT, diuresed.  COVID-19 viral pneumonia.  Mild intermittent asthma without exacerbation.  He reports using inhaler once weekly at baseline. Trace left pleural effusion.  Noted on CT scan, diuresed.     Wean supplemental O2 as tolerated; goal O2 sat 92-96%.  HOB > 30 degrees to limit aspiration risk.      Vent: CMV, RR 24 => 20,  mL (6 mL/kg IBW), PEEP 16, FiO2 70%.     Goal Pplat less than 30, goal driving pressure less than 15.    Reduce RR from 24 to 240.    Discontinue inhaled Veletri.    P/F ratio 208.  ABG 2x/day.  Supinate this morning.  Anticipate reprone later today.    abx and diuresis as below.     Cardiovascular:  Hypertensive urgency.  Off vasopressors, now hypertensive.  Has history of essential hypertension, on lisinopril at baseline.  Paroxysmal atrial fibrillation.  Went into A. fib on 11/26, initially rate controlled.  Initiated amiodarone on 11/27.  Converted to normal sinus rhythm after intubation on 11/28.  Amio gtt stopped.  Mild RV dysfunction.  TTE 11/28 with + pressure overload, moderate RV dilation, mild RV dysfunction.  LVEF 60-65%.    Cardiac monitoring.  SBP >= 90 mmHg, MAP >= 65 mmHg.  EKG PRN.    Norepinephrine as needed for BP goals.    Restart lisinopril 10 mg daily per home dose.     Metoprolol IV, hydralazine IV PRN ordered - still hypertensive.  Started metoprolol 25 mg 2x/day.    Versed 2 mg, fentanyl 50 mcg IV x 1 given for hypertension also.     GI/: Transaminitis.  Likely from the viral infection.  Present prior to Remdesivir use.  Slight rise on 12/7. Moderate protein-calorie malnutrition.  Slow transit constipation.    Repeat AST, ALT in AM.    Nutrition: Enteral feeding - initiate trickle feeding of has BM today.  FWF 30 mL  Q6H.    Last BM:  12/2 Meds: colace 2x/day.  Senna 2x/day.  Give lactulose, start miralax 2x/day.    GI prophylaxis: PPI.     Renal:  Metabolic alkalosis.      Monitor I/O's.  Electrolyte repletion PRN.  Avoid/limit nephrotoxic agents.    IVFs: Saline lock.    Continue Lasix 40 mg IV daily; monitor for contraction alkalosis.     Heme/Onc: COVID-19 associated coagulopathy.  D-dimer 1.24 on 11/24.  Leukopenia,resolved.  Thrombocytopenia, resolved.     DVT prophylaxis: SCDs.  Lovenox 0.5 mg/kg subcu 2X/day.     Endocrine: Hyperglycemia, mild.  No history of diabetes.  Secondary to stress and steroid use.  Hemoglobin A1c 5.8% 11/24.    FSBG Q4H, Aspart insulin SS (high insulin resistance).  Hypoglycemia protocol.     Infectious diseases: COVID-19 viral infection.  PCR positive on 11/19.  Hospitalized on 11/24.  CRP 12.3, PCT 0.5 on 11/24.  s/p decadron x 10 days (start 11/24), Remdesivir x 5 days (start 11/24), s/p tocilizumab 8 mg/kg on 11/24.  Multi-organism ventilator associated pneumonia.  ET aspirate 12/1 + 1+ E. Coli and 1+ Proteus mirabalis.  Cefepime IV on 12/1-12/6, switched to ceftriaxone IV on 12/6.    Follow-up cultures.    Continue ceftriaxone (start 12/6) x 5 days to complete 10-day course.     Rheum/Musculoskeletal: Chronic lumbar pain and knee pain.  Patient being evaluated by orthopedic surgery for surgical intervention.    Activity:  Bedrest    Lines/Drains/Tubes:  8.0 mm endotracheal tube, placed 11/28  RUE PICC central line, placed 11/24  Left radial A-line, placed 11/28  OG enteral tube, placed 11/28  Wei catheter, placed 11/28     Code Status:  Full Code     The patient and/or the family was educated about the above plan of care and indicated understanding.  Updated the patient's ex-wife, aJma North on 12/7  All questions answered.     This patient is considered critically ill and requires ICU level of care due to acute respiratory failure with hypoxia, acute respiratory distress syndrome  requiring mechanical ventilation and proning.     Total Critical Care time, not including separate billable procedure time: 55 minutes.    Kendra Redman MD, MPH  Pulmonary/Critical Care Medicine  12/07/2021   11:59 AM         ICU DAILY CHECKLIST:   ICU DAILY CHECKLIST           Can patient transfer out of MICU? no    FAST HUG:    Feeding:  yes.  Patient is receiving NPO    Wei: no  Analgesia/Sedation: yes; Dexmedetomidine, Midazolam and Fentanyl   Thromboembolic prophylaxis: yes; Mode:  Lovenox and SCDs  HOB>30:  yes  Stress Ulcer Protocol Active: yes; Mode: PPI  Glycemic Control: Any glucose > 180 no; Mode of Insulin Therapy: Sliding Scale Insulin    INTUBATED:  Can patient have daily waking:  No  Can patient have spontaneous breathing trial:  No    Restraints? no    PHYSICAL THERAPY AND MOBILITY:  Can patient have PT and mobility trial: no  Activity: Bedrest    RISK FACTORS PRESENT ON ADMISSION:  Clinically Significant Risk Factors Present on Admission           # Non-Severe Malnutrition, POA: based on Weight loss;Reduced intake (12/06/21 1300)         Subjective/Interval History:   11/24: seen at the [place] ED on 11/19, tested positive for Covid 19 at that time.  Ex-wife endorsed concerns, patient came to [facility] ED for evaluation.  O2 sat 44% on room air in triage.  Initially placed on oxygen mask, then noninvasive ventilation.  CT scan with extensive groundglass opacities, no pulmonary embolism.  He was given Decadron, remdesivir, tocilizumab  11/24: Transferred to ICU.  NPO.  Encourage self proning.  11/25:  NIPPV, 60 L/min, FiO2 100%.  Back on NIPPV, FiO2 at 80%.  11/26: Tried HFNC, 100%, 60 L/min for 3 hours.  Then back in NIPPV, FiO2 80%-85%.   Flipped into rate-controlled Afib.  11/27:  HFNC 95%, 60 L/min for about 6 hours.  Back on NIPPV in early evening.  Started amiodarone bolus/gtt.   11/28:  Intubated.  Started Nimbex gtt.  Started Veletri.  Proned.  OG tube placed.    11/29: Gastric feeding  "held.   11/30: WBC rising, check BCx.  Started vanco, cefepime.  12/1:  ET aspirate + 1+ Proteus mirabilis, 1+ Escherichia coli.  Started Cefepime/Vanco.  12/5:  Transferred from [facility] ICU to [facility] ICU last night.    12/6: Proned at 3 am, kept prone during day.  Stopped Cefepime, started ceftriaxone.     Review of Systems:  The Review of Systems is negative other than noted in the HPI        Allergies/Medications:   Allergies:   No Known Allergies    Continuous Infusions:    cisatracurium 7 mcg/kg/min (12/07/21 1041)     dexmedetomidine 1.5 mcg/kg/hr (12/07/21 1101)     dextrose       fentaNYL 300 mcg/hr (12/07/21 1000)     midazolam 12 mg/hr (12/07/21 1000)     norepinephrine Stopped (12/07/21 0500)     - MEDICATION INSTRUCTIONS -       Scheduled Medications:    ARIPiprazole  5 mg Per Feeding Tube Daily     artificial tears   Both Eyes Q8H     buPROPion  100 mg Per Feeding Tube TID     cefTRIAXone  2 g Intravenous Q24H     chlorhexidine  15 mL Mouth/Throat Q12H     sennosides  10 mL Per Feeding Tube BID    And     docusate  100 mg Per Feeding Tube BID     enoxaparin ANTICOAGULANT  50 mg Subcutaneous Q12H     furosemide  40 mg Intravenous Q24H     gabapentin  800 mg Per Feeding Tube TID     influenza quadrivalent (PF) vacc  0.5 mL Intramuscular Prior to discharge     insulin aspart  1-12 Units Subcutaneous Q4H     lisinopril  10 mg Oral or Feeding Tube Daily     LORazepam  2 mg Per Feeding Tube Q4H     metoprolol tartrate  25 mg Oral or Feeding Tube BID     oxyCODONE  10 mg Per Feeding Tube Q4H     pantoprazole  40 mg Per Feeding Tube QAM AC    Or     pantoprazole (PROTONIX) IV  40 mg Intravenous QAM AC     polyethylene glycol  17 g Oral or Feeding Tube BID           Objective:   Vitals:  BP (!) 172/100   Pulse 105   Temp 98.9  F (37.2  C) (Oral)   Resp 21   Ht 1.803 m (5' 11\")   Wt 98.8 kg (217 lb 13 oz)   SpO2 95%   BMI 30.38 kg/m    Vent: Ventilation Mode: CMV/AC  (Continuous Mandatory " Ventilation/ Assist Control)  FiO2 (%): 50 %  Rate Set (breaths/minute): 24 breaths/min  Tidal Volume Set (mL): 450 mL  PEEP (cm H2O): 16 cmH2O  Oxygen Concentration (%): 50 %  Resp: 21    GEN: Intubated, sedated.  HEENT: Normocephalic, atraumatic.  Pupils small/reactive, anicteric sclera. Endotracheal tube.  NECK: Supple.    PULM:  Mechanical ventilation.   Diminished breath sounds, no wheezing.   CVS: Regular rate and rhythm.  Normal S1, S2.  No rubs, murmurs, or gallops.    ABDOMEN: Hypoactive bowel sounds.  Non-distended.    EXTREMITES:  No clubbing, cyanosis.  1+ pitting edema of feet.   NEURO:   Intubated, sedated.      Intake/Output: I/O last 3 completed shifts:  In: 2186.02 [I.V.:1474.02; NG/GT:712]  Out: 3745 [Urine:3745]        Pertinent Studies:   All laboratory data reviewed  Serum Glucose range:   Recent Labs   Lab 12/07/21  0825 12/07/21 0438 12/07/21  0421 12/06/21  2354   * 123 120* 122*     ABG:   Recent Labs   Lab 12/07/21 0438 12/06/21  1520 12/06/21  0516 12/06/21  0359   PH 7.45* 7.43 7.44 7.46*   PCO2 52* 57*  --  51*   PO2 104* 120*  --  152*   HCO3 34* 35*  --  35*   O2PER 50 0.5  --  80     CBC:   Recent Labs   Lab 12/07/21 0438 12/06/21  0516 12/05/21  2347   WBC 8.2 9.6 10.6   HGB 11.9* 11.2* 11.0*   HCT 36.4* 35.1* 34.2*   MCV 92 93 94    177 180     Chemistry:   Recent Labs   Lab 12/07/21  0438 12/06/21  0516 12/05/21  2347 12/03/21  0500 12/02/21  0506    140 142   < > 144   POTASSIUM 4.5 4.3 4.3   < > 4.9   CHLORIDE 101 100 100   < > 109*   CO2 35* 34* 35*   < > 29   BUN 25* 25* 26*   < > 20   CR 0.59* 0.58* 0.62*   < > 0.60*   GFRESTIMATED >90 >90 >90   < > >90   TRUONG 8.2* 8.6 8.0*   < > 7.6*   MAG 2.3 2.1 2.2   < > 2.2   PROTTOTAL 5.5*  --   --   --  5.0*   ALBUMIN 2.7*  --   --   --  2.4*   AST 46*  --   --   --  26   *  --   --   --  31   ALKPHOS 54  --   --   --  47   BILITOTAL 0.6  --   --   --  0.3    < > = values in this interval not displayed.      Coags:  Recent Labs   Lab 12/05/21  2346   INR 0.96     Cardiac Markers:  No results for input(s): CKTOTAL, TROPONINI in the last 168 hours.     Microbiology:  ET aspirate, 12/1: 1+ Proteus mirabilis, 1+ Escherichia coli   Blood culture x 1, 11/30: negative.  COVID-19 PCR, 11/19: Positive        Cardiology/Radiology:   Cardiac: All cardiac studies reviewed by me.    EKG:  Reviewed    TTE, 11/28:  Summary: 1. Left ventricular size, wall motion and function are normal. The ejection fraction is 60-65%. 2. Flattened septum is consistent with RV pressure/volume overload. 3. Mildly decreased right ventricular systolic function The right ventricle is moderately dilated. Right ventricular systolic pressure could not be approximated due to inadequate tricuspid regurgitation. No tricuspid regurgitation. RA pressure of 8 mmHg.    Radiology:  All imaging studies reviewed by me.    CXR, 12/6: Endotracheal tube tip 4.5 cm above the felipa. Enteric tube tip below the diaphragm. Right PICC line tip over the low SVC. Heart size within normal limits. Diffuse bilateral pulmonary infiltrates. Overall, the infiltrates appear mildly progressed compared to the prior study. No significant bony abnormalities. Remainder unremarkable.    Abdomen X-Ray, 11/28: Enteric suction tube with the tip and side-port within the mid gastric lumen. Nonobstructive bowel gas pattern. No definite free air. Persistent diffuse bibasilar pulmonary airspace opacities.     CT chest PE study, 11/24:  FINDINGS: ANGIOGRAM CHEST: No pulmonary artery filling defect. Aorta is normal in caliber.  LUNGS AND PLEURA: Extensive groundglass consolidative bilateral pulmonary opacities. Trace left effusion. MEDIASTINUM/AXILLAE: Heart size is normal scattered borderline enlarged mediastinal lymph nodes with the largest in the right lower paratracheal chain measuring 1.1 x 2.2 cm (series 5/29).  CORONARY ARTERY CALCIFICATION: None.  UPPER ABDOMEN: Hepatic steatosis.   MUSCULOSKELETAL: Normal. IMPRESSION: 1.  No pulmonary embolus. 2.  COVID pneumonia. 3.  Likely reactive mediastinal  lymph nodes. 4.  Hepatic steatosis.

## 2021-12-07 NOTE — PROGRESS NOTES
RT PROGRESS NOTE    VENT DAY# 10    CURRENT SETTINGS:   Ventilation Mode: CMV/AC  (Continuous Mandatory Ventilation/ Assist Control)  FiO2 (%): 50 %  Rate Set (breaths/minute): 20 breaths/min  Tidal Volume Set (mL): 450 mL  PEEP (cm H2O): 16 cmH2O  Oxygen Concentration (%): 50 %  Resp: 20      PATIENT PARAMETERS:  PIP 32   Pplat:  29  Pmean:  18  SBT: No     Secretions:  scant  02 Sats:  94%    ETT SIZE 8.0 Secured at 26 cm at teeth/gums    Respiratory Medications: None       NOTE / SHIFT SUMMARY:   Patient continues on mechanical ventilation. No weaning performed today. RT will continue to follow.    Piter Monsalve, RT

## 2021-12-07 NOTE — PROGRESS NOTES
ETT # 8.0 secured 26 cm at the teeth. Pt remains on Veletri, vented/full support. Current settings: AC 24 450 50% 16. Head/arms Q2H repositioned without incident. BS diminished. RT following.    Edvin Henderson, RT

## 2021-12-08 LAB
ALT SERPL W P-5'-P-CCNC: 113 U/L (ref 0–45)
ANION GAP SERPL CALCULATED.3IONS-SCNC: 5 MMOL/L (ref 5–18)
AST SERPL W P-5'-P-CCNC: 44 U/L (ref 0–40)
BASE EXCESS BLDA CALC-SCNC: 10.8 MMOL/L
BASE EXCESS BLDA CALC-SCNC: 13.3 MMOL/L
BUN SERPL-MCNC: 26 MG/DL (ref 8–22)
CALCIUM SERPL-MCNC: 8.8 MG/DL (ref 8.5–10.5)
CHLORIDE BLD-SCNC: 102 MMOL/L (ref 98–107)
CO2 SERPL-SCNC: 34 MMOL/L (ref 22–31)
COHGB MFR BLD: 97.4 % (ref 96–97)
COHGB MFR BLD: 98 % (ref 96–97)
CREAT SERPL-MCNC: 0.62 MG/DL (ref 0.7–1.3)
ERYTHROCYTE [DISTWIDTH] IN BLOOD BY AUTOMATED COUNT: 14.3 % (ref 10–15)
GFR SERPL CREATININE-BSD FRML MDRD: >90 ML/MIN/1.73M2
GLUCOSE BLD-MCNC: 141 MG/DL (ref 70–125)
GLUCOSE BLDC GLUCOMTR-MCNC: 121 MG/DL (ref 70–99)
GLUCOSE BLDC GLUCOMTR-MCNC: 126 MG/DL (ref 70–99)
GLUCOSE BLDC GLUCOMTR-MCNC: 128 MG/DL (ref 70–99)
GLUCOSE BLDC GLUCOMTR-MCNC: 130 MG/DL (ref 70–99)
GLUCOSE BLDC GLUCOMTR-MCNC: 132 MG/DL (ref 70–99)
GLUCOSE BLDC GLUCOMTR-MCNC: 134 MG/DL (ref 70–99)
GLUCOSE BLDC GLUCOMTR-MCNC: 149 MG/DL (ref 70–99)
HCO3 BLD-SCNC: 32 MMOL/L (ref 23–29)
HCO3 BLD-SCNC: 35 MMOL/L (ref 23–29)
HCT VFR BLD AUTO: 38.9 % (ref 40–53)
HGB BLD-MCNC: 12.4 G/DL (ref 13.3–17.7)
MAGNESIUM SERPL-MCNC: 2.3 MG/DL (ref 1.8–2.6)
MCH RBC QN AUTO: 30 PG (ref 26.5–33)
MCHC RBC AUTO-ENTMCNC: 31.9 G/DL (ref 31.5–36.5)
MCV RBC AUTO: 94 FL (ref 78–100)
O2/TOTAL GAS SETTING VFR VENT: 0.5 %
O2/TOTAL GAS SETTING VFR VENT: 0.5 %
OXYHGB MFR BLD: 95.8 % (ref 96–97)
OXYHGB MFR BLD: 96.5 % (ref 96–97)
PCO2 BLD: 55 MM HG (ref 35–45)
PCO2 BLD: 66 MM HG (ref 35–45)
PEEP: 14 CM H2O
PEEP: 14 CM H2O
PH BLD: 7.35 [PH] (ref 7.37–7.44)
PH BLD: 7.44 [PH] (ref 7.37–7.44)
PLATELET # BLD AUTO: 197 10E3/UL (ref 150–450)
PO2 BLD: 103 MM HG (ref 80–90)
PO2 BLD: 87 MM HG (ref 80–90)
POTASSIUM BLD-SCNC: 4.3 MMOL/L (ref 3.5–5)
RATE: 20 RR/MIN
RATE: 20 RR/MIN
RBC # BLD AUTO: 4.13 10E6/UL (ref 4.4–5.9)
SODIUM SERPL-SCNC: 141 MMOL/L (ref 136–145)
TEMPERATURE: 37 DEGREES C
TEMPERATURE: 37 DEGREES C
TRIGL SERPL-MCNC: 244 MG/DL
VENTILATION MODE: ABNORMAL
VENTILATION MODE: AC
VENTILATOR TIDAL VOLUME: 450 ML
WBC # BLD AUTO: 9.8 10E3/UL (ref 4–11)

## 2021-12-08 PROCEDURE — 84460 ALANINE AMINO (ALT) (SGPT): CPT | Performed by: INTERNAL MEDICINE

## 2021-12-08 PROCEDURE — 258N000003 HC RX IP 258 OP 636: Performed by: INTERNAL MEDICINE

## 2021-12-08 PROCEDURE — 250N000011 HC RX IP 250 OP 636: Performed by: INTERNAL MEDICINE

## 2021-12-08 PROCEDURE — 99291 CRITICAL CARE FIRST HOUR: CPT | Performed by: INTERNAL MEDICINE

## 2021-12-08 PROCEDURE — 84450 TRANSFERASE (AST) (SGOT): CPT | Performed by: INTERNAL MEDICINE

## 2021-12-08 PROCEDURE — 80048 BASIC METABOLIC PNL TOTAL CA: CPT | Performed by: INTERNAL MEDICINE

## 2021-12-08 PROCEDURE — 200N000001 HC R&B ICU

## 2021-12-08 PROCEDURE — 250N000013 HC RX MED GY IP 250 OP 250 PS 637: Performed by: INTERNAL MEDICINE

## 2021-12-08 PROCEDURE — 999N000157 HC STATISTIC RCP TIME EA 10 MIN

## 2021-12-08 PROCEDURE — 82805 BLOOD GASES W/O2 SATURATION: CPT | Performed by: INTERNAL MEDICINE

## 2021-12-08 PROCEDURE — 250N000011 HC RX IP 250 OP 636

## 2021-12-08 PROCEDURE — 83735 ASSAY OF MAGNESIUM: CPT | Performed by: INTERNAL MEDICINE

## 2021-12-08 PROCEDURE — 36592 COLLECT BLOOD FROM PICC: CPT | Performed by: INTERNAL MEDICINE

## 2021-12-08 PROCEDURE — 250N000009 HC RX 250: Performed by: INTERNAL MEDICINE

## 2021-12-08 PROCEDURE — 84478 ASSAY OF TRIGLYCERIDES: CPT | Performed by: INTERNAL MEDICINE

## 2021-12-08 PROCEDURE — 3E043XZ INTRODUCTION OF VASOPRESSOR INTO CENTRAL VEIN, PERCUTANEOUS APPROACH: ICD-10-PCS | Performed by: INTERNAL MEDICINE

## 2021-12-08 PROCEDURE — 85014 HEMATOCRIT: CPT | Performed by: INTERNAL MEDICINE

## 2021-12-08 PROCEDURE — 94003 VENT MGMT INPAT SUBQ DAY: CPT

## 2021-12-08 RX ORDER — HYDRALAZINE HYDROCHLORIDE 20 MG/ML
10 INJECTION INTRAMUSCULAR; INTRAVENOUS EVERY 6 HOURS PRN
Status: DISCONTINUED | OUTPATIENT
Start: 2021-12-08 | End: 2021-12-31 | Stop reason: HOSPADM

## 2021-12-08 RX ORDER — METOPROLOL TARTRATE 1 MG/ML
10 INJECTION, SOLUTION INTRAVENOUS EVERY 4 HOURS PRN
Status: DISCONTINUED | OUTPATIENT
Start: 2021-12-08 | End: 2021-12-31

## 2021-12-08 RX ORDER — PROPOFOL 10 MG/ML
5-20 INJECTION, EMULSION INTRAVENOUS CONTINUOUS
Status: DISCONTINUED | OUTPATIENT
Start: 2021-12-08 | End: 2021-12-10

## 2021-12-08 RX ORDER — PROPOFOL 10 MG/ML
INJECTION, EMULSION INTRAVENOUS
Status: COMPLETED
Start: 2021-12-08 | End: 2021-12-08

## 2021-12-08 RX ORDER — NOREPINEPHRINE BITARTRATE 0.02 MG/ML
.01-.6 INJECTION, SOLUTION INTRAVENOUS CONTINUOUS
Status: DISCONTINUED | OUTPATIENT
Start: 2021-12-08 | End: 2021-12-21

## 2021-12-08 RX ORDER — MIDAZOLAM HCL IN 0.9 % NACL/PF 1 MG/ML
1-12 PLASTIC BAG, INJECTION (ML) INTRAVENOUS CONTINUOUS
Status: DISCONTINUED | OUTPATIENT
Start: 2021-12-08 | End: 2021-12-10

## 2021-12-08 RX ORDER — DEXMEDETOMIDINE HYDROCHLORIDE 4 UG/ML
.2-1.5 INJECTION, SOLUTION INTRAVENOUS CONTINUOUS
Status: DISCONTINUED | OUTPATIENT
Start: 2021-12-08 | End: 2021-12-10

## 2021-12-08 RX ADMIN — OXYCODONE HYDROCHLORIDE 10 MG: 5 SOLUTION ORAL at 05:48

## 2021-12-08 RX ADMIN — CHLORHEXIDINE GLUCONATE 0.12% ORAL RINSE 15 ML: 1.2 LIQUID ORAL at 08:28

## 2021-12-08 RX ADMIN — BUPROPION HYDROCHLORIDE 100 MG: 100 TABLET, FILM COATED ORAL at 09:54

## 2021-12-08 RX ADMIN — ACETAMINOPHEN 650 MG: 650 SOLUTION ORAL at 12:30

## 2021-12-08 RX ADMIN — Medication 20 MG: at 16:33

## 2021-12-08 RX ADMIN — POLYETHYLENE GLYCOL 3350 17 G: 17 POWDER, FOR SOLUTION ORAL at 20:00

## 2021-12-08 RX ADMIN — Medication 300 MCG/HR: at 20:08

## 2021-12-08 RX ADMIN — LORAZEPAM 2 MG: 2 LIQUID ORAL at 15:33

## 2021-12-08 RX ADMIN — DEXTROSE MONOHYDRATE 6.5 MCG/KG/MIN: 50 INJECTION, SOLUTION INTRAVENOUS at 08:50

## 2021-12-08 RX ADMIN — Medication 12 MG/HR: at 14:45

## 2021-12-08 RX ADMIN — Medication 100 MCG: at 09:53

## 2021-12-08 RX ADMIN — ACETAMINOPHEN 650 MG: 650 SOLUTION ORAL at 19:50

## 2021-12-08 RX ADMIN — GABAPENTIN 800 MG: 250 SUSPENSION ORAL at 09:54

## 2021-12-08 RX ADMIN — MINERAL OIL AND WHITE PETROLATUM: 150; 830 OINTMENT OPHTHALMIC at 04:03

## 2021-12-08 RX ADMIN — ACETAMINOPHEN 650 MG: 650 SOLUTION ORAL at 02:12

## 2021-12-08 RX ADMIN — Medication 100 MCG: at 22:00

## 2021-12-08 RX ADMIN — OXYCODONE HYDROCHLORIDE 10 MG: 5 SOLUTION ORAL at 09:54

## 2021-12-08 RX ADMIN — SENNOSIDES 10 ML: 8.8 LIQUID ORAL at 20:00

## 2021-12-08 RX ADMIN — Medication 300 MCG/HR: at 05:51

## 2021-12-08 RX ADMIN — FUROSEMIDE 40 MG: 10 INJECTION, SOLUTION INTRAMUSCULAR; INTRAVENOUS at 08:29

## 2021-12-08 RX ADMIN — HYDRALAZINE HYDROCHLORIDE 10 MG: 20 INJECTION INTRAMUSCULAR; INTRAVENOUS at 04:59

## 2021-12-08 RX ADMIN — DEXMEDETOMIDINE HYDROCHLORIDE 1.5 MCG/KG/HR: 400 INJECTION INTRAVENOUS at 04:34

## 2021-12-08 RX ADMIN — DEXMEDETOMIDINE HYDROCHLORIDE 1.5 MCG/KG/HR: 400 INJECTION INTRAVENOUS at 01:32

## 2021-12-08 RX ADMIN — Medication 12 MG/HR: at 16:29

## 2021-12-08 RX ADMIN — Medication 100 MCG: at 15:33

## 2021-12-08 RX ADMIN — NICARDIPINE HYDROCHLORIDE 2.5 MG/HR: 0.2 INJECTION INTRAVENOUS at 09:49

## 2021-12-08 RX ADMIN — PROPOFOL 10 MCG/KG/MIN: 10 INJECTION, EMULSION INTRAVENOUS at 16:29

## 2021-12-08 RX ADMIN — ARIPIPRAZOLE 5 MG: 1 SOLUTION ORAL at 09:56

## 2021-12-08 RX ADMIN — DEXMEDETOMIDINE HYDROCHLORIDE 1.5 MCG/KG/HR: 400 INJECTION INTRAVENOUS at 14:45

## 2021-12-08 RX ADMIN — DEXMEDETOMIDINE HYDROCHLORIDE 1.5 MCG/KG/HR: 400 INJECTION INTRAVENOUS at 21:36

## 2021-12-08 RX ADMIN — Medication 100 MCG: at 04:12

## 2021-12-08 RX ADMIN — OXYCODONE HYDROCHLORIDE 10 MG: 5 SOLUTION ORAL at 14:47

## 2021-12-08 RX ADMIN — Medication 100 MCG: at 16:36

## 2021-12-08 RX ADMIN — GABAPENTIN 800 MG: 250 SUSPENSION ORAL at 12:29

## 2021-12-08 RX ADMIN — DEXMEDETOMIDINE HYDROCHLORIDE 1.5 MCG/KG/HR: 400 INJECTION INTRAVENOUS at 18:45

## 2021-12-08 RX ADMIN — LORAZEPAM 2 MG: 2 LIQUID ORAL at 08:28

## 2021-12-08 RX ADMIN — Medication 12 MG/HR: at 06:36

## 2021-12-08 RX ADMIN — OXYCODONE HYDROCHLORIDE 10 MG: 5 SOLUTION ORAL at 21:36

## 2021-12-08 RX ADMIN — DEXTROSE MONOHYDRATE 7 MCG/KG/MIN: 50 INJECTION, SOLUTION INTRAVENOUS at 03:39

## 2021-12-08 RX ADMIN — Medication 300 MCG/HR: at 12:21

## 2021-12-08 RX ADMIN — OXYCODONE HYDROCHLORIDE 10 MG: 5 SOLUTION ORAL at 17:58

## 2021-12-08 RX ADMIN — Medication 100 MCG: at 20:27

## 2021-12-08 RX ADMIN — LORAZEPAM 2 MG: 2 LIQUID ORAL at 12:29

## 2021-12-08 RX ADMIN — DEXMEDETOMIDINE HYDROCHLORIDE 1.5 MCG/KG/HR: 400 INJECTION INTRAVENOUS at 06:50

## 2021-12-08 RX ADMIN — Medication 100 MCG: at 11:28

## 2021-12-08 RX ADMIN — CEFTRIAXONE SODIUM 2 G: 2 INJECTION, POWDER, FOR SOLUTION INTRAMUSCULAR; INTRAVENOUS at 09:54

## 2021-12-08 RX ADMIN — LORAZEPAM 2 MG: 2 LIQUID ORAL at 19:50

## 2021-12-08 RX ADMIN — Medication 12 MG/HR: at 22:13

## 2021-12-08 RX ADMIN — ENOXAPARIN SODIUM 50 MG: 100 INJECTION SUBCUTANEOUS at 09:54

## 2021-12-08 RX ADMIN — DEXMEDETOMIDINE HYDROCHLORIDE 1.5 MCG/KG/HR: 400 INJECTION INTRAVENOUS at 08:29

## 2021-12-08 RX ADMIN — Medication 100 MCG: at 08:15

## 2021-12-08 RX ADMIN — DEXMEDETOMIDINE HYDROCHLORIDE 1.5 MCG/KG/HR: 400 INJECTION INTRAVENOUS at 16:28

## 2021-12-08 RX ADMIN — Medication 20 MG: at 20:28

## 2021-12-08 RX ADMIN — Medication 100 MCG: at 02:59

## 2021-12-08 RX ADMIN — Medication 20 MG: at 22:09

## 2021-12-08 RX ADMIN — Medication 0.06 MCG/KG/MIN: at 18:16

## 2021-12-08 RX ADMIN — METOPROLOL TARTRATE 10 MG: 5 INJECTION INTRAVENOUS at 05:45

## 2021-12-08 RX ADMIN — PROPOFOL 20 MCG/KG/MIN: 10 INJECTION, EMULSION INTRAVENOUS at 22:20

## 2021-12-08 RX ADMIN — Medication 100 MCG: at 05:11

## 2021-12-08 RX ADMIN — METOPROLOL TARTRATE 5 MG: 5 INJECTION INTRAVENOUS at 04:13

## 2021-12-08 RX ADMIN — LORAZEPAM 2 MG: 2 LIQUID ORAL at 00:03

## 2021-12-08 RX ADMIN — GABAPENTIN 800 MG: 250 SUSPENSION ORAL at 17:58

## 2021-12-08 RX ADMIN — DEXMEDETOMIDINE HYDROCHLORIDE 1.5 MCG/KG/HR: 400 INJECTION INTRAVENOUS at 12:56

## 2021-12-08 RX ADMIN — INSULIN ASPART 1 UNITS: 100 INJECTION, SOLUTION INTRAVENOUS; SUBCUTANEOUS at 04:02

## 2021-12-08 RX ADMIN — Medication 40 MG: at 08:28

## 2021-12-08 RX ADMIN — BUPROPION HYDROCHLORIDE 100 MG: 100 TABLET, FILM COATED ORAL at 14:47

## 2021-12-08 RX ADMIN — ENOXAPARIN SODIUM 50 MG: 100 INJECTION SUBCUTANEOUS at 21:36

## 2021-12-08 RX ADMIN — OXYCODONE HYDROCHLORIDE 10 MG: 5 SOLUTION ORAL at 02:12

## 2021-12-08 RX ADMIN — BUPROPION HYDROCHLORIDE 100 MG: 100 TABLET, FILM COATED ORAL at 20:00

## 2021-12-08 RX ADMIN — CHLORHEXIDINE GLUCONATE 0.12% ORAL RINSE 15 ML: 1.2 LIQUID ORAL at 19:50

## 2021-12-08 RX ADMIN — DOCUSATE SODIUM 100 MG: 50 LIQUID ORAL at 20:00

## 2021-12-08 RX ADMIN — LORAZEPAM 2 MG: 2 LIQUID ORAL at 03:40

## 2021-12-08 RX ADMIN — LORAZEPAM 2 MG: 2 LIQUID ORAL at 23:48

## 2021-12-08 ASSESSMENT — ACTIVITIES OF DAILY LIVING (ADL)
ADLS_ACUITY_SCORE: 24
ADLS_ACUITY_SCORE: 22
ADLS_ACUITY_SCORE: 24
ADLS_ACUITY_SCORE: 22
ADLS_ACUITY_SCORE: 24

## 2021-12-08 ASSESSMENT — MIFFLIN-ST. JEOR: SCORE: 1870.13

## 2021-12-08 NOTE — PLAN OF CARE
Problem: Communication Impairment (Mechanical Ventilation, Invasive)  Goal: Effective Communication  Outcome: Improving     Problem: Device-Related Complication Risk (Mechanical Ventilation, Invasive)  Goal: Optimal Device Function  Outcome: Improving     Problem: Inability to Wean (Mechanical Ventilation, Invasive)  Goal: Mechanical Ventilation Liberation  Outcome: Improving    JN COVID RT PROGRESS NOTE    DATA:    VENT DAY#  11    CURRENT SETTINGS:  VENT SETTINGS   A/C, 20, 450, +14        FIO2:   50    PATIENT PARAMETERS:    PIP 30  Pplat:  27  Pmean:  19  SBT: N/A  SECRETIONS:  Large/yellow/thick  02 SATS:  91-98%    ETT SIZE 8.0  Secured at  26 cm at teeth    Securing device changed / tube re-taped date: N/A    Respiratory Medications: N/A     ABG: Results for ARABELLA TOBIAS SOLA (MRN [id number]) as of 12/8/2021 18:22   Ref. Range 12/8/2021 14:59   pH Arterial Latest Ref Range: 7.37 - 7.44  7.44   pCO2 Arterial Latest Ref Range: 35 - 45 mm Hg 55 (H)   PO2 Arterial Latest Ref Range: 80 - 90 mm Hg 87   Bicarbonate Arterial Latest Ref Range: 23 - 29 mmol/L 35 (H)   Base Excess Art Latest Ref Range:   mmol/L 13.3   FIO2 Unknown 0.5   Oxyhemoglobin Latest Ref Range: 96.0 - 97.0 % 95.8 (L)       NOTE / PLAN:   Cont with plan. Pt was supinated this morning and remains supine based on ABG's results.

## 2021-12-08 NOTE — PLAN OF CARE
Problem: Adult Inpatient Plan of Care  Goal: Optimal Comfort and Wellbeing  Outcome: No Change     Problem: Device-Related Complication Risk (Mechanical Ventilation, Invasive)  Goal: Optimal Device Function  Outcome: No Change  Intervention: Optimize Device Care and Function  Recent Flowsheet Documentation  Taken 12/8/2021 0400 by Faith Melgar RN  Airway Safety Measures: all equipment/monitors on and audible  Taken 12/8/2021 0000 by Faith Melgar RN  Airway Safety Measures: all equipment/monitors on and audible  Taken 12/7/2021 2000 by Faith Melgar RN  Airway Safety Measures: all equipment/monitors on and audible     Problem: Skin and Tissue Injury (Mechanical Ventilation, Invasive)  Goal: Absence of Device-Related Skin and Tissue Injury  Outcome: No Change  Intervention: Maintain Skin and Tissue Health  Recent Flowsheet Documentation  Taken 12/8/2021 0400 by Faith Melgar RN  Device Skin Pressure Protection:   absorbent pad utilized/changed   adhesive use limited   positioning supports utilized   pressure points protected   skin-to-device areas padded   skin-to-skin areas padded  Taken 12/8/2021 0000 by Faith Melgar RN  Device Skin Pressure Protection:   absorbent pad utilized/changed   adhesive use limited   positioning supports utilized   pressure points protected   skin-to-device areas padded   skin-to-skin areas padded  Taken 12/7/2021 2000 by Faith Melgar RN  Device Skin Pressure Protection:   absorbent pad utilized/changed   adhesive use limited   positioning supports utilized   pressure points protected   skin-to-device areas padded   skin-to-skin areas padded    Patient prone all shift, head turns q 2 hrs with RT. BP very labile, prn meds to keep within parameters. Low grade temp, tylenol given. Pt has had 3 small loose brown BM's so far this shift. Tube feeding residuals 100-160cc.

## 2021-12-08 NOTE — PROGRESS NOTES
[facility] [place]:  CRITICAL CARE PROGRESS NOTE     Date / Time of Admission:  11/24/2021  1:19 PM    ID: Jared North is a 46 year old male, unvaccinated against COVID-19, with history of polysubstance abuse and chronic pain/lower back pain now on Suboxone, mood disorder/depression, essential hypertension, sleep disorder breathing, mild intermittent asthma, knee pain, and recent diagnosis of COVID-19 viral infection on 11/19/2021 who presented to [facility] on 11/24/2021 with worsening shortness of breath, admitted to the ICU with COVID-19 viral pneumonia and severe acute respiratory failure with hypoxia requiring noninvasive ventilation.  Course complicated by ARDS requiring intubation 11/28, multi-organism bacterial VAP (Proteus, E. Coli) and circulatory shock.         Changes for today: 1.   Weaning off paralytic today.  With reduced sedation - patient followed minimal commands.   2. Reduce RASS goal from -5 to -3/-4 and assess tolerance.    3. P/F ratio 206 while prone this morning.   Repeat ABG P/F 174 this afternoon while supine.  4. Will hold off on proning today and monitor response.   5. Keep PEEP 14 today - last PEEP reduction was yesterday.  If stable, will reduce PEEP tomorrow.  6. Hemodynamics labile.  Nicardipine gtt ordered.   7. Received lasix IV today already - placed hold order.   8. Advancing enteral feeding today.   9. Holding stool softeners this morning, plan to give in afternoon.  Rectal tube placed.        Assessment/Plan:   Neurologic:  Acute metabolic encephalopathy.   In the setting of therapeutic sedation.  Reduced sedation 12/8, pt followed commands.  On paralytic infusion 11/28 - 12/8.  History of polysubstance abuse and chronic pain/lower back pain now on Suboxone.  Hold suboxone on 11/28 after intubation. Mood disorder/depression.    Weaning off paralytic today.  With reduced sedation - patient followed minimal commands.     Reduce RASS goal from -5 to -3/-4  and assess tolerance.      Precedex, fentanyl, midazolam gtt as needed for sedation.  RASS goal -3/-4.    Addition of low-dose propofol gtt to help with sedation.  Check TGs level now and every 48 hours.    Continue home meds: abilify, bupropion, gabapentin 3x/day    Continue ativan 2 mg Q4H, continue oxycodone 10 mg Q4H    Pain meds PRN     Pulmonary: Acute respiratory failure with hypoxia, acute respiratory distress syndrome.  Secondary to COVID19 and superimposed VAP.   CT chest 11/24 extensive groundglass consolidative bilateral pulmonary opacities.  Failed HFNC/NIPPV, intubated 11/28.  Had trace left pleural effusion on CT, diuresed.  Inhaled Veletri 11/28- 12/7.  Paralytics 11/28-12/8.  COVID-19 viral pneumonia.  Mild intermittent asthma without exacerbation.  He reports using inhaler once weekly at baseline. Trace left pleural effusion.  Noted on CT scan, diuresed.     Wean supplemental O2 as tolerated; goal O2 sat 92-96%.  HOB > 30 degrees to limit aspiration risk.      Vent: CMV, RR 20,  mL (6.5 mL/kg IBW), PEEP 14, FiO2 50%.     Goal Pplat less than 30, goal driving pressure less than 15.    Keep PEEP 14 today - last PEEP reduction was yesterday.  If stable, will reduce PEEP tomorrow.    P/F ratio 206 while prone, 174 while supine in the afternoon.    ABG 2x/day.  Supinate this morning.  Will hold off on proning this afternoon due to facial edema/wounds.    Abx and diuresis as below.     Cardiovascular:  Circulatory shock/hypertensive urgency.  Back on vasopressors, hypotensive.  Has history of essential hypertension, on lisinopril at baseline.  Unclear cause of lability in BP trends.  Following commands w/ reduced sedation 12/8 so less concerned for neurologic cause. Paroxysmal atrial fibrillation.  Went into A. fib on 11/26, initially rate controlled.  Initiated amiodarone on 11/27.  Converted to normal sinus rhythm after intubation on 11/28.  Amio gtt stopped.  Mild RV dysfunction.  TTE 11/28 with  + pressure overload, moderate RV dilation, mild RV dysfunction.  LVEF 60-65%.    Cardiac monitoring.  SBP >= 90 mmHg, MAP >= 65 mmHg.  EKG PRN.    Norepinephrine as needed for BP goals.    Nicardipine gtt added today in event of hypertension again.    Holding lisinopril, metoprolol given BP lability.    Received Lasix IV this morning - hold for now.  ? If contributing to BP trends.    If BP's still fluctuating, consider head CT for evaluation.     GI/: Transaminitis, stable.  Likely from the viral infection.  Present prior to Remdesivir use.  Slight rise on 12/7 but stable 12/8. Moderate protein-calorie malnutrition.  Slow transit constipation.    Nutrition: Enteral feeding - advance enteral feeding today.  FWF 30 mL Q6H.    Last BM:  12/8 Meds: colace 2x/day.  Senna 2x/day, miralax 2x/day.  Rectal tube placed.    GI prophylaxis: PPI.     Renal:  Metabolic alkalosis.      Monitor I/O's.  Electrolyte repletion PRN.  Avoid/limit nephrotoxic agents.    IVFs: Saline lock.    Received Lasix IV this morning - hold for now.      Heme/Onc: COVID-19 associated coagulopathy.  D-dimer 1.24 on 11/24.  Leukopenia,resolved.  Thrombocytopenia, resolved.     DVT prophylaxis: SCDs.  Lovenox 0.5 mg/kg subcu 2X/day.     Endocrine: Hyperglycemia, mild.  No history of diabetes.  Secondary to stress and steroid use.  Hemoglobin A1c 5.8% 11/24.    FSBG Q4H, Aspart insulin SS (high insulin resistance).  Hypoglycemia protocol.     Infectious diseases: COVID-19 viral infection.  PCR positive on 11/19.  Hospitalized on 11/24.  CRP 12.3, PCT 0.5 on 11/24.  s/p decadron x 10 days (start 11/24), Remdesivir x 5 days (start 11/24), s/p tocilizumab 8 mg/kg on 11/24.  Multi-organism ventilator associated pneumonia.  ET aspirate 12/1 + 1+ E. Coli and 1+ Proteus mirabalis.  Cefepime IV on 12/1-12/6, switched to ceftriaxone IV on 12/6.    Follow-up cultures.    Continue ceftriaxone (start 12/6) x 5 days to complete 10-day  course.     Rheum/Musculoskeletal: Chronic lumbar pain and knee pain.  Patient being evaluated by orthopedic surgery for surgical intervention.    Activity:  Bedrest    Lines/Drains/Tubes:  8.0 mm endotracheal tube, placed 11/28  RUE PICC central line, placed 11/24  Left radial A-line, placed 11/28  OG enteral tube, placed 11/28  Wei catheter, placed 11/28     Code Status:  Full Code     The patient and/or the family was educated about the above plan of care and indicated understanding.  Updated the patient's ex-wife, Jama North on 12/8  All questions answered.     This patient is considered critically ill and requires ICU level of care due to acute respiratory failure with hypoxia, acute respiratory distress syndrome requiring mechanical ventilation; circulatory shock on vasopressors     Total Critical Care time, not including separate billable procedure time: 50 minutes.    Kendra Redman MD, MPH  Pulmonary/Critical Care Medicine  12/08/2021   3:22 PM         ICU DAILY CHECKLIST:   ICU DAILY CHECKLIST           Can patient transfer out of MICU? no    FAST HUG:    Feeding:  yes.  Patient is receiving TUBE FEEDS    Wei: no  Analgesia/Sedation: yes; Dexmedetomidine, Midazolam and Fentanyl   Thromboembolic prophylaxis: yes; Mode:  Lovenox and SCDs  HOB>30:  yes  Stress Ulcer Protocol Active: yes; Mode: PPI  Glycemic Control: Any glucose > 180 no; Mode of Insulin Therapy: Sliding Scale Insulin    INTUBATED:  Can patient have daily waking:  No  Can patient have spontaneous breathing trial:  No    Restraints? no    PHYSICAL THERAPY AND MOBILITY:  Can patient have PT and mobility trial: no  Activity: Bedrest    RISK FACTORS PRESENT ON ADMISSION:  Clinically Significant Risk Factors Present on Admission           # Non-Severe Malnutrition, POA: based on Weight loss;Reduced intake (12/06/21 1300)         Subjective/Interval History:   11/24: seen at the [place] ED on 11/19, tested positive for Covid 19 at that time.   Ex-wife endorsed concerns, patient came to [facility] ED for evaluation.  O2 sat 44% on room air in triage.  Initially placed on oxygen mask, then noninvasive ventilation.  CT scan with extensive groundglass opacities, no pulmonary embolism.  He was given Decadron, remdesivir, tocilizumab  11/24: Transferred to ICU.  NPO.  Encourage self proning.  11/25:  NIPPV, 60 L/min, FiO2 100%.  Back on NIPPV, FiO2 at 80%.  11/26: Tried HFNC, 100%, 60 L/min for 3 hours.  Then back in NIPPV, FiO2 80%-85%.   Flipped into rate-controlled Afib.  11/27:  HFNC 95%, 60 L/min for about 6 hours.  Back on NIPPV in early evening.  Started amiodarone bolus/gtt.   11/28:  Intubated.  Started Nimbex gtt.  Started Veletri.  Proned.  OG tube placed.    11/29: Gastric feeding held.   11/30: WBC rising, check BCx.  Started vanco, cefepime.  12/1:  ET aspirate + 1+ Proteus mirabilis, 1+ Escherichia coli.  Started Cefepime/Vanco.  12/5:  Transferred from [facility] ICU to [facility] ICU last night.    12/6: Proned at 3 am, kept prone during day.  Stopped Cefepime, started ceftriaxone.   12/7:  Stopped Veletri. Labile BP.  Hypertensive, started lisinopril/metoprolol.  Then hypotensive, started norepi gtt.   PEEP from 16 to 14.  Supine/proned.  Started trickle feeds.     Overnight, had BP fluctuations with turns.    Multiple bowel movements.  Rectal tube placed.     Review of Systems:  The Review of Systems is negative other than noted in the HPI        Allergies/Medications:   Allergies:   No Known Allergies    Continuous Infusions:    dexmedetomidine 1.5 mcg/kg/hr (12/08/21 1445)     dextrose       fentaNYL 300 mcg/hr (12/08/21 1445)     midazolam 12 mg/hr (12/08/21 1445)     niCARdipine Stopped (12/08/21 1020)     norepinephrine 0.04 mcg/kg/min (12/08/21 1503)     - MEDICATION INSTRUCTIONS -       Scheduled Medications:    ARIPiprazole  5 mg Per Feeding Tube Daily     buPROPion  100 mg Per Feeding Tube TID     cefTRIAXone  2 g Intravenous  "Q24H     chlorhexidine  15 mL Mouth/Throat Q12H     sennosides  10 mL Per Feeding Tube BID    And     docusate  100 mg Per Feeding Tube BID     enoxaparin ANTICOAGULANT  50 mg Subcutaneous Q12H     [Held by provider] furosemide  40 mg Intravenous Q24H     gabapentin  800 mg Per Feeding Tube TID     influenza quadrivalent (PF) vacc  0.5 mL Intramuscular Prior to discharge     insulin aspart  1-12 Units Subcutaneous Q4H     [Held by provider] lisinopril  10 mg Oral or Feeding Tube Daily     LORazepam  2 mg Per Feeding Tube Q4H     [Held by provider] metoprolol tartrate  25 mg Oral or Feeding Tube BID     oxyCODONE  10 mg Per Feeding Tube Q4H     pantoprazole  40 mg Per Feeding Tube QAM AC    Or     pantoprazole (PROTONIX) IV  40 mg Intravenous QAM AC     polyethylene glycol  17 g Oral or Feeding Tube BID           Objective:   Vitals:  BP (!) 164/98   Pulse 101   Temp 99.8  F (37.7  C) (Oral)   Resp 20   Ht 1.803 m (5' 11\")   Wt 97.3 kg (214 lb 8.1 oz)   SpO2 96%   BMI 29.92 kg/m    Vent: Ventilation Mode: CMV/AC  (Continuous Mandatory Ventilation/ Assist Control)  FiO2 (%): 50 %  Rate Set (breaths/minute): 20 breaths/min  Tidal Volume Set (mL): 500 mL  PEEP (cm H2O): 14 cmH2O  Oxygen Concentration (%): 50 %  Resp: 20    GEN: Intubated, sedated.  HEENT: Normocephalic, atraumatic.  Pupils small/reactive, anicteric sclera. Endotracheal tube.  NECK: Supple.    PULM:  Mechanical ventilation.   Diminished breath sounds, no wheezing.   CVS: Regular rate and rhythm.  Normal S1, S2.  No rubs, murmurs, or gallops.    ABDOMEN: Hypoactive bowel sounds.  Non-distended.    EXTREMITES:  No clubbing, cyanosis.  1+ pitting edema of feet.   NEURO:   Intubated, sedated.      Intake/Output: I/O last 3 completed shifts:  In: 2723.55 [I.V.:1998.55; NG/GT:540]  Out: 4645 [Urine:4645]        Pertinent Studies:   All laboratory data reviewed  Serum Glucose range:   Recent Labs   Lab 12/08/21  1225 12/08/21  0840 12/08/21  0401 " 12/08/21  0358   * 134* 141* 149*     ABG:   Recent Labs   Lab 12/08/21  1459 12/08/21  0400 12/07/21  1439   PH 7.44 7.35* 7.38   PCO2 55* 66* 62*   PO2 87 103* 118*   HCO3 35* 32* 33*   O2PER 0.5 0.5 0.5     CBC:   Recent Labs   Lab 12/08/21  0401 12/07/21  1555 12/07/21  0438   WBC 9.8 11.1* 8.2   HGB 12.4* 12.3* 11.9*   HCT 38.9* 38.9* 36.4*   MCV 94 93 92    244 201     Chemistry:   Recent Labs   Lab 12/08/21  0401 12/07/21  1439 12/07/21  0438 12/06/21  0516 12/03/21  0500 12/02/21  0506     --  139 140   < > 144   POTASSIUM 4.3 4.9 4.5 4.3   < > 4.9   CHLORIDE 102  --  101 100   < > 109*   CO2 34*  --  35* 34*   < > 29   BUN 26*  --  25* 25*   < > 20   CR 0.62*  --  0.59* 0.58*   < > 0.60*   GFRESTIMATED >90  --  >90 >90   < > >90   TRUONG 8.8  --  8.2* 8.6   < > 7.6*   MAG 2.3  --  2.3 2.1   < > 2.2   PROTTOTAL  --   --  5.5*  --   --  5.0*   ALBUMIN  --   --  2.7*  --   --  2.4*   AST 44*  --  46*  --   --  26   *  --  111*  --   --  31   ALKPHOS  --   --  54  --   --  47   BILITOTAL  --   --  0.6  --   --  0.3    < > = values in this interval not displayed.     Coags:  Recent Labs   Lab 12/05/21  2346   INR 0.96     Cardiac Markers:  No results for input(s): CKTOTAL, TROPONINI in the last 168 hours.     Microbiology:  ET aspirate, 12/1: 1+ Proteus mirabilis, 1+ Escherichia coli   Blood culture x 1, 11/30: negative.  COVID-19 PCR, 11/19: Positive        Cardiology/Radiology:   Cardiac: All cardiac studies reviewed by me.    EKG:  Reviewed    TTE, 11/28:  Summary: 1. Left ventricular size, wall motion and function are normal. The ejection fraction is 60-65%. 2. Flattened septum is consistent with RV pressure/volume overload. 3. Mildly decreased right ventricular systolic function The right ventricle is moderately dilated. Right ventricular systolic pressure could not be approximated due to inadequate tricuspid regurgitation. No tricuspid regurgitation. RA pressure of 8  mmHg.    Radiology:  All imaging studies reviewed by me.    CXR, 12/5: Endotracheal tube tip 4.5 cm above the felipa. Enteric tube tip below the diaphragm. Right PICC line tip over the low SVC. Heart size within normal limits. Diffuse bilateral pulmonary infiltrates. Overall, the infiltrates appear mildly progressed compared to the prior study. No significant bony abnormalities. Remainder unremarkable.    Abdomen X-Ray, 11/28: Enteric suction tube with the tip and side-port within the mid gastric lumen. Nonobstructive bowel gas pattern. No definite free air. Persistent diffuse bibasilar pulmonary airspace opacities.     CT chest PE study, 11/24:  FINDINGS: ANGIOGRAM CHEST: No pulmonary artery filling defect. Aorta is normal in caliber.  LUNGS AND PLEURA: Extensive groundglass consolidative bilateral pulmonary opacities. Trace left effusion. MEDIASTINUM/AXILLAE: Heart size is normal scattered borderline enlarged mediastinal lymph nodes with the largest in the right lower paratracheal chain measuring 1.1 x 2.2 cm (series 5/29).  CORONARY ARTERY CALCIFICATION: None.  UPPER ABDOMEN: Hepatic steatosis.  MUSCULOSKELETAL: Normal. IMPRESSION: 1.  No pulmonary embolus. 2.  COVID pneumonia. 3.  Likely reactive mediastinal  lymph nodes. 4.  Hepatic steatosis.

## 2021-12-08 NOTE — PROVIDER NOTIFICATION
Hypotensive, up to 0.1 on levophed. Temp 100.2.     Notified Dr. Redman. Decreasing paralytics. Continue to monitor.

## 2021-12-08 NOTE — PROGRESS NOTES
MD RESTRAINT FOR NON-VIOLENT BEHAVIOR FACE TO FACE EVALUATION  Progress Note  Restraint Application    I recognize that restraints are physical and/or chemical interventions intended to restrict a person's movements. Restraints are currently needed to ensure the safety of this patient and/or others. My clinical rationale appears below.    PATIENT EVALUATION  Patient evaluated on 12/08/2021 at 3:08 PM to confirm the need for medical restraints.  --  Category/Type of Restraint:  NON-VIOLENT  --  Patient's Immediate Situation:  Patient demonstrated the following behaviors: Pulling/tugging at invasive lines or tubes and does not respond to verbal/non-verbal redirection  --  Patient's Reaction to the intervention:  Does patient understand the reason for restraint/seclusion? No  --  Medical Condition:  Is there any evidence of compromise of Skin integrity, Respiratory, Cardiovascular, Musculoskeletal, Hydration? Yes  Skin integrity, Respiratory, Cardiovascular and Musculoskeletal  --  Root Cause of the Behavior:  Acute respiratory failure, acute encephalopathy related to therapeutic sedation  --  Behavioral Condition:  In consultation with the RN, is there a need to continue this restraint or seclusion? Yes  --  Criteria for Release from the Restraint:  Patient is calm, listens to verbal redirection, and stops attempting to pull out lines/tubes    See Restraint Flowsheet for complete restraint documentation and assessment.    Kendra Redman MD, MPH  Pulmonary & Critical Care Medicine  12/08/2021  3:08 PM

## 2021-12-08 NOTE — PLAN OF CARE
Problem: Communication Impairment (Mechanical Ventilation, Invasive)  Goal: Effective Communication  Outcome: No Change     Problem: Inability to Wean (Mechanical Ventilation, Invasive)  Goal: Mechanical Ventilation Liberation  Outcome: No Change     Problem: Dysrhythmia (Heart Failure)  Goal: Stable Heart Rate and Rhythm  Outcome: No Change     Problem: Adult Inpatient Plan of Care  Goal: Plan of Care Review  Outcome: Improving  Flowsheets (Taken 12/7/2021 0295)  Outcome Summary: Veletri off, peep to 14. Supine at 1000, prone at 1800. Hypotensive episode treated. Hypertensive since proned, bolused for pain and sedation. Off levo now. Had BM>     Problem: Nutrition Impairment (Mechanical Ventilation, Invasive)  Goal: Optimal Nutrition Delivery  Outcome: Improving     Problem: Fluid Imbalance (Heart Failure)  Goal: Fluid Balance  Outcome: Improving

## 2021-12-08 NOTE — PROVIDER NOTIFICATION
Updated Dr. Cage re: patient last train of four was 4/4. BIS in goal, patient respiratory rate at goal. No movement noted in patient. MD ok with not titrating med's further to achieve 2/4 Train of four.

## 2021-12-08 NOTE — PROGRESS NOTES
RT PROGRESS NOTE    VENT DAY# 11    CURRENT SETTINGS:   Ventilation Mode: CMV/AC  (Continuous Mandatory Ventilation/ Assist Control)  FiO2 (%): 50 %  Rate Set (breaths/minute): 20 breaths/min  Tidal Volume Set (mL): 500 mL  PEEP (cm H2O): 14 cmH2O  Oxygen Concentration (%): 50 %  Resp: 20      PATIENT PARAMETERS:  PIP 29  Pplat:  28  Pmean:  18  Compliance: Good    ETT SIZE 8.0 Secured at 26 cm at teeth/gums    Respiratory Medications: none    NOTE / SHIFT SUMMARY:   Remain on the vent, prone and Q2 head turn    Tianna Wynn, RT

## 2021-12-08 NOTE — PROVIDER NOTIFICATION
Notified Dr. Cage of pt elevated 's/80's. Patient had received prn lopressor, prn hydralazine. Boluses of versed and Fentanly. Also did head position change earlier as patient seemed to tolerate head to the left better. No PRN or interventions were effective. Received new orders for increased dose of IV  Lopressor.  Dose given. Will continue to monitor.

## 2021-12-08 NOTE — PROGRESS NOTES
Nutrition Therapy Brief Note     Nutrition Prescription    RECOMMENDATIONS FOR MDs/PROVIDERS TO ORDER:      Malnutrition Status:    Moderate noted 11/28 at [facility]    Recommendations already ordered by Registered Dietitian (RD):  Gradual TF increase toward goal  Increase TF 10 ml every 8hrs  to goal 80 ml/hr continuous.  FWF 80 ml every 6 hrs.  Promote @ goal of  80ml/hr  (1920ml/day)  will provide: 1920 kcals, 119 g PRO, 1610 ml free H20, 249 g CHO, and 0 g fiber daily, 320 ml flushes, 1930 ml total free water.     Future/Additional Recommendations:       EVALUATION OF THE PROGRESS TOWARD GOALS   Diet: NPO  Nutrition Support: Promote trickle rate 15 ml/hr (started 12/6)  FWF 30 ml every 6 hrs  Intake:  360 ml formula, 360 calories, 22 g protein, 47 g carb, 302 ml free water, 120 ml flush, 422 ml total free water      NEW FINDINGS   Pt had multi BMs overnight, rectal tube to be placed.  Pt proned overnight, to turn to supine after rounds.

## 2021-12-09 LAB
ANION GAP SERPL CALCULATED.3IONS-SCNC: 5 MMOL/L (ref 5–18)
BASE EXCESS BLDA CALC-SCNC: 10.2 MMOL/L
BASE EXCESS BLDA CALC-SCNC: 12 MMOL/L
BUN SERPL-MCNC: 20 MG/DL (ref 8–22)
CALCIUM SERPL-MCNC: 8.5 MG/DL (ref 8.5–10.5)
CHLORIDE BLD-SCNC: 105 MMOL/L (ref 98–107)
CO2 SERPL-SCNC: 34 MMOL/L (ref 22–31)
COHGB MFR BLD: 96.4 % (ref 96–97)
COHGB MFR BLD: 96.7 % (ref 96–97)
CREAT SERPL-MCNC: 0.61 MG/DL (ref 0.7–1.3)
ERYTHROCYTE [DISTWIDTH] IN BLOOD BY AUTOMATED COUNT: 14.7 % (ref 10–15)
GFR SERPL CREATININE-BSD FRML MDRD: >90 ML/MIN/1.73M2
GLUCOSE BLD-MCNC: 124 MG/DL (ref 70–125)
GLUCOSE BLDC GLUCOMTR-MCNC: 113 MG/DL (ref 70–99)
GLUCOSE BLDC GLUCOMTR-MCNC: 117 MG/DL (ref 70–99)
GLUCOSE BLDC GLUCOMTR-MCNC: 118 MG/DL (ref 70–99)
GLUCOSE BLDC GLUCOMTR-MCNC: 128 MG/DL (ref 70–99)
GLUCOSE BLDC GLUCOMTR-MCNC: 128 MG/DL (ref 70–99)
HCO3 BLD-SCNC: 33 MMOL/L (ref 23–29)
HCO3 BLD-SCNC: 34 MMOL/L (ref 23–29)
HCT VFR BLD AUTO: 36 % (ref 40–53)
HGB BLD-MCNC: 11.3 G/DL (ref 13.3–17.7)
MAGNESIUM SERPL-MCNC: 2.1 MG/DL (ref 1.8–2.6)
MCH RBC QN AUTO: 30 PG (ref 26.5–33)
MCHC RBC AUTO-ENTMCNC: 31.4 G/DL (ref 31.5–36.5)
MCV RBC AUTO: 96 FL (ref 78–100)
O2/TOTAL GAS SETTING VFR VENT: 0.4 %
O2/TOTAL GAS SETTING VFR VENT: 50 %
OXYHGB MFR BLD: 94.1 % (ref 96–97)
OXYHGB MFR BLD: 94.5 % (ref 96–97)
PCO2 BLD: 53 MM HG (ref 35–45)
PCO2 BLD: 55 MM HG (ref 35–45)
PEEP: 12 CM H2O
PEEP: 14 CM H2O
PH BLD: 7.41 [PH] (ref 7.37–7.44)
PH BLD: 7.44 [PH] (ref 7.37–7.44)
PLATELET # BLD AUTO: 200 10E3/UL (ref 150–450)
PO2 BLD: 75 MM HG (ref 80–90)
PO2 BLD: 79 MM HG (ref 80–90)
POTASSIUM BLD-SCNC: 3.8 MMOL/L (ref 3.5–5)
PSV (LAB BLOOD GAS): ABNORMAL
RATE: 20 RR/MIN
RATE: 20 RR/MIN
RBC # BLD AUTO: 3.77 10E6/UL (ref 4.4–5.9)
SODIUM SERPL-SCNC: 144 MMOL/L (ref 136–145)
TEMPERATURE: 37 DEGREES C
TEMPERATURE: 37 DEGREES C
TRIGL SERPL-MCNC: 246 MG/DL
VENTILATION MODE: ABNORMAL
VENTILATION MODE: ABNORMAL
VENTILATOR TIDAL VOLUME: 450 ML
VENTILATOR TIDAL VOLUME: 450 ML
WBC # BLD AUTO: 9.5 10E3/UL (ref 4–11)

## 2021-12-09 PROCEDURE — 84478 ASSAY OF TRIGLYCERIDES: CPT | Performed by: INTERNAL MEDICINE

## 2021-12-09 PROCEDURE — 258N000003 HC RX IP 258 OP 636: Performed by: INTERNAL MEDICINE

## 2021-12-09 PROCEDURE — 250N000013 HC RX MED GY IP 250 OP 250 PS 637: Performed by: INTERNAL MEDICINE

## 2021-12-09 PROCEDURE — 83735 ASSAY OF MAGNESIUM: CPT | Performed by: INTERNAL MEDICINE

## 2021-12-09 PROCEDURE — 99255 IP/OBS CONSLTJ NEW/EST HI 80: CPT | Performed by: NURSE PRACTITIONER

## 2021-12-09 PROCEDURE — 94003 VENT MGMT INPAT SUBQ DAY: CPT

## 2021-12-09 PROCEDURE — 250N000011 HC RX IP 250 OP 636: Performed by: INTERNAL MEDICINE

## 2021-12-09 PROCEDURE — 80048 BASIC METABOLIC PNL TOTAL CA: CPT | Performed by: INTERNAL MEDICINE

## 2021-12-09 PROCEDURE — 250N000013 HC RX MED GY IP 250 OP 250 PS 637: Performed by: NURSE PRACTITIONER

## 2021-12-09 PROCEDURE — 85014 HEMATOCRIT: CPT | Performed by: INTERNAL MEDICINE

## 2021-12-09 PROCEDURE — 200N000001 HC R&B ICU

## 2021-12-09 PROCEDURE — 99291 CRITICAL CARE FIRST HOUR: CPT | Performed by: INTERNAL MEDICINE

## 2021-12-09 PROCEDURE — 999N000157 HC STATISTIC RCP TIME EA 10 MIN

## 2021-12-09 PROCEDURE — 82805 BLOOD GASES W/O2 SATURATION: CPT | Performed by: INTERNAL MEDICINE

## 2021-12-09 PROCEDURE — 250N000009 HC RX 250: Performed by: INTERNAL MEDICINE

## 2021-12-09 PROCEDURE — 250N000011 HC RX IP 250 OP 636: Performed by: NURSE PRACTITIONER

## 2021-12-09 RX ORDER — OXYCODONE HCL 5 MG/5 ML
20 SOLUTION, ORAL ORAL EVERY 4 HOURS
Status: DISCONTINUED | OUTPATIENT
Start: 2021-12-09 | End: 2021-12-25 | Stop reason: ALTCHOICE

## 2021-12-09 RX ORDER — QUETIAPINE FUMARATE 25 MG/1
100 TABLET, FILM COATED ORAL 4 TIMES DAILY PRN
Status: DISCONTINUED | OUTPATIENT
Start: 2021-12-09 | End: 2021-12-27

## 2021-12-09 RX ORDER — LORAZEPAM 2 MG/ML
4 CONCENTRATE ORAL EVERY 4 HOURS
Status: DISCONTINUED | OUTPATIENT
Start: 2021-12-09 | End: 2021-12-27

## 2021-12-09 RX ORDER — QUETIAPINE FUMARATE 25 MG/1
50 TABLET, FILM COATED ORAL 4 TIMES DAILY
Status: DISCONTINUED | OUTPATIENT
Start: 2021-12-10 | End: 2021-12-10

## 2021-12-09 RX ORDER — ARIPIPRAZOLE ORAL 1 MG/ML
10 SOLUTION ORAL DAILY
Status: DISCONTINUED | OUTPATIENT
Start: 2021-12-10 | End: 2021-12-09

## 2021-12-09 RX ORDER — LACTULOSE 10 G/15ML
20 SOLUTION ORAL ONCE
Status: COMPLETED | OUTPATIENT
Start: 2021-12-09 | End: 2021-12-09

## 2021-12-09 RX ORDER — BUPROPION HYDROCHLORIDE 100 MG/1
100 TABLET ORAL 3 TIMES DAILY
Status: DISCONTINUED | OUTPATIENT
Start: 2021-12-09 | End: 2021-12-21

## 2021-12-09 RX ORDER — ARIPIPRAZOLE ORAL 1 MG/ML
5 SOLUTION ORAL ONCE
Status: COMPLETED | OUTPATIENT
Start: 2021-12-09 | End: 2021-12-09

## 2021-12-09 RX ORDER — OLANZAPINE 10 MG/2ML
10 INJECTION, POWDER, FOR SOLUTION INTRAMUSCULAR 3 TIMES DAILY
Status: COMPLETED | OUTPATIENT
Start: 2021-12-09 | End: 2021-12-10

## 2021-12-09 RX ORDER — ARIPIPRAZOLE ORAL 1 MG/ML
5 SOLUTION ORAL DAILY
Status: DISCONTINUED | OUTPATIENT
Start: 2021-12-10 | End: 2021-12-16

## 2021-12-09 RX ORDER — DIPHENHYDRAMINE HYDROCHLORIDE 50 MG/ML
50 INJECTION INTRAMUSCULAR; INTRAVENOUS 3 TIMES DAILY
Status: COMPLETED | OUTPATIENT
Start: 2021-12-09 | End: 2021-12-10

## 2021-12-09 RX ADMIN — OXYCODONE HYDROCHLORIDE 20 MG: 5 SOLUTION ORAL at 10:04

## 2021-12-09 RX ADMIN — Medication 100 MCG: at 03:25

## 2021-12-09 RX ADMIN — Medication 100 MCG: at 04:34

## 2021-12-09 RX ADMIN — POLYETHYLENE GLYCOL 3350 17 G: 17 POWDER, FOR SOLUTION ORAL at 08:01

## 2021-12-09 RX ADMIN — GABAPENTIN 800 MG: 250 SUSPENSION ORAL at 16:53

## 2021-12-09 RX ADMIN — Medication 100 MCG: at 00:37

## 2021-12-09 RX ADMIN — OLANZAPINE 10 MG: 10 INJECTION, POWDER, FOR SOLUTION INTRAMUSCULAR at 21:18

## 2021-12-09 RX ADMIN — CHLORHEXIDINE GLUCONATE 0.12% ORAL RINSE 15 ML: 1.2 LIQUID ORAL at 07:47

## 2021-12-09 RX ADMIN — Medication 300 MCG/HR: at 10:29

## 2021-12-09 RX ADMIN — DEXMEDETOMIDINE HYDROCHLORIDE 1.5 MCG/KG/HR: 400 INJECTION INTRAVENOUS at 03:14

## 2021-12-09 RX ADMIN — PROPOFOL 10 MCG/KG/MIN: 10 INJECTION, EMULSION INTRAVENOUS at 08:03

## 2021-12-09 RX ADMIN — DEXMEDETOMIDINE HYDROCHLORIDE 1.5 MCG/KG/HR: 400 INJECTION INTRAVENOUS at 08:45

## 2021-12-09 RX ADMIN — Medication 50 MCG: at 08:24

## 2021-12-09 RX ADMIN — ACETAMINOPHEN 650 MG: 650 SOLUTION ORAL at 12:42

## 2021-12-09 RX ADMIN — DIPHENHYDRAMINE HYDROCHLORIDE 50 MG: 50 INJECTION, SOLUTION INTRAMUSCULAR; INTRAVENOUS at 21:17

## 2021-12-09 RX ADMIN — BUPROPION HYDROCHLORIDE 100 MG: 100 TABLET, FILM COATED ORAL at 16:53

## 2021-12-09 RX ADMIN — GABAPENTIN 800 MG: 250 SUSPENSION ORAL at 09:15

## 2021-12-09 RX ADMIN — Medication 40 MG: at 07:47

## 2021-12-09 RX ADMIN — Medication 100 MCG: at 10:33

## 2021-12-09 RX ADMIN — PROPOFOL 20 MCG/KG/MIN: 10 INJECTION, EMULSION INTRAVENOUS at 16:44

## 2021-12-09 RX ADMIN — LORAZEPAM 2 MG: 2 LIQUID ORAL at 07:48

## 2021-12-09 RX ADMIN — LORAZEPAM 4 MG: 2 LIQUID ORAL at 21:01

## 2021-12-09 RX ADMIN — ENOXAPARIN SODIUM 50 MG: 100 INJECTION SUBCUTANEOUS at 21:17

## 2021-12-09 RX ADMIN — LACTULOSE 20 G: 10 SOLUTION ORAL at 12:42

## 2021-12-09 RX ADMIN — Medication 100 MCG: at 09:34

## 2021-12-09 RX ADMIN — ACETAMINOPHEN 650 MG: 650 SOLUTION ORAL at 02:06

## 2021-12-09 RX ADMIN — Medication 0.04 MCG/KG/MIN: at 15:21

## 2021-12-09 RX ADMIN — OXYCODONE HYDROCHLORIDE 20 MG: 5 SOLUTION ORAL at 13:09

## 2021-12-09 RX ADMIN — POLYETHYLENE GLYCOL 3350 17 G: 17 POWDER, FOR SOLUTION ORAL at 21:19

## 2021-12-09 RX ADMIN — DIPHENHYDRAMINE HYDROCHLORIDE 50 MG: 50 INJECTION, SOLUTION INTRAMUSCULAR; INTRAVENOUS at 12:52

## 2021-12-09 RX ADMIN — DOCUSATE SODIUM 100 MG: 50 LIQUID ORAL at 08:02

## 2021-12-09 RX ADMIN — PROPOFOL 20 MCG/KG/MIN: 10 INJECTION, EMULSION INTRAVENOUS at 23:23

## 2021-12-09 RX ADMIN — ENOXAPARIN SODIUM 50 MG: 100 INJECTION SUBCUTANEOUS at 09:17

## 2021-12-09 RX ADMIN — LORAZEPAM 4 MG: 2 LIQUID ORAL at 23:57

## 2021-12-09 RX ADMIN — Medication 12 MG/HR: at 23:11

## 2021-12-09 RX ADMIN — CHLORHEXIDINE GLUCONATE 0.12% ORAL RINSE 15 ML: 1.2 LIQUID ORAL at 21:16

## 2021-12-09 RX ADMIN — Medication 20 MG: at 09:51

## 2021-12-09 RX ADMIN — BUPROPION HYDROCHLORIDE 100 MG: 100 TABLET, FILM COATED ORAL at 12:42

## 2021-12-09 RX ADMIN — SENNOSIDES 10 ML: 8.8 LIQUID ORAL at 21:16

## 2021-12-09 RX ADMIN — Medication 20 MG: at 12:43

## 2021-12-09 RX ADMIN — BUPROPION HYDROCHLORIDE 100 MG: 100 TABLET, FILM COATED ORAL at 08:02

## 2021-12-09 RX ADMIN — Medication 300 MCG/HR: at 17:55

## 2021-12-09 RX ADMIN — LORAZEPAM 4 MG: 2 LIQUID ORAL at 12:02

## 2021-12-09 RX ADMIN — DEXMEDETOMIDINE HYDROCHLORIDE 1.5 MCG/KG/HR: 400 INJECTION INTRAVENOUS at 11:42

## 2021-12-09 RX ADMIN — GABAPENTIN 800 MG: 250 SUSPENSION ORAL at 12:02

## 2021-12-09 RX ADMIN — LORAZEPAM 2 MG: 2 LIQUID ORAL at 04:05

## 2021-12-09 RX ADMIN — Medication 12 MG/HR: at 14:36

## 2021-12-09 RX ADMIN — OXYCODONE HYDROCHLORIDE 10 MG: 5 SOLUTION ORAL at 01:51

## 2021-12-09 RX ADMIN — Medication 10 MG: at 03:26

## 2021-12-09 RX ADMIN — DEXMEDETOMIDINE HYDROCHLORIDE 1.5 MCG/KG/HR: 400 INJECTION INTRAVENOUS at 06:10

## 2021-12-09 RX ADMIN — OXYCODONE HYDROCHLORIDE 20 MG: 5 SOLUTION ORAL at 19:01

## 2021-12-09 RX ADMIN — SENNOSIDES 10 ML: 8.8 LIQUID ORAL at 08:02

## 2021-12-09 RX ADMIN — Medication 20 MG: at 04:59

## 2021-12-09 RX ADMIN — DEXMEDETOMIDINE HYDROCHLORIDE 1.5 MCG/KG/HR: 400 INJECTION INTRAVENOUS at 00:15

## 2021-12-09 RX ADMIN — OXYCODONE HYDROCHLORIDE 10 MG: 5 SOLUTION ORAL at 06:03

## 2021-12-09 RX ADMIN — Medication 100 MCG: at 12:05

## 2021-12-09 RX ADMIN — ACETAMINOPHEN 650 MG: 650 SOLUTION ORAL at 16:47

## 2021-12-09 RX ADMIN — ARIPIPRAZOLE 5 MG: 1 SOLUTION ORAL at 10:19

## 2021-12-09 RX ADMIN — CEFTRIAXONE SODIUM 2 G: 2 INJECTION, POWDER, FOR SOLUTION INTRAMUSCULAR; INTRAVENOUS at 09:17

## 2021-12-09 RX ADMIN — Medication 12 MG/HR: at 06:10

## 2021-12-09 RX ADMIN — OLANZAPINE 10 MG: 10 INJECTION, POWDER, FOR SOLUTION INTRAMUSCULAR at 12:55

## 2021-12-09 RX ADMIN — DEXMEDETOMIDINE HYDROCHLORIDE 1.5 MCG/KG/HR: 400 INJECTION INTRAVENOUS at 17:47

## 2021-12-09 RX ADMIN — DEXMEDETOMIDINE HYDROCHLORIDE 1.5 MCG/KG/HR: 400 INJECTION INTRAVENOUS at 20:58

## 2021-12-09 RX ADMIN — ARIPIPRAZOLE 5 MG: 1 SOLUTION ORAL at 08:01

## 2021-12-09 RX ADMIN — Medication 20 MG: at 10:22

## 2021-12-09 RX ADMIN — Medication 10 MG: at 06:46

## 2021-12-09 RX ADMIN — DEXMEDETOMIDINE HYDROCHLORIDE 1.5 MCG/KG/HR: 400 INJECTION INTRAVENOUS at 14:34

## 2021-12-09 RX ADMIN — LORAZEPAM 4 MG: 2 LIQUID ORAL at 15:31

## 2021-12-09 RX ADMIN — Medication 300 MCG/HR: at 03:14

## 2021-12-09 RX ADMIN — OXYCODONE HYDROCHLORIDE 20 MG: 5 SOLUTION ORAL at 21:20

## 2021-12-09 ASSESSMENT — ACTIVITIES OF DAILY LIVING (ADL)
ADLS_ACUITY_SCORE: 24

## 2021-12-09 ASSESSMENT — MIFFLIN-ST. JEOR: SCORE: 1842.13

## 2021-12-09 NOTE — PLAN OF CARE
Problem: Adult Inpatient Plan of Care  Goal: Optimal Comfort and Wellbeing  Outcome: Improving     Problem: Adult Inpatient Plan of Care  Goal: Absence of Hospital-Acquired Illness or Injury  I  Problem: Risk for Delirium  Goal: Improved Behavioral Control  Outcome: Improving   Restless all am. Propofol, precedex and fentanyl all at max rates. Dr Redman informed. Given prn propofol 20 mcg x3. Given prn Fentanyl 100 mcg x3. Given Versed prn 2 mg x3. Scheduled ativan and oxycodone increased/doubled. After Zyprexa and benadryl given IV, calm RASS -2. HR went from 110-120's to 70-80's. Appears comfortable,

## 2021-12-09 NOTE — CONSULTS
INITIAL PSYCHIATRIC CONSULTATION  This note was created with the help of Dragon dictation system. Grammatical and typing errors are not intentional.            REASON FOR REQUEST: High sedative needs, on multiple behavioral meds as outpatient, guidance on which ones to escalate      ASSESSMENT/RECOMMENDATIONS/PLAN :   Metabolic encephalopathy exacerbated in the context of COVID-19 pneumonia,  ICU, mechanical ventilation.  Cognitive and behavioral changes with agitation and restlessness.  Major depressive disorder recurrent by history.  Narcotic dependence by history.    Mood disorder due to history of chronic back pain.    Recommendations:  Decrease Abilify to 5 mg daily  Wellbutrin 100 mg TID 0900, 1300, 1700. (HS  dose may interfere with sleep. )  Gabapentin 800 mg 3 times a day: Continue  Consider following medications to help mitigate behavioral escalation when ICU sedation is decreased.  For behavioral aggression and agitation:   Olanzapine 10 mg 3 times a day IV x3 doses.    To be given with Benadryl 50 mg 3 times a day IV x3 doses.  Seroquel 50 mg QID.   Seroquel 100 mg 4 times a day per feeding tube as needed.  Should aforementioned medications fail to minimize behavioral and mood escalation, will consider adding either Depakote or Keppra along with Seroquel.   Case discussed with Dr Redman. I will be following for further management and adjustment of psychotropics as his care progresses.     MENTAL STATUS EXAMINATION:   Appearance: Stated age, Laying in bed.  On mechanical ventilation  Speech: Somnolent  Behavior: Needing restraints  Thought Process: Somnolent.  Thought content: Does not show hallucinations, delusions, paranoia, no restlessness.  Thought Formation: No loosening of associations, no flight of ideas.  Judgment: Impaired.  Insight :Impaired.  Attention : Somnolent  Memory: Depressed  Fund Of Knowledge: Depressed  Affect: Flat  Mood: somnolent  Alert and Oriented: x 1  Comprehension:  "Depressed   Generative thought content: None  Language: Difficult to assess, not engaging in any conversation. As noted in admission note he was conversing.  Gait and Ambulation: Not tested.    BP 93/56   Pulse 100   Temp 99.6  F (37.6  C) (Oral)   Resp 26   Ht 1.803 m (5' 11\")   Wt 94.5 kg (208 lb 5.4 oz)   SpO2 92%   BMI 29.06 kg/m        HISTORY OF PRESENT ILLNESS:   Presenting history to include: Per [facility] [place]/Specialists:     Jared North is a 45 year old old male with h/o hypertension, reactive airway disease, chronic attic is on Suboxone (not on his own) p/w increasing shortness of breath.  Patient lives in a sober house.  Patient says that he has been sick for about a week.  Patient cannot tell me exactly when he started getting short of breath but he said that has been feeling weak.  He is not vaccinated but he cannot tell me the reason why he is not vaccinated.  Probably got it from the sober house.  Patient denies any chest pain.  Abdominal pain.  No nausea or vomiting.  No diarrhea.  No myalgias.   Patient is severely hypoxemic when he arrived to the emergency room.  Oxygen saturation in the 40s on triage.  He was placed on BiPAP with improvement to mid 90s.  Procalcitonin was on the upper limits of normal but nothing suggestive of bacterial pneumonia on his chest CT.  No pulmonary embolism but bilateral infiltrates consistent with Covid he was given a dose of Decadron as well as remdesivir and Tocilizumab after consultation with intensivist.  No immediate ICU beds available.  Is currently on BiPAP and needing high flow oxygen 100%.  PICC line was inserted.    Patient is intubated needing ICU sedation protocol.  Patient continues to pulling, tugging at invasive lines and tubes, not following directions not following commands nor retaining any information at this time.  Behavioral agitation, restlessness escalate with decreasing sedation.  History was obtained from the chart review.  Patient's history " "is significant for depression, anxiety, narcotic dependence and currently on multiple psychotropic medications to include but not limited to Abilify 5 mg daily, Suboxone, bupropion 300 mg in the morning, gabapentin 800 mg 3 times a day, hydroxyzine 25-50 by mouth nightly.  He is followed by psychiatric provider on an outpatient basis.  Currently resides in a sober home.  It would appear that he was feeling sick for a week before coming to hospital and believed that he contracted COVID from the sober house.  Patient is unvaccinated.    Review of Systems:As per HPI. Remainders of 12 point review of systems negative.  Psychiatric ROS:    Patient is not providing any meaningful information.        PFSH reviewed  and not pertinent to chief complaint/reason for visit  /60 (BP Location: Right arm)   Pulse 90   Temp 99.4  F (37.4  C) (Oral)   Resp 20   Ht 1.803 m (5' 11\")   Wt 94.5 kg (208 lb 5.4 oz)   SpO2 94%   BMI 29.06 kg/m    No results found for: AMPHET, PCP, BARBIT, OXYCODONE, THC, ETOH  @24HOURRESULTS@  Recent Results (from the past 72 hour(s))   Glucose by meter    Collection Time: 12/06/21  8:00 AM   Result Value Ref Range    GLUCOSE BY METER POCT 96 70 - 99 mg/dL   Glucose by meter    Collection Time: 12/06/21  1:10 PM   Result Value Ref Range    GLUCOSE BY METER POCT 161 (H) 70 - 99 mg/dL   Blood gas arterial    Collection Time: 12/06/21  3:20 PM   Result Value Ref Range    pH Arterial 7.43 7.37 - 7.44    pCO2 Arterial 57 (H) 35 - 45 mm Hg    pO2 Arterial 120 (H) 80 - 90 mm Hg    Bicarbonate Arterial 35 (H) 23 - 29 mmol/L    O2 Sat, Arterial 98.9 (H) 96.0 - 97.0 %    Oxyhemoglobin 97.6 (H) 96.0 - 97.0 %    Base Excess/Deficit (+/-) 13.3   mmol/L    Ventilation Mode ac     Rate 24 rr/min    FIO2 0.5     Peep 16 cm H2O    Sample Stabilized Temperature 37.0 degrees C    Ventilator Tidal Volume 450 mL   Glucose by meter    Collection Time: 12/06/21  4:36 PM   Result Value Ref Range    GLUCOSE BY METER " POCT 123 (H) 70 - 99 mg/dL   Glucose by meter    Collection Time: 12/06/21  8:10 PM   Result Value Ref Range    GLUCOSE BY METER POCT 115 (H) 70 - 99 mg/dL   Glucose by meter    Collection Time: 12/06/21 11:54 PM   Result Value Ref Range    GLUCOSE BY METER POCT 122 (H) 70 - 99 mg/dL   Glucose by meter    Collection Time: 12/07/21  4:21 AM   Result Value Ref Range    GLUCOSE BY METER POCT 120 (H) 70 - 99 mg/dL   Basic metabolic panel    Collection Time: 12/07/21  4:38 AM   Result Value Ref Range    Sodium 139 136 - 145 mmol/L    Potassium 4.5 3.5 - 5.0 mmol/L    Chloride 101 98 - 107 mmol/L    Carbon Dioxide (CO2) 35 (H) 22 - 31 mmol/L    Anion Gap 3 (L) 5 - 18 mmol/L    Urea Nitrogen 25 (H) 8 - 22 mg/dL    Creatinine 0.59 (L) 0.70 - 1.30 mg/dL    Calcium 8.2 (L) 8.5 - 10.5 mg/dL    Glucose 123 70 - 125 mg/dL    GFR Estimate >90 >60 mL/min/1.73m2   CBC with platelets    Collection Time: 12/07/21  4:38 AM   Result Value Ref Range    WBC Count 8.2 4.0 - 11.0 10e3/uL    RBC Count 3.97 (L) 4.40 - 5.90 10e6/uL    Hemoglobin 11.9 (L) 13.3 - 17.7 g/dL    Hematocrit 36.4 (L) 40.0 - 53.0 %    MCV 92 78 - 100 fL    MCH 30.0 26.5 - 33.0 pg    MCHC 32.7 31.5 - 36.5 g/dL    RDW 13.8 10.0 - 15.0 %    Platelet Count 201 150 - 450 10e3/uL   Magnesium    Collection Time: 12/07/21  4:38 AM   Result Value Ref Range    Magnesium 2.3 1.8 - 2.6 mg/dL   Blood gas arterial    Collection Time: 12/07/21  4:38 AM   Result Value Ref Range    pH Arterial 7.45 (H) 7.37 - 7.44    pCO2 Arterial 52 (H) 35 - 45 mm Hg    pO2 Arterial 104 (H) 80 - 90 mm Hg    Bicarbonate Arterial 34 (H) 23 - 29 mmol/L    O2 Sat, Arterial 98.6 (H) 96.0 - 97.0 %    Oxyhemoglobin 97.3 (H) 96.0 - 97.0 %    Base Excess/Deficit (+/-) 12.2   mmol/L    Ventilation Mode VC-AC     Rate 24 rr/min    FIO2 50     Peep 16 cm H2O    Sample Stabilized Temperature 37.0 degrees C    Ventilator Tidal Volume 450 mL   Hepatic panel    Collection Time: 12/07/21  4:38 AM   Result Value Ref  Range    Bilirubin Total 0.6 0.0 - 1.0 mg/dL    Bilirubin Direct 0.2 <=0.5 mg/dL    Protein Total 5.5 (L) 6.0 - 8.0 g/dL    Albumin 2.7 (L) 3.5 - 5.0 g/dL    Alkaline Phosphatase 54 45 - 120 U/L    AST 46 (H) 0 - 40 U/L     (H) 0 - 45 U/L   Glucose by meter    Collection Time: 12/07/21  8:25 AM   Result Value Ref Range    GLUCOSE BY METER POCT 111 (H) 70 - 99 mg/dL   Glucose by meter    Collection Time: 12/07/21 12:44 PM   Result Value Ref Range    GLUCOSE BY METER POCT 158 (H) 70 - 99 mg/dL   Blood gas arterial    Collection Time: 12/07/21  2:39 PM   Result Value Ref Range    pH Arterial 7.38 7.37 - 7.44    pCO2 Arterial 62 (H) 35 - 45 mm Hg    pO2 Arterial 118 (H) 80 - 90 mm Hg    Bicarbonate Arterial 33 (H) 23 - 29 mmol/L    O2 Sat, Arterial 98.7 (H) 96.0 - 97.0 %    Oxyhemoglobin 96.7 96.0 - 97.0 %    Base Excess/Deficit (+/-) 11.2   mmol/L    Ventilation Mode ac     Rate 20 rr/min    FIO2 0.5     Peep 16 cm H2O    Sample Stabilized Temperature 37.0 degrees C    Ventilator Tidal Volume 450 mL   Potassium    Collection Time: 12/07/21  2:39 PM   Result Value Ref Range    Potassium 4.9 3.5 - 5.0 mmol/L   CBC with platelets    Collection Time: 12/07/21  3:55 PM   Result Value Ref Range    WBC Count 11.1 (H) 4.0 - 11.0 10e3/uL    RBC Count 4.20 (L) 4.40 - 5.90 10e6/uL    Hemoglobin 12.3 (L) 13.3 - 17.7 g/dL    Hematocrit 38.9 (L) 40.0 - 53.0 %    MCV 93 78 - 100 fL    MCH 29.3 26.5 - 33.0 pg    MCHC 31.6 31.5 - 36.5 g/dL    RDW 14.5 10.0 - 15.0 %    Platelet Count 244 150 - 450 10e3/uL   Lactic acid whole blood    Collection Time: 12/07/21  3:55 PM   Result Value Ref Range    Lactic Acid 1.2 0.7 - 2.0 mmol/L   Glucose by meter    Collection Time: 12/07/21  4:01 PM   Result Value Ref Range    GLUCOSE BY METER POCT 132 (H) 70 - 99 mg/dL   Glucose by meter    Collection Time: 12/07/21  8:02 PM   Result Value Ref Range    GLUCOSE BY METER POCT 129 (H) 70 - 99 mg/dL   Glucose by meter    Collection Time: 12/08/21  12:01 AM   Result Value Ref Range    GLUCOSE BY METER POCT 132 (H) 70 - 99 mg/dL   Glucose by meter    Collection Time: 12/08/21  3:58 AM   Result Value Ref Range    GLUCOSE BY METER POCT 149 (H) 70 - 99 mg/dL   Blood gas arterial    Collection Time: 12/08/21  4:00 AM   Result Value Ref Range    pH Arterial 7.35 (L) 7.37 - 7.44    pCO2 Arterial 66 (H) 35 - 45 mm Hg    pO2 Arterial 103 (H) 80 - 90 mm Hg    Bicarbonate Arterial 32 (H) 23 - 29 mmol/L    O2 Sat, Arterial 98.0 (H) 96.0 - 97.0 %    Oxyhemoglobin 96.5 96.0 - 97.0 %    Base Excess/Deficit (+/-) 10.8   mmol/L    Ventilation Mode vc ac     Rate 20 rr/min    FIO2 0.5     Peep 14 cm H2O    Sample Stabilized Temperature 37.0 degrees C    Ventilator Tidal Volume 450 mL   Basic metabolic panel    Collection Time: 12/08/21  4:01 AM   Result Value Ref Range    Sodium 141 136 - 145 mmol/L    Potassium 4.3 3.5 - 5.0 mmol/L    Chloride 102 98 - 107 mmol/L    Carbon Dioxide (CO2) 34 (H) 22 - 31 mmol/L    Anion Gap 5 5 - 18 mmol/L    Urea Nitrogen 26 (H) 8 - 22 mg/dL    Creatinine 0.62 (L) 0.70 - 1.30 mg/dL    Calcium 8.8 8.5 - 10.5 mg/dL    Glucose 141 (H) 70 - 125 mg/dL    GFR Estimate >90 >60 mL/min/1.73m2   CBC with platelets    Collection Time: 12/08/21  4:01 AM   Result Value Ref Range    WBC Count 9.8 4.0 - 11.0 10e3/uL    RBC Count 4.13 (L) 4.40 - 5.90 10e6/uL    Hemoglobin 12.4 (L) 13.3 - 17.7 g/dL    Hematocrit 38.9 (L) 40.0 - 53.0 %    MCV 94 78 - 100 fL    MCH 30.0 26.5 - 33.0 pg    MCHC 31.9 31.5 - 36.5 g/dL    RDW 14.3 10.0 - 15.0 %    Platelet Count 197 150 - 450 10e3/uL   Magnesium    Collection Time: 12/08/21  4:01 AM   Result Value Ref Range    Magnesium 2.3 1.8 - 2.6 mg/dL   AST    Collection Time: 12/08/21  4:01 AM   Result Value Ref Range    AST 44 (H) 0 - 40 U/L   ALT    Collection Time: 12/08/21  4:01 AM   Result Value Ref Range     (H) 0 - 45 U/L   Triglycerides    Collection Time: 12/08/21  4:01 AM   Result Value Ref Range    Triglycerides  244 (H) <=149 mg/dL   Glucose by meter    Collection Time: 12/08/21  8:40 AM   Result Value Ref Range    GLUCOSE BY METER POCT 134 (H) 70 - 99 mg/dL   Glucose by meter    Collection Time: 12/08/21 12:25 PM   Result Value Ref Range    GLUCOSE BY METER POCT 128 (H) 70 - 99 mg/dL   Blood gas arterial    Collection Time: 12/08/21  2:59 PM   Result Value Ref Range    pH Arterial 7.44 7.37 - 7.44    pCO2 Arterial 55 (H) 35 - 45 mm Hg    pO2 Arterial 87 80 - 90 mm Hg    Bicarbonate Arterial 35 (H) 23 - 29 mmol/L    O2 Sat, Arterial 97.4 (H) 96.0 - 97.0 %    Oxyhemoglobin 95.8 (L) 96.0 - 97.0 %    Base Excess/Deficit (+/-) 13.3   mmol/L    Ventilation Mode ac     Rate 20 rr/min    FIO2 0.5     Peep 14 cm H2O    Sample Stabilized Temperature 37.0 degrees C   Glucose by meter    Collection Time: 12/08/21  3:39 PM   Result Value Ref Range    GLUCOSE BY METER POCT 130 (H) 70 - 99 mg/dL   Glucose by meter    Collection Time: 12/08/21  7:54 PM   Result Value Ref Range    GLUCOSE BY METER POCT 121 (H) 70 - 99 mg/dL   Glucose by meter    Collection Time: 12/08/21 11:45 PM   Result Value Ref Range    GLUCOSE BY METER POCT 126 (H) 70 - 99 mg/dL   Glucose by meter    Collection Time: 12/09/21  3:58 AM   Result Value Ref Range    GLUCOSE BY METER POCT 128 (H) 70 - 99 mg/dL   Basic metabolic panel    Collection Time: 12/09/21  4:13 AM   Result Value Ref Range    Sodium 144 136 - 145 mmol/L    Potassium 3.8 3.5 - 5.0 mmol/L    Chloride 105 98 - 107 mmol/L    Carbon Dioxide (CO2) 34 (H) 22 - 31 mmol/L    Anion Gap 5 5 - 18 mmol/L    Urea Nitrogen 20 8 - 22 mg/dL    Creatinine 0.61 (L) 0.70 - 1.30 mg/dL    Calcium 8.5 8.5 - 10.5 mg/dL    Glucose 124 70 - 125 mg/dL    GFR Estimate >90 >60 mL/min/1.73m2   CBC with platelets    Collection Time: 12/09/21  4:13 AM   Result Value Ref Range    WBC Count 9.5 4.0 - 11.0 10e3/uL    RBC Count 3.77 (L) 4.40 - 5.90 10e6/uL    Hemoglobin 11.3 (L) 13.3 - 17.7 g/dL    Hematocrit 36.0 (L) 40.0 - 53.0 %     MCV 96 78 - 100 fL    MCH 30.0 26.5 - 33.0 pg    MCHC 31.4 (L) 31.5 - 36.5 g/dL    RDW 14.7 10.0 - 15.0 %    Platelet Count 200 150 - 450 10e3/uL   Magnesium    Collection Time: 12/09/21  4:13 AM   Result Value Ref Range    Magnesium 2.1 1.8 - 2.6 mg/dL   Blood gas arterial    Collection Time: 12/09/21  4:13 AM   Result Value Ref Range    pH Arterial 7.44 7.37 - 7.44    pCO2 Arterial 53 (H) 35 - 45 mm Hg    pO2 Arterial 75 (L) 80 - 90 mm Hg    Bicarbonate Arterial 34 (H) 23 - 29 mmol/L    O2 Sat, Arterial 96.4 96.0 - 97.0 %    Oxyhemoglobin 94.1 (L) 96.0 - 97.0 %    Base Excess/Deficit (+/-) 12.0   mmol/L    Ventilation Mode vc ac     Rate 20 rr/min    FIO2 0.4     Peep 14 cm H2O    Sample Stabilized Temperature 37.0 degrees C    Ventilator Tidal Volume 450 mL   Triglycerides    Collection Time: 12/09/21  4:13 AM   Result Value Ref Range    Triglycerides 246 (H) <=149 mg/dL       PMH: No past medical history on file.        Current Medications:Scheduled Meds:    ARIPiprazole  5 mg Per Feeding Tube Daily     buPROPion  100 mg Per Feeding Tube TID     cefTRIAXone  2 g Intravenous Q24H     chlorhexidine  15 mL Mouth/Throat Q12H     sennosides  10 mL Per Feeding Tube BID    And     docusate  100 mg Per Feeding Tube BID     enoxaparin ANTICOAGULANT  50 mg Subcutaneous Q12H     [Held by provider] furosemide  40 mg Intravenous Q24H     gabapentin  800 mg Per Feeding Tube TID     influenza quadrivalent (PF) vacc  0.5 mL Intramuscular Prior to discharge     insulin aspart  1-12 Units Subcutaneous Q4H     [Held by provider] lisinopril  10 mg Oral or Feeding Tube Daily     LORazepam  2 mg Per Feeding Tube Q4H     [Held by provider] metoprolol tartrate  25 mg Oral or Feeding Tube BID     oxyCODONE  10 mg Per Feeding Tube Q4H     pantoprazole  40 mg Per Feeding Tube QAM AC    Or     pantoprazole (PROTONIX) IV  40 mg Intravenous QAM AC     polyethylene glycol  17 g Oral or Feeding Tube BID     Continuous Infusions:     dexmedetomidine 1.5 mcg/kg/hr (12/09/21 0610)     dextrose       fentaNYL 300 mcg/hr (12/09/21 0600)     propofol (DIPRIVAN) infusion 10 mcg/kg/min (12/09/21 0600)    And     - MEDICATION INSTRUCTIONS -       midazolam 12 mg/hr (12/09/21 0610)     niCARdipine Stopped (12/08/21 1020)     norepinephrine 0.02 mcg/kg/min (12/09/21 0630)     - MEDICATION INSTRUCTIONS -       PRN Meds:.acetaminophen, acetaminophen **OR** acetaminophen, bisacodyl, dextrose, glucose **OR** dextrose **OR** glucagon, glucose **OR** dextrose **OR** glucagon, fentaNYL, hydrALAZINE, hydrALAZINE, propofol (DIPRIVAN) infusion **AND** propofol **AND** CK total **AND** Triglycerides **AND** - MEDICATION INSTRUCTIONS - **AND** Notify Physician, metoprolol, midazolam, ondansetron **OR** ondansetron, - MEDICATION INSTRUCTIONS -, polyethylene glycol, prochlorperazine **OR** prochlorperazine **OR** prochlorperazine                Family History: PERSONALLY REVIEWED.  Family History   Problem Relation Age of Onset     Diabetes Mother      Heart Disease Mother      Hypertension Mother      Hypertension Maternal Grandmother      Diabetes Maternal Grandfather      Hypertension Maternal Grandfather      Pertinent Family hx not pertinent to Chief Complaint or reason for visit.     Social History:  PERSONALLY REVIEWED.  Social History     Socioeconomic History     Marital status:      Spouse name: Not on file     Number of children: Not on file     Years of education: Not on file     Highest education level: Not on file   Occupational History     Not on file   Tobacco Use     Smoking status: Never Smoker     Smokeless tobacco: Current User   Substance and Sexual Activity     Alcohol use: Yes     Alcohol/week: 21.0 standard drinks     Drug use: No     Sexual activity: Not on file   Other Topics Concern     Not on file   Social History Narrative     Not on file     Social Determinants of Health     Financial Resource Strain: Not on file   Food Insecurity:  Not on file   Transportation Needs: Not on file   Physical Activity: Not on file   Stress: Not on file   Social Connections: Not on file   Intimate Partner Violence: Not on file   Housing Stability: Not on file    not pertinent to Chief Complaint or reason for visit.             Allergies as of 06/01/2014 Reviewed     Review of Systems:As per HPI. Remainders of 12 point review of systems negative.    Review of Pertinent Laboratory:      PERSONALLY REVIEWED.    Physical Exam: Temp:  [98.8  F (37.1  C)-101  F (38.3  C)] 99.4  F (37.4  C)  Pulse:  [] 90  Resp:  [13-34] 20  BP: (105)/(60) 105/60  MAP:  [45 mmHg-121 mmHg] 71 mmHg  Arterial Line BP: ()/(32-94) 115/55  FiO2 (%):  [40 %-50 %] 40 %  SpO2:  [91 %-99 %] 94 %   Vitals: reviewed in chart     Physical exam as per medical team: reviewed in chart      diagnoses, risk and benefits of medications discussed with staff. Care coordination with care management team.   Thank you for this consultation.       Jessi Hoyos; NP  [facility]        This note was created with the help of Dragon dictation system. Grammatical and typing errors are not intentional.

## 2021-12-09 NOTE — PROGRESS NOTES
[facility]:  CRITICAL CARE PROGRESS NOTE     Date / Time of Admission:  11/24/2021  1:19 PM    ID: Jared North is a 46 year old male, unvaccinated against COVID-19, with history of polysubstance abuse and chronic pain/lower back pain now on Suboxone, mood disorder/depression, essential hypertension, sleep disorder breathing, mild intermittent asthma, knee pain, and recent diagnosis of COVID-19 viral infection on 11/19/2021 who presented to [facility] on 11/24/2021 with worsening shortness of breath, admitted to the ICU with COVID-19 viral pneumonia and severe acute respiratory failure with hypoxia requiring noninvasive ventilation.  Course complicated by ARDS requiring intubation 11/28, multi-organism bacterial VAP (Proteus, E. Coli) and circulatory shock.         Changes for today: 1.   Behavioral health consult pending.    2. Not opening eyes but increased movements.    3. Triglycerides 246.  Continue low-dose propofol gtt.  4. Increased Abilify from 5 mg to 10 mg daily.  5. Increased Ativan from 2 mg to 4 mg Q4H.  6. Increased Oxycodone from 10 mg to 20 mg Q4H.   7. P/F ratio 187.5 while supine this morning.  Will keep supine for now.  Repeat ABG this afternoon.  8. Reduce PEEP from 14 to 12.   9. Holding Lasix for today (last dose given 12/8)  10. Advancing enteral feeding today.   11. Work on daily bowel movement with increased opiates and advancement of enteral feeding.  12. Give lactulose x 1.        Assessment/Plan:   Neurologic:  Acute metabolic encephalopathy.   In the setting of therapeutic sedation.  Reduced sedation 12/8, pt followed commands.  On paralytic infusion 11/28 - 12/8.  History of polysubstance abuse and chronic pain/lower back pain now on Suboxone.  Hold suboxone on 11/28 after intubation. Mood disorder/depression.    Precedex, fentanyl, midazolam, propofol gtt as needed for sedation.  RASS goal -3/-4.    Low-dose propofol gtt to help with sedation.  Check TGs  level every 48 hours.    Continue home meds: abilify, bupropion, gabapentin 3x/day    Increased Abilify from 5 mg to 10 mg daily.    Increased Ativan from 2 mg to 4 mg Q4H.    Increased Oxycodone from 10 mg to 20 mg Q4H.     Behavioral Health consultation to assist with additional sedatives with patient and also with underlying mood disorder, home meds on-board.    Pain meds PRN     Pulmonary: Acute respiratory failure with hypoxia, acute respiratory distress syndrome.  Secondary to COVID19 and superimposed VAP.   CT chest 11/24 extensive groundglass consolidative bilateral pulmonary opacities.  Failed HFNC/NIPPV, intubated 11/28.  Had trace left pleural effusion on CT, diuresed.  Inhaled Veletri 11/28- 12/7.  Paralytics 11/28-12/8.  ABG 12/8 while prone vs supine: PF ratio 206 while prone, PF ratio 174 while supine, both on FiO2 50% and PEEP 14.  COVID-19 viral pneumonia.  Mild intermittent asthma without exacerbation.  He reports using inhaler once weekly at baseline. Trace left pleural effusion.  Noted on CT scan, diuresed.     Wean supplemental O2 as tolerated; goal O2 sat 92-96%.  HOB > 30 degrees to limit aspiration risk.      Vent: CMV, RR 20,  mL (6.5 mL/kg IBW), PEEP 14, FiO2 40-50%.     Goal Pplat less than 30, goal driving pressure less than 15.    Reduce PEEP from 14 to 12 today.     P/F ratio 187.5 while supine.  Repeat ABG this afternoon.    Abx and diuresis as below.     Cardiovascular:  Circulatory shock/hypertensive urgency.  Back on vasopressors, hypotensive.  Has history of essential hypertension, on lisinopril at baseline.  Unclear cause of lability in BP trends.  Following commands w/ reduced sedation 12/8 so less concerned for neurologic cause. Paroxysmal atrial fibrillation.  Went into A. fib on 11/26, initially rate controlled.  Initiated amiodarone on 11/27.  Converted to normal sinus rhythm after intubation on 11/28.  Amio gtt stopped.  Mild RV dysfunction.  TTE 11/28 with + pressure  overload, moderate RV dilation, mild RV dysfunction.  LVEF 60-65%.    Cardiac monitoring.  SBP >= 90 mmHg, MAP >= 65 mmHg.  EKG PRN.    Norepinephrine gtt, nicardipine gtt as needed for BP goals.    Holding lisinopril, metoprolol given BP lability.    Pending sedative changes, might start scheduled metoprolol vs labetalol.     If BP's still fluctuating, consider head CT for evaluation.     GI/: Transaminitis, stable.  Likely from the viral infection.  Present prior to Remdesivir use.  Slight rise on 12/7 but stable 12/8. Moderate protein-calorie malnutrition.  Slow transit constipation.    Nutrition: Enteral feeding - advance enteral feeding today.  FWF 30 mL Q6H.    Last BM:  12/8. Meds: colace 2x/day.  Senna 2x/day, miralax 2x/day.  Rectal tube placed.      Minimal output in rectal tube since yesterday.  Give additional lactulose x 1 today.    GI prophylaxis: PPI.     Renal:  Metabolic alkalosis.      Monitor I/O's.  Electrolyte repletion PRN.  Avoid/limit nephrotoxic agents.    IVFs: Saline lock.    Received Lasix IV this morning - hold for now.      Heme/Onc: COVID-19 associated coagulopathy.  D-dimer 1.24 on 11/24.  Leukopenia,resolved.  Thrombocytopenia, resolved.     DVT prophylaxis: SCDs.  Lovenox 0.5 mg/kg subcu 2X/day.     Endocrine: Hyperglycemia, mild.  No history of diabetes.  Secondary to stress and steroid use.  Hemoglobin A1c 5.8% 11/24.    FSBG Q4H, Aspart insulin SS (high insulin resistance).  Hypoglycemia protocol.     Infectious diseases: COVID-19 viral infection.  PCR positive on 11/19.  Hospitalized on 11/24.  CRP 12.3, PCT 0.5 on 11/24.  s/p decadron x 10 days (start 11/24), Remdesivir x 5 days (start 11/24), s/p tocilizumab 8 mg/kg on 11/24.  Multi-organism ventilator associated pneumonia.  ET aspirate 12/1 + 1+ E. Coli and 1+ Proteus mirabalis.  Cefepime IV on 12/1-12/6, switched to ceftriaxone IV on 12/6.    Continue ceftriaxone (start 12/6) x 5 days to complete 10-day  course.     Rheum/Musculoskeletal: Chronic lumbar pain and knee pain.  Patient being evaluated by orthopedic surgery for surgical intervention.    Activity:  Bedrest    Lines/Drains/Tubes:  8.0 mm endotracheal tube, placed 11/28  RUE PICC central line, placed 11/24  Left radial A-line, placed 11/28  OG enteral tube, placed 11/28  Wei catheter, placed 11/28     Code Status:  Full Code     The patient and/or the family was educated about the above plan of care and indicated understanding.  Updated the patient's ex-wife, Jama North on 12/9  All questions answered.     This patient is considered critically ill and requires ICU level of care due to acute respiratory failure with hypoxia, acute respiratory distress syndrome requiring mechanical ventilation; circulatory shock on vasopressors     Total Critical Care time, not including separate billable procedure time: 55 minutes.    Kendra Redman MD, MPH  Pulmonary/Critical Care Medicine  12/09/2021   11:29 AM         ICU DAILY CHECKLIST:   ICU DAILY CHECKLIST           Can patient transfer out of MICU? no    FAST HUG:    Feeding:  yes.  Patient is receiving TUBE FEEDS    Wei: no  Analgesia/Sedation: yes; Propofol, Dexmedetomidine, Midazolam and Fentanyl   Thromboembolic prophylaxis: yes; Mode:  Lovenox and SCDs  HOB>30:  yes  Stress Ulcer Protocol Active: yes; Mode: PPI  Glycemic Control: Any glucose > 180 no; Mode of Insulin Therapy: Sliding Scale Insulin    INTUBATED:  Can patient have daily waking:  No  Can patient have spontaneous breathing trial:  No    Restraints? no    PHYSICAL THERAPY AND MOBILITY:  Can patient have PT and mobility trial: no  Activity: Bedrest    RISK FACTORS PRESENT ON ADMISSION:  Clinically Significant Risk Factors Present on Admission           # Non-Severe Malnutrition, POA: based on Weight loss;Reduced intake (12/06/21 1300)         Subjective/Interval History:   11/24: seen at the [facility] ED on 11/19, tested positive for Covid 19 at that  time.  Ex-wife endorsed concerns, patient came to [facility] ED for evaluation.  O2 sat 44% on room air in triage.  Initially placed on oxygen mask, then noninvasive ventilation.  CT scan with extensive groundglass opacities, no pulmonary embolism.  He was given Decadron, remdesivir, tocilizumab  11/24: Transferred to ICU.  NPO.  Encourage self proning.  11/25:  NIPPV, 60 L/min, FiO2 100%.  Back on NIPPV, FiO2 at 80%.  11/26: Tried HFNC, 100%, 60 L/min for 3 hours.  Then back in NIPPV, FiO2 80%-85%.   Flipped into rate-controlled Afib.  11/27:  HFNC 95%, 60 L/min for about 6 hours.  Back on NIPPV in early evening.  Started amiodarone bolus/gtt.   11/28:  Intubated.  Started Nimbex gtt.  Started Veletri.  Proned.  OG tube placed.    11/29: Gastric feeding held.   11/30: WBC rising, check BCx.  Started vanco, cefepime.  12/1:  ET aspirate + 1+ Proteus mirabilis, 1+ Escherichia coli.  Started Cefepime/Vanco.  12/5:  Transferred from [facility] ICU to [facility] ICU last night.    12/6: Proned at 3 am, kept prone during day.  Stopped Cefepime, started ceftriaxone.   12/7:  Stopped Veletri. Labile BP.  Hypertensive, started lisinopril/metoprolol.  Then hypotensive, started norepi gtt.   PEEP from 16 to 14.  Supine/proned.  Started trickle feeds.   12/8:  Rectal tube.  Stopped paralytics.  Supinated, kept supine!  Labile BP related to RASS levels.      Overnight, no issues.  This morning, more arousability/movement.    FiO2 40% to 50% with movements    Review of Systems:  The Review of Systems is negative other than noted in the HPI        Allergies/Medications:   Allergies:   No Known Allergies    Continuous Infusions:    dexmedetomidine 1.5 mcg/kg/hr (12/09/21 0845)     dextrose       fentaNYL 300 mcg/hr (12/09/21 1029)     propofol (DIPRIVAN) infusion 20 mcg/kg/min (12/09/21 0915)    And     - MEDICATION INSTRUCTIONS -       midazolam 12 mg/hr (12/09/21 0610)     niCARdipine Stopped (12/08/21 1020)      "norepinephrine 0.01 mcg/kg/min (12/09/21 0844)     - MEDICATION INSTRUCTIONS -       Scheduled Medications:    [START ON 12/10/2021] ARIPiprazole  10 mg Per Feeding Tube Daily     buPROPion  100 mg Per Feeding Tube TID     cefTRIAXone  2 g Intravenous Q24H     chlorhexidine  15 mL Mouth/Throat Q12H     sennosides  10 mL Per Feeding Tube BID    And     docusate  100 mg Per Feeding Tube BID     enoxaparin ANTICOAGULANT  50 mg Subcutaneous Q12H     [Held by provider] furosemide  40 mg Intravenous Q24H     gabapentin  800 mg Per Feeding Tube TID     influenza quadrivalent (PF) vacc  0.5 mL Intramuscular Prior to discharge     insulin aspart  1-12 Units Subcutaneous Q4H     [Held by provider] lisinopril  10 mg Oral or Feeding Tube Daily     LORazepam  4 mg Per Feeding Tube Q4H     [Held by provider] metoprolol tartrate  25 mg Oral or Feeding Tube BID     oxyCODONE  20 mg Per Feeding Tube Q4H     pantoprazole  40 mg Per Feeding Tube QAM AC    Or     pantoprazole (PROTONIX) IV  40 mg Intravenous QAM AC     polyethylene glycol  17 g Oral or Feeding Tube BID           Objective:   Vitals:  BP 93/56   Pulse 100   Temp 99.6  F (37.6  C) (Oral)   Resp 26   Ht 1.803 m (5' 11\")   Wt 94.5 kg (208 lb 5.4 oz)   SpO2 92%   BMI 29.06 kg/m    Vent: Ventilation Mode: CMV/AC  (Continuous Mandatory Ventilation/ Assist Control)  FiO2 (%): 50 %  Rate Set (breaths/minute): 20 breaths/min  Tidal Volume Set (mL): 450 mL  PEEP (cm H2O): (S) 12 cmH2O  Oxygen Concentration (%): 40 %  Peak Inspiratory Pressure (cm H2O) (Drager Kristen): 26  Resp: 26    GEN: Intubated, sedated.  Moving arms with deep stimulation.  HEENT: Normocephalic, atraumatic.  Pupils small/reactive, anicteric sclera. Endotracheal tube.  NECK: Supple.    PULM:  Mechanical ventilation.   Diminished breath sounds, no wheezing.   CVS: Regular rate and rhythm.  Normal S1, S2.  No rubs, murmurs, or gallops.    ABDOMEN: Hypoactive bowel sounds.  Non-distended.    EXTREMITES:  No " clubbing, cyanosis.  1+ pitting edema of feet.   NEURO:   Intubated, sedated.      Intake/Output: I/O last 3 completed shifts:  In: 3212.71 [I.V.:2062.71; NG/GT:910]  Out: 3850 [Urine:3850]        Pertinent Studies:   All laboratory data reviewed  Serum Glucose range:   Recent Labs   Lab 12/09/21  0737 12/09/21  0413 12/09/21  0358 12/08/21  2345   * 124 128* 126*     ABG:   Recent Labs   Lab 12/09/21  0413 12/08/21  1459 12/08/21  0400   PH 7.44 7.44 7.35*   PCO2 53* 55* 66*   PO2 75* 87 103*   HCO3 34* 35* 32*   O2PER 0.4 0.5 0.5     CBC:   Recent Labs   Lab 12/09/21  0413 12/08/21  0401 12/07/21  1555   WBC 9.5 9.8 11.1*   HGB 11.3* 12.4* 12.3*   HCT 36.0* 38.9* 38.9*   MCV 96 94 93    197 244     Chemistry:   Recent Labs   Lab 12/09/21  0413 12/08/21  0401 12/07/21  1439 12/07/21  0438    141  --  139   POTASSIUM 3.8 4.3 4.9 4.5   CHLORIDE 105 102  --  101   CO2 34* 34*  --  35*   BUN 20 26*  --  25*   CR 0.61* 0.62*  --  0.59*   GFRESTIMATED >90 >90  --  >90   TRUONG 8.5 8.8  --  8.2*   MAG 2.1 2.3  --  2.3   PROTTOTAL  --   --   --  5.5*   ALBUMIN  --   --   --  2.7*   AST  --  44*  --  46*   ALT  --  113*  --  111*   ALKPHOS  --   --   --  54   BILITOTAL  --   --   --  0.6     Coags:  Recent Labs   Lab 12/05/21  2346   INR 0.96     Cardiac Markers:  No results for input(s): CKTOTAL, TROPONINI in the last 168 hours.     Microbiology:  ET aspirate, 12/1: 1+ Proteus mirabilis, 1+ Escherichia coli   Blood culture x 1, 11/30: negative.  COVID-19 PCR, 11/19: Positive        Cardiology/Radiology:   Cardiac: All cardiac studies reviewed by me.    EKG:  Reviewed    TTE, 11/28:  Summary: 1. Left ventricular size, wall motion and function are normal. The ejection fraction is 60-65%. 2. Flattened septum is consistent with RV pressure/volume overload. 3. Mildly decreased right ventricular systolic function The right ventricle is moderately dilated. Right ventricular systolic pressure could not be  approximated due to inadequate tricuspid regurgitation. No tricuspid regurgitation. RA pressure of 8 mmHg.    Radiology:  All imaging studies reviewed by me.    CXR, 12/6: Endotracheal tube tip 4.5 cm above the felipa. Enteric tube tip below the diaphragm. Right PICC line tip over the low SVC. Heart size within normal limits. Diffuse bilateral pulmonary infiltrates. Overall, the infiltrates appear mildly progressed compared to the prior study. No significant bony abnormalities. Remainder unremarkable.    Abdomen X-Ray, 11/28: Enteric suction tube with the tip and side-port within the mid gastric lumen. Nonobstructive bowel gas pattern. No definite free air. Persistent diffuse bibasilar pulmonary airspace opacities.     CT chest PE study, 11/24:  FINDINGS: ANGIOGRAM CHEST: No pulmonary artery filling defect. Aorta is normal in caliber.  LUNGS AND PLEURA: Extensive groundglass consolidative bilateral pulmonary opacities. Trace left effusion. MEDIASTINUM/AXILLAE: Heart size is normal scattered borderline enlarged mediastinal lymph nodes with the largest in the right lower paratracheal chain measuring 1.1 x 2.2 cm (series 5/29).  CORONARY ARTERY CALCIFICATION: None.  UPPER ABDOMEN: Hepatic steatosis.  MUSCULOSKELETAL: Normal. IMPRESSION: 1.  No pulmonary embolus. 2.  COVID pneumonia. 3.  Likely reactive mediastinal  lymph nodes. 4.  Hepatic steatosis.

## 2021-12-09 NOTE — PROGRESS NOTES
RT PROGRESS NOTE    VENT DAY# 12    CURRENT SETTINGS:   Ventilation Mode: CMV/AC  (Continuous Mandatory Ventilation/ Assist Control)  FiO2 (%): 40 %  Rate Set (breaths/minute): 20 breaths/min  Tidal Volume Set (mL): 450 mL  PEEP (cm H2O): 14 cmH2O  Oxygen Concentration (%): 40 %  Peak Inspiratory Pressure (cm H2O) (Drager Kristen): 26  Resp: 27      PATIENT PARAMETERS:  PIP 27  Pplat:  25  Pmean:  17  Secretions:  Scant amount of clear/white ETT secretions  02 Sats:  94-97%    ETT SIZE 8.0 Secured at 26 cm at teeth/gums    Respiratory Medications: none    ABG: Results for TOBIAS BELL (MRN [id number]) as of 12/9/2021 05:39   Ref. Range 12/9/2021 04:13   pH Arterial Latest Ref Range: 7.37 - 7.44  7.44   pCO2 Arterial Latest Ref Range: 35 - 45 mm Hg 53 (H)   PO2 Arterial Latest Ref Range: 80 - 90 mm Hg 75 (L)   Bicarbonate Arterial Latest Ref Range: 23 - 29 mmol/L 34 (H)   Base Excess Art Latest Ref Range:   mmol/L 12.0   FIO2 Unknown 0.4   Oxyhemoglobin Latest Ref Range: 96.0 - 97.0 % 94.1 (L)     NOTE / SHIFT SUMMARY:   Patient remains intubated on full mechanical vent support. No significant events overnight. Will continue to follow.    Leida Burns, RT

## 2021-12-09 NOTE — PROGRESS NOTES
Care Management Follow Up    Length of Stay (days): 4    Expected Discharge Date: multiple days      Concerns to be Addressed:       Patient plan of care discussed at interdisciplinary rounds: Yes    Anticipated Discharge Disposition:       Anticipated Discharge Services:    Anticipated Discharge DME:      Patient/family educated on Medicare website which has current facility and service quality ratings:    Education Provided on the Discharge Plan:    Patient/Family in Agreement with the Plan:      Referrals Placed by CM/SW:    Private pay costs discussed: Not applicable    Additional Information:  CM following for needs at discharge. Currently intubated       Marilee Mcdowell RN

## 2021-12-09 NOTE — PROGRESS NOTES
Pulmonary/Critical Care Medicine update    Ex-wife updated me that patient has either stopped taking Abilify or Wellbutrin independently.  Unclear which one but thinks it might be the Wellbutrin.  States that the drug made him have palpitations and also made him more agitated.    I stated that he has been on it for almost 2 weeks (since admission), but I will let our Behavioral Health Team know.     Kendra Redman MD, MPH  Pulmonary & Critical Care Medicine  12/09/2021  1:22 PM

## 2021-12-09 NOTE — PROGRESS NOTES
MD RESTRAINT FOR NON-VIOLENT BEHAVIOR FACE TO FACE EVALUATION  Progress Note  Restraint Application    I recognize that restraints are physical and/or chemical interventions intended to restrict a person's movements. Restraints are currently needed to ensure the safety of this patient and/or others. My clinical rationale appears below.    PATIENT EVALUATION  Patient evaluated on 12/09/2021 at 8:52 AM to confirm the need for medical restraints.  --  Category/Type of Restraint:  NON-VIOLENT  --  Patient's Immediate Situation:  Patient demonstrated the following behaviors: Pulling/tugging at invasive lines or tubes and does not respond to verbal/non-verbal redirection  --  Patient's Reaction to the intervention:  Does patient understand the reason for restraint/seclusion? No  --  Medical Condition:  Is there any evidence of compromise of Skin integrity, Respiratory, Cardiovascular, Musculoskeletal, Hydration? Yes  Skin integrity, Respiratory, Cardiovascular and Musculoskeletal  --  Root Cause of the Behavior:  Acute respiratory failure, acute encephalopathy related to therapeutic sedation  --  Behavioral Condition:  In consultation with the RN, is there a need to continue this restraint or seclusion? Yes  --  Criteria for Release from the Restraint:  Patient is calm, listens to verbal redirection, and stops attempting to pull out lines/tubes    See Restraint Flowsheet for complete restraint documentation and assessment.    Kendra Redman MD, MPH  Pulmonary & Critical Care Medicine  12/09/2021  8:52 AM

## 2021-12-09 NOTE — PROGRESS NOTES
RT PROGRESS NOTE    VENT DAY # 12    CURRENT SETTINGS:   Ventilation Mode: CMV/AC  (Continuous Mandatory Ventilation/ Assist Control)  FiO2 (%): 50 %  Rate Set (breaths/minute): 20 breaths/min  Tidal Volume Set (mL): 450 mL  PEEP (cm H2O): (S) 12 cmH2O  Oxygen Concentration (%): 40 %  Peak Inspiratory Pressure (cm H2O) (Drager Kristen): 26  Resp: 23      PATIENT PARAMETERS:  PIP: 33  Pplat: 26  Pmean:      SBT: Not ready for weaning trial at this time.   Secretions: Mod thick white secretions.    02 Sats:  93-94%    ETT SIZE 8.0 Secured at 26 cm at teeth/gums    Respiratory Medications: None     ABG: @14:40 pm, pH 7.41; pCO2 55 ; pO2 79; HCO3 33,Sat 94.5% , on 50%    NOTE / SHIFT SUMMARY:   Pt continue on full support and shows no sign of any respiratory distress. Will continue to follow.     Lencho Lao, RRT

## 2021-12-09 NOTE — PLAN OF CARE
Problem: Inability to Wean (Mechanical Ventilation, Invasive)  Goal: Mechanical Ventilation Liberation  Outcome: No Change  Intervention: Promote Extubation and Mechanical Ventilation Liberation  Recent Flowsheet Documentation  Taken 12/9/2021 0400 by Faith Melgar RN  Sleep/Rest Enhancement:   awakenings minimized   noise level reduced   regular sleep/rest pattern promoted  Medication Review/Management: medications reviewed  Taken 12/9/2021 0000 by Faith Melgar RN  Sleep/Rest Enhancement:   awakenings minimized   noise level reduced   regular sleep/rest pattern promoted  Medication Review/Management: medications reviewed  Taken 12/8/2021 2000 by Faith Melgar RN  Sleep/Rest Enhancement:   awakenings minimized   noise level reduced   regular sleep/rest pattern promoted  Medication Review/Management: medications reviewed     Problem: Adult Inpatient Plan of Care  Goal: Absence of Hospital-Acquired Illness or Injury  Outcome: Improving  Intervention: Identify and Manage Fall Risk  Recent Flowsheet Documentation  Taken 12/9/2021 0400 by Faith Melgar RN  Safety Promotion/Fall Prevention:   activity supervised   clutter free environment maintained   fall prevention program maintained   increased rounding and observation   increase visualization of patient   lighting adjusted   patient and family education   room near nurse's station   safety round/check completed  Taken 12/9/2021 0000 by Faith Melgar RN  Safety Promotion/Fall Prevention:   activity supervised   clutter free environment maintained   fall prevention program maintained   increased rounding and observation   increase visualization of patient   lighting adjusted   patient and family education   room near nurse's station   safety round/check completed  Taken 12/8/2021 2000 by Faith Melgar RN  Safety Promotion/Fall Prevention:   activity supervised   clutter free environment maintained   fall prevention program maintained    increased rounding and observation   increase visualization of patient   lighting adjusted   patient and family education   room near nurse's station   safety round/check completed  Intervention: Prevent Skin Injury  Recent Flowsheet Documentation  Taken 12/9/2021 0400 by Faith Melgar RN  Body Position:   turned   right  Taken 12/9/2021 0200 by Faith Melgar RN  Body Position:   turned   supine  Taken 12/9/2021 0000 by Faith Melgar RN  Body Position:   turned   right  Taken 12/8/2021 2200 by Faith Melgar RN  Body Position:   turned   left  Taken 12/8/2021 2000 by Faith Melgar RN  Body Position:   turned   supine  Intervention: Prevent and Manage VTE (Venous Thromboembolism) Risk  Recent Flowsheet Documentation  Taken 12/9/2021 0400 by Faith Melgar RN  VTE Prevention/Management:   pneumatic compression device   anticoagulant therapy maintained  Taken 12/9/2021 0000 by Faith Melgar RN  VTE Prevention/Management:   pneumatic compression device   anticoagulant therapy maintained  Taken 12/8/2021 2000 by Faith Melgar RN  VTE Prevention/Management:   pneumatic compression device   anticoagulant therapy maintained  Intervention: Prevent Infection  Recent Flowsheet Documentation  Taken 12/9/2021 0400 by Faith Melgar RN  Infection Prevention:   cohorting utilized   environmental surveillance performed   equipment surfaces disinfected   hand hygiene promoted   personal protective equipment utilized   rest/sleep promoted   single patient room provided   visitors restricted/screened  Taken 12/9/2021 0000 by Faith Melgar RN  Infection Prevention:   cohorting utilized   environmental surveillance performed   equipment surfaces disinfected   hand hygiene promoted   personal protective equipment utilized   rest/sleep promoted   single patient room provided   visitors restricted/screened  Taken 12/8/2021 2000 by Faith Melgar RN  Infection Prevention:   cohorting utilized    environmental surveillance performed   equipment surfaces disinfected   hand hygiene promoted   personal protective equipment utilized   rest/sleep promoted   single patient room provided   visitors restricted/screened     Problem: Nutrition Impairment (Mechanical Ventilation, Invasive)  Goal: Optimal Nutrition Delivery  Outcome: Improving     Problem: ARDS (Acute Respiratory Distress Syndrome)  Goal: Effective Oxygenation  Outcome: Improving  Intervention: Optimize Oxygenation, Ventilation and Perfusion  Recent Flowsheet Documentation  Taken 12/9/2021 0400 by Faith Melgar RN  Lung Protection Measures:   fluid excess minimized   ventilator synchrony promoted  Airway/Ventilation Management:   airway patency maintained   calming measures promoted  Taken 12/9/2021 0000 by Faith Melgar RN  Lung Protection Measures:   fluid excess minimized   ventilator synchrony promoted  Airway/Ventilation Management:   airway patency maintained   calming measures promoted  Taken 12/8/2021 2000 by Faith Melgar RN  Lung Protection Measures:   fluid excess minimized   ventilator synchrony promoted  Airway/Ventilation Management:   airway patency maintained   calming measures promoted  Patient tube feeding increased q 8 hrs, currently at 45 ml, due to increase at 1200 by 10 cc. 80 cc water flushes q 6 hrs. When sedation is decreased pt becomes tachycardic, has thrashing head movement and leg movements. Patient medicated per PRN orders with boluses of fentanyl, Versed, and propofol.  Turned and reposition q 2 hrs

## 2021-12-10 LAB
ANION GAP SERPL CALCULATED.3IONS-SCNC: 7 MMOL/L (ref 5–18)
BASE EXCESS BLDA CALC-SCNC: 10.1 MMOL/L
BASE EXCESS BLDA CALC-SCNC: 8.9 MMOL/L
BASE EXCESS BLDA CALC-SCNC: 9.5 MMOL/L
BUN SERPL-MCNC: 14 MG/DL (ref 8–22)
CALCIUM SERPL-MCNC: 8.2 MG/DL (ref 8.5–10.5)
CHLORIDE BLD-SCNC: 108 MMOL/L (ref 98–107)
CO2 SERPL-SCNC: 31 MMOL/L (ref 22–31)
COHGB MFR BLD: 93.8 % (ref 96–97)
COHGB MFR BLD: 97.3 % (ref 96–97)
COHGB MFR BLD: 98.6 % (ref 96–97)
CREAT SERPL-MCNC: 0.53 MG/DL (ref 0.7–1.3)
ERYTHROCYTE [DISTWIDTH] IN BLOOD BY AUTOMATED COUNT: 14.7 % (ref 10–15)
FLOW: ABNORMAL
FLOW: ABNORMAL
GFR SERPL CREATININE-BSD FRML MDRD: >90 ML/MIN/1.73M2
GLUCOSE BLD-MCNC: 117 MG/DL (ref 70–125)
GLUCOSE BLDC GLUCOMTR-MCNC: 112 MG/DL (ref 70–99)
GLUCOSE BLDC GLUCOMTR-MCNC: 118 MG/DL (ref 70–99)
GLUCOSE BLDC GLUCOMTR-MCNC: 118 MG/DL (ref 70–99)
GLUCOSE BLDC GLUCOMTR-MCNC: 127 MG/DL (ref 70–99)
GLUCOSE BLDC GLUCOMTR-MCNC: 86 MG/DL (ref 70–99)
HCO3 BLD-SCNC: 31 MMOL/L (ref 23–29)
HCO3 BLD-SCNC: 32 MMOL/L (ref 23–29)
HCO3 BLD-SCNC: 33 MMOL/L (ref 23–29)
HCT VFR BLD AUTO: 31.9 % (ref 40–53)
HGB BLD-MCNC: 10.1 G/DL (ref 13.3–17.7)
MAGNESIUM SERPL-MCNC: 2 MG/DL (ref 1.8–2.6)
MCH RBC QN AUTO: 30.2 PG (ref 26.5–33)
MCHC RBC AUTO-ENTMCNC: 31.7 G/DL (ref 31.5–36.5)
MCV RBC AUTO: 96 FL (ref 78–100)
O2/TOTAL GAS SETTING VFR VENT: 50 %
O2/TOTAL GAS SETTING VFR VENT: 50 %
O2/TOTAL GAS SETTING VFR VENT: 65 %
OXYHGB MFR BLD: 91.9 % (ref 96–97)
OXYHGB MFR BLD: 95.7 % (ref 96–97)
OXYHGB MFR BLD: 96.7 % (ref 96–97)
PCO2 BLD: 51 MM HG (ref 35–45)
PCO2 BLD: 52 MM HG (ref 35–45)
PCO2 BLD: 52 MM HG (ref 35–45)
PEEP: 12 CM H2O
PH BLD: 7.41 [PH] (ref 7.37–7.44)
PH BLD: 7.42 [PH] (ref 7.37–7.44)
PH BLD: 7.43 [PH] (ref 7.37–7.44)
PLATELET # BLD AUTO: 156 10E3/UL (ref 150–450)
PO2 BLD: 65 MM HG (ref 80–90)
PO2 BLD: 83 MM HG (ref 80–90)
PO2 BLD: 98 MM HG (ref 80–90)
POTASSIUM BLD-SCNC: 3.7 MMOL/L (ref 3.5–5)
PSV (LAB BLOOD GAS): ABNORMAL
PSV (LAB BLOOD GAS): ABNORMAL
RATE: 18 RR/MIN
RATE: 18 RR/MIN
RATE: 20 RR/MIN
RBC # BLD AUTO: 3.34 10E6/UL (ref 4.4–5.9)
SODIUM SERPL-SCNC: 146 MMOL/L (ref 136–145)
TEMPERATURE: 37 DEGREES C
VENTILATION MODE: ABNORMAL
VENTILATOR TIDAL VOLUME: 450 ML
VENTILATOR TIDAL VOLUME: 540 ML
VENTILATOR TIDAL VOLUME: 540 ML
WBC # BLD AUTO: 8.3 10E3/UL (ref 4–11)

## 2021-12-10 PROCEDURE — 250N000011 HC RX IP 250 OP 636: Performed by: NURSE PRACTITIONER

## 2021-12-10 PROCEDURE — 250N000009 HC RX 250: Performed by: INTERNAL MEDICINE

## 2021-12-10 PROCEDURE — 82805 BLOOD GASES W/O2 SATURATION: CPT | Performed by: INTERNAL MEDICINE

## 2021-12-10 PROCEDURE — 200N000001 HC R&B ICU

## 2021-12-10 PROCEDURE — 258N000003 HC RX IP 258 OP 636: Performed by: INTERNAL MEDICINE

## 2021-12-10 PROCEDURE — 250N000011 HC RX IP 250 OP 636: Performed by: INTERNAL MEDICINE

## 2021-12-10 PROCEDURE — 83735 ASSAY OF MAGNESIUM: CPT | Performed by: INTERNAL MEDICINE

## 2021-12-10 PROCEDURE — 250N000013 HC RX MED GY IP 250 OP 250 PS 637: Performed by: INTERNAL MEDICINE

## 2021-12-10 PROCEDURE — 94003 VENT MGMT INPAT SUBQ DAY: CPT

## 2021-12-10 PROCEDURE — 999N000157 HC STATISTIC RCP TIME EA 10 MIN

## 2021-12-10 PROCEDURE — 85027 COMPLETE CBC AUTOMATED: CPT | Performed by: INTERNAL MEDICINE

## 2021-12-10 PROCEDURE — 250N000013 HC RX MED GY IP 250 OP 250 PS 637: Performed by: NURSE PRACTITIONER

## 2021-12-10 PROCEDURE — 99232 SBSQ HOSP IP/OBS MODERATE 35: CPT | Performed by: NURSE PRACTITIONER

## 2021-12-10 PROCEDURE — 80048 BASIC METABOLIC PNL TOTAL CA: CPT | Performed by: INTERNAL MEDICINE

## 2021-12-10 PROCEDURE — 99291 CRITICAL CARE FIRST HOUR: CPT | Performed by: INTERNAL MEDICINE

## 2021-12-10 RX ORDER — QUETIAPINE FUMARATE 25 MG/1
100 TABLET, FILM COATED ORAL 4 TIMES DAILY
Status: COMPLETED | OUTPATIENT
Start: 2021-12-10 | End: 2021-12-13

## 2021-12-10 RX ORDER — PROPOFOL 10 MG/ML
5-20 INJECTION, EMULSION INTRAVENOUS CONTINUOUS
Status: DISCONTINUED | OUTPATIENT
Start: 2021-12-10 | End: 2021-12-11

## 2021-12-10 RX ORDER — DIPHENHYDRAMINE HYDROCHLORIDE 50 MG/ML
25 INJECTION INTRAMUSCULAR; INTRAVENOUS EVERY 6 HOURS
Status: COMPLETED | OUTPATIENT
Start: 2021-12-10 | End: 2021-12-12

## 2021-12-10 RX ORDER — MIDAZOLAM HCL IN 0.9 % NACL/PF 1 MG/ML
1-12 PLASTIC BAG, INJECTION (ML) INTRAVENOUS CONTINUOUS
Status: DISCONTINUED | OUTPATIENT
Start: 2021-12-10 | End: 2021-12-12

## 2021-12-10 RX ORDER — DEXMEDETOMIDINE HYDROCHLORIDE 4 UG/ML
.2-1.5 INJECTION, SOLUTION INTRAVENOUS CONTINUOUS
Status: DISCONTINUED | OUTPATIENT
Start: 2021-12-10 | End: 2021-12-12

## 2021-12-10 RX ORDER — HALOPERIDOL 5 MG/ML
2 INJECTION INTRAMUSCULAR EVERY 6 HOURS
Status: COMPLETED | OUTPATIENT
Start: 2021-12-10 | End: 2021-12-12

## 2021-12-10 RX ADMIN — BUPROPION HYDROCHLORIDE 100 MG: 100 TABLET, FILM COATED ORAL at 14:06

## 2021-12-10 RX ADMIN — QUETIAPINE 100 MG: 25 TABLET ORAL at 21:10

## 2021-12-10 RX ADMIN — HALOPERIDOL LACTATE 2 MG: 5 INJECTION, SOLUTION INTRAMUSCULAR at 21:30

## 2021-12-10 RX ADMIN — OXYCODONE HYDROCHLORIDE 20 MG: 5 SOLUTION ORAL at 06:27

## 2021-12-10 RX ADMIN — ARIPIPRAZOLE 5 MG: 1 SOLUTION ORAL at 08:02

## 2021-12-10 RX ADMIN — CEFTRIAXONE SODIUM 2 G: 2 INJECTION, POWDER, FOR SOLUTION INTRAMUSCULAR; INTRAVENOUS at 09:21

## 2021-12-10 RX ADMIN — ENOXAPARIN SODIUM 50 MG: 100 INJECTION SUBCUTANEOUS at 21:25

## 2021-12-10 RX ADMIN — DIPHENHYDRAMINE HYDROCHLORIDE 50 MG: 50 INJECTION, SOLUTION INTRAMUSCULAR; INTRAVENOUS at 08:03

## 2021-12-10 RX ADMIN — DEXMEDETOMIDINE HYDROCHLORIDE 1.5 MCG/KG/HR: 4 INJECTION, SOLUTION INTRAVENOUS at 18:33

## 2021-12-10 RX ADMIN — SENNOSIDES 10 ML: 8.8 LIQUID ORAL at 08:02

## 2021-12-10 RX ADMIN — BUPROPION HYDROCHLORIDE 100 MG: 100 TABLET, FILM COATED ORAL at 17:13

## 2021-12-10 RX ADMIN — DEXMEDETOMIDINE HYDROCHLORIDE 1.5 MCG/KG/HR: 400 INJECTION INTRAVENOUS at 09:20

## 2021-12-10 RX ADMIN — CHLORHEXIDINE GLUCONATE 0.12% ORAL RINSE 15 ML: 1.2 LIQUID ORAL at 07:36

## 2021-12-10 RX ADMIN — PROPOFOL 20 MCG/KG/MIN: 10 INJECTION, EMULSION INTRAVENOUS at 07:34

## 2021-12-10 RX ADMIN — DEXMEDETOMIDINE HYDROCHLORIDE 1.5 MCG/KG/HR: 4 INJECTION, SOLUTION INTRAVENOUS at 15:42

## 2021-12-10 RX ADMIN — DEXMEDETOMIDINE HYDROCHLORIDE 1.5 MCG/KG/HR: 400 INJECTION INTRAVENOUS at 06:24

## 2021-12-10 RX ADMIN — Medication 300 MCG/HR: at 17:25

## 2021-12-10 RX ADMIN — Medication 10 MG/HR: at 17:40

## 2021-12-10 RX ADMIN — CHLORHEXIDINE GLUCONATE 0.12% ORAL RINSE 15 ML: 1.2 LIQUID ORAL at 21:26

## 2021-12-10 RX ADMIN — LORAZEPAM 4 MG: 2 LIQUID ORAL at 15:49

## 2021-12-10 RX ADMIN — Medication 12 MG/HR: at 06:33

## 2021-12-10 RX ADMIN — Medication 300 MCG/HR: at 01:56

## 2021-12-10 RX ADMIN — QUETIAPINE 50 MG: 25 TABLET, FILM COATED ORAL at 08:03

## 2021-12-10 RX ADMIN — DEXMEDETOMIDINE HYDROCHLORIDE 1.5 MCG/KG/HR: 400 INJECTION INTRAVENOUS at 00:01

## 2021-12-10 RX ADMIN — QUETIAPINE 100 MG: 25 TABLET ORAL at 17:13

## 2021-12-10 RX ADMIN — DIPHENHYDRAMINE HYDROCHLORIDE 25 MG: 50 INJECTION, SOLUTION INTRAMUSCULAR; INTRAVENOUS at 14:07

## 2021-12-10 RX ADMIN — DOCUSATE SODIUM 100 MG: 50 LIQUID ORAL at 08:02

## 2021-12-10 RX ADMIN — DEXMEDETOMIDINE HYDROCHLORIDE 1.5 MCG/KG/HR: 4 INJECTION, SOLUTION INTRAVENOUS at 21:47

## 2021-12-10 RX ADMIN — LORAZEPAM 4 MG: 2 LIQUID ORAL at 04:29

## 2021-12-10 RX ADMIN — Medication 100 MCG: at 17:52

## 2021-12-10 RX ADMIN — OXYCODONE HYDROCHLORIDE 10 MG: 5 SOLUTION ORAL at 17:13

## 2021-12-10 RX ADMIN — LORAZEPAM 4 MG: 2 LIQUID ORAL at 21:09

## 2021-12-10 RX ADMIN — POLYETHYLENE GLYCOL 3350 17 G: 17 POWDER, FOR SOLUTION ORAL at 08:03

## 2021-12-10 RX ADMIN — Medication 300 MCG/HR: at 10:24

## 2021-12-10 RX ADMIN — BUPROPION HYDROCHLORIDE 100 MG: 100 TABLET, FILM COATED ORAL at 08:02

## 2021-12-10 RX ADMIN — QUETIAPINE 100 MG: 25 TABLET ORAL at 14:06

## 2021-12-10 RX ADMIN — Medication 100 MCG: at 00:17

## 2021-12-10 RX ADMIN — Medication 100 MCG: at 11:34

## 2021-12-10 RX ADMIN — Medication 0.1 MCG/KG/MIN: at 17:57

## 2021-12-10 RX ADMIN — HALOPERIDOL LACTATE 2 MG: 5 INJECTION, SOLUTION INTRAMUSCULAR at 15:49

## 2021-12-10 RX ADMIN — GABAPENTIN 800 MG: 250 SUSPENSION ORAL at 17:24

## 2021-12-10 RX ADMIN — LORAZEPAM 4 MG: 2 LIQUID ORAL at 07:34

## 2021-12-10 RX ADMIN — Medication 20 MG: at 10:35

## 2021-12-10 RX ADMIN — OXYCODONE HYDROCHLORIDE 20 MG: 5 SOLUTION ORAL at 14:07

## 2021-12-10 RX ADMIN — Medication 40 MG: at 06:31

## 2021-12-10 RX ADMIN — ENOXAPARIN SODIUM 50 MG: 100 INJECTION SUBCUTANEOUS at 09:21

## 2021-12-10 RX ADMIN — DEXMEDETOMIDINE HYDROCHLORIDE 1.5 MCG/KG/HR: 400 INJECTION INTRAVENOUS at 03:15

## 2021-12-10 RX ADMIN — OXYCODONE HYDROCHLORIDE 20 MG: 5 SOLUTION ORAL at 21:10

## 2021-12-10 RX ADMIN — DEXMEDETOMIDINE HYDROCHLORIDE 1.5 MCG/KG/HR: 400 INJECTION INTRAVENOUS at 12:27

## 2021-12-10 RX ADMIN — DIPHENHYDRAMINE HYDROCHLORIDE 25 MG: 50 INJECTION, SOLUTION INTRAMUSCULAR; INTRAVENOUS at 21:07

## 2021-12-10 RX ADMIN — DOCUSATE SODIUM 100 MG: 50 LIQUID ORAL at 21:10

## 2021-12-10 RX ADMIN — GABAPENTIN 800 MG: 250 SUSPENSION ORAL at 11:25

## 2021-12-10 RX ADMIN — LORAZEPAM 4 MG: 2 LIQUID ORAL at 11:55

## 2021-12-10 RX ADMIN — PROPOFOL 15 MCG/KG/MIN: 10 INJECTION, EMULSION INTRAVENOUS at 21:24

## 2021-12-10 RX ADMIN — GABAPENTIN 800 MG: 250 SUSPENSION ORAL at 08:02

## 2021-12-10 RX ADMIN — HALOPERIDOL LACTATE 2 MG: 5 INJECTION, SOLUTION INTRAMUSCULAR at 10:25

## 2021-12-10 RX ADMIN — OXYCODONE HYDROCHLORIDE 20 MG: 5 SOLUTION ORAL at 01:57

## 2021-12-10 RX ADMIN — OXYCODONE HYDROCHLORIDE 20 MG: 5 SOLUTION ORAL at 09:22

## 2021-12-10 RX ADMIN — SENNOSIDES 10 ML: 8.8 LIQUID ORAL at 21:10

## 2021-12-10 RX ADMIN — OLANZAPINE 10 MG: 10 INJECTION, POWDER, FOR SOLUTION INTRAMUSCULAR at 08:02

## 2021-12-10 ASSESSMENT — ACTIVITIES OF DAILY LIVING (ADL)
ADLS_ACUITY_SCORE: 24
ADLS_ACUITY_SCORE: 26
ADLS_ACUITY_SCORE: 24
ADLS_ACUITY_SCORE: 26
ADLS_ACUITY_SCORE: 24
ADLS_ACUITY_SCORE: 24
ADLS_ACUITY_SCORE: 26
ADLS_ACUITY_SCORE: 24
ADLS_ACUITY_SCORE: 26
ADLS_ACUITY_SCORE: 24

## 2021-12-10 NOTE — PLAN OF CARE
Problem: Adult Inpatient Plan of Care  Goal: Optimal Comfort and Wellbeing  Outcome: No Change     Problem: Inability to Wean (Mechanical Ventilation, Invasive)  Goal: Mechanical Ventilation Liberation  Outcome: Improving    FiO2 changed from 60 to 50%. RR  to 18 and peep decreased to 12. Afternoon ABG ph 7.43 PCO2 51 PO2 83.  CPOT 0 and RASS -3 except between 1030 and 1145 coughing and restless. Given Bolus of Versed and Fentanyl with relief.  Family updated via phone and IPAD on.

## 2021-12-10 NOTE — PROGRESS NOTES
Care Management Follow Up    Length of Stay (days): 5      Expected Discharge Date: pending     Concerns to be Addressed: covid care        Patient plan of care discussed at interdisciplinary rounds: Yes     Anticipated Discharge Disposition:  TBD     Anticipated Discharge Services:  TBD  Anticipated Discharge DME:  [facility]           Additional Information:  Patient covid positive 11/19. Intubated. History of polysubstance abuse and chronic pain on Suboxone. Psychiatry consult.     Covid unvaccinated.     Assessment History: Patient lives at ex-spouse [facility] half time and lives  somewhere else  the rest of the time.  He is independent with IADLS/ADLS. No assistive device; no home care services; does drive. Patient was working on paperwork to apply for disability.      Final discharge plan pending progression and recommendations.        Leena Jordan RN

## 2021-12-10 NOTE — PROGRESS NOTES
RT PROGRESS NOTE    VENT DAY # 13    CURRENT SETTINGS:   Ventilation Mode: CMV/AC  (Continuous Mandatory Ventilation/ Assist Control)  FiO2 (%): (S) 50 %  Rate Set (breaths/minute): 20 breaths/min  Tidal Volume Set (mL): 450 mL  PEEP (cm H2O): 12 cmH2O  Oxygen Concentration (%): (S) 50 %  Resp: 18      PATIENT PARAMETERS:  PIP: 27  Pplat: 18   Pmean: 14    SBT: Not ready for weaning trial at this time.   Secretions: Mod thick white/pale yellow secretions.   02 Sats: 96-97%%    ETT SIZE 8.0 Secured at 26 cm at teeth/gums    Respiratory Medications: None     ABG: @14:31 pH 7.43; pCO2 51; pO2 83; HCO3 33, O2 Sat 95.7%, on 50%    NOTE / SHIFT SUMMARY:   Pt remains on full support with no obvious respiratory distress, recent blood gas as above. Will continue to monitor closely.     Lencho Lao, RRT

## 2021-12-10 NOTE — PROGRESS NOTES
ICU PROGRESS NOTE:    Assessment/Plan:  46 year old male, unvaccinated against COVID-19, with history of polysubstance abuse and chronic pain/lower back pain on suboxone, mood disorder/depression, HTN, ROSSY mild intermittent asthma, presented with respiratory failure/COVID ARDS intubated on 11/28. Initially requiring proning/paralysis, veletri, now down trending vent needs, issues with agitation, VAP.    Changes for Today:    Lighten RASS goal to -2 to -3, starting with versed wean    50%/+12    Vt liberalized to 7 ml/kg      CV:  - Hx HTN, on meds  - Has had labile Bps, intermittent pressor needs due to vasoplegia  - Afib with RVR earlier in admission, resolved  - TTE 11/28 with + pressure overload, moderate RV dilation, mild RV dysfunction.  LVEF 60-65%    Norepinephrine gtt, nicardipine gtt as needed for BP goals.    RESP:  - Acute hypoxemic respiratory failure due to COVID pneumonia and ARDS, intubated 11/28  - Hx of mild asthma on prn albuterol  - Polymicrobial VAP, on abx  - No longer requiring paralysis, proning, or veletri    Goal Pplat less than 30, goal driving pressure less than 15.    Currently on 50%/+12. Continue to wean today, will reduce PEEP later if possible    Pplt well below goal. Liberalize Vt to ~7 ml/kg for comfort/synchrony.     Abx per ID    Continue net negative for ARDS care. (I/O -4L >, no lasix today)    RENAL:  No acute issues  Receiving prn lasix intermittently  Increase free water today for mild hyperNa    ID:  - COVID-19 viral infection, PCR positive on 11/19.    - s/p decadron x 10 days, remdesivir x 5 days, s/p tocilizumab   - Multi-organism ventilator associated pneumonia - E. Coli and proteus    Continue ceftriaxone (start 12/6) x 5 days to complete 10-day course.    GI:  - Transaminitis, stable. Likely from the viral infection.  Present prior to Remdesivir use  Enteral feeding - advance enteral feeding today.  FWF 30 mL Q6H.  - Last BM - 12/9    NEURO:  - History of polysubstance  "abuse and chronic pain/lower back pain on suboxone  - High sedative needs, enteral regimen all increased yesterday    Continue scheduled ativan, oxycodone, benadryl, haldol    Continued on home meds: abilify, bupropion, gabapentin 3x/day    Appreciate psych recommendations    Will plan to reduce RASS goal to -2 to -3 today    Check QTc tomorrow     HEMATOLOGIC:  COVID-19 associated coagulopathy: continue LMWH ppx  Stable counts, continue to monitor    ENDOCRINE:  No history of diabetes    FSBG Q4H, Aspart insulin SS (high insulin resistance).  Hypoglycemia protocol.      ICU Checklist:  Feeding: tube feedings    Lines/Tubes:  8.0 mm endotracheal tube, placed 11/28  RUE PICC central line, placed 11/24  Left radial A-line, placed 11/28  Wei catheter, placed 11/28    Prophylaxis:    Thromboembolic: LMWH     Stress Ulcer: PPI    VAP: peridex      Restraints? yes      DISPOSITION: ICU    CODE STATUS: Full Code    FAMILY COMMUNICATION: Will contact later today for update.       Total Critical Care Time: 49 minutes    Upon evaluation, this patient is critically ill with a high probability of imminent life threatening deterioration due to acute hypoxemic respiratory failure due to ARS from COVID pneumonia, ventilator associated pneumonia, on mechanical ventilation which required my direct personal management.      No Lloyd MD  Pulmonary and Critical Care Medicine  [facility]  Office: [phone]    ----------------------------    Overnight events:  No acute events, stable    Subjective:  Unable to obtain    Objective:  Physical Exam:  Ventilation Mode: CMV/AC  (Continuous Mandatory Ventilation/ Assist Control)  FiO2 (%): (S) 55 %  Rate Set (breaths/minute): 20 breaths/min  Tidal Volume Set (mL): 450 mL  PEEP (cm H2O): 12 cmH2O  Oxygen Concentration (%): 55 %  Resp: 20    BP 93/56   Pulse 82   Temp 99.7  F (37.6  C) (Oral)   Resp 20   Ht 1.803 m (5' 11\")   Wt 94.5 kg (208 lb 5.4 oz)   SpO2 100%   BMI " 29.06 kg/m      Intake/Output last 3 shifts:  I/O last 3 completed shifts:  In: 3215.83 [I.V.:1875.83; NG/GT:960]  Out: 2775 [Urine:1675; Stool:1100]  Intake/Output this shift:  No intake/output data recorded.    Physical Exam  Gen: Intubated, sedated, no distress  HEENT: no OP lesions, no TITA  CV: RRR, no m/g/r  Resp: CTAB  Abd: soft, nontender, BS+  Neuro: PERRL, nonfocal  Ext: no edema    LABS:  Reviewed in detail    IMAGING/STUDIES:  12/5/21 CXR:  Endotracheal tube tip 4.5 cm above the felipa. Enteric tube tip below the diaphragm. Right PICC line tip over the low SVC. Heart size within normal limits. Diffuse bilateral pulmonary infiltrates. Overall, the infiltrates appear mildly progressed compared to the prior study. No significant bony abnormalities. Remainder unremarkable.      PROVIDER RESTRAINT FOR NON-VIOLENT BEHAVIOR FACE TO FACE EVALUATION    Patient's Immediate Situation:  Patient demonstrated the following behaviors: pulling lines/tubes    Patient's Reaction to the intervention:  Does patient understand the reason for restraint/seclusion? Unable to understand    Medical Condition:  Is there any evidence of compromise of Skin integrity, Respiratory, Cardiovascular, Musculoskeletal, Hydration? No    Behavioral Condition:  In consultation with the RN, is there a need to continue this restraint or seclusion? Yes    See Restraint Flowsheet for complete restraint documentation and assessment.

## 2021-12-10 NOTE — PROGRESS NOTES
Psychiatric Progress Note  Metabolic encephalopathy exacerbated in the context of COVID-19 pneumonia,  ICU, mechanical ventilation.  Cognitive and behavioral changes with agitation and restlessness.  Major depressive disorder recurrent by history.  Narcotic dependence by history.    Mood disorder due to history of chronic back pain.     Recommendations:  Decrease Abilify to 5 mg daily  Wellbutrin 100 mg TID 0900, 1300, 1700. (HS  dose may interfere with sleep. )  Gabapentin 800 mg 3 times a day: Continue  Consider following medications to help mitigate behavioral escalation when ICU sedation is decreased.  For behavioral aggression and agitation:   Haldol 2 mg Q6H x 8 doses  To be given with Benadryl 50 mg Q6H IV x 8 doses.  Increase Seroquel 100 mg QID.   Seroquel 100 mg 4 times a day per feeding tube as needed.  Should aforementioned medications fail to minimize behavioral and mood escalation, will consider adding either Depakote or Keppra along with Seroquel.   Case discussed with Dr Redman. I will be following for further management and adjustment of psychotropics as his care progresses.     SUBJECTIVE:  Patient had had agitated moments requiring high dose ICU sedation needing medications changes and augmentation. Resting, sedated.     MENTAL STATUS EXAMINATION:   Appearance:sedated.  On mechanical ventilation  Speech: Somnolent  Behavior: Needing restraints  Thought Process: Somnolent.  Thought content: Does not show hallucinations, delusions, paranoia, no restlessness.  Thought Formation: No loosening of associations, no flight of ideas.  Judgment: Impaired.  Insight :Impaired.  Attention : Somnolent  Memory: Depressed  Fund Of Knowledge: Depressed  Affect: Flat  Mood: somnolent  Alert and Oriented: x 1  Comprehension: Depressed   Generative thought content: None  Language: Difficult to assess, not engaging in any conversation. As noted in admission note he was conversing.  Gait and Ambulation: Not  tested.  Vital signs in last 24 hours  Temp:  [98.6  F (37  C)-101.2  F (38.4  C)] 99.2  F (37.3  C)  Pulse:  [] 89  Resp:  [17-34] 23  MAP:  [55 mmHg-103 mmHg] 76 mmHg  Arterial Line BP: ()/(40-76) 132/58  FiO2 (%):  [50 %-65 %] 55 %  SpO2:  [88 %-100 %] 97 %

## 2021-12-10 NOTE — PROGRESS NOTES
RT PROGRESS NOTE    VENT DAY# 13    CURRENT SETTINGS:   Ventilation Mode: CMV/AC  (Continuous Mandatory Ventilation/ Assist Control)  FiO2 (%): 65 %  Rate Set (breaths/minute): 20 breaths/min  Tidal Volume Set (mL): 450 mL  PEEP (cm H2O): 12 cmH2O  Oxygen Concentration (%): 65 %  Resp: 22      PATIENT PARAMETERS:  PIP 28  Pplat:  25  Pmean:  17  Secretions: Moderate amount of thick white/yellow ETT secretions  02 Sats:  98-99%    ETT SIZE 8.0 Secured at 26 cm at teeth/gums    Respiratory Medications: none     ABG: Results for TOBIAS BELL (MRN [id number]) as of 12/10/2021 05:58   Ref. Range 12/10/2021 04:47   pH Arterial Latest Ref Range: 7.37 - 7.44  7.41   pCO2 Arterial Latest Ref Range: 35 - 45 mm Hg 52 (H)   PO2 Arterial Latest Ref Range: 80 - 90 mm Hg 98 (H)   Bicarbonate Arterial Latest Ref Range: 23 - 29 mmol/L 31 (H)   Base Excess Art Latest Ref Range:   mmol/L 8.9   FIO2 Unknown 65   Oxyhemoglobin Latest Ref Range: 96.0 - 97.0 % 96.7     NOTE / SHIFT SUMMARY:  Patient remains intubated on full vent support. Will continue to monitor and assess.     Leida Burns, RT

## 2021-12-10 NOTE — PLAN OF CARE
Problem: Inability to Wean (Mechanical Ventilation, Invasive)  Goal: Mechanical Ventilation Liberation  Outcome: Improving  Intervention: Promote Extubation and Mechanical Ventilation Liberation  Recent Flowsheet Documentation  Taken 12/10/2021 0400 by Amber oCyle, RN  Medication Review/Management: medications reviewed  Taken 12/10/2021 0000 by Abmer Coyle, RN  Medication Review/Management: medications reviewed     Problem: Skin and Tissue Injury (Mechanical Ventilation, Invasive)  Goal: Absence of Device-Related Skin and Tissue Injury  Outcome: Improving       Amber Coyle, RN

## 2021-12-11 ENCOUNTER — APPOINTMENT (OUTPATIENT)
Dept: RADIOLOGY | Facility: HOSPITAL | Age: 46
End: 2021-12-11
Attending: INTERNAL MEDICINE
Payer: COMMERCIAL

## 2021-12-11 LAB
ANION GAP SERPL CALCULATED.3IONS-SCNC: 3 MMOL/L (ref 5–18)
BASE EXCESS BLDA CALC-SCNC: 10.6 MMOL/L
BASE EXCESS BLDA CALC-SCNC: 9.2 MMOL/L
BUN SERPL-MCNC: 12 MG/DL (ref 8–22)
CALCIUM SERPL-MCNC: 8.1 MG/DL (ref 8.5–10.5)
CHLORIDE BLD-SCNC: 109 MMOL/L (ref 98–107)
CO2 SERPL-SCNC: 33 MMOL/L (ref 22–31)
COHGB MFR BLD: 93.7 % (ref 96–97)
COHGB MFR BLD: 98 % (ref 96–97)
CREAT SERPL-MCNC: 0.52 MG/DL (ref 0.7–1.3)
ERYTHROCYTE [DISTWIDTH] IN BLOOD BY AUTOMATED COUNT: 14.7 % (ref 10–15)
FLOW: ABNORMAL
GFR SERPL CREATININE-BSD FRML MDRD: >90 ML/MIN/1.73M2
GLUCOSE BLD-MCNC: 116 MG/DL (ref 70–125)
GLUCOSE BLDC GLUCOMTR-MCNC: 106 MG/DL (ref 70–99)
GLUCOSE BLDC GLUCOMTR-MCNC: 108 MG/DL (ref 70–99)
GLUCOSE BLDC GLUCOMTR-MCNC: 117 MG/DL (ref 70–99)
GLUCOSE BLDC GLUCOMTR-MCNC: 128 MG/DL (ref 70–99)
GLUCOSE BLDC GLUCOMTR-MCNC: 129 MG/DL (ref 70–99)
GLUCOSE BLDC GLUCOMTR-MCNC: 135 MG/DL (ref 70–99)
GLUCOSE BLDC GLUCOMTR-MCNC: 94 MG/DL (ref 70–99)
HCO3 BLD-SCNC: 32 MMOL/L (ref 23–29)
HCO3 BLD-SCNC: 33 MMOL/L (ref 23–29)
HCT VFR BLD AUTO: 32.6 % (ref 40–53)
HGB BLD-MCNC: 10.3 G/DL (ref 13.3–17.7)
MAGNESIUM SERPL-MCNC: 1.9 MG/DL (ref 1.8–2.6)
MCH RBC QN AUTO: 30 PG (ref 26.5–33)
MCHC RBC AUTO-ENTMCNC: 31.6 G/DL (ref 31.5–36.5)
MCV RBC AUTO: 95 FL (ref 78–100)
O2/TOTAL GAS SETTING VFR VENT: 50 %
O2/TOTAL GAS SETTING VFR VENT: 50 %
OXYHGB MFR BLD: 92.2 % (ref 96–97)
OXYHGB MFR BLD: 96.4 % (ref 96–97)
PCO2 BLD: 51 MM HG (ref 35–45)
PCO2 BLD: 52 MM HG (ref 35–45)
PEEP: 12 CM H2O
PEEP: 8 CM H2O
PH BLD: 7.43 [PH] (ref 7.37–7.44)
PH BLD: 7.44 [PH] (ref 7.37–7.44)
PLATELET # BLD AUTO: 148 10E3/UL (ref 150–450)
PO2 BLD: 63 MM HG (ref 80–90)
PO2 BLD: 91 MM HG (ref 80–90)
POTASSIUM BLD-SCNC: 4.1 MMOL/L (ref 3.5–5)
PSV (LAB BLOOD GAS): ABNORMAL
RATE: 18 RR/MIN
RATE: 18 RR/MIN
RBC # BLD AUTO: 3.43 10E6/UL (ref 4.4–5.9)
SODIUM SERPL-SCNC: 145 MMOL/L (ref 136–145)
TEMPERATURE: 37 DEGREES C
TEMPERATURE: 37 DEGREES C
TRIGL SERPL-MCNC: 156 MG/DL
VENTILATION MODE: ABNORMAL
VENTILATION MODE: ABNORMAL
VENTILATOR TIDAL VOLUME: 520 ML
VENTILATOR TIDAL VOLUME: 540 ML
WBC # BLD AUTO: 6 10E3/UL (ref 4–11)

## 2021-12-11 PROCEDURE — 84478 ASSAY OF TRIGLYCERIDES: CPT | Performed by: INTERNAL MEDICINE

## 2021-12-11 PROCEDURE — 80048 BASIC METABOLIC PNL TOTAL CA: CPT | Performed by: INTERNAL MEDICINE

## 2021-12-11 PROCEDURE — 250N000009 HC RX 250: Performed by: INTERNAL MEDICINE

## 2021-12-11 PROCEDURE — 85027 COMPLETE CBC AUTOMATED: CPT | Performed by: INTERNAL MEDICINE

## 2021-12-11 PROCEDURE — 99291 CRITICAL CARE FIRST HOUR: CPT | Performed by: INTERNAL MEDICINE

## 2021-12-11 PROCEDURE — 250N000013 HC RX MED GY IP 250 OP 250 PS 637: Performed by: NURSE PRACTITIONER

## 2021-12-11 PROCEDURE — 250N000013 HC RX MED GY IP 250 OP 250 PS 637: Performed by: INTERNAL MEDICINE

## 2021-12-11 PROCEDURE — C9113 INJ PANTOPRAZOLE SODIUM, VIA: HCPCS | Performed by: INTERNAL MEDICINE

## 2021-12-11 PROCEDURE — 250N000011 HC RX IP 250 OP 636: Performed by: INTERNAL MEDICINE

## 2021-12-11 PROCEDURE — 71045 X-RAY EXAM CHEST 1 VIEW: CPT

## 2021-12-11 PROCEDURE — 94003 VENT MGMT INPAT SUBQ DAY: CPT

## 2021-12-11 PROCEDURE — 82805 BLOOD GASES W/O2 SATURATION: CPT | Performed by: INTERNAL MEDICINE

## 2021-12-11 PROCEDURE — 999N000157 HC STATISTIC RCP TIME EA 10 MIN

## 2021-12-11 PROCEDURE — 83735 ASSAY OF MAGNESIUM: CPT | Performed by: INTERNAL MEDICINE

## 2021-12-11 PROCEDURE — 258N000003 HC RX IP 258 OP 636: Performed by: INTERNAL MEDICINE

## 2021-12-11 PROCEDURE — 200N000001 HC R&B ICU

## 2021-12-11 PROCEDURE — 250N000011 HC RX IP 250 OP 636: Performed by: NURSE PRACTITIONER

## 2021-12-11 RX ORDER — FUROSEMIDE 10 MG/ML
20 INJECTION INTRAMUSCULAR; INTRAVENOUS ONCE
Status: COMPLETED | OUTPATIENT
Start: 2021-12-11 | End: 2021-12-11

## 2021-12-11 RX ORDER — PROPOFOL 10 MG/ML
5-40 INJECTION, EMULSION INTRAVENOUS CONTINUOUS
Status: DISCONTINUED | OUTPATIENT
Start: 2021-12-11 | End: 2021-12-12

## 2021-12-11 RX ADMIN — HALOPERIDOL LACTATE 2 MG: 5 INJECTION, SOLUTION INTRAMUSCULAR at 21:06

## 2021-12-11 RX ADMIN — Medication 250 MCG/HR: at 10:58

## 2021-12-11 RX ADMIN — ENOXAPARIN SODIUM 50 MG: 100 INJECTION SUBCUTANEOUS at 09:14

## 2021-12-11 RX ADMIN — DIPHENHYDRAMINE HYDROCHLORIDE 25 MG: 50 INJECTION, SOLUTION INTRAMUSCULAR; INTRAVENOUS at 07:31

## 2021-12-11 RX ADMIN — LORAZEPAM 4 MG: 2 LIQUID ORAL at 12:25

## 2021-12-11 RX ADMIN — FUROSEMIDE 20 MG: 10 INJECTION, SOLUTION INTRAMUSCULAR; INTRAVENOUS at 19:06

## 2021-12-11 RX ADMIN — Medication 0.06 MCG/KG/MIN: at 07:47

## 2021-12-11 RX ADMIN — CHLORHEXIDINE GLUCONATE 0.12% ORAL RINSE 15 ML: 1.2 LIQUID ORAL at 07:31

## 2021-12-11 RX ADMIN — ENOXAPARIN SODIUM 50 MG: 100 INJECTION SUBCUTANEOUS at 21:06

## 2021-12-11 RX ADMIN — DEXMEDETOMIDINE HYDROCHLORIDE 1.5 MCG/KG/HR: 4 INJECTION, SOLUTION INTRAVENOUS at 21:05

## 2021-12-11 RX ADMIN — LORAZEPAM 4 MG: 2 LIQUID ORAL at 07:31

## 2021-12-11 RX ADMIN — PROPOFOL 20 MCG/KG/MIN: 10 INJECTION, EMULSION INTRAVENOUS at 16:52

## 2021-12-11 RX ADMIN — LORAZEPAM 4 MG: 2 LIQUID ORAL at 05:13

## 2021-12-11 RX ADMIN — Medication 8 MG/HR: at 03:26

## 2021-12-11 RX ADMIN — Medication 8 MG/HR: at 19:57

## 2021-12-11 RX ADMIN — OXYCODONE HYDROCHLORIDE 20 MG: 5 SOLUTION ORAL at 13:53

## 2021-12-11 RX ADMIN — BUPROPION HYDROCHLORIDE 100 MG: 100 TABLET, FILM COATED ORAL at 16:16

## 2021-12-11 RX ADMIN — BUPROPION HYDROCHLORIDE 100 MG: 100 TABLET, FILM COATED ORAL at 08:04

## 2021-12-11 RX ADMIN — PROPOFOL 25 MCG/KG/MIN: 10 INJECTION, EMULSION INTRAVENOUS at 21:05

## 2021-12-11 RX ADMIN — HALOPERIDOL LACTATE 2 MG: 5 INJECTION, SOLUTION INTRAMUSCULAR at 09:15

## 2021-12-11 RX ADMIN — DEXMEDETOMIDINE HYDROCHLORIDE 1.5 MCG/KG/HR: 4 INJECTION, SOLUTION INTRAVENOUS at 03:25

## 2021-12-11 RX ADMIN — SENNOSIDES 10 ML: 8.8 LIQUID ORAL at 20:11

## 2021-12-11 RX ADMIN — Medication 100 MCG: at 15:25

## 2021-12-11 RX ADMIN — ARIPIPRAZOLE 5 MG: 1 SOLUTION ORAL at 08:04

## 2021-12-11 RX ADMIN — DEXMEDETOMIDINE HYDROCHLORIDE 1.5 MCG/KG/HR: 4 INJECTION, SOLUTION INTRAVENOUS at 15:30

## 2021-12-11 RX ADMIN — DIPHENHYDRAMINE HYDROCHLORIDE 25 MG: 50 INJECTION, SOLUTION INTRAMUSCULAR; INTRAVENOUS at 01:00

## 2021-12-11 RX ADMIN — QUETIAPINE 100 MG: 25 TABLET ORAL at 08:04

## 2021-12-11 RX ADMIN — QUETIAPINE 100 MG: 25 TABLET ORAL at 20:11

## 2021-12-11 RX ADMIN — DEXMEDETOMIDINE HYDROCHLORIDE 1.5 MCG/KG/HR: 4 INJECTION, SOLUTION INTRAVENOUS at 12:23

## 2021-12-11 RX ADMIN — Medication 275 MCG/HR: at 20:00

## 2021-12-11 RX ADMIN — GABAPENTIN 800 MG: 250 SUSPENSION ORAL at 08:04

## 2021-12-11 RX ADMIN — HALOPERIDOL LACTATE 2 MG: 5 INJECTION, SOLUTION INTRAMUSCULAR at 05:13

## 2021-12-11 RX ADMIN — BUPROPION HYDROCHLORIDE 100 MG: 100 TABLET, FILM COATED ORAL at 12:26

## 2021-12-11 RX ADMIN — OXYCODONE HYDROCHLORIDE 20 MG: 5 SOLUTION ORAL at 05:13

## 2021-12-11 RX ADMIN — LORAZEPAM 4 MG: 2 LIQUID ORAL at 20:11

## 2021-12-11 RX ADMIN — GABAPENTIN 800 MG: 250 SUSPENSION ORAL at 16:16

## 2021-12-11 RX ADMIN — DEXMEDETOMIDINE HYDROCHLORIDE 1.5 MCG/KG/HR: 4 INJECTION, SOLUTION INTRAVENOUS at 00:22

## 2021-12-11 RX ADMIN — Medication 275 MCG/HR: at 02:03

## 2021-12-11 RX ADMIN — OXYCODONE HYDROCHLORIDE 20 MG: 5 SOLUTION ORAL at 01:01

## 2021-12-11 RX ADMIN — POLYETHYLENE GLYCOL 3350 17 G: 17 POWDER, FOR SOLUTION ORAL at 08:04

## 2021-12-11 RX ADMIN — GABAPENTIN 800 MG: 250 SUSPENSION ORAL at 12:25

## 2021-12-11 RX ADMIN — DIPHENHYDRAMINE HYDROCHLORIDE 25 MG: 50 INJECTION, SOLUTION INTRAMUSCULAR; INTRAVENOUS at 13:53

## 2021-12-11 RX ADMIN — DEXMEDETOMIDINE HYDROCHLORIDE 1.5 MCG/KG/HR: 4 INJECTION, SOLUTION INTRAVENOUS at 09:13

## 2021-12-11 RX ADMIN — DEXMEDETOMIDINE HYDROCHLORIDE 1.5 MCG/KG/HR: 4 INJECTION, SOLUTION INTRAVENOUS at 06:05

## 2021-12-11 RX ADMIN — PANTOPRAZOLE SODIUM 40 MG: 40 INJECTION, POWDER, FOR SOLUTION INTRAVENOUS at 06:19

## 2021-12-11 RX ADMIN — DEXMEDETOMIDINE HYDROCHLORIDE 1.5 MCG/KG/HR: 4 INJECTION, SOLUTION INTRAVENOUS at 18:25

## 2021-12-11 RX ADMIN — QUETIAPINE 100 MG: 25 TABLET ORAL at 16:16

## 2021-12-11 RX ADMIN — DOCUSATE SODIUM 100 MG: 50 LIQUID ORAL at 20:11

## 2021-12-11 RX ADMIN — CHLORHEXIDINE GLUCONATE 0.12% ORAL RINSE 15 ML: 1.2 LIQUID ORAL at 19:51

## 2021-12-11 RX ADMIN — QUETIAPINE 100 MG: 25 TABLET ORAL at 12:25

## 2021-12-11 RX ADMIN — DOCUSATE SODIUM 100 MG: 50 LIQUID ORAL at 08:03

## 2021-12-11 RX ADMIN — LORAZEPAM 4 MG: 2 LIQUID ORAL at 16:15

## 2021-12-11 RX ADMIN — DIPHENHYDRAMINE HYDROCHLORIDE 25 MG: 50 INJECTION, SOLUTION INTRAMUSCULAR; INTRAVENOUS at 19:54

## 2021-12-11 RX ADMIN — HALOPERIDOL LACTATE 2 MG: 5 INJECTION, SOLUTION INTRAMUSCULAR at 16:12

## 2021-12-11 RX ADMIN — SENNOSIDES 10 ML: 8.8 LIQUID ORAL at 08:03

## 2021-12-11 RX ADMIN — OXYCODONE HYDROCHLORIDE 20 MG: 5 SOLUTION ORAL at 21:06

## 2021-12-11 RX ADMIN — LORAZEPAM 4 MG: 2 LIQUID ORAL at 01:00

## 2021-12-11 RX ADMIN — OXYCODONE HYDROCHLORIDE 20 MG: 5 SOLUTION ORAL at 09:15

## 2021-12-11 RX ADMIN — OXYCODONE HYDROCHLORIDE 20 MG: 5 SOLUTION ORAL at 17:49

## 2021-12-11 RX ADMIN — PROPOFOL 15 MCG/KG/MIN: 10 INJECTION, EMULSION INTRAVENOUS at 06:05

## 2021-12-11 RX ADMIN — Medication 0.06 MCG/KG/MIN: at 23:06

## 2021-12-11 ASSESSMENT — ACTIVITIES OF DAILY LIVING (ADL)
ADLS_ACUITY_SCORE: 26

## 2021-12-11 ASSESSMENT — MIFFLIN-ST. JEOR: SCORE: 1831.13

## 2021-12-11 NOTE — PLAN OF CARE
Problem: Nutrition Impairment (Mechanical Ventilation, Invasive)  Goal: Optimal Nutrition Delivery  Outcome: Improving     Problem: Dysrhythmia (Heart Failure)  Goal: Stable Heart Rate and Rhythm  Outcome: Improving     Normal Sinus rhythm in the 90's. Occasional PVC.  TF at goal. Tolerating. Residuals 375 and 250.    Andreina Cr RN

## 2021-12-11 NOTE — PLAN OF CARE
Problem: ARDS (Acute Respiratory Distress Syndrome)  Goal: Effective Oxygenation  Outcome: No Change   PF ratio this am 182. FIO2 at 50% peep of 12.   Problem: Risk for Delirium  Goal: Improved Behavioral Control  Outcome: Improving  Lowered rate of fentanyl gtt and versed gtt to keep RASS -2 to -3.

## 2021-12-11 NOTE — PLAN OF CARE
Problem: Device-Related Complication Risk (Mechanical Ventilation, Invasive)  Goal: Optimal Device Function  Outcome: No Change     Problem: Inability to Wean (Mechanical Ventilation, Invasive)  Goal: Mechanical Ventilation Liberation  Outcome: No Change     Problem: Skin and Tissue Injury (Mechanical Ventilation, Invasive)  Goal: Absence of Device-Related Skin and Tissue Injury  Outcome: No Change

## 2021-12-11 NOTE — PROGRESS NOTES
RT PROGRESS NOTE    VENT DAY# 14    CURRENT SETTINGS:   Ventilation Mode: CMV/AC  (Continuous Mandatory Ventilation/ Assist Control)  FiO2 (%): 50 %  Rate Set (breaths/minute): 18 breaths/min  Tidal Volume Set (mL): 540 mL  PEEP (cm H2O): 10 cmH2O  Oxygen Concentration (%): 50 %  Resp: 18      PATIENT PARAMETERS:  PIP 29  Pplat:  28  Pmean:  16  Compliance: 55  SBT: NO, weaning peep and fio2      Secretions:  Thin white secretions  02 Sats:  97%    ETT SIZE 8.0 Secured at 26 cm at teeth/gums    Respiratory Medications: none     ABG: @1345 pH 7.43; pCO2 51; pO2 63; HCO3 32, %O2 Sat 93.7 on on above settings    NOTE / SHIFT SUMMARY:   Peep decreased to 10 cm today, fio2 increased to 60 % for dec sats .  Breath sounds coarse.    Bailey SELBY Patricia, RT

## 2021-12-11 NOTE — PROGRESS NOTES
RT PROGRESS NOTE    VENT DAY# 14    CURRENT SETTINGS:   Ventilation Mode: CMV/AC  (Continuous Mandatory Ventilation/ Assist Control)  FiO2 (%): 50 %  Rate Set (breaths/minute): 18 breaths/min  Tidal Volume Set (mL): 540 mL  PEEP (cm H2O): 12 cmH2O  Oxygen Concentration (%): 50 %  Resp: 18      PATIENT PARAMETERS:  PIP 29  Pplat:  27  Pmean:  16  Secretions:  Scant amount of thick white ETT secretions  02 Sats:  95-98%    ETT SIZE 8.0 Secured at 26 cm at teeth/gums    Respiratory Medications: none     ABG: Results for TOBIAS BELL (MRN [id number]) as of 12/11/2021 05:47   Ref. Range 12/11/2021 05:22   pH Arterial Latest Ref Range: 7.37 - 7.44  7.44   pCO2 Arterial Latest Ref Range: 35 - 45 mm Hg 52 (H)   PO2 Arterial Latest Ref Range: 80 - 90 mm Hg 91 (H)   Bicarbonate Arterial Latest Ref Range: 23 - 29 mmol/L 33 (H)   Base Excess Art Latest Ref Range:   mmol/L 10.6   FIO2 Unknown 50   Oxyhemoglobin Latest Ref Range: 96.0 - 97.0 % 96.4     NOTE / SHIFT SUMMARY:   Patient remains intubated on full vent support. No significant events overnight. Will continue to monitor and assess.     Leida Burns, RT

## 2021-12-11 NOTE — PROGRESS NOTES
ICU PROGRESS NOTE:    Assessment/Plan:  46 year old male, unvaccinated against COVID-19, with history of polysubstance abuse and chronic pain/lower back pain on suboxone, mood disorder/depression, HTN, ROSSY mild intermittent asthma, presented with respiratory failure/COVID ARDS intubated on 11/28. Initially requiring proning/paralysis, veletri, now down trending vent needs, issues with agitation, VAP.    Changes for Today:    RASS goal -1 to -2, working on reducing versed    PEEP reduced to 10      CV:  - Hx HTN, on meds  - Has had labile Bps, intermittent pressor needs due to vasoplegia  - Afib with RVR earlier in admission, resolved  - TTE 11/28 with + pressure overload, moderate RV dilation, mild RV dysfunction.  LVEF 60-65%    Norepinephrine gtt, nicardipine gtt as needed for BP goals.    RESP:  - Acute hypoxemic respiratory failure due to COVID pneumonia and ARDS, intubated 11/28  - Hx of mild asthma on prn albuterol  - Polymicrobial VAP, on abx  - No longer requiring paralysis, proning, or veletri    Goal Pplat less than 30, goal driving pressure less than 15.    Currently on 50%/+10.     Pplt well below goal. Liberalize Vt to ~7 ml/kg for comfort/synchrony.     Abx per ID    Continue net negative for ARDS care. (I/O -4L >, no lasix today)    RENAL:  No acute issues  Receiving prn lasix intermittently  Increase free water today for mild hyperNa    ID:  - COVID-19 viral infection, PCR positive on 11/19.    - s/p decadron x 10 days, remdesivir x 5 days, s/p tocilizumab   - Multi-organism ventilator associated pneumonia - E. Coli and proteus    Completed total 10 day course abx (cefepime/ceftriaxone)    GI:  - Transaminitis, stable. Likely from the viral infection.  Present prior to Remdesivir use  - Enteral feeding - tolerating  FWF   - Last BM - 12/9    NEURO:  - History of polysubstance abuse and chronic pain/lower back pain on suboxone  - High sedative needs, enteral regimen all increased yesterday    Continue  "scheduled ativan, oxycodone, benadryl, haldol    Continued on home meds: abilify, bupropion, gabapentin 3x/day    Appreciate psych recommendations    RASS goal -1 to -2    HEMATOLOGIC:  COVID-19 associated coagulopathy: continue LMWH ppx  Stable counts, continue to monitor    ENDOCRINE:  No history of diabetes    FSBG Q4H, Aspart insulin SS (high insulin resistance).  Hypoglycemia protocol.      ICU Checklist:  Feeding: tube feedings    Lines/Tubes:  8.0 mm endotracheal tube, placed 11/28  RUE PICC central line, placed 11/24  Left radial A-line, placed 11/28  Wei catheter, placed 11/28    Prophylaxis:    Thromboembolic: LMWH     Stress Ulcer: PPI    VAP: peridex      Restraints? yes      DISPOSITION: ICU    CODE STATUS: Full Code    FAMILY COMMUNICATION: Will contact later today for update.       Total Critical Care Time: 48 minutes    Upon evaluation, this patient is critically ill with a high probability of imminent life threatening deterioration due to acute hypoxemic respiratory failure due to ARS from COVID pneumonia, ventilator associated pneumonia, on mechanical ventilation which required my direct personal management.      No Lloyd MD  Pulmonary and Critical Care Medicine  [facility]  Office: [phone]    ----------------------------    Overnight events:  No acute events, stable    Subjective:  Unable to obtain    Objective:  Physical Exam:  Ventilation Mode: CMV/AC  (Continuous Mandatory Ventilation/ Assist Control)  FiO2 (%): 50 %  Rate Set (breaths/minute): 18 breaths/min  Tidal Volume Set (mL): 540 mL  PEEP (cm H2O): 12 cmH2O  Oxygen Concentration (%): 50 %  Resp: 18    BP 93/56   Pulse 74   Temp 99.8  F (37.7  C) (Oral)   Resp 18   Ht 1.803 m (5' 11\")   Wt 93.4 kg (205 lb 14.6 oz)   SpO2 97%   BMI 28.72 kg/m      Intake/Output last 3 shifts:  I/O last 3 completed shifts:  In: 4841.55 [I.V.:1919.55; NG/GT:1020]  Out: 3885 [Urine:3135; Stool:750]  Intake/Output this shift:  I/O " this shift:  In: 535.79 [I.V.:175.79; NG/GT:200]  Out: 170 [Urine:170]    Physical Exam  Gen: Intubated, sedated, no distress  HEENT: no OP lesions, no TITA  CV: RRR, no m/g/r  Resp: CTAB/L, diminished  Abd: soft, nontender, BS+  Neuro: PERRL, nonfocal  Ext: no edema    LABS:  Reviewed in detail    IMAGING/STUDIES:  12/5/21 CXR:  Endotracheal tube tip 4.5 cm above the felipa. Enteric tube tip below the diaphragm. Right PICC line tip over the low SVC. Heart size within normal limits. Diffuse bilateral pulmonary infiltrates. Overall, the infiltrates appear mildly progressed compared to the prior study. No significant bony abnormalities. Remainder unremarkable.      PROVIDER RESTRAINT FOR NON-VIOLENT BEHAVIOR FACE TO FACE EVALUATION    Patient's Immediate Situation:  Patient demonstrated the following behaviors: pulling lines/tubes    Patient's Reaction to the intervention:  Does patient understand the reason for restraint/seclusion? Unable to understand    Medical Condition:  Is there any evidence of compromise of Skin integrity, Respiratory, Cardiovascular, Musculoskeletal, Hydration? No    Behavioral Condition:  In consultation with the RN, is there a need to continue this restraint or seclusion? Yes    See Restraint Flowsheet for complete restraint documentation and assessment.

## 2021-12-11 NOTE — PLAN OF CARE
Problem: Adult Inpatient Plan of Care  Goal: Optimal Comfort and Wellbeing  Outcome: No Change     Problem: Inability to Wean (Mechanical Ventilation, Invasive)  Goal: Mechanical Ventilation Liberation  Outcome: Improving  Peep decreased from 12 to 10 and then to 8 today. 1500 ABGs pending. Oxygen saturations consistently > 92%. CPOT 0 and RASS -3 with present sedation. Opens eyes during cares but does not follow commands. Family updated via phone and IPAD on for them.

## 2021-12-12 LAB
ANION GAP SERPL CALCULATED.3IONS-SCNC: 2 MMOL/L (ref 5–18)
BASE EXCESS BLDA CALC-SCNC: 10.2 MMOL/L
BASE EXCESS BLDA CALC-SCNC: 10.7 MMOL/L
BUN SERPL-MCNC: 14 MG/DL (ref 8–22)
CALCIUM SERPL-MCNC: 8.6 MG/DL (ref 8.5–10.5)
CHLORIDE BLD-SCNC: 104 MMOL/L (ref 98–107)
CO2 SERPL-SCNC: 35 MMOL/L (ref 22–31)
COHGB MFR BLD: 94.2 % (ref 96–97)
COHGB MFR BLD: 97.5 % (ref 96–97)
CREAT SERPL-MCNC: 0.58 MG/DL (ref 0.7–1.3)
ERYTHROCYTE [DISTWIDTH] IN BLOOD BY AUTOMATED COUNT: 14.6 % (ref 10–15)
FLOW: ABNORMAL
GFR SERPL CREATININE-BSD FRML MDRD: >90 ML/MIN/1.73M2
GLUCOSE BLD-MCNC: 126 MG/DL (ref 70–125)
GLUCOSE BLDC GLUCOMTR-MCNC: 106 MG/DL (ref 70–99)
GLUCOSE BLDC GLUCOMTR-MCNC: 107 MG/DL (ref 70–99)
GLUCOSE BLDC GLUCOMTR-MCNC: 118 MG/DL (ref 70–99)
GLUCOSE BLDC GLUCOMTR-MCNC: 125 MG/DL (ref 70–99)
GLUCOSE BLDC GLUCOMTR-MCNC: 132 MG/DL (ref 70–99)
GLUCOSE BLDC GLUCOMTR-MCNC: 137 MG/DL (ref 70–99)
HCO3 BLD-SCNC: 33 MMOL/L (ref 23–29)
HCO3 BLD-SCNC: 33 MMOL/L (ref 23–29)
HCT VFR BLD AUTO: 35.4 % (ref 40–53)
HGB BLD-MCNC: 11.1 G/DL (ref 13.3–17.7)
MAGNESIUM SERPL-MCNC: 1.9 MG/DL (ref 1.8–2.6)
MCH RBC QN AUTO: 29.7 PG (ref 26.5–33)
MCHC RBC AUTO-ENTMCNC: 31.4 G/DL (ref 31.5–36.5)
MCV RBC AUTO: 95 FL (ref 78–100)
O2/TOTAL GAS SETTING VFR VENT: 40 %
O2/TOTAL GAS SETTING VFR VENT: 50 %
OXYHGB MFR BLD: 92.5 % (ref 96–97)
OXYHGB MFR BLD: 95.9 % (ref 96–97)
PCO2 BLD: 51 MM HG (ref 35–45)
PCO2 BLD: 57 MM HG (ref 35–45)
PEEP: 10 CM H2O
PEEP: 8 CM H2O
PH BLD: 7.4 [PH] (ref 7.37–7.44)
PH BLD: 7.44 [PH] (ref 7.37–7.44)
PLATELET # BLD AUTO: 163 10E3/UL (ref 150–450)
PO2 BLD: 68 MM HG (ref 80–90)
PO2 BLD: 90 MM HG (ref 80–90)
POTASSIUM BLD-SCNC: 4.2 MMOL/L (ref 3.5–5)
PSV (LAB BLOOD GAS): ABNORMAL
RATE: 18 RR/MIN
RATE: 18 RR/MIN
RBC # BLD AUTO: 3.74 10E6/UL (ref 4.4–5.9)
SODIUM SERPL-SCNC: 141 MMOL/L (ref 136–145)
TEMPERATURE: 37 DEGREES C
TEMPERATURE: 37 DEGREES C
VENTILATION MODE: ABNORMAL
VENTILATION MODE: AC
VENTILATOR TIDAL VOLUME: 520 ML
VENTILATOR TIDAL VOLUME: 520 ML
WBC # BLD AUTO: 7.5 10E3/UL (ref 4–11)

## 2021-12-12 PROCEDURE — 83735 ASSAY OF MAGNESIUM: CPT | Performed by: INTERNAL MEDICINE

## 2021-12-12 PROCEDURE — 250N000011 HC RX IP 250 OP 636: Performed by: INTERNAL MEDICINE

## 2021-12-12 PROCEDURE — 250N000009 HC RX 250: Performed by: INTERNAL MEDICINE

## 2021-12-12 PROCEDURE — 258N000003 HC RX IP 258 OP 636: Performed by: INTERNAL MEDICINE

## 2021-12-12 PROCEDURE — 250N000013 HC RX MED GY IP 250 OP 250 PS 637: Performed by: NURSE PRACTITIONER

## 2021-12-12 PROCEDURE — 82805 BLOOD GASES W/O2 SATURATION: CPT | Performed by: INTERNAL MEDICINE

## 2021-12-12 PROCEDURE — C9113 INJ PANTOPRAZOLE SODIUM, VIA: HCPCS | Performed by: INTERNAL MEDICINE

## 2021-12-12 PROCEDURE — 200N000001 HC R&B ICU

## 2021-12-12 PROCEDURE — 999N000157 HC STATISTIC RCP TIME EA 10 MIN

## 2021-12-12 PROCEDURE — 250N000013 HC RX MED GY IP 250 OP 250 PS 637: Performed by: INTERNAL MEDICINE

## 2021-12-12 PROCEDURE — 99291 CRITICAL CARE FIRST HOUR: CPT | Performed by: INTERNAL MEDICINE

## 2021-12-12 PROCEDURE — 85027 COMPLETE CBC AUTOMATED: CPT | Performed by: INTERNAL MEDICINE

## 2021-12-12 PROCEDURE — 250N000011 HC RX IP 250 OP 636: Performed by: NURSE PRACTITIONER

## 2021-12-12 PROCEDURE — 94003 VENT MGMT INPAT SUBQ DAY: CPT

## 2021-12-12 PROCEDURE — 80048 BASIC METABOLIC PNL TOTAL CA: CPT | Performed by: INTERNAL MEDICINE

## 2021-12-12 RX ORDER — MIDAZOLAM HCL IN 0.9 % NACL/PF 1 MG/ML
1-5 PLASTIC BAG, INJECTION (ML) INTRAVENOUS CONTINUOUS
Status: DISCONTINUED | OUTPATIENT
Start: 2021-12-12 | End: 2021-12-12

## 2021-12-12 RX ORDER — PROPOFOL 10 MG/ML
5-40 INJECTION, EMULSION INTRAVENOUS CONTINUOUS
Status: DISCONTINUED | OUTPATIENT
Start: 2021-12-12 | End: 2021-12-13

## 2021-12-12 RX ORDER — DEXMEDETOMIDINE HYDROCHLORIDE 4 UG/ML
.2-1.5 INJECTION, SOLUTION INTRAVENOUS CONTINUOUS
Status: DISCONTINUED | OUTPATIENT
Start: 2021-12-12 | End: 2021-12-13

## 2021-12-12 RX ADMIN — DEXMEDETOMIDINE HYDROCHLORIDE 1.5 MCG/KG/HR: 400 INJECTION INTRAVENOUS at 16:11

## 2021-12-12 RX ADMIN — PROPOFOL 25 MCG/KG/MIN: 10 INJECTION, EMULSION INTRAVENOUS at 18:16

## 2021-12-12 RX ADMIN — DEXMEDETOMIDINE HYDROCHLORIDE 1.5 MCG/KG/HR: 4 INJECTION, SOLUTION INTRAVENOUS at 06:17

## 2021-12-12 RX ADMIN — DOCUSATE SODIUM 100 MG: 50 LIQUID ORAL at 08:31

## 2021-12-12 RX ADMIN — DIPHENHYDRAMINE HYDROCHLORIDE 25 MG: 50 INJECTION, SOLUTION INTRAMUSCULAR; INTRAVENOUS at 01:29

## 2021-12-12 RX ADMIN — LORAZEPAM 4 MG: 2 LIQUID ORAL at 21:40

## 2021-12-12 RX ADMIN — ARIPIPRAZOLE 5 MG: 1 SOLUTION ORAL at 08:30

## 2021-12-12 RX ADMIN — DIPHENHYDRAMINE HYDROCHLORIDE 25 MG: 50 INJECTION, SOLUTION INTRAMUSCULAR; INTRAVENOUS at 07:33

## 2021-12-12 RX ADMIN — QUETIAPINE 100 MG: 25 TABLET ORAL at 08:31

## 2021-12-12 RX ADMIN — OXYCODONE HYDROCHLORIDE 20 MG: 5 SOLUTION ORAL at 06:03

## 2021-12-12 RX ADMIN — SENNOSIDES 10 ML: 8.8 LIQUID ORAL at 08:31

## 2021-12-12 RX ADMIN — BUPROPION HYDROCHLORIDE 100 MG: 100 TABLET, FILM COATED ORAL at 16:10

## 2021-12-12 RX ADMIN — CHLORHEXIDINE GLUCONATE 0.12% ORAL RINSE 15 ML: 1.2 LIQUID ORAL at 07:32

## 2021-12-12 RX ADMIN — OXYCODONE HYDROCHLORIDE 20 MG: 5 SOLUTION ORAL at 01:30

## 2021-12-12 RX ADMIN — DEXMEDETOMIDINE HYDROCHLORIDE 1.5 MCG/KG/HR: 4 INJECTION, SOLUTION INTRAVENOUS at 03:17

## 2021-12-12 RX ADMIN — Medication 20 MG: at 12:38

## 2021-12-12 RX ADMIN — Medication 50 MCG: at 16:00

## 2021-12-12 RX ADMIN — GABAPENTIN 800 MG: 250 SUSPENSION ORAL at 16:36

## 2021-12-12 RX ADMIN — Medication 175 MCG/HR: at 16:23

## 2021-12-12 RX ADMIN — DEXMEDETOMIDINE HYDROCHLORIDE 1.5 MCG/KG/HR: 400 INJECTION INTRAVENOUS at 19:08

## 2021-12-12 RX ADMIN — OXYCODONE HYDROCHLORIDE 20 MG: 5 SOLUTION ORAL at 18:15

## 2021-12-12 RX ADMIN — Medication 20 MG: at 17:10

## 2021-12-12 RX ADMIN — QUETIAPINE 100 MG: 25 TABLET ORAL at 16:10

## 2021-12-12 RX ADMIN — POLYETHYLENE GLYCOL 3350 17 G: 17 POWDER, FOR SOLUTION ORAL at 08:31

## 2021-12-12 RX ADMIN — OXYCODONE HYDROCHLORIDE 20 MG: 5 SOLUTION ORAL at 09:38

## 2021-12-12 RX ADMIN — CHLORHEXIDINE GLUCONATE 0.12% ORAL RINSE 15 ML: 1.2 LIQUID ORAL at 21:40

## 2021-12-12 RX ADMIN — LORAZEPAM 4 MG: 2 LIQUID ORAL at 03:39

## 2021-12-12 RX ADMIN — BUPROPION HYDROCHLORIDE 100 MG: 100 TABLET, FILM COATED ORAL at 12:23

## 2021-12-12 RX ADMIN — ENOXAPARIN SODIUM 50 MG: 100 INJECTION SUBCUTANEOUS at 21:39

## 2021-12-12 RX ADMIN — PANTOPRAZOLE SODIUM 40 MG: 40 INJECTION, POWDER, FOR SOLUTION INTRAVENOUS at 06:04

## 2021-12-12 RX ADMIN — HALOPERIDOL LACTATE 2 MG: 5 INJECTION, SOLUTION INTRAMUSCULAR at 03:39

## 2021-12-12 RX ADMIN — LORAZEPAM 4 MG: 2 LIQUID ORAL at 07:32

## 2021-12-12 RX ADMIN — PROPOFOL 20 MCG/KG/MIN: 10 INJECTION, EMULSION INTRAVENOUS at 12:26

## 2021-12-12 RX ADMIN — LORAZEPAM 4 MG: 2 LIQUID ORAL at 16:10

## 2021-12-12 RX ADMIN — ENOXAPARIN SODIUM 50 MG: 100 INJECTION SUBCUTANEOUS at 09:38

## 2021-12-12 RX ADMIN — DEXMEDETOMIDINE HYDROCHLORIDE 1.5 MCG/KG/HR: 400 INJECTION INTRAVENOUS at 21:41

## 2021-12-12 RX ADMIN — OXYCODONE HYDROCHLORIDE 20 MG: 5 SOLUTION ORAL at 21:40

## 2021-12-12 RX ADMIN — LORAZEPAM 4 MG: 2 LIQUID ORAL at 12:23

## 2021-12-12 RX ADMIN — OXYCODONE HYDROCHLORIDE 20 MG: 5 SOLUTION ORAL at 13:39

## 2021-12-12 RX ADMIN — DEXMEDETOMIDINE HYDROCHLORIDE 1.5 MCG/KG/HR: 400 INJECTION INTRAVENOUS at 09:35

## 2021-12-12 RX ADMIN — QUETIAPINE 100 MG: 25 TABLET ORAL at 21:40

## 2021-12-12 RX ADMIN — QUETIAPINE 100 MG: 25 TABLET ORAL at 12:23

## 2021-12-12 RX ADMIN — LORAZEPAM 4 MG: 2 LIQUID ORAL at 00:35

## 2021-12-12 RX ADMIN — Medication 250 MCG/HR: at 05:22

## 2021-12-12 RX ADMIN — BUPROPION HYDROCHLORIDE 100 MG: 100 TABLET, FILM COATED ORAL at 08:30

## 2021-12-12 RX ADMIN — Medication 0.07 MCG/KG/MIN: at 10:15

## 2021-12-12 RX ADMIN — Medication 4 MG/HR: at 08:33

## 2021-12-12 RX ADMIN — GABAPENTIN 800 MG: 250 SUSPENSION ORAL at 12:23

## 2021-12-12 RX ADMIN — PROPOFOL 20 MCG/KG/MIN: 10 INJECTION, EMULSION INTRAVENOUS at 03:37

## 2021-12-12 RX ADMIN — GABAPENTIN 800 MG: 250 SUSPENSION ORAL at 08:36

## 2021-12-12 RX ADMIN — DEXMEDETOMIDINE HYDROCHLORIDE 1.5 MCG/KG/HR: 400 INJECTION INTRAVENOUS at 12:45

## 2021-12-12 ASSESSMENT — ACTIVITIES OF DAILY LIVING (ADL)
ADLS_ACUITY_SCORE: 22
ADLS_ACUITY_SCORE: 24
ADLS_ACUITY_SCORE: 22
ADLS_ACUITY_SCORE: 24
ADLS_ACUITY_SCORE: 26
ADLS_ACUITY_SCORE: 24
ADLS_ACUITY_SCORE: 26
ADLS_ACUITY_SCORE: 24
ADLS_ACUITY_SCORE: 22
ADLS_ACUITY_SCORE: 26
ADLS_ACUITY_SCORE: 22
ADLS_ACUITY_SCORE: 24

## 2021-12-12 NOTE — PROGRESS NOTES
RT PROGRESS NOTE    VENT DAY# 15    CURRENT SETTINGS:  Ventilation Mode: CMV/AC  (Continuous Mandatory Ventilation/ Assist Control)  FiO2 (%): 50 %  Rate Set (breaths/minute): 18 breaths/min  Tidal Volume Set (mL): 520 mL  PEEP (cm H2O): 10 cmH2O  Oxygen Concentration (%): 50 %  Resp: 18     PATIENT PARAMETERS:  PIP 29  Pplat:  26  Pmean:  15  Compliance: 46.5  SBT: no   Secretions:  Sx small to moderate thick pale tan  02 Sats:  98%    ETT SIZE 8.0 Secured at 26 cm at teeth/gums    Respiratory Medications: n/a     Results for TOBIAS BELL SOLA (MRN [id number]) as of 12/12/2021 04:57   Ref. Range 12/12/2021 03:56   pH Arterial Latest Ref Range: 7.37 - 7.44  7.40   pCO2 Arterial Latest Ref Range: 35 - 45 mm Hg 57 (H)   PO2 Arterial Latest Ref Range: 80 - 90 mm Hg 90   Bicarbonate Arterial Latest Ref Range: 23 - 29 mmol/L 33 (H)   Base Excess Art Latest Ref Range:   mmol/L 10.7   FIO2 Unknown 50       NOTE / SHIFT SUMMARY:   Pt remains on full vent support.  Breath sounds clear-diminishrd.  No changes to vent settings.     Michel Tran, RT

## 2021-12-12 NOTE — PLAN OF CARE
Problem: ARDS (Acute Respiratory Distress Syndrome)  Goal: Effective Oxygenation  Outcome: No Change      Problem: Risk for Delirium  Goal: Improved Behavioral Control  Outcome: Improving  Started to go down on versed gtt and fentanyl gtt to keep RASS -2 to -3.   Will continue to monitor.

## 2021-12-12 NOTE — PLAN OF CARE
Problem: ARDS (Acute Respiratory Distress Syndrome)  Goal: Effective Oxygenation  Outcome: No Change     Episode this afternoon of of desaturating mid to high 80's , 's, restless, prolonged expiration. Propofol, versed, and fentanyl increased. Dr. Lloyd notified. New orders for chest xray and lasix. Improving. Andreina Cr RN

## 2021-12-12 NOTE — PROGRESS NOTES
ICU PROGRESS NOTE:    Assessment/Plan:  46 year old male, unvaccinated against COVID-19, with history of polysubstance abuse and chronic pain/lower back pain on suboxone, mood disorder/depression, HTN, ROSSY mild intermittent asthma, presented with respiratory failure/COVID ARDS intubated on 11/28. Initially requiring proning/paralysis, veletri, now down trending vent needs, issues with agitation, VAP.    Changes for Today:    RASS goal -1 to -2, reducing versed/fent    PEEP reduced to 8      CV:  - Hx HTN, on meds  - Has had labile Bps, intermittent pressor needs due to vasoplegia  - Afib with RVR earlier in admission, resolved  - TTE 11/28 with + pressure overload, moderate RV dilation, mild RV dysfunction.  LVEF 60-65%    Norepinephrine gtt, nicardipine gtt as needed for BP goals.    RESP:  - Acute hypoxemic respiratory failure due to COVID pneumonia and ARDS, intubated 11/28  - Hx of mild asthma on prn albuterol  - Polymicrobial VAP, on abx  - No longer requiring paralysis, proning, or veletri    Goal Pplat less than 30, goal driving pressure less than 15.    Currently on 50%/+10. Drop PEEP to 8 today     Pplt well below goal. Liberalized Vt to ~7 ml/kg for comfort/synchrony.     Abx per ID    Continue net negative for ARDS care. (I/O -5.5L, lasix yest)    RENAL:  No acute issues  Receiving prn lasix intermittently, good response to 20 mg x 1 yest  On free water today for mild hyperNa    ID:  - COVID-19 viral infection, PCR positive on 11/19.    - s/p decadron x 10 days, remdesivir x 5 days, s/p tocilizumab   - Multi-organism ventilator associated pneumonia - E. Coli and proteus    Completed total 10 day course abx (cefepime/ceftriaxone)    Currently no evidence of new infection    GI:  - Transaminitis, stable. Likely from the viral infection.  Present prior to Remdesivir use  - Enteral feeding - tolerating  FWF   - Rectal tube in place, watery stool, hold qowel meds    NEURO:  - History of polysubstance abuse and  "chronic pain/lower back pain on suboxone  - High sedative needs, on a significant enteral regimen    Fentanyl/versed (weaning), low dose propofol.  yest    Add back precedex if needed, bradycardia with this prior    Continue scheduled ativan, oxycodone, benadryl, haldol    Continued on home meds: abilify, bupropion, gabapentin 3x/day    Appreciate psych recommendations    RASS goal -1 to -2    HEMATOLOGIC:  COVID-19 associated coagulopathy: continue LMWH ppx  Stable counts, continue to monitor    ENDOCRINE:  No history of diabetes    FSBG Q4H, Aspart insulin SS (high insulin resistance).  Hypoglycemia protocol.      ICU Checklist:  Feeding: tube feedings    Lines/Tubes:  8.0 mm endotracheal tube, placed 11/28  RUE PICC central line, placed 11/24  Left radial A-line, placed 11/28  Wei catheter, placed 11/28    Prophylaxis:    Thromboembolic: LMWH     Stress Ulcer: PPI    VAP: peridex      Restraints? yes      DISPOSITION: ICU    CODE STATUS: Full Code    FAMILY COMMUNICATION: Will contact later today for update.       Total Critical Care Time: 44 minutes    Upon evaluation, this patient is critically ill with a high probability of imminent life threatening deterioration due to acute hypoxemic respiratory failure due to ARS from COVID pneumonia, ventilator associated pneumonia, on mechanical ventilation which required my direct personal management.      No Lloyd MD  Pulmonary and Critical Care Medicine  [facility]  Office: [phone]    ----------------------------    Overnight events:  No acute events, stable    Subjective:  Unable to obtain    Objective:  Physical Exam:  Ventilation Mode: CMV/AC  (Continuous Mandatory Ventilation/ Assist Control)  FiO2 (%): 50 %  Rate Set (breaths/minute): 18 breaths/min  Tidal Volume Set (mL): 520 mL  PEEP (cm H2O): 10 cmH2O  Oxygen Concentration (%): 50 %  Resp: 18    BP 93/56   Pulse 70   Temp 99.4  F (37.4  C) (Oral)   Resp 18   Ht 1.803 m (5' 11\")   " Wt 93.4 kg (205 lb 14.6 oz)   SpO2 99%   BMI 28.72 kg/m      Intake/Output last 3 shifts:  I/O last 3 completed shifts:  In: 4321.97 [I.V.:1834.97; NG/GT:770]  Out: 6085 [Urine:5610; Stool:475]  Intake/Output this shift:  No intake/output data recorded.    Physical Exam  Gen: Intubated, sedated, no distress  HEENT: no OP lesions, no TITA  CV: RRR, no m/g/r  Resp: CTAB/L, diminished  Abd: soft, nontender, BS+  Neuro: PERRL, nonfocal  Ext: no edema, warm    LABS:  Reviewed in detail    IMAGING/STUDIES:  12/10/21 CXR:  Extensive bilateral pulmonary infiltrates have worsened. Endotracheal tube tip 3.4 cm proximal to the felipa. Nasogastric tube and stable right PICC.     IMPRESSION:   1. Worsening bilateral pulmonary infiltrates.     2. Stable appliances.      PROVIDER RESTRAINT FOR NON-VIOLENT BEHAVIOR FACE TO FACE EVALUATION    Patient's Immediate Situation:  Patient demonstrated the following behaviors: pulling lines/tubes    Patient's Reaction to the intervention:  Does patient understand the reason for restraint/seclusion? Unable to understand    Medical Condition:  Is there any evidence of compromise of Skin integrity, Respiratory, Cardiovascular, Musculoskeletal, Hydration? No    Behavioral Condition:  In consultation with the RN, is there a need to continue this restraint or seclusion? Yes    See Restraint Flowsheet for complete restraint documentation and assessment.

## 2021-12-12 NOTE — PROGRESS NOTES
RT PROGRESS NOTE    VENT DAY# 15    CURRENT SETTINGS:   Ventilation Mode: CMV/AC  (Continuous Mandatory Ventilation/ Assist Control)  FiO2 (%): 50 %  Rate Set (breaths/minute): 18 breaths/min  Tidal Volume Set (mL): 520 mL  PEEP (cm H2O): 10 cmH2O  Oxygen Concentration (%): 40 %  Resp: 18      PATIENT PARAMETERS:  PIP 33  Pplat:  28  Pmean:  15  Compliance: 24  SBT: NO, weaning peep and fio2, decreasing sedation       Secretions:  White secretions  02 Sats:  99%    ETT SIZE 8.0 Secured at 26 cm at teeth/gums    Respiratory Medications: none     ABG: @1445 pH 7.44; pCO2 51; pO2 68; HCO3 3394, %O2 Sat 91, BE 10 on above settings    NOTE / SHIFT SUMMARY:   Cont current cares    Bailey Novoap, RT

## 2021-12-12 NOTE — PLAN OF CARE
Problem: Communication Impairment (Mechanical Ventilation, Invasive)  Goal: Effective Communication  Outcome: No Change     Problem: Device-Related Complication Risk (Mechanical Ventilation, Invasive)  Goal: Optimal Device Function  Outcome: No Change     Problem: Inability to Wean (Mechanical Ventilation, Invasive)  Goal: Mechanical Ventilation Liberation  Outcome: No Change

## 2021-12-13 LAB
ANION GAP SERPL CALCULATED.3IONS-SCNC: 4 MMOL/L (ref 5–18)
BASE EXCESS BLDA CALC-SCNC: 7.9 MMOL/L
BUN SERPL-MCNC: 14 MG/DL (ref 8–22)
CALCIUM SERPL-MCNC: 8.9 MG/DL (ref 8.5–10.5)
CHLORIDE BLD-SCNC: 106 MMOL/L (ref 98–107)
CO2 SERPL-SCNC: 31 MMOL/L (ref 22–31)
COHGB MFR BLD: 97.1 % (ref 96–97)
CREAT SERPL-MCNC: 0.59 MG/DL (ref 0.7–1.3)
ERYTHROCYTE [DISTWIDTH] IN BLOOD BY AUTOMATED COUNT: 14.7 % (ref 10–15)
GFR SERPL CREATININE-BSD FRML MDRD: >90 ML/MIN/1.73M2
GLUCOSE BLD-MCNC: 149 MG/DL (ref 70–125)
GLUCOSE BLDC GLUCOMTR-MCNC: 105 MG/DL (ref 70–99)
GLUCOSE BLDC GLUCOMTR-MCNC: 108 MG/DL (ref 70–99)
GLUCOSE BLDC GLUCOMTR-MCNC: 112 MG/DL (ref 70–99)
GLUCOSE BLDC GLUCOMTR-MCNC: 113 MG/DL (ref 70–99)
GLUCOSE BLDC GLUCOMTR-MCNC: 128 MG/DL (ref 70–99)
GLUCOSE BLDC GLUCOMTR-MCNC: 129 MG/DL (ref 70–99)
GLUCOSE BLDC GLUCOMTR-MCNC: 137 MG/DL (ref 70–99)
HCO3 BLD-SCNC: 30 MMOL/L (ref 23–29)
HCT VFR BLD AUTO: 36.5 % (ref 40–53)
HGB BLD-MCNC: 11.6 G/DL (ref 13.3–17.7)
MAGNESIUM SERPL-MCNC: 2 MG/DL (ref 1.8–2.6)
MCH RBC QN AUTO: 30.1 PG (ref 26.5–33)
MCHC RBC AUTO-ENTMCNC: 31.8 G/DL (ref 31.5–36.5)
MCV RBC AUTO: 95 FL (ref 78–100)
O2/TOTAL GAS SETTING VFR VENT: 40 %
OXYHGB MFR BLD: 95.7 % (ref 96–97)
PCO2 BLD: 53 MM HG (ref 35–45)
PEEP: 8 CM H2O
PH BLD: 7.4 [PH] (ref 7.37–7.44)
PLATELET # BLD AUTO: 177 10E3/UL (ref 150–450)
PO2 BLD: 87 MM HG (ref 80–90)
POTASSIUM BLD-SCNC: 4.3 MMOL/L (ref 3.5–5)
RATE: 18 RR/MIN
RBC # BLD AUTO: 3.86 10E6/UL (ref 4.4–5.9)
SODIUM SERPL-SCNC: 141 MMOL/L (ref 136–145)
TEMPERATURE: 37 DEGREES C
TRIGL SERPL-MCNC: 174 MG/DL
VENTILATION MODE: AC
VENTILATOR TIDAL VOLUME: 520 ML
WBC # BLD AUTO: 7 10E3/UL (ref 4–11)

## 2021-12-13 PROCEDURE — 250N000011 HC RX IP 250 OP 636: Performed by: INTERNAL MEDICINE

## 2021-12-13 PROCEDURE — 250N000011 HC RX IP 250 OP 636

## 2021-12-13 PROCEDURE — 94003 VENT MGMT INPAT SUBQ DAY: CPT

## 2021-12-13 PROCEDURE — 83735 ASSAY OF MAGNESIUM: CPT | Performed by: INTERNAL MEDICINE

## 2021-12-13 PROCEDURE — 84478 ASSAY OF TRIGLYCERIDES: CPT | Performed by: INTERNAL MEDICINE

## 2021-12-13 PROCEDURE — 250N000009 HC RX 250: Performed by: INTERNAL MEDICINE

## 2021-12-13 PROCEDURE — C9113 INJ PANTOPRAZOLE SODIUM, VIA: HCPCS | Performed by: INTERNAL MEDICINE

## 2021-12-13 PROCEDURE — 250N000013 HC RX MED GY IP 250 OP 250 PS 637: Performed by: NURSE PRACTITIONER

## 2021-12-13 PROCEDURE — 85027 COMPLETE CBC AUTOMATED: CPT | Performed by: INTERNAL MEDICINE

## 2021-12-13 PROCEDURE — 250N000013 HC RX MED GY IP 250 OP 250 PS 637: Performed by: INTERNAL MEDICINE

## 2021-12-13 PROCEDURE — 99291 CRITICAL CARE FIRST HOUR: CPT | Performed by: INTERNAL MEDICINE

## 2021-12-13 PROCEDURE — 250N000011 HC RX IP 250 OP 636: Performed by: NURSE PRACTITIONER

## 2021-12-13 PROCEDURE — 200N000001 HC R&B ICU

## 2021-12-13 PROCEDURE — 258N000003 HC RX IP 258 OP 636: Performed by: INTERNAL MEDICINE

## 2021-12-13 PROCEDURE — 80048 BASIC METABOLIC PNL TOTAL CA: CPT | Performed by: INTERNAL MEDICINE

## 2021-12-13 PROCEDURE — 82805 BLOOD GASES W/O2 SATURATION: CPT | Performed by: INTERNAL MEDICINE

## 2021-12-13 PROCEDURE — 999N000157 HC STATISTIC RCP TIME EA 10 MIN

## 2021-12-13 RX ORDER — PROPOFOL 10 MG/ML
5-75 INJECTION, EMULSION INTRAVENOUS CONTINUOUS
Status: DISCONTINUED | OUTPATIENT
Start: 2021-12-14 | End: 2021-12-15

## 2021-12-13 RX ORDER — PROPOFOL 10 MG/ML
INJECTION, EMULSION INTRAVENOUS
Status: COMPLETED
Start: 2021-12-13 | End: 2021-12-13

## 2021-12-13 RX ORDER — DEXMEDETOMIDINE HYDROCHLORIDE 4 UG/ML
.2-1.5 INJECTION, SOLUTION INTRAVENOUS CONTINUOUS
Status: DISCONTINUED | OUTPATIENT
Start: 2021-12-13 | End: 2021-12-28

## 2021-12-13 RX ORDER — AMINO AC/PROTEIN HYDR/WHEY PRO 10G-100/30
1 LIQUID (ML) ORAL 2 TIMES DAILY
Status: DISCONTINUED | OUTPATIENT
Start: 2021-12-13 | End: 2021-12-20

## 2021-12-13 RX ADMIN — DEXMEDETOMIDINE HYDROCHLORIDE 1.5 MCG/KG/HR: 400 INJECTION INTRAVENOUS at 06:29

## 2021-12-13 RX ADMIN — CHLORHEXIDINE GLUCONATE 0.12% ORAL RINSE 15 ML: 1.2 LIQUID ORAL at 08:54

## 2021-12-13 RX ADMIN — Medication 100 MCG: at 18:42

## 2021-12-13 RX ADMIN — OXYCODONE HYDROCHLORIDE 20 MG: 5 SOLUTION ORAL at 17:47

## 2021-12-13 RX ADMIN — Medication 100 MCG: at 17:55

## 2021-12-13 RX ADMIN — BUPROPION HYDROCHLORIDE 100 MG: 100 TABLET, FILM COATED ORAL at 08:52

## 2021-12-13 RX ADMIN — Medication 20 MG: at 13:04

## 2021-12-13 RX ADMIN — BUPROPION HYDROCHLORIDE 100 MG: 100 TABLET, FILM COATED ORAL at 12:05

## 2021-12-13 RX ADMIN — OXYCODONE HYDROCHLORIDE 20 MG: 5 SOLUTION ORAL at 13:20

## 2021-12-13 RX ADMIN — GABAPENTIN 800 MG: 250 SUSPENSION ORAL at 16:49

## 2021-12-13 RX ADMIN — GABAPENTIN 800 MG: 250 SUSPENSION ORAL at 10:03

## 2021-12-13 RX ADMIN — GABAPENTIN 800 MG: 250 SUSPENSION ORAL at 12:05

## 2021-12-13 RX ADMIN — QUETIAPINE 100 MG: 25 TABLET ORAL at 08:53

## 2021-12-13 RX ADMIN — Medication 100 MCG: at 13:57

## 2021-12-13 RX ADMIN — QUETIAPINE FUMARATE 100 MG: 25 TABLET ORAL at 13:20

## 2021-12-13 RX ADMIN — Medication 0.05 MCG/KG/MIN: at 00:31

## 2021-12-13 RX ADMIN — PROPOFOL 10 MCG/KG/MIN: 10 INJECTION, EMULSION INTRAVENOUS at 23:54

## 2021-12-13 RX ADMIN — DEXMEDETOMIDINE HYDROCHLORIDE 1.5 MCG/KG/HR: 400 INJECTION INTRAVENOUS at 00:29

## 2021-12-13 RX ADMIN — CHLORHEXIDINE GLUCONATE 0.12% ORAL RINSE 15 ML: 1.2 LIQUID ORAL at 20:00

## 2021-12-13 RX ADMIN — LORAZEPAM 4 MG: 2 LIQUID ORAL at 08:53

## 2021-12-13 RX ADMIN — OXYCODONE HYDROCHLORIDE 20 MG: 5 SOLUTION ORAL at 21:29

## 2021-12-13 RX ADMIN — OXYCODONE HYDROCHLORIDE 20 MG: 5 SOLUTION ORAL at 05:28

## 2021-12-13 RX ADMIN — DEXMEDETOMIDINE HYDROCHLORIDE 1.5 MCG/KG/HR: 4 INJECTION, SOLUTION INTRAVENOUS at 15:32

## 2021-12-13 RX ADMIN — Medication 175 MCG/HR: at 17:57

## 2021-12-13 RX ADMIN — ENOXAPARIN SODIUM 50 MG: 100 INJECTION SUBCUTANEOUS at 21:29

## 2021-12-13 RX ADMIN — LORAZEPAM 4 MG: 2 LIQUID ORAL at 23:35

## 2021-12-13 RX ADMIN — DEXMEDETOMIDINE HYDROCHLORIDE 1.5 MCG/KG/HR: 400 INJECTION INTRAVENOUS at 09:47

## 2021-12-13 RX ADMIN — QUETIAPINE 100 MG: 25 TABLET ORAL at 12:04

## 2021-12-13 RX ADMIN — BUPROPION HYDROCHLORIDE 100 MG: 100 TABLET, FILM COATED ORAL at 16:49

## 2021-12-13 RX ADMIN — Medication 100 MCG: at 13:03

## 2021-12-13 RX ADMIN — Medication 1 PACKET: at 20:05

## 2021-12-13 RX ADMIN — DEXMEDETOMIDINE HYDROCHLORIDE 1.5 MCG/KG/HR: 400 INJECTION INTRAVENOUS at 12:41

## 2021-12-13 RX ADMIN — QUETIAPINE 100 MG: 25 TABLET ORAL at 20:05

## 2021-12-13 RX ADMIN — MIDAZOLAM HYDROCHLORIDE 2 MG: 1 INJECTION, SOLUTION INTRAMUSCULAR; INTRAVENOUS at 23:34

## 2021-12-13 RX ADMIN — QUETIAPINE FUMARATE 100 MG: 25 TABLET ORAL at 23:34

## 2021-12-13 RX ADMIN — LORAZEPAM 4 MG: 2 LIQUID ORAL at 00:10

## 2021-12-13 RX ADMIN — ARIPIPRAZOLE 5 MG: 1 SOLUTION ORAL at 08:53

## 2021-12-13 RX ADMIN — MIDAZOLAM HYDROCHLORIDE 2 MG: 1 INJECTION, SOLUTION INTRAMUSCULAR; INTRAVENOUS at 22:33

## 2021-12-13 RX ADMIN — PROPOFOL 30 MCG/KG/MIN: 10 INJECTION, EMULSION INTRAVENOUS at 00:43

## 2021-12-13 RX ADMIN — DEXMEDETOMIDINE HYDROCHLORIDE 1.5 MCG/KG/HR: 400 INJECTION INTRAVENOUS at 03:35

## 2021-12-13 RX ADMIN — ENOXAPARIN SODIUM 50 MG: 100 INJECTION SUBCUTANEOUS at 09:00

## 2021-12-13 RX ADMIN — OXYCODONE HYDROCHLORIDE 20 MG: 5 SOLUTION ORAL at 09:00

## 2021-12-13 RX ADMIN — LORAZEPAM 4 MG: 2 LIQUID ORAL at 15:24

## 2021-12-13 RX ADMIN — OXYCODONE HYDROCHLORIDE 20 MG: 5 SOLUTION ORAL at 00:56

## 2021-12-13 RX ADMIN — DEXMEDETOMIDINE HYDROCHLORIDE 1.5 MCG/KG/HR: 4 INJECTION, SOLUTION INTRAVENOUS at 18:38

## 2021-12-13 RX ADMIN — Medication 175 MCG/HR: at 05:50

## 2021-12-13 RX ADMIN — MIDAZOLAM HYDROCHLORIDE 2 MG: 1 INJECTION, SOLUTION INTRAMUSCULAR; INTRAVENOUS at 20:14

## 2021-12-13 RX ADMIN — QUETIAPINE 100 MG: 25 TABLET ORAL at 16:48

## 2021-12-13 RX ADMIN — LORAZEPAM 4 MG: 2 LIQUID ORAL at 04:19

## 2021-12-13 RX ADMIN — PANTOPRAZOLE SODIUM 40 MG: 40 INJECTION, POWDER, FOR SOLUTION INTRAVENOUS at 05:32

## 2021-12-13 RX ADMIN — MIDAZOLAM HYDROCHLORIDE 2 MG: 1 INJECTION, SOLUTION INTRAMUSCULAR; INTRAVENOUS at 18:09

## 2021-12-13 RX ADMIN — PROPOFOL 30 MCG/KG/MIN: 10 INJECTION, EMULSION INTRAVENOUS at 06:26

## 2021-12-13 RX ADMIN — LORAZEPAM 4 MG: 2 LIQUID ORAL at 20:05

## 2021-12-13 RX ADMIN — QUETIAPINE FUMARATE 100 MG: 25 TABLET ORAL at 00:22

## 2021-12-13 RX ADMIN — LORAZEPAM 4 MG: 2 LIQUID ORAL at 12:04

## 2021-12-13 RX ADMIN — Medication 100 MCG: at 00:00

## 2021-12-13 RX ADMIN — Medication 100 MCG: at 23:02

## 2021-12-13 RX ADMIN — PROPOFOL 20 MCG/KG/MIN: 10 INJECTION, EMULSION INTRAVENOUS at 11:07

## 2021-12-13 RX ADMIN — DEXMEDETOMIDINE HYDROCHLORIDE 1.5 MCG/KG/HR: 4 INJECTION, SOLUTION INTRAVENOUS at 21:55

## 2021-12-13 ASSESSMENT — ACTIVITIES OF DAILY LIVING (ADL)
ADLS_ACUITY_SCORE: 22

## 2021-12-13 ASSESSMENT — MIFFLIN-ST. JEOR: SCORE: 1796.13

## 2021-12-13 NOTE — PROGRESS NOTES
Care Management Follow Up    Length of Stay (days): 8    Expected Discharge Date: To be determined.      Concerns to be Addressed:   ICU level of care, due to alteration in respiratory status secondary to Covid-19, requiring oral intubation/vent support and drips.    Patient plan of care discussed at interdisciplinary rounds: Yes  Follow up from rounds/notes:   Lighten sedation, wean as tolerated.     Anticipated Discharge Disposition:  Discharge goal is pending response to treatment, medical needs and mobility closer to discharge.      Anticipated Discharge Services:  To be determined by destination, patient/family preferences, medical needs and mobility status closer to the time of discharge.  Anticipated Discharge DME:  To be determined.     Patient/family educated on Medicare website which has current facility and service quality ratings:  NA  Education Provided on the Discharge Plan:  Per team  Patient/Family in Agreement with the Plan:  Yes    Referrals Placed by CM/SW:  None  Private pay costs discussed: Not applicable     Additional Information:  Patient lives with his ex-spouse, Jama, part of the time. She reports he is normally independent at baseline. If necessary, Jama would be able to provide care in her home post discharge (pending patient's needs). Previous care manager discussed all potential discharge options including LTACH, TCU/Acute Rehab, or home with home care depending on Jared's progressions and needs at discharge. CM to keep Jama updated once closer to discharge and better idea of plan is known. Transport to be determined. Care manager to follow.   Of note, patient has not been vaccinated for Covid-19 (no record on file).    Marilee Farias RN

## 2021-12-13 NOTE — PROGRESS NOTES
Desaturation of 89% noted with PEEP of 5 and fiO2 50%. PEEP increased back up to 8. Oxygen saturation at 90% currently.     Marysol Lee, RT

## 2021-12-13 NOTE — PLAN OF CARE
Problem: Inability to Wean (Mechanical Ventilation, Invasive)  Goal: Mechanical Ventilation Liberation  Outcome: No Change     Problem: Ventilator-Induced Lung Injury (Mechanical Ventilation, Invasive)  Goal: Absence of Ventilator-Induced Lung Injury  Outcome: No Change     Problem: Device-Related Complication Risk (Mechanical Ventilation, Invasive)  Goal: Optimal Device Function  Outcome: Improving     Problem: Skin and Tissue Injury (Mechanical Ventilation, Invasive)  Goal: Absence of Device-Related Skin and Tissue Injury  Outcome: Improving    Marysol Lee, RT

## 2021-12-13 NOTE — PLAN OF CARE
Problem: Adult Inpatient Plan of Care  Goal: Plan of Care Review  Outcome: Improving  Goal: Patient-Specific Goal (Individualized)  Outcome: Improving  Goal: Absence of Hospital-Acquired Illness or Injury  Outcome: Improving  Intervention: Identify and Manage Fall Risk  Recent Flowsheet Documentation  Taken 12/12/2021 2000 by Home Holly RN  Safety Promotion/Fall Prevention:   check orthostatic blood pressure   safety round/check completed  Taken 12/12/2021 1600 by Home Holly RN  Safety Promotion/Fall Prevention:   check orthostatic blood pressure   safety round/check completed  Intervention: Prevent Skin Injury  Recent Flowsheet Documentation  Taken 12/12/2021 2200 by Home Holly RN  Body Position:   left   turned  Taken 12/12/2021 2000 by Home Holly RN  Body Position:   right   turned  Taken 12/12/2021 1800 by Home Holly RN  Body Position:   turned   supine, legs elevated   supine, head elevated  Taken 12/12/2021 1600 by Home Holly RN  Body Position:   right   turned  Intervention: Prevent and Manage VTE (Venous Thromboembolism) Risk  Recent Flowsheet Documentation  Taken 12/12/2021 2000 by Home Holly RN  VTE Prevention/Management: anticoagulant therapy maintained  Taken 12/12/2021 1600 by Home Holly RN  VTE Prevention/Management: anticoagulant therapy maintained  Intervention: Prevent Infection  Recent Flowsheet Documentation  Taken 12/12/2021 2000 by Home Holly RN  Infection Prevention:   equipment surfaces disinfected   hand hygiene promoted   personal protective equipment utilized   rest/sleep promoted   single patient room provided   visitors restricted/screened  Taken 12/12/2021 1600 by Home Holly RN  Infection Prevention:   equipment surfaces disinfected   hand hygiene promoted   personal protective equipment utilized   rest/sleep promoted   single patient room provided   visitors restricted/screened  Goal: Optimal  Comfort and Wellbeing  Outcome: Improving  Goal: Readiness for Transition of Care  Outcome: Improving     Problem: Risk for Delirium  Goal: Optimal Coping  Outcome: Improving  Goal: Improved Behavioral Control  Outcome: Improving  Goal: Improved Attention and Thought Clarity  Outcome: Improving  Goal: Improved Sleep  Outcome: Improving     Problem: Communication Impairment (Mechanical Ventilation, Invasive)  Goal: Effective Communication  Outcome: Improving     Problem: Device-Related Complication Risk (Mechanical Ventilation, Invasive)  Goal: Optimal Device Function  Outcome: Improving  Intervention: Optimize Device Care and Function  Recent Flowsheet Documentation  Taken 12/12/2021 2000 by Home Holly RN  Airway Safety Measures:   manual resuscitator/mask/valve in room   suction at bedside   suction equipment   oxygen flowmeter  Taken 12/12/2021 1600 by Home Holly RN  Airway Safety Measures:   manual resuscitator/mask/valve in room   suction at bedside   suction equipment   oxygen flowmeter     Problem: Inability to Wean (Mechanical Ventilation, Invasive)  Goal: Mechanical Ventilation Liberation  Outcome: Improving     Problem: Nutrition Impairment (Mechanical Ventilation, Invasive)  Goal: Optimal Nutrition Delivery  Outcome: Improving     Problem: Skin and Tissue Injury (Mechanical Ventilation, Invasive)  Goal: Absence of Device-Related Skin and Tissue Injury  Outcome: Improving     Problem: Ventilator-Induced Lung Injury (Mechanical Ventilation, Invasive)  Goal: Absence of Ventilator-Induced Lung Injury  Outcome: Improving  Intervention: Prevent Ventilator-Associated Pneumonia  Recent Flowsheet Documentation  Taken 12/12/2021 2200 by Home Holly RN  Oral Care: swabbed with antiseptic solution  Head of Bed (HOB) Positioning: HOB at 30 degrees  Taken 12/12/2021 2000 by Home Holly RN  VAP Prevention Bundle:   HOB elevation maintained   oral care regularly provided  Oral Care:  swabbed with antiseptic solution  Head of Bed (HOB) Positioning: HOB at 30 degrees  Taken 12/12/2021 1800 by Home Holly RN  Head of Bed (HOB) Positioning: HOB at 30 degrees  Taken 12/12/2021 1600 by Home Hloly RN  VAP Prevention Bundle:   HOB elevation maintained   oral care regularly provided  Oral Care: swabbed with antiseptic solution  Head of Bed (HOB) Positioning: HOB at 30 degrees     Problem: ARDS (Acute Respiratory Distress Syndrome)  Goal: Effective Oxygenation  Outcome: Improving     Problem: Dysrhythmia (Heart Failure)  Goal: Stable Heart Rate and Rhythm  Outcome: Improving     Problem: Fluid Imbalance (Heart Failure)  Goal: Fluid Balance  Outcome: Improving     RASS anywhere from -3 to +2. Gets agitated, hypertensive, and de-sats w/ oral cares. PRN fentanyl and propofol bump to keep comfortable. Opens eyes to voice and oral care but not able to follow commands. Attemps to pull lines despite education. Labile pressure- continues to remain on Levo 0.07. Spouse Jama updated- she reported that pt has hx of difficult weaning off of sedation and extubation and has needed reassurance from family in the past.

## 2021-12-13 NOTE — PROGRESS NOTES
RT PROGRESS NOTE    VENT DAY# 16    CURRENT SETTINGS:   Ventilation Mode: CMV/AC  (Continuous Mandatory Ventilation/ Assist Control)  FiO2 (%): 40 %  Rate Set (breaths/minute): 18 breaths/min  Tidal Volume Set (mL): 520 mL  PEEP (cm H2O): 10 cmH2O  Oxygen Concentration (%): 40 %  Resp: 18      PATIENT PARAMETERS:  PIP 34  Pplat:  28  Pmean:  14  Compliance: 27  SBT: no   Secretions:  Sx small amount thick white  02 Sats:  95%    ETT SIZE 8.0 Secured at 26 cm at teeth/gums      Results for TOBIAS BELL (MRN [id number]) as of 12/13/2021 07:30   Ref. Range 12/13/2021 04:28   pH Arterial Latest Ref Range: 7.37 - 7.44  7.40   pCO2 Arterial Latest Ref Range: 35 - 45 mm Hg 53 (H)   PO2 Arterial Latest Ref Range: 80 - 90 mm Hg 87   Bicarbonate Arterial Latest Ref Range: 23 - 29 mmol/L 30 (H)   Base Excess Art Latest Ref Range:   mmol/L 7.9   FIO2 Unknown 40       NOTE / SHIFT SUMMARY:   Pt remains on full vent support.  Breath sounds coarse.  No changes to vent settings.      Michel Tran, RT

## 2021-12-13 NOTE — PROGRESS NOTES
RT PROGRESS NOTE     VENT DAY# 16     CURRENT SETTINGS:  Ventilation Mode: CMV/AC  (Continuous Mandatory Ventilation/ Assist Control)  FiO2 (%): 50 %  Rate Set (breaths/minute): 18 breaths/min  Tidal Volume Set (mL): 520 mL  PEEP (cm H2O): (S) 8 cmH2O  Oxygen Concentration (%): 40 %  Resp: 18     PATIENT PARAMETERS:  PIP 33  Pplat:  28  Pmean:  15  Compliance: 24  SBT: No, PEEP and sedation                  Secretions:  Moderate white secretions  02 Sats:  90%     ETT SIZE 8.0 Secured at 26 cm at teeth/gums     Respiratory Medications: none      ABG:   Ref. Range 12/13/2021 04:28   pH Arterial Latest Ref Range: 7.37 - 7.44  7.40   pCO2 Arterial Latest Ref Range: 35 - 45 mm Hg 53 (H)   PO2 Arterial Latest Ref Range: 80 - 90 mm Hg 87   Bicarbonate Arterial Latest Ref Range: 23 - 29 mmol/L 30 (H)     NOTE / SHIFT SUMMARY:  Pt remains on full mechanical ventilator support. Desaturation noted in the high 80s when PEEP was decreased to 5. PEEP is now back at 8. Continue to monitor and titrate oxygen as able.     Marysol Lee, RT

## 2021-12-13 NOTE — PLAN OF CARE
Problem: Risk for Delirium  Goal: Improved Behavioral Control  Outcome: No Change  Versed gtt turned off. Now on fentanyl, precedex and propofol gtts to keep RASS -1 to -2. Gave prn seroquel x 1 for restlessness.   Levophed gtt still on.     Problem: ARDS (Acute Respiratory Distress Syndrome)  Goal: Effective Oxygenation  Outcome: Improving  PF ratio this am 217.50. Coarse diminished breath sounds.   Will continue to wean off sedation as tolerated.

## 2021-12-13 NOTE — PROGRESS NOTES
CRITICAL CARE PROGRESS NOTE:    Assessment/Plan:  46 year old male, unvaccinated against COVID-19, with history of polysubstance abuse and chronic pain/lower back pain on suboxone, mood disorder/depression, HTN, ROSSY mild intermittent asthma, presented with respiratory failure/COVID ARDS intubated on 11/28. Initially requiring proning/paralysis, veletri, now down trending vent needs, issues with agitation, VAP.    RESP:  Acute respiratory failure with hypoxemia due to covid ARDS. Prolonged, day 16 on vent. Approaching minimal vent settings. Hoping to SBT soon. No longer requiring paralysis, proning or veletri.     Cont LPV, Vt is ~7cc/kg IBW OK for now    Pplat >30, DP goal 10-15    Titrate FiO2 for goal SpO2 88-92% or PaO2 55 mm Hg or greater.    decr PEEP to 5 today.    Daily sedation vacation for SBT    Will need to discus trach/PEG with family if unable to extubate this week.     Stop checking ABGs as pf ratio now >200     CV:  Shock, circulatory. Low grade, likely vasoplegia from sedation. Possible septic component due to VAP. Echo 11/28: LVEF 60-65%, flattened septum c/w RV pressure/vol overload, mildling decr RV function with mod dilatation. No valve issues. Afib/RVR earlier in admission resolved    MAP >65, wean NE as able    Not requiring cardene recently.    NEURO:  Encephalopathy, likely toxic/metabolic. weakness and deconditioning. Anxiety complicating picture. On suboxone for hx of opioid dependence.    Tylenol prn pain    Off propofol and versed    Cont fent + dex, RASS goal 0 to -1    Cont gabapentin, wellbutrin, abilify, oxycodone, lorazepam, seroquel at previous doses    GI:  No issues, tolerating TF    Cont TF and advance as tolerated    Bowel regimen prn    IV PPI    RENAL:  No issues, normal renal function. At risk for YAIMA.    Avoid nephrotoxins    Maintain lange    ID:  Severe covid-19 infection, improving. Proteus + e. Coli VAP dx on sputum from 12/1, pan-sensitive.     S/p decadron, remdesivir  and toci x1    Completed 10 days of cefepime/CTX for VAP    Monitor off abx    HEMATOLOGIC:  hypercoag due to covid0-19. Stable mild anemia.     hgb >7    LMWH 0.5mg/kg bid per covid ICU guidelines    ENDOCRINE:  No issues    FSBg checks, insulin sliding scale/drip per ICU protocol    ICU PROPHYLAXIS:    peridex    IV PPI    LMWH 0.5mg/kg bid    CODE STATUS, DISPOSITION, FAMILY COMMUNICATION: will update family later today.  Full code    Lines/Drains/Tubes:  PICC 11/28  Art line radial, 11/28  OG tube  Wei  Rectal tube  ETT 8mm (day 16 on vent)    Restraints  Progress Note  Restraint Application    I recognize that restraints are physical and/or chemical interventions intended to restrict a person's movements. Restraints are currently needed to ensure the safety of this patient and/or others. My clinical rationale appears below.    Category/Type of Restraint     Non Violent:  Soft limb restraint x2  --  Behavior  Pulling at tubes/lines  --  Root Cause of the Behavior  Sedation/intubation  --  Less-Restrictive Measures that Failed  Non Violent Measures:  Close Observation  --  Response to the Restraint  Patient unable to pull at tubes/lines  --  Criteria for Release from the Restraint  Patient calm and off sedation    Morales (Geo) MD Melissa  [facility]  Pager [phone]  Clinic [phone]  Fax [phone]     Overnight events:  Stable night  Approaching minimal vent settings  NE @0.06 -> off later in the day  Awakens to voice and following some commands    PEEP 10 -> 8 this morning SpO2 mid 90s    Sedation: dex, fent, prop. midaz off 12/12    Subjective:  As above  Denies pain    Objective:  Physical Exam:  Vent settings for last 24 hours:  Ventilation Mode: CMV/AC  (Continuous Mandatory Ventilation/ Assist Control)  FiO2 (%): 40 %  Rate Set (breaths/minute): 18 breaths/min  Tidal Volume Set (mL): 520 mL  PEEP (cm H2O): 8 cmH2O  Oxygen Concentration (%): 40  "%  Resp: 24      BP 91/55   Pulse (!) 127   Temp 98.9  F (37.2  C)   Resp 24   Ht 1.803 m (5' 11\")   Wt 89.9 kg (198 lb 3.1 oz)   SpO2 93%   BMI 27.64 kg/m      Intake/Output last 3 shifts:  I/O last 3 completed shifts:  In: 4044.31 [I.V.:2054.31; NG/GT:1350]  Out: 5640 [Urine:5640]  Intake/Output this shift:  I/O this shift:  In: 348.7 [I.V.:288.7; NG/GT:60]  Out: 850 [Emesis/NG output:850]    Physical Exam  Gen: intubated, sedated. Awakens to voice, follows some commands.  HEENT: no OP lesions, no TITA  CV: RRR, no m/g/r  Resp: clear ant no w/r/r   Abd: soft, nontender, BS+  Neuro: PERRL, nonfocal  Ext: no edema    LAB:  Recent Labs   Lab 12/13/21 0428   WBC 7.0   HGB 11.6*   HCT 36.5*        Recent Labs   Lab 12/13/21  0428 12/12/21  0356 12/11/21  0522 12/09/21  0413 12/08/21  0401 12/07/21  0438    141 145   < > 141 139   CO2 31 35* 33*   < > 34* 35*   BUN 14 14 12   < > 26* 25*   ALKPHOS  --   --   --   --   --  54   ALT  --   --   --   --  113* 111*   AST  --   --   --   --  44* 46*    < > = values in this interval not displayed.     Micro  PCT normal on 12/5  Sputum 12/1: proteus + e. Coli, pan-sensitive.     No current outpatient medications on file.       Critical care attestation: 45 minutes spent managing the following issues: acute respiratory failure requiring intubation/IMV, circulatory shock requiring continuous vasopressor infusions, severe covid-19 infection with ARDS, hypoxemic resp failure/prolonged. Toxic/metabolic encephalopathy. High risk for organ deterioration and death requiring ICU level care.    "

## 2021-12-13 NOTE — PROGRESS NOTES
"CLINICAL NUTRITION SERVICES - REASSESSMENT NOTE     Nutrition Prescription    RECOMMENDATIONS FOR MDs/PROVIDERS TO ORDER:      Malnutrition Status:    Moderate noted 11/28 at [facility]    Recommendations already ordered by Registered Dietitian (RD):  Adjust TF goal rate to 60 ml/hr, supplement Prosource 1 pkt 2x/day.  Promote @ goal of  60ml/hr  (1440ml/day)  will provide: 1520 kcals, 111 g PRO, 1208 ml free H20, 187 g CHO, and 0 g fiber, 320 ml flushes daily.    Future/Additional Recommendations:  TF/propofol     EVALUATION OF THE PROGRESS TOWARD GOALS   Diet: NPO  Nutrition Support: Promote goal rate 80 ml/hr  FWF 80 ml every 6 hrs.  Intake: 1920 kcals, 119 g PRO, 1610 ml free H20, 249 g CHO, and 0 g fiber daily, 320 ml flushes, 1930 ml total free water.    Propofol at current rate to provide 460 calories/day    NEW FINDINGS   Discussed pt in team rounds.  Propofol restarted. Bowel meds have been held, no rectal tube output recorded in 24 hrs.    ANTHROPOMETRICS  Height: 180.3 cm (5' 11\")  Most Recent Weight: 98.8 kg (217 lb 13 oz)  12/6  Most Recent Weight: 89.9 kg (198 lb 3.1 oz)    Weight History:   Wt Readings from Last 6 Encounters:   12/13/21 89.9 kg (198 lb 3.1 oz)   12/05/21 89.4 kg (197 lb 3.2 oz)   02/22/18 83.3 kg (183 lb 11.2 oz)   12/5 wt at [facility].  20 lb gain in one day with transfer to [facility] (?)  -7 L per I/Os. (pt on diuretic from 12/4 to 12/11)    Dosing Weight: 78.2 kg, IBW     ASSESSED NUTRITION NEEDS  Estimated Energy Needs: 8150-0692 kcals/day (22-25)  Justification: critically ill and Obese  Estimated Protein Needs: 117-156 grams protein/day (1.5 - 2 grams of pro/kg)  Justification: critically ill and Obesity guidelines  Estimated Fluid Needs: 2933-8093 mL/day (1 mL/kcal)   Justification: Maintenance    PHYSICAL FINDINGS  See malnutrition section below.  COVID isolation    GI CONCERNS  0 rectal tube output in 24 hrs.  LBM noted as 12/13.  OG    LABS  Na 141  K 4.3  Creat 0.59    FSBG " 105-137  Reviewed    MEDICATIONS  IVs-propofol, levophed, fentanyl, precedex  Oxycodone, colace-held, miralax-held, senokot-held  Reviewed      MALNUTRITION: noted 11/28  % Weight Loss:  13% in 9 months  % Intake:  </= 50% for >/= 5 days (severe malnutrition)  Subcutaneous Fat Loss:  unable  Muscle Loss:  unable  Fluid Retention:  Trace per charting     Malnutrition Diagnosis: Moderate malnutrition  In Context of:  Acute illness or injury    Goals   Tolerate TF at goal rate-met  Meet nutrition needs-met  Electrolytes WNL-met    CURRENT NUTRITION DIAGNOSIS  Malnutrition related to acute illness as evidenced by wt loss, decreased po intake  Swallow difficulty r/t acute illness evidenced by intubation      INTERVENTIONS  Implementation  Enteral Nutrition - Modify rate, propofol providing some calories.    Monitoring/Evaluation  Progress toward goals will be monitored and evaluated per protocol.

## 2021-12-13 NOTE — PROGRESS NOTES
PEEP was decreased from 10 to 8 this morning. SpO2 97%. Peak pressure 32, plateau pressure 25.    Marysol Lee, RT

## 2021-12-14 ENCOUNTER — APPOINTMENT (OUTPATIENT)
Dept: RADIOLOGY | Facility: HOSPITAL | Age: 46
End: 2021-12-14
Attending: INTERNAL MEDICINE
Payer: COMMERCIAL

## 2021-12-14 LAB
ALBUMIN UR-MCNC: NEGATIVE MG/DL
ANION GAP SERPL CALCULATED.3IONS-SCNC: 8 MMOL/L (ref 5–18)
APPEARANCE UR: CLEAR
BASOPHILS # BLD AUTO: 0 10E3/UL (ref 0–0.2)
BASOPHILS NFR BLD AUTO: 0 %
BILIRUB UR QL STRIP: NEGATIVE
BUN SERPL-MCNC: 15 MG/DL (ref 8–22)
C REACTIVE PROTEIN LHE: 3.3 MG/DL (ref 0–0.8)
CALCIUM SERPL-MCNC: 8.8 MG/DL (ref 8.5–10.5)
CHLORIDE BLD-SCNC: 101 MMOL/L (ref 98–107)
CO2 SERPL-SCNC: 28 MMOL/L (ref 22–31)
COLOR UR AUTO: COLORLESS
CREAT SERPL-MCNC: 0.62 MG/DL (ref 0.7–1.3)
EOSINOPHIL # BLD AUTO: 0.4 10E3/UL (ref 0–0.7)
EOSINOPHIL NFR BLD AUTO: 4 %
ERYTHROCYTE [DISTWIDTH] IN BLOOD BY AUTOMATED COUNT: 14.8 % (ref 10–15)
GFR SERPL CREATININE-BSD FRML MDRD: >90 ML/MIN/1.73M2
GLUCOSE BLD-MCNC: 101 MG/DL (ref 70–125)
GLUCOSE BLDC GLUCOMTR-MCNC: 107 MG/DL (ref 70–99)
GLUCOSE BLDC GLUCOMTR-MCNC: 134 MG/DL (ref 70–99)
GLUCOSE UR STRIP-MCNC: NEGATIVE MG/DL
HCT VFR BLD AUTO: 35.8 % (ref 40–53)
HGB BLD-MCNC: 11.3 G/DL (ref 13.3–17.7)
HGB UR QL STRIP: NEGATIVE
IMM GRANULOCYTES # BLD: 0.1 10E3/UL
IMM GRANULOCYTES NFR BLD: 0 %
KETONES UR STRIP-MCNC: NEGATIVE MG/DL
LEUKOCYTE ESTERASE UR QL STRIP: NEGATIVE
LYMPHOCYTES # BLD AUTO: 0.5 10E3/UL (ref 0.8–5.3)
LYMPHOCYTES NFR BLD AUTO: 4 %
MAGNESIUM SERPL-MCNC: 1.7 MG/DL (ref 1.8–2.6)
MCH RBC QN AUTO: 29.4 PG (ref 26.5–33)
MCHC RBC AUTO-ENTMCNC: 31.6 G/DL (ref 31.5–36.5)
MCV RBC AUTO: 93 FL (ref 78–100)
MONOCYTES # BLD AUTO: 0.6 10E3/UL (ref 0–1.3)
MONOCYTES NFR BLD AUTO: 5 %
NEUTROPHILS # BLD AUTO: 9.9 10E3/UL (ref 1.6–8.3)
NEUTROPHILS NFR BLD AUTO: 87 %
NITRATE UR QL: NEGATIVE
NRBC # BLD AUTO: 0 10E3/UL
NRBC BLD AUTO-RTO: 0 /100
PH UR STRIP: 6 [PH] (ref 5–7)
PLATELET # BLD AUTO: 168 10E3/UL (ref 150–450)
POTASSIUM BLD-SCNC: 4.4 MMOL/L (ref 3.5–5)
PROCALCITONIN SERPL-MCNC: 0.15 NG/ML (ref 0–0.49)
RBC # BLD AUTO: 3.84 10E6/UL (ref 4.4–5.9)
SODIUM SERPL-SCNC: 137 MMOL/L (ref 136–145)
SP GR UR STRIP: 1.01 (ref 1–1.03)
UROBILINOGEN UR STRIP-MCNC: <2 MG/DL
WBC # BLD AUTO: 11.4 10E3/UL (ref 4–11)

## 2021-12-14 PROCEDURE — 83735 ASSAY OF MAGNESIUM: CPT | Performed by: INTERNAL MEDICINE

## 2021-12-14 PROCEDURE — 36592 COLLECT BLOOD FROM PICC: CPT | Performed by: INTERNAL MEDICINE

## 2021-12-14 PROCEDURE — 87077 CULTURE AEROBIC IDENTIFY: CPT | Performed by: INTERNAL MEDICINE

## 2021-12-14 PROCEDURE — 200N000001 HC R&B ICU

## 2021-12-14 PROCEDURE — 86141 C-REACTIVE PROTEIN HS: CPT | Performed by: INTERNAL MEDICINE

## 2021-12-14 PROCEDURE — 250N000013 HC RX MED GY IP 250 OP 250 PS 637: Performed by: INTERNAL MEDICINE

## 2021-12-14 PROCEDURE — 250N000011 HC RX IP 250 OP 636: Performed by: INTERNAL MEDICINE

## 2021-12-14 PROCEDURE — 84145 PROCALCITONIN (PCT): CPT | Performed by: INTERNAL MEDICINE

## 2021-12-14 PROCEDURE — 81003 URINALYSIS AUTO W/O SCOPE: CPT | Performed by: INTERNAL MEDICINE

## 2021-12-14 PROCEDURE — 87149 DNA/RNA DIRECT PROBE: CPT | Performed by: INTERNAL MEDICINE

## 2021-12-14 PROCEDURE — 999N000157 HC STATISTIC RCP TIME EA 10 MIN

## 2021-12-14 PROCEDURE — 250N000009 HC RX 250: Performed by: INTERNAL MEDICINE

## 2021-12-14 PROCEDURE — 99232 SBSQ HOSP IP/OBS MODERATE 35: CPT | Performed by: NURSE PRACTITIONER

## 2021-12-14 PROCEDURE — 71045 X-RAY EXAM CHEST 1 VIEW: CPT

## 2021-12-14 PROCEDURE — 80048 BASIC METABOLIC PNL TOTAL CA: CPT | Performed by: NURSE PRACTITIONER

## 2021-12-14 PROCEDURE — 85018 HEMOGLOBIN: CPT | Performed by: INTERNAL MEDICINE

## 2021-12-14 PROCEDURE — 87106 FUNGI IDENTIFICATION YEAST: CPT | Performed by: INTERNAL MEDICINE

## 2021-12-14 PROCEDURE — 99291 CRITICAL CARE FIRST HOUR: CPT | Performed by: INTERNAL MEDICINE

## 2021-12-14 PROCEDURE — 258N000003 HC RX IP 258 OP 636: Performed by: INTERNAL MEDICINE

## 2021-12-14 PROCEDURE — 87086 URINE CULTURE/COLONY COUNT: CPT | Performed by: INTERNAL MEDICINE

## 2021-12-14 PROCEDURE — 250N000013 HC RX MED GY IP 250 OP 250 PS 637: Performed by: NURSE PRACTITIONER

## 2021-12-14 RX ORDER — HALOPERIDOL 2 MG/ML
2 SOLUTION ORAL 3 TIMES DAILY
Status: DISCONTINUED | OUTPATIENT
Start: 2021-12-14 | End: 2021-12-16

## 2021-12-14 RX ORDER — DIPHENHYDRAMINE HCL 12.5MG/5ML
50 LIQUID (ML) ORAL 3 TIMES DAILY
Status: DISCONTINUED | OUTPATIENT
Start: 2021-12-14 | End: 2021-12-14

## 2021-12-14 RX ORDER — DIPHENHYDRAMINE HCL 12.5 MG/5ML
50 SOLUTION ORAL
Status: DISCONTINUED | OUTPATIENT
Start: 2021-12-14 | End: 2021-12-16

## 2021-12-14 RX ADMIN — PROPOFOL 65 MCG/KG/MIN: 10 INJECTION, EMULSION INTRAVENOUS at 07:25

## 2021-12-14 RX ADMIN — Medication 200 MCG/HR: at 04:08

## 2021-12-14 RX ADMIN — DEXMEDETOMIDINE HYDROCHLORIDE 1.5 MCG/KG/HR: 4 INJECTION, SOLUTION INTRAVENOUS at 21:56

## 2021-12-14 RX ADMIN — TRAZODONE HYDROCHLORIDE 75 MG: 50 TABLET ORAL at 20:11

## 2021-12-14 RX ADMIN — CHLORHEXIDINE GLUCONATE 0.12% ORAL RINSE 15 ML: 1.2 LIQUID ORAL at 20:10

## 2021-12-14 RX ADMIN — DIPHENHYDRAMINE HYDROCHLORIDE 50 MG: 12.5 SOLUTION ORAL at 09:26

## 2021-12-14 RX ADMIN — LORAZEPAM 4 MG: 2 LIQUID ORAL at 12:10

## 2021-12-14 RX ADMIN — PROPOFOL 65 MCG/KG/MIN: 10 INJECTION, EMULSION INTRAVENOUS at 04:42

## 2021-12-14 RX ADMIN — LORAZEPAM 4 MG: 2 LIQUID ORAL at 20:10

## 2021-12-14 RX ADMIN — Medication 175 MCG/HR: at 16:35

## 2021-12-14 RX ADMIN — LORAZEPAM 4 MG: 2 LIQUID ORAL at 23:55

## 2021-12-14 RX ADMIN — PROPOFOL 25 MCG/KG/MIN: 10 INJECTION, EMULSION INTRAVENOUS at 21:11

## 2021-12-14 RX ADMIN — DEXMEDETOMIDINE HYDROCHLORIDE 1.5 MCG/KG/HR: 4 INJECTION, SOLUTION INTRAVENOUS at 06:55

## 2021-12-14 RX ADMIN — BUPROPION HYDROCHLORIDE 100 MG: 100 TABLET, FILM COATED ORAL at 12:10

## 2021-12-14 RX ADMIN — OXYCODONE HYDROCHLORIDE 20 MG: 5 SOLUTION ORAL at 09:24

## 2021-12-14 RX ADMIN — MIDAZOLAM HYDROCHLORIDE 2 MG: 1 INJECTION, SOLUTION INTRAMUSCULAR; INTRAVENOUS at 03:36

## 2021-12-14 RX ADMIN — TRAZODONE HYDROCHLORIDE 75 MG: 50 TABLET ORAL at 14:08

## 2021-12-14 RX ADMIN — HALOPERIDOL 2 MG: 2 SOLUTION ORAL at 14:08

## 2021-12-14 RX ADMIN — DEXMEDETOMIDINE HYDROCHLORIDE 1.5 MCG/KG/HR: 4 INJECTION, SOLUTION INTRAVENOUS at 00:39

## 2021-12-14 RX ADMIN — GABAPENTIN 800 MG: 250 SUSPENSION ORAL at 09:24

## 2021-12-14 RX ADMIN — LORAZEPAM 4 MG: 2 LIQUID ORAL at 03:38

## 2021-12-14 RX ADMIN — TRAZODONE HYDROCHLORIDE 75 MG: 50 TABLET ORAL at 09:25

## 2021-12-14 RX ADMIN — BUPROPION HYDROCHLORIDE 100 MG: 100 TABLET, FILM COATED ORAL at 09:26

## 2021-12-14 RX ADMIN — ARIPIPRAZOLE 5 MG: 1 SOLUTION ORAL at 09:24

## 2021-12-14 RX ADMIN — Medication 40 MG: at 05:39

## 2021-12-14 RX ADMIN — OXYCODONE HYDROCHLORIDE 20 MG: 5 SOLUTION ORAL at 14:12

## 2021-12-14 RX ADMIN — Medication 100 MCG: at 04:14

## 2021-12-14 RX ADMIN — HALOPERIDOL 2 MG: 2 SOLUTION ORAL at 09:26

## 2021-12-14 RX ADMIN — PROPOFOL 35 MCG/KG/MIN: 10 INJECTION, EMULSION INTRAVENOUS at 14:06

## 2021-12-14 RX ADMIN — OXYCODONE HYDROCHLORIDE 20 MG: 5 SOLUTION ORAL at 05:38

## 2021-12-14 RX ADMIN — Medication 0.02 MCG/KG/MIN: at 03:42

## 2021-12-14 RX ADMIN — LORAZEPAM 4 MG: 2 LIQUID ORAL at 16:37

## 2021-12-14 RX ADMIN — BUPROPION HYDROCHLORIDE 100 MG: 100 TABLET, FILM COATED ORAL at 16:47

## 2021-12-14 RX ADMIN — ACETAMINOPHEN 650 MG: 650 SOLUTION ORAL at 00:04

## 2021-12-14 RX ADMIN — Medication 100 MCG: at 01:25

## 2021-12-14 RX ADMIN — ENOXAPARIN SODIUM 50 MG: 100 INJECTION SUBCUTANEOUS at 09:26

## 2021-12-14 RX ADMIN — ACETAMINOPHEN 650 MG: 650 SOLUTION ORAL at 04:18

## 2021-12-14 RX ADMIN — DEXMEDETOMIDINE HYDROCHLORIDE 1.5 MCG/KG/HR: 4 INJECTION, SOLUTION INTRAVENOUS at 19:01

## 2021-12-14 RX ADMIN — GABAPENTIN 800 MG: 250 SUSPENSION ORAL at 12:18

## 2021-12-14 RX ADMIN — DEXMEDETOMIDINE HYDROCHLORIDE 1.5 MCG/KG/HR: 4 INJECTION, SOLUTION INTRAVENOUS at 03:44

## 2021-12-14 RX ADMIN — PROPOFOL 65 MCG/KG/MIN: 10 INJECTION, EMULSION INTRAVENOUS at 10:01

## 2021-12-14 RX ADMIN — ENOXAPARIN SODIUM 50 MG: 100 INJECTION SUBCUTANEOUS at 22:06

## 2021-12-14 RX ADMIN — LORAZEPAM 4 MG: 2 LIQUID ORAL at 09:24

## 2021-12-14 RX ADMIN — CHLORHEXIDINE GLUCONATE 0.12% ORAL RINSE 15 ML: 1.2 LIQUID ORAL at 09:24

## 2021-12-14 RX ADMIN — Medication 100 MCG: at 16:57

## 2021-12-14 RX ADMIN — Medication 1 PACKET: at 20:10

## 2021-12-14 RX ADMIN — QUETIAPINE FUMARATE 100 MG: 25 TABLET ORAL at 04:14

## 2021-12-14 RX ADMIN — MIDAZOLAM HYDROCHLORIDE 2 MG: 1 INJECTION, SOLUTION INTRAMUSCULAR; INTRAVENOUS at 04:37

## 2021-12-14 RX ADMIN — DEXMEDETOMIDINE HYDROCHLORIDE 1.5 MCG/KG/HR: 4 INJECTION, SOLUTION INTRAVENOUS at 13:01

## 2021-12-14 RX ADMIN — DEXMEDETOMIDINE HYDROCHLORIDE 1.5 MCG/KG/HR: 4 INJECTION, SOLUTION INTRAVENOUS at 09:56

## 2021-12-14 RX ADMIN — OXYCODONE HYDROCHLORIDE 20 MG: 5 SOLUTION ORAL at 17:59

## 2021-12-14 RX ADMIN — Medication 100 MCG: at 03:05

## 2021-12-14 RX ADMIN — DIPHENHYDRAMINE HYDROCHLORIDE 50 MG: 12.5 SOLUTION ORAL at 12:11

## 2021-12-14 RX ADMIN — DIPHENHYDRAMINE HYDROCHLORIDE 50 MG: 12.5 SOLUTION ORAL at 16:37

## 2021-12-14 RX ADMIN — HALOPERIDOL 2 MG: 2 SOLUTION ORAL at 20:11

## 2021-12-14 RX ADMIN — DEXMEDETOMIDINE HYDROCHLORIDE 1.5 MCG/KG/HR: 4 INJECTION, SOLUTION INTRAVENOUS at 16:10

## 2021-12-14 RX ADMIN — Medication 1 PACKET: at 09:24

## 2021-12-14 RX ADMIN — OXYCODONE HYDROCHLORIDE 20 MG: 5 SOLUTION ORAL at 01:28

## 2021-12-14 RX ADMIN — OXYCODONE HYDROCHLORIDE 20 MG: 5 SOLUTION ORAL at 22:06

## 2021-12-14 RX ADMIN — Medication 100 MCG: at 00:02

## 2021-12-14 RX ADMIN — GABAPENTIN 800 MG: 250 SUSPENSION ORAL at 16:42

## 2021-12-14 ASSESSMENT — ACTIVITIES OF DAILY LIVING (ADL)
ADLS_ACUITY_SCORE: 22

## 2021-12-14 NOTE — PROGRESS NOTES
"Psychiatric Progress Note  Metabolic encephalopathy exacerbated in the context of COVID-19 pneumonia,  ICU, mechanical ventilation.  Cognitive and behavioral changes with agitation and restlessness.  Major depressive disorder recurrent by history.  Narcotic dependence by history.    Mood disorder due to history of chronic back pain.     Recommendations:  Decrease Abilify to 5 mg daily  Wellbutrin 100 mg TID 0900, 1300, 1700. (HS  dose may interfere with sleep. )  Gabapentin 800 mg 3 times a day: Continue  Consider following medications to help mitigate behavioral escalation when ICU sedation is decreased.  For behavioral aggression and agitation:   Haldol 2 mg TID   To be given with Benadryl 50 mg TID  Disocntinue Seroquel: not effective in managing agitation, still requiring ICU sedation protocol.  Following for further management and adjustment of psychotropics as his care progresses.     SUBJECTIVE:  Sedated, not agitated.    MENTAL STATUS EXAMINATION:   Appearance:sedated.  On mechanical ventilation  Speech: Somnolent  Behavior: Needing restraints  Thought Process: Somnolent.  Thought content: Does not show hallucinations, delusions, paranoia, no restlessness.  Thought Formation: No loosening of associations, no flight of ideas.  Judgment: Impaired.  Insight :Impaired.  Attention : Somnolent  Memory: Depressed  Fund Of Knowledge: Depressed  Affect: Flat  Mood: somnolent  Alert and Oriented: x 1  Comprehension: Depressed   Generative thought content: None  Language: Difficult to assess, not engaging in any conversation. As noted in admission note he was conversing.  Gait and Ambulation: Not tested.  Vital signs in last 24 hours  /67   Pulse 83   Temp (!) 102.4  F (39.1  C) (Oral)   Resp 24   Ht 1.803 m (5' 11\")   Wt 89.9 kg (198 lb 3.1 oz)   SpO2 94%   BMI 27.64 kg/m                "

## 2021-12-14 NOTE — PLAN OF CARE
Problem: Risk for Delirium  Goal: Improved Behavioral Control  Outcome: No Change  Very agitated this shift, given fentanyl IV boluses multiple times, versed 2 mg IV prn few times, seroquel prn given 2x, restarted propofol gtt rate at 65 mcg/hr. Also on scheduled oxycodone and lorazepam.   Highest temp of 102.4F, tylenol given 2x this shift, ice packs applied. Blood cultures ans sputum culture done. CXR done.   Will continue to monitor.

## 2021-12-14 NOTE — PROGRESS NOTES
Respiratory Care Note    Ventilation Mode: CMV/AC  (Continuous Mandatory Ventilation/ Assist Control)  FiO2 (%): 50 %  Rate Set (breaths/minute): 18 breaths/min  Tidal Volume Set (mL): 520 mL  PEEP (cm H2O): 8 cmH2O  Oxygen Concentration (%): 50 %  Resp: 24    Pt Parameters:     PIP: 27  Pplat: 26  Vte: 529  RR: 18  Pmean: 14  MVe: 9.13  Secretions: scant clear      Reji Prince, RT

## 2021-12-14 NOTE — DISCHARGE SUMMARY
[facility] MEDICINE  DISCHARGE SUMMARY     Primary Care Physician: Sal Marie  Admission Date: 11/24/2021   Discharge Provider: Tomy Snell MD Discharge Date: 12/14/2021   Diet: NPO is intubated   Code Status: Full Code   Activity: Bedrest is intubated and sedated        Condition at Discharge: Guarded     REASON FOR PRESENTATION(See Admission Note for Details)   Shortness of breath    PRINCIPAL & ACTIVE DISCHARGE DIAGNOSES     Active Problems:    Elevated LFTs    Acute respiratory failure with hypoxia (H)    Pneumonia due to 2019 novel coronavirus    Atrial fibrillation with rapid ventricular response (H)      PENDING LABS     Unresulted Labs Ordered in the Past 30 Days of this Admission     No orders found from 10/25/2021 to 11/25/2021.            PROCEDURES ( this hospitalization only)          RECOMMENDATIONS TO OUTPATIENT PROVIDER FOR F/U VISIT     Recommendations to follow pending discharge from [facility].    DISPOSITION     [facility]    SUMMARY OF HOSPITAL COURSE:      COVID-19 pneumonia with severe acute respiratory failure.  Unfortunate 45-year-old male with a history of chronic pain, methadone use, and known Covid positive status presented with worsening shortness of breath.  Patient was admitted and started on Covid protocols and had progressive respiratory failure ultimately resulting in intubation Nov 28, 2021.  He then developed ventilator associated pneumonia and ARDS.  Patient was managed by the ICU service.  On multiple sedating medications antibiotics steroids etc.  Ultimately transferred to [facility] due to the ICU being at over capacity at [facility].  Patient's transfer was from ICU to ICU and directly managed by the ICU attendings.  Please see their notes from 12/5/2021 and 12/6/2021 for more details.  Patient was still intubated at the time of discharge.  New onset A. fib with RVR.  Amiodarone used and then  later discontinued.  Patient had negative troponins.  Elevated transaminases.  Suspect Covid related.  Trend.  History of opioid use and alcoholism.  Reportedly on Suboxone.  Moderate to severe protein calorie malnutrition.    Discharge Medications with Med changes:     Discharge Medication List as of 12/5/2021 10:42 PM      CONTINUE these medications which have NOT CHANGED    Details   albuterol (PROAIR HFA;PROVENTIL HFA;VENTOLIN HFA) 90 mcg/actuation inhaler Inhale 2 puffs into the lungs every 4 hours as needed , Historical      ARIPiprazole (ABILIFY) 5 MG tablet Take 5 mg by mouth daily, Historical      buprenorphine HCl-naloxone HCl (SUBOXONE) 8-2 MG per film Place 1 Film under the tongue daily, Historical      buPROPion (WELLBUTRIN XL) 300 MG 24 hr tablet Take 300 mg by mouth every morning, Historical      gabapentin (NEURONTIN) 400 MG capsule Take 800 mg by mouth 3 times daily, Historical      hydrOXYzine (VISTARIL) 25 MG capsule Take 25-50 mg by mouth nightly as needed for itching, Historical      multivitamin w/minerals (CERTAVITE/ANTIOXIDANTS) tablet Take 1 tablet by mouth daily, Historical      lisinopril (PRINIVIL,ZESTRIL) 40 MG tablet [LISINOPRIL (PRINIVIL,ZESTRIL) 40 MG TABLET] Take 1 tablet (40 mg total) by mouth daily., Disp-90 tablet, R-0, NormalWould you let him know he needs to establish care with a primary doctor for further refills?  Thank you      methadone (DOLOPHINE) 50 mg/5 mL solution [METHADONE (DOLOPHINE) 50 MG/5 ML SOLUTION] Take 84 mg by mouth daily., Historical                   Rationale for medication changes:              Consults       VASCULAR ACCESS ADULT IP CONSULT  SOCIAL WORK IP CONSULT  PAIN MANAGEMENT ADULT IP CONSULT  INTENSIVIST IP CONSULT  NUTRITION SERVICES ADULT IP CONSULT  PHARMACY IP CONSULT  PHARMACY TO DOSE [facility]  PHARMACY IP CONSULT    Immunizations given this encounter     Most Recent Immunizations   Administered Date(s) Administered     DT (PEDS <7y) 01/01/1998      Td (Adult), Adsorbed 01/01/1998     Td,adult,historic,unspecified 01/01/1998     Tdap (Adacel,Boostrix) 02/22/2018   Pended Date(s) Pended     Influenza Vaccine IM > 6 months Valent IIV4 (Alfuria,Fluzone) 12/15/2021           Anticoagulation Information      Recent INR results: No results for input(s): INR in the last 168 hours.  Warfarin doses (if applicable) or name of other anticoagulant:       SIGNIFICANT IMAGING FINDINGS     Results for orders placed or performed during the hospital encounter of 11/24/21   CT Chest Pulmonary Embolism w Contrast    Impression    IMPRESSION:  1.  No pulmonary embolus.  2.  COVID pneumonia.  3.  Likely reactive mediastinal lymph nodes.  4.  Hepatic steatosis.   XR Chest Port 1 View    Impression    IMPRESSION: Single AP view of the chest was obtained. Endotracheal tube tip projects over mid thoracic trachea approximately 4 cm from the felipa. Enteric tube crosses the diaphragm with the distal tip outside the field-of-view. Right upper extremity   PICC tip projects over low SVC. Stable cardiomediastinal silhouette. Bilateral basilar predominant patchy airspace pulmonary opacities, worrisome for infection including atypical infection like COVID-19, significantly worsened as compared to 11/19/2021   x-ray. No significant pleural effusion or pneumothorax.     XR Abdomen Port 1 View    Impression    IMPRESSION: Enteric suction tube with the tip and side-port within the mid gastric lumen. Nonobstructive bowel gas pattern. No definite free air. Persistent diffuse bibasilar pulmonary airspace opacities.    XR Chest Port 1 View    Impression    IMPRESSION: An endotracheal tube tip is 3.8 cm from the felipa. An NG or OG tube terminates off the field-of-view. A right PICC is present. Increased opacities at the right lung base are concerning for new pneumonia. Left lung opacities have decreased.   No pneumothorax. The heart is not well assessed.    Echocardiogram Complete   Result Value  Ref Range    LVEF  60-65%        SIGNIFICANT LABORATORY FINDINGS         Discharge Orders     No discharge procedures on file.    Examination   Physical Exam   Temp:  [98  F (36.7  C)-102.4  F (39.1  C)] 98  F (36.7  C)  Pulse:  [] 78  Resp:  [15-40] 18  BP: ()/(53-71) 90/56  MAP:  [58 mmHg-292 mmHg] 64 mmHg  Arterial Line BP: ()/(45-77) 101/50  FiO2 (%):  [40 %-50 %] 45 %  SpO2:  [90 %-100 %] 94 %  Wt Readings from Last 1 Encounters:   12/13/21 89.9 kg (198 lb 3.1 oz)             Please see EMR for more detailed significant labs, imaging, consultant notes etc.    ITomy MD, personally saw the patient today and spent greater than 30 minutes discharging this patient.    Tomy Snell MD  [facility]    CC:Sal Marie

## 2021-12-14 NOTE — PROGRESS NOTES
Care Management Follow Up    Length of Stay (days): 9    Expected Discharge Date: To be determined.      Concerns to be Addressed:   ICU level of care, due to alteration in respiratory status secondary to Covid-19, requiring oral intubation/vent support and drips.    Patient plan of care discussed at interdisciplinary rounds: Yes  Follow up from rounds/notes:   Psychiatry following to assist with medication management. MD working on antibiotic plan.     Anticipated Discharge Disposition:  Discharge goal is pending response to treatment, medical needs and mobility closer to discharge.      Anticipated Discharge Services:  To be determined by destination, patient/family preferences, medical needs and mobility status closer to the time of discharge.  Anticipated Discharge DME:  To be determined.     Patient/family educated on Medicare website which has current facility and service quality ratings:  NA  Education Provided on the Discharge Plan:  Per team  Patient/Family in Agreement with the Plan:  Yes    Referrals Placed by CM/SW:  None  Private pay costs discussed: Not applicable     Additional Information:  Patient lives with his ex-spouse, Jama, part of the time. She reports he is normally independent at baseline. If necessary, Jama would be able to provide care in her home post discharge (pending patient's needs). Previous care manager discussed all potential discharge options including LTACH, TCU/Acute Rehab, or home with home care depending on Jared's progressions and needs at discharge. CM to keep Jama updated once closer to discharge and better idea of plan is known. Transport to be determined. Care manager to follow.   Of note, patient has not been vaccinated for Covid-19 (no record on file).    Marilee Farias RN

## 2021-12-14 NOTE — PROGRESS NOTES
CRITICAL CARE PROGRESS NOTE:    Assessment/Plan:  46 year old male, unvaccinated against COVID-19, with history of polysubstance abuse and chronic pain/lower back pain on suboxone, mood disorder/depression, HTN, ROSSY mild intermittent asthma, presented with respiratory failure/COVID ARDS intubated on 11/28. Initially requiring proning/paralysis, veletri, now down trending vent needs, issues with agitation, VAP.    RESP:  Acute respiratory failure with hypoxemia due to covid ARDS. Prolonged, day 16 on vent. Approaching minimal vent settings. Failed SBT 12/13. Lots of anxiety, agitation which is going to limit performance on SBTs. Per family, when he was intubated several years ago, he had a hard time getting off the vent for similar reasons. No longer requiring paralysis, proning or veletri.     Cont LPV, Vt is ~7cc/kg IBW OK for now as no longer in ARDS.     Pplat < 30, DP goal 10-15    Titrate FiO2 for goal SpO2 88-92% or PaO2 55 mm Hg or greater.    Try decreasing PEEP to 5 today    Daily sedation vacation for SBT    With his significant anxiety, agitation, poor SBT yesterday I think getting a trach/PEG done sooner rather than later makes sense. I will discuss with the family today.     CV:  Shock, circulatory. Low grade, likely vasoplegia from sedation. Possible septic component due to VAP. Echo 11/28: LVEF 60-65%, flattened septum c/w RV pressure/vol overload, mildling decr RV function with mod dilatation. No valve issues. Afib/RVR earlier in admission resolved    MAP >65, wean NE as able    Not requiring cardene recently.    NEURO:  Encephalopathy, likely toxic/metabolic. weakness and deconditioning. Anxiety complicating picture. On suboxone for hx of opioid dependence.    Tylenol prn pain    Off propofol, versed and dex    RASS goal 0 to -1    Appreciate psych input    Cont scheduled gabapentin, wellbutrin, abilify, benadryl, oxycodone, haldol, lorazepam, seroquel, trazodone per psych    GI:  No issues,  tolerating TF    Cont TF and advance as tolerated    Bowel regimen prn    IV PPI    RENAL:  No issues, normal renal function. At risk for YAIMA.    Avoid nephrotoxins    Maintain lange    ID:  Severe covid-19 infection, improving. Proteus + e. Coli VAP dx on sputum from 12/1, pan-sensitive. New fever last night, slight increased in wbc count. At risk for another VAP as he received toci    S/p decadron, remdesivir and toci x1    Completed 10 days of cefepime/CTX for VAP    Holding off on starting new abx as his vent settings are stable and clinically he is doing OK. Low threshold to restart broad-spectrum abx if he becomes stable    HEMATOLOGIC:  hypercoag due to covid0-19. Stable mild anemia.     hgb >7    LMWH 0.5mg/kg bid per covid ICU guidelines    ENDOCRINE:  No issues    FSBG checks, insulin sliding scale/drip per ICU protocol    ICU PROPHYLAXIS:    peridex    IV PPI    LMWH 0.5mg/kg bid    CODE STATUS, DISPOSITION, FAMILY COMMUNICATION:   Updated his ex-spouse Jama yesterday.  Introduced possibility of needing trach/PEG.  Full code    Lines/Drains/Tubes:  PICC 11/28  Art line radial, 11/28  OG tube  Lange  Rectal tube  ETT 8mm (day 16 on vent)    Restraints  Progress Note  Restraint Application    I recognize that restraints are physical and/or chemical interventions intended to restrict a person's movements. Restraints are currently needed to ensure the safety of this patient and/or others. My clinical rationale appears below.    Category/Type of Restraint     Non Violent:  Soft limb restraint x2  --  Behavior  Pulling at tubes/lines  --  Root Cause of the Behavior  Sedation/intubation  --  Less-Restrictive Measures that Failed  Non Violent Measures:  Close Observation  --  Response to the Restraint  Patient unable to pull at tubes/lines  --  Criteria for Release from the Restraint  Patient calm and off sedation    Morales Torres MD (Avi)  [facility] Pulmonary & Critical Care  Pager [phone]  [phone]  Clinic [phone]  Fax [phone]     Overnight events:  No major issues overnight however he is back on propofol today  Seen by psych, new psych meds added.    FiO2 0.5, PEEP 8.  Pplat 26    NE @0.04  Dex/fent/prop for sedation     Febrile to 102 overnight -> blood, sputum cultures done.  No change in vent settings, FiO2 needs -> no abx started    Subjective:  As above  Denies pain    Objective:  Physical Exam:  Vent settings for last 24 hours:  Ventilation Mode: CMV/AC  (Continuous Mandatory Ventilation/ Assist Control)  FiO2 (%): 50 %  Rate Set (breaths/minute): 18 breaths/min  Tidal Volume Set (mL): 520 mL  PEEP (cm H2O): 8 cmH2O  Oxygen Concentration (%): 50 %  Resp: 18      /69   Pulse 75   Temp 98.8  F (37.1  C) (Oral)   Resp 18   Ht 1.803 m (5' 11\")   Wt 89.9 kg (198 lb 3.1 oz)   SpO2 96%   BMI 27.64 kg/m      Intake/Output last 3 shifts:  I/O last 3 completed shifts:  In: 3868.25 [I.V.:1586.25; NG/GT:2102]  Out: 3915 [Urine:3065; Emesis/NG output:850]  Intake/Output this shift:  I/O this shift:  In: 511.66 [I.V.:262.66; NG/GT:249]  Out: 1250 [Urine:1250]    Physical Exam  Gen: intubated, sedated. Awakens to voice, follows some commands.  HEENT: no OP lesions, no TITA  CV: RRR, no m/g/r  Resp: clear ant no w/r/r   Abd: soft, nontender, BS+  Neuro: PERRL, nonfocal  Ext: no edema    LAB:  Recent Labs   Lab 12/14/21 0425   WBC 11.4*   HGB 11.3*   HCT 35.8*        Recent Labs   Lab 12/14/21  0425 12/13/21  0428 12/12/21  0356 12/09/21  0413 12/08/21  0401    141 141   < > 141   CO2 28 31 35*   < > 34*   BUN 15 14 14   < > 26*   ALT  --   --   --   --  113*   AST  --   --   --   --  44*    < > = values in this interval not displayed.     Micro  PCT normal on 12/5  Sputum 12/1: proteus + e. Coli, pan-sensitive.   Blood 12/14 pending  Sputum 12/14 pending  Urine, UA 12/14 pending    No current outpatient medications on file.       Critical care attestation: 45 minutes " spent managing the following issues: acute respiratory failure requiring intubation/IMV, circulatory shock requiring continuous vasopressor infusions, severe covid-19 infection with ARDS, hypoxemic resp failure/prolonged. Toxic/metabolic encephalopathy. High risk for organ deterioration and death requiring ICU level care.

## 2021-12-15 LAB
ANION GAP SERPL CALCULATED.3IONS-SCNC: 3 MMOL/L (ref 5–18)
BACTERIA UR CULT: NO GROWTH
BASE EXCESS BLDV CALC-SCNC: 6.9 MMOL/L
BUN SERPL-MCNC: 18 MG/DL (ref 8–22)
CALCIUM SERPL-MCNC: 8.7 MG/DL (ref 8.5–10.5)
CHLORIDE BLD-SCNC: 103 MMOL/L (ref 98–107)
CO2 SERPL-SCNC: 32 MMOL/L (ref 22–31)
CREAT SERPL-MCNC: 0.63 MG/DL (ref 0.7–1.3)
ERYTHROCYTE [DISTWIDTH] IN BLOOD BY AUTOMATED COUNT: 15 % (ref 10–15)
GFR SERPL CREATININE-BSD FRML MDRD: >90 ML/MIN/1.73M2
GLUCOSE BLD-MCNC: 120 MG/DL (ref 70–125)
GLUCOSE BLDC GLUCOMTR-MCNC: 128 MG/DL (ref 70–99)
HCO3 BLDV-SCNC: 30 MMOL/L (ref 24–30)
HCT VFR BLD AUTO: 32 % (ref 40–53)
HGB BLD-MCNC: 10.2 G/DL (ref 13.3–17.7)
LACTATE SERPL-SCNC: 0.7 MMOL/L (ref 0.7–2)
MAGNESIUM SERPL-MCNC: 1.9 MG/DL (ref 1.8–2.6)
MCH RBC QN AUTO: 30.4 PG (ref 26.5–33)
MCHC RBC AUTO-ENTMCNC: 31.9 G/DL (ref 31.5–36.5)
MCV RBC AUTO: 95 FL (ref 78–100)
OXYHGB MFR BLDV: 77 % (ref 70–75)
PCO2 BLDV: 50 MM HG (ref 35–50)
PH BLDV: 7.41 [PH] (ref 7.35–7.45)
PLATELET # BLD AUTO: 142 10E3/UL (ref 150–450)
PO2 BLDV: 42 MM HG (ref 25–47)
POTASSIUM BLD-SCNC: 4 MMOL/L (ref 3.5–5)
RBC # BLD AUTO: 3.36 10E6/UL (ref 4.4–5.9)
SAO2 % BLDV: 78.6 % (ref 70–75)
SODIUM SERPL-SCNC: 138 MMOL/L (ref 136–145)
TRIGL SERPL-MCNC: 127 MG/DL
WBC # BLD AUTO: 9.1 10E3/UL (ref 4–11)

## 2021-12-15 PROCEDURE — 200N000001 HC R&B ICU

## 2021-12-15 PROCEDURE — 87040 BLOOD CULTURE FOR BACTERIA: CPT | Performed by: INTERNAL MEDICINE

## 2021-12-15 PROCEDURE — 94660 CPAP INITIATION&MGMT: CPT

## 2021-12-15 PROCEDURE — 250N000009 HC RX 250: Performed by: INTERNAL MEDICINE

## 2021-12-15 PROCEDURE — 5A1955Z RESPIRATORY VENTILATION, GREATER THAN 96 CONSECUTIVE HOURS: ICD-10-PCS | Performed by: INTERNAL MEDICINE

## 2021-12-15 PROCEDURE — 99291 CRITICAL CARE FIRST HOUR: CPT | Performed by: INTERNAL MEDICINE

## 2021-12-15 PROCEDURE — 250N000013 HC RX MED GY IP 250 OP 250 PS 637: Performed by: NURSE PRACTITIONER

## 2021-12-15 PROCEDURE — 94003 VENT MGMT INPAT SUBQ DAY: CPT

## 2021-12-15 PROCEDURE — 250N000011 HC RX IP 250 OP 636: Performed by: INTERNAL MEDICINE

## 2021-12-15 PROCEDURE — 999N000157 HC STATISTIC RCP TIME EA 10 MIN

## 2021-12-15 PROCEDURE — 85027 COMPLETE CBC AUTOMATED: CPT | Performed by: INTERNAL MEDICINE

## 2021-12-15 PROCEDURE — 5A09357 ASSISTANCE WITH RESPIRATORY VENTILATION, LESS THAN 24 CONSECUTIVE HOURS, CONTINUOUS POSITIVE AIRWAY PRESSURE: ICD-10-PCS | Performed by: INTERNAL MEDICINE

## 2021-12-15 PROCEDURE — 83735 ASSAY OF MAGNESIUM: CPT | Performed by: INTERNAL MEDICINE

## 2021-12-15 PROCEDURE — 82805 BLOOD GASES W/O2 SATURATION: CPT | Performed by: INTERNAL MEDICINE

## 2021-12-15 PROCEDURE — 83605 ASSAY OF LACTIC ACID: CPT | Performed by: INTERNAL MEDICINE

## 2021-12-15 PROCEDURE — 250N000013 HC RX MED GY IP 250 OP 250 PS 637: Performed by: INTERNAL MEDICINE

## 2021-12-15 PROCEDURE — 80048 BASIC METABOLIC PNL TOTAL CA: CPT | Performed by: INTERNAL MEDICINE

## 2021-12-15 PROCEDURE — 84478 ASSAY OF TRIGLYCERIDES: CPT | Performed by: INTERNAL MEDICINE

## 2021-12-15 RX ORDER — HYDROMORPHONE HYDROCHLORIDE 1 MG/ML
.5-1 INJECTION, SOLUTION INTRAMUSCULAR; INTRAVENOUS; SUBCUTANEOUS EVERY 4 HOURS PRN
Status: DISCONTINUED | OUTPATIENT
Start: 2021-12-15 | End: 2021-12-31 | Stop reason: HOSPADM

## 2021-12-15 RX ORDER — LORAZEPAM 2 MG/ML
2 INJECTION INTRAMUSCULAR EVERY 6 HOURS
Status: DISCONTINUED | OUTPATIENT
Start: 2021-12-15 | End: 2021-12-16

## 2021-12-15 RX ORDER — HALOPERIDOL 5 MG/ML
2 INJECTION INTRAMUSCULAR ONCE
Status: DISCONTINUED | OUTPATIENT
Start: 2021-12-15 | End: 2021-12-15

## 2021-12-15 RX ORDER — HALOPERIDOL 5 MG/ML
5 INJECTION INTRAMUSCULAR EVERY 5 MIN PRN
Status: DISCONTINUED | OUTPATIENT
Start: 2021-12-15 | End: 2021-12-31 | Stop reason: HOSPADM

## 2021-12-15 RX ORDER — HALOPERIDOL 5 MG/ML
5 INJECTION INTRAMUSCULAR EVERY 6 HOURS PRN
Status: DISCONTINUED | OUTPATIENT
Start: 2021-12-15 | End: 2021-12-15

## 2021-12-15 RX ORDER — HALOPERIDOL 5 MG/ML
5 INJECTION INTRAMUSCULAR ONCE
Status: COMPLETED | OUTPATIENT
Start: 2021-12-15 | End: 2021-12-15

## 2021-12-15 RX ADMIN — ACETAMINOPHEN 650 MG: 650 SUPPOSITORY RECTAL at 22:29

## 2021-12-15 RX ADMIN — Medication 100 MCG: at 09:58

## 2021-12-15 RX ADMIN — OXYCODONE HYDROCHLORIDE 20 MG: 5 SOLUTION ORAL at 09:35

## 2021-12-15 RX ADMIN — DEXMEDETOMIDINE HYDROCHLORIDE 1.5 MCG/KG/HR: 4 INJECTION, SOLUTION INTRAVENOUS at 00:58

## 2021-12-15 RX ADMIN — DEXMEDETOMIDINE HYDROCHLORIDE 1.5 MCG/KG/HR: 4 INJECTION, SOLUTION INTRAVENOUS at 06:48

## 2021-12-15 RX ADMIN — Medication 175 MCG/HR: at 06:13

## 2021-12-15 RX ADMIN — DEXMEDETOMIDINE HYDROCHLORIDE 1.4 MCG/KG/HR: 4 INJECTION, SOLUTION INTRAVENOUS at 19:00

## 2021-12-15 RX ADMIN — PROPOFOL 40 MCG/KG/MIN: 10 INJECTION, EMULSION INTRAVENOUS at 10:20

## 2021-12-15 RX ADMIN — GABAPENTIN 800 MG: 250 SUSPENSION ORAL at 08:17

## 2021-12-15 RX ADMIN — DEXMEDETOMIDINE HYDROCHLORIDE 1.4 MCG/KG/HR: 4 INJECTION, SOLUTION INTRAVENOUS at 15:42

## 2021-12-15 RX ADMIN — BUPROPION HYDROCHLORIDE 100 MG: 100 TABLET, FILM COATED ORAL at 08:16

## 2021-12-15 RX ADMIN — HALOPERIDOL 2 MG: 2 SOLUTION ORAL at 13:02

## 2021-12-15 RX ADMIN — HALOPERIDOL LACTATE 5 MG: 5 INJECTION, SOLUTION INTRAMUSCULAR at 15:48

## 2021-12-15 RX ADMIN — OXYCODONE HYDROCHLORIDE 20 MG: 5 SOLUTION ORAL at 02:45

## 2021-12-15 RX ADMIN — ENOXAPARIN SODIUM 50 MG: 100 INJECTION SUBCUTANEOUS at 22:56

## 2021-12-15 RX ADMIN — OXYCODONE HYDROCHLORIDE 20 MG: 5 SOLUTION ORAL at 06:13

## 2021-12-15 RX ADMIN — HALOPERIDOL 2 MG: 2 SOLUTION ORAL at 08:16

## 2021-12-15 RX ADMIN — PROPOFOL 25 MCG/KG/MIN: 10 INJECTION, EMULSION INTRAVENOUS at 02:45

## 2021-12-15 RX ADMIN — Medication 40 MG: at 08:18

## 2021-12-15 RX ADMIN — TRAZODONE HYDROCHLORIDE 75 MG: 50 TABLET ORAL at 08:19

## 2021-12-15 RX ADMIN — BUPROPION HYDROCHLORIDE 100 MG: 100 TABLET, FILM COATED ORAL at 12:10

## 2021-12-15 RX ADMIN — LORAZEPAM 4 MG: 2 LIQUID ORAL at 08:18

## 2021-12-15 RX ADMIN — ARIPIPRAZOLE 5 MG: 1 SOLUTION ORAL at 08:17

## 2021-12-15 RX ADMIN — LORAZEPAM 4 MG: 2 LIQUID ORAL at 12:09

## 2021-12-15 RX ADMIN — TRAZODONE HYDROCHLORIDE 75 MG: 50 TABLET ORAL at 13:03

## 2021-12-15 RX ADMIN — OXYCODONE HYDROCHLORIDE 20 MG: 5 SOLUTION ORAL at 13:02

## 2021-12-15 RX ADMIN — CHLORHEXIDINE GLUCONATE 0.12% ORAL RINSE 15 ML: 1.2 LIQUID ORAL at 08:19

## 2021-12-15 RX ADMIN — Medication 1 PACKET: at 08:19

## 2021-12-15 RX ADMIN — ENOXAPARIN SODIUM 50 MG: 100 INJECTION SUBCUTANEOUS at 09:36

## 2021-12-15 RX ADMIN — DEXMEDETOMIDINE HYDROCHLORIDE 1.5 MCG/KG/HR: 4 INJECTION, SOLUTION INTRAVENOUS at 12:57

## 2021-12-15 RX ADMIN — GABAPENTIN 800 MG: 250 SUSPENSION ORAL at 12:10

## 2021-12-15 RX ADMIN — LORAZEPAM 4 MG: 2 LIQUID ORAL at 04:09

## 2021-12-15 RX ADMIN — DIPHENHYDRAMINE HYDROCHLORIDE 50 MG: 12.5 SOLUTION ORAL at 08:18

## 2021-12-15 RX ADMIN — DEXMEDETOMIDINE HYDROCHLORIDE 1.5 MCG/KG/HR: 4 INJECTION, SOLUTION INTRAVENOUS at 04:09

## 2021-12-15 RX ADMIN — HALOPERIDOL LACTATE 5 MG: 5 INJECTION, SOLUTION INTRAMUSCULAR at 22:38

## 2021-12-15 RX ADMIN — LORAZEPAM 2 MG: 2 INJECTION INTRAMUSCULAR; INTRAVENOUS at 22:56

## 2021-12-15 RX ADMIN — DIPHENHYDRAMINE HYDROCHLORIDE 50 MG: 12.5 SOLUTION ORAL at 12:10

## 2021-12-15 RX ADMIN — DEXMEDETOMIDINE HYDROCHLORIDE 1.5 MCG/KG/HR: 4 INJECTION, SOLUTION INTRAVENOUS at 09:50

## 2021-12-15 RX ADMIN — MIDAZOLAM HYDROCHLORIDE 2 MG: 1 INJECTION, SOLUTION INTRAMUSCULAR; INTRAVENOUS at 10:00

## 2021-12-15 RX ADMIN — DEXMEDETOMIDINE HYDROCHLORIDE 1.5 MCG/KG/HR: 4 INJECTION, SOLUTION INTRAVENOUS at 22:18

## 2021-12-15 ASSESSMENT — ACTIVITIES OF DAILY LIVING (ADL)
ADLS_ACUITY_SCORE: 22

## 2021-12-15 ASSESSMENT — MIFFLIN-ST. JEOR: SCORE: 1787.13

## 2021-12-15 NOTE — PROGRESS NOTES
Respiratory Care Note    Vent day #17    Ventilation Mode: CMV/AC  (Continuous Mandatory Ventilation/ Assist Control)  FiO2 (%): 40 %  Rate Set (breaths/minute): 18 breaths/min  Tidal Volume Set (mL): 520 mL  PEEP (cm H2O): 5 cmH2O  Oxygen Concentration (%): 40 %  Resp: 18    Pt Parameters:     PIP: 26  Pplat: unable to obtain.  Secretions: small yellow    Notes/plan: no recent ABG, continue to wean FiO2 as able. Weaning on hold due to pt agitation.       Reji Prince, RT

## 2021-12-15 NOTE — PROGRESS NOTES
CRITICAL CARE PROGRESS NOTE:    Assessment/Plan:  46 year old male, unvaccinated against COVID-19, with history of polysubstance abuse and chronic pain/lower back pain on suboxone, mood disorder/depression, HTN, ROSSY mild intermittent asthma, presented with respiratory failure/COVID ARDS intubated on 11/28. Initially requiring proning/paralysis, veletri, now down trending vent needs, issues with agitation, VAP.    RESP:  Acute respiratory failure with hypoxemia due to covid ARDS. Prolonged, day 16 on vent. On minimal vent settings, failing SBTs the last couple of days to extreme anxiety, agitation with high RR, HR and diaphoresis. Per family, when he was intubated several years ago, he had a hard time getting off the vent for similar reasons. No longer requiring paralysis, proning or veletri.     Cont LPV, Vt is ~7cc/kg IBW OK for now as no longer in ARDS.     Pplat < 30, DP goal 10-15    Titrate FiO2 for goal SpO2 88-92% or PaO2 55 mm Hg or greater.    Daily sedation vacation for SBT    With his significant anxiety, agitation, poor SBT yesterday I think getting a trach/PEG done sooner rather than later makes sense. I will discuss with the family today.     CV:  Shock, circulatory. Low grade, likely vasoplegia from sedation. Possible septic component due to VAP. Echo 11/28: LVEF 60-65%, flattened septum c/w RV pressure/vol overload, mildling decr RV function with mod dilatation. No valve issues. Afib/RVR earlier in admission resolved    MAP >65, wean NE as able    Not requiring cardene recently - discontinued order    NEURO:  Encephalopathy, likely toxic/metabolic. weakness and deconditioning. Anxiety complicating picture. On suboxone for hx of opioid dependence.    Tylenol prn pain    Off propofol, dex, prop. Versed off. Does poorly when sedation lightened.     RASS goal 0 to -1    Appreciate psych input    Cont scheduled gabapentin, wellbutrin, abilify, benadryl, oxycodone, haldol, lorazepam, seroquel, trazodone  per psych    GI:  No issues, tolerating TF    Cont TF and advance as tolerated    Bowel regimen prn    IV PPI    RENAL:  No issues, normal renal function. At risk for YAIMA.    Avoid nephrotoxins    Maintain lange    ID:  Severe covid-19 infection, improving. Proteus + e. Coli VAP dx on sputum from 12/1, pan-sensitive. New fever 12/14 AM, slight increased in wbc count. At risk for another VAP as he received toci. Cultures neg so far.    S/p decadron, remdesivir and toci x1    Completed 10 days of cefepime/CTX for VAP    Holding off on starting new abx as his vent settings are stable and clinically he is doing OK. Low threshold to restart broad-spectrum abx if he becomes stable    covid recovered as of today.    HEMATOLOGIC:  hypercoag due to covid-19. Stable mild anemia.     hgb >7    LMWH 0.5mg/kg bid per covid ICU guidelines    ENDOCRINE:  No issues    Not checking FSBG anymore.     ICU PROPHYLAXIS:    peridex    IV PPI    LMWH 0.5mg/kg bid    CODE STATUS, DISPOSITION, FAMILY COMMUNICATION:   Will update his ex-wife Jama later today.  I would favor proceeding with trach/PEG rather than attempting more SBT's this week. Will see what she thinks.     Lines/Drains/Tubes:  PICC 11/28  OG tube  Lange  Rectal tube  ETT 8mm (day 17 on vent)    Restraints  Progress Note  Restraint Application    I recognize that restraints are physical and/or chemical interventions intended to restrict a person's movements. Restraints are currently needed to ensure the safety of this patient and/or others. My clinical rationale appears below.    Category/Type of Restraint     Non Violent:  Soft limb restraint x2  --  Behavior  Pulling at tubes/lines  --  Root Cause of the Behavior  Sedation/intubation  --  Less-Restrictive Measures that Failed  Non Violent Measures:  Close Observation  --  Response to the Restraint  Patient unable to pull at tubes/lines  --  Criteria for Release from the Restraint  Patient calm and off sedation    Morales  "(Geo) MD Melissa  [facility] Pulmonary & Critical Care  Pager [phone]  Clinic [phone]  Fax [phone]     Overnight events:  No major issues overnight  Extremely agitated when sedation lightened this morning, RR 40s, HR 140s, diaphoretic.    Vent settings minimal.     Dex/fent/prop for sedation.  NE @0.02  Good UOP.     Subjective:  As above  Denies pain    Objective:  Physical Exam:  Vent settings for last 24 hours:  Ventilation Mode: CMV/AC  (Continuous Mandatory Ventilation/ Assist Control)  FiO2 (%): 40 %  Rate Set (breaths/minute): 18 breaths/min  Tidal Volume Set (mL): 520 mL  PEEP (cm H2O): 5 cmH2O  Oxygen Concentration (%): 50 %  Resp: 26      /60   Pulse 119   Temp 100.2  F (37.9  C) (Oral)   Resp 26   Ht 1.803 m (5' 11\")   Wt 89 kg (196 lb 3.4 oz)   SpO2 (!) 87%   BMI 27.37 kg/m      Intake/Output last 3 shifts:  I/O last 3 completed shifts:  In: 2017.6 [I.V.:955.6; NG/GT:1062]  Out: 2095 [Urine:2095]  Intake/Output this shift:  I/O this shift:  In: 1458 [I.V.:614; NG/GT:844]  Out: 850 [Urine:850]    Physical Exam  Gen: intubated, sedated. Awakens to voice, follows some commands.  HEENT: no OP lesions, no TITA  CV: RRR, no m/g/r  Resp: clear ant no w/r/r   Abd: soft, nontender, BS+  Neuro: PERRL, nonfocal  Ext: no edema    LAB:  Recent Labs   Lab 12/15/21  0431   WBC 9.1   HGB 10.2*   HCT 32.0*   *     Recent Labs   Lab 12/15/21  0431 12/14/21 0425 12/13/21 0428    137 141   CO2 32* 28 31   BUN 18 15 14     Micro  PCT normal on 12/5  Sputum 12/1: proteus + e. Coli, pan-sensitive.   Blood 12/14 pending (no gm  stain available)  Sputum 12/14 pending, gram stain neg  Urine, UA 12/14 NGTD    No current outpatient medications on file.       Critical care attestation: 45 minutes spent managing the following issues: acute respiratory failure requiring intubation/IMV, circulatory shock requiring continuous vasopressor infusions, severe covid-19 infection " with ARDS, hypoxemic resp failure/prolonged. Toxic/metabolic encephalopathy. High risk for organ deterioration and death requiring ICU level care.

## 2021-12-15 NOTE — PROGRESS NOTES
RT PROGRESS NOTE    VENT DAY# 18    CURRENT SETTINGS:   Ventilation Mode: CMV/AC  (Continuous Mandatory Ventilation/ Assist Control)  FiO2 (%): 40 %  Rate Set (breaths/minute): 18 breaths/min  Tidal Volume Set (mL): 520 mL  PEEP (cm H2O): 5 cmH2O  Oxygen Concentration (%): 50 %  Resp: 26      PATIENT PARAMETERS:  PIP 31  Pplat:  28  Pmean:  9.4  Compliance: 37  SBT: Yes, cpap 5 ps 5 40% x   Minutes, rsbi 65     Secretions:  Thick white  02 Sats:  96%    ETT SIZE 8.0 Secured at 26 cm at teeth/gums    Respiratory Medications: none         NOTE / SHIFT SUMMARY:   Pt extubated at 1515, placed on bipap after extubation 12/6/, RR 14, fio2 45   RR 32-45, sats 97%.  Pt had positive leak test when cuff deflated.  Monitor resp status closely.    Bailey SELBY Patricia, RT

## 2021-12-15 NOTE — PLAN OF CARE
Pt tolerating turns and cares without decrease in oxygen levels. O2 sats stable on 40% FIO2. Pt has been intermittently noted to be found quietly lying in bed with eyes open since 0330 this am. Gtts--Precedex at 1.5, Fentanyl at 175 and propofol at 25mcqs. Pt received scheduled medications but no additional prn sedation. High temp for night was 99.6 at 0400 this am.   Problem: Adult Inpatient Plan of Care  Goal: Absence of Hospital-Acquired Illness or Injury  Intervention: Identify and Manage Fall Risk       Problem: Device-Related Complication Risk (Mechanical Ventilation, Invasive)  Goal: Optimal Device Function  Intervention: Optimize Device Care and Function    Problem: Risk for Delirium  Goal: Optimal Coping  Outcome: Improving   Maddison Teran RN

## 2021-12-15 NOTE — PLAN OF CARE
Problem: Adult Inpatient Plan of Care  Goal: Absence of Hospital-Acquired Illness or Injury  Outcome: No Change  Intervention: Identify and Manage Fall Risk  Recent Flowsheet Documentation  Taken 12/14/2021 1600 by Sara Morales RN  Safety Promotion/Fall Prevention:   bed alarm on   fall prevention program maintained  Taken 12/14/2021 1200 by Sara Morales RN  Safety Promotion/Fall Prevention:   bed alarm on   fall prevention program maintained  Taken 12/14/2021 0800 by Sara Morales RN  Safety Promotion/Fall Prevention:   bed alarm on   fall prevention program maintained  Intervention: Prevent Skin Injury  Recent Flowsheet Documentation  Taken 12/14/2021 1600 by Sara Morales RN  Body Position:   turned   left  Taken 12/14/2021 1400 by Sara Morales RN  Body Position:   turned   right  Taken 12/14/2021 1200 by Sara Morales RN  Body Position:   turned   supine  Taken 12/14/2021 1000 by Sara Morales RN  Body Position:   turned   left  Taken 12/14/2021 0800 by Sara Morales RN  Body Position:   turned   right  Intervention: Prevent and Manage VTE (Venous Thromboembolism) Risk  Recent Flowsheet Documentation  Taken 12/14/2021 1600 by Sara Morales RN  VTE Prevention/Management:   pneumatic compression device   anticoagulant therapy maintained  Taken 12/14/2021 1200 by Sara Morales RN  VTE Prevention/Management:   pneumatic compression device   anticoagulant therapy maintained  Taken 12/14/2021 0800 by Sara Morales RN  VTE Prevention/Management:   pneumatic compression device   anticoagulant therapy maintained  Intervention: Prevent Infection  Recent Flowsheet Documentation  Taken 12/14/2021 1600 by Sara Morales RN  Infection Prevention:   environmental surveillance performed   equipment surfaces disinfected   hand hygiene promoted   personal protective equipment utilized  Taken 12/14/2021 1200 by Sara Morales RN  Infection Prevention:   environmental surveillance performed   equipment surfaces disinfected    hand hygiene promoted   personal protective equipment utilized  Taken 12/14/2021 0800 by Sara Morales, RN  Infection Prevention:   environmental surveillance performed   equipment surfaces disinfected   hand hygiene promoted   personal protective equipment utilized     Problem: Inability to Wean (Mechanical Ventilation, Invasive)  Goal: Mechanical Ventilation Liberation  Outcome: No Change   Pt had fevers overnight. Pan cultured awaiting results. Psych added several medications to help with anxiety. Plan is for SBT tomorrow

## 2021-12-15 NOTE — PLAN OF CARE
Problem: Adult Inpatient Plan of Care  Goal: Plan of Care Review  Outcome: Improving  Goal: Patient-Specific Goal (Individualized)  Outcome: Improving  Goal: Absence of Hospital-Acquired Illness or Injury  Outcome: Improving  Intervention: Identify and Manage Fall Risk  Recent Flowsheet Documentation  Taken 12/15/2021 0800 by Sara Morales RN  Safety Promotion/Fall Prevention: bed alarm on  Intervention: Prevent Skin Injury  Recent Flowsheet Documentation  Taken 12/15/2021 0800 by Sara Morales RN  Body Position:   turned   left  Intervention: Prevent and Manage VTE (Venous Thromboembolism) Risk  Recent Flowsheet Documentation  Taken 12/15/2021 0800 by Sara Morales RN  VTE Prevention/Management:   pneumatic compression device   anticoagulant therapy maintained  Intervention: Prevent Infection  Recent Flowsheet Documentation  Taken 12/15/2021 0800 by Sara Morales RN  Infection Prevention:   environmental surveillance performed   equipment surfaces disinfected   personal protective equipment utilized  Goal: Optimal Comfort and Wellbeing  Outcome: Improving  Goal: Readiness for Transition of Care  Outcome: Improving     Problem: Risk for Delirium  Goal: Optimal Coping  Outcome: Improving  Goal: Improved Behavioral Control  Outcome: Improving  Goal: Improved Attention and Thought Clarity  Outcome: Improving  Goal: Improved Sleep  Outcome: Improving     Problem: Communication Impairment (Mechanical Ventilation, Invasive)  Goal: Effective Communication  Outcome: Improving     Problem: Device-Related Complication Risk (Mechanical Ventilation, Invasive)  Goal: Optimal Device Function  Outcome: Improving  Intervention: Optimize Device Care and Function  Recent Flowsheet Documentation  Taken 12/15/2021 0800 by Sara Morales RN  Airway Safety Measures:   all equipment/monitors on and audible   manual resuscitator/mask/valve in room     Problem: Inability to Wean (Mechanical Ventilation, Invasive)  Goal: Mechanical  Ventilation Liberation  Outcome: Improving  Intervention: Promote Extubation and Mechanical Ventilation Liberation  Recent Flowsheet Documentation  Taken 12/15/2021 0800 by Sara Morales RN  Medication Review/Management: high-risk medications identified     Problem: Nutrition Impairment (Mechanical Ventilation, Invasive)  Goal: Optimal Nutrition Delivery  Outcome: Improving     Problem: Skin and Tissue Injury (Mechanical Ventilation, Invasive)  Goal: Absence of Device-Related Skin and Tissue Injury  Outcome: Improving     Problem: Ventilator-Induced Lung Injury (Mechanical Ventilation, Invasive)  Goal: Absence of Ventilator-Induced Lung Injury  Outcome: Improving  Intervention: Prevent Ventilator-Associated Pneumonia  Recent Flowsheet Documentation  Taken 12/15/2021 0800 by Sara Morales, RN  VAP Prevention Bundle:   HOB elevation maintained   oral care regularly provided   readiness to extubate assessed   sedation interruption performed   stress ulcer prophylaxis provided   VTE prophylaxis provided   vent circuit breaks minimized  Oral Care:   lip lubricant applied   swabbed with antiseptic solution  Head of Bed (HOB) Positioning: HOB at 20-30 degrees  VAP Prevention Contraindications: per provider order  VAP Prevention Measures: completed     Problem: ARDS (Acute Respiratory Distress Syndrome)  Goal: Effective Oxygenation  Outcome: Improving     Problem: Dysrhythmia (Heart Failure)  Goal: Stable Heart Rate and Rhythm  Outcome: Improving     Problem: Fluid Imbalance (Heart Failure)  Goal: Fluid Balance  Outcome: Improving   Attempted SBT this am but pt's anxiety with 's RR 40's and HTN prohibited him from even getting to SBT after turning off sedation. Attempted again this afternoon with ex-wife at bedside.

## 2021-12-16 ENCOUNTER — APPOINTMENT (OUTPATIENT)
Dept: RADIOLOGY | Facility: HOSPITAL | Age: 46
End: 2021-12-16
Attending: INTERNAL MEDICINE
Payer: COMMERCIAL

## 2021-12-16 LAB
ATRIAL RATE - MUSE: 96 BPM
BACTERIA SPEC CULT: ABNORMAL
BASE EXCESS BLDA CALC-SCNC: 2.7 MMOL/L
COHGB MFR BLD: 97.4 % (ref 96–97)
DIASTOLIC BLOOD PRESSURE - MUSE: NORMAL MMHG
ENTEROCOCCUS FAECALIS: NOT DETECTED
ENTEROCOCCUS FAECIUM: NOT DETECTED
GLUCOSE BLDC GLUCOMTR-MCNC: 110 MG/DL (ref 70–99)
GLUCOSE BLDC GLUCOMTR-MCNC: 119 MG/DL (ref 70–99)
GLUCOSE BLDC GLUCOMTR-MCNC: 126 MG/DL (ref 70–99)
GLUCOSE BLDC GLUCOMTR-MCNC: 132 MG/DL (ref 70–99)
HCO3 BLD-SCNC: 26 MMOL/L (ref 23–29)
INTERPRETATION ECG - MUSE: NORMAL
LISTERIA SPECIES (DETECTED/NOT DETECTED): NOT DETECTED
O2/TOTAL GAS SETTING VFR VENT: 50 %
OXYHGB MFR BLD: 95.2 % (ref 96–97)
P AXIS - MUSE: 47 DEGREES
PCO2 BLD: 45 MM HG (ref 35–45)
PEEP: 8 CM H2O
PH BLD: 7.39 [PH] (ref 7.37–7.44)
PO2 BLD: 86 MM HG (ref 80–90)
PR INTERVAL - MUSE: 176 MS
QRS DURATION - MUSE: 98 MS
QT - MUSE: 392 MS
QTC - MUSE: 495 MS
R AXIS - MUSE: -12 DEGREES
RATE: 18 RR/MIN
STAPHYLOCOCCUS AUREUS: NOT DETECTED
STAPHYLOCOCCUS EPIDERMIDIS: NOT DETECTED
STAPHYLOCOCCUS LUGDUNENSIS: NOT DETECTED
STAPHYLOCOCCUS SPECIES: NOT DETECTED
STREPTOCOCCUS AGALACTIAE: NOT DETECTED
STREPTOCOCCUS ANGINOSUS GROUP: NOT DETECTED
STREPTOCOCCUS PNEUMONIAE: NOT DETECTED
STREPTOCOCCUS PYOGENES: NOT DETECTED
STREPTOCOCCUS SPECIES: NOT DETECTED
SYSTOLIC BLOOD PRESSURE - MUSE: NORMAL MMHG
T AXIS - MUSE: 15 DEGREES
TEMPERATURE: 37 DEGREES C
VENTILATION MODE: ABNORMAL
VENTILATOR TIDAL VOLUME: 520 ML
VENTRICULAR RATE- MUSE: 96 BPM

## 2021-12-16 PROCEDURE — 99291 CRITICAL CARE FIRST HOUR: CPT | Mod: 25 | Performed by: INTERNAL MEDICINE

## 2021-12-16 PROCEDURE — 250N000011 HC RX IP 250 OP 636: Performed by: INTERNAL MEDICINE

## 2021-12-16 PROCEDURE — 250N000013 HC RX MED GY IP 250 OP 250 PS 637: Performed by: INTERNAL MEDICINE

## 2021-12-16 PROCEDURE — 74018 RADEX ABDOMEN 1 VIEW: CPT

## 2021-12-16 PROCEDURE — 999N000065 XR CHEST PORT 1 VIEW: Mod: 76

## 2021-12-16 PROCEDURE — 258N000003 HC RX IP 258 OP 636: Performed by: NURSE PRACTITIONER

## 2021-12-16 PROCEDURE — 0BH17EZ INSERTION OF ENDOTRACHEAL AIRWAY INTO TRACHEA, VIA NATURAL OR ARTIFICIAL OPENING: ICD-10-PCS | Performed by: INTERNAL MEDICINE

## 2021-12-16 PROCEDURE — 999N000065 XR CHEST PORT 1 VIEW

## 2021-12-16 PROCEDURE — 250N000009 HC RX 250: Performed by: INTERNAL MEDICINE

## 2021-12-16 PROCEDURE — 93005 ELECTROCARDIOGRAM TRACING: CPT

## 2021-12-16 PROCEDURE — 99232 SBSQ HOSP IP/OBS MODERATE 35: CPT | Performed by: NURSE PRACTITIONER

## 2021-12-16 PROCEDURE — 94002 VENT MGMT INPAT INIT DAY: CPT

## 2021-12-16 PROCEDURE — 999N000157 HC STATISTIC RCP TIME EA 10 MIN

## 2021-12-16 PROCEDURE — 200N000001 HC R&B ICU

## 2021-12-16 PROCEDURE — 36600 WITHDRAWAL OF ARTERIAL BLOOD: CPT

## 2021-12-16 PROCEDURE — 93010 ELECTROCARDIOGRAM REPORT: CPT | Performed by: INTERNAL MEDICINE

## 2021-12-16 PROCEDURE — 250N000011 HC RX IP 250 OP 636: Performed by: NURSE PRACTITIONER

## 2021-12-16 PROCEDURE — 82805 BLOOD GASES W/O2 SATURATION: CPT | Performed by: INTERNAL MEDICINE

## 2021-12-16 PROCEDURE — 258N000003 HC RX IP 258 OP 636: Performed by: INTERNAL MEDICINE

## 2021-12-16 PROCEDURE — 31500 INSERT EMERGENCY AIRWAY: CPT | Mod: 59 | Performed by: INTERNAL MEDICINE

## 2021-12-16 PROCEDURE — 250N000013 HC RX MED GY IP 250 OP 250 PS 637: Performed by: NURSE PRACTITIONER

## 2021-12-16 PROCEDURE — 94660 CPAP INITIATION&MGMT: CPT

## 2021-12-16 PROCEDURE — 36556 INSERT NON-TUNNEL CV CATH: CPT | Performed by: INTERNAL MEDICINE

## 2021-12-16 RX ORDER — CHLORHEXIDINE GLUCONATE ORAL RINSE 1.2 MG/ML
15 SOLUTION DENTAL EVERY 12 HOURS
Status: DISCONTINUED | OUTPATIENT
Start: 2021-12-16 | End: 2021-12-31 | Stop reason: HOSPADM

## 2021-12-16 RX ORDER — FENTANYL CITRATE 50 UG/ML
50 INJECTION, SOLUTION INTRAMUSCULAR; INTRAVENOUS ONCE
Status: COMPLETED | OUTPATIENT
Start: 2021-12-16 | End: 2021-12-16

## 2021-12-16 RX ORDER — CEFAZOLIN SODIUM 1 G/50ML
20 SOLUTION INTRAVENOUS ONCE
Status: COMPLETED | OUTPATIENT
Start: 2021-12-16 | End: 2021-12-16

## 2021-12-16 RX ORDER — HALOPERIDOL 2 MG/ML
2 SOLUTION ORAL 3 TIMES DAILY
Status: DISCONTINUED | OUTPATIENT
Start: 2021-12-16 | End: 2021-12-21

## 2021-12-16 RX ORDER — PIPERACILLIN SODIUM, TAZOBACTAM SODIUM 3; .375 G/15ML; G/15ML
3.38 INJECTION, POWDER, LYOPHILIZED, FOR SOLUTION INTRAVENOUS EVERY 8 HOURS
Status: DISCONTINUED | OUTPATIENT
Start: 2021-12-16 | End: 2021-12-21

## 2021-12-16 RX ORDER — PROPOFOL 10 MG/ML
50 INJECTION, EMULSION INTRAVENOUS ONCE
Status: DISCONTINUED | OUTPATIENT
Start: 2021-12-16 | End: 2021-12-17 | Stop reason: CLARIF

## 2021-12-16 RX ORDER — ARIPIPRAZOLE ORAL 1 MG/ML
5 SOLUTION ORAL DAILY
Status: DISCONTINUED | OUTPATIENT
Start: 2021-12-16 | End: 2021-12-21

## 2021-12-16 RX ORDER — GABAPENTIN 250 MG/5ML
800 SOLUTION ORAL 3 TIMES DAILY
Status: DISCONTINUED | OUTPATIENT
Start: 2021-12-16 | End: 2021-12-31 | Stop reason: HOSPADM

## 2021-12-16 RX ORDER — ETOMIDATE 2 MG/ML
20 INJECTION INTRAVENOUS ONCE
Status: COMPLETED | OUTPATIENT
Start: 2021-12-16 | End: 2021-12-16

## 2021-12-16 RX ORDER — DIPHENHYDRAMINE HYDROCHLORIDE 50 MG/ML
50 INJECTION INTRAMUSCULAR; INTRAVENOUS 3 TIMES DAILY
Status: DISCONTINUED | OUTPATIENT
Start: 2021-12-16 | End: 2021-12-16

## 2021-12-16 RX ORDER — HALOPERIDOL 5 MG/ML
2 INJECTION INTRAMUSCULAR 3 TIMES DAILY
Status: DISCONTINUED | OUTPATIENT
Start: 2021-12-16 | End: 2021-12-16

## 2021-12-16 RX ORDER — PROPOFOL 10 MG/ML
5-75 INJECTION, EMULSION INTRAVENOUS CONTINUOUS
Status: DISCONTINUED | OUTPATIENT
Start: 2021-12-16 | End: 2021-12-18

## 2021-12-16 RX ORDER — PIPERACILLIN SODIUM, TAZOBACTAM SODIUM 3; .375 G/15ML; G/15ML
3.38 INJECTION, POWDER, LYOPHILIZED, FOR SOLUTION INTRAVENOUS ONCE
Status: COMPLETED | OUTPATIENT
Start: 2021-12-16 | End: 2021-12-16

## 2021-12-16 RX ADMIN — Medication 25 MCG/HR: at 13:11

## 2021-12-16 RX ADMIN — HALOPERIDOL LACTATE 5 MG: 5 INJECTION, SOLUTION INTRAMUSCULAR at 06:51

## 2021-12-16 RX ADMIN — Medication 1 PACKET: at 20:01

## 2021-12-16 RX ADMIN — HALOPERIDOL LACTATE 5 MG: 5 INJECTION, SOLUTION INTRAMUSCULAR at 04:49

## 2021-12-16 RX ADMIN — PIPERACILLIN SODIUM AND TAZOBACTAM SODIUM 3.38 G: 3; .375 INJECTION, POWDER, LYOPHILIZED, FOR SOLUTION INTRAVENOUS at 06:15

## 2021-12-16 RX ADMIN — VANCOMYCIN HYDROCHLORIDE 2000 MG: 5 INJECTION, POWDER, LYOPHILIZED, FOR SOLUTION INTRAVENOUS at 01:17

## 2021-12-16 RX ADMIN — Medication 800 MG: at 17:15

## 2021-12-16 RX ADMIN — DEXMEDETOMIDINE HYDROCHLORIDE 1.5 MCG/KG/HR: 4 INJECTION, SOLUTION INTRAVENOUS at 06:40

## 2021-12-16 RX ADMIN — HALOPERIDOL LACTATE 5 MG: 5 INJECTION, SOLUTION INTRAMUSCULAR at 06:39

## 2021-12-16 RX ADMIN — VANCOMYCIN HYDROCHLORIDE 1500 MG: 5 INJECTION, POWDER, LYOPHILIZED, FOR SOLUTION INTRAVENOUS at 14:18

## 2021-12-16 RX ADMIN — Medication 800 MG: at 14:43

## 2021-12-16 RX ADMIN — HALOPERIDOL LACTATE 5 MG: 5 INJECTION, SOLUTION INTRAMUSCULAR at 07:56

## 2021-12-16 RX ADMIN — PROPOFOL 60 MCG/KG/MIN: 10 INJECTION, EMULSION INTRAVENOUS at 22:04

## 2021-12-16 RX ADMIN — LORAZEPAM 2 MG: 2 INJECTION INTRAMUSCULAR; INTRAVENOUS at 04:14

## 2021-12-16 RX ADMIN — HALOPERIDOL LACTATE 5 MG: 5 INJECTION, SOLUTION INTRAMUSCULAR at 03:55

## 2021-12-16 RX ADMIN — HYDROMORPHONE HYDROCHLORIDE 1 MG: 1 INJECTION, SOLUTION INTRAMUSCULAR; INTRAVENOUS; SUBCUTANEOUS at 03:08

## 2021-12-16 RX ADMIN — HALOPERIDOL 2 MG: 2 SOLUTION ORAL at 20:02

## 2021-12-16 RX ADMIN — HYDROMORPHONE HYDROCHLORIDE 1 MG: 1 INJECTION, SOLUTION INTRAMUSCULAR; INTRAVENOUS; SUBCUTANEOUS at 06:13

## 2021-12-16 RX ADMIN — DEXMEDETOMIDINE HYDROCHLORIDE 1.5 MCG/KG/HR: 4 INJECTION, SOLUTION INTRAVENOUS at 09:43

## 2021-12-16 RX ADMIN — HYDROMORPHONE HYDROCHLORIDE 1 MG: 1 INJECTION, SOLUTION INTRAMUSCULAR; INTRAVENOUS; SUBCUTANEOUS at 09:11

## 2021-12-16 RX ADMIN — PIPERACILLIN SODIUM AND TAZOBACTAM SODIUM 3.38 G: 3; .375 INJECTION, POWDER, LYOPHILIZED, FOR SOLUTION INTRAVENOUS at 14:45

## 2021-12-16 RX ADMIN — Medication 0.03 MCG/KG/MIN: at 18:52

## 2021-12-16 RX ADMIN — PROPOFOL 20 MCG/KG/MIN: 10 INJECTION, EMULSION INTRAVENOUS at 12:51

## 2021-12-16 RX ADMIN — DEXMEDETOMIDINE HYDROCHLORIDE 1.5 MCG/KG/HR: 4 INJECTION, SOLUTION INTRAVENOUS at 22:05

## 2021-12-16 RX ADMIN — DEXMEDETOMIDINE HYDROCHLORIDE 1.5 MCG/KG/HR: 4 INJECTION, SOLUTION INTRAVENOUS at 01:22

## 2021-12-16 RX ADMIN — PIPERACILLIN AND TAZOBACTAM 3.38 G: 3; .375 INJECTION, POWDER, LYOPHILIZED, FOR SOLUTION INTRAVENOUS at 01:11

## 2021-12-16 RX ADMIN — HALOPERIDOL LACTATE 2 MG: 5 INJECTION, SOLUTION INTRAMUSCULAR at 11:19

## 2021-12-16 RX ADMIN — PROPOFOL 60 MCG/KG/MIN: 10 INJECTION, EMULSION INTRAVENOUS at 18:53

## 2021-12-16 RX ADMIN — DIPHENHYDRAMINE HYDROCHLORIDE 50 MG: 50 INJECTION, SOLUTION INTRAMUSCULAR; INTRAVENOUS at 11:16

## 2021-12-16 RX ADMIN — DEXMEDETOMIDINE HYDROCHLORIDE 1.5 MCG/KG/HR: 4 INJECTION, SOLUTION INTRAVENOUS at 18:58

## 2021-12-16 RX ADMIN — DEXMEDETOMIDINE HYDROCHLORIDE 1.5 MCG/KG/HR: 4 INJECTION, SOLUTION INTRAVENOUS at 16:27

## 2021-12-16 RX ADMIN — OXYCODONE HYDROCHLORIDE 20 MG: 5 SOLUTION ORAL at 22:07

## 2021-12-16 RX ADMIN — Medication 50 MCG: at 16:09

## 2021-12-16 RX ADMIN — HALOPERIDOL LACTATE 5 MG: 5 INJECTION, SOLUTION INTRAMUSCULAR at 09:08

## 2021-12-16 RX ADMIN — LORAZEPAM 2 MG: 2 INJECTION INTRAMUSCULAR; INTRAVENOUS at 11:07

## 2021-12-16 RX ADMIN — TRAZODONE HYDROCHLORIDE 75 MG: 50 TABLET ORAL at 14:44

## 2021-12-16 RX ADMIN — ARIPIPRAZOLE 5 MG: 1 SOLUTION ORAL at 17:15

## 2021-12-16 RX ADMIN — LORAZEPAM 4 MG: 2 LIQUID ORAL at 17:15

## 2021-12-16 RX ADMIN — FENTANYL CITRATE 50 MCG: 50 INJECTION, SOLUTION INTRAMUSCULAR; INTRAVENOUS at 13:18

## 2021-12-16 RX ADMIN — LEVETIRACETAM 500 MG: 100 INJECTION, SOLUTION INTRAVENOUS at 22:21

## 2021-12-16 RX ADMIN — TRAZODONE HYDROCHLORIDE 75 MG: 50 TABLET ORAL at 20:02

## 2021-12-16 RX ADMIN — HALOPERIDOL LACTATE 5 MG: 5 INJECTION, SOLUTION INTRAMUSCULAR at 05:20

## 2021-12-16 RX ADMIN — DEXMEDETOMIDINE HYDROCHLORIDE 1.5 MCG/KG/HR: 4 INJECTION, SOLUTION INTRAVENOUS at 12:44

## 2021-12-16 RX ADMIN — CHLORHEXIDINE GLUCONATE 0.12% ORAL RINSE 15 ML: 1.2 LIQUID ORAL at 20:01

## 2021-12-16 RX ADMIN — Medication 100 MCG: at 13:30

## 2021-12-16 RX ADMIN — FENTANYL CITRATE 50 MCG: 50 INJECTION, SOLUTION INTRAMUSCULAR; INTRAVENOUS at 12:47

## 2021-12-16 RX ADMIN — HALOPERIDOL 2 MG: 2 SOLUTION ORAL at 14:43

## 2021-12-16 RX ADMIN — ENOXAPARIN SODIUM 50 MG: 100 INJECTION SUBCUTANEOUS at 11:08

## 2021-12-16 RX ADMIN — BUPROPION HYDROCHLORIDE 100 MG: 100 TABLET, FILM COATED ORAL at 17:14

## 2021-12-16 RX ADMIN — PROPOFOL 60 MCG/KG/MIN: 10 INJECTION, EMULSION INTRAVENOUS at 15:04

## 2021-12-16 RX ADMIN — DEXMEDETOMIDINE HYDROCHLORIDE 1.5 MCG/KG/HR: 4 INJECTION, SOLUTION INTRAVENOUS at 04:13

## 2021-12-16 RX ADMIN — OXYCODONE HYDROCHLORIDE 20 MG: 5 SOLUTION ORAL at 17:16

## 2021-12-16 RX ADMIN — PIPERACILLIN SODIUM AND TAZOBACTAM SODIUM 3.38 G: 3; .375 INJECTION, POWDER, LYOPHILIZED, FOR SOLUTION INTRAVENOUS at 22:20

## 2021-12-16 RX ADMIN — Medication 70 MG: at 12:55

## 2021-12-16 RX ADMIN — LORAZEPAM 4 MG: 2 LIQUID ORAL at 20:06

## 2021-12-16 RX ADMIN — LEVETIRACETAM 500 MG: 100 INJECTION, SOLUTION INTRAVENOUS at 11:12

## 2021-12-16 RX ADMIN — ETOMIDATE 20 MG: 2 INJECTION INTRAVENOUS at 12:48

## 2021-12-16 ASSESSMENT — ACTIVITIES OF DAILY LIVING (ADL)
ADLS_ACUITY_SCORE: 22
ADLS_ACUITY_SCORE: 24
ADLS_ACUITY_SCORE: 22
ADLS_ACUITY_SCORE: 24
ADLS_ACUITY_SCORE: 22
ADLS_ACUITY_SCORE: 22
ADLS_ACUITY_SCORE: 24
ADLS_ACUITY_SCORE: 22
ADLS_ACUITY_SCORE: 24
ADLS_ACUITY_SCORE: 22
ADLS_ACUITY_SCORE: 24
ADLS_ACUITY_SCORE: 22
ADLS_ACUITY_SCORE: 24
ADLS_ACUITY_SCORE: 22

## 2021-12-16 NOTE — PLAN OF CARE
Problem: ARDS (Acute Respiratory Distress Syndrome)  Goal: Effective Oxygenation  Outcome: Improving

## 2021-12-16 NOTE — PROCEDURES
CENTRAL LINE INSERTION PROCEDURE NOTE  (NON-OR)    Procedure Date: 12/16/2021   Performing Physician: Morales Torres MD    Procedure: insertion of right internal jugular vein triple lumen central line   Indications: vascular access    Estimated Blood Loss: minimal  Complications: none immediate    Findings: normal compressible right internal jugular vein with adjacent visualized carotid artery medially    Procedure Details:   Procedure was done as an emergency: Yes  The patient was identified as Jared North with date of birth 1975 and the procedure verified as insertion of right IJ internal jugular vein triple lumen central line. A time out was held and the above information confirmed.    Under sterile conditions the skin over the right neck was prepped with chlorhexidine and covered with a sterile drape. Strict sterile conditions were maintained: cap, mask, and sterile gloves were worn by all participants. 5 ml of Lidocaine 1% local anesthetic was infiltrated into the skin and subcutaneous tissues. Under continuous ultrasound guidance using a sterile probe cover, the right internal jugular vein was accessed with a needle. A wire was fed over the needle. The needle was removed. A nick was made in the skin adjacent to the wire with a scalpel. The tract was dilated. A pre-flushed triple-lumen catheter was placed into the vein to a depth of 18 cm. The wire was removed. The line was sutured in place with 4 sutures. A bio patch and sterile transparent dressing were placed.    Number of attempts: 1     A chest x-ray was ordered.  Location of guidewire within the right internal jugular vein was confirmed with the ultrasound.       Condition: critical and unchanged    Morales (Geo) MD Melissa  [facility] Pulmonary & Critical Care  Clinic [phone]  Fax [phone]

## 2021-12-16 NOTE — PHARMACY-VANCOMYCIN DOSING SERVICE
"Pharmacy Vancomycin Initial Note  Date of Service 2021  Patient's  1975  46 year old, male    Indication: Bacteremia  Current estimated CrCl = Estimated Creatinine Clearance: 182.5 mL/min (A) (based on SCr of 0.63 mg/dL (L)).    Creatinine for last 3 days  2021:  4:28 AM Creatinine 0.59 mg/dL  2021:  4:25 AM Creatinine 0.62 mg/dL  12/15/2021:  4:31 AM Creatinine 0.63 mg/dL    Recent Vancomycin Level(s) for last 3 days  No results found for requested labs within last 72 hours.      Vancomycin IV Administrations (past 72 hours)      No vancomycin orders with administrations in past 72 hours.                Nephrotoxins and other renal medications (From now, onward)    Start     Dose/Rate Route Frequency Ordered Stop    21 1300  vancomycin (VANCOCIN) 1,500 mg in sodium chloride 0.9 % 250 mL intermittent infusion        \"Followed by\" Linked Group Details    1,500 mg  over 90 Minutes Intravenous EVERY 12 HOURS 21 0018      21 0700  piperacillin-tazobactam (ZOSYN) 3.375 g vial to attach to  mL bag        Note to Pharmacy: Extended infusion dosing to start 6 hours after initial infusion.   \"Followed by\" Linked Group Details    3.375 g  over 240 Minutes Intravenous EVERY 8 HOURS 21 0009      21 0100  piperacillin-tazobactam (ZOSYN) 3.375 g vial to attach to  mL bag        \"Followed by\" Linked Group Details    3.375 g  over 30 Minutes Intravenous ONCE 21 0009      21 0100  vancomycin (VANCOCIN) 2,000 mg in sodium chloride 0.9 % 500 mL intermittent infusion        \"Followed by\" Linked Group Details    20 mg/kg × 89 kg  over 2 Hours Intravenous ONCE 21 0018      21 0200  norepinephrine (LEVOPHED) 4 mg in  mL infusion PREMIX         0.01-0.6 mcg/kg/min × 75.3 kg (Ideal)  2.8-169.4 mL/hr  Intravenous CONTINUOUS 21 0133            Contrast Orders - past 72 hours (72h ago, onward)            None          [employer] " Prediction of Planned Initial Vancomycin Regimen  Loading dose: 2000 mg at 01:00 12/16/2021.  Regimen: 1500 mg IV every 12 hours.  Start time: 00:19 on 12/16/2021  Exposure target: AUC24 (range)400-600 mg/L.hr   AUC24,ss: 554 mg/L.hr  Probability of AUC24 > 400: 80 %  Ctrough,ss: 16.4 mg/L  Probability of Ctrough,ss > 20: 35 %  Probability of nephrotoxicity (Lodise MOISES 2009): 12 %          Plan:  1. Start vancomycin  2000 mg x 1 then 1500mg IV q12h.   2. Vancomycin monitoring method: AUC  3. Vancomycin therapeutic monitoring goal: 400-600 mg*h/L  4. Pharmacy will check vancomycin levels as appropriate in 1-3 Days.    5. Serum creatinine levels will be ordered daily for the first week of therapy and at least twice weekly for subsequent weeks.      No Dhillon, [facility]

## 2021-12-16 NOTE — PROGRESS NOTES
CRITICAL CARE PROGRESS NOTE:    Assessment/Plan:  46 year old male, unvaccinated against COVID-19, with history of polysubstance abuse and chronic pain/lower back pain on suboxone, mood disorder/depression, HTN, ROSSY mild intermittent asthma, presented with respiratory failure/COVID ARDS intubated on 11/28. Initially requiring proning/paralysis, veletri, now down trending vent needs, issues with agitation, VAP.    RESP:  Acute respiratory failure with hypoxemia due to covid ARDS. Prolonged, day 16 on vent. On minimal vent settings, failing SBTs the last couple of days to extreme anxiety, agitation with high RR, HR and diaphoresis. Per family, when he was intubated several years ago, he had a hard time getting off the vent for similar reasons. No longer requiring paralysis, proning or veletri. Extubated 12/15 PM, did OK on bipap and with haldol for severe agitated delirium, however the following day had markedly increased WOB with RR in 50s, very agitated, not protecting airway, reintubated without issues.     Cont LPV, Vt is ~7cc/kg IBW OK for now as no longer in ARDS (placed on previous vent settings)    Pplat < 30, DP goal 10-15    Titrate FiO2 for goal SpO2 88-92% or PaO2 55 mm Hg or greater.    Check ABG post-reintubation    General surgery consult for bedside perc trach/PEG tomorrow. NPO pMN, hold LMWH tonight, check CBC, coags in AM (coags not checked since 12/5)    CV:  Shock, circulatory. Low grade, likely vasoplegia from sedation. Possible septic component due to VAP. Echo 11/28: LVEF 60-65%, flattened septum c/w RV pressure/vol overload, mildling decr RV function with mod dilatation. No valve issues. Afib/RVR earlier in admission resolved    MAP >65, wean NE as able    NEURO:  Encephalopathy, likely toxic/metabolic. 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 Anxiety complicating picture. On suboxone for hx of opioid dependence. Per his ex-wife Jama, has severe anxiety, depression,  PTSD that has been difficult to control.     Cont propofol, dex, prop for RASS goal -1 to -2    Versed stopped a few days ago but may need to add back if unable to sedate adequately.     Appreciate psych input    Cont scheduled gabapentin, wellbutrin, abilify, benadryl, haldol, oxycodone, lorazepam, trazodone per psych. Was on seroquel previously per psych but do not see that order today.    Psych added bid keppra today    GI:  No issues, tolerating TF    RD consult for TF reinitiation    Bowel regimen prn    IV PPI    RENAL:  No issues, normal renal function. At risk for YAIMA.    Avoid nephrotoxins    Maintain lange    ID:  Severe covid-19 infection, improving. Proteus + e. Coli VAP dx on sputum from 12/1, pan-sensitive. New fever 12/14 AM, slight increased in wbc count. At risk for another VAP as he received toci. Cultures neg so far. Repeat sputum cx positive for same 2 organisms. Blood cx 12/14 also + for GPC in pairs and chains, 1/2 bottles, drawn from PICC line. Unclear if contaminant or true line infection.     S/p decadron, remdesivir and toci x1    Completed 10 days of cefepime/CTX for VAP    Back on vanc, zosyn as of 12/15 overnight. Continue for now.    Follow up culture data    Remove PICC line, new RIJ TLC in place    covid recovered as of 12/15    HEMATOLOGIC:  hypercoag due to covid-19. Stable mild anemia.     hgb >7    LMWH 0.5mg/kg bid per covid ICU guidelines    ENDOCRINE:  No issues    Not checking FSBG anymore.     ICU PROPHYLAXIS:    peridex    IV PPI    LMWH 0.5mg/kg bid    CODE STATUS, DISPOSITION, FAMILY COMMUNICATION:   Will update his ex-wife Jama later today.  I would favor proceeding with trach/PEG rather than attempting more SBT's this week. Will see what she thinks.     Lines/Drains/Tubes:  PICC 11/28 - REMOVE today  NEW RIJ TLC 12/16  OG tube 12/16  Lange  Rectal tube  ETT 7.5mm (day 1 reintubation)    Restraints  Progress Note  Restraint Application    I recognize that restraints are  "physical and/or chemical interventions intended to restrict a person's movements. Restraints are currently needed to ensure the safety of this patient and/or others. My clinical rationale appears below.    Category/Type of Restraint     Non Violent:  Soft limb restraint x2  --  Behavior  Pulling at tubes/lines  --  Root Cause of the Behavior  Sedation/intubation  --  Less-Restrictive Measures that Failed  Non Violent Measures:  Close Observation  --  Response to the Restraint  Patient unable to pull at tubes/lines  --  Criteria for Release from the Restraint  Patient calm and off sedation    Morales (Geo) MD Melissa  [facility] Pulmonary & Critical Care  Pager [phone]  Clinic [phone]  Fax [phone]     Overnight events:  Extubated yesterday. reintubated today due to marked tachy[avelino, respiratory distress, inability to protect airway.   New RIJ central line placed    Started vanc/zosyn overnight due to GPC in blood.     Vent  18/520/5/0.5  Awaiting initial ABG post-reintubation  Hard to sedate after intubation    On dex, prop, fent  Multiple fent, prop boluses needed to be given     Subjective:  As above  Denies pain    Objective:  Physical Exam:  Vent settings for last 24 hours:  Ventilation Mode: CPAP/PS  (Continuous positive airway pressure with Pressure Support)  FiO2 (%): 45 %  Rate Set (breaths/minute): 18 breaths/min  Tidal Volume Set (mL): 520 mL  PEEP (cm H2O): 5 cmH2O  Pressure Support (cm H2O): 5 cmH2O  Oxygen Concentration (%): 40 %  Resp: (!) 38      BP (!) 133/94 (BP Location: Left arm)   Pulse 91   Temp 99.1  F (37.3  C) (Axillary)   Resp (!) 38   Ht 1.803 m (5' 11\")   Wt 89 kg (196 lb 3.4 oz)   SpO2 98%   BMI 27.37 kg/m      Intake/Output last 3 shifts:  I/O last 3 completed shifts:  In: 3318.09 [I.V.:2193.09; NG/GT:1125]  Out: 2970 [Urine:2970]  Intake/Output this shift:  I/O this shift:  In: 480 [I.V.:480]  Out: 350 [Urine:350]    Physical Exam  Gen: " intubated, sedated. Awakens to voice, follows some commands.  HEENT: no OP lesions, no TITA  CV: RRR, no m/g/r  Resp: clear ant no w/r/r  Abd: soft, nontender, BS+  Neuro: PERRL, nonfocal  Ext: no edema    LAB:  Recent Labs   Lab 12/15/21  0431   WBC 9.1   HGB 10.2*   HCT 32.0*   *     Recent Labs   Lab 12/15/21  0431 12/14/21  0425 12/13/21  0428    137 141   CO2 32* 28 31   BUN 18 15 14     Micro  PCT normal on 12/5 and 12/14  Sputum 12/1: proteus + e. Coli, pan-sensitive.   Blood 12/14 pending (no gm  stain available)  Sputum 12/14 + for proteus and e. Coli, speciation pending  Blood 12/14 + for GPC in pairs and chains, 1/2 bottles (PICC line only)  Urine, UA 12/14 NGTD    No current outpatient medications on file.       Critical care attestation: 45 minutes spent managing the following issues: acute respiratory failure requiring intubation/IMV, circulatory shock requiring continuous vasopressor infusions, severe covid-19 infection with ARDS, hypoxemic resp failure/prolonged. Toxic/metabolic encephalopathy. High risk for organ deterioration and death requiring ICU level care.

## 2021-12-16 NOTE — PROGRESS NOTES
Patient very restless during night shift. During first few hours, would sleep well and wake, needing occasional direction from 1:1 staff. During second half of night, patient was very restless. Would fall asleep for 1-2 minutes and then would jolt self awake and try to get out of bed or pull on bipap. Does respond to redirection. PRN medication given, but did not seem to have much effect for more than a few minutes.     When patient is sleeping, respirations are in the upper 20's. When patient is awake and anxious, RR goes all the way up to the 50's. Patient currently remains on 45% FiO2, and SpO2 remains in upper 90's even during tachypnea.     Planning to remove PICC line today d/t blood cultures. Staff was able to place one peripheral IV, but patient became increasingly anxious after.     Antibiotics started during night d/t positive blood cultures.     Ex-wife called for an update at end of night shift.  Fermin Johnson RN 12/16/2021

## 2021-12-16 NOTE — PROGRESS NOTES
RESPIRATORY CARE NOTE     Pt on V 60 BiPAP and tolerating well:  Mode:  ST  IPAP:  12  EPAP:  6  Resp. Rate:  14    FiO2:  45%    Breath sounds diminished.     HR 86; RR 26;  SpO2 97%      Michel Tran, RT

## 2021-12-16 NOTE — PLAN OF CARE
Problem: Adult Inpatient Plan of Care  Goal: Readiness for Transition of Care  Outcome: Declining   Patient continues to have 1:1 for restlessness and anxiety. This am during rounds patient RR in 40's and unable to talk down. Gave Haldol x2 and Dilaudid. Renetta Hoyos also consulted for med management. Through out the morning frequent SOB with increased RR, multiple meds given and unable to settle down. Reintubated about 1300 by Dr. Torres. Now on Precedex, Propofol and Fentanyl gtt. Patent seems calm and much more relaxed. A new triple lumen central line also placed this afternoon. OG placed as well. Portable CXR and abdomen done to confirm placements.

## 2021-12-16 NOTE — CONSULTS
"CLINICAL NUTRITION SERVICES - REASSESSMENT NOTE     Nutrition Prescription    RECOMMENDATIONS FOR MDs/PROVIDERS TO ORDER:  None    Malnutrition Status:    Moderate noted 11/28 at [facility]    Recommendations already ordered by Registered Dietitian (RD):  Restart TF at 20 ml/hr, increase by 10 ml/hr q 4 hours to goal rate to 60 ml/hr, supplement Prosource 1 pkt 2x/day.  Promote @ goal of  60ml/hr  (1440ml/day)  will provide: 1520 kcals, 111 g PRO, 1208 ml free H20, 187 g CHO, and 0 g fiber, 320 ml flushes daily.    Future/Additional Recommendations:  Adjust TF pending propofol dose     EVALUATION OF THE PROGRESS TOWARD GOALS   Diet: NPO    NEW FINDINGS   Pt extubated and NPO yesterday.   Re-intubated today with new OG. Propofol restarted - at currrent rate providing 845 rashel/day but tritrating down from initial dose    ANTHROPOMETRICS  Height: 180.3 cm (5' 11\")  Most Recent Weight: 89 kg (196 lb 3.4 oz) 12/15  12/13 89.9 kg (198 lb 3.1 oz)    Weight History:   Wt Readings from Last 6 Encounters:   12/15/21 89 kg (196 lb 3.4 oz)   12/05/21 89.4 kg (197 lb 3.2 oz)   02/22/18 83.3 kg (183 lb 11.2 oz)   12/5 wt at [facility].  20 lb gain in one day with transfer to [facility] (?)  -7 L per I/Os. (pt on diuretic from 12/4 to 12/11)    Dosing Weight: 78.2 kg, IBW     ASSESSED NUTRITION NEEDS  Estimated Energy Needs: 4734-2088 kcals/day (22-25)  Justification: critically ill and Obese  Estimated Protein Needs: 117-156 grams protein/day (1.5 - 2 grams of pro/kg)  Justification: critically ill and Obesity guidelines  Estimated Fluid Needs: 3167-0622 mL/day (1 mL/kcal)   Justification: Maintenance    PHYSICAL FINDINGS  See malnutrition section below.  COVID isolation    GI CONCERNS  0 rectal tube output in 24 hrs.  LBM noted as 12/13.  OG    LABS  Na 141  K 4.3  Creat 0.59    FSBG 105-137  Reviewed    MEDICATIONS  IVs-propofol, levophed, fentanyl, precedex  Oxycodone, colace-held, miralax-held, senokot-held, iv " abx  Reviewed    MALNUTRITION: noted 11/28  % Weight Loss:  13% in 9 months  % Intake:  </= 50% for >/= 5 days (severe malnutrition)  Subcutaneous Fat Loss:  unable  Muscle Loss:  unable  Fluid Retention:  Trace per charting     Malnutrition Diagnosis: Moderate malnutrition  In Context of:  Acute illness or injury    Goals   Tolerate TF at goal rate-restarted  Meet nutrition needs-restarted    CURRENT NUTRITION DIAGNOSIS  Malnutrition related to acute illness as evidenced by wt loss, decreased po intake  Swallow difficulty r/t acute illness evidenced by intubation      INTERVENTIONS  Implementation  Enteral Nutrition - Initiate TF with titration to goal.    Monitoring/Evaluation  Progress toward goals will be monitored and evaluated per protocol.

## 2021-12-16 NOTE — PROCEDURES
ENDOTRACHEAL INTUBATION PROCEDURE NOTE (NON-OR)    Date of Procedure:  12/16/2021  Procedural Physician: Morales Torres MD    Indication for endotracheal intubation:  airway compromise, impending respiratory failure and respiratory failure  Route: orotracheal  Sedation: etomidate 20 mg IV  Paralytic: succinylcholine 100 mg IV  Lidocaine: no  Atropine: no  Equipment: GlideScope 3 blade, 7.5-mm endotracheal tube  Cricoid Pressure: no  Number of attempts: 1  ETT location confirmed by:  by auscultation and ETCO2 monitor.    Complications: none immediate    Procedure Details:   Procedure done under emergency: yes  The patient was identified as Jared North with date of birth 1975 and the procedure verified as endotracheal intubation. A time out was held and the above information confirmed.    The oropharynx was noted to be very erythematous and edematous.  The vocal cords were visualized and the patient was intubated via the oral route. The tube was taped at 25 cm at the teeth. Post intubation examination was carried out with auscultation, end tidal carbon dioxide detector, and a stat portable chest x-ray. SpO2 jyoti was 88% during the procedure.    Condition: critical and unchanged    Morales (Geo) MD Melissa  [facility] Pulmonary [facility] Critical [facility]  Clinic [phone]  Fax [phone]

## 2021-12-16 NOTE — PROGRESS NOTES
Pt remains on bipap, wore overnight. Large mask, liquicell in place.  Settings: st 12/6, RR 14 45%  RR 32-57, , sats 98%, HR 89.    1300: pt intubated for resp distress.  RT PROGRESS NOTE    VENT DAY# 1, extubated yesterday after 18 days    CURRENT SETTINGS:   Ventilation Mode: CPAP/PS  (Continuous positive airway pressure with Pressure Support)  FiO2 (%): 45 %  Rate Set (breaths/minute): 18 breaths/min  Tidal Volume Set (mL): 520 mL  PEEP (cm H2O): 5 cmH2O  Pressure Support (cm H2O): 5 cmH2O  Oxygen Concentration (%): 40 %  Resp: (!) 38      PATIENT PARAMETERS:  PIP 45  Pplat:  unable  Pmean:  22  Compliance: 35  SBT: NO     Secretions:  Deb white  02 Sats:  99%    ETT SIZE 7.5 Secured at 25 cm at teeth/gums    Respiratory Medications: none     NOTE / SHIFT SUMMARY:   Abgs pending.      Bailey Gomez, RT

## 2021-12-16 NOTE — PLAN OF CARE
Extubated at 1515. On BiPAP 12/6 45%. Pt has a temp now of 101.2. Blood cultures sent, lactic and VBG. Pt is confused and reaching for items in the air. Remains on Dex at 1.5, and PRN haldol. D[employer]ic[employer] removed as pt was bypassing. CHG bath completed 12/15/21.

## 2021-12-16 NOTE — PROGRESS NOTES
"Care Management Follow Up    Length of Stay (days): 11    Expected Discharge Date: To be determined.      Concerns to be Addressed:   ICU level of care, due to alteration in respiratory status secondary to Covid-19, requiring oral intubation/vent support and drips:  Extubated last PM. Needing 1:1 due to combative nature (history of PTSD, polysubstance abuse): Psychiatry following to assist with medication management. IV antibiotics.   Patient plan of care discussed at interdisciplinary rounds: Yes  Follow up from rounds/notes:   Likely to need re-intubation today (done). Per MD note, \"General surgery consult for bedside perc trach/PEG tomorrow.\"    Anticipated Discharge Disposition:  Discharge goal is pending response to treatment, medical needs and mobility closer to discharge.      Anticipated Discharge Services:  To be determined by destination, patient/family preferences, medical needs and mobility status closer to the time of discharge.  Anticipated Discharge DME:  To be determined.     Patient/family educated on Medicare website which has current facility and service quality ratings:  NA  Education Provided on the Discharge Plan:  Per team  Patient/Family in Agreement with the Plan:  Yes    Referrals Placed by CM/SW:  None  Private pay costs discussed: Not applicable     Additional Information:  Patient lives with his ex-spouse, Jama, part of the time. She reports he is normally independent at baseline. If necessary, Jama would be able to provide care in her home post discharge (pending patient's needs). Previous care manager discussed all potential discharge options including LTACH, TCU/Acute Rehab, or home with home care depending on Jared's progressions and needs at discharge. CM to keep Jama updated once closer to discharge and better idea of plan is known. Per Jama, patient has a history of PTSD and has trouble weaning off the vent in the past. Transport to be determined. Care manager to follow.   Of " note, patient has not been vaccinated for Covid-19 (no record on file).    Marilee Farias RN

## 2021-12-16 NOTE — PROGRESS NOTES
"Psychiatric Progress Note  Metabolic encephalopathy exacerbated in the context of COVID-19 pneumonia,  ICU, mechanical ventilation.  Cognitive and behavioral changes with agitation and restlessness.  Major depressive disorder recurrent by history.  Narcotic dependence by history.    Mood disorder due to history of chronic back pain.     Recommendations:  Decrease Abilify to 5 mg daily  Wellbutrin 100 mg TID 0900, 1300, 1700. (HS  dose may interfere with sleep. )  Gabapentin 800 mg 3 times a day: Continue  Fentanyl 50 mg 3 times a day.  Haldol 2 mg 3 times a day.  Keppra 500 mg every 12 hours IV.     SUBJECTIVE:  Patient continues to have significant agitation when ICU sedation protocol is decreased.  Self extubated however will would need to be reintubated.  Nursing notes indicated that patient had required a one-to-one staff due to his restlessness, impulsivity, difficulty sleeping, not following directions.  MENTAL STATUS EXAMINATION:   Appearance:sedated.  On mechanical ventilation  Speech: Somnolent  Behavior: Needing restraints  Thought Process: Somnolent.  Thought content: Does not show hallucinations, delusions, paranoia, no restlessness.  Thought Formation: No loosening of associations, no flight of ideas.  Judgment: Impaired.  Insight :Impaired.  Attention : Somnolent  Memory: Depressed  Fund Of Knowledge: Depressed  Affect: Flat  Mood: somnolent  Alert and Oriented: x 1  Comprehension: Depressed   Generative thought content: None  Language: Difficult to assess, not engaging in any conversation. As noted in admission note he was conversing.  Gait and Ambulation: Not tested.  Vital signs in last 24 hours  BP (!) 133/94 (BP Location: Left arm)   Pulse 91   Temp 99.1  F (37.3  C) (Axillary)   Resp (!) 38   Ht 1.803 m (5' 11\")   Wt 89 kg (196 lb 3.4 oz)   SpO2 98%   BMI 27.37 kg/m                "

## 2021-12-17 LAB
ANION GAP SERPL CALCULATED.3IONS-SCNC: 8 MMOL/L (ref 5–18)
APTT PPP: 34 SECONDS (ref 22–38)
BUN SERPL-MCNC: 10 MG/DL (ref 8–22)
CALCIUM SERPL-MCNC: 8.6 MG/DL (ref 8.5–10.5)
CHLORIDE BLD-SCNC: 108 MMOL/L (ref 98–107)
CO2 SERPL-SCNC: 27 MMOL/L (ref 22–31)
CREAT SERPL-MCNC: 0.6 MG/DL (ref 0.7–1.3)
ERYTHROCYTE [DISTWIDTH] IN BLOOD BY AUTOMATED COUNT: 14.5 % (ref 10–15)
GFR SERPL CREATININE-BSD FRML MDRD: >90 ML/MIN/1.73M2
GLUCOSE BLD-MCNC: 116 MG/DL (ref 70–125)
HCT VFR BLD AUTO: 29.8 % (ref 40–53)
HGB BLD-MCNC: 9.5 G/DL (ref 13.3–17.7)
INR PPP: 1.23 (ref 0.85–1.15)
MAGNESIUM SERPL-MCNC: 1.9 MG/DL (ref 1.8–2.6)
MCH RBC QN AUTO: 29.8 PG (ref 26.5–33)
MCHC RBC AUTO-ENTMCNC: 31.9 G/DL (ref 31.5–36.5)
MCV RBC AUTO: 93 FL (ref 78–100)
PLATELET # BLD AUTO: 200 10E3/UL (ref 150–450)
POTASSIUM BLD-SCNC: 3.1 MMOL/L (ref 3.5–5)
POTASSIUM BLD-SCNC: 3.2 MMOL/L (ref 3.5–5)
POTASSIUM BLD-SCNC: 3.7 MMOL/L (ref 3.5–5)
RBC # BLD AUTO: 3.19 10E6/UL (ref 4.4–5.9)
SODIUM SERPL-SCNC: 143 MMOL/L (ref 136–145)
TRIGL SERPL-MCNC: 149 MG/DL
WBC # BLD AUTO: 7.4 10E3/UL (ref 4–11)

## 2021-12-17 PROCEDURE — 250N000013 HC RX MED GY IP 250 OP 250 PS 637: Performed by: INTERNAL MEDICINE

## 2021-12-17 PROCEDURE — 250N000011 HC RX IP 250 OP 636

## 2021-12-17 PROCEDURE — 85730 THROMBOPLASTIN TIME PARTIAL: CPT | Performed by: INTERNAL MEDICINE

## 2021-12-17 PROCEDURE — C9113 INJ PANTOPRAZOLE SODIUM, VIA: HCPCS | Performed by: INTERNAL MEDICINE

## 2021-12-17 PROCEDURE — 84478 ASSAY OF TRIGLYCERIDES: CPT | Performed by: INTERNAL MEDICINE

## 2021-12-17 PROCEDURE — 94003 VENT MGMT INPAT SUBQ DAY: CPT

## 2021-12-17 PROCEDURE — 84132 ASSAY OF SERUM POTASSIUM: CPT | Performed by: INTERNAL MEDICINE

## 2021-12-17 PROCEDURE — 85610 PROTHROMBIN TIME: CPT | Performed by: INTERNAL MEDICINE

## 2021-12-17 PROCEDURE — 250N000013 HC RX MED GY IP 250 OP 250 PS 637: Performed by: NURSE PRACTITIONER

## 2021-12-17 PROCEDURE — 250N000011 HC RX IP 250 OP 636: Performed by: INTERNAL MEDICINE

## 2021-12-17 PROCEDURE — 250N000009 HC RX 250: Performed by: INTERNAL MEDICINE

## 2021-12-17 PROCEDURE — 258N000003 HC RX IP 258 OP 636: Performed by: NURSE PRACTITIONER

## 2021-12-17 PROCEDURE — 80048 BASIC METABOLIC PNL TOTAL CA: CPT | Performed by: INTERNAL MEDICINE

## 2021-12-17 PROCEDURE — 250N000011 HC RX IP 250 OP 636: Performed by: NURSE PRACTITIONER

## 2021-12-17 PROCEDURE — 0DH63UZ INSERTION OF FEEDING DEVICE INTO STOMACH, PERCUTANEOUS APPROACH: ICD-10-PCS | Performed by: SURGERY

## 2021-12-17 PROCEDURE — 258N000003 HC RX IP 258 OP 636: Performed by: INTERNAL MEDICINE

## 2021-12-17 PROCEDURE — 999N000157 HC STATISTIC RCP TIME EA 10 MIN

## 2021-12-17 PROCEDURE — 31600 PLANNED TRACHEOSTOMY: CPT | Performed by: SURGERY

## 2021-12-17 PROCEDURE — 85027 COMPLETE CBC AUTOMATED: CPT | Performed by: INTERNAL MEDICINE

## 2021-12-17 PROCEDURE — 99291 CRITICAL CARE FIRST HOUR: CPT | Mod: 25 | Performed by: INTERNAL MEDICINE

## 2021-12-17 PROCEDURE — 43246 EGD PLACE GASTROSTOMY TUBE: CPT | Mod: 51 | Performed by: SURGERY

## 2021-12-17 PROCEDURE — 0B114F4 BYPASS TRACHEA TO CUTANEOUS WITH TRACHEOSTOMY DEVICE, PERCUTANEOUS ENDOSCOPIC APPROACH: ICD-10-PCS | Performed by: SURGERY

## 2021-12-17 PROCEDURE — 83735 ASSAY OF MAGNESIUM: CPT | Performed by: INTERNAL MEDICINE

## 2021-12-17 PROCEDURE — 31622 DX BRONCHOSCOPE/WASH: CPT | Performed by: INTERNAL MEDICINE

## 2021-12-17 PROCEDURE — 200N000001 HC R&B ICU

## 2021-12-17 RX ORDER — POTASSIUM CHLORIDE 29.8 MG/ML
20 INJECTION INTRAVENOUS
Status: COMPLETED | OUTPATIENT
Start: 2021-12-17 | End: 2021-12-17

## 2021-12-17 RX ADMIN — ARIPIPRAZOLE 5 MG: 1 SOLUTION ORAL at 08:07

## 2021-12-17 RX ADMIN — LORAZEPAM 4 MG: 2 LIQUID ORAL at 12:32

## 2021-12-17 RX ADMIN — PIPERACILLIN SODIUM AND TAZOBACTAM SODIUM 3.38 G: 3; .375 INJECTION, POWDER, LYOPHILIZED, FOR SOLUTION INTRAVENOUS at 14:08

## 2021-12-17 RX ADMIN — LORAZEPAM 4 MG: 2 LIQUID ORAL at 04:27

## 2021-12-17 RX ADMIN — ACETAMINOPHEN 650 MG: 650 SOLUTION ORAL at 18:17

## 2021-12-17 RX ADMIN — BUPROPION HYDROCHLORIDE 100 MG: 100 TABLET, FILM COATED ORAL at 12:32

## 2021-12-17 RX ADMIN — Medication 100 MCG: at 11:21

## 2021-12-17 RX ADMIN — Medication 40 MG: at 11:19

## 2021-12-17 RX ADMIN — Medication 1 PACKET: at 08:31

## 2021-12-17 RX ADMIN — LORAZEPAM 4 MG: 2 LIQUID ORAL at 20:26

## 2021-12-17 RX ADMIN — LEVETIRACETAM 500 MG: 100 INJECTION, SOLUTION INTRAVENOUS at 23:30

## 2021-12-17 RX ADMIN — PROPOFOL 35 MCG/KG/MIN: 10 INJECTION, EMULSION INTRAVENOUS at 15:55

## 2021-12-17 RX ADMIN — PANTOPRAZOLE SODIUM 40 MG: 40 INJECTION, POWDER, FOR SOLUTION INTRAVENOUS at 08:06

## 2021-12-17 RX ADMIN — VANCOMYCIN HYDROCHLORIDE 1500 MG: 5 INJECTION, POWDER, LYOPHILIZED, FOR SOLUTION INTRAVENOUS at 00:48

## 2021-12-17 RX ADMIN — Medication 50 MCG: at 11:15

## 2021-12-17 RX ADMIN — PIPERACILLIN SODIUM AND TAZOBACTAM SODIUM 3.38 G: 3; .375 INJECTION, POWDER, LYOPHILIZED, FOR SOLUTION INTRAVENOUS at 06:10

## 2021-12-17 RX ADMIN — LORAZEPAM 4 MG: 2 LIQUID ORAL at 08:23

## 2021-12-17 RX ADMIN — LEVETIRACETAM 500 MG: 100 INJECTION, SOLUTION INTRAVENOUS at 12:28

## 2021-12-17 RX ADMIN — DEXMEDETOMIDINE HYDROCHLORIDE 1.5 MCG/KG/HR: 4 INJECTION, SOLUTION INTRAVENOUS at 06:42

## 2021-12-17 RX ADMIN — VANCOMYCIN HYDROCHLORIDE 1500 MG: 5 INJECTION, POWDER, LYOPHILIZED, FOR SOLUTION INTRAVENOUS at 12:32

## 2021-12-17 RX ADMIN — PROPOFOL 50 MCG/KG/MIN: 10 INJECTION, EMULSION INTRAVENOUS at 12:07

## 2021-12-17 RX ADMIN — DEXMEDETOMIDINE HYDROCHLORIDE 1.5 MCG/KG/HR: 4 INJECTION, SOLUTION INTRAVENOUS at 21:59

## 2021-12-17 RX ADMIN — DEXMEDETOMIDINE HYDROCHLORIDE 1.5 MCG/KG/HR: 4 INJECTION, SOLUTION INTRAVENOUS at 04:27

## 2021-12-17 RX ADMIN — DEXMEDETOMIDINE HYDROCHLORIDE 1.5 MCG/KG/HR: 4 INJECTION, SOLUTION INTRAVENOUS at 01:16

## 2021-12-17 RX ADMIN — Medication 800 MG: at 12:32

## 2021-12-17 RX ADMIN — Medication 0.03 MCG/KG/MIN: at 15:49

## 2021-12-17 RX ADMIN — HALOPERIDOL 2 MG: 2 SOLUTION ORAL at 14:08

## 2021-12-17 RX ADMIN — PROPOFOL 60 MCG/KG/MIN: 10 INJECTION, EMULSION INTRAVENOUS at 05:13

## 2021-12-17 RX ADMIN — Medication 50 MG: at 11:42

## 2021-12-17 RX ADMIN — POTASSIUM CHLORIDE 20 MEQ: 29.8 INJECTION, SOLUTION INTRAVENOUS at 08:29

## 2021-12-17 RX ADMIN — BUPROPION HYDROCHLORIDE 100 MG: 100 TABLET, FILM COATED ORAL at 17:09

## 2021-12-17 RX ADMIN — HALOPERIDOL 2 MG: 2 SOLUTION ORAL at 08:07

## 2021-12-17 RX ADMIN — HALOPERIDOL 2 MG: 2 SOLUTION ORAL at 20:27

## 2021-12-17 RX ADMIN — OXYCODONE HYDROCHLORIDE 20 MG: 5 SOLUTION ORAL at 09:39

## 2021-12-17 RX ADMIN — DEXMEDETOMIDINE HYDROCHLORIDE 1.5 MCG/KG/HR: 4 INJECTION, SOLUTION INTRAVENOUS at 12:41

## 2021-12-17 RX ADMIN — DEXMEDETOMIDINE HYDROCHLORIDE 1.5 MCG/KG/HR: 4 INJECTION, SOLUTION INTRAVENOUS at 18:52

## 2021-12-17 RX ADMIN — Medication 800 MG: at 08:11

## 2021-12-17 RX ADMIN — LORAZEPAM 4 MG: 2 LIQUID ORAL at 23:44

## 2021-12-17 RX ADMIN — PROPOFOL 60 MCG/KG/MIN: 10 INJECTION, EMULSION INTRAVENOUS at 01:17

## 2021-12-17 RX ADMIN — DEXMEDETOMIDINE HYDROCHLORIDE 1.5 MCG/KG/HR: 4 INJECTION, SOLUTION INTRAVENOUS at 09:44

## 2021-12-17 RX ADMIN — CHLORHEXIDINE GLUCONATE 0.12% ORAL RINSE 15 ML: 1.2 LIQUID ORAL at 20:22

## 2021-12-17 RX ADMIN — TRAZODONE HYDROCHLORIDE 75 MG: 50 TABLET ORAL at 08:08

## 2021-12-17 RX ADMIN — LORAZEPAM 4 MG: 2 LIQUID ORAL at 15:17

## 2021-12-17 RX ADMIN — BUPROPION HYDROCHLORIDE 100 MG: 100 TABLET, FILM COATED ORAL at 08:07

## 2021-12-17 RX ADMIN — POTASSIUM CHLORIDE 20 MEQ: 29.8 INJECTION, SOLUTION INTRAVENOUS at 09:39

## 2021-12-17 RX ADMIN — TRAZODONE HYDROCHLORIDE 75 MG: 50 TABLET ORAL at 20:23

## 2021-12-17 RX ADMIN — OXYCODONE HYDROCHLORIDE 20 MG: 5 SOLUTION ORAL at 14:08

## 2021-12-17 RX ADMIN — CHLORHEXIDINE GLUCONATE 0.12% ORAL RINSE 15 ML: 1.2 LIQUID ORAL at 08:06

## 2021-12-17 RX ADMIN — Medication 20 MG: at 11:17

## 2021-12-17 RX ADMIN — OXYCODONE HYDROCHLORIDE 20 MG: 5 SOLUTION ORAL at 01:16

## 2021-12-17 RX ADMIN — OXYCODONE HYDROCHLORIDE 20 MG: 5 SOLUTION ORAL at 17:09

## 2021-12-17 RX ADMIN — PIPERACILLIN SODIUM AND TAZOBACTAM SODIUM 3.38 G: 3; .375 INJECTION, POWDER, LYOPHILIZED, FOR SOLUTION INTRAVENOUS at 23:35

## 2021-12-17 RX ADMIN — Medication 800 MG: at 17:09

## 2021-12-17 RX ADMIN — DEXMEDETOMIDINE HYDROCHLORIDE 1.5 MCG/KG/HR: 4 INJECTION, SOLUTION INTRAVENOUS at 15:47

## 2021-12-17 RX ADMIN — PROPOFOL 55 MCG/KG/MIN: 10 INJECTION, EMULSION INTRAVENOUS at 08:47

## 2021-12-17 RX ADMIN — TRAZODONE HYDROCHLORIDE 75 MG: 50 TABLET ORAL at 14:07

## 2021-12-17 RX ADMIN — Medication 20 MG: at 11:21

## 2021-12-17 RX ADMIN — OXYCODONE HYDROCHLORIDE 20 MG: 5 SOLUTION ORAL at 22:08

## 2021-12-17 RX ADMIN — OXYCODONE HYDROCHLORIDE 20 MG: 5 SOLUTION ORAL at 06:10

## 2021-12-17 RX ADMIN — MIDAZOLAM HYDROCHLORIDE 5 MG: 1 INJECTION, SOLUTION INTRAMUSCULAR; INTRAVENOUS at 11:25

## 2021-12-17 RX ADMIN — LORAZEPAM 4 MG: 2 LIQUID ORAL at 00:48

## 2021-12-17 ASSESSMENT — ACTIVITIES OF DAILY LIVING (ADL)
ADLS_ACUITY_SCORE: 22
ADLS_ACUITY_SCORE: 24
ADLS_ACUITY_SCORE: 22
ADLS_ACUITY_SCORE: 24
ADLS_ACUITY_SCORE: 22

## 2021-12-17 ASSESSMENT — MIFFLIN-ST. JEOR: SCORE: 1769.13

## 2021-12-17 NOTE — PROGRESS NOTES
PULMONARY / CRITICAL CARE PROGRESS NOTE    Date / Time of Admission:  12/5/2021 11:26 PM    Assessment:      Jared North is a 46 year old male, unvaccinated against COVID-19, with history of polysubstance abuse and chronic pain/lower back pain on suboxone, mood disorder/depression, HTN, ROSSY mild intermittent asthma, presented with respiratory failure/COVID ARDS intubated on 11/28. Initially requiring proning/paralysis, veletri, now down trending vent needs, issues with agitation, treated for VAP. Not tolerating weaning trials, failed extubation 12/15, re-intubated, surgery consulted for trach and PEG tube    1. Acute respiratory failure s/p intubation 11/28  Extubated on 12/15, re-intubated the same day due to severe agitation.   Schedule for trach.  2. Ventilator associated pneumonia  Sputum Cx 12/14 showed polymicrobial (Strept constellatus, E coli, proteus, staph epi, and candida parapsilosis.   Blood cultures (+) strep constellatus. Continue zosyn and vancomycin   F/u blood cultures 12/15 so far negative.   3. Hypotension due to sedation   4. Encephalopathy / angitation  5. Chronic back pain / polysubstance abuse on suboxone  6. Depression     Advance Directives:  Full code    Plan:   1. Titrate vent settings 18/520/8/35%, keep SpO2 > 90% and Plateau pressures < 32  2. Sedation to keep RASS -2 to -3, propofol, fentanyl and precedex drip  3. Continue scheduled gabapentin, wellbutrin, abilify, benadryl, haldol, oxycodone, lorazepam, trazodone per psych  4. Patient is scheduled for trach tube  5. Heplock IV fluids  6. Titrate pressors to keep MAP > 65  7. Continue diuresis  8. Abx zosyn and vancomycin   9. Tube feedings were held due to trach and PEG tube placement   10. PPI for GI prophylaxis   11. Glucose level monitoring  12. DVT prophylaxis lovenox subcutaneous was held , will resume it post procedure  13. Referral to LTAC    Please contact me if you have any questions.  Total critical care time, not  including separately billable procedure time: 45 minutes  This patient had a high probability of imminent or life threatening deterioration due to acute respiratory failure which required my direct attention, intervention and personal management.     Jude Odonnell  Pulmonary / Critical Care  12/17/2021  8:22 AM          ICU DAILY CHECKLIST                           Can patient transfer out of MICU? no    FAST HUG:    Feeding:  Feeding: noPatient is receiving NPO  For schedule procedure  Wei: yes  Analgesia/Sedation:yes  Precedex, propofol and fentanyl drip  Thromboembolic prophylaxis: Yes; Mode:  Lovenox and SCDs  HOB>30:  Yes  Stress Ulcer Protocol Active: Yes; Mode: PPI  Glycemic Control: Any glucose > 180 no; Mode of Insulin Therapy: Sliding Scale Insulin    INTUBATED:  Can patient have daily waking:  Yes  Can patient have spontaneous breathing trial:  Yes    Restraints? yes    PHYSICAL THERAPY AND MOBILITY:  Can patient have PT and mobility trial: no  Activity: bedrest    Subjective:   HPI:  Jared North is a 46 year old male, unvaccinated against COVID-19, with history of polysubstance abuse and chronic pain/lower back pain on suboxone, mood disorder/depression, HTN, ROSSY mild intermittent asthma, presented with respiratory failure/COVID ARDS intubated on 11/28. Initially requiring proning/paralysis, veletri, now down trending vent needs, issues with agitation, treated for VAP. Not tolerating weaning trials, failed extubation 12/15, re-intubated, surgery consulted for trach and PEG tube    Events overnight  - FiO2 down to 35%  - On low dose pressors  - NPO for scheduled trach and PEG tube    Allergies: Patient has no known allergies.     MEDS:  Current Facility-Administered Medications   Medication     acetaminophen (TYLENOL) solution 650 mg     acetaminophen (TYLENOL) tablet 650 mg    Or     acetaminophen (TYLENOL) Suppository 650 mg     ARIPiprazole (ABILIFY) solution 5 mg     bisacodyl  (DULCOLAX) Suppository 10 mg     buPROPion (WELLBUTRIN) tablet 100 mg     chlorhexidine (PERIDEX) 0.12 % solution 15 mL     dexmedetomidine (PRECEDEX) 400 mcg in 0.9% sodium chloride 100 mL     dextrose 10% infusion     glucose gel 15-30 g    Or     dextrose 50 % injection 25-50 mL    Or     glucagon injection 1 mg     glucose gel 15-30 g    Or     dextrose 50 % injection 25-50 mL    Or     glucagon injection 1 mg     [Held by provider] sennosides (SENOKOT) syrup 10 mL    And     [Held by provider] docusate (COLACE) 50 MG/5ML liquid 100 mg     [Held by provider] enoxaparin ANTICOAGULANT (LOVENOX) injection 50 mg     fentaNYL (SUBLIMAZE) 50 mcg/mL bolus from infusion pump 50 mcg     fentaNYL (SUBLIMAZE) infusion     gabapentin (NEURONTIN) solution 800 mg     haloperidol (HALDOL) 2 MG/ML (HIGH CONC) solution 2 mg     haloperidol lactate (HALDOL) injection 5 mg     hydrALAZINE (APRESOLINE) injection 10 mg     HYDROmorphone (PF) (DILAUDID) injection 0.5-1 mg     influenza quadrivalent (PF) vacc (FLUZONE) injection 0.5 mL     levETIRAcetam (KEPPRA) 500 mg in sodium chloride 0.9 % 100 mL intermittent infusion     LORazepam (ATIVAN) 2 MG/ML (HIGH CONC) oral solution 4 mg     metoprolol (LOPRESSOR) injection 10 mg     norepinephrine (LEVOPHED) 4 mg in  mL infusion PREMIX     ondansetron (ZOFRAN-ODT) ODT tab 4 mg    Or     ondansetron (ZOFRAN) injection 4 mg     oxyCODONE (ROXICODONE) solution 20 mg     pantoprazole (PROTONIX) 2 mg/mL suspension 40 mg    Or     pantoprazole (PROTONIX) IV push injection 40 mg     Patient is already receiving anticoagulation with heparin, enoxaparin (LOVENOX), warfarin (COUMADIN)  or other anticoagulant medication     piperacillin-tazobactam (ZOSYN) 3.375 g vial to attach to  mL bag     [Held by provider] polyethylene glycol (MIRALAX) Packet 17 g     polyethylene glycol (MIRALAX) Packet 17 g     potassium chloride 20 mEq in 50 mL intermittent infusion     prochlorperazine  "(COMPAZINE) injection 10 mg    Or     prochlorperazine (COMPAZINE) tablet 10 mg    Or     prochlorperazine (COMPAZINE) suppository 25 mg     propofol (DIPRIVAN) infusion 50 mg     propofol (DIPRIVAN) infusion     protein modular (PROSOURCE TF) 1 packet     QUEtiapine (SEROquel) tablet 100 mg     traZODone (DESYREL) half-tab 75 mg     vancomycin (VANCOCIN) 1,500 mg in sodium chloride 0.9 % 250 mL intermittent infusion       Objective:   VITALS:  /58   Pulse 69   Temp 100  F (37.8  C)   Resp 18   Ht 1.803 m (5' 11\")   Wt 87.2 kg (192 lb 3.9 oz)   SpO2 100%   BMI 26.81 kg/m    VENT:  Ventilation Mode: CMV/AC  (Continuous Mandatory Ventilation/ Assist Control)  FiO2 (%): (S) 35 %  Rate Set (breaths/minute): 18 breaths/min  Tidal Volume Set (mL): 520 mL  PEEP (cm H2O): 8 cmH2O  Oxygen Concentration (%): (S) 35 %  Resp: 18    EXAM:   Gen: sedated, difficult to arouse, vented  HEENT: pale conjunctiva, moist mucosa, ET tube  Neck: no thyromegaly, masses or JVD  Lungs: discrete ronchi both HT  CV: regular, no murmurs or gallops appreciated  Abdomen: soft, NT, BS wnl  Ext: no edema  Neuro: sedated, difficult to arouse      Data Review:  Recent Labs   Lab 12/17/21  0421 12/16/21  1959 12/16/21  1713 12/16/21  1136 12/16/21  0858 12/15/21  0431    126* 110* 132* 119* 120      12/17/2021 04:21   Sodium 143   Potassium 3.1 (L)   Chloride 108 (H)   Carbon Dioxide 27   Urea Nitrogen 10   Creatinine 0.60 (L)   GFR Estimate >90   Calcium 8.6   Anion Gap 8   Triglycerides 149   Glucose 116   WBC 7.4   Hemoglobin 9.5 (L)   Hematocrit 29.8 (L)   Platelet Count 200   RBC Count 3.19 (L)   MCV 93   MCH 29.8   MCHC 31.9   RDW 14.5   INR 1.23 (H)   PTT 34     Tracheal Culture 4+ Streptococcus constellatus Abnormal     Beta hemolytic strain   This organism is susceptible to ampicillin, penicillin, vancomycin and the cephalosporins. If treatment is required and your patient is allergic to penicillin, contact the " microbiology lab within 5 days to request susceptibility testing.   1+ Escherichia coli Abnormal        1+ Proteus mirabilis Abnormal        2+ Staphylococcus epidermidis Abnormal     Susceptibilities not routinely done   3+ Candida parapsilosis complex Abnormal     Susceptibilities not routinely done          Blood Culture Positive on the 2nd day of incubation Abnormal        Streptococcus constellatus Panic     1 of 2 bottles   Beta hemolytic strain   Group F Streptococcus          XR CHEST PORT 1 VIEW  LOCATION: [facility]  DATE/TIME: 12/16/2021 1:56 PM  INDICATION: line placement. COVID  COMPARISON: 12/16/2021 at 1305 a.m.                                 IMPRESSION: Endotracheal tube tip 3.6 cm from felipa. Right internal jugular line tip projects over the lower SVC. Right PICC tip projects most likely over the right upper atrium. Nasogastric tube tip below the film. Heart size appears normal. Unchanged diffuse bilateral pulmonary infiltrates.    By:  Jude Odonnell MD, 12/17/2021  8:22 AM    Primary Care Physician:  Sal Marie

## 2021-12-17 NOTE — PROGRESS NOTES
RT PROGRESS NOTE    VENT DAY# 2 (day 19 total)    CURRENT SETTINGS:   Ventilation Mode: CMV/AC  (Continuous Mandatory Ventilation/ Assist Control)  FiO2 (%): 40 %  Rate Set (breaths/minute): 18 breaths/min  Tidal Volume Set (mL): 520 mL  PEEP (cm H2O): 8 cmH2O  Oxygen Concentration (%): 40 %  Resp: 19      PATIENT PARAMETERS:  PIP 27  Pplat:  26  Pmean:  12  Secretions:  Small amount white/clear thin  02 Sats:  %    ETT SIZE 7.5 Secured at 25 cm at teeth/gums    Respiratory Medications: none      NOTE / SHIFT SUMMARY:   Patient remains intubated on full vent support. Will continue to monitor and assess.     Leida Burns, RT

## 2021-12-17 NOTE — PLAN OF CARE
Problem: Adult Inpatient Plan of Care  Goal: Plan of Care Review  Outcome: Improving  Goal: Patient-Specific Goal (Individualized)  Outcome: Improving  Goal: Absence of Hospital-Acquired Illness or Injury  Outcome: Improving  Intervention: Identify and Manage Fall Risk  Recent Flowsheet Documentation  Taken 12/16/2021 2000 by Home Holly RN  Safety Promotion/Fall Prevention: room near nurse's station  Taken 12/16/2021 1600 by Home Holly RN  Safety Promotion/Fall Prevention: room near nurse's station  Intervention: Prevent Skin Injury  Recent Flowsheet Documentation  Taken 12/16/2021 2200 by Home Holly RN  Body Position:   turned   left  Taken 12/16/2021 2000 by Home Holly RN  Body Position:   turned   supine, legs elevated   supine, head elevated  Taken 12/16/2021 1600 by Home Holly RN  Body Position:   turned   supine, legs elevated   supine, head elevated  Intervention: Prevent and Manage VTE (Venous Thromboembolism) Risk  Recent Flowsheet Documentation  Taken 12/16/2021 2000 by Home Holly RN  VTE Prevention/Management: pneumatic compression device  Taken 12/16/2021 1600 by Home Holly RN  VTE Prevention/Management: pneumatic compression device  Intervention: Prevent Infection  Recent Flowsheet Documentation  Taken 12/16/2021 2000 by Home Holly RN  Infection Prevention:   environmental surveillance performed   equipment surfaces disinfected   hand hygiene promoted   personal protective equipment utilized   rest/sleep promoted   single patient room provided   visitors restricted/screened  Taken 12/16/2021 1600 by Home Holly RN  Infection Prevention:   environmental surveillance performed   equipment surfaces disinfected   hand hygiene promoted   personal protective equipment utilized   rest/sleep promoted   single patient room provided   visitors restricted/screened  Goal: Optimal Comfort and Wellbeing  Outcome: Improving  Goal:  Readiness for Transition of Care  Outcome: Improving     Problem: Risk for Delirium  Goal: Optimal Coping  Outcome: Improving  Goal: Improved Behavioral Control  Outcome: Improving  Goal: Improved Attention and Thought Clarity  Outcome: Improving  Goal: Improved Sleep  Outcome: Improving     Problem: Communication Impairment (Mechanical Ventilation, Invasive)  Goal: Effective Communication  Outcome: Improving     Problem: Device-Related Complication Risk (Mechanical Ventilation, Invasive)  Goal: Optimal Device Function  Outcome: Improving  Intervention: Optimize Device Care and Function  Recent Flowsheet Documentation  Taken 12/16/2021 2000 by Home Holly RN  Airway Safety Measures:   all equipment/monitors on and audible   manual resuscitator at bedside   suction at bedside   suction regulator   suction equipment   oxygen flowmeter  Taken 12/16/2021 1600 by Home Holly RN  Airway Safety Measures:   all equipment/monitors on and audible   manual resuscitator at bedside   suction at bedside   suction regulator   suction equipment   oxygen flowmeter     Problem: Inability to Wean (Mechanical Ventilation, Invasive)  Goal: Mechanical Ventilation Liberation  Outcome: Improving     Problem: Nutrition Impairment (Mechanical Ventilation, Invasive)  Goal: Optimal Nutrition Delivery  Outcome: Improving     Problem: Skin and Tissue Injury (Mechanical Ventilation, Invasive)  Goal: Absence of Device-Related Skin and Tissue Injury  Outcome: Improving     Problem: Ventilator-Induced Lung Injury (Mechanical Ventilation, Invasive)  Goal: Absence of Ventilator-Induced Lung Injury  Outcome: Improving  Intervention: Prevent Ventilator-Associated Pneumonia  Recent Flowsheet Documentation  Taken 12/16/2021 2200 by Home Holly RN  Oral Care: swabbed with antiseptic solution  Head of Bed (HOB) Positioning: HOB at 30 degrees  Taken 12/16/2021 2000 by Home Holly RN  Oral Care: swabbed with antiseptic  solution  Head of Bed (HOB) Positioning: HOB at 30 degrees  Taken 12/16/2021 1600 by Home Holly RN  Oral Care: swabbed with antiseptic solution  Head of Bed (HOB) Positioning: HOB at 30 degrees     Problem: ARDS (Acute Respiratory Distress Syndrome)  Goal: Effective Oxygenation  Outcome: Improving     Problem: Dysrhythmia (Heart Failure)  Goal: Stable Heart Rate and Rhythm  Outcome: Improving     Problem: Fluid Imbalance (Heart Failure)  Goal: Fluid Balance  Outcome: Improving     Ex-spouse Jama updated about pt status. Will keep pt NPO in anticipation of trach tomorrow morning. Needed levo started due to MAP <65. Stable on 0.06 of Levo.

## 2021-12-17 NOTE — PROGRESS NOTES
RT PROGRESS NOTE    VENT DAY # 2    CURRENT SETTINGS:   Ventilation Mode: CMV/AC  (Continuous Mandatory Ventilation/ Assist Control)  FiO2 (%): (S) 40 %  Rate Set (breaths/minute): 18 breaths/min  Tidal Volume Set (mL): 520 mL  PEEP (cm H2O): 8 cmH2O  Oxygen Concentration (%): 35 %  Resp: 8      PATIENT PARAMETERS:  PIP 30  Pplat: 27  Pmean: 12  SBT: Presently not ready for weaning trial.  Secretions: Mod tin tan/red secretions   02 Sats: 99%    Trach tube # 7.5 Shiley, spare tube remains at bedside.     Respiratory Medications:None     ABG: @06:47 am,  pH 7.43; pCO2 47; pO2 102; HCO3 30, sats 96.9% on 45%    NOTE / SHIFT SUMMARY:   Pt did receive tracheostomy procedure with # 7.5 Shiley placed in without incident. Supplemental oxygen has been titrated down to 35%, pt still able to maintain decent saturations. RT will continue to monitor closely.     Lencho Lao, RRT

## 2021-12-17 NOTE — PROCEDURES
[facility]  Operative Note    Pre-operative diagnosis:  Acute and chronic respiratory failure with hypoxia   Post-operative diagnosis  acute on chronic respiratory failure with hypoxia   Procedure:    Surgeon: Dre Matt MD   Assistants(s):  None   Anesthesia:  Propofol and Versed   Estimated blood loss: Less than 10 ml    Total IV fluids: (See anesthesia record)   Blood transfusion: No transfusion was given during surgery   Total urine output: Not measured   Drains: None   Specimens: None   Implants: None   Findings: None.   Complications: None.   Condition: Stable       Description of procedure:  The patient was identified at bedside.  A shoulder roll was placed behind the upper back.  He was then prepped and draped in sterile fashion.  After procedural timeout confirmed the correct patient and procedures, we began by making a 2 cm incision in the neck roughly 2 cm cephalad to the sternal notch.  We then bluntly dissected down to the underlying trachea.  The cricoid cartilage was palpated and we went approximately 2-3 tracheal rings distal to this.  The existing tracheostomy tube was backed out, under bronchoscopic guidance.  We then advanced a needle through the anterior trachea again under direct visual guidance.  Once this was seen entering the lumen of the trachea, a wire was advanced into the distal trachea.  The needle was then removed and we proceeded to serially dilate over the wire.  Once dilated large enough with the [place] percutaneous tracheostomy system, a 7.5 Shiley tracheostomy tube was inserted into the trachea.  The cuff was inflated and the tracheostomy tube hooked up to the ventilator circuit.  Bronchoscopy was then performed through the tracheostomy tube confirming appropriate positioning.  The collar of the tracheostomy was then sutured to the skin with 2-0 Prolene sutures.    Once the tracheostomy was secured, we then proceeded to expose the abdomen.  The  upper abdomen was prepped with alcohol.  A bite block was then placed in the mouth and the endoscope was advanced down the oropharynx through the esophagus into the stomach which was then distended with air.  We identified a site along the greater curvature with good one-to-one motion with palpation of the anterior abdominal wall, and good transillumination.  This site was then injected with 1% lidocaine.  A 1 cm incision was then made in the skin.  Under direct visual guidance, a needle was then inserted through the abdominal wall into the stomach.  A wire was inserted through the needle and snared endoscopically, bringing the wire then out of the mouth.  Utilizing a push technique, a 20 Lao gastrostomy tube was then advanced over the wire and brought out through the anterior abdominal wall.  Repeat endoscopy was then performed to ensure good seating of the bumper against the gastric wall.  The tube sat at approximately 4 cm at the level of the skin.  The endoscope was then removed.  The bumper and clamp were then advanced over the gastrostomy tube and the tube was trimmed to an appropriate length.  Patient tolerated this procedure well.    Dre Matt MD, FACS  [phone]  [facility]  General and Bariatric Surgery

## 2021-12-17 NOTE — PROCEDURES
FIBEROPTIC BRONCHOSCOPY PROCEDURE NOTE     Date of Procedure: 12/17/2021  Performing Physician: Jude Odonnell MD  Pre-Procedure Diagnosis:     1. Acute respiratory failure   2. Prolonged mechanical ventilation. Failed extubation    Post-Procedure Diagnosis:    1. Mild thick bronchial secretion   2. No endobronchial lesions  3. S/p percutaneous tracheostomy     Procedure:  Diagnostic Flexible Fiberoptic Bronchoscopy   Indications:  Jared North is a 46 year old male with history of acute respiratory failure ARDS due to COVID19 viral infection, polysubstance abuse , chronic pain syndrome, depression. Prolonged mechanical intubation, failed extubation. Schedule for percutaneous tracheostomy.     Preop evaluation:  Procedure:  Intravenous Sedation.    Expected level:  Deep sedation  ASA Class: 3/4  Mallampati:  Not assess, patient is intubated  Anesthesia:  5 mg Versed IV,  Fentanyl and propofol drip, FiO2 was increased to 100%  Specimen:  none  Estimated Blood Loss:  minimal ml  Complications:  none    Findings:  Vocal Cords , not assess, patient is intubated   Trachea erythematous mucosa, mild trach secretions  Genevieve erythematous mucosa, mild trach secretions  Right Bronchial Tree  , erythematous mucosa, mild trach secretions, no endobronchial lesions  Left Bronchial Tree, erythematous mucosa, mild trach secretions, no endobronchial lesions    Procedure Details:   The patient was seen and the risks, benefits, complications, treatment options, and expected outcomes were discussed with WIFE   . The risks and potential complications of their problem and proposed treatment include but are not limited to infection, bleeding, pain, adverse drug reaction, pulmonary aspiration, the need for additional procedures, failure to diagnose a condition, creating a complication requiring transfusion or operation, and complication secondary to the anesthetic.  The patient/alternate (see above) concurred with the proposed  plan, giving informed consent.  The patient was identified as Jared North with Date of Birth 1975 and the procedure verified as Diagnostic Flexible Fiberoptic Bronchoscopy .  A Time Out was held and the above information confirmed.    The bronchoscope was passed through the ET tube . The scope was then passed into the trachea  Careful inspection of the tracheal lumen was accomplished. The scope was sequentially passed into the left main and then left upper and lower bronchi and segmental bronchi.   Findings and specimen details recorded above.  The scope was then withdrawn and advanced into the right main bronchus and then into the RUL, RML, and RLL bronchi and segmental bronchi.   Findings and specimen details recorded above.     The bronchoscope and ET tube were withdrawed to the high trachea level, direct visualization of anterior tracheal wall at the level of infra thyroid cartilage.     Surgery placed a percutaneous tracheostomy tube, see additional note for details. Visualization of needle going thru trachea, guidewire was placed and serial dilation of were done then placement of tracheostomy tube.     Bronchoscopy was inserted thru tracheostomy tube, tip located 4 cm above felipa, no significant bleeding from procedure. Percutaneous tracheostomy tube was secured with sutures. See surgery note.     Additional Procedures: Diagnostic bronchoscopy     The patient tolerated the procedure well.      Jude Odonnell MD, 12/17/2021 11:30 AM    Referring Physician: Carlotta Cage  Attending Physician: Jude Dunham Ca*  Primary Care Physician: Sal Marie

## 2021-12-17 NOTE — PLAN OF CARE
Problem: Adult Inpatient Plan of Care  Goal: Optimal Comfort and Wellbeing  Outcome: Improving     Problem: Inability to Wean (Mechanical Ventilation, Invasive)  Goal: Mechanical Ventilation Liberation  Outcome: Improving  Intervention: Promote Extubation and Mechanical Ventilation Liberation  Recent Flowsheet Documentation  Taken 12/17/2021 1315 by Gustavo Polanco RN  Sleep/Rest Enhancement:   awakenings minimized   consistent schedule promoted   noise level reduced  Environmental Support: calm environment promoted  Taken 12/17/2021 1215 by Gustavo Polanco RN  Sleep/Rest Enhancement:   awakenings minimized   consistent schedule promoted   noise level reduced  Environmental Support: calm environment promoted  Taken 12/17/2021 0800 by Gustavo Polanco RN  Sleep/Rest Enhancement:   awakenings minimized   consistent schedule promoted   noise level reduced  Environmental Support: calm environment promoted     Pt had bedside tracheostomy and g-tube placement done today without complication.  Weaning IV sedation as able to achieve RASS goal of -2 to -1.  Weaned levophed drip as able to achieve MAP > 65 mmHg.  G-tube ok'd for meds tonight, hold tube feed over night.  Ex-wife, Jama, updated on plan of care via phone.  Continue to monitor

## 2021-12-17 NOTE — PROGRESS NOTES
Care Management Follow Up    Length of Stay (days): 12    Expected Discharge Date: To be determined.      Concerns to be Addressed:   ICU level of care, due to alteration in respiratory status secondary to Covid-19, requiring oral intubation/vent support and drips: Psychiatry following to assist with medication management.   Patient plan of care discussed at interdisciplinary rounds: Yes  Follow up from rounds/notes:   NPO for PEG and trach planned for today (DONE).     Anticipated Discharge Disposition:  Discharge goal is pending response to treatment, medical needs and mobility closer to discharge. Likely [facility]ACH but [facility] is following along.      Anticipated Discharge Services:  To be determined by destination, patient/family preferences, medical needs and mobility status closer to the time of discharge. Consider referral to [facility] for continued vent weaning, trach management, skilled nursing, medical management and monitoring and therapy once tracheostomy is completed (referrals sent). [facility] is also following along.   Anticipated Discharge DME:  To be determined.     Patient/family educated on Medicare website which has current facility and service quality ratings:  NA  Education Provided on the Discharge Plan:  Per team  Patient/Family in Agreement with the Plan:  Yes    Referrals Placed by ANSHUL/SW:  [facility]; [facility];   Private pay costs discussed: Not applicable     Additional Information:  Patient lives with his ex-spouse, Jama, part of the time. She reports he is normally independent at baseline. If necessary, Jama would be able to provide care in her home post discharge (pending patient's needs). Previous care manager discussed all potential discharge options including LTACH, TCU/Acute Rehab, or home with home care depending on Jared's progressions and needs at discharge. CM to keep Jama updated once closer to  discharge and better idea of plan is known. Per Jama, patient has a history of PTSD and has trouble weaning off the vent in the past. Transport to be determined. Care manager to follow.   12:32 PM:  Trach and PEG placed; initial referrals made to local LTACH facilities.   3:04 PM:  Received a call from admissions with [facility] stating that they are also following along.   Of note, patient has not been vaccinated for Covid-19 (no record on file).    Marilee Farias RN

## 2021-12-17 NOTE — PROGRESS NOTES
Okay to utilize the gastrostomy tube for meds immediately.  But otherwise placed to gravity drainage, and can be used for tube feeds beginning tomorrow.    Dre Matt MD, FACS  [phone]  [facility]

## 2021-12-18 ENCOUNTER — APPOINTMENT (OUTPATIENT)
Dept: PHYSICAL THERAPY | Facility: HOSPITAL | Age: 46
End: 2021-12-18
Attending: INTERNAL MEDICINE
Payer: COMMERCIAL

## 2021-12-18 LAB
ALBUMIN SERPL-MCNC: 2.4 G/DL (ref 3.5–5)
ALP SERPL-CCNC: 59 U/L (ref 45–120)
ALT SERPL W P-5'-P-CCNC: 32 U/L (ref 0–45)
ANION GAP SERPL CALCULATED.3IONS-SCNC: 8 MMOL/L (ref 5–18)
AST SERPL W P-5'-P-CCNC: 19 U/L (ref 0–40)
BACTERIA BLD CULT: ABNORMAL
BACTERIA BLD CULT: ABNORMAL
BASOPHILS # BLD AUTO: 0 10E3/UL (ref 0–0.2)
BASOPHILS NFR BLD AUTO: 1 %
BILIRUB SERPL-MCNC: 0.6 MG/DL (ref 0–1)
BUN SERPL-MCNC: 13 MG/DL (ref 8–22)
CALCIUM SERPL-MCNC: 8.6 MG/DL (ref 8.5–10.5)
CHLORIDE BLD-SCNC: 107 MMOL/L (ref 98–107)
CO2 SERPL-SCNC: 28 MMOL/L (ref 22–31)
CREAT SERPL-MCNC: 0.61 MG/DL (ref 0.7–1.3)
EOSINOPHIL # BLD AUTO: 0.2 10E3/UL (ref 0–0.7)
EOSINOPHIL NFR BLD AUTO: 3 %
ERYTHROCYTE [DISTWIDTH] IN BLOOD BY AUTOMATED COUNT: 14.6 % (ref 10–15)
GFR SERPL CREATININE-BSD FRML MDRD: >90 ML/MIN/1.73M2
GLUCOSE BLD-MCNC: 108 MG/DL (ref 70–125)
HCT VFR BLD AUTO: 30.5 % (ref 40–53)
HGB BLD-MCNC: 9.8 G/DL (ref 13.3–17.7)
IMM GRANULOCYTES # BLD: 0 10E3/UL
IMM GRANULOCYTES NFR BLD: 1 %
LYMPHOCYTES # BLD AUTO: 1.2 10E3/UL (ref 0.8–5.3)
LYMPHOCYTES NFR BLD AUTO: 17 %
MAGNESIUM SERPL-MCNC: 1.8 MG/DL (ref 1.8–2.6)
MCH RBC QN AUTO: 30.1 PG (ref 26.5–33)
MCHC RBC AUTO-ENTMCNC: 32.1 G/DL (ref 31.5–36.5)
MCV RBC AUTO: 94 FL (ref 78–100)
MONOCYTES # BLD AUTO: 0.6 10E3/UL (ref 0–1.3)
MONOCYTES NFR BLD AUTO: 8 %
NEUTROPHILS # BLD AUTO: 5.1 10E3/UL (ref 1.6–8.3)
NEUTROPHILS NFR BLD AUTO: 70 %
NRBC # BLD AUTO: 0 10E3/UL
NRBC BLD AUTO-RTO: 0 /100
PLATELET # BLD AUTO: 198 10E3/UL (ref 150–450)
POTASSIUM BLD-SCNC: 4 MMOL/L (ref 3.5–5)
PROT SERPL-MCNC: 5.9 G/DL (ref 6–8)
RBC # BLD AUTO: 3.26 10E6/UL (ref 4.4–5.9)
SODIUM SERPL-SCNC: 143 MMOL/L (ref 136–145)
WBC # BLD AUTO: 7.1 10E3/UL (ref 4–11)

## 2021-12-18 PROCEDURE — 85025 COMPLETE CBC W/AUTO DIFF WBC: CPT | Performed by: INTERNAL MEDICINE

## 2021-12-18 PROCEDURE — 258N000003 HC RX IP 258 OP 636: Performed by: INTERNAL MEDICINE

## 2021-12-18 PROCEDURE — 250N000013 HC RX MED GY IP 250 OP 250 PS 637: Performed by: NURSE PRACTITIONER

## 2021-12-18 PROCEDURE — 97110 THERAPEUTIC EXERCISES: CPT | Mod: GP | Performed by: PHYSICAL THERAPIST

## 2021-12-18 PROCEDURE — 258N000003 HC RX IP 258 OP 636: Performed by: NURSE PRACTITIONER

## 2021-12-18 PROCEDURE — 200N000001 HC R&B ICU

## 2021-12-18 PROCEDURE — 250N000013 HC RX MED GY IP 250 OP 250 PS 637: Performed by: INTERNAL MEDICINE

## 2021-12-18 PROCEDURE — 82040 ASSAY OF SERUM ALBUMIN: CPT | Performed by: INTERNAL MEDICINE

## 2021-12-18 PROCEDURE — 250N000009 HC RX 250: Performed by: INTERNAL MEDICINE

## 2021-12-18 PROCEDURE — 999N000009 HC STATISTIC AIRWAY CARE

## 2021-12-18 PROCEDURE — 94003 VENT MGMT INPAT SUBQ DAY: CPT

## 2021-12-18 PROCEDURE — 97162 PT EVAL MOD COMPLEX 30 MIN: CPT | Mod: GP | Performed by: PHYSICAL THERAPIST

## 2021-12-18 PROCEDURE — 87077 CULTURE AEROBIC IDENTIFY: CPT | Performed by: INTERNAL MEDICINE

## 2021-12-18 PROCEDURE — 250N000011 HC RX IP 250 OP 636: Performed by: INTERNAL MEDICINE

## 2021-12-18 PROCEDURE — 99291 CRITICAL CARE FIRST HOUR: CPT | Performed by: INTERNAL MEDICINE

## 2021-12-18 PROCEDURE — 999N000157 HC STATISTIC RCP TIME EA 10 MIN

## 2021-12-18 PROCEDURE — 83735 ASSAY OF MAGNESIUM: CPT | Performed by: INTERNAL MEDICINE

## 2021-12-18 PROCEDURE — 250N000011 HC RX IP 250 OP 636: Performed by: NURSE PRACTITIONER

## 2021-12-18 PROCEDURE — 80053 COMPREHEN METABOLIC PANEL: CPT | Performed by: INTERNAL MEDICINE

## 2021-12-18 RX ADMIN — CHLORHEXIDINE GLUCONATE 0.12% ORAL RINSE 15 ML: 1.2 LIQUID ORAL at 08:05

## 2021-12-18 RX ADMIN — DEXMEDETOMIDINE HYDROCHLORIDE 1.5 MCG/KG/HR: 4 INJECTION, SOLUTION INTRAVENOUS at 06:59

## 2021-12-18 RX ADMIN — Medication 800 MG: at 08:05

## 2021-12-18 RX ADMIN — LORAZEPAM 4 MG: 2 LIQUID ORAL at 11:53

## 2021-12-18 RX ADMIN — LEVETIRACETAM 500 MG: 100 INJECTION, SOLUTION INTRAVENOUS at 10:36

## 2021-12-18 RX ADMIN — Medication 1 PACKET: at 20:55

## 2021-12-18 RX ADMIN — QUETIAPINE FUMARATE 100 MG: 25 TABLET ORAL at 22:26

## 2021-12-18 RX ADMIN — BUPROPION HYDROCHLORIDE 100 MG: 100 TABLET, FILM COATED ORAL at 08:05

## 2021-12-18 RX ADMIN — Medication 25 MCG/HR: at 07:59

## 2021-12-18 RX ADMIN — Medication 50 MCG: at 15:19

## 2021-12-18 RX ADMIN — OXYCODONE HYDROCHLORIDE 20 MG: 5 SOLUTION ORAL at 05:26

## 2021-12-18 RX ADMIN — DEXMEDETOMIDINE HYDROCHLORIDE 1.4 MCG/KG/HR: 4 INJECTION, SOLUTION INTRAVENOUS at 16:15

## 2021-12-18 RX ADMIN — LORAZEPAM 4 MG: 2 LIQUID ORAL at 05:09

## 2021-12-18 RX ADMIN — DEXMEDETOMIDINE HYDROCHLORIDE 1.5 MCG/KG/HR: 4 INJECTION, SOLUTION INTRAVENOUS at 01:05

## 2021-12-18 RX ADMIN — LORAZEPAM 4 MG: 2 LIQUID ORAL at 15:40

## 2021-12-18 RX ADMIN — PIPERACILLIN SODIUM AND TAZOBACTAM SODIUM 3.38 G: 3; .375 INJECTION, POWDER, LYOPHILIZED, FOR SOLUTION INTRAVENOUS at 15:43

## 2021-12-18 RX ADMIN — PIPERACILLIN SODIUM AND TAZOBACTAM SODIUM 3.38 G: 3; .375 INJECTION, POWDER, LYOPHILIZED, FOR SOLUTION INTRAVENOUS at 06:08

## 2021-12-18 RX ADMIN — TRAZODONE HYDROCHLORIDE 75 MG: 50 TABLET ORAL at 20:55

## 2021-12-18 RX ADMIN — HALOPERIDOL 2 MG: 2 SOLUTION ORAL at 20:55

## 2021-12-18 RX ADMIN — HALOPERIDOL 2 MG: 2 SOLUTION ORAL at 08:05

## 2021-12-18 RX ADMIN — ENOXAPARIN SODIUM 40 MG: 40 INJECTION SUBCUTANEOUS at 08:05

## 2021-12-18 RX ADMIN — TRAZODONE HYDROCHLORIDE 75 MG: 50 TABLET ORAL at 13:10

## 2021-12-18 RX ADMIN — Medication 800 MG: at 13:10

## 2021-12-18 RX ADMIN — DEXMEDETOMIDINE HYDROCHLORIDE 1.5 MCG/KG/HR: 4 INJECTION, SOLUTION INTRAVENOUS at 22:49

## 2021-12-18 RX ADMIN — BUPROPION HYDROCHLORIDE 100 MG: 100 TABLET, FILM COATED ORAL at 13:10

## 2021-12-18 RX ADMIN — DEXMEDETOMIDINE HYDROCHLORIDE 1.5 MCG/KG/HR: 4 INJECTION, SOLUTION INTRAVENOUS at 10:19

## 2021-12-18 RX ADMIN — Medication 50 MCG: at 21:50

## 2021-12-18 RX ADMIN — OXYCODONE HYDROCHLORIDE 20 MG: 5 SOLUTION ORAL at 02:36

## 2021-12-18 RX ADMIN — ARIPIPRAZOLE 5 MG: 1 SOLUTION ORAL at 08:05

## 2021-12-18 RX ADMIN — ONDANSETRON 4 MG: 2 INJECTION INTRAMUSCULAR; INTRAVENOUS at 18:07

## 2021-12-18 RX ADMIN — OXYCODONE HYDROCHLORIDE 20 MG: 5 SOLUTION ORAL at 21:57

## 2021-12-18 RX ADMIN — CHLORHEXIDINE GLUCONATE 0.12% ORAL RINSE 15 ML: 1.2 LIQUID ORAL at 20:55

## 2021-12-18 RX ADMIN — VANCOMYCIN HYDROCHLORIDE 1500 MG: 5 INJECTION, POWDER, LYOPHILIZED, FOR SOLUTION INTRAVENOUS at 13:05

## 2021-12-18 RX ADMIN — DEXMEDETOMIDINE HYDROCHLORIDE 1.4 MCG/KG/HR: 4 INJECTION, SOLUTION INTRAVENOUS at 19:38

## 2021-12-18 RX ADMIN — HALOPERIDOL 2 MG: 2 SOLUTION ORAL at 13:10

## 2021-12-18 RX ADMIN — BUPROPION HYDROCHLORIDE 100 MG: 100 TABLET, FILM COATED ORAL at 17:02

## 2021-12-18 RX ADMIN — LORAZEPAM 4 MG: 2 LIQUID ORAL at 08:05

## 2021-12-18 RX ADMIN — Medication 40 MG: at 06:59

## 2021-12-18 RX ADMIN — DEXMEDETOMIDINE HYDROCHLORIDE 1.5 MCG/KG/HR: 4 INJECTION, SOLUTION INTRAVENOUS at 03:50

## 2021-12-18 RX ADMIN — OXYCODONE HYDROCHLORIDE 20 MG: 5 SOLUTION ORAL at 18:06

## 2021-12-18 RX ADMIN — LEVETIRACETAM 500 MG: 100 INJECTION, SOLUTION INTRAVENOUS at 22:32

## 2021-12-18 RX ADMIN — OXYCODONE HYDROCHLORIDE 20 MG: 5 SOLUTION ORAL at 10:41

## 2021-12-18 RX ADMIN — Medication 800 MG: at 17:01

## 2021-12-18 RX ADMIN — DEXMEDETOMIDINE HYDROCHLORIDE 1.4 MCG/KG/HR: 4 INJECTION, SOLUTION INTRAVENOUS at 13:07

## 2021-12-18 RX ADMIN — VANCOMYCIN HYDROCHLORIDE 1500 MG: 5 INJECTION, POWDER, LYOPHILIZED, FOR SOLUTION INTRAVENOUS at 01:05

## 2021-12-18 RX ADMIN — OXYCODONE HYDROCHLORIDE 20 MG: 5 SOLUTION ORAL at 13:10

## 2021-12-18 RX ADMIN — TRAZODONE HYDROCHLORIDE 75 MG: 50 TABLET ORAL at 08:05

## 2021-12-18 RX ADMIN — LORAZEPAM 4 MG: 2 LIQUID ORAL at 20:55

## 2021-12-18 RX ADMIN — PIPERACILLIN SODIUM AND TAZOBACTAM SODIUM 3.38 G: 3; .375 INJECTION, POWDER, LYOPHILIZED, FOR SOLUTION INTRAVENOUS at 22:32

## 2021-12-18 ASSESSMENT — ACTIVITIES OF DAILY LIVING (ADL)
ADLS_ACUITY_SCORE: 24
ADLS_ACUITY_SCORE: 24
ADLS_ACUITY_SCORE: 26
ADLS_ACUITY_SCORE: 24
ADLS_ACUITY_SCORE: 26
ADLS_ACUITY_SCORE: 24
ADLS_ACUITY_SCORE: 26
ADLS_ACUITY_SCORE: 24
ADLS_ACUITY_SCORE: 26
ADLS_ACUITY_SCORE: 24
ADLS_ACUITY_SCORE: 26
ADLS_ACUITY_SCORE: 24
ADLS_ACUITY_SCORE: 26
ADLS_ACUITY_SCORE: 26

## 2021-12-18 NOTE — PROGRESS NOTES
12/18/21 1122   Quick Adds   Type of Visit Initial PT Evaluation   Living Environment   Living Environment Comments Pt with trach, on full vent support, non-verbal at this time. Information obtained via chart review. Pt lives with ex-wife part of the time.   Self-Care   Activity/Exercise/Self-Care Comment Pt independent with ADLs/IADLs.   Disability/Function   Change in Functional Status Since Onset of Current Illness/Injury yes   General Information   Onset of Illness/Injury or Date of Surgery 11/24/21   Referring Physician Morales Torres MD   Patient/Family Therapy Goals Statement (PT) None stated.   Pertinent History of Current Problem (include personal factors and/or comorbidities that impact the POC) Pt admitted with COVID.   Existing Precautions/Restrictions fall  (recovered COVID)   Range of Motion (ROM)   ROM Quick Adds ROM deficits secondary to weakness   Strength   Manual Muscle Testing Quick Adds Deficits observed during functional mobility   Bed Mobility   Comment (Bed Mobility) Deferred: pt with trach, full vent support. RN okays patient for ex/ROM.   Clinical Impression   Criteria for Skilled Therapeutic Intervention yes, treatment indicated   PT Diagnosis (PT) impaired functional mobility   Influenced by the following impairments decreased ROM, decreased strength   Functional limitations due to impairments difficulty with transfers, ambulation   Clinical Presentation Evolving/Changing   Clinical Presentation Rationale Pt presents as medically diagnosed.   Clinical Decision Making (Complexity) moderate complexity   Therapy Frequency (PT) Daily   Predicted Duration of Therapy Intervention (days/wks) 7 days   Planned Therapy Interventions (PT) balance training;bed mobility training;ROM (range of motion);strengthening;patient/family education;postural re-education;transfer training   Risk & Benefits of therapy have been explained evaluation/treatment results reviewed;participants included;patient   PT  Discharge Planning    PT Discharge Recommendation (DC Rec)   (LTACH)   PT Rationale for DC Rec Pt only able to participate in ROM/strengthening at this time. Recommend LTACH at discharge. Pt will require mechanical lift for transfers.   Total Evaluation Time   Total Evaluation Time (Minutes) 10     Ayleen Nguyen, PT  12/18/2021

## 2021-12-18 NOTE — PROGRESS NOTES
ASSESSMENT:  No diagnosis found.    Jared North is a 46 year old male who is s/p tracheostomy and PEG tube palcement on 12/17/21. No issues with trach/PEG    PLAN:  Ok to start feeds per RD through PEG  Surgery will sign off    SUBJECTIVE:   Patient intubated and sedated. Seen through window. Not bucking vent and trach is intact. No issues with trach or PEG per assigned RN      Patient Vitals for the past 24 hrs:   BP Temp Temp src Pulse Resp SpO2   12/18/21 0823 -- 99.1  F (37.3  C) Oral -- -- --   12/18/21 0811 -- -- -- -- -- 97 %   12/18/21 0645 111/74 -- -- 63 18 97 %   12/18/21 0630 113/75 -- -- 63 18 96 %   12/18/21 0615 113/74 -- -- 63 18 96 %   12/18/21 0600 111/72 99.1  F (37.3  C) Oral 63 18 96 %   12/18/21 0545 105/68 -- -- 66 19 95 %   12/18/21 0530 111/71 -- -- 64 20 94 %   12/18/21 0515 102/59 -- -- 75 23 93 %   12/18/21 0500 116/81 -- -- 90 (!) 69 93 %   12/18/21 0457 -- -- -- 78 22 92 %   12/18/21 0445 112/77 -- -- 62 19 95 %   12/18/21 0430 117/75 -- -- 66 22 93 %   12/18/21 0415 110/72 -- -- 62 19 96 %   12/18/21 0400 107/69 98.3  F (36.8  C) Axillary 67 23 96 %   12/18/21 0345 105/69 -- -- 66 19 96 %   12/18/21 0330 109/73 -- -- 64 19 96 %   12/18/21 0315 116/77 -- -- 64 19 96 %   12/18/21 0300 121/81 -- -- 63 20 97 %   12/18/21 0245 121/80 -- -- 64 19 97 %   12/18/21 0230 118/79 -- -- 67 21 96 %   12/18/21 0215 124/83 -- -- 63 19 96 %   12/18/21 0200 119/80 98.1  F (36.7  C) Oral 64 20 97 %   12/18/21 0147 -- -- -- 64 19 97 %   12/18/21 0145 116/80 -- -- 64 19 97 %   12/18/21 0130 117/79 -- -- 66 20 97 %   12/18/21 0115 118/77 -- -- 67 19 97 %   12/18/21 0100 121/82 -- -- 66 20 97 %   12/18/21 0054 -- -- -- 65 19 97 %   12/18/21 0045 127/84 -- -- 65 20 97 %   12/18/21 0030 -- -- -- 64 20 97 %   12/18/21 0015 -- -- -- 64 20 97 %   12/18/21 0000 -- 99.6  F (37.6  C) Oral 66 22 96 %   12/17/21 2345 -- -- -- 65 20 98 %   12/17/21 2330 96/57 -- -- 67 29 98 %   12/17/21 2315 120/70 -- -- 66 18 97 %    12/17/21 2300 119/68 -- -- 64 19 98 %   12/17/21 2245 119/67 -- -- 64 18 98 %   12/17/21 2230 116/65 -- -- 64 18 97 %   12/17/21 2215 112/66 -- -- 63 20 97 %   12/17/21 2200 109/64 99.3  F (37.4  C) Oral 61 20 97 %   12/17/21 2145 103/61 -- -- 67 20 98 %   12/17/21 2130 101/60 -- -- 67 19 98 %   12/17/21 2115 102/63 -- -- 67 19 97 %   12/17/21 2100 101/64 -- -- 67 19 97 %   12/17/21 2045 104/64 -- -- 66 19 97 %   12/17/21 2030 106/65 -- -- 66 21 97 %   12/17/21 2015 99/72 -- -- 64 (!) 62 96 %   12/17/21 2000 108/58 98.8  F (37.1  C) Oral 71 20 98 %   12/17/21 1947 116/72 -- -- 66 21 97 %   12/17/21 1945 116/72 -- -- 66 18 97 %   12/17/21 1930 114/72 -- -- 67 19 97 %   12/17/21 1915 112/72 -- -- 67 19 97 %   12/17/21 1900 114/71 -- -- 67 18 97 %   12/17/21 1815 112/64 -- -- 62 21 96 %   12/17/21 1800 111/66 (!) 100.5  F (38.1  C) Axillary 64 20 96 %   12/17/21 1745 112/66 -- -- 62 20 96 %   12/17/21 1730 110/66 -- -- 64 19 96 %   12/17/21 1715 112/65 -- -- 66 18 96 %   12/17/21 1700 110/62 -- -- 63 19 96 %   12/17/21 1645 107/62 -- -- 63 18 96 %   12/17/21 1630 106/62 -- -- 63 18 95 %   12/17/21 1615 113/66 -- -- 62 18 94 %   12/17/21 1600 96/54 -- -- 66 18 99 %   12/17/21 1545 102/59 -- -- 63 18 99 %   12/17/21 1530 104/62 -- -- 64 18 99 %   12/17/21 1517 -- 98.6  F (37  C) Oral -- -- --   12/17/21 1515 103/63 -- -- 64 18 99 %   12/17/21 1500 106/65 -- -- 62 18 99 %   12/17/21 1445 103/64 -- -- 62 18 99 %   12/17/21 1430 106/66 -- -- 61 18 98 %   12/17/21 1415 96/57 -- -- 67 23 96 %   12/17/21 1400 112/69 98.9  F (37.2  C) Oral 62 18 99 %   12/17/21 1345 110/69 -- -- 61 18 99 %   12/17/21 1330 112/68 -- -- 61 18 99 %   12/17/21 1315 109/67 -- -- 62 12 98 %   12/17/21 1300 108/67 -- -- 62 8 97 %   12/17/21 1245 110/66 -- -- 63 19 95 %   12/17/21 1242 -- 97.8  F (36.6  C) -- -- -- --   12/17/21 1230 108/64 -- -- 66 20 95 %   12/17/21 1217 95/49 -- -- 66 (!) 32 100 %   12/17/21 1215 -- -- -- 66 (!) 38 100 %    12/17/21 1200 118/65 -- -- 62 18 100 %   12/17/21 1145 92/53 -- -- 63 18 100 %   12/17/21 1130 111/60 -- -- 66 18 100 %   12/17/21 1115 119/72 -- -- 67 18 100 %   12/17/21 1100 105/56 -- -- 61 8 98 %   12/17/21 1045 105/58 -- -- 60 19 99 %   12/17/21 1030 104/58 -- -- 62 19 98 %   12/17/21 1015 104/63 -- -- 63 20 98 %   12/17/21 1000 106/61 -- -- 65 19 98 %   12/17/21 0945 102/58 -- -- 67 20 98 %   12/17/21 0930 103/56 -- -- 67 18 98 %   12/17/21 0915 102/55 -- -- 66 18 97 %   12/17/21 0900 103/56 -- -- 65 18 97 %         PHYSICAL EXAM:  Deferred due to covid    12/17 0700 - 12/18 0659  In: 2922.4 [I.V.:2727.4]  Out: 1143 [Urine:1143]    No results displayed because visit has over 200 results.             SHONA Sneed CNP

## 2021-12-18 NOTE — PLAN OF CARE
Problem: Adult Inpatient Plan of Care  Goal: Plan of Care Review  Outcome: No Change  Problem: ARDS (Acute Respiratory Distress Syndrome)  Goal: Effective Oxygenation  Outcome: No Change  Problem: Dysrhythmia (Heart Failure)  Goal: Stable Heart Rate and Rhythm  Outcome: No Change    RASS of -2. Patient opens eyes to voice and pain. Weaned off of levo. Tolerating well.    CPOT of 1 due to coughing, has moderate amount of secretions. Tolerating vent and calm. Given scheduled ativan and oxycodone. Trach clean, dry, and intact.    G-tube intact and patent. Flushes given with meds. G-tube clamped due to frequent medication administration, intensivist aware. Wei patent and draining.    Low grade temperatures between 98.1 to 99.6. Patient was diaphoretic.     Jaida Adams RN

## 2021-12-18 NOTE — PROGRESS NOTES
Occupational Therapy  Pt is not appropriate for skilled OT services at this time as he is unable to participate functionally in cares.  Will defer to PT for PROM needs.  Plan was discussed with PT.  Will D/C current OT orders.  Please reorder when appropriate Thank you.    12/18/2021 by Snow Garcia, OTR, OTR/L

## 2021-12-18 NOTE — PLAN OF CARE
"  Problem: Communication Impairment (Mechanical Ventilation, Invasive)  Goal: Effective Communication  12/18/2021 0402 by Génesis Interiano, RT  Outcome: Improving  12/18/2021 0251 by Génesis Interiano, RT  Outcome: Improving   Ventilation Mode: CMV/AC  (Continuous Mandatory Ventilation/ Assist Control)  FiO2 (%): 35 %  Rate Set (breaths/minute): 18 breaths/min  Tidal Volume Set (mL): 520 mL  PEEP (cm H2O): (S) 5 cmH2O  Oxygen Concentration (%): 35 %  Resp: 19    TACH: 7.5 Shiley place in 12/17/21  PIP: 37  Pmean: 18  Pplat: 21    Blood pressure 121/80, pulse 64, temperature 98.1  F (36.7  C), temperature source Oral, resp. rate 19, height 1.803 m (5' 11\"), weight 87.2 kg (192 lb 3.9 oz), SpO2 97 %.    RT note: pt. Is stable on full vent. Suctioned X 2 small thick white yellow. Continue monitoring.   "

## 2021-12-18 NOTE — PROGRESS NOTES
RT PROGRESS NOTE    VENT DAY# 3    CURRENT SETTINGS:   Ventilation Mode: CMV/AC  (Continuous Mandatory Ventilation/ Assist Control)  FiO2 (%): 35 %  Rate Set (breaths/minute): (S) 16 breaths/min  Tidal Volume Set (mL): 520 mL  PEEP (cm H2O): 5 cmH2O  Pressure Support (cm H2O): 5 cmH2O  Oxygen Concentration (%): 35 %  Resp: 23      PATIENT PARAMETERS:  PIP 30  Pplat:  19  Pmean:  11  Compliance: 23  SBT: Yes    Wean time: 30   Secretions:  Moderate, thick, white.  02 Sats:  95%    Trach tube SIZE 7.5     Respiratory Medications: None       NOTE / SHIFT SUMMARY:   Pt was stable on the vent today. PS 5/5 trial was performed today for 35 minutes. Pt did well, but ended it so not to exhaust the Pt. Rt will continue to follow.    Piter Monsalve, RT

## 2021-12-18 NOTE — PLAN OF CARE
"  Problem: Communication Impairment (Mechanical Ventilation, Invasive)  Goal: Effective Communication  Outcome: Improving     RT PROGRESS NOTE     DATA:     CURRENT SETTINGS: Ventilation Mode: CMV/AC  (Continuous Mandatory Ventilation/ Assist Control)  FiO2 (%): 35 %  Rate Set (breaths/minute): 18 breaths/min  Tidal Volume Set (mL): 520 mL  PEEP (cm H2O): (S) 5 cmH2O  Oxygen Concentration (%): 35 %  Resp: 19    Vent: day 3  PIP: 37  Pmean:18  Pplat: 21  TRACH TYPE / SIZE:  7.5 SHILEY PLACED IN 12/17/21     ACTION:             THERAPIES:   ONE             SUCTION:                           FREQUENCY:   x 2                        AMOUNT:   SMALL                         CONSISTENCY:   THICK                        COLOR:   WHITE                WEANING PHASE:   NONE                          RESPONSE:             BS:   DIMINISHED              VITAL SIGNS:   Blood pressure 121/80, pulse 64, temperature 98.1  F (36.7  C), temperature source Oral, resp. rate 19, height 1.803 m (5' 11\"), weight 87.2 kg (192 lb 3.9 oz), SpO2 97 %.                    NOTE / PLAN:  PT.  Is stable on full vent. Not weaning yet. Continue monitoring.    "

## 2021-12-18 NOTE — PROGRESS NOTES
PULMONARY / CRITICAL CARE PROGRESS NOTE    Date / Time of Admission:  12/5/2021 11:26 PM    Assessment:      Jared North is a 46 year old male, unvaccinated against COVID-19, with history of polysubstance abuse and chronic pain/lower back pain on suboxone, mood disorder/depression, HTN, ROSSY mild intermittent asthma, presented with respiratory failure/COVID ARDS intubated on 11/28. Initially requiring proning/paralysis, veletri, now down trending vent needs, issues with agitation, treated for VAP. Not tolerating weaning trials, failed extubation 12/15, re-intubated, surgery consulted for trach and PEG tube    1. Acute respiratory failure  S/p intubation 11/28, extubated on 12/15, re-intubated the same day due to severe agitation.   2. S/p tracheostomy 12/17   3. Ventilator associated pneumonia  Sputum Cx 12/14 showed polymicrobial (Strept constellatus, E coli, proteus, staph epi, and candida parapsilosis.   Blood cultures (+) strep constellatus.   F/u blood cultures 12/15 so far negative.   New trach culture was ordered. Narrow Abx to zosyn  4. Streptococcus pneumonia with bacteremia   5. Encephalopathy / angitation  6. Chronic back pain / polysubstance abuse on suboxone  7. Depression     Advance Directives:  Full code    Plan:   1. Titrate vent settings 16/520/5/35%, keep SpO2 > 90%  2. Start weaning trials per protocol   3. Sedation to keep RASS -1 to -2, discontinue propofol, titrate fentanyl and precedex drip  4. Continue scheduled gabapentin, wellbutrin, abilify, benadryl, haldol, oxycodone, lorazepam, trazodone per psych  5. Titrate pressors to keep MAP > 65  6. Continue diuresis  7. Narrow Abx to zosyn  8. Resume tube feedings   9. PPI for GI prophylaxis   10. Glucose level monitoring  11. DVT prophylaxis lovenox subcutaneous  12. Referral to LTAC    Please contact me if you have any questions.  Total critical care time, not including separately billable procedure time: 45 minutes  This patient had a high  probability of imminent or life threatening deterioration due to acute respiratory failure which required my direct attention, intervention and personal management.     Jude Odonnell  Pulmonary / Critical Care  12/18/2021 11:00 AM        ICU DAILY CHECKLIST                           Can patient transfer out of MICU? no    FAST HUG:    Feeding:  Feeding: noPatient is receiving NPO  For schedule procedure  Wei: yes  Analgesia/Sedation:yes  Precedex, propofol and fentanyl drip  Thromboembolic prophylaxis: Yes; Mode:  Lovenox and SCDs  HOB>30:  Yes  Stress Ulcer Protocol Active: Yes; Mode: PPI  Glycemic Control: Any glucose > 180 no; Mode of Insulin Therapy: Sliding Scale Insulin    INTUBATED:  Can patient have daily waking:  Yes  Can patient have spontaneous breathing trial:  Yes    Restraints? yes    PHYSICAL THERAPY AND MOBILITY:  Can patient have PT and mobility trial: no  Activity: bedrest    Subjective:   HPI:  Jared North is a 46 year old male, unvaccinated against COVID-19, with history of polysubstance abuse and chronic pain/lower back pain on suboxone, mood disorder/depression, HTN, ROSSY mild intermittent asthma, presented with respiratory failure/COVID ARDS intubated on 11/28. Initially requiring proning/paralysis, veletri, now down trending vent needs, issues with agitation, treated for VAP. Not tolerating weaning trials, failed extubation 12/15, re-intubated, surgery consulted for trach and PEG tube    Events overnight  - S/p trach and PEG tube 12/17  - FiO2 down to 35%    Allergies: Patient has no known allergies.     MEDS:  Current Facility-Administered Medications   Medication     acetaminophen (TYLENOL) solution 650 mg     acetaminophen (TYLENOL) tablet 650 mg    Or     acetaminophen (TYLENOL) Suppository 650 mg     ARIPiprazole (ABILIFY) solution 5 mg     bisacodyl (DULCOLAX) Suppository 10 mg     buPROPion (WELLBUTRIN) tablet 100 mg     chlorhexidine (PERIDEX) 0.12 % solution 15 mL      dexmedetomidine (PRECEDEX) 400 mcg in 0.9% sodium chloride 100 mL     dextrose 10% infusion     glucose gel 15-30 g    Or     dextrose 50 % injection 25-50 mL    Or     glucagon injection 1 mg     glucose gel 15-30 g    Or     dextrose 50 % injection 25-50 mL    Or     glucagon injection 1 mg     [Held by provider] sennosides (SENOKOT) syrup 10 mL    And     [Held by provider] docusate (COLACE) 50 MG/5ML liquid 100 mg     enoxaparin ANTICOAGULANT (LOVENOX) injection 40 mg     fentaNYL (SUBLIMAZE) 50 mcg/mL bolus from infusion pump 50 mcg     fentaNYL (SUBLIMAZE) infusion     gabapentin (NEURONTIN) solution 800 mg     haloperidol (HALDOL) 2 MG/ML (HIGH CONC) solution 2 mg     haloperidol lactate (HALDOL) injection 5 mg     hydrALAZINE (APRESOLINE) injection 10 mg     HYDROmorphone (PF) (DILAUDID) injection 0.5-1 mg     influenza quadrivalent (PF) vacc (FLUZONE) injection 0.5 mL     levETIRAcetam (KEPPRA) 500 mg in sodium chloride 0.9 % 100 mL intermittent infusion     LORazepam (ATIVAN) 2 MG/ML (HIGH CONC) oral solution 4 mg     metoprolol (LOPRESSOR) injection 10 mg     norepinephrine (LEVOPHED) 4 mg in  mL infusion PREMIX     ondansetron (ZOFRAN-ODT) ODT tab 4 mg    Or     ondansetron (ZOFRAN) injection 4 mg     oxyCODONE (ROXICODONE) solution 20 mg     pantoprazole (PROTONIX) 2 mg/mL suspension 40 mg    Or     pantoprazole (PROTONIX) IV push injection 40 mg     Patient is already receiving anticoagulation with heparin, enoxaparin (LOVENOX), warfarin (COUMADIN)  or other anticoagulant medication     piperacillin-tazobactam (ZOSYN) 3.375 g vial to attach to  mL bag     [Held by provider] polyethylene glycol (MIRALAX) Packet 17 g     polyethylene glycol (MIRALAX) Packet 17 g     prochlorperazine (COMPAZINE) injection 10 mg    Or     prochlorperazine (COMPAZINE) tablet 10 mg    Or     prochlorperazine (COMPAZINE) suppository 25 mg     propofol (DIPRIVAN) infusion     protein modular (PROSOURCE TF) 1  "packet     QUEtiapine (SEROquel) tablet 100 mg     traZODone (DESYREL) half-tab 75 mg     vancomycin (VANCOCIN) 1,500 mg in sodium chloride 0.9 % 250 mL intermittent infusion       Objective:   VITALS:  /78   Pulse 75   Temp 99.2  F (37.3  C) (Axillary)   Resp 24   Ht 1.803 m (5' 11\")   Wt 87.2 kg (192 lb 3.9 oz)   SpO2 94%   BMI 26.81 kg/m    VENT:  Ventilation Mode: CMV/AC  (Continuous Mandatory Ventilation/ Assist Control)  FiO2 (%): 35 %  Rate Set (breaths/minute): 18 breaths/min  Tidal Volume Set (mL): 520 mL  PEEP (cm H2O): 5 cmH2O  Oxygen Concentration (%): 35 %  Resp: 24    EXAM:   Gen: sedated, difficult to arouse, vented  HEENT: pale conjunctiva, moist mucosa  Neck: s/p trach   Lungs: discrete ronchi both HT  CV: regular, no murmurs or gallops appreciated  Abdomen: soft, NT, BS wnl, G tube   Ext: no edema  Neuro: sedated, difficult to arouse      Data Review:  Recent Labs   Lab 12/18/21  0515 12/17/21  0421 12/16/21  1959 12/16/21  1713 12/16/21  1136 12/16/21  0858    116 126* 110* 132* 119*        12/18/2021 05:15   Sodium 143   Potassium 4.0   Chloride 107   Carbon Dioxide 28   Urea Nitrogen 13   Creatinine 0.61 (L)   GFR Estimate >90   Calcium 8.6   Anion Gap 8   Magnesium 1.8   Albumin 2.4 (L)   Protein Total 5.9 (L)   Bilirubin Total 0.6   Alkaline Phosphatase 59   ALT 32   AST 19      12/18/2021 05:15   WBC 7.1   Hemoglobin 9.8 (L)   Hematocrit 30.5 (L)   Platelet Count 198     Tracheal Culture 4+ Streptococcus constellatus Abnormal     Beta hemolytic strain   This organism is susceptible to ampicillin, penicillin, vancomycin and the cephalosporins. If treatment is required and your patient is allergic to penicillin, contact the microbiology lab within 5 days to request susceptibility testing.   1+ Escherichia coli Abnormal        1+ Proteus mirabilis Abnormal        2+ Staphylococcus epidermidis Abnormal     Susceptibilities not routinely done   3+ Candida parapsilosis " complex Abnormal     Susceptibilities not routinely done          Blood Culture Positive on the 2nd day of incubation Abnormal        Streptococcus constellatus Panic     1 of 2 bottles   Beta hemolytic strain   Group F Streptococcus          XR CHEST PORT 1 VIEW  LOCATION: [facility]  DATE/TIME: 12/16/2021 1:56 PM  INDICATION: line placement. COVID  COMPARISON: 12/16/2021 at 1305 a.m.                                 IMPRESSION: Endotracheal tube tip 3.6 cm from felipa. Right internal jugular line tip projects over the lower SVC. Right PICC tip projects most likely over the right upper atrium. Nasogastric tube tip below the film. Heart size appears normal. Unchanged diffuse bilateral pulmonary infiltrates.    By:  Jude Odonnell MD, 12/18/2021 11:00 AM  Primary Care Physician:  Sal Marie

## 2021-12-19 LAB
BACTERIA BLD CULT: NO GROWTH
MAGNESIUM SERPL-MCNC: 1.8 MG/DL (ref 1.8–2.6)
POTASSIUM BLD-SCNC: 3.2 MMOL/L (ref 3.5–5)
POTASSIUM BLD-SCNC: 3.5 MMOL/L (ref 3.5–5)
TRIGL SERPL-MCNC: 141 MG/DL

## 2021-12-19 PROCEDURE — 250N000013 HC RX MED GY IP 250 OP 250 PS 637: Performed by: INTERNAL MEDICINE

## 2021-12-19 PROCEDURE — 84478 ASSAY OF TRIGLYCERIDES: CPT | Performed by: INTERNAL MEDICINE

## 2021-12-19 PROCEDURE — 94003 VENT MGMT INPAT SUBQ DAY: CPT

## 2021-12-19 PROCEDURE — 99291 CRITICAL CARE FIRST HOUR: CPT | Performed by: INTERNAL MEDICINE

## 2021-12-19 PROCEDURE — 250N000011 HC RX IP 250 OP 636: Performed by: INTERNAL MEDICINE

## 2021-12-19 PROCEDURE — 250N000011 HC RX IP 250 OP 636: Performed by: NURSE PRACTITIONER

## 2021-12-19 PROCEDURE — 250N000009 HC RX 250: Performed by: INTERNAL MEDICINE

## 2021-12-19 PROCEDURE — 250N000013 HC RX MED GY IP 250 OP 250 PS 637: Performed by: NURSE PRACTITIONER

## 2021-12-19 PROCEDURE — 999N000009 HC STATISTIC AIRWAY CARE

## 2021-12-19 PROCEDURE — 258N000003 HC RX IP 258 OP 636: Performed by: NURSE PRACTITIONER

## 2021-12-19 PROCEDURE — 200N000001 HC R&B ICU

## 2021-12-19 PROCEDURE — 84132 ASSAY OF SERUM POTASSIUM: CPT | Performed by: INTERNAL MEDICINE

## 2021-12-19 PROCEDURE — 999N000157 HC STATISTIC RCP TIME EA 10 MIN

## 2021-12-19 PROCEDURE — 83735 ASSAY OF MAGNESIUM: CPT | Performed by: INTERNAL MEDICINE

## 2021-12-19 RX ORDER — POTASSIUM CHLORIDE 20MEQ/15ML
40 LIQUID (ML) ORAL ONCE
Status: COMPLETED | OUTPATIENT
Start: 2021-12-19 | End: 2021-12-19

## 2021-12-19 RX ADMIN — HALOPERIDOL 2 MG: 2 SOLUTION ORAL at 20:54

## 2021-12-19 RX ADMIN — Medication 1 PACKET: at 08:02

## 2021-12-19 RX ADMIN — CHLORHEXIDINE GLUCONATE 0.12% ORAL RINSE 15 ML: 1.2 LIQUID ORAL at 23:53

## 2021-12-19 RX ADMIN — DEXMEDETOMIDINE HYDROCHLORIDE 1.5 MCG/KG/HR: 4 INJECTION, SOLUTION INTRAVENOUS at 04:43

## 2021-12-19 RX ADMIN — DEXMEDETOMIDINE HYDROCHLORIDE 1.5 MCG/KG/HR: 4 INJECTION, SOLUTION INTRAVENOUS at 19:18

## 2021-12-19 RX ADMIN — Medication 40 MG: at 06:30

## 2021-12-19 RX ADMIN — DEXMEDETOMIDINE HYDROCHLORIDE 1.5 MCG/KG/HR: 4 INJECTION, SOLUTION INTRAVENOUS at 07:55

## 2021-12-19 RX ADMIN — Medication 50 MCG: at 16:06

## 2021-12-19 RX ADMIN — PIPERACILLIN SODIUM AND TAZOBACTAM SODIUM 3.38 G: 3; .375 INJECTION, POWDER, LYOPHILIZED, FOR SOLUTION INTRAVENOUS at 22:21

## 2021-12-19 RX ADMIN — LEVETIRACETAM 500 MG: 100 INJECTION, SOLUTION INTRAVENOUS at 22:00

## 2021-12-19 RX ADMIN — LORAZEPAM 4 MG: 2 LIQUID ORAL at 07:56

## 2021-12-19 RX ADMIN — LORAZEPAM 4 MG: 2 LIQUID ORAL at 16:10

## 2021-12-19 RX ADMIN — Medication 800 MG: at 08:01

## 2021-12-19 RX ADMIN — OXYCODONE HYDROCHLORIDE 20 MG: 5 SOLUTION ORAL at 14:03

## 2021-12-19 RX ADMIN — DEXMEDETOMIDINE HYDROCHLORIDE 1.2 MCG/KG/HR: 4 INJECTION, SOLUTION INTRAVENOUS at 11:24

## 2021-12-19 RX ADMIN — HALOPERIDOL 2 MG: 2 SOLUTION ORAL at 08:01

## 2021-12-19 RX ADMIN — Medication 50 MCG: at 00:39

## 2021-12-19 RX ADMIN — ENOXAPARIN SODIUM 40 MG: 40 INJECTION SUBCUTANEOUS at 08:01

## 2021-12-19 RX ADMIN — DEXMEDETOMIDINE HYDROCHLORIDE 1 MCG/KG/HR: 4 INJECTION, SOLUTION INTRAVENOUS at 15:42

## 2021-12-19 RX ADMIN — LORAZEPAM 4 MG: 2 LIQUID ORAL at 23:53

## 2021-12-19 RX ADMIN — Medication 1 PACKET: at 20:55

## 2021-12-19 RX ADMIN — BUPROPION HYDROCHLORIDE 100 MG: 100 TABLET, FILM COATED ORAL at 16:10

## 2021-12-19 RX ADMIN — PIPERACILLIN SODIUM AND TAZOBACTAM SODIUM 3.38 G: 3; .375 INJECTION, POWDER, LYOPHILIZED, FOR SOLUTION INTRAVENOUS at 14:03

## 2021-12-19 RX ADMIN — LORAZEPAM 4 MG: 2 LIQUID ORAL at 12:13

## 2021-12-19 RX ADMIN — LORAZEPAM 4 MG: 2 LIQUID ORAL at 00:18

## 2021-12-19 RX ADMIN — HALOPERIDOL LACTATE 5 MG: 5 INJECTION, SOLUTION INTRAMUSCULAR at 18:43

## 2021-12-19 RX ADMIN — DEXMEDETOMIDINE HYDROCHLORIDE 1.5 MCG/KG/HR: 4 INJECTION, SOLUTION INTRAVENOUS at 01:35

## 2021-12-19 RX ADMIN — OXYCODONE HYDROCHLORIDE 20 MG: 5 SOLUTION ORAL at 10:36

## 2021-12-19 RX ADMIN — Medication 50 MCG: at 04:56

## 2021-12-19 RX ADMIN — HALOPERIDOL 2 MG: 2 SOLUTION ORAL at 13:12

## 2021-12-19 RX ADMIN — POTASSIUM CHLORIDE 40 MEQ: 1.5 SOLUTION ORAL at 06:26

## 2021-12-19 RX ADMIN — PIPERACILLIN SODIUM AND TAZOBACTAM SODIUM 3.38 G: 3; .375 INJECTION, POWDER, LYOPHILIZED, FOR SOLUTION INTRAVENOUS at 06:30

## 2021-12-19 RX ADMIN — LORAZEPAM 4 MG: 2 LIQUID ORAL at 04:44

## 2021-12-19 RX ADMIN — TRAZODONE HYDROCHLORIDE 75 MG: 50 TABLET ORAL at 08:01

## 2021-12-19 RX ADMIN — Medication 800 MG: at 16:13

## 2021-12-19 RX ADMIN — LORAZEPAM 4 MG: 2 LIQUID ORAL at 20:52

## 2021-12-19 RX ADMIN — DEXMEDETOMIDINE HYDROCHLORIDE 1.5 MCG/KG/HR: 4 INJECTION, SOLUTION INTRAVENOUS at 22:21

## 2021-12-19 RX ADMIN — ARIPIPRAZOLE 5 MG: 1 SOLUTION ORAL at 08:01

## 2021-12-19 RX ADMIN — BUPROPION HYDROCHLORIDE 100 MG: 100 TABLET, FILM COATED ORAL at 13:12

## 2021-12-19 RX ADMIN — TRAZODONE HYDROCHLORIDE 75 MG: 50 TABLET ORAL at 14:03

## 2021-12-19 RX ADMIN — LEVETIRACETAM 500 MG: 100 INJECTION, SOLUTION INTRAVENOUS at 10:36

## 2021-12-19 RX ADMIN — TRAZODONE HYDROCHLORIDE 75 MG: 50 TABLET ORAL at 20:54

## 2021-12-19 RX ADMIN — Medication 50 MCG: at 06:55

## 2021-12-19 RX ADMIN — QUETIAPINE FUMARATE 100 MG: 25 TABLET ORAL at 18:45

## 2021-12-19 RX ADMIN — CHLORHEXIDINE GLUCONATE 0.12% ORAL RINSE 15 ML: 1.2 LIQUID ORAL at 07:56

## 2021-12-19 RX ADMIN — OXYCODONE HYDROCHLORIDE 20 MG: 5 SOLUTION ORAL at 06:26

## 2021-12-19 RX ADMIN — Medication 50 MCG: at 18:27

## 2021-12-19 RX ADMIN — Medication 800 MG: at 13:15

## 2021-12-19 RX ADMIN — OXYCODONE HYDROCHLORIDE 20 MG: 5 SOLUTION ORAL at 02:23

## 2021-12-19 RX ADMIN — OXYCODONE HYDROCHLORIDE 20 MG: 5 SOLUTION ORAL at 18:45

## 2021-12-19 RX ADMIN — BUPROPION HYDROCHLORIDE 100 MG: 100 TABLET, FILM COATED ORAL at 08:01

## 2021-12-19 RX ADMIN — OXYCODONE HYDROCHLORIDE 20 MG: 5 SOLUTION ORAL at 21:37

## 2021-12-19 ASSESSMENT — ACTIVITIES OF DAILY LIVING (ADL)
ADLS_ACUITY_SCORE: 22
ADLS_ACUITY_SCORE: 24
ADLS_ACUITY_SCORE: 22
ADLS_ACUITY_SCORE: 24
ADLS_ACUITY_SCORE: 22
ADLS_ACUITY_SCORE: 22
ADLS_ACUITY_SCORE: 24
ADLS_ACUITY_SCORE: 22
ADLS_ACUITY_SCORE: 24
ADLS_ACUITY_SCORE: 22

## 2021-12-19 NOTE — PROGRESS NOTES
PULMONARY / CRITICAL CARE PROGRESS NOTE    Date / Time of Admission:  12/5/2021 11:26 PM    Assessment:      Jared North is a 46 year old male, unvaccinated against COVID-19, with history of polysubstance abuse and chronic pain/lower back pain on suboxone, mood disorder/depression, HTN, ROSSY mild intermittent asthma, presented with respiratory failure/COVID ARDS intubated on 11/28. Initially requiring proning/paralysis, veletri, now down trending vent needs, issues with agitation, treated for VAP. Not tolerating weaning trials, failed extubation 12/15, re-intubated, surgery consulted for trach and PEG tube    1. Acute respiratory failure  S/p intubation 11/28, extubated on 12/15, re-intubated the same day due to severe agitation.   2. S/p tracheostomy 12/17   3. Ventilator associated pneumonia  Sputum Cx 12/14 showed polymicrobial bacteria (Strept constellatus, E coli, proteus, staph epi, and candida parapsilosis.   Blood cultures (+) strep constellatus.   F/u blood cultures 12/15 so far negative.   New trach culture is pending. Abx zosyn  4. Streptococcus pneumonia with bacteremia   5. Encephalopathy / angitation  6. Chronic back pain / polysubstance abuse on suboxone  7. Depression     Advance Directives:  Full code    Plan:   1. Titrate vent settings 16/520/5/35%, keep SpO2 > 90%  2. Continue daily PS weaning trials  3. Sedation to keep RASS 0 to -1 , titrate precedex drip  4. Continue scheduled gabapentin, wellbutrin, abilify, benadryl, haldol, oxycodone, lorazepam, trazodone per psych  5. Titrate pressors to keep MAP > 65  6. Continue diuresis  7. Follow trach secretion culture 12/18  8. Abx zosyn  9. Resume tube feedings   10. PPI for GI prophylaxis   11. Glucose level monitoring  12. DVT prophylaxis lovenox subcutaneous  13. Referral to LTAC    Please contact me if you have any questions.  Total critical care time, not including separately billable procedure time: 45 minutes  This patient had a high  probability of imminent or life threatening deterioration due to acute respiratory failure which required my direct attention, intervention and personal management.     Jude Odonnell  Pulmonary / Critical Care  12/19/2021 11:00 AM        ICU DAILY CHECKLIST                           Can patient transfer out of MICU? no    FAST HUG:    Feeding:  Feeding: yes Patient is receiving tube feedings   Wei: yes  Analgesia/Sedation:yes  Precedex drip  Thromboembolic prophylaxis: Yes; Mode:  Lovenox and SCDs  HOB>30:  Yes  Stress Ulcer Protocol Active: Yes; Mode: PPI  Glycemic Control: Any glucose > 180 no; Mode of Insulin Therapy: Sliding Scale Insulin    INTUBATED:  Can patient have daily waking:  Yes  Can patient have spontaneous breathing trial:  Yes  Restraints? yes    PHYSICAL THERAPY AND MOBILITY:  Can patient have PT and mobility trial: no  Activity: bedrest    Subjective:   HPI:  Jared North is a 46 year old male, unvaccinated against COVID-19, with history of polysubstance abuse and chronic pain/lower back pain on suboxone, mood disorder/depression, HTN, ROSSY, mild intermittent asthma, presented with respiratory failure/COVID ARDS intubated on 11/28. Initially requiring proning/paralysis, veletri, now down trending vent needs, issues with agitation, treated for VAP. Not tolerating weaning trials, failed extubation 12/15, re-intubated, S/p trach and PEG tube 12/17    Events overnight  - Started on PS weaning trial   - Off fentanyl drip, titratting down precedex drip   - Tolerating tube feedings     Allergies: Patient has no known allergies.     MEDS:  Current Facility-Administered Medications   Medication     acetaminophen (TYLENOL) solution 650 mg     acetaminophen (TYLENOL) tablet 650 mg    Or     acetaminophen (TYLENOL) Suppository 650 mg     ARIPiprazole (ABILIFY) solution 5 mg     bisacodyl (DULCOLAX) Suppository 10 mg     buPROPion (WELLBUTRIN) tablet 100 mg     chlorhexidine (PERIDEX) 0.12 %  solution 15 mL     dexmedetomidine (PRECEDEX) 400 mcg in 0.9% sodium chloride 100 mL     dextrose 10% infusion     glucose gel 15-30 g    Or     dextrose 50 % injection 25-50 mL    Or     glucagon injection 1 mg     glucose gel 15-30 g    Or     dextrose 50 % injection 25-50 mL    Or     glucagon injection 1 mg     [Held by provider] sennosides (SENOKOT) syrup 10 mL    And     [Held by provider] docusate (COLACE) 50 MG/5ML liquid 100 mg     enoxaparin ANTICOAGULANT (LOVENOX) injection 40 mg     fentaNYL (SUBLIMAZE) 50 mcg/mL bolus from infusion pump 50 mcg     fentaNYL (SUBLIMAZE) infusion     gabapentin (NEURONTIN) solution 800 mg     haloperidol (HALDOL) 2 MG/ML (HIGH CONC) solution 2 mg     haloperidol lactate (HALDOL) injection 5 mg     hydrALAZINE (APRESOLINE) injection 10 mg     HYDROmorphone (PF) (DILAUDID) injection 0.5-1 mg     influenza quadrivalent (PF) vacc (FLUZONE) injection 0.5 mL     levETIRAcetam (KEPPRA) 500 mg in sodium chloride 0.9 % 100 mL intermittent infusion     LORazepam (ATIVAN) 2 MG/ML (HIGH CONC) oral solution 4 mg     metoprolol (LOPRESSOR) injection 10 mg     norepinephrine (LEVOPHED) 4 mg in  mL infusion PREMIX     ondansetron (ZOFRAN-ODT) ODT tab 4 mg    Or     ondansetron (ZOFRAN) injection 4 mg     oxyCODONE (ROXICODONE) solution 20 mg     pantoprazole (PROTONIX) 2 mg/mL suspension 40 mg    Or     pantoprazole (PROTONIX) IV push injection 40 mg     Patient is already receiving anticoagulation with heparin, enoxaparin (LOVENOX), warfarin (COUMADIN)  or other anticoagulant medication     piperacillin-tazobactam (ZOSYN) 3.375 g vial to attach to  mL bag     [Held by provider] polyethylene glycol (MIRALAX) Packet 17 g     polyethylene glycol (MIRALAX) Packet 17 g     prochlorperazine (COMPAZINE) injection 10 mg    Or     prochlorperazine (COMPAZINE) tablet 10 mg    Or     prochlorperazine (COMPAZINE) suppository 25 mg     protein modular (PROSOURCE TF) 1 packet      "QUEtiapine (SEROquel) tablet 100 mg     traZODone (DESYREL) half-tab 75 mg       Objective:   VITALS:  /80   Pulse 82   Temp 99  F (37.2  C) (Oral)   Resp 25   Ht 1.803 m (5' 11\")   Wt 87.2 kg (192 lb 3.9 oz)   SpO2 95%   BMI 26.81 kg/m    VENT:  Ventilation Mode: (S) CPAP/PS  (Continuous positive airway pressure with Pressure Support)  FiO2 (%): 30 %  Rate Set (breaths/minute): 16 breaths/min  Tidal Volume Set (mL): 520 mL  PEEP (cm H2O): 5 cmH2O  Pressure Support (cm H2O): 10 cmH2O  Oxygen Concentration (%): 30 %  Resp: 25    EXAM:   Gen: sedated, difficult to arouse, vented, tolerating weaning trial  HEENT: pale conjunctiva, moist mucosa  Neck: s/p trach   Lungs: discrete ronchi both HT  CV: regular, no murmurs or gallops appreciated  Abdomen: soft, NT, BS wnl, G tube   Ext: no edema  Neuro: sedated, difficult to arouse, moves all 4 extremities       Data Review:  Recent Labs   Lab 12/18/21  0515 12/17/21  0421 12/16/21  1959 12/16/21  1713 12/16/21  1136 12/16/21  0858    116 126* 110* 132* 119*      12/18/2021 05:15   Sodium 143   Potassium 4.0   Chloride 107   Carbon Dioxide 28   Urea Nitrogen 13   Creatinine 0.61 (L)   GFR Estimate >90   Calcium 8.6   Anion Gap 8   Magnesium 1.8   Albumin 2.4 (L)   Protein Total 5.9 (L)   Bilirubin Total 0.6   Alkaline Phosphatase 59   ALT 32   AST 19   Glucose 108   WBC 7.1   Hemoglobin 9.8 (L)   Hematocrit 30.5 (L)   Platelet Count 198       Tracheal Culture 4+ Streptococcus constellatus Abnormal     Beta hemolytic strain   This organism is susceptible to ampicillin, penicillin, vancomycin and the cephalosporins. If treatment is required and your patient is allergic to penicillin, contact the microbiology lab within 5 days to request susceptibility testing.   1+ Escherichia coli Abnormal        1+ Proteus mirabilis Abnormal        2+ Staphylococcus epidermidis Abnormal     Susceptibilities not routinely done   3+ Candida parapsilosis complex Abnormal   "   Susceptibilities not routinely done          Tracheal Culture 12/18 Culture in progress             Blood Culture Positive on the 2nd day of incubation Abnormal        Streptococcus constellatus Panic     1 of 2 bottles   Beta hemolytic strain   Group F Streptococcus          XR CHEST PORT 1 VIEW  LOCATION: [facility]  DATE/TIME: 12/16/2021 1:56 PM  INDICATION: line placement. COVID  COMPARISON: 12/16/2021 at 1305 a.m.                                 IMPRESSION: Endotracheal tube tip 3.6 cm from felipa. Right internal jugular line tip projects over the lower SVC. Right PICC tip projects most likely over the right upper atrium. Nasogastric tube tip below the film. Heart size appears normal. Unchanged diffuse bilateral pulmonary infiltrates.    By:  Jude Odonnell MD, 12/19/2021 11:00 AM  Primary Care Physician:  Sal Marie

## 2021-12-19 NOTE — PLAN OF CARE
Problem: Adult Inpatient Plan of Care  Goal: Plan of Care Review  Outcome: Improving     Problem: Adult Inpatient Plan of Care  Goal: Optimal Comfort and Wellbeing  Outcome: Improving     Problem: Inability to Wean (Mechanical Ventilation, Invasive)  Goal: Mechanical Ventilation Liberation  Outcome: Improving  Intervention: Promote Extubation and Mechanical Ventilation Liberation  Recent Flowsheet Documentation  Taken 12/18/2021 1630 by Gustavo Polanco RN  Sleep/Rest Enhancement:   consistent schedule promoted   family presence promoted   noise level reduced   regular sleep/rest pattern promoted  Environmental Support: calm environment promoted  Taken 12/18/2021 1200 by Gustavo Polanco RN  Sleep/Rest Enhancement:   consistent schedule promoted   family presence promoted   noise level reduced   regular sleep/rest pattern promoted  Environmental Support: calm environment promoted  Taken 12/18/2021 0800 by Gustavo Polanco RN  Sleep/Rest Enhancement:   consistent schedule promoted   family presence promoted   noise level reduced   regular sleep/rest pattern promoted  Environmental Support: calm environment promoted   Continued to wean IV sedation to reach RASS goal of -1 to 0.  PT awakens to voice and follows some simple commands. This evening pt had one episode of pink/red colored emesis resembling medications previously put down g-tube. PRN Zofran given, tube feeding placed on hold.  PT weaned for approx 30 minutes before becoming tachypnic and diaphroetic, so he was placed back on full ventilator support.  Pt had several episodes of loose stools, currently not on any bowel regimen.

## 2021-12-19 NOTE — PLAN OF CARE
Problem: Adult Inpatient Plan of Care  Goal: Plan of Care Review  Outcome: No Change  Problem: ARDS (Acute Respiratory Distress Syndrome)  Goal: Effective Oxygenation  Outcome: No Change  Problem: Dysrhythmia (Heart Failure)  Goal: Stable Heart Rate and Rhythm  Outcome: No Change     Patient agitated/restless overnight. PRNs given with minimal changes. Precedex increased to 1.5. Continues to be agitated with cares. Frequent movements in bed, kicking legs over the side, pulling against restraints. Patient redirectable, able to follow commands. RASS fluctuated between -1 to +2.     Tolerating vent but had large amount of secretions. Tolerating meds through G-tube.    Had loose BM x1. Continuing to monittor.    Jaida Adams RN

## 2021-12-19 NOTE — PLAN OF CARE
Problem: Adult Inpatient Plan of Care  Goal: Patient-Specific Goal (Individualized)  Outcome: Improving     Problem: Risk for Delirium  Goal: Improved Behavioral Control  Outcome: Improving  Intervention: Prevent and Manage Agitation  Recent Flowsheet Documentation  Taken 12/19/2021 1145 by Gustavo Polanco RN  Environment Familiarity/Consistency: daily routine followed  Taken 12/19/2021 0800 by Gustavo Polanco RN  Environment Familiarity/Consistency: daily routine followed     Pt weaned for approx 2 hours today.  Precedex adjusted to reach RASS goal 0 to -1.  Tube feeding restarted at approx 1000, increase as tolerated per orders.  VSS afebrile.  Ex wife Jama updated on plan of care via phone.

## 2021-12-19 NOTE — PLAN OF CARE
"  Problem: Communication Impairment (Mechanical Ventilation, Invasive)  Goal: Effective Communication  Outcome: Improving     RT PROGRESS NOTE     DATA:     CURRENT SETTINGS: Ventilation Mode: CMV/AC  (Continuous Mandatory Ventilation/ Assist Control)  FiO2 (%): (S) 30 %  Rate Set (breaths/minute): 16 breaths/min  Tidal Volume Set (mL): 520 mL  PEEP (cm H2O): 5 cmH2O  Pressure Support (cm H2O): 5 cmH2O  Oxygen Concentration (%): (S) 30 %  Resp: 19    Vent: day 4    PIP: 29  Pmean: 14  Pplat: 26    TRACH TYPE / SIZE:  7.5 SHILEY PLACED IN 12/17/21     ACTION:             THERAPIES:   ONE             SUCTION:                           FREQUENCY:   x 2                        AMOUNT:   large to moderate                        CONSISTENCY:   frothy/thick                        COLOR:   WHITE                WEANING PHASE:   NONE                          RESPONSE:             BS:   DIMINISHED              VITAL SIGNS:   Blood pressure 131/84, pulse 56, temperature 98.6  F (37  C), temperature source Oral, resp. rate 19, height 1.803 m (5' 11\"), weight 87.2 kg (192 lb 3.9 oz), SpO2 (S) 98 %.                    NOTE / PLAN:  PT.  Is stable on full vent. Not weaning yet. Continue monitoring.    "

## 2021-12-19 NOTE — PLAN OF CARE
RT PROGRESS NOTE    VENT DAY# 4    CURRENT SETTINGS:   Ventilation Mode: (S) CMV/AC  (Continuous Mandatory Ventilation/ Assist Control)  FiO2 (%): 30 %  Rate Set (breaths/minute): 16 breaths/min  Tidal Volume Set (mL): 520 mL  PEEP (cm H2O): 5 cmH2O  Pressure Support (cm H2O): 10 cmH2O  Oxygen Concentration (%): 30 %  Resp: (!) 33    12/19, patient weaned for 2 hrs on CPAP 10/5/30%.      PATIENT PARAMETERS:  PIP 24  Pplat:  27  Pmean:  8  SBT:   Yes, PS 10/5/30 weaned 2 hrs  Secretions:  moderate thick white  02 Sats:  96%    Trach # 7.5 Shiley    Respiratory Medications: None     ABG: none this shift    NOTE / SHIFT SUMMARY:   Patient weaned for 2 hrs on CPAP 10/5/30%. Wean terminated due to tachpnea. Will wean again tomorrow. Patient has strong cough, suctioned for moderate thick white. Will continue with current therapy & assess as needed.    Santo Jerez, RT

## 2021-12-20 ENCOUNTER — APPOINTMENT (OUTPATIENT)
Dept: PHYSICAL THERAPY | Facility: HOSPITAL | Age: 46
End: 2021-12-20
Attending: INTERNAL MEDICINE
Payer: COMMERCIAL

## 2021-12-20 LAB
ALBUMIN SERPL-MCNC: 2.5 G/DL (ref 3.5–5)
ALP SERPL-CCNC: 65 U/L (ref 45–120)
ALT SERPL W P-5'-P-CCNC: 57 U/L (ref 0–45)
ANION GAP SERPL CALCULATED.3IONS-SCNC: 6 MMOL/L (ref 5–18)
AST SERPL W P-5'-P-CCNC: 29 U/L (ref 0–40)
BASOPHILS # BLD AUTO: 0 10E3/UL (ref 0–0.2)
BASOPHILS NFR BLD AUTO: 0 %
BILIRUB SERPL-MCNC: 0.3 MG/DL (ref 0–1)
BUN SERPL-MCNC: 10 MG/DL (ref 8–22)
CALCIUM SERPL-MCNC: 8.7 MG/DL (ref 8.5–10.5)
CHLORIDE BLD-SCNC: 106 MMOL/L (ref 98–107)
CO2 SERPL-SCNC: 28 MMOL/L (ref 22–31)
CREAT SERPL-MCNC: 0.57 MG/DL (ref 0.7–1.3)
EOSINOPHIL # BLD AUTO: 0.3 10E3/UL (ref 0–0.7)
EOSINOPHIL NFR BLD AUTO: 6 %
ERYTHROCYTE [DISTWIDTH] IN BLOOD BY AUTOMATED COUNT: 14.4 % (ref 10–15)
GFR SERPL CREATININE-BSD FRML MDRD: >90 ML/MIN/1.73M2
GLUCOSE BLD-MCNC: 113 MG/DL (ref 70–125)
HCT VFR BLD AUTO: 28.9 % (ref 40–53)
HGB BLD-MCNC: 9.5 G/DL (ref 13.3–17.7)
IMM GRANULOCYTES # BLD: 0 10E3/UL
IMM GRANULOCYTES NFR BLD: 1 %
LYMPHOCYTES # BLD AUTO: 1.2 10E3/UL (ref 0.8–5.3)
LYMPHOCYTES NFR BLD AUTO: 21 %
MAGNESIUM SERPL-MCNC: 1.8 MG/DL (ref 1.8–2.6)
MCH RBC QN AUTO: 30.3 PG (ref 26.5–33)
MCHC RBC AUTO-ENTMCNC: 32.9 G/DL (ref 31.5–36.5)
MCV RBC AUTO: 92 FL (ref 78–100)
MONOCYTES # BLD AUTO: 0.4 10E3/UL (ref 0–1.3)
MONOCYTES NFR BLD AUTO: 7 %
NEUTROPHILS # BLD AUTO: 3.8 10E3/UL (ref 1.6–8.3)
NEUTROPHILS NFR BLD AUTO: 65 %
NRBC # BLD AUTO: 0 10E3/UL
NRBC BLD AUTO-RTO: 0 /100
PLATELET # BLD AUTO: 201 10E3/UL (ref 150–450)
POTASSIUM BLD-SCNC: 3.3 MMOL/L (ref 3.5–5)
POTASSIUM BLD-SCNC: 3.5 MMOL/L (ref 3.5–5)
PROT SERPL-MCNC: 5.7 G/DL (ref 6–8)
RBC # BLD AUTO: 3.14 10E6/UL (ref 4.4–5.9)
SODIUM SERPL-SCNC: 140 MMOL/L (ref 136–145)
WBC # BLD AUTO: 5.7 10E3/UL (ref 4–11)

## 2021-12-20 PROCEDURE — 84132 ASSAY OF SERUM POTASSIUM: CPT | Performed by: INTERNAL MEDICINE

## 2021-12-20 PROCEDURE — 250N000011 HC RX IP 250 OP 636: Performed by: INTERNAL MEDICINE

## 2021-12-20 PROCEDURE — 250N000009 HC RX 250: Performed by: INTERNAL MEDICINE

## 2021-12-20 PROCEDURE — 83735 ASSAY OF MAGNESIUM: CPT | Performed by: INTERNAL MEDICINE

## 2021-12-20 PROCEDURE — 999N000157 HC STATISTIC RCP TIME EA 10 MIN

## 2021-12-20 PROCEDURE — 250N000011 HC RX IP 250 OP 636: Performed by: NURSE PRACTITIONER

## 2021-12-20 PROCEDURE — 94003 VENT MGMT INPAT SUBQ DAY: CPT

## 2021-12-20 PROCEDURE — 250N000013 HC RX MED GY IP 250 OP 250 PS 637: Performed by: NURSE PRACTITIONER

## 2021-12-20 PROCEDURE — 97110 THERAPEUTIC EXERCISES: CPT | Mod: GP | Performed by: PHYSICAL THERAPIST

## 2021-12-20 PROCEDURE — 80053 COMPREHEN METABOLIC PANEL: CPT | Performed by: INTERNAL MEDICINE

## 2021-12-20 PROCEDURE — 250N000013 HC RX MED GY IP 250 OP 250 PS 637: Performed by: INTERNAL MEDICINE

## 2021-12-20 PROCEDURE — 258N000003 HC RX IP 258 OP 636: Performed by: NURSE PRACTITIONER

## 2021-12-20 PROCEDURE — 99291 CRITICAL CARE FIRST HOUR: CPT | Performed by: INTERNAL MEDICINE

## 2021-12-20 PROCEDURE — 85004 AUTOMATED DIFF WBC COUNT: CPT | Performed by: INTERNAL MEDICINE

## 2021-12-20 PROCEDURE — 200N000001 HC R&B ICU

## 2021-12-20 RX ORDER — POTASSIUM CHLORIDE 1.5 G/1.58G
40 POWDER, FOR SOLUTION ORAL ONCE
Status: COMPLETED | OUTPATIENT
Start: 2021-12-20 | End: 2021-12-20

## 2021-12-20 RX ORDER — AMINO AC/PROTEIN HYDR/WHEY PRO 10G-100/30
1 LIQUID (ML) ORAL DAILY
Status: DISCONTINUED | OUTPATIENT
Start: 2021-12-21 | End: 2021-12-22

## 2021-12-20 RX ADMIN — DEXMEDETOMIDINE HYDROCHLORIDE 1.5 MCG/KG/HR: 4 INJECTION, SOLUTION INTRAVENOUS at 19:49

## 2021-12-20 RX ADMIN — LEVETIRACETAM 500 MG: 100 INJECTION, SOLUTION INTRAVENOUS at 22:06

## 2021-12-20 RX ADMIN — OXYCODONE HYDROCHLORIDE 20 MG: 5 SOLUTION ORAL at 17:06

## 2021-12-20 RX ADMIN — OXYCODONE HYDROCHLORIDE 20 MG: 5 SOLUTION ORAL at 10:30

## 2021-12-20 RX ADMIN — POTASSIUM CHLORIDE FOR ORAL SOLUTION 40 MEQ: 1.5 POWDER, FOR SOLUTION ORAL at 06:45

## 2021-12-20 RX ADMIN — Medication 50 MCG: at 10:30

## 2021-12-20 RX ADMIN — TRAZODONE HYDROCHLORIDE 75 MG: 50 TABLET ORAL at 20:18

## 2021-12-20 RX ADMIN — HALOPERIDOL 2 MG: 2 SOLUTION ORAL at 20:18

## 2021-12-20 RX ADMIN — LORAZEPAM 4 MG: 2 LIQUID ORAL at 04:43

## 2021-12-20 RX ADMIN — Medication 50 MCG: at 07:00

## 2021-12-20 RX ADMIN — ARIPIPRAZOLE 5 MG: 1 SOLUTION ORAL at 08:39

## 2021-12-20 RX ADMIN — LORAZEPAM 4 MG: 2 LIQUID ORAL at 08:35

## 2021-12-20 RX ADMIN — DEXMEDETOMIDINE HYDROCHLORIDE 1.5 MCG/KG/HR: 4 INJECTION, SOLUTION INTRAVENOUS at 13:30

## 2021-12-20 RX ADMIN — Medication 800 MG: at 14:10

## 2021-12-20 RX ADMIN — QUETIAPINE FUMARATE 100 MG: 25 TABLET ORAL at 06:45

## 2021-12-20 RX ADMIN — OXYCODONE HYDROCHLORIDE 20 MG: 5 SOLUTION ORAL at 22:06

## 2021-12-20 RX ADMIN — Medication 40 MG: at 06:37

## 2021-12-20 RX ADMIN — Medication 800 MG: at 16:06

## 2021-12-20 RX ADMIN — BUPROPION HYDROCHLORIDE 100 MG: 100 TABLET, FILM COATED ORAL at 14:10

## 2021-12-20 RX ADMIN — HALOPERIDOL 2 MG: 2 SOLUTION ORAL at 08:38

## 2021-12-20 RX ADMIN — TRAZODONE HYDROCHLORIDE 75 MG: 50 TABLET ORAL at 08:38

## 2021-12-20 RX ADMIN — DEXMEDETOMIDINE HYDROCHLORIDE 1.5 MCG/KG/HR: 4 INJECTION, SOLUTION INTRAVENOUS at 22:24

## 2021-12-20 RX ADMIN — DEXMEDETOMIDINE HYDROCHLORIDE 1.5 MCG/KG/HR: 4 INJECTION, SOLUTION INTRAVENOUS at 04:43

## 2021-12-20 RX ADMIN — DEXMEDETOMIDINE HYDROCHLORIDE 1.5 MCG/KG/HR: 4 INJECTION, SOLUTION INTRAVENOUS at 10:30

## 2021-12-20 RX ADMIN — PIPERACILLIN SODIUM AND TAZOBACTAM SODIUM 3.38 G: 3; .375 INJECTION, POWDER, LYOPHILIZED, FOR SOLUTION INTRAVENOUS at 14:09

## 2021-12-20 RX ADMIN — OXYCODONE HYDROCHLORIDE 20 MG: 5 SOLUTION ORAL at 01:21

## 2021-12-20 RX ADMIN — BUPROPION HYDROCHLORIDE 100 MG: 100 TABLET, FILM COATED ORAL at 08:35

## 2021-12-20 RX ADMIN — LORAZEPAM 4 MG: 2 LIQUID ORAL at 20:18

## 2021-12-20 RX ADMIN — CHLORHEXIDINE GLUCONATE 0.12% ORAL RINSE 15 ML: 1.2 LIQUID ORAL at 19:49

## 2021-12-20 RX ADMIN — Medication 50 MCG: at 11:34

## 2021-12-20 RX ADMIN — DEXMEDETOMIDINE HYDROCHLORIDE 1.5 MCG/KG/HR: 4 INJECTION, SOLUTION INTRAVENOUS at 16:34

## 2021-12-20 RX ADMIN — DEXMEDETOMIDINE HYDROCHLORIDE 1.5 MCG/KG/HR: 4 INJECTION, SOLUTION INTRAVENOUS at 07:34

## 2021-12-20 RX ADMIN — OXYCODONE HYDROCHLORIDE 20 MG: 5 SOLUTION ORAL at 05:49

## 2021-12-20 RX ADMIN — CHLORHEXIDINE GLUCONATE 0.12% ORAL RINSE 15 ML: 1.2 LIQUID ORAL at 09:40

## 2021-12-20 RX ADMIN — LEVETIRACETAM 500 MG: 100 INJECTION, SOLUTION INTRAVENOUS at 11:28

## 2021-12-20 RX ADMIN — PIPERACILLIN SODIUM AND TAZOBACTAM SODIUM 3.38 G: 3; .375 INJECTION, POWDER, LYOPHILIZED, FOR SOLUTION INTRAVENOUS at 22:06

## 2021-12-20 RX ADMIN — Medication 50 MCG: at 04:45

## 2021-12-20 RX ADMIN — LORAZEPAM 4 MG: 2 LIQUID ORAL at 23:29

## 2021-12-20 RX ADMIN — Medication 50 MCG: at 06:11

## 2021-12-20 RX ADMIN — BUPROPION HYDROCHLORIDE 100 MG: 100 TABLET, FILM COATED ORAL at 16:06

## 2021-12-20 RX ADMIN — LORAZEPAM 4 MG: 2 LIQUID ORAL at 16:06

## 2021-12-20 RX ADMIN — Medication 800 MG: at 08:39

## 2021-12-20 RX ADMIN — OXYCODONE HYDROCHLORIDE 20 MG: 5 SOLUTION ORAL at 14:09

## 2021-12-20 RX ADMIN — ENOXAPARIN SODIUM 40 MG: 40 INJECTION SUBCUTANEOUS at 08:38

## 2021-12-20 RX ADMIN — TRAZODONE HYDROCHLORIDE 75 MG: 50 TABLET ORAL at 14:10

## 2021-12-20 RX ADMIN — HALOPERIDOL 2 MG: 2 SOLUTION ORAL at 14:09

## 2021-12-20 RX ADMIN — PIPERACILLIN SODIUM AND TAZOBACTAM SODIUM 3.38 G: 3; .375 INJECTION, POWDER, LYOPHILIZED, FOR SOLUTION INTRAVENOUS at 06:37

## 2021-12-20 RX ADMIN — LORAZEPAM 4 MG: 2 LIQUID ORAL at 12:22

## 2021-12-20 RX ADMIN — Medication 50 MCG: at 02:13

## 2021-12-20 RX ADMIN — Medication 1 PACKET: at 08:44

## 2021-12-20 RX ADMIN — DEXMEDETOMIDINE HYDROCHLORIDE 1.5 MCG/KG/HR: 4 INJECTION, SOLUTION INTRAVENOUS at 01:21

## 2021-12-20 ASSESSMENT — ACTIVITIES OF DAILY LIVING (ADL)
ADLS_ACUITY_SCORE: 22
ADLS_ACUITY_SCORE: 24
ADLS_ACUITY_SCORE: 22
ADLS_ACUITY_SCORE: 22

## 2021-12-20 ASSESSMENT — MIFFLIN-ST. JEOR: SCORE: 1782.1

## 2021-12-20 NOTE — PROGRESS NOTES
Trach # 7.5 Shanon. Patient remains on vent/full support and did not tolerate weaning. Changes are not made. Pt is sedated. BS coarse. RT following.    Edvin Henderson, RT

## 2021-12-20 NOTE — PLAN OF CARE
Pt has a 7.5 shiley trach; oozy bleeding at site. On 1.5 mcg of precedex and bolused with 50 mcg of fentanyl x4. Pt gets agitated at times; gave 100 mg of seroquel this AM for increased agitation and work of breathing. Opens eyes and follows commands.  Sinus tanner to sinus rhythm 50-90's.  MAP greater than 65; -130's.  PEG tube turned and promote increased to 60 ml/hr with 80 ml flush every 6 hours. Dignicare in place; 50 ml output over shift.  Adequate uop.  Replaced K this AM, mag recheck tomorrow morning.  Temp up to 99.1. vent settings 30%/300/16/5. Moderate secretions.     Problem: Adult Inpatient Plan of Care  Goal: Absence of Hospital-Acquired Illness or Injury  Intervention: Identify and Manage Fall Risk  Recent Flowsheet Documentation  Taken 12/20/2021 0400 by Hiral Brown RN  Safety Promotion/Fall Prevention: room near nurse's station  Taken 12/20/2021 0000 by Hiral Brown RN  Safety Promotion/Fall Prevention: room near nurse's station  Intervention: Prevent Skin Injury  Recent Flowsheet Documentation  Taken 12/20/2021 0600 by Hiral Brown RN  Body Position:   left   right   turned  Taken 12/20/2021 0400 by Hiral Brown RN  Body Position:   left   right   turned  Taken 12/20/2021 0200 by Hiral Brown RN  Body Position:   left   right   turned  Taken 12/20/2021 0000 by Hiral Brown RN  Body Position:   left   right   turned  Intervention: Prevent and Manage VTE (Venous Thromboembolism) Risk  Recent Flowsheet Documentation  Taken 12/20/2021 0400 by Hiral Brown RN  VTE Prevention/Management: anticoagulant therapy maintained  Taken 12/20/2021 0000 by Hiral Brown RN  VTE Prevention/Management: anticoagulant therapy maintained  Intervention: Prevent Infection  Recent Flowsheet Documentation  Taken 12/20/2021 0400 by Hiral Brown RN  Infection Prevention:   equipment surfaces disinfected   environmental surveillance performed  Taken 12/20/2021 0000 by Hiral Brown RN  Infection Prevention:    equipment surfaces disinfected   environmental surveillance performed     Problem: Risk for Delirium  Goal: Optimal Coping  Intervention: Optimize Psychosocial Adjustment to Delirium  Recent Flowsheet Documentation  Taken 12/20/2021 0400 by Hiral Brown RN  Supportive Measures:   self-care encouraged   relaxation techniques promoted  Taken 12/20/2021 0000 by Hiral Brown RN  Supportive Measures:   self-care encouraged   relaxation techniques promoted  Goal: Improved Behavioral Control  Intervention: Prevent and Manage Agitation  Recent Flowsheet Documentation  Taken 12/20/2021 0400 by Hiral Brown RN  Environment Familiarity/Consistency: daily routine followed  Taken 12/20/2021 0000 by Hiral Brown RN  Environment Familiarity/Consistency: daily routine followed  Goal: Improved Attention and Thought Clarity  Intervention: Maximize Cognitive Function  Recent Flowsheet Documentation  Taken 12/20/2021 0400 by Hiral Brown RN  Sensory Stimulation Regulation:   auditory stimulation minimized   care clustered   lighting decreased  Reorientation Measures:   calendar in view   clock in view  Taken 12/20/2021 0000 by Hiral Brown RN  Sensory Stimulation Regulation:   auditory stimulation minimized   care clustered   lighting decreased  Reorientation Measures:   calendar in view   clock in view  Goal: Improved Sleep  Intervention: Promote Sleep  Recent Flowsheet Documentation  Taken 12/20/2021 0400 by Hiral Brown RN  Sleep/Rest Enhancement:   awakenings minimized   consistent schedule promoted  Taken 12/20/2021 0000 by Hiral Brown RN  Sleep/Rest Enhancement:   awakenings minimized   consistent schedule promoted     Problem: Communication Impairment (Mechanical Ventilation, Invasive)  Goal: Effective Communication  Intervention: Ensure Effective Communication  Recent Flowsheet Documentation  Taken 12/20/2021 0400 by Hiral Brown RN  Communication Enhancement Strategies:   nonverbal strategies used   one-step  directions provided  Taken 12/20/2021 0000 by Hiral Brown RN  Communication Enhancement Strategies:   nonverbal strategies used   one-step directions provided     Problem: Device-Related Complication Risk (Mechanical Ventilation, Invasive)  Goal: Optimal Device Function  Intervention: Optimize Device Care and Function  Recent Flowsheet Documentation  Taken 12/20/2021 0400 by Hiral Brown RN  Airway Safety Measures: all equipment/monitors on and audible  Aspiration Precautions: respiratory status monitored  Taken 12/20/2021 0000 by Hiral Brown RN  Airway Safety Measures: all equipment/monitors on and audible  Aspiration Precautions: respiratory status monitored     Problem: Inability to Wean (Mechanical Ventilation, Invasive)  Goal: Mechanical Ventilation Liberation  Intervention: Promote Extubation and Mechanical Ventilation Liberation  Recent Flowsheet Documentation  Taken 12/20/2021 0400 by Hiral Brown RN  Sleep/Rest Enhancement:   awakenings minimized   consistent schedule promoted  Medication Review/Management: medications reviewed  Environmental Support: calm environment promoted  Taken 12/20/2021 0000 by Hiral Brown RN  Sleep/Rest Enhancement:   awakenings minimized   consistent schedule promoted  Medication Review/Management: medications reviewed  Environmental Support: calm environment promoted     Problem: Nutrition Impairment (Mechanical Ventilation, Invasive)  Goal: Optimal Nutrition Delivery  Intervention: Optimize Nutrition Delivery  Recent Flowsheet Documentation  Taken 12/20/2021 0400 by Hiral Brown RN  Nutrition Support Management: tube feeding rate increased  Taken 12/20/2021 0000 by Hiral Brown RN  Nutrition Support Management: tube feeding rate increased     Problem: Skin and Tissue Injury (Mechanical Ventilation, Invasive)  Goal: Absence of Device-Related Skin and Tissue Injury  Intervention: Maintain Skin and Tissue Health  Recent Flowsheet Documentation  Taken 12/20/2021 0400 by  Wells, Hiral, RN  Device Skin Pressure Protection: absorbent pad utilized/changed  Taken 12/20/2021 0000 by Hiral Brown RN  Device Skin Pressure Protection: absorbent pad utilized/changed     Problem: Ventilator-Induced Lung Injury (Mechanical Ventilation, Invasive)  Goal: Absence of Ventilator-Induced Lung Injury  Intervention: Prevent Ventilator-Associated Pneumonia  Recent Flowsheet Documentation  Taken 12/20/2021 0600 by Hiral Brown RN  Head of Bed (HOB) Positioning: HOB at 20-30 degrees  Taken 12/20/2021 0400 by Hiral Brown RN  Oral Care: lip lubricant applied  Head of Bed (HOB) Positioning: HOB at 20-30 degrees  Taken 12/20/2021 0200 by Hiral Brown RN  Head of Bed (HOB) Positioning: HOB at 20-30 degrees  Taken 12/20/2021 0000 by Hiral Brown RN  Oral Care: lip lubricant applied  Head of Bed (HOB) Positioning: HOB at 20-30 degrees     Problem: ARDS (Acute Respiratory Distress Syndrome)  Goal: Effective Oxygenation  Intervention: Optimize Oxygenation, Ventilation and Perfusion  Recent Flowsheet Documentation  Taken 12/20/2021 0400 by Hiral Brown RN  Airway/Ventilation Management: airway patency maintained  Taken 12/20/2021 0000 by Hiral Brown RN  Airway/Ventilation Management: airway patency maintained     Problem: Hypertension Acute  Goal: Blood Pressure Within Desired Range  Intervention: Normalize Blood Pressure  Recent Flowsheet Documentation  Taken 12/20/2021 0400 by Hiral Brown RN  Sensory Stimulation Regulation:   auditory stimulation minimized   care clustered   lighting decreased  Medication Review/Management: medications reviewed  Taken 12/20/2021 0000 by Hiral Brown RN  Sensory Stimulation Regulation:   auditory stimulation minimized   care clustered   lighting decreased  Medication Review/Management: medications reviewed

## 2021-12-20 NOTE — PROGRESS NOTES
Care Management Follow Up    Length of Stay (days): 15    Expected Discharge Date: 12/23/2021     Concerns to be Addressed:  ICU level of care, Covid recovered, requiring intubation/vent support, tube feedings, positive blood culture       Patient plan of care discussed at interdisciplinary rounds: Yes    Anticipated Discharge Disposition:  LTACH     Anticipated Discharge Services:  LTACH  Anticipated Discharge DME:  None    Patient/family educated on Medicare website which has current facility and service quality ratings:    Education Provided on the Discharge Plan:    Patient/Family in Agreement with the Plan:      Referrals Placed by CM/SW:  LTACH  Private pay costs discussed: Not applicable    Additional Information:  Pt has had trach and PEG placed. Pt had initial PT eval on 12/18 and per report - pt only able to participate in ROM/strengthening at this time. Recommend LTACH at discharge. Pt will require mechanical lift for transfers.     Pt had initial referrals sent to LTACHs on 12/17 and referrals are pending. Pt remains on Precedex.     Contacted Nerissa at [facility]. They do not have any beds today.  She confirms they can accept a very small amount of pts on Precedex in their ICU, but it is very limited and will likely be barrier to placement. They will continue to follow.    Contacted Opal at [facility] and she requested new referral. Referral re-faxed. She confirms they cannot accept pt with Precedex, and pt needs to be off Levophed at least 24 hrs.     Spoke with Char in Admissions at [facility]. She states pt still requires more care then they can accept, but will continue to follow.    Margie Bernal RN

## 2021-12-20 NOTE — PLAN OF CARE
Problem: Inability to Wean (Mechanical Ventilation, Invasive)  Goal: Mechanical Ventilation Liberation  Outcome: Improving     Problem: Ventilator-Induced Lung Injury (Mechanical Ventilation, Invasive)  Goal: Absence of Ventilator-Induced Lung Injury  Outcome: Improving     Problem: ARDS (Acute Respiratory Distress Syndrome)  Goal: Effective Oxygenation  Outcome: Improving     Karla Wilkins, RT

## 2021-12-20 NOTE — PROGRESS NOTES
"CLINICAL NUTRITION SERVICES - REASSESSMENT NOTE     Nutrition Prescription    RECOMMENDATIONS FOR MDs/PROVIDERS TO ORDER:      Malnutrition Status:    Moderate noted 11/28 at [facility]    Recommendations already ordered by Registered Dietitian (RD):  Adjust TF to Jevity 1.5 start at 40 ml/hr if tolerating after 4 hrs increase 10 ml/hr to goal 60 ml/hr.  Decrease prosource to 1 pkt/day.   ml every 4 hrs, 2054 ml total free water.  Jevity 1.5 Abiodun @ goal of  60ml/hr  (1440ml/day)  will provide: 2200 kcals, 102 g PRO, 1094 ml free H20, 310 g CHO, and 30 g fiber daily.    Future/Additional Recommendations:  Hydration.     EVALUATION OF THE PROGRESS TOWARD GOALS   Diet: NPO  Nutrition Support: Promote goal rate 60 ml/hr  prosource 1 pkt 2x/day  FWF 80 ml every 6 hrs.  Intake:  Promote @ goal of  60ml/hr  (1440ml/day)  will provide: 1520 kcals, 111 g PRO, 1208 ml free H20, 187 g CHO, and 0 g fiber, 320 ml flushes daily.     NEW FINDINGS   Discussed pt in team rounds.  Pt COVID recovered.  Met pt today.  12/17 trache and PEG placed.    ANTHROPOMETRICS  Height: 180.3 cm (5' 11\")  Admit wt 98.8 kg (217 lb 13 oz)  12/6  Most Recent Weight: 88.5 kg (195 lb 1.6 oz)    Wt Readings from Last 6 Encounters:   12/13/21 89.9 kg (198 lb 3.1 oz)   12/05/21 89.4 kg (197 lb 3.2 oz)   02/22/18 83.3 kg (183 lb 11.2 oz)   [facility]/5 [facility] at [facility].  20 lb gain in one day with transfer to [facility] (?)  -5 L per I/Os. (pt on diuretic from 12/4 to 12/11)    Dosing Weight: 78.2 kg, IBW     ASSESSED NUTRITION NEEDS  Increased calorie needs today  Estimated Energy Needs: 0207-3528 kcals/day (25-30)  Justification: critically ill and Obese  Estimated Protein Needs:  grams protein/day (1.2 - 2 grams of pro/kg)  Justification: acute illness  Estimated Fluid Needs: 9682-2838 mL/day (1 mL/kcal)   Justification: Maintenance  (Increased calorie needs as appears pt more active)    PHYSICAL FINDINGS  See malnutrition section below.  Pt more active, moving in " bed.    GI CONCERNS  Rectal tube with 50 ml output. (placed 12/19)  PEG 12/17    LABS  Na 140  K 3.3-replacing  Reviewed    MEDICATIONS  IVs-precedex (1434 ml IVs in past 24 hrs)  Keppra, protonix,   Reviewed    MALNUTRITION: noted 11/28  % Weight Loss:  13% in 9 months  % Intake:  </= 50% for >/= 5 days (severe malnutrition)  Subcutaneous Fat Loss:  unable  Muscle Loss:  unable  Fluid Retention:  Trace per charting     Malnutrition Diagnosis: Moderate malnutrition  In Context of:  Acute illness or injury    Goals   Tolerate TF at goal rate-met  Meet nutrition needs-Progressing (calorie needs increased today)    Nutrition Diagnosis  Swallow difficulty r/t acute illness evidenced by intubation   Evaluation: No change    INTERVENTIONS  Implementation  Enteral Nutrition - Modify change to Promote with fiber, increase provisions.    Monitoring/Evaluation  Progress toward goals will be monitored and evaluated per protocol.

## 2021-12-20 NOTE — PLAN OF CARE
Problem: Adult Inpatient Plan of Care  Goal: Absence of Hospital-Acquired Illness or Injury  Intervention: Identify and Manage Fall Risk  Recent Flowsheet Documentation  Taken 12/19/2021 2000 by Oliva Horowitz RN  Safety Promotion/Fall Prevention:   bed alarm on   room near nurse's station   increase visualization of patient   fall prevention program maintained   increased rounding and observation   room organization consistent  Taken 12/19/2021 1600 by Oliva Horowitz RN  Safety Promotion/Fall Prevention:   bed alarm on   room near nurse's station   increase visualization of patient   fall prevention program maintained   increased rounding and observation   room organization consistent  Intervention: Prevent Skin Injury  Recent Flowsheet Documentation  Taken 12/19/2021 2000 by Oliva Horowitz RN  Body Position:   left   right   turned  Taken 12/19/2021 1600 by Olvia Horowtiz RN  Body Position:   left   right   turned  Intervention: Prevent and Manage VTE (Venous Thromboembolism) Risk  Recent Flowsheet Documentation  Taken 12/19/2021 2000 by Oliva Horowitz RN  VTE Prevention/Management: anticoagulant therapy maintained  Taken 12/19/2021 1600 by Oliva Horowitz RN  VTE Prevention/Management: anticoagulant therapy maintained  Intervention: Prevent Infection  Recent Flowsheet Documentation  Taken 12/19/2021 2000 by Oliva Horowitz RN  Infection Prevention:   equipment surfaces disinfected   environmental surveillance performed   hand hygiene promoted   personal protective equipment utilized   single patient room provided  Taken 12/19/2021 1600 by Oliva Horowitz RN  Infection Prevention:   equipment surfaces disinfected   environmental surveillance performed   hand hygiene promoted   personal protective equipment utilized   single patient room provided     Problem: Communication Impairment (Mechanical Ventilation, Invasive)  Goal: Effective Communication  Intervention: Ensure  Effective Communication  Recent Flowsheet Documentation  Taken 12/19/2021 2000 by Oliva Horowitz RN  Communication Enhancement Strategies: one-step directions provided  Taken 12/19/2021 1600 by Oliva Horowitz RN  Communication Enhancement Strategies: one-step directions provided     Problem: Device-Related Complication Risk (Mechanical Ventilation, Invasive)  Goal: Optimal Device Function  Intervention: Optimize Device Care and Function  Recent Flowsheet Documentation  Taken 12/19/2021 2000 by Oliva Horowitz RN  Airway Safety Measures:   all equipment/monitors on and audible   manual resuscitator at bedside   suction at bedside   suction equipment   suction regulator   manual resuscitator/mask/valve in room  Taken 12/19/2021 1600 by Oliva Horowitz RN  Airway Safety Measures:   all equipment/monitors on and audible   manual resuscitator at bedside   suction at bedside   suction equipment   suction regulator   manual resuscitator/mask/valve in room     Problem: Inability to Wean (Mechanical Ventilation, Invasive)  Goal: Mechanical Ventilation Liberation  Intervention: Promote Extubation and Mechanical Ventilation Liberation  Recent Flowsheet Documentation  Taken 12/19/2021 2000 by Oliva Horowitz RN  Medication Review/Management: medications reviewed  Taken 12/19/2021 1600 by Oliva Horowitz RN  Medication Review/Management: medications reviewed     Problem: Ventilator-Induced Lung Injury (Mechanical Ventilation, Invasive)  Goal: Absence of Ventilator-Induced Lung Injury  Intervention: Prevent Ventilator-Associated Pneumonia  Recent Flowsheet Documentation  Taken 12/19/2021 2000 by Oliva Horowitz RN  Oral Care: lip lubricant applied  Head of Bed (HOB) Positioning: HOB at 30 degrees  Taken 12/19/2021 1600 by Oliva Horowitz RN  Oral Care: lip lubricant applied  Head of Bed (HOB) Positioning: HOB at 30 degrees     Problem: Hypertension Acute  Goal: Blood Pressure Within Desired  Range  Intervention: Normalize Blood Pressure  Recent Flowsheet Documentation  Taken 12/19/2021 2000 by Oliva Horowitz, ERYN  Medication Review/Management: medications reviewed  Taken 12/19/2021 1600 by Oliva Horowitz RN  Medication Review/Management: medications reviewed   Hand off report given to receiving RN.

## 2021-12-20 NOTE — PROGRESS NOTES
PULMONARY / CRITICAL CARE PROGRESS NOTE    Date / Time of Admission:  12/5/2021 11:26 PM    Assessment:      Jared North is a 46 year old male, unvaccinated against COVID-19, with history of polysubstance abuse and chronic pain/lower back pain on suboxone, mood disorder/depression, HTN, ROSSY mild intermittent asthma, presented with respiratory failure/COVID ARDS intubated on 11/28. Initially requiring proning/paralysis, veletri, now down trending vent needs, issues with agitation, treated for VAP. Not tolerating weaning trials, failed extubation 12/15, re-intubated, surgery consulted for trach and PEG tube    1. Acute respiratory failure  S/p intubation 11/28, extubated on 12/15, re-intubated the following today due to severe agitation and respiratory distress.  2. S/p tracheostomy 12/17   3. Ventilator associated pneumonia  Sputum Cx 12/14 showed polymicrobial bacteria (Strept constellatus, E coli, proteus, staph epi, and candida parapsilosis.   Blood cultures (+) strep constellatus.   F/u blood cultures 12/15 so far negative.   New trach culture is pending. Abx zosyn  4. Streptococcus pneumonia with bacteremia   5. Encephalopathy / angitation  6. Chronic back pain / polysubstance abuse on suboxone  7. Depression     Advance Directives:  Full code    Plan:   1. Titrate vent settings 16/520/5/35%, keep SpO2 > 90%  2. Continue daily PS weaning trials phase 1  3. Sedation to keep RASS 0 to -1 , titrate precedex drip  4. Continue scheduled gabapentin, wellbutrin, abilify, benadryl, haldol, oxycodone, lorazepam, trazodone per psych  5. Titrate pressors to keep MAP > 65  6. Follow trach secretion culture 12/18  7. Abx zosyn  8. Cont tube feedings   9. PPI for GI prophylaxis   10. Glucose level monitoring  11. DVT prophylaxis lovenox subcutaneous  12. Referral to LTACH, not ready yet as still on precedex.       Morales (Geo) MD Melissa  [facility] Pulmonary & Critical Care  Clinic [phone]  Fax [phone]          ICU DAILY CHECKLIST                           Can patient transfer out of MICU? no    FAST HUG:    Feeding:  Feeding: yes Patient is receiving tube feedings   Wei: yes  Analgesia/Sedation:yes  Precedex drip  Thromboembolic prophylaxis: Yes; Mode:  Lovenox and SCDs  HOB>30:  Yes  Stress Ulcer Protocol Active: Yes; Mode: PPI  Glycemic Control: Any glucose > 180 no; Mode of Insulin Therapy: Sliding Scale Insulin    INTUBATED:  Can patient have daily waking:  Yes  Can patient have spontaneous breathing trial:  Yes  Restraints? yes    PHYSICAL THERAPY AND MOBILITY:  Can patient have PT and mobility trial: no  Activity: bedrest    Subjective:   HPI:  Jared North is a 46 year old male, unvaccinated against COVID-19, with history of polysubstance abuse and chronic pain/lower back pain on suboxone, mood disorder/depression, HTN, ROSSY, mild intermittent asthma, presented with respiratory failure/COVID ARDS intubated on 11/28. Initially requiring proning/paralysis, veletri, now down trending vent needs, issues with agitation, treated for VAP. Not tolerating weaning trials, failed extubation 12/15, re-intubated, S/p trach and PEG tube 12/17    Events overnight  - Started on PS weaning trial   - Off fentanyl drip, titratting down precedex drip   - Tolerating tube feedings     Allergies: Patient has no known allergies.     MEDS:  Current Facility-Administered Medications   Medication     acetaminophen (TYLENOL) solution 650 mg     acetaminophen (TYLENOL) tablet 650 mg    Or     acetaminophen (TYLENOL) Suppository 650 mg     ARIPiprazole (ABILIFY) solution 5 mg     bisacodyl (DULCOLAX) Suppository 10 mg     buPROPion (WELLBUTRIN) tablet 100 mg     chlorhexidine (PERIDEX) 0.12 % solution 15 mL     dexmedetomidine (PRECEDEX) 400 mcg in 0.9% sodium chloride 100 mL     dextrose 10% infusion     glucose gel 15-30 g    Or     dextrose 50 % injection 25-50 mL    Or     glucagon injection 1 mg  "    glucose gel 15-30 g    Or     dextrose 50 % injection 25-50 mL    Or     glucagon injection 1 mg     [Held by provider] sennosides (SENOKOT) syrup 10 mL    And     [Held by provider] docusate (COLACE) 50 MG/5ML liquid 100 mg     enoxaparin ANTICOAGULANT (LOVENOX) injection 40 mg     fentaNYL (SUBLIMAZE) 50 mcg/mL bolus from infusion pump 50 mcg     fentaNYL (SUBLIMAZE) infusion     gabapentin (NEURONTIN) solution 800 mg     haloperidol (HALDOL) 2 MG/ML (HIGH CONC) solution 2 mg     haloperidol lactate (HALDOL) injection 5 mg     hydrALAZINE (APRESOLINE) injection 10 mg     HYDROmorphone (PF) (DILAUDID) injection 0.5-1 mg     influenza quadrivalent (PF) vacc (FLUZONE) injection 0.5 mL     levETIRAcetam (KEPPRA) 500 mg in sodium chloride 0.9 % 100 mL intermittent infusion     LORazepam (ATIVAN) 2 MG/ML (HIGH CONC) oral solution 4 mg     metoprolol (LOPRESSOR) injection 10 mg     norepinephrine (LEVOPHED) 4 mg in  mL infusion PREMIX     ondansetron (ZOFRAN-ODT) ODT tab 4 mg    Or     ondansetron (ZOFRAN) injection 4 mg     oxyCODONE (ROXICODONE) solution 20 mg     pantoprazole (PROTONIX) 2 mg/mL suspension 40 mg    Or     pantoprazole (PROTONIX) IV push injection 40 mg     Patient is already receiving anticoagulation with heparin, enoxaparin (LOVENOX), warfarin (COUMADIN)  or other anticoagulant medication     piperacillin-tazobactam (ZOSYN) 3.375 g vial to attach to  mL bag     [Held by provider] polyethylene glycol (MIRALAX) Packet 17 g     polyethylene glycol (MIRALAX) Packet 17 g     prochlorperazine (COMPAZINE) injection 10 mg    Or     prochlorperazine (COMPAZINE) tablet 10 mg    Or     prochlorperazine (COMPAZINE) suppository 25 mg     protein modular (PROSOURCE TF) 1 packet     QUEtiapine (SEROquel) tablet 100 mg     traZODone (DESYREL) half-tab 75 mg       Objective:   VITALS:  /84   Pulse 66   Temp 99.1  F (37.3  C) (Oral)   Resp 27   Ht 1.803 m (5' 11\")   Wt 88.5 kg (195 lb 1.6 oz) "   SpO2 95%   BMI 27.21 kg/m    VENT:  Ventilation Mode: CMV/AC  (Continuous Mandatory Ventilation/ Assist Control)  FiO2 (%): 30 %  Rate Set (breaths/minute): 16 breaths/min  Tidal Volume Set (mL): 520 mL  PEEP (cm H2O): 5 cmH2O  Pressure Support (cm H2O): 10 cmH2O  Oxygen Concentration (%): 30 %  Resp: 27    EXAM:   Gen: sedated, difficult to arouse, vented, tolerating weaning trial  HEENT: pale conjunctiva, moist mucosa  Neck: s/p trach   Lungs: discrete ronchi both HT  CV: regular, no murmurs or gallops appreciated  Abdomen: soft, NT, BS wnl, G tube   Ext: no edema  Neuro: sedated, difficult to arouse, moves all 4 extremities       Data Review:  Recent Labs   Lab 12/20/21  0453 12/18/21  0515 12/17/21  0421 12/16/21  1959 12/16/21  1713 12/16/21  1136    108 116 126* 110* 132*      12/18/2021 05:15   Sodium 143   Potassium 4.0   Chloride 107   Carbon Dioxide 28   Urea Nitrogen 13   Creatinine 0.61 (L)   GFR Estimate >90   Calcium 8.6   Anion Gap 8   Magnesium 1.8   Albumin 2.4 (L)   Protein Total 5.9 (L)   Bilirubin Total 0.6   Alkaline Phosphatase 59   ALT 32   AST 19   Glucose 108   WBC 7.1   Hemoglobin 9.8 (L)   Hematocrit 30.5 (L)   Platelet Count 198       Tracheal Culture 4+ Streptococcus constellatus Abnormal     Beta hemolytic strain   This organism is susceptible to ampicillin, penicillin, vancomycin and the cephalosporins. If treatment is required and your patient is allergic to penicillin, contact the microbiology lab within 5 days to request susceptibility testing.   1+ Escherichia coli Abnormal        1+ Proteus mirabilis Abnormal        2+ Staphylococcus epidermidis Abnormal     Susceptibilities not routinely done   3+ Candida parapsilosis complex Abnormal     Susceptibilities not routinely done          Tracheal Culture 12/18 Culture in progress             Blood Culture Positive on the 2nd day of incubation Abnormal        Streptococcus constellatus Panic     1 of 2 bottles   Beta  hemolytic strain   Group F Streptococcus          XR CHEST PORT 1 VIEW  LOCATION: [facility]  DATE/TIME: 12/16/2021 1:56 PM  INDICATION: line placement. COVID  COMPARISON: 12/16/2021 at 1305 a.m.                                 IMPRESSION: Endotracheal tube tip 3.6 cm from felipa. Right internal jugular line tip projects over the lower SVC. Right PICC tip projects most likely over the right upper atrium. Nasogastric tube tip below the film. Heart size appears normal. Unchanged diffuse bilateral pulmonary infiltrates.    Morales (Geo) MD Melissa  [facility] Pulmonary & Critical Care  Clinic [phone]  Fax [phone]      Critical care attestation: 45 minutes spent managing the following issues: acute respiratory failure requiring intubation/IMV, encephalopathy needing continuous precedex infusion.  High risk for organ deterioration and death requiring ICU level care.

## 2021-12-20 NOTE — PROGRESS NOTES
[facility]    On December 20, 2021 received referral from Margie Care Manager.  I reviewed the chart of Jared North for  potential admission to [facility].    Once the pt is off of sedation infusions and IV push Fentanyl and demonstrated consistent vent wean trials patient will be clinically appropriate for [facility].     He will require prior authorization for LTACH admission from his insurance (I will do this).     Will continue to follow for appropriateness and bed availability.    Chidi Gomez RN, BSN, HNB-BC  Utilization , RN -- LTACH

## 2021-12-20 NOTE — PROGRESS NOTES
RT PROGRESS NOTE    VENT DAY# 5    CURRENT SETTINGS:   Ventilation Mode: (S) CMV/AC  (Continuous Mandatory Ventilation/ Assist Control) (placed back on full vent support )  FiO2 (%): 30 %  Rate Set (breaths/minute): 16 breaths/min  Tidal Volume Set (mL): 520 mL  PEEP (cm H2O): 5 cmH2O  Pressure Support (cm H2O): 10 cmH2O  Oxygen Concentration (%): 30 %  Resp: 18      PATIENT PARAMETERS:  PIP 29  Pplat:  28  Compliance: 44  SBT: Yes    Wean time: 4 hrs     Failed wean due to: end of designated trial  Secretions:  moderate amount of yellow/tan secretions via trach   02 Sats:  97%    Shiley# 7.5    Respiratory Medications: none     NOTE / SHIFT SUMMARY:   Pt. weaned on CPAP 5, PS 10 for 4 hrs. BS coarse, suctioning moderate amount of secretions via trach, RT following     Karla Wilkins, RT

## 2021-12-21 ENCOUNTER — APPOINTMENT (OUTPATIENT)
Dept: OCCUPATIONAL THERAPY | Facility: HOSPITAL | Age: 46
End: 2021-12-21
Attending: INTERNAL MEDICINE
Payer: COMMERCIAL

## 2021-12-21 ENCOUNTER — APPOINTMENT (OUTPATIENT)
Dept: PHYSICAL THERAPY | Facility: HOSPITAL | Age: 46
End: 2021-12-21
Attending: INTERNAL MEDICINE
Payer: COMMERCIAL

## 2021-12-21 LAB
BACTERIA BLD CULT: NO GROWTH
MAGNESIUM SERPL-MCNC: 1.8 MG/DL (ref 1.8–2.6)
POTASSIUM BLD-SCNC: 3.5 MMOL/L (ref 3.5–5)
TRIGL SERPL-MCNC: 145 MG/DL

## 2021-12-21 PROCEDURE — 97167 OT EVAL HIGH COMPLEX 60 MIN: CPT | Mod: GO

## 2021-12-21 PROCEDURE — 200N000001 HC R&B ICU

## 2021-12-21 PROCEDURE — 250N000013 HC RX MED GY IP 250 OP 250 PS 637: Performed by: NURSE PRACTITIONER

## 2021-12-21 PROCEDURE — 83735 ASSAY OF MAGNESIUM: CPT | Performed by: INTERNAL MEDICINE

## 2021-12-21 PROCEDURE — 250N000009 HC RX 250: Performed by: INTERNAL MEDICINE

## 2021-12-21 PROCEDURE — 250N000011 HC RX IP 250 OP 636: Performed by: INTERNAL MEDICINE

## 2021-12-21 PROCEDURE — 97110 THERAPEUTIC EXERCISES: CPT | Mod: GO

## 2021-12-21 PROCEDURE — 99291 CRITICAL CARE FIRST HOUR: CPT | Performed by: NURSE PRACTITIONER

## 2021-12-21 PROCEDURE — 84132 ASSAY OF SERUM POTASSIUM: CPT | Performed by: INTERNAL MEDICINE

## 2021-12-21 PROCEDURE — 250N000013 HC RX MED GY IP 250 OP 250 PS 637: Performed by: INTERNAL MEDICINE

## 2021-12-21 PROCEDURE — 999N000157 HC STATISTIC RCP TIME EA 10 MIN

## 2021-12-21 PROCEDURE — 84478 ASSAY OF TRIGLYCERIDES: CPT | Performed by: INTERNAL MEDICINE

## 2021-12-21 PROCEDURE — 94003 VENT MGMT INPAT SUBQ DAY: CPT

## 2021-12-21 PROCEDURE — 250N000011 HC RX IP 250 OP 636: Performed by: NURSE PRACTITIONER

## 2021-12-21 PROCEDURE — 99232 SBSQ HOSP IP/OBS MODERATE 35: CPT | Performed by: NURSE PRACTITIONER

## 2021-12-21 PROCEDURE — C9113 INJ PANTOPRAZOLE SODIUM, VIA: HCPCS | Performed by: INTERNAL MEDICINE

## 2021-12-21 PROCEDURE — 258N000003 HC RX IP 258 OP 636: Performed by: NURSE PRACTITIONER

## 2021-12-21 PROCEDURE — 97535 SELF CARE MNGMENT TRAINING: CPT | Mod: GO

## 2021-12-21 PROCEDURE — 97530 THERAPEUTIC ACTIVITIES: CPT | Mod: GP | Performed by: PHYSICAL THERAPIST

## 2021-12-21 RX ORDER — QUETIAPINE FUMARATE 25 MG/1
75 TABLET, FILM COATED ORAL 3 TIMES DAILY
Status: DISCONTINUED | OUTPATIENT
Start: 2021-12-21 | End: 2021-12-31 | Stop reason: HOSPADM

## 2021-12-21 RX ORDER — ARIPIPRAZOLE ORAL 1 MG/ML
7 SOLUTION ORAL DAILY
Status: DISCONTINUED | OUTPATIENT
Start: 2021-12-22 | End: 2021-12-31 | Stop reason: HOSPADM

## 2021-12-21 RX ORDER — QUETIAPINE FUMARATE 25 MG/1
100 TABLET, FILM COATED ORAL AT BEDTIME
Status: DISCONTINUED | OUTPATIENT
Start: 2021-12-21 | End: 2021-12-31 | Stop reason: HOSPADM

## 2021-12-21 RX ORDER — QUETIAPINE FUMARATE 25 MG/1
75 TABLET, FILM COATED ORAL 3 TIMES DAILY
Status: DISCONTINUED | OUTPATIENT
Start: 2021-12-21 | End: 2021-12-21

## 2021-12-21 RX ORDER — CEFTRIAXONE 1 G/1
1 INJECTION, POWDER, FOR SOLUTION INTRAMUSCULAR; INTRAVENOUS EVERY 24 HOURS
Status: DISCONTINUED | OUTPATIENT
Start: 2021-12-21 | End: 2021-12-31 | Stop reason: HOSPADM

## 2021-12-21 RX ORDER — QUETIAPINE FUMARATE 25 MG/1
100 TABLET, FILM COATED ORAL AT BEDTIME
Status: DISCONTINUED | OUTPATIENT
Start: 2021-12-21 | End: 2021-12-21

## 2021-12-21 RX ORDER — BUPROPION HYDROCHLORIDE 100 MG/1
100 TABLET ORAL 2 TIMES DAILY
Status: DISCONTINUED | OUTPATIENT
Start: 2021-12-21 | End: 2021-12-27

## 2021-12-21 RX ORDER — SIMETHICONE 40MG/0.6ML
40 SUSPENSION, DROPS(FINAL DOSAGE FORM)(ML) ORAL EVERY 6 HOURS PRN
Status: DISCONTINUED | OUTPATIENT
Start: 2021-12-21 | End: 2021-12-31 | Stop reason: HOSPADM

## 2021-12-21 RX ADMIN — QUETIAPINE 100 MG: 25 TABLET ORAL at 20:40

## 2021-12-21 RX ADMIN — OXYCODONE HYDROCHLORIDE 20 MG: 5 SOLUTION ORAL at 21:16

## 2021-12-21 RX ADMIN — LORAZEPAM 4 MG: 2 LIQUID ORAL at 03:46

## 2021-12-21 RX ADMIN — TRAZODONE HYDROCHLORIDE 75 MG: 50 TABLET ORAL at 08:52

## 2021-12-21 RX ADMIN — Medication 800 MG: at 12:57

## 2021-12-21 RX ADMIN — OXYCODONE HYDROCHLORIDE 20 MG: 5 SOLUTION ORAL at 09:47

## 2021-12-21 RX ADMIN — BUPROPION HYDROCHLORIDE 100 MG: 100 TABLET, FILM COATED ORAL at 08:53

## 2021-12-21 RX ADMIN — ACETAMINOPHEN 650 MG: 325 TABLET ORAL at 20:51

## 2021-12-21 RX ADMIN — TRAZODONE HYDROCHLORIDE 75 MG: 50 TABLET ORAL at 20:39

## 2021-12-21 RX ADMIN — PANTOPRAZOLE SODIUM 40 MG: 40 INJECTION, POWDER, FOR SOLUTION INTRAVENOUS at 06:05

## 2021-12-21 RX ADMIN — CHLORHEXIDINE GLUCONATE 0.12% ORAL RINSE 15 ML: 1.2 LIQUID ORAL at 08:52

## 2021-12-21 RX ADMIN — LORAZEPAM 4 MG: 2 LIQUID ORAL at 08:52

## 2021-12-21 RX ADMIN — LORAZEPAM 4 MG: 2 LIQUID ORAL at 15:32

## 2021-12-21 RX ADMIN — HALOPERIDOL 2 MG: 2 SOLUTION ORAL at 08:52

## 2021-12-21 RX ADMIN — LEVETIRACETAM 500 MG: 100 INJECTION, SOLUTION INTRAVENOUS at 22:34

## 2021-12-21 RX ADMIN — QUETIAPINE 75 MG: 25 TABLET ORAL at 12:57

## 2021-12-21 RX ADMIN — DEXMEDETOMIDINE HYDROCHLORIDE 0.8 MCG/KG/HR: 4 INJECTION, SOLUTION INTRAVENOUS at 15:31

## 2021-12-21 RX ADMIN — LORAZEPAM 4 MG: 2 LIQUID ORAL at 23:27

## 2021-12-21 RX ADMIN — OXYCODONE HYDROCHLORIDE 20 MG: 5 SOLUTION ORAL at 13:40

## 2021-12-21 RX ADMIN — BUPROPION HYDROCHLORIDE 100 MG: 100 TABLET, FILM COATED ORAL at 17:11

## 2021-12-21 RX ADMIN — DEXMEDETOMIDINE HYDROCHLORIDE 1.5 MCG/KG/HR: 4 INJECTION, SOLUTION INTRAVENOUS at 01:51

## 2021-12-21 RX ADMIN — PIPERACILLIN SODIUM AND TAZOBACTAM SODIUM 3.38 G: 3; .375 INJECTION, POWDER, LYOPHILIZED, FOR SOLUTION INTRAVENOUS at 06:04

## 2021-12-21 RX ADMIN — HYDROMORPHONE HYDROCHLORIDE 1 MG: 1 INJECTION, SOLUTION INTRAMUSCULAR; INTRAVENOUS; SUBCUTANEOUS at 18:54

## 2021-12-21 RX ADMIN — OXYCODONE HYDROCHLORIDE 20 MG: 5 SOLUTION ORAL at 05:20

## 2021-12-21 RX ADMIN — Medication 800 MG: at 09:20

## 2021-12-21 RX ADMIN — DEXMEDETOMIDINE HYDROCHLORIDE 1 MCG/KG/HR: 4 INJECTION, SOLUTION INTRAVENOUS at 21:32

## 2021-12-21 RX ADMIN — METOPROLOL TARTRATE 5 MG: 5 INJECTION INTRAVENOUS at 20:38

## 2021-12-21 RX ADMIN — HYDROMORPHONE HYDROCHLORIDE 1 MG: 1 INJECTION, SOLUTION INTRAMUSCULAR; INTRAVENOUS; SUBCUTANEOUS at 22:33

## 2021-12-21 RX ADMIN — OXYCODONE HYDROCHLORIDE 20 MG: 5 SOLUTION ORAL at 01:51

## 2021-12-21 RX ADMIN — Medication 800 MG: at 17:11

## 2021-12-21 RX ADMIN — ENOXAPARIN SODIUM 40 MG: 40 INJECTION SUBCUTANEOUS at 08:52

## 2021-12-21 RX ADMIN — ARIPIPRAZOLE 5 MG: 1 SOLUTION ORAL at 08:52

## 2021-12-21 RX ADMIN — LEVETIRACETAM 500 MG: 100 INJECTION, SOLUTION INTRAVENOUS at 11:25

## 2021-12-21 RX ADMIN — QUETIAPINE FUMARATE 100 MG: 25 TABLET ORAL at 08:52

## 2021-12-21 RX ADMIN — SIMETHICONE 40 MG: 20 SUSPENSION/ DROPS ORAL at 03:46

## 2021-12-21 RX ADMIN — QUETIAPINE FUMARATE 100 MG: 25 TABLET ORAL at 23:38

## 2021-12-21 RX ADMIN — CEFTRIAXONE SODIUM 1 G: 1 INJECTION, POWDER, FOR SOLUTION INTRAMUSCULAR; INTRAVENOUS at 11:28

## 2021-12-21 RX ADMIN — DEXMEDETOMIDINE HYDROCHLORIDE 1 MCG/KG/HR: 4 INJECTION, SOLUTION INTRAVENOUS at 11:39

## 2021-12-21 RX ADMIN — TRAZODONE HYDROCHLORIDE 75 MG: 50 TABLET ORAL at 13:42

## 2021-12-21 RX ADMIN — DEXMEDETOMIDINE HYDROCHLORIDE 1.2 MCG/KG/HR: 4 INJECTION, SOLUTION INTRAVENOUS at 05:24

## 2021-12-21 RX ADMIN — QUETIAPINE 75 MG: 25 TABLET ORAL at 17:11

## 2021-12-21 RX ADMIN — CHLORHEXIDINE GLUCONATE 0.12% ORAL RINSE 15 ML: 1.2 LIQUID ORAL at 20:38

## 2021-12-21 RX ADMIN — HYDROMORPHONE HYDROCHLORIDE 0.5 MG: 1 INJECTION, SOLUTION INTRAMUSCULAR; INTRAVENOUS; SUBCUTANEOUS at 11:21

## 2021-12-21 RX ADMIN — Medication 1 PACKET: at 08:54

## 2021-12-21 RX ADMIN — LORAZEPAM 4 MG: 2 LIQUID ORAL at 11:25

## 2021-12-21 RX ADMIN — LORAZEPAM 4 MG: 2 LIQUID ORAL at 20:51

## 2021-12-21 RX ADMIN — OXYCODONE HYDROCHLORIDE 20 MG: 5 SOLUTION ORAL at 17:22

## 2021-12-21 RX ADMIN — Medication 50 MCG: at 07:45

## 2021-12-21 ASSESSMENT — ACTIVITIES OF DAILY LIVING (ADL)
ADLS_ACUITY_SCORE: 24
ADLS_ACUITY_SCORE: 22
ADLS_ACUITY_SCORE: 21
ADLS_ACUITY_SCORE: 22
ADLS_ACUITY_SCORE: 21
ADLS_ACUITY_SCORE: 22
ADLS_ACUITY_SCORE: 21
ADLS_ACUITY_SCORE: 22
ADLS_ACUITY_SCORE: 24
ADLS_ACUITY_SCORE: 21
ADLS_ACUITY_SCORE: 22
ADLS_ACUITY_SCORE: 22

## 2021-12-21 ASSESSMENT — MIFFLIN-ST. JEOR: SCORE: 1787.09

## 2021-12-21 NOTE — CONSULTS
INITIAL PSYCHIATRIC CONSULTATION  This note was created with the help of Dragon dictation system. Grammatical and typing errors are not intentional.            REASON FOR REQUEST: severe agitation ARDS trach/vent  trying to get off precedex drip      ASSESSMENT/RECOMMENDATIONS/PLAN :   Metabolic encephalopathy exacerbated in the context of COVID-19 pneumonia,  ICU, mechanical ventilation.  Cognitive and behavioral changes with agitation and restlessness.  Major depressive disorder recurrent by history.  Narcotic dependence by history.    Mood disorder due to history of chronic back pain.     Recommendations:  Decrease Abilify to 7 mg daily  Wellbutrin 100 mg TID 0900,  1700. (HS  dose may interfere with sleep. )  Gabapentin 800 mg 3 times a day: Continue  Seroquel 75 mg TID and 100 mg at bedtime.  Consider following medications to help mitigate behavioral escalation when ICU sedation is decreased.  For behavioral aggression and agitation:   Seroquel 100 mg 4 times a day per feeding tube as needed.    MENTAL STATUS EXAMINATION:   Intubated, sedated, Mechanical ventilation.  Appearance: NAF, Sedated  Speech: None  Thought Process: Not assessable due to somnolence, not arousable.  Thought content: Does not show hallucinations, delusions, paranoia, no restlessness.  Thought Formation: No loosening of associations, no flight of ideas.  Judgment: Impaired.  Insight :Impaired.  Attention : Somnolent  Memory: Depressed  Fund Of Knowledge: Depressed  Affect: Flat  Mood: Somnolent  Alert and Oriented: x 0  Comprehension: Depressed somnolence  Generative thought content: None  Language: Difficult to assess, not engaging in any conversation.   Gait and Ambulation: Not tested.      HISTORY OF PRESENT ILLNESS:   Presenting history to include: Per [facility]/Specialists:   Jared North is a 46 year old male, unvaccinated against COVID-19, with history of polysubstance abuse and chronic pain/lower back pain on suboxone, mood  disorder/depression, HTN, ROSSY mild intermittent asthma, presented with respiratory failure/COVID ARDS intubated on 11/28. Initially requiring proning/paralysis, veletri, now down trending vent needs, issues with agitation, treated for VAP. Not tolerating weaning trials, failed extubation 12/15, re-intubated, surgery consulted for trach and PEG tube    Acute respiratory failure S/p intubation 11/28, extubated on 12/15, re-intubated the following today due to severe agitation and respiratory distress.  S/p tracheostomy 12/17 Ventilator associated pneumoniaSputum Cx 12/14 showed polymicrobial bacteria (Strept constellatus, E coli, proteus, staph epi, and candida parapsilosis. Blood cultures (+) strep constellatus. F/u blood cultures 12/15 so far negative. New trach culture is pending. Abx zosyn  Streptococcus pneumonia with bacteremia Encephalopathy / angitation  Chronic back pain / polysubstance abuse on suboxoneDepression   Patient is intubated needing ICU sedation protocol.    Please see my note below:   Patient continues to pulling, tugging at invasive lines and tubes, not following directions not following commands nor retaining any information at this time.  Behavioral agitation, restlessness escalate with decreasing sedation.  History was obtained from the chart review.  Patient's history is significant for depression, anxiety, narcotic dependence and currently on multiple psychotropic medications to include but not limited to Abilify 5 mg daily, Suboxone, bupropion 300 mg in the morning, gabapentin 800 mg 3 times a day, hydroxyzine 25-50 by mouth nightly.  He is followed by psychiatric provider on an outpatient basis.  Currently resides in a sober home.  It would appear that he was feeling sick for a week before coming to hospital and believed that he contracted COVID from the sober house.  Patient is unvaccinated.        Patient remains intubated and sedated, escalating agitation with decrease in ICU sedation  "protocol. He is still needing high dose of ICU sedation protocol.   Needing restraints for patency of Tubes at times. Staff unable to lower sedation due to severe agitation. .       Review of Systems:As per HPI. Remainders of 12 point review of systems negative.  Psychiatric ROS:  Not verbal.         PFSH reviewed  and not pertinent to chief complaint/reason for visit  BP 94/56   Pulse 67   Temp 98  F (36.7  C)   Resp 17   Ht 1.803 m (5' 11\")   Wt 89 kg (196 lb 3.2 oz)   SpO2 98%   BMI 27.36 kg/m    No results found for: AMPHET, PCP, BARBIT, OXYCODONE, THC, ETOH  @24HOURRESULTS@  Recent Results (from the past 72 hour(s))   Respiratory Aerobic Bacterial Culture    Collection Time: 12/18/21  3:20 PM    Specimen: Trachea; Washings   Result Value Ref Range    Culture Culture in progress     Culture 1+ Proteus mirabilis (A)        Susceptibility    Proteus mirabilis - KIMBERLY     Ampicillin <=2.0 Susceptible ug/mL     Ampicillin/ Sulbactam <=2.0 Susceptible ug/mL     Piperacillin/Tazobactam <=4.0 Susceptible ug/mL     Ceftazidime <=1.0 Susceptible ug/mL     Ceftriaxone <=1.0 Susceptible ug/mL     Cefepime <=1.0 Susceptible ug/mL     Meropenem <=0.25 Susceptible ug/mL     Gentamicin <=1.0 Susceptible ug/mL     Tobramycin <=1.0 Susceptible ug/mL     Ciprofloxacin <=0.25 Susceptible ug/mL     Levofloxacin <=0.12 Susceptible ug/mL     Trimethoprim/Sulfamethoxazole <=1/19 Susceptible ug/mL   Triglycerides    Collection Time: 12/19/21  4:55 AM   Result Value Ref Range    Triglycerides 141 <=149 mg/dL   Potassium    Collection Time: 12/19/21  4:55 AM   Result Value Ref Range    Potassium 3.2 (L) 3.5 - 5.0 mmol/L   Magnesium    Collection Time: 12/19/21  4:55 AM   Result Value Ref Range    Magnesium 1.8 1.8 - 2.6 mg/dL   Potassium    Collection Time: 12/19/21 10:39 AM   Result Value Ref Range    Potassium 3.5 3.5 - 5.0 mmol/L   Magnesium    Collection Time: 12/20/21  4:53 AM   Result Value Ref Range    Magnesium 1.8 1.8 - 2.6 " mg/dL   Comprehensive metabolic panel    Collection Time: 12/20/21  4:53 AM   Result Value Ref Range    Sodium 140 136 - 145 mmol/L    Potassium 3.3 (L) 3.5 - 5.0 mmol/L    Chloride 106 98 - 107 mmol/L    Carbon Dioxide (CO2) 28 22 - 31 mmol/L    Anion Gap 6 5 - 18 mmol/L    Urea Nitrogen 10 8 - 22 mg/dL    Creatinine 0.57 (L) 0.70 - 1.30 mg/dL    Calcium 8.7 8.5 - 10.5 mg/dL    Glucose 113 70 - 125 mg/dL    Alkaline Phosphatase 65 45 - 120 U/L    AST 29 0 - 40 U/L    ALT 57 (H) 0 - 45 U/L    Protein Total 5.7 (L) 6.0 - 8.0 g/dL    Albumin 2.5 (L) 3.5 - 5.0 g/dL    Bilirubin Total 0.3 0.0 - 1.0 mg/dL    GFR Estimate >90 >60 mL/min/1.73m2   CBC with platelets and differential    Collection Time: 12/20/21  4:53 AM   Result Value Ref Range    WBC Count 5.7 4.0 - 11.0 10e3/uL    RBC Count 3.14 (L) 4.40 - 5.90 10e6/uL    Hemoglobin 9.5 (L) 13.3 - 17.7 g/dL    Hematocrit 28.9 (L) 40.0 - 53.0 %    MCV 92 78 - 100 fL    MCH 30.3 26.5 - 33.0 pg    MCHC 32.9 31.5 - 36.5 g/dL    RDW 14.4 10.0 - 15.0 %    Platelet Count 201 150 - 450 10e3/uL    % Neutrophils 65 %    % Lymphocytes 21 %    % Monocytes 7 %    % Eosinophils 6 %    % Basophils 0 %    % Immature Granulocytes 1 %    NRBCs per 100 WBC 0 <1 /100    Absolute Neutrophils 3.8 1.6 - 8.3 10e3/uL    Absolute Lymphocytes 1.2 0.8 - 5.3 10e3/uL    Absolute Monocytes 0.4 0.0 - 1.3 10e3/uL    Absolute Eosinophils 0.3 0.0 - 0.7 10e3/uL    Absolute Basophils 0.0 0.0 - 0.2 10e3/uL    Absolute Immature Granulocytes 0.0 <=0.4 10e3/uL    Absolute NRBCs 0.0 10e3/uL   Potassium    Collection Time: 12/20/21  7:54 PM   Result Value Ref Range    Potassium 3.5 3.5 - 5.0 mmol/L   Triglycerides    Collection Time: 12/21/21  3:52 AM   Result Value Ref Range    Triglycerides 145 <=149 mg/dL   Magnesium    Collection Time: 12/21/21  3:52 AM   Result Value Ref Range    Magnesium 1.8 1.8 - 2.6 mg/dL   Potassium    Collection Time: 12/21/21  3:52 AM   Result Value Ref Range    Potassium 3.5 3.5 - 5.0  mmol/L       PMH: No past medical history on file.        Current Medications:Scheduled Meds:    ARIPiprazole  5 mg Oral Daily     buPROPion  100 mg Per Feeding Tube TID     cefTRIAXone  1 g Intravenous Q24H     chlorhexidine  15 mL Mouth/Throat Q12H     [Held by provider] sennosides  10 mL Per Feeding Tube BID    And     [Held by provider] docusate  100 mg Per Feeding Tube BID     enoxaparin ANTICOAGULANT  40 mg Subcutaneous Q24H     gabapentin  800 mg Oral TID     haloperidol  2 mg Oral TID     influenza quadrivalent (PF) vacc  0.5 mL Intramuscular Prior to discharge     levETIRAcetam  500 mg Intravenous Q12H     LORazepam  4 mg Per Feeding Tube Q4H     oxyCODONE  20 mg Per Feeding Tube Q4H     pantoprazole  40 mg Per Feeding Tube QAM AC    Or     pantoprazole (PROTONIX) IV  40 mg Intravenous QAM AC     [Held by provider] polyethylene glycol  17 g Oral or Feeding Tube BID     protein modular  1 packet Per Feeding Tube Daily     traZODone  75 mg Oral or Feeding Tube TID     Continuous Infusions:    [Held by provider] dexmedetomidine Stopped (12/21/21 0930)     dextrose       - MEDICATION INSTRUCTIONS -       PRN Meds:.acetaminophen, acetaminophen **OR** acetaminophen, bisacodyl, dextrose, glucose **OR** dextrose **OR** glucagon, haloperidol lactate, hydrALAZINE, HYDROmorphone, metoprolol, ondansetron **OR** ondansetron, - MEDICATION INSTRUCTIONS -, polyethylene glycol, prochlorperazine **OR** prochlorperazine **OR** prochlorperazine, QUEtiapine, simethicone                Family History: PERSONALLY REVIEWED.  Family History   Problem Relation Age of Onset     Diabetes Mother      Heart Disease Mother      Hypertension Mother      Hypertension Maternal Grandmother      Diabetes Maternal Grandfather      Hypertension Maternal Grandfather      Pertinent Family hx not pertinent to Chief Complaint or reason for visit.     Social History:  PERSONALLY REVIEWED.  Social History     Socioeconomic History     Marital status:       Spouse name: Not on file     Number of children: Not on file     Years of education: Not on file     Highest education level: Not on file   Occupational History     Not on file   Tobacco Use     Smoking status: Never Smoker     Smokeless tobacco: Current User   Substance and Sexual Activity     Alcohol use: Yes     Alcohol/week: 21.0 standard drinks     Drug use: No     Sexual activity: Not on file   Other Topics Concern     Not on file   Social History Narrative     Not on file     Social Determinants of Health     Financial Resource Strain: Not on file   Food Insecurity: Not on file   Transportation Needs: Not on file   Physical Activity: Not on file   Stress: Not on file   Social Connections: Not on file   Intimate Partner Violence: Not on file   Housing Stability: Not on file    not pertinent to Chief Complaint or reason for visit.             Allergies as of 06/01/2014 Reviewed     Review of Systems:As per HPI. Remainders of 12 point review of systems negative.    Review of Pertinent Laboratory:      PERSONALLY REVIEWED.    Physical Exam: Temp:  [98  F (36.7  C)-98.8  F (37.1  C)] 98  F (36.7  C)  Pulse:  [] 67  Resp:  [17-37] 17  BP: ()/(53-89) 94/56  FiO2 (%):  [30 %] 30 %  SpO2:  [94 %-100 %] 98 %   Vitals: reviewed in chart     Physical exam as per medical team: reviewed in chart      diagnoses, risk and benefits of medications discussed with staff. Care coordination with care management team.   Thank you for this consultation.       Jessi Hoyos; NP  [facility]        This note was created with the help of Dragon dictation system. Grammatical and typing errors are not intentional.

## 2021-12-21 NOTE — PROGRESS NOTES
PULMONARY / CRITICAL CARE PROGRESS NOTE    Date / Time of Admission:  12/5/2021 11:26 PM    Assessment:      Jared North is a 46 year old male, unvaccinated against COVID-19, with history of polysubstance abuse and chronic pain/lower back pain on suboxone, mood disorder/depression, HTN, ROSSY mild intermittent asthma, presented with respiratory failure/COVID ARDS intubated on 11/28. Initially requiring proning/paralysis, veletri, now down trending vent needs, issues with agitation, treated for VAP. Not tolerating weaning trials, failed extubation 12/15, re-intubated, surgery consulted for trach and PEG tube      Today's  changes:  -Change antibiotics to rocephin x 10 days based on cultures  -Failed trial off precedex this am, I will ask psych to see again today(thanks)  -phase 1 weaning trial  -DC Levopehd(off for 24 hours)  -consider taking internal jugular out in next few days if stays off pressors        1. Acute respiratory failure  S/p intubation 11/28, extubated on 12/15, re-intubated the following today due to severe agitation and respiratory distress.  2. S/p tracheostomy 12/17   3. Ventilator associated pneumonia  Sputum Cx 12/14 showed polymicrobial bacteria (Strept constellatus, E coli, proteus, staph epi, and candida parapsilosis.   Blood cultures (+) strep constellatus.   F/u blood cultures 12/15 so far negative.   New trach culture is pending. Abx zosyn  4. Streptococcus pneumonia with bacteremia   5. Encephalopathy / agitation  6. Chronic back pain / polysubstance abuse on suboxone  7. Depression     Advance Directives:  Full code    Plan:   1. Titrate vent settings 16/520/5/35%, keep SpO2 > 90%  2. Continue daily PS weaning trials phase 1  3. Sedation to keep RASS 0 to -1 , pt did not tolerate precedex off this am, severe agitation  I talked with JARED perry, she will see again today  4. Continue scheduled gabapentin, wellbutrin, abilify, benadryl, haldol, oxycodone, lorazepam, trazodone  per psych  5. Titrate pressors to keep MAP > 65  6. Bell antibiotics to rocephin x 10 days based on cultures  7. Cont tube feedings   8. PPI for GI prophylaxis   9. Glucose level monitoring  10. DVT prophylaxis lovenox subcutaneous  11. Referral to LTACH, not ready yet as still on precedex.     SHONA Fenton CNP   [facility] Pulmonary & Critical Care    Critical care attestation: 40 minutes spent managing the following issues: acute respiratory failure requiring intubation/IMV, encephalopathy needing continuous precedex infusion.  High risk for organ deterioration and death requiring ICU level care.          ICU DAILY CHECKLIST                           Can patient transfer out of MICU? no    FAST HUG:    Feeding:  Feeding: yes Patient is receiving tube feedings   Wei: yes  Analgesia/Sedation:yes  Precedex drip  Thromboembolic prophylaxis: Yes; Mode:  Lovenox and SCDs  HOB>30:  Yes  Stress Ulcer Protocol Active: Yes; Mode: PPI  Glycemic Control: Any glucose > 180 no; Mode of Insulin Therapy: Sliding Scale Insulin    INTUBATED:  Can patient have daily waking:  Yes  Can patient have spontaneous breathing trial:  Yes  Restraints? yes    PHYSICAL THERAPY AND MOBILITY:  Can patient have PT and mobility trial: no  Activity: bedrest    Subjective:   HPI:  Jared North is a 46 year old male, unvaccinated against COVID-19, with history of polysubstance abuse and chronic pain/lower back pain on suboxone, mood disorder/depression, HTN, ROSSY, mild intermittent asthma, presented with respiratory failure/COVID ARDS intubated on 11/28. Initially requiring proning/paralysis, veletri, now down trending vent needs, issues with agitation, treated for VAP. Not tolerating weaning trials, failed extubation 12/15, re-intubated, S/p trach and PEG tube 12/17    Events overnight  - Started on PS weaning trial   - Off fentanyl drip, titratting down precedex drip   - Tolerating tube feedings     Allergies:  Patient has no known allergies.     MEDS:  Current Facility-Administered Medications   Medication     acetaminophen (TYLENOL) solution 650 mg     acetaminophen (TYLENOL) tablet 650 mg    Or     acetaminophen (TYLENOL) Suppository 650 mg     [START ON 12/22/2021] ARIPiprazole (ABILIFY) solution 7 mg     bisacodyl (DULCOLAX) Suppository 10 mg     buPROPion (WELLBUTRIN) tablet 100 mg     cefTRIAXone (ROCEPHIN) 1 g vial to attach to  mL bag for ADULTS or NS 50 mL bag for PEDS     chlorhexidine (PERIDEX) 0.12 % solution 15 mL     dexmedetomidine (PRECEDEX) 400 mcg in 0.9% sodium chloride 100 mL     dextrose 10% infusion     glucose gel 15-30 g    Or     dextrose 50 % injection 25-50 mL    Or     glucagon injection 1 mg     sennosides (SENOKOT) syrup 10 mL    And     docusate (COLACE) 50 MG/5ML liquid 100 mg     enoxaparin ANTICOAGULANT (LOVENOX) injection 40 mg     gabapentin (NEURONTIN) solution 800 mg     haloperidol lactate (HALDOL) injection 5 mg     hydrALAZINE (APRESOLINE) injection 10 mg     HYDROmorphone (PF) (DILAUDID) injection 0.5-1 mg     influenza quadrivalent (PF) vacc (FLUZONE) injection 0.5 mL     levETIRAcetam (KEPPRA) 500 mg in sodium chloride 0.9 % 100 mL intermittent infusion     LORazepam (ATIVAN) 2 MG/ML (HIGH CONC) oral solution 4 mg     metoprolol (LOPRESSOR) injection 10 mg     ondansetron (ZOFRAN-ODT) ODT tab 4 mg    Or     ondansetron (ZOFRAN) injection 4 mg     oxyCODONE (ROXICODONE) solution 20 mg     pantoprazole (PROTONIX) 2 mg/mL suspension 40 mg    Or     pantoprazole (PROTONIX) IV push injection 40 mg     Patient is already receiving anticoagulation with heparin, enoxaparin (LOVENOX), warfarin (COUMADIN)  or other anticoagulant medication     [Held by provider] polyethylene glycol (MIRALAX) Packet 17 g     polyethylene glycol (MIRALAX) Packet 17 g     prochlorperazine (COMPAZINE) injection 10 mg    Or     prochlorperazine (COMPAZINE) tablet 10 mg    Or     prochlorperazine  "(COMPAZINE) suppository 25 mg     protein modular (PROSOURCE TF) 1 packet     QUEtiapine (SEROquel) tablet 100 mg     QUEtiapine (SEROquel) tablet 100 mg     QUEtiapine (SEROquel) tablet 75 mg     simethicone (MYLICON) suspension 40 mg     traZODone (DESYREL) half-tab 75 mg       Objective:   VITALS:  BP 94/56   Pulse 67   Temp 98  F (36.7  C)   Resp 17   Ht 1.803 m (5' 11\")   Wt 89 kg (196 lb 3.2 oz)   SpO2 98%   BMI 27.36 kg/m    VENT:  Ventilation Mode: CMV/AC  (Continuous Mandatory Ventilation/ Assist Control)  FiO2 (%): 30 %  Rate Set (breaths/minute): 16 breaths/min  Tidal Volume Set (mL): 520 mL  PEEP (cm H2O): 5 cmH2O  Pressure Support (cm H2O): 10 cmH2O  Oxygen Concentration (%): 30 %  Resp: 17    EXAM:   Gen: calm, sitting on side of bed with 2 therapists  Very weak  On precedex drip  HEENT: pale conjunctiva, moist mucosa  Neck: trach ok  Lungs: discrete ronchi both HT  CV: regular, no murmurs or gallops appreciated  Abdomen: soft, NT, BS wnl, G tube   Ext: no edema  Neuro: lightly sedated, calm moves all 4 extremities       Data Review:  Recent Labs   Lab 12/20/21  0453 12/18/21  0515 12/17/21  0421 12/16/21  1959 12/16/21  1713 12/16/21  1136    108 116 126* 110* 132*      12/18/2021 05:15   Sodium 143   Potassium 4.0   Chloride 107   Carbon Dioxide 28   Urea Nitrogen 13   Creatinine 0.61 (L)   GFR Estimate >90   Calcium 8.6   Anion Gap 8   Magnesium 1.8   Albumin 2.4 (L)   Protein Total 5.9 (L)   Bilirubin Total 0.6   Alkaline Phosphatase 59   ALT 32   AST 19   Glucose 108   WBC 7.1   Hemoglobin 9.8 (L)   Hematocrit 30.5 (L)   Platelet Count 198       Tracheal Culture 4+ Streptococcus constellatus Abnormal     Beta hemolytic strain   This organism is susceptible to ampicillin, penicillin, vancomycin and the cephalosporins. If treatment is required and your patient is allergic to penicillin, contact the microbiology lab within 5 days to request susceptibility testing.   1+ Escherichia " coli Abnormal        1+ Proteus mirabilis Abnormal        2+ Staphylococcus epidermidis Abnormal     Susceptibilities not routinely done   3+ Candida parapsilosis complex Abnormal     Susceptibilities not routinely done          Tracheal Culture 12/18 Culture in progress             Blood Culture Positive on the 2nd day of incubation Abnormal        Streptococcus constellatus Panic     1 of 2 bottles   Beta hemolytic strain   Group F Streptococcus          XR CHEST PORT 1 VIEW  LOCATION: [facility]  DATE/TIME: 12/16/2021 1:56 PM  INDICATION: line placement. COVID  COMPARISON: 12/16/2021 at 1305 a.m.                                 IMPRESSION: Endotracheal tube tip 3.6 cm from felipa. Right internal jugular line tip projects over the lower SVC. Right PICC tip projects most likely over the right upper atrium. Nasogastric tube tip below the film. Heart size appears normal. Unchanged diffuse bilateral pulmonary infiltrates.

## 2021-12-21 NOTE — PROGRESS NOTES
Trach # 7.5 Shanon. Patient remains on vent/full support. PIP 25 cmH2O, Pplat: 21 cmH2O. sats 99%. Changes are not made. RT following.    Edvin Henderson, RT

## 2021-12-21 NOTE — PROGRESS NOTES
RT PROGRESS NOTE    VENT DAY# 5 since reinitubation    CURRENT SETTINGS:   Ventilation Mode: (S) CMV/AC  (Continuous Mandatory Ventilation/ Assist Control)  FiO2 (%): 30 %  Rate Set (breaths/minute): 16 breaths/min  Tidal Volume Set (mL): 520 mL  PEEP (cm H2O): 5 cmH2O  Pressure Support (cm H2O): 12 cmH2O  Oxygen Concentration (%): 30 %  Peak Inspiratory Pressure (cm H2O) (Drager Kristen): 38  Resp: 23    PATIENT PARAMETERS:  PIP 23  Pplat:  21  Pmean:  9.5  Compliance: 34.7  SBT: Yes    Wean time: 255   RSBI 47     Secretions:  Moderate tan  02 Sats:  96%    Trach #7.5 Shiley    NOTE / SHIFT SUMMARY:   46 yr Covid recovered patient remains intubated.  Patient weaned today.  Continue current treatment.    Maddison Price, RT

## 2021-12-21 NOTE — PROGRESS NOTES
Patient appears calm and cooperative on current sedation. Dex gtt very slowly titrated down throughout shift. Follows commands well- was communicating yes/no with hand squeezes when prompted.   Patient is diaphoretic but afebrile.  Tube feeding changed to Jevity 1.5 at start of NOC shift, was running previous tube feeding still at that time.   Patient was also having large amounts of gas both in dignicare as well as from gtube - simethicone PRN order obtained and administered. Fermin Johnson, RN 12/21/2021

## 2021-12-22 ENCOUNTER — APPOINTMENT (OUTPATIENT)
Dept: OCCUPATIONAL THERAPY | Facility: HOSPITAL | Age: 46
End: 2021-12-22
Attending: INTERNAL MEDICINE
Payer: COMMERCIAL

## 2021-12-22 ENCOUNTER — APPOINTMENT (OUTPATIENT)
Dept: PHYSICAL THERAPY | Facility: HOSPITAL | Age: 46
End: 2021-12-22
Attending: INTERNAL MEDICINE
Payer: COMMERCIAL

## 2021-12-22 LAB
ANION GAP SERPL CALCULATED.3IONS-SCNC: 9 MMOL/L (ref 5–18)
BACTERIA SPEC CULT: ABNORMAL
BUN SERPL-MCNC: 8 MG/DL (ref 8–22)
CALCIUM SERPL-MCNC: 9.1 MG/DL (ref 8.5–10.5)
CHLORIDE BLD-SCNC: 106 MMOL/L (ref 98–107)
CO2 SERPL-SCNC: 27 MMOL/L (ref 22–31)
CREAT SERPL-MCNC: 0.64 MG/DL (ref 0.7–1.3)
ERYTHROCYTE [DISTWIDTH] IN BLOOD BY AUTOMATED COUNT: 14.8 % (ref 10–15)
GFR SERPL CREATININE-BSD FRML MDRD: >90 ML/MIN/1.73M2
GLUCOSE BLD-MCNC: 134 MG/DL (ref 70–125)
HCT VFR BLD AUTO: 31.1 % (ref 40–53)
HGB BLD-MCNC: 10.1 G/DL (ref 13.3–17.7)
LACTATE SERPL-SCNC: 1 MMOL/L (ref 0.7–2)
MAGNESIUM SERPL-MCNC: 1.9 MG/DL (ref 1.8–2.6)
MCH RBC QN AUTO: 30.2 PG (ref 26.5–33)
MCHC RBC AUTO-ENTMCNC: 32.5 G/DL (ref 31.5–36.5)
MCV RBC AUTO: 93 FL (ref 78–100)
PHOSPHATE SERPL-MCNC: 3.5 MG/DL (ref 2.5–4.5)
PLATELET # BLD AUTO: 272 10E3/UL (ref 150–450)
POTASSIUM BLD-SCNC: 3.2 MMOL/L (ref 3.5–5)
POTASSIUM BLD-SCNC: 3.2 MMOL/L (ref 3.5–5)
POTASSIUM BLD-SCNC: 3.7 MMOL/L (ref 3.5–5)
RBC # BLD AUTO: 3.34 10E6/UL (ref 4.4–5.9)
SODIUM SERPL-SCNC: 142 MMOL/L (ref 136–145)
WBC # BLD AUTO: 6.8 10E3/UL (ref 4–11)

## 2021-12-22 PROCEDURE — 250N000009 HC RX 250: Performed by: INTERNAL MEDICINE

## 2021-12-22 PROCEDURE — 250N000011 HC RX IP 250 OP 636: Performed by: NURSE PRACTITIONER

## 2021-12-22 PROCEDURE — 250N000011 HC RX IP 250 OP 636: Performed by: INTERNAL MEDICINE

## 2021-12-22 PROCEDURE — 250N000013 HC RX MED GY IP 250 OP 250 PS 637: Performed by: INTERNAL MEDICINE

## 2021-12-22 PROCEDURE — 85014 HEMATOCRIT: CPT | Performed by: NURSE PRACTITIONER

## 2021-12-22 PROCEDURE — 36592 COLLECT BLOOD FROM PICC: CPT | Performed by: INTERNAL MEDICINE

## 2021-12-22 PROCEDURE — 82310 ASSAY OF CALCIUM: CPT | Performed by: NURSE PRACTITIONER

## 2021-12-22 PROCEDURE — 84100 ASSAY OF PHOSPHORUS: CPT | Performed by: NURSE PRACTITIONER

## 2021-12-22 PROCEDURE — 250N000013 HC RX MED GY IP 250 OP 250 PS 637: Performed by: NURSE PRACTITIONER

## 2021-12-22 PROCEDURE — 99291 CRITICAL CARE FIRST HOUR: CPT | Performed by: NURSE PRACTITIONER

## 2021-12-22 PROCEDURE — 200N000001 HC R&B ICU

## 2021-12-22 PROCEDURE — 83735 ASSAY OF MAGNESIUM: CPT | Performed by: INTERNAL MEDICINE

## 2021-12-22 PROCEDURE — 999N000157 HC STATISTIC RCP TIME EA 10 MIN

## 2021-12-22 PROCEDURE — 97535 SELF CARE MNGMENT TRAINING: CPT | Mod: GO

## 2021-12-22 PROCEDURE — 84132 ASSAY OF SERUM POTASSIUM: CPT | Performed by: INTERNAL MEDICINE

## 2021-12-22 PROCEDURE — 94003 VENT MGMT INPAT SUBQ DAY: CPT

## 2021-12-22 PROCEDURE — 97530 THERAPEUTIC ACTIVITIES: CPT | Mod: GP

## 2021-12-22 PROCEDURE — 84132 ASSAY OF SERUM POTASSIUM: CPT | Performed by: NURSE PRACTITIONER

## 2021-12-22 PROCEDURE — 258N000003 HC RX IP 258 OP 636: Performed by: NURSE PRACTITIONER

## 2021-12-22 PROCEDURE — 83605 ASSAY OF LACTIC ACID: CPT | Performed by: NURSE PRACTITIONER

## 2021-12-22 RX ORDER — POTASSIUM CHLORIDE 20MEQ/15ML
40 LIQUID (ML) ORAL ONCE
Status: COMPLETED | OUTPATIENT
Start: 2021-12-22 | End: 2021-12-22

## 2021-12-22 RX ORDER — NOREPINEPHRINE BITARTRATE 0.02 MG/ML
.01-.6 INJECTION, SOLUTION INTRAVENOUS CONTINUOUS
Status: DISCONTINUED | OUTPATIENT
Start: 2021-12-22 | End: 2021-12-28

## 2021-12-22 RX ADMIN — BUPROPION HYDROCHLORIDE 100 MG: 100 TABLET, FILM COATED ORAL at 16:32

## 2021-12-22 RX ADMIN — HYDRALAZINE HYDROCHLORIDE 10 MG: 20 INJECTION INTRAMUSCULAR; INTRAVENOUS at 15:06

## 2021-12-22 RX ADMIN — METOPROLOL TARTRATE 10 MG: 5 INJECTION INTRAVENOUS at 09:26

## 2021-12-22 RX ADMIN — POTASSIUM CHLORIDE 40 MEQ: 1.5 SOLUTION ORAL at 05:21

## 2021-12-22 RX ADMIN — HYDROMORPHONE HYDROCHLORIDE 1 MG: 1 INJECTION, SOLUTION INTRAMUSCULAR; INTRAVENOUS; SUBCUTANEOUS at 16:49

## 2021-12-22 RX ADMIN — Medication 1 PACKET: at 08:44

## 2021-12-22 RX ADMIN — ARIPIPRAZOLE 7 MG: 1 SOLUTION ORAL at 08:44

## 2021-12-22 RX ADMIN — METOPROLOL TARTRATE 10 MG: 5 INJECTION INTRAVENOUS at 18:41

## 2021-12-22 RX ADMIN — TRAZODONE HYDROCHLORIDE 75 MG: 50 TABLET ORAL at 08:44

## 2021-12-22 RX ADMIN — DEXMEDETOMIDINE HYDROCHLORIDE 0.4 MCG/KG/HR: 4 INJECTION, SOLUTION INTRAVENOUS at 21:10

## 2021-12-22 RX ADMIN — Medication 800 MG: at 12:40

## 2021-12-22 RX ADMIN — DEXMEDETOMIDINE HYDROCHLORIDE 1.5 MCG/KG/HR: 4 INJECTION, SOLUTION INTRAVENOUS at 00:49

## 2021-12-22 RX ADMIN — OXYCODONE HYDROCHLORIDE 20 MG: 5 SOLUTION ORAL at 14:20

## 2021-12-22 RX ADMIN — CEFTRIAXONE SODIUM 1 G: 1 INJECTION, POWDER, FOR SOLUTION INTRAMUSCULAR; INTRAVENOUS at 11:29

## 2021-12-22 RX ADMIN — OXYCODONE HYDROCHLORIDE 20 MG: 5 SOLUTION ORAL at 09:29

## 2021-12-22 RX ADMIN — ENOXAPARIN SODIUM 40 MG: 40 INJECTION SUBCUTANEOUS at 08:44

## 2021-12-22 RX ADMIN — LORAZEPAM 4 MG: 2 LIQUID ORAL at 19:40

## 2021-12-22 RX ADMIN — QUETIAPINE 75 MG: 25 TABLET ORAL at 08:44

## 2021-12-22 RX ADMIN — CHLORHEXIDINE GLUCONATE 0.12% ORAL RINSE 15 ML: 1.2 LIQUID ORAL at 19:42

## 2021-12-22 RX ADMIN — QUETIAPINE 100 MG: 25 TABLET ORAL at 21:14

## 2021-12-22 RX ADMIN — Medication 800 MG: at 16:32

## 2021-12-22 RX ADMIN — OXYCODONE HYDROCHLORIDE 20 MG: 5 SOLUTION ORAL at 02:22

## 2021-12-22 RX ADMIN — DEXMEDETOMIDINE HYDROCHLORIDE 0.8 MCG/KG/HR: 4 INJECTION, SOLUTION INTRAVENOUS at 16:54

## 2021-12-22 RX ADMIN — ACETAMINOPHEN 650 MG: 650 SOLUTION ORAL at 00:49

## 2021-12-22 RX ADMIN — HYDROMORPHONE HYDROCHLORIDE 1 MG: 1 INJECTION, SOLUTION INTRAMUSCULAR; INTRAVENOUS; SUBCUTANEOUS at 03:19

## 2021-12-22 RX ADMIN — QUETIAPINE 75 MG: 25 TABLET ORAL at 12:40

## 2021-12-22 RX ADMIN — BUPROPION HYDROCHLORIDE 100 MG: 100 TABLET, FILM COATED ORAL at 08:44

## 2021-12-22 RX ADMIN — LORAZEPAM 4 MG: 2 LIQUID ORAL at 16:32

## 2021-12-22 RX ADMIN — HYDROMORPHONE HYDROCHLORIDE 1 MG: 1 INJECTION, SOLUTION INTRAMUSCULAR; INTRAVENOUS; SUBCUTANEOUS at 09:41

## 2021-12-22 RX ADMIN — LEVETIRACETAM 500 MG: 100 INJECTION, SOLUTION INTRAVENOUS at 11:28

## 2021-12-22 RX ADMIN — HALOPERIDOL LACTATE 5 MG: 5 INJECTION, SOLUTION INTRAMUSCULAR at 19:42

## 2021-12-22 RX ADMIN — QUETIAPINE 75 MG: 25 TABLET ORAL at 16:32

## 2021-12-22 RX ADMIN — HALOPERIDOL LACTATE 5 MG: 5 INJECTION, SOLUTION INTRAMUSCULAR at 22:40

## 2021-12-22 RX ADMIN — LORAZEPAM 4 MG: 2 LIQUID ORAL at 11:29

## 2021-12-22 RX ADMIN — Medication 800 MG: at 08:44

## 2021-12-22 RX ADMIN — CHLORHEXIDINE GLUCONATE 0.12% ORAL RINSE 15 ML: 1.2 LIQUID ORAL at 08:44

## 2021-12-22 RX ADMIN — LEVETIRACETAM 500 MG: 100 INJECTION, SOLUTION INTRAVENOUS at 22:40

## 2021-12-22 RX ADMIN — Medication 40 MG: at 06:42

## 2021-12-22 RX ADMIN — OXYCODONE HYDROCHLORIDE 20 MG: 5 SOLUTION ORAL at 18:42

## 2021-12-22 RX ADMIN — TRAZODONE HYDROCHLORIDE 75 MG: 50 TABLET ORAL at 21:14

## 2021-12-22 RX ADMIN — LORAZEPAM 4 MG: 2 LIQUID ORAL at 08:45

## 2021-12-22 RX ADMIN — TRAZODONE HYDROCHLORIDE 75 MG: 50 TABLET ORAL at 14:20

## 2021-12-22 RX ADMIN — OXYCODONE HYDROCHLORIDE 20 MG: 5 SOLUTION ORAL at 21:23

## 2021-12-22 RX ADMIN — DEXMEDETOMIDINE HYDROCHLORIDE 1 MCG/KG/HR: 4 INJECTION, SOLUTION INTRAVENOUS at 09:28

## 2021-12-22 RX ADMIN — DEXMEDETOMIDINE HYDROCHLORIDE 1.3 MCG/KG/HR: 4 INJECTION, SOLUTION INTRAVENOUS at 03:30

## 2021-12-22 RX ADMIN — Medication 0.03 MCG/KG/MIN: at 06:57

## 2021-12-22 RX ADMIN — LORAZEPAM 4 MG: 2 LIQUID ORAL at 03:19

## 2021-12-22 RX ADMIN — SIMETHICONE 40 MG: 20 SUSPENSION/ DROPS ORAL at 16:32

## 2021-12-22 ASSESSMENT — ACTIVITIES OF DAILY LIVING (ADL)
ADLS_ACUITY_SCORE: 19
ADLS_ACUITY_SCORE: 21
ADLS_ACUITY_SCORE: 19
ADLS_ACUITY_SCORE: 21
ADLS_ACUITY_SCORE: 21
ADLS_ACUITY_SCORE: 19
ADLS_ACUITY_SCORE: 21
ADLS_ACUITY_SCORE: 19
ADLS_ACUITY_SCORE: 21

## 2021-12-22 ASSESSMENT — MIFFLIN-ST. JEOR: SCORE: 1711.34

## 2021-12-22 NOTE — PROGRESS NOTES
Continues on CPAP/PS 5/12 .30 x 465 minutes, continue trial as tolerated until night time for sleep per CNP. BS coarse upper lobes, suctioned inline trach tube small amount thick tan secretions.   SpO2 96 %. RT to monitor.    Ventilation Mode: CPAP/PS  (Continuous positive airway pressure with Pressure Support)  FiO2 (%): 30 %  Rate Set (breaths/minute): 16 breaths/min  Tidal Volume Set (mL): 520 mL  PEEP (cm H2O): 5 cmH2O  Pressure Support (cm H2O): 12 cmH2O  Oxygen Concentration (%): 30 %  Peak Inspiratory Pressure (cm H2O) (Drager Kristen): 35  Resp: 25

## 2021-12-22 NOTE — PROGRESS NOTES
ICU PROGRESS NOTE:        Assessment/Plan:     Changes for Today:    Continue to attempt to wean precedex if able    Hypotensive early this am.  Lactic flat.  No fevers, WBC ct normal.  Suspect was related to dose of dilaudid.  No concerns for sepsis.    Continue Phase I weaning      1. Acute respiratory failure  S/p intubation 11/28, extubated on 12/15, re-intubated the following today due to severe agitation and respiratory distress.  2. S/p tracheostomy 12/17   3. Ventilator associated pneumonia  Sputum Cx 12/14 showed polymicrobial bacteria (Strept constellatus, E coli, proteus, staph epi, and candida parapsilosis.   Blood cultures (+) strep constellatus.   F/u blood cultures 12/15 so far negative.   New trach culture is pending. Abx zosyn  4. Streptococcus pneumonia with bacteremia   5. Encephalopathy / agitation  6. Chronic back pain / polysubstance abuse on suboxone  7. Depression      Advance Directives:  Full code     Plan:   1. Titrate vent settings 16/520/5/35%, keep SpO2 > 90%  2. Continue daily PS weaning trials phase 1  3. Sedation to keep RASS 0 to -1 , pt did not tolerate precedex off this am, severe agitation  I talked with JARED Hoyos [facility] service, she will see again today  Appreicate JARED Hoyos's assistance.  Patient did have better sleep after addition of medications yesterday.  Continue to wean precedex as tolerated.  4. Continue scheduled gabapentin, wellbutrin, abilify, benadryl, haldol, oxycodone, lorazepam, trazodone per psych  5. Titrate pressors to keep MAP > 65  6. Bell antibiotics to rocephin x 10 days based on cultures  7. Cont tube feedings   8. PPI for GI prophylaxis   9. Glucose level monitoring  10. DVT prophylaxis lovenox subcutaneous  11. Referral to LTACH, not ready yet as still on precedex.     COVID RECOVERED      ICU Prophylaxis:    PPI:  Yes, enteral protonix    Peridex:  Yes    Anticoagulation/DVT Prophylaxis:  Lovenox 40 mg subcutaneous daily, (now that COVID recovered)          Lines/Drains/Tubes:  CVC TRIPLE LUMEN Right Internal jugular  5 days    Urethral Catheter Coude 14 fr  23 days    Gastrostomy/Enterostomy Gastrostomy LUQ 20 fr  4 days    Rectal Tube  2 days    Surgical Airway Tracheostomy Shiley 7.5 Cuffed  4 days    PROVIDER RESTRAINT FOR VIOLENT BEHAVIOR FACE TO FACE EVALUATION        Patient's Immediate Situation:  Patient demonstrated the following behaviors: pulling at tubes and lines    Patient's Reaction to the intervention:  Does patient understand the reason for restraint/seclusion? Unable to access    Medical Condition:  Is there any evidence of compromise of Skin integrity, Respiratory, Cardiovascular, Musculoskeletal, Hydration? no    Behavioral Condition:  In consultation with the RN, is there a need to continue this restraint or seclusion? Yes    See Restraint Flowsheet for complete restraint documentation and assessment.    SHONA García CNP        CODE STATUS:  FULL  I will update patient's ex-spouse Jama North later today.     SUBJECTIVE:    Ccx:  Unable to access    HPI:  Jared North is a 46 year old male, unvaccinated against COVID-19, with history of polysubstance abuse and chronic pain/lower back pain on suboxone, mood disorder/depression, HTN, ROSSY, mild intermittent asthma, presented with respiratory failure/COVID ARDS intubated on 11/28. Initially requiring proning/paralysis, veletri, now down trending vent needs, issues with agitation, treated for VAP. Not tolerating weaning trials, failed extubation 12/15, re-intubated, S/p trach and PEG tube 12/17      No past medical history on file.  Past Surgical History:   Procedure Laterality Date     PICC TRIPLE LUMEN PLACEMENT  11/24/2021          Current Facility-Administered Medications   Medication     acetaminophen (TYLENOL) solution 650 mg     acetaminophen (TYLENOL) tablet 650 mg    Or     acetaminophen (TYLENOL) Suppository 650 mg     ARIPiprazole (ABILIFY) solution 7 mg     bisacodyl  (DULCOLAX) Suppository 10 mg     buPROPion (WELLBUTRIN) tablet 100 mg     cefTRIAXone (ROCEPHIN) 1 g vial to attach to  mL bag for ADULTS or NS 50 mL bag for PEDS     chlorhexidine (PERIDEX) 0.12 % solution 15 mL     dexmedetomidine (PRECEDEX) 400 mcg in 0.9% sodium chloride 100 mL     dextrose 10% infusion     glucose gel 15-30 g    Or     dextrose 50 % injection 25-50 mL    Or     glucagon injection 1 mg     sennosides (SENOKOT) syrup 10 mL    And     docusate (COLACE) 50 MG/5ML liquid 100 mg     enoxaparin ANTICOAGULANT (LOVENOX) injection 40 mg     gabapentin (NEURONTIN) solution 800 mg     haloperidol lactate (HALDOL) injection 5 mg     hydrALAZINE (APRESOLINE) injection 10 mg     HYDROmorphone (PF) (DILAUDID) injection 0.5-1 mg     influenza quadrivalent (PF) vacc (FLUZONE) injection 0.5 mL     levETIRAcetam (KEPPRA) 500 mg in sodium chloride 0.9 % 100 mL intermittent infusion     LORazepam (ATIVAN) 2 MG/ML (HIGH CONC) oral solution 4 mg     metoprolol (LOPRESSOR) injection 10 mg     norepinephrine (LEVOPHED) 4 mg in  mL infusion PREMIX     ondansetron (ZOFRAN-ODT) ODT tab 4 mg    Or     ondansetron (ZOFRAN) injection 4 mg     oxyCODONE (ROXICODONE) solution 20 mg     pantoprazole (PROTONIX) 2 mg/mL suspension 40 mg    Or     pantoprazole (PROTONIX) IV push injection 40 mg     Patient is already receiving anticoagulation with heparin, enoxaparin (LOVENOX), warfarin (COUMADIN)  or other anticoagulant medication     [Held by provider] polyethylene glycol (MIRALAX) Packet 17 g     polyethylene glycol (MIRALAX) Packet 17 g     prochlorperazine (COMPAZINE) injection 10 mg    Or     prochlorperazine (COMPAZINE) tablet 10 mg    Or     prochlorperazine (COMPAZINE) suppository 25 mg     protein modular (PROSOURCE TF) 1 packet     QUEtiapine (SEROquel) tablet 100 mg     QUEtiapine (SEROquel) tablet 100 mg     QUEtiapine (SEROquel) tablet 75 mg     simethicone (MYLICON) suspension 40 mg     traZODone  (DESYREL) half-tab 75 mg      No Known Allergies  Family History   Problem Relation Age of Onset     Diabetes Mother      Heart Disease Mother      Hypertension Mother      Hypertension Maternal Grandmother      Diabetes Maternal Grandfather      Hypertension Maternal Grandfather          PHYSICAL ASSESSMENT:  General: appears stated age, alert, cooperative  Lungs:  CTA  Heart: RRR, S1 and S2   Abdomen: Soft, non-tender.  Bowel sounds present X 4 quadrants.  Skin: skin color normal, texture normal, no rashes or lesions.   Extremities:  No edema noted.   Pulses:  +2 BUE and +2 BLE  Neuro:  Follows simple commands.  PURDY    Temp: 98.7  F (37.1  C) Temp src: Oral BP: (!) 125/98 Pulse: 106   Resp: 30 SpO2: 94 % O2 Device: Mechanical Ventilator          Intake/Output Summary (Last 24 hours) at 12/22/2021 1053  Last data filed at 12/22/2021 0600  Gross per 24 hour   Intake 2079.27 ml   Output 4050 ml   Net -1970.73 ml     Temp: 98.7  F (37.1  C) Temp src: Oral BP: (!) 125/98 Pulse: 106   Resp: 30 SpO2: 94 % O2 Device: Mechanical Ventilator        Lab Results   Component Value Date    WBC 6.8 12/22/2021     Lab Results   Component Value Date    RBC 3.34 12/22/2021     Lab Results   Component Value Date    HGB 10.1 12/22/2021     Lab Results   Component Value Date    HCT 31.1 12/22/2021     No components found for: MCT  Lab Results   Component Value Date    MCV 93 12/22/2021     Lab Results   Component Value Date    MCH 30.2 12/22/2021     Lab Results   Component Value Date    MCHC 32.5 12/22/2021     Lab Results   Component Value Date    RDW 14.8 12/22/2021     Lab Results   Component Value Date     12/22/2021       Last Comprehensive Metabolic Panel:  Sodium   Date Value Ref Range Status   12/22/2021 142 136 - 145 mmol/L Final     Potassium   Date Value Ref Range Status   12/22/2021 3.2 (L) 3.5 - 5.0 mmol/L Final   12/22/2021 3.2 (L) 3.5 - 5.0 mmol/L Final     Chloride   Date Value Ref Range Status   12/22/2021 106  98 - 107 mmol/L Final     Carbon Dioxide (CO2)   Date Value Ref Range Status   12/22/2021 27 22 - 31 mmol/L Final     Anion Gap   Date Value Ref Range Status   12/22/2021 9 5 - 18 mmol/L Final     Glucose   Date Value Ref Range Status   12/22/2021 134 (H) 70 - 125 mg/dL Final     Urea Nitrogen   Date Value Ref Range Status   12/22/2021 8 8 - 22 mg/dL Final     Creatinine   Date Value Ref Range Status   12/22/2021 0.64 (L) 0.70 - 1.30 mg/dL Final     GFR Estimate   Date Value Ref Range Status   12/22/2021 >90 >60 mL/min/1.73m2 Final     Comment:     Effective December 21, 2021 eGFRcr in adults is calculated using the 2021 CKD-EPI creatinine equation which includes age and gender (Love et al., NEJM, DOI: 10.1056/XJOGsw4455055)   02/16/2018 >60 >60 mL/min/1.73m2 Final     Calcium   Date Value Ref Range Status   12/22/2021 9.1 8.5 - 10.5 mg/dL Final       Last Arterial Blood Gas:  pH Arterial   Date Value Ref Range Status   12/16/2021 7.39 7.37 - 7.44 Final     pCO2 Arterial   Date Value Ref Range Status   12/16/2021 45 35 - 45 mm Hg Final     pO2 Arterial   Date Value Ref Range Status   12/16/2021 86 80 - 90 mm Hg Final     Bicarbonate Arterial   Date Value Ref Range Status   12/16/2021 26 23 - 29 mmol/L Final     O2 Sat, Arterial   Date Value Ref Range Status   12/16/2021 97.4 (H) 96.0 - 97.0 % Final     Base Excess/Deficit (+/-)   Date Value Ref Range Status   12/16/2021 2.7   mmol/L Final       No results found for: TROPI      Reviewed recent imaging.         SHONA Howard, CNP  Pulmonary Critical Care  Pager: [phone]    Time Billed:  45 minutes  of critical care time.    Patient requiring ICU level of care: Patient remains weaning from the ventilator after recovering from COVID-19 pneumonia.  Reducing sedation and sedation management.

## 2021-12-22 NOTE — PROGRESS NOTES
Care Management Follow Up    Length of Stay (days): 17    Expected Discharge Date: 12/27/2021     Concerns to be Addressed:   ICU level of care, recovered Covid-19, sedation, precedex, pressor, mechanical ventilator support, Psych following, medication adjustments  Patient plan of care discussed at interdisciplinary rounds: Yes    Anticipated Discharge Disposition:  LTACH     Anticipated Discharge Services:  LTACH  Anticipated Discharge DME:  none    Patient/family educated on Medicare website which has current facility and service quality ratings:    Education Provided on the Discharge Plan:  Per care team  Patient/Family in Agreement with the Plan:      Referrals Placed by CM/SW:  LTACH is following  Private pay costs discussed: Not applicable    Additional Information:  Chart reviewed and plan of care discussed at ICU rounds.  Care management will continue to follow medical progression, review recommendations from care team, and assist as needed with discharge planning.    Patient has trach and PEG. He remains on Precedex but was seen by Psych 12/21 and recommendations for medications in progress.  He is continuing with daily weaning, Phase I.    LTACH referrals have been placed to [facility] as well as [facility] and patient is being followed for readiness.  Patient will need prior authorization for LTACH admission from his insurance - see 12/20 note by referral specialist.      La Schmidt RN

## 2021-12-22 NOTE — PROGRESS NOTES
Mechanical ventilation, settings below, BS mild scattered rhonchi this morning, suctioned inline normal viscosity small tan secretions, current BS diminished and clear.     RT PROGRESS NOTE      CURRENT SETTINGS:   Ventilation Mode: (S) CPAP/PS  (Continuous positive airway pressure with Pressure Support) (start)  FiO2 (%): 30 %  Rate Set (breaths/minute): 16 breaths/min  Tidal Volume Set (mL): 520 mL  PEEP (cm H2O): 5 cmH2O  Pressure Support (cm H2O): (S) 12 cmH2O  Oxygen Concentration (%): 30 %  Peak Inspiratory Pressure (cm H2O) (Drager Kristen): 35  Resp: 23      PATIENT PARAMETERS:  PIP 35  Pplat:  24  Pmean:  11  Compliance: 16  SBT: yes    Current Wean time: 290 minutes, continues.   RSBI 48     02 Sats:  95%    Trach tube SIZE 7.5    Respiratory Medications: none       Fermin Nunez, RT

## 2021-12-22 NOTE — PLAN OF CARE
Problem: Communication Impairment (Mechanical Ventilation, Invasive)  Goal: Effective Communication  Outcome: Improving     Problem: Device-Related Complication Risk (Mechanical Ventilation, Invasive)  Goal: Optimal Device Function  Outcome: No Change     Problem: Inability to Wean (Mechanical Ventilation, Invasive)  Goal: Mechanical Ventilation Liberation  Outcome: Improving     Problem: Skin and Tissue Injury (Mechanical Ventilation, Invasive)  Goal: Absence of Device-Related Skin and Tissue Injury  Outcome: Improving     Problem: Ventilator-Induced Lung Injury (Mechanical Ventilation, Invasive)  Goal: Absence of Ventilator-Induced Lung Injury  Outcome: No Change    Actively weaning this shift CPAP/PS 5/12 30 %

## 2021-12-22 NOTE — PLAN OF CARE
Problem: Inability to Wean (Mechanical Ventilation, Invasive)  Goal: Mechanical Ventilation Liberation  Outcome: No Change  Intervention: Promote Extubation and Mechanical Ventilation Liberation     30%, 520 VT, 16 RR, 5 PEEP. Coarse, diminished lung sounds. Moderate amount of tracheal secretions, tan/white. Moderate clear oral secretions, thick.     Problem: Risk for Delirium  Goal: Improved Attention and Thought Clarity  Outcome: No Change  Intervention: Maximize Cognitive Function     Pt agitated/restless at 2300, scheduled ativan given, as well as seroquel and tylenol, pt temp creeping up and pt warm. Dilaudid given x1 with linen change, bed bath, and turn. Also increased precedex, but now slowly weaning down, precedex now infusing at 0.6. Pt is inconsistent with following commands. Makes attempts to get out of bed and pull at lines/cords while awake. However, pt is currently very drowsy.    Problem: Adult Inpatient Plan of Care  Goal: Plan of Care Review  Outcome: No Change   NSR/ST on tele, VSS. TF infusing at 60. Wei in place with very good output.     BP low this AM, attempted to wake patient and take BP, tried on multiple limbs, but still low. Gave 1L bolus, but it did not make much of a difference. Notified Dr. Lloyd, levo started, currently infusing at 0.04.

## 2021-12-22 NOTE — PROGRESS NOTES
"Nutrition Therapy Brief Note     Nutrition Prescription    RECOMMENDATIONS FOR MDs/PROVIDERS TO ORDER:      Malnutrition Status:    Moderate noted 11/28 at [facility].    Recommendations already ordered by Registered Dietitian (RD):  Adjust to Promote with fiber (may be better tolerated than current formula)  start at 50 ml/hr increase 10 ml every 4 hrs to goal 70 ml/hr continuous.  Promote with Fiber @ goal of  70ml/hr  (1680ml/day)  will provide: 1680 kcals, 106 g PRO, 1396 ml free H20, 218 g CHO, and 24 g fiber daily.  Decrease flush to 85 ml every 6 hrs.  (340 mls).    Future/Additional Recommendations:  Reassess calorie needs as pt becomes more active.      EVALUATION OF THE PROGRESS TOWARD GOALS   Diet: NPO  Nutrition Support: Jevity 1.5 goal rate 60 mlhr  Prosource 1 pkt/day   ml every 4 hrs  Intake: 2200 kcals, 102 g PRO, 1094 ml free H20, 310 g CHO, and 30 g fiber daily.       NEW FINDINGS   Pt with increased rectal tube volume since Jevity 1.5   formula started.  Potassium replaced per protocol.      Dosing Weight: 78.2 kg, IBW     ASSESSED NUTRITION NEEDS  Estimated Energy Needs: 3599-4156 kcals/day (22-25)  Justification:  vented and Overwt.  Estimated Protein Needs:  grams protein/day (1.2 - 2 grams of pro/kg)  Justification: acute illness  Estimated Fluid Needs: 1754-6341 mL/day (1 mL/kcal)   Justification: Maintenance      ANTHROPOMETRICS  Height: 180.3 cm (5' 11\")  Most Recent Weight: 81.4 kg (179 lb 8 oz)  ?  Was 196 lb 12/21/21.    Intervention:  Adjust to other fiber formula and monitor tolerance.        "

## 2021-12-22 NOTE — PLAN OF CARE
Problem: Device-Related Complication Risk (Mechanical Ventilation, Invasive)  Goal: Optimal Device Function  Outcome: Improving     Problem: Inability to Wean (Mechanical Ventilation, Invasive)  Goal: Mechanical Ventilation Liberation  Outcome: Improving     Problem: Ventilator-Induced Lung Injury (Mechanical Ventilation, Invasive)  Goal: Absence of Ventilator-Induced Lung Injury  Outcome: Improving

## 2021-12-22 NOTE — PROGRESS NOTES
Mechanical ventilation, mode changed to CPAP/PS 5/12  .30.  RT to monitor, MD notified.     Ventilation Mode: (S) CPAP/PS  (Continuous positive airway pressure with Pressure Support) (start)  FiO2 (%): 30 %  Rate Set (breaths/minute): 16 breaths/min  Tidal Volume Set (mL): 520 mL  PEEP (cm H2O): 5 cmH2O  Pressure Support (cm H2O): (S) 12 cmH2O  Oxygen Concentration (%): 30 %  Peak Inspiratory Pressure (cm H2O) (Drager Kristen): 35  Resp: 23

## 2021-12-23 ENCOUNTER — APPOINTMENT (OUTPATIENT)
Dept: OCCUPATIONAL THERAPY | Facility: HOSPITAL | Age: 46
End: 2021-12-23
Attending: INTERNAL MEDICINE
Payer: COMMERCIAL

## 2021-12-23 ENCOUNTER — APPOINTMENT (OUTPATIENT)
Dept: PHYSICAL THERAPY | Facility: HOSPITAL | Age: 46
End: 2021-12-23
Attending: INTERNAL MEDICINE
Payer: COMMERCIAL

## 2021-12-23 LAB
ANION GAP SERPL CALCULATED.3IONS-SCNC: 8 MMOL/L (ref 5–18)
BUN SERPL-MCNC: 10 MG/DL (ref 8–22)
CALCIUM SERPL-MCNC: 8.9 MG/DL (ref 8.5–10.5)
CHLORIDE BLD-SCNC: 108 MMOL/L (ref 98–107)
CO2 SERPL-SCNC: 26 MMOL/L (ref 22–31)
CREAT SERPL-MCNC: 0.69 MG/DL (ref 0.7–1.3)
ERYTHROCYTE [DISTWIDTH] IN BLOOD BY AUTOMATED COUNT: 15.1 % (ref 10–15)
GFR SERPL CREATININE-BSD FRML MDRD: >90 ML/MIN/1.73M2
GLUCOSE BLD-MCNC: 135 MG/DL (ref 70–125)
HCT VFR BLD AUTO: 34.2 % (ref 40–53)
HGB BLD-MCNC: 11.1 G/DL (ref 13.3–17.7)
MAGNESIUM SERPL-MCNC: 1.8 MG/DL (ref 1.8–2.6)
MCH RBC QN AUTO: 29.9 PG (ref 26.5–33)
MCHC RBC AUTO-ENTMCNC: 32.5 G/DL (ref 31.5–36.5)
MCV RBC AUTO: 92 FL (ref 78–100)
PLATELET # BLD AUTO: 339 10E3/UL (ref 150–450)
POTASSIUM BLD-SCNC: 3.9 MMOL/L (ref 3.5–5)
RBC # BLD AUTO: 3.71 10E6/UL (ref 4.4–5.9)
SODIUM SERPL-SCNC: 142 MMOL/L (ref 136–145)
WBC # BLD AUTO: 11.7 10E3/UL (ref 4–11)

## 2021-12-23 PROCEDURE — 200N000001 HC R&B ICU

## 2021-12-23 PROCEDURE — 250N000013 HC RX MED GY IP 250 OP 250 PS 637: Performed by: INTERNAL MEDICINE

## 2021-12-23 PROCEDURE — 250N000009 HC RX 250: Performed by: INTERNAL MEDICINE

## 2021-12-23 PROCEDURE — 94003 VENT MGMT INPAT SUBQ DAY: CPT

## 2021-12-23 PROCEDURE — 258N000003 HC RX IP 258 OP 636: Performed by: NURSE PRACTITIONER

## 2021-12-23 PROCEDURE — 999N000157 HC STATISTIC RCP TIME EA 10 MIN

## 2021-12-23 PROCEDURE — 83735 ASSAY OF MAGNESIUM: CPT | Performed by: INTERNAL MEDICINE

## 2021-12-23 PROCEDURE — 250N000011 HC RX IP 250 OP 636: Performed by: NURSE PRACTITIONER

## 2021-12-23 PROCEDURE — 250N000011 HC RX IP 250 OP 636: Performed by: INTERNAL MEDICINE

## 2021-12-23 PROCEDURE — 99291 CRITICAL CARE FIRST HOUR: CPT | Performed by: NURSE PRACTITIONER

## 2021-12-23 PROCEDURE — 85027 COMPLETE CBC AUTOMATED: CPT | Performed by: NURSE PRACTITIONER

## 2021-12-23 PROCEDURE — 97535 SELF CARE MNGMENT TRAINING: CPT | Mod: GO

## 2021-12-23 PROCEDURE — 250N000013 HC RX MED GY IP 250 OP 250 PS 637: Performed by: NURSE PRACTITIONER

## 2021-12-23 PROCEDURE — 82310 ASSAY OF CALCIUM: CPT | Performed by: NURSE PRACTITIONER

## 2021-12-23 PROCEDURE — 97530 THERAPEUTIC ACTIVITIES: CPT | Mod: GP

## 2021-12-23 RX ADMIN — TRAZODONE HYDROCHLORIDE 75 MG: 50 TABLET ORAL at 09:03

## 2021-12-23 RX ADMIN — BUPROPION HYDROCHLORIDE 100 MG: 100 TABLET, FILM COATED ORAL at 17:06

## 2021-12-23 RX ADMIN — LORAZEPAM 4 MG: 2 LIQUID ORAL at 21:05

## 2021-12-23 RX ADMIN — Medication 800 MG: at 17:07

## 2021-12-23 RX ADMIN — OXYCODONE HYDROCHLORIDE 20 MG: 5 SOLUTION ORAL at 13:20

## 2021-12-23 RX ADMIN — Medication 40 MG: at 06:33

## 2021-12-23 RX ADMIN — DEXMEDETOMIDINE HYDROCHLORIDE 0.6 MCG/KG/HR: 4 INJECTION, SOLUTION INTRAVENOUS at 23:49

## 2021-12-23 RX ADMIN — DEXMEDETOMIDINE HYDROCHLORIDE 0.8 MCG/KG/HR: 4 INJECTION, SOLUTION INTRAVENOUS at 06:10

## 2021-12-23 RX ADMIN — OXYCODONE HYDROCHLORIDE 20 MG: 5 SOLUTION ORAL at 10:06

## 2021-12-23 RX ADMIN — LEVETIRACETAM 500 MG: 100 INJECTION, SOLUTION INTRAVENOUS at 10:28

## 2021-12-23 RX ADMIN — DEXMEDETOMIDINE HYDROCHLORIDE 0.8 MCG/KG/HR: 4 INJECTION, SOLUTION INTRAVENOUS at 12:47

## 2021-12-23 RX ADMIN — OXYCODONE HYDROCHLORIDE 20 MG: 5 SOLUTION ORAL at 06:10

## 2021-12-23 RX ADMIN — QUETIAPINE 100 MG: 25 TABLET ORAL at 21:00

## 2021-12-23 RX ADMIN — LORAZEPAM 4 MG: 2 LIQUID ORAL at 00:02

## 2021-12-23 RX ADMIN — HALOPERIDOL LACTATE 5 MG: 5 INJECTION, SOLUTION INTRAMUSCULAR at 00:33

## 2021-12-23 RX ADMIN — LORAZEPAM 4 MG: 2 LIQUID ORAL at 04:09

## 2021-12-23 RX ADMIN — TRAZODONE HYDROCHLORIDE 75 MG: 50 TABLET ORAL at 13:21

## 2021-12-23 RX ADMIN — BUPROPION HYDROCHLORIDE 100 MG: 100 TABLET, FILM COATED ORAL at 09:02

## 2021-12-23 RX ADMIN — OXYCODONE HYDROCHLORIDE 20 MG: 5 SOLUTION ORAL at 02:04

## 2021-12-23 RX ADMIN — OXYCODONE HYDROCHLORIDE 20 MG: 5 SOLUTION ORAL at 21:00

## 2021-12-23 RX ADMIN — CHLORHEXIDINE GLUCONATE 0.12% ORAL RINSE 15 ML: 1.2 LIQUID ORAL at 20:59

## 2021-12-23 RX ADMIN — DEXMEDETOMIDINE HYDROCHLORIDE 1 MCG/KG/HR: 4 INJECTION, SOLUTION INTRAVENOUS at 17:02

## 2021-12-23 RX ADMIN — LORAZEPAM 4 MG: 2 LIQUID ORAL at 17:05

## 2021-12-23 RX ADMIN — TRAZODONE HYDROCHLORIDE 75 MG: 50 TABLET ORAL at 21:06

## 2021-12-23 RX ADMIN — CEFTRIAXONE SODIUM 1 G: 1 INJECTION, POWDER, FOR SOLUTION INTRAMUSCULAR; INTRAVENOUS at 12:51

## 2021-12-23 RX ADMIN — QUETIAPINE 75 MG: 25 TABLET ORAL at 12:51

## 2021-12-23 RX ADMIN — OXYCODONE HYDROCHLORIDE 20 MG: 5 SOLUTION ORAL at 17:20

## 2021-12-23 RX ADMIN — LORAZEPAM 4 MG: 2 LIQUID ORAL at 12:51

## 2021-12-23 RX ADMIN — ACETAMINOPHEN 650 MG: 650 SOLUTION ORAL at 10:06

## 2021-12-23 RX ADMIN — Medication 800 MG: at 09:02

## 2021-12-23 RX ADMIN — SIMETHICONE 40 MG: 20 SUSPENSION/ DROPS ORAL at 09:13

## 2021-12-23 RX ADMIN — HALOPERIDOL LACTATE 5 MG: 5 INJECTION, SOLUTION INTRAMUSCULAR at 14:51

## 2021-12-23 RX ADMIN — ENOXAPARIN SODIUM 40 MG: 40 INJECTION SUBCUTANEOUS at 09:02

## 2021-12-23 RX ADMIN — ARIPIPRAZOLE 7 MG: 1 SOLUTION ORAL at 09:02

## 2021-12-23 RX ADMIN — SODIUM CHLORIDE, POTASSIUM CHLORIDE, SODIUM LACTATE AND CALCIUM CHLORIDE 500 ML: 600; 310; 30; 20 INJECTION, SOLUTION INTRAVENOUS at 09:01

## 2021-12-23 RX ADMIN — ACETAMINOPHEN 650 MG: 650 SOLUTION ORAL at 20:59

## 2021-12-23 RX ADMIN — Medication 800 MG: at 12:52

## 2021-12-23 RX ADMIN — DEXMEDETOMIDINE HYDROCHLORIDE 0.8 MCG/KG/HR: 4 INJECTION, SOLUTION INTRAVENOUS at 02:03

## 2021-12-23 RX ADMIN — LORAZEPAM 4 MG: 2 LIQUID ORAL at 09:02

## 2021-12-23 RX ADMIN — QUETIAPINE 75 MG: 25 TABLET ORAL at 17:05

## 2021-12-23 RX ADMIN — HYDROMORPHONE HYDROCHLORIDE 1 MG: 1 INJECTION, SOLUTION INTRAMUSCULAR; INTRAVENOUS; SUBCUTANEOUS at 12:51

## 2021-12-23 RX ADMIN — QUETIAPINE 75 MG: 25 TABLET ORAL at 09:03

## 2021-12-23 RX ADMIN — CHLORHEXIDINE GLUCONATE 0.12% ORAL RINSE 15 ML: 1.2 LIQUID ORAL at 09:02

## 2021-12-23 ASSESSMENT — ACTIVITIES OF DAILY LIVING (ADL)
ADLS_ACUITY_SCORE: 21
ADLS_ACUITY_SCORE: 19
ADLS_ACUITY_SCORE: 21
ADLS_ACUITY_SCORE: 19
ADLS_ACUITY_SCORE: 21

## 2021-12-23 NOTE — PROGRESS NOTES
"CLINICAL NUTRITION SERVICES - REASSESSMENT NOTE     Nutrition Prescription    RECOMMENDATIONS FOR MDs/PROVIDERS TO ORDER:      Malnutrition Status:    Moderate noted 11/28 at     Recommendations already ordered by Registered Dietitian (RD):  Continue TF as ordered.    Future/Additional Recommendations:  Monitor wt.  May need additional calories if wt loss or increased activity.       EVALUATION OF THE PROGRESS TOWARD GOALS   Diet: NPO  Nutrition Support: Promote with fiber at goal 70 ml/hr   FWF 85 ml every 6 hrs  Intake: 1680 kcals, 106 g PRO, 1396 ml free H20, 218 g CHO, and 24 g fiber daily, 340 ml flushes, 1736 ml total free water.     NEW FINDINGS   Pt tolerating TF with decreased stooling.  (Pt had increased stooling with Jevity 1.5).  Looking at LTACH placement probably next week as pt still on precedex.    ANTHROPOMETRICS  Height: 180.3 cm (5' 11\")  Admit wt 98.8 kg (217 lb 13 oz)  12/6  Most Recent Weight: 81.4 kg (179 lb 8 oz) 12/22 -question accuracy.  Was 196 lb 3.2 oz on 12/21.  Weight History:   Wt Readings from Last 6 Encounters:   12/22/21 81.4 kg (179 lb 8 oz)   12/05/21 89.4 kg (197 lb 3.2 oz)   02/22/18 83.3 kg (183 lb 11.2 oz)   12/5 wt at  and 12/6 wt at [facility].  20 lb gain in one day with transfer to [facility] (?)  -4.7 L per I/Os. (pt on diuretic from 12/4 to 12/11)  Pt's wt down overall but had been stable around 195 lb for the past week.    Dosing Weight: 78.2 kg, IBW     ASSESSED NUTRITION NEEDS  Estimated Energy Needs: 3278-3242 kcals/day (22-25)  Justification:  vented and Overwt.  Estimated Protein Needs:  grams protein/day (1.2 - 2 grams of pro/kg)  Justification: acute illness  Estimated Fluid Needs: 6407-5948 mL/day (1 mL/kcal)   Justification: Maintenance    PHYSICAL FINDINGS  See malnutrition section below.      GI CONCERNS  Rectal tube 350 ml out (this is decreased from 600 and 650 ml the previous 2 days.)  PEG placed 12/17.    LABS  Reviewed    MEDICATIONS  IVs-precedex, " levophed  Scheduled bowel meds-currently held  Reviewed    MALNUTRITION: noted 11/28  % Weight Loss:  13% in 9 months  % Intake:  </= 50% for >/= 5 days (severe malnutrition)  Subcutaneous Fat Loss:  unable  Muscle Loss:  unable  Fluid Retention:  Trace per charting     Malnutrition Diagnosis: Moderate malnutrition  In Context of:  Acute illness or injury    Goals   Tolerate TF at goal rate-met  Meet nutrition needs-met      Nutrition Diagnosis  Swallow difficulty r/t acute illness evidenced by intubation   Evaluation: No change      INTERVENTIONS  Implementation  Continue current TF.  Monitor wt.    Monitoring/Evaluation  Progress toward goals will be monitored and evaluated per protocol.

## 2021-12-23 NOTE — PROGRESS NOTES
1328 CPAP/PS trial x 180 minutes 5/12 .30, tolerated well, up in chair    Ventilation Mode: CPAP/PS  (Continuous positive airway pressure with Pressure Support)  FiO2 (%): 30 %  Rate Set (breaths/minute): 16 breaths/min  Tidal Volume Set (mL): 520 mL  PEEP (cm H2O): 5 cmH2O  Pressure Support (cm H2O): (S) 12 cmH2O  Oxygen Concentration (%): 30 %  Peak Inspiratory Pressure (cm H2O) (Drager Kristen): 35  Resp: 11      changed back to VC AC mode previous settings per RN request second to fatigue, RT to monitor

## 2021-12-23 NOTE — PROGRESS NOTES
0722 Mechanical ventilation, settings below, BS coarse bilat, suctioned inline large thin tan secretions. SpO2 95 %, Patient easily arousable,   0728 changed to CPAP/PS 5/12, .30. RT to monitor    RT PROGRESS NOTE      CURRENT SETTINGS:   Ventilation Mode: (S) CPAP/PS  (Continuous positive airway pressure with Pressure Support) (start)  FiO2 (%): 30 %  Rate Set (breaths/minute): 16 breaths/min  Tidal Volume Set (mL): 520 mL  PEEP (cm H2O): 5 cmH2O  Pressure Support (cm H2O): (S) 12 cmH2O  Oxygen Concentration (%): 30 %  Peak Inspiratory Pressure (cm H2O) (Drager Kristen): 35  Resp: 18      PATIENT PARAMETERS:  PIP 28  Pplat:  18  Pmean:  10  Compliance: 27  SBT: yes start    02 Sats:  95%    Tracheostomy tube SIZE 7.5   Respiratory Medications: none       Fermin Nunez, RT

## 2021-12-23 NOTE — PROGRESS NOTES
1055 Spontaneous Vt 682 RR 31 Ve 25.6 RSBI 45, increased PS back to 12 cm. Suctioned inline small normal viscosity tan, patient more relaxed after. CPAP trial continues, RT to monitor.

## 2021-12-23 NOTE — PROGRESS NOTES
ICU PROGRESS NOTE:        Assessment/Plan:     Changes for Today:    Continue to attempt weaning off of precedex.  Remove CVC  Continue Phase I weaning.     1. Acute respiratory failure  S/p intubation 11/28, extubated on 12/15, re-intubated the following today due to severe agitation and respiratory distress.  2. S/p tracheostomy 12/17   3. Ventilator associated pneumonia  Sputum Cx 12/14 showed polymicrobial bacteria (Strept constellatus, E coli, proteus, staph epi, and candida parapsilosis.   Blood cultures (+) strep constellatus.   F/u blood cultures 12/15 so far negative.   New trach culture is pending. Abx zosyn  4. Streptococcus pneumonia with bacteremia   5. Encephalopathy / agitation  6. Chronic back pain / polysubstance abuse on suboxone  7. Depression      Advance Directives:  Full code     Plan:   1. Titrate vent settings 16/520/5/35%, keep SpO2 > 90%  2. Continue daily PS weaning trials phase 1  3. Sedation to keep RASS 0 to -1 , pt did not tolerate precedex off this am, severe agitation  I talked with JARED loera service, she will see again today  Appreicate JARED Hoyos's assistance.  Patient did have better sleep after addition of medications yesterday.  Continue to wean precedex as tolerated.  4. Continue scheduled gabapentin, wellbutrin, abilify, benadryl, haldol, oxycodone, lorazepam, trazodone per psych  5. Titrate pressors to keep MAP > 65  6. Bell antibiotics to rocephin x 10 days based on cultures  7. Cont tube feedings   8. PPI for GI prophylaxis   9. Glucose level monitoring  10. DVT prophylaxis lovenox subcutaneous  11. Referral to LTACH, not ready yet as still on precedex.      COVID RECOVERED        ICU Prophylaxis:     PPI:  Yes, enteral protonix     Peridex:  Yes     Anticoagulation/DVT Prophylaxis:  Lovenox 40 mg subcutaneous daily, (now that COVID recovered)            Lines/Drains/Tubes:  CVC TRIPLE LUMEN Right Internal jugular  5 days     Urethral Catheter Coude 14 fr  23  days     Gastrostomy/Enterostomy Gastrostomy LUQ 20 fr  4 days     Rectal Tube  2 days     Surgical Airway Tracheostomy Shiley 7.5 Cuffed  4 days     PROVIDER RESTRAINT FOR VIOLENT BEHAVIOR FACE TO FACE EVALUATION           Patient's Immediate Situation:  Patient demonstrated the following behaviors: pulling at tubes and lines     Patient's Reaction to the intervention:  Does patient understand the reason for restraint/seclusion? Unable to access     Medical Condition:  Is there any evidence of compromise of Skin integrity, Respiratory, Cardiovascular, Musculoskeletal, Hydration? no     Behavioral Condition:  In consultation with the RN, is there a need to continue this restraint or seclusion? Yes     See Restraint Flowsheet for complete restraint documentation and assessment.     Tamar Bustos, APRN CNP       CODE STATUS:  FULL      SUBJECTIVE:     Ccx:  Follows simple commands.    HPI:  Jared North is a 46 year old male, unvaccinated against COVID-19, with history of polysubstance abuse and chronic pain/lower back pain on suboxone, mood disorder/depression, HTN, ROSSY, mild intermittent asthma, presented with respiratory failure/COVID ARDS intubated on 11/28. Initially requiring proning/paralysis, veletri, now down trending vent needs, issues with agitation, treated for VAP. Not tolerating weaning trials, failed extubation 12/15, re-intubated, S/p trach and PEG tube 12/17      No past medical history on file.  Past Surgical History:   Procedure Laterality Date     PICC TRIPLE LUMEN PLACEMENT  11/24/2021          Current Facility-Administered Medications   Medication     acetaminophen (TYLENOL) solution 650 mg     acetaminophen (TYLENOL) tablet 650 mg    Or     acetaminophen (TYLENOL) Suppository 650 mg     ARIPiprazole (ABILIFY) solution 7 mg     bisacodyl (DULCOLAX) Suppository 10 mg     buPROPion (WELLBUTRIN) tablet 100 mg     cefTRIAXone (ROCEPHIN) 1 g vial to attach to  mL bag for ADULTS or NS 50 mL  bag for PEDS     chlorhexidine (PERIDEX) 0.12 % solution 15 mL     dexmedetomidine (PRECEDEX) 400 mcg in 0.9% sodium chloride 100 mL     dextrose 10% infusion     glucose gel 15-30 g    Or     dextrose 50 % injection 25-50 mL    Or     glucagon injection 1 mg     sennosides (SENOKOT) syrup 10 mL    And     docusate (COLACE) 50 MG/5ML liquid 100 mg     enoxaparin ANTICOAGULANT (LOVENOX) injection 40 mg     gabapentin (NEURONTIN) solution 800 mg     haloperidol lactate (HALDOL) injection 5 mg     hydrALAZINE (APRESOLINE) injection 10 mg     HYDROmorphone (PF) (DILAUDID) injection 0.5-1 mg     influenza quadrivalent (PF) vacc (FLUZONE) injection 0.5 mL     levETIRAcetam (KEPPRA) 500 mg in sodium chloride 0.9 % 100 mL intermittent infusion     LORazepam (ATIVAN) 2 MG/ML (HIGH CONC) oral solution 4 mg     metoprolol (LOPRESSOR) injection 10 mg     norepinephrine (LEVOPHED) 4 mg in  mL infusion PREMIX     ondansetron (ZOFRAN-ODT) ODT tab 4 mg    Or     ondansetron (ZOFRAN) injection 4 mg     oxyCODONE (ROXICODONE) solution 20 mg     pantoprazole (PROTONIX) 2 mg/mL suspension 40 mg    Or     pantoprazole (PROTONIX) IV push injection 40 mg     Patient is already receiving anticoagulation with heparin, enoxaparin (LOVENOX), warfarin (COUMADIN)  or other anticoagulant medication     [Held by provider] polyethylene glycol (MIRALAX) Packet 17 g     polyethylene glycol (MIRALAX) Packet 17 g     prochlorperazine (COMPAZINE) injection 10 mg    Or     prochlorperazine (COMPAZINE) tablet 10 mg    Or     prochlorperazine (COMPAZINE) suppository 25 mg     QUEtiapine (SEROquel) tablet 100 mg     QUEtiapine (SEROquel) tablet 100 mg     QUEtiapine (SEROquel) tablet 75 mg     simethicone (MYLICON) suspension 40 mg     traZODone (DESYREL) half-tab 75 mg      No Known Allergies  Family History   Problem Relation Age of Onset     Diabetes Mother      Heart Disease Mother      Hypertension Mother      Hypertension Maternal Grandmother       Diabetes Maternal Grandfather      Hypertension Maternal Grandfather          PHYSICAL ASSESSMENT:  General: appears stated age, alert, cooperative  Lungs:  Diminished in bases   Heart: RRR, S1 and S2   Abdomen: Soft, non-tender.  Bowel sounds present X 4 quadrants.  Skin: skin color normal, texture normal, no rashes or lesions.   Extremities:  No edema noted.   Pulses:  +2 BUE and +2 BLE  Neuro: follows simple commands.  PURDY    Temp: 98.8  F (37.1  C) Temp src: Oral BP: 134/78 Pulse: (!) 125   Resp: 30 SpO2: 93 % O2 Device: Mechanical Ventilator          Intake/Output Summary (Last 24 hours) at 12/23/2021 1117  Last data filed at 12/23/2021 0600  Gross per 24 hour   Intake 1901.07 ml   Output 3175 ml   Net -1273.93 ml     Temp: 98.8  F (37.1  C) Temp src: Oral BP: 134/78 Pulse: (!) 125   Resp: 30 SpO2: 93 % O2 Device: Mechanical Ventilator        Lab Results   Component Value Date    WBC 11.7 12/23/2021     Lab Results   Component Value Date    RBC 3.71 12/23/2021     Lab Results   Component Value Date    HGB 11.1 12/23/2021     Lab Results   Component Value Date    HCT 34.2 12/23/2021     No components found for: MCT  Lab Results   Component Value Date    MCV 92 12/23/2021     Lab Results   Component Value Date    MCH 29.9 12/23/2021     Lab Results   Component Value Date    MCHC 32.5 12/23/2021     Lab Results   Component Value Date    RDW 15.1 12/23/2021     Lab Results   Component Value Date     12/23/2021       Last Comprehensive Metabolic Panel:  Sodium   Date Value Ref Range Status   12/23/2021 142 136 - 145 mmol/L Final     Potassium   Date Value Ref Range Status   12/23/2021 3.9 3.5 - 5.0 mmol/L Final     Chloride   Date Value Ref Range Status   12/23/2021 108 (H) 98 - 107 mmol/L Final     Carbon Dioxide (CO2)   Date Value Ref Range Status   12/23/2021 26 22 - 31 mmol/L Final     Anion Gap   Date Value Ref Range Status   12/23/2021 8 5 - 18 mmol/L Final     Glucose   Date Value Ref Range Status    12/23/2021 135 (H) 70 - 125 mg/dL Final     Urea Nitrogen   Date Value Ref Range Status   12/23/2021 10 8 - 22 mg/dL Final     Creatinine   Date Value Ref Range Status   12/23/2021 0.69 (L) 0.70 - 1.30 mg/dL Final     GFR Estimate   Date Value Ref Range Status   12/23/2021 >90 >60 mL/min/1.73m2 Final     Comment:     Effective December 21, 2021 eGFRcr in adults is calculated using the 2021 CKD-EPI creatinine equation which includes age and gender (Love et al., NEJ, DOI: 10.1056/NCOQgm7927224)   02/16/2018 >60 >60 mL/min/1.73m2 Final     Calcium   Date Value Ref Range Status   12/23/2021 8.9 8.5 - 10.5 mg/dL Final       Last Arterial Blood Gas:  pH Arterial   Date Value Ref Range Status   12/16/2021 7.39 7.37 - 7.44 Final     pCO2 Arterial   Date Value Ref Range Status   12/16/2021 45 35 - 45 mm Hg Final     pO2 Arterial   Date Value Ref Range Status   12/16/2021 86 80 - 90 mm Hg Final     Bicarbonate Arterial   Date Value Ref Range Status   12/16/2021 26 23 - 29 mmol/L Final     O2 Sat, Arterial   Date Value Ref Range Status   12/16/2021 97.4 (H) 96.0 - 97.0 % Final     Base Excess/Deficit (+/-)   Date Value Ref Range Status   12/16/2021 2.7   mmol/L Final       No results found for: SHONA Torre, CNP  Pulmonary Critical Care  Pager: [phone]    Time Billed: 45 minutes  of critical care time.    Patient requiring ICU level of care:   Patent remains weaning from the ventilator after recovering from COVID 19 pneumonia.  Reducing sedation.

## 2021-12-23 NOTE — PROGRESS NOTES
Trach # 7.5 Shanon. Patient is restless and remain on vent/full support. Current settings: AC 16 520 30% 5. sats 96%. BS coarse/decreased. Tan/thick secretions suctioned. RT following.    Edvin Henderson, RT

## 2021-12-23 NOTE — PLAN OF CARE
Problem: Communication Impairment (Mechanical Ventilation, Invasive)  Goal: Effective Communication  Outcome: No Change     Problem: Device-Related Complication Risk (Mechanical Ventilation, Invasive)  Goal: Optimal Device Function  Outcome: No Change     Problem: Inability to Wean (Mechanical Ventilation, Invasive)  Goal: Mechanical Ventilation Liberation  Outcome: Improving, CPAP/PS trial 5/12 .30 yesterday x 11 hours. CPAP/PS 5/12 changed to 5/10 then back to 5/12 for pt comfort, FiO2 .30 today     Problem: Ventilator-Induced Lung Injury (Mechanical Ventilation, Invasive)  Goal: Absence of Ventilator-Induced Lung Injury  Outcome: No Change     Problem: ARDS (Acute Respiratory Distress Syndrome)  Goal: Effective Oxygenation  Outcome: No Change

## 2021-12-23 NOTE — PROGRESS NOTES
1000 continues on mechanical ventilation    Ventilation Mode: CPAP/PS  (Continuous positive airway pressure with Pressure Support)  FiO2 (%): 30 %  Rate Set (breaths/minute): 16 breaths/min  Tidal Volume Set (mL): 520 mL  PEEP (cm H2O): 5 cmH2O  Pressure Support (cm H2O): 12 cmH2O (decreased to 10 per MD order)  Oxygen Concentration (%): 30 %  Peak Inspiratory Pressure (cm H2O) (Drager Kristen): 35  Resp: 18    RSBI 53, decreased PS to 10 cm per MD request.

## 2021-12-24 LAB
ANION GAP SERPL CALCULATED.3IONS-SCNC: 6 MMOL/L (ref 5–18)
BUN SERPL-MCNC: 14 MG/DL (ref 8–22)
CALCIUM SERPL-MCNC: 8.9 MG/DL (ref 8.5–10.5)
CHLORIDE BLD-SCNC: 106 MMOL/L (ref 98–107)
CO2 SERPL-SCNC: 27 MMOL/L (ref 22–31)
CREAT SERPL-MCNC: 0.62 MG/DL (ref 0.7–1.3)
ERYTHROCYTE [DISTWIDTH] IN BLOOD BY AUTOMATED COUNT: 15.5 % (ref 10–15)
GFR SERPL CREATININE-BSD FRML MDRD: >90 ML/MIN/1.73M2
GLUCOSE BLD-MCNC: 90 MG/DL (ref 70–125)
HCT VFR BLD AUTO: 32.8 % (ref 40–53)
HGB BLD-MCNC: 10.5 G/DL (ref 13.3–17.7)
MAGNESIUM SERPL-MCNC: 1.8 MG/DL (ref 1.8–2.6)
MCH RBC QN AUTO: 30.1 PG (ref 26.5–33)
MCHC RBC AUTO-ENTMCNC: 32 G/DL (ref 31.5–36.5)
MCV RBC AUTO: 94 FL (ref 78–100)
PLATELET # BLD AUTO: 278 10E3/UL (ref 150–450)
POTASSIUM BLD-SCNC: 3.9 MMOL/L (ref 3.5–5)
RBC # BLD AUTO: 3.49 10E6/UL (ref 4.4–5.9)
SODIUM SERPL-SCNC: 139 MMOL/L (ref 136–145)
WBC # BLD AUTO: 7.6 10E3/UL (ref 4–11)

## 2021-12-24 PROCEDURE — 250N000011 HC RX IP 250 OP 636: Performed by: INTERNAL MEDICINE

## 2021-12-24 PROCEDURE — 272N000452 HC KIT SHRLOCK 5FR POWER PICC TRIPLE LUMEN

## 2021-12-24 PROCEDURE — 80048 BASIC METABOLIC PNL TOTAL CA: CPT | Performed by: NURSE PRACTITIONER

## 2021-12-24 PROCEDURE — C9113 INJ PANTOPRAZOLE SODIUM, VIA: HCPCS | Performed by: INTERNAL MEDICINE

## 2021-12-24 PROCEDURE — 250N000013 HC RX MED GY IP 250 OP 250 PS 637: Performed by: INTERNAL MEDICINE

## 2021-12-24 PROCEDURE — 250N000013 HC RX MED GY IP 250 OP 250 PS 637: Performed by: NURSE PRACTITIONER

## 2021-12-24 PROCEDURE — 999N000157 HC STATISTIC RCP TIME EA 10 MIN

## 2021-12-24 PROCEDURE — 36569 INSJ PICC 5 YR+ W/O IMAGING: CPT

## 2021-12-24 PROCEDURE — 99291 CRITICAL CARE FIRST HOUR: CPT | Performed by: NURSE PRACTITIONER

## 2021-12-24 PROCEDURE — 250N000011 HC RX IP 250 OP 636: Performed by: NURSE PRACTITIONER

## 2021-12-24 PROCEDURE — 250N000009 HC RX 250: Performed by: INTERNAL MEDICINE

## 2021-12-24 PROCEDURE — 258N000003 HC RX IP 258 OP 636: Performed by: NURSE PRACTITIONER

## 2021-12-24 PROCEDURE — 85027 COMPLETE CBC AUTOMATED: CPT | Performed by: NURSE PRACTITIONER

## 2021-12-24 PROCEDURE — 200N000001 HC R&B ICU

## 2021-12-24 PROCEDURE — P9047 ALBUMIN (HUMAN), 25%, 50ML: HCPCS | Performed by: INTERNAL MEDICINE

## 2021-12-24 PROCEDURE — 250N000009 HC RX 250: Performed by: NURSE PRACTITIONER

## 2021-12-24 PROCEDURE — 83735 ASSAY OF MAGNESIUM: CPT | Performed by: INTERNAL MEDICINE

## 2021-12-24 RX ORDER — OLANZAPINE 5 MG/1
5 TABLET, ORALLY DISINTEGRATING ORAL DAILY PRN
Status: DISCONTINUED | OUTPATIENT
Start: 2021-12-24 | End: 2021-12-25

## 2021-12-24 RX ORDER — ALBUMIN (HUMAN) 12.5 G/50ML
25 SOLUTION INTRAVENOUS ONCE
Status: COMPLETED | OUTPATIENT
Start: 2021-12-24 | End: 2021-12-24

## 2021-12-24 RX ORDER — LIDOCAINE 40 MG/G
CREAM TOPICAL
Status: ACTIVE | OUTPATIENT
Start: 2021-12-24 | End: 2021-12-27

## 2021-12-24 RX ADMIN — DEXMEDETOMIDINE HYDROCHLORIDE 1.1 MCG/KG/HR: 4 INJECTION, SOLUTION INTRAVENOUS at 09:45

## 2021-12-24 RX ADMIN — OLANZAPINE 5 MG: 5 TABLET, ORALLY DISINTEGRATING ORAL at 13:41

## 2021-12-24 RX ADMIN — CHLORHEXIDINE GLUCONATE 0.12% ORAL RINSE 15 ML: 1.2 LIQUID ORAL at 08:34

## 2021-12-24 RX ADMIN — PANTOPRAZOLE SODIUM 40 MG: 40 INJECTION, POWDER, FOR SOLUTION INTRAVENOUS at 06:42

## 2021-12-24 RX ADMIN — QUETIAPINE FUMARATE 100 MG: 25 TABLET ORAL at 22:27

## 2021-12-24 RX ADMIN — LORAZEPAM 4 MG: 2 LIQUID ORAL at 20:40

## 2021-12-24 RX ADMIN — OXYCODONE HYDROCHLORIDE 20 MG: 5 SOLUTION ORAL at 21:54

## 2021-12-24 RX ADMIN — OXYCODONE HYDROCHLORIDE 20 MG: 5 SOLUTION ORAL at 13:41

## 2021-12-24 RX ADMIN — OXYCODONE HYDROCHLORIDE 20 MG: 5 SOLUTION ORAL at 17:37

## 2021-12-24 RX ADMIN — CHLORHEXIDINE GLUCONATE 0.12% ORAL RINSE 15 ML: 1.2 LIQUID ORAL at 20:40

## 2021-12-24 RX ADMIN — DEXMEDETOMIDINE HYDROCHLORIDE 1 MCG/KG/HR: 4 INJECTION, SOLUTION INTRAVENOUS at 06:09

## 2021-12-24 RX ADMIN — HALOPERIDOL LACTATE 5 MG: 5 INJECTION, SOLUTION INTRAMUSCULAR at 03:26

## 2021-12-24 RX ADMIN — LEVETIRACETAM 500 MG: 100 INJECTION, SOLUTION INTRAVENOUS at 00:32

## 2021-12-24 RX ADMIN — HYDROMORPHONE HYDROCHLORIDE 1 MG: 1 INJECTION, SOLUTION INTRAMUSCULAR; INTRAVENOUS; SUBCUTANEOUS at 22:28

## 2021-12-24 RX ADMIN — DEXMEDETOMIDINE HYDROCHLORIDE 1 MCG/KG/HR: 4 INJECTION, SOLUTION INTRAVENOUS at 22:01

## 2021-12-24 RX ADMIN — ENOXAPARIN SODIUM 40 MG: 40 INJECTION SUBCUTANEOUS at 08:34

## 2021-12-24 RX ADMIN — LORAZEPAM 4 MG: 2 LIQUID ORAL at 16:25

## 2021-12-24 RX ADMIN — LIDOCAINE HYDROCHLORIDE 3 ML: 10 INJECTION, SOLUTION EPIDURAL; INFILTRATION; INTRACAUDAL; PERINEURAL at 11:13

## 2021-12-24 RX ADMIN — ARIPIPRAZOLE 7 MG: 1 SOLUTION ORAL at 08:34

## 2021-12-24 RX ADMIN — CEFTRIAXONE SODIUM 1 G: 1 INJECTION, POWDER, FOR SOLUTION INTRAMUSCULAR; INTRAVENOUS at 12:46

## 2021-12-24 RX ADMIN — BUPROPION HYDROCHLORIDE 100 MG: 100 TABLET, FILM COATED ORAL at 08:33

## 2021-12-24 RX ADMIN — OXYCODONE HYDROCHLORIDE 20 MG: 5 SOLUTION ORAL at 09:35

## 2021-12-24 RX ADMIN — LEVETIRACETAM 500 MG: 100 INJECTION, SOLUTION INTRAVENOUS at 10:42

## 2021-12-24 RX ADMIN — LORAZEPAM 4 MG: 2 LIQUID ORAL at 09:35

## 2021-12-24 RX ADMIN — ACETAMINOPHEN 650 MG: 650 SOLUTION ORAL at 22:28

## 2021-12-24 RX ADMIN — TRAZODONE HYDROCHLORIDE 75 MG: 50 TABLET ORAL at 08:33

## 2021-12-24 RX ADMIN — ALBUMIN HUMAN 25 G: 0.25 SOLUTION INTRAVENOUS at 03:36

## 2021-12-24 RX ADMIN — OXYCODONE HYDROCHLORIDE 20 MG: 5 SOLUTION ORAL at 02:44

## 2021-12-24 RX ADMIN — QUETIAPINE 75 MG: 25 TABLET ORAL at 12:46

## 2021-12-24 RX ADMIN — QUETIAPINE FUMARATE 100 MG: 25 TABLET ORAL at 05:20

## 2021-12-24 RX ADMIN — Medication 800 MG: at 12:46

## 2021-12-24 RX ADMIN — HALOPERIDOL LACTATE 5 MG: 5 INJECTION, SOLUTION INTRAMUSCULAR at 21:49

## 2021-12-24 RX ADMIN — LORAZEPAM 4 MG: 2 LIQUID ORAL at 03:48

## 2021-12-24 RX ADMIN — Medication 800 MG: at 08:34

## 2021-12-24 RX ADMIN — BUPROPION HYDROCHLORIDE 100 MG: 100 TABLET, FILM COATED ORAL at 16:25

## 2021-12-24 RX ADMIN — DEXMEDETOMIDINE HYDROCHLORIDE 0.9 MCG/KG/HR: 4 INJECTION, SOLUTION INTRAVENOUS at 17:40

## 2021-12-24 RX ADMIN — TRAZODONE HYDROCHLORIDE 75 MG: 50 TABLET ORAL at 13:41

## 2021-12-24 RX ADMIN — LORAZEPAM 4 MG: 2 LIQUID ORAL at 00:53

## 2021-12-24 RX ADMIN — OXYCODONE HYDROCHLORIDE 20 MG: 5 SOLUTION ORAL at 05:20

## 2021-12-24 RX ADMIN — DEXMEDETOMIDINE HYDROCHLORIDE 1 MCG/KG/HR: 4 INJECTION, SOLUTION INTRAVENOUS at 13:49

## 2021-12-24 RX ADMIN — LEVETIRACETAM 500 MG: 100 INJECTION, SOLUTION INTRAVENOUS at 22:00

## 2021-12-24 RX ADMIN — HALOPERIDOL LACTATE 5 MG: 5 INJECTION, SOLUTION INTRAMUSCULAR at 05:21

## 2021-12-24 RX ADMIN — QUETIAPINE 100 MG: 25 TABLET ORAL at 20:40

## 2021-12-24 RX ADMIN — LORAZEPAM 4 MG: 2 LIQUID ORAL at 12:46

## 2021-12-24 RX ADMIN — TRAZODONE HYDROCHLORIDE 75 MG: 50 TABLET ORAL at 20:41

## 2021-12-24 RX ADMIN — QUETIAPINE 75 MG: 25 TABLET ORAL at 16:24

## 2021-12-24 RX ADMIN — Medication 800 MG: at 16:41

## 2021-12-24 RX ADMIN — QUETIAPINE 75 MG: 25 TABLET ORAL at 08:33

## 2021-12-24 RX ADMIN — LORAZEPAM 4 MG: 2 LIQUID ORAL at 23:35

## 2021-12-24 ASSESSMENT — ACTIVITIES OF DAILY LIVING (ADL)
ADLS_ACUITY_SCORE: 21
ADLS_ACUITY_SCORE: 21
ADLS_ACUITY_SCORE: 19
ADLS_ACUITY_SCORE: 19
ADLS_ACUITY_SCORE: 21
ADLS_ACUITY_SCORE: 21
ADLS_ACUITY_SCORE: 19
ADLS_ACUITY_SCORE: 21
ADLS_ACUITY_SCORE: 21
ADLS_ACUITY_SCORE: 19
ADLS_ACUITY_SCORE: 21

## 2021-12-24 ASSESSMENT — MIFFLIN-ST. JEOR: SCORE: 1729.03

## 2021-12-24 NOTE — PROCEDURES
"Procedures  PICC Line Insertion Procedure Note  Pt. Name: Jared North  MRN:        [id number]    Procedure: Insertion of a  triple Lumen  5 fr  Bard SOLO (valved) Power PICC, Lot number [id number]    Indications: vascular access    Contraindications : none noted    Procedure Details   Patient identified with 2 identifiers and \"Time Out\" conducted.  .     Central line insertion bundle followed: hand hygeine performed prior to procedure, site cleansed with cholraprep, hat, mask, sterile gloves,sterile gown worn, patient draped with maximum barrier head to toe drape, sterile field maintained.    The vein was assessed and found to be compressible and of adequate size. 3 ml 1% Lidocaine administered sq to the insertion site. A 5 Fr PICC was inserted into the basilic vein of the right arm with ultrasound guidance. One attempt(s) required to access vein.   Catheter threaded without difficulty. Good blood return noted.    Modified Seldinger Technique used for insertion.    The 8 sharps that are included in the PICC insertion kit were accounted for and disposed of in the sharps container prior to breakdown of the sterile field.    Catheter secured with Statlock, biopatch and Tegaderm dressing applied.    Findings:  Total catheter length  39 cm, with 1 cm exposed. Mid upper arm circumference is 31 cm. Catheter was flushed with 30 cc NS. Patient  tolerated procedure well.    Tip placement verified by 3cg tip location technology  . Tip placement in the SVC.    CLABSI prevention brochure left at bedside.    Patient's primary RN notified PICC is ready for use.    Comments:        Tony Beltre RN  [facility]        "

## 2021-12-24 NOTE — PROGRESS NOTES
ICU PROGRESS NOTE:        Assessment/Plan:     Changes for Today:    Continue to wean off precedex    Continue Phase I weaning, 10/5    PICC line    1. Acute respiratory failure  S/p intubation 11/28, extubated on 12/15, re-intubated the following today due to severe agitation and respiratory distress.  2. S/p tracheostomy 12/17   3. Ventilator associated pneumonia  Sputum Cx 12/14 showed polymicrobial bacteria (Strept constellatus, E coli, proteus, staph epi, and candida parapsilosis.   Blood cultures (+) strep constellatus.   Completing coarse of Rocephin.   4. Streptococcus pneumonia with bacteremia   5. Encephalopathy / agitation  6. Chronic back pain / polysubstance abuse on suboxone  7. Depression      Advance Directives:  Full code     Plan:   1. Titrate vent settings 16/520/5/35%, keep SpO2 > 90%  2. Continue daily PS weaning trials phase 1  3. Sedation to keep RASS 0 to -1 , pt did not tolerate precedex off this am, severe agitation  I talked with JARED loera service, she will see again today  Appreicate JARED Hoyos's assistance.   4. Continue scheduled gabapentin, wellbutrin, abilify, benadryl, haldol, oxycodone, lorazepam, trazodone per psych  5. Titrate pressors to keep MAP > 65  6. Bell antibiotics to rocephin x 10 days based on cultures  7. Cont tube feedings   8. PPI for GI prophylaxis   9. Glucose level monitoring  10. DVT prophylaxis lovenox subcutaneous  11. Referral to LTACH, not ready yet as still on precedex.      COVID RECOVERED        ICU Prophylaxis:     PPI:  Yes, enteral protonix     Peridex:  Yes     Anticoagulation/DVT Prophylaxis:  Lovenox 40 mg subcutaneous daily, (now that COVID recovered)            Lines/Drains/Tubes:  CVC TRIPLE LUMEN Right Internal jugular  5 days     Urethral Catheter Coude 14 fr  23 days     Gastrostomy/Enterostomy Gastrostomy LUQ 20 fr  4 days     Rectal Tube  2 days     Surgical Airway Tracheostomy Shanon 7.5 Cuffed  4 days     PROVIDER RESTRAINT FOR VIOLENT  BEHAVIOR FACE TO FACE EVALUATION           Patient's Immediate Situation:  Patient demonstrated the following behaviors: pulling at tubes and lines     Patient's Reaction to the intervention:  Does patient understand the reason for restraint/seclusion? Unable to access     Medical Condition:  Is there any evidence of compromise of Skin integrity, Respiratory, Cardiovascular, Musculoskeletal, Hydration? no     Behavioral Condition:  In consultation with the RN, is there a need to continue this restraint or seclusion? Yes     See Restraint Flowsheet for complete restraint documentation and assessment.     Tamar Bustos, SHONA CNP          CODE STATUS: FULL    SUBJECTIVE:    Ccx: Following simple commands.     HPI:    Jared North is a 46 year old male, unvaccinated against COVID-19, with history of polysubstance abuse and chronic pain/lower back pain on suboxone, mood disorder/depression, HTN, ROSSY, mild intermittent asthma, presented with respiratory failure/COVID ARDS intubated on 11/28. Initially requiring proning/paralysis, veletri, now down trending vent needs, issues with agitation, treated for VAP. Not tolerating weaning trials, failed extubation 12/15, re-intubated, S/p trach and PEG tube 12/17         No past medical history on file.  Past Surgical History:   Procedure Laterality Date     PICC TRIPLE LUMEN PLACEMENT  11/24/2021          Current Facility-Administered Medications   Medication     acetaminophen (TYLENOL) solution 650 mg     acetaminophen (TYLENOL) tablet 650 mg    Or     acetaminophen (TYLENOL) Suppository 650 mg     ARIPiprazole (ABILIFY) solution 7 mg     bisacodyl (DULCOLAX) Suppository 10 mg     buPROPion (WELLBUTRIN) tablet 100 mg     cefTRIAXone (ROCEPHIN) 1 g vial to attach to  mL bag for ADULTS or NS 50 mL bag for PEDS     chlorhexidine (PERIDEX) 0.12 % solution 15 mL     dexmedetomidine (PRECEDEX) 400 mcg in 0.9% sodium chloride 100 mL     dextrose 10% infusion     glucose gel  15-30 g    Or     dextrose 50 % injection 25-50 mL    Or     glucagon injection 1 mg     sennosides (SENOKOT) syrup 10 mL    And     docusate (COLACE) 50 MG/5ML liquid 100 mg     enoxaparin ANTICOAGULANT (LOVENOX) injection 40 mg     gabapentin (NEURONTIN) solution 800 mg     haloperidol lactate (HALDOL) injection 5 mg     hydrALAZINE (APRESOLINE) injection 10 mg     HYDROmorphone (PF) (DILAUDID) injection 0.5-1 mg     influenza quadrivalent (PF) vacc (FLUZONE) injection 0.5 mL     levETIRAcetam (KEPPRA) 500 mg in sodium chloride 0.9 % 100 mL intermittent infusion     lidocaine (LMX4) cream     lidocaine 1 % 0.1-5 mL     LORazepam (ATIVAN) 2 MG/ML (HIGH CONC) oral solution 4 mg     metoprolol (LOPRESSOR) injection 10 mg     norepinephrine (LEVOPHED) 4 mg in  mL infusion PREMIX     OLANZapine zydis (zyPREXA) ODT tab 5 mg     ondansetron (ZOFRAN-ODT) ODT tab 4 mg    Or     ondansetron (ZOFRAN) injection 4 mg     oxyCODONE (ROXICODONE) solution 20 mg     pantoprazole (PROTONIX) 2 mg/mL suspension 40 mg    Or     pantoprazole (PROTONIX) IV push injection 40 mg     Patient is already receiving anticoagulation with heparin, enoxaparin (LOVENOX), warfarin (COUMADIN)  or other anticoagulant medication     [Held by provider] polyethylene glycol (MIRALAX) Packet 17 g     polyethylene glycol (MIRALAX) Packet 17 g     prochlorperazine (COMPAZINE) injection 10 mg    Or     prochlorperazine (COMPAZINE) tablet 10 mg    Or     prochlorperazine (COMPAZINE) suppository 25 mg     QUEtiapine (SEROquel) tablet 100 mg     QUEtiapine (SEROquel) tablet 100 mg     QUEtiapine (SEROquel) tablet 75 mg     simethicone (MYLICON) suspension 40 mg     sodium chloride (PF) 0.9% PF flush 10-40 mL     traZODone (DESYREL) half-tab 75 mg      No Known Allergies  Family History   Problem Relation Age of Onset     Diabetes Mother      Heart Disease Mother      Hypertension Mother      Hypertension Maternal Grandmother      Diabetes Maternal  Grandfather      Hypertension Maternal Grandfather          PHYSICAL ASSESSMENT:  General: appears stated age, alert, cooperative  Lungs:  CTA  Heart: RRR, S1 and S2   Abdomen: Soft, non-tender.  Bowel sounds present X 4 quadrants.  Skin: skin color normal, texture normal, no rashes or lesions.   Extremities:  No edema noted.   Pulses:  +2 BUE and +2 BLE  Neuro:  PURDY, following simple commands.     Temp: 97.6  F (36.4  C) Temp src: Axillary BP: 107/64 Pulse: 61   Resp: 19 SpO2: 98 % O2 Device: Mechanical Ventilator          Intake/Output Summary (Last 24 hours) at 12/24/2021 1350  Last data filed at 12/24/2021 1200  Gross per 24 hour   Intake 2513.44 ml   Output 2270 ml   Net 243.44 ml     Temp: 97.6  F (36.4  C) Temp src: Axillary BP: 107/64 Pulse: 61   Resp: 19 SpO2: 98 % O2 Device: Mechanical Ventilator        Lab Results   Component Value Date    WBC 7.6 12/24/2021     Lab Results   Component Value Date    RBC 3.49 12/24/2021     Lab Results   Component Value Date    HGB 10.5 12/24/2021     Lab Results   Component Value Date    HCT 32.8 12/24/2021     No components found for: MCT  Lab Results   Component Value Date    MCV 94 12/24/2021     Lab Results   Component Value Date    MCH 30.1 12/24/2021     Lab Results   Component Value Date    MCHC 32.0 12/24/2021     Lab Results   Component Value Date    RDW 15.5 12/24/2021     Lab Results   Component Value Date     12/24/2021       Last Comprehensive Metabolic Panel:  Sodium   Date Value Ref Range Status   12/24/2021 139 136 - 145 mmol/L Final     Potassium   Date Value Ref Range Status   12/24/2021 3.9 3.5 - 5.0 mmol/L Final     Chloride   Date Value Ref Range Status   12/24/2021 106 98 - 107 mmol/L Final     Carbon Dioxide (CO2)   Date Value Ref Range Status   12/24/2021 27 22 - 31 mmol/L Final     Anion Gap   Date Value Ref Range Status   12/24/2021 6 5 - 18 mmol/L Final     Glucose   Date Value Ref Range Status   12/24/2021 90 70 - 125 mg/dL Final      Urea Nitrogen   Date Value Ref Range Status   12/24/2021 14 8 - 22 mg/dL Final     Creatinine   Date Value Ref Range Status   12/24/2021 0.62 (L) 0.70 - 1.30 mg/dL Final     GFR Estimate   Date Value Ref Range Status   12/24/2021 >90 >60 mL/min/1.73m2 Final     Comment:     Effective December 21, 2021 eGFRcr in adults is calculated using the 2021 CKD-EPI creatinine equation which includes age and gender (Love et al., NE, DOI: 10.1056/EONLgh2399870)   02/16/2018 >60 >60 mL/min/1.73m2 Final     Calcium   Date Value Ref Range Status   12/24/2021 8.9 8.5 - 10.5 mg/dL Final       Last Arterial Blood Gas:  pH Arterial   Date Value Ref Range Status   12/16/2021 7.39 7.37 - 7.44 Final     pCO2 Arterial   Date Value Ref Range Status   12/16/2021 45 35 - 45 mm Hg Final     pO2 Arterial   Date Value Ref Range Status   12/16/2021 86 80 - 90 mm Hg Final     Bicarbonate Arterial   Date Value Ref Range Status   12/16/2021 26 23 - 29 mmol/L Final     O2 Sat, Arterial   Date Value Ref Range Status   12/16/2021 97.4 (H) 96.0 - 97.0 % Final     Base Excess/Deficit (+/-)   Date Value Ref Range Status   12/16/2021 2.7   mmol/L Final       No results found for: MORENITA Bustos APRN, CNP  Pulmonary Critical Care  Pager: [phone]    Time Billed:  45 minutes of critical care time.    Patient requiring ICU level of care: Patent remains weaning from the ventilator after recovering from COVID 19 pneumonia.  Reducing sedation.

## 2021-12-25 LAB
ANION GAP SERPL CALCULATED.3IONS-SCNC: 8 MMOL/L (ref 5–18)
BUN SERPL-MCNC: 10 MG/DL (ref 8–22)
CALCIUM SERPL-MCNC: 9 MG/DL (ref 8.5–10.5)
CHLORIDE BLD-SCNC: 105 MMOL/L (ref 98–107)
CO2 SERPL-SCNC: 27 MMOL/L (ref 22–31)
CREAT SERPL-MCNC: 0.61 MG/DL (ref 0.7–1.3)
GFR SERPL CREATININE-BSD FRML MDRD: >90 ML/MIN/1.73M2
GLUCOSE BLD-MCNC: 128 MG/DL (ref 70–125)
MAGNESIUM SERPL-MCNC: 1.8 MG/DL (ref 1.8–2.6)
POTASSIUM BLD-SCNC: 4 MMOL/L (ref 3.5–5)
SODIUM SERPL-SCNC: 140 MMOL/L (ref 136–145)

## 2021-12-25 PROCEDURE — 999N000009 HC STATISTIC AIRWAY CARE

## 2021-12-25 PROCEDURE — 82310 ASSAY OF CALCIUM: CPT | Performed by: NURSE PRACTITIONER

## 2021-12-25 PROCEDURE — 250N000011 HC RX IP 250 OP 636: Performed by: NURSE PRACTITIONER

## 2021-12-25 PROCEDURE — 94003 VENT MGMT INPAT SUBQ DAY: CPT

## 2021-12-25 PROCEDURE — 250N000013 HC RX MED GY IP 250 OP 250 PS 637: Performed by: INTERNAL MEDICINE

## 2021-12-25 PROCEDURE — 250N000013 HC RX MED GY IP 250 OP 250 PS 637: Performed by: NURSE PRACTITIONER

## 2021-12-25 PROCEDURE — 99291 CRITICAL CARE FIRST HOUR: CPT | Performed by: NURSE PRACTITIONER

## 2021-12-25 PROCEDURE — C9113 INJ PANTOPRAZOLE SODIUM, VIA: HCPCS | Performed by: INTERNAL MEDICINE

## 2021-12-25 PROCEDURE — 250N000011 HC RX IP 250 OP 636: Performed by: INTERNAL MEDICINE

## 2021-12-25 PROCEDURE — 200N000001 HC R&B ICU

## 2021-12-25 PROCEDURE — 83735 ASSAY OF MAGNESIUM: CPT | Performed by: NURSE PRACTITIONER

## 2021-12-25 PROCEDURE — 999N000157 HC STATISTIC RCP TIME EA 10 MIN

## 2021-12-25 PROCEDURE — 258N000003 HC RX IP 258 OP 636: Performed by: NURSE PRACTITIONER

## 2021-12-25 PROCEDURE — 999N000185 HC STATISTIC TRANSPORT TIME EA 15 MIN

## 2021-12-25 PROCEDURE — 250N000009 HC RX 250: Performed by: INTERNAL MEDICINE

## 2021-12-25 RX ORDER — OXYCODONE HCL 20 MG/ML
20 CONCENTRATE, ORAL ORAL EVERY 4 HOURS
Status: DISCONTINUED | OUTPATIENT
Start: 2021-12-25 | End: 2021-12-31 | Stop reason: HOSPADM

## 2021-12-25 RX ORDER — OLANZAPINE 5 MG/1
5 TABLET, ORALLY DISINTEGRATING ORAL 2 TIMES DAILY
Status: DISCONTINUED | OUTPATIENT
Start: 2021-12-25 | End: 2021-12-31 | Stop reason: HOSPADM

## 2021-12-25 RX ADMIN — OXYCODONE HYDROCHLORIDE 20 MG: 5 SOLUTION ORAL at 01:25

## 2021-12-25 RX ADMIN — HALOPERIDOL LACTATE 5 MG: 5 INJECTION, SOLUTION INTRAMUSCULAR at 22:08

## 2021-12-25 RX ADMIN — Medication 800 MG: at 17:15

## 2021-12-25 RX ADMIN — LORAZEPAM 4 MG: 2 LIQUID ORAL at 07:39

## 2021-12-25 RX ADMIN — QUETIAPINE 75 MG: 25 TABLET ORAL at 17:28

## 2021-12-25 RX ADMIN — LORAZEPAM 4 MG: 2 LIQUID ORAL at 17:15

## 2021-12-25 RX ADMIN — LORAZEPAM 4 MG: 2 LIQUID ORAL at 20:25

## 2021-12-25 RX ADMIN — TRAZODONE HYDROCHLORIDE 75 MG: 50 TABLET ORAL at 20:30

## 2021-12-25 RX ADMIN — QUETIAPINE 100 MG: 25 TABLET ORAL at 20:26

## 2021-12-25 RX ADMIN — TRAZODONE HYDROCHLORIDE 75 MG: 50 TABLET ORAL at 13:47

## 2021-12-25 RX ADMIN — OXYCODONE HYDROCHLORIDE 20 MG: 100 SOLUTION ORAL at 17:28

## 2021-12-25 RX ADMIN — LORAZEPAM 4 MG: 2 LIQUID ORAL at 12:11

## 2021-12-25 RX ADMIN — DEXMEDETOMIDINE HYDROCHLORIDE 1.5 MCG/KG/HR: 4 INJECTION, SOLUTION INTRAVENOUS at 07:35

## 2021-12-25 RX ADMIN — OXYCODONE HYDROCHLORIDE 20 MG: 100 SOLUTION ORAL at 22:06

## 2021-12-25 RX ADMIN — OXYCODONE HYDROCHLORIDE 20 MG: 100 SOLUTION ORAL at 09:47

## 2021-12-25 RX ADMIN — DEXMEDETOMIDINE HYDROCHLORIDE 1.3 MCG/KG/HR: 4 INJECTION, SOLUTION INTRAVENOUS at 22:15

## 2021-12-25 RX ADMIN — DEXMEDETOMIDINE HYDROCHLORIDE 1.5 MCG/KG/HR: 4 INJECTION, SOLUTION INTRAVENOUS at 01:24

## 2021-12-25 RX ADMIN — DEXMEDETOMIDINE HYDROCHLORIDE 1.5 MCG/KG/HR: 4 INJECTION, SOLUTION INTRAVENOUS at 04:30

## 2021-12-25 RX ADMIN — Medication 800 MG: at 12:12

## 2021-12-25 RX ADMIN — TRAZODONE HYDROCHLORIDE 75 MG: 50 TABLET ORAL at 08:04

## 2021-12-25 RX ADMIN — OLANZAPINE 5 MG: 5 TABLET, ORALLY DISINTEGRATING ORAL at 20:27

## 2021-12-25 RX ADMIN — ENOXAPARIN SODIUM 40 MG: 40 INJECTION SUBCUTANEOUS at 08:01

## 2021-12-25 RX ADMIN — CHLORHEXIDINE GLUCONATE 0.12% ORAL RINSE 15 ML: 1.2 LIQUID ORAL at 20:26

## 2021-12-25 RX ADMIN — OXYCODONE HYDROCHLORIDE 20 MG: 100 SOLUTION ORAL at 05:43

## 2021-12-25 RX ADMIN — BUPROPION HYDROCHLORIDE 100 MG: 100 TABLET, FILM COATED ORAL at 08:05

## 2021-12-25 RX ADMIN — CEFTRIAXONE SODIUM 1 G: 1 INJECTION, POWDER, FOR SOLUTION INTRAMUSCULAR; INTRAVENOUS at 12:03

## 2021-12-25 RX ADMIN — LEVETIRACETAM 500 MG: 100 INJECTION, SOLUTION INTRAVENOUS at 10:40

## 2021-12-25 RX ADMIN — QUETIAPINE FUMARATE 100 MG: 25 TABLET ORAL at 13:48

## 2021-12-25 RX ADMIN — LEVETIRACETAM 500 MG: 100 INJECTION, SOLUTION INTRAVENOUS at 22:10

## 2021-12-25 RX ADMIN — OXYCODONE HYDROCHLORIDE 20 MG: 100 SOLUTION ORAL at 13:48

## 2021-12-25 RX ADMIN — DEXMEDETOMIDINE HYDROCHLORIDE 1.3 MCG/KG/HR: 4 INJECTION, SOLUTION INTRAVENOUS at 10:37

## 2021-12-25 RX ADMIN — OLANZAPINE 5 MG: 5 TABLET, ORALLY DISINTEGRATING ORAL at 09:43

## 2021-12-25 RX ADMIN — QUETIAPINE 75 MG: 25 TABLET ORAL at 12:14

## 2021-12-25 RX ADMIN — LORAZEPAM 4 MG: 2 LIQUID ORAL at 23:44

## 2021-12-25 RX ADMIN — LORAZEPAM 4 MG: 2 LIQUID ORAL at 03:33

## 2021-12-25 RX ADMIN — PANTOPRAZOLE SODIUM 40 MG: 40 INJECTION, POWDER, FOR SOLUTION INTRAVENOUS at 06:31

## 2021-12-25 RX ADMIN — ARIPIPRAZOLE 7 MG: 1 SOLUTION ORAL at 08:06

## 2021-12-25 RX ADMIN — BUPROPION HYDROCHLORIDE 100 MG: 100 TABLET, FILM COATED ORAL at 17:15

## 2021-12-25 RX ADMIN — CHLORHEXIDINE GLUCONATE 0.12% ORAL RINSE 15 ML: 1.2 LIQUID ORAL at 07:41

## 2021-12-25 RX ADMIN — DEXMEDETOMIDINE HYDROCHLORIDE 1 MCG/KG/HR: 4 INJECTION, SOLUTION INTRAVENOUS at 14:42

## 2021-12-25 RX ADMIN — QUETIAPINE 75 MG: 25 TABLET ORAL at 08:02

## 2021-12-25 RX ADMIN — Medication 800 MG: at 08:06

## 2021-12-25 RX ADMIN — QUETIAPINE FUMARATE 100 MG: 25 TABLET ORAL at 09:06

## 2021-12-25 NOTE — PROGRESS NOTES
ICU PROGRESS NOTE:    Assessment/Plan:     Changes for Today:    Continue to wean off precedex.  Added scheduled zyprexa, BID. PRN seem to have been helpful.  Has not needed pressors for 24 hours.      1. Acute respiratory failure  S/p intubation 11/28, extubated on 12/15, re-intubated the following today due to severe agitation and respiratory distress.  2. S/p tracheostomy 12/17   3. Ventilator associated pneumonia  Sputum Cx 12/14 showed polymicrobial bacteria (Strept constellatus, E coli, proteus, staph epi, and candida parapsilosis.   Blood cultures (+) strep constellatus.   Completing coarse of Rocephin.   4. Streptococcus pneumonia with bacteremia   5. Encephalopathy / agitation  6. Chronic back pain / polysubstance abuse on suboxone  7. Depression      Advance Directives:  Full code     Plan:   1. Titrate vent settings 16/520/5/35%, keep SpO2 > 90%  2. Continue daily PS weaning trials phase 1  3. Sedation to keep RASS 0 to -1 , pt did not tolerate precedex off this am, severe agitation  I talked with [facility] service,  Appreicate [facility] Hoyos's assistance.   4. Continue scheduled gabapentin, wellbutrin, abilify, benadryl, haldol, oxycodone, lorazepam, trazodone per psych.  Adding scheduled zyprexa  5. Titrate pressors to keep MAP > 65  6. Change antibotics to rocephin x 10 days based on cultures  7. Cont tube feedings   8. PPI for GI prophylaxis   9. Glucose level monitoring  10. DVT prophylaxis lovenox subcutaneous  11. Referral to LTACH, not ready yet as still on precedex.      COVID RECOVERED        ICU Prophylaxis:     PPI:  Yes, enteral protonix     Peridex:  Yes     Anticoagulation/DVT Prophylaxis:  Lovenox 40 mg subcutaneous daily, (now that COVID recovered)            Lines/Drains/Tubes:  CVC TRIPLE LUMEN Right Internal jugular  5 days     Urethral Catheter Coude 14 fr  23 days     Gastrostomy/Enterostomy Gastrostomy LUQ 20 fr  4 days     Rectal Tube  2 days     Surgical Airway Tracheostomy Shanon  7.5 Cuffed  4 days     PROVIDER RESTRAINT FOR VIOLENT BEHAVIOR FACE TO FACE EVALUATION           Patient's Immediate Situation:  Patient demonstrated the following behaviors: pulling at tubes and lines     Patient's Reaction to the intervention:  Does patient understand the reason for restraint/seclusion? Unable to access     Medical Condition:  Is there any evidence of compromise of Skin integrity, Respiratory, Cardiovascular, Musculoskeletal, Hydration? no     Behavioral Condition:  In consultation with the RN, is there a need to continue this restraint or seclusion? Yes     See Restraint Flowsheet for complete restraint documentation and assessment.     Tamar Bustos, APRN CNP      CODE STATUS:      SUBJECTIVE:    Ccx: Following simple commands     HPI:  Jared North is a 46 year old male, unvaccinated against COVID-19, with history of polysubstance abuse and chronic pain/lower back pain on suboxone, mood disorder/depression, HTN, ROSSY, mild intermittent asthma, presented with respiratory failure/COVID ARDS intubated on 11/28. Initially requiring proning/paralysis, veletri, now down trending vent needs, issues with agitation, treated for VAP. Not tolerating weaning trials, failed extubation 12/15, re-intubated, S/p trach and PEG tube 12/17      No past medical history on file.  Past Surgical History:   Procedure Laterality Date     PICC TRIPLE LUMEN PLACEMENT  11/24/2021          Current Facility-Administered Medications   Medication     acetaminophen (TYLENOL) solution 650 mg     acetaminophen (TYLENOL) tablet 650 mg    Or     acetaminophen (TYLENOL) Suppository 650 mg     ARIPiprazole (ABILIFY) solution 7 mg     bisacodyl (DULCOLAX) Suppository 10 mg     buPROPion (WELLBUTRIN) tablet 100 mg     cefTRIAXone (ROCEPHIN) 1 g vial to attach to  mL bag for ADULTS or NS 50 mL bag for PEDS     chlorhexidine (PERIDEX) 0.12 % solution 15 mL     dexmedetomidine (PRECEDEX) 400 mcg in 0.9% sodium chloride 100 mL      dextrose 10% infusion     glucose gel 15-30 g    Or     dextrose 50 % injection 25-50 mL    Or     glucagon injection 1 mg     sennosides (SENOKOT) syrup 10 mL    And     docusate (COLACE) 50 MG/5ML liquid 100 mg     enoxaparin ANTICOAGULANT (LOVENOX) injection 40 mg     gabapentin (NEURONTIN) solution 800 mg     haloperidol lactate (HALDOL) injection 5 mg     hydrALAZINE (APRESOLINE) injection 10 mg     HYDROmorphone (PF) (DILAUDID) injection 0.5-1 mg     influenza quadrivalent (PF) vacc (FLUZONE) injection 0.5 mL     levETIRAcetam (KEPPRA) 500 mg in sodium chloride 0.9 % 100 mL intermittent infusion     lidocaine (LMX4) cream     lidocaine 1 % 0.1-5 mL     LORazepam (ATIVAN) 2 MG/ML (HIGH CONC) oral solution 4 mg     metoprolol (LOPRESSOR) injection 10 mg     norepinephrine (LEVOPHED) 4 mg in  mL infusion PREMIX     OLANZapine zydis (zyPREXA) ODT tab 5 mg     ondansetron (ZOFRAN-ODT) ODT tab 4 mg    Or     ondansetron (ZOFRAN) injection 4 mg     oxyCODONE (ROXICODONE INTENSOL) 20 mg/mL (HIGH CONC) solution 20 mg     pantoprazole (PROTONIX) 2 mg/mL suspension 40 mg    Or     pantoprazole (PROTONIX) IV push injection 40 mg     Patient is already receiving anticoagulation with heparin, enoxaparin (LOVENOX), warfarin (COUMADIN)  or other anticoagulant medication     [Held by provider] polyethylene glycol (MIRALAX) Packet 17 g     polyethylene glycol (MIRALAX) Packet 17 g     prochlorperazine (COMPAZINE) injection 10 mg    Or     prochlorperazine (COMPAZINE) tablet 10 mg    Or     prochlorperazine (COMPAZINE) suppository 25 mg     QUEtiapine (SEROquel) tablet 100 mg     QUEtiapine (SEROquel) tablet 100 mg     QUEtiapine (SEROquel) tablet 75 mg     simethicone (MYLICON) suspension 40 mg     sodium chloride (PF) 0.9% PF flush 10-40 mL     traZODone (DESYREL) half-tab 75 mg      No Known Allergies  Family History   Problem Relation Age of Onset     Diabetes Mother      Heart Disease Mother      Hypertension  Mother      Hypertension Maternal Grandmother      Diabetes Maternal Grandfather      Hypertension Maternal Grandfather          PHYSICAL ASSESSMENT:  General: appears stated age, alert, cooperative  Lungs:  CTA  Heart: RRR, S1 and S2   Abdomen: Soft, non-tender.  Bowel sounds present X 4 quadrants.  Skin: skin color normal, texture normal, no rashes or lesions.   Extremities:  No edema noted.   Pulses:  +2 BUE and +2 BLE  Neuro:  Follows simple commands.  Intermittently agitated.     Temp: 97.4  F (36.3  C) Temp src: Axillary BP: (!) 86/52 Pulse: 111   Resp: 23 SpO2: 97 % O2 Device: Mechanical Ventilator          Intake/Output Summary (Last 24 hours) at 12/25/2021 1416  Last data filed at 12/25/2021 1352  Gross per 24 hour   Intake 3501.75 ml   Output 2975 ml   Net 526.75 ml     Temp: 97.4  F (36.3  C) Temp src: Axillary BP: (!) 86/52 Pulse: 111   Resp: 23 SpO2: 97 % O2 Device: Mechanical Ventilator        Lab Results   Component Value Date    WBC 7.6 12/24/2021     Lab Results   Component Value Date    RBC 3.49 12/24/2021     Lab Results   Component Value Date    HGB 10.5 12/24/2021     Lab Results   Component Value Date    HCT 32.8 12/24/2021     No components found for: MCT  Lab Results   Component Value Date    MCV 94 12/24/2021     Lab Results   Component Value Date    MCH 30.1 12/24/2021     Lab Results   Component Value Date    MCHC 32.0 12/24/2021     Lab Results   Component Value Date    RDW 15.5 12/24/2021     Lab Results   Component Value Date     12/24/2021       Last Comprehensive Metabolic Panel:  Sodium   Date Value Ref Range Status   12/25/2021 140 136 - 145 mmol/L Final     Potassium   Date Value Ref Range Status   12/25/2021 4.0 3.5 - 5.0 mmol/L Final     Chloride   Date Value Ref Range Status   12/25/2021 105 98 - 107 mmol/L Final     Carbon Dioxide (CO2)   Date Value Ref Range Status   12/25/2021 27 22 - 31 mmol/L Final     Anion Gap   Date Value Ref Range Status   12/25/2021 8 5 - 18  mmol/L Final     Glucose   Date Value Ref Range Status   12/25/2021 128 (H) 70 - 125 mg/dL Final     Urea Nitrogen   Date Value Ref Range Status   12/25/2021 10 8 - 22 mg/dL Final     Creatinine   Date Value Ref Range Status   12/25/2021 0.61 (L) 0.70 - 1.30 mg/dL Final     GFR Estimate   Date Value Ref Range Status   12/25/2021 >90 >60 mL/min/1.73m2 Final     Comment:     Effective December 21, 2021 eGFRcr in adults is calculated using the 2021 CKD-EPI creatinine equation which includes age and gender (Love et al., NEJ, DOI: 10.1056/LLVLnp9052292)   02/16/2018 >60 >60 mL/min/1.73m2 Final     Calcium   Date Value Ref Range Status   12/25/2021 9.0 8.5 - 10.5 mg/dL Final       Last Arterial Blood Gas:  pH Arterial   Date Value Ref Range Status   12/16/2021 7.39 7.37 - 7.44 Final     pCO2 Arterial   Date Value Ref Range Status   12/16/2021 45 35 - 45 mm Hg Final     pO2 Arterial   Date Value Ref Range Status   12/16/2021 86 80 - 90 mm Hg Final     Bicarbonate Arterial   Date Value Ref Range Status   12/16/2021 26 23 - 29 mmol/L Final     O2 Sat, Arterial   Date Value Ref Range Status   12/16/2021 97.4 (H) 96.0 - 97.0 % Final     Base Excess/Deficit (+/-)   Date Value Ref Range Status   12/16/2021 2.7   mmol/L Final       No results found for: MORENITA Bustos, APRN, CNP  Pulmonary Critical Care  Pager: [phone]    Time Billed:     45 minutes of critical care time.    Patient requiring ICU level of care: Patent remains weaning from the ventilator after recovering from COVID 19 pneumonia.  Reducing sedation.

## 2021-12-25 NOTE — PROGRESS NOTES
RT PROGRESS NOTE    VENT DAY# 29    CURRENT SETTINGS:  Ventilation Mode: CMV/AC  (Continuous Mandatory Ventilation/ Assist Control)  FiO2 (%): 30 %  Rate Set (breaths/minute): 16 breaths/min  Tidal Volume Set (mL): 520 mL  PEEP (cm H2O): 5 cmH2O  Pressure Support (cm H2O): 10 cmH2O  Oxygen Concentration (%): 30 %  Resp: 16     PATIENT PARAMETERS:  PIP 26  Pplat:  20  Pmean:  8.9  Compliance: 32  SBT: no  Secretions:  Sx small amount thick white   02 Sats:  97%    Trach:  7.5 Shiley    NOTE / SHIFT SUMMARY:   Pt on full vent support for night.  Pt transported to PACU without incident.  Breath sounds coarse.  No changes to vent settings    Michel Tran, RT

## 2021-12-25 NOTE — PLAN OF CARE
Problem: Adult Inpatient Plan of Care  Goal: Absence of Hospital-Acquired Illness or Injury  Intervention: Identify and Manage Fall Risk  Recent Flowsheet Documentation  Taken 12/24/2021 2000 by Oliva Horowitz RN  Safety Promotion/Fall Prevention:   bed alarm on   fall prevention program maintained   increased rounding and observation   lighting adjusted   room near nurse's station   room organization consistent  Taken 12/24/2021 1600 by Oliva Horowitz RN  Safety Promotion/Fall Prevention:   bed alarm on   fall prevention program maintained   increased rounding and observation   lighting adjusted   room near nurse's station   room organization consistent  Intervention: Prevent Skin Injury  Recent Flowsheet Documentation  Taken 12/24/2021 2000 by Oliva Horowitz RN  Body Position:   turned   left   right   log-rolled   side-lying 30 degrees  Taken 12/24/2021 1600 by Oliva Horowitz RN  Body Position:   turned   left   right   log-rolled   side-lying 30 degrees  Intervention: Prevent and Manage VTE (Venous Thromboembolism) Risk  Recent Flowsheet Documentation  Taken 12/24/2021 2000 by Oliva Horowitz RN  VTE Prevention/Management: pneumatic compression device  Taken 12/24/2021 1600 by Oliva Horowitz RN  VTE Prevention/Management: pneumatic compression device  Intervention: Prevent Infection  Recent Flowsheet Documentation  Taken 12/24/2021 2000 by Oliva Horowitz RN  Infection Prevention:   equipment surfaces disinfected   hand hygiene promoted   environmental surveillance performed   personal protective equipment utilized   single patient room provided  Taken 12/24/2021 1600 by Oliva Horowitz RN  Infection Prevention:   equipment surfaces disinfected   hand hygiene promoted   environmental surveillance performed   personal protective equipment utilized   single patient room provided     Problem: Device-Related Complication Risk (Mechanical Ventilation, Invasive)  Goal: Optimal  Device Function  Intervention: Optimize Device Care and Function  Recent Flowsheet Documentation  Taken 12/24/2021 2000 by Oliva Horowitz RN  Airway Safety Measures:   all equipment/monitors on and audible   suction regulator   suction equipment   suction at bedside  Taken 12/24/2021 1600 by Oliva Horowitz RN  Airway Safety Measures:   all equipment/monitors on and audible   suction regulator   suction equipment   suction at bedside     Problem: Inability to Wean (Mechanical Ventilation, Invasive)  Goal: Mechanical Ventilation Liberation  Intervention: Promote Extubation and Mechanical Ventilation Liberation  Recent Flowsheet Documentation  Taken 12/24/2021 2000 by Oliva Horowitz RN  Medication Review/Management: medications reviewed  Taken 12/24/2021 1600 by Oliva Horowitz RN  Medication Review/Management: medications reviewed     Problem: Ventilator-Induced Lung Injury (Mechanical Ventilation, Invasive)  Goal: Absence of Ventilator-Induced Lung Injury  Intervention: Prevent Ventilator-Associated Pneumonia  Recent Flowsheet Documentation  Taken 12/24/2021 2000 by Oliva Horowitz RN  Oral Care:   lip lubricant applied   tongue brushed   teeth brushed  Head of Bed (HOB) Positioning: HOB at 30 degrees  Taken 12/24/2021 1600 by Oliva Horowitz RN  Oral Care:   lip lubricant applied   tongue brushed   teeth brushed  Head of Bed (HOB) Positioning: HOB at 30 degrees     Problem: Hypertension Acute  Goal: Blood Pressure Within Desired Range  Intervention: Normalize Blood Pressure  Recent Flowsheet Documentation  Taken 12/24/2021 2000 by Oliva Horowitz RN  Medication Review/Management: medications reviewed  Taken 12/24/2021 1600 by Oliva Horowitz RN  Medication Review/Management: medications review  Hand off report given to receiving RN.

## 2021-12-25 NOTE — PROGRESS NOTES
Vent day 29.  PT continues on below vent settings: PT weaned from 0750 this AM to approx 1500 on PS 10/5 30%. Suctioning moderate amounts of thick white secretions.      Ventilation Mode: CMV/AC  (Continuous Mandatory Ventilation/ Assist Control)  FiO2 (%): 30 %  Rate Set (breaths/minute): 16 breaths/min  Tidal Volume Set (mL): 577 mL  PEEP (cm H2O): 5 cmH2O  Pressure Support (cm H2O): 10 cmH2O  Oxygen Concentration (%): 30 %  Resp: (!) 44  PIP:22-26  Plat: 21-23    Fabricio Donahue, RT  12/25/2021

## 2021-12-25 NOTE — PLAN OF CARE
Pt had uneventful shift. SBT most of shift. Occasionally got anxious, gave prn seroquel 100mg twice and Zyprexa 5mg once. Was able to titrate down Precedex from 1.5 to 1. TF at goal. Transferred pt from [facility] back to ICU room 353. Jama notified. d

## 2021-12-25 NOTE — PLAN OF CARE
Pt is sedated on 1.5 mcg of precedex and getting scheduled oxycodone and ativan.  RASS of -1 to -2 but will wake up and be +2.  In bilateral wrist restraints; follows commands intermittently.  7.5 shiley trach; vent settings are 30%/520/ 16/5. MAP greater than 65. Sinus tanner/NSR 50-60's. Promote at 70 ml/hr and 160 ml every 4 hours. Adequate uop.     Problem: Adult Inpatient Plan of Care  Goal: Absence of Hospital-Acquired Illness or Injury  Intervention: Identify and Manage Fall Risk  Recent Flowsheet Documentation  Taken 12/25/2021 0400 by Hiral Brown RN  Safety Promotion/Fall Prevention: room near nurse's station  Taken 12/25/2021 0000 by Hiral Brown RN  Safety Promotion/Fall Prevention: room near nurse's station  Intervention: Prevent Skin Injury  Recent Flowsheet Documentation  Taken 12/25/2021 0600 by Hiral Brown RN  Body Position:   turned   left   right  Taken 12/25/2021 0400 by Hiral Brown RN  Body Position:   turned   left   right  Taken 12/25/2021 0200 by Hiral Brown RN  Body Position:   turned   left   right  Intervention: Prevent and Manage VTE (Venous Thromboembolism) Risk  Recent Flowsheet Documentation  Taken 12/25/2021 0400 by Hiral Brown RN  VTE Prevention/Management: pneumatic compression device  Taken 12/25/2021 0000 by Hiral Brown RN  VTE Prevention/Management: pneumatic compression device  Intervention: Prevent Infection  Recent Flowsheet Documentation  Taken 12/25/2021 0400 by Hiral Brown RN  Infection Prevention: equipment surfaces disinfected  Taken 12/25/2021 0000 by Hiral Brown RN  Infection Prevention: equipment surfaces disinfected     Problem: Risk for Delirium  Goal: Optimal Coping  Intervention: Optimize Psychosocial Adjustment to Delirium  Recent Flowsheet Documentation  Taken 12/25/2021 0400 by Hiral Brown RN  Supportive Measures: relaxation techniques promoted  Taken 12/25/2021 0000 by Hiral Brown RN  Supportive Measures: relaxation techniques promoted  Goal:  Improved Behavioral Control  Intervention: Prevent and Manage Agitation  Recent Flowsheet Documentation  Taken 12/25/2021 0400 by Hiral Brown RN  Environment Familiarity/Consistency: daily routine followed  Taken 12/25/2021 0000 by Hiral Brown RN  Environment Familiarity/Consistency: daily routine followed  Goal: Improved Attention and Thought Clarity  Intervention: Maximize Cognitive Function  Recent Flowsheet Documentation  Taken 12/25/2021 0400 by Hiral Brown RN  Sensory Stimulation Regulation: auditory stimulation minimized  Reorientation Measures: clock in view  Taken 12/25/2021 0000 by Hiral Brown RN  Sensory Stimulation Regulation: auditory stimulation minimized  Reorientation Measures: clock in view  Goal: Improved Sleep  Intervention: Promote Sleep  Recent Flowsheet Documentation  Taken 12/25/2021 0400 by Hiral Brown RN  Sleep/Rest Enhancement:   awakenings minimized   consistent schedule promoted  Taken 12/25/2021 0000 by Hiral Brown RN  Sleep/Rest Enhancement:   awakenings minimized   consistent schedule promoted     Problem: Communication Impairment (Mechanical Ventilation, Invasive)  Goal: Effective Communication  Intervention: Ensure Effective Communication  Recent Flowsheet Documentation  Taken 12/25/2021 0400 by Hiral Brown RN  Communication Enhancement Strategies: communication board used  Taken 12/25/2021 0000 by Hiral Brown RN  Communication Enhancement Strategies: communication board used     Problem: Device-Related Complication Risk (Mechanical Ventilation, Invasive)  Goal: Optimal Device Function  Intervention: Optimize Device Care and Function  Recent Flowsheet Documentation  Taken 12/25/2021 0400 by Hiral Brown RN  Airway Safety Measures: all equipment/monitors on and audible  Taken 12/25/2021 0000 by Hiral Brown RN  Airway Safety Measures: all equipment/monitors on and audible     Problem: Inability to Wean (Mechanical Ventilation, Invasive)  Goal: Mechanical Ventilation  Liberation  Intervention: Promote Extubation and Mechanical Ventilation Liberation  Recent Flowsheet Documentation  Taken 12/25/2021 0400 by Hiral Brown RN  Sleep/Rest Enhancement:   awakenings minimized   consistent schedule promoted  Medication Review/Management: medications reviewed  Environmental Support: calm environment promoted  Taken 12/25/2021 0000 by Hiral Brown RN  Sleep/Rest Enhancement:   awakenings minimized   consistent schedule promoted  Medication Review/Management: medications reviewed  Environmental Support: calm environment promoted     Problem: Skin and Tissue Injury (Mechanical Ventilation, Invasive)  Goal: Absence of Device-Related Skin and Tissue Injury  Intervention: Maintain Skin and Tissue Health  Recent Flowsheet Documentation  Taken 12/25/2021 0400 by Hiral Brown RN  Device Skin Pressure Protection: adhesive use limited  Taken 12/25/2021 0000 by Hiral Brown RN  Device Skin Pressure Protection: adhesive use limited     Problem: Ventilator-Induced Lung Injury (Mechanical Ventilation, Invasive)  Goal: Absence of Ventilator-Induced Lung Injury  Intervention: Facilitate Lung-Protection Measures  Recent Flowsheet Documentation  Taken 12/25/2021 0400 by Hiral Brown RN  Lung Protection Measures: fluid excess minimized  Taken 12/25/2021 0000 by Hiral Brown RN  Lung Protection Measures: fluid excess minimized  Intervention: Prevent Ventilator-Associated Pneumonia  Recent Flowsheet Documentation  Taken 12/25/2021 0600 by Hiral Brown RN  Head of Bed (HOB) Positioning: HOB at 20-30 degrees  Taken 12/25/2021 0400 by Hiral Brown RN  Oral Care: lip lubricant applied  Head of Bed (HOB) Positioning: HOB at 20-30 degrees  Taken 12/25/2021 0200 by Hiral Brown RN  Head of Bed (HOB) Positioning: HOB at 20-30 degrees     Problem: ARDS (Acute Respiratory Distress Syndrome)  Goal: Effective Oxygenation  Intervention: Optimize Oxygenation, Ventilation and Perfusion  Recent Flowsheet  Documentation  Taken 12/25/2021 0400 by Hiral Brown RN  Lung Protection Measures: fluid excess minimized  Airway/Ventilation Management: airway patency maintained  Taken 12/25/2021 0000 by Hiral Brown RN  Lung Protection Measures: fluid excess minimized  Airway/Ventilation Management: airway patency maintained     Problem: Hypertension Acute  Goal: Blood Pressure Within Desired Range  Intervention: Normalize Blood Pressure  Recent Flowsheet Documentation  Taken 12/25/2021 0400 by Hiral Brown RN  Sensory Stimulation Regulation: auditory stimulation minimized  Medication Review/Management: medications reviewed  Taken 12/25/2021 0000 by Hiral Brown RN  Sensory Stimulation Regulation: auditory stimulation minimized  Medication Review/Management: medications reviewed

## 2021-12-26 LAB
MAGNESIUM SERPL-MCNC: 2 MG/DL (ref 1.8–2.6)
POTASSIUM BLD-SCNC: 4 MMOL/L (ref 3.5–5)
SARS-COV-2 RNA RESP QL NAA+PROBE: NEGATIVE

## 2021-12-26 PROCEDURE — 258N000003 HC RX IP 258 OP 636: Performed by: NURSE PRACTITIONER

## 2021-12-26 PROCEDURE — 250N000013 HC RX MED GY IP 250 OP 250 PS 637: Performed by: NURSE PRACTITIONER

## 2021-12-26 PROCEDURE — 250N000009 HC RX 250: Performed by: NURSE PRACTITIONER

## 2021-12-26 PROCEDURE — 999N000157 HC STATISTIC RCP TIME EA 10 MIN

## 2021-12-26 PROCEDURE — 83735 ASSAY OF MAGNESIUM: CPT | Performed by: INTERNAL MEDICINE

## 2021-12-26 PROCEDURE — 250N000009 HC RX 250: Performed by: INTERNAL MEDICINE

## 2021-12-26 PROCEDURE — 250N000013 HC RX MED GY IP 250 OP 250 PS 637: Performed by: INTERNAL MEDICINE

## 2021-12-26 PROCEDURE — 250N000011 HC RX IP 250 OP 636: Performed by: NURSE PRACTITIONER

## 2021-12-26 PROCEDURE — 84132 ASSAY OF SERUM POTASSIUM: CPT | Performed by: INTERNAL MEDICINE

## 2021-12-26 PROCEDURE — 94003 VENT MGMT INPAT SUBQ DAY: CPT

## 2021-12-26 PROCEDURE — 200N000001 HC R&B ICU

## 2021-12-26 PROCEDURE — 87635 SARS-COV-2 COVID-19 AMP PRB: CPT | Performed by: NURSE PRACTITIONER

## 2021-12-26 PROCEDURE — 250N000011 HC RX IP 250 OP 636: Performed by: INTERNAL MEDICINE

## 2021-12-26 PROCEDURE — 99291 CRITICAL CARE FIRST HOUR: CPT | Performed by: NURSE PRACTITIONER

## 2021-12-26 RX ORDER — SCOLOPAMINE TRANSDERMAL SYSTEM 1 MG/1
1 PATCH, EXTENDED RELEASE TRANSDERMAL
Status: DISCONTINUED | OUTPATIENT
Start: 2021-12-26 | End: 2021-12-30

## 2021-12-26 RX ADMIN — OXYCODONE HYDROCHLORIDE 20 MG: 100 SOLUTION ORAL at 02:10

## 2021-12-26 RX ADMIN — BUPROPION HYDROCHLORIDE 100 MG: 100 TABLET, FILM COATED ORAL at 08:00

## 2021-12-26 RX ADMIN — CHLORHEXIDINE GLUCONATE 0.12% ORAL RINSE 15 ML: 1.2 LIQUID ORAL at 08:03

## 2021-12-26 RX ADMIN — CHLORHEXIDINE GLUCONATE 0.12% ORAL RINSE 15 ML: 1.2 LIQUID ORAL at 20:46

## 2021-12-26 RX ADMIN — DEXMEDETOMIDINE HYDROCHLORIDE 0.8 MCG/KG/HR: 4 INJECTION, SOLUTION INTRAVENOUS at 19:39

## 2021-12-26 RX ADMIN — DEXMEDETOMIDINE HYDROCHLORIDE 1.1 MCG/KG/HR: 4 INJECTION, SOLUTION INTRAVENOUS at 11:35

## 2021-12-26 RX ADMIN — ARIPIPRAZOLE 7 MG: 1 SOLUTION ORAL at 08:02

## 2021-12-26 RX ADMIN — Medication 800 MG: at 08:07

## 2021-12-26 RX ADMIN — TRAZODONE HYDROCHLORIDE 75 MG: 50 TABLET ORAL at 08:00

## 2021-12-26 RX ADMIN — ENOXAPARIN SODIUM 40 MG: 40 INJECTION SUBCUTANEOUS at 08:02

## 2021-12-26 RX ADMIN — OXYCODONE HYDROCHLORIDE 20 MG: 100 SOLUTION ORAL at 17:18

## 2021-12-26 RX ADMIN — OXYCODONE HYDROCHLORIDE 20 MG: 100 SOLUTION ORAL at 10:24

## 2021-12-26 RX ADMIN — QUETIAPINE 75 MG: 25 TABLET ORAL at 12:46

## 2021-12-26 RX ADMIN — LORAZEPAM 4 MG: 2 LIQUID ORAL at 17:18

## 2021-12-26 RX ADMIN — LEVETIRACETAM 500 MG: 100 INJECTION, SOLUTION INTRAVENOUS at 23:17

## 2021-12-26 RX ADMIN — Medication 800 MG: at 12:27

## 2021-12-26 RX ADMIN — LORAZEPAM 4 MG: 2 LIQUID ORAL at 12:15

## 2021-12-26 RX ADMIN — TRAZODONE HYDROCHLORIDE 75 MG: 50 TABLET ORAL at 14:10

## 2021-12-26 RX ADMIN — DEXMEDETOMIDINE HYDROCHLORIDE 1 MCG/KG/HR: 4 INJECTION, SOLUTION INTRAVENOUS at 06:13

## 2021-12-26 RX ADMIN — SCOPALAMINE 1 PATCH: 1 PATCH, EXTENDED RELEASE TRANSDERMAL at 14:09

## 2021-12-26 RX ADMIN — LEVETIRACETAM 500 MG: 100 INJECTION, SOLUTION INTRAVENOUS at 12:15

## 2021-12-26 RX ADMIN — OLANZAPINE 5 MG: 5 TABLET, ORALLY DISINTEGRATING ORAL at 08:01

## 2021-12-26 RX ADMIN — SODIUM CHLORIDE 500 ML: 9 INJECTION, SOLUTION INTRAVENOUS at 16:10

## 2021-12-26 RX ADMIN — TRAZODONE HYDROCHLORIDE 75 MG: 50 TABLET ORAL at 21:00

## 2021-12-26 RX ADMIN — DEXMEDETOMIDINE HYDROCHLORIDE 1.4 MCG/KG/HR: 4 INJECTION, SOLUTION INTRAVENOUS at 14:42

## 2021-12-26 RX ADMIN — Medication 40 MG: at 06:41

## 2021-12-26 RX ADMIN — CEFTRIAXONE SODIUM 1 G: 1 INJECTION, POWDER, FOR SOLUTION INTRAMUSCULAR; INTRAVENOUS at 12:24

## 2021-12-26 RX ADMIN — HALOPERIDOL LACTATE 5 MG: 5 INJECTION, SOLUTION INTRAMUSCULAR at 10:25

## 2021-12-26 RX ADMIN — OXYCODONE HYDROCHLORIDE 20 MG: 100 SOLUTION ORAL at 14:09

## 2021-12-26 RX ADMIN — LORAZEPAM 4 MG: 2 LIQUID ORAL at 21:00

## 2021-12-26 RX ADMIN — QUETIAPINE 75 MG: 25 TABLET ORAL at 08:00

## 2021-12-26 RX ADMIN — LORAZEPAM 4 MG: 2 LIQUID ORAL at 08:02

## 2021-12-26 RX ADMIN — Medication 800 MG: at 17:39

## 2021-12-26 RX ADMIN — QUETIAPINE 75 MG: 25 TABLET ORAL at 17:19

## 2021-12-26 RX ADMIN — DEXMEDETOMIDINE HYDROCHLORIDE 1.1 MCG/KG/HR: 4 INJECTION, SOLUTION INTRAVENOUS at 02:09

## 2021-12-26 RX ADMIN — LORAZEPAM 4 MG: 2 LIQUID ORAL at 04:30

## 2021-12-26 RX ADMIN — OXYCODONE HYDROCHLORIDE 20 MG: 100 SOLUTION ORAL at 06:02

## 2021-12-26 RX ADMIN — HALOPERIDOL LACTATE 5 MG: 5 INJECTION, SOLUTION INTRAMUSCULAR at 12:14

## 2021-12-26 ASSESSMENT — ACTIVITIES OF DAILY LIVING (ADL)
ADLS_ACUITY_SCORE: 19
ADLS_ACUITY_SCORE: 21
ADLS_ACUITY_SCORE: 19

## 2021-12-26 NOTE — PLAN OF CARE
Pt was agitated on arrival to shift with precedex at 1.3 mcg.  Gave 5 mg of haldol and scheduled oxcodone, ativan, and seroquel.  Was able to come down on precedes to 1 mcg this morning.  Pt follows commands.  Moves all extremities.  Temp up to 99.2. Sinus rhythm. MAP greater than 65.  7.5 Shiley trach with moderate white/tan secretions.  VCAC 30%/520/16/5. Promote running at 70 ml/hr and 160 ml of free water flush every 4 hours. Adequate uop.  K and mag rechecks in AM; no replacement needed this morning.  Once precedex comes off, looking at LTACH; plan to wean on vent today.      Problem: Adult Inpatient Plan of Care  Goal: Absence of Hospital-Acquired Illness or Injury  Intervention: Identify and Manage Fall Risk  Recent Flowsheet Documentation  Taken 12/26/2021 0400 by Hiral Brown RN  Safety Promotion/Fall Prevention: room near nurse's station  Taken 12/26/2021 0000 by Hiral Brown RN  Safety Promotion/Fall Prevention: room near nurse's station  Taken 12/25/2021 2000 by Hiral Brown RN  Safety Promotion/Fall Prevention: room near nurse's station  Intervention: Prevent Skin Injury  Recent Flowsheet Documentation  Taken 12/26/2021 0400 by Hiral Brown RN  Body Position:   turned   left   right  Taken 12/26/2021 0200 by Hiral Brown RN  Body Position:   turned   left   right  Taken 12/26/2021 0000 by Hiral Brown RN  Body Position:   turned   left   right  Taken 12/25/2021 2200 by Hiral Brown RN  Body Position:   turned   left   right  Taken 12/25/2021 2000 by Hiral Brown RN  Body Position:   turned   left   right  Intervention: Prevent and Manage VTE (Venous Thromboembolism) Risk  Recent Flowsheet Documentation  Taken 12/26/2021 0400 by Hiral Brown RN  VTE Prevention/Management: anticoagulant therapy maintained  Taken 12/26/2021 0000 by Hiral Brown RN  VTE Prevention/Management: anticoagulant therapy maintained  Taken 12/25/2021 2000 by Hiral Brown RN  VTE Prevention/Management: anticoagulant  therapy maintained  Intervention: Prevent Infection  Recent Flowsheet Documentation  Taken 12/26/2021 0400 by Hiral Brown RN  Infection Prevention: environmental surveillance performed  Taken 12/26/2021 0000 by Hiral Brown RN  Infection Prevention: environmental surveillance performed  Taken 12/25/2021 2000 by Hiral Brown RN  Infection Prevention: environmental surveillance performed     Problem: Risk for Delirium  Goal: Optimal Coping  Intervention: Optimize Psychosocial Adjustment to Delirium  Recent Flowsheet Documentation  Taken 12/26/2021 0400 by Hiral Brown RN  Supportive Measures: relaxation techniques promoted  Taken 12/26/2021 0000 by Hiral Brown RN  Supportive Measures: relaxation techniques promoted  Taken 12/25/2021 2000 by Hiral Brown RN  Supportive Measures: relaxation techniques promoted  Goal: Improved Behavioral Control  Intervention: Prevent and Manage Agitation  Recent Flowsheet Documentation  Taken 12/26/2021 0400 by Hiral Brown RN  Environment Familiarity/Consistency: daily routine followed  Taken 12/26/2021 0000 by Hiral Brown RN  Environment Familiarity/Consistency: daily routine followed  Taken 12/25/2021 2000 by Hiral Brown RN  Environment Familiarity/Consistency: daily routine followed  Goal: Improved Attention and Thought Clarity  Intervention: Maximize Cognitive Function  Recent Flowsheet Documentation  Taken 12/26/2021 0400 by Hiral Brown RN  Sensory Stimulation Regulation: auditory stimulation minimized  Reorientation Measures:   clock in view   calendar in view  Taken 12/26/2021 0000 by Hiral Brown RN  Sensory Stimulation Regulation: auditory stimulation minimized  Reorientation Measures:   clock in view   calendar in view  Taken 12/25/2021 2000 by Hiral Brown RN  Sensory Stimulation Regulation: auditory stimulation minimized  Reorientation Measures:   clock in view   calendar in view  Goal: Improved Sleep  Intervention: Promote Sleep  Recent Flowsheet  Documentation  Taken 12/26/2021 0400 by Hiral Brown RN  Sleep/Rest Enhancement:   awakenings minimized   consistent schedule promoted  Taken 12/26/2021 0000 by Hiral Brown RN  Sleep/Rest Enhancement:   awakenings minimized   consistent schedule promoted  Taken 12/25/2021 2000 by Hiral Brown RN  Sleep/Rest Enhancement:   awakenings minimized   consistent schedule promoted     Problem: Communication Impairment (Mechanical Ventilation, Invasive)  Goal: Effective Communication  Intervention: Ensure Effective Communication  Recent Flowsheet Documentation  Taken 12/26/2021 0400 by Hiral Brown RN  Communication Enhancement Strategies:   communication board used   call light answered in person  Taken 12/26/2021 0000 by Hiral Brown RN  Communication Enhancement Strategies:   communication board used   call light answered in person  Taken 12/25/2021 2000 by Hiral Brown RN  Communication Enhancement Strategies:   communication board used   call light answered in person     Problem: Device-Related Complication Risk (Mechanical Ventilation, Invasive)  Goal: Optimal Device Function  Intervention: Optimize Device Care and Function  Recent Flowsheet Documentation  Taken 12/26/2021 0400 by Hiral Brown RN  Airway Safety Measures: all equipment/monitors on and audible  Taken 12/26/2021 0000 by Hiral Brown RN  Airway Safety Measures: all equipment/monitors on and audible  Taken 12/25/2021 2000 by Hiral Brown RN  Airway Safety Measures: all equipment/monitors on and audible     Problem: Inability to Wean (Mechanical Ventilation, Invasive)  Goal: Mechanical Ventilation Liberation  Intervention: Promote Extubation and Mechanical Ventilation Liberation  Recent Flowsheet Documentation  Taken 12/26/2021 0400 by Hiral Brown RN  Sleep/Rest Enhancement:   awakenings minimized   consistent schedule promoted  Medication Review/Management: medications reviewed  Environmental Support: calm environment promoted  Taken 12/26/2021  0000 by Hiral Brown RN  Sleep/Rest Enhancement:   awakenings minimized   consistent schedule promoted  Medication Review/Management: medications reviewed  Environmental Support: calm environment promoted  Taken 12/25/2021 2000 by Hiral Brown RN  Sleep/Rest Enhancement:   awakenings minimized   consistent schedule promoted  Medication Review/Management: medications reviewed  Environmental Support: calm environment promoted     Problem: Skin and Tissue Injury (Mechanical Ventilation, Invasive)  Goal: Absence of Device-Related Skin and Tissue Injury  Intervention: Maintain Skin and Tissue Health  Recent Flowsheet Documentation  Taken 12/26/2021 0400 by Hiral Brown RN  Device Skin Pressure Protection: adhesive use limited  Taken 12/26/2021 0000 by Hiral Brown RN  Device Skin Pressure Protection: adhesive use limited  Taken 12/25/2021 2000 by Hiral Brown RN  Device Skin Pressure Protection: adhesive use limited     Problem: Ventilator-Induced Lung Injury (Mechanical Ventilation, Invasive)  Goal: Absence of Ventilator-Induced Lung Injury  Intervention: Prevent Ventilator-Associated Pneumonia  Recent Flowsheet Documentation  Taken 12/26/2021 0400 by Hiral Brown RN  Oral Care: lip lubricant applied  Head of Bed (HOB) Positioning: HOB at 20-30 degrees  Taken 12/26/2021 0200 by Hiral Brown RN  Head of Bed (HOB) Positioning: HOB at 20-30 degrees  Taken 12/26/2021 0000 by Hiral Brown RN  Oral Care: lip lubricant applied  Head of Bed (HOB) Positioning: HOB at 20-30 degrees  Taken 12/25/2021 2200 by Hiral Brown RN  Head of Bed (HOB) Positioning: HOB at 20-30 degrees  Taken 12/25/2021 2000 by Hiral Brown RN  Oral Care: lip lubricant applied  Head of Bed (HOB) Positioning: HOB at 20-30 degrees     Problem: ARDS (Acute Respiratory Distress Syndrome)  Goal: Effective Oxygenation  Intervention: Optimize Oxygenation, Ventilation and Perfusion  Recent Flowsheet Documentation  Taken 12/26/2021 0400 by Hiral Brown  RN  Airway/Ventilation Management:   airway patency maintained   calming measures promoted  Taken 12/26/2021 0000 by Hiral Brown RN  Airway/Ventilation Management:   airway patency maintained   calming measures promoted  Taken 12/25/2021 2000 by Hiral Brown RN  Airway/Ventilation Management:   airway patency maintained   calming measures promoted     Problem: Hypertension Acute  Goal: Blood Pressure Within Desired Range  Intervention: Normalize Blood Pressure  Recent Flowsheet Documentation  Taken 12/26/2021 0400 by Hiral Brown RN  Sensory Stimulation Regulation: auditory stimulation minimized  Medication Review/Management: medications reviewed  Taken 12/26/2021 0000 by iHral Brown RN  Sensory Stimulation Regulation: auditory stimulation minimized  Medication Review/Management: medications reviewed  Taken 12/25/2021 2000 by Hiral Brown RN  Sensory Stimulation Regulation: auditory stimulation minimized  Medication Review/Management: medications reviewed

## 2021-12-26 NOTE — PROGRESS NOTES
ICU PROGRESS NOTE:    Assessment/Plan:    Changes for Today:  Continue to wean off precedex.  Get OOB in chair       1. Acute respiratory failure  S/p intubation 11/28, extubated on 12/15, re-intubated the following today due to severe agitation and respiratory distress.  2. S/p tracheostomy 12/17   3. Ventilator associated pneumonia  Sputum Cx 12/14 showed polymicrobial bacteria (Strept constellatus, E coli, proteus, staph epi, and candida parapsilosis.   Blood cultures (+) strep constellatus.   Completing coarse of Rocephin.   4. Streptococcus pneumonia with bacteremia   5. Encephalopathy / agitation  6. Chronic back pain / polysubstance abuse on suboxone  7. Depression      Advance Directives:  Full code     Plan:   1. Titrate vent settings 16/520/5/35%, keep SpO2 > 90%  2. Continue daily PS weaning trials phase 1  3. Sedation to keep RASS 0 to -1 , pt did not tolerate precedex off this am, severe agitation  I talked with [facility] pysch service,  Appreicate [facility]'s assistance.   4. Continue scheduled gabapentin, wellbutrin, abilify, benadryl, haldol, oxycodone, lorazepam, trazodone per psych.  Adding scheduled zyprexa  5. Titrate pressors to keep MAP > 65  6. Change antibotics to rocephin x 10 days based on cultures  7. Cont tube feedings   8. PPI for GI prophylaxis   9. Glucose level monitoring  10. DVT prophylaxis lovenox subcutaneous  11. Referral to LTACH, not ready yet as still on precedex.      COVID RECOVERED        ICU Prophylaxis:     PPI:  Yes, enteral protonix     Peridex:  Yes     Anticoagulation/DVT Prophylaxis:  Lovenox 40 mg subcutaneous daily, (now that COVID recovered)            Lines/Drains/Tubes:  CVC TRIPLE LUMEN Right Internal jugular  Urethral Catheter Coude 14 fr  Gastrostomy/Enterostomy Gastrostomy LUQ 20 fr  Rectal Tube  Surgical Airway Tracheostomy Shanon 7.5 Cuffed       PROVIDER RESTRAINT FOR VIOLENT BEHAVIOR FACE TO FACE EVALUATION     Patient's Immediate Situation:  Patient  demonstrated the following behaviors: pulling at tubes and lines     Patient's Reaction to the intervention:  Does patient understand the reason for restraint/seclusion? Unable to access     Medical Condition:  Is there any evidence of compromise of Skin integrity, Respiratory, Cardiovascular, Musculoskeletal, Hydration? no     Behavioral Condition:  In consultation with the RN, is there a need to continue this restraint or seclusion? Yes     See Restraint Flowsheet for complete restraint documentation and assessment.      CODE STATUS:      SUBJECTIVE:    Ccx: Following simple commands     HPI:  Jared North is a 46 year old male, unvaccinated against COVID-19, with history of polysubstance abuse and chronic pain/lower back pain on suboxone, mood disorder/depression, HTN, ROSSY, mild intermittent asthma, presented with respiratory failure/COVID ARDS intubated on 11/28. Initially requiring proning/paralysis, veletri, now down trending vent needs, issues with agitation, treated for VAP. Not tolerating weaning trials, failed extubation 12/15, re-intubated, S/p trach and PEG tube 12/17              PHYSICAL ASSESSMENT:  General: appears stated age, alert, cooperative  Lungs:  CTA  Heart: RRR, S1 and S2   Abdomen: Soft, non-tender.  Bowel sounds present X 4 quadrants.  Skin: skin color normal, texture normal, no rashes or lesions.   Extremities:  No edema noted.   Pulses:  +2 BUE and +2 BLE  Neuro:  Follows simple commands.  Intermittently agitated.     Temp: 97.9  F (36.6  C) Temp src: Oral BP: 94/55 Pulse: 66   Resp: 16 SpO2: 98 % O2 Device: Mechanical Ventilator          Intake/Output Summary (Last 24 hours) at 12/25/2021 1416  Last data filed at 12/25/2021 1352  Gross per 24 hour   Intake 3501.75 ml   Output 2975 ml   Net 526.75 ml     Temp: 97.9  F (36.6  C) Temp src: Oral BP: 94/55 Pulse: 66   Resp: 16 SpO2: 98 % O2 Device: Mechanical Ventilator        Lab Results   Component Value Date    WBC 7.6 12/24/2021      Lab Results   Component Value Date    RBC 3.49 12/24/2021     Lab Results   Component Value Date    HGB 10.5 12/24/2021     Lab Results   Component Value Date    HCT 32.8 12/24/2021     No components found for: MCT  Lab Results   Component Value Date    MCV 94 12/24/2021     Lab Results   Component Value Date    MCH 30.1 12/24/2021     Lab Results   Component Value Date    MCHC 32.0 12/24/2021     Lab Results   Component Value Date    RDW 15.5 12/24/2021     Lab Results   Component Value Date     12/24/2021       Last Comprehensive Metabolic Panel:  Sodium   Date Value Ref Range Status   12/25/2021 140 136 - 145 mmol/L Final     Potassium   Date Value Ref Range Status   12/26/2021 4.0 3.5 - 5.0 mmol/L Final     Chloride   Date Value Ref Range Status   12/25/2021 105 98 - 107 mmol/L Final     Carbon Dioxide (CO2)   Date Value Ref Range Status   12/25/2021 27 22 - 31 mmol/L Final     Anion Gap   Date Value Ref Range Status   12/25/2021 8 5 - 18 mmol/L Final     Glucose   Date Value Ref Range Status   12/25/2021 128 (H) 70 - 125 mg/dL Final     Urea Nitrogen   Date Value Ref Range Status   12/25/2021 10 8 - 22 mg/dL Final     Creatinine   Date Value Ref Range Status   12/25/2021 0.61 (L) 0.70 - 1.30 mg/dL Final     GFR Estimate   Date Value Ref Range Status   12/25/2021 >90 >60 mL/min/1.73m2 Final     Comment:     Effective December 21, 2021 eGFRcr in adults is calculated using the 2021 CKD-EPI creatinine equation which includes age and gender (Love gentile al., NEJ, DOI: 10.1056/PZODth9230684)   02/16/2018 >60 >60 mL/min/1.73m2 Final     Calcium   Date Value Ref Range Status   12/25/2021 9.0 8.5 - 10.5 mg/dL Final       Last Arterial Blood Gas:  pH Arterial   Date Value Ref Range Status   12/16/2021 7.39 7.37 - 7.44 Final     pCO2 Arterial   Date Value Ref Range Status   12/16/2021 45 35 - 45 mm Hg Final     pO2 Arterial   Date Value Ref Range Status   12/16/2021 86 80 - 90 mm Hg Final     Bicarbonate Arterial    Date Value Ref Range Status   12/16/2021 26 23 - 29 mmol/L Final     O2 Sat, Arterial   Date Value Ref Range Status   12/16/2021 97.4 (H) 96.0 - 97.0 % Final     Base Excess/Deficit (+/-)   Date Value Ref Range Status   12/16/2021 2.7   mmol/L Final       No results found for: MORENITA Boyd, APRN CNP   Pulmonary Critical Care    Time Billed:     40 minutes of critical care time.    Patient requiring ICU level of care: Patent remains weaning from the ventilator after recovering from COVID 19 pneumonia.  Reducing sedation.

## 2021-12-26 NOTE — PROGRESS NOTES
Trach # 7.5 Shanon. Pt remains on vent/full support and is not weaned during this shift. Changes are not made. Pt is restless; moderate/white secretions suctioned. RT following.    Edvin Henderson, RT

## 2021-12-27 ENCOUNTER — APPOINTMENT (OUTPATIENT)
Dept: OCCUPATIONAL THERAPY | Facility: HOSPITAL | Age: 46
End: 2021-12-27
Attending: INTERNAL MEDICINE
Payer: COMMERCIAL

## 2021-12-27 ENCOUNTER — APPOINTMENT (OUTPATIENT)
Dept: PHYSICAL THERAPY | Facility: HOSPITAL | Age: 46
End: 2021-12-27
Attending: INTERNAL MEDICINE
Payer: COMMERCIAL

## 2021-12-27 LAB
MAGNESIUM SERPL-MCNC: 1.8 MG/DL (ref 1.8–2.6)
POTASSIUM BLD-SCNC: 4.3 MMOL/L (ref 3.5–5)

## 2021-12-27 PROCEDURE — 250N000013 HC RX MED GY IP 250 OP 250 PS 637: Performed by: NURSE PRACTITIONER

## 2021-12-27 PROCEDURE — 250N000013 HC RX MED GY IP 250 OP 250 PS 637: Performed by: INTERNAL MEDICINE

## 2021-12-27 PROCEDURE — 250N000011 HC RX IP 250 OP 636: Performed by: INTERNAL MEDICINE

## 2021-12-27 PROCEDURE — 99291 CRITICAL CARE FIRST HOUR: CPT | Performed by: INTERNAL MEDICINE

## 2021-12-27 PROCEDURE — 250N000009 HC RX 250: Performed by: NURSE PRACTITIONER

## 2021-12-27 PROCEDURE — 250N000009 HC RX 250: Performed by: INTERNAL MEDICINE

## 2021-12-27 PROCEDURE — 97530 THERAPEUTIC ACTIVITIES: CPT | Mod: GP

## 2021-12-27 PROCEDURE — 999N000157 HC STATISTIC RCP TIME EA 10 MIN

## 2021-12-27 PROCEDURE — 200N000001 HC R&B ICU

## 2021-12-27 PROCEDURE — 97535 SELF CARE MNGMENT TRAINING: CPT | Mod: GO

## 2021-12-27 PROCEDURE — 250N000011 HC RX IP 250 OP 636: Performed by: NURSE PRACTITIONER

## 2021-12-27 PROCEDURE — 258N000003 HC RX IP 258 OP 636: Performed by: NURSE PRACTITIONER

## 2021-12-27 PROCEDURE — 94003 VENT MGMT INPAT SUBQ DAY: CPT

## 2021-12-27 PROCEDURE — 97110 THERAPEUTIC EXERCISES: CPT | Mod: GO

## 2021-12-27 PROCEDURE — 83735 ASSAY OF MAGNESIUM: CPT | Performed by: INTERNAL MEDICINE

## 2021-12-27 PROCEDURE — 84132 ASSAY OF SERUM POTASSIUM: CPT | Performed by: INTERNAL MEDICINE

## 2021-12-27 RX ORDER — LORAZEPAM 2 MG/ML
4 INJECTION INTRAMUSCULAR EVERY 4 HOURS PRN
Status: DISCONTINUED | OUTPATIENT
Start: 2021-12-27 | End: 2021-12-27

## 2021-12-27 RX ORDER — LORAZEPAM 2 MG/ML
8 CONCENTRATE ORAL EVERY 4 HOURS
Status: DISCONTINUED | OUTPATIENT
Start: 2021-12-27 | End: 2021-12-28

## 2021-12-27 RX ORDER — MIDODRINE HYDROCHLORIDE 2.5 MG/1
2.5 TABLET ORAL EVERY 8 HOURS
Status: DISCONTINUED | OUTPATIENT
Start: 2021-12-27 | End: 2021-12-31 | Stop reason: HOSPADM

## 2021-12-27 RX ORDER — LORAZEPAM 2 MG/ML
4 INJECTION INTRAMUSCULAR
Status: DISCONTINUED | OUTPATIENT
Start: 2021-12-27 | End: 2021-12-31 | Stop reason: HOSPADM

## 2021-12-27 RX ORDER — LORAZEPAM 2 MG/ML
6 CONCENTRATE ORAL EVERY 4 HOURS
Status: DISCONTINUED | OUTPATIENT
Start: 2021-12-27 | End: 2021-12-27

## 2021-12-27 RX ORDER — MIDODRINE HYDROCHLORIDE 2.5 MG/1
2.5 TABLET ORAL
Status: DISCONTINUED | OUTPATIENT
Start: 2021-12-27 | End: 2021-12-27

## 2021-12-27 RX ADMIN — OXYCODONE HYDROCHLORIDE 20 MG: 100 SOLUTION ORAL at 21:42

## 2021-12-27 RX ADMIN — QUETIAPINE 100 MG: 25 TABLET ORAL at 20:20

## 2021-12-27 RX ADMIN — Medication 800 MG: at 12:49

## 2021-12-27 RX ADMIN — OXYCODONE HYDROCHLORIDE 20 MG: 100 SOLUTION ORAL at 14:25

## 2021-12-27 RX ADMIN — BUPROPION HYDROCHLORIDE 100 MG: 100 TABLET, FILM COATED ORAL at 08:46

## 2021-12-27 RX ADMIN — LEVETIRACETAM 500 MG: 100 INJECTION, SOLUTION INTRAVENOUS at 22:27

## 2021-12-27 RX ADMIN — HALOPERIDOL LACTATE 5 MG: 5 INJECTION, SOLUTION INTRAMUSCULAR at 13:25

## 2021-12-27 RX ADMIN — TRAZODONE HYDROCHLORIDE 75 MG: 50 TABLET ORAL at 15:27

## 2021-12-27 RX ADMIN — CHLORHEXIDINE GLUCONATE 0.12% ORAL RINSE 15 ML: 1.2 LIQUID ORAL at 08:44

## 2021-12-27 RX ADMIN — LORAZEPAM 8 MG: 2 LIQUID ORAL at 20:07

## 2021-12-27 RX ADMIN — OXYCODONE HYDROCHLORIDE 20 MG: 100 SOLUTION ORAL at 18:17

## 2021-12-27 RX ADMIN — HYDROCORTISONE SODIUM SUCCINATE 50 MG: 100 INJECTION, POWDER, FOR SOLUTION INTRAMUSCULAR; INTRAVENOUS at 16:44

## 2021-12-27 RX ADMIN — LORAZEPAM 6 MG: 2 LIQUID ORAL at 11:45

## 2021-12-27 RX ADMIN — OLANZAPINE 5 MG: 5 TABLET, ORALLY DISINTEGRATING ORAL at 08:46

## 2021-12-27 RX ADMIN — LORAZEPAM 4 MG: 2 LIQUID ORAL at 09:14

## 2021-12-27 RX ADMIN — TRAZODONE HYDROCHLORIDE 75 MG: 50 TABLET ORAL at 08:44

## 2021-12-27 RX ADMIN — LEVETIRACETAM 500 MG: 100 INJECTION, SOLUTION INTRAVENOUS at 10:46

## 2021-12-27 RX ADMIN — OXYCODONE HYDROCHLORIDE 20 MG: 100 SOLUTION ORAL at 06:23

## 2021-12-27 RX ADMIN — HYDROCORTISONE SODIUM SUCCINATE 50 MG: 100 INJECTION, POWDER, FOR SOLUTION INTRAMUSCULAR; INTRAVENOUS at 09:15

## 2021-12-27 RX ADMIN — HALOPERIDOL LACTATE 5 MG: 5 INJECTION, SOLUTION INTRAMUSCULAR at 15:26

## 2021-12-27 RX ADMIN — DEXMEDETOMIDINE HYDROCHLORIDE 1 MCG/KG/HR: 4 INJECTION, SOLUTION INTRAVENOUS at 04:04

## 2021-12-27 RX ADMIN — HALOPERIDOL LACTATE 5 MG: 5 INJECTION, SOLUTION INTRAMUSCULAR at 02:30

## 2021-12-27 RX ADMIN — QUETIAPINE 75 MG: 25 TABLET ORAL at 16:43

## 2021-12-27 RX ADMIN — CEFTRIAXONE SODIUM 1 G: 1 INJECTION, POWDER, FOR SOLUTION INTRAMUSCULAR; INTRAVENOUS at 11:46

## 2021-12-27 RX ADMIN — LORAZEPAM 6 MG: 2 LIQUID ORAL at 16:43

## 2021-12-27 RX ADMIN — LORAZEPAM 4 MG: 2 LIQUID ORAL at 04:04

## 2021-12-27 RX ADMIN — HALOPERIDOL LACTATE 5 MG: 5 INJECTION, SOLUTION INTRAMUSCULAR at 20:04

## 2021-12-27 RX ADMIN — ENOXAPARIN SODIUM 40 MG: 40 INJECTION SUBCUTANEOUS at 08:46

## 2021-12-27 RX ADMIN — CHLORHEXIDINE GLUCONATE 0.12% ORAL RINSE 15 ML: 1.2 LIQUID ORAL at 20:19

## 2021-12-27 RX ADMIN — OLANZAPINE 5 MG: 5 TABLET, ORALLY DISINTEGRATING ORAL at 20:19

## 2021-12-27 RX ADMIN — HALOPERIDOL LACTATE 5 MG: 5 INJECTION, SOLUTION INTRAMUSCULAR at 02:00

## 2021-12-27 RX ADMIN — MIDODRINE HYDROCHLORIDE 2.5 MG: 2.5 TABLET ORAL at 18:14

## 2021-12-27 RX ADMIN — SENNOSIDES 10 ML: 8.8 LIQUID ORAL at 20:19

## 2021-12-27 RX ADMIN — OXYCODONE HYDROCHLORIDE 20 MG: 100 SOLUTION ORAL at 01:50

## 2021-12-27 RX ADMIN — HYDROMORPHONE HYDROCHLORIDE 1 MG: 1 INJECTION, SOLUTION INTRAMUSCULAR; INTRAVENOUS; SUBCUTANEOUS at 04:56

## 2021-12-27 RX ADMIN — HALOPERIDOL LACTATE 5 MG: 5 INJECTION, SOLUTION INTRAMUSCULAR at 11:45

## 2021-12-27 RX ADMIN — HALOPERIDOL LACTATE 5 MG: 5 INJECTION, SOLUTION INTRAMUSCULAR at 16:24

## 2021-12-27 RX ADMIN — Medication 800 MG: at 16:45

## 2021-12-27 RX ADMIN — QUETIAPINE 75 MG: 25 TABLET ORAL at 08:46

## 2021-12-27 RX ADMIN — OXYCODONE HYDROCHLORIDE 20 MG: 100 SOLUTION ORAL at 09:15

## 2021-12-27 RX ADMIN — Medication 0.01 MCG/KG/MIN: at 06:11

## 2021-12-27 RX ADMIN — DOCUSATE SODIUM 100 MG: 50 LIQUID ORAL at 20:19

## 2021-12-27 RX ADMIN — HALOPERIDOL LACTATE 5 MG: 5 INJECTION, SOLUTION INTRAMUSCULAR at 11:11

## 2021-12-27 RX ADMIN — LORAZEPAM 4 MG: 2 LIQUID ORAL at 00:18

## 2021-12-27 RX ADMIN — Medication 40 MG: at 06:30

## 2021-12-27 RX ADMIN — QUETIAPINE 75 MG: 25 TABLET ORAL at 12:47

## 2021-12-27 RX ADMIN — ARIPIPRAZOLE 7 MG: 1 SOLUTION ORAL at 08:45

## 2021-12-27 RX ADMIN — DEXMEDETOMIDINE HYDROCHLORIDE 0.5 MCG/KG/HR: 4 INJECTION, SOLUTION INTRAVENOUS at 13:07

## 2021-12-27 RX ADMIN — TRAZODONE HYDROCHLORIDE 75 MG: 50 TABLET ORAL at 21:41

## 2021-12-27 RX ADMIN — MIDODRINE HYDROCHLORIDE 2.5 MG: 2.5 TABLET ORAL at 10:57

## 2021-12-27 RX ADMIN — Medication 800 MG: at 09:14

## 2021-12-27 ASSESSMENT — ACTIVITIES OF DAILY LIVING (ADL)
ADLS_ACUITY_SCORE: 21
ADLS_ACUITY_SCORE: 19
ADLS_ACUITY_SCORE: 21
ADLS_ACUITY_SCORE: 21
ADLS_ACUITY_SCORE: 19
ADLS_ACUITY_SCORE: 19
ADLS_ACUITY_SCORE: 21
ADLS_ACUITY_SCORE: 21
ADLS_ACUITY_SCORE: 19
ADLS_ACUITY_SCORE: 19
ADLS_ACUITY_SCORE: 21
ADLS_ACUITY_SCORE: 19
ADLS_ACUITY_SCORE: 21
ADLS_ACUITY_SCORE: 19
ADLS_ACUITY_SCORE: 19
ADLS_ACUITY_SCORE: 21

## 2021-12-27 ASSESSMENT — MIFFLIN-ST. JEOR: SCORE: 1736.74

## 2021-12-27 NOTE — PROGRESS NOTES
Respiratory Care Note    Pt weaned for about 75 minutes on 10/5 before being placed back on full vent support. Pts RR increased over 35 bpm, MVe > 15, increased restlessness. RN notified.       Reji Prince, RT

## 2021-12-27 NOTE — PROGRESS NOTES
CLINICAL NUTRITION SERVICES - REASSESAMENT NOTE      RECOMMENDATIONS FOR MD/PROVIDER TO ORDER:   None     Recommendations Ordered by Registered Dietitian (RD):   Increased TF to 80ml/hr dt significant weight loss    Promote w/ fiber will provide 1920kcals, 119g protein, 265g CHO, 54g fat, 27g fiber, 1596ml free water, 340ml water from flushes for a total of 1936ml fluid daily     Future/Additional Recommendations:   Consider adjusting to fiber free formula if diarrhea worsens with increased rate  Consider initiating culturelle when weaned off vent to help with SHAHRIAR diarrhea     Malnutrition: Moderate malnutrition in the context of acute illness (please carry forward if malnutrition diagnosed)         EVALUATION OF PROGRESS TOWARD GOALS   Diet:  NPO, Adult Formula Drip Feeding: Continuous    Nutrition Support:  PEG tube placed 12/17/21. Receiving Promote w/ Fiber @ 70ml/hr and 85ml water every 6 hours.    Intake/Tolerance:  TF providing 1680kcals, 104g protein, 232g CHO, 47g fat, 24g fiber, 1396ml free water, 340ml water from flushes for total of 1736ml fluid daily.      ASSESSED NUTRITION NEEDS:  Dosing Weight:  84 kg (185 lb 1.6 oz)     Estimated Energy Needs: 4083-8879 kcals/day (Shilo State Equation (PSU) 2003b)  Justification:  vented and Overwt.  Estimated Protein Needs: 101-126 grams protein/day (1.2 - 1.5 grams of pro/kg)  Justification: acute illness  Estimated Fluid Needs: 5857-4359 mL/day (1 mL/kcal)   Justification: Maintenance    NEW FINDINGS:   12/27/21 0500 84 kg (185 lb 1.6 oz)       2.8% dry weight loss in 1 week   12/24/21 0630 83.2 kg (183 lb 6.4 oz)   12/22/21 0400 81.4 kg (179 lb 8 oz)   12/21/21 0300 89 kg (196 lb 3.2 oz)   12/20/21 0600 88.5 kg (195 lb 1.6 oz)   12/17/21 0500 87.2 kg (192 lb 3.9 oz)   12/15/21 0000 89 kg (196 lb 3.4 oz)   12/13/21 0100 89.9 kg (198 lb 3.1 oz)     Net fluid down 2.1L (4.6lbs) since 12/1/3/21 per I/Os    Labs: Cr 0.61, Hgb 10.5, hematocrit 32.8, rbc 3.49    Meds:  Rocephin, solu-cortef, protonix     GI: diarrhea, LBM 12/27/21, unmeasured output    Previous Goals:   Tolerate TF at goal rate-met  Meet nutrition needs-met  Evaluation: Not met    Previous Nutrition Diagnosis:   Swallow difficulty r/t acute illness evidenced by intubation   Evaluation: No change      MALNUTRITION  % Weight Loss:  > 2% in 1 week (severe malnutrition)  % Intake:  Decreased intake does not meet criteria for malnutrition meeting at least 85% of estimated needs  Subcutaneous Fat Loss:  Unable to assess  Muscle Loss:  Unable to assess  Fluid Retention:  Mild 1+    Malnutrition Diagnosis: Moderate malnutrition  In Context of:  Acute illness or injury    CURRENT NUTRITION DIAGNOSIS  Malnutrition related to COVID-19 as evidenced by 2.8% unintentional weight loss in 1 week and mild fluid accumulation    Swallowing difficulty r/t COVID-19 and evidenced by mechanical ventilation    INTERVENTIONS  Recommendations / Nutrition Prescription  Increase TF to meet estimated energy needs dt significant weight loss    Implementation  EN Schedule    Goals  Meet estimated nutrition needs  No further significant weight loss  Regular BM    MONITORING AND EVALUATION:  Progress towards goals will be monitored and evaluated per protocol and Practice Guidelines, Enteral Nutrition intake, Weight, Biochemical data and Nutrition-focused physical findings

## 2021-12-27 NOTE — PLAN OF CARE
Problem: Anxiety  Goal: Anxiety Reduction or Resolution  Outcome: Improving  - held zyprexa and oxycodone as scheduled at 10pm due to RASS score of -4.   - PAtient RASS score has been +1 or -3 since midnight.  Increased Precedex gtts and PRN haldol, dilaudid given and its been helpful however BP drops with MAP of >60 when RASS score is -3/-4. MAP of 59, restarted Levophed with MAP >60.    Problem: ARDS (Acute Respiratory Distress Syndrome)  Goal: Effective Oxygenation  Outcome: Improving  - frequent suctioning in his mouth due to moderate to large amount of secretion in his mouth and its making him anxious most of the time. Patient has Scopalamine patch.

## 2021-12-27 NOTE — PROGRESS NOTES
RT PROGRESS NOTE    VENT DAY# 31    CURRENT SETTINGS:   Ventilation Mode: CMV/AC  (Continuous Mandatory Ventilation/ Assist Control)  FiO2 (%): 30 %  Rate Set (breaths/minute): 16 breaths/min  Tidal Volume Set (mL): 520 mL  PEEP (cm H2O): 5 cmH2O  Pressure Support (cm H2O): 10 cmH2O  Oxygen Concentration (%): 30 %  Resp: 21      PATIENT PARAMETERS:  PIP 15  Pplat:  12  Pmean:  9.5  Secretions:  Large amount of thick white tracheal secretions  02 Sats:  96-99%    Tracheostomy: #7.5 Shiley - placed 12/17    Respiratory Medications: none      NOTE / SHIFT SUMMARY:   Patient remains on full vent support overnight. No weaning or vent changes made. Plan to resume SBT in AM per patient tolerance.     Leida Burns, RT

## 2021-12-27 NOTE — PROGRESS NOTES
ICU PROGRESS NOTE:    Assessment/Plan:    Changes for Today:  Continue to wean off precedex.  Get OOB in chair       1. Acute respiratory failure  S/p intubation 11/28, extubated on 12/15, re-intubated the following today due to severe agitation and respiratory distress.  2. S/p tracheostomy 12/17   3. Ventilator associated pneumonia  Sputum Cx 12/14 showed polymicrobial bacteria (Strept constellatus, E coli, proteus, staph epi, and candida parapsilosis.   Blood cultures (+) strep constellatus.   Completing coarse of Rocephin.   4. Streptococcus pneumonia with bacteremia   5. Encephalopathy / agitation  6. Chronic back pain / polysubstance abuse on suboxone  7. Depression      Advance Directives:  Full code    Very complex picture from a psych standpoint which certainly makes ventilator weaning with tracheostomy more difficult.  Main priority is to get him off of infusions and pressors.  Transition to an aggressive scheduled and as needed dosing regimen with a plan for a likely long wean at [place].     Plan:     1. Continue daily PS weaning trials phase 1  2. Sedation to keep RASS 0 to -1. Continue to work on getting precedex off.  3. Continue scheduled gabapentin, abilify, benadryl, haldol, oxycodone, lorazepam, trazodone per psych.  Adding scheduled zyprexa.  Increase standing lorazepam.  Add as needed lorazepam.  4. Titrate pressors down.  No evidence of hypoperfusion.  Map goal 55.  5. Add hydrocortisone and midodrine for mild hypotension.  6. Change antibotics to rocephin x 10 days based on cultures  7. Cont tube feedings   8. PPI for GI prophylaxis   9. Glucose level monitoring  10. DVT prophylaxis lovenox subcutaneous  11. Referral to LTACH     COVID RECOVERED        ICU Prophylaxis:     PPI:  Yes, enteral protonix     Peridex:  Yes     Anticoagulation/DVT Prophylaxis:  Lovenox 40 mg subcutaneous daily, (now that COVID recovered)            Lines/Drains/Tubes:  CVC TRIPLE LUMEN Right Internal  jugular  Urethral Catheter Coude 14 fr  Gastrostomy/Enterostomy Gastrostomy LUQ 20 fr  Rectal Tube  Surgical Airway Tracheostomy Domingaley 7.5 Cuffed       PROVIDER RESTRAINT FOR VIOLENT BEHAVIOR FACE TO FACE EVALUATION     Patient's Immediate Situation:  Patient demonstrated the following behaviors: pulling at tubes and lines     Patient's Reaction to the intervention:  Does patient understand the reason for restraint/seclusion? Unable to access     Medical Condition:  Is there any evidence of compromise of Skin integrity, Respiratory, Cardiovascular, Musculoskeletal, Hydration? no     Behavioral Condition:  In consultation with the RN, is there a need to continue this restraint or seclusion? Yes     See Restraint Flowsheet for complete restraint documentation and assessment.      CODE STATUS:      SUBJECTIVE:    Ccx: Following simple commands     HPI:  Jared North is a 46 year old male, unvaccinated against COVID-19, with history of polysubstance abuse and chronic pain/lower back pain on suboxone, mood disorder/depression, HTN, ROSSY, mild intermittent asthma, presented with respiratory failure/COVID ARDS intubated on 11/28. Initially requiring proning/paralysis, veletri, now down trending vent needs, issues with agitation, treated for VAP. Not tolerating weaning trials, failed extubation 12/15, re-intubated, S/p trach and PEG tube 12/17    Subjective.  Breathing comfortably.  No evident distress this morning.    PHYSICAL ASSESSMENT:  General: appears stated age, sleeping  Lungs: Small bilateral crackles  Heart: RRR, S1 and S2   Abdomen: Soft, non-tender.   Skin: skin color normal, texture normal, no rashes or lesions.   Extremities:  No edema noted.   Pulses:  +2 BUE and +2 BLE  Neuro:  Follows simple commands.  Intermittently agitated.  Similar yesterday.    Temp: 98.4  F (36.9  C) Temp src: Oral BP: 93/57 Pulse: 65   Resp: 16 SpO2: 100 % O2 Device: Mechanical Ventilator          Temp: 98.4  F (36.9  C) Temp src:  Oral BP: 93/57 Pulse: 65   Resp: 16 SpO2: 100 % O2 Device: Mechanical Ventilator        Lab Results   Component Value Date    WBC 7.6 12/24/2021     Lab Results   Component Value Date    RBC 3.49 12/24/2021     Lab Results   Component Value Date    HGB 10.5 12/24/2021     Lab Results   Component Value Date    HCT 32.8 12/24/2021     No components found for: MCT  Lab Results   Component Value Date    MCV 94 12/24/2021     Lab Results   Component Value Date    MCH 30.1 12/24/2021     Lab Results   Component Value Date    MCHC 32.0 12/24/2021     Lab Results   Component Value Date    RDW 15.5 12/24/2021     Lab Results   Component Value Date     12/24/2021       Last Comprehensive Metabolic Panel:  Sodium   Date Value Ref Range Status   12/25/2021 140 136 - 145 mmol/L Final     Potassium   Date Value Ref Range Status   12/27/2021 4.3 3.5 - 5.0 mmol/L Final     Chloride   Date Value Ref Range Status   12/25/2021 105 98 - 107 mmol/L Final     Carbon Dioxide (CO2)   Date Value Ref Range Status   12/25/2021 27 22 - 31 mmol/L Final     Anion Gap   Date Value Ref Range Status   12/25/2021 8 5 - 18 mmol/L Final     Glucose   Date Value Ref Range Status   12/25/2021 128 (H) 70 - 125 mg/dL Final     Urea Nitrogen   Date Value Ref Range Status   12/25/2021 10 8 - 22 mg/dL Final     Creatinine   Date Value Ref Range Status   12/25/2021 0.61 (L) 0.70 - 1.30 mg/dL Final     GFR Estimate   Date Value Ref Range Status   12/25/2021 >90 >60 mL/min/1.73m2 Final     Comment:     Effective December 21, 2021 eGFRcr in adults is calculated using the 2021 CKD-EPI creatinine equation which includes age and gender (Love gentile al., NEJM, DOI: 10.1056/WAHPhz7069858)   02/16/2018 >60 >60 mL/min/1.73m2 Final     Calcium   Date Value Ref Range Status   12/25/2021 9.0 8.5 - 10.5 mg/dL Final       Last Arterial Blood Gas:  pH Arterial   Date Value Ref Range Status   12/16/2021 7.39 7.37 - 7.44 Final     pCO2 Arterial   Date Value Ref Range  Status   12/16/2021 45 35 - 45 mm Hg Final     pO2 Arterial   Date Value Ref Range Status   12/16/2021 86 80 - 90 mm Hg Final     Bicarbonate Arterial   Date Value Ref Range Status   12/16/2021 26 23 - 29 mmol/L Final     O2 Sat, Arterial   Date Value Ref Range Status   12/16/2021 97.4 (H) 96.0 - 97.0 % Final     Base Excess/Deficit (+/-)   Date Value Ref Range Status   12/16/2021 2.7   mmol/L Final       No results found for: MORENITA Muñiz MD    35 minutes of critical care time thus far today.  This patient is critically ill at high risk of decompensation and death.  Time spent includes bedside discussion with RN, RT, bedside evaluation.  Titrating complex neuropsychiatric medication regimen.  Managing complicated ICU delirium and agitation.  Titration of mechanical ventilation.

## 2021-12-27 NOTE — PROGRESS NOTES
Care Management Follow Up    Length of Stay (days): 22    Expected Discharge Date: 12/28/2021     Concerns to be Addressed:     Trach on vent support, wean and trial CPAP today, Abx, Tube feeds, Psych following for agitation and meds  Patient plan of care discussed at interdisciplinary rounds: Yes    Anticipated Discharge Disposition:  LTACH vs AR     Anticipated Discharge Services:    Anticipated Discharge DME:      Patient/family educated on Medicare website which has current facility and service quality ratings:    Education Provided on the Discharge Plan:    Patient/Family in Agreement with the Plan:      Referrals Placed by CM/SW:    Private pay costs discussed: Not applicable    Additional Information:  CM following for assist with discharge placement post hospital stay.      Agnes Choi RN

## 2021-12-27 NOTE — PLAN OF CARE
RT PROGRESS NOTE    VENT DAY# 30    CURRENT SETTINGS:   Ventilation Mode: CMV/AC  (Continuous Mandatory Ventilation/ Assist Control)  FiO2 (%): 30 %  Rate Set (breaths/minute): 16 breaths/min  Tidal Volume Set (mL): 520 mL  PEEP (cm H2O): 5 cmH2O  Pressure Support (cm H2O): 10 cmH2O  Oxygen Concentration (%): 30 %  Resp: (!) 7      PATIENT PARAMETERS:  PIP 26  Pplat:  20  Pmean:  9  SBT: Yes    Wean time: 0816-0943     Failed wean due to: RR >35 for >5 minutes and agitation/unresolved anxiety  Secretions:  moderate white  02 Sats:  96%    Trach  # 7.5 Shiley    Respiratory Medications: none     ABG:    NOTE / SHIFT SUMMARY:   Patient weaned for ~ 1.5 hrs on PS 10/5/30. Wean was terminated due to increased WOB, agitation, anxiety. Will attempt to wean again tomorrow.     Santo Jerez, RT

## 2021-12-28 ENCOUNTER — APPOINTMENT (OUTPATIENT)
Dept: PHYSICAL THERAPY | Facility: HOSPITAL | Age: 46
End: 2021-12-28
Attending: INTERNAL MEDICINE
Payer: COMMERCIAL

## 2021-12-28 ENCOUNTER — APPOINTMENT (OUTPATIENT)
Dept: OCCUPATIONAL THERAPY | Facility: HOSPITAL | Age: 46
End: 2021-12-28
Attending: INTERNAL MEDICINE
Payer: COMMERCIAL

## 2021-12-28 LAB
ANION GAP SERPL CALCULATED.3IONS-SCNC: 11 MMOL/L (ref 5–18)
BUN SERPL-MCNC: 10 MG/DL (ref 8–22)
CALCIUM SERPL-MCNC: 9.4 MG/DL (ref 8.5–10.5)
CHLORIDE BLD-SCNC: 105 MMOL/L (ref 98–107)
CO2 SERPL-SCNC: 26 MMOL/L (ref 22–31)
CREAT SERPL-MCNC: 0.67 MG/DL (ref 0.7–1.3)
GFR SERPL CREATININE-BSD FRML MDRD: >90 ML/MIN/1.73M2
GLUCOSE BLD-MCNC: 121 MG/DL (ref 70–125)
GLUCOSE BLDC GLUCOMTR-MCNC: 125 MG/DL (ref 70–99)
MAGNESIUM SERPL-MCNC: 1.8 MG/DL (ref 1.8–2.6)
POTASSIUM BLD-SCNC: 4.1 MMOL/L (ref 3.5–5)
POTASSIUM BLD-SCNC: 4.1 MMOL/L (ref 3.5–5)
SODIUM SERPL-SCNC: 142 MMOL/L (ref 136–145)

## 2021-12-28 PROCEDURE — 250N000011 HC RX IP 250 OP 636: Performed by: NURSE PRACTITIONER

## 2021-12-28 PROCEDURE — 200N000001 HC R&B ICU

## 2021-12-28 PROCEDURE — 250N000013 HC RX MED GY IP 250 OP 250 PS 637: Performed by: NURSE PRACTITIONER

## 2021-12-28 PROCEDURE — 250N000011 HC RX IP 250 OP 636: Performed by: INTERNAL MEDICINE

## 2021-12-28 PROCEDURE — 97535 SELF CARE MNGMENT TRAINING: CPT | Mod: GO

## 2021-12-28 PROCEDURE — 250N000013 HC RX MED GY IP 250 OP 250 PS 637: Performed by: INTERNAL MEDICINE

## 2021-12-28 PROCEDURE — 250N000009 HC RX 250: Performed by: INTERNAL MEDICINE

## 2021-12-28 PROCEDURE — 80048 BASIC METABOLIC PNL TOTAL CA: CPT | Performed by: INTERNAL MEDICINE

## 2021-12-28 PROCEDURE — 36592 COLLECT BLOOD FROM PICC: CPT | Performed by: INTERNAL MEDICINE

## 2021-12-28 PROCEDURE — 258N000003 HC RX IP 258 OP 636: Performed by: NURSE PRACTITIONER

## 2021-12-28 PROCEDURE — 999N000157 HC STATISTIC RCP TIME EA 10 MIN

## 2021-12-28 PROCEDURE — 94003 VENT MGMT INPAT SUBQ DAY: CPT

## 2021-12-28 PROCEDURE — 97110 THERAPEUTIC EXERCISES: CPT | Mod: GP

## 2021-12-28 PROCEDURE — 97530 THERAPEUTIC ACTIVITIES: CPT | Mod: GP

## 2021-12-28 PROCEDURE — 84132 ASSAY OF SERUM POTASSIUM: CPT | Performed by: INTERNAL MEDICINE

## 2021-12-28 PROCEDURE — 99291 CRITICAL CARE FIRST HOUR: CPT | Performed by: INTERNAL MEDICINE

## 2021-12-28 PROCEDURE — 97110 THERAPEUTIC EXERCISES: CPT | Mod: GO

## 2021-12-28 PROCEDURE — 83735 ASSAY OF MAGNESIUM: CPT | Performed by: INTERNAL MEDICINE

## 2021-12-28 RX ORDER — LORAZEPAM 2 MG/ML
12 CONCENTRATE ORAL EVERY 4 HOURS
Status: DISCONTINUED | OUTPATIENT
Start: 2021-12-28 | End: 2021-12-31 | Stop reason: HOSPADM

## 2021-12-28 RX ADMIN — OLANZAPINE 5 MG: 5 TABLET, ORALLY DISINTEGRATING ORAL at 08:52

## 2021-12-28 RX ADMIN — HALOPERIDOL LACTATE 5 MG: 5 INJECTION, SOLUTION INTRAMUSCULAR at 16:39

## 2021-12-28 RX ADMIN — OXYCODONE HYDROCHLORIDE 20 MG: 100 SOLUTION ORAL at 09:55

## 2021-12-28 RX ADMIN — LORAZEPAM 4 MG: 2 INJECTION INTRAMUSCULAR; INTRAVENOUS at 12:12

## 2021-12-28 RX ADMIN — QUETIAPINE 75 MG: 25 TABLET ORAL at 16:38

## 2021-12-28 RX ADMIN — Medication 40 MG: at 08:51

## 2021-12-28 RX ADMIN — LORAZEPAM 12 MG: 2 LIQUID ORAL at 18:36

## 2021-12-28 RX ADMIN — HYDROMORPHONE HYDROCHLORIDE 1 MG: 1 INJECTION, SOLUTION INTRAMUSCULAR; INTRAVENOUS; SUBCUTANEOUS at 04:04

## 2021-12-28 RX ADMIN — MIDODRINE HYDROCHLORIDE 2.5 MG: 2.5 TABLET ORAL at 19:22

## 2021-12-28 RX ADMIN — LEVETIRACETAM 500 MG: 100 INJECTION, SOLUTION INTRAVENOUS at 23:17

## 2021-12-28 RX ADMIN — ARIPIPRAZOLE 7 MG: 1 SOLUTION ORAL at 08:51

## 2021-12-28 RX ADMIN — LORAZEPAM 12 MG: 2 LIQUID ORAL at 09:30

## 2021-12-28 RX ADMIN — OLANZAPINE 5 MG: 5 TABLET, ORALLY DISINTEGRATING ORAL at 20:04

## 2021-12-28 RX ADMIN — TRAZODONE HYDROCHLORIDE 75 MG: 50 TABLET ORAL at 14:01

## 2021-12-28 RX ADMIN — ENOXAPARIN SODIUM 40 MG: 40 INJECTION SUBCUTANEOUS at 08:51

## 2021-12-28 RX ADMIN — CEFTRIAXONE SODIUM 1 G: 1 INJECTION, POWDER, FOR SOLUTION INTRAMUSCULAR; INTRAVENOUS at 12:12

## 2021-12-28 RX ADMIN — HALOPERIDOL LACTATE 5 MG: 5 INJECTION, SOLUTION INTRAMUSCULAR at 21:41

## 2021-12-28 RX ADMIN — TRAZODONE HYDROCHLORIDE 75 MG: 50 TABLET ORAL at 08:53

## 2021-12-28 RX ADMIN — LORAZEPAM 4 MG: 2 INJECTION INTRAMUSCULAR; INTRAVENOUS at 15:17

## 2021-12-28 RX ADMIN — TRAZODONE HYDROCHLORIDE 75 MG: 50 TABLET ORAL at 20:05

## 2021-12-28 RX ADMIN — LORAZEPAM 8 MG: 2 LIQUID ORAL at 05:48

## 2021-12-28 RX ADMIN — HYDROCORTISONE SODIUM SUCCINATE 50 MG: 100 INJECTION, POWDER, FOR SOLUTION INTRAMUSCULAR; INTRAVENOUS at 08:52

## 2021-12-28 RX ADMIN — QUETIAPINE 100 MG: 25 TABLET ORAL at 20:04

## 2021-12-28 RX ADMIN — DEXMEDETOMIDINE HYDROCHLORIDE 0.3 MCG/KG/HR: 4 INJECTION, SOLUTION INTRAVENOUS at 00:21

## 2021-12-28 RX ADMIN — LEVETIRACETAM 500 MG: 100 INJECTION, SOLUTION INTRAVENOUS at 10:25

## 2021-12-28 RX ADMIN — DOCUSATE SODIUM 100 MG: 50 LIQUID ORAL at 09:30

## 2021-12-28 RX ADMIN — OXYCODONE HYDROCHLORIDE 20 MG: 100 SOLUTION ORAL at 02:09

## 2021-12-28 RX ADMIN — LORAZEPAM 12 MG: 2 LIQUID ORAL at 20:05

## 2021-12-28 RX ADMIN — SENNOSIDES 10 ML: 8.8 LIQUID ORAL at 20:05

## 2021-12-28 RX ADMIN — QUETIAPINE 75 MG: 25 TABLET ORAL at 14:00

## 2021-12-28 RX ADMIN — DOCUSATE SODIUM 100 MG: 50 LIQUID ORAL at 20:05

## 2021-12-28 RX ADMIN — Medication 800 MG: at 08:52

## 2021-12-28 RX ADMIN — CHLORHEXIDINE GLUCONATE 0.12% ORAL RINSE 15 ML: 1.2 LIQUID ORAL at 08:51

## 2021-12-28 RX ADMIN — CHLORHEXIDINE GLUCONATE 0.12% ORAL RINSE 15 ML: 1.2 LIQUID ORAL at 20:05

## 2021-12-28 RX ADMIN — HALOPERIDOL LACTATE 5 MG: 5 INJECTION, SOLUTION INTRAMUSCULAR at 14:26

## 2021-12-28 RX ADMIN — MIDODRINE HYDROCHLORIDE 2.5 MG: 2.5 TABLET ORAL at 10:25

## 2021-12-28 RX ADMIN — QUETIAPINE 75 MG: 25 TABLET ORAL at 08:52

## 2021-12-28 RX ADMIN — OXYCODONE HYDROCHLORIDE 20 MG: 100 SOLUTION ORAL at 19:22

## 2021-12-28 RX ADMIN — LORAZEPAM 4 MG: 2 INJECTION INTRAMUSCULAR; INTRAVENOUS at 00:08

## 2021-12-28 RX ADMIN — OXYCODONE HYDROCHLORIDE 20 MG: 100 SOLUTION ORAL at 05:48

## 2021-12-28 RX ADMIN — Medication 800 MG: at 16:38

## 2021-12-28 RX ADMIN — HYDROCORTISONE SODIUM SUCCINATE 50 MG: 100 INJECTION, POWDER, FOR SOLUTION INTRAMUSCULAR; INTRAVENOUS at 00:31

## 2021-12-28 RX ADMIN — Medication 800 MG: at 14:20

## 2021-12-28 RX ADMIN — LORAZEPAM 12 MG: 2 LIQUID ORAL at 14:19

## 2021-12-28 RX ADMIN — HYDROCORTISONE SODIUM SUCCINATE 50 MG: 100 INJECTION, POWDER, FOR SOLUTION INTRAMUSCULAR; INTRAVENOUS at 16:39

## 2021-12-28 RX ADMIN — OXYCODONE HYDROCHLORIDE 20 MG: 100 SOLUTION ORAL at 21:41

## 2021-12-28 RX ADMIN — SENNOSIDES 10 ML: 8.8 LIQUID ORAL at 08:54

## 2021-12-28 RX ADMIN — LORAZEPAM 8 MG: 2 LIQUID ORAL at 02:21

## 2021-12-28 RX ADMIN — OXYCODONE HYDROCHLORIDE 20 MG: 100 SOLUTION ORAL at 13:59

## 2021-12-28 ASSESSMENT — ACTIVITIES OF DAILY LIVING (ADL)
ADLS_ACUITY_SCORE: 19

## 2021-12-28 NOTE — PROGRESS NOTES
0808 Pt assessed, mechanical ventilation, settings below, BS coarse upper lobes, suctioned inline small thick tan secretions, oral secretions clear/white. No weaning attempted at this time, , RR 30, patient appears anxious, RT to monitor and assess, wean when indicated.     RT PROGRESS NOTE    VENT DAY# 32    CURRENT SETTINGS:   Ventilation Mode: Other (see comments) (VC AC)  FiO2 (%): (S) 25 %  Rate Set (breaths/minute): 16 breaths/min  Tidal Volume Set (mL): 520 mL  PEEP (cm H2O): 5 cmH2O  Pressure Support (cm H2O): 10 cmH2O  Oxygen Concentration (%): 30 % (decreased to 25)  Resp: (!) 44      PATIENT PARAMETERS:  PIP 12  Pplat:  unable  Pmean:  7  Compliance: 115  SBT: no      02 Sats:  98% on 30 % O2, decreased to 25,     Trach tube SIZE 7.5  Respiratory Medications: none     0852 SpO2 95 %.    Fermin Nunez, RT

## 2021-12-28 NOTE — PROGRESS NOTES
ICU PROGRESS NOTE:    Assessment/Plan:    Changes for Today:  Continue to wean off precedex.  Get OOB in chair  Discuss with Cheryl.       1. Acute respiratory failure  S/p intubation 11/28, extubated on 12/15, re-intubated the following today due to severe agitation and respiratory distress.  2. S/p tracheostomy 12/17   3. Ventilator associated pneumonia  Sputum Cx 12/14 showed polymicrobial bacteria (Strept constellatus, E coli, proteus, staph epi, and candida parapsilosis.   Blood cultures (+) strep constellatus.   Completing coarse of Rocephin.   4. Streptococcus pneumonia with bacteremia   5. Encephalopathy / agitation  6. Chronic back pain / polysubstance abuse on suboxone  7. Depression      Advance Directives:  Full code    Very complex picture from a psych standpoint which certainly makes ventilator weaning with tracheostomy more difficult. I discussed in detail his history, personality, personal struggles with his ex-wife who is still very supportive and understanding of his overall picture. Given that with a very complex polypharmacy regimen he is still having quite a bit of difficulty, we will try to simplify things somewhat. Increase lorazepam which as long as the patient will get respiratory failure has a very wide therapeutic index. He overall will have to be moderately sedated to initially tolerate his cares. Explained the high doses of lorazepam and the risks and benefits of this approach in detail with his ex-wife.     Plan:     1. Continue daily PS weaning trials phase 1  2. Sedation to keep RASS 0 to -1. Continue to work on getting precedex off. This should come off today.  3. Continue scheduled gabapentin, abilify, benadryl, haldol, oxycodone, lorazepam, trazodone per psych.  Adding scheduled zyprexa.  Increased standing lorazepam.   4. Titrate pressors down.  No evidence of hypoperfusion.  Map goal 55.  5. Added hydrocortisone and midodrine for mild hypotension which has resolved.  6. Change  antibotics to rocephin x 10 days based on cultures  7. Cont tube feedings   8. PPI for GI prophylaxis   9. Glucose level monitoring  10. DVT prophylaxis lovenox subcutaneous  11. Referral to LTACH     COVID RECOVERED        ICU Prophylaxis:     PPI:  Yes, enteral protonix     Peridex:  Yes     Anticoagulation/DVT Prophylaxis:  Lovenox 40 mg subcutaneous daily, (now that COVID recovered)            Lines/Drains/Tubes:  CVC TRIPLE LUMEN Right Internal jugular  Urethral Catheter Coude 14 fr  Gastrostomy/Enterostomy Gastrostomy LUQ 20 fr  Rectal Tube  Surgical Airway Tracheostomy Domingaley 7.5 Cuffed       PROVIDER RESTRAINT FOR VIOLENT BEHAVIOR FACE TO FACE EVALUATION     Patient's Immediate Situation:  Patient demonstrated the following behaviors: pulling at tubes and lines     Patient's Reaction to the intervention:  Does patient understand the reason for restraint/seclusion? Unable to access     Medical Condition:  Is there any evidence of compromise of Skin integrity, Respiratory, Cardiovascular, Musculoskeletal, Hydration? no     Behavioral Condition:  In consultation with the RN, is there a need to continue this restraint or seclusion? Yes     See Restraint Flowsheet for complete restraint documentation and assessment.      CODE STATUS:      SUBJECTIVE:    Ccx: Following simple commands     HPI:  Jared North is a 46 year old male, unvaccinated against COVID-19, with history of polysubstance abuse and chronic pain/lower back pain on suboxone, mood disorder/depression, HTN, ROSSY, mild intermittent asthma, presented with respiratory failure/COVID ARDS intubated on 11/28. Initially requiring proning/paralysis, veletri, now down trending vent needs, issues with agitation, treated for VAP. Not tolerating weaning trials, failed extubation 12/15, re-intubated, S/p trach and PEG tube 12/17    Subjective.  More awake today. Stayed on precedex over night, only one prn dose used.    PHYSICAL ASSESSMENT:  General: awake,  restless  Lungs: Small bilateral crackles  Heart: RRR, S1 and S2   Abdomen: Soft, non-tender.   Skin: skin color normal, texture normal, no rashes or lesions.   Extremities:  No edema noted.   Pulses:  +2 BUE and +2 BLE  Neuro:  Eyes open, interacts somewhat, moves all extremities    Temp: 98.4  F (36.9  C) Temp src: Axillary BP: (!) 184/125 Pulse: (!) 124   Resp: (!) 44 SpO2: 97 % O2 Device: Mechanical Ventilator          Temp: 98.4  F (36.9  C) Temp src: Axillary BP: (!) 184/125 Pulse: (!) 124   Resp: (!) 44 SpO2: 97 % O2 Device: Mechanical Ventilator        Lab Results   Component Value Date    WBC 7.6 12/24/2021     Lab Results   Component Value Date    RBC 3.49 12/24/2021     Lab Results   Component Value Date    HGB 10.5 12/24/2021     Lab Results   Component Value Date    HCT 32.8 12/24/2021     No components found for: MCT  Lab Results   Component Value Date    MCV 94 12/24/2021     Lab Results   Component Value Date    MCH 30.1 12/24/2021     Lab Results   Component Value Date    MCHC 32.0 12/24/2021     Lab Results   Component Value Date    RDW 15.5 12/24/2021     Lab Results   Component Value Date     12/24/2021       Last Comprehensive Metabolic Panel:  Sodium   Date Value Ref Range Status   12/28/2021 142 136 - 145 mmol/L Final     Potassium   Date Value Ref Range Status   12/28/2021 4.1 3.5 - 5.0 mmol/L Final   12/28/2021 4.1 3.5 - 5.0 mmol/L Final     Chloride   Date Value Ref Range Status   12/28/2021 105 98 - 107 mmol/L Final     Carbon Dioxide (CO2)   Date Value Ref Range Status   12/28/2021 26 22 - 31 mmol/L Final     Anion Gap   Date Value Ref Range Status   12/28/2021 11 5 - 18 mmol/L Final     Glucose   Date Value Ref Range Status   12/28/2021 121 70 - 125 mg/dL Final     GLUCOSE BY METER POCT   Date Value Ref Range Status   12/28/2021 125 (H) 70 - 99 mg/dL Final     Urea Nitrogen   Date Value Ref Range Status   12/28/2021 10 8 - 22 mg/dL Final     Creatinine   Date Value Ref Range  Status   12/28/2021 0.67 (L) 0.70 - 1.30 mg/dL Final     GFR Estimate   Date Value Ref Range Status   12/28/2021 >90 >60 mL/min/1.73m2 Final     Comment:     Effective December 21, 2021 eGFRcr in adults is calculated using the 2021 CKD-EPI creatinine equation which includes age and gender (Love et al., NEJ, DOI: 10.1056/UXIQwx8318146)   02/16/2018 >60 >60 mL/min/1.73m2 Final     Calcium   Date Value Ref Range Status   12/28/2021 9.4 8.5 - 10.5 mg/dL Final       Last Arterial Blood Gas:  pH Arterial   Date Value Ref Range Status   12/16/2021 7.39 7.37 - 7.44 Final     pCO2 Arterial   Date Value Ref Range Status   12/16/2021 45 35 - 45 mm Hg Final     pO2 Arterial   Date Value Ref Range Status   12/16/2021 86 80 - 90 mm Hg Final     Bicarbonate Arterial   Date Value Ref Range Status   12/16/2021 26 23 - 29 mmol/L Final     O2 Sat, Arterial   Date Value Ref Range Status   12/16/2021 97.4 (H) 96.0 - 97.0 % Final     Base Excess/Deficit (+/-)   Date Value Ref Range Status   12/16/2021 2.7   mmol/L Final       No results found for: MORENITA Muñiz MD    30 minutes of critical care time thus far today.  This patient is critically ill at high risk of decompensation and death.  Time spent includes bedside discussion with RN, RT, bedside evaluation.  Titrating complex neuropsychiatric medication regimen.  Managing complicated ICU delirium and agitation.  Titration of mechanical ventilation.

## 2021-12-28 NOTE — PROGRESS NOTES
RT PROGRESS NOTE    VENT DAY# 32    CURRENT SETTINGS:   Ventilation Mode: CMV/AC  (Continuous Mandatory Ventilation/ Assist Control)  FiO2 (%): 30 %  Rate Set (breaths/minute): 16 breaths/min  Tidal Volume Set (mL): 520 mL  PEEP (cm H2O): 5 cmH2O  Pressure Support (cm H2O): 10 cmH2O  Oxygen Concentration (%): 30 %  Resp: 26      PATIENT PARAMETERS:  PIP 20  Pplat:  GURDEEP  Pmean:  11  Secretions:  Large amount of thick creamy/white tracheal secretions  02 Sats:  95-99%    Tracheostomy: #7.5 Shiley - placed 12/17/21    Respiratory Medications: none       NOTE / SHIFT SUMMARY:   Patient remains on full vent support overnight. Plan to resume SBT in AM per patient tolerance. Will continue to monitor.     Leida Burns, RT

## 2021-12-28 NOTE — PLAN OF CARE
Problem: Communication Impairment (Mechanical Ventilation, Invasive)  Goal: Effective Communication  Outcome: Improving     Problem: Device-Related Complication Risk (Mechanical Ventilation, Invasive)  Goal: Optimal Device Function  Outcome: No Change     Problem: Inability to Wean (Mechanical Ventilation, Invasive)  Goal: Mechanical Ventilation Liberation  Outcome: No Change, has weaned daily 1-11 hours as tolerated, currently tachycardic, tachypneic, restless, anxious, weaning on hold, will attempt when indicated.      Problem: Skin and Tissue Injury (Mechanical Ventilation, Invasive)  Goal: Absence of Device-Related Skin and Tissue Injury  Outcome: Improving, Tracheostomy 12/17.      Problem: Ventilator-Induced Lung Injury (Mechanical Ventilation, Invasive)  Goal: Absence of Ventilator-Induced Lung Injury  Outcome: Improving

## 2021-12-28 NOTE — PROGRESS NOTES
Donavan Multani stated looking good. Would be able to take if on Precedex, but no current ICU bed. If off Precedex will be able to take to a regular LTACH bed.     Cheryl montoya [facility] stated pt. needs a faxed auth request which would mean waiting up to 24 hrs for a response. Auth can't be sought until the pt. is off drips (need to be off for 24 hrs prior to transfer) and stable before the auth can be initiated or the pt will be declined.

## 2021-12-29 ENCOUNTER — APPOINTMENT (OUTPATIENT)
Dept: RADIOLOGY | Facility: HOSPITAL | Age: 46
End: 2021-12-29
Attending: INTERNAL MEDICINE
Payer: COMMERCIAL

## 2021-12-29 LAB
ANION GAP SERPL CALCULATED.3IONS-SCNC: 9 MMOL/L (ref 5–18)
BUN SERPL-MCNC: 12 MG/DL (ref 8–22)
CALCIUM SERPL-MCNC: 9.2 MG/DL (ref 8.5–10.5)
CHLORIDE BLD-SCNC: 105 MMOL/L (ref 98–107)
CO2 SERPL-SCNC: 28 MMOL/L (ref 22–31)
CREAT SERPL-MCNC: 0.66 MG/DL (ref 0.7–1.3)
ERYTHROCYTE [DISTWIDTH] IN BLOOD BY AUTOMATED COUNT: 15 % (ref 10–15)
GFR SERPL CREATININE-BSD FRML MDRD: >90 ML/MIN/1.73M2
GLUCOSE BLD-MCNC: 118 MG/DL (ref 70–125)
GLUCOSE BLDC GLUCOMTR-MCNC: 114 MG/DL (ref 70–99)
GLUCOSE BLDC GLUCOMTR-MCNC: 123 MG/DL (ref 70–99)
GLUCOSE BLDC GLUCOMTR-MCNC: 127 MG/DL (ref 70–99)
HCT VFR BLD AUTO: 35.9 % (ref 40–53)
HGB BLD-MCNC: 11.5 G/DL (ref 13.3–17.7)
MAGNESIUM SERPL-MCNC: 1.9 MG/DL (ref 1.8–2.6)
MCH RBC QN AUTO: 30.3 PG (ref 26.5–33)
MCHC RBC AUTO-ENTMCNC: 32 G/DL (ref 31.5–36.5)
MCV RBC AUTO: 95 FL (ref 78–100)
PHOSPHATE SERPL-MCNC: 4 MG/DL (ref 2.5–4.5)
PLATELET # BLD AUTO: 296 10E3/UL (ref 150–450)
POTASSIUM BLD-SCNC: 3.3 MMOL/L (ref 3.5–5)
POTASSIUM BLD-SCNC: 4.4 MMOL/L (ref 3.5–5)
PROCALCITONIN SERPL-MCNC: 0.03 NG/ML (ref 0–0.49)
RBC # BLD AUTO: 3.79 10E6/UL (ref 4.4–5.9)
SODIUM SERPL-SCNC: 142 MMOL/L (ref 136–145)
WBC # BLD AUTO: 15.8 10E3/UL (ref 4–11)

## 2021-12-29 PROCEDURE — 83735 ASSAY OF MAGNESIUM: CPT | Performed by: INTERNAL MEDICINE

## 2021-12-29 PROCEDURE — 250N000011 HC RX IP 250 OP 636: Performed by: NURSE PRACTITIONER

## 2021-12-29 PROCEDURE — 99233 SBSQ HOSP IP/OBS HIGH 50: CPT | Performed by: INTERNAL MEDICINE

## 2021-12-29 PROCEDURE — 250N000013 HC RX MED GY IP 250 OP 250 PS 637: Performed by: INTERNAL MEDICINE

## 2021-12-29 PROCEDURE — 250N000013 HC RX MED GY IP 250 OP 250 PS 637: Performed by: NURSE PRACTITIONER

## 2021-12-29 PROCEDURE — 999N000157 HC STATISTIC RCP TIME EA 10 MIN

## 2021-12-29 PROCEDURE — 84145 PROCALCITONIN (PCT): CPT | Performed by: INTERNAL MEDICINE

## 2021-12-29 PROCEDURE — 84100 ASSAY OF PHOSPHORUS: CPT | Performed by: INTERNAL MEDICINE

## 2021-12-29 PROCEDURE — 87077 CULTURE AEROBIC IDENTIFY: CPT | Performed by: INTERNAL MEDICINE

## 2021-12-29 PROCEDURE — 250N000011 HC RX IP 250 OP 636: Performed by: INTERNAL MEDICINE

## 2021-12-29 PROCEDURE — 250N000009 HC RX 250: Performed by: NURSE PRACTITIONER

## 2021-12-29 PROCEDURE — 200N000001 HC R&B ICU

## 2021-12-29 PROCEDURE — 94003 VENT MGMT INPAT SUBQ DAY: CPT

## 2021-12-29 PROCEDURE — 71045 X-RAY EXAM CHEST 1 VIEW: CPT

## 2021-12-29 PROCEDURE — 999N000009 HC STATISTIC AIRWAY CARE

## 2021-12-29 PROCEDURE — 84132 ASSAY OF SERUM POTASSIUM: CPT | Performed by: INTERNAL MEDICINE

## 2021-12-29 PROCEDURE — 258N000003 HC RX IP 258 OP 636: Performed by: NURSE PRACTITIONER

## 2021-12-29 PROCEDURE — 99238 HOSP IP/OBS DSCHRG MGMT 30/<: CPT | Performed by: NURSE PRACTITIONER

## 2021-12-29 PROCEDURE — 85014 HEMATOCRIT: CPT | Performed by: INTERNAL MEDICINE

## 2021-12-29 PROCEDURE — 36592 COLLECT BLOOD FROM PICC: CPT | Performed by: INTERNAL MEDICINE

## 2021-12-29 PROCEDURE — 87040 BLOOD CULTURE FOR BACTERIA: CPT | Performed by: INTERNAL MEDICINE

## 2021-12-29 RX ORDER — DEXTROSE MONOHYDRATE 25 G/50ML
25-50 INJECTION, SOLUTION INTRAVENOUS
Status: DISCONTINUED | OUTPATIENT
Start: 2021-12-29 | End: 2021-12-29

## 2021-12-29 RX ORDER — NICOTINE POLACRILEX 4 MG
15-30 LOZENGE BUCCAL
Status: DISCONTINUED | OUTPATIENT
Start: 2021-12-29 | End: 2021-12-29

## 2021-12-29 RX ORDER — LEVETIRACETAM 100 MG/ML
500 SOLUTION ORAL 2 TIMES DAILY
Status: DISCONTINUED | OUTPATIENT
Start: 2021-12-29 | End: 2021-12-31 | Stop reason: HOSPADM

## 2021-12-29 RX ORDER — POTASSIUM CHLORIDE 29.8 MG/ML
20 INJECTION INTRAVENOUS
Status: COMPLETED | OUTPATIENT
Start: 2021-12-29 | End: 2021-12-29

## 2021-12-29 RX ADMIN — MIDODRINE HYDROCHLORIDE 2.5 MG: 2.5 TABLET ORAL at 18:26

## 2021-12-29 RX ADMIN — Medication 800 MG: at 16:17

## 2021-12-29 RX ADMIN — TRAZODONE HYDROCHLORIDE 75 MG: 50 TABLET ORAL at 20:15

## 2021-12-29 RX ADMIN — LORAZEPAM 12 MG: 2 LIQUID ORAL at 04:49

## 2021-12-29 RX ADMIN — OXYCODONE HYDROCHLORIDE 20 MG: 100 SOLUTION ORAL at 09:45

## 2021-12-29 RX ADMIN — OXYCODONE HYDROCHLORIDE 20 MG: 100 SOLUTION ORAL at 01:48

## 2021-12-29 RX ADMIN — OXYCODONE HYDROCHLORIDE 20 MG: 100 SOLUTION ORAL at 13:39

## 2021-12-29 RX ADMIN — Medication 800 MG: at 13:39

## 2021-12-29 RX ADMIN — HYDROCORTISONE SODIUM SUCCINATE 50 MG: 100 INJECTION, POWDER, FOR SOLUTION INTRAMUSCULAR; INTRAVENOUS at 00:33

## 2021-12-29 RX ADMIN — MIDODRINE HYDROCHLORIDE 2.5 MG: 2.5 TABLET ORAL at 10:19

## 2021-12-29 RX ADMIN — LORAZEPAM 4 MG: 2 INJECTION INTRAMUSCULAR; INTRAVENOUS at 08:41

## 2021-12-29 RX ADMIN — POTASSIUM CHLORIDE 20 MEQ: 29.8 INJECTION, SOLUTION INTRAVENOUS at 08:52

## 2021-12-29 RX ADMIN — OLANZAPINE 5 MG: 5 TABLET, ORALLY DISINTEGRATING ORAL at 20:16

## 2021-12-29 RX ADMIN — LORAZEPAM 12 MG: 2 LIQUID ORAL at 20:16

## 2021-12-29 RX ADMIN — DOCUSATE SODIUM 100 MG: 50 LIQUID ORAL at 09:09

## 2021-12-29 RX ADMIN — OLANZAPINE 5 MG: 5 TABLET, ORALLY DISINTEGRATING ORAL at 08:57

## 2021-12-29 RX ADMIN — LORAZEPAM 12 MG: 2 LIQUID ORAL at 00:33

## 2021-12-29 RX ADMIN — LORAZEPAM 12 MG: 2 LIQUID ORAL at 13:38

## 2021-12-29 RX ADMIN — DOCUSATE SODIUM 100 MG: 50 LIQUID ORAL at 20:16

## 2021-12-29 RX ADMIN — LEVETIRACETAM 500 MG: 100 SOLUTION ORAL at 20:16

## 2021-12-29 RX ADMIN — Medication 800 MG: at 09:09

## 2021-12-29 RX ADMIN — QUETIAPINE 75 MG: 25 TABLET ORAL at 08:57

## 2021-12-29 RX ADMIN — Medication 40 MG: at 06:41

## 2021-12-29 RX ADMIN — LORAZEPAM 12 MG: 2 LIQUID ORAL at 16:17

## 2021-12-29 RX ADMIN — QUETIAPINE 75 MG: 25 TABLET ORAL at 13:38

## 2021-12-29 RX ADMIN — ARIPIPRAZOLE 7 MG: 1 SOLUTION ORAL at 09:09

## 2021-12-29 RX ADMIN — OXYCODONE HYDROCHLORIDE 20 MG: 100 SOLUTION ORAL at 22:43

## 2021-12-29 RX ADMIN — TRAZODONE HYDROCHLORIDE 75 MG: 50 TABLET ORAL at 09:09

## 2021-12-29 RX ADMIN — OXYCODONE HYDROCHLORIDE 20 MG: 100 SOLUTION ORAL at 18:25

## 2021-12-29 RX ADMIN — QUETIAPINE 75 MG: 25 TABLET ORAL at 16:23

## 2021-12-29 RX ADMIN — LORAZEPAM 12 MG: 2 LIQUID ORAL at 09:45

## 2021-12-29 RX ADMIN — CHLORHEXIDINE GLUCONATE 0.12% ORAL RINSE 15 ML: 1.2 LIQUID ORAL at 08:43

## 2021-12-29 RX ADMIN — HYDROCORTISONE SODIUM SUCCINATE 50 MG: 100 INJECTION, POWDER, FOR SOLUTION INTRAMUSCULAR; INTRAVENOUS at 08:53

## 2021-12-29 RX ADMIN — LEVETIRACETAM 500 MG: 100 INJECTION, SOLUTION INTRAVENOUS at 10:19

## 2021-12-29 RX ADMIN — POTASSIUM CHLORIDE 20 MEQ: 29.8 INJECTION, SOLUTION INTRAVENOUS at 09:50

## 2021-12-29 RX ADMIN — MIDODRINE HYDROCHLORIDE 2.5 MG: 2.5 TABLET ORAL at 03:02

## 2021-12-29 RX ADMIN — ENOXAPARIN SODIUM 40 MG: 40 INJECTION SUBCUTANEOUS at 08:57

## 2021-12-29 RX ADMIN — CEFTRIAXONE SODIUM 1 G: 1 INJECTION, POWDER, FOR SOLUTION INTRAMUSCULAR; INTRAVENOUS at 12:16

## 2021-12-29 RX ADMIN — OXYCODONE HYDROCHLORIDE 20 MG: 100 SOLUTION ORAL at 05:31

## 2021-12-29 RX ADMIN — TRAZODONE HYDROCHLORIDE 75 MG: 50 TABLET ORAL at 14:57

## 2021-12-29 RX ADMIN — HYDROMORPHONE HYDROCHLORIDE 1 MG: 1 INJECTION, SOLUTION INTRAMUSCULAR; INTRAVENOUS; SUBCUTANEOUS at 01:48

## 2021-12-29 RX ADMIN — CHLORHEXIDINE GLUCONATE 0.12% ORAL RINSE 15 ML: 1.2 LIQUID ORAL at 19:43

## 2021-12-29 RX ADMIN — SCOPALAMINE 1 PATCH: 1 PATCH, EXTENDED RELEASE TRANSDERMAL at 13:42

## 2021-12-29 RX ADMIN — SENNOSIDES 10 ML: 8.8 LIQUID ORAL at 20:16

## 2021-12-29 RX ADMIN — QUETIAPINE 100 MG: 25 TABLET ORAL at 20:16

## 2021-12-29 ASSESSMENT — ACTIVITIES OF DAILY LIVING (ADL)
ADLS_ACUITY_SCORE: 19
ADLS_ACUITY_SCORE: 23
ADLS_ACUITY_SCORE: 19
ADLS_ACUITY_SCORE: 19
ADLS_ACUITY_SCORE: 23
ADLS_ACUITY_SCORE: 19
ADLS_ACUITY_SCORE: 23
ADLS_ACUITY_SCORE: 23
ADLS_ACUITY_SCORE: 19
ADLS_ACUITY_SCORE: 23
ADLS_ACUITY_SCORE: 19
ADLS_ACUITY_SCORE: 23
ADLS_ACUITY_SCORE: 23
ADLS_ACUITY_SCORE: 19
ADLS_ACUITY_SCORE: 23
ADLS_ACUITY_SCORE: 19
ADLS_ACUITY_SCORE: 19
ADLS_ACUITY_SCORE: 25
ADLS_ACUITY_SCORE: 25
ADLS_ACUITY_SCORE: 19
ADLS_ACUITY_SCORE: 19

## 2021-12-29 NOTE — DISCHARGE SUMMARY
Provider Discharge Summary     Patient ID:  Jared North  [id number]  46 year old  1975    Admit date: 12/5/2021    Discharge date and time: 12/31/2021     Admitting Physician: Sofía Quintana MD     Discharge Provider: SHONA Weiner CNP    Admission Diagnoses: Acute respiratory distress syndrome (ARDS) due to COVID-19 virus (H) [U07.1, J80]    Discharge Diagnoses: Prolonged acute respiratory hypoxic failure; agitated delirium, ventilator associated pneumonia.     Admission Condition: critical    Discharged Condition: stable    Indication for Admission: Acute hypoxemic respiratory failure    Hospital Course:   46 year old male, unvaccinated against COVID-19, with history of polysubstance abuse and chronic pain/lower back pain on suboxone, mood disorder/depression, HTN, ROSSY, mild intermittent asthma, presented with respiratory failure/COVID ARDS intubated on 11/28. Initially requiring proning/paralysis, veletri, now down trending vent needs, issues with agitation, treated for VAP. Not tolerating weaning trials, failed extubation 12/15, re-intubated, S/p trach and PEG tube 12/17.    CV:  Continue low dose midodrine  stress steroids completed 12/29  Tachycardia related to spikes in agitation.      Pulm:    Continue daily PS weaning trials phase 1    Progressing with this, 8/+5 yesterday.     Likely ready for Phase 2 trials after transfer.      Neuro:    Continue scheduled gabapentin, abilify, benadryl, haldol, oxycodone, lorazepam, trazodone per psych.      Added scheduled zyprexa and increased standing lorazepam     Suggest Psych stay involved after transfer to help facilitate weaning of high dose lorazepam and oxycodone going forward.     Change IV keppra to liquid via enteral tube     ID:   - Currently on rocephin x 10 days based on cultures  Sputum Cx 12/18 showed polymicrobial (Strept constellatus, E coli, proteus, staph epi, and candida parapsilosis.   12/14 Blood cultures (+) strep  constellatus, repeat cx NG  - Leukocytosis - resolved.     Wei removed 12/29    Completing CTX course.      GI:  Cont tube feedings  Last BM 12/30       Heme:  Stable counts     Renal:  No acute issues     ICU Prophylaxis:  Peridex  Lovenox 40 mg subcutaneous daily, (now that COVID recovered)      Lines/Drains/Tubes:  PICC (12/23)  Gastrostomy/Enterostomy Gastrostomy LUQ 20 fr  Surgical Airway Tracheostomy Shiley 7.5 Cuffed    Discharge Exam:  General: awake and calm  HEENMT: #7.5 Shiley in place; site clean.   Lungs: Diminished, clear  Heart: RRR, S1 and S2            Abdomen: Soft, non-tender.   Skin: skin color normal, texture normal, no rashes or lesions.   Extremities:  No edema noted.   Pulses:  +2 BUE and +2 BLE  Neuro:  Eyes open, interacts somewhat, moves all extremities    Disposition: LTACH    Patient Instructions:   Current Discharge Medication List        ARIPiprazole  7 mg Oral or Feeding Tube Daily     cefTRIAXone  1 g Intravenous Q24H     chlorhexidine  15 mL Mouth/Throat Q12H     sennosides  10 mL Per Feeding Tube BID    And     docusate  100 mg Per Feeding Tube BID     enoxaparin ANTICOAGULANT  40 mg Subcutaneous Q24H     gabapentin  800 mg Oral TID     influenza quadrivalent (PF) vacc  0.5 mL Intramuscular Prior to discharge     insulin aspart  1-4 Units Subcutaneous Q4H     levETIRAcetam  500 mg Oral or Feeding Tube BID     LORazepam  12 mg Per Feeding Tube Q4H     midodrine  2.5 mg Oral Q8H     OLANZapine zydis  5 mg Oral BID     oxyCODONE  20 mg Oral or Feeding Tube Q4H     [Held by provider] polyethylene glycol  17 g Oral or Feeding Tube BID     QUEtiapine  100 mg Oral or Feeding Tube At Bedtime     QUEtiapine  75 mg Oral or Feeding Tube TID     traZODone  75 mg Oral or Feeding Tube TID     acetaminophen, acetaminophen **OR** acetaminophen, bisacodyl, dextrose, glucose **OR** dextrose **OR** glucagon, haloperidol lactate, hydrALAZINE, HYDROmorphone, LORazepam, metoprolol, ondansetron **OR**  ondansetron, - MEDICATION INSTRUCTIONS -, polyethylene glycol, prochlorperazine **OR** prochlorperazine **OR** prochlorperazine, simethicone      Activity: activity as tolerated  Diet: tube feeds    Signed:  Elaina Hauser, CNP  [facility] Pulmonary/Critical Care

## 2021-12-29 NOTE — PROGRESS NOTES
1550 continues on mechanical ventilation, CPAP/PS 5/10 .30 x 463 minutes, tolerating well, SpO2 96 %, Spontaneous RR 17, Vt 456 Ve 7.0 RSBI 37. Patient appears comfortable and is mostly sleeping, RT to monitor    Ventilation Mode: CPAP/PS  (Continuous positive airway pressure with Pressure Support)  FiO2 (%): 30 %  Rate Set (breaths/minute): 16 breaths/min  Tidal Volume Set (mL): 520 mL  PEEP (cm H2O): 5 cmH2O  Pressure Support (cm H2O): 10 cmH2O  Oxygen Concentration (%): 30 %  Resp: 20

## 2021-12-29 NOTE — PROGRESS NOTES
0737 mechanical ventilation, settings below, BS coarse bilat, suctioned inline moderate thick pale yellow secretions, productive cough. Patient awake and pseudo responsive, RT to monitor, weaning trial as tolerated.     RT PROGRESS NOTE    VENT DAY# 33    CURRENT SETTINGS:   Ventilation Mode: CPAP/PS  (Continuous positive airway pressure with Pressure Support) (start)  FiO2 (%): 25 %  Rate Set (breaths/minute): 16 breaths/min  Tidal Volume Set (mL): 520 mL  PEEP (cm H2O): 5 cmH2O  Pressure Support (cm H2O): (S) 10 cmH2O  Oxygen Concentration (%): (S) 30 %  Resp: (!) 36      PATIENT PARAMETERS:  PIP 29  Pplat:  20  Compliance: 37  SBT: started 0807      02 Sats:  95 %    TT SIZE 7.5     Respiratory Medications: none     0807 started CPAP/PS 5/10 increased FiO2 to .30. RT to monitor    Fermin Nunez, RT

## 2021-12-29 NOTE — PROGRESS NOTES
1640 Trach tube 7.5 Shiley, sutures removed, appear clean and dry, cleaned around trach stoma and neck, split gauze applied around trach tube, new foam trach tie placed, trach stay in place. Trach tube secured. SpO2 97 % after. RT to monitor.

## 2021-12-29 NOTE — PROGRESS NOTES
1122 mechanical ventilation, settings below, continues on CPAP//PS 5/10 .30 x 200 minutes, Spontaneous Vt 427, Ve 7.1, RR 17, RSBI 40. BS diminished and clear, RT to monitor, titrate O2 as tolerated.     Ventilation Mode: CPAP/PS  (Continuous positive airway pressure with Pressure Support)  FiO2 (%): 30 %  Rate Set (breaths/minute): 16 breaths/min  Tidal Volume Set (mL): 520 mL  PEEP (cm H2O): 5 cmH2O  Pressure Support (cm H2O): 10 cmH2O  Oxygen Concentration (%): 30 %  Resp: 18

## 2021-12-29 NOTE — PLAN OF CARE
Problem: Communication Impairment (Mechanical Ventilation, Invasive)  Goal: Effective Communication  Outcome: No Change     Problem: Device-Related Complication Risk (Mechanical Ventilation, Invasive)  Goal: Optimal Device Function  Outcome: No Change     Problem: Inability to Wean (Mechanical Ventilation, Invasive)  Goal: Mechanical Ventilation Liberation  Outcome: No Change     Problem: Skin and Tissue Injury (Mechanical Ventilation, Invasive)  Goal: Absence of Device-Related Skin and Tissue Injury  Outcome: No Change     Problem: Ventilator-Induced Lung Injury (Mechanical Ventilation, Invasive)  Goal: Absence of Ventilator-Induced Lung Injury  Outcome: No Change    Remains on same VC AC settings day 33, FiO2 had been titrated to .25, PEEP 5, intermittent weaning CPAP/PS 5/10. FiO2 increased to 30 % for trial second to desaturation. Weaning not attempted yesterday due to tachycardia/tachypnea. Started CPAP/PS 5/10 at 99692 today.

## 2021-12-29 NOTE — PROGRESS NOTES
RT PROGRESS NOTE    VENT DAY# 33    CURRENT SETTINGS:   Ventilation Mode: CMV/AC  (Continuous Mandatory Ventilation/ Assist Control)  FiO2 (%): 25 %  Rate Set (breaths/minute): 16 breaths/min  Tidal Volume Set (mL): 520 mL  PEEP (cm H2O): 5 cmH2O  Oxygen Concentration (%): 25 %  Resp: 16      PATIENT PARAMETERS:  PIP 24  Pplat:  19  Pmean:  9.1  Secretions:  Small amount of thick white tracheal secretions  02 Sats:  94-95%    Tracheostomy: 7.5 Shiley    Respiratory Medications: none      NOTE / SHIFT SUMMARY:   Patient remains on full vent support. No weaning or vent changes made this shift. Will continue to follow.     Leida Burns, RT

## 2021-12-29 NOTE — PROGRESS NOTES
Care Management Follow Up    Length of Stay (days): 24    Expected Discharge Date: 12/30/2021     Concerns to be Addressed:     Insurance auth  Patient plan of care discussed at interdisciplinary rounds: Yes    Anticipated Discharge Disposition:  [facility]     Anticipated Discharge Services:  LTACH  Anticipated Discharge DME:      Patient/family educated on Medicare website which has current facility and service quality ratings:    Education Provided on the Discharge Plan:  yes  Patient/Family in Agreement with the Plan:  Ex-wife Jama  Referrals Placed by CM/SW:    Private pay costs discussed: Not applicable    Additional Information:  Spoke with Jama North ex-wife to update on discharge to [facility] on Thurs. 12/30. She was very happy and thankful. Will go via [facility] for vent support.      Agnes Choi RN

## 2021-12-29 NOTE — PROGRESS NOTES
ICU PROGRESS NOTE:    Summary:  1. Acute respiratory failure  S/p intubation 11/28, extubated on 12/15, re-intubated the following today due to severe agitation and respiratory distress.  2. S/p tracheostomy 12/17   3. Ventilator associated pneumonia  Sputum Cx 12/14 showed polymicrobial bacteria (Strept constellatus, E coli, proteus, staph epi, and candida parapsilosis.   Blood cultures (+) strep constellatus.   Completing coarse of Rocephin.   4. Streptococcus pneumonia with bacteremia   5. Encephalopathy / agitation  6. Chronic back pain / polysubstance abuse on suboxone  7. Depression      Very complex picture from a psych standpoint which certainly makes ventilator weaning with tracheostomy more difficult. I discussed in detail his history, personality, personal struggles with his ex-wife who is still very supportive and understanding of his overall picture. Given that with a very complex polypharmacy regimen he is still having quite a bit of difficulty, we will try to simplify things somewhat. Increase lorazepam which as long as the patient will get respiratory failure has a very wide therapeutic index. He overall will have to be moderately sedated to initially tolerate his cares. Explained the high doses of lorazepam and the risks and benefits of this approach in detail with his ex-wife.     Plan:     CV:  Continue low dose midodrine  Stop stress steroids today    Pulm:    Continue daily PS weaning trials phase 1    Progressing with this, 10/5 yest    Report of increased secretions - check CXR, and sputum cx today    Neuro:    Continue scheduled gabapentin, abilify, benadryl, haldol, oxycodone, lorazepam, trazodone per psych.      Added scheduled zyprexa and increased standing lorazepam yest.    Will ask psych to [place] back to leave recs today prior to transfer    Change IV keppra to liquid via enteral tube    ID:   - Currently on rocephin x 10 days based on cultures  Sputum Cx 12/18 showed polymicrobial  (Strept constellatus, E coli, proteus, staph epi, and candida parapsilosis.   12/14 Blood cultures (+) strep constellatus, repeat cx NG  - Leukocytosis, no fevers.    Remove lange (30 days old). Check CXR, sputum cx, procalcitonin    Requires 2 weeks abx for bacteremia (last day 1/2). Continue CTX pending any new culture data as above    GI:  Cont tube feedings     Heme:  Stable counts    Renal:  No acute issues     ICU Prophylaxis:  Peridex  Lovenox 40 mg subcutaneous daily, (now that COVID recovered)      Lines/Drains/Tubes:  PICC (12/23)  Urethral Catheter Coude 14 fr (30 days old)  Gastrostomy/Enterostomy Gastrostomy LUQ 20 fr  Surgical Airway Tracheostomy Shiley 7.5 Cuffed       PROVIDER RESTRAINT FOR VIOLENT BEHAVIOR FACE TO FACE EVALUATION     Patient's Immediate Situation:  Patient demonstrated the following behaviors: pulling at tubes and lines     Patient's Reaction to the intervention:  Does patient understand the reason for restraint/seclusion? Unable to access     Medical Condition:  Is there any evidence of compromise of Skin integrity, Respiratory, Cardiovascular, Musculoskeletal, Hydration? no     Behavioral Condition:  In consultation with the RN, is there a need to continue this restraint or seclusion? Yes     See Restraint Flowsheet for complete restraint documentation and assessment.      CODE STATUS: Full    Dispo: Tentative plan for LTACH admit tomorrow pending above.       No Lloyd MD  Pulmonary and Critical Care Medicine  [facility]  Office: [phone]    ------------------------  Overnight Events:  No acute events  Off precedex since 8 am yest    HPI:  Jared North is a 46 year old male, unvaccinated against COVID-19, with history of polysubstance abuse and chronic pain/lower back pain on suboxone, mood disorder/depression, HTN, ROSSY, mild intermittent asthma, presented with respiratory failure/COVID ARDS intubated on 11/28. Initially requiring proning/paralysis, veletri,  "now down trending vent needs, issues with agitation, treated for VAP. Not tolerating weaning trials, failed extubation 12/15, re-intubated, S/p trach and PEG tube 12/17    /77   Pulse 100   Temp 98.2  F (36.8  C) (Axillary)   Resp 18   Ht 1.803 m (5' 11\")   Wt 84 kg (185 lb 1.6 oz)   SpO2 97%   BMI 25.82 kg/m      PHYSICAL ASSESSMENT:  General: awake, restless  Lungs: Diminished, clear  Heart: RRR, S1 and S2   Abdomen: Soft, non-tender.   Skin: skin color normal, texture normal, no rashes or lesions.   Extremities:  No edema noted.   Pulses:  +2 BUE and +2 BLE  Neuro:  Eyes open, interacts somewhat, moves all extremities    LABS:  None for several days  Re-check today    Imaging:  No recent images                "

## 2021-12-30 ENCOUNTER — APPOINTMENT (OUTPATIENT)
Dept: OCCUPATIONAL THERAPY | Facility: HOSPITAL | Age: 46
End: 2021-12-30
Attending: INTERNAL MEDICINE
Payer: COMMERCIAL

## 2021-12-30 LAB
ANION GAP SERPL CALCULATED.3IONS-SCNC: 12 MMOL/L (ref 5–18)
BUN SERPL-MCNC: 14 MG/DL (ref 8–22)
CALCIUM SERPL-MCNC: 9.2 MG/DL (ref 8.5–10.5)
CHLORIDE BLD-SCNC: 102 MMOL/L (ref 98–107)
CO2 SERPL-SCNC: 25 MMOL/L (ref 22–31)
CREAT SERPL-MCNC: 0.66 MG/DL (ref 0.7–1.3)
ERYTHROCYTE [DISTWIDTH] IN BLOOD BY AUTOMATED COUNT: 15 % (ref 10–15)
GFR SERPL CREATININE-BSD FRML MDRD: >90 ML/MIN/1.73M2
GLUCOSE BLD-MCNC: 87 MG/DL (ref 70–125)
GLUCOSE BLDC GLUCOMTR-MCNC: 102 MG/DL (ref 70–99)
GLUCOSE BLDC GLUCOMTR-MCNC: 109 MG/DL (ref 70–99)
GLUCOSE BLDC GLUCOMTR-MCNC: 122 MG/DL (ref 70–99)
GLUCOSE BLDC GLUCOMTR-MCNC: 87 MG/DL (ref 70–99)
GLUCOSE BLDC GLUCOMTR-MCNC: 93 MG/DL (ref 70–99)
GLUCOSE BLDC GLUCOMTR-MCNC: 98 MG/DL (ref 70–99)
HCT VFR BLD AUTO: 37.2 % (ref 40–53)
HGB BLD-MCNC: 11.7 G/DL (ref 13.3–17.7)
MAGNESIUM SERPL-MCNC: 1.9 MG/DL (ref 1.8–2.6)
MCH RBC QN AUTO: 29.8 PG (ref 26.5–33)
MCHC RBC AUTO-ENTMCNC: 31.5 G/DL (ref 31.5–36.5)
MCV RBC AUTO: 95 FL (ref 78–100)
PLATELET # BLD AUTO: 256 10E3/UL (ref 150–450)
POTASSIUM BLD-SCNC: 3.8 MMOL/L (ref 3.5–5)
RBC # BLD AUTO: 3.93 10E6/UL (ref 4.4–5.9)
SODIUM SERPL-SCNC: 139 MMOL/L (ref 136–145)
WBC # BLD AUTO: 9.1 10E3/UL (ref 4–11)

## 2021-12-30 PROCEDURE — 250N000013 HC RX MED GY IP 250 OP 250 PS 637: Performed by: INTERNAL MEDICINE

## 2021-12-30 PROCEDURE — 80048 BASIC METABOLIC PNL TOTAL CA: CPT | Performed by: INTERNAL MEDICINE

## 2021-12-30 PROCEDURE — 97110 THERAPEUTIC EXERCISES: CPT | Mod: GO

## 2021-12-30 PROCEDURE — 250N000011 HC RX IP 250 OP 636: Performed by: INTERNAL MEDICINE

## 2021-12-30 PROCEDURE — 250N000013 HC RX MED GY IP 250 OP 250 PS 637: Performed by: NURSE PRACTITIONER

## 2021-12-30 PROCEDURE — 99233 SBSQ HOSP IP/OBS HIGH 50: CPT | Performed by: NURSE PRACTITIONER

## 2021-12-30 PROCEDURE — 85027 COMPLETE CBC AUTOMATED: CPT | Performed by: INTERNAL MEDICINE

## 2021-12-30 PROCEDURE — 200N000001 HC R&B ICU

## 2021-12-30 PROCEDURE — 999N000157 HC STATISTIC RCP TIME EA 10 MIN

## 2021-12-30 PROCEDURE — 83735 ASSAY OF MAGNESIUM: CPT | Performed by: INTERNAL MEDICINE

## 2021-12-30 PROCEDURE — 94003 VENT MGMT INPAT SUBQ DAY: CPT

## 2021-12-30 RX ADMIN — OLANZAPINE 5 MG: 5 TABLET, ORALLY DISINTEGRATING ORAL at 08:38

## 2021-12-30 RX ADMIN — OXYCODONE HYDROCHLORIDE 20 MG: 100 SOLUTION ORAL at 01:58

## 2021-12-30 RX ADMIN — OXYCODONE HYDROCHLORIDE 20 MG: 100 SOLUTION ORAL at 18:12

## 2021-12-30 RX ADMIN — OXYCODONE HYDROCHLORIDE 20 MG: 100 SOLUTION ORAL at 13:07

## 2021-12-30 RX ADMIN — MIDODRINE HYDROCHLORIDE 2.5 MG: 2.5 TABLET ORAL at 10:56

## 2021-12-30 RX ADMIN — TRAZODONE HYDROCHLORIDE 75 MG: 50 TABLET ORAL at 20:17

## 2021-12-30 RX ADMIN — LORAZEPAM 12 MG: 2 LIQUID ORAL at 04:19

## 2021-12-30 RX ADMIN — QUETIAPINE 75 MG: 25 TABLET ORAL at 16:15

## 2021-12-30 RX ADMIN — CEFTRIAXONE SODIUM 1 G: 1 INJECTION, POWDER, FOR SOLUTION INTRAMUSCULAR; INTRAVENOUS at 11:00

## 2021-12-30 RX ADMIN — OXYCODONE HYDROCHLORIDE 20 MG: 100 SOLUTION ORAL at 21:42

## 2021-12-30 RX ADMIN — OXYCODONE HYDROCHLORIDE 20 MG: 100 SOLUTION ORAL at 09:17

## 2021-12-30 RX ADMIN — MIDODRINE HYDROCHLORIDE 2.5 MG: 2.5 TABLET ORAL at 18:13

## 2021-12-30 RX ADMIN — LORAZEPAM 12 MG: 2 LIQUID ORAL at 12:01

## 2021-12-30 RX ADMIN — CHLORHEXIDINE GLUCONATE 0.12% ORAL RINSE 15 ML: 1.2 LIQUID ORAL at 20:16

## 2021-12-30 RX ADMIN — DOCUSATE SODIUM 100 MG: 50 LIQUID ORAL at 08:37

## 2021-12-30 RX ADMIN — LORAZEPAM 12 MG: 2 LIQUID ORAL at 21:48

## 2021-12-30 RX ADMIN — QUETIAPINE 75 MG: 25 TABLET ORAL at 08:38

## 2021-12-30 RX ADMIN — LEVETIRACETAM 500 MG: 100 SOLUTION ORAL at 20:17

## 2021-12-30 RX ADMIN — TRAZODONE HYDROCHLORIDE 75 MG: 50 TABLET ORAL at 13:07

## 2021-12-30 RX ADMIN — Medication 800 MG: at 16:15

## 2021-12-30 RX ADMIN — Medication 800 MG: at 12:01

## 2021-12-30 RX ADMIN — MIDODRINE HYDROCHLORIDE 2.5 MG: 2.5 TABLET ORAL at 02:25

## 2021-12-30 RX ADMIN — LORAZEPAM 12 MG: 2 LIQUID ORAL at 16:15

## 2021-12-30 RX ADMIN — TRAZODONE HYDROCHLORIDE 75 MG: 50 TABLET ORAL at 08:37

## 2021-12-30 RX ADMIN — ENOXAPARIN SODIUM 40 MG: 40 INJECTION SUBCUTANEOUS at 08:38

## 2021-12-30 RX ADMIN — LEVETIRACETAM 500 MG: 100 SOLUTION ORAL at 08:37

## 2021-12-30 RX ADMIN — QUETIAPINE 100 MG: 25 TABLET ORAL at 20:17

## 2021-12-30 RX ADMIN — CHLORHEXIDINE GLUCONATE 0.12% ORAL RINSE 15 ML: 1.2 LIQUID ORAL at 07:47

## 2021-12-30 RX ADMIN — QUETIAPINE 75 MG: 25 TABLET ORAL at 12:01

## 2021-12-30 RX ADMIN — ARIPIPRAZOLE 7 MG: 1 SOLUTION ORAL at 08:37

## 2021-12-30 RX ADMIN — Medication 800 MG: at 08:37

## 2021-12-30 RX ADMIN — OXYCODONE HYDROCHLORIDE 20 MG: 100 SOLUTION ORAL at 05:39

## 2021-12-30 RX ADMIN — LORAZEPAM 12 MG: 2 LIQUID ORAL at 08:37

## 2021-12-30 RX ADMIN — DOCUSATE SODIUM 100 MG: 50 LIQUID ORAL at 21:42

## 2021-12-30 RX ADMIN — LORAZEPAM 12 MG: 2 LIQUID ORAL at 00:44

## 2021-12-30 RX ADMIN — SENNOSIDES 10 ML: 8.8 LIQUID ORAL at 08:37

## 2021-12-30 RX ADMIN — SENNOSIDES 10 ML: 8.8 LIQUID ORAL at 20:16

## 2021-12-30 RX ADMIN — OLANZAPINE 5 MG: 5 TABLET, ORALLY DISINTEGRATING ORAL at 20:17

## 2021-12-30 ASSESSMENT — ACTIVITIES OF DAILY LIVING (ADL)
ADLS_ACUITY_SCORE: 32
ADLS_ACUITY_SCORE: 28
ADLS_ACUITY_SCORE: 28
ADLS_ACUITY_SCORE: 32
ADLS_ACUITY_SCORE: 28
ADLS_ACUITY_SCORE: 32
ADLS_ACUITY_SCORE: 32
ADLS_ACUITY_SCORE: 28
ADLS_ACUITY_SCORE: 32
ADLS_ACUITY_SCORE: 28
ADLS_ACUITY_SCORE: 28
ADLS_ACUITY_SCORE: 32
ADLS_ACUITY_SCORE: 28
ADLS_ACUITY_SCORE: 32
ADLS_ACUITY_SCORE: 32
ADLS_ACUITY_SCORE: 28
ADLS_ACUITY_SCORE: 23
ADLS_ACUITY_SCORE: 32
ADLS_ACUITY_SCORE: 28

## 2021-12-30 ASSESSMENT — MIFFLIN-ST. JEOR: SCORE: 1719.5

## 2021-12-30 NOTE — PROGRESS NOTES
RT PROGRESS NOTE    VENT DAY# 34    CURRENT SETTINGS:   Ventilation Mode: CMV/AC  (Continuous Mandatory Ventilation/ Assist Control)  FiO2 (%): 30 %  Rate Set (breaths/minute): 16 breaths/min  Tidal Volume Set (mL): 520 mL  PEEP (cm H2O): 5 cmH2O  Pressure Support (cm H2O): 10 cmH2O  Oxygen Concentration (%): 30 %  Resp: 18      PATIENT PARAMETERS:  PIP 22  Pplat:  18  Pmean:  9  Secretions:  Moderate, white/clear, thick/frothy  02 Sats:  ~97%    Tracheostomy: 7.5 Shiley    Respiratory Medications: none       NOTE / SHIFT SUMMARY:   Patient returned to full vent support to rest overnight. Tolerated SBT at +5/10 well for approximately 12 hours. Plan to resume SBT in AM per patient tolerance. Will continue to monitor.     Leida Burns, RT

## 2021-12-30 NOTE — PROGRESS NOTES
Spoke to Opal from [facility]. She is waiting on insurance auth. Also waiting to hear if they can accommodate this admission today d/t staffing. She will keep me updated.     11:09 AM  Updated from Opal. Says still waiting on insurance auth but ride is set up at 1500 . Will need to cancel if no auth by 1400 will need to cancel. Opal is calling ex wife to update    CM updated nurse. Updated provider    2:18 PM  Notified by [place] that insurance auth hasnt come through yet so will need to cancel discharge for today. Updated nurse who will update charge and provider. Opal will update family and cancel ride for today       Ride rescheduled by Opal for 1100 tomorrow

## 2021-12-30 NOTE — PLAN OF CARE
Uneventful night shift.  Vitals stable.  Brief hypertension when patient became a little restless; treated with scheduled medications only.  Wei was removed yesterday evening.  Attempted primofit x2, now wearing a brief.  Voiding well.  Small smear of stool this morning.  Plan for transfer to [facility] today.     Tammy Brewer RN 12/30/2021 6:59 AM       Problem: Restraint for Non-Violent/Non-Self-Destructive Behavior  Goal: Prevent/Manage Potential Problems  Description: Maintain safety of patient and others during period of restraint.  Promote psychological and physical wellbeing.  Prevent injury to skin and involved body parts.  Promote nutrition, hydration, and elimination.  Outcome: Improving     Problem: Risk for Delirium  Goal: Optimal Coping  Outcome: No Change  Goal: Improved Behavioral Control  Outcome: No Change  Intervention: Prevent and Manage Agitation  Recent Flowsheet Documentation  Taken 12/30/2021 0400 by Tammy Brewer RN  Complementary Therapy: music therapy provided  Environment Familiarity/Consistency:   daily routine followed   familiar objects from home provided  Taken 12/30/2021 0014 by Tammy Brewer RN  Complementary Therapy: music therapy provided  Environment Familiarity/Consistency:   daily routine followed   familiar objects from home provided  Taken 12/29/2021 1957 by Tammy Brewer RN  Complementary Therapy: music therapy provided  Environment Familiarity/Consistency:   daily routine followed   familiar objects from home provided  Goal: Improved Attention and Thought Clarity  Outcome: No Change  Intervention: Maximize Cognitive Function  Recent Flowsheet Documentation  Taken 12/30/2021 0400 by Tammy Brewer RN  Sensory Stimulation Regulation: care clustered  Reorientation Measures: clock in view  Taken 12/30/2021 0014 by Tammy Brewer RN  Sensory Stimulation Regulation: care clustered  Reorientation Measures: clock in view  Taken 12/29/2021 1957 by Tammy Brewer RN  Sensory  Stimulation Regulation: care clustered  Reorientation Measures: clock in view  Goal: Improved Sleep  Outcome: No Change  Intervention: Promote Sleep  Recent Flowsheet Documentation  Taken 12/30/2021 0400 by Tammy Brewer RN  Sleep/Rest Enhancement: consistent schedule promoted  Taken 12/30/2021 0014 by Tammy Brewer RN  Sleep/Rest Enhancement: consistent schedule promoted  Taken 12/29/2021 1957 by Tammy Brewer, RN  Sleep/Rest Enhancement: consistent schedule promoted

## 2021-12-30 NOTE — PROGRESS NOTES
ICU PROGRESS NOTE:    Summary:  1. Acute respiratory failure  S/p intubation 11/28, extubated on 12/15, re-intubated the following today due to severe agitation and respiratory distress.  2. S/p tracheostomy 12/17   3. Ventilator associated pneumonia  Sputum Cx 12/14 showed polymicrobial bacteria (Strept constellatus, E coli, proteus, staph epi, and candida parapsilosis.   Blood cultures (+) strep constellatus.   Completing coarse of Rocephin.   4. Streptococcus pneumonia with bacteremia   5. Encephalopathy / agitation  6. Chronic back pain / polysubstance abuse on suboxone  7. Depression         Plan:     CV:  Continue low dose midodrine  Stress dose steroid stopped 12/29    Pulm:    Continue daily PS weaning trials phase 1. May be ready to move to Phase 2 tomorrow.     Progressing with this, 8/5 today    Scopolamine d/cd today - risk of mucous plugging/tenacious secretions with this.     Neuro:    Continue scheduled gabapentin, abilify, benadryl, haldol, oxycodone, lorazepam, trazodone per psych.      Added scheduled zyprexa and increased standing lorazepam yest.    Will ask psych to [place] back to leave recs today prior to transfer    Change IV keppra to liquid via enteral tube    ID:   - Currently on rocephin x 10 days based on cultures  Sputum Cx 12/18 showed polymicrobial (Strept constellatus, E coli, proteus, staph epi, and candida parapsilosis.   12/14 Blood cultures (+) strep constellatus, repeat cx NG  - Leukocytosis, resolved today.    Requires 2 weeks abx for bacteremia (last day 1/2). Continue CTX pending any new culture data as above    GI:  Cont tube feedings   Last BM 12/30    Heme:  Stable counts    Renal:  No acute issues     ICU Prophylaxis:  Peridex  Lovenox 40 mg subcutaneous daily, (now that COVID recovered)      Lines/Drains/Tubes:  PICC (12/23)  Gastrostomy/Enterostomy Gastrostomy LUQ 20 fr  Surgical Airway Tracheostomy Shiley 7.5 Cuffed       PROVIDER RESTRAINT FOR VIOLENT BEHAVIOR FACE TO  "FACE EVALUATION     Patient's Immediate Situation:  Patient demonstrated the following behaviors: pulling at tubes and lines     Patient's Reaction to the intervention:  Does patient understand the reason for restraint/seclusion? Unable to access     Medical Condition:  Is there any evidence of compromise of Skin integrity, Respiratory, Cardiovascular, Musculoskeletal, Hydration? no     Behavioral Condition:  In consultation with the RN, is there a need to continue this restraint or seclusion? Yes     See Restraint Flowsheet for complete restraint documentation and assessment.      CODE STATUS: Full    Dispo: Tentative plan for LTACH once insurance is approved.        Elaina Hauser, [facility] Pulmonary/Critical Care     ------------------------  Overnight Events:  No acute events       HPI:  Jared North is a 46 year old male, unvaccinated against COVID-19, with history of polysubstance abuse and chronic pain/lower back pain on suboxone, mood disorder/depression, HTN, ROSSY, mild intermittent asthma, presented with respiratory failure/COVID ARDS intubated on . Initially requiring proning/paralysis, veletri, now down trending vent needs, issues with agitation, treated for VAP. Not tolerating weaning trials, failed extubation 12/15, re-intubated, S/p trach and PEG tube     /81   Pulse 109   Temp 98.8  F (37.1  C) (Axillary)   Resp 21   Ht 1.803 m (5' 11\")   Wt 82.2 kg (181 lb 4.8 oz)   SpO2 98%   BMI 25.29 kg/m      PHYSICAL ASSESSMENT:  General: awake, calm  HEENMT: 7.5 Shiley trach site intact   Lungs: Diminished, clear; no distress on PSV  Heart: RRR, S1 and S2   Abdomen: Soft, non-tender.   Skin: skin color normal, texture normal, no rashes or lesions.   Extremities:  No edema noted.   Pulses:  +2 BUE and +2 BLE  Neuro:  Eyes open, interacts somewhat, moves all extremities    LABS:  Reviewed.     Imagin/29 CXR - Endotracheal tube has been removed and a new tracheostomy " tube has been placed in good position. Right PICC line remains in good position. Right IJ CVC has been removed. NG tube also removed. Bilateral interstitial infiltrates have improved when compared   to previous. There are no new or acute infiltrates. No pleural effusion

## 2021-12-30 NOTE — PROGRESS NOTES
"RESPIRATORY CARE NOTE     Patient Name: Jared North  Today's Date: 12/30/2021     Pt continues on the following settings:  Ventilation Mode: CPAP/PS  (Continuous positive airway pressure with Pressure Support)  FiO2 (%): 30 %  Rate Set (breaths/minute): 16 breaths/min  Tidal Volume Set (mL): 520 mL  PEEP (cm H2O): 5 cmH2O  Pressure Support (cm H2O): 8 cmH2O  Oxygen Concentration (%): 30 %  Resp: 21          Pt istrached.  Pt's respiratory status is stable. RT will continue to follow per MD's orders.     /81   Pulse 109   Temp 98.8  F (37.1  C) (Axillary)   Resp 21   Ht 1.803 m (5' 11\")   Wt 82.2 kg (181 lb 4.8 oz)   SpO2 98%   BMI 25.29 kg/m      Alex Mejia, RT  "

## 2021-12-30 NOTE — PLAN OF CARE
Problem: Risk for Delirium  Goal: Optimal Coping  Outcome: No Change  Intervention: Optimize Psychosocial Adjustment to Delirium  Recent Flowsheet Documentation  Taken 12/29/2021 1600 by Andreina Oconnell RN  Supportive Measures: positive reinforcement provided   Patient has scheduled meds, and prn available.    Problem: Nutrition Impairment (Mechanical Ventilation, Invasive)  Goal: Optimal Nutrition Delivery  Outcome: No Change   Tolerating tube feeds    Problem: Skin and Tissue Injury (Mechanical Ventilation, Invasive)  Goal: Absence of Device-Related Skin and Tissue Injury  Outcome: No Change   Turning q 2 hours/

## 2021-12-30 NOTE — PROGRESS NOTES
[facility] ([address])    Jared North has been accepted for admission to [facility] today pending insurance auth.  If auth doesn't come through today will plan for potential admission 12/31 a.m..      Ride: 3:00 pm via [facility]. They will bring a vent and cardiac monitor.    RN to RN report: Please call [address] at [phone] for nurse to nurse report.before the pt discharges.     Accepting MD: Dr. Denis Soliman. Please contact Dr. Soliman via [employer] or cell phone [phone] for provider to provider report before the pt discharges.    Paperwork needed prior to discharge: Discharge summary and discharge/readmit orders.     Transfer packet: Will need only the discharge summary and MAR.         Opal Chin RN  LTACH Referral Specialist    [facility]    [address] [address]  [email or web address]    [email or web address]  Office: [phone]  Fax: [phone]     CONFIDENTIAL Protected under [place] Statute  145.61 et seq

## 2021-12-30 NOTE — PLAN OF CARE
SBT started at 0727 am on PS 8/5/30%. So far doing well on wean.  Will cont to wean as tolerated. Suctioned for small to mod white.  Plan: transfer to LTACH today per provider.    Santo Jerez, RT

## 2021-12-30 NOTE — PLAN OF CARE
Problem: Adult Inpatient Plan of Care  Goal: Plan of Care Review  Outcome: Improving   Reviewed plan of care.    Problem: Nutrition Impairment (Mechanical Ventilation, Invasive)  Goal: Optimal Nutrition Delivery  Outcome: Improving    Patient at goal rate of 85cc/hr.

## 2021-12-30 NOTE — PLAN OF CARE
Problem: Adult Inpatient Plan of Care  Goal: Patient-Specific Goal (Individualized)  Outcome: Improving   Goal is for patient to transition to ltach at [place] today.    Problem: Adult Inpatient Plan of Care  Goal: Optimal Comfort and Wellbeing  Outcome: Improving   Repositioning patient q 2 hours.    Problem: Risk for Delirium  Goal: Optimal Coping  Intervention: Optimize Psychosocial Adjustment to Delirium  Recent Flowsheet Documentation  Taken 12/30/2021 1200 by Andreina Oconnell RN  Supportive Measures: positive reinforcement provided  Taken 12/30/2021 0800 by Andreina Oconnell RN  Supportive Measures: positive reinforcement provided   Re-orienting patient q 2 hours and as need, patient able to follow simple commands.    Problem: Inability to Wean (Mechanical Ventilation, Invasive)  Goal: Mechanical Ventilation Liberation  Outcome: Improving  Intervention: Promote Extubation and Mechanical Ventilation Liberation  Recent Flowsheet Documentation  Taken 12/30/2021 1200 by Andreina Oconnell RN  Sleep/Rest Enhancement:   consistent schedule promoted   music provided  Medication Review/Management: medications reviewed  Environmental Support:   calm environment promoted   distractions minimized   environmental consistency promoted  Taken 12/30/2021 0800 by Andreina Oconnell RN  Sleep/Rest Enhancement:   consistent schedule promoted   music provided  Medication Review/Management: medications reviewed  Environmental Support:   calm environment promoted   distractions minimized   environmental consistency promoted   Weaning daily    Problem: Nutrition Impairment (Mechanical Ventilation, Invasive)  Goal: Optimal Nutrition Delivery  Outcome: Improving   Promote at goal of 80cc/hr.    Patient has been worked up to transfer to [facility] today, depending on insurance approval.  Will update family when know for sure.  Vitals signs stable, slightly tachycardic, afebrile.  Patient has been given scheduled and medications,  able to follow simple commands.  Will continue to monitor.

## 2021-12-30 NOTE — PROGRESS NOTES
Patient had some agitation and restlessness this morning, prn ativan and scheduled meds given.  Patient has bee drowsy, but able to arouse to follow intermittent commands. Wei d/c'd, bladder can done, 302cc.   External cath in place.

## 2021-12-31 ENCOUNTER — HOSPITAL ENCOUNTER (INPATIENT)
Facility: CLINIC | Age: 46
LOS: 24 days | Discharge: HOME | End: 2022-01-24
Attending: INTERNAL MEDICINE | Admitting: INTERNAL MEDICINE
Payer: COMMERCIAL

## 2021-12-31 VITALS
RESPIRATION RATE: 16 BRPM | BODY MASS INDEX: 24.93 KG/M2 | HEART RATE: 100 BPM | OXYGEN SATURATION: 96 % | WEIGHT: 178.1 LBS | SYSTOLIC BLOOD PRESSURE: 109 MMHG | TEMPERATURE: 98.7 F | HEIGHT: 71 IN | DIASTOLIC BLOOD PRESSURE: 66 MMHG

## 2021-12-31 DIAGNOSIS — F11.20 OPIOID USE DISORDER, SEVERE, DEPENDENCE (H): Primary | ICD-10-CM

## 2021-12-31 DIAGNOSIS — F33.2 SEVERE EPISODE OF RECURRENT MAJOR DEPRESSIVE DISORDER, WITHOUT PSYCHOTIC FEATURES (H): ICD-10-CM

## 2021-12-31 DIAGNOSIS — G93.41 ACUTE METABOLIC ENCEPHALOPATHY: ICD-10-CM

## 2021-12-31 DIAGNOSIS — I10 ESSENTIAL HYPERTENSION: ICD-10-CM

## 2021-12-31 PROBLEM — J96.21 ACUTE ON CHRONIC RESPIRATORY FAILURE WITH HYPOXIA (H): Status: ACTIVE | Noted: 2021-11-24

## 2021-12-31 PROBLEM — G47.33 OSA (OBSTRUCTIVE SLEEP APNEA): Status: ACTIVE | Noted: 2021-12-31

## 2021-12-31 PROBLEM — F19.10 POLYSUBSTANCE ABUSE (H): Status: ACTIVE | Noted: 2021-12-31

## 2021-12-31 PROBLEM — J95.851 VENTILATOR ASSOCIATED PNEUMONIA (H): Status: ACTIVE | Noted: 2021-12-31

## 2021-12-31 PROBLEM — U07.1 ACUTE RESPIRATORY DISTRESS SYNDROME (ARDS) DUE TO COVID-19 VIRUS (H): Status: RESOLVED | Noted: 2021-12-05 | Resolved: 2021-12-31

## 2021-12-31 PROBLEM — J45.20 MILD INTERMITTENT ASTHMA: Status: ACTIVE | Noted: 2021-12-31

## 2021-12-31 PROBLEM — U07.1 PNEUMONIA DUE TO 2019 NOVEL CORONAVIRUS: Status: RESOLVED | Noted: 2021-11-24 | Resolved: 2021-12-31

## 2021-12-31 PROBLEM — J80 ACUTE RESPIRATORY DISTRESS SYNDROME (ARDS) DUE TO COVID-19 VIRUS (H): Status: RESOLVED | Noted: 2021-12-05 | Resolved: 2021-12-31

## 2021-12-31 PROBLEM — J12.82 PNEUMONIA DUE TO 2019 NOVEL CORONAVIRUS: Status: RESOLVED | Noted: 2021-11-24 | Resolved: 2021-12-31

## 2021-12-31 LAB
GLUCOSE BLDC GLUCOMTR-MCNC: 108 MG/DL (ref 70–99)
GLUCOSE BLDC GLUCOMTR-MCNC: 109 MG/DL (ref 70–99)
GLUCOSE BLDC GLUCOMTR-MCNC: 115 MG/DL (ref 70–99)
GLUCOSE BLDC GLUCOMTR-MCNC: 121 MG/DL (ref 70–99)
GLUCOSE BLDC GLUCOMTR-MCNC: 83 MG/DL (ref 70–99)
GLUCOSE BLDC GLUCOMTR-MCNC: 90 MG/DL (ref 70–99)
MAGNESIUM SERPL-MCNC: 1.8 MG/DL (ref 1.8–2.6)
POTASSIUM BLD-SCNC: 4 MMOL/L (ref 3.5–5)

## 2021-12-31 PROCEDURE — 250N000013 HC RX MED GY IP 250 OP 250 PS 637: Performed by: NURSE PRACTITIONER

## 2021-12-31 PROCEDURE — 250N000011 HC RX IP 250 OP 636: Performed by: INTERNAL MEDICINE

## 2021-12-31 PROCEDURE — 250N000013 HC RX MED GY IP 250 OP 250 PS 637: Performed by: INTERNAL MEDICINE

## 2021-12-31 PROCEDURE — 999N000157 HC STATISTIC RCP TIME EA 10 MIN

## 2021-12-31 PROCEDURE — 83735 ASSAY OF MAGNESIUM: CPT | Performed by: INTERNAL MEDICINE

## 2021-12-31 PROCEDURE — 94003 VENT MGMT INPAT SUBQ DAY: CPT

## 2021-12-31 PROCEDURE — 5A1955Z RESPIRATORY VENTILATION, GREATER THAN 96 CONSECUTIVE HOURS: ICD-10-PCS | Performed by: NURSE PRACTITIONER

## 2021-12-31 PROCEDURE — 84132 ASSAY OF SERUM POTASSIUM: CPT | Performed by: INTERNAL MEDICINE

## 2021-12-31 PROCEDURE — 99223 1ST HOSP IP/OBS HIGH 75: CPT | Mod: AI | Performed by: INTERNAL MEDICINE

## 2021-12-31 PROCEDURE — 120N000001 HC R&B MED SURG/OB

## 2021-12-31 PROCEDURE — 999N000009 HC STATISTIC AIRWAY CARE

## 2021-12-31 PROCEDURE — 250N000011 HC RX IP 250 OP 636: Performed by: NURSE PRACTITIONER

## 2021-12-31 PROCEDURE — 250N000009 HC RX 250: Performed by: NURSE PRACTITIONER

## 2021-12-31 PROCEDURE — 87081 CULTURE SCREEN ONLY: CPT | Performed by: INTERNAL MEDICINE

## 2021-12-31 RX ORDER — PROCHLORPERAZINE 25 MG
25 SUPPOSITORY, RECTAL RECTAL EVERY 12 HOURS PRN
Status: DISCONTINUED | OUTPATIENT
Start: 2021-12-31 | End: 2022-01-24 | Stop reason: HOSPADM

## 2021-12-31 RX ORDER — HYDRALAZINE HYDROCHLORIDE 20 MG/ML
10 INJECTION INTRAMUSCULAR; INTRAVENOUS EVERY 6 HOURS PRN
Status: CANCELLED | OUTPATIENT
Start: 2021-12-31

## 2021-12-31 RX ORDER — ACETAMINOPHEN 325 MG/1
650 TABLET ORAL EVERY 4 HOURS PRN
Status: DISCONTINUED | OUTPATIENT
Start: 2021-12-31 | End: 2022-01-09

## 2021-12-31 RX ORDER — QUETIAPINE FUMARATE 25 MG/1
100 TABLET, FILM COATED ORAL AT BEDTIME
Status: CANCELLED | OUTPATIENT
Start: 2021-12-31

## 2021-12-31 RX ORDER — OXYCODONE HCL 20 MG/ML
20 CONCENTRATE, ORAL ORAL EVERY 4 HOURS
Status: DISCONTINUED | OUTPATIENT
Start: 2021-12-31 | End: 2022-01-04

## 2021-12-31 RX ORDER — POLYETHYLENE GLYCOL 3350 17 G/17G
17 POWDER, FOR SOLUTION ORAL 2 TIMES DAILY
Status: DISCONTINUED | OUTPATIENT
Start: 2021-12-31 | End: 2022-01-04

## 2021-12-31 RX ORDER — METOPROLOL TARTRATE 1 MG/ML
5-10 INJECTION, SOLUTION INTRAVENOUS EVERY 4 HOURS PRN
Status: DISCONTINUED | OUTPATIENT
Start: 2021-12-31 | End: 2022-01-24 | Stop reason: HOSPADM

## 2021-12-31 RX ORDER — OLANZAPINE 5 MG/1
5 TABLET, ORALLY DISINTEGRATING ORAL 2 TIMES DAILY
Status: CANCELLED | OUTPATIENT
Start: 2021-12-31

## 2021-12-31 RX ORDER — OXYCODONE HCL 20 MG/ML
20 CONCENTRATE, ORAL ORAL EVERY 4 HOURS
Status: CANCELLED | OUTPATIENT
Start: 2021-12-31

## 2021-12-31 RX ORDER — LORAZEPAM 2 MG/ML
4 INJECTION INTRAMUSCULAR
Status: ON HOLD | COMMUNITY
End: 2022-01-24

## 2021-12-31 RX ORDER — NICOTINE POLACRILEX 4 MG
15-30 LOZENGE BUCCAL
Status: DISCONTINUED | OUTPATIENT
Start: 2021-12-31 | End: 2022-01-24 | Stop reason: HOSPADM

## 2021-12-31 RX ORDER — OXYCODONE HCL 20 MG/ML
20 CONCENTRATE, ORAL ORAL EVERY 4 HOURS
Status: ON HOLD | COMMUNITY
End: 2022-01-24

## 2021-12-31 RX ORDER — QUETIAPINE FUMARATE 50 MG/1
100 TABLET, FILM COATED ORAL AT BEDTIME
Status: DISCONTINUED | OUTPATIENT
Start: 2021-12-31 | End: 2022-01-11

## 2021-12-31 RX ORDER — BISACODYL 10 MG
10 SUPPOSITORY, RECTAL RECTAL DAILY PRN
Status: DISCONTINUED | OUTPATIENT
Start: 2021-12-31 | End: 2022-01-24 | Stop reason: HOSPADM

## 2021-12-31 RX ORDER — ARIPIPRAZOLE ORAL 1 MG/ML
7 SOLUTION ORAL DAILY
Status: CANCELLED | OUTPATIENT
Start: 2021-12-31

## 2021-12-31 RX ORDER — LEVETIRACETAM 100 MG/ML
500 SOLUTION ORAL 2 TIMES DAILY
Status: CANCELLED | OUTPATIENT
Start: 2021-12-31

## 2021-12-31 RX ORDER — MIDODRINE HYDROCHLORIDE 2.5 MG/1
2.5 TABLET ORAL EVERY 8 HOURS
Status: ON HOLD | COMMUNITY
End: 2022-01-24

## 2021-12-31 RX ORDER — METOPROLOL TARTRATE 1 MG/ML
5-10 INJECTION, SOLUTION INTRAVENOUS EVERY 4 HOURS PRN
Status: ON HOLD | COMMUNITY
End: 2022-01-24

## 2021-12-31 RX ORDER — CEFTRIAXONE 1 G/1
1 INJECTION, POWDER, FOR SOLUTION INTRAMUSCULAR; INTRAVENOUS EVERY 24 HOURS
Status: COMPLETED | OUTPATIENT
Start: 2022-01-01 | End: 2022-01-03

## 2021-12-31 RX ORDER — CEFTRIAXONE 1 G/1
1 INJECTION, POWDER, FOR SOLUTION INTRAMUSCULAR; INTRAVENOUS EVERY 24 HOURS
Status: CANCELLED | OUTPATIENT
Start: 2021-12-31 | End: 2022-01-02

## 2021-12-31 RX ORDER — LORAZEPAM 2 MG/ML
12 CONCENTRATE ORAL EVERY 4 HOURS
Status: CANCELLED | OUTPATIENT
Start: 2021-12-31

## 2021-12-31 RX ORDER — CHLORHEXIDINE GLUCONATE ORAL RINSE 1.2 MG/ML
15 SOLUTION DENTAL 2 TIMES DAILY
Status: ON HOLD | COMMUNITY
End: 2022-01-24

## 2021-12-31 RX ORDER — HALOPERIDOL 5 MG/ML
2 INJECTION INTRAMUSCULAR EVERY 5 MIN PRN
Status: ON HOLD | COMMUNITY
End: 2022-01-24

## 2021-12-31 RX ORDER — HYDROMORPHONE HYDROCHLORIDE 1 MG/ML
.5-1 INJECTION, SOLUTION INTRAMUSCULAR; INTRAVENOUS; SUBCUTANEOUS EVERY 4 HOURS PRN
Status: CANCELLED | OUTPATIENT
Start: 2021-12-31

## 2021-12-31 RX ORDER — ACETAMINOPHEN 650 MG/1
650 SUPPOSITORY RECTAL EVERY 4 HOURS PRN
Status: CANCELLED | OUTPATIENT
Start: 2021-12-31

## 2021-12-31 RX ORDER — BISACODYL 10 MG
10 SUPPOSITORY, RECTAL RECTAL DAILY PRN
Status: CANCELLED | OUTPATIENT
Start: 2021-12-31

## 2021-12-31 RX ORDER — POLYETHYLENE GLYCOL 3350 17 G/17G
17 POWDER, FOR SOLUTION ORAL DAILY PRN
Status: CANCELLED | OUTPATIENT
Start: 2021-12-31

## 2021-12-31 RX ORDER — PROCHLORPERAZINE MALEATE 10 MG
10 TABLET ORAL EVERY 6 HOURS PRN
Status: DISCONTINUED | OUTPATIENT
Start: 2021-12-31 | End: 2022-01-24 | Stop reason: HOSPADM

## 2021-12-31 RX ORDER — CHLORHEXIDINE GLUCONATE ORAL RINSE 1.2 MG/ML
15 SOLUTION DENTAL EVERY 12 HOURS
Status: DISCONTINUED | OUTPATIENT
Start: 2021-12-31 | End: 2022-01-24 | Stop reason: HOSPADM

## 2021-12-31 RX ORDER — SIMETHICONE 40MG/0.6ML
40 SUSPENSION, DROPS(FINAL DOSAGE FORM)(ML) ORAL EVERY 6 HOURS PRN
Status: DISCONTINUED | OUTPATIENT
Start: 2021-12-31 | End: 2022-01-24 | Stop reason: HOSPADM

## 2021-12-31 RX ORDER — HYDRALAZINE HYDROCHLORIDE 20 MG/ML
10 INJECTION INTRAMUSCULAR; INTRAVENOUS EVERY 6 HOURS PRN
Status: DISCONTINUED | OUTPATIENT
Start: 2021-12-31 | End: 2022-01-24 | Stop reason: HOSPADM

## 2021-12-31 RX ORDER — TRAZODONE HYDROCHLORIDE 150 MG/1
75 TABLET ORAL 3 TIMES DAILY
Status: ON HOLD | COMMUNITY
End: 2022-01-24

## 2021-12-31 RX ORDER — NYSTATIN 100000/ML
500000 SUSPENSION, ORAL (FINAL DOSE FORM) ORAL 4 TIMES DAILY
Status: DISPENSED | OUTPATIENT
Start: 2021-12-31 | End: 2022-01-10

## 2021-12-31 RX ORDER — LORAZEPAM 2 MG/ML
12 CONCENTRATE ORAL EVERY 4 HOURS
Status: DISCONTINUED | OUTPATIENT
Start: 2021-12-31 | End: 2022-01-03

## 2021-12-31 RX ORDER — LORAZEPAM 2 MG/ML
4 INJECTION INTRAMUSCULAR
Status: CANCELLED | OUTPATIENT
Start: 2021-12-31

## 2021-12-31 RX ORDER — ONDANSETRON 2 MG/ML
4 INJECTION INTRAMUSCULAR; INTRAVENOUS EVERY 6 HOURS PRN
Status: CANCELLED | OUTPATIENT
Start: 2021-12-31

## 2021-12-31 RX ORDER — DEXTROSE MONOHYDRATE 25 G/50ML
25-50 INJECTION, SOLUTION INTRAVENOUS
Status: CANCELLED | OUTPATIENT
Start: 2021-12-31

## 2021-12-31 RX ORDER — MIDODRINE HYDROCHLORIDE 2.5 MG/1
2.5 TABLET ORAL EVERY 8 HOURS
Status: DISCONTINUED | OUTPATIENT
Start: 2021-12-31 | End: 2022-01-05

## 2021-12-31 RX ORDER — LEVETIRACETAM 100 MG/ML
500 SOLUTION ORAL 2 TIMES DAILY
Status: DISCONTINUED | OUTPATIENT
Start: 2021-12-31 | End: 2022-01-05

## 2021-12-31 RX ORDER — OLANZAPINE 5 MG/1
5 TABLET, ORALLY DISINTEGRATING ORAL 2 TIMES DAILY
Status: DISCONTINUED | OUTPATIENT
Start: 2021-12-31 | End: 2022-01-04

## 2021-12-31 RX ORDER — ONDANSETRON 4 MG/1
4 TABLET, ORALLY DISINTEGRATING ORAL EVERY 6 HOURS PRN
Status: CANCELLED | OUTPATIENT
Start: 2021-12-31

## 2021-12-31 RX ORDER — LORAZEPAM 2 MG/ML
12 CONCENTRATE ORAL EVERY 4 HOURS
Status: ON HOLD | COMMUNITY
End: 2022-01-24

## 2021-12-31 RX ORDER — ONDANSETRON 2 MG/ML
4 INJECTION INTRAMUSCULAR; INTRAVENOUS EVERY 6 HOURS PRN
Status: DISCONTINUED | OUTPATIENT
Start: 2021-12-31 | End: 2022-01-24 | Stop reason: HOSPADM

## 2021-12-31 RX ORDER — PROCHLORPERAZINE MALEATE 10 MG
10 TABLET ORAL EVERY 6 HOURS PRN
Status: CANCELLED | OUTPATIENT
Start: 2021-12-31

## 2021-12-31 RX ORDER — PROCHLORPERAZINE 25 MG
25 SUPPOSITORY, RECTAL RECTAL EVERY 12 HOURS PRN
Status: CANCELLED | OUTPATIENT
Start: 2021-12-31

## 2021-12-31 RX ORDER — SIMETHICONE 40MG/0.6ML
40 SUSPENSION, DROPS(FINAL DOSAGE FORM)(ML) ORAL EVERY 6 HOURS PRN
Status: CANCELLED | OUTPATIENT
Start: 2021-12-31

## 2021-12-31 RX ORDER — NICOTINE POLACRILEX 4 MG
15-30 LOZENGE BUCCAL
Status: CANCELLED | OUTPATIENT
Start: 2021-12-31

## 2021-12-31 RX ORDER — ACETAMINOPHEN 650 MG/20.3ML
650 LIQUID ORAL EVERY 4 HOURS PRN
Status: DISCONTINUED | OUTPATIENT
Start: 2021-12-31 | End: 2022-01-24 | Stop reason: HOSPADM

## 2021-12-31 RX ORDER — DEXTROSE MONOHYDRATE 100 MG/ML
INJECTION, SOLUTION INTRAVENOUS CONTINUOUS PRN
Status: DISCONTINUED | OUTPATIENT
Start: 2021-12-31 | End: 2022-01-24 | Stop reason: HOSPADM

## 2021-12-31 RX ORDER — GABAPENTIN 250 MG/5ML
800 SOLUTION ORAL 3 TIMES DAILY
Status: CANCELLED | OUTPATIENT
Start: 2021-12-31

## 2021-12-31 RX ORDER — ACETAMINOPHEN 325 MG/1
650 TABLET ORAL EVERY 4 HOURS PRN
Status: CANCELLED | OUTPATIENT
Start: 2021-12-31

## 2021-12-31 RX ORDER — LEVETIRACETAM 500 MG/1
500 TABLET ORAL 2 TIMES DAILY
Status: ON HOLD | COMMUNITY
End: 2022-01-24

## 2021-12-31 RX ORDER — MIDODRINE HYDROCHLORIDE 2.5 MG/1
2.5 TABLET ORAL EVERY 8 HOURS
Status: CANCELLED | OUTPATIENT
Start: 2021-12-31

## 2021-12-31 RX ORDER — GABAPENTIN 250 MG/5ML
800 SOLUTION ORAL EVERY 8 HOURS
Status: DISCONTINUED | OUTPATIENT
Start: 2021-12-31 | End: 2022-01-03

## 2021-12-31 RX ORDER — HYDROMORPHONE HYDROCHLORIDE 1 MG/ML
.5-1 INJECTION, SOLUTION INTRAMUSCULAR; INTRAVENOUS; SUBCUTANEOUS EVERY 4 HOURS PRN
Status: DISCONTINUED | OUTPATIENT
Start: 2021-12-31 | End: 2022-01-03

## 2021-12-31 RX ORDER — ONDANSETRON 4 MG/1
4 TABLET, ORALLY DISINTEGRATING ORAL EVERY 6 HOURS PRN
Status: DISCONTINUED | OUTPATIENT
Start: 2021-12-31 | End: 2022-01-24 | Stop reason: HOSPADM

## 2021-12-31 RX ORDER — LORAZEPAM 2 MG/ML
4 INJECTION INTRAMUSCULAR
Status: DISCONTINUED | OUTPATIENT
Start: 2021-12-31 | End: 2022-01-03

## 2021-12-31 RX ORDER — QUETIAPINE FUMARATE 25 MG/1
75 TABLET, FILM COATED ORAL 3 TIMES DAILY
Status: CANCELLED | OUTPATIENT
Start: 2021-12-31

## 2021-12-31 RX ORDER — DEXTROSE MONOHYDRATE 100 MG/ML
INJECTION, SOLUTION INTRAVENOUS CONTINUOUS PRN
Status: CANCELLED | OUTPATIENT
Start: 2021-12-31

## 2021-12-31 RX ORDER — METOPROLOL TARTRATE 1 MG/ML
5-10 INJECTION, SOLUTION INTRAVENOUS EVERY 4 HOURS PRN
Status: DISCONTINUED | OUTPATIENT
Start: 2021-12-31 | End: 2021-12-31 | Stop reason: HOSPADM

## 2021-12-31 RX ORDER — QUETIAPINE FUMARATE 25 MG/1
75 TABLET, FILM COATED ORAL 3 TIMES DAILY
Status: ON HOLD | COMMUNITY
End: 2022-01-24

## 2021-12-31 RX ORDER — POLYETHYLENE GLYCOL 3350 17 G/17G
17 POWDER, FOR SOLUTION ORAL 2 TIMES DAILY
Status: CANCELLED | OUTPATIENT
Start: 2021-12-31

## 2021-12-31 RX ORDER — POLYETHYLENE GLYCOL 3350 17 G/17G
17 POWDER, FOR SOLUTION ORAL DAILY PRN
Status: DISCONTINUED | OUTPATIENT
Start: 2021-12-31 | End: 2022-01-05

## 2021-12-31 RX ORDER — METOPROLOL TARTRATE 1 MG/ML
5-10 INJECTION, SOLUTION INTRAVENOUS EVERY 4 HOURS PRN
Status: CANCELLED | OUTPATIENT
Start: 2021-12-31

## 2021-12-31 RX ORDER — BISACODYL 10 MG
10 SUPPOSITORY, RECTAL RECTAL DAILY PRN
Status: ON HOLD | COMMUNITY
End: 2022-01-24

## 2021-12-31 RX ORDER — ARIPIPRAZOLE ORAL 1 MG/ML
7 SOLUTION ORAL DAILY
Status: DISCONTINUED | OUTPATIENT
Start: 2022-01-01 | End: 2022-01-18

## 2021-12-31 RX ORDER — OLANZAPINE 5 MG/1
5 TABLET, ORALLY DISINTEGRATING ORAL 2 TIMES DAILY
Status: ON HOLD | COMMUNITY
End: 2022-01-24

## 2021-12-31 RX ORDER — QUETIAPINE FUMARATE 100 MG/1
100 TABLET, FILM COATED ORAL AT BEDTIME
Status: ON HOLD | COMMUNITY
End: 2022-01-24

## 2021-12-31 RX ORDER — ARIPIPRAZOLE ORAL 1 MG/ML
7 SOLUTION ORAL DAILY
Status: ON HOLD | COMMUNITY
End: 2022-01-24

## 2021-12-31 RX ORDER — CHLORHEXIDINE GLUCONATE ORAL RINSE 1.2 MG/ML
15 SOLUTION DENTAL EVERY 12 HOURS
Status: CANCELLED | OUTPATIENT
Start: 2021-12-31

## 2021-12-31 RX ORDER — DEXTROSE MONOHYDRATE 25 G/50ML
25-50 INJECTION, SOLUTION INTRAVENOUS
Status: DISCONTINUED | OUTPATIENT
Start: 2021-12-31 | End: 2022-01-24 | Stop reason: HOSPADM

## 2021-12-31 RX ORDER — HALOPERIDOL 5 MG/ML
5 INJECTION INTRAMUSCULAR EVERY 5 MIN PRN
Status: DISCONTINUED | OUTPATIENT
Start: 2021-12-31 | End: 2022-01-05

## 2021-12-31 RX ORDER — HALOPERIDOL 5 MG/ML
5 INJECTION INTRAMUSCULAR EVERY 5 MIN PRN
Status: CANCELLED | OUTPATIENT
Start: 2021-12-31

## 2021-12-31 RX ADMIN — LEVETIRACETAM 500 MG: 100 SOLUTION ORAL at 08:00

## 2021-12-31 RX ADMIN — METOPROLOL TARTRATE 5 MG: 5 INJECTION INTRAVENOUS at 11:53

## 2021-12-31 RX ADMIN — LORAZEPAM 4 MG: 2 INJECTION INTRAMUSCULAR; INTRAVENOUS at 06:23

## 2021-12-31 RX ADMIN — Medication 800 MG: at 08:10

## 2021-12-31 RX ADMIN — LORAZEPAM 12 MG: 2 LIQUID ORAL at 12:05

## 2021-12-31 RX ADMIN — QUETIAPINE 75 MG: 25 TABLET ORAL at 19:14

## 2021-12-31 RX ADMIN — LORAZEPAM 4 MG: 2 INJECTION INTRAMUSCULAR; INTRAVENOUS at 14:54

## 2021-12-31 RX ADMIN — OXYCODONE HYDROCHLORIDE 20 MG: 100 SOLUTION ORAL at 14:16

## 2021-12-31 RX ADMIN — Medication 800 MG: at 12:05

## 2021-12-31 RX ADMIN — LORAZEPAM 4 MG: 2 INJECTION INTRAMUSCULAR; INTRAVENOUS at 19:13

## 2021-12-31 RX ADMIN — OXYCODONE HYDROCHLORIDE 20 MG: 100 SOLUTION ORAL at 11:55

## 2021-12-31 RX ADMIN — LORAZEPAM 12 MG: 2 LIQUID ORAL at 08:10

## 2021-12-31 RX ADMIN — QUETIAPINE FUMARATE 100 MG: 50 TABLET ORAL at 21:33

## 2021-12-31 RX ADMIN — TRAZODONE HYDROCHLORIDE 75 MG: 50 TABLET ORAL at 08:00

## 2021-12-31 RX ADMIN — LORAZEPAM 12 MG: 2 LIQUID ORAL at 00:41

## 2021-12-31 RX ADMIN — NYSTATIN 500000 UNITS: 100000 SUSPENSION ORAL at 21:35

## 2021-12-31 RX ADMIN — OXYCODONE HYDROCHLORIDE 20 MG: 100 SOLUTION ORAL at 19:14

## 2021-12-31 RX ADMIN — TRAZODONE HYDROCHLORIDE 75 MG: 50 TABLET ORAL at 14:16

## 2021-12-31 RX ADMIN — OLANZAPINE 5 MG: 5 TABLET, ORALLY DISINTEGRATING ORAL at 08:00

## 2021-12-31 RX ADMIN — GABAPENTIN 800 MG: 250 SOLUTION ORAL at 21:35

## 2021-12-31 RX ADMIN — CHLORHEXIDINE GLUCONATE 0.12% ORAL RINSE 15 ML: 1.2 LIQUID ORAL at 07:56

## 2021-12-31 RX ADMIN — QUETIAPINE 75 MG: 25 TABLET ORAL at 12:05

## 2021-12-31 RX ADMIN — LEVETIRACETAM 500 MG: 100 SOLUTION ORAL at 21:28

## 2021-12-31 RX ADMIN — MIDODRINE HYDROCHLORIDE 2.5 MG: 2.5 TABLET ORAL at 21:27

## 2021-12-31 RX ADMIN — MIDODRINE HYDROCHLORIDE 2.5 MG: 2.5 TABLET ORAL at 03:16

## 2021-12-31 RX ADMIN — CEFTRIAXONE SODIUM 1 G: 1 INJECTION, POWDER, FOR SOLUTION INTRAMUSCULAR; INTRAVENOUS at 11:53

## 2021-12-31 RX ADMIN — OXYCODONE HYDROCHLORIDE 20 MG: 100 SOLUTION ORAL at 05:18

## 2021-12-31 RX ADMIN — LORAZEPAM 12 MG: 2 LIQUID ORAL at 23:53

## 2021-12-31 RX ADMIN — ENOXAPARIN SODIUM 40 MG: 40 INJECTION SUBCUTANEOUS at 08:01

## 2021-12-31 RX ADMIN — OXYCODONE HYDROCHLORIDE 20 MG: 100 SOLUTION ORAL at 01:00

## 2021-12-31 RX ADMIN — ARIPIPRAZOLE 7 MG: 1 SOLUTION ORAL at 08:00

## 2021-12-31 RX ADMIN — HALOPERIDOL LACTATE 5 MG: 5 INJECTION, SOLUTION INTRAMUSCULAR at 06:11

## 2021-12-31 RX ADMIN — LORAZEPAM 12 MG: 2 LIQUID ORAL at 05:14

## 2021-12-31 RX ADMIN — HALOPERIDOL LACTATE 5 MG: 5 INJECTION, SOLUTION INTRAMUSCULAR at 14:54

## 2021-12-31 RX ADMIN — TRAZODONE HYDROCHLORIDE 75 MG: 150 TABLET ORAL at 22:23

## 2021-12-31 RX ADMIN — OLANZAPINE 5 MG: 5 TABLET, ORALLY DISINTEGRATING ORAL at 21:34

## 2021-12-31 RX ADMIN — CHLORHEXIDINE GLUCONATE 0.12% ORAL RINSE 15 ML: 1.2 LIQUID ORAL at 21:26

## 2021-12-31 RX ADMIN — QUETIAPINE 75 MG: 25 TABLET ORAL at 08:00

## 2021-12-31 ASSESSMENT — ACTIVITIES OF DAILY LIVING (ADL)
ADLS_ACUITY_SCORE: 32
ADLS_ACUITY_SCORE: 11
ADLS_ACUITY_SCORE: 13
ADLS_ACUITY_SCORE: 32
ADLS_ACUITY_SCORE: 12
ADLS_ACUITY_SCORE: 32
ADLS_ACUITY_SCORE: 11
ADLS_ACUITY_SCORE: 13
ADLS_ACUITY_SCORE: 32
ADLS_ACUITY_SCORE: 32
ADLS_ACUITY_SCORE: 11
ADLS_ACUITY_SCORE: 32
ADLS_ACUITY_SCORE: 32
ADLS_ACUITY_SCORE: 13
ADLS_ACUITY_SCORE: 32

## 2021-12-31 ASSESSMENT — MIFFLIN-ST. JEOR
SCORE: 1704.99
SCORE: 1704.53

## 2021-12-31 NOTE — PROGRESS NOTES
Care Management Discharge Note    Discharge Date: 12/31/2021       Discharge Disposition: LTACH    Discharge Services:      Discharge DME:      Discharge Transportation: other (see comments) (stretcher)    Private pay costs discussed: Not applicable    PAS Confirmation Code:    Patient/family educated on Medicare website which has current facility and service quality ratings: yes    Education Provided on the Discharge Plan:    Persons Notified of Discharge Plans: ex wife, nurse, provider   Patient/Family in Agreement with the Plan: yes    Handoff Referral Completed: Yes    Additional Information:  Per Opal, insurance auth received and patient ok to admit to LTACH today. Ride is at 11. She will updated ex wife Jama. CM updated nurse. Will update provider. PCS complete and faxed to transport      9:47 AM  Opal changed transport to 1200. Nurse updated         Marilee Mcdowell RN

## 2021-12-31 NOTE — PLAN OF CARE
Problem: Inability to Wean (Mechanical Ventilation, Invasive)  Goal: Mechanical Ventilation Liberation  Outcome: Improving     Problem: Skin and Tissue Injury (Mechanical Ventilation, Invasive)  Goal: Absence of Device-Related Skin and Tissue Injury  Outcome: Improving     Problem: Ventilator-Induced Lung Injury (Mechanical Ventilation, Invasive)  Goal: Absence of Ventilator-Induced Lung Injury  Outcome: Improving   RT PROGRESS NOTE     DATA:     CURRENT SETTINGS: A/C, 20, 520, +5             TRACH TYPE / SIZE: 7.5 Taper guard 12/17/21             MODE:   A/C             FIO2:   40%     ACTION:             THERAPIES:                SUCTION:                           FREQUENCY:   X1                        AMOUNT:   Moderate                         CONSISTENCY:   Thick                         COLOR:   White              SPONTANEOUS COUGH EFFORT/STRENGTH OF EFFORT (not elicited by suctioning): Yes/strong                              WEANING PHASE:   1                        WEAN MODE:                           WEAN TIME:                           END WEAN REASON:        RESPONSE:             BS:   Coarse              VITAL SIGNS:   Sat 97%, , RR 27              EMOTIONAL NEEDS / CONCERNS:                  RISK FOR SELF DECANNULATION:                          RISK DUE TO:                          INTERVENTION/S IN PLACE IS/ARE:         NOTE / PLAN:   Pt was admitted today on current settings with a 7.5 taper guard trach. Minimum sxn so far.

## 2021-12-31 NOTE — PLAN OF CARE
Problem: Ventilator-Induced Lung Injury (Mechanical Ventilation, Invasive)  Goal: Absence of Ventilator-Induced Lung Injury  Outcome: Improving     Problem: ARDS (Acute Respiratory Distress Syndrome)  Goal: Effective Oxygenation  Outcome: Improving     Karla Wilkins RT

## 2021-12-31 NOTE — PLAN OF CARE
Pt transported in stable condition by EMS to [facility] at 1540. Report called to ERYN Youssef and update called to receiving nurse. All belongings sent with patient.

## 2021-12-31 NOTE — PLAN OF CARE
Physical Therapy Discharge Summary    Reason for therapy discharge:    Discharged to LTACH.    Progress towards therapy goal(s). See goals on Care Plan in [facility] electronic health record for goal details.  Goals partially met.  Barriers to achieving goals:   discharge from facility.    Therapy recommendation(s):    Continued therapy is recommended.  Rationale/Recommendations:  Recommend continued PT at LTACH.    Ayleen Nguyen, PT  12/31/2021

## 2021-12-31 NOTE — PROGRESS NOTES
[facility] ([address])     Jared North has been accepted for admission to [facility] at [facility] today. Insurance authorization has been received.     Ride: 11:00 am via [facility]. They will bring a vent and cardiac monitor.     RN to RN report: Please call [address] at [phone] for nurse to nurse report.before the pt discharges.      Accepting MD: Dr. Denis Soliman. Please contact Dr. Soliman via [place] or cell phone [phone] for provider to provider report before the pt discharges.     Paperwork needed prior to discharge: Discharge summary and discharge/readmit orders.      Transfer packet: Will need only the discharge summary and MAR.       I will contact the pt's ex-wife, Jama, and let her know the transfer is confirmed for today.        Opal Chin RN  LTACH Referral Specialist     [facility] [place]     [address] [place] [id number]  [email or web address]    [email or web address]  Office: [phone]  Fax: [phone]     CONFIDENTIAL Protected under [place] Statute  145.61 et seq

## 2021-12-31 NOTE — PROGRESS NOTES
RT PROGRESS NOTE    VENT DAY# 35    CURRENT SETTINGS:   Ventilation Mode: CMV/AC  (Continuous Mandatory Ventilation/ Assist Control)  FiO2 (%): 30 %  Rate Set (breaths/minute): 16 breaths/min  Tidal Volume Set (mL): 520 mL  PEEP (cm H2O): 5 cmH2O  Pressure Support (cm H2O): 8 cmH2O  Oxygen Concentration (%): 30 %  Resp: 16      PATIENT PARAMETERS:  PIP 29  Pplat:  GURDEEP  Pmean:  9.7  Compliance: 38.7  SBT: no      Secretions:  Moderate whitish tan  02 Sats:  96%    ETT SIZE trach 7.5 shiley    Respiratory Medications: none     ABG: none recent    NOTE / SHIFT SUMMARY:   Pt remained stable on full vent support over night on the above settings. RT to follow and wean when appropriate.    Kenneth H. Kjellberg

## 2021-12-31 NOTE — SIGNIFICANT EVENT
Started to get agitated at 04:00. 's to 150's. Copious oral and trach secretions.Trying to get out of bed. Was wet. Had urine and BM.Total bed changed and diaper changed. Thought he would settle but 45 minutes later sitting up and wanted to get out of bed. Nods to bedpan. Had another big amount of stool and urine. Changed and repositioned. Agitation never stopped. Would be attentive and listens with instructions but the moment you turned your back, he's sitting up again.Gave haldol 5mg IV ( did not  Help), Lorazepam 4 mg IV given next . No improvement until report time. Will be getting next  all his AM psych meds.

## 2021-12-31 NOTE — PROGRESS NOTES
RT PROGRESS NOTE    VENT DAY# 35    CURRENT SETTINGS:   Ventilation Mode: CMV/AC  (Continuous Mandatory Ventilation/ Assist Control)  FiO2 (%): 30 %  Rate Set (breaths/minute): 16 breaths/min  Tidal Volume Set (mL): 520 mL  PEEP (cm H2O): 5 cmH2O  Pressure Support (cm H2O): 8 cmH2O  Oxygen Concentration (%): 30 %  Resp: 16      PATIENT PARAMETERS:  PIP 24  Pplat:  22  Compliance: 44  Secretions:  Large amount of thick white secretions via trach  02 Sats:  %    7.5 Shiley    Respiratory Medications: none     NOTE / SHIFT SUMMARY:   Pt. remains on full vent support, settings above. BS coarse, suctioning large amount of thick white secretions via trach, wean on hold today, pt. is transporting to [facility] this afternoon, report called to Alicja WALTER. RT following      Karla Wilkins RT

## 2021-12-31 NOTE — PLAN OF CARE
Occupational Therapy Discharge Summary    Reason for therapy discharge:    Discharged to LTACH    Progress towards therapy goal(s). See goals on Care Plan in [facility] electronic health record for goal details.  Goals not met.  Barriers to achieving goals:   limited tolerance for therapy.    Therapy recommendation(s):    Continued therapy is recommended.  Rationale/Recommendations:  for increased ADL independence, strength and endurance.

## 2022-01-01 ENCOUNTER — APPOINTMENT (OUTPATIENT)
Dept: OCCUPATIONAL THERAPY | Facility: CLINIC | Age: 47
End: 2022-01-01
Attending: INTERNAL MEDICINE
Payer: COMMERCIAL

## 2022-01-01 PROBLEM — R13.12 OROPHARYNGEAL DYSPHAGIA: Status: ACTIVE | Noted: 2022-01-01

## 2022-01-01 LAB
BACTERIA ASPIRATE CULT: ABNORMAL
GLUCOSE BLDC GLUCOMTR-MCNC: 107 MG/DL (ref 70–99)
GLUCOSE BLDC GLUCOMTR-MCNC: 114 MG/DL (ref 70–99)
GLUCOSE BLDC GLUCOMTR-MCNC: 132 MG/DL (ref 70–99)
MAGNESIUM SERPL-MCNC: 2.1 MG/DL (ref 1.8–2.6)
POTASSIUM BLD-SCNC: 4.2 MMOL/L (ref 3.5–5)

## 2022-01-01 PROCEDURE — 94003 VENT MGMT INPAT SUBQ DAY: CPT

## 2022-01-01 PROCEDURE — 250N000011 HC RX IP 250 OP 636: Performed by: NURSE PRACTITIONER

## 2022-01-01 PROCEDURE — 84132 ASSAY OF SERUM POTASSIUM: CPT | Performed by: NURSE PRACTITIONER

## 2022-01-01 PROCEDURE — 97165 OT EVAL LOW COMPLEX 30 MIN: CPT | Mod: GO

## 2022-01-01 PROCEDURE — 999N000009 HC STATISTIC AIRWAY CARE

## 2022-01-01 PROCEDURE — 97110 THERAPEUTIC EXERCISES: CPT | Mod: GO

## 2022-01-01 PROCEDURE — 250N000013 HC RX MED GY IP 250 OP 250 PS 637: Performed by: INTERNAL MEDICINE

## 2022-01-01 PROCEDURE — 99233 SBSQ HOSP IP/OBS HIGH 50: CPT | Performed by: INTERNAL MEDICINE

## 2022-01-01 PROCEDURE — 999N000157 HC STATISTIC RCP TIME EA 10 MIN

## 2022-01-01 PROCEDURE — 250N000009 HC RX 250: Performed by: NURSE PRACTITIONER

## 2022-01-01 PROCEDURE — 83735 ASSAY OF MAGNESIUM: CPT | Performed by: NURSE PRACTITIONER

## 2022-01-01 PROCEDURE — 250N000013 HC RX MED GY IP 250 OP 250 PS 637: Performed by: NURSE PRACTITIONER

## 2022-01-01 PROCEDURE — 120N000001 HC R&B MED SURG/OB

## 2022-01-01 RX ADMIN — ENOXAPARIN SODIUM 40 MG: 100 INJECTION SUBCUTANEOUS at 08:41

## 2022-01-01 RX ADMIN — CHLORHEXIDINE GLUCONATE 0.12% ORAL RINSE 15 ML: 1.2 LIQUID ORAL at 08:40

## 2022-01-01 RX ADMIN — QUETIAPINE 75 MG: 25 TABLET ORAL at 17:01

## 2022-01-01 RX ADMIN — OXYCODONE HYDROCHLORIDE 20 MG: 100 SOLUTION ORAL at 00:13

## 2022-01-01 RX ADMIN — CEFTRIAXONE SODIUM 1 G: 1 INJECTION, POWDER, FOR SOLUTION INTRAMUSCULAR; INTRAVENOUS at 11:52

## 2022-01-01 RX ADMIN — LORAZEPAM 12 MG: 2 LIQUID ORAL at 15:37

## 2022-01-01 RX ADMIN — POLYETHYLENE GLYCOL 3350 17 G: 17 POWDER, FOR SOLUTION ORAL at 08:39

## 2022-01-01 RX ADMIN — OXYCODONE HYDROCHLORIDE 20 MG: 100 SOLUTION ORAL at 11:54

## 2022-01-01 RX ADMIN — TRAZODONE HYDROCHLORIDE 75 MG: 150 TABLET ORAL at 21:27

## 2022-01-01 RX ADMIN — DOCUSATE SODIUM 100 MG: 50 LIQUID ORAL at 08:39

## 2022-01-01 RX ADMIN — LEVETIRACETAM 500 MG: 100 SOLUTION ORAL at 08:39

## 2022-01-01 RX ADMIN — ARIPIPRAZOLE 7 MG: 1 SOLUTION ORAL at 08:41

## 2022-01-01 RX ADMIN — QUETIAPINE 75 MG: 25 TABLET ORAL at 12:01

## 2022-01-01 RX ADMIN — MIDODRINE HYDROCHLORIDE 2.5 MG: 2.5 TABLET ORAL at 03:28

## 2022-01-01 RX ADMIN — OXYCODONE HYDROCHLORIDE 20 MG: 100 SOLUTION ORAL at 03:30

## 2022-01-01 RX ADMIN — NYSTATIN 500000 UNITS: 100000 SUSPENSION ORAL at 12:00

## 2022-01-01 RX ADMIN — TRAZODONE HYDROCHLORIDE 75 MG: 150 TABLET ORAL at 08:40

## 2022-01-01 RX ADMIN — GABAPENTIN 800 MG: 250 SOLUTION ORAL at 06:36

## 2022-01-01 RX ADMIN — OXYCODONE HYDROCHLORIDE 20 MG: 100 SOLUTION ORAL at 08:41

## 2022-01-01 RX ADMIN — TRAZODONE HYDROCHLORIDE 75 MG: 150 TABLET ORAL at 14:44

## 2022-01-01 RX ADMIN — POLYETHYLENE GLYCOL 3350 17 G: 17 POWDER, FOR SOLUTION ORAL at 21:27

## 2022-01-01 RX ADMIN — SENNOSIDES 10 ML: 8.8 LIQUID ORAL at 08:39

## 2022-01-01 RX ADMIN — NYSTATIN 500000 UNITS: 100000 SUSPENSION ORAL at 17:01

## 2022-01-01 RX ADMIN — LEVETIRACETAM 500 MG: 100 SOLUTION ORAL at 21:27

## 2022-01-01 RX ADMIN — METOPROLOL TARTRATE 5 MG: 5 INJECTION INTRAVENOUS at 17:45

## 2022-01-01 RX ADMIN — SENNOSIDES 10 ML: 8.8 LIQUID ORAL at 21:27

## 2022-01-01 RX ADMIN — QUETIAPINE FUMARATE 100 MG: 50 TABLET ORAL at 21:27

## 2022-01-01 RX ADMIN — OLANZAPINE 5 MG: 5 TABLET, ORALLY DISINTEGRATING ORAL at 08:40

## 2022-01-01 RX ADMIN — CHLORHEXIDINE GLUCONATE 0.12% ORAL RINSE 15 ML: 1.2 LIQUID ORAL at 19:41

## 2022-01-01 RX ADMIN — LORAZEPAM 12 MG: 2 LIQUID ORAL at 11:53

## 2022-01-01 RX ADMIN — OLANZAPINE 5 MG: 5 TABLET, ORALLY DISINTEGRATING ORAL at 21:28

## 2022-01-01 RX ADMIN — GABAPENTIN 800 MG: 250 SOLUTION ORAL at 21:31

## 2022-01-01 RX ADMIN — LORAZEPAM 12 MG: 2 LIQUID ORAL at 03:31

## 2022-01-01 RX ADMIN — GABAPENTIN 800 MG: 250 SOLUTION ORAL at 17:11

## 2022-01-01 RX ADMIN — QUETIAPINE 75 MG: 25 TABLET ORAL at 08:40

## 2022-01-01 RX ADMIN — LORAZEPAM 12 MG: 2 LIQUID ORAL at 06:37

## 2022-01-01 RX ADMIN — LORAZEPAM 12 MG: 2 LIQUID ORAL at 19:42

## 2022-01-01 RX ADMIN — NYSTATIN 500000 UNITS: 100000 SUSPENSION ORAL at 08:40

## 2022-01-01 RX ADMIN — OXYCODONE HYDROCHLORIDE 20 MG: 100 SOLUTION ORAL at 19:42

## 2022-01-01 RX ADMIN — OXYCODONE HYDROCHLORIDE 20 MG: 100 SOLUTION ORAL at 15:26

## 2022-01-01 RX ADMIN — DOCUSATE SODIUM 100 MG: 50 LIQUID ORAL at 21:27

## 2022-01-01 RX ADMIN — NYSTATIN 500000 UNITS: 100000 SUSPENSION ORAL at 21:27

## 2022-01-01 ASSESSMENT — ACTIVITIES OF DAILY LIVING (ADL)
ADLS_ACUITY_SCORE: 27
ADLS_ACUITY_SCORE: 31
ADLS_ACUITY_SCORE: 27
ADLS_ACUITY_SCORE: 31
ADLS_ACUITY_SCORE: 27
ADLS_ACUITY_SCORE: 31
ADLS_ACUITY_SCORE: 31
ADLS_ACUITY_SCORE: 27
ADLS_ACUITY_SCORE: 31
ADLS_ACUITY_SCORE: 27
ADLS_ACUITY_SCORE: 23
ADLS_ACUITY_SCORE: 27
ADLS_ACUITY_SCORE: 31
ADLS_ACUITY_SCORE: 31

## 2022-01-01 NOTE — CONSULTS
"CLINICAL NUTRITION SERVICES  -  ASSESSMENT NOTE      Recommendations Ordered by Registered Dietitian (RD):   Continue current TF regimen   Future/Additional Recommendations:   Consider initiating culturelle when weaned off vent - heavy antibiotic use   Malnutrition:   % Weight Loss:  > 5% in 1 month (severe malnutrition)  % Intake:  Decreased intake does not meet criteria for malnutrition - meeting needs via TF  Subcutaneous Fat Loss:  Orbital region moderate depletion and Upper arm region mild depletion  Muscle Loss:  Temporal region severe depletion, Clavicle bone region severe depletion, Dorsal hand region severe depletion, Anterior thigh region moderate-severe depletion and Posterior calf region severe depletion  Fluid Retention:  None noted    Malnutrition Diagnosis: Severe malnutrition  In Context of:  Acute illness or injury     REASON FOR ASSESSMENT  Jared North is a 46 year old male seen by Registered Dietitian for Provider Order - Registered Dietitian to Assess and Order TF per Medical Nutrition Therapy Protocol    PMH:   unvaccinated against COVID-19, with history of polysubstance abuse, HTN, ROSSY, and diagnosis of COVID-19 viral infection on 11/19/2021   - vent/paralysis/proning/pressors    NUTRITION HISTORY  - Information obtained from pt. chart  trach/PEG on 12/17/21  - TF provision increased on 12/27 d/t significant weight loss  - pt. Still on full vent support    CURRENT NUTRITION ORDERS  Diet Order:     None    Current Intake/Tolerance:  Promote with Fiber @ goal of  80ml/hr  (1920ml/day)  will provide: 1920 kcals, 121 g PRO, 1595 ml free H20, 249 g CHO, and 27 g fiber daily.  FWF 85 mL q6 hrs  TF + FWF = 1935 mL H2O/day    NUTRITION FOCUSED PHYSICAL ASSESSMENT FOR DIAGNOSING MALNUTRITION)  Yes         Observed:    Muscle wasting (refer to documentation in Malnutrition section) and Subcutaneous fat loss (refer to documentation in Malnutrition section)    ANTHROPOMETRICS  Height: 5' 11\"  Weight: " 178 lbs 0 oz  Body mass index is 24.83 kg/m .  Weight Status:  Normal BMI  Weight History: Initial admit weight 192# 11/28  - 7.3% weight loss in 1 month - severe  Wt Readings from Last 10 Encounters:   12/31/21 80.7 kg (178 lb)   12/31/21 80.8 kg (178 lb 1.6 oz)   12/05/21 89.4 kg (197 lb 3.2 oz)   02/22/18 83.3 kg (183 lb 11.2 oz)     12/27/21 0500 84 kg (185 lb 1.6 oz)       2.8% dry weight loss in 1 week   12/24/21 0630 83.2 kg (183 lb 6.4 oz)   12/22/21 0400 81.4 kg (179 lb 8 oz)   12/21/21 0300 89 kg (196 lb 3.2 oz)   12/20/21 0600 88.5 kg (195 lb 1.6 oz)   12/17/21 0500 87.2 kg (192 lb 3.9 oz)   12/15/21 0000 89 kg (196 lb 3.4 oz)   12/13/21 0100 89.9 kg (198 lb 3.1 oz)     11/28/21 87.2 kg (192 lb 4.8 oz)     LABS  Labs reviewed  - electrolytes, BG WNL  Labs:  Electrolytes  Potassium (mmol/L)   Date Value   01/01/2022 4.2   12/31/2021 4.0   12/30/2021 3.8     Phosphorus (mg/dL)   Date Value   12/29/2021 4.0   12/22/2021 3.5   11/26/2021 3.7    Blood Glucose  Glucose (mg/dL)   Date Value   12/30/2021 87   12/29/2021 118   12/28/2021 121   12/25/2021 128 (H)   12/24/2021 90     GLUCOSE BY METER POCT (mg/dL)   Date Value   01/01/2022 114 (H)   01/01/2022 132 (H)   01/01/2022 107 (H)   12/31/2021 121 (H)   12/31/2021 109 (H)     Hemoglobin A1C (%)   Date Value   11/24/2021 5.8 (H)    Inflammatory Markers  CRP (mg/dL)   Date Value   12/14/2021 3.3 (H)   11/29/2021 0.6   11/28/2021 1.0 (H)     WBC Count (10e3/uL)   Date Value   12/30/2021 9.1   12/29/2021 15.8 (H)   12/24/2021 7.6     Albumin (g/dL)   Date Value   12/20/2021 2.5 (L)   12/18/2021 2.4 (L)   12/07/2021 2.7 (L)      Magnesium (mg/dL)   Date Value   01/01/2022 2.1   12/31/2021 1.8   12/30/2021 1.9     Sodium (mmol/L)   Date Value   12/30/2021 139   12/29/2021 142   12/28/2021 142    Renal  Urea Nitrogen (mg/dL)   Date Value   12/30/2021 14   12/29/2021 12   12/28/2021 10     Creatinine (mg/dL)   Date Value   12/30/2021 0.66 (L)   12/29/2021 0.66 (L)    12/28/2021 0.67 (L)     Additional  Triglycerides (mg/dL)   Date Value   12/21/2021 145   12/19/2021 141   12/17/2021 149     Ketones Urine (mg/dL)   Date Value   12/14/2021 Negative   12/10/2002 Negative        MEDICATIONS  Medications reviewed    ARIPiprazole  7 mg Oral or Feeding Tube Daily     cefTRIAXone  1 g Intravenous Q24H     chlorhexidine  15 mL Mouth/Throat Q12H     sennosides  10 mL Per Feeding Tube BID    And     docusate  100 mg Per Feeding Tube BID     enoxaparin ANTICOAGULANT  40 mg Subcutaneous Q24H     gabapentin  800 mg Oral Q8H     [START ON 1/4/2022] influenza quadrivalent (PF) vacc  0.5 mL Intramuscular Prior to discharge     levETIRAcetam  500 mg Oral or Feeding Tube BID     LORazepam  12 mg Per Feeding Tube Q4H     midodrine  2.5 mg Oral Q8H     nystatin  500,000 Units Mouth/Throat 4x Daily     OLANZapine zydis  5 mg Oral BID     oxyCODONE  20 mg Oral or Feeding Tube Q4H     polyethylene glycol  17 g Oral or Feeding Tube BID     QUEtiapine  100 mg Oral or Feeding Tube At Bedtime     QUEtiapine  75 mg Oral or Feeding Tube TID     sodium chloride (PF)  10-40 mL Intracatheter Q7 Days     traZODone  75 mg Oral or Feeding Tube TID        dextrose       - MEDICATION INSTRUCTIONS -       - MEDICATION INSTRUCTIONS -        acetaminophen, acetaminophen **OR** acetaminophen, bisacodyl, dextrose, glucose **OR** dextrose **OR** glucagon, haloperidol lactate, hydrALAZINE, HYDROmorphone, LORazepam, metoprolol, ondansetron **OR** ondansetron, - MEDICATION INSTRUCTIONS -, - MEDICATION INSTRUCTIONS -, polyethylene glycol, prochlorperazine **OR** prochlorperazine **OR** prochlorperazine, simethicone, sodium chloride (PF), sodium chloride (PF)     ASSESSED NUTRITION NEEDS PER APPROVED PRACTICE GUIDELINES:  Dosing Weight:  84 kg (185 lb 1.6 oz)      Estimated Energy Needs: 8572-4778 kcals/day ([employer] State Equation (PSU) 2003b)  Justification:  vented and Overwt.  Estimated Protein Needs: 101-126 grams  protein/day (1.2 - 1.5 grams of pro/kg)  Justification: acute illness  Estimated Fluid Needs: 0523-6352 mL/day (1 mL/kcal)   Justification: Maintenance    MALNUTRITION:  % Weight Loss:  > 5% in 1 month (severe malnutrition)  % Intake:  Decreased intake does not meet criteria for malnutrition - meeting needs via TF  Subcutaneous Fat Loss:  Orbital region moderate depletion and Upper arm region mild depletion  Muscle Loss:  Temporal region severe depletion, Clavicle bone region severe depletion, Dorsal hand region severe depletion, Anterior thigh region moderate-severe depletion and Posterior calf region severe depletion  Fluid Retention:  None noted    Malnutrition Diagnosis: Severe malnutrition  In Context of:  Acute illness or injury    NUTRITION DIAGNOSIS:  Malnutrition related to acute illness, intubated x1 month as evidenced by weight loss, muscle wasting, subcutaneous fat loss    Swallowing difficulty r/t COVID-19 and evidenced by mechanical ventilation    NUTRITION INTERVENTIONS  Recommendations / Nutrition Prescription  Continue current TF regimen  Consider initiating culturelle when weaned off vent - heavy antibiotic use    Implementation  Nutrition education: Not appropriate at this time due to patient condition  EN Composition, EN Schedule and Feeding Tube Flush    Nutrition Goals  Maintain weight  Tolerate TF  Regular stooling    MONITORING AND EVALUATION:  Progress towards goals will be monitored and evaluated per protocol and Practice Guidelines    Lindsey Miranda RDN, LD  Clinical Dietitian

## 2022-01-01 NOTE — PLAN OF CARE
Problem: Ventilator-Induced Lung Injury (Mechanical Ventilation, Invasive)  Goal: Absence of Ventilator-Induced Lung Injury  Outcome: No Change   Patient is a new admit from [facility] who came per  stretcher  accompanied by EMT personnel for Acute on Chronic respiratory failure. He is recovered from Covid 19 but still on full Vent support. Pulmonology and RT following him.    Problem: Restraint for Non-Violent/Non-Self-Destructive Behavior  Goal: Prevent/Manage Potential Problems  Description: Maintain safety of patient and others during period of restraint.  Promote psychological and physical wellbeing.  Prevent injury to skin and involved body parts.  Promote nutrition, hydration, and elimination.  Outcome: No Change   He has previous history of severe agitation  and depression. He had an episode of restlessness  and agitation this shift. He was even noted throwing his leg on top of the side rails or between the cracks of the side rail. He was medicated with PRN Lorazepam 4 mg IVP  at 1914 and it was helpful. He was maintained on bilateral soft wrist restraints to keep him safe from pulling his lines and tubings.    Problem: Dysrhythmia  Goal: Normalized Cardiac Rhythm  Outcome: No Change   He is on continuous cardiac monitoring for his sinus tachycardia. His  heart rates ranged between 105 - 129 this shift. Will keep monitoring patient's progress.

## 2022-01-01 NOTE — PLAN OF CARE
Problem: Inability to Wean (Mechanical Ventilation, Invasive)  Goal: Mechanical Ventilation Liberation  Outcome: Improving     Problem: Skin and Tissue Injury (Mechanical Ventilation, Invasive)  Goal: Absence of Device-Related Skin and Tissue Injury  Outcome: Improving     Problem: Ventilator-Induced Lung Injury (Mechanical Ventilation, Invasive)  Goal: Absence of Ventilator-Induced Lung Injury  Outcome: Improving   RT PROGRESS NOTE     DATA:     CURRENT SETTINGS: A/C, 16, 520, +5             TRACH TYPE / SIZE: 7.5 Taper guard 12/17/21             MODE:   A/C             FIO2:   40%     ACTION:             THERAPIES:                SUCTION:                           FREQUENCY:   X2                        AMOUNT:   Moderate                         CONSISTENCY:  thin/ Thick                         COLOR:   White              SPONTANEOUS COUGH EFFORT/STRENGTH OF EFFORT (not elicited by suctioning): Yes/strong                              WEANING PHASE:   1                        WEAN MODE:                           WEAN TIME:                           END WEAN REASON:        RESPONSE:             BS:   Coarse              VITAL SIGNS:  Sat 96-97%, -122, RR 21-25              EMOTIONAL NEEDS / CONCERNS: yes              RISK FOR SELF DECANNULATION: yes                         RISK DUE TO:  restless                        INTERVENTION/S IN PLACE IS/ARE:  restrained       NOTE / PLAN:  continue current POC, will start phase 1 in AM

## 2022-01-01 NOTE — PROGRESS NOTES
[facility]    Medicine Progress Note - Hospitalist Service       Date of Admission:  12/31/2021    Background Summary:  Jared North is a 46 year old male unvaccinated against COVID-19, with history of polysubstance abuse and chronic pain/lower back pain on Suboxone prior to admission (had been on methadone in the past but not in recent past), mood disorder/depression, HTN, ROSSY, mild intermittent asthma, knee pain, and diagnosis of COVID-19 viral infection on 11/19/2021 who originally presented to [facility] on 11/24/2021 with worsening shortness of breath, admitted to the ICU with COVID-19 viral pneumonia and severe acute respiratory failure with hypoxia requiring noninvasive ventilation.  He was started on Decadron, remdesivir, and tocilizumab and followed for several days alternating between NIPPV and HFNC.  He had an episode of afib during this time that eventually resolved and has not recurred after briefly being treated with amiodarone.  He did not improve and was eventually intubated on 11/28/21 and then proned and started on veletri.  11/30/21 he was started on cefepime and vanco for VAP - Cx grew E. Coli and Proteus mirabilis.  He did develop hypotension possibly from septic shock vs sedation vasoplegia and was treated with norepi intemittently.  Vanco was stopped on 12/5/21.  He was then transferred to [place]'s ICU on 12/5/21.       At [place] he was followed in ICU and continued on vent/paralysis/proning/pressors.  Cefepime switched to rocephin 12/6/21.  He continued to be very encephalopathic and psychiatry was consulted. He was weaned off pressors and didn't require proning by 12/10/21.  He has been on large doses of anti-psychotics and sedatives.  He developed fevers again 12/14 but had clinically improved somewhat and was extubated on 12/15/21.  By  The next day 12/16/21 he had severely increased work of breathing and so was re-intubated. Blood Cx grew Streptococcus constellatus and sputum  Cx grew that as well but also had E. Coli/Proteus m./staph epi/candida parapsilosis. Vanco/Zosyn was started on 12/16/21.  He then underwent trach/PEG on 12/17/21.  Vanco was stopped after 3 days and Zosyn switched to rocephin on 12/21/21 to complete on 1/2/21.       During this time he has had significant issues with encephalopathy and agitation. As above he has been started on multiple meds for this.  Over the last week prior to admission here at [facility] they have increased his lorazepam up to 12 mg every 4 hours and oxycodone 20 mg every 4 hours along with abilify 7 mg (home med), gabapentin 800 mg every 8 hours, keppra 500 mg twice daily (for behavior), zyprexa 5 mg twice daily, and seroquel 75 mg TID with 100 mg HS.  With this regimen he has improved reportedly but is still awake and agitated.    Assessment & Plan           Principal Problem:    Acute on chronic respiratory failure with hypoxia - vent, pulm/RT consults, PT/OT to get strong enough to wean vent/trach     Active Problems:    Acute metabolic encephalopathy - multiple meds as outlined above on very high doses of lorazepam and oxycodone along with the zyprexa, Seroquel, gabapentin, and keppra.  He still is awake and gets agitated as the 12 mg of lorazepam begins to wear off at the 3 hour juanita.  I will have psychiatry and addiction medicine consult - it is possible he may need to go back on suboxone in order to allow for weaning of these other meds.        Chronic back pain - was on suboxone prior to admission - will have addiction medicine consult.  Currently on oxycodone 20 mg every 4 hours.      Essential hypertension - has been low so off meds      Acute respiratory distress syndrome (ARDS) due to COVID-19 pneumonia - improved, starting the weaning process for vent      Major depressive disorder, recurrent episode, severe - continue abilify, seroquel, zyprexa, trazodone,psychiatry to see      Opioid type dependence - off suboxone, now on oxycodone  high dose and ativan high dose, will need to slowly wean, consult psychiatry and addiction medicine      Polysubstance abuse as above      ROSSY (obstructive sleep apnea)- vented/trached for now      Mild intermittent asthma - can add nebs as needed      Recent Ventilator associated pneumonia - on rocephin to complete 1/3/22.      Thrush - start nystatin      Dysphagia - speech therapy consult, on tube feeds     Severe Malnutrition, POA: based on nutrition assessment        Diet: Adult Formula Drip Feeding: Continuous Promote w fiber; Gastrostomy; Goal Rate: 80; mL/hr; Medication - Feeding Tube Flush Frequency: At least 15-30 mL water before and after medication administration and with tube clogging    DVT Prophylaxis: Enoxaparin (Lovenox) SQ  Wei Catheter: Not present  Central Lines: None  Code Status: Full Code      Disposition Plan   Expected Discharge: TBD   Anticipated discharge location: home with family   vs acute rehab vs TCU       The patient's care was discussed with the Bedside Nurse. And ex-wife    Adam Soliman MD  Hospitalist Service  [facility]  Securely message with the Vocera Web Console (learn more here)  Text page via [employer] Paging/Directory        Clinically Significant Risk Factors Present on Admission               ______________________________________________________________________    Interval History       Data reviewed today: I reviewed all medications, new labs and imaging results over the last 24 hours.  All medication issues identified within the last24 hours have been addressed and completed as appropriate.    Physical Exam   Vital Signs: Temp: 100.3  F (37.9  C) Temp src: Axillary BP: 131/82 Pulse: (!) 137   Resp: 28 SpO2: 100 % O2 Device: Mechanical Ventilator    Weight: 178 lbs 0 oz  General Appearance: middle aged M in NAD on vent  Respiratory: rhonchi bilaterally  Cardiovascular: RRR S1S2, no JVD, no edema, no mgr  GI: +BS, soft, NT/ND, G-tube site intact - no redness or drainage, no  HSM  Skin: denudement on left inner thigh 2x4 cm  Musculoskeletal: ext are warm and nontender, no joint swelling or redness  Neurologic: alert, attempts to answer questions by mouthing words- unable to understand, follows a few simple directions, motor 5-/5 upper and lower ext symm bilaterally, sensory grossly intact to light touch, CN 2-12 intact  Psychiatric: appears confused, but calm    Data   Recent Labs   Lab 01/01/22  0805 01/01/22  0611 01/01/22  0603 01/01/22  0002 12/31/21  0759 12/31/21  0518 12/30/21  0426 12/30/21  0424 12/29/21  1210 12/29/21  1159 12/28/21  0826 12/28/21  0502   WBC  --   --   --   --   --   --   --  9.1  --  15.8*  --   --    HGB  --   --   --   --   --   --   --  11.7*  --  11.5*  --   --    MCV  --   --   --   --   --   --   --  95  --  95  --   --    PLT  --   --   --   --   --   --   --  256  --  296  --   --    NA  --   --   --   --   --   --   --  139  --  142  --  142   POTASSIUM  --  4.2  --   --   --  4.0  --  3.8  --  4.4   < > 4.1  4.1   CHLORIDE  --   --   --   --   --   --   --  102  --  105  --  105   CO2  --   --   --   --   --   --   --  25  --  28  --  26   BUN  --   --   --   --   --   --   --  14  --  12  --  10   CR  --   --   --   --   --   --   --  0.66*  --  0.66*  --  0.67*   ANIONGAP  --   --   --   --   --   --   --  12  --  9  --  11   TRUONG  --   --   --   --   --   --   --  9.2  --  9.2  --  9.4   *  --  132* 107*   < >  --    < > 87   < > 118   < > 121    < > = values in this interval not displayed.

## 2022-01-01 NOTE — PLAN OF CARE
Problem: Adult Inpatient Plan of Care  Goal: Absence of Hospital-Acquired Illness or Injury  Intervention: Identify and Manage Fall Risk  Recent Flowsheet Documentation  Taken 1/1/2022 0000 by Ashley Mejia RN  Safety Promotion/Fall Prevention: bed alarm on  Intervention: Prevent Skin Injury  Recent Flowsheet Documentation  Taken 1/1/2022 0330 by Ashley Mejia RN  Body Position:   turned   supine  Taken 1/1/2022 0015 by Ashley Mejia RN  Body Position:   turned   right  Intervention: Prevent Infection  Recent Flowsheet Documentation  Taken 1/1/2022 0000 by Ashley Mejia RN  Infection Prevention: hand hygiene promoted   Pt given all Sched ativan becomes agitated an hour before medication is due and attempts to climb out of bed and pull at all tubes. Remains in restraints for pt safety.

## 2022-01-01 NOTE — H&P
[facility]    History and Physical - Hospitalist Service       Date of Admission:  12/31/2021    Assessment & Plan      Jared North is a 46 year old male unvaccinated against COVID-19, with history of polysubstance abuse and chronic pain/lower back pain on Suboxone prior to admission (had been on methadone in the past but not in recent past), mood disorder/depression, HTN, ROSSY, mild intermittent asthma, knee pain, and diagnosis of COVID-19 viral infection on 11/19/2021 who originally presented to [facility] on 11/24/2021 with worsening shortness of breath, admitted to the ICU with COVID-19 viral pneumonia and severe acute respiratory failure with hypoxia requiring noninvasive ventilation.  He was started on Decadron, remdesivir, and tocilizumab and followed for several days alternating between NIPPV and HFNC.  He had an episode of afib during this time that eventually resolved and has not recurred after briefly being treated with amiodarone.  He did not improve and was eventually intubated on 11/28/21 and then proned and started on veletri.  11/30/21 he was started on cefepime and vanco for VAP - Cx grew E. Coli and Proteus mirabilis.  He did develop hypotension possibly from septic shock vs sedation vasoplegia and was treated with norepi intemittently.  Vanco was stopped on 12/5/21.  He was then transferred to [place]'s ICU on 12/5/21.      At [place] he was followed in ICU and continued on vent/paralysis/proning/pressors.  Cefepime switched to rocephin 12/6/21.  He continued to be very encephalopathic and psychiatry was consulted. He was weaned off pressors and didn't require proning by 12/10/21.  He has been on large doses of anti-psychotics and sedatives.  He developed fevers again 12/14 but had clinically improved somewhat and was extubated on 12/15/21.  By  The next day 12/16/21 he had severely increased work of breathing and so was re-intubated. Blood Cx grew Streptococcus constellatus and sputum Cx  grew that as well but also had E. Coli/Proteus m./staph epi/candida parapsilosis. Vanco/Zosyn was started on 12/16/21.  He then underwent trach/PEG on 12/17/21.  Vanco was stopped after 3 days and Zosyn switched to rocephin on 12/21/21 to complete on 1/2/21.      During this time he has had significant issues with encephalopathy and agitation. As above he has been started on multiple meds for this.  Over the last week prior to admission here at [facility] they have increased his lorazepam up to 12 mg every 4 hours and oxycodone 20 mg every 4 hours along with abilify 7 mg (home med), gabapentin 800 mg every 8 hours, keppra 500 mg twice daily (for behavior), zyprexa 5 mg twice daily, and seroquel 75 mg TID with 100 mg HS.  With this regimen he has improved reportedly but is still awake and agitated.        Principal Problem:    Acute on chronic respiratory failure with hypoxia - vent, pulm/RT consults    Active Problems:    Chronic back pain - was on suboxone prior to admission    Essential hypertension - has been low so off meds    Pneumonia due to 2019 novel coronavirus    Acute respiratory distress syndrome (ARDS) due to COVID-19 virus (H)    Major depressive disorder, recurrent episode, severe - continue abilify, seroquel, zyprexa, trazodone    Opioid type dependence - off suboxone, now on oxycodone high dose and ativan high dose, will need to slowly wean, consult psychiatry    Polysubstance abuse as above    ROSSY (obstructive sleep apnea)- vented/trached for now    Mild intermittent asthma - can add nebs as needed    Acute metabolic encephalopathy - multiple meds as outlined above    Ventilator associated pneumonia - on rocephin to complete 1/3/22.    Thrush - start nystatin         Diet: Adult Formula Drip Feeding: Continuous Promote w fiber; Gastrostomy; Goal Rate: 80; mL/hr; Medication - Feeding Tube Flush Frequency: At least 15-30 mL water before and after medication administration and with tube clogging    DVT  Prophylaxis: Enoxaparin (Lovenox) SQ  Wei Catheter: Not present  Central Lines: None except PICC from 12/23/21  Code Status: Full Code    G-tube 12/17/21  Trach 12/17/21    Clinically Significant Risk Factors Present on Admission             # Coagulation Defect: home medication list includes an anticoagulant medication        Disposition Plan   Expected Discharge:   TBD  Anticipated discharge location: home with family        Adam Soliman MD  [facility]  Securely message with the Vocera Web Console (learn more here)  Text page via [employer] Paging/Directory        ______________________________________________________________________    Chief Complaint   dyspnea    History is obtained from the patient and electronic health record    History of Present Illness   Jared North is a 46 year old male unvaccinated against COVID-19, with history of polysubstance abuse and chronic pain/lower back pain now on Suboxone, mood disorder/depression, HTN, ROSSY, mild intermittent asthma, knee pain, and recent diagnosis of COVID-19 viral infection on 11/19/2021 who originally presented to [facility] on 11/24/2021 with worsening shortness of breath, admitted to the ICU with COVID-19 viral pneumonia and severe acute respiratory failure with hypoxia requiring noninvasive ventilation.  He was started on Decadron, remdesivir, and tocilizumab and followed for several days alternating between NIPPV and HFNC.  He had an episode of afib during this time that eventually resolved and has not recurred after briefly being treated with amiodarone.  He did not improve and was eventually intubated on 11/28/21 and then proned and started on veletri.  11/30/21 he was started on cefepime and vanco for VAP - Cx grew E. Coli and Proteus mirabilis.  He did develop hypotension possibly from septic shock vs sedation vasoplegia and was treated with norepi intemittently.  Vanco was stopped on 12/5/21.  He was then transferred to [facility] ICU on  12/5/21.      At [place] he was followed in ICU and continued on vent/paralysis/proning/pressors.  Cefepime switched to rocephin 12/6/21.  He continued to be very encephalopathic and psychiatry was consulted. He was weaned off pressors and didn't require proning by 12/10/21.  He has been on large doses of anti-psychotics and sedatives.  He developed fevers again 12/14 but had clinically improved somewhat and was extubated on 12/15/21.  By  The next day 12/16/21 he had severely increased work of breathing and so was re-intubated. Blood Cx grew Streptococcus constellatus and sputum Cx grew that as well but also had E. Coli/Proteus m./staph epi/candida parapsilosis. Vanco/Zosyn was started on 12/16/21.  He then underwent trach/PEG on 12/17/21.  Vanco was stopped after 3 days and Zosyn switched to rocephin on 12/21/21 to complete on 1/2/21.      During this time he has had significant issues with encephalopathy and agitation. As above he has been started on multiple meds for this.  Over the last week prior to admission here at [facility] they have increased his lorazepam up to 12 mg every 4 hours and oxycodone 20 mg every 4 hours along with abilify 7 mg (home med), gabapentin 800 mg every 8 hours, keppra 500 mg twice daily (for behavior), zyprexa 5 mg twice daily, and seroquel 75 mg TID with 100 mg HS.  With this regimen he has improved reportedly but is still awake and agitate    Review of Systems    Review of systems not obtained due to patient factors - confusion    Past Medical History    I have reviewed this patient's medical history and updated it with pertinent information if needed.   Past Medical History:   Diagnosis Date     Major depressive disorder, recurrent episode, severe (H) 4/26/2006    Formatting of this note might be different from the original. Epic     Mild intermittent asthma 12/31/2021     Opioid type dependence (H) 4/26/2006    Formatting of this note might be different from the original. Epic      ROSSY (obstructive sleep apnea) 2021     Pneumonia due to 2019 novel coronavirus 2021     Polysubstance abuse (H) 2021       Past Surgical History   I have reviewed this patient's surgical history and updated it with pertinent information if needed.  Past Surgical History:   Procedure Laterality Date     GASTROSTOMY  2021     PICC TRIPLE LUMEN PLACEMENT  2021          TRACHEOSTOMY  2021       Social History   I have reviewed this patient's social history and updated it with pertinent information if needed.  Social History     Tobacco Use     Smoking status: Never Smoker     Smokeless tobacco: Current User   Substance Use Topics     Alcohol use: Yes     Alcohol/week: 21.0 standard drinks     Drug use: No       Family History   I have reviewed this patient's family history and updated it with pertinent information if needed.  Family History   Problem Relation Age of Onset     Diabetes Mother      Heart Disease Mother      Hypertension Mother      Hypertension Maternal Grandmother      Diabetes Maternal Grandfather      Hypertension Maternal Grandfather        Prior to Admission Medications   Prior to Admission Medications   Prescriptions Last Dose Informant Patient Reported? Taking?   ARIPiprazole (ABILIFY) 1 MG/ML SOLN solution 2021 at 0800  Yes Yes   Sig: Take 7 mg by mouth daily   LORazepam (ATIVAN) 2 MG/ML (HIGH CONC) oral solution 2021 at 1205  Yes Yes   Si mg by Per Feeding Tube route every 4 hours Started   LORazepam (ATIVAN) 2 MG/ML injection 2021 at 1454  Yes Yes   Sig: Inject 4 mg into the vein every 2 hours as needed for agitation or anxiety   OLANZapine zydis (ZYPREXA) 5 MG ODT 2021 at 0800  Yes Yes   Sig: Take 5 mg by mouth 2 times daily   QUEtiapine (SEROQUEL) 100 MG tablet 2021 at 2017  Yes Yes   Si mg by Oral or Feeding Tube route At Bedtime   QUEtiapine (SEROQUEL) 25 MG tablet 2021 at 1205  Yes Yes   Si mg by Oral or  Feeding Tube route 3 times daily   acetaminophen (TYLENOL) 32 mg/mL liquid Unknown at Unknown time  Yes Yes   Si mg by Oral or Feeding Tube route every 4 hours as needed for fever or mild pain   albuterol (PROAIR HFA;PROVENTIL HFA;VENTOLIN HFA) 90 mcg/actuation inhaler   Yes Yes   Sig: Inhale 2 puffs into the lungs every 4 hours as needed    bisacodyl (DULCOLAX) 10 MG suppository Unknown at Unknown time  Yes Yes   Sig: Place 10 mg rectally daily as needed for constipation Hold for loose stools   buPROPion (WELLBUTRIN XL) 300 MG 24 hr tablet   Yes Yes   Sig: Take 300 mg by mouth every morning   buprenorphine HCl-naloxone HCl (SUBOXONE) 8-2 MG per film   Yes Yes   Sig: Place 1 Film under the tongue daily   cefTRIAXone 2021 at 1153  Yes Yes   Sig: Inject 1 g into the vein every 24 hours In 100ml NS   chlorhexidine (CHLORHEXIDINE) 0.12 % solution 2021 at 0756  Yes Yes   Sig: Take 15 mLs by mouth 2 times daily While intubated   docusate (COLACE) 50 MG/5ML liquid 2021 at 2016  Yes Yes   Si mg by Per Feeding Tube route 2 times daily While hospitalized   enoxaparin ANTICOAGULANT (LOVENOX) 40 MG/0.4ML syringe 2021 at 0801  Yes Yes   Sig: Inject 40 mg Subcutaneous every 24 hours While hospitalized   gabapentin (NEURONTIN) 400 MG capsule 2021 at 1205  Yes Yes   Sig: Take 800 mg by mouth 3 times daily   haloperidol lactate (HALDOL) 5 MG/ML injection 2021 at 1454  Yes Yes   Sig: Inject 2 mg into the vein every 5 minutes as needed for agitation Up to 3 doses in 30 min While hospitalized   hydrOXYzine (VISTARIL) 25 MG capsule   Yes Yes   Sig: Take 25-50 mg by mouth nightly as needed for itching   insulin aspart (NOVOLOG PEN) 100 UNIT/ML pen Past Week at Unknown time  Yes Yes   Sig: Inject 1-4 Units Subcutaneous every 4 hours Sliding scale low resistance  While hospitalized   levETIRAcetam (KEPPRA) 500 MG tablet 2021 at 0800  Yes Yes   Si mg by Oral or Feeding Tube route 2  times daily   lisinopril (ZESTRIL) 10 MG tablet   Yes Yes   Sig: Take 10 mg by mouth daily   metoprolol (LOPRESSOR) 5 MG/5ML injection 2021 at 1153  Yes Yes   Sig: Inject 5-10 mg into the vein every 4 hours as needed for high blood pressure   midodrine (PROAMATINE) 2.5 MG tablet 2021 at 0316  Yes Yes   Sig: Take 2.5 mg by mouth every 8 hours   multivitamin w/minerals (CERTAVITE/ANTIOXIDANTS) tablet   Yes Yes   Sig: Take 1 tablet by mouth daily   oxyCODONE (ROXICODONE INTENSOL) 20 mg/mL (HIGH CONC) solution 2021 at 1416  Yes Yes   Si mg by Oral or Feeding Tube route every 4 hours Started in [facility] (SENOKOT) 8.8 MG/5ML syrup 2021 at 2016  Yes Yes   Sig: 10 mLs by Per Feeding Tube route 2 times daily While hospitalized   traZODone (DESYREL) 150 MG tablet 2021 at 1416  Yes Yes   Sig: Take 75 mg by mouth 3 times daily      Facility-Administered Medications: None     Allergies   No Known Allergies    Physical Exam   Vital Signs: Temp: 98.8  F (37.1  C) Temp src: Oral BP: 122/83 Pulse: (!) 124   Resp: 27 SpO2: 97 % O2 Device: Mechanical Ventilator    Weight: 178 lbs 0 oz    General Appearance: middle aged M in NAD on vent  Eyes: PERRL, EOMI, conj clear and moist  HEENT: NC/AT, ears and nose clear without discharge, OP - has white coating on tongue, no exudates or erythema, neck supple no thyromegaly, trach intact, no redness or drainage  Respiratory: rhonchi bilaterally  Cardiovascular: RRR S1S2, no JVD, no edema, no mgr  GI: +BS, soft, NT/ND, G-tube site intact - no redness or drainage, no HSM  Lymph/Hematologic: no nodes in neck or groin  Skin: denudement on left inner thigh 2x4 cm  Musculoskeletal: ext are warm and nontender, no joint swelling or redness  Neurologic: alert, attempts to answer questions by mouthing words- unable to understand, follows a few simple directions, motor 5-/5 upper and lower ext symm bilaterally, sensory grossly intact to light touch, CN 2-12  intact  Psychiatric: appears confused, but calm    Data   Data reviewed today: I reviewed all medications, new labs and imaging results over the last 24 hours. All medication issues identified within the last24 hours have been addressed and completed as appropriate.      Recent Labs   Lab 12/31/21  1737 12/31/21  1200 12/31/21  0759 12/31/21  0518 12/30/21  0426 12/30/21  0424 12/29/21  1210 12/29/21  1159 12/28/21  0826 12/28/21  0502   WBC  --   --   --   --   --  9.1  --  15.8*  --   --    HGB  --   --   --   --   --  11.7*  --  11.5*  --   --    MCV  --   --   --   --   --  95  --  95  --   --    PLT  --   --   --   --   --  256  --  296  --   --    NA  --   --   --   --   --  139  --  142  --  142   POTASSIUM  --   --   --  4.0  --  3.8  --  4.4   < > 4.1  4.1   CHLORIDE  --   --   --   --   --  102  --  105  --  105   CO2  --   --   --   --   --  25  --  28  --  26   BUN  --   --   --   --   --  14  --  12  --  10   CR  --   --   --   --   --  0.66*  --  0.66*  --  0.67*   ANIONGAP  --   --   --   --   --  12  --  9  --  11   TRUONG  --   --   --   --   --  9.2  --  9.2  --  9.4   * 115* 108*  --    < > 87   < > 118   < > 121    < > = values in this interval not displayed.

## 2022-01-01 NOTE — PHARMACY-ADMISSION MEDICATION HISTORY
Pharmacy Note - Admission Medication History    Pertinent Provider Information:     Home Medication List:  Abluterol inhaler prn  Abilify 5mg daily  Suboxone 8-2mg per film 1 daily  Bupropion xl 300mg qam  Gabapentin 800mg tid  Vistaril 25mg 1-2 tabs  nightly as needed for itching  Lisinopril 40mg daily  Multivit daily   ______________________________________________________________________    Prior To Admission (PTA) med list completed and updated in EMR.       PTA Med List   Medication Sig Note Last Dose     acetaminophen (TYLENOL) 32 mg/mL liquid 650 mg by Oral or Feeding Tube route every 4 hours as needed for fever or mild pain  Unknown at Unknown time     albuterol (PROAIR HFA;PROVENTIL HFA;VENTOLIN HFA) 90 mcg/actuation inhaler Inhale 2 puffs into the lungs every 4 hours as needed  12/31/2021: [facility] med      ARIPiprazole (ABILIFY) 1 MG/ML SOLN solution Take 7 mg by mouth daily 12/31/2021: Was taking 5mg daily prior to being hospitalized 12/31/2021 at 0800     bisacodyl (DULCOLAX) 10 MG suppository Place 10 mg rectally daily as needed for constipation Hold for loose stools  Unknown at Unknown time     buprenorphine HCl-naloxone HCl (SUBOXONE) 8-2 MG per film Place 1 Film under the tongue daily 12/31/2021: [facility]ta med      buPROPion (WELLBUTRIN XL) 300 MG 24 hr tablet Take 300 mg by mouth every morning       cefTRIAXone Inject 1 g into the vein every 24 hours In 100ml NS  12/31/2021 at 1153     chlorhexidine (CHLORHEXIDINE) 0.12 % solution Take 15 mLs by mouth 2 times daily While intubated  12/31/2021 at 0756     docusate (COLACE) 50 MG/5ML liquid 100 mg by Per Feeding Tube route 2 times daily While hospitalized  12/30/2021 at 2016     enoxaparin ANTICOAGULANT (LOVENOX) 40 MG/0.4ML syringe Inject 40 mg Subcutaneous every 24 hours While hospitalized  12/31/2021 at 0801     gabapentin (NEURONTIN) 400 MG capsule Take 800 mg by mouth 3 times daily 12/31/2021: [facility]ta med 12/31/2021 at 1205     haloperidol lactate (HALDOL) 5  MG/ML injection Inject 2 mg into the vein every 5 minutes as needed for agitation Up to 3 doses in 30 min While hospitalized  12/31/2021 at 1454     hydrOXYzine (VISTARIL) 25 MG capsule Take 25-50 mg by mouth nightly as needed for itching       insulin aspart (NOVOLOG PEN) 100 UNIT/ML pen Inject 1-4 Units Subcutaneous every 4 hours Sliding scale low resistance  While hospitalized  Past Week at Unknown time     levETIRAcetam (KEPPRA) 500 MG tablet 500 mg by Oral or Feeding Tube route 2 times daily  12/31/2021 at 0800     lisinopril (ZESTRIL) 10 MG tablet Take 10 mg by mouth daily 12/31/2021: [facility]ta med      LORazepam (ATIVAN) 2 MG/ML (HIGH CONC) oral solution 12 mg by Per Feeding Tube route every 4 hours Started  12/31/2021 at 1205     LORazepam (ATIVAN) 2 MG/ML injection Inject 4 mg into the vein every 2 hours as needed for agitation or anxiety  12/31/2021 at 1454     metoprolol (LOPRESSOR) 5 MG/5ML injection Inject 5-10 mg into the vein every 4 hours as needed for high blood pressure  12/31/2021 at 1153     midodrine (PROAMATINE) 2.5 MG tablet Take 2.5 mg by mouth every 8 hours  12/31/2021 at 0316     multivitamin w/minerals (CERTAVITE/ANTIOXIDANTS) tablet Take 1 tablet by mouth daily 12/31/2021: [facility]ta med      OLANZapine zydis (ZYPREXA) 5 MG ODT Take 5 mg by mouth 2 times daily  12/31/2021 at 0800     oxyCODONE (ROXICODONE INTENSOL) 20 mg/mL (HIGH CONC) solution 20 mg by Oral or Feeding Tube route every 4 hours Started in hospital   12/31/2021 at 1416     QUEtiapine (SEROQUEL) 100 MG tablet 100 mg by Oral or Feeding Tube route At Bedtime  12/30/2021 at 2017     QUEtiapine (SEROQUEL) 25 MG tablet 75 mg by Oral or Feeding Tube route 3 times daily  12/31/2021 at 1205     sennosides (SENOKOT) 8.8 MG/5ML syrup 10 mLs by Per Feeding Tube route 2 times daily While hospitalized  12/30/2021 at 2016     traZODone (DESYREL) 150 MG tablet Take 75 mg by mouth 3 times daily  12/31/2021 at 1416       Information source(s): Hospital  records  Method of interview communication: N/A    The information provided in this note is only as accurate as the sources available at the time of the update(s).    Thank you for the opportunity to participate in the care of this patient.    Dora Buckner, Pharm D, BCPS, [facility] 12/31/2021 7:27 PM

## 2022-01-01 NOTE — PROGRESS NOTES
01/01/22 1100   Disability/Function   Hearing Difficulty or Deaf no   Wear Glasses or Blind no   Concentrating, Remembering or Making Decisions Difficulty yes   Difficulty Communicating yes   Communication other (see comments)   Difficulty Eating/Swallowing no   Eating/Swallowing other (see comments)   Walking or Climbing Stairs Difficulty other (see comments)   Dressing/Bathing Difficulty yes   Dressing/Bathing bathing difficulty, assistance 1 person   Toileting issues other (see comments)   Doing Errands Independently Difficulty (such as shopping) other (see comments)   Change in Functional Status Since Onset of Current Illness/Injury yes   General Information   General Observations and Info Jared North is a 46 year old male unvaccinated against COVID-19, with history of polysubstance abuse and chronic pain/lower back pain on Suboxone prior to admission (had been on methadone in the past but not in recent past), mood disorder/depression, HTN, ROSSY, mild intermittent asthma, knee pain, and diagnosis of COVID-19 viral infection on 11/19/2021 who originally presented to [facility] on 11/24/2021 with worsening shortness of breath, admitted to the ICU with COVID-19 viral pneumonia and severe acute respiratory failure with hypoxia requiring noninvasive ventilation.  He was started on Decadron, remdesivir, and tocilizumab and followed for several days alternating between NIPPV and HFNC.  He had an episode of afib during this time that eventually resolved and has not recurred after briefly being treated with amiodarone.  He did not improve and was eventually intubated on 11/28/21 and then proned and started on veletri.  11/30/21 he was started on cefepime and vanco for VAP - Cx grew E. Coli and Proteus mirabilis.  He did develop hypotension possibly from septic shock vs sedation vasoplegia and was treated with norepi intemittently.  Vanco was stopped on 12/5/21.  He was then transferred to [facility] ICU on  12/5/21.    Cognitive Status Examination   Orientation Status other (see comments)  (unable to assess due to medical status)   Affect/Mental Status (Cognitive) agitated;anxious   Follows Commands follows one-step commands   Safety Deficit impulsivity;safety precautions awareness   Range of Motion Comprehensive   General Range of Motion bilateral upper extremity ROM WFL   Strength Comprehensive (MMT)   General Manual Muscle Testing (MMT) Assessment no strength deficits identified   Clinical Impression   Criteria for Skilled Therapeutic Interventions Met (OT) meets criteria   OT Diagnosis decreased ADL independence, impaired cognition, impaired functional mobility   Identified Performance Deficits bed mob, balance, activity tolerance, strength, grooming   Therapy Frequency (OT) 5x/week   Predicted Duration of Therapy 4 weeks   OT Discharge Planning    OT Discharge Recommendation (DC Rec) Transitional Care Facility;Acute Rehab Center-Motivated patient will benefit from intensive, interdisciplinary therapy.  Anticipate will be able to tolerate 3 hours of therapy per day   OT Rationale for DC Rec IND prior to illness    Total Evaluation Time (Minutes)   Total Evaluation Time (Minutes) 15

## 2022-01-01 NOTE — PLAN OF CARE
Problem: Inability to Wean (Mechanical Ventilation, Invasive)  Goal: Mechanical Ventilation Liberation  Outcome: Improving     Problem: Skin and Tissue Injury (Mechanical Ventilation, Invasive)  Goal: Absence of Device-Related Skin and Tissue Injury  Outcome: Improving     Problem: Ventilator-Induced Lung Injury (Mechanical Ventilation, Invasive)  Goal: Absence of Ventilator-Induced Lung Injury  Outcome: Improving   RT PROGRESS NOTE     DATA:     CURRENT SETTINGS: A/C, 20, 520, +5             TRACH TYPE / SIZE: 7.5 Taper guard 12/17/21             MODE:   A/C             FIO2:   30%     ACTION:             THERAPIES:                SUCTION:                           FREQUENCY:   X4                        AMOUNT:   Moderate/large/scant                        CONSISTENCY: Thin                         COLOR:   White              SPONTANEOUS COUGH EFFORT/STRENGTH OF EFFORT (not elicited by suctioning): Yes/strong                              WEANING PHASE:   1                        WEAN MODE:  CPAP/PS 8/5                        WEAN TIME:   2HRS                        END WEAN REASON:  d/t increased WOB RR 42 and      RESPONSE:             BS:  Coarse              VITAL SIGNS: Sat 94-97%, HR , RR 21-42              EMOTIONAL NEEDS / CONCERNS:                  RISK FOR SELF DECANNULATION:  Yes                         RISK DUE TO:  Confusion                        INTERVENTION/S IN PLACE IS/ARE:  2.0 bilateral restraints in place     NOTE / PLAN:   Pt did not tolerate wean today. RT will cont to try to wean as tolerated.

## 2022-01-02 ENCOUNTER — APPOINTMENT (OUTPATIENT)
Dept: SPEECH THERAPY | Facility: CLINIC | Age: 47
End: 2022-01-02
Attending: INTERNAL MEDICINE
Payer: COMMERCIAL

## 2022-01-02 ENCOUNTER — APPOINTMENT (OUTPATIENT)
Dept: PHYSICAL THERAPY | Facility: CLINIC | Age: 47
End: 2022-01-02
Attending: INTERNAL MEDICINE
Payer: COMMERCIAL

## 2022-01-02 LAB
MAGNESIUM SERPL-MCNC: 1.9 MG/DL (ref 1.8–2.6)
POTASSIUM BLD-SCNC: 4.1 MMOL/L (ref 3.5–5)

## 2022-01-02 PROCEDURE — 92523 SPEECH SOUND LANG COMPREHEN: CPT | Mod: GN | Performed by: SPEECH-LANGUAGE PATHOLOGIST

## 2022-01-02 PROCEDURE — 97530 THERAPEUTIC ACTIVITIES: CPT | Mod: GP

## 2022-01-02 PROCEDURE — 94003 VENT MGMT INPAT SUBQ DAY: CPT

## 2022-01-02 PROCEDURE — 97162 PT EVAL MOD COMPLEX 30 MIN: CPT | Mod: GP

## 2022-01-02 PROCEDURE — 250N000013 HC RX MED GY IP 250 OP 250 PS 637: Performed by: NURSE PRACTITIONER

## 2022-01-02 PROCEDURE — 999N000009 HC STATISTIC AIRWAY CARE

## 2022-01-02 PROCEDURE — 250N000013 HC RX MED GY IP 250 OP 250 PS 637: Performed by: INTERNAL MEDICINE

## 2022-01-02 PROCEDURE — 84132 ASSAY OF SERUM POTASSIUM: CPT | Performed by: INTERNAL MEDICINE

## 2022-01-02 PROCEDURE — 999N000157 HC STATISTIC RCP TIME EA 10 MIN

## 2022-01-02 PROCEDURE — 99233 SBSQ HOSP IP/OBS HIGH 50: CPT | Performed by: INTERNAL MEDICINE

## 2022-01-02 PROCEDURE — 93010 ELECTROCARDIOGRAM REPORT: CPT | Performed by: GENERAL ACUTE CARE HOSPITAL

## 2022-01-02 PROCEDURE — 83735 ASSAY OF MAGNESIUM: CPT | Performed by: INTERNAL MEDICINE

## 2022-01-02 PROCEDURE — 97112 NEUROMUSCULAR REEDUCATION: CPT | Mod: GP

## 2022-01-02 PROCEDURE — 250N000011 HC RX IP 250 OP 636: Performed by: NURSE PRACTITIONER

## 2022-01-02 PROCEDURE — 120N000001 HC R&B MED SURG/OB

## 2022-01-02 PROCEDURE — 93005 ELECTROCARDIOGRAM TRACING: CPT | Performed by: INTERNAL MEDICINE

## 2022-01-02 PROCEDURE — 93005 ELECTROCARDIOGRAM TRACING: CPT

## 2022-01-02 RX ORDER — METOPROLOL TARTRATE 25 MG/1
25 TABLET, FILM COATED ORAL 2 TIMES DAILY
Status: DISCONTINUED | OUTPATIENT
Start: 2022-01-02 | End: 2022-01-04

## 2022-01-02 RX ORDER — METOPROLOL TARTRATE 25 MG/1
25 TABLET, FILM COATED ORAL 2 TIMES DAILY
Status: DISCONTINUED | OUTPATIENT
Start: 2022-01-02 | End: 2022-01-02

## 2022-01-02 RX ADMIN — CHLORHEXIDINE GLUCONATE 0.12% ORAL RINSE 15 ML: 1.2 LIQUID ORAL at 08:31

## 2022-01-02 RX ADMIN — NYSTATIN 500000 UNITS: 100000 SUSPENSION ORAL at 12:47

## 2022-01-02 RX ADMIN — LEVETIRACETAM 500 MG: 100 SOLUTION ORAL at 08:31

## 2022-01-02 RX ADMIN — ARIPIPRAZOLE 7 MG: 1 SOLUTION ORAL at 08:30

## 2022-01-02 RX ADMIN — CEFTRIAXONE SODIUM 1 G: 1 INJECTION, POWDER, FOR SOLUTION INTRAMUSCULAR; INTRAVENOUS at 12:10

## 2022-01-02 RX ADMIN — QUETIAPINE FUMARATE 100 MG: 50 TABLET ORAL at 20:17

## 2022-01-02 RX ADMIN — LORAZEPAM 12 MG: 2 LIQUID ORAL at 23:22

## 2022-01-02 RX ADMIN — QUETIAPINE 75 MG: 25 TABLET ORAL at 08:31

## 2022-01-02 RX ADMIN — METOPROLOL TARTRATE 25 MG: 25 TABLET, FILM COATED ORAL at 20:17

## 2022-01-02 RX ADMIN — OXYCODONE HYDROCHLORIDE 20 MG: 100 SOLUTION ORAL at 12:48

## 2022-01-02 RX ADMIN — LORAZEPAM 12 MG: 2 LIQUID ORAL at 05:48

## 2022-01-02 RX ADMIN — POLYETHYLENE GLYCOL 3350 17 G: 17 POWDER, FOR SOLUTION ORAL at 08:30

## 2022-01-02 RX ADMIN — ENOXAPARIN SODIUM 40 MG: 100 INJECTION SUBCUTANEOUS at 08:30

## 2022-01-02 RX ADMIN — OLANZAPINE 5 MG: 5 TABLET, ORALLY DISINTEGRATING ORAL at 20:17

## 2022-01-02 RX ADMIN — LORAZEPAM 12 MG: 2 LIQUID ORAL at 07:12

## 2022-01-02 RX ADMIN — LORAZEPAM 12 MG: 2 LIQUID ORAL at 15:38

## 2022-01-02 RX ADMIN — LORAZEPAM 12 MG: 2 LIQUID ORAL at 12:04

## 2022-01-02 RX ADMIN — OXYCODONE HYDROCHLORIDE 20 MG: 100 SOLUTION ORAL at 08:30

## 2022-01-02 RX ADMIN — QUETIAPINE 75 MG: 25 TABLET ORAL at 12:47

## 2022-01-02 RX ADMIN — LEVETIRACETAM 500 MG: 100 SOLUTION ORAL at 20:16

## 2022-01-02 RX ADMIN — TRAZODONE HYDROCHLORIDE 75 MG: 150 TABLET ORAL at 08:31

## 2022-01-02 RX ADMIN — OXYCODONE HYDROCHLORIDE 20 MG: 100 SOLUTION ORAL at 20:16

## 2022-01-02 RX ADMIN — NYSTATIN 500000 UNITS: 100000 SUSPENSION ORAL at 08:30

## 2022-01-02 RX ADMIN — GABAPENTIN 800 MG: 250 SOLUTION ORAL at 13:12

## 2022-01-02 RX ADMIN — QUETIAPINE 75 MG: 25 TABLET ORAL at 16:36

## 2022-01-02 RX ADMIN — LORAZEPAM 4 MG: 2 INJECTION INTRAMUSCULAR; INTRAVENOUS at 04:45

## 2022-01-02 RX ADMIN — TRAZODONE HYDROCHLORIDE 75 MG: 150 TABLET ORAL at 13:00

## 2022-01-02 RX ADMIN — POLYETHYLENE GLYCOL 3350 17 G: 17 POWDER, FOR SOLUTION ORAL at 20:16

## 2022-01-02 RX ADMIN — DOCUSATE SODIUM 100 MG: 50 LIQUID ORAL at 20:16

## 2022-01-02 RX ADMIN — SENNOSIDES 10 ML: 8.8 LIQUID ORAL at 08:30

## 2022-01-02 RX ADMIN — OXYCODONE HYDROCHLORIDE 20 MG: 100 SOLUTION ORAL at 16:36

## 2022-01-02 RX ADMIN — SENNOSIDES 10 ML: 8.8 LIQUID ORAL at 21:44

## 2022-01-02 RX ADMIN — OXYCODONE HYDROCHLORIDE 20 MG: 100 SOLUTION ORAL at 04:44

## 2022-01-02 RX ADMIN — NYSTATIN 500000 UNITS: 100000 SUSPENSION ORAL at 16:36

## 2022-01-02 RX ADMIN — TRAZODONE HYDROCHLORIDE 75 MG: 150 TABLET ORAL at 20:16

## 2022-01-02 RX ADMIN — NYSTATIN 500000 UNITS: 100000 SUSPENSION ORAL at 20:16

## 2022-01-02 RX ADMIN — MIDODRINE HYDROCHLORIDE 2.5 MG: 2.5 TABLET ORAL at 12:47

## 2022-01-02 RX ADMIN — GABAPENTIN 800 MG: 250 SOLUTION ORAL at 21:40

## 2022-01-02 RX ADMIN — METOPROLOL TARTRATE 25 MG: 25 TABLET, FILM COATED ORAL at 10:12

## 2022-01-02 RX ADMIN — OLANZAPINE 5 MG: 5 TABLET, ORALLY DISINTEGRATING ORAL at 08:32

## 2022-01-02 RX ADMIN — GABAPENTIN 800 MG: 250 SOLUTION ORAL at 06:14

## 2022-01-02 RX ADMIN — CHLORHEXIDINE GLUCONATE 0.12% ORAL RINSE 15 ML: 1.2 LIQUID ORAL at 20:16

## 2022-01-02 RX ADMIN — LORAZEPAM 12 MG: 2 LIQUID ORAL at 18:44

## 2022-01-02 RX ADMIN — DOCUSATE SODIUM 100 MG: 50 LIQUID ORAL at 08:31

## 2022-01-02 ASSESSMENT — ACTIVITIES OF DAILY LIVING (ADL)
ADLS_ACUITY_SCORE: 27
ADLS_ACUITY_SCORE: 25
ADLS_ACUITY_SCORE: 27
ADLS_ACUITY_SCORE: 27
ADLS_ACUITY_SCORE: 25
ADLS_ACUITY_SCORE: 27
ADLS_ACUITY_SCORE: 25
ADLS_ACUITY_SCORE: 27
ADLS_ACUITY_SCORE: 25
ADLS_ACUITY_SCORE: 27
ADLS_ACUITY_SCORE: 25
ADLS_ACUITY_SCORE: 27
ADLS_ACUITY_SCORE: 27
ADLS_ACUITY_SCORE: 25

## 2022-01-02 NOTE — PLAN OF CARE
"  Problem: Inability to Wean (Mechanical Ventilation, Invasive)  Goal: Mechanical Ventilation Liberation  Outcome: Improving     Problem: Skin and Tissue Injury (Mechanical Ventilation, Invasive)  Goal: Absence of Device-Related Skin and Tissue Injury  Outcome: Improving     Problem: Ventilator-Induced Lung Injury (Mechanical Ventilation, Invasive)  Goal: Absence of Ventilator-Induced Lung Injury  Outcome: Improving   RT PROGRESS NOTE     DATA:     CURRENT SETTINGS: AC 16/520/+5/30%             TRACH TYPE / SIZE:  #7.5 shiley TaperGuard, placed on 12/17             MODE:   AC             FIO2:   30%     ACTION:             THERAPIES:                SUCTION:                           FREQUENCY:   X2                        AMOUNT:   scant to small                        CONSISTENCY:   thin                         COLOR:   clear             SPONTANEOUS COUGH EFFORT/STRENGTH OF EFFORT (not elicited by suctioning): good productive                              WEANING PHASE:   #1                        WEAN MODE:    PS 8                         WEAN TIME:                           END WEAN REASON:        RESPONSE:             BS:   clear              VITAL SIGNS:  /81 (BP Location: Left arm, Patient Position: Right side)   Pulse 105   Temp 98.1  F (36.7  C) (Axillary)   Resp 17   Ht 1.803 m (5' 11\")   Wt 80.7 kg (178 lb)   SpO2 98%   BMI 24.83 kg/m                EMOTIONAL NEEDS / CONCERNS:                  RISK FOR SELF DECANNULATION:  yes                        RISK DUE TO:  confusion, agitation, restless                         INTERVENTION/S IN PLACE IS/ARE: Bilateral wrist restrain     NOTE / PLAN:  will cont SBT days as miranda and monitor closely.  "

## 2022-01-02 NOTE — PLAN OF CARE
Problem: Inability to Wean (Mechanical Ventilation, Invasive)  Goal: Mechanical Ventilation Liberation  Outcome: Improving     Problem: Skin and Tissue Injury (Mechanical Ventilation, Invasive)  Goal: Absence of Device-Related Skin and Tissue Injury  Outcome: Improving     Problem: Ventilator-Induced Lung Injury (Mechanical Ventilation, Invasive)  Goal: Absence of Ventilator-Induced Lung Injury  Outcome: Improving     Problem: Restraint for Non-Violent/Non-Self-Destructive Behavior  Goal: Prevent/Manage Potential Problems  Description: Maintain safety of patient and others during period of restraint.  Promote psychological and physical wellbeing.  Prevent injury to skin and involved body parts.  Promote nutrition, hydration, and elimination.  Outcome: Improving   RT PROGRESS NOTE     DATA:     CURRENT SETTINGS: AC 16, 520, +5, 30%             TRACH TYPE / SIZE: 7.5 SHILEY TaperGuard Placed on 12/17             MODE: AC             FIO2: 30%     ACTION:             THERAPIES:               SUCTION:                           FREQUENCY: x3                        AMOUNT:Small to moderate                        CONSISTENCY:thick                        COLOR:  white             SPONTANEOUS COUGH EFFORT/STRENGTH OF EFFORT (not elicited by suctioning): Strong                              WEANING PHASE: 1                        WEAN MODE:  CPAP 5/PS 12                        WEAN TIME: 10 hrs 15 min                        END WEAN REASON: Increased work of breathing , Tachycardia ( )                     ECG=done  RESPONSE:             BS: Coarse             VITAL SIGNS: Sat 97-99%, -105 , RR 16-28             EMOTIONAL NEEDS / CONCERNS: Confused                RISK FOR SELF DECANNULATION: yes                        RISK DUE TO: confused                         INTERVENTION/S IN PLACE IS/ARE: Bilateral wrist restraints       NOTE / PLAN:  Cont. Current plan of care

## 2022-01-02 NOTE — PROGRESS NOTES
01/02/22 0942   Quick Adds   Type of Visit Initial PT Evaluation   Living Environment   People in home other (see comments)  (sober house)   Current Living Arrangements other (see comments)  (sober house)   Transportation Anticipated other (see comments)   Living Environment Comments Per screen, pt. not  lived in sober house but bed is not being held   Self-Care   Usual Activity Tolerance good   Current Activity Tolerance fair   Equipment Currently Used at Home none   General Information   Onset of Illness/Injury or Date of Surgery 11/24/21   Patient/Family Therapy Goals Statement (PT) Post Covid, resp failure, ARDS, polysubstance abuse, restless/agitated due to encephalopathy and possible withdrawl, in B wrist restraints   Cognition   Orientation Status (Cognition) person   Affect/Mental Status (Cognition) low arousal/lethargic   Follows Commands (Cognition) follows one-step commands;75-90% accuracy   Range of Motion (ROM)   ROM Comment WFL   Strength   Strength Comments B LE Grossly 3/5   Bed Mobility   Comment (Bed Mobility) Max A supine to sit   Transfers   Transfer Safety Comments Not tested   Gait/Stairs (Locomotion)   Comment (Gait/Stairs) NA   Balance   Balance Comments Sit balance variable CGA to Mod A   Sensory Examination   Sensory Perception Comments B LE light touch intact   Clinical Impression   Criteria for Skilled Therapeutic Intervention yes, treatment indicated   PT Diagnosis (PT) impaired mobility, weakness, impaired balance   Influenced by the following impairments Pt. requires Max A with bed mobility and assist to sit due to weakness   Functional limitations due to impairments current medical status   Clinical Presentation Evolving/Changing   Clinical Presentation Rationale Pt presents as medically diagnosed.   Clinical Decision Making (Complexity) moderate complexity   Therapy Frequency (PT) 6x/week   Predicted Duration of Therapy Intervention (days/wks) 6 weeks   Planned Therapy  Interventions (PT) bed mobility training;balance training;E-stim;home exercise program;gait training;neuromuscular re-education;patient/family education;ROM (range of motion);stair training;strengthening;stretching;transfer training;progressive activity/exercise   Anticipated Equipment Needs at Discharge (PT) wheelchair cushion;wheelchair;walker, rolling;gait belt   Risk & Benefits of therapy have been explained evaluation/treatment results reviewed;care plan/treatment goals reviewed   Clinical Impression Comments Pt. lethargic at eval due to medication did follow 1 step commands for LE movement.  Pt. requires max A for bed mobility and has significant weakness.  Pt. is appropriate for skilled PT services to address limitations

## 2022-01-02 NOTE — PLAN OF CARE
Problem: Restraint for Non-Violent/Non-Self-Destructive Behavior  Goal: Prevent/Manage Potential Problems  Description: Maintain safety of patient and others during period of restraint.  Promote psychological and physical wellbeing.  Prevent injury to skin and involved body parts.  Promote nutrition, hydration, and elimination.  Outcome: No Change   Still with Bilateral soft limb restraints for restlessness, trying to get out of bed. He was more awake this morning, seems to comprehend situation but still needs frequent reorientation. Removed condom cath x 2. Reminded not to pull on lines, redirectable. Had PT, OT today. Got pt up in the chair x1 when ex-wife is visiting.  Problem: Dysrhythmia  Goal: Normalized Cardiac Rhythm  Outcome: No Change   Pt still on telemetry monitoring, reading:sinus tachycardia. K=4.1, Mg=1.9 recheck labs tomorrow. Resting in bed at this time.

## 2022-01-02 NOTE — PLAN OF CARE
Problem: Restraint for Non-Violent/Non-Self-Destructive Behavior  Goal: Prevent/Manage Potential Problems  Description: Maintain safety of patient and others during period of restraint.  Promote psychological and physical wellbeing.  Prevent injury to skin and involved body parts.  Promote nutrition, hydration, and elimination.  Outcome: No Change   Patient was moderately sedated at the beginning of the shift, scheduled Ativan was delayed but later given when patient woke up , was very agitated and trying to climb out of bed. Patient continued on restraint for safety , all other cares done.

## 2022-01-02 NOTE — PLAN OF CARE
Problem: Adult Inpatient Plan of Care  Goal: Readiness for Transition of Care  Recent Flowsheet Documentation  Taken 1/2/2022 0942 by Fermin Troncoso, PT  Transportation Anticipated: other (see comments)  Intervention: Mutually Develop Transition Plan  Recent Flowsheet Documentation  Taken 1/2/2022 0942 by Fermin Troncoso, PT  Transportation Anticipated: other (see comments)  Equipment Currently Used at Home: none     Problem: PT General Care Plan  Goal: PT Frequency  Flowsheets (Taken 1/2/2022 0957)  PT Frequency: 6  Goal: Bed Mobility (PT)  Description: PT Bed Mobility  Flowsheets (Taken 1/2/2022 0957)  PT goal: bed mobility: Supervision/stand-by assist  Goal: Transfer (PT)  Description: PT Transfer  Flowsheets (Taken 1/2/2022 0957)  PT goal: transfers: Minimal assist  Goal: Gait (PT)  Description: PT Gait  Flowsheets (Taken 1/2/2022 0957)  PT goal: gait:   Minimal assist   150 feet   Standard walker  Goal: Stairs (PT)  Description: PT Stairs  Flowsheets (Taken 1/2/2022 0957)  PT goal: stairs:   Minimal assist   4 stairs   Rail on both sides

## 2022-01-02 NOTE — PLAN OF CARE
Problem: Adult Inpatient Plan of Care  Goal: Optimal Comfort and Wellbeing  Outcome: Improving     Problem: Restraint for Non-Violent/Non-Self-Destructive Behavior  Goal: Prevent/Manage Potential Problems  Description: Maintain safety of patient and others during period of restraint.  Promote psychological and physical wellbeing.  Prevent injury to skin and involved body parts.  Promote nutrition, hydration, and elimination.  Outcome: Improving     Problem: Dysrhythmia  Goal: Normalized Cardiac Rhythm  Outcome: Improving     Pt is alert but appears to be confused and restless for most of the shift. He remains on SL x2 restraints for safety.Pt remains on telemetry monitoring, the reading has been sinus tachy.

## 2022-01-02 NOTE — PROGRESS NOTES
01/02/22 0920   General Information   Onset of Illness/Injury or Date of Surgery 12/31/21   Referring Physician Jourdan   Pertinent History of Current Problem Per MD note (copied/pasted parts of it): Jared North is a 46 year old male unvaccinated against COVID-19, with history of polysubstance abuse and chronic pain/lower back pain, mood disorder/depression, HTN, ROSSY, mild intermittent asthma, knee pain, and diagnosis of COVID-19 viral infection on 11/19/2021 who originally presented to [facility] on 11/24/2021 with worsening shortness of breath, admitted to the ICU with COVID-19 viral pneumonia and severe acute respiratory failure with hypoxia requiring noninvasive ventilation. He did not improve and was eventually intubated on 11/28/21 and then proned and started on veletri. 11/30/21 he was started on cefepime and vanco for VAP - Cx grew E. Coli and Proteus mirabilis. He did develop hypotension possibly from septic shock vs sedation vasoplegia. He was then transferred to [facility] ICU on 12/5/21. At [place] he was followed in ICU and continued on vent/paralysis/proning/pressors. He continued to be very encephalopathic and psychiatry was consulted. He was weaned off pressors and didn't require proning by 12/10/21. He has been on large doses of anti-psychotics and sedatives. He developed fevers again 12/14 but had clinically improved somewhat and was extubated on 12/15/21. By the next day 12/16/21 he had severely increased work of breathing and so was re-intubated. Blood Cx grew Streptococcus constellatus and sputum Cx grew that as well but also had E. Coli/Proteus m./staph epi/candida parapsilosis. He then underwent trach/PEG on 12/17/21. During this time he has had significant issues with encephalopathy and agitation. As above he has been started on multiple meds for this. Over the last week prior to admission here at [facility] they have increased his lorazepam up to 12 mg every 4 hours and oxycodone  20 mg every 4 hours along with abilify 7 mg (home med), gabapentin 800 mg every 8 hours, keppra 500 mg twice daily (for behavior), zyprexa 5 mg twice daily, and seroquel 75 mg TID with 100 mg HS.  With this regimen he has improved reportedly but is still awake and agitated.   Type of Evaluation   Type of Evaluation Speech, Language, Cognition   Oral Motor   Oral Musculature generally intact   Structural Abnormalities none present   Mucosal Quality other (see comments)  (thrush)   Facial Symmetry (Oral Motor)   Facial Symmetry (Oral Motor) unable/difficult to assess   Lip Function (Oral Motor)   Lip Range of Motion (Oral Motor) unable/difficult to assess   Lip Strength (Oral Motor) unable/difficult to assess   Tongue Function (Oral Motor)   Tongue ROM (Oral Motor) unable/difficult to assess   Tongue Strength (Oral Motor) unable/difficult to assess   Cough/Swallow/Gag Reflex (Oral Motor)   Volitional Throat Clear/Cough (Oral Motor) unable/difficult to assess   Volitional Swallow (Oral Motor) unable/difficult to assess   Speech   Speech Intelligibility (Motor Speech) unable/difficult to assess   Comment, Motor Speech Assessment no attempts at verbal expression elicited or noted   Auditory Comprehension   Follows Commands (Auditory Comprehension) 1-step command   Yes/No Questions (Auditory Comprehension) biographical/personal questions   Functional Assessment Scale (Auditory Comprehension) Severe Impairment   Comment, Assessment (Auditory Comprehension) Able to follow a few commands, answer a few questions but severely limited response rate   1 Step, Follows Commands (Auditory Comprehension) impaired;0-24% accuracy   Biographical/Personal Questions (Auditory Comprehension) 0-24% accuracy;impaired   Verbal Expression   Automatic Speech (Verbal Expression) unable/difficult to assess   Responsive Naming (Verbal Expression) semantic/function;other (see comments)  (automatic speech)   Functional Assessment Scale (Verbal  Expression) Severe Impairment   Comment, Assessment (Verbal Expression) no attempt at mouthing words; inpaired, unsuccessful with cues   Semantic/Function, Responsive Naming (Verbal Expression) impaired;unable/difficult to assess   Cognition   Cognitive Status Severe impairment in cognition, decreased attention/participation. Pt had eyes open, and intermittent responsiveness but required consistent verbal/tactile cues. Didn't look over at clinician with eye tracking and minimal response when right in his vision. He did have intermittent response (<20%) to verbal commands/questions with auditory stimuli.   General Therapy Interventions   Planned Therapy Interventions Cognitive Treatment;Communication;Language   Cognitive treatment Progressive attention training   Language Auditory comprehension;Verbal expression   Communication Augmentative/alternative communication;Speaking valve instruction;Improve speech intelligibility   SLP Therapy Assessment/Plan   Criteria for Skilled Therapeutic Interventions Met (SLP Eval) yes;treatment indicated   SLP Diagnosis aphonia, decreased communication means, severe cognitive deficits   Rehab Potential (SLP Eval) fair, will monitor progress closely   Therapy Frequency (SLP Eval) 3 times/wk   Predicted Duration of Therapy Intervention (SLP Eval) 6 weeks   Comment, Therapy Assessment/Plan (SLP) Patient presents with aphonia due to trach/vent, lack of communication means, severe expressive/receptive language, decreased ability to follow commands/answer ques, severe cognitive deficits, variable alertness/agitation level/participation level, severe impairments with attention/participation, and suspect in problem solving, memory, reasoning, executive functions and insight.   Therapy Plan Review/Discharge Plan (SLP)   Therapy Plan Review (SLP) evaluation/treatment results reviewed;participants included;patient   SLP Discharge Planning    SLP Discharge Recommendation (DC Rec) Transitional  Care Facility   SLP Rationale for DC Rec Pt on mechanical ventilation, NPO/PEG. Pt well-below baseline swallowing and speech/language skills as well as cognitive linguistic skills and speech/voicing   SLP Brief overview of current status  expect decreased/variable responsiveness at this point, will monitor participation level in therapy    Total Evaluation Time   Total Evaluation Time (Minutes) 20

## 2022-01-02 NOTE — PROGRESS NOTES
[facility]    Medicine Progress Note - Hospitalist Service       Date of Admission:  12/31/2021    Background Summary:  Jared North is a 46 year old male unvaccinated against COVID-19, with history of polysubstance abuse and chronic pain/lower back pain on Suboxone prior to admission (had been on methadone in the past but not in recent past), mood disorder/depression, HTN, ROSSY, mild intermittent asthma, knee pain, and diagnosis of COVID-19 viral infection on 11/19/2021 who originally presented to [facility] on 11/24/2021 with worsening shortness of breath, admitted to the ICU with COVID-19 viral pneumonia and severe acute respiratory failure with hypoxia requiring noninvasive ventilation.  He was started on Decadron, remdesivir, and tocilizumab and followed for several days alternating between NIPPV and HFNC.  He had an episode of afib during this time that eventually resolved and has not recurred after briefly being treated with amiodarone.  He did not improve and was eventually intubated on 11/28/21 and then proned and started on veletri.  11/30/21 he was started on cefepime and vanco for VAP - Cx grew E. Coli and Proteus mirabilis.  He did develop hypotension possibly from septic shock vs sedation vasoplegia and was treated with norepi intemittently.  Vanco was stopped on 12/5/21.  He was then transferred to [place] ICU on 12/5/21.       At [place] he was followed in ICU and continued on vent/paralysis/proning/pressors.  Cefepime switched to rocephin 12/6/21.  He continued to be very encephalopathic and psychiatry was consulted. He was weaned off pressors and didn't require proning by 12/10/21.  He has been on large doses of anti-psychotics and sedatives.  He developed fevers again 12/14 but had clinically improved somewhat and was extubated on 12/15/21.  By  The next day 12/16/21 he had severely increased work of breathing and so was re-intubated. Blood Cx grew Streptococcus constellatus and sputum  Cx grew that as well but also had E. Coli/Proteus m./staph epi/candida parapsilosis. Vanco/Zosyn was started on 12/16/21.  He then underwent trach/PEG on 12/17/21.  Vanco was stopped after 3 days and Zosyn switched to rocephin on 12/21/21 to complete on 1/2/21.       During this time he has had significant issues with encephalopathy and agitation. As above he has been started on multiple meds for this.  Over the last week prior to admission here at [facility] they have increased his lorazepam up to 12 mg every 4 hours and oxycodone 20 mg every 4 hours along with abilify 7 mg (home med), gabapentin 800 mg every 8 hours, keppra 500 mg twice daily (for behavior), zyprexa 5 mg twice daily, and seroquel 75 mg TID with 100 mg HS.  With this regimen he has improved reportedly but is still awake and agitated.    Assessment & Plan             Principal Problem:    Acute on chronic respiratory failure with hypoxia - vent, pulm/RT consults, PT/OT to get strong enough to wean vent/trach     Active Problems:    Acute metabolic encephalopathy - multiple meds as outlined above on very high doses of lorazepam and oxycodone along with the zyprexa, Seroquel, gabapentin, and keppra.  He still is awake and gets agitated as the 12 mg of lorazepam begins to wear off at the 3 hour juanita.  I will have psychiatry and addiction medicine consult - it is possible he may need to go back on suboxone in order to allow for weaning of these other meds.        Chronic back pain - was on suboxone prior to admission - will have addiction medicine consult.  Currently on oxycodone 20 mg every 4 hours.      Essential hypertension - has been low so off meds      Acute respiratory distress syndrome (ARDS) due to COVID-19 pneumonia - improved, starting the weaning process for vent      Major depressive disorder, recurrent episode, severe - continue abilify, seroquel, zyprexa, trazodone,psychiatry to see      Opioid type dependence - off suboxone, now on oxycodone  high dose and ativan high dose, will need to slowly wean, consult psychiatry and addiction medicine      Polysubstance abuse as above      ROSSY (obstructive sleep apnea)- vented/trached for now      Mild intermittent asthma - can add nebs as needed      Recent Ventilator associated pneumonia - on rocephin to complete 1/3/22.      Thrush - start nystatin      Dysphagia - speech therapy consult, on tube feeds     Severe Malnutrition, POA: based on nutrition assessment     Sinus tachycardia  -  Treatment of underlying causes  -  Pt has been in initiated on metoprolol tartrate         Diet: Adult Formula Drip Feeding: Continuous Promote w fiber; Gastrostomy; Goal Rate: 80; mL/hr; Medication - Feeding Tube Flush Frequency: At least 15-30 mL water before and after medication administration and with tube clogging    DVT Prophylaxis: Enoxaparin (Lovenox) SQ  Wei Catheter: Not present  Central Lines: None  Code Status: Full Code      Disposition Plan   Expected Discharge: TBD   Anticipated discharge location: home with family   vs acute rehab vs TCU       The patient's care was discussed with the care team    Yonny Staley MD  Hospitalist Service  [facility]  Securely message with the Vocera Web Console (learn more here)  Text page via [employer] Paging/Directory        Clinically Significant Risk Factors Present on Admission               ______________________________________________________________________    Interval History       Data reviewed today: I reviewed all medications, new labs and imaging results over the last 24 hours.  All medication issues identified within the last24 hours have been addressed and completed as appropriate.    Physical Exam   Vital Signs: Temp: 98.9  F (37.2  C) Temp src: Oral BP: 116/71 Pulse: 104   Resp: 23 SpO2: 98 % O2 Device: Mechanical Ventilator    Weight: 178 lbs 0 oz  General: Awake, alert, appropriately interactive.  Appeared comfortable.  HEENT: Sclera without redness or jaundice.   External ears appear normal  Cardiac: Tachycardic  Pulmonary: Clear to auscultation in bilateral lung fields  Abdomen: Not distended.  Not tender to palpation.  Musculoskeletal: No joint abnormalities noted  Skin: Normally turgid, no rashes noted  Neurologic: Awake, alert, appropriately interactive  Psychiatric: Calm      Data   Recent Labs   Lab 01/02/22  0612 01/01/22  0805 01/01/22  0611 01/01/22  0603 01/01/22  0002 12/31/21  0759 12/31/21  0518 12/30/21  0426 12/30/21  0424 12/29/21  1210 12/29/21  1159 12/28/21  0826 12/28/21  0502   WBC  --   --   --   --   --   --   --   --  9.1  --  15.8*  --   --    HGB  --   --   --   --   --   --   --   --  11.7*  --  11.5*  --   --    MCV  --   --   --   --   --   --   --   --  95  --  95  --   --    PLT  --   --   --   --   --   --   --   --  256  --  296  --   --    NA  --   --   --   --   --   --   --   --  139  --  142  --  142   POTASSIUM 4.1  --  4.2  --   --   --  4.0  --  3.8  --  4.4   < > 4.1  4.1   CHLORIDE  --   --   --   --   --   --   --   --  102  --  105  --  105   CO2  --   --   --   --   --   --   --   --  25  --  28  --  26   BUN  --   --   --   --   --   --   --   --  14  --  12  --  10   CR  --   --   --   --   --   --   --   --  0.66*  --  0.66*  --  0.67*   ANIONGAP  --   --   --   --   --   --   --   --  12  --  9  --  11   TRUONG  --   --   --   --   --   --   --   --  9.2  --  9.2  --  9.4   GLC  --  114*  --  132* 107*   < >  --    < > 87   < > 118   < > 121    < > = values in this interval not displayed.

## 2022-01-03 ENCOUNTER — APPOINTMENT (OUTPATIENT)
Dept: PHYSICAL THERAPY | Facility: CLINIC | Age: 47
End: 2022-01-03
Attending: INTERNAL MEDICINE
Payer: COMMERCIAL

## 2022-01-03 ENCOUNTER — APPOINTMENT (OUTPATIENT)
Dept: SPEECH THERAPY | Facility: CLINIC | Age: 47
End: 2022-01-03
Attending: INTERNAL MEDICINE
Payer: COMMERCIAL

## 2022-01-03 ENCOUNTER — APPOINTMENT (OUTPATIENT)
Dept: OCCUPATIONAL THERAPY | Facility: CLINIC | Age: 47
End: 2022-01-03
Attending: INTERNAL MEDICINE
Payer: COMMERCIAL

## 2022-01-03 LAB
BACTERIA BLD CULT: NO GROWTH
BACTERIA SPEC CULT: NORMAL
MAGNESIUM SERPL-MCNC: 2 MG/DL (ref 1.8–2.6)
POTASSIUM BLD-SCNC: 4.1 MMOL/L (ref 3.5–5)

## 2022-01-03 PROCEDURE — 250N000011 HC RX IP 250 OP 636: Performed by: NURSE PRACTITIONER

## 2022-01-03 PROCEDURE — 250N000013 HC RX MED GY IP 250 OP 250 PS 637: Performed by: FAMILY MEDICINE

## 2022-01-03 PROCEDURE — 99221 1ST HOSP IP/OBS SF/LOW 40: CPT | Performed by: FAMILY MEDICINE

## 2022-01-03 PROCEDURE — 97112 NEUROMUSCULAR REEDUCATION: CPT | Mod: GP

## 2022-01-03 PROCEDURE — G0463 HOSPITAL OUTPT CLINIC VISIT: HCPCS

## 2022-01-03 PROCEDURE — 250N000013 HC RX MED GY IP 250 OP 250 PS 637: Performed by: INTERNAL MEDICINE

## 2022-01-03 PROCEDURE — 94003 VENT MGMT INPAT SUBQ DAY: CPT

## 2022-01-03 PROCEDURE — 92610 EVALUATE SWALLOWING FUNCTION: CPT | Mod: GN

## 2022-01-03 PROCEDURE — 84132 ASSAY OF SERUM POTASSIUM: CPT | Performed by: INTERNAL MEDICINE

## 2022-01-03 PROCEDURE — 999N000009 HC STATISTIC AIRWAY CARE

## 2022-01-03 PROCEDURE — 120N000001 HC R&B MED SURG/OB

## 2022-01-03 PROCEDURE — 999N000157 HC STATISTIC RCP TIME EA 10 MIN

## 2022-01-03 PROCEDURE — 83735 ASSAY OF MAGNESIUM: CPT | Performed by: INTERNAL MEDICINE

## 2022-01-03 PROCEDURE — 97110 THERAPEUTIC EXERCISES: CPT | Mod: GP

## 2022-01-03 PROCEDURE — 92507 TX SP LANG VOICE COMM INDIV: CPT | Mod: GN

## 2022-01-03 PROCEDURE — 99232 SBSQ HOSP IP/OBS MODERATE 35: CPT | Performed by: INTERNAL MEDICINE

## 2022-01-03 PROCEDURE — 250N000013 HC RX MED GY IP 250 OP 250 PS 637: Performed by: NURSE PRACTITIONER

## 2022-01-03 PROCEDURE — 94002 VENT MGMT INPAT INIT DAY: CPT | Performed by: NURSE PRACTITIONER

## 2022-01-03 PROCEDURE — 97535 SELF CARE MNGMENT TRAINING: CPT | Mod: GO | Performed by: OCCUPATIONAL THERAPIST

## 2022-01-03 RX ORDER — PHENOBARBITAL 32.4 MG/1
259.2 TABLET ORAL EVERY 4 HOURS
Status: DISCONTINUED | OUTPATIENT
Start: 2022-01-03 | End: 2022-01-04

## 2022-01-03 RX ORDER — PHENOBARBITAL 20 MG/5ML
30 ELIXIR ORAL
Status: DISCONTINUED | OUTPATIENT
Start: 2022-01-03 | End: 2022-01-03

## 2022-01-03 RX ORDER — GABAPENTIN 250 MG/5ML
1200 SOLUTION ORAL EVERY 8 HOURS
Status: DISCONTINUED | OUTPATIENT
Start: 2022-01-03 | End: 2022-01-07

## 2022-01-03 RX ORDER — PHENOBARBITAL 20 MG/5ML
270 ELIXIR ORAL EVERY 4 HOURS
Status: DISCONTINUED | OUTPATIENT
Start: 2022-01-03 | End: 2022-01-03

## 2022-01-03 RX ORDER — PHENOBARBITAL 32.4 MG/1
32.4 TABLET ORAL
Status: DISCONTINUED | OUTPATIENT
Start: 2022-01-03 | End: 2022-01-06

## 2022-01-03 RX ORDER — MINERAL OIL/HYDROPHIL PETROLAT
OINTMENT (GRAM) TOPICAL DAILY
Status: DISCONTINUED | OUTPATIENT
Start: 2022-01-03 | End: 2022-01-24 | Stop reason: HOSPADM

## 2022-01-03 RX ADMIN — PHENOBARBITAL 259.2 MG: 32.4 TABLET ORAL at 14:07

## 2022-01-03 RX ADMIN — CHLORHEXIDINE GLUCONATE 0.12% ORAL RINSE 15 ML: 1.2 LIQUID ORAL at 08:43

## 2022-01-03 RX ADMIN — CEFTRIAXONE SODIUM 1 G: 1 INJECTION, POWDER, FOR SOLUTION INTRAMUSCULAR; INTRAVENOUS at 11:30

## 2022-01-03 RX ADMIN — OLANZAPINE 5 MG: 5 TABLET, ORALLY DISINTEGRATING ORAL at 21:12

## 2022-01-03 RX ADMIN — LEVETIRACETAM 500 MG: 100 SOLUTION ORAL at 08:43

## 2022-01-03 RX ADMIN — OXYCODONE HYDROCHLORIDE 20 MG: 100 SOLUTION ORAL at 16:37

## 2022-01-03 RX ADMIN — METOPROLOL TARTRATE 25 MG: 25 TABLET, FILM COATED ORAL at 21:12

## 2022-01-03 RX ADMIN — QUETIAPINE FUMARATE 100 MG: 50 TABLET ORAL at 21:13

## 2022-01-03 RX ADMIN — OXYCODONE HYDROCHLORIDE 20 MG: 100 SOLUTION ORAL at 00:40

## 2022-01-03 RX ADMIN — BUPRENORPHINE HYDROCHLORIDE 1 MG: 2 TABLET SUBLINGUAL at 11:30

## 2022-01-03 RX ADMIN — ARIPIPRAZOLE 7 MG: 1 SOLUTION ORAL at 08:45

## 2022-01-03 RX ADMIN — QUETIAPINE 75 MG: 25 TABLET ORAL at 14:10

## 2022-01-03 RX ADMIN — LORAZEPAM 12 MG: 2 LIQUID ORAL at 06:35

## 2022-01-03 RX ADMIN — TRAZODONE HYDROCHLORIDE 75 MG: 150 TABLET ORAL at 08:43

## 2022-01-03 RX ADMIN — GABAPENTIN 1200 MG: 250 SOLUTION ORAL at 21:13

## 2022-01-03 RX ADMIN — MIDODRINE HYDROCHLORIDE 2.5 MG: 2.5 TABLET ORAL at 11:30

## 2022-01-03 RX ADMIN — OLANZAPINE 5 MG: 5 TABLET, ORALLY DISINTEGRATING ORAL at 08:43

## 2022-01-03 RX ADMIN — NYSTATIN 500000 UNITS: 100000 SUSPENSION ORAL at 08:43

## 2022-01-03 RX ADMIN — OXYCODONE HYDROCHLORIDE 20 MG: 100 SOLUTION ORAL at 11:30

## 2022-01-03 RX ADMIN — NYSTATIN 500000 UNITS: 100000 SUSPENSION ORAL at 16:38

## 2022-01-03 RX ADMIN — METOPROLOL TARTRATE 25 MG: 25 TABLET, FILM COATED ORAL at 08:45

## 2022-01-03 RX ADMIN — OXYCODONE HYDROCHLORIDE 20 MG: 100 SOLUTION ORAL at 04:21

## 2022-01-03 RX ADMIN — NYSTATIN 500000 UNITS: 100000 SUSPENSION ORAL at 21:12

## 2022-01-03 RX ADMIN — POLYETHYLENE GLYCOL 3350 17 G: 17 POWDER, FOR SOLUTION ORAL at 08:43

## 2022-01-03 RX ADMIN — OXYCODONE HYDROCHLORIDE 20 MG: 100 SOLUTION ORAL at 21:12

## 2022-01-03 RX ADMIN — OXYCODONE HYDROCHLORIDE 20 MG: 100 SOLUTION ORAL at 08:43

## 2022-01-03 RX ADMIN — ACETAMINOPHEN ORAL SOLUTION 650 MG: 650 SOLUTION ORAL at 14:08

## 2022-01-03 RX ADMIN — TRAZODONE HYDROCHLORIDE 75 MG: 150 TABLET ORAL at 14:09

## 2022-01-03 RX ADMIN — LORAZEPAM 12 MG: 2 LIQUID ORAL at 03:09

## 2022-01-03 RX ADMIN — GABAPENTIN 800 MG: 250 SOLUTION ORAL at 06:31

## 2022-01-03 RX ADMIN — TRAZODONE HYDROCHLORIDE 75 MG: 150 TABLET ORAL at 21:18

## 2022-01-03 RX ADMIN — NYSTATIN 500000 UNITS: 100000 SUSPENSION ORAL at 14:09

## 2022-01-03 RX ADMIN — GABAPENTIN 1200 MG: 250 SOLUTION ORAL at 14:08

## 2022-01-03 RX ADMIN — QUETIAPINE 75 MG: 25 TABLET ORAL at 16:38

## 2022-01-03 RX ADMIN — DOCUSATE SODIUM 100 MG: 50 LIQUID ORAL at 08:43

## 2022-01-03 RX ADMIN — MIDODRINE HYDROCHLORIDE 2.5 MG: 2.5 TABLET ORAL at 04:25

## 2022-01-03 RX ADMIN — LEVETIRACETAM 500 MG: 100 SOLUTION ORAL at 21:12

## 2022-01-03 RX ADMIN — QUETIAPINE 75 MG: 25 TABLET ORAL at 08:44

## 2022-01-03 RX ADMIN — PHENOBARBITAL 259.2 MG: 32.4 TABLET ORAL at 21:13

## 2022-01-03 RX ADMIN — WHITE PETROLATUM: 1.75 OINTMENT TOPICAL at 11:31

## 2022-01-03 RX ADMIN — PHENOBARBITAL 259.2 MG: 32.4 TABLET ORAL at 16:38

## 2022-01-03 RX ADMIN — CHLORHEXIDINE GLUCONATE 0.12% ORAL RINSE 15 ML: 1.2 LIQUID ORAL at 21:12

## 2022-01-03 RX ADMIN — ENOXAPARIN SODIUM 40 MG: 100 INJECTION SUBCUTANEOUS at 08:43

## 2022-01-03 ASSESSMENT — ACTIVITIES OF DAILY LIVING (ADL)
ADLS_ACUITY_SCORE: 25
ADLS_ACUITY_SCORE: 19
ADLS_ACUITY_SCORE: 20
ADLS_ACUITY_SCORE: 19
ADLS_ACUITY_SCORE: 20
ADLS_ACUITY_SCORE: 20
ADLS_ACUITY_SCORE: 25
ADLS_ACUITY_SCORE: 25
ADLS_ACUITY_SCORE: 20
ADLS_ACUITY_SCORE: 19
ADLS_ACUITY_SCORE: 25
ADLS_ACUITY_SCORE: 25
ADLS_ACUITY_SCORE: 20
ADLS_ACUITY_SCORE: 25
ADLS_ACUITY_SCORE: 25
ADLS_ACUITY_SCORE: 20
DEPENDENT_IADLS:: INDEPENDENT
ADLS_ACUITY_SCORE: 25
ADLS_ACUITY_SCORE: 20
ADLS_ACUITY_SCORE: 25
ADLS_ACUITY_SCORE: 25
ADLS_ACUITY_SCORE: 20
ADLS_ACUITY_SCORE: 25

## 2022-01-03 ASSESSMENT — MIFFLIN-ST. JEOR: SCORE: 1724.04

## 2022-01-03 NOTE — CONSULTS
WOC RN Assessment Note     Today's Visit: New consult, full head to toe assessment completed. Areas of concern listed below.  Date of last photo: NA    Labs:  Albumin   Date/Time Value Ref Range Status   12/20/2021 04:53 AM 2.5 (L) 3.5 - 5.0 g/dL Final     CRP   Date/Time Value Ref Range Status   12/14/2021 04:25 AM 3.3 (H) 0.0-<0.8 mg/dL Final     Recent Labs   Lab Test 12/30/21  0424   HGB 11.7*       Vitals:   The last obtained vitals are:   Temp: 99.5  F (37.5  C)  Pulse: (!) 130  Resp: 16  BP: 117/60    Reji:        [unfilled]  Body mass index is 25.43 kg/m .      Wound Ostomy Continence Assessment/Plan:  Ostomy  Attention to Tracheostomy   Shiley faceplate, no skin concerns noted  Treatment Plan: Routine cares     Attention to Feeding Tube:   No concerns  Treatment Plan: Routine cares      Other Xerosis   Treatment Plan: Aquaphor ointment daily to feet and dry skin.       Discussed plan of care with nurse and patient.     Outcomes and treatment recommendations are to promote skin integrity, contain exudate and promote wound healing.     WOC RN will sign off, wound/ostomy treatment plan in place. Please re-consult with any new concerns.

## 2022-01-03 NOTE — PLAN OF CARE
Problem: Adult Inpatient Plan of Care  Goal: Absence of Hospital-Acquired Illness or Injury  Intervention: Identify and Manage Fall Risk  Recent Flowsheet Documentation  Taken 1/3/2022 0045 by Ailyn Lim RN  Safety Promotion/Fall Prevention:    bed alarm on    patient and family education  Intervention: Prevent Infection  Recent Flowsheet Documentation  Taken 1/3/2022 0045 by Ailyn Lim, RN  Infection Prevention:    hand hygiene promoted    rest/sleep promoted    single patient room progressing  Pt was comfortable majority of the shift/ Pt gets restless at times especially when he tries to void about 450 ml but after he voided he went back to sleep. Scheduled medication helpful. Pt continue to be   on inus tachycardia and at midnight, pt was NSR. Pt was turned and repositioned  every 2 hours.  Will continue to monitor.

## 2022-01-03 NOTE — PLAN OF CARE
Problem: Adult Inpatient Plan of Care  Goal: Plan of Care Review  Outcome: No Change     Problem: Restraint for Non-Violent/Non-Self-Destructive Behavior  Goal: Prevent/Manage Potential Problems  Description: Maintain safety of patient and others during period of restraint.  Promote psychological and physical wellbeing.  Prevent injury to skin and involved body parts.  Promote nutrition, hydration, and elimination.  Outcome: No Change     Problem: Adult Inpatient Plan of Care  Goal: Absence of Hospital-Acquired Illness or Injury  Intervention: Identify and Manage Fall Risk  Recent Flowsheet Documentation  Taken 1/3/2022 0900 by Miguel Scanlon RN  Safety Promotion/Fall Prevention: activity supervised  Intervention: Prevent Infection  Recent Flowsheet Documentation  Taken 1/3/2022 0900 by Miguel Scanlon RN  Infection Prevention:   hand hygiene promoted   equipment surfaces disinfected   Pt was up in chair half of the day. He is restless most of the time. Anxiety was manageable with schedule antianexiety med. Pain   Problem: Adult Inpatient Plan of Care  Goal: Absence of Hospital-Acquired Illness or Injury  Intervention: Identify and Manage Fall Risk  Recent Flowsheet Documentation  Taken 1/3/2022 0900 by Miguel Scanlon RN  Safety Promotion/Fall Prevention: activity supervised  Intervention: Prevent Infection  Recent Flowsheet Documentation  Taken 1/3/2022 0900 by Miguel Scanlon RN  Infection Prevention:   hand hygiene promoted   equipment surfaces disinfected

## 2022-01-03 NOTE — PLAN OF CARE
Problem: Inability to Wean (Mechanical Ventilation, Invasive)  Goal: Mechanical Ventilation Liberation  Outcome: Improving     Problem: Skin and Tissue Injury (Mechanical Ventilation, Invasive)  Goal: Absence of Device-Related Skin and Tissue Injury  Outcome: Improving     Problem: Ventilator-Induced Lung Injury (Mechanical Ventilation, Invasive)  Goal: Absence of Ventilator-Induced Lung Injury  Outcome: Improving     Problem: Restraint for Non-Violent/Non-Self-Destructive Behavior  Goal: Prevent/Manage Potential Problems  Description: Maintain safety of patient and others during period of restraint.  Promote psychological and physical wellbeing.  Prevent injury to skin and involved body parts.  Promote nutrition, hydration, and elimination.  Outcome: Improving   RT PROGRESS NOTE     DATA:     CURRENT SETTINGS: AC 16, 520, +5, 30%             TRACH TYPE / SIZE: 7.5 SHILEY TaperGuard Placed on 12/17/21             MODE: AC             FIO2: 30%     ACTION:             THERAPIES:               SUCTION:                           FREQUENCY: x3                        AMOUNT:Moderate                        CONSISTENCY:thick                        COLOR:  white/pale yellow /  Light blue x1 =from delayed asp             SPONTANEOUS COUGH EFFORT/STRENGTH OF EFFORT (not elicited by suctioning): Strong                              WEANING PHASE: 1                        WEAN MODE:  CPAP 5/PS 12                        WEAN TIME:  3 HRS 35 MIN , Patient is weaning Since 14:55 for the second time                         END WEAN REASON: Increased work  Of breathing ,                   RESPONSE:             BS: Coarse             VITAL SIGNS: Sat 96-98%, HR  , RR 12-23             EMOTIONAL NEEDS / CONCERNS: Confused                RISK FOR SELF DECANNULATION: yes                        RISK DUE TO: confused                         INTERVENTION/S IN PLACE IS/ARE: Bilateral wrist restraints       NOTE / PLAN:  Cont. Current  plan of care

## 2022-01-03 NOTE — PROGRESS NOTES
Blue Dye Test       01/03/22 1000   General Information   Onset of Illness/Injury or Date of Surgery 12/31/21   Referring Physician Jourdan   Pertinent History of Current Problem Per MD note (copied/pasted parts of it): Jared North is a 46 year old male unvaccinated against COVID-19, with history of polysubstance abuse and chronic pain/lower back pain, mood disorder/depression, HTN, ROSSY, mild intermittent asthma, knee pain, and diagnosis of COVID-19 viral infection on 11/19/2021 who originally presented to [facility] on 11/24/2021 with worsening shortness of breath, admitted to the ICU with COVID-19 viral pneumonia and severe acute respiratory failure with hypoxia requiring noninvasive ventilation. He did not improve and was eventually intubated on 11/28/21 and then proned and started on veletri. 11/30/21 he was started on cefepime and vanco for VAP - Cx grew E. Coli and Proteus mirabilis. He did develop hypotension possibly from septic shock vs sedation vasoplegia. He was then transferred to [place] ICU on 12/5/21. At [place] he was followed in ICU and continued on vent/paralysis/proning/pressors. He continued to be very encephalopathic and psychiatry was consulted. He was weaned off pressors and didn't require proning by 12/10/21. He has been on large doses of anti-psychotics and sedatives. He developed fevers again 12/14 but had clinically improved somewhat and was extubated on 12/15/21. By the next day 12/16/21 he had severely increased work of breathing and so was re-intubated. Blood Cx grew Streptococcus constellatus and sputum Cx grew that as well but also had E. Coli/Proteus m./staph epi/candida parapsilosis. He then underwent trach/PEG on 12/17/21. During this time he has had significant issues with encephalopathy and agitation. As above he has been started on multiple meds for this. Over the last week prior to admission here at [facility] they have increased his lorazepam up to 12 mg every 4  hours and oxycodone 20 mg every 4 hours along with abilify 7 mg (home med), gabapentin 800 mg every 8 hours, keppra 500 mg twice daily (for behavior), zyprexa 5 mg twice daily, and seroquel 75 mg TID with 100 mg HS.  With this regimen he has improved reportedly but is still awake and agitated.   General Observations Improved alertness and participation today   Type of Evaluation   Type of Evaluation Swallow Evaluation   Tracheostomy Assessment (Speaking Valve)   Date of Tracheostomy 12/17/21   Type, Tracheostomy Tube Shiley   Tube Size, Tracheostomy #7.5   Cuff, Tracheostomy Tube cuffed, inflated   Participation Ability (Speaking Valve) awake/alert;attempts to communicate, mouthing words;leak speech used;follows simple commands   Comment, Tracheostomy (Speaking Valve) Some voicing with cuff deflation prior to suction from RT. No speaking valve at this time, initial BDT.   Oral Motor   Oral Musculature generally intact   Vocal Quality/Secretion Management (Oral Motor)   Vocal Quality (Oral Motor) aphonia;other (see comments)  (d/t trach/vent, breathy voicing with cuff deflation.)   Secretion Management (Oral Motor) WNL   General Swallowing Observations   Current Diet/Method of Nutritional Intake (General Swallowing Observations, NIS) NPO;gastrostomy tube (PEG)   Comment, General Swallowing Observations BDT to assess secretion management. Blue dye was placed with cuff up, pt on vent. Volitional swallow completed x2. RT, Defa in the room and provided cuff deflation with suction. No blue dye was observed. Please see  RT notes regarding results of delayed aspiration.   Swallowing Evaluation   (BDT)   SLP Discharge Planning    SLP Brief overview of current status  Improved alertness and participation today. No blue dye noted with initial suction. Consider inline PMV or cuff deflation.    Total Evaluation Time   Total Evaluation Time (Minutes) 20

## 2022-01-03 NOTE — PLAN OF CARE
"  Problem: Inability to Wean (Mechanical Ventilation, Invasive)  Goal: Mechanical Ventilation Liberation  Outcome: Improving     Problem: Skin and Tissue Injury (Mechanical Ventilation, Invasive)  Goal: Absence of Device-Related Skin and Tissue Injury  Outcome: Improving     Problem: Ventilator-Induced Lung Injury (Mechanical Ventilation, Invasive)  Goal: Absence of Ventilator-Induced Lung Injury  Outcome: Improving      RT PROGRESS NOTE     DATA:     CURRENT SETTINGS: AC 16/520/+5/30%             TRACH TYPE / SIZE:  #7.5 shiley TaperGuard, placed on 12/17             MODE:   AC             FIO2:   30%     ACTION:             THERAPIES:                SUCTION:                           FREQUENCY:   X2                        AMOUNT:   small                        CONSISTENCY:   thin                         COLOR:   clear             SPONTANEOUS COUGH EFFORT/STRENGTH OF EFFORT (not elicited by suctioning): good productive                           WEANING PHASE:   #1                        WEAN MODE:    PS 12                        WEAN TIME:                           END WEAN REASON:        RESPONSE:             BS:   clear              VITAL SIGNS:  /81 (BP Location: Left arm, Patient Position: Right side)   Pulse 105   Temp 98.1  F (36.7  C) (Axillary)   Resp 17   Ht 1.803 m (5' 11\")   Wt 80.7 kg (178 lb)   SpO2 98%   BMI 24.83 kg/m                EMOTIONAL NEEDS / CONCERNS:                  RISK FOR SELF DECANNULATION:  yes                        RISK DUE TO:  confusion, agitation, restless                         INTERVENTION/S IN PLACE IS/ARE: Bilateral wrist restrain     NOTE / PLAN:  Patient remains on full vent support for the night. Will cont SBT at days as miranda and monitor closely.  "

## 2022-01-03 NOTE — PLAN OF CARE
Care Management Progression of Care Update        DR GLOS - Target D/C Date 22        PLAN/GOALS  Pulmonary following. Phase 1 wean~CPAP 5/PS12 for 10 hours. Vent at night AC @ 30% Fi02.  Frequent suctioning 3x/shift.      2.  Nutrition management.  Tube feeding via PEG placed on 21.  Registered Dietician to monitor tolerance of tube feeding, wieght and labs.    3.  Addiction Medicine consult    4.  WOC consult.    5.  PT 6x/week.  OT 5x/week.    6.  Speech therapy 3x/week.      BARRIERS  1.  Acute on chronic respiratory failure with hypoxia due to COVID-19.  Vent / trach wean.    2.  Acute metabolic encephalopathy.    3.  Oropharyngeal dysphagia.    4.  Cardiac monitoring.    5.  Bilateral soft wrist restraints.    6.  Severe malnutrition >5% wieght loss in 1 month,  Albumin 2.5.      Disposition:  TCU vs. Acute Rehab    Care Manager Name:  Chidi Gomez RN,BSN, HNB-BC    Date/Time:  January 3, 2022 @ 12:49 PM

## 2022-01-03 NOTE — PROGRESS NOTES
[facility]    Medicine Progress Note - Hospitalist Service       Date of Admission:  12/31/2021    Background Summary:  Jared North is a 46 year old male unvaccinated against COVID-19, with history of polysubstance abuse and chronic pain/lower back pain on Suboxone prior to admission (had been on methadone in the past but not in recent past), mood disorder/depression, HTN, ROSSY, mild intermittent asthma, knee pain, and diagnosis of COVID-19 viral infection on 11/19/2021 who originally presented to [facility] on 11/24/2021 with worsening shortness of breath, admitted to the ICU with COVID-19 viral pneumonia and severe acute respiratory failure with hypoxia requiring noninvasive ventilation.  He was started on Decadron, remdesivir, and tocilizumab and followed for several days alternating between NIPPV and HFNC.  He had an episode of afib during this time that eventually resolved and has not recurred after briefly being treated with amiodarone.  He did not improve and was eventually intubated on 11/28/21 and then proned and started on veletri.  11/30/21 he was started on cefepime and vanco for VAP - Cx grew E. Coli and Proteus mirabilis.  He did develop hypotension possibly from septic shock vs sedation vasoplegia and was treated with norepi intemittently.  Vanco was stopped on 12/5/21.  He was then transferred to [place]'s ICU on 12/5/21.       At [place] he was followed in ICU and continued on vent/paralysis/proning/pressors.  Cefepime switched to rocephin 12/6/21.  He continued to be very encephalopathic and psychiatry was consulted. He was weaned off pressors and didn't require proning by 12/10/21.  He has been on large doses of anti-psychotics and sedatives.  He developed fevers again 12/14 but had clinically improved somewhat and was extubated on 12/15/21.  By  The next day 12/16/21 he had severely increased work of breathing and so was re-intubated. Blood Cx grew Streptococcus constellatus and sputum  Cx grew that as well but also had E. Coli/Proteus m./staph epi/candida parapsilosis. Vanco/Zosyn was started on 12/16/21.  He then underwent trach/PEG on 12/17/21.  Vanco was stopped after 3 days and Zosyn switched to rocephin on 12/21/21 to complete on 1/2/21.       During this time he has had significant issues with encephalopathy and agitation. As above he has been started on multiple meds for this.  Over the last week prior to admission here at [facility] they have increased his lorazepam up to 12 mg every 4 hours and oxycodone 20 mg every 4 hours along with abilify 7 mg (home med), gabapentin 800 mg every 8 hours, keppra 500 mg twice daily (for behavior), zyprexa 5 mg twice daily, and seroquel 75 mg TID with 100 mg HS.  With this regimen he has improved reportedly but is still awake and agitated.    Assessment & Plan             Principal Problem:    Acute on chronic respiratory failure with hypoxia - vent, pulm/RT consults, PT/OT to get strong enough to wean vent/trach     Active Problems:    Acute metabolic encephalopathy -med adjustment per psych service      Chronic back pain - Pain control per addiction medicine service      Essential hypertension - has been low so off meds      Acute respiratory distress syndrome (ARDS) due to COVID-19 pneumonia - improved, starting the weaning process for vent      Major depressive disorder, recurrent episode, severe - continue abilify, seroquel, zyprexa, trazodone,psychiatry to see      Opioid type dependence - Mgnt per addiction medicine service      Polysubstance abuse as above      ROSSY (obstructive sleep apnea)- vented/trached for now      Mild intermittent asthma - can add nebs as needed      Recent Ventilator associated pneumonia - on rocephin to complete 1/3/22.      Thrush - start nystatin      Dysphagia - speech therapy consult, on tube feeds     Severe Malnutrition, POA: based on nutrition assessment     Sinus tachycardia  -  Treatment of underlying causes  -  Pt  has been in initiated on metoprolol tartrate         Diet: Adult Formula Drip Feeding: Continuous Promote w fiber; Gastrostomy; Goal Rate: 80; mL/hr; Medication - Feeding Tube Flush Frequency: At least 15-30 mL water before and after medication administration and with tube clogging    DVT Prophylaxis: Enoxaparin (Lovenox) SQ  Wei Catheter: Not present  Central Lines: None  Code Status: Full Code      Disposition Plan   Expected Discharge: TBD   Anticipated discharge location: home with family   vs acute rehab vs TCU       The patient's care was discussed with the care team    Yonny Staley MD  Hospitalist Service  LTACH  Securely message with the Vocera Web Console (learn more here)  Text page via [employer] Paging/Directory        Clinically Significant Risk Factors Present on Admission             # Overweight: last Body mass index is 25.43 kg/m .    ______________________________________________________________________    Interval History   Has been reported to be restless    Data reviewed today: I reviewed all medications, new labs and imaging results over the last 24 hours.  All medication issues identified within the last24 hours have been addressed and completed as appropriate.    Physical Exam   Vital Signs: Temp: 97.4  F (36.3  C) Temp src: Axillary BP: 112/62 Pulse: 104   Resp: 15 SpO2: 96 % O2 Device: Mechanical Ventilator    Weight: 182 lbs 4.8 oz  General: Awake, alert, appeared comfortable.  HEENT: Sclera without redness or jaundice.  External ears appear normal  Cardiac: Tachycardic  Pulmonary: Clear to auscultation in bilateral lung fields  Abdomen: Not distended.  Not tender to palpation.  Musculoskeletal: No joint abnormalities noted  Skin: Normally turgid, no rashes noted  Neurologic: Awake, alert, appropriately interactive  Psychiatric: Calm      Data   Recent Labs   Lab 01/03/22  0600 01/02/22  0612 01/01/22  0805 01/01/22  0611 01/01/22  0603 01/01/22  0002 12/30/21  0426 12/30/21  0424  12/29/21  1210 12/29/21  1159 12/28/21  0826 12/28/21  0502   WBC  --   --   --   --   --   --   --  9.1  --  15.8*  --   --    HGB  --   --   --   --   --   --   --  11.7*  --  11.5*  --   --    MCV  --   --   --   --   --   --   --  95  --  95  --   --    PLT  --   --   --   --   --   --   --  256  --  296  --   --    NA  --   --   --   --   --   --   --  139  --  142  --  142   POTASSIUM 4.1 4.1  --  4.2  --   --    < > 3.8  --  4.4   < > 4.1  4.1   CHLORIDE  --   --   --   --   --   --   --  102  --  105  --  105   CO2  --   --   --   --   --   --   --  25  --  28  --  26   BUN  --   --   --   --   --   --   --  14  --  12  --  10   CR  --   --   --   --   --   --   --  0.66*  --  0.66*  --  0.67*   ANIONGAP  --   --   --   --   --   --   --  12  --  9  --  11   TRUONG  --   --   --   --   --   --   --  9.2  --  9.2  --  9.4   GLC  --   --  114*  --  132* 107*   < > 87   < > 118   < > 121    < > = values in this interval not displayed.       Imaging    EXAM: XR CHEST PORT 1 VIEW  LOCATION: [facility]  DATE/TIME: 12/29/2021 3:05 PM     INDICATION: Evaluate for worsening infiltrate/new VAP.  COMPARISON: 12/16/2021.                                                                      IMPRESSION:   Endotracheal tube has been removed and a new tracheostomy tube has been placed in good position. Right PICC line remains in good position. Right IJ CVC has been removed. NG tube also removed. Bilateral interstitial infiltrates have improved when compared   to previous. There are no new or acute infiltrates. No pleural effusion.

## 2022-01-03 NOTE — CONSULTS
"Addiction Medicine Consultation    Jared SELBY Can, 1975, [id number]    PCP  Sal Marie, [phone]         Assessment and Plan:     Principal Problem:    Acute on chronic respiratory failure with hypoxia (H)  Active Problems:    Chronic back pain    Essential hypertension    Pneumonia due to 2019 novel coronavirus    Acute respiratory distress syndrome (ARDS) due to COVID-19 virus (H)    Major depressive disorder, recurrent episode, severe (H)    Opioid use disorder, severe, dependence (H)    Polysubstance abuse (H)    ROSSY (obstructive sleep apnea)    Mild intermittent asthma    Acute metabolic encephalopathy    Ventilator associated pneumonia (H)    Oropharyngeal dysphagia    46-year-old male with the above medical issues following prolonged hospitalization in the ICU for ARDS due to COVID-19 infection.  He is now on high-dose scheduled lorazepam and oxycodone in addition to Dilaudid prn which has not been administered and lorazepam as needed (administered 4 mg twice.)  Consultation requested because he has been unable to wean lorazepam and oxycodone due to agitation. Prior to admission, he was on buprenorphine and per  was on 8 mg daily however, when asked, patient nodded his head \"yes\" that he was actually taking 4 mg.  It is not entirely clear how much he was taking since he is unable to give a history. Nonetheless, he clearly has tolerance and has required high-dose opiates during the hospitalization.  Patient acknowledged that he would like to be back on buprenorphine.  He also denied a previous addiction to benzodiazepines.  It is unclear if he has a history of seizures.    Severe opioid use disorder, dependence- recommend induction back onto buprenorphine (patient agrees by nodding) utilizing a micro induction approach to avoid opioid withdrawal.  Patient may actually gain better pain control on buprenorphine. Furthermore, buprenorphine has been shown to also help with mood in patients with " opioid use disorder.      Plan:   1. Discontinue as needed Dilaudid order  2. Continue scheduled oxycodone 20 mg every 4 hours  3. Start buprenorphine induction with 1mg daily SUBLINGUAL  4. We will continue following patient and make slow increases of buprenorphine.  Anticipate that an induction may take at least 1 week because of his agitation.    Benzodiazepine management without a clear history of sedative use disorder- recommend converting patient to phenobarbital as this may actually be more effective at controlling his agitation and he may tolerate tapering down on phenobarbital better than he would a lorazepam taper.  He has been receiving lorazepam 72-76 mg daily which is equivalent to approximately 5649-9537 mg of phenobarbital daily. This is an extremely high amount. To avoid phenobarbital toxicity, I recommend starting at a much lower dose and monitoring closely for signs of toxicity.  (Furthermore, his receptors are likely already saturated in increasing doses will likely be of little benefit.)  The actual starting dose is somewhat arbitrary in this case since he has been requiring such extreme amounts of lorazepam.  Will plan on starting with an approximate 20% reduction in the overall dose (1728mg total daily dose) and divide this every 4 hours so doses may be held for sedation/toxicity (288mg q4 hrs). Additionally, will add phenobarb prn for agitation since the starting dose may not be sufficient despite it being so high.     Plan:  1. Increase gabapentin to 1200mg q8 hrs as this may help with agitation while switching to phenobarb and an eventual taper.  2. Continue Keppra unchanged (seizure prophylaxis since patient is at risk)  3. Discontinue lorazepam orders  4. Start phenobarbital 259.2 mg every 4 hours  5. Phenobarbital 32.4 mg every 2 hours as needed agitation  6. Watch for sedation and/or nystagmus and hold doses of phenobarbital if either occur.    Noted that patient is also on a number of  "medications with anticholinergic properties which in the end may cause more agitation.  I recommend considering a reduction in the atypical antipsychotics to see if this actually reduces some of his symptoms.  If this is considered, perhaps wait until Wednesday when a more established plan for phenobarbital exists to avoid changing too many variables at once.    Addiction medicine will continue to follow patient  For questions and concerns, please page the [facility] addiction medicine provider.    Chief Complaint:  Acute respiratory failure and agitation     HPI:    Jared North is a 46 year old old male with a past medical history significant for severe opioid use disorder, dependence who transferred to [facility] unit following hospitalization for ARDS secondary to COVID-19. Consultation requested for agitation and use of high-dose opioids and benzodiazepines.  Patient is currently trached and vented, and continues to have difficulty with agitation, requiring soft restraints.  He has been maintained on lorazepam 12 mg every 4 hours with an occasional as needed dose, oxycodone 20 mg every 4 hours, gabapentin 800 mg every 8 hours, Abilify, Zyprexa, Seroquel and Keppra 500 mg twice daily.  Haldol is ordered as needed but has not been administered as of yet.  According to the previous notes, there has been some improvement on this regimen however, he continues to become agitated.       is reviewed and shows buprenorphine 8 mg daily.  When asked, patient nods that he was on a lower dose and it appeared that he was mouthing the word \"4.\"  It is not entirely clear how much he had been taking however, he had been consistently receiving prescriptions.    Patient nodded \"no\" when asked if he ever used benzodiazepines.  He nodded \"no\" when asked if he currently had any pain.  Patient nodded \"yes\" when asked if he wanted to be back on buprenorphine.    Unable to obtain more history from the patient as he was " actually slightly sedated at the time of exam.    Medical History  Past Medical History:   Diagnosis Date     Major depressive disorder, recurrent episode, severe (H) 4/26/2006    Formatting of this note might be different from the original. Epic     Mild intermittent asthma 12/31/2021     Opioid type dependence (H) 4/26/2006    Formatting of this note might be different from the original. Epic     ROSSY (obstructive sleep apnea) 12/31/2021     Pneumonia due to 2019 novel coronavirus 11/24/2021     Polysubstance abuse (H) 12/31/2021       Surgical History  Past Surgical History:   Procedure Laterality Date     GASTROSTOMY  12/17/2021     PICC TRIPLE LUMEN PLACEMENT  11/24/2021          TRACHEOSTOMY  12/17/2021       Social History  Current living situation: Lives in [place]    Social History     Tobacco Use     Smoking status: Never Smoker     Smokeless tobacco: Current User   Substance Use Topics     Alcohol use: Yes     Alcohol/week: 21.0 standard drinks       Family History  Reviewed    family history includes Diabetes in his maternal grandfather and mother; Heart Disease in his mother; Hypertension in his maternal grandfather, maternal grandmother, and mother.    Prior to Admission Medications   Medications Prior to Admission   Medication Sig Dispense Refill Last Dose     acetaminophen (TYLENOL) 32 mg/mL liquid 650 mg by Oral or Feeding Tube route every 4 hours as needed for fever or mild pain   Unknown at Unknown time     albuterol (PROAIR HFA;PROVENTIL HFA;VENTOLIN HFA) 90 mcg/actuation inhaler Inhale 2 puffs into the lungs every 4 hours as needed         ARIPiprazole (ABILIFY) 1 MG/ML SOLN solution Take 7 mg by mouth daily   12/31/2021 at 0800     bisacodyl (DULCOLAX) 10 MG suppository Place 10 mg rectally daily as needed for constipation Hold for loose stools   Unknown at Unknown time     buprenorphine HCl-naloxone HCl (SUBOXONE) 8-2 MG per film Place 1 Film under the tongue daily        buPROPion (WELLBUTRIN  XL) 300 MG 24 hr tablet Take 300 mg by mouth every morning        cefTRIAXone Inject 1 g into the vein every 24 hours In 100ml NS   12/31/2021 at 1153     chlorhexidine (CHLORHEXIDINE) 0.12 % solution Take 15 mLs by mouth 2 times daily While intubated   12/31/2021 at 0756     docusate (COLACE) 50 MG/5ML liquid 100 mg by Per Feeding Tube route 2 times daily While hospitalized   12/30/2021 at 2016     enoxaparin ANTICOAGULANT (LOVENOX) 40 MG/0.4ML syringe Inject 40 mg Subcutaneous every 24 hours While hospitalized   12/31/2021 at 0801     gabapentin (NEURONTIN) 400 MG capsule Take 800 mg by mouth 3 times daily   12/31/2021 at 1205     haloperidol lactate (HALDOL) 5 MG/ML injection Inject 2 mg into the vein every 5 minutes as needed for agitation Up to 3 doses in 30 min While hospitalized   12/31/2021 at 1454     hydrOXYzine (VISTARIL) 25 MG capsule Take 25-50 mg by mouth nightly as needed for itching        insulin aspart (NOVOLOG PEN) 100 UNIT/ML pen Inject 1-4 Units Subcutaneous every 4 hours Sliding scale low resistance  While hospitalized   Past Week at Unknown time     levETIRAcetam (KEPPRA) 500 MG tablet 500 mg by Oral or Feeding Tube route 2 times daily   12/31/2021 at 0800     lisinopril (ZESTRIL) 10 MG tablet Take 10 mg by mouth daily        LORazepam (ATIVAN) 2 MG/ML (HIGH CONC) oral solution 12 mg by Per Feeding Tube route every 4 hours Started   12/31/2021 at 1205     LORazepam (ATIVAN) 2 MG/ML injection Inject 4 mg into the vein every 2 hours as needed for agitation or anxiety   12/31/2021 at 1454     metoprolol (LOPRESSOR) 5 MG/5ML injection Inject 5-10 mg into the vein every 4 hours as needed for high blood pressure   12/31/2021 at 1153     midodrine (PROAMATINE) 2.5 MG tablet Take 2.5 mg by mouth every 8 hours   12/31/2021 at 0316     multivitamin w/minerals (CERTAVITE/ANTIOXIDANTS) tablet Take 1 tablet by mouth daily        OLANZapine zydis (ZYPREXA) 5 MG ODT Take 5 mg by mouth 2 times daily    "12/31/2021 at 0800     oxyCODONE (ROXICODONE INTENSOL) 20 mg/mL (HIGH CONC) solution 20 mg by Oral or Feeding Tube route every 4 hours Started in hospital    12/31/2021 at 1416     QUEtiapine (SEROQUEL) 100 MG tablet 100 mg by Oral or Feeding Tube route At Bedtime   12/30/2021 at 2017     QUEtiapine (SEROQUEL) 25 MG tablet 75 mg by Oral or Feeding Tube route 3 times daily   12/31/2021 at 1205     sennosides (SENOKOT) 8.8 MG/5ML syrup 10 mLs by Per Feeding Tube route 2 times daily While hospitalized   12/30/2021 at 2016     traZODone (DESYREL) 150 MG tablet Take 75 mg by mouth 3 times daily   12/31/2021 at 1416       Allergies  No Known Allergies       Review of Systems:    Unable to obtain      Physical Exam:    /60 (BP Location: Left arm)   Pulse (!) 130   Temp 99.5  F (37.5  C) (Axillary)   Resp 16   Ht 1.803 m (5' 11\")   Wt 82.7 kg (182 lb 4.8 oz)   SpO2 96%   BMI 25.43 kg/m      General appearance   Gen: Awake but frequently closes eyes  Appears comfortable but frustrated when unable to answer questions.  Clearly trying to interact  Dermatologic:No piloerection or diaphoresis. No jaundice  HEENT   EOMI.    No yawning  External ears appear normal  Pulmonary   Trached and vented  Neurologic   No Tremor  Psychiatric: Calm    Results:  Lab Results personally reviewed      personally reviewed and shows most recently:  Prescriptions  Total Prescriptions: 25    Total Private Pay: 1    Fill Date ID   Written Drug Qty Days Prescriber Rx # Pharmacy Refill   Daily Dose* Pymt Type [facility]     11/11/2021  2   11/11/2021  Gabapentin 800 MG Tablet    90.00  30 [facility]   [id number]   Sup (0121)   0/0   [place]icaid [place]   11/11/2021  2   11/11/2021  Suboxone 8 Mg-2 MG SL Film    28.00  28 Ka [facility]   [id number]   Sup (3560)   0/0  8.00 mg  [place]icaid [place]   10/14/2021  2   10/14/2021  Gabapentin 800 MG Tablet    90.00  30 Ka [facility]   [id number]   Sup (1095)   0/0   [place]icaid [place]   10/14/2021  2   10/14/2021  Suboxone 8 Mg-2 MG SL Film    " 10.00  10 Ka [facility]   [id number]   Sup (4438)   0/0  8.00 mg  Private Pay   [place]   10/14/2021  2   10/14/2021  Suboxone 8 Mg-2 MG SL Film    18.00  18 Ka [facility]   [id number]   Sup (4438)   0/0  8.00 mg  [place]icaid [place]   09/16/2021  2   09/16/2021  Gabapentin 800 MG Tablet    90.00  30 Ka [facility]   [id number]   Sup (4438)   0/0   [place]icaid [place]   09/16/2021  2   09/16/2021  Suboxone 8 Mg-2 MG SL Film    28.00  28 Ka [facility]   [id number]   Sup (4438)   0/0  8.00 mg  [place]icaid [place]   08/19/2021  2   08/19/2021  Suboxone 8 Mg-2 MG SL Film    28.00  28 Ka [facility]   [id number]   Sup (4438)   0/0  8.00 mg  [place]icaid [place]   08/19/2021  2   08/19/2021  Gabapentin 800 MG Tablet    90.00  30 Ka [facility]   [id number]   Sup (4438)   0/0   [place]icaid [place]   07/22/2021  2   07/22/2021  Suboxone 8 Mg-2 MG SL Film    28.00  28 Ka [facility]   [id number]   Sup (4438)   0/0  8.00 mg  [place]icaid [place]     Rxs for suboxone date back to 01/2021.    Eda Peterson MD  Addiction Medicine

## 2022-01-03 NOTE — CONSULTS
Consultation - [place] [facility]  Jared North,  1975, MRN [id number]    PCP: Sal Marie, [phone]   Code status:  Full Code       Extended Emergency Contact Information  Primary Emergency Contact: Jama North  Address: [address] [id number] [facility]  Mobile Phone: [phone]  Relation: Ex-Spouse       Impressions:   Principal Problem:    Acute on chronic respiratory failure with hypoxia (H)  Active Problems:    Chronic back pain    Essential hypertension    Pneumonia due to 2019 novel coronavirus    Acute respiratory distress syndrome (ARDS) due to COVID-19 virus (H)    Major depressive disorder, recurrent episode, severe (H)    Opioid type dependence (H)    Polysubstance abuse (H)    ROSSY (obstructive sleep apnea)    Mild intermittent asthma    Acute metabolic encephalopathy    Ventilator associated pneumonia (H)    Oropharyngeal dysphagia       Recommendations and Plans:   1. Vent wean pathway: phase 1  2. Trach change: original placed 21  Will ask RT to downsize to # 7 [place] today  3. Diagnostic testing: BDT today per SLP  4. Overall prognosis of vent liberation: will depend on behavior    Hilda Boyd [facility] Pulmonary/Critical Care          Chief Complaint Acute on chronic respiratory failure with hypoxia (H)       HPI     Jared North is a 46 year old year old male admitted to [facility] on 2021 for ongoing treatment of respiratory failure .    The referring facility is [facility]  Records from that facility are available to assist in historical details.    46 year old male, unvaccinated against COVID-19, with history of polysubstance abuse and chronic pain/lower back pain on suboxone, mood disorder/depression, HTN, ROSSY, mild intermittent asthma, presented with respiratory failure/COVID ARDS intubated on . Initially requiring proning/paralysis, veletri, now down trending vent needs, issues with agitation,  treated for VAP. Not tolerating weaning trials, failed extubation 12/15, re-intubated, S/p trach and PEG tube 12/17.       Reviewed  Social History  Reviewed, and he  reports that he has never smoked. He uses smokeless tobacco. He reports current alcohol use of about 21.0 standard drinks of alcohol per week. He reports that he does not use drugs.    Smoking history:   History   Smoking Status     Never Smoker   Smokeless Tobacco     Current User     Family History  Reviewed, and family history includes Diabetes in his maternal grandfather and mother; Heart Disease in his mother; Hypertension in his maternal grandfather, maternal grandmother, and mother.   Allergies  No Known Allergies           Current Medications:     ARIPiprazole  7 mg Oral or Feeding Tube Daily     cefTRIAXone  1 g Intravenous Q24H     chlorhexidine  15 mL Mouth/Throat Q12H     sennosides  10 mL Per Feeding Tube BID    And     docusate  100 mg Per Feeding Tube BID     enoxaparin ANTICOAGULANT  40 mg Subcutaneous Q24H     gabapentin  800 mg Oral Q8H     [START ON 1/4/2022] influenza quadrivalent (PF) vacc  0.5 mL Intramuscular Prior to discharge     levETIRAcetam  500 mg Oral or Feeding Tube BID     LORazepam  12 mg Per Feeding Tube Q4H     metoprolol tartrate  25 mg Per G Tube BID     midodrine  2.5 mg Oral Q8H     nystatin  500,000 Units Mouth/Throat 4x Daily     OLANZapine zydis  5 mg Oral BID     oxyCODONE  20 mg Oral or Feeding Tube Q4H     polyethylene glycol  17 g Oral or Feeding Tube BID     QUEtiapine  100 mg Oral or Feeding Tube At Bedtime     QUEtiapine  75 mg Oral or Feeding Tube TID     sodium chloride (PF)  10-40 mL Intracatheter Q7 Days     traZODone  75 mg Oral or Feeding Tube TID          Review of Systems:  Review of systems not obtained due to patient factors. Physical Exam:  Temp:  [33.8  F (1  C)-99.8  F (37.7  C)] 99.5  F (37.5  C)  Pulse:  [] 92  Resp:  [16-22] 16  BP: (102-129)/(60-80) 117/60  FiO2 (%):  [30 %] 30  %  SpO2:  [96 %-98 %] 96 %    General: awake and calm  HEENMT: #7.5 Shiley in place; site clean.   Lungs: Diminished, clear  Heart: RRR, S1 and S2            Abdomen: Soft, non-tender.   Skin: skin color normal, texture normal, no rashes or lesions.   Extremities:  No edema noted.   Pulses:  +2 BUE and +2 BLE  Neuro:  Eyes open, interacts somewhat, moves all extremities       Pertinent Labs:  Lab Results: personally reviewed.   Lab Results   Component Value Date     12/30/2021    CO2 25 12/30/2021    BUN 14 12/30/2021        Pertinent Radiology:  Reviewed          Tracheostomy tube data:  Date of initial placement: 12/17/21  Current tube - type: 7.5 , size: shealey           Hilda Boyd [facility] Pulmonary/Critical Care

## 2022-01-03 NOTE — CONSULTS
"    INITIAL PSYCHIATRIC CONSULTATION  This note was created with the help of Dragon dictation system. Grammatical and typing errors are not intentional.              REASON FOR REQUEST: Please re-visit and make recs for medications and tapers - patient discharging to [facility] tomorrow. Just off precedex in last 24 hours;      ASSESSMENT/RECOMMENDATIONS/PLAN :     Mood changes likely in the context of a prolonged hospitalization with intermittent anxiety, restlessness.  Cognitive and behavioral changes due to prolonged hospitalization and in the setting of metabolic encephalopathy, ICU delirium and due to COVID.  Major depressive disorder by history.    Recommendations:  Discontinue Seroquel daytime dose: Patient has been quite somnolent.  Seroquel 50 mg 4 times a day as needed.  Seroquel 100 mg at bedtime for sleep.  Trazodone 75 mg at bedtime for sleep.  Addiction medicine to manage benzos and opiates.  Efforts at sleep regulation.      MENTAL STATUS EXAMINATION:   General Appearance: NAD, Comfortable, somnolent  Speech: Slow, minimal.  Thought Process: Increased latency  Thought content: No psychosis, NO SI  Thought Formation: Loosening of associations.  Judgment: Depressed  Insight : Depressed  Attention : Sleepy  Memory: Depressed  Fund Of Knowledge: Depressed  Affect: Neutral  Mood: Congruent  Alert : Arousable  Orientation: X 1-2  Comprehension: Depressed  Generative thought content: Reduced, slow  Language: Intact  Gait and Ambulation:Slow.  With assist, needing assist with all ADLs  Musculoskeletal: No tonal abnormalities    /79 (BP Location: Left arm)   Pulse (!) 125   Temp 98.5  F (36.9  C) (Axillary)   Resp 22   Ht 1.803 m (5' 11\")   Wt 82.7 kg (182 lb 4.8 oz)   SpO2 96%   BMI 25.43 kg/m      HISTORY OF PRESENT ILLNESS:   Presenting history to include: [place] [facility]:   Jared North is a 46 year old male unvaccinated against COVID-19, with history of polysubstance abuse and chronic " pain/lower back pain now on Suboxone, mood disorder/depression, HTN, ROSSY, mild intermittent asthma, knee pain, and recent diagnosis of COVID-19 viral infection on 11/19/2021 who originally presented to [facility] on 11/24/2021 with worsening shortness of breath, admitted to the ICU with COVID-19 viral pneumonia and severe acute respiratory failure with hypoxia requiring noninvasive ventilation.  He was started on Decadron, remdesivir, and tocilizumab and followed for several days alternating between NIPPV and HFNC.  He had an episode of afib during this time that eventually resolved and has not recurred after briefly being treated with amiodarone.  He did not improve and was eventually intubated on 11/28/21 and then proned and started on veletri.  11/30/21 he was started on cefepime and vanco for VAP - Cx grew E. Coli and Proteus mirabilis.  He did develop hypotension possibly from septic shock vs sedation vasoplegia and was treated with norepi intemittently.  Vanco was stopped on 12/5/21.  He was then transferred to [place]'s ICU on 12/5/21.     At [place] he was followed in ICU and continued onvent/paralysis/proning/pressors.  Cefepime switched to rocephin 12/6/21.  He continued to be very encephalopathic and psychiatry was consulted. He was weaned off pressors and didn't require proning by 12/10/21.  He has been on large doses of anti-psychotics and sedatives.  He developed fevers again 12/14 but had clinically improved somewhat and was extubated on 12/15/21.  By  The next day 12/16/21 he had severely increased work of breathing and so was re-intubated. Blood Cx grew Streptococcus constellatus and sputum Cx grew that as well but also had E. Coli/Proteus m./staph epi/candida parapsilosis. Vanco/Zosyn was started on 12/16/21.  He then underwent trach/PEG on 12/17/21.  Vanco was stopped after 3 days and Zosyn switched to rocephin on 12/21/21 to complete on 1/2/21.  During this time he has had significant  "issues with encephalopathy and agitation. As above he has been started on multiple meds for this.  Over the last week prior to admission here at [facility] they have increased his lorazepam up to 12 mg every 4 hours and oxycodone 20 mg every 4 hours along with abilify 7 mg (home med), gabapentin 800 mg every 8 hours, keppra 500 mg twice daily (for behavior), zyprexa 5 mg twice daily, and seroquel 75 mg TID with 100 mg HS.  With this regimen he has improved reportedly but is still awake and agitate    Patient is calm and resting this morning. He is having off and on restlessness, and somnolence. At times needing bilateral restraints. I was asked by ICU team to follow along at [facility] for med management. Addiction medicine should be managing most of his Benzo, Opioids, and phenobarb.  Patient is somnolent, not providing any meaningful information.  Chart reviewed in its entirety.  Patient appears to be quite somnolent thus would go ahead and decrease Seroquel and discontinue olanzapine.    Patient is known to me from previous assessments at [facility] in ICU.  While intubated he exhibited significant agitation, aggression, pulling on tubes thus necessitating significant high doses of sedating medications including ICU sedation protocol.  Patient's history significant for depression, opioid use disorder.  Review of Systems:As per HPI. Remainders of 12 point review of systems negative.  Psychiatric ROS:  Patient does not provide any meaningful information due to sleepiness.        PFSH reviewed  and not pertinent to chief complaint/reason for visit  /60 (BP Location: Left arm)   Pulse 92   Temp 99.5  F (37.5  C) (Axillary)   Resp 16   Ht 1.803 m (5' 11\")   Wt 82.7 kg (182 lb 4.8 oz)   SpO2 96%   BMI 25.43 kg/m    No results found for: AMPHET, PCP, BARBIT, OXYCODONE, THC, ETOH  @24HOURRESULTS@  Recent Results (from the past 72 hour(s))   Glucose by meter    Collection Time: 12/31/21 12:00 PM   Result Value " Ref Range    GLUCOSE BY METER POCT 115 (H) 70 - 99 mg/dL   Glucose by meter    Collection Time: 12/31/21  5:37 PM   Result Value Ref Range    GLUCOSE BY METER POCT 109 (H) 70 - 99 mg/dL   MRSA Culture    Collection Time: 12/31/21  6:36 PM    Specimen: Nose; Swab   Result Value Ref Range    Culture No MRSA isolated    Glucose by meter    Collection Time: 12/31/21  8:58 PM   Result Value Ref Range    GLUCOSE BY METER POCT 121 (H) 70 - 99 mg/dL   Glucose by meter    Collection Time: 01/01/22 12:02 AM   Result Value Ref Range    GLUCOSE BY METER POCT 107 (H) 70 - 99 mg/dL   Glucose by meter    Collection Time: 01/01/22  6:03 AM   Result Value Ref Range    GLUCOSE BY METER POCT 132 (H) 70 - 99 mg/dL   Magnesium    Collection Time: 01/01/22  6:11 AM   Result Value Ref Range    Magnesium 2.1 1.8 - 2.6 mg/dL   Potassium    Collection Time: 01/01/22  6:11 AM   Result Value Ref Range    Potassium 4.2 3.5 - 5.0 mmol/L   Glucose by meter    Collection Time: 01/01/22  8:05 AM   Result Value Ref Range    GLUCOSE BY METER POCT 114 (H) 70 - 99 mg/dL   Potassium    Collection Time: 01/02/22  6:12 AM   Result Value Ref Range    Potassium 4.1 3.5 - 5.0 mmol/L   Magnesium    Collection Time: 01/02/22  6:12 AM   Result Value Ref Range    Magnesium 1.9 1.8 - 2.6 mg/dL   ECG 12-LEAD WITH MUSE (LHE)    Collection Time: 01/02/22  8:57 AM   Result Value Ref Range    Systolic Blood Pressure  mmHg    Diastolic Blood Pressure  mmHg    Ventricular Rate 127 BPM    Atrial Rate 127 BPM    MD Interval 130 ms    QRS Duration 80 ms     ms    QTc 470 ms    P Axis 58 degrees    R AXIS -21 degrees    T Axis 40 degrees    Interpretation ECG       Sinus tachycardia  Otherwise normal ECG  When compared with ECG of 16-DEC-2021 08:23,  No significant change was found     Potassium    Collection Time: 01/03/22  6:00 AM   Result Value Ref Range    Potassium 4.1 3.5 - 5.0 mmol/L   Magnesium    Collection Time: 01/03/22  6:00 AM   Result Value Ref Range     Magnesium 2.0 1.8 - 2.6 mg/dL       PMH:   Past Medical History:   Diagnosis Date     Major depressive disorder, recurrent episode, severe (H) 4/26/2006    Formatting of this note might be different from the original. Epic     Mild intermittent asthma 12/31/2021     Opioid type dependence (H) 4/26/2006    Formatting of this note might be different from the original. Epic     ROSSY (obstructive sleep apnea) 12/31/2021     Pneumonia due to 2019 novel coronavirus 11/24/2021     Polysubstance abuse (H) 12/31/2021           Current Medications:Scheduled Meds:    ARIPiprazole  7 mg Oral or Feeding Tube Daily     cefTRIAXone  1 g Intravenous Q24H     chlorhexidine  15 mL Mouth/Throat Q12H     sennosides  10 mL Per Feeding Tube BID    And     docusate  100 mg Per Feeding Tube BID     enoxaparin ANTICOAGULANT  40 mg Subcutaneous Q24H     gabapentin  800 mg Oral Q8H     [START ON 1/4/2022] influenza quadrivalent (PF) vacc  0.5 mL Intramuscular Prior to discharge     levETIRAcetam  500 mg Oral or Feeding Tube BID     LORazepam  12 mg Per Feeding Tube Q4H     metoprolol tartrate  25 mg Per G Tube BID     midodrine  2.5 mg Oral Q8H     nystatin  500,000 Units Mouth/Throat 4x Daily     OLANZapine zydis  5 mg Oral BID     oxyCODONE  20 mg Oral or Feeding Tube Q4H     polyethylene glycol  17 g Oral or Feeding Tube BID     QUEtiapine  100 mg Oral or Feeding Tube At Bedtime     QUEtiapine  75 mg Oral or Feeding Tube TID     sodium chloride (PF)  10-40 mL Intracatheter Q7 Days     traZODone  75 mg Oral or Feeding Tube TID     Continuous Infusions:    dextrose       - MEDICATION INSTRUCTIONS -       - MEDICATION INSTRUCTIONS -       PRN Meds:.acetaminophen, acetaminophen **OR** acetaminophen, bisacodyl, dextrose, glucose **OR** dextrose **OR** glucagon, haloperidol lactate, hydrALAZINE, HYDROmorphone, LORazepam, metoprolol, ondansetron **OR** ondansetron, - MEDICATION INSTRUCTIONS -, - MEDICATION INSTRUCTIONS -, polyethylene glycol,  prochlorperazine **OR** prochlorperazine **OR** prochlorperazine, simethicone, sodium chloride (PF), sodium chloride (PF)                Family History: PERSONALLY REVIEWED.  Family History   Problem Relation Age of Onset     Diabetes Mother      Heart Disease Mother      Hypertension Mother      Hypertension Maternal Grandmother      Diabetes Maternal Grandfather      Hypertension Maternal Grandfather      Pertinent Family hx not pertinent to Chief Complaint or reason for visit.     Social History:  PERSONALLY REVIEWED.  Social History     Socioeconomic History     Marital status:      Spouse name: Not on file     Number of children: Not on file     Years of education: Not on file     Highest education level: Not on file   Occupational History     Not on file   Tobacco Use     Smoking status: Never Smoker     Smokeless tobacco: Current User   Substance and Sexual Activity     Alcohol use: Yes     Alcohol/week: 21.0 standard drinks     Drug use: No     Sexual activity: Not on file   Other Topics Concern     Not on file   Social History Narrative     Not on file     Social Determinants of Health     Financial Resource Strain: Not on file   Food Insecurity: Not on file   Transportation Needs: Not on file   Physical Activity: Not on file   Stress: Not on file   Social Connections: Not on file   Intimate Partner Violence: Not on file   Housing Stability: Not on file    not pertinent to Chief Complaint or reason for visit.             Allergies as of 06/01/2014 Reviewed     Review of Systems:As per HPI. Remainders of 12 point review of systems negative.    Review of Pertinent Laboratory:      PERSONALLY REVIEWED.    Physical Exam: Temp:  [33.8  F (1  C)-99.8  F (37.7  C)] 99.5  F (37.5  C)  Pulse:  [] 92  Resp:  [16-22] 16  BP: (102-129)/(60-80) 117/60  FiO2 (%):  [30 %] 30 %  SpO2:  [96 %-98 %] 96 %   Vitals: reviewed in chart     Physical exam as per medical team: reviewed in chart      diagnoses, risk  and benefits of medications discussed with staff. Care coordination with care management team.   Thank you for this consultation.       Jessi Hoyos; NP  Mental health [facility] Addiction Services        This note was created with the help of Dragon dictation system. Grammatical and typing errors are not intentional.

## 2022-01-03 NOTE — PROGRESS NOTES
01/03/22 1400   Name of Certified Therapeutic Rec Specialist   Name of Certified Therapeutic Rec Specialist   (KINJAL Samson)   Appointment Type   Type of Therapeutic Rec Session Therapeutic Rec Assessment   General Information   Patient Profile Review See Profile for full history and prior level of function   Daily Contact with Relatives or Friends Phone call;Visit   Observations Per ex wife, pt likes Netflix movies, camping, astronomy/planets. Daughter Kelley is very important to him.   Community Involvement   Community Involvement Disabled   Music   Music Preferences Rock   Listens to Recorded Music Yes   Media   TV / Movies TV;Other (comments)  (Netflix)   Sports / Physical Activities   Sports/Exercise Walks;Other (comments)  (strength training, weights)   Sports Fan Baseball;Football   Impression   Open to Socializing with Others Unable (please describe in comments);Other (comments)  (decreased cognition)   Barriers to Leisure Cognition   Patient, family and / or staff in agreement with Plan of Care Yes   Treatment Plan   Assessment Assessment completed, pt is alert inconsistently nods head but does make eye contact. Restless, attempting to get out of bed. Has bilateral wrist restraints applied. Ex wife present to assist with assessment. Pt was living in half way house and on disability.   Time Spent with Patient   Treatment End Time 1430

## 2022-01-03 NOTE — CONSULTS
General Information  Assessment completed with: Spouse or significant other, Jama  Type of CM/SW Visit: Initial Assessment    Primary Care Provider verified and updated as needed: Yes   Readmission within the last 30 days:  (Transfer)      Reason for Consult: discharge planning  Advance Care Planning:  No health care directives    Communication Assessment  Patient's communication style: spoken language (English or Bilingual) (at baseline.)    Hearing Difficulty or Deaf: no   Wear Glasses or Blind: no    Cognitive  Cognitive/Neuro/Behavioral: level of consciousness,arousability,all  Level of Consciousness: alert  Arousal Level: opens eyes spontaneously  Orientation: disoriented to,person,place,time,situation  Mood/Behavior: calm  Best Language:  (danyell)  Speech: trached    Living Environment:   People in home: significant other (Ex wife)  Jama  Current living Arrangements: residential facility  Name of Facility: Sober living when not with Jama   Able to return to prior arrangements: no     Family/Social Support:  Care provided by: self  Provides care for: no one  Marital Status:   Other (specify) (Ex wife, Jama)          Description of Support System:         Current Resources:   Patient receiving home care services:       Community Resources:    Equipment currently used at home: none  Supplies currently used at home:      Employment/Financial:  Employment Status:          Financial Concerns:       Lifestyle & Psychosocial Needs:  Social Determinants of Health     Tobacco Use: High Risk     Smoking Tobacco Use: Never Smoker     Smokeless Tobacco Use: Current User   Alcohol Use: Not on file   Financial Resource Strain: Not on file   Food Insecurity: Not on file   Transportation Needs: Not on file   Physical Activity: Not on file   Stress: Not on file   Social Connections: Not on file   Intimate Partner Violence: Not on file   Depression: Not on file   Housing Stability: Not on file       Functional  Status:  Prior to admission patient needed assistance:   Dependent ADLs:: Independent  Dependent IADLs:: Independent  Assesssment of Functional Status: Not at baseline with ADL Functioning,Not at baseline with mobility,Not at  functional baseline    Mental Health Status:  None noted    Chemical Dependency Status:  History of           Values/Beliefs:  Spiritual, Cultural Beliefs, Yazidi Practices, Values that affect care: no             Additional Information:  Spoke with ex-wife Jama by telephone. Patient lives with his ex-spouse, Jama, part of the time (but had also been spending time in a sober living facility). She reported that he is normally independent at baseline. If necessary, Jama would be able to provide care in her home post discharge (pending patient's needs) but will likely need extensive rehab and therapy. CM to keep Jama updated once closer to discharge and better idea of plan is known. Transport to be determined. Care manager to follow.   Of note, patient has not been vaccinated for Covid-19 (no record on file) but is now designated as 'covid recovered'.  Trach and PEG placed on 12/17/2021.     Marilee Farias RN

## 2022-01-03 NOTE — PROGRESS NOTES
Care Management Initial Consult       Concerns to be Addressed:   Acute on chronic respiratory failure with hypoxia requiring support of a ventilator (vent weaning phase 1). Pulmonology, psychiatry and addiction medicine following for medication management. WOCN following. Feeding tube in place for tube feeding, RD and speech following.   Patient plan of care discussed at interdisciplinary rounds: Yes  Follow up from rounds/notes:   Nursing and MD working on managing sedation to keep patient as alert and calm as possible. Patient is needing restraints and still restless. Per RT, will continue working with patient on vent weaning.     Anticipated Discharge Disposition:  Likely will need extensive therapy and rehab.     Anticipated Discharge Services:  To be determined by destination, patient/family preferences, medical needs and mobility status closer to the time of discharge.  Anticipated Discharge DME:  To be determined.     Patient/family educated on Medicare website which has current facility and service quality ratings:  NA at this time.   Education Provided on the Discharge Plan:  Per team.  Patient/Family in Agreement with the Plan:  Yes    Referrals Placed by CM/SW:  None  Private pay costs discussed: Not applicable       Additional Information:  Trach and PEG placed on 12/17/2021, transferred to LTACH on 12/31/2021. Initial assessment was completed with patient's ex-wife Jama by telephone. Patient lives with his ex-spouse, Jama, part of the time (but had also been spending time in a sober living facility). She reported that he is normally independent at baseline. If necessary, Jama would be able to provide care in her home post discharge (pending patient's needs) but anticipate patient will likely need extensive rehab and therapy. CM to keep Jama updated once closer to discharge and better idea of plan is known. Transport to be determined. Care manager to follow.   Of note, patient has not been vaccinated  for Covid-19 (no record on file) but is now designated as 'covid recovered'.    Marilee Farias RN

## 2022-01-04 ENCOUNTER — APPOINTMENT (OUTPATIENT)
Dept: OCCUPATIONAL THERAPY | Facility: CLINIC | Age: 47
End: 2022-01-04
Attending: INTERNAL MEDICINE
Payer: COMMERCIAL

## 2022-01-04 ENCOUNTER — APPOINTMENT (OUTPATIENT)
Dept: SPEECH THERAPY | Facility: CLINIC | Age: 47
End: 2022-01-04
Attending: INTERNAL MEDICINE
Payer: COMMERCIAL

## 2022-01-04 ENCOUNTER — APPOINTMENT (OUTPATIENT)
Dept: PHYSICAL THERAPY | Facility: CLINIC | Age: 47
End: 2022-01-04
Attending: INTERNAL MEDICINE
Payer: COMMERCIAL

## 2022-01-04 LAB
BACTERIA BLD CULT: NO GROWTH
BASE EXCESS BLDV CALC-SCNC: 7.4 MMOL/L
HCO3 BLDV-SCNC: 30 MMOL/L (ref 24–30)
MAGNESIUM SERPL-MCNC: 1.7 MG/DL (ref 1.8–2.6)
OXYHGB MFR BLDV: 87.6 % (ref 70–75)
PCO2 BLDV: 48 MM HG (ref 35–50)
PH BLDV: 7.43 [PH] (ref 7.35–7.45)
PO2 BLDV: 53 MM HG (ref 25–47)
POTASSIUM BLD-SCNC: 4 MMOL/L (ref 3.5–5)
SAO2 % BLDV: 89.3 % (ref 70–75)

## 2022-01-04 PROCEDURE — 90686 IIV4 VACC NO PRSV 0.5 ML IM: CPT | Performed by: NURSE PRACTITIONER

## 2022-01-04 PROCEDURE — 250N000013 HC RX MED GY IP 250 OP 250 PS 637: Performed by: NURSE PRACTITIONER

## 2022-01-04 PROCEDURE — 82805 BLOOD GASES W/O2 SATURATION: CPT | Performed by: INTERNAL MEDICINE

## 2022-01-04 PROCEDURE — 99233 SBSQ HOSP IP/OBS HIGH 50: CPT | Performed by: HOSPITALIST

## 2022-01-04 PROCEDURE — 250N000013 HC RX MED GY IP 250 OP 250 PS 637: Performed by: INTERNAL MEDICINE

## 2022-01-04 PROCEDURE — 84132 ASSAY OF SERUM POTASSIUM: CPT | Performed by: INTERNAL MEDICINE

## 2022-01-04 PROCEDURE — 99233 SBSQ HOSP IP/OBS HIGH 50: CPT | Performed by: FAMILY MEDICINE

## 2022-01-04 PROCEDURE — 92507 TX SP LANG VOICE COMM INDIV: CPT | Mod: GN | Performed by: SPEECH-LANGUAGE PATHOLOGIST

## 2022-01-04 PROCEDURE — 120N000001 HC R&B MED SURG/OB

## 2022-01-04 PROCEDURE — 94003 VENT MGMT INPAT SUBQ DAY: CPT

## 2022-01-04 PROCEDURE — 250N000009 HC RX 250: Performed by: NURSE PRACTITIONER

## 2022-01-04 PROCEDURE — 250N000011 HC RX IP 250 OP 636: Performed by: NURSE PRACTITIONER

## 2022-01-04 PROCEDURE — 250N000013 HC RX MED GY IP 250 OP 250 PS 637: Performed by: FAMILY MEDICINE

## 2022-01-04 PROCEDURE — 258N000003 HC RX IP 258 OP 636: Performed by: INTERNAL MEDICINE

## 2022-01-04 PROCEDURE — 250N000013 HC RX MED GY IP 250 OP 250 PS 637: Performed by: HOSPITALIST

## 2022-01-04 PROCEDURE — 83735 ASSAY OF MAGNESIUM: CPT | Performed by: INTERNAL MEDICINE

## 2022-01-04 PROCEDURE — 99356 PR PROLONGED SERV,INPATIENT,1ST HR: CPT | Performed by: FAMILY MEDICINE

## 2022-01-04 PROCEDURE — 97530 THERAPEUTIC ACTIVITIES: CPT | Mod: GO | Performed by: OCCUPATIONAL THERAPIST

## 2022-01-04 PROCEDURE — 94003 VENT MGMT INPAT SUBQ DAY: CPT | Performed by: NURSE PRACTITIONER

## 2022-01-04 PROCEDURE — 99222 1ST HOSP IP/OBS MODERATE 55: CPT | Performed by: NURSE PRACTITIONER

## 2022-01-04 PROCEDURE — 92597 ORAL SPEECH DEVICE EVAL: CPT | Mod: GN | Performed by: SPEECH-LANGUAGE PATHOLOGIST

## 2022-01-04 PROCEDURE — 999N000258 HC STATISTIC TRACH CHANGE

## 2022-01-04 PROCEDURE — 97110 THERAPEUTIC EXERCISES: CPT | Mod: GP

## 2022-01-04 PROCEDURE — 999N000157 HC STATISTIC RCP TIME EA 10 MIN

## 2022-01-04 PROCEDURE — G0008 ADMIN INFLUENZA VIRUS VAC: HCPCS | Performed by: NURSE PRACTITIONER

## 2022-01-04 PROCEDURE — 0B21XFZ CHANGE TRACHEOSTOMY DEVICE IN TRACHEA, EXTERNAL APPROACH: ICD-10-PCS | Performed by: NURSE PRACTITIONER

## 2022-01-04 PROCEDURE — 31600 PLANNED TRACHEOSTOMY: CPT

## 2022-01-04 PROCEDURE — 999N000009 HC STATISTIC AIRWAY CARE

## 2022-01-04 RX ORDER — BUPRENORPHINE 2 MG/1
2 TABLET SUBLINGUAL DAILY
Status: DISCONTINUED | OUTPATIENT
Start: 2022-01-04 | End: 2022-01-04

## 2022-01-04 RX ORDER — QUETIAPINE FUMARATE 50 MG/1
50 TABLET, FILM COATED ORAL 4 TIMES DAILY PRN
Status: DISCONTINUED | OUTPATIENT
Start: 2022-01-04 | End: 2022-01-13

## 2022-01-04 RX ORDER — POLYETHYLENE GLYCOL 3350 17 G/17G
17 POWDER, FOR SOLUTION ORAL DAILY PRN
Status: DISCONTINUED | OUTPATIENT
Start: 2022-01-04 | End: 2022-01-10

## 2022-01-04 RX ORDER — BUPRENORPHINE 2 MG/1
2 TABLET SUBLINGUAL DAILY
Status: DISCONTINUED | OUTPATIENT
Start: 2022-01-05 | End: 2022-01-04

## 2022-01-04 RX ORDER — QUETIAPINE FUMARATE 50 MG/1
50 TABLET, FILM COATED ORAL 2 TIMES DAILY
Status: DISCONTINUED | OUTPATIENT
Start: 2022-01-04 | End: 2022-01-04

## 2022-01-04 RX ORDER — PHENOBARBITAL 32.4 MG/1
194.4 TABLET ORAL EVERY 4 HOURS
Status: DISCONTINUED | OUTPATIENT
Start: 2022-01-04 | End: 2022-01-05

## 2022-01-04 RX ORDER — PHENOBARBITAL 32.4 MG/1
194.4 TABLET ORAL EVERY 4 HOURS
Status: DISCONTINUED | OUTPATIENT
Start: 2022-01-04 | End: 2022-01-04

## 2022-01-04 RX ORDER — BUPRENORPHINE 2 MG/1
2 TABLET SUBLINGUAL 2 TIMES DAILY
Status: DISCONTINUED | OUTPATIENT
Start: 2022-01-04 | End: 2022-01-05

## 2022-01-04 RX ORDER — METOPROLOL TARTRATE 50 MG
50 TABLET ORAL 2 TIMES DAILY
Status: DISCONTINUED | OUTPATIENT
Start: 2022-01-04 | End: 2022-01-24 | Stop reason: HOSPADM

## 2022-01-04 RX ORDER — OXYCODONE HCL 20 MG/ML
20 CONCENTRATE, ORAL ORAL
Status: DISCONTINUED | OUTPATIENT
Start: 2022-01-04 | End: 2022-01-04

## 2022-01-04 RX ORDER — OXYCODONE HCL 20 MG/ML
10 CONCENTRATE, ORAL ORAL EVERY 6 HOURS
Status: DISCONTINUED | OUTPATIENT
Start: 2022-01-04 | End: 2022-01-04

## 2022-01-04 RX ADMIN — PHENOBARBITAL 194.4 MG: 32.4 TABLET ORAL at 12:46

## 2022-01-04 RX ADMIN — GABAPENTIN 1200 MG: 250 SOLUTION ORAL at 06:09

## 2022-01-04 RX ADMIN — METOPROLOL TARTRATE 50 MG: 50 TABLET, FILM COATED ORAL at 21:20

## 2022-01-04 RX ADMIN — METOPROLOL TARTRATE 5 MG: 5 INJECTION INTRAVENOUS at 17:06

## 2022-01-04 RX ADMIN — TRAZODONE HYDROCHLORIDE 75 MG: 150 TABLET ORAL at 09:13

## 2022-01-04 RX ADMIN — BUPRENORPHINE HYDROCHLORIDE 1 MG: 2 TABLET SUBLINGUAL at 09:06

## 2022-01-04 RX ADMIN — GABAPENTIN 1200 MG: 250 SOLUTION ORAL at 21:26

## 2022-01-04 RX ADMIN — NYSTATIN 500000 UNITS: 100000 SUSPENSION ORAL at 16:31

## 2022-01-04 RX ADMIN — SODIUM CHLORIDE 1000 ML: 9 INJECTION, SOLUTION INTRAVENOUS at 04:52

## 2022-01-04 RX ADMIN — LEVETIRACETAM 500 MG: 100 SOLUTION ORAL at 21:18

## 2022-01-04 RX ADMIN — ARIPIPRAZOLE 7 MG: 1 SOLUTION ORAL at 09:07

## 2022-01-04 RX ADMIN — QUETIAPINE FUMARATE 50 MG: 50 TABLET ORAL at 09:12

## 2022-01-04 RX ADMIN — INFLUENZA A VIRUS A/VICTORIA/2570/2019 IVR-215 (H1N1) ANTIGEN (FORMALDEHYDE INACTIVATED), INFLUENZA A VIRUS A/TASMANIA/503/2020 IVR-221 (H3N2) ANTIGEN (FORMALDEHYDE INACTIVATED), INFLUENZA B VIRUS B/PHUKET/3073/2013 ANTIGEN (FORMALDEHYDE INACTIVATED), AND INFLUENZA B VIRUS B/WASHINGTON/02/2019 ANTIGEN (FORMALDEHYDE INACTIVATED) 0.5 ML: 15; 15; 15; 15 INJECTION, SUSPENSION INTRAMUSCULAR at 09:24

## 2022-01-04 RX ADMIN — WHITE PETROLATUM: 1.75 OINTMENT TOPICAL at 09:14

## 2022-01-04 RX ADMIN — LEVETIRACETAM 500 MG: 100 SOLUTION ORAL at 09:13

## 2022-01-04 RX ADMIN — TRAZODONE HYDROCHLORIDE 75 MG: 150 TABLET ORAL at 13:14

## 2022-01-04 RX ADMIN — QUETIAPINE FUMARATE 50 MG: 50 TABLET ORAL at 19:49

## 2022-01-04 RX ADMIN — NYSTATIN 500000 UNITS: 100000 SUSPENSION ORAL at 12:35

## 2022-01-04 RX ADMIN — ACETAMINOPHEN ORAL SOLUTION 650 MG: 650 SOLUTION ORAL at 13:18

## 2022-01-04 RX ADMIN — TRAZODONE HYDROCHLORIDE 75 MG: 150 TABLET ORAL at 21:19

## 2022-01-04 RX ADMIN — PHENOBARBITAL 32.4 MG: 32.4 TABLET ORAL at 05:35

## 2022-01-04 RX ADMIN — NYSTATIN 500000 UNITS: 100000 SUSPENSION ORAL at 09:04

## 2022-01-04 RX ADMIN — BUPRENORPHINE HYDROCHLORIDE 2 MG: 2 TABLET SUBLINGUAL at 21:21

## 2022-01-04 RX ADMIN — NYSTATIN 500000 UNITS: 100000 SUSPENSION ORAL at 21:18

## 2022-01-04 RX ADMIN — PHENOBARBITAL 259.2 MG: 32.4 TABLET ORAL at 04:52

## 2022-01-04 RX ADMIN — GABAPENTIN 1200 MG: 250 SOLUTION ORAL at 13:28

## 2022-01-04 RX ADMIN — CHLORHEXIDINE GLUCONATE 0.12% ORAL RINSE 15 ML: 1.2 LIQUID ORAL at 09:04

## 2022-01-04 RX ADMIN — PHENOBARBITAL 194.4 MG: 32.4 TABLET ORAL at 21:18

## 2022-01-04 RX ADMIN — PHENOBARBITAL 194.4 MG: 32.4 TABLET ORAL at 16:31

## 2022-01-04 RX ADMIN — METOPROLOL TARTRATE 25 MG: 25 TABLET, FILM COATED ORAL at 09:05

## 2022-01-04 RX ADMIN — ENOXAPARIN SODIUM 40 MG: 100 INJECTION SUBCUTANEOUS at 09:07

## 2022-01-04 RX ADMIN — PHENOBARBITAL 194.4 MG: 32.4 TABLET ORAL at 23:53

## 2022-01-04 RX ADMIN — QUETIAPINE FUMARATE 100 MG: 50 TABLET ORAL at 21:21

## 2022-01-04 RX ADMIN — CHLORHEXIDINE GLUCONATE 0.12% ORAL RINSE 15 ML: 1.2 LIQUID ORAL at 21:18

## 2022-01-04 ASSESSMENT — ACTIVITIES OF DAILY LIVING (ADL)
ADLS_ACUITY_SCORE: 17
ADLS_ACUITY_SCORE: 23
ADLS_ACUITY_SCORE: 23
ADLS_ACUITY_SCORE: 17
ADLS_ACUITY_SCORE: 23
ADLS_ACUITY_SCORE: 19
ADLS_ACUITY_SCORE: 17
ADLS_ACUITY_SCORE: 19
ADLS_ACUITY_SCORE: 17
ADLS_ACUITY_SCORE: 19
ADLS_ACUITY_SCORE: 23
ADLS_ACUITY_SCORE: 17
ADLS_ACUITY_SCORE: 17
ADLS_ACUITY_SCORE: 19
ADLS_ACUITY_SCORE: 19
ADLS_ACUITY_SCORE: 17
ADLS_ACUITY_SCORE: 19
ADLS_ACUITY_SCORE: 17
ADLS_ACUITY_SCORE: 19
ADLS_ACUITY_SCORE: 19

## 2022-01-04 NOTE — PLAN OF CARE
"  Problem: Inability to Wean (Mechanical Ventilation, Invasive)  Goal: Mechanical Ventilation Liberation  Outcome: Improving     Problem: Skin and Tissue Injury (Mechanical Ventilation, Invasive)  Goal: Absence of Device-Related Skin and Tissue Injury  Outcome: Improving     Problem: Ventilator-Induced Lung Injury (Mechanical Ventilation, Invasive)  Goal: Absence of Ventilator-Induced Lung Injury  Outcome: Improving  RT PROGRESS NOTE     DATA:     CURRENT SETTINGS: AC 16/520/+5/25%             TRACH TYPE / SIZE:  7.5 Shiley TaperGuard, placed on 12/17/21             MODE:   AC             FIO2:   25%     ACTION:             THERAPIES:                SUCTION:                           FREQUENCY:   X2                        AMOUNT:   small                        CONSISTENCY:   thick creamy                         COLOR:   light blue/whitish              SPONTANEOUS COUGH EFFORT/STRENGTH OF EFFORT (not elicited by suctioning): good productive                               WEANING PHASE:   1                        WEAN MODE:    PS 8                        WEAN TIME:   8 hours on PS +12, last 4 hours of weaning on PS +8                        END WEAN REASON:   for night      RESPONSE:             BS:   clear             VITAL SIGNS:   BP 99/66 (BP Location: Left arm, Patient Position: Left side)   Pulse 83   Temp 97.4  F (36.3  C) (Oral)   Resp 16   Ht 1.803 m (5' 11\")   Wt 82.7 kg (182 lb 4.8 oz)   SpO2 95%   BMI 25.43 kg/m                EMOTIONAL NEEDS / CONCERNS:                  RISK FOR SELF DECANNULATION:  yes                        RISK DUE TO: Restless                         INTERVENTION/S IN PLACE IS/ARE: Bilateral wrist restrain      NOTE / PLAN:  Patient has weaned on PS + 12-8 for 12 hours. On PS +12 for the first 8 hours and PS +8 for last 4 hours, miranda well. Pt's RR 12-16, VT 5-6 L, RSBI <100 (18-25), SPO2 95-98%, HR 80-90. No sign of distress noted. Switched to full vent support for night. Will " downsize trach to #7 Bivona as ordered and consider Trach mask. Will cont monitor.

## 2022-01-04 NOTE — PROGRESS NOTES
Pulmonary/CC  Medicine - Ventilator Management :    HPI: 46 year old year old male admitted to [facility] on 12/31/2021 for ongoing treatment of respiratory failure .    The referring facility is [facility]  Records from that facility are available to assist in historical details.     46 year old male, unvaccinated against COVID-19, with history of polysubstance abuse and chronic pain/lower back pain on suboxone, mood disorder/depression, HTN, ROSSY, mild intermittent asthma, presented with respiratory failure/COVID ARDS intubated on 11/28. Initially requiring proning/paralysis, veletri, now down trending vent needs, issues with agitation, treated for VAP. Not tolerating weaning trials, failed extubation 12/15, re-intubated, S/p trach and PEG tube 12/17.    Chief complaint: Ongoing respiratory failure  Significant change in clinical status during past 24 hours? - No      Ventilation Mode: CPAP/PS  (Continuous positive airway pressure with Pressure Support)  FiO2 (%): 35 %  Rate Set (breaths/minute): 16 breaths/min  Tidal Volume Set (mL): 520 mL  PEEP (cm H2O): 5 cmH2O  Pressure Support (cm H2O): 8 cmH2O  Oxygen Concentration (%): 25 %  Resp: 26       Tracheal secretions: moderate    Current phase of ventilator weaning pathway:  Phase 1      Ventilator weaning results from yesterday: 8 hours PS12, 4 hours PS 8 then back on vent last night    Physical exam: (patient personally examined)  General: awake and calm  HEENMT: #7.5 Shiley in place; site clean.   Lungs: Diminished, clear  Heart: RRR, S1 and S2            Abdomen: Soft, non-tender.   Skin: skin color normal, texture normal, no rashes or lesions.   Extremities:  No edema noted.   Pulses:  +2 BUE and +2 BLE  Neuro:  Eyes open, interacts somewhat, moves all extremities    Clinical status discussed today with  respiratory therapist     Diagnosis:   Patient Active Problem List   Diagnosis     Chronic back pain     Essential hypertension     Elevated LFTs      Acute on chronic respiratory failure with hypoxia (H)     Pneumonia due to 2019 novel coronavirus     Atrial fibrillation with rapid ventricular response (H)     Acute respiratory distress syndrome (ARDS) due to COVID-19 virus (H)     Major depressive disorder, recurrent episode, severe (H)     Opioid use disorder, severe, dependence (H)     Polysubstance abuse (H)     ROSSY (obstructive sleep apnea)     Mild intermittent asthma     Acute metabolic encephalopathy     Ventilator associated pneumonia (H)     Oropharyngeal dysphagia     Recommendations/Plans:  1. Ventilator orders: move to phase 2 today after trach downsized  2. Trach change? I downsized trach to # 7 Bivona this am with RT w/o odifficulty  3. Diagnostic testing? Per [facility] and [facility]  4. Other? Per [facility]    SHONA Fenton CNP   [facility] Pulmonary and Critical Medicine

## 2022-01-04 NOTE — PROGRESS NOTES
[facility]    Medicine Progress Note - Hospitalist Service       Date of Admission:  12/31/2021    Background Summary:  Jared North is a 46 year old male unvaccinated against COVID-19, with history of polysubstance abuse and chronic pain/lower back pain on Suboxone prior to admission (had been on methadone in the past but not in recent past), mood disorder/depression, HTN, ROSSY, mild intermittent asthma, knee pain, and diagnosis of COVID-19 viral infection on 11/19/2021 who originally presented to [facility] on 11/24/2021 with worsening shortness of breath, admitted to the ICU with COVID-19 viral pneumonia and severe acute respiratory failure with hypoxia requiring noninvasive ventilation.  He was started on Decadron, remdesivir, and tocilizumab and followed for several days alternating between NIPPV and HFNC.  He had an episode of afib during this time that eventually resolved and has not recurred after briefly being treated with amiodarone.  He did not improve and was eventually intubated on 11/28/21 and then proned and started on veletri.  11/30/21 he was started on cefepime and vanco for VAP - Cx grew E. Coli and Proteus mirabilis.  He did develop hypotension possibly from septic shock vs sedation vasoplegia and was treated with norepi intemittently.  Vanco was stopped on 12/5/21.  He was then transferred to [place]'s ICU on 12/5/21.       At [place] he was followed in ICU and continued on vent/paralysis/proning/pressors.  Cefepime switched to rocephin 12/6/21.  He continued to be very encephalopathic and psychiatry was consulted. He was weaned off pressors and didn't require proning by 12/10/21.  He has been on large doses of anti-psychotics and sedatives.  He developed fevers again 12/14 but had clinically improved somewhat and was extubated on 12/15/21. On 12/16/21 he had severely increased work of breathing and so was re-intubated. Blood Cx grew Streptococcus constellatus and sputum Cx grew that as  well but also had E. Coli/Proteus m./staph epi/candida parapsilosis. Vanco/Zosyn was started on 12/16/21.  He then underwent trach/PEG on 12/17/21.  Vanco was stopped after 3 days and Zosyn switched to rocephin on 12/21/21 to complete on 1/2/21.       During this time he has had significant issues with encephalopathy and agitation. As above he has been started on multiple meds for this.  Over the last week prior to admission here at [facility] they have increased his lorazepam up to 12 mg every 4 hours and oxycodone 20 mg every 4 hours along with abilify 7 mg (home med), gabapentin 800 mg every 8 hours, keppra 500 mg twice daily (for behavior), zyprexa 5 mg twice daily, and seroquel 75 mg TID with 100 mg HS.  With this regimen he has improved reportedly but is still awake and agitated.    Assessment & Plan         Principal Problem:    Acute on chronic respiratory failure with hypoxia     Acute respiratory distress syndrome (ARDS) due to COVID-19 pneumonia   -Currently on Phase 1 vent , slow weaning per pulm/RT  -Likely Trach change to be done today     Active Problems:  Acute metabolic encephalopathy - Med adjustment per psych service.    Chronic back pain -patient was on Suboxone prior to admission.    - Pain control per addiction medicine service, subutex resumed today as above.  Pt not taking it SL, will reach out to team to change it to via feeding tube ?   -Oxy to be on hold for now.  -Gabapentin dose increased to 1200mg Q8H    Essential hypertension - Stable, on metoprolol for Sinus tach, closely monitor BP.     Major depressive disorder, recurrent episode, severe - continue abilify, seroquel, zyprexa, trazodone. PSy to follow. D/w Addiction medicine who will discuss with psy and likely decrease dose of Zyprexa if ok per psy today.    Opioid type dependence - Mx per addiction medicine service, On phenobarb and resumed subutex today. Oxy on hold for now given pt was sedated overnight.    Polysubstance abuse as  above    ROSSY (obstructive sleep apnea)- vented/trached for now    Mild intermittent asthma - can add nebs as needed    Recent Ventilator associated pneumonia - Completed CTX on 1/3/22.    Thrush - On nystatin    Dysphagia - speech therapy consult, on tube feeds    Severe Malnutrition, POA: based on nutrition assessment     Sinus tachycardia  -  Treatment of underlying causes  -  Pt has been in initiated on metoprolol tartrate         Diet: Adult Formula Drip Feeding: Continuous Promote w fiber; Gastrostomy; Goal Rate: 80; mL/hr; Medication - Feeding Tube Flush Frequency: At least 15-30 mL water before and after medication administration and with tube clogging    DVT Prophylaxis: Enoxaparin (Lovenox) SQ  Wei Catheter: Not present  Central Lines: None  Code Status: Full Code      Disposition Plan   Expected Discharge: TBD   Anticipated discharge location: home with family   vs acute rehab vs TCU       The patient's care was discussed with addiction medicine team, RN, SW/CM    Tamika Skinner MD  Hospitalist Service  [facility]  Securely message with the Vocera Web Console (learn more here)  Text page via [employer] Paging/Directory             # Overweight: last Body mass index is 25.43 kg/m .    ______________________________________________________________________    Interval History   Patient is new to me. Chart reviewed and events noted.  Patient seen and examined.   Patient awake, alert and intermittently following commands.  He appears diaphoretic and appears restless during my visit this morning.  Patient was somnolent and likely oversedated overnight and hence his oxycodone and phenobarbital were held.        Data reviewed today: I reviewed all medications, new labs and imaging results over the last 24 hours.  All medication issues identified within the last24 hours have been addressed and completed as appropriate.    Physical Exam   Vital Signs: Temp: 98.5  F (36.9  C) Temp src: Axillary BP: 135/79 Pulse: (!) 129    Resp: 26 SpO2: 94 % O2 Device: Trach dome Oxygen Delivery: 30 LPM  Weight: 182 lbs 4.8 oz    General: Awake, alert, diaphoretic, appears restless  HEENT: EOMI, AT, NC  Cardiac: Tachycardic  Pulmonary: Clear to auscultation in bilateral lung fields  Abdomen: Soft, non tender, Not distended.  Not tender to palpation.  Musculoskeletal: No joint abnormalities noted  Skin: Normally turgid, no rashes noted  Neurologic: Awake, alert, appropriately interactive, moving both LE, b/l UE in restraints.  Psychiatric: Appears slightly restless      Data   Recent Labs   Lab 01/04/22  0555 01/03/22  0600 01/02/22  0612 01/01/22  0805 01/01/22  0611 01/01/22  0603 01/01/22  0002 12/30/21  0426 12/30/21  0424 12/29/21  1210 12/29/21  1159   WBC  --   --   --   --   --   --   --   --  9.1  --  15.8*   HGB  --   --   --   --   --   --   --   --  11.7*  --  11.5*   MCV  --   --   --   --   --   --   --   --  95  --  95   PLT  --   --   --   --   --   --   --   --  256  --  296   NA  --   --   --   --   --   --   --   --  139  --  142   POTASSIUM 4.0 4.1 4.1  --    < >  --   --    < > 3.8  --  4.4   CHLORIDE  --   --   --   --   --   --   --   --  102  --  105   CO2  --   --   --   --   --   --   --   --  25  --  28   BUN  --   --   --   --   --   --   --   --  14  --  12   CR  --   --   --   --   --   --   --   --  0.66*  --  0.66*   ANIONGAP  --   --   --   --   --   --   --   --  12  --  9   TRUONG  --   --   --   --   --   --   --   --  9.2  --  9.2   GLC  --   --   --  114*  --  132* 107*   < > 87   < > 118    < > = values in this interval not displayed.       Imaging    EXAM: XR CHEST PORT 1 VIEW  LOCATION: [facility]  DATE/TIME: 12/29/2021 3:05 PM     INDICATION: Evaluate for worsening infiltrate/new VAP.  COMPARISON: 12/16/2021.                                                                      IMPRESSION:   Endotracheal tube has been removed and a new tracheostomy tube has been placed in good position.  Right PICC line remains in good position. Right IJ CVC has been removed. NG tube also removed. Bilateral interstitial infiltrates have improved when compared   to previous. There are no new or acute infiltrates. No pleural effusion.

## 2022-01-04 NOTE — PROVIDER NOTIFICATION
Pt heavily sedated at start of shift, only arousable to vigorous stimulation, BP 99/66 at 0032. Text paged Dr. Pratt with request to hold scheduled oxycodone and phenobarbital. Received callback orders by telephone at 0040 to hold scheduled medications as well as to put in orders for AM VBG.

## 2022-01-04 NOTE — PLAN OF CARE
Problem: Communication Impairment (Artificial Airway)  Goal: Effective Communication  Outcome: Improving     Problem: Device-Related Complication Risk (Artificial Airway)  Goal: Optimal Device Function  Outcome: Improving     Problem: Skin and Tissue Injury (Artificial Airway)  Goal: Absence of Device-Related Skin or Tissue Injury  Outcome: Improving   RT PROGRESS NOTE     DATA:     CURRENT SETTINGS:             TRACH TYPE / SIZE:  #7 bivona, placed 01/04             MODE:   tm             FIO2:   35% + 30L     ACTION:             THERAPIES:               SUCTION:                           FREQUENCY:   X3                        AMOUNT:   SMALL                        CONSISTENCY:   thin                        COLOR:   light blue, ( from yesterday BDT)             SPONTANEOUS COUGH EFFORT/STRENGTH OF EFFORT (not elicited by suctioning):                              WEANING PHASE:  2                        WEAN MODE:    35%TM+30L                        WEAN TIME:   since 1000                        END WEAN REASON:   cont     RESPONSE:             BS:   coarse             VITAL SIGNS:   SPO2 95-97%, RR 20-26             EMOTIONAL NEEDS / CONCERNS:                RISK FOR SELF DECANNULATION:  YES                        RISK DUE TO:  CONFUSE                        INTERVENTION/S IN PLACE IS/ARE:  RESTRAINED      NOTE / PLAN:   trach was downsized to #7 bivona, and placed pt on 35%tm+30L,with pmv, tolerating well  Cont current POC

## 2022-01-04 NOTE — PROGRESS NOTES
Pt very restless and agitated,HR between 135-145/min. Schedule phenobarb given per order with no effect. HR continue to clime up to 145- 150/min range. V/S checked see flow sheet, and prn Metoprolol 5mg ivp given.HR went down to 109-116/min.Pt tolerated med well and seems a little bit [place] at this time. will continue to monitor Pt status closely.

## 2022-01-04 NOTE — PROGRESS NOTES
Care Management Initial Consult       Concerns to be Addressed:   Acute on chronic respiratory failure with hypoxia requiring support of a ventilator (vent weaning phase 1). Pulmonology, psychiatry and addiction medicine following for medication management. WOCN following. Feeding tube in place for tube feeding, RD and speech following.   Patient plan of care discussed at interdisciplinary rounds: Yes  Follow up from rounds/notes:   Nursing and MD working on managing sedation to keep patient as alert and calm as possible. Patient is needing restraints and still restless. Per RT, will continue working with patient on vent weaning.     Anticipated Discharge Disposition:  Likely will need extensive therapy and rehab.     Anticipated Discharge Services:  To be determined by destination, patient/family preferences, medical needs and mobility status closer to the time of discharge.  Anticipated Discharge DME:  To be determined.     Patient/family educated on Medicare website which has current facility and service quality ratings:  NA at this time.   Education Provided on the Discharge Plan:  Per team.  Patient/Family in Agreement with the Plan:  Yes    Referrals Placed by CM/SW:  None  Private pay costs discussed: Not applicable       Additional Information:  Trach and PEG placed on 12/17/2021, transferred to [facility] on 12/31/2021. Initial assessment was completed with patient's ex-wife Jama by telephone. Patient lives with his ex-spouse, Jama, part of the time (but had also been spending time in [facility] facility). She reported that he is normally independent at baseline. If necessary, Jama would be able to provide care in her home post discharge (pending patient's needs) but anticipate patient will likely need extensive rehab and therapy. CM to keep Jama updated once closer to discharge and better idea of plan is known. Transport to be determined. Care manager to follow.   Of note, patient has not been  vaccinated for Covid-19 (no record on file) but is now designated as 'covid recovered'.  1:54 PM:  Met with patient and Jama at the bedside. Jama is interested in having a care progression meeting as soon as possible. Care progression meeting tentatively set for 1100 on Friday 1/7/2022.   2:15 PM:  Family has brought in home medications for MD to review. Update provided to pharmacist.     Marilee Farias RN

## 2022-01-04 NOTE — PROGRESS NOTES
"Speech pathology: Speaking valve evaluation     01/04/22 1047   General Information   Onset of Illness/Injury or Date of Surgery 11/24/21   Referring Physician Deb   Pertinent History of Current Problem Per H&P: \"Jared North is a 46 year old male unvaccinated against COVID-19, with history of polysubstance abuse and chronic pain/lower back pain on Suboxone prior to admission (had been on methadone in the past but not in recent past), mood disorder/depression, HTN, ROSSY, mild intermittent asthma, knee pain, and diagnosis of COVID-19 viral infection on 11/19/2021 who originally presented to [facility] on 11/24/2021 with worsening shortness of breath, admitted to the ICU with COVID-19 viral pneumonia and severe acute respiratory failure with hypoxia requiring noninvasive ventilation....At [place] he was followed in ICU and continued on vent/paralysis/proning/pressors.  Cefepime switched to rocephin 12/6/21.  He continued to be very encephalopathic and psychiatry was consulted. He was weaned off pressors and didn't require proning by 12/10/21.  He has been on large doses of anti-psychotics and sedatives.  He developed fevers again 12/14 but had clinically improved somewhat and was extubated on 12/15/21.  By  The next day 12/16/21 he had severely increased work of breathing and so was re-intubated. Blood Cx grew Streptococcus constellatus and sputum Cx grew that as well but also had E. Coli/Proteus m./staph epi/candida parapsilosis. Vanco/Zosyn was started on 12/16/21.  He then underwent trach/PEG on 12/17/21.\"   General Observations Patient's ex-wife was present via iPad.   Type of Evaluation   Type of Evaluation Artificial Airway (Speaking Valve)   Tracheostomy Assessment (Speaking Valve)   Date of Tracheostomy 12/17/21   Type, Tracheostomy Tube Bivona   Tube Size, Tracheostomy #7   Cuff, Tracheostomy Tube cuffed, inflated   Participation Ability (Speaking Valve) awake/alert;attempts to communicate, " mouthing words   Comment, Tracheostomy (Speaking Valve) Downsized this am to above trach.    Respiratory Status (Speaking Valve)   Oxygen Supply trach mask   Oral/Tracheal Secretions (Speaking Valve)   Oral Secretions (Speaking Valve Assessment) moderate secretions   Tracheal Secretions (Speaking Valve Assessment) minimal secretions   Speaking Valve Trials (Speaking Valve)   Cuff Inflated at Onset of Evaluation Yes   Orders received to deflate cuff for PMSV trial Yes   Oxygen saturation before cuff deflation 95 %   Respiratory rate before cuff deflation 30 Per Minute   Set-up/Cuff Deflation (Speaking Valve Trial) cuff deflated, total;tolerance, adequate airflow   Oxygen saturation after cuff deflation 93 %   Respiratory rate after cuff deflation 30 Per Minute   Secretions/Suction, Cuff Deflation (Speaking Valve) oral suctioning during cuff deflation;oral suctioning post cuff deflation;tracheal suctioning prior to trial;tracheal suctioning post cuff deflation   Airflow/Phonation Air flow around trach adequate with finger occlusion   Speaking Valve placed on tracheostomy tube   Oxygen saturation with PMSV placement 93 %   Respiratory Rate with PMSV placement 30 Per Minute  (likely compromised by agitation)   Breath Support (Speaking Valve Trial) exhales through mouth   Voice Production (Speaking Valve Trial) voicing achieved;fair strength/quality  (significant dysarthria)   Cough Production (Speaking Valve Trial) good   Secretions During Valve Use (Speaking Valve Trial) secretions stable during valve use  (prompts needed occasionally to clear)   Outcome of Trial (Speaking Valve) tolerance is good;no change from baseline   Total amount of time with PMSV placement: 25   Recommendations (Speaking Valve Trials) speaking valve use recommended   Vocal Quality/Secretion Management (Oral Motor)   Vocal Quality (Oral Motor) wet/gurgly   General Swallowing Observations   Current Diet/Method of Nutritional Intake (General  Swallowing Observations, NIS) NPO   General Therapy Interventions   Communication Speaking valve instruction   SLP Therapy Assessment/Plan   Criteria for Skilled Therapeutic Interventions Met (SLP Eval) yes;treatment indicated   SLP Diagnosis dysphonia   Rehab Potential (SLP Eval) good, to achieve stated therapy goals   Therapy Frequency (SLP Eval) 5 times/wk   Predicted Duration of Therapy Intervention (SLP Eval) LOS   Comment, Therapy Assessment/Plan (SLP) Patient presents with decent tolerance of speaking valve, some risk for aspiration of secretions, warranting particularly thorough and consistent oral cares. Communication was compromised by    Therapy Plan Review/Discharge Plan (SLP)   Therapy Plan Review (SLP) evaluation/treatment results reviewed;care plan/treatment goals reviewed;participants included;patient;spouse/significant other   SLP Discharge Planning    SLP Discharge Recommendation (DC Rec) Transitional Care Facility   SLP Rationale for DC Rec Significantly impaired swallowing, cognition, and communication, needing significant intervention at discharge likely.  Not able to tolerate 3 hours of intensive therapy at this time.   SLP Brief overview of current status  Improved alertness but quite restless and agitated.  He tolerates speaking valve well but does have ongoing risk of aspiration with secretions, warranting thorough oral cares on a regular basis.  His communication is significantly limited by decreased articulatory precision, possibly compromised by significant amount of sedating meds.  Confused and disoriented as well.    Total Evaluation Time   Total Evaluation Time (Minutes) 30

## 2022-01-04 NOTE — PLAN OF CARE
Problem: Restraint for Non-Violent/Non-Self-Destructive Behavior  Goal: Prevent/Manage Potential Problems  Description: Maintain safety of patient and others during period of restraint.  Promote psychological and physical wellbeing.  Prevent injury to skin and involved body parts.  Promote nutrition, hydration, and elimination.  Outcome: No Change     Problem: Adult Inpatient Plan of Care  Goal: Patient-Specific Goal (Individualized)  Outcome: No Change   Patient remained restless between medication administrations ,attempting to pull on tubes. Patient tolerated repositions and transfers well. Schedule pain medications given. No  sign of pain noted.

## 2022-01-04 NOTE — PROGRESS NOTES
Addiction Medicine Progress Note  1/4/2022    ADDENDUM:  1150  Patient was able to dissolve 1mg buprenorphine and there is slight improvement in opioid withdrawal sxs. No evidence for precipitated withdrawal. He refused the 2nd dose and it is not clear as to why. Films may give some ease of dosing and would be preferred but not on formulary. Would consider special order if tabs prove to be too difficult with increasing size/dose.   I spoke with patient directly and still difficult to understand why he refused. He is now willing to move forward with additional doses.    PLAN:  Give an additional 2mg dose of buprenorphine SL now, followed by an additional 2mg at bedtime.   Then 2mgBID ordered for tomorrow. Will possibly increase again tomorrow.  Will continue to follow. Anticipate that he will require at least 8mg given he was previously on 8mg and was on high doses of oxycodone.    Assessment/Plan    Principal Problem:    Acute on chronic respiratory failure with hypoxia (H)  Active Problems:    Chronic back pain    Essential hypertension    Pneumonia due to 2019 novel coronavirus    Acute respiratory distress syndrome (ARDS) due to COVID-19 virus (H)    Major depressive disorder, recurrent episode, severe (H)    Opioid use disorder, severe, dependence (H)    Polysubstance abuse (H)    ROSSY (obstructive sleep apnea)    Mild intermittent asthma    Acute metabolic encephalopathy    Ventilator associated pneumonia (H)    Oropharyngeal dysphagia      46-year-old male with the above medical issues following prolonged hospitalization in the ICU for ARDS due to COVID-19 infection.  He had been on high-dose scheduled lorazepam and oxycodone in addition to Dilaudid prn which has not been administered and lorazepam as needed (administered 4 mg twice.)  Consultation requested because he has been unable to wean lorazepam and oxycodone due to agitation. Prior to admission, he was on buprenorphine and per  was on 8 mg daily  "however, when asked, patient nodded his head \"yes\" that he was actually taking 4 mg.  It is not entirely clear how much he was taking since he is unable to give a history. Nonetheless, he clearly has tolerance and has required high-dose opiates during the hospitalization.  Patient acknowledged that he would like to be back on buprenorphine.  He also denied a previous addiction to benzodiazepines.  It is unclear if he has a history of seizures.     Severe opioid use disorder, dependence- began micro induction to buprenorphine yesterday but dose was given in gtube which will have little to no absorption. He became somnolent overnight (from phenobarbital which was also started yesterday) and oxycodone was held.  He has therefore not had a dose since 01/03/2022 at 2112.  Now in moderate opioid withdrawal.  Unclear if he has the ability to dissolve buprenorphine sublingually..       Plan:   1. Continue holding oxycodone  2. Give buprenorphine 2 mg sublingual now and observe whether or not he has the ability to take it sublingually.  3. If patient able to handle sublingual buprenorphine, will continue with a more accelerated induction since he is now in opiate withdrawal and plan to give an additional 6mg this morning.       Benzodiazepine management without a clear history of sedative use disorder- Ativan was stopped on 01/03/2022 and converted to phenobarbital.  The initial dose was phenobarbital 259.2 mg every 4 hours which is a decrease of approximately 20% of the lorazepam equivalency.  Patient had difficulty with somnolence and mild hypotension overnight so phenobarbital was held.  He then received a scheduled dose of 259.2 mg at 0452, followed by a as needed dose for agitation at 0535 (32.4 mg.)  Currently there are no signs of phenobarbital toxicity but given the somnolence and mild hypotension overnight, a dose reduction is appropriate.  We will plan on reducing dose by approximately 20% and monitoring closely.  " "Once a tolerable dose has been established, the plan will be to slowly taper.                   Plan:  1. Continue with the increased dose of gabapentin at 1200mg q8 hrs.  2. Continue Keppra unchanged (seizure prophylaxis since patient is at risk)  3. Phenobarbital 194.4 mg every 4 hours  4. Phenobarbital 32.4 mg every 2 hours as needed agitation  5. Watch for sedation, hypotension and/or nystagmus and hold doses of phenobarbital if either occur.     Noted that patient is also on a number of medications with anticholinergic properties which in the end may cause more agitation.  I recommend considering a reduction in the atypical antipsychotics to see if this actually reduces some of his symptoms.  Will communicate with Jessi Hoyos and consider a reduction in Zyprexa.      Addiction medicine will continue to follow patient  For questions and concerns, please page the [facility] addiction medicine provider.       Subjective  Patient had difficulty with somnolence and hypotension overnight.  Phenobarbital and oxycodone have been held.  Now appears to be in opioid withdrawal and when asked, patient acknowledges that he feels that this is true by nodding \"yes.\"  Unclear if he can tolerate taking buprenorphine sublingually.    He received scheduled phenobarbital this morning at 0452, followed by a as needed dose of 32.4 mg at 0535.  Seems to be tolerating those 2 doses well and primarily is in opiate withdrawal.    ROS:    Diaphoresis  Restlessness  Increased agitation this morning  Unable to obtain further review of systems because of patient's condition.    Objective    Vital signs in last 24 hours  Temp:  [97.4  F (36.3  C)-98.5  F (36.9  C)] 98.5  F (36.9  C)  Pulse:  [] 125  Resp:  [15-28] 22  BP: ()/(59-85) 135/79  FiO2 (%):  [25 %-30 %] 25 %  SpO2:  [95 %-100 %] 96 %        Physical Exam      Gen: Awake and alert.  Appears uncomfortable and restless.  Diaphoresis is present  Pupils slightly dilated.  No " scleral icterus  eomi  Tremor: none  Skin: no jaundice, moderate to severe diaphoresis present with some piloerection  Respiratory:  Trached and vented    Psych: Appears anxious      Pertinent Labs   Lab Results: I have personally reviewed the labs including VBG from this morning.    Eda Peterson MD  Addiction Medicine    A total of 70min spent in the care and coordination of this patient including discussion with psychiatry, hospitalist, RN, charge RN regarding ensuring buprenorphine is given sublingually and patient was seen several times over the course of the morning to monitor for ongoing withdrawal.  Additionally, extensive chart review, dosing calculations and literature review      Total Visit Time: 70  o For Outpatient, 'Total Visit Time' = Face-to-Face time only.  o For Inpatient, 'Total Visit Time' = Unit Floor + Face-to-Face time.    Total Face-to-Face Prolonged Service Time: 15    Content of the Prolonged Time: as above

## 2022-01-04 NOTE — PLAN OF CARE
Problem: Adult Inpatient Plan of Care  Goal: Plan of Care Review  Outcome: No Change     Problem: Restraint for Non-Violent/Non-Self-Destructive Behavior  Goal: Prevent/Manage Potential Problems  Description: Maintain safety of patient and others during period of restraint.  Promote psychological and physical wellbeing.  Prevent injury to skin and involved body parts.  Promote nutrition, hydration, and elimination.  Outcome: No Change     Problem: Adult Inpatient Plan of Care  Goal: Absence of Hospital-Acquired Illness or Injury  Intervention: Identify and Manage Fall Risk  Recent Flowsheet Documentation  Taken 1/4/2022 0943 by Miguel Scanlon RN  Safety Promotion/Fall Prevention: chair alarm on  Intervention: Prevent Infection  Recent Flowsheet Documentation  Taken 1/4/2022 0943 by Miguel Scanlon RN  Infection Prevention: hand hygiene promoted   Pt was very agitated and restless. He attempted to get out of bed and required 4 people to settle him down. He was up in chair for 2 hours and tolerated. Refused to take sublingual medication twice. Wife is here to visit and supportive. Tylenol given for pain and discomfort.

## 2022-01-04 NOTE — PROVIDER NOTIFICATION
Text paged Dr Pratt at 0410 regarding continued sedation and low blood pressures of 90s systolic. Asked to continue holding phenobarbital and oxycodone,  agreed and asked to give fluid bolus of 1L NS. While prepping this, pt started to awaken. Blood pressure was retaken and  was notified that it had risen to 112/66 and asked for clarification about continuing to give the fluid bolus. Dr Pratt called back and confirmed to continue giving fluid bolus and to also give the scheduled phenobarbital.

## 2022-01-04 NOTE — PLAN OF CARE
Problem: Adult Inpatient Plan of Care  Goal: Plan of Care Review  Outcome: No Change     Problem: Restraint for Non-Violent/Non-Self-Destructive Behavior  Goal: Prevent/Manage Potential Problems  Description: Maintain safety of patient and others during period of restraint.  Promote psychological and physical wellbeing.  Prevent injury to skin and involved body parts.  Promote nutrition, hydration, and elimination.  Outcome: No Change    Pt slept deeply for 5-6 hours overnight. Low BPs at start of shift. Held scheduled oxycodone x2, held scheduled phenobarbital once and gave once. Also gave PRN phenobarbital x1. Bilateral wrist restraints on. Pt pulled condom cath off and urinated once for a large unmeasured output. 2 loose BM overnight, medium and large. 1L NS bolus given overnight d/t low urinary output and low BP.    See this writer's previous provider notification notes for more information.

## 2022-01-04 NOTE — SIGNIFICANT EVENT
Significant Event Note  Received multiple calls through the night on this patient, stating that the patient was fairly somnolent and systolic blood pressure was in the 130s with very little urine output.  Suggested holding of phenobarb and oxycodone around midnight which is scheduled.  Received another call at about 4 AM with similar concerns.  Recommended holding the sedating medications once again and administration of 1 L of normal saline bolus.  However patient's blood pressure did improve significantly, would recommend the daytime provider to take a look at the sedating medications and possibly spacing them out as needed  I have decreased the oxycodone to 10 mg to be used every 6 hours, it was originally at 20 mg every 4 hours

## 2022-01-05 ENCOUNTER — APPOINTMENT (OUTPATIENT)
Dept: SPEECH THERAPY | Facility: CLINIC | Age: 47
End: 2022-01-05
Attending: INTERNAL MEDICINE
Payer: COMMERCIAL

## 2022-01-05 ENCOUNTER — APPOINTMENT (OUTPATIENT)
Dept: OCCUPATIONAL THERAPY | Facility: CLINIC | Age: 47
End: 2022-01-05
Attending: INTERNAL MEDICINE
Payer: COMMERCIAL

## 2022-01-05 ENCOUNTER — APPOINTMENT (OUTPATIENT)
Dept: PHYSICAL THERAPY | Facility: CLINIC | Age: 47
End: 2022-01-05
Attending: INTERNAL MEDICINE
Payer: COMMERCIAL

## 2022-01-05 LAB
MAGNESIUM SERPL-MCNC: 2 MG/DL (ref 1.8–2.6)
POTASSIUM BLD-SCNC: 3.3 MMOL/L (ref 3.5–5)
POTASSIUM BLD-SCNC: 4.4 MMOL/L (ref 3.5–5)

## 2022-01-05 PROCEDURE — 999N000123 HC STATISTIC OXYGEN O2DAILY TECH TIME

## 2022-01-05 PROCEDURE — 999N000009 HC STATISTIC AIRWAY CARE

## 2022-01-05 PROCEDURE — 99233 SBSQ HOSP IP/OBS HIGH 50: CPT | Performed by: INTERNAL MEDICINE

## 2022-01-05 PROCEDURE — 92526 ORAL FUNCTION THERAPY: CPT | Mod: GN | Performed by: SPEECH-LANGUAGE PATHOLOGIST

## 2022-01-05 PROCEDURE — 250N000013 HC RX MED GY IP 250 OP 250 PS 637: Performed by: HOSPITALIST

## 2022-01-05 PROCEDURE — 99232 SBSQ HOSP IP/OBS MODERATE 35: CPT | Performed by: NURSE PRACTITIONER

## 2022-01-05 PROCEDURE — 250N000013 HC RX MED GY IP 250 OP 250 PS 637: Performed by: INTERNAL MEDICINE

## 2022-01-05 PROCEDURE — 250N000013 HC RX MED GY IP 250 OP 250 PS 637: Performed by: NURSE PRACTITIONER

## 2022-01-05 PROCEDURE — 97530 THERAPEUTIC ACTIVITIES: CPT | Mod: GP

## 2022-01-05 PROCEDURE — 99233 SBSQ HOSP IP/OBS HIGH 50: CPT | Performed by: FAMILY MEDICINE

## 2022-01-05 PROCEDURE — 250N000013 HC RX MED GY IP 250 OP 250 PS 637: Performed by: FAMILY MEDICINE

## 2022-01-05 PROCEDURE — 84132 ASSAY OF SERUM POTASSIUM: CPT | Performed by: HOSPITALIST

## 2022-01-05 PROCEDURE — 97129 THER IVNTJ 1ST 15 MIN: CPT | Mod: GO | Performed by: OCCUPATIONAL THERAPIST

## 2022-01-05 PROCEDURE — 999N000157 HC STATISTIC RCP TIME EA 10 MIN

## 2022-01-05 PROCEDURE — 97116 GAIT TRAINING THERAPY: CPT | Mod: GP

## 2022-01-05 PROCEDURE — 83735 ASSAY OF MAGNESIUM: CPT | Performed by: HOSPITALIST

## 2022-01-05 PROCEDURE — 250N000011 HC RX IP 250 OP 636: Performed by: NURSE PRACTITIONER

## 2022-01-05 PROCEDURE — 99356 PR PROLONGED SERV,INPATIENT,1ST HR: CPT | Performed by: FAMILY MEDICINE

## 2022-01-05 PROCEDURE — 84132 ASSAY OF SERUM POTASSIUM: CPT | Performed by: INTERNAL MEDICINE

## 2022-01-05 PROCEDURE — 120N000018 HC R&B RESPIRATORY CARE WITH ATTENDANT

## 2022-01-05 PROCEDURE — 97535 SELF CARE MNGMENT TRAINING: CPT | Mod: GO | Performed by: OCCUPATIONAL THERAPIST

## 2022-01-05 RX ORDER — BUPRENORPHINE 2 MG/1
4 TABLET SUBLINGUAL DAILY
Status: DISCONTINUED | OUTPATIENT
Start: 2022-01-05 | End: 2022-01-05

## 2022-01-05 RX ORDER — BUPRENORPHINE 8 MG/1
8 TABLET SUBLINGUAL
Status: DISCONTINUED | OUTPATIENT
Start: 2022-01-06 | End: 2022-01-13

## 2022-01-05 RX ORDER — MIDODRINE HYDROCHLORIDE 2.5 MG/1
2.5 TABLET ORAL EVERY 8 HOURS PRN
Status: DISCONTINUED | OUTPATIENT
Start: 2022-01-05 | End: 2022-01-05

## 2022-01-05 RX ORDER — BUPRENORPHINE 2 MG/1
4 TABLET SUBLINGUAL DAILY
Status: COMPLETED | OUTPATIENT
Start: 2022-01-05 | End: 2022-01-05

## 2022-01-05 RX ORDER — BUPRENORPHINE 8 MG/1
8 TABLET SUBLINGUAL DAILY
Status: DISCONTINUED | OUTPATIENT
Start: 2022-01-05 | End: 2022-01-13

## 2022-01-05 RX ORDER — MIDODRINE HYDROCHLORIDE 2.5 MG/1
2.5 TABLET ORAL EVERY 8 HOURS PRN
Status: DISCONTINUED | OUTPATIENT
Start: 2022-01-05 | End: 2022-01-24 | Stop reason: HOSPADM

## 2022-01-05 RX ORDER — POTASSIUM CHLORIDE 20MEQ/15ML
40 LIQUID (ML) ORAL ONCE
Status: COMPLETED | OUTPATIENT
Start: 2022-01-05 | End: 2022-01-05

## 2022-01-05 RX ORDER — LEVETIRACETAM 100 MG/ML
500 SOLUTION ORAL AT BEDTIME
Status: COMPLETED | OUTPATIENT
Start: 2022-01-05 | End: 2022-01-07

## 2022-01-05 RX ORDER — BUPRENORPHINE 8 MG/1
8 TABLET SUBLINGUAL DAILY
Status: DISCONTINUED | OUTPATIENT
Start: 2022-01-06 | End: 2022-01-05

## 2022-01-05 RX ORDER — PHENOBARBITAL 32.4 MG/1
194.4 TABLET ORAL EVERY 6 HOURS
Status: DISCONTINUED | OUTPATIENT
Start: 2022-01-05 | End: 2022-01-06

## 2022-01-05 RX ADMIN — CHLORHEXIDINE GLUCONATE 0.12% ORAL RINSE 15 ML: 1.2 LIQUID ORAL at 08:01

## 2022-01-05 RX ADMIN — NYSTATIN 500000 UNITS: 100000 SUSPENSION ORAL at 12:47

## 2022-01-05 RX ADMIN — LEVETIRACETAM 500 MG: 100 SOLUTION ORAL at 21:28

## 2022-01-05 RX ADMIN — SENNOSIDES 10 ML: 8.8 LIQUID ORAL at 21:28

## 2022-01-05 RX ADMIN — PHENOBARBITAL 194.4 MG: 32.4 TABLET ORAL at 12:47

## 2022-01-05 RX ADMIN — ARIPIPRAZOLE 7 MG: 1 SOLUTION ORAL at 08:01

## 2022-01-05 RX ADMIN — PHENOBARBITAL 194.4 MG: 32.4 TABLET ORAL at 08:03

## 2022-01-05 RX ADMIN — QUETIAPINE FUMARATE 100 MG: 50 TABLET ORAL at 21:28

## 2022-01-05 RX ADMIN — NYSTATIN 500000 UNITS: 100000 SUSPENSION ORAL at 16:06

## 2022-01-05 RX ADMIN — TRAZODONE HYDROCHLORIDE 75 MG: 150 TABLET ORAL at 13:49

## 2022-01-05 RX ADMIN — NYSTATIN 500000 UNITS: 100000 SUSPENSION ORAL at 08:01

## 2022-01-05 RX ADMIN — LEVETIRACETAM 500 MG: 100 SOLUTION ORAL at 08:00

## 2022-01-05 RX ADMIN — TRAZODONE HYDROCHLORIDE 75 MG: 150 TABLET ORAL at 08:00

## 2022-01-05 RX ADMIN — GABAPENTIN 1200 MG: 250 SOLUTION ORAL at 06:19

## 2022-01-05 RX ADMIN — METOPROLOL TARTRATE 50 MG: 50 TABLET, FILM COATED ORAL at 08:00

## 2022-01-05 RX ADMIN — METOPROLOL TARTRATE 50 MG: 50 TABLET, FILM COATED ORAL at 21:28

## 2022-01-05 RX ADMIN — QUETIAPINE FUMARATE 50 MG: 50 TABLET ORAL at 10:39

## 2022-01-05 RX ADMIN — GABAPENTIN 1200 MG: 250 SOLUTION ORAL at 21:32

## 2022-01-05 RX ADMIN — NYSTATIN 500000 UNITS: 100000 SUSPENSION ORAL at 21:28

## 2022-01-05 RX ADMIN — PHENOBARBITAL 194.4 MG: 32.4 TABLET ORAL at 19:15

## 2022-01-05 RX ADMIN — BUPRENORPHINE HYDROCHLORIDE 8 MG: 8 TABLET SUBLINGUAL at 09:06

## 2022-01-05 RX ADMIN — ENOXAPARIN SODIUM 40 MG: 100 INJECTION SUBCUTANEOUS at 08:00

## 2022-01-05 RX ADMIN — WHITE PETROLATUM: 1.75 OINTMENT TOPICAL at 08:01

## 2022-01-05 RX ADMIN — GABAPENTIN 1200 MG: 250 SOLUTION ORAL at 13:49

## 2022-01-05 RX ADMIN — POTASSIUM CHLORIDE 40 MEQ: 20 SOLUTION ORAL at 09:06

## 2022-01-05 RX ADMIN — TRAZODONE HYDROCHLORIDE 75 MG: 150 TABLET ORAL at 21:28

## 2022-01-05 RX ADMIN — HALOPERIDOL LACTATE 5 MG: 5 INJECTION, SOLUTION INTRAMUSCULAR at 01:39

## 2022-01-05 RX ADMIN — BUPRENORPHINE HYDROCHLORIDE 4 MG: 2 TABLET SUBLINGUAL at 15:54

## 2022-01-05 RX ADMIN — ACETAMINOPHEN ORAL SOLUTION 650 MG: 650 SOLUTION ORAL at 10:39

## 2022-01-05 RX ADMIN — CHLORHEXIDINE GLUCONATE 0.12% ORAL RINSE 15 ML: 1.2 LIQUID ORAL at 19:15

## 2022-01-05 ASSESSMENT — ACTIVITIES OF DAILY LIVING (ADL)
ADLS_ACUITY_SCORE: 19
ADLS_ACUITY_SCORE: 21
ADLS_ACUITY_SCORE: 19
ADLS_ACUITY_SCORE: 17
ADLS_ACUITY_SCORE: 19
ADLS_ACUITY_SCORE: 17
ADLS_ACUITY_SCORE: 19
ADLS_ACUITY_SCORE: 21
ADLS_ACUITY_SCORE: 17
ADLS_ACUITY_SCORE: 17
ADLS_ACUITY_SCORE: 19
ADLS_ACUITY_SCORE: 19
ADLS_ACUITY_SCORE: 21
ADLS_ACUITY_SCORE: 19
ADLS_ACUITY_SCORE: 19
ADLS_ACUITY_SCORE: 17
ADLS_ACUITY_SCORE: 19
ADLS_ACUITY_SCORE: 17
ADLS_ACUITY_SCORE: 19
ADLS_ACUITY_SCORE: 17
ADLS_ACUITY_SCORE: 19

## 2022-01-05 NOTE — PLAN OF CARE
Care Management Progression of Care Update        DR GLOS - Target D/C Date 22        PLAN/GOALS  Pulmonary following. Phase 2 wean~Trac dome @ 21% Fi02. First night off vent.  Frequent suctioning 3x/shift.      2.  Nutrition management.  Diet NPO ok for ice chips w/supervision. Tube feeding via PEG placed on 21.  Registered Dietician to monitor tolerance of tube feeding, wieght and labs.    3.  Addiction Medicine following.    4.  Psychiatry consult.    5.  PT 6x/week.  OT 5x/week.    6.  Speech therapy 5x/week.      BARRIERS  1.  Acute on chronic respiratory failure with hypoxia due to COVID-19.  Vent / trach wean.    2.  Acute metabolic encephalopathy.    3.  Oropharyngeal dysphagia.    4.  Cardiac monitoring.    5.  Bilateral soft wrist restraints.    6.  Severe malnutrition >5% wieght loss in 1 month,  Albumin 2.5.      Disposition:  TCU vs. Acute Rehab    Care Manager Name:  Chidi Gomez RN,BSN, HNB-BC    Date/Time:  2022 @ 2:31 PM

## 2022-01-05 NOTE — PROGRESS NOTES
Very agitated with cares and needed 3 people to assist. Patient crawling out of bed since 7:30 AM  Needed to safely move bladder and bowels so this writer and nursing assistant stayed to assist. Patient does not understand that he is in a hospital.   Also, he is requesting chewing tobacco. Discussed nicotine patch but he states that doesn't work.  Requested a 1:1 sitter. Has taken from 7-9 am to get patient settled for morning.    Update 0915 will not take Buprenorphine SL without chewing tobacco first.    0920 Re-approched and took SL    Jennie Guzman RN

## 2022-01-05 NOTE — PLAN OF CARE
Problem: Inability to Wean (Mechanical Ventilation, Invasive)  Goal: Mechanical Ventilation Liberation  Outcome: Improving     Problem: Skin and Tissue Injury (Mechanical Ventilation, Invasive)  Goal: Absence of Device-Related Skin and Tissue Injury  Outcome: Improving     Problem: Ventilator-Induced Lung Injury (Mechanical Ventilation, Invasive)  Goal: Absence of Ventilator-Induced Lung Injury  Outcome: Improving     Problem: Communication Impairment (Artificial Airway)  Goal: Effective Communication  Outcome: Improving     Problem: Device-Related Complication Risk (Artificial Airway)  Goal: Optimal Device Function  Outcome: Improving  Intervention: Optimize Device Care and Function  Recent Flowsheet Documentation  Taken 1/5/2022 0035 by Rudolph Coles, RT  Airway Safety Measures: all equipment/monitors on and audible  Taken 1/4/2022 1956 by Rudolph Coles RT  Airway Safety Measures: all equipment/monitors on and audible     Problem: Skin and Tissue Injury (Artificial Airway)  Goal: Absence of Device-Related Skin or Tissue Injury  Outcome: Improving     RT PROGRESS NOTE     DATA:     CURRENT SETTINGS:             TRACH TYPE / SIZE: 37 Bivona, changed on 01/04/22             MODE:   Trach dome with PMV             FIO2:   35%/ 30 LPM     ACTION:             THERAPIES:                SUCTION:                           FREQUENCY:   X1                        AMOUNT:   small                        CONSISTENCY:   thick creamy                        COLOR:   clear/whitish              SPONTANEOUS COUGH EFFORT/STRENGTH OF EFFORT (not elicited by suctioning): good productive                              WEANING PHASE:   2                        WEAN MODE:    TM with PMV                        WEAN TIME:   Since 01/04                        END WEAN REASON:   ongoing as miranda     RESPONSE:             BS:   clear             VITAL SIGNS:   /82 (BP Location: Left arm, Patient Position: Supine)   Pulse 104   Temp  "98.6  F (37  C) (Oral)   Resp 24   Ht 1.803 m (5' 11\")   Wt 82.7 kg (182 lb 4.8 oz)   SpO2 95%   BMI 25.43 kg/m                EMOTIONAL NEEDS / CONCERNS:                  RISK FOR SELF DECANNULATION:  yes                        RISK DUE TO:  confusion                         INTERVENTION/S IN PLACE IS/ARE:  bilateral wrist restrain        NOTE / PLAN:   Patient remains on TM with PMV, miranda well. Will cont monitor.  "

## 2022-01-05 NOTE — PROGRESS NOTES
Care Management Initial Consult       Concerns to be Addressed:   Acute on chronic respiratory failure with hypoxia requiring support of a ventilator (vent weaning phase 2). Pulmonology, psychiatry and addiction medicine following for medication management. WOCN following. Feeding tube in place for tube feeding, RD and speech following.   Patient plan of care discussed at interdisciplinary rounds: Yes  Follow up from rounds/notes:   Phase 2 weaning. Still restless, 1:1 sitter requested. Tachy (NSR).    Anticipated Discharge Disposition:  Likely will need extensive therapy and rehab.     Anticipated Discharge Services:  To be determined by destination, patient/family preferences, medical needs and mobility status closer to the time of discharge.  Anticipated Discharge DME:  To be determined.     Patient/family educated on Medicare website which has current facility and service quality ratings:  NA at this time.   Education Provided on the Discharge Plan:  Per team.  Patient/Family in Agreement with the Plan:  Yes    Referrals Placed by CM/SW:  None  Private pay costs discussed: Not applicable       Additional Information:  Trach and PEG placed on 12/17/2021, transferred to LTACH on 12/31/2021. Initial assessment was completed with patient's ex-wife Jama by telephone. Patient lives with his ex-spouse, Jama, part of the time (but had also been spending time in [facility] facility). She reported that he is normally independent at baseline. If necessary, Jama would be able to provide care in her home post discharge (pending patient's needs) but anticipate patient will likely need extensive rehab and therapy. CM to keep Jama updated once closer to discharge and better idea of plan is known. Transport to be determined. Care manager to follow.   Of note, patient has not been vaccinated for Covid-19 (no record on file) but is now designated as 'covid recovered'.  1:54 PM:  Met with patient and Jama at the  bedside. Jama is interested in having a care progression meeting as soon as possible. Care progression meeting tentatively set for 1100 on Friday 1/7/2022.   2:15 PM:  Family has brought in home medications for MD to review. Update provided to pharmacist.     Marilee Farias RN

## 2022-01-05 NOTE — PLAN OF CARE
Problem: Skin and Tissue Injury (Mechanical Ventilation, Invasive)  Goal: Absence of Device-Related Skin and Tissue Injury  Outcome: No Change     Problem: Skin and Tissue Injury (Artificial Airway)  Goal: Absence of Device-Related Skin or Tissue Injury  Outcome: No Change     Problem: Inability to Wean (Mechanical Ventilation, Invasive)  Goal: Mechanical Ventilation Liberation  Outcome: Improving     Problem: Ventilator-Induced Lung Injury (Mechanical Ventilation, Invasive)  Goal: Absence of Ventilator-Induced Lung Injury  Outcome: Improving     Problem: Communication Impairment (Artificial Airway)  Goal: Effective Communication  Outcome: Improving     Problem: Device-Related Complication Risk (Artificial Airway)  Goal: Optimal Device Function  Outcome: Improving  Intervention: Optimize Device Care and Function  Recent Flowsheet Documentation  Taken 1/5/2022 1128 by Delphine Pineda, RT  Airway Safety Measures: all equipment/monitors on and audible  Taken 1/5/2022 0739 by Delphine Pineda, RT  Airway Safety Measures: all equipment/monitors on and audible

## 2022-01-05 NOTE — PROGRESS NOTES
"Pulmonary/CC  Medicine - Ventilator Management :    HPI: 46 year old year old male admitted to [facility] on 12/31/2021 for ongoing treatment of respiratory failure .    The referring facility is [facility]  Records from that facility are available to assist in historical details.     46 year old male, unvaccinated against COVID-19, with history of polysubstance abuse and chronic pain/lower back pain on suboxone, mood disorder/depression, HTN, ROSSY, mild intermittent asthma, presented with respiratory failure/COVID ARDS intubated on 11/28. Initially requiring proning/paralysis, veletri, now down trending vent needs, issues with agitation, treated for VAP. Not tolerating weaning trials, failed extubation 12/15, re-intubated, S/p trach and PEG tube 12/17.    Chief complaint: Ongoing respiratory failure  Significant change in clinical status during past 24 hours? - No      Ventilation Mode: Trach collar  FiO2 (%): 21 %  Rate Set (breaths/minute): 16 breaths/min  Tidal Volume Set (mL): 520 mL  PEEP (cm H2O): 5 cmH2O  Pressure Support (cm H2O): 8 cmH2O  Oxygen Concentration (%): 35 %  Resp: 24       Tracheal secretions: moderate    Current phase of ventilator weaning pathway:  Phase 2 started 1/4     Ventilator weaning results from yesterday: first night off vent    Physical exam: (patient personally examined)  General: awake, up in chair  \"I want to go walk my dog\"    Impulsive behavior  HEENMT: #7 bivona in place; site clean.   Lungs: Diminished, clear  Heart: RRR, S1 and S2            Abdomen: Soft, non-tender.   Skin: skin color normal, texture normal, no rashes or lesions.   Extremities:  No edema noted.   Pulses:  +2 BUE and +2 BLE  Neuro:  Eyes open, interacts somewhat, moves all extremities    Clinical status discussed today with  respiratory therapist     Diagnosis:   Patient Active Problem List   Diagnosis     Chronic back pain     Essential hypertension     Elevated LFTs     Acute on chronic respiratory " failure with hypoxia (H)     Pneumonia due to 2019 novel coronavirus     Atrial fibrillation with rapid ventricular response (H)     Acute respiratory distress syndrome (ARDS) due to COVID-19 virus (H)     Major depressive disorder, recurrent episode, severe (H)     Opioid use disorder, severe, dependence (H)     Polysubstance abuse (H)     ROSSY (obstructive sleep apnea)     Mild intermittent asthma     Acute metabolic encephalopathy     Ventilator associated pneumonia (H)     Oropharyngeal dysphagia     Recommendations/Plans:  1. Ventilator orders: on phase 2, off vent since 1/4 am  2. Trach change? 1/4 changed to  # 7 Bivona   3. Diagnostic testing? Per [facility] and [facility]  4. Other? Per [facility]    SHONA Fenton [facility]monary [facility]

## 2022-01-05 NOTE — PROGRESS NOTES
"Speech pathology: Bedside swallow study     01/05/22 1118   General Information   Onset of Illness/Injury or Date of Surgery 11/24/21   Referring Physician Jourdan   Pertinent History of Current Problem Per H&P: \"Jared North is a 46 year old male unvaccinated against COVID-19, with history of polysubstance abuse and chronic pain/lower back pain on Suboxone prior to admission (had been on methadone in the past but not in recent past), mood disorder/depression, HTN, ROSSY, mild intermittent asthma, knee pain, and diagnosis of COVID-19 viral infection on 11/19/2021 who originally presented to [facility] on 11/24/2021 with worsening shortness of breath, admitted to the ICU with COVID-19 viral pneumonia and severe acute respiratory failure with hypoxia requiring noninvasive ventilation....At [place] he was followed in ICU and continued on vent/paralysis/proning/pressors.  Cefepime switched to rocephin 12/6/21.  He continued to be very encephalopathic and psychiatry was consulted. He was weaned off pressors and didn't require proning by 12/10/21.  He has been on large doses of anti-psychotics and sedatives.  He developed fevers again 12/14 but had clinically improved somewhat and was extubated on 12/15/21.  By  The next day 12/16/21 he had severely increased work of breathing and so was re-intubated. Blood Cx grew Streptococcus constellatus and sputum Cx grew that as well but also had E. Coli/Proteus m./staph epi/candida parapsilosis. Vanco/Zosyn was started on 12/16/21.  He then underwent trach/PEG on 12/17/21.\"   Type of Evaluation   Type of Evaluation Swallow Evaluation   Tracheostomy Assessment (Speaking Valve)   Type, Tracheostomy Tube Bivona   Tube Size, Tracheostomy #7   Comment, Tracheostomy (Speaking Valve) Speaking valve in place   Oral Motor   Oral Musculature anomalies present   Structural Abnormalities none present   Mucosal Quality adequate   Dentition (Oral Motor) some missing teeth   Facial " Symmetry (Oral Motor)   Facial Symmetry (Oral Motor) WNL   Lip Function (Oral Motor)   Lip Range of Motion (Oral Motor) WNL   Lip Strength (Oral Motor) WNL   Tongue Function (Oral Motor)   Tongue ROM (Oral Motor) WNL   Tongue Strength (Oral Motor) WNL   Tongue Coordination/Speed (Oral Motor) slows down progressively   Cough/Swallow/Gag Reflex (Oral Motor)   Volitional Throat Clear/Cough (Oral Motor) WNL   Volitional Swallow (Oral Motor) mildly delayed   Vocal Quality/Secretion Management (Oral Motor)   Vocal Quality (Oral Motor) WFL   Secretion Management (Oral Motor) WNL   General Swallowing Observations   Current Diet/Method of Nutritional Intake (General Swallowing Observations, NIS) NPO   Respiratory Support (General Swallowing Observations) trach collar   Swallowing Evaluation Clinical swallow evaluation   Clinical Swallow Evaluation   Feeding Assistance minimal assistance required   Clinical Swallow Evaluation Textures Trialed thin liquids;moderately thick liquids/liquidized;pureed  (ice chips)   Clinical Swallow Eval: Thin Liquid Texture Trial   Mode of Presentation, Thin Liquids cup;straw   Volume of Liquid or Food Presented 3  oz   Oral Phase of Swallow delayed AP movement   Oral Residue left lip drooling;right lip drooling  (mild)   Pharyngeal Phase of Swallow impaired;wet vocal quality after swallow   Diagnostic Statement Patient presents with mild to moderate impulsivity as well as inconsistent signs symptoms of aspiration with thin liquids.   Clinical Swallow Eval: Moderately Thick Liquids   Mode of Presentation cup   Volume Presented 1 ounce   Oral Phase delayed AP movement;premature pharyngeal entry  (Suspected premature)   Pharyngeal Phase throat clearing   Diagnostic Statement Patient presents with subtle signs symptoms of aspiration with moderately thick liquids, characterized by slow bolus prep and AP bolus transit.  Cannot rule out premature entrance into the pharynx.   Clinical Swallow  Evaluation: Puree Solid Texture Trial   Mode of Presentation, Puree spoon   Volume of Puree Presented 1 ounce   Oral Phase, Puree delayed AP movement;premature pharyngeal entry  (Suspect premature)   Pharyngeal Phase, Puree intact   Diagnostic Statement No overt signs or symptoms of aspiration with purée.     Esophageal Phase of Swallow   Patient reports or presents with symptoms of esophageal dysphagia No   Swallowing Recommendations   Diet Consistency Recommendations ice chips only   Supervision Level for Intake close supervision needed   Mode of Delivery Recommendations bolus size, small  (Limit 2-3 ice chips at a time)   Recommended Feeding/Eating Techniques (Swallow Eval) provide oral hygiene prior to intake   Instrumental Assessment Recommendations VFSS (videofluroscopic swallowing study)   General Therapy Interventions   Dysphagia treatment Instruction of safe swallow strategies;Compensatory strategies for swallowing;Modified diet education   SLP Therapy Assessment/Plan   Criteria for Skilled Therapeutic Interventions Met (SLP Eval) yes;treatment indicated   SLP Diagnosis Dysphagia   Rehab Potential (SLP Eval) good, to achieve stated therapy goals   Therapy Frequency (SLP Eval) 5 times/wk   Predicted Duration of Therapy Intervention (SLP Eval) Length of stay   Comment, Therapy Assessment/Plan (SLP) Patient presents with inconsistent signs and symptoms of aspiration with multiple consistencies.  Appears appropriate to initiate ice chips, but fluctuating cognition, agitation, respiratory status, is at risk for silent aspiration.   Therapy Plan Review/Discharge Plan (SLP)   Therapy Plan Review (SLP) evaluation/treatment results reviewed;care plan/treatment goals reviewed;participants voiced agreement with care plan;participants included;patient   SLP Discharge Planning    SLP Discharge Recommendation (DC Rec) Transitional Care Facility   SLP Rationale for DC Rec Significantly impaired swallowing, cognition, and  communication, needing significant intervention at discharge likely.  Not able to tolerate 3 hours of intensive therapy at this time.   SLP Brief overview of current status  Appropriate for ice chips only at this time.  Would likely benefit from video swallow study prior to initiating diet given increased risk for aspiration, including silent aspiration, with tracheostomy.    Total Evaluation Time   Total Evaluation Time (Minutes) 25

## 2022-01-05 NOTE — PROGRESS NOTES
[facility]    Medicine Progress Note - Hospitalist Service       Date of Admission:  12/31/2021    Background Summary:  Jared North is a 46 year old male unvaccinated against COVID-19, with history of polysubstance abuse and chronic pain/lower back pain on Suboxone prior to admission (had been on methadone in the past but not in recent past), mood disorder/depression, HTN, ROSSY, mild intermittent asthma, knee pain, and diagnosis of COVID-19 viral infection on 11/19/2021 who originally presented to [facility] on 11/24/2021 with worsening shortness of breath, admitted to the ICU with COVID-19 viral pneumonia and severe acute respiratory failure with hypoxia requiring noninvasive ventilation.  He was started on Decadron, remdesivir, and tocilizumab and followed for several days alternating between NIPPV and HFNC.  He had an episode of afib during this time that eventually resolved and has not recurred after briefly being treated with amiodarone.  He did not improve and was eventually intubated on 11/28/21 and then proned and started on veletri.  11/30/21 he was started on cefepime and vanco for VAP - Cx grew E. Coli and Proteus mirabilis.  He did develop hypotension possibly from septic shock vs sedation vasoplegia and was treated with norepi intemittently.  Vanco was stopped on 12/5/21.  He was then transferred to [place]'s ICU on 12/5/21.       At [place] he was followed in ICU and continued on vent/paralysis/proning/pressors.  Cefepime switched to rocephin 12/6/21.  He continued to be very encephalopathic and psychiatry was consulted. He was weaned off pressors and didn't require proning by 12/10/21.  He has been on large doses of anti-psychotics and sedatives.  He developed fevers again 12/14 but had clinically improved somewhat and was extubated on 12/15/21. On 12/16/21 he had severely increased work of breathing and so was re-intubated. Blood Cx grew Streptococcus constellatus and sputum Cx grew that as  well but also had E. Coli/Proteus m./staph epi/candida parapsilosis. Vanco/Zosyn was started on 12/16/21.  He then underwent trach/PEG on 12/17/21.  Vanco was stopped after 3 days and Zosyn switched to rocephin on 12/21/21 to complete on 1/2/21.       During this time he has had significant issues with encephalopathy and agitation. As above he has been started on multiple meds for this.  Over the last week prior to admission here at [facility] they have increased his lorazepam up to 12 mg every 4 hours and oxycodone 20 mg every 4 hours along with abilify 7 mg (home med), gabapentin 800 mg every 8 hours, keppra 500 mg twice daily (for behavior), zyprexa 5 mg twice daily, and seroquel 75 mg TID with 100 mg HS.  With this regimen he has improved reportedly but is still awake and agitated.    Assessment & Plan         Principal Problem:    Acute on chronic respiratory failure with hypoxia -     Acute respiratory distress syndrome (ARDS) due to COVID-19 pneumonia   -Currently on Phase 2 vent weaning, slow weaning per pulm/RT  - trach changed 1/4/22     Active Problems:  Acute metabolic encephalopathy - Med adjustment per psych and addiction service.  Currently on Abilify, subutex, neurontin, keppra (being tapered off), phenobarbital, seroquel, and trazodone.  Was on very high doses of lorazepam and oxycodone as above which have been stopped.    Chronic back pain -patient was on Suboxone prior to admission.    - Pain control per addiction medicine service, subutex resumed and titrating up.    -Gabapentin dose increased to 1200mg Q8H    Essential hypertension - Stable, on metoprolol for Sinus tach, closely monitor BP.     Major depressive disorder, recurrent episode, severe - continue abilify, seroquel, trazodone.  Off zyprexa    Opioid type dependence - Mx per addiction medicine service, On phenobarb and resumed subutex. Oxy has been stopped.    Polysubstance abuse as above    ROSSY (obstructive sleep apnea)- vented/trached for  now    Mild intermittent asthma - can add nebs as needed    Recent Ventilator associated pneumonia - Completed CTX on 1/3/22.    Thrush - On nystatin    Dysphagia - speech therapy consult, on tube feeds    Severe Malnutrition, POA: based on nutrition assessment     Sinus tachycardia  -  Treatment of underlying causes  -  Pt has been in initiated on metoprolol tartrate         Diet: Adult Formula Drip Feeding: Continuous Promote w fiber; Gastrostomy; Goal Rate: 80; mL/hr; Medication - Feeding Tube Flush Frequency: At least 15-30 mL water before and after medication administration and with tube clogging    DVT Prophylaxis: Enoxaparin (Lovenox) SQ  Wei Catheter: Not present  Central Lines: PRESENT  PICC Triple Lumen 12/24/21 Right Basilic Medication Drips-Site Assessment: WDL  Code Status: Full Code      Disposition Plan   Expected Discharge: TBD   Anticipated discharge location: home with family   vs acute rehab vs TCU       The patient's care was discussed with addiction medicine team, RN    Adam Soliman MD  Hospitalist Service  [facility]  Securely message with the Vocera Web Console (learn more here)  Text page via [employer] Paging/Directory                 ______________________________________________________________________    Interval History   Patient is new to me. Chart reviewed and events noted.  Patient seen and examined.   Patient awake, alert and generally following commands.  Somewhat restless.         Data reviewed today: I reviewed all medications, new labs and imaging results over the last 24 hours.  All medication issues identified within the last24 hours have been addressed and completed as appropriate.   Phenobarb held for one dose this AM and has been restarted per addiction medicine    Physical Exam   Vital Signs: Temp: 97.9  F (36.6  C) Temp src: Oral BP: 111/66 Pulse: 104   Resp: 22 SpO2: 93 % O2 Device: Trach dome Oxygen Delivery: 25 LPM  Weight: 182 lbs 4.8 oz    General: Awake, alert, appears  "restless  HEENT: EOMI, AT, NC  Cardiac: Tachycardic  Pulmonary: Clear to auscultation in bilateral lung fields  Abdomen: Soft, non tender, Not distended.  Not tender to palpation.  Musculoskeletal: No joint abnormalities noted  Skin: Normally turgid, no rashes noted  Neurologic: Awake, alert, appropriately interactive, oriented to \"22\" but not 2022 - states its 1922, January but not date or location.  moving both LE, b/l UE in restraints.  Psychiatric: Appears slightly restless      Data   Recent Labs   Lab 01/05/22  1314 01/05/22  0607 01/04/22  0555 01/02/22  0612 01/01/22  0805 01/01/22  0611 01/01/22  0603 01/01/22  0002 12/30/21  0426 12/30/21  0424   WBC  --   --   --   --   --   --   --   --   --  9.1   HGB  --   --   --   --   --   --   --   --   --  11.7*   MCV  --   --   --   --   --   --   --   --   --  95   PLT  --   --   --   --   --   --   --   --   --  256   NA  --   --   --   --   --   --   --   --   --  139   POTASSIUM 4.4 3.3* 4.0   < >  --    < >  --   --    < > 3.8   CHLORIDE  --   --   --   --   --   --   --   --   --  102   CO2  --   --   --   --   --   --   --   --   --  25   BUN  --   --   --   --   --   --   --   --   --  14   CR  --   --   --   --   --   --   --   --   --  0.66*   ANIONGAP  --   --   --   --   --   --   --   --   --  12   TRUONG  --   --   --   --   --   --   --   --   --  9.2   GLC  --   --   --   --  114*  --  132* 107*   < > 87    < > = values in this interval not displayed.     "

## 2022-01-05 NOTE — PROGRESS NOTES
RT PROGRESS NOTE     DATA:     CURRENT SETTINGS:             TRACH TYPE / SIZE:  #7 Bivona (placed 1/4)             MODE:   TM/PMV             FIO2:   21%/25L     ACTION:             THERAPIES:   NA             SUCTION:                           FREQUENCY:   No Suction Needed                        AMOUNT:   NA                        CONSISTENCY:   NA                        COLOR:   NA             SPONTANEOUS COUGH EFFORT/STRENGTH OF EFFORT (not elicited by suctioning): Strong Spontaneous Cough                           WEANING PHASE:   Phase 2                        WEAN MODE:    21%/25L TM/PMV                        WEAN TIME:   Continuous (Since 1/4)                        END WEAN REASON:   NA     RESPONSE:             BS:   Clear             VITAL SIGNS:   Sating 93-97%, -104, RR 22-24             EMOTIONAL NEEDS / CONCERNS:  NA                RISK FOR SELF DECANNULATION:  Yes             RISK DUE TO:  Agitation/Confusion             INTERVENTION/S IN PLACE IS/ARE:  Restrained, 1x1      NOTE / PLAN:   Tonight will be patient's second night off the vent (currently on standby).  RT will continue to monitor.

## 2022-01-05 NOTE — PROGRESS NOTES
Addiction Medicine Progress Note      Assessment/Plan    Principal Problem:    Acute on chronic respiratory failure with hypoxia (H)  Active Problems:    Chronic back pain    Essential hypertension    Pneumonia due to 2019 novel coronavirus    Acute respiratory distress syndrome (ARDS) due to COVID-19 virus (H)    Major depressive disorder, recurrent episode, severe (H)    Opioid use disorder, severe, dependence (H)    Polysubstance abuse (H)    ROSSY (obstructive sleep apnea)    Mild intermittent asthma    Acute metabolic encephalopathy    Ventilator associated pneumonia (H)    Oropharyngeal dysphagia      46-year-old male with the above medical issues following prolonged hospitalization in the ICU for ARDS due to COVID-19 infection.  He had been on high-dose scheduled lorazepam and oxycodone.  Consultation requested because he has been unable to wean lorazepam and oxycodone due to agitation. Prior to admission, he was on buprenorphine 4 mg twice daily.  Patient acknowledged that he would like to be back on buprenorphine.  He also denied a previous addiction to benzodiazepines.  It is unclear if he has a history of seizures.     Severe opioid use disorder, dependence- began micro induction to buprenorphine on 01/03/2022 but that night, patient became more somnolent and oxycodone was held.  He was then in opiate withdrawal on the morning of 01/04/2022.  Rather than restarting oxycodone, the decision was made to go forward with a more typical buprenorphine induction.  Patient had a total of 3 mg buprenorphine on 01/03 and 8 mg this morning.  He appears more alert and more comfortable.        Plan:   1. Increase buprenorphine again today by giving an additional 4 mg in the evening.  2. Beginning 01/06/2022: Buprenorphine 8 mg sublingual twice daily.  3. No other opiates.       Benzodiazepine management without a clear history of sedative use disorder- Ativan was stopped on 01/03/2022 and converted to phenobarbital.  The  initial dose was phenobarbital 259.2 mg every 4 hours which is a decrease of approximately 20% of the lorazepam equivalency.  Patient had difficulty with somnolence and mild hypotension the first night and phenobarbital was held.  RASS of -4 last night and another dose was held.  He received a total of 777.6 mg in the previous 24 hours.  (On the first day, beginning 01/03/2022, he received 1069.2 mg total.)  Patient appears [place] this morning after the first phenobarbital dose of 194.4 mg and buprenorphine 8 mg.  To avoid increasing doses of phenobarbital and another episode of somnolence, I will reduce the scheduled phenobarbital dose today but still have as needed doses available for agitation. Once a tolerable dose has been established, the plan will be to slowly taper.                   Plan:  1. Decrease phenobarbital scheduled dose to 194.4 mg every 6 hours for a total of 777.6 mg total daily dose.  2. Phenobarbital 32.4 mg every 2 hours as needed agitation (give phenobarbital prior to other as needed medications first for agitation)  3. Continue with the increased dose of gabapentin at 1200mg q8 hrs.  4. Continue Keppra unchanged (seizure prophylaxis since patient is at risk)  5. Watch for sedation, hypotension and/or nystagmus and hold doses of phenobarbital if either occur.  6. Eventual phenobarbital taper once there is an established, effective scheduled dose.       Agitation: Zyprexa discontinued and scheduled Seroquel reduced to decrease medications with anticholinergic properties in an attempt to decrease paradoxical agitation.  Haldol was given once overnight.    Plan:  1. Give phenobarbital 32.4 mg every 2 hours as needed for agitation first  2. If continues to be agitated, recommend giving as needed Seroquel dose next.  3. Haldol discontinued to avoid prolonged QTC, simplify the medication regimen and to ensure patient is receiving adequate doses of phenobarbital to avoid benzodiazepine  withdrawal.       Addiction medicine will continue to follow patient  For questions and concerns, please page the [facility] addiction medicine provider.       Subjective  Patient is able to speak more today and is more alert than the last 2 days on my exam.  Nursing notes reviewed and patient clearly has intermittent agitation with some somnolence overnight.  Patient is oriented to self only and is making somewhat paranoid, delusional statements about treatment at [facility].  He is asking for chewing tobacco and is irritated that it is not given to him.  Patient had initially refused the buprenorphine dose this morning but was willing to take it and does appear more comfortable.  Seems to verbalize understanding as to why he is back on that medication.    ROS:  Unable to obtain review of systems because of confusion      Objective    Vital signs in last 24 hours  Temp:  [97.7  F (36.5  C)-100  F (37.8  C)] 98.1  F (36.7  C)  Pulse:  [] 99  Resp:  [22-26] 24  BP: (117-140)/() 117/81  FiO2 (%):  [21 %-36 %] 21 %  SpO2:  [95 %-97 %] 97 %        Physical Exam      Gen: Awake and alert.  Oriented to self only (patient believes he is at [facility] and is irritated when told that is not true.)  Appears more comfortable this morning but continues to have some restlessness.  No diaphoresis  Pupils are appropriately sized for ambient light no scleral icterus  eomi  Tremor: none  Skin: no jaundice, no diaphoresis or piloerection  Respiratory:  Trached     Psych: Appears anxious and irritable      Pertinent Labs   Lab Results: I have personally reviewed the labs    Eda Peterson MD  Addiction Medicine

## 2022-01-05 NOTE — PLAN OF CARE
Problem: Device-Related Complication Risk (Artificial Airway)  Goal: Optimal Device Function  Intervention: Optimize Device Care and Function  Recent Flowsheet Documentation  Taken 1/5/2022 0057 by Estuardo Pringle, RN  Airway Safety Measures: all equipment/monitors on and audible     Problem: Inability to Wean (Mechanical Ventilation, Invasive)  Goal: Mechanical Ventilation Liberation  Intervention: Promote Extubation and Mechanical Ventilation Liberation  Recent Flowsheet Documentation  Taken 1/5/2022 0057 by Estuardo Pringle, RN  Medication Review/Management: medications reviewed   Patient and oriented to self. Exhibited restlessness and agitation. Attempted several times to climb out of bed. The HS meds and the midnights medication were ineffective. Was given Haldol 5 mg IV and was effective. Patient slept thereafter. RASS at -2 at 0400 hours. The Hospitalist notified and the scheduled Phenobartital 194.4 mg held. RASS this morning -1. Gabapentin given. Was on trach dome through the shift with no respiratory distress. Tele rhythm sinus tach with prolonged QT. Remains stable.

## 2022-01-05 NOTE — PLAN OF CARE
Problem: Restraint for Non-Violent/Non-Self-Destructive Behavior  Goal: Prevent/Manage Potential Problems  Description: Maintain safety of patient and others during period of restraint.  Promote psychological and physical wellbeing.  Prevent injury to skin and involved body parts.  Promote nutrition, hydration, and elimination.  Outcome: No Change   Awake, inconsistent, impulsive and confuse. Pulling and wiggles a lot. PRN seroquel /Ft given, calm and not sedated.On non violent restraints for safety.  Sinus  Rhythm on telemetry, previous was sinus tachycardia. NO chest pain.  Ice hips okay with supervision. On K and Mag protocol, latest: K 4.4 and Mag 2.0.  Loves to watch [employer] and [employer].   Wife and daughter were on video call today., patient interactive with them but still inconsistent.  Continue on TM/PMV sats @93-97% RA.  Mahi Pierre RN

## 2022-01-05 NOTE — PLAN OF CARE
Problem: Restraint for Non-Violent/Non-Self-Destructive Behavior  Goal: Prevent/Manage Potential Problems  Description: Maintain safety of patient and others during period of restraint.  Promote psychological and physical wellbeing.  Prevent injury to skin and involved body parts.  Promote nutrition, hydration, and elimination.  Outcome: No Change     Problem: Dysrhythmia  Goal: Normalized Cardiac Rhythm  Outcome: No Change   Patient remained restless, still attempting to  pull on tubes. Restraints continued. Patient had tachycardia ranging from 135 to 145. At 1706 PRN IV Metoprolol   5 mg given by Swat nurse with effect. Heart rate settled down.  Later PRN Seroquel given for restlessness x 1.

## 2022-01-06 ENCOUNTER — APPOINTMENT (OUTPATIENT)
Dept: SPEECH THERAPY | Facility: CLINIC | Age: 47
End: 2022-01-06
Attending: INTERNAL MEDICINE
Payer: COMMERCIAL

## 2022-01-06 ENCOUNTER — APPOINTMENT (OUTPATIENT)
Dept: OCCUPATIONAL THERAPY | Facility: CLINIC | Age: 47
End: 2022-01-06
Attending: INTERNAL MEDICINE
Payer: COMMERCIAL

## 2022-01-06 ENCOUNTER — APPOINTMENT (OUTPATIENT)
Dept: PHYSICAL THERAPY | Facility: CLINIC | Age: 47
End: 2022-01-06
Attending: INTERNAL MEDICINE
Payer: COMMERCIAL

## 2022-01-06 LAB
ANION GAP SERPL CALCULATED.3IONS-SCNC: 12 MMOL/L (ref 5–18)
BUN SERPL-MCNC: 12 MG/DL (ref 8–22)
CALCIUM SERPL-MCNC: 9.8 MG/DL (ref 8.5–10.5)
CHLORIDE BLD-SCNC: 106 MMOL/L (ref 98–107)
CO2 SERPL-SCNC: 22 MMOL/L (ref 22–31)
CREAT SERPL-MCNC: 0.63 MG/DL (ref 0.7–1.3)
CREAT SERPL-MCNC: 0.63 MG/DL (ref 0.7–1.3)
GFR SERPL CREATININE-BSD FRML MDRD: >90 ML/MIN/1.73M2
GFR SERPL CREATININE-BSD FRML MDRD: >90 ML/MIN/1.73M2
GLUCOSE BLD-MCNC: 111 MG/DL (ref 70–125)
MAGNESIUM SERPL-MCNC: 1.9 MG/DL (ref 1.8–2.6)
POTASSIUM BLD-SCNC: 3.8 MMOL/L (ref 3.5–5)
POTASSIUM BLD-SCNC: 3.8 MMOL/L (ref 3.5–5)
SODIUM SERPL-SCNC: 140 MMOL/L (ref 136–145)

## 2022-01-06 PROCEDURE — 82565 ASSAY OF CREATININE: CPT | Performed by: INTERNAL MEDICINE

## 2022-01-06 PROCEDURE — 97535 SELF CARE MNGMENT TRAINING: CPT | Mod: GO | Performed by: OCCUPATIONAL THERAPIST

## 2022-01-06 PROCEDURE — 92507 TX SP LANG VOICE COMM INDIV: CPT | Mod: GN | Performed by: SPEECH-LANGUAGE PATHOLOGIST

## 2022-01-06 PROCEDURE — 36415 COLL VENOUS BLD VENIPUNCTURE: CPT | Performed by: INTERNAL MEDICINE

## 2022-01-06 PROCEDURE — 250N000013 HC RX MED GY IP 250 OP 250 PS 637: Performed by: FAMILY MEDICINE

## 2022-01-06 PROCEDURE — 83735 ASSAY OF MAGNESIUM: CPT | Performed by: INTERNAL MEDICINE

## 2022-01-06 PROCEDURE — 250N000013 HC RX MED GY IP 250 OP 250 PS 637: Performed by: NURSE PRACTITIONER

## 2022-01-06 PROCEDURE — 250N000013 HC RX MED GY IP 250 OP 250 PS 637: Performed by: INTERNAL MEDICINE

## 2022-01-06 PROCEDURE — 99233 SBSQ HOSP IP/OBS HIGH 50: CPT | Performed by: FAMILY MEDICINE

## 2022-01-06 PROCEDURE — 97116 GAIT TRAINING THERAPY: CPT | Mod: GP

## 2022-01-06 PROCEDURE — 97129 THER IVNTJ 1ST 15 MIN: CPT | Mod: GO | Performed by: OCCUPATIONAL THERAPIST

## 2022-01-06 PROCEDURE — 999N000123 HC STATISTIC OXYGEN O2DAILY TECH TIME

## 2022-01-06 PROCEDURE — 999N000009 HC STATISTIC AIRWAY CARE

## 2022-01-06 PROCEDURE — 120N000018 HC R&B RESPIRATORY CARE WITH ATTENDANT

## 2022-01-06 PROCEDURE — 250N000011 HC RX IP 250 OP 636: Performed by: NURSE PRACTITIONER

## 2022-01-06 PROCEDURE — 84132 ASSAY OF SERUM POTASSIUM: CPT | Performed by: INTERNAL MEDICINE

## 2022-01-06 PROCEDURE — 250N000013 HC RX MED GY IP 250 OP 250 PS 637: Performed by: HOSPITALIST

## 2022-01-06 PROCEDURE — 99233 SBSQ HOSP IP/OBS HIGH 50: CPT | Performed by: INTERNAL MEDICINE

## 2022-01-06 PROCEDURE — 999N000157 HC STATISTIC RCP TIME EA 10 MIN

## 2022-01-06 PROCEDURE — 92526 ORAL FUNCTION THERAPY: CPT | Mod: GN | Performed by: SPEECH-LANGUAGE PATHOLOGIST

## 2022-01-06 RX ORDER — LORAZEPAM 1 MG/1
1 TABLET ORAL EVERY 4 HOURS PRN
Status: DISCONTINUED | OUTPATIENT
Start: 2022-01-06 | End: 2022-01-07

## 2022-01-06 RX ORDER — METHOCARBAMOL 500 MG/1
500 TABLET, FILM COATED ORAL 3 TIMES DAILY PRN
Status: DISCONTINUED | OUTPATIENT
Start: 2022-01-06 | End: 2022-01-07

## 2022-01-06 RX ADMIN — BUPRENORPHINE HYDROCHLORIDE 8 MG: 8 TABLET SUBLINGUAL at 08:05

## 2022-01-06 RX ADMIN — SENNOSIDES 10 ML: 8.8 LIQUID ORAL at 08:04

## 2022-01-06 RX ADMIN — METOPROLOL TARTRATE 50 MG: 50 TABLET, FILM COATED ORAL at 20:12

## 2022-01-06 RX ADMIN — BUPRENORPHINE HYDROCHLORIDE 8 MG: 8 TABLET SUBLINGUAL at 16:39

## 2022-01-06 RX ADMIN — SENNOSIDES 10 ML: 8.8 LIQUID ORAL at 20:12

## 2022-01-06 RX ADMIN — PHENOBARBITAL 194.4 MG: 32.4 TABLET ORAL at 06:47

## 2022-01-06 RX ADMIN — CHLORHEXIDINE GLUCONATE 0.12% ORAL RINSE 15 ML: 1.2 LIQUID ORAL at 07:58

## 2022-01-06 RX ADMIN — PHENOBARBITAL 194.4 MG: 32.4 TABLET ORAL at 00:00

## 2022-01-06 RX ADMIN — QUETIAPINE FUMARATE 50 MG: 50 TABLET ORAL at 12:39

## 2022-01-06 RX ADMIN — PHENOBARBITAL 243 MG: 16.2 TABLET ORAL at 23:13

## 2022-01-06 RX ADMIN — GABAPENTIN 1200 MG: 250 SOLUTION ORAL at 13:27

## 2022-01-06 RX ADMIN — ARIPIPRAZOLE 7 MG: 1 SOLUTION ORAL at 08:04

## 2022-01-06 RX ADMIN — NYSTATIN 500000 UNITS: 100000 SUSPENSION ORAL at 08:05

## 2022-01-06 RX ADMIN — PHENOBARBITAL 243 MG: 16.2 TABLET ORAL at 14:37

## 2022-01-06 RX ADMIN — PHENOBARBITAL 32.4 MG: 32.4 TABLET ORAL at 03:58

## 2022-01-06 RX ADMIN — METOPROLOL TARTRATE 50 MG: 50 TABLET, FILM COATED ORAL at 08:05

## 2022-01-06 RX ADMIN — NYSTATIN 500000 UNITS: 100000 SUSPENSION ORAL at 20:12

## 2022-01-06 RX ADMIN — ACETAMINOPHEN 650 MG: 325 TABLET ORAL at 09:34

## 2022-01-06 RX ADMIN — WHITE PETROLATUM: 1.75 OINTMENT TOPICAL at 08:07

## 2022-01-06 RX ADMIN — ACETAMINOPHEN ORAL SOLUTION 650 MG: 650 SOLUTION ORAL at 00:00

## 2022-01-06 RX ADMIN — PHENOBARBITAL 32.4 MG: 32.4 TABLET ORAL at 09:34

## 2022-01-06 RX ADMIN — ENOXAPARIN SODIUM 40 MG: 100 INJECTION SUBCUTANEOUS at 08:05

## 2022-01-06 RX ADMIN — NYSTATIN 500000 UNITS: 100000 SUSPENSION ORAL at 16:39

## 2022-01-06 RX ADMIN — GABAPENTIN 1200 MG: 250 SOLUTION ORAL at 22:00

## 2022-01-06 RX ADMIN — TRAZODONE HYDROCHLORIDE 75 MG: 150 TABLET ORAL at 13:27

## 2022-01-06 RX ADMIN — LEVETIRACETAM 500 MG: 100 SOLUTION ORAL at 20:12

## 2022-01-06 RX ADMIN — GABAPENTIN 1200 MG: 250 SOLUTION ORAL at 07:02

## 2022-01-06 RX ADMIN — TRAZODONE HYDROCHLORIDE 75 MG: 150 TABLET ORAL at 20:12

## 2022-01-06 RX ADMIN — CHLORHEXIDINE GLUCONATE 0.12% ORAL RINSE 15 ML: 1.2 LIQUID ORAL at 20:11

## 2022-01-06 RX ADMIN — TRAZODONE HYDROCHLORIDE 75 MG: 150 TABLET ORAL at 08:05

## 2022-01-06 RX ADMIN — NYSTATIN 500000 UNITS: 100000 SUSPENSION ORAL at 12:39

## 2022-01-06 RX ADMIN — QUETIAPINE FUMARATE 100 MG: 50 TABLET ORAL at 20:12

## 2022-01-06 RX ADMIN — PHENOBARBITAL 243 MG: 16.2 TABLET ORAL at 19:00

## 2022-01-06 RX ADMIN — METHOCARBAMOL 500 MG: 500 TABLET ORAL at 02:26

## 2022-01-06 ASSESSMENT — ACTIVITIES OF DAILY LIVING (ADL)
ADLS_ACUITY_SCORE: 17

## 2022-01-06 ASSESSMENT — MIFFLIN-ST. JEOR: SCORE: 1711.79

## 2022-01-06 NOTE — PLAN OF CARE
"  Problem: Communication Impairment (Artificial Airway)  Goal: Effective Communication  Outcome: Improving     Problem: Device-Related Complication Risk (Artificial Airway)  Goal: Optimal Device Function  Outcome: Improving  Intervention: Optimize Device Care and Function  Recent Flowsheet Documentation  Taken 1/5/2022 2330 by Rudolph Coles RT  Airway Safety Measures: all equipment/monitors on and audible  Taken 1/5/2022 2030 by Rudolph Coles RT  Airway Safety Measures: all equipment/monitors on and audible     Problem: Skin and Tissue Injury (Artificial Airway)  Goal: Absence of Device-Related Skin or Tissue Injury  Outcome: Improving      RT PROGRESS NOTE     DATA:  CURRENT SETTINGS: AC 16/520/+5/25% Stby              TRACH TYPE / SIZE:  #7 Bivona (changed on 01/04/22)             MODE:   Trach dome with PMV             FIO2:   21%/ 25 LPM     ACTION:             THERAPIES:                SUCTION:                           FREQUENCY:                          AMOUNT:                          CONSISTENCY:                           COLOR:                SPONTANEOUS COUGH EFFORT/STRENGTH OF EFFORT (not elicited by suctioning): good nonproductive                           WEANING PHASE:  2                        WEAN MODE:  TM with PMV                        WEAN TIME:   Since 01/04                        END WEAN REASON:   ongoing as miranda     RESPONSE:             BS:  clear             VITAL SIGNS:   /82 (BP Location: Left arm, Patient Position: Supine)   Pulse 104   Temp 98.6  F (37  C) (Oral)   Resp 24   Ht 1.803 m (5' 11\")   Wt 82.7 kg (182 lb 4.8 oz)   SpO2 95%   BMI 25.43 kg/m                EMOTIONAL NEEDS / CONCERNS:                  RISK FOR SELF DECANNULATION:  yes                        RISK DUE TO:  confusion                         INTERVENTION/S IN PLACE IS/ARE: 1:1       NOTE / PLAN:   Patient remains on TM with PMV, miranda well. Will cont monitor.  @0447, Pt dropped SPO2 to 87%, FIO2 " increased to 30% to maintain SPO2 greater than 90%, PMV removed.

## 2022-01-06 NOTE — PROGRESS NOTES
Care Management Initial Consult       Concerns to be Addressed:   Acute on chronic respiratory failure with hypoxia requiring support of a ventilator (vent weaning phase 2 started 1/4/2022). Pulmonology, psychiatry and addiction medicine following for medication management. WOCN following. Feeding tube in place for tube feeding, RD and speech following. In restraints and on 1:1 due to impulsivity.   Patient plan of care discussed at interdisciplinary rounds: Yes  Follow up from rounds/notes:   Phase 2 weaning, on tele. May need to increase fluid flushes. Plan to place a peripheral IV due to unexpected removal of PICC overnight.     Anticipated Discharge Disposition:  Likely will need extensive therapy and rehab.     Anticipated Discharge Services:  To be determined by destination, patient/family preferences, medical needs and mobility status closer to the time of discharge.  Anticipated Discharge DME:  To be determined.     Patient/family educated on Medicare website which has current facility and service quality ratings:  NA at this time.   Education Provided on the Discharge Plan:  Per team.  Patient/Family in Agreement with the Plan:  Yes    Referrals Placed by CM/SW:  None  Private pay costs discussed: Not applicable       Additional Information:  Trach and PEG placed on 12/17/2021, transferred to LTACH on 12/31/2021. Initial assessment was completed with patient's ex-wife Jama by telephone. Patient lives with his ex-spouse, Jama, part of the time (but had also been spending time in [facility]). She reported that he is normally independent at baseline. Jama hopes that she would be able to provide care in her home post discharge (pending patient's needs) but she understands that patient will likely need extensive rehab and therapy first. CM to keep Jama updated once closer to discharge and better idea of plan is known. A care progression meeting has been arranged for 1100 AM on 1/7/2022.  Transport to be determined. Care manager to follow.   Of note, patient has not been vaccinated for Covid-19 (no record on file) but is now designated as 'covid recovered'.    Marilee Farias RN

## 2022-01-06 NOTE — PLAN OF CARE
Problem: Restraint for Non-Violent/Non-Self-Destructive Behavior  Goal: Prevent/Manage Potential Problems  Description: Maintain safety of patient and others during period of restraint.  Promote psychological and physical wellbeing.  Prevent injury to skin and involved body parts.  Promote nutrition, hydration, and elimination.  Outcome: Improving     Problem: Dysrhythmia  Goal: Normalized Cardiac Rhythm  Outcome: No Change     Problem: Violence Risk or Actual  Goal: Anger and Impulse Control  Outcome: Improving      Patient has been in restraints this shift, was pulling on tubes intermittently this shift. Explained to patient that if he doesn`t pull on tubes overnight, restraints could possibly be discontinued tomorrow am. Patient very talkative this shift and requested to call ex wife. Patient knew her phone number and ex wife was called. Patient has been on telemetry, running NSR to sinus tach. 1:1 due to impulsivity. Alert and oriented to self only. Patient has been using urinal to void, no condom cath used this shift. Shampoo given.

## 2022-01-06 NOTE — PROGRESS NOTES
Addiction Medicine Progress Note      Assessment/Plan    Principal Problem:    Acute on chronic respiratory failure with hypoxia (H)  Active Problems:    Chronic back pain    Essential hypertension    Pneumonia due to 2019 novel coronavirus    Acute respiratory distress syndrome (ARDS) due to COVID-19 virus (H)    Major depressive disorder, recurrent episode, severe (H)    Opioid use disorder, severe, dependence (H)    Polysubstance abuse (H)    ROSSY (obstructive sleep apnea)    Mild intermittent asthma    Acute metabolic encephalopathy    Ventilator associated pneumonia (H)    Oropharyngeal dysphagia      46-year-old male with the above medical issues following prolonged hospitalization in the ICU for ARDS due to COVID-19 infection.  He had been on high-dose scheduled lorazepam and oxycodone.  Consultation requested because he has been unable to wean lorazepam and oxycodone due to agitation. Prior to admission, he was on buprenorphine 4 mg twice daily. No previous known addiction to benzodiazepines.  It is unclear if he has a history of seizures.     Severe opioid use disorder, dependence- began micro induction to buprenorphine on 01/03/2022 but that night, patient became more somnolent and oxycodone was held.  He was then in opiate withdrawal on the morning of 01/04/2022.  Rather than restarting oxycodone, the decision was made to go forward with a more typical buprenorphine induction.  Patient has had the following buprenorphine doses: 3 mg (01/04), 12 mg (01/05), 8 mg (01/06 a.m. with plan for an additional 8 mg in pm).  Overall he appears to be tolerating the induction well and has had a reduction in his heart rate since he was in acute opiate withdrawal earlier in the week.      Plan:   1. Continue with the plan to increase buprenorphine to 8 mg twice daily by giving an additional 8 mg this evening.  2. Will continue with 8 mg twice daily tomorrow, 01/07/2022.  Continue to follow and we will certainly consider  ongoing dose adjustments since they may benefit the overall picture including his agitation.  His tolerance is obviously higher than it was prior to admission and it may be appropriate for him to eventually increase the dose to 8 mg 3 times daily.  3. No other opiates.         Benzodiazepine management without a clear history of sedative use disorder- Ativan was stopped on 01/03/2022 and converted to phenobarbital.  The initial dose was phenobarbital 259.2 mg every 4 hours which is a decrease of approximately 20% of the lorazepam equivalency.  Patient had difficulty with somnolence and mild hypotension the first night and and again increased somnolence the second evening.  According to nursing notes from the last 24 hours, he has tolerated the current dose much better but the PRN phenobarb dose has appropriately been utilized once daily the last 2 days.    Patient has had the following phenobarbital total daily doses: 1069.2 mg (01/03), 777.6 mg (01/04), 1004.4 mg (01/05) and his most recent dose was 32.4 mg PRN this morning.  The eventual goal is to taper off of phenobarbital and therefore it is important to avoid ongoing increases in the dose (especially given he has had some episodes of somnolence.)  The initial loading doses on Monday and the trend over the last few days, indicate that a dose around 1000 mg for today would be appropriate.                   Plan:  1. Increase scheduled phenobarbital to 243 mg every 6 hours for a total daily dose of 1004.4 mg (including the PRN already given) since this has been approximately the therapeutic dose.  2. Discontinue as needed phenobarbital dose to avoid increasing total daily doses.  3. Continue with the increased dose of gabapentin at 1200mg q8 hrs.  4. Keppra taper is appropriate  5. Watch for sedation, hypotension and/or nystagmus and hold doses of phenobarbital if either occur.  6. May consider reducing total daily dose in several days days to begin a taper.   Most likely given the patient's agitation, he will likely need a somewhat prolonged taper over the course of several weeks.       Agitation: Zyprexa discontinued and scheduled Seroquel reduced to decrease medications with anticholinergic properties in an attempt to decrease paradoxical agitation.  Haldol discontinued to avoid prolonged QTC and simplify the medication regimen.     Plan:  1. Continue scheduled bedtime dose of Seroquel 100 mg and utilize the PRN Seroquel doses for agitation.       Addiction medicine will continue to follow patient  For questions and concerns, please page the [facility] addiction medicine provider.       Subjective  Patient is more alert and talkative today.  He is more engaged in his spouse is pleased with his progress this week.  Patient remains paranoid and delusional and according to his family, he does have a tendency to have these types of paranoid thoughts/beliefs.  Clearly confused with poor short-term memory    ROS:  Unable to obtain review of systems because of confusion      Objective    Vital signs in last 24 hours  Temp:  [96.7  F (35.9  C)-99.5  F (37.5  C)] 98.3  F (36.8  C)  Pulse:  [] 107  Resp:  [16-26] 20  BP: (111-151)/(66-85) 136/85  FiO2 (%):  [21 %-30 %] 30 %  SpO2:  [91 %-97 %] 97 %        Physical Exam    Gen: Awake and alert.  Oriented to self only   Continues to be restless  No diaphoresis  Pupils are appropriately sized for ambient light, no scleral icterus  eomi  Tremor: none  Skin: no jaundice, no diaphoresis or piloerection  Respiratory:  Trach dome    Speech is clear  Psych: Appears anxious and irritable      Pertinent Labs   Lab Results: I have personally reviewed the labs    Eda Peterson MD  Addiction Medicine                       1 year

## 2022-01-06 NOTE — PLAN OF CARE
Problem: Inability to Wean (Mechanical Ventilation, Invasive)  Goal: Mechanical Ventilation Liberation  Outcome: Improving     Problem: Skin and Tissue Injury (Mechanical Ventilation, Invasive)  Goal: Absence of Device-Related Skin and Tissue Injury  Outcome: Improving     Problem: Ventilator-Induced Lung Injury (Mechanical Ventilation, Invasive)  Goal: Absence of Ventilator-Induced Lung Injury  Outcome: Improving     Problem: Restraint for Non-Violent/Non-Self-Destructive Behavior  Goal: Prevent/Manage Potential Problems  Description: Maintain safety of patient and others during period of restraint.  Promote psychological and physical wellbeing.  Prevent injury to skin and involved body parts.  Promote nutrition, hydration, and elimination.  Outcome: Improving   RT PROGRESS NOTE     DATA:     CURRENT SETTINGS: AC 16, 520, +5, 30% (S/B)             TRACH TYPE / SIZE:#7 BIVONA TTS CHANGED ON 1/4/22             MODE: TM/PMV             FIO2: 30% @25L/MIN     ACTION:             THERAPIES:               SUCTION:                           FREQUENCY: none                        AMOUNT:                        CONSISTENCY:                        COLOR:               SPONTANEOUS COUGH EFFORT/STRENGTH OF EFFORT (not elicited by suctioning): Strong                              WEANING PHASE:2                        WEAN MODE:TM/PMV                        WEAN TIME: Since 1/4 /22                        END WEAN REASON:                   RESPONSE:             BS: Coarse             VITAL SIGNS: Sat 96-97%, HR  103-107, RR 20-22             EMOTIONAL NEEDS / CONCERNS: Confused                RISK FOR SELF DECANNULATION: yes                        RISK DUE TO: confused                         INTERVENTION/S IN PLACE IS/ARE: Bilateral wrist restraints and 1:1 staff supervision       NOTE / PLAN:  Cont. Current plan of care

## 2022-01-06 NOTE — PROGRESS NOTES
CLINICAL NUTRITION SERVICES - REASSESSMENT NOTE     Nutrition Prescription    RECOMMENDATIONS FOR MDs/PROVIDERS TO ORDER:      Malnutrition Status:    Previously noted severe 22    Recommendations already ordered by Registered Dietitian (RD):  Increased FWF :  H20 135 ml q 4 h    Continue enteral feeding regimen as ordered   Future/Additional Recommendations:       EVALUATION OF PROGRESS TOWARD GOALS/NEW FINDINGS   Progress towards goals will be monitored and evaluated per protocol and Practice Guidelines      Diet order: Orders Placed This Encounter      NPO for Medical/Clinical Reasons Except for: Ice Chips, NPO but receiving Tube Feeding   Enteral: via PEG placed 21:    CONTINUOUS, Starting on 21 at 1700, Until Specified  Adult Formula: Promote w fiber  Route: Gastrostomy  Goal Rate: 80  Goal Units: mL/hr  Medication - Feeding Tube Flush Frequency: At least 15-30 mL water before and after medication administration and with tube clogging  Special Advance Schedule: No  FWF 85 mL q6 hrs  Patient remains NPO and on TF with good tolerance:   Enteral at goal provides the following to meet estimated needs:1920ml/day formula: 1920 kcals, 121 g PRO, 1595 ml free H20+ flushes = 1935 ml/d, 249 g CHO, and 27 g fiber daily.    Tolerance/GRV:  Total EN volume received:  1928 1518 2139 1728 1851 1093       6 day average:  ~90% of prescribed volume recieved      Last BM per [email or web address]     Per nursing concern for concentrated urine w/ request to increase flushes as appropriate     Skin: intact     Reji nutrition score: 3; total score: 15    I/O last 3 completed shifts:    In: 2289 [NG/GT:2289]    Out: 975 [Urine:975]    Labs:    Electrolytes  Potassium (mmol/L)   Date Value   2022 3.8   2022 3.8   2022 4.4     Phosphorus (mg/dL)   Date Value   2021 4.0   2021 3.5   2021 3.7        Blood Glucose  Glucose (mg/dL)   Date Value   2022 111   2021 87    12/29/2021 118   12/28/2021 121   12/25/2021 128 (H)     GLUCOSE BY METER POCT (mg/dL)   Date Value   01/01/2022 114 (H)   01/01/2022 132 (H)   01/01/2022 107 (H)   12/31/2021 121 (H)   12/31/2021 109 (H)     Hemoglobin A1C (%)   Date Value   11/24/2021 5.8 (H)        Inflammatory Markers  CRP (mg/dL)   Date Value   12/14/2021 3.3 (H)   11/29/2021 0.6   11/28/2021 1.0 (H)     WBC Count (10e3/uL)   Date Value   12/30/2021 9.1   12/29/2021 15.8 (H)   12/24/2021 7.6     Albumin (g/dL)   Date Value   12/20/2021 2.5 (L)   12/18/2021 2.4 (L)   12/07/2021 2.7 (L)        Magnesium (mg/dL)   Date Value   01/06/2022 1.9   01/05/2022 2.0   01/04/2022 1.7 (L)     Sodium (mmol/L)   Date Value   01/06/2022 140   12/30/2021 139   12/29/2021 142        Renal  Urea Nitrogen (mg/dL)   Date Value   01/06/2022 12   12/30/2021 14   12/29/2021 12     Creatinine (mg/dL)   Date Value   01/06/2022 0.63 (L)   01/06/2022 0.63 (L)   12/30/2021 0.66 (L)         Additional  Triglycerides (mg/dL)   Date Value   12/21/2021 145   12/19/2021 141   12/17/2021 149     Ketones Urine (mg/dL)   Date Value   12/14/2021 Negative   12/10/2002 Negative        Meds:    ARIPiprazole  7 mg Oral or Feeding Tube Daily     buprenorphine  8 mg Sublingual Daily     buprenorphine  8 mg Sublingual Daily at 4 pm     chlorhexidine  15 mL Mouth/Throat Q12H     enoxaparin ANTICOAGULANT  40 mg Subcutaneous Q24H     gabapentin  1,200 mg Per Feeding Tube Q8H     levETIRAcetam  500 mg Oral or Feeding Tube At Bedtime     metoprolol tartrate  50 mg Per G Tube BID     mineral oil-hydrophilic petrolatum   Topical Daily     nystatin  500,000 Units Mouth/Throat 4x Daily     PHENobarbital  243 mg Oral or Feeding Tube Q6H     QUEtiapine  100 mg Oral or Feeding Tube At Bedtime     sennosides  10 mL Per Feeding Tube BID     sodium chloride (PF)  10-40 mL Intracatheter Q8H     sodium chloride (PF)  10-40 mL Intracatheter Q7 Days     traZODone  75 mg Oral or Feeding Tube TID         "dextrose       - MEDICATION INSTRUCTIONS -       - MEDICATION INSTRUCTIONS -        acetaminophen, acetaminophen **OR** acetaminophen, bisacodyl, dextrose, glucose **OR** dextrose **OR** glucagon, sennosides **AND** docusate, hydrALAZINE, methocarbamol, metoprolol, midodrine, ondansetron **OR** ondansetron, - MEDICATION INSTRUCTIONS -, - MEDICATION INSTRUCTIONS -, polyethylene glycol, prochlorperazine **OR** prochlorperazine **OR** prochlorperazine, QUEtiapine, simethicone, sodium chloride (PF)          ANTHROPOMETRICS  Height: 5' 11\"  Weight: 81 kg   Body mass index is 25.05 kg/m .  Wt Readings from Last 10 Encounters:   01/06/22 81.5 kg (179 lb 9.6 oz)   12/31/21 80.8 kg (178 lb 1.6 oz)   12/05/21 89.4 kg (197 lb 3.2 oz)   02/22/18 83.3 kg (183 lb 11.2 oz)     01/06/22 0538 81.5 kg (179 lb 9.6 oz) -- GE   01/03/22 0614 82.7 kg (182 lb 4.8 oz) -- LM   12/31/21 1635 80.7 kg (178 lb) --        Weight Status:  Overweight BMI 25-29.9  Weight changes since admission : stable    NEW FINDINGS     Previous Goals   Maintain weight  Tolerate TF  Regular stooling  Evaluation: progressing    Previous Nutrition Diagnosis  Malnutrition related to acute illness, intubated x1 month as evidenced by weight loss, muscle wasting, subcutaneous fat loss     Swallowing difficulty r/t COVID-19 and evidenced by mechanical ventilation  Evaluation: Improving     Intervention:  Increased FWF :  H20 135 ml q 4 h. This will provide a total of 2405 ml/d ( ~ 29.6 ml/kg ).( 1595 ml free fluid from formula +810 ml from flushes = 2405 ml/d  "

## 2022-01-06 NOTE — PLAN OF CARE
Patient alert and oriented to self. Fluctuates with lucidity. Naps on and off through the shift with no notable sleep. Was stable and communicating effective until after midnight medication, when his demeanor changed. Started hallucinating, but redirectable to the point he was aggressive and wanted to get out of bed. Want to get out of bed to go to work this morning. Efforts to made to minimize restraint use due to aggravation of his anxiety. Was finally placed on soft limb x2, 4 side rail, and pelvic at 0600. Continued on trach dome through the shift. Had his oxygen bumped up from 21% to 32% due complaint of SOB. Has been stable thereafter. Tylenol 650 mg given for back pain, Robaxin givn for complaint of lower extremity muscle tightness and spasm. All were effective. Was given phenobarbital prn x1 with no effect. Incontinent to bladder x1. Had his PICC line accidentally pull out NOC during patient self positioning. Will consider putting PIV as pt is on tele. Tele mostly sinus tach with prolong QT, and Sinus tach during restlessness.    Problem: Inability to Wean (Mechanical Ventilation, Invasive)  Goal: Mechanical Ventilation Liberation  Outcome: Improving  Intervention: Promote Extubation and Mechanical Ventilation Liberation  Recent Flowsheet Documentation  Taken 1/6/2022 0000 by Estuardo Pringle, RN  Medication Review/Management: medications reviewed     Problem: Restraint for Non-Violent/Non-Self-Destructive Behavior  Goal: Prevent/Manage Potential Problems  Description: Maintain safety of patient and others during period of restraint.  Promote psychological and physical wellbeing.  Prevent injury to skin and involved body parts.  Promote nutrition, hydration, and elimination.  Outcome: Improving

## 2022-01-06 NOTE — PLAN OF CARE
"  Problem: Restraint for Non-Violent/Non-Self-Destructive Behavior  Goal: Prevent/Manage Potential Problems  Description: Maintain safety of patient and others during period of restraint.  Promote psychological and physical wellbeing.  Prevent injury to skin and involved body parts.  Promote nutrition, hydration, and elimination.  Outcome: No Change   Pt alert, oriented to self. Orientation to situation fluctuates, lucid at times with intermittent confusion. Still on 1:1, soft limbs x2, pelvic when up in the chair.  He continues to be impulsive, trying to get out of bed/chair. Pt has mentioned he had hallucination last night, saw 4 people in the room talking about \"killing him\", he was aware that it might be a hallucination. He was fighting his sleepiness all throughout the day probably attributed to his hallucination last night and fear of being killed. Reoriented & reassured. Prn tylenol given at 0930H for back pain, effective.  Ex wife Jama came for a visit. He participated in therapy, walked with PT with assist of 2. He was up in the chair x 2 hrs. Pleasantly confused, watched tv intermittently.  Ex wife trimmed his hair and washed it. Prn seroquel for restlessness & agitation at 1239H. Helped. Slept around 3:30pm x2 hrs after initial dose of Phenobarbital 243mg. Pt having trouble initiating micturation, tried 2x but failed around 2030H. Bladder scanned x1 = 148ml. Offered urinal q2H & per request. Pt needs a lot of redirection & reorientation. Will continue to monitor.   "

## 2022-01-07 ENCOUNTER — APPOINTMENT (OUTPATIENT)
Dept: PHYSICAL THERAPY | Facility: CLINIC | Age: 47
End: 2022-01-07
Attending: INTERNAL MEDICINE
Payer: COMMERCIAL

## 2022-01-07 ENCOUNTER — APPOINTMENT (OUTPATIENT)
Dept: OCCUPATIONAL THERAPY | Facility: CLINIC | Age: 47
End: 2022-01-07
Attending: INTERNAL MEDICINE
Payer: COMMERCIAL

## 2022-01-07 ENCOUNTER — APPOINTMENT (OUTPATIENT)
Dept: RADIOLOGY | Facility: CLINIC | Age: 47
End: 2022-01-07
Attending: NURSE PRACTITIONER
Payer: COMMERCIAL

## 2022-01-07 ENCOUNTER — APPOINTMENT (OUTPATIENT)
Dept: SPEECH THERAPY | Facility: CLINIC | Age: 47
End: 2022-01-07
Attending: INTERNAL MEDICINE
Payer: COMMERCIAL

## 2022-01-07 LAB
ATRIAL RATE - MUSE: 127 BPM
DIASTOLIC BLOOD PRESSURE - MUSE: NORMAL MMHG
INTERPRETATION ECG - MUSE: NORMAL
MAGNESIUM SERPL-MCNC: 1.8 MG/DL (ref 1.8–2.6)
P AXIS - MUSE: 58 DEGREES
POTASSIUM BLD-SCNC: 4.2 MMOL/L (ref 3.5–5)
PR INTERVAL - MUSE: 130 MS
QRS DURATION - MUSE: 80 MS
QT - MUSE: 324 MS
QTC - MUSE: 470 MS
R AXIS - MUSE: -21 DEGREES
SYSTOLIC BLOOD PRESSURE - MUSE: NORMAL MMHG
T AXIS - MUSE: 40 DEGREES
VENTRICULAR RATE- MUSE: 127 BPM

## 2022-01-07 PROCEDURE — 250N000013 HC RX MED GY IP 250 OP 250 PS 637: Performed by: FAMILY MEDICINE

## 2022-01-07 PROCEDURE — 97110 THERAPEUTIC EXERCISES: CPT | Mod: GO | Performed by: OCCUPATIONAL THERAPIST

## 2022-01-07 PROCEDURE — 74230 X-RAY XM SWLNG FUNCJ C+: CPT

## 2022-01-07 PROCEDURE — 83735 ASSAY OF MAGNESIUM: CPT | Performed by: INTERNAL MEDICINE

## 2022-01-07 PROCEDURE — 999N000123 HC STATISTIC OXYGEN O2DAILY TECH TIME

## 2022-01-07 PROCEDURE — 250N000013 HC RX MED GY IP 250 OP 250 PS 637: Performed by: INTERNAL MEDICINE

## 2022-01-07 PROCEDURE — 250N000013 HC RX MED GY IP 250 OP 250 PS 637: Performed by: NURSE PRACTITIONER

## 2022-01-07 PROCEDURE — 97535 SELF CARE MNGMENT TRAINING: CPT | Mod: GO | Performed by: OCCUPATIONAL THERAPIST

## 2022-01-07 PROCEDURE — 92611 MOTION FLUOROSCOPY/SWALLOW: CPT | Mod: GN | Performed by: SPEECH-LANGUAGE PATHOLOGIST

## 2022-01-07 PROCEDURE — 250N000011 HC RX IP 250 OP 636: Performed by: NURSE PRACTITIONER

## 2022-01-07 PROCEDURE — 99232 SBSQ HOSP IP/OBS MODERATE 35: CPT | Performed by: NURSE PRACTITIONER

## 2022-01-07 PROCEDURE — 120N000018 HC R&B RESPIRATORY CARE WITH ATTENDANT

## 2022-01-07 PROCEDURE — 97116 GAIT TRAINING THERAPY: CPT | Mod: GP

## 2022-01-07 PROCEDURE — 97112 NEUROMUSCULAR REEDUCATION: CPT | Mod: GP

## 2022-01-07 PROCEDURE — 99233 SBSQ HOSP IP/OBS HIGH 50: CPT | Performed by: INTERNAL MEDICINE

## 2022-01-07 PROCEDURE — 999N000009 HC STATISTIC AIRWAY CARE

## 2022-01-07 PROCEDURE — 36415 COLL VENOUS BLD VENIPUNCTURE: CPT | Performed by: INTERNAL MEDICINE

## 2022-01-07 PROCEDURE — 999N000157 HC STATISTIC RCP TIME EA 10 MIN

## 2022-01-07 PROCEDURE — 99368 TEAM CONF W/O PAT BY HC PRO: CPT | Performed by: OCCUPATIONAL THERAPIST

## 2022-01-07 PROCEDURE — 999N000150 HC STATISTIC PT MED CONFERENCE < 30 MIN

## 2022-01-07 PROCEDURE — 84132 ASSAY OF SERUM POTASSIUM: CPT | Performed by: INTERNAL MEDICINE

## 2022-01-07 PROCEDURE — 250N000013 HC RX MED GY IP 250 OP 250 PS 637: Performed by: HOSPITALIST

## 2022-01-07 PROCEDURE — 99366 TEAM CONF W/PAT BY HC PROF: CPT | Performed by: SPEECH-LANGUAGE PATHOLOGIST

## 2022-01-07 RX ORDER — BARIUM SULFATE 400 MG/ML
SUSPENSION ORAL ONCE
Status: COMPLETED | OUTPATIENT
Start: 2022-01-07 | End: 2022-01-07

## 2022-01-07 RX ORDER — GABAPENTIN 250 MG/5ML
900 SOLUTION ORAL EVERY 8 HOURS
Status: DISCONTINUED | OUTPATIENT
Start: 2022-01-07 | End: 2022-01-24

## 2022-01-07 RX ADMIN — ENOXAPARIN SODIUM 40 MG: 100 INJECTION SUBCUTANEOUS at 08:47

## 2022-01-07 RX ADMIN — CHLORHEXIDINE GLUCONATE 0.12% ORAL RINSE 15 ML: 1.2 LIQUID ORAL at 19:52

## 2022-01-07 RX ADMIN — BARIUM SULFATE 60 ML: 400 SUSPENSION ORAL at 13:29

## 2022-01-07 RX ADMIN — NYSTATIN 500000 UNITS: 100000 SUSPENSION ORAL at 17:42

## 2022-01-07 RX ADMIN — LORAZEPAM 1 MG: 1 TABLET ORAL at 00:24

## 2022-01-07 RX ADMIN — PHENOBARBITAL 243 MG: 16.2 TABLET ORAL at 13:27

## 2022-01-07 RX ADMIN — QUETIAPINE FUMARATE 50 MG: 50 TABLET ORAL at 07:35

## 2022-01-07 RX ADMIN — BUPRENORPHINE HYDROCHLORIDE 8 MG: 8 TABLET SUBLINGUAL at 17:38

## 2022-01-07 RX ADMIN — LORAZEPAM 1 MG: 1 TABLET ORAL at 05:40

## 2022-01-07 RX ADMIN — ARIPIPRAZOLE 7 MG: 1 SOLUTION ORAL at 13:28

## 2022-01-07 RX ADMIN — NYSTATIN 500000 UNITS: 100000 SUSPENSION ORAL at 21:57

## 2022-01-07 RX ADMIN — METOPROLOL TARTRATE 50 MG: 50 TABLET, FILM COATED ORAL at 08:47

## 2022-01-07 RX ADMIN — SENNOSIDES 10 ML: 8.8 LIQUID ORAL at 22:01

## 2022-01-07 RX ADMIN — TRAZODONE HYDROCHLORIDE 75 MG: 150 TABLET ORAL at 08:47

## 2022-01-07 RX ADMIN — BUPRENORPHINE HYDROCHLORIDE 8 MG: 8 TABLET SUBLINGUAL at 08:48

## 2022-01-07 RX ADMIN — GABAPENTIN 900 MG: 250 SOLUTION ORAL at 22:02

## 2022-01-07 RX ADMIN — METOPROLOL TARTRATE 50 MG: 50 TABLET, FILM COATED ORAL at 21:58

## 2022-01-07 RX ADMIN — TRAZODONE HYDROCHLORIDE 75 MG: 150 TABLET ORAL at 13:28

## 2022-01-07 RX ADMIN — NYSTATIN 500000 UNITS: 100000 SUSPENSION ORAL at 08:47

## 2022-01-07 RX ADMIN — SENNOSIDES 10 ML: 8.8 LIQUID ORAL at 08:47

## 2022-01-07 RX ADMIN — TRAZODONE HYDROCHLORIDE 75 MG: 150 TABLET ORAL at 21:58

## 2022-01-07 RX ADMIN — CHLORHEXIDINE GLUCONATE 0.12% ORAL RINSE 15 ML: 1.2 LIQUID ORAL at 07:35

## 2022-01-07 RX ADMIN — ACETAMINOPHEN ORAL SOLUTION 650 MG: 650 SOLUTION ORAL at 07:35

## 2022-01-07 RX ADMIN — LEVETIRACETAM 500 MG: 100 SOLUTION ORAL at 21:57

## 2022-01-07 RX ADMIN — PHENOBARBITAL 243 MG: 16.2 TABLET ORAL at 05:06

## 2022-01-07 RX ADMIN — GABAPENTIN 1200 MG: 250 SOLUTION ORAL at 05:07

## 2022-01-07 RX ADMIN — NYSTATIN 500000 UNITS: 100000 SUSPENSION ORAL at 13:29

## 2022-01-07 RX ADMIN — BARIUM SULFATE 20 ML: 400 SUSPENSION ORAL at 13:27

## 2022-01-07 RX ADMIN — WHITE PETROLATUM: 1.75 OINTMENT TOPICAL at 08:49

## 2022-01-07 RX ADMIN — QUETIAPINE FUMARATE 100 MG: 50 TABLET ORAL at 21:57

## 2022-01-07 RX ADMIN — PHENOBARBITAL 210.6 MG: 16.2 TABLET ORAL at 17:41

## 2022-01-07 RX ADMIN — GABAPENTIN 1200 MG: 250 SOLUTION ORAL at 13:28

## 2022-01-07 ASSESSMENT — ACTIVITIES OF DAILY LIVING (ADL)
ADLS_ACUITY_SCORE: 23
ADLS_ACUITY_SCORE: 17
ADLS_ACUITY_SCORE: 23
ADLS_ACUITY_SCORE: 17
ADLS_ACUITY_SCORE: 23
ADLS_ACUITY_SCORE: 17
ADLS_ACUITY_SCORE: 17
ADLS_ACUITY_SCORE: 23
ADLS_ACUITY_SCORE: 17
ADLS_ACUITY_SCORE: 23
ADLS_ACUITY_SCORE: 17
ADLS_ACUITY_SCORE: 23
ADLS_ACUITY_SCORE: 17
ADLS_ACUITY_SCORE: 17
ADLS_ACUITY_SCORE: 23
ADLS_ACUITY_SCORE: 23
ADLS_ACUITY_SCORE: 17
ADLS_ACUITY_SCORE: 23
ADLS_ACUITY_SCORE: 17

## 2022-01-07 NOTE — PROGRESS NOTES
[facility]    Medicine Progress Note - Hospitalist Service       Date of Admission:  12/31/2021    Background Summary:  Jared North is a 46 year old male unvaccinated against COVID-19, with history of polysubstance abuse and chronic pain/lower back pain on Suboxone prior to admission (had been on methadone in the past but not in recent past), mood disorder/depression, HTN, ROSSY, mild intermittent asthma, knee pain, and diagnosis of COVID-19 viral infection on 11/19/2021 who originally presented to [facility] on 11/24/2021 with worsening shortness of breath, admitted to the ICU with COVID-19 viral pneumonia and severe acute respiratory failure with hypoxia requiring noninvasive ventilation.  He was started on Decadron, remdesivir, and tocilizumab and followed for several days alternating between NIPPV and HFNC.  He had an episode of afib during this time that eventually resolved and has not recurred after briefly being treated with amiodarone.  He did not improve and was eventually intubated on 11/28/21 and then proned and started on veletri.  11/30/21 he was started on cefepime and vanco for VAP - Cx grew E. Coli and Proteus mirabilis.  He did develop hypotension possibly from septic shock vs sedation vasoplegia and was treated with norepi intemittently.  Vanco was stopped on 12/5/21.  He was then transferred to [facility] ICU on 12/5/21.       At [place] he was followed in ICU and continued on vent/paralysis/proning/pressors.  Cefepime switched to rocephin 12/6/21.  He continued to be very encephalopathic and psychiatry was consulted. He was weaned off pressors and didn't require proning by 12/10/21.  He has been on large doses of anti-psychotics and sedatives.  He developed fevers again 12/14 but had clinically improved somewhat and was extubated on 12/15/21. On 12/16/21 he had severely increased work of breathing and so was re-intubated. Blood Cx grew Streptococcus constellatus and sputum Cx grew that as  well but also had E. Coli/Proteus m./staph epi/candida parapsilosis. Vanco/Zosyn was started on 12/16/21.  He then underwent trach/PEG on 12/17/21.  Vanco was stopped after 3 days and Zosyn switched to rocephin on 12/21/21 to complete on 1/2/21.       During this time he had significant issues with encephalopathy and agitation. As above he was started on multiple meds for this.  Over the last week prior to admission here at [facility] they had increased his lorazepam up to 12 mg every 4 hours and oxycodone 20 mg every 4 hours along with abilify 7 mg (home med), gabapentin 800 mg every 8 hours, keppra 500 mg twice daily (for behavior), zyprexa 5 mg twice daily, and seroquel 75 mg TID with 100 mg HS.  With this regimen he has improved reportedly but is still awake and agitated.    Assessment & Plan         Principal Problem:    Acute on chronic respiratory failure with hypoxia -     Acute respiratory distress syndrome (ARDS) due to COVID-19 pneumonia   -moving to Phase 3 vent weaning today  - trach changed 1/4/22     Active Problems:  Acute metabolic encephalopathy - Med adjustment per psych and addiction service.  Currently on Abilify, subutex, neurontin, keppra (being tapered off), phenobarbital, seroquel, and trazodone.  Was on very high doses of lorazepam and oxycodone as above which have been stopped.  Slowly improving.  He had a difficult night last night and was very agitated so was given a dose of lorazepam and then some extra seroquel this morning as well.  Discussed with Addiction medicine.  Will hold on any further ativan for now if possible and follow.    Mild intermittent dysconjugate gaze - his ex-wife states she has noted this in the past when he is on certain medications like ativan but not if he is off all meds.    Chronic back pain -patient was on Suboxone prior to admission.    - Pain control per addiction medicine service, subutex resumed and titrating up.    -Gabapentin dose increased to 1200mg  Q8H    Essential hypertension - Stable, on metoprolol for Sinus tach, closely monitor BP.     Major depressive disorder, recurrent episode, severe - continue abilify, seroquel, trazodone.  Off zyprexa    Opioid type dependence - Mx per addiction medicine service, On phenobarb and resumed subutex. Oxy has been stopped.    Polysubstance abuse as above    ROSSY (obstructive sleep apnea)- vented/trached for now    Mild intermittent asthma - can add nebs as needed    Recent Ventilator associated pneumonia - Completed CTX on 1/3/22.    Thrush - On nystatin    Dysphagia - speech therapy consult, on tube feeds, starting modified diet today    Severe Malnutrition, POA: based on nutrition assessment     Sinus tachycardia  -  Treatment of underlying causes  -  Pt has been in initiated on metoprolol tartrate         Diet: Adult Formula Drip Feeding: Continuous Promote w fiber; Gastrostomy; Goal Rate: 80; mL/hr; Medication - Feeding Tube Flush Frequency: At least 15-30 mL water before and after medication administration and with tube clogging  NPO for Medical/Clinical Reasons Except for: Ice Chips, NPO but receiving Tube Feeding    DVT Prophylaxis: Enoxaparin (Lovenox) SQ  Wei Catheter: Not present  Central Lines: None  Code Status: Full Code      Disposition Plan   Expected Discharge: TBD   Anticipated discharge location: home with family   vs acute rehab vs TCU       The patient's care was discussed with  Patient, ex-wife, RN, CM, PT/OT/ST in care conference today total 40 min with 25 min discussion on overall status and prognosis.    Adam Soliman MD  Hospitalist Service  LTACH  Securely message with the Vocera Web Console (learn more here)  Text page via [facility] Paging/Directory                 ______________________________________________________________________    Interval History   Patient is new to me. Chart reviewed and events noted.  Patient seen and examined.   Lethargic this morning.   Was reportedly agitated  overnight and was kicking at staff.  Given ativan last evening and seroquel extra this morning.      Data reviewed today: I reviewed all medications, new labs and imaging results over the last 24 hours.  All medication issues identified within the last24 hours have been addressed and completed as appropriate.       Physical Exam   Vital Signs: Temp: 98  F (36.7  C) Temp src: Axillary BP: 101/69 Pulse: 79   Resp: 15 SpO2: 94 % O2 Device: None (Room air) Oxygen Delivery: 25 LPM  Weight: 179 lbs 9.6 oz    General: Awake, alert, appears restless  HEENT: EOMI with occas dysconjugate gaze - right eye wanders a bit, AT, NC  Cardiac: Tachycardic  Pulmonary: Clear to auscultation in bilateral lung fields  Abdomen: Soft, non tender, Not distended.  Not tender to palpation.  Musculoskeletal: No joint abnormalities noted  Skin: Normally turgid, no rashes noted  Neurologic: lethargic, arouses but oriented to self only moving both LE, b/l UE in restraints.  Psychiatric: Appears slightly restless      Data   Recent Labs   Lab 01/07/22  0622 01/06/22  1256 01/06/22  0814 01/05/22  1314 01/02/22  0612 01/01/22  0805 01/01/22  0611 01/01/22  0603   NA  --   --  140  --   --   --   --   --    POTASSIUM 4.2  --  3.8  3.8 4.4   < >  --    < >  --    CHLORIDE  --   --  106  --   --   --   --   --    CO2  --   --  22  --   --   --   --   --    BUN  --   --  12  --   --   --   --   --    CR  --  0.63* 0.63*  --   --   --   --   --    ANIONGAP  --   --  12  --   --   --   --   --    TRUONG  --   --  9.8  --   --   --   --   --    GLC  --   --  111  --   --  114*  --  132*    < > = values in this interval not displayed.

## 2022-01-07 NOTE — PROGRESS NOTES
Care Progression Meeting:     Staff present: Attending MD, PT, OT, speech therapy, registered dietician and RN care manager.     Family present/on phone or I-PAD:  Jama North (telephone)     MD goals  of care and/or barriers to discharge:  Continuing to work with addiction medicine to adjust patient's medications (goal is patient to be as alert as possible while maintaining his safety and staff safety). Patient continues to be restless and impulsive and did not sleep well overnight. Required a one-time dose of Ativan and continues on 1:1. It is common for patients whose sleep cycle is off to display alteration in cognition. The team will continue to work with patient who is continuing to show signs of improvement. Notes that some patients recovering from Covid can get back to baseline but the majority seem to have lasting residual deficits. However, impossible to say at this point how much patient will improve. Previous CD issues may also affect his long term cognition. It may be that he just needs time to adjust to the medication changes. May need to live in a supervised setting vs live with family when ready for discharge to the community.      PT Goals and progress: Patient is progressing well. He is able to ambulate about 300 feet with frequent rest breaks. They are currently needing assist of two for hand holding and cueing. Issues: patient is distractible, crossing his legs and veering off to the side. He is having issues with his balance, concentration and coordination. Goal is for patient to be able to use a walker (long term goal is for patient to be able to ambulate without an assistive device).      OT Goals and progress:  Working with patient on activities of daily living such as dressing, bed mobility, problem solving and fine motor coordination. He is able to follow direction but is distractible. He is redirectable but needs frequent redirection. Patient seems to be aware that his concentration is a  problem.      ST Goals and progress:  Patient is working well with the speaking valve and is able to communicate well. Barriers include confusion, short term memory and recall. Plan is for patient to have a video swallow study today (pending alertness). If not alert enough today to participate in the video swallow safely today, will plan for Monday 1/10/2022.  He is at risk for silent aspiration (which is what they are hoping to rule out with the video swallow study).      RD Goals and progress:  Working closely with speech therapy. Goal is to maintain adequate nutrition, avoid weight loss.    RT/Pulmonology goals and progress:   Move to phase three. Capping with request to be capped for swallow study.      Care management Goals and progression of discharge planning, barriers to discharge planning:  Goal is likely acute rehab once the team determines that he is ready to discharge from [facility]. Patient will need to be stable off of the 1:1 for at least 24 hours prior to discharging to another facility.     Family concerns or comments: Patient seems to be stuck in 2004 and seems to believe he is 26 years old. Question: Will his cognition improve? Answer (MD): We are seeing improvement but only time will tell how much improvement patient will achieve.   Per Jama, 'last time he went through something like this, it took him about 3-6 months to improve.' She notes that the patient is acting like he did when he was taking Ambien. She would like to bring his 17 year-old daughter to the hospital to see patient (will update manager to call Jama).     Estimated date of discharge:  To be determined.     Marilee Farias RN     Abbreviation  Code:  [facility] Wheelchair ([facility] WC), [facility] Stretcher ([facility] STR), Patient (pt), Transitional Care Unit (TCU), Skilled Nursing Facility (SNF), Long Term Care (LTC), Long term acute care hospital (LTACH), Assisted Living ([facility] or AL), Care Management (CM), Physical Therapy (PT),  Occupational Therapy (OT), Speech Therapy (ST), Registered Dietician (RD), Respiratory Therapy (RT).

## 2022-01-07 NOTE — PROGRESS NOTES
Pt trying to get out of bed wanting to leave. Started to get agitated, combative, kicked staff. Gave scheduled phenobarbital 243mg scheduled for NOC. Restraints on Bilateral lower extremities applied. Pt continued yelling in room. 1:1 still in place for safety. Will continue to monitor.

## 2022-01-07 NOTE — PROGRESS NOTES
"Nursing staff reported that pt has been agitated.  He reportedly was kicking staff. Nursing staff reported that patient had pulled out his PICC line and peripheral IV.    He is prescribed quetiapine 50 mg p.o. 4 times daily as needed.      /85   Pulse 111   Temp 98.1  F (36.7  C) (Oral)   Resp 18   Ht 1.803 m (5' 11\")   Wt 81.5 kg (179 lb 9.6 oz)   SpO2 97%   BMI 25.05 kg/m    Awake, alert, interactive, confused.  Has bilateral wrist and ankle soft restraints applied. He was yelling out.  Heart rate tachycardic  Lungs clear  Abd nondistended  No rashes noted in exposed skin  Agitated, yelling      A:  Hospitalization delirium, multifactorial    P:  Will add PRN oral lorazepam and monitor response     "

## 2022-01-07 NOTE — PLAN OF CARE
During receiving report , patient very restless, putting legs over  rails, intermittently irritable. Per report patient had been so during the night, and slept little. Patient has a video swallow scheduled around 1130 this morning. Seroquel given, soap and water bath given, back, neck and foot massage given about 8am. He is calm quiet and appears to be sleeping comfortably. Will continue to monitor.

## 2022-01-07 NOTE — PROGRESS NOTES
.  Addiction Medicine Progress Note     Principal Problem:    Acute on chronic respiratory failure with hypoxia (H)  Active Problems:    Chronic back pain    Essential hypertension    Pneumonia due to 2019 novel coronavirus    Acute respiratory distress syndrome (ARDS) due to COVID-19 virus (H)    Major depressive disorder, recurrent episode, severe (H)    Opioid use disorder, severe, dependence (H)    Polysubstance abuse (H)    ROSSY (obstructive sleep apnea)    Mild intermittent asthma    Acute metabolic encephalopathy    Ventilator associated pneumonia (H)    Oropharyngeal dysphagia        HPI:     46-year-old male with the above medical issues on LTACH unit after prolonged hospitalization in the ICU for ARDS due to COVID-19 infection.  Was originally intubated on 11/28/21 and was just weaned off yesterday.    He has a hx of opioi duse disorder, mainly pills prescribed chronically for pain.   Was on Suboxone 8 mg q d prior to admission.    He had been on high-dose scheduled lorazepam and oxycodone.  Consultation requested because he has been unable to wean lorazepam and oxycodone due to agitation. Prior to admission, he was on buprenorphine 4 mg twice daily. No previous known addiction to benzodiazepines.  It is unclear if he has a history of seizures.     Severe opioid use disorder, dependence- began micro induction to buprenorphine on 01/03/2022 but that night, patient became more somnolent and oxycodone was held.  He was then in opiate withdrawal on the morning of 01/04/2022.  Rather than restarting oxycodone, the decision was made to go forward with a more typical buprenorphine induction.  Patient has had the following buprenorphine doses: 3 mg (01/04), 12 mg (01/05), 8 mg (01/06 a.m. with plan for an additional 8 mg in pm).  Overall he appears to be tolerating the induction well and has had a reduction in his heart rate since he was in acute opiate withdrawal earlier in the week.      Plan:   1. Continue with  the plan to increase buprenorphine to 8 mg twice daily by giving an additional 8 mg this evening.  2. Will continue with 8 mg twice daily tomorrow, 01/07/2022.  Continue to follow and we will certainly consider ongoing dose adjustments since they may benefit the overall picture including his agitation.  His tolerance is obviously higher than it was prior to admission and it may be appropriate for him to eventually increase the dose to 8 mg 3 times daily.  3. No other opiates.           Benzodiazepine management without a clear history of sedative use disorder- Ativan was stopped on 01/03/2022 and converted to phenobarbital.  The initial dose was phenobarbital 259.2 mg every 4 hours which is a decrease of approximately 20% of the lorazepam equivalency.  Patient had difficulty with somnolence and mild hypotension the first night and and again increased somnolence the second evening.  According to nursing notes from the last 24 hours, he has tolerated the current dose much better but the PRN phenobarb dose has appropriately been utilized once daily the last 2 days.    Patient has had the following phenobarbital total daily doses: 1069.2 mg (01/03), 777.6 mg (01/04), 1004.4 mg (01/05) and his most recent dose was 32.4 mg PRN this morning.  The eventual goal is to taper off of phenobarbital and therefore it is important to avoid ongoing increases in the dose (especially given he has had some episodes of somnolence.)  The initial loading doses on Monday and the trend over the last few days, indicate that a dose around 1000 mg for today would be appropriate.                   Plan:  1. Increase scheduled phenobarbital to 243 mg every 6 hours for a total daily dose of 1004.4 mg (including the PRN already given) since this has been approximately the therapeutic dose.  2. Discontinue as needed phenobarbital dose to avoid increasing total daily doses.  3. Continue with the increased dose of gabapentin at 1200mg q8  hrs.  4. Keppra taper is appropriate  5. Watch for sedation, hypotension and/or nystagmus and hold doses of phenobarbital if either occur.  6. May consider reducing total daily dose in several days days to begin a taper.  Most likely given the patient's agitation, he will likely need a somewhat prolonged taper over the course of several weeks.        Agitation: Zyprexa discontinued and scheduled Seroquel reduced to decrease medications with anticholinergic properties in an attempt to decrease paradoxical agitation.  Haldol discontinued to avoid prolonged QTC and simplify the medication regimen.      Plan:  1. Continue scheduled bedtime dose of Seroquel 100 mg and utilize the PRN Seroquel doses for agitation.        Addiction medicine will continue to follow patient  For questions and concerns, please page the [facility] addiction medicine provider.        Subjective  Patient is more alert and talkative today.  He is more engaged in his spouse is pleased with his progress this week.  Patient remains paranoid and delusional and according to his family, he does have a tendency to have these types of paranoid thoughts/beliefs.  Clearly confused with poor short-term memory     ROS:  Unable to obtain review of systems because of confusion        Objective     Vital signs in last 24 hours  Temp:  [96.7  F (35.9  C)-99.5  F (37.5  C)] 98.3  F (36.8  C)  Pulse:  [] 107  Resp:  [16-26] 20  BP: (111-151)/(66-85) 136/85  FiO2 (%):  [21 %-30 %] 30 %  SpO2:  [91 %-97 %] 97 %           Physical Exam     Gen: Awake and alert.  Oriented to self only   Continues to be restless  No diaphoresis  Pupils are appropriately sized for ambient light, no scleral icterus  eomi  Tremor: none  Skin: no jaundice, no diaphoresis or piloerection  Respiratory:  Trach dome    Speech is clear  Psych: Appears anxious and irritable        Pertinent Labs   Lab Results: I have personally reviewed the labs     Eda Peterson MD  Addiction  "Medicine                                          Assessment/Plan     Principal Problem:    Acute on chronic respiratory failure with hypoxia (H)  Active Problems:    Chronic back pain    Essential hypertension    Pneumonia due to 2019 novel coronavirus    Acute respiratory distress syndrome (ARDS) due to COVID-19 virus (H)    Major depressive disorder, recurrent episode, severe (H)    Opioid use disorder, severe, dependence (H)    Polysubstance abuse (H)    ROSSY (obstructive sleep apnea)    Mild intermittent asthma    Acute metabolic encephalopathy    Ventilator associated pneumonia (H)    Oropharyngeal dysphagia      HPI:    46-year-old male with the above medical issues following prolonged hospitalization in the ICU for ARDS secondary  to COVID-19 pneumonia infection.  He had been on high-dose scheduled lorazepam and oxycodone.  Consultation requested because he has been unable to wean lorazepam and oxycodone due to agitation. Prior to admission, he was on buprenorphine 4 mg twice daily. No previous known addiction to benzodiazepines.  It is unclear if he has a history of seizures.    Nurses report that the patient was agitated last night and did not sleep well.   This am was groggy but it is unclear whether it was due to lack of sleep or medications.       When I saw him in early afternoon, I was able to converse and he answered questions appropriately, then would doze off while sitting up.   Was eating soft diet.      Current medications:  Buprenorphine 8 bid yesterday and today  Phenobarb  243 mg q 6 hr on 1/6/ and today (started 1/3)  Gabapentin 1200 mg q 8 hrs   Quetiapine 100 mg q hs  Trazodone 75 mg tid      Objective:   /69 (BP Location: Left arm, Patient Position: Supine)   Pulse 92   Temp 98  F (36.7  C) (Axillary)   Resp 15   Ht 1.803 m (5' 11\")   Wt 81.5 kg (179 lb 9.6 oz)   SpO2 93%   BMI 25.05 kg/m       sats good off vent.  Trach tube still in.    Skin:   Sweaty  HEENT:   R eye is not " conjugate and does not Abduct to L                   R Pupil enlarged compared to L                   Not clear whether this was present Pre-admission.                   No nystagmus seen.    Neuro:  Intermittently alert, but doses off.                Some answers non- sensical    Psych:  Denies anxiety or depression but has hx of latter.       Reports opiates were prescribed for chronic low back pain.      Assessment and Plan:    1.  Opioid Use Disorder, chronic -  Was on 8 mg buprenorphine PTA and then got  large amounts of opiates for some time while on ventilator.   Have gotten off the oxycodone and currently is getting 8 mg bid buprenorphine.   Sweaty, but no other signs of withdrawal.    Doubt that this is causing his sedation.    Continue same dose.       2.   Chronic prescription benzodizepaine use - when we saw him earlier this week, he had been getting about 72 mg lorazepam per day.   He was switched to equivalent dose of phenobarbital  And was also given large doses of gabapentin.    Now showing signs of oversedation, so will reduce both of these somewhat.    Note he is also getting trazodone 3 times per day for total dose of 225 mg daily.       3.   Disconjugate gaze, Confusion, encephalopathy -  Unclear what baseline cognitive function was,  Unclear whether disconjugate gaze is new.   It is noted that he has not had any imaging of head during entire admission at [facility].   Sedation, confusion very possibly related to medications, but should probably rule out other causes.         Will decrease gabapentin to 900 mg tid    Begin slow taper of the phenobarbital.    Discuss #3 with hospitalist.      Maria Del Carmen Dangelo MD  Addiction Medicine Service  [facility]   Page me (click here for Hannah Dangelo)

## 2022-01-07 NOTE — PLAN OF CARE
Problem: Adult Inpatient Plan of Care  Goal: Optimal Comfort and Wellbeing  Outcome: Improving   Provided bath and massage of neck, back and feet. Paitent  is resting comfortable at this time-continue to monitor

## 2022-01-07 NOTE — PROGRESS NOTES
Pulmonary/CC  Medicine - Ventilator Management :    HPI: 46 year old year old male admitted to [facility] on 12/31/2021 for ongoing treatment of respiratory failure .    The referring facility is [facility]  Records from that facility are available to assist in historical details.     46 year old male, unvaccinated against COVID-19, with history of polysubstance abuse and chronic pain/lower back pain on suboxone, mood disorder/depression, HTN, ROSSY, mild intermittent asthma, presented with respiratory failure/COVID ARDS intubated on 11/28. Initially requiring proning/paralysis, veletri, now down trending vent needs, issues with agitation, treated for VAP. Not tolerating weaning trials, failed extubation 12/15, re-intubated, S/p trach and PEG tube 12/17.    Chief complaint: Ongoing respiratory failure  Significant change in clinical status during past 24 hours? - No      Ventilation Mode: Trach collar  FiO2 (%): 35 %  Oxygen Concentration (%): 35 %  Resp: 15       Tracheal secretions: moderate    Current phase of ventilator weaning pathway:  Phase 2 started 1/4     Ventilator weaning results from yesterday: off vent since 1/4 am    Physical exam: (patient personally examined)  General: presently in bed sleeping  Was awake most of the night  Has a 1:1 sitter in room  HEENMT: #7 bivona in place; site clean.   Lungs: Diminished, clear  Heart: RRR, S1 and S2            Abdomen: Soft, non-tender.   Skin: skin color normal, texture normal, no rashes or lesions.   Extremities:  No edema noted.   Pulses:  +2 BUE and +2 BLE  Neuro:  PURDY interactive when not sleeping    Clinical status discussed today with  respiratory therapist     Diagnosis:   Patient Active Problem List   Diagnosis     Chronic back pain     Essential hypertension     Elevated LFTs     Acute on chronic respiratory failure with hypoxia (H)     Pneumonia due to 2019 novel coronavirus     Atrial fibrillation with rapid ventricular response (H)     Acute  respiratory distress syndrome (ARDS) due to COVID-19 virus (H)     Major depressive disorder, recurrent episode, severe (H)     Opioid use disorder, severe, dependence (H)     Polysubstance abuse (H)     ROSSY (obstructive sleep apnea)     Mild intermittent asthma     Acute metabolic encephalopathy     Ventilator associated pneumonia (H)     Oropharyngeal dysphagia     Recommendations/Plans:  1. Ventilator orders: move to phase 3 today  2. Trach change? 1/4 changed to  # 7 Bivona   3. Diagnostic testing? To have VSS today per SLP, let's may sure he is capped when this is done today  4. [place] [facility] [place]    Hilda Boyd, SHONA CNP   [facility] Pulmonary and Critical Medicine

## 2022-01-07 NOTE — PLAN OF CARE
Problem: Inability to Wean (Mechanical Ventilation, Invasive)  Goal: Mechanical Ventilation Liberation  Outcome: Improving     Problem: Skin and Tissue Injury (Mechanical Ventilation, Invasive)  Goal: Absence of Device-Related Skin and Tissue Injury  Outcome: Improving     Problem: Ventilator-Induced Lung Injury (Mechanical Ventilation, Invasive)  Goal: Absence of Ventilator-Induced Lung Injury  Outcome: Improving     Problem: Communication Impairment (Artificial Airway)  Goal: Effective Communication  Outcome: Improving     Problem: Device-Related Complication Risk (Artificial Airway)  Goal: Optimal Device Function  Outcome: Improving  Intervention: Optimize Device Care and Function  Recent Flowsheet Documentation  Taken 1/6/2022 2326 by Rudolph Coles, RT  Airway Safety Measures: all equipment/monitors on and audible  Taken 1/6/2022 1952 by Rudolph Coles, RT  Airway Safety Measures: all equipment/monitors on and audible     Problem: Skin and Tissue Injury (Artificial Airway)  Goal: Absence of Device-Related Skin or Tissue Injury  Outcome: Improving      RT PROGRESS NOTE     DATA:  CURRENT SETTINGS: AC 16/520/+5/25% Stby              TRACH TYPE / SIZE:  #7 Bivona (changed on 01/04/22)             MODE:   Trach dome with PMV             FIO2:   35%/ 25 LPM     ACTION:             THERAPIES:                SUCTION:                           FREQUENCY:                          AMOUNT:                          CONSISTENCY:                           COLOR:                SPONTANEOUS COUGH EFFORT/STRENGTH OF EFFORT (not elicited by suctioning): good nonproductive                           WEANING PHASE:  2                        WEAN MODE:  35%/ 25L HFTM with PMV                        WEAN TIME:   Since 01/04                        END WEAN REASON:   ongoing as miranda     RESPONSE:             BS:  clear             VITAL SIGNS:  /82 (BP Location: Left arm)   Pulse 94   Temp 98.2  F (36.8  C) (Oral)   Resp 18  "  Ht 1.803 m (5' 11\")   Wt 81.5 kg (179 lb 9.6 oz)   SpO2 97%   BMI 25.05 kg/m               EMOTIONAL NEEDS / CONCERNS:                  RISK FOR SELF DECANNULATION:  yes                      RISK DUE TO:  confusion/hallucination                        INTERVENTION/S IN PLACE IS/ARE: Bilateral wrist and ankle soft restrains/ 1:1       NOTE / PLAN:   Patient remains on TM with PMV, miranda well. Will cont monitor.                           "

## 2022-01-07 NOTE — PROGRESS NOTES
"Speech pathology: Video swallow study     01/07/22 5153   General Information   Onset of Illness/Injury or Date of Surgery 12/24/21   Referring Physician Deb   Pertinent History of Current Problem Per H&P: \"Jared North is a 46 year old male unvaccinated against COVID-19, with history of polysubstance abuse and chronic pain/lower back pain on Suboxone prior to admission (had been on methadone in the past but not in recent past), mood disorder/depression, HTN, ROSSY, mild intermittent asthma, knee pain, and diagnosis of COVID-19 viral infection on 11/19/2021 who originally presented to [facility] on 11/24/2021 with worsening shortness of breath, admitted to the ICU with COVID-19 viral pneumonia and severe acute respiratory failure with hypoxia requiring noninvasive ventilation....[place] he was followed in ICU and continued on vent/paralysis/proning/pressors.  Cefepime switched to rocephin 12/6/21.  He continued to be very encephalopathic and psychiatry was consulted. He was weaned off pressors and didn't require proning by 12/10/21.  He has been on large doses of anti-psychotics and sedatives.  He developed fevers again 12/14 but had clinically improved somewhat and was extubated on 12/15/21.  By  The next day 12/16/21 he had severely increased work of breathing and so was re-intubated. Blood Cx grew Streptococcus constellatus and sputum Cx grew that as well but also had E. Coli/Proteus m./staph epi/candida parapsilosis. Vanco/Zosyn was started on 12/16/21.  He then underwent trach/PEG on 12/17/21.\"   Type of Evaluation   Type of Evaluation Swallow Evaluation   Tracheostomy Assessment (Speaking Valve)   Comment, Tracheostomy (Speaking Valve) Trach capped for evaluation   Oral Motor   Oral Musculature anomalies present   Structural Abnormalities none present   Mucosal Quality adequate   Dentition (Oral Motor) some missing teeth   Facial Symmetry (Oral Motor)   Facial Symmetry (Oral Motor) WNL   Lip " Function (Oral Motor)   Lip Range of Motion (Oral Motor) WNL   Lip Strength (Oral Motor) WNL   Tongue Function (Oral Motor)   Tongue ROM (Oral Motor) WNL   Tongue Strength (Oral Motor) WNL   Tongue Coordination/Speed (Oral Motor) slows down progressively   Cough/Swallow/Gag Reflex (Oral Motor)   Volitional Throat Clear/Cough (Oral Motor) WNL   Volitional Swallow (Oral Motor) mildly delayed   Vocal Quality/Secretion Management (Oral Motor)   Vocal Quality (Oral Motor) WFL   Secretion Management (Oral Motor) WNL   General Swallowing Observations   Current Diet/Method of Nutritional Intake (General Swallowing Observations, NIS) NPO   Swallowing Evaluation Videofluoroscopic swallow study (VFSS)   VFSS Evaluation   Views Taken left lateral   VFSS Textures Trialed thin liquids;mildly thick liquids;moderately thick liquids/liquidized;pureed;solid foods   VFSS Eval: Thin Liquid Texture Trial   Mode of Presentation, Thin Liquid cup;straw   Preparatory Phase Holding;Other (see comments)  (At times patient swishing bolus in his mouth prior to swallo)   Oral Phase, Thin Liquid Premature pharyngeal entry   Pharyngeal Phase, Thin Liquid Delayed swallow reflex   Rosenbek's Penetration Aspiration Scale: Thin Liquid Trial Results 7 - contrast passes glottis, visible subglottic residue remains despite patient's response (aspiration)   Response to Aspiration unproductive reflexive involuntary cough/throat clear   VFSS Eval: Mildly Thick Liquids   Mode of Presentation cup;straw   Preparatory Phase Holding  (Patient talking at times with bolus present)   Oral Phase Delayed AP movement   Pharyngeal Phase Delayed swallow reflex  (Spell to valleculae prior to swallow)   Rosenbek's Penetration Aspiration Scale 1 - no aspiration, contrast does not enter airway   VFSS Eval: Moderately Thick Liquids   Mode of Presentation spoon   Preparatory Phase Holding   Oral Phase Delayed AP movement   Pharyngeal Phase WFL   Rosenbek's Penetration  Aspiration Scale 1 - no aspiration, contrast does not enter airway   VFSS Evaluation: Puree Solid Texture Trial   Mode of Presentation, Puree spoon   Preparatory Phase Holding   Oral Phase, Puree Delayed AP movement   Pharyngeal Phase, Puree WFL   Rosenbek's Penetration Aspiration Scale: Puree Food Trial Results 1 - no aspiration, contrast does not enter airway   VFSS Evaluation: Solid Food Texture Trial   Mode of Presentation, Solid fed by clinician   Diagnostic Statement Once bolus was placed in his mouth, patient refused stating distaste   Esophageal Phase of Swallow   Patient reports or presents with symptoms of esophageal dysphagia No   Swallowing Recommendations   Diet Consistency Recommendations mildly thick liquids (level 2);minced & moist (level 5)   Supervision Level for Intake 1:1 supervision needed   Mode of Delivery Recommendations bolus size, small;slow rate of intake   Recommended Feeding/Eating Techniques (Swallow Eval) maintain upright sitting position for eating;maintain upright posture during/after eating for 30 minutes;minimize distractions during oral intake   Comment, Swallowing Recommendations Patient was distracted, restless, and was not easily redirectable during evaluation.  As a result, fluoroscopy infrequently captured instances of swallowing as patient was moving in and out of frame despite education, and prompts.   General Therapy Interventions   Planned Therapy Interventions Dysphagia Treatment   Dysphagia treatment Modified diet education;Instruction of safe swallow strategies;Compensatory strategies for swallowing   SLP Therapy Assessment/Plan   Criteria for Skilled Therapeutic Interventions Met (SLP Eval) yes;treatment indicated   SLP Diagnosis Dysphagia   Rehab Potential (SLP Eval) good, to achieve stated therapy goals   Therapy Frequency (SLP Eval) 5 times/wk   Predicted Duration of Therapy Intervention (SLP Eval) Length of stay   Comment, Therapy Assessment/Plan (SLP) Patient  presented with mild oral pharyngeal dysphagia functionally.  However patient's distractibility and overall cognitive dysfunction places patient at significantly high risk for aspiration due to inattention of bolus.  Risk is particularly high with thin liquids and more advanced food textures that require chewing prior to swallow.  Eliminating distractions and encouraging attention to task will be critical for safe p.o. intake.  Given impulsivity, assistance and supervision with feeding would be necessary.   Therapy Plan Review/Discharge Plan (SLP)   Therapy Plan Review (SLP) evaluation/treatment results reviewed;care plan/treatment goals reviewed;participants included;patient   SLP Discharge Planning    SLP Discharge Recommendation (DC Rec) Acute Rehab Center-Motivated patient will benefit from intensive, interdisciplinary therapy.  Anticipate will be able to tolerate 3 hours of therapy per day   SLP Rationale for DC Rec Significantly impaired swallowing, cognition, and communication, needing significant intervention at discharge likely.    SLP Brief overview of current status  Appropriate to initiate diet of puréed food textures and mildly thick liquids with one-to-one supervision, encouraging attention to task, assistance with feeding small bites and sips at slow rate.    Total Evaluation Time   Total Evaluation Time (Minutes) 25

## 2022-01-07 NOTE — PROGRESS NOTES
[facility]    Medicine Progress Note - Hospitalist Service       Date of Admission:  12/31/2021    Background Summary:  Jared North is a 46 year old male unvaccinated against COVID-19, with history of polysubstance abuse and chronic pain/lower back pain on Suboxone prior to admission (had been on methadone in the past but not in recent past), mood disorder/depression, HTN, ROSSY, mild intermittent asthma, knee pain, and diagnosis of COVID-19 viral infection on 11/19/2021 who originally presented to [facility] on 11/24/2021 with worsening shortness of breath, admitted to the ICU with COVID-19 viral pneumonia and severe acute respiratory failure with hypoxia requiring noninvasive ventilation.  He was started on Decadron, remdesivir, and tocilizumab and followed for several days alternating between NIPPV and HFNC.  He had an episode of afib during this time that eventually resolved and has not recurred after briefly being treated with amiodarone.  He did not improve and was eventually intubated on 11/28/21 and then proned and started on veletri.  11/30/21 he was started on cefepime and vanco for VAP - Cx grew E. Coli and Proteus mirabilis.  He did develop hypotension possibly from septic shock vs sedation vasoplegia and was treated with norepi intemittently.  Vanco was stopped on 12/5/21.  He was then transferred to [place]'s ICU on 12/5/21.       At [place] he was followed in ICU and continued on vent/paralysis/proning/pressors.  Cefepime switched to rocephin 12/6/21.  He continued to be very encephalopathic and psychiatry was consulted. He was weaned off pressors and didn't require proning by 12/10/21.  He has been on large doses of anti-psychotics and sedatives.  He developed fevers again 12/14 but had clinically improved somewhat and was extubated on 12/15/21. On 12/16/21 he had severely increased work of breathing and so was re-intubated. Blood Cx grew Streptococcus constellatus and sputum Cx grew that as  well but also had E. Coli/Proteus m./staph epi/candida parapsilosis. Vanco/Zosyn was started on 12/16/21.  He then underwent trach/PEG on 12/17/21.  Vanco was stopped after 3 days and Zosyn switched to rocephin on 12/21/21 to complete on 1/2/21.       During this time he had significant issues with encephalopathy and agitation. As above he was started on multiple meds for this.  Over the last week prior to admission here at [facility] they had increased his lorazepam up to 12 mg every 4 hours and oxycodone 20 mg every 4 hours along with abilify 7 mg (home med), gabapentin 800 mg every 8 hours, keppra 500 mg twice daily (for behavior), zyprexa 5 mg twice daily, and seroquel 75 mg TID with 100 mg HS.  With this regimen he has improved reportedly but is still awake and agitated.    Assessment & Plan         Principal Problem:    Acute on chronic respiratory failure with hypoxia -     Acute respiratory distress syndrome (ARDS) due to COVID-19 pneumonia   -Currently on Phase 2 vent weaning, slow weaning per pulm/RT  - trach changed 1/4/22     Active Problems:  Acute metabolic encephalopathy - Med adjustment per psych and addiction service.  Currently on Abilify, subutex, neurontin, keppra (being tapered off), phenobarbital, seroquel, and trazodone.  Was on very high doses of lorazepam and oxycodone as above which have been stopped.  Slowly improving.    Chronic back pain -patient was on Suboxone prior to admission.    - Pain control per addiction medicine service, subutex resumed and titrating up.    -Gabapentin dose increased to 1200mg Q8H    Essential hypertension - Stable, on metoprolol for Sinus tach, closely monitor BP.     Major depressive disorder, recurrent episode, severe - continue abilify, seroquel, trazodone.  Off zyprexa    Opioid type dependence - Mx per addiction medicine service, On phenobarb and resumed subutex. Oxy has been stopped.    Polysubstance abuse as above    ROSSY (obstructive sleep apnea)-  vented/trached for now    Mild intermittent asthma - can add nebs as needed    Recent Ventilator associated pneumonia - Completed CTX on 1/3/22.    Thrush - On nystatin    Dysphagia - speech therapy consult, on tube feeds    Severe Malnutrition, POA: based on nutrition assessment     Sinus tachycardia  -  Treatment of underlying causes  -  Pt has been in initiated on metoprolol tartrate         Diet: Adult Formula Drip Feeding: Continuous Promote w fiber; Gastrostomy; Goal Rate: 80; mL/hr; Medication - Feeding Tube Flush Frequency: At least 15-30 mL water before and after medication administration and with tube clogging  NPO for Medical/Clinical Reasons Except for: Ice Chips, NPO but receiving Tube Feeding    DVT Prophylaxis: Enoxaparin (Lovenox) SQ  Wei Catheter: Not present  Central Lines: None  Code Status: Full Code      Disposition Plan   Expected Discharge: TBD   Anticipated discharge location: home with family   vs acute rehab vs TCU       The patient's care was discussed with  Patient, ex-wife, RN    Adam Soliman MD  Hospitalist Service  [facility]  Securely message with the Vocera Web Console (learn more here)  Text page via [employer] Paging/Directory                 ______________________________________________________________________    Interval History   Patient is new to me. Chart reviewed and events noted.  Patient seen and examined.   Patient more awake, alert and interactive, making jokes but still confused.  Somewhat restless.         Data reviewed today: I reviewed all medications, new labs and imaging results over the last 24 hours.  All medication issues identified within the last24 hours have been addressed and completed as appropriate.   Phenobarb dose increased some today per addiction medicine.    Physical Exam   Vital Signs: Temp: 98.1  F (36.7  C) Temp src: Oral BP: 131/76 Pulse: 107   Resp: 16 SpO2: 96 % O2 Device: Trach dome Oxygen Delivery: 25 LPM  Weight: 179 lbs 9.6 oz    General: Awake,  "alert, appears restless  HEENT: EOMI, AT, NC  Cardiac: Tachycardic  Pulmonary: Clear to auscultation in bilateral lung fields  Abdomen: Soft, non tender, Not distended.  Not tender to palpation.  Musculoskeletal: No joint abnormalities noted  Skin: Normally turgid, no rashes noted  Neurologic: Awake, alert, appropriately interactive, oriented to self only, \"2003 February\" and not date or location.  moving both LE, b/l UE in restraints.  Psychiatric: Appears slightly restless      Data   Recent Labs   Lab 01/06/22  1256 01/06/22  0814 01/05/22  1314 01/05/22  0607 01/02/22  0612 01/01/22  0805 01/01/22  0611 01/01/22  0603   NA  --  140  --   --   --   --   --   --    POTASSIUM  --  3.8  3.8 4.4 3.3*   < >  --    < >  --    CHLORIDE  --  106  --   --   --   --   --   --    CO2  --  22  --   --   --   --   --   --    BUN  --  12  --   --   --   --   --   --    CR 0.63* 0.63*  --   --   --   --   --   --    ANIONGAP  --  12  --   --   --   --   --   --    TRUONG  --  9.8  --   --   --   --   --   --    GLC  --  111  --   --   --  114*  --  132*    < > = values in this interval not displayed.     "

## 2022-01-07 NOTE — PLAN OF CARE
Alert Confused vs  delirium,Agitated at start of shift and kicking at staff x1.   1 mg Ativan given per prn order. & pt did sleep x 2 hrs. When awake pt is talking to people who he apparently is seeing. Bilateral soft wrist restaints in place. Pt pulls at tubes. Telemetry shows sinus rhythm. Continent of urine x2., confused and making confused conversation while awake. Continue 1:1 sitter at bedside to provide for safety

## 2022-01-07 NOTE — PROGRESS NOTES
"Video Swallow Study     01/07/22 9424   General Information   Onset of Illness/Injury or Date of Surgery 12/24/21   Referring Physician Deb   Pertinent History of Current Problem Per H&P: \"Jared North is a 46 year old male unvaccinated against COVID-19, with history of polysubstance abuse and chronic pain/lower back pain on Suboxone prior to admission (had been on methadone in the past but not in recent past), mood disorder/depression, HTN, ROSSY, mild intermittent asthma, knee pain, and diagnosis of COVID-19 viral infection on 11/19/2021 who originally presented to [facility] on 11/24/2021 with worsening shortness of breath, admitted to the ICU with COVID-19 viral pneumonia and severe acute respiratory failure with hypoxia requiring noninvasive ventilation....At [place] he was followed in ICU and continued on vent/paralysis/proning/pressors.  Cefepime switched to rocephin 12/6/21.  He continued to be very encephalopathic and psychiatry was consulted. He was weaned off pressors and didn't require proning by 12/10/21.  He has been on large doses of anti-psychotics and sedatives.  He developed fevers again 12/14 but had clinically improved somewhat and was extubated on 12/15/21.  By  The next day 12/16/21 he had severely increased work of breathing and so was re-intubated. Blood Cx grew Streptococcus constellatus and sputum Cx grew that as well but also had E. Coli/Proteus m./staph epi/candida parapsilosis. Vanco/Zosyn was started on 12/16/21.  He then underwent trach/PEG on 12/17/21.\"   Type of Evaluation   Type of Evaluation Swallow Evaluation   Tracheostomy Assessment (Speaking Valve)   Comment, Tracheostomy (Speaking Valve) Trach capped for evaluation   Oral Motor   Oral Musculature anomalies present   Structural Abnormalities none present   Mucosal Quality adequate   Dentition (Oral Motor) some missing teeth   Facial Symmetry (Oral Motor)   Facial Symmetry (Oral Motor) WNL   Lip Function (Oral " Motor)   Lip Range of Motion (Oral Motor) WNL   Lip Strength (Oral Motor) WNL   Tongue Function (Oral Motor)   Tongue ROM (Oral Motor) WNL   Tongue Strength (Oral Motor) WNL   Tongue Coordination/Speed (Oral Motor) slows down progressively   Cough/Swallow/Gag Reflex (Oral Motor)   Volitional Throat Clear/Cough (Oral Motor) WNL   Volitional Swallow (Oral Motor) mildly delayed   Vocal Quality/Secretion Management (Oral Motor)   Vocal Quality (Oral Motor) WFL   Secretion Management (Oral Motor) WNL   General Swallowing Observations   Current Diet/Method of Nutritional Intake (General Swallowing Observations, NIS) NPO   Swallowing Evaluation Videofluoroscopic swallow study (VFSS)   VFSS Evaluation   Views Taken left lateral   VFSS Textures Trialed thin liquids;mildly thick liquids;moderately thick liquids/liquidized;pureed;solid foods   VFSS Eval: Thin Liquid Texture Trial   Mode of Presentation, Thin Liquid cup;straw   Preparatory Phase Holding;Other (see comments)  (At times patient swishing bolus in his mouth prior to swallo)   Oral Phase, Thin Liquid Premature pharyngeal entry   Pharyngeal Phase, Thin Liquid Delayed swallow reflex   Rosenbek's Penetration Aspiration Scale: Thin Liquid Trial Results 7 - contrast passes glottis, visible subglottic residue remains despite patient's response (aspiration)   Response to Aspiration unproductive reflexive involuntary cough/throat clear   VFSS Eval: Mildly Thick Liquids   Mode of Presentation cup;straw   Preparatory Phase Holding  (Patient talking at times with bolus present)   Oral Phase Delayed AP movement   Pharyngeal Phase Delayed swallow reflex  (Spell to valleculae prior to swallow)   Rosenbek's Penetration Aspiration Scale 1 - no aspiration, contrast does not enter airway   VFSS Eval: Moderately Thick Liquids   Mode of Presentation spoon   Preparatory Phase Holding   Oral Phase Delayed AP movement   Pharyngeal Phase WFL   Rosenbek's Penetration Aspiration Scale 1 - no  aspiration, contrast does not enter airway   VFSS Evaluation: Puree Solid Texture Trial   Mode of Presentation, Puree spoon   Preparatory Phase Holding   Oral Phase, Puree Delayed AP movement   Pharyngeal Phase, Puree WFL   Rosenbek's Penetration Aspiration Scale: Puree Food Trial Results 1 - no aspiration, contrast does not enter airway   VFSS Evaluation: Solid Food Texture Trial   Mode of Presentation, Solid fed by clinician   Diagnostic Statement Once bolus was placed in his mouth, patient refused stating distaste   Esophageal Phase of Swallow   Patient reports or presents with symptoms of esophageal dysphagia No   Swallowing Recommendations   Diet Consistency Recommendations mildly thick liquids (level 2);minced & moist (level 5)   Supervision Level for Intake 1:1 supervision needed   Mode of Delivery Recommendations bolus size, small;slow rate of intake   Recommended Feeding/Eating Techniques (Swallow Eval) maintain upright sitting position for eating;maintain upright posture during/after eating for 30 minutes;minimize distractions during oral intake   Comment, Swallowing Recommendations Patient was distracted, restless, and was not easily redirectable during evaluation.  As a result, fluoroscopy infrequently captured instances of swallowing as patient was moving in and out of frame despite education, and prompts.   General Therapy Interventions   Planned Therapy Interventions Dysphagia Treatment   Dysphagia treatment Modified diet education;Instruction of safe swallow strategies;Compensatory strategies for swallowing   SLP Therapy Assessment/Plan   Criteria for Skilled Therapeutic Interventions Met (SLP Eval) yes;treatment indicated   SLP Diagnosis Dysphagia   Rehab Potential (SLP Eval) good, to achieve stated therapy goals   Therapy Frequency (SLP Eval) 5 times/wk   Predicted Duration of Therapy Intervention (SLP Eval) Length of stay   Comment, Therapy Assessment/Plan (SLP) Patient presented with mild oral  pharyngeal dysphagia functionally.  However patient's distractibility and overall cognitive dysfunction places patient at significantly high risk for aspiration due to inattention of bolus.  Risk is particularly high with thin liquids and more advanced food textures that require chewing prior to swallow.  Eliminating distractions and encouraging attention to task will be critical for safe p.o. intake.  Given impulsivity, assistance and supervision with feeding would be necessary.   Therapy Plan Review/Discharge Plan (SLP)   Therapy Plan Review (SLP) evaluation/treatment results reviewed;care plan/treatment goals reviewed;participants included;patient   SLP Discharge Planning    SLP Discharge Recommendation (DC Rec) [facility] patient will benefit from intensive, interdisciplinary therapy.  Anticipate will be able to tolerate 3 hours of therapy per day   SLP Rationale for DC Rec Significantly impaired swallowing, cognition, and communication, needing significant intervention at discharge likely.    SLP Brief overview of current status  Appropriate to initiate diet of minced and moist food textures and mildly thick liquids with one-to-one supervision, encouraging attention to task, assistance with feeding small bites and sips at slow rate.    Total Evaluation Time   Total Evaluation Time (Minutes) 25

## 2022-01-07 NOTE — PROGRESS NOTES
Care Management Initial Consult       Concerns to be Addressed:   Acute on chronic respiratory failure with hypoxia requiring support of a ventilator (vent weaning phase 2 started 1/4/2022).  In restraints (soft limb x2) and on 1:1 due to impulsivity.   Patient plan of care discussed at interdisciplinary rounds: Yes  Follow up from rounds/notes:   Continues on phase 2 weaning, on tele. Video swallow study planned for 1140 AM today. (Move to phase 3 today. If not alert enough for swallow study today, plan swallow study on Monday 1/10/2022).    Anticipated Discharge Disposition:  Likely will need extensive therapy and rehab.     Anticipated Discharge Services:  To be determined by destination, patient/family preferences, medical needs and mobility status closer to the time of discharge.  Anticipated Discharge DME:  To be determined.     Patient/family educated on Medicare website which has current facility and service quality ratings:  NA at this time.   Education Provided on the Discharge Plan:  Per team.  Patient/Family in Agreement with the Plan:  Yes    Referrals Placed by CM/SW:  None  Private pay costs discussed: Not applicable       Additional Information:  Trach and PEG placed on 12/17/2021, transferred to [facility] on 12/31/2021. Initial assessment was completed with patient's ex-wife Jama by telephone. Patient lives with his ex-spouse, Jama, part of the time (but had also been spending time in [facility]). She reported that he is normally independent at baseline. Jama hopes that she would be able to provide care in her home post discharge (pending patient's needs) but she understands that patient will likely need extensive rehab and therapy first. CM to keep Jama updated once closer to discharge and better idea of plan is known. A care progression meeting has been arranged for 1100 AM today, 1/7/2022, and will be held in the conference room (call Jama on speaker phone). Transport to be  determined. Care manager to follow.   12:09 PM:  See separate noted regarding care progression conference. Jama would like to bring patient's 17 year-old daughter to see him. Will update unit manager to call Jama.   Of note, patient has not been vaccinated for Covid-19 (no record on file) but is now designated as 'covid recovered'.    Marilee Farias RN

## 2022-01-07 NOTE — PROGRESS NOTES
NUTRITION THERAPY FOLLOW UP NOTE    Pt with video swallow completed today.  Recommendations for MM5 diet with mildly thick liquids.    Diet order:    NPO and Tube Feeding   Enteral: via PEG placed 12/17/21:     CONTINUOUS  Adult Formula: Promote w fiber  Route: Gastrostomy  Goal Rate: 80  Goal Units: mL/hr  Medication - Feeding Tube Flush Frequency: At least 15-30 mL water before and after medication administration and with tube clogging   mL q6 hrs  Enteral feeding provides the following to meet estimated needs:1920ml/day formula: 1920 kcals, 121 g PRO, 1595 ml free H20+ flushes = 2405 ml/d, 249 g CHO, and 27 g fiber daily.     Previous Nutrition Diagnosis  Malnutrition related to acute illness, intubated x1 month as evidenced by weight loss, muscle wasting, subcutaneous fat loss     Swallowing difficulty r/t COVID-19 and evidenced by mechanical ventilation  Evaluation: Improving     Goal - diet advancement (new)    Intervention:  Verbal ordered received to start MM5 diet with mildly thick liquids.  Changed tube feeding to 300 ml back up bolus after each meal if eats less than 50% of meal.  Each bolus will provide 300 calories, 18 grams protein, 41 g CHO, 4 g fiber, and 249 ml free water.    Monitoring and Evaluation:  Diet advance, po intake, need for TF

## 2022-01-07 NOTE — PLAN OF CARE
Problem: Inability to Wean (Mechanical Ventilation, Invasive)  Goal: Mechanical Ventilation Liberation  Outcome: Improving     Problem: Skin and Tissue Injury (Mechanical Ventilation, Invasive)  Goal: Absence of Device-Related Skin and Tissue Injury  Outcome: Improving     Problem: Ventilator-Induced Lung Injury (Mechanical Ventilation, Invasive)  Goal: Absence of Ventilator-Induced Lung Injury  Outcome: Improving     Problem: Restraint for Non-Violent/Non-Self-Destructive Behavior  Goal: Prevent/Manage Potential Problems  Description: Maintain safety of patient and others during period of restraint.  Promote psychological and physical wellbeing.  Prevent injury to skin and involved body parts.  Promote nutrition, hydration, and elimination.  Outcome: Improving   RT PROGRESS NOTE     DATA:     CURRENT SETTINGS:              TRACH TYPE / SIZE:#7 BIVONA TTS CHANGED ON 1/4/22             MODE: Trach  Capped   ( TM/PMV )             FIO2: 21%     ACTION:             THERAPIES:               SUCTION:                           FREQUENCY: none                        AMOUNT:                        CONSISTENCY:                        COLOR:               SPONTANEOUS COUGH EFFORT/STRENGTH OF EFFORT (not elicited by suctioning): Strong                              WEANING PHASE:3                        WEAN MODE:Trach Capped on RA                         WEAN TIME: Since 09:40 tolerating                        END WEAN REASON:                   RESPONSE:             BS: Clear             VITAL SIGNS: Sat 93-95%, HR  79-92, RR 22             EMOTIONAL NEEDS / CONCERNS: Confused                RISK FOR SELF DECANNULATION: yes                        RISK DUE TO: confused                         INTERVENTION/S IN PLACE IS/ARE: Bilateral wrist restraints + 1:1 staff supervision       NOTE / PLAN:  Cont. Current plan of care

## 2022-01-08 LAB
MAGNESIUM SERPL-MCNC: 1.8 MG/DL (ref 1.8–2.6)
POTASSIUM BLD-SCNC: 4 MMOL/L (ref 3.5–5)

## 2022-01-08 PROCEDURE — 999N000157 HC STATISTIC RCP TIME EA 10 MIN

## 2022-01-08 PROCEDURE — 120N000018 HC R&B RESPIRATORY CARE WITH ATTENDANT

## 2022-01-08 PROCEDURE — 999N000123 HC STATISTIC OXYGEN O2DAILY TECH TIME

## 2022-01-08 PROCEDURE — 250N000013 HC RX MED GY IP 250 OP 250 PS 637: Performed by: INTERNAL MEDICINE

## 2022-01-08 PROCEDURE — 999N000009 HC STATISTIC AIRWAY CARE

## 2022-01-08 PROCEDURE — 250N000011 HC RX IP 250 OP 636: Performed by: NURSE PRACTITIONER

## 2022-01-08 PROCEDURE — 250N000013 HC RX MED GY IP 250 OP 250 PS 637: Performed by: NURSE PRACTITIONER

## 2022-01-08 PROCEDURE — 83735 ASSAY OF MAGNESIUM: CPT | Performed by: INTERNAL MEDICINE

## 2022-01-08 PROCEDURE — 36415 COLL VENOUS BLD VENIPUNCTURE: CPT | Performed by: INTERNAL MEDICINE

## 2022-01-08 PROCEDURE — 250N000013 HC RX MED GY IP 250 OP 250 PS 637: Performed by: HOSPITALIST

## 2022-01-08 PROCEDURE — 84132 ASSAY OF SERUM POTASSIUM: CPT | Performed by: INTERNAL MEDICINE

## 2022-01-08 PROCEDURE — 250N000013 HC RX MED GY IP 250 OP 250 PS 637: Performed by: FAMILY MEDICINE

## 2022-01-08 RX ADMIN — GABAPENTIN 900 MG: 250 SOLUTION ORAL at 06:01

## 2022-01-08 RX ADMIN — METOPROLOL TARTRATE 50 MG: 50 TABLET, FILM COATED ORAL at 08:43

## 2022-01-08 RX ADMIN — ACETAMINOPHEN ORAL SOLUTION 650 MG: 650 SOLUTION ORAL at 23:43

## 2022-01-08 RX ADMIN — NYSTATIN 500000 UNITS: 100000 SUSPENSION ORAL at 12:18

## 2022-01-08 RX ADMIN — METOPROLOL TARTRATE 50 MG: 50 TABLET, FILM COATED ORAL at 20:36

## 2022-01-08 RX ADMIN — TRAZODONE HYDROCHLORIDE 75 MG: 150 TABLET ORAL at 14:17

## 2022-01-08 RX ADMIN — QUETIAPINE FUMARATE 50 MG: 50 TABLET ORAL at 12:32

## 2022-01-08 RX ADMIN — QUETIAPINE FUMARATE 50 MG: 50 TABLET ORAL at 06:38

## 2022-01-08 RX ADMIN — PHENOBARBITAL 178.2 MG: 16.2 TABLET ORAL at 18:04

## 2022-01-08 RX ADMIN — NYSTATIN 500000 UNITS: 100000 SUSPENSION ORAL at 20:36

## 2022-01-08 RX ADMIN — QUETIAPINE FUMARATE 100 MG: 50 TABLET ORAL at 20:36

## 2022-01-08 RX ADMIN — ACETAMINOPHEN ORAL SOLUTION 650 MG: 650 SOLUTION ORAL at 06:38

## 2022-01-08 RX ADMIN — CHLORHEXIDINE GLUCONATE 0.12% ORAL RINSE 15 ML: 1.2 LIQUID ORAL at 07:48

## 2022-01-08 RX ADMIN — WHITE PETROLATUM: 1.75 OINTMENT TOPICAL at 08:48

## 2022-01-08 RX ADMIN — NYSTATIN 500000 UNITS: 100000 SUSPENSION ORAL at 08:44

## 2022-01-08 RX ADMIN — GABAPENTIN 900 MG: 250 SOLUTION ORAL at 14:17

## 2022-01-08 RX ADMIN — CHLORHEXIDINE GLUCONATE 0.12% ORAL RINSE 15 ML: 1.2 LIQUID ORAL at 20:36

## 2022-01-08 RX ADMIN — NYSTATIN 500000 UNITS: 100000 SUSPENSION ORAL at 18:04

## 2022-01-08 RX ADMIN — BUPRENORPHINE HYDROCHLORIDE 8 MG: 8 TABLET SUBLINGUAL at 18:03

## 2022-01-08 RX ADMIN — SENNOSIDES 10 ML: 8.8 LIQUID ORAL at 20:35

## 2022-01-08 RX ADMIN — TRAZODONE HYDROCHLORIDE 75 MG: 150 TABLET ORAL at 08:44

## 2022-01-08 RX ADMIN — PHENOBARBITAL 243 MG: 16.2 TABLET ORAL at 14:42

## 2022-01-08 RX ADMIN — GABAPENTIN 900 MG: 250 SOLUTION ORAL at 21:29

## 2022-01-08 RX ADMIN — SENNOSIDES 10 ML: 8.8 LIQUID ORAL at 08:43

## 2022-01-08 RX ADMIN — ARIPIPRAZOLE 7 MG: 1 SOLUTION ORAL at 08:44

## 2022-01-08 RX ADMIN — BUPRENORPHINE HYDROCHLORIDE 8 MG: 8 TABLET SUBLINGUAL at 08:43

## 2022-01-08 RX ADMIN — TRAZODONE HYDROCHLORIDE 75 MG: 150 TABLET ORAL at 20:36

## 2022-01-08 RX ADMIN — PHENOBARBITAL 243 MG: 16.2 TABLET ORAL at 02:28

## 2022-01-08 RX ADMIN — PHENOBARBITAL 243 MG: 16.2 TABLET ORAL at 07:47

## 2022-01-08 RX ADMIN — ENOXAPARIN SODIUM 40 MG: 100 INJECTION SUBCUTANEOUS at 08:44

## 2022-01-08 ASSESSMENT — ACTIVITIES OF DAILY LIVING (ADL)
ADLS_ACUITY_SCORE: 21
ADLS_ACUITY_SCORE: 23
ADLS_ACUITY_SCORE: 21
ADLS_ACUITY_SCORE: 21
ADLS_ACUITY_SCORE: 17
ADLS_ACUITY_SCORE: 17
ADLS_ACUITY_SCORE: 23
ADLS_ACUITY_SCORE: 17
ADLS_ACUITY_SCORE: 23
ADLS_ACUITY_SCORE: 21
ADLS_ACUITY_SCORE: 19
ADLS_ACUITY_SCORE: 17
ADLS_ACUITY_SCORE: 19
ADLS_ACUITY_SCORE: 17
ADLS_ACUITY_SCORE: 23
ADLS_ACUITY_SCORE: 21
ADLS_ACUITY_SCORE: 23
ADLS_ACUITY_SCORE: 21
ADLS_ACUITY_SCORE: 17
ADLS_ACUITY_SCORE: 21
ADLS_ACUITY_SCORE: 17
ADLS_ACUITY_SCORE: 17

## 2022-01-08 NOTE — PROGRESS NOTES
[facility]    Medicine Progress Note - Hospitalist Service       Date of Admission:  12/31/2021    Assessment & Plan                    Principal Problem:    Acute on chronic respiratory failure with hypoxia -     Acute respiratory distress syndrome (ARDS) due to COVID-19 pneumonia   -Continue phase 3 vent weaning  - trach changed 1/4/22     Active Problems:  Acute metabolic encephalopathy - Med adjustment per psych and addiction service.  Currently on Abilify, subutex, neurontin, keppra (being tapered off), phenobarbital, seroquel, and trazodone.  Was on very high doses of lorazepam and oxycodone as above which have been stopped.  Slowly improving.  Previous provider discussed with addiction medicine need to hold further Ativan given increasing somnolence yesterday.  Remains on restraints, still encephalopathic, though answer simple questions appropriately.    Mild intermittent dysconjugate gaze - his ex-wife states she has noted this in the past when he is on certain medications like ativan but not if he is off all meds.    Chronic back pain -patient was on Suboxone prior to admission.    - Pain control per addiction medicine service, subutex resumed and titrating up.    -Gabapentin dose increased to 1200mg Q8H    Essential hypertension - Stable, on metoprolol for Sinus tach, closely monitor BP.     Major depressive disorder, recurrent episode, severe - continue abilify, seroquel, trazodone.  Off zyprexa    Opioid type dependence - Mx per addiction medicine service, On phenobarb and resumed subutex. Oxy has been stopped.    Polysubstance abuse as above    ROSSY (obstructive sleep apnea)- vented/trached for now    Mild intermittent asthma - can add nebs as needed    Recent Ventilator associated pneumonia - Completed CTX on 1/3/22.    Thrush - On nystatin    Dysphagia - speech therapy consult, on tube feeds, starting modified diet today    Severe Malnutrition, POA: based on nutrition assessment     Sinus tachycardia  -   Treatment of underlying causes  -  Pt has been in initiated on metoprolol tartrate           Diet: Adult Formula Drip Feeding: Continuous Promote w fiber; Gastrostomy; Goal Rate: 150 ml/hr x 2 hrs after meals if eats <50% of meal; mL/hr; Medication - Feeding Tube Flush Frequency: At least 15-30 mL water before and after medication administratio...  Minced & Moist Diet (level 5) Mildly Thick (level 2)    DVT Prophylaxis: Heparin SQ  Wei Catheter: Not present  Central Lines: None  Code Status: Full Code      Disposition Plan   Expected Discharge: 01/20/2022     Anticipated discharge location: home with family    Delays:     Complex Care  1:1 Sitter            The patient's care was discussed with the patient and nursing staff    Shanell Lane MD  Hospitalist Service  [facility]  Securely message with the Vocera Web Console (learn more here)  Text page via [employer] Paging/Directory        Clinically Significant Risk Factors Present on Admission                    ______________________________________________________________________    Interval History   Seen and examined at bedside.  No acute events overnight.  On soft restraints.  Per nursing staff was about to pull his G-tube.  Seem encephalopathic though answer simple questions with 1 or 2 words.  No other acute concerns    Data reviewed today: I reviewed all medications, new labs and imaging results over the last 24 hours.   Physical Exam   Vital Signs: Temp: 97.8  F (36.6  C) Temp src: Oral BP: 105/66 Pulse: 81   Resp: 15 SpO2: 98 % O2 Device: None (Room air) Oxygen Delivery: 25 LPM  Weight: 179 lbs 9.6 oz     General: Awake, alert, appears restless  HEENT: EOMI with occas dysconjugate gaze - right eye wanders a bit, AT, NC  Cardiac: Tachycardic  Pulmonary: Clear to auscultation in bilateral lung fields  Abdomen: Soft, non tender, Not distended.  Not tender to palpation.  Musculoskeletal: No joint abnormalities noted  Skin: Normally turgid, no rashes  noted  Neurologic: lethargic, arouses but oriented to self only moving both LE, b/l UE in restraints.  Psychiatric: Appears slightly restless    Data

## 2022-01-08 NOTE — PLAN OF CARE
Problem: Communication Impairment (Artificial Airway)  Goal: Effective Communication  Outcome: Improving     Problem: Device-Related Complication Risk (Artificial Airway)  Goal: Optimal Device Function  Outcome: Improving     Problem: Skin and Tissue Injury (Artificial Airway)  Goal: Absence of Device-Related Skin or Tissue Injury  Outcome: Improving   RT PROGRESS NOTE     DATA:     CURRENT SETTINGS:             TRACH TYPE / SIZE:  #7 bivona, placed 01/04             MODE:  CAP/2L             FIO2:        ACTION:             THERAPIES:               SUCTION:                           FREQUENCY:   X1                        AMOUNT:   SMALL                        CONSISTENCY:   thin                        COLOR:   PALE/YELLOW             SPONTANEOUS COUGH EFFORT/STRENGTH OF EFFORT (not elicited by suctioning):                              WEANING PHASE:  3                        WEAN MODE:    CAP/2L                        WEAN TIME:   comt                        END WEAN REASON:   cont     RESPONSE:             BS:   coarse             VITAL SIGNS:   SPO2 95-97%, RR 20-26             EMOTIONAL NEEDS / CONCERNS:                RISK FOR SELF DECANNULATION:  YES                        RISK DUE TO:  CONFUSE                        INTERVENTION/S IN PLACE IS/ARE:  RESTRAINED      NOTE / PLAN:     Cont current POC

## 2022-01-08 NOTE — PLAN OF CARE
"  Problem: Inability to Wean (Mechanical Ventilation, Invasive)  Goal: Mechanical Ventilation Liberation  Outcome: Improving   RT PROGRESS NOTE     DATA:     CURRENT SETTINGS: CAPPED/NC             TRACH TYPE / SIZE:  #7 Bivona             MODE:   CAPPED             FIO2:   2 LNC     ACTION:             THERAPIES:   none             SUCTION:                           FREQUENCY:   none                        AMOUNT:                           CONSISTENCY:                           COLOR:                SPONTANEOUS COUGH EFFORT/STRENGTH OF EFFORT (not elicited by suctioning): fair                              WEANING PHASE:   3                        WEAN MODE:    CAPPED/NC                        WEAN TIME:   24/7 as tols                        END WEAN REASON:   ongoing.      RESPONSE:             BS:   clear and diminished              VITAL SIGNS:   Blood pressure 105/66, pulse 81, temperature 97.8  F (36.6  C), temperature source Oral, resp. rate 15, height 1.803 m (5' 11\"), weight 81.5 kg (179 lb 9.6 oz), SpO2 98 %.               EMOTIONAL NEEDS / CONCERNS:                  RISK FOR SELF DECANNULATION:                          RISK DUE TO:                          INTERVENTION/S IN PLACE IS/ARE:         NOTE / PLAN:   continue current orders and monitoring.    "

## 2022-01-08 NOTE — PLAN OF CARE
Pt in bed majority of shift. Attempting to void hourly with urinal placement, bedpan assist. Incontinent once, bladder scan 217. Pt refused lunch, spit any food or fluid out attempted to give him. Verbalizations rambling, non sensical content. Was singing snippets of soundgarden music this morning. Assisted pt up to broda chair in room, wife Bertram here just after he sat up. Pt swinging legs kicking with purpose, attempting to bite when gtube flushed with meds given. Slid down in chair despite pelvic restraint, bilateral wrist restraints and chair belt in use. Wife assisted getting his niece on ipad & she will visit him tomorrow as pt responds favorable to her.  Prn seroquel was given prior to chair transfers. Pt assist of 4 staff to get back to bed where he did calm down some but continues to swear profusely.

## 2022-01-08 NOTE — PLAN OF CARE
"  Problem: Adult Inpatient Plan of Care  Goal: Plan of Care Review  Outcome: Improving  Goal: Patient-Specific Goal (Individualized)  Outcome: Improving  Goal: Absence of Hospital-Acquired Illness or Injury  Intervention: Identify and Manage Fall Risk  Recent Flowsheet Documentation  Taken 1/8/2022 0100 by Ailyn Lim RN  Safety Promotion/Fall Prevention:    activity supervised    lighting adjusted    room door open  Intervention: Prevent Skin Injury  Recent Flowsheet Documentation  Taken 1/8/2022 0433 by Ailyn Lim RN  Body Position:    right    turned  Taken 1/8/2022 0200 by Ailyn Lim RN  Body Position: supine  Taken 1/7/2022 2300 by Ailyn Lim RN  Body Position:    left    position changed independently  Taken 1/7/2022 2106 by Ailyn Lim RN  Body Position: position changed independently  Intervention: Prevent Infection  Recent Flowsheet Documentation  Taken 1/8/2022 0100 by Ailyn Lim RN  Infection Prevention: personal protective equipment utilized     Problem: Restraint for Non-Violent/Non-Self-Destructive Behavior  Goal: Prevent/Manage Potential Problems  Description: Maintain safety of patient and others during period of restraint.  Promote psychological and physical wellbeing.  Prevent injury to skin and involved body parts.  Promote nutrition, hydration, and elimination.  Outcome: No Change     Problem: Dysrhythmia  Goal: Normalized Cardiac Rhythm  Outcome: Improving   Vitals stable. Pt alert and oriented X1. Voided in the urinal at HS. Continued to confused. Slept  From 2100 to 0500 this morning.  Denied pain until this morning when he trying to void. Pt stated\" it hurts' and starting to get restless and agitated and yet unable to void after trying for almost an hour.   PRN Seroquel and Tylenol given at 0630 . Straight cath for 450 ml  per order. Continued to be on restraints for his safety . Pt impulsive   But redirectable. Will continue to monitor.  "

## 2022-01-09 ENCOUNTER — APPOINTMENT (OUTPATIENT)
Dept: OCCUPATIONAL THERAPY | Facility: CLINIC | Age: 47
End: 2022-01-09
Attending: INTERNAL MEDICINE
Payer: COMMERCIAL

## 2022-01-09 LAB
MAGNESIUM SERPL-MCNC: 1.9 MG/DL (ref 1.8–2.6)
POTASSIUM BLD-SCNC: 4.2 MMOL/L (ref 3.5–5)

## 2022-01-09 PROCEDURE — 84132 ASSAY OF SERUM POTASSIUM: CPT | Performed by: INTERNAL MEDICINE

## 2022-01-09 PROCEDURE — 250N000011 HC RX IP 250 OP 636: Performed by: NURSE PRACTITIONER

## 2022-01-09 PROCEDURE — 999N000009 HC STATISTIC AIRWAY CARE

## 2022-01-09 PROCEDURE — 999N000123 HC STATISTIC OXYGEN O2DAILY TECH TIME

## 2022-01-09 PROCEDURE — 999N000157 HC STATISTIC RCP TIME EA 10 MIN

## 2022-01-09 PROCEDURE — 36415 COLL VENOUS BLD VENIPUNCTURE: CPT | Performed by: INTERNAL MEDICINE

## 2022-01-09 PROCEDURE — 250N000013 HC RX MED GY IP 250 OP 250 PS 637: Performed by: INTERNAL MEDICINE

## 2022-01-09 PROCEDURE — 250N000013 HC RX MED GY IP 250 OP 250 PS 637: Performed by: HOSPITALIST

## 2022-01-09 PROCEDURE — 250N000013 HC RX MED GY IP 250 OP 250 PS 637: Performed by: FAMILY MEDICINE

## 2022-01-09 PROCEDURE — 250N000013 HC RX MED GY IP 250 OP 250 PS 637: Performed by: NURSE PRACTITIONER

## 2022-01-09 PROCEDURE — 120N000018 HC R&B RESPIRATORY CARE WITH ATTENDANT

## 2022-01-09 PROCEDURE — 83735 ASSAY OF MAGNESIUM: CPT | Performed by: INTERNAL MEDICINE

## 2022-01-09 PROCEDURE — 97530 THERAPEUTIC ACTIVITIES: CPT | Mod: GO | Performed by: OCCUPATIONAL THERAPIST

## 2022-01-09 PROCEDURE — 99207 PR SC NO CHARGE VISIT/PATIENT NOT SEEN: CPT | Performed by: INTERNAL MEDICINE

## 2022-01-09 RX ADMIN — PHENOBARBITAL 243 MG: 16.2 TABLET ORAL at 23:39

## 2022-01-09 RX ADMIN — ACETAMINOPHEN ORAL SOLUTION 650 MG: 650 SOLUTION ORAL at 23:41

## 2022-01-09 RX ADMIN — CHLORHEXIDINE GLUCONATE 0.12% ORAL RINSE 15 ML: 1.2 LIQUID ORAL at 08:09

## 2022-01-09 RX ADMIN — PHENOBARBITAL 243 MG: 16.2 TABLET ORAL at 06:33

## 2022-01-09 RX ADMIN — BUPRENORPHINE HYDROCHLORIDE 8 MG: 8 TABLET SUBLINGUAL at 08:09

## 2022-01-09 RX ADMIN — TRAZODONE HYDROCHLORIDE 75 MG: 150 TABLET ORAL at 08:13

## 2022-01-09 RX ADMIN — ENOXAPARIN SODIUM 40 MG: 100 INJECTION SUBCUTANEOUS at 08:09

## 2022-01-09 RX ADMIN — ACETAMINOPHEN ORAL SOLUTION 650 MG: 650 SOLUTION ORAL at 07:37

## 2022-01-09 RX ADMIN — QUETIAPINE FUMARATE 50 MG: 50 TABLET ORAL at 00:13

## 2022-01-09 RX ADMIN — QUETIAPINE FUMARATE 50 MG: 50 TABLET ORAL at 07:37

## 2022-01-09 RX ADMIN — GABAPENTIN 900 MG: 250 SOLUTION ORAL at 21:01

## 2022-01-09 RX ADMIN — CHLORHEXIDINE GLUCONATE 0.12% ORAL RINSE 15 ML: 1.2 LIQUID ORAL at 20:29

## 2022-01-09 RX ADMIN — WHITE PETROLATUM: 1.75 OINTMENT TOPICAL at 08:10

## 2022-01-09 RX ADMIN — PHENOBARBITAL 243 MG: 16.2 TABLET ORAL at 01:46

## 2022-01-09 RX ADMIN — NYSTATIN 500000 UNITS: 100000 SUSPENSION ORAL at 17:22

## 2022-01-09 RX ADMIN — PHENOBARBITAL 178.2 MG: 16.2 TABLET ORAL at 17:13

## 2022-01-09 RX ADMIN — SENNOSIDES 10 ML: 8.8 LIQUID ORAL at 08:09

## 2022-01-09 RX ADMIN — TRAZODONE HYDROCHLORIDE 75 MG: 150 TABLET ORAL at 20:34

## 2022-01-09 RX ADMIN — GABAPENTIN 900 MG: 250 SOLUTION ORAL at 05:38

## 2022-01-09 RX ADMIN — NYSTATIN 500000 UNITS: 100000 SUSPENSION ORAL at 08:09

## 2022-01-09 RX ADMIN — SENNOSIDES 10 ML: 8.8 LIQUID ORAL at 20:32

## 2022-01-09 RX ADMIN — ARIPIPRAZOLE 7 MG: 1 SOLUTION ORAL at 08:13

## 2022-01-09 RX ADMIN — GABAPENTIN 900 MG: 250 SOLUTION ORAL at 13:30

## 2022-01-09 RX ADMIN — QUETIAPINE FUMARATE 50 MG: 50 TABLET ORAL at 13:50

## 2022-01-09 RX ADMIN — METOPROLOL TARTRATE 50 MG: 50 TABLET, FILM COATED ORAL at 20:32

## 2022-01-09 RX ADMIN — TRAZODONE HYDROCHLORIDE 75 MG: 150 TABLET ORAL at 13:30

## 2022-01-09 RX ADMIN — ACETAMINOPHEN ORAL SOLUTION 650 MG: 650 SOLUTION ORAL at 13:50

## 2022-01-09 RX ADMIN — QUETIAPINE FUMARATE 100 MG: 50 TABLET ORAL at 20:32

## 2022-01-09 RX ADMIN — NYSTATIN 500000 UNITS: 100000 SUSPENSION ORAL at 13:30

## 2022-01-09 RX ADMIN — NYSTATIN 500000 UNITS: 100000 SUSPENSION ORAL at 20:33

## 2022-01-09 RX ADMIN — BUPRENORPHINE HYDROCHLORIDE 8 MG: 8 TABLET SUBLINGUAL at 17:08

## 2022-01-09 RX ADMIN — METOPROLOL TARTRATE 50 MG: 50 TABLET, FILM COATED ORAL at 08:09

## 2022-01-09 RX ADMIN — QUETIAPINE FUMARATE 50 MG: 50 TABLET ORAL at 23:41

## 2022-01-09 ASSESSMENT — ACTIVITIES OF DAILY LIVING (ADL)
ADLS_ACUITY_SCORE: 19
ADLS_ACUITY_SCORE: 21
ADLS_ACUITY_SCORE: 19
ADLS_ACUITY_SCORE: 21
ADLS_ACUITY_SCORE: 19
ADLS_ACUITY_SCORE: 21
ADLS_ACUITY_SCORE: 21
ADLS_ACUITY_SCORE: 19
ADLS_ACUITY_SCORE: 19
ADLS_ACUITY_SCORE: 21
ADLS_ACUITY_SCORE: 19
ADLS_ACUITY_SCORE: 21
ADLS_ACUITY_SCORE: 19
ADLS_ACUITY_SCORE: 19
ADLS_ACUITY_SCORE: 21
ADLS_ACUITY_SCORE: 19

## 2022-01-09 ASSESSMENT — MIFFLIN-ST. JEOR: SCORE: 1703.17

## 2022-01-09 NOTE — PLAN OF CARE
"  Problem: Adult Inpatient Plan of Care  Goal: Plan of Care Review  Outcome: No Change     Problem: Restraint for Non-Violent/Non-Self-Destructive Behavior  Goal: Prevent/Manage Potential Problems  Description: Maintain safety of patient and others during period of restraint.  Promote psychological and physical wellbeing.  Prevent injury to skin and involved body parts.  Promote nutrition, hydration, and elimination.  Outcome: No Change     Problem: Dysrhythmia  Goal: Normalized Cardiac Rhythm  Outcome: Improving     Pt slept around 4 hours overnight. Alert to self only, confused. Alerted staff to pain \"all over,\" prn acetaminophen given x1 with no more complaints of pain following this. Bladder scanned for 305 mL, pt was offered urinal several times and declined, then accepted but could not void save for a small amount. Straight catheterized for 300 mL.    While awake, pt was very agitated, confused and hallucinating. Hallucinations included people that were not in the room, being convinced that this writer was the pt's daughter, that the pt was not in the hospital and was instead both at home/in shelter, and that the pt was in a trailer or a truck. Pt attempted to climb out of bed frequently during these events, and also had episodes where he repeatedly kicked down very hard on the bedside rails. Staff attempted to pad these rails with pillows but the pt would intentionally aim for the hard areas of the bed. PRN seroquel given once with no effect, pt remained agitated after 1 hour, yelling at staff and trying to get out of bed. Scheduled phenobarbital was then given with effect. Woke up agitated around 0630 and was given the next scheduled dose of phenobarbital. At the time that this note is written, pt remains agitated and is repeatedly yelling for his wife.  "

## 2022-01-09 NOTE — PROGRESS NOTES
[facility]    Medicine Progress Note - Hospitalist Service       Date of Admission:  12/31/2021    Assessment & Plan                    Principal Problem:    Acute on chronic respiratory failure with hypoxia -     Acute respiratory distress syndrome (ARDS) due to COVID-19 pneumonia   -Continue phase 3 vent weaning  - trach changed 1/4/22     Active Problems:  Acute metabolic encephalopathy - Med adjustment per psych and addiction service.  Currently on Abilify, subutex, neurontin, keppra (being tapered off), phenobarbital, seroquel, and trazodone.  Was on very high doses of lorazepam and oxycodone as above which have been stopped.  Slowly improving.  Previous provider discussed with addiction medicine need to hold further Ativan given increasing somnolence yesterday.  Remains on soft restraints.  Still with episodes of confusion, hallucination and agitation.    Mild intermittent dysconjugate gaze - his ex-wife states she has noted this in the past when he is on certain medications like ativan but not if he is off all meds.    Chronic back pain -patient was on Suboxone prior to admission.    - Pain control per addiction medicine service, subutex resumed and titrating up.    -Gabapentin dose increased to 1200mg Q8H    Essential hypertension - Stable, on metoprolol for Sinus tach, closely monitor BP.     Major depressive disorder, recurrent episode, severe - continue abilify, seroquel, trazodone.  Off zyprexa    Opioid type dependence - Mx per addiction medicine service, On phenobarb and resumed subutex. Oxy has been stopped.    Polysubstance abuse as above    ROSSY (obstructive sleep apnea)- vented/trached for now    Mild intermittent asthma - can add nebs as needed    Recent Ventilator associated pneumonia - Completed CTX on 1/3/22.    Thrush - On nystatin    Dysphagia - speech therapy consult, on tube feeds, starting modified diet today    Severe Malnutrition, POA: based on nutrition assessment     Sinus tachycardia  -   Treatment of underlying causes  -  Pt has been in initiated on metoprolol tartrate           Diet: Adult Formula Drip Feeding: Continuous Promote w fiber; Gastrostomy; Goal Rate: 150 ml/hr x 2 hrs after meals if eats <50% of meal; mL/hr; Medication - Feeding Tube Flush Frequency: At least 15-30 mL water before and after medication administratio...  Minced & Moist Diet (level 5) Mildly Thick (level 2)    DVT Prophylaxis: Heparin SQ  Wei Catheter: Not present  Central Lines: None  Code Status: Full Code      Disposition Plan   Expected Discharge: 01/20/2022     Anticipated discharge location: home with family    Delays:     Complex Care  1:1 Sitter            The patient's care was discussed with the patient and nursing staff    Shanell Lane MD  Hospitalist Service  [facility]  Securely message with the Vocera Web Console (learn more here)  Text page via [employer] Paging/Directory        Clinically Significant Risk Factors Present on Admission                    ______________________________________________________________________    Interval History   Seen and examined at bedside.  Chart reviewed and events noted.  Remains with episodes of agitation, confusion and hallucinations.  No reported fevers or chills, no shortness of breath.  No other acute concerns.      Data reviewed today: I reviewed all medications, new labs and imaging results over the last 24 hours.   Physical Exam   Vital Signs: Temp: 98.2  F (36.8  C) Temp src: Oral BP: 115/85 Pulse: 87   Resp: 22 SpO2: 94 % O2 Device: Nasal cannula Oxygen Delivery: 1 LPM  Weight: 177 lbs 11.2 oz     General: Awake, alert, appears restless  HEENT: EOMI with occas dysconjugate gaze - right eye wanders a bit, AT, NC  Cardiac: Tachycardic  Pulmonary: Clear to auscultation in bilateral lung fields  Abdomen: Soft, non tender, Not distended.  Not tender to palpation.  Musculoskeletal: No joint abnormalities noted  Skin: Normally turgid, no rashes noted  Neurologic: lethargic,  arouses but oriented to self only moving both LE, b/l UE in restraints.  Psychiatric: Appears slightly restless    Data

## 2022-01-09 NOTE — PLAN OF CARE
"  Problem: Inability to Wean (Mechanical Ventilation, Invasive)  Goal: Mechanical Ventilation Liberation  Outcome: Improving   RT PROGRESS NOTE     DATA:     CURRENT SETTINGS: CAPPED/NC             TRACH TYPE / SIZE:  #7 Bivona             MODE:   CAPPED             FIO2:   2 LNC     ACTION:             THERAPIES:   none             SUCTION:                           FREQUENCY:   none                        AMOUNT:                           CONSISTENCY:                           COLOR:                SPONTANEOUS COUGH EFFORT/STRENGTH OF EFFORT (not elicited by suctioning): fair                              WEANING PHASE:   3                        WEAN MODE:    CAPPED/NC                        WEAN TIME:   24/7 as tols                        END WEAN REASON:   ongoing.      RESPONSE:             BS:   clear and diminished              VITAL SIGNS:   Blood pressure 115/85, pulse 81, temperature 98.2  F (36.8  C), temperature source Oral, resp. rate 20, height 1.803 m (5' 11\"), weight 81.5 kg (179 lb 9.6 oz), SpO2 98 %.               EMOTIONAL NEEDS / yes CONCERNS: confuseion                  RISK FOR SELF DECANNULATION:  Yes; one in one                         RISK DUE TO:                          INTERVENTION/S IN PLACE IS/ARE:         NOTE / PLAN:   Pt. Is confuse and agitated. Pt. Is one to one and soft restrains. Continue current orders and monitoring.    "

## 2022-01-09 NOTE — PROGRESS NOTES
Addiction Medicine    Chart reviewed.   Patient has episodes of agitation and confusion with hallucinations and attempts to climb out of bed and pull out tubes, requiring soft restraints on UE.  Does have some periods of lucidity and calms down with phenobarbital,      Currently on :  Abilify 7 mg po daily  Quetiapine 100 mg q hs  Buprenorphine 8 mg bid ( for chronic pain, opioid use)  Phenobarbital 243 mg tid and 145.8 mg once today (for BDZ withdrawal)  Gabapentin 900 mg tid (reduced due to sedation)  Lovenox 40 mg q 24 h  Metoprolol 50 mg bid  Senokot   Trazodone 75 mg tid  Mycostatin    Assessment:    1.  Confusion, Acute encephalopathy in acutely ill patient.  -    DDx includes: Infection, hypoxia (no evidence for either);      Medication induced (BDZ withdrawal, serotonin syndrome)                                      Anatomic ( less likely since not focal, but does have disconjugate gaze)     Pain ?        Check for mydriasis, hyperreflexia, repeat CDC with diff,   Hold taper of phenobarb for today.               Consider CT head    2.  ARDS secondary to COVID - improved, off ventilator and maintaining oxygenation.      3.  Opioid use disorder - continue on buprenorphine at 8 mg bid - this should keep patient from having any withdrawal but not be too sedating.       Will continue to follow with you.      Maria Del Carmen Dangelo MD  Addiction Medicine Service  [facility]   Page me (click here for Hannah Dangelo)

## 2022-01-09 NOTE — PLAN OF CARE
Problem: Communication Impairment (Artificial Airway)  Goal: Effective Communication  Outcome: Improving     Problem: Device-Related Complication Risk (Artificial Airway)  Goal: Optimal Device Function  Outcome: Improving     Problem: Skin and Tissue Injury (Artificial Airway)  Goal: Absence of Device-Related Skin or Tissue Injury  Outcome: Improving   RT PROGRESS NOTE     DATA:     CURRENT SETTINGS:             TRACH TYPE / SIZE:  #7 bivona, placed 01/04             MODE:  CAP/1L             FIO2:        ACTION:             THERAPIES:               SUCTION:                           FREQUENCY:   X1                        AMOUNT:   SMALL                        CONSISTENCY:   thin                        COLOR:   PALE/YELLOW             SPONTANEOUS COUGH EFFORT/STRENGTH OF EFFORT (not elicited by suctioning):                              WEANING PHASE:  3                        WEAN MODE:    CAP/1L                        WEAN TIME:   comt                        END WEAN REASON:   cont     RESPONSE:             BS:   coarse             VITAL SIGNS:   SPO2 95-97%, RR 20-26             EMOTIONAL NEEDS / CONCERNS:                RISK FOR SELF DECANNULATION:  YES                        RISK DUE TO:  CONFUSE                        INTERVENTION/S IN PLACE IS/ARE:  RESTRAINED      NOTE / PLAN:     Cont current POC

## 2022-01-09 NOTE — PLAN OF CARE
Problem: Restraint for Non-Violent/Non-Self-Destructive Behavior  Goal: Prevent/Manage Potential Problems  Description: Maintain safety of patient and others during period of restraint.  Promote psychological and physical wellbeing.  Prevent injury to skin and involved body parts.  Promote nutrition, hydration, and elimination.  Outcome: No Change   Patient was restless at the beginning of the shift and was hitting siderails with his legs. Seroquel and Tylenol given. Slept for 3 hours after; was not cooperating with OT because of drowsiness. But when patient was sitting up in his wheelchair with niece , Delphine visiting, patient was awake enough to talk with family on the ipad, sing his favorite songs with niece and took a cup of nectar thickened apple juice and ice chips.   Patient did not eat breakfast and ate only 4 bites of food for lunch. Bolus tubefeeding given twice.  Patient became very agitated when niece left and was so disoriented he attempted to climb out of bed and was kicking staff. He had also attempted to void several times but was not able to do so. The discomfort must have contributed to the restlessness and agitation. Bladder was scanned for 330; straight catheterization was done by SWAT nurse, Sofia Cochran, with 300 ml of dark yellow urine with sediments. Patient slept after.  Patient was again agitated and kicking staff; uncooperative with cares after his niece left.  Seroquel and Tylenol given with little effect.   Ate 50% of supper.

## 2022-01-10 ENCOUNTER — APPOINTMENT (OUTPATIENT)
Dept: SPEECH THERAPY | Facility: CLINIC | Age: 47
End: 2022-01-10
Attending: INTERNAL MEDICINE
Payer: COMMERCIAL

## 2022-01-10 ENCOUNTER — APPOINTMENT (OUTPATIENT)
Dept: OCCUPATIONAL THERAPY | Facility: CLINIC | Age: 47
End: 2022-01-10
Attending: INTERNAL MEDICINE
Payer: COMMERCIAL

## 2022-01-10 ENCOUNTER — APPOINTMENT (OUTPATIENT)
Dept: PHYSICAL THERAPY | Facility: CLINIC | Age: 47
End: 2022-01-10
Attending: INTERNAL MEDICINE
Payer: COMMERCIAL

## 2022-01-10 LAB
CREAT SERPL-MCNC: 0.59 MG/DL (ref 0.7–1.3)
GFR SERPL CREATININE-BSD FRML MDRD: >90 ML/MIN/1.73M2
MAGNESIUM SERPL-MCNC: 1.8 MG/DL (ref 1.8–2.6)
PLATELET # BLD AUTO: 203 10E3/UL (ref 150–450)
POTASSIUM BLD-SCNC: 3.8 MMOL/L (ref 3.5–5)

## 2022-01-10 PROCEDURE — 250N000013 HC RX MED GY IP 250 OP 250 PS 637: Performed by: INTERNAL MEDICINE

## 2022-01-10 PROCEDURE — 97530 THERAPEUTIC ACTIVITIES: CPT | Mod: GO | Performed by: OCCUPATIONAL THERAPIST

## 2022-01-10 PROCEDURE — 99232 SBSQ HOSP IP/OBS MODERATE 35: CPT | Performed by: INTERNAL MEDICINE

## 2022-01-10 PROCEDURE — 97535 SELF CARE MNGMENT TRAINING: CPT | Mod: GO | Performed by: OCCUPATIONAL THERAPIST

## 2022-01-10 PROCEDURE — 92526 ORAL FUNCTION THERAPY: CPT | Mod: GN

## 2022-01-10 PROCEDURE — 250N000013 HC RX MED GY IP 250 OP 250 PS 637: Performed by: FAMILY MEDICINE

## 2022-01-10 PROCEDURE — 84132 ASSAY OF SERUM POTASSIUM: CPT | Performed by: INTERNAL MEDICINE

## 2022-01-10 PROCEDURE — 999N000157 HC STATISTIC RCP TIME EA 10 MIN

## 2022-01-10 PROCEDURE — 85049 AUTOMATED PLATELET COUNT: CPT | Performed by: INTERNAL MEDICINE

## 2022-01-10 PROCEDURE — 82565 ASSAY OF CREATININE: CPT | Performed by: INTERNAL MEDICINE

## 2022-01-10 PROCEDURE — 99233 SBSQ HOSP IP/OBS HIGH 50: CPT | Performed by: INTERNAL MEDICINE

## 2022-01-10 PROCEDURE — 97110 THERAPEUTIC EXERCISES: CPT | Mod: GP

## 2022-01-10 PROCEDURE — 83735 ASSAY OF MAGNESIUM: CPT | Performed by: INTERNAL MEDICINE

## 2022-01-10 PROCEDURE — 92507 TX SP LANG VOICE COMM INDIV: CPT | Mod: GN

## 2022-01-10 PROCEDURE — 120N000018 HC R&B RESPIRATORY CARE WITH ATTENDANT

## 2022-01-10 PROCEDURE — 36415 COLL VENOUS BLD VENIPUNCTURE: CPT | Performed by: INTERNAL MEDICINE

## 2022-01-10 PROCEDURE — 250N000013 HC RX MED GY IP 250 OP 250 PS 637: Performed by: HOSPITALIST

## 2022-01-10 PROCEDURE — 250N000013 HC RX MED GY IP 250 OP 250 PS 637: Performed by: NURSE PRACTITIONER

## 2022-01-10 PROCEDURE — 999N000123 HC STATISTIC OXYGEN O2DAILY TECH TIME

## 2022-01-10 PROCEDURE — 999N000009 HC STATISTIC AIRWAY CARE

## 2022-01-10 PROCEDURE — 97116 GAIT TRAINING THERAPY: CPT | Mod: GP

## 2022-01-10 PROCEDURE — 250N000011 HC RX IP 250 OP 636: Performed by: NURSE PRACTITIONER

## 2022-01-10 RX ORDER — POLYETHYLENE GLYCOL 3350 17 G/17G
17 POWDER, FOR SOLUTION ORAL DAILY
Status: DISCONTINUED | OUTPATIENT
Start: 2022-01-10 | End: 2022-01-24 | Stop reason: HOSPADM

## 2022-01-10 RX ORDER — BISACODYL 10 MG
10 SUPPOSITORY, RECTAL RECTAL ONCE
Status: COMPLETED | OUTPATIENT
Start: 2022-01-10 | End: 2022-01-10

## 2022-01-10 RX ADMIN — ARIPIPRAZOLE 7 MG: 1 SOLUTION ORAL at 08:31

## 2022-01-10 RX ADMIN — METOPROLOL TARTRATE 50 MG: 50 TABLET, FILM COATED ORAL at 08:30

## 2022-01-10 RX ADMIN — QUETIAPINE FUMARATE 100 MG: 50 TABLET ORAL at 22:12

## 2022-01-10 RX ADMIN — BUPRENORPHINE HYDROCHLORIDE 8 MG: 8 TABLET SUBLINGUAL at 08:30

## 2022-01-10 RX ADMIN — TRAZODONE HYDROCHLORIDE 75 MG: 150 TABLET ORAL at 22:26

## 2022-01-10 RX ADMIN — WHITE PETROLATUM: 1.75 OINTMENT TOPICAL at 08:45

## 2022-01-10 RX ADMIN — METOPROLOL TARTRATE 50 MG: 50 TABLET, FILM COATED ORAL at 22:12

## 2022-01-10 RX ADMIN — TRAZODONE HYDROCHLORIDE 75 MG: 150 TABLET ORAL at 15:07

## 2022-01-10 RX ADMIN — NYSTATIN 500000 UNITS: 100000 SUSPENSION ORAL at 17:46

## 2022-01-10 RX ADMIN — BUPRENORPHINE HYDROCHLORIDE 8 MG: 8 TABLET SUBLINGUAL at 17:45

## 2022-01-10 RX ADMIN — PHENOBARBITAL 243 MG: 16.2 TABLET ORAL at 12:58

## 2022-01-10 RX ADMIN — GABAPENTIN 900 MG: 250 SOLUTION ORAL at 22:25

## 2022-01-10 RX ADMIN — CHLORHEXIDINE GLUCONATE 0.12% ORAL RINSE 15 ML: 1.2 LIQUID ORAL at 22:11

## 2022-01-10 RX ADMIN — GABAPENTIN 900 MG: 250 SOLUTION ORAL at 05:25

## 2022-01-10 RX ADMIN — CHLORHEXIDINE GLUCONATE 0.12% ORAL RINSE 15 ML: 1.2 LIQUID ORAL at 08:44

## 2022-01-10 RX ADMIN — ENOXAPARIN SODIUM 40 MG: 100 INJECTION SUBCUTANEOUS at 08:31

## 2022-01-10 RX ADMIN — PHENOBARBITAL 243 MG: 16.2 TABLET ORAL at 05:25

## 2022-01-10 RX ADMIN — TRAZODONE HYDROCHLORIDE 75 MG: 150 TABLET ORAL at 08:26

## 2022-01-10 RX ADMIN — BISACODYL 10 MG: 10 SUPPOSITORY RECTAL at 11:43

## 2022-01-10 RX ADMIN — GABAPENTIN 900 MG: 250 SOLUTION ORAL at 15:07

## 2022-01-10 RX ADMIN — SENNOSIDES 10 ML: 8.8 LIQUID ORAL at 08:30

## 2022-01-10 RX ADMIN — POLYETHYLENE GLYCOL 3350 17 G: 17 POWDER, FOR SOLUTION ORAL at 10:38

## 2022-01-10 RX ADMIN — SENNOSIDES 10 ML: 8.8 LIQUID ORAL at 22:11

## 2022-01-10 RX ADMIN — NYSTATIN 500000 UNITS: 100000 SUSPENSION ORAL at 08:45

## 2022-01-10 RX ADMIN — PHENOBARBITAL 113.4 MG: 16.2 TABLET ORAL at 17:58

## 2022-01-10 ASSESSMENT — ACTIVITIES OF DAILY LIVING (ADL)
ADLS_ACUITY_SCORE: 21
ADLS_ACUITY_SCORE: 19
ADLS_ACUITY_SCORE: 21
ADLS_ACUITY_SCORE: 19
ADLS_ACUITY_SCORE: 21
ADLS_ACUITY_SCORE: 19
ADLS_ACUITY_SCORE: 21

## 2022-01-10 ASSESSMENT — MIFFLIN-ST. JEOR: SCORE: 1706.35

## 2022-01-10 NOTE — PROGRESS NOTES
"Care Management Initial Consult       Concerns to be Addressed:   Status of tracheostomy tube (currently in phase 3, capped on room air).  In restraints (soft limb) and on 1:1 due to impulsivity.   Patient plan of care discussed at interdisciplinary rounds: Yes  Follow up from rounds/notes:   Code green called on 1/9 due to patient kicking staff. MD to continue working on medication management. PT recommends considering a net bed for patient. (Addendum: per pulmonology note, \"Would monitor for now and consider decannulation later in the week pending clinical course.\"    Anticipated Discharge Disposition:  Likely will need extensive therapy and rehab.     Anticipated Discharge Services:  To be determined by destination, patient/family preferences, medical needs and mobility status closer to the time of discharge.  Anticipated Discharge DME:  To be determined.     Patient/family educated on Medicare website which has current facility and service quality ratings:  NA at this time.   Education Provided on the Discharge Plan:  Per team.  Patient/Family in Agreement with the Plan:  Yes    Referrals Placed by CM/SW:  None  Private pay costs discussed: Not applicable       Additional Information:  Trach and PEG placed on 12/17/2021, transferred to [facility] on 12/31/2021. Initial assessment was completed with patient's ex-wife Jama by telephone. Patient lives with his ex-spouse, Jama, part of the time (but had also been spending time in [facility] facility). She reported that he is normally independent at baseline. Jama hopes that she would be able to provide care in her home post discharge (pending patient's needs) but she understands that patient will likely need extensive rehab and therapy first. CM to keep Jama updated once closer to discharge and better idea of plan is known. A care progression meeting was held on 1/7/22 (see note).    Transport mode and destination yet to be determined. Care manager to " follow.     Of note, patient has not been vaccinated for Covid-19 (no record on file) but is now designated as 'covid recovered'.    Marilee Farias RN

## 2022-01-10 NOTE — PLAN OF CARE
Problem: Inability to Wean (Mechanical Ventilation, Invasive)  Goal: Mechanical Ventilation Liberation  Outcome: Improving     Problem: Skin and Tissue Injury (Mechanical Ventilation, Invasive)  Goal: Absence of Device-Related Skin and Tissue Injury  Outcome: Improving     Problem: Ventilator-Induced Lung Injury (Mechanical Ventilation, Invasive)  Goal: Absence of Ventilator-Induced Lung Injury  Outcome: Improving     Problem: Restraint for Non-Violent/Non-Self-Destructive Behavior  Goal: Prevent/Manage Potential Problems  Description: Maintain safety of patient and others during period of restraint.  Promote psychological and physical wellbeing.  Prevent injury to skin and involved body parts.  Promote nutrition, hydration, and elimination.  Outcome: Improving   RT PROGRESS NOTE     DATA:     CURRENT SETTINGS:              TRACH TYPE / SIZE:#7 BIVONA TTS CHANGED ON 1/4/22             MODE: Trach  Capped                FIO2:  RA to 2LNC     ACTION:             THERAPIES:               SUCTION:                           FREQUENCY: none                        AMOUNT:                        CONSISTENCY:                        COLOR:               SPONTANEOUS COUGH EFFORT/STRENGTH OF EFFORT (not elicited by suctioning): Strong                              WEANING PHASE:3                        WEAN MODE:Trach Capped on RA  and 2LNC                        WEAN TIME: Since 1/7/22                        END WEAN REASON:                   RESPONSE:             BS: Clear             VITAL SIGNS:Sat 96-98%, HR  , RR 22             EMOTIONAL NEEDS / CONCERNS: Confused                RISK FOR SELF DECANNULATION: yes                        RISK DUE TO: confused                         INTERVENTION/S IN PLACE IS/ARE:4Point restraints + 1:1 staff supervision         NOTE / PLAN:  Cont. Current plan of care / Patient desaturates when  asleep-using  2LNC

## 2022-01-10 NOTE — PROVIDER NOTIFICATION
Pt lightly sedated. Text page sent to Dr. Lane asking if he wants the phenobarbital held.    Michelle Rivera, 1/10/2022

## 2022-01-10 NOTE — PLAN OF CARE
Problem: Inability to Wean (Mechanical Ventilation, Invasive)  Goal: Mechanical Ventilation Liberation  Outcome: Improving     Problem: Skin and Tissue Injury (Mechanical Ventilation, Invasive)  Goal: Absence of Device-Related Skin and Tissue Injury  Outcome: Improving     Problem: Ventilator-Induced Lung Injury (Mechanical Ventilation, Invasive)  Goal: Absence of Ventilator-Induced Lung Injury  Outcome: Improving   RT PROGRESS NOTE     DATA:     CURRENT SETTINGS:              TRACH TYPE / SIZE: 7 Bivona 1/04/22             MODE:   Cap              FIO2:   21%     ACTION:             THERAPIES:                SUCTION:                           FREQUENCY:   1                        AMOUNT:   Scant                        CONSISTENCY: Thin                         COLOR:   White              SPONTANEOUS COUGH EFFORT/STRENGTH OF EFFORT (not elicited by suctioning): Yes/strong                              WEANING PHASE:   3                        WEAN MODE:  Cap                        WEAN TIME:   As tolerated                         END WEAN REASON:      RESPONSE:             BS:  Clear              VITAL SIGNS: Sat 94-97%, -124, RR 20-26              EMOTIONAL NEEDS / CONCERNS:                  RISK FOR SELF DECANNULATION:  Yes                         RISK DUE TO:  Confusion                        INTERVENTION/S IN PLACE IS/ARE:  4.0 bilateral restraints in place     NOTE / PLAN:   Cont with plan.

## 2022-01-10 NOTE — PLAN OF CARE
Problem: Adult Inpatient Plan of Care  Goal: Absence of Hospital-Acquired Illness or Injury  Outcome: Improving  Intervention: Identify and Manage Fall Risk  Recent Flowsheet Documentation  Taken 1/10/2022 0014 by Marshall Akhtar RN  Safety Promotion/Fall Prevention:   activity supervised   assistive device/personal items within reach   bed alarm on   bedside attendant   nonskid shoes/slippers when out of bed   room door open    Pt has been awake most of the time.  Pt only slept for about one and half hours.  Pt has been  very restless and agitated throughout the night.  PRN Seroquel and Tylenol were given with minimal effect.  Pt attempted to get out of bed several times.  Non-violent 4 points restraint, and one on one staff are in place for pt's safety.  Vitals were stable.

## 2022-01-10 NOTE — PROGRESS NOTES
[facility]    Medicine Progress Note - Hospitalist Service       Date of Admission:  12/31/2021    Background Summary:  Jared North is a 46 year old male unvaccinated against COVID-19, with history of polysubstance abuse and chronic pain/lower back pain on Suboxone prior to admission (had been on methadone in the past but not in recent past), mood disorder/depression, HTN, ROSSY, mild intermittent asthma, knee pain, and diagnosis of COVID-19 viral infection on 11/19/2021 who originally presented to [facility] on 11/24/2021 with worsening shortness of breath, admitted to the ICU with COVID-19 viral pneumonia and severe acute respiratory failure with hypoxia requiring noninvasive ventilation.  He was started on Decadron, remdesivir, and tocilizumab and followed for several days alternating between NIPPV and HFNC.  He had an episode of afib during this time that eventually resolved and has not recurred after briefly being treated with amiodarone.  He did not improve and was eventually intubated on 11/28/21 and then proned and started on veletri.  11/30/21 he was started on cefepime and vanco for VAP - Cx grew E. Coli and Proteus mirabilis.  He did develop hypotension possibly from septic shock vs sedation vasoplegia and was treated with norepi intemittently.  Vanco was stopped on 12/5/21.  He was then transferred to [place] ICU on 12/5/21.       At [place] he was followed in ICU and continued on vent/paralysis/proning/pressors.  Cefepime switched to rocephin 12/6/21.  He continued to be very encephalopathic and psychiatry was consulted. He was weaned off pressors and didn't require proning by 12/10/21.  He has been on large doses of anti-psychotics and sedatives.  He developed fevers again 12/14 but had clinically improved somewhat and was extubated on 12/15/21. On 12/16/21 he had severely increased work of breathing and so was re-intubated. Blood Cx grew Streptococcus constellatus and sputum Cx grew that as  well but also had E. Coli/Proteus m./staph epi/candida parapsilosis. Vanco/Zosyn was started on 12/16/21.  He then underwent trach/PEG on 12/17/21.  Vanco was stopped after 3 days and Zosyn switched to rocephin on 12/21/21 to complete on 1/2/21.       During this time he had significant issues with encephalopathy and agitation. As above he was started on multiple meds for this.  Over the last week prior to admission here at [facility] they had increased his lorazepam up to 12 mg every 4 hours and oxycodone 20 mg every 4 hours along with abilify 7 mg (home med), gabapentin 800 mg every 8 hours, keppra 500 mg twice daily (for behavior), zyprexa 5 mg twice daily, and seroquel 75 mg TID with 100 mg HS.  With this regimen he improved reportedly in the ICU but was still awake and agitated.  He was transferred here to [facility] on 12/31/21.    Assessment & Plan            Principal Problem:    Acute on chronic respiratory failure with hypoxia -     Acute respiratory distress syndrome (ARDS) due to COVID-19 pneumonia   -Continue phase 3 vent weaning  - trach changed 1/4/22     Active Problems:  Acute metabolic encephalopathy - Med adjustment per psych and addiction service.  Currently on Abilify, subutex, neurontin, keppra (being tapered off), phenobarbital, seroquel, and trazodone.  Was on very high doses of lorazepam and oxycodone as above which have been stopped.  Slowly improving.  Discussed with addiction medicine would be ideal to hold further Ativan given increasing somnolence  Remains on soft restraints.  Still with episodes of confusion, hallucination and agitation.    Mild intermittent dysconjugate gaze - his ex-wife states she has noted this in the past when he is on certain medications like ativan but not if he is off all meds.    Chronic back pain -patient was on Suboxone prior to admission.    - Pain control per addiction medicine service, subutex resumed and titrating up.    -Gabapentin dose increased to 1200mg  Q8H    Essential hypertension - Stable, on metoprolol for Sinus tach, closely monitor BP.     Major depressive disorder, recurrent episode, severe - continue abilify, seroquel, trazodone.  Off zyprexa    Opioid type dependence - Mx per addiction medicine service, On phenobarb and resumed subutex. Oxy has been stopped.    Polysubstance abuse as above    ROSSY (obstructive sleep apnea)- vented/trached for now    Mild intermittent asthma - can add nebs as needed    Recent Ventilator associated pneumonia - Completed CTX on 1/3/22.    Thrush - On nystatin    Dysphagia - speech therapy consult, on tube feeds, starting modified diet today    Severe Malnutrition, POA: based on nutrition assessment     Sinus tachycardia  -  Treatment of underlying causes  -  Pt has been in initiated on metoprolol tartrate           Diet: Adult Formula Drip Feeding: Continuous Promote w fiber; Gastrostomy; Goal Rate: 150 ml/hr x 2 hrs after meals if eats <50% of meal; mL/hr; Medication - Feeding Tube Flush Frequency: At least 15-30 mL water before and after medication administratio...  Minced & Moist Diet (level 5) Mildly Thick (level 2)    DVT Prophylaxis: Heparin SQ  Wei Catheter: Not present  Central Lines: None  Code Status: Full Code      Disposition Plan   Expected Discharge: 01/25/2022     Anticipated discharge location: home with family    Delays:     Complex Care  1:1 Sitter            The patient's care was discussed with the patient and nursing staff and ex-wife    Adam Soliman MD  Hospitalist Service  [facility]  Securely message with the [employer] Web Console (learn more here)  Text page via [employer] Paging/Directory        Clinically Significant Risk Factors Present on Admission                    ______________________________________________________________________    Interval History   Seen and examined at bedside.  Chart reviewed and events noted.  Remains with episodes of agitation, confusion and hallucinations.  No reported fevers  or chills, no shortness of breath.  No other acute concerns.      Data reviewed today: I reviewed all medications, new labs and imaging results over the last 24 hours.   All medication issues identified within the last24 hours have been addressed and completed as appropriate.    Physical Exam   Vital Signs: Temp: 98.7  F (37.1  C) Temp src: Axillary BP: 131/85 Pulse: 85   Resp: 20 SpO2: 100 % O2 Device: Nasal cannula Oxygen Delivery: 2 LPM  Weight: 178 lbs 6.4 oz     General: Awake, alert, appears restless  HEENT: EOMI with occas dysconjugate gaze - right eye wanders a bit, AT, NC  Cardiac: Tachycardic  Pulmonary: Clear to auscultation in bilateral lung fields  Abdomen: Soft, non tender, Not distended.  Not tender to palpation.  Musculoskeletal: No joint abnormalities noted  Skin: Normally turgid, no rashes noted  Neurologic: lethargic, arouses but oriented to self only, moving both LE, b/l UE in restraints.  Psychiatric: Appears slightly restless        Data   Recent Labs   Lab 01/10/22  0612 01/09/22  0728 01/08/22  0814 01/07/22  0622 01/06/22  1256 01/06/22  0814     --   --   --   --   --    NA  --   --   --   --   --  140   POTASSIUM 3.8 4.2 4.0   < >  --  3.8  3.8   CHLORIDE  --   --   --   --   --  106   CO2  --   --   --   --   --  22   BUN  --   --   --   --   --  12   CR 0.59*  --   --   --  0.63* 0.63*   ANIONGAP  --   --   --   --   --  12   TRUONG  --   --   --   --   --  9.8   GLC  --   --   --   --   --  111    < > = values in this interval not displayed.

## 2022-01-10 NOTE — PLAN OF CARE
"Pt has poor appetite.  Stating he does not like the food he is offered.  Maybe ate a bit shy of 25% of breakfast, and only a few bites of his lunch. Back up bolus given per order.    Pt fell asleep around 0900.  Attempting to wake at about 1200 to do cares and to help pt stay on a sleep schedule where he is awake during the day and sleeping during the night.  Pt difficult to get fully awake and interacting initially, but did suddenly wake up fully around 1230.  Pt awake until after PT and OT session and then fell asleep in the chair at approximately 1415.    Problem: Dysrhythmia  Goal: Normalized Cardiac Rhythm  Outcome: No Change   Pt was tachy to the low 100s this morning. Tachycardia resolved after the Metoprolol was given.    Problem: Violence Risk or Actual  Goal: Anger and Impulse Control  Outcome: No Change   Pt fluctuates between being lethargic and being agitated most of the shift.  Pt frequently verbally abusive and calling writer \"bitch\" and saying, \"you don't know shit\".  Remains in four point soft restraints. No evidence of injury.  Significant other, Jama, concerned about restraints causing further delirium and that \"he just needs to move. Get up and walk. Maybe just even stand up. A big sarah would help\".  Explained to Jama that we have to be safe and can't be removing restraints and trying to stand him if he is so agitated he is kicking/punching at/attempting to bite staff.  Writer also trying to reinforce pt and staff safety after her comment of \"maybe we could just lay him on the ground so he could do some push ups or something to help burn energy\". Jama may need further reinforcement of patient and staff safety, as well as realistic goals and pt abilities at this time.  Jama is very motivated to help pt and help him heal.    Problem: Inability to Wean (Mechanical Ventilation, Invasive)  Goal: Mechanical Ventilation Liberation  Intervention: Promote Extubation and Mechanical Ventilation " Liberation  Recent Flowsheet Documentation  Taken 1/10/2022 0858 by Michelle Persaud, RN  Medication Review/Management:   high-risk medications identified   medications reviewed   Trach capped on room air when awake. Pt's oxygen sats do drop to mid to high 80s when sleeping. 2 LPM nasal cannula applied while sleeping and then oxygen maintains in the 90s.    Michelle Rivera RN 1/10/2022

## 2022-01-10 NOTE — PROGRESS NOTES
Pulmonary/CC  Medicine - Ventilator Management :    46 year old male, unvaccinated against COVID-19, with history of polysubstance abuse and chronic pain/lower back pain on suboxone, mood disorder/depression, HTN, ROSSY, mild intermittent asthma, presented with respiratory failure/COVID ARDS intubated on 11/28. Initially requiring proning/paralysis, veletri, now down trending vent needs, issues with agitation, treated for VAP. Not tolerating weaning trials, failed extubation 12/15, re-intubated, S/p trach and PEG tube 12/17.    Diagnosis:   Patient Active Problem List   Diagnosis     Chronic back pain     Essential hypertension     Elevated LFTs     Acute on chronic respiratory failure with hypoxia (H)     Pneumonia due to 2019 novel coronavirus     Atrial fibrillation with rapid ventricular response (H)     Acute respiratory distress syndrome (ARDS) due to COVID-19 virus (H)     Major depressive disorder, recurrent episode, severe (H)     Opioid use disorder, severe, dependence (H)     Polysubstance abuse (H)     ROSSY (obstructive sleep apnea)     Mild intermittent asthma     Acute metabolic encephalopathy     Ventilator associated pneumonia (H)     Oropharyngeal dysphagia       Chief complaint: Ongoing respiratory failure    Objective:  Significant change in clinical status during past 24 hours? - No    Tracheal secretions: x1, thin    Capped since 1/7, currently on 3 LPM O2  Ventilator weaning results from yesterday: off vent since 1/4      Subjective:  Restful, unable to obtain    Physical exam:   General: presently in bed sleeping   HEENMT: #7 bivona in place; site clean.   Lungs: Diminished, clear  Heart: RRR, S1 and S2            Abdomen: Soft, non-tender.   Skin: skin color normal, texture normal, no rashes or lesions.   Extremities:  No edema noted.   Neuro:  PURDY interactive when not sleeping    LABS:  Reviewed    IMAGING/STUDIES:    1/7/21 VFSS:  1.  Adequate oral phase.  2.  Delay in initiation of swallow  reflex with pour over to the pyriform sinuses during swallowing of thin liquid.  3.  Complete epiglottic inversion.  4.  Aspiration of thin liquid.  5.  No significant stasis.      Recommendations/Plans:  1. Respiratory failure:    Capped since 1/7    Obtain am VBG tomorrow. Some prior mild CO2 retention on gases     Chart history ROSSY, no AHI reported. Requiring O2 at night    Ongoing taper/adjustment of buprenorphine and phenobarb for withdrawal, with alternating agitation and encephalopathy, concern for dysphagia as below. Would monitor for now and consider decannulation later in the week pending clinical course.    2. Tracheostomy:    1/4 changed to  # 7 Bivona     Routine care and changes    3. SLP:  Patient presented with mild oral pharyngeal dysphagia functionally. However patient's distractibility and overall cognitive dysfunction places patient at significantly high risk for aspiration due to inattention of bolus.  Risk is particularly high with thin liquids and more advanced food textures that require chewing prior to swallow.       No Lloyd MD  Pulmonary and Critical Care Medicine  [facility]  Office: [phone]

## 2022-01-11 ENCOUNTER — APPOINTMENT (OUTPATIENT)
Dept: PHYSICAL THERAPY | Facility: CLINIC | Age: 47
End: 2022-01-11
Attending: INTERNAL MEDICINE
Payer: COMMERCIAL

## 2022-01-11 ENCOUNTER — APPOINTMENT (OUTPATIENT)
Dept: OCCUPATIONAL THERAPY | Facility: CLINIC | Age: 47
End: 2022-01-11
Attending: INTERNAL MEDICINE
Payer: COMMERCIAL

## 2022-01-11 LAB
BASE EXCESS BLDV CALC-SCNC: 5.8 MMOL/L
HCO3 BLDV-SCNC: 29 MMOL/L (ref 24–30)
MAGNESIUM SERPL-MCNC: 1.9 MG/DL (ref 1.8–2.6)
OXYHGB MFR BLDV: 81.8 % (ref 70–75)
PCO2 BLDV: 44 MM HG (ref 35–50)
PH BLDV: 7.44 [PH] (ref 7.35–7.45)
PO2 BLDV: 46 MM HG (ref 25–47)
POTASSIUM BLD-SCNC: 3.9 MMOL/L (ref 3.5–5)
SAO2 % BLDV: 83.5 % (ref 70–75)

## 2022-01-11 PROCEDURE — 84132 ASSAY OF SERUM POTASSIUM: CPT | Performed by: INTERNAL MEDICINE

## 2022-01-11 PROCEDURE — 83735 ASSAY OF MAGNESIUM: CPT | Performed by: INTERNAL MEDICINE

## 2022-01-11 PROCEDURE — 250N000013 HC RX MED GY IP 250 OP 250 PS 637: Performed by: FAMILY MEDICINE

## 2022-01-11 PROCEDURE — 99233 SBSQ HOSP IP/OBS HIGH 50: CPT | Performed by: INTERNAL MEDICINE

## 2022-01-11 PROCEDURE — 250N000013 HC RX MED GY IP 250 OP 250 PS 637: Performed by: HOSPITALIST

## 2022-01-11 PROCEDURE — 250N000013 HC RX MED GY IP 250 OP 250 PS 637: Performed by: NURSE PRACTITIONER

## 2022-01-11 PROCEDURE — 36415 COLL VENOUS BLD VENIPUNCTURE: CPT | Performed by: INTERNAL MEDICINE

## 2022-01-11 PROCEDURE — 120N000018 HC R&B RESPIRATORY CARE WITH ATTENDANT

## 2022-01-11 PROCEDURE — 250N000013 HC RX MED GY IP 250 OP 250 PS 637: Performed by: INTERNAL MEDICINE

## 2022-01-11 PROCEDURE — 99232 SBSQ HOSP IP/OBS MODERATE 35: CPT | Performed by: NURSE PRACTITIONER

## 2022-01-11 PROCEDURE — 999N000009 HC STATISTIC AIRWAY CARE

## 2022-01-11 PROCEDURE — 999N000157 HC STATISTIC RCP TIME EA 10 MIN

## 2022-01-11 PROCEDURE — 97116 GAIT TRAINING THERAPY: CPT | Mod: GP

## 2022-01-11 PROCEDURE — 97129 THER IVNTJ 1ST 15 MIN: CPT | Mod: GO | Performed by: OCCUPATIONAL THERAPIST

## 2022-01-11 PROCEDURE — 99232 SBSQ HOSP IP/OBS MODERATE 35: CPT | Performed by: INTERNAL MEDICINE

## 2022-01-11 PROCEDURE — 250N000011 HC RX IP 250 OP 636: Performed by: NURSE PRACTITIONER

## 2022-01-11 PROCEDURE — 82805 BLOOD GASES W/O2 SATURATION: CPT | Performed by: INTERNAL MEDICINE

## 2022-01-11 RX ORDER — QUETIAPINE FUMARATE 25 MG/1
25 TABLET, FILM COATED ORAL AT BEDTIME
Status: DISCONTINUED | OUTPATIENT
Start: 2022-01-11 | End: 2022-01-15

## 2022-01-11 RX ORDER — TRAZODONE HYDROCHLORIDE 50 MG/1
100 TABLET, FILM COATED ORAL AT BEDTIME
Status: DISCONTINUED | OUTPATIENT
Start: 2022-01-11 | End: 2022-01-18

## 2022-01-11 RX ORDER — PHENOBARBITAL 16.2 MG/1
16.2 TABLET ORAL
Status: DISCONTINUED | OUTPATIENT
Start: 2022-01-13 | End: 2022-01-13

## 2022-01-11 RX ADMIN — CHLORHEXIDINE GLUCONATE 0.12% ORAL RINSE 15 ML: 1.2 LIQUID ORAL at 08:51

## 2022-01-11 RX ADMIN — ENOXAPARIN SODIUM 40 MG: 100 INJECTION SUBCUTANEOUS at 08:51

## 2022-01-11 RX ADMIN — TRAZODONE HYDROCHLORIDE 75 MG: 150 TABLET ORAL at 08:51

## 2022-01-11 RX ADMIN — WHITE PETROLATUM: 1.75 OINTMENT TOPICAL at 08:52

## 2022-01-11 RX ADMIN — QUETIAPINE FUMARATE 50 MG: 50 TABLET ORAL at 00:10

## 2022-01-11 RX ADMIN — POLYETHYLENE GLYCOL 3350 17 G: 17 POWDER, FOR SOLUTION ORAL at 08:51

## 2022-01-11 RX ADMIN — BUPRENORPHINE HYDROCHLORIDE 8 MG: 8 TABLET SUBLINGUAL at 16:56

## 2022-01-11 RX ADMIN — PHENOBARBITAL 243 MG: 16.2 TABLET ORAL at 05:21

## 2022-01-11 RX ADMIN — ARIPIPRAZOLE 7 MG: 1 SOLUTION ORAL at 08:51

## 2022-01-11 RX ADMIN — GABAPENTIN 900 MG: 250 SOLUTION ORAL at 21:40

## 2022-01-11 RX ADMIN — SENNOSIDES 10 ML: 8.8 LIQUID ORAL at 08:51

## 2022-01-11 RX ADMIN — ACETAMINOPHEN ORAL SOLUTION 650 MG: 650 SOLUTION ORAL at 00:10

## 2022-01-11 RX ADMIN — SENNOSIDES 10 ML: 8.8 LIQUID ORAL at 20:50

## 2022-01-11 RX ADMIN — PHENOBARBITAL 243 MG: 16.2 TABLET ORAL at 00:10

## 2022-01-11 RX ADMIN — CHLORHEXIDINE GLUCONATE 0.12% ORAL RINSE 15 ML: 1.2 LIQUID ORAL at 20:50

## 2022-01-11 RX ADMIN — PHENOBARBITAL 243 MG: 16.2 TABLET ORAL at 21:48

## 2022-01-11 RX ADMIN — GABAPENTIN 900 MG: 250 SOLUTION ORAL at 05:21

## 2022-01-11 RX ADMIN — QUETIAPINE 25 MG: 25 TABLET ORAL at 20:50

## 2022-01-11 RX ADMIN — TRAZODONE HYDROCHLORIDE 100 MG: 50 TABLET ORAL at 20:50

## 2022-01-11 RX ADMIN — METOPROLOL TARTRATE 50 MG: 50 TABLET, FILM COATED ORAL at 20:50

## 2022-01-11 RX ADMIN — PHENOBARBITAL 81 MG: 16.2 TABLET ORAL at 17:07

## 2022-01-11 RX ADMIN — BUPRENORPHINE HYDROCHLORIDE 8 MG: 8 TABLET SUBLINGUAL at 08:51

## 2022-01-11 RX ADMIN — GABAPENTIN 900 MG: 250 SOLUTION ORAL at 14:12

## 2022-01-11 RX ADMIN — METOPROLOL TARTRATE 50 MG: 50 TABLET, FILM COATED ORAL at 08:52

## 2022-01-11 RX ADMIN — PHENOBARBITAL 243 MG: 16.2 TABLET ORAL at 14:12

## 2022-01-11 RX ADMIN — Medication 5 MG: at 20:51

## 2022-01-11 ASSESSMENT — ACTIVITIES OF DAILY LIVING (ADL)
ADLS_ACUITY_SCORE: 21
ADLS_ACUITY_SCORE: 19
ADLS_ACUITY_SCORE: 19
ADLS_ACUITY_SCORE: 21
ADLS_ACUITY_SCORE: 21
ADLS_ACUITY_SCORE: 19
ADLS_ACUITY_SCORE: 21
ADLS_ACUITY_SCORE: 19
ADLS_ACUITY_SCORE: 21
ADLS_ACUITY_SCORE: 19
ADLS_ACUITY_SCORE: 21
ADLS_ACUITY_SCORE: 19
ADLS_ACUITY_SCORE: 21

## 2022-01-11 ASSESSMENT — MIFFLIN-ST. JEOR: SCORE: 1697.73

## 2022-01-11 NOTE — PLAN OF CARE
Problem: Adult Inpatient Plan of Care  Goal: Patient-Specific Goal (Individualized)  Outcome: No Change   Patient was awake all night from midnight to 0700. He was verbally and physically abusive, confused, yelling out and unable to redirect. Prn seroquel and tylenol given at midnight but it was ineffective. He was restless and talks to himself. Soft limb x 4 restraint and 4 siderails up needed for patient safety. Sitter at bedside. He also did not void all night. Bladder scan volume is 468ml. Straight cath result is 375ml. Will closely monitor.

## 2022-01-11 NOTE — PLAN OF CARE
Problem: Communication Impairment (Artificial Airway)  Goal: Effective Communication  Outcome: Improving     Problem: Device-Related Complication Risk (Artificial Airway)  Goal: Optimal Device Function  Outcome: Improving     Problem: Skin and Tissue Injury (Artificial Airway)  Goal: Absence of Device-Related Skin or Tissue Injury  Outcome: Improving      RT PROGRESS NOTE     DATA:     CURRENT SETTINGS:              TRACH TYPE / SIZE: 7 Bivona 1/04/22             MODE:   Cap              FIO2:   21%     ACTION:             THERAPIES:                SUCTION:                           FREQUENCY:                          AMOUNT:                          CONSISTENCY:                        COLOR:              SPONTANEOUS COUGH EFFORT/STRENGTH OF EFFORT (not elicited by suctioning): Yes/strong                              WEANING PHASE:   3                        WEAN MODE:  Cap                        WEAN TIME:   As tolerated                         END WEAN REASON:      RESPONSE:             BS:  Clear              VITAL SIGNS: Sat 92-96%, HR , RR 20-24             EMOTIONAL NEEDS / CONCERNS:                  RISK FOR SELF DECANNULATION:  Yes                         RISK DUE TO:  Confusion                        INTERVENTION/S IN PLACE IS/ARE:  4.0 bilateral restraints in place     NOTE / PLAN:   Cont with plan. Pt remains restless and 1:1

## 2022-01-11 NOTE — PROGRESS NOTES
[facility]    Medicine Progress Note - Hospitalist Service       Date of Admission:  12/31/2021    Summary:    Jared North is a 46 year old male who is unvaccinated against COVID-19, with history of polysubstance abuse and chronic pain/lower back pain on Suboxone prior to admission (had been on methadone in the past but not in recent past), mood disorder/depression, HTN, ROSSY and mild intermittent asthma. Patient was diagnosed with COVID-19 viral infection on 11/19/2021. He presented to [facility] on 11/24/2021 with worsening shortness of breath. He was admitted to the ICU with COVID-19 viral pneumonia and severe acute respiratory failure with hypoxia requiring noninvasive ventilation.  He received treatment with Decadron, remdesivir, and tocilizumab. He needed supplemental oxygen alternating with NIPPV and HFNC.  He had an episode of afib during this time that eventually resolved and has not recurred after briefly being treated with amiodarone.  He did not improve and was eventually intubated on 11/28/21 and then proned and started on veletri. On 11/30/21, he was started on cefepime and vanco for VAP. Culture grew E. Coli and Proteus mirabilis.  He did develop hypotension possibly from septic shock vs sedation vasoplegia and was treated with norepi intemittently.  Vanco was stopped on 12/5/21.  He was then transferred to [place]'s ICU on 12/5/21.       At [place] he was followed in ICU and continued on vent/paralysis/proning/pressors.  Cefepime switched to rocephin 12/6/21.  He continued to be very encephalopathic and psychiatry was consulted. He was weaned off pressors and didn't require proning by 12/10/21.  He has been on large doses of anti-psychotics and sedatives.  He developed fevers again 12/14 but had clinically improved somewhat and was extubated on 12/15/21. On 12/16/21 he had severely increased work of breathing and so was re-intubated. Blood culture grew Streptococcus constellatus and sputum  culture grew that as well but also had E. Coli/Proteus m./staph epi/candida parapsilosis. Vanco/Zosyn was started on 12/16/21.  He then underwent trach/PEG on 12/17/21.  Vanco was stopped after 3 days and Zosyn switched to rocephin on 12/21/21 to complete on 1/2/21.       During this time he had significant issues with encephalopathy and agitation. As above he was started on multiple meds for this. Prior to transfer to [facility], they had increased his lorazepam up to 12 mg every 4 hours and oxycodone 20 mg every 4 hours along with abilify 7 mg (home med), gabapentin 800 mg every 8 hours, keppra 500 mg twice daily (for behavior), zyprexa 5 mg twice daily, and seroquel 75 mg TID with 100 mg HS.  With this regimen, he improved reportedly in the ICU but was still awake and agitated.  He was transferred to [facility] on 12/31/21.      Assessment & Plan           Acute respiratory failure with hypoxia: due to acute respiratory distress syndrome (ARDS) secondary to COVID-19 pneumonia. Initially intubated on 11/28. Failed extubation and reintubated on 12/15/21. S/p tracheostomy on 12/17/21. Capped since 1/7/22. Currently stable on room air.   - Patient has high risk of aspiration due to oral pharyngeal dysphagia and overall cognitive dysfunction. Per pulmonary, will continue to monitor closely and decannulate when appropriate.   - Continue nebs prn    Acute metabolic encephalopathy: was on high dose of sedating medication prior to transfer to [facility]. Psych and addiction medicine have been involved in adjusting the medication. Lorazepam, Keppra and oxycodone were tapered off. Currently on Abilify 7 mg daily (home dose), subutex 8 mg qam and 8 mg qpm for chronic pain and opioid use, neurontin 900 mg tid, phenobarbital 243 mg tid for benzo withdrawal, seroquel 12.5 mg bid, and trazodone 100 mg qhs.  Per discussed with addiction medicine, would be ideal to hold further Ativan given increasing somnolence. Patient remains confused,  agitated.  - Continue soft restraints and bedside sitter. Wean as able  - Change seroquel to 25 mg at bedtime.  - Continue melatonin and trazodone at bedtime.     Oropharyngeal dysphagia: started diet and on back up bolus tube feed  - Further diet modification and tube feed per speech therapy and dietitian    Mild intermittent dysconjugate gaze: his ex-wife states she has noted this in the past when he is on certain medications like ativan but not if he is off all meds.     Chronic back pain: Patient was on Suboxone prior to admission.  Currently on suboxone and gabapentin as above.     Essential hypertension: BP controlled. On metoprolol. Monitor BP.      Major depressive disorder, recurrent episode, severe: continue abilify, seroquel, trazodone.  Off zyprexa     Opioid type dependence: Per addiction medicine service, on phenobarb and resumed subutex.      Polysubstance abuse as above     ROSSY (obstructive sleep apnea): management per respiratory failure as above     Mild intermittent asthma: nebs as needed     Recent Ventilator associated pneumonia: Completed antibiotics on 1/3/22.     Thrush - On nystatin     Severe Malnutrition, POA: based on nutrition assessment             Diet: Adult Formula Drip Feeding: Continuous Promote w fiber; Gastrostomy; Goal Rate: 150 ml/hr x 2 hrs after meals if eats <50% of meal; mL/hr; Medication - Feeding Tube Flush Frequency: At least 15-30 mL water before and after medication administratio...  Minced & Moist Diet (level 5) Mildly Thick (level 2)    DVT Prophylaxis: Enoxaparin (Lovenox) SQ  Wei Catheter: Not present  Central Lines: None  Code Status: Full Code      Disposition Plan   Expected Discharge: 01/25/2022     Anticipated discharge location: home with family    Delays:     Complex Care  1:1 Sitter            The patient's care was discussed with the Bedside Nurse and Care Coordinator/.      Kenn Leal MD  Hospitalist Service  [facility]  Securely message with  the [employer] Web Console (learn more here)  Text page via [facility] Paging/Directory      ______________________________________________________________________    Interval History   Per nursing staff, patient was agitated all night. He required 4 point restraint and 1:1 bedside sitter. He finally slept for 2 hours in the morning. He remained agitated and confused when seen and examined. He has urinary retention of 320 ml on bladder scan.    Data reviewed today: I reviewed all medications, new labs and imaging results over the last 24 hours.     Physical Exam   Vital Signs: Temp: 97.8  F (36.6  C) Temp src: Axillary BP: 118/80 Pulse: 81   Resp: 20 SpO2: 98 % O2 Device: Nasal cannula with humidification Oxygen Delivery: 2 LPM  Weight: 176 lbs 8 oz    General appearance: not in acute distress  HEENT: PERRL, EOMI  Lungs: Clear breath sounds in bilateral lung fields  Cardiovascular: Regular rate and rhythm, normal S1-S2  Abdomen: Soft, non tender, no distension.   Musculoskeletal: No joint swelling  Skin: No rash and no edema  Neurology: Awake and alert. Confused and agitated.  Cranial nerves II - XII normal.  Normal muscle strength in all four extremities.    Data   Recent Labs   Lab 01/11/22  0639 01/10/22  0612 01/09/22  0728 01/07/22  0622 01/06/22  1256 01/06/22  0814   PLT  --  203  --   --   --   --    NA  --   --   --   --   --  140   POTASSIUM 3.9 3.8 4.2   < >  --  3.8  3.8   CHLORIDE  --   --   --   --   --  106   CO2  --   --   --   --   --  22   BUN  --   --   --   --   --  12   CR  --  0.59*  --   --  0.63* 0.63*   ANIONGAP  --   --   --   --   --  12   TRUONG  --   --   --   --   --  9.8   GLC  --   --   --   --   --  111    < > = values in this interval not displayed.         Restraint face-to-face    Within an hour after restraint an in person face to face assessment was completed, including an evaluation of the patient's immediate reaction to the intervention, behavioral assessment and review/assessment of  history, drugs and medications, recent labs, etc., and behavioral condition.  The patient experienced: No adverse physical outcome from seclusion/restraint initiation.  The intervention of restraint or seclusion needs to continue.

## 2022-01-11 NOTE — PROGRESS NOTES
Psychiatric Progress Note    Mood changes likely in the context of a prolonged hospitalization with intermittent anxiety, restlessness.  Cognitive and behavioral changes due to prolonged hospitalization and in the setting of metabolic encephalopathy, ICU delirium and due to COVID.  Major depressive disorder by history.     Recommendations:  Seroquel 12.5 mg BID: daytime anxiety.   Seroquel 50 mg 4 times a day as needed.  Discontinue Seroquel 100 mg at bedtime for sleep.  Trazodone 100 mg at bedtime for sleep.  Addiction medicine to manage benzos and opiates.  Efforts at sleep regulation.    SUBJECTIVE:  Disoriented, calm, talks to himself, still needing restraints. No psychosis.     MENTAL STATUS EXAMINATION:   General Appearance:  Comfortable, Arousable  Speech: Slow, minimal.  Thought Process: Increased latency  Thought content: No psychosis, NO SI  Thought Formation: Loosening of associations.  Judgment: Depressed  Insight : Depressed  Attention : Sleepy  Memory: Depressed  Fund Of Knowledge: Depressed  Affect: Neutral  Mood: Congruent  Alert : Arousable  Orientation: X 1-2  Comprehension: Depressed  Generative thought content: Reduced, slow  Language: Intact  Gait and Ambulation:Slow.  With assist, needing assist with all ADLs  Musculoskeletal: No tonal abnormalities  Vital signs in last 24 hours  Temp:  [97.8  F (36.6  C)-98.7  F (37.1  C)] 97.8  F (36.6  C)  Pulse:  [] 81  Resp:  [20-24] 20  BP: (118-168)/(80-85) 118/80  FiO2 (%):  [21 %] 21 %  SpO2:  [92 %-100 %] 98 %

## 2022-01-11 NOTE — PROGRESS NOTES
"Writer introduced self to patient-he said that writer was Annette his ex wife and began asking \"why did you do it annette, why are you with him?\" reminded him of writers name and he insists that writer is Annette. Then he began crying and saying \"why, why, it hurts, it hurts\", over and over. He is unable to answer questions of being in pain. Writer held hand and stroked forehead. He now appears to be sleeping at this time. RT placed nasal cannula at 2liters due to de sating q1-2 mins in the low 80's and going back to the mid 90's. Plan for today-allow patient to rest quietly-so as to be able to participate in therapies. Will continue to remain close at bedside offering consoling with his intermittent rousals.   Patient had fallen sound asleep after breakfast of which he ate 25% ~930am to 1130 am-gave bolus. Up in chair, writer wheeled patient around unit. Attempted several times to feed patient his lunch, but kept spitting it out-gave bolus. Ex wife came about 1330.She left about 1145. Patient back in bed and appears to be sleeping soundly at this time.. Will hand off to oncoming nurse for continuation of cares and monitoring.  "

## 2022-01-11 NOTE — PROGRESS NOTES
Care Management Initial Consult       Concerns to be Addressed:   Status of tracheostomy tube (currently in phase 3, capped on room air).  In restraints (soft limb) and on 1:1 due to impulsivity.   Patient plan of care discussed at interdisciplinary rounds: Yes  Follow up from rounds:   Needing 2 liters of oxygen when sleeping. MD to review medications due to agitation (sleep cycle off: patient up at night, getting medicated and then sleeps during the day. Goal will be for patient to sleep during at night and be active during the day time).     Anticipated Discharge Disposition:  Likely will need extensive therapy and rehab.     Anticipated Discharge Services:  To be determined by destination, patient/family preferences, medical needs and mobility status closer to the time of discharge.  Anticipated Discharge DME:  To be determined.     Patient/family educated on Medicare website which has current facility and service quality ratings:  NA at this time.   Education Provided on the Discharge Plan:  Per team.  Patient/Family in Agreement with the Plan:  Yes    Referrals Placed by CM/SW:  None  Private pay costs discussed: Not applicable       Additional Information:  Trach and PEG placed on 12/17/2021, transferred to [facility] on 12/31/2021. Initial assessment was completed with patient's ex-wife Jama by telephone. Patient lives with his ex-spouse, Jama, part of the time (but had also been spending time in [facility]). She reported that he is normally independent at baseline. Jama hopes that she would be able to provide care in her home post discharge (pending patient's needs) but she understands that patient will likely need extensive rehab and therapy first. CM to keep Jama updated once closer to discharge and better idea of plan is known. A care progression meeting was held on 1/7/22 (see note).    Transport mode and destination yet to be determined. Care manager to follow.     Of note, patient  has not been vaccinated for Covid-19 (no record on file) but is now designated as 'covid recovered'.    Marilee Farias RN

## 2022-01-11 NOTE — PLAN OF CARE
Problem: Inability to Wean (Mechanical Ventilation, Invasive)  Goal: Mechanical Ventilation Liberation  Outcome: Improving     Problem: Skin and Tissue Injury (Mechanical Ventilation, Invasive)  Goal: Absence of Device-Related Skin and Tissue Injury  Outcome: Improving     Problem: Ventilator-Induced Lung Injury (Mechanical Ventilation, Invasive)  Goal: Absence of Ventilator-Induced Lung Injury  Outcome: Improving     Problem: Restraint for Non-Violent/Non-Self-Destructive Behavior  Goal: Prevent/Manage Potential Problems  Description: Maintain safety of patient and others during period of restraint.  Promote psychological and physical wellbeing.  Prevent injury to skin and involved body parts.  Promote nutrition, hydration, and elimination.  Outcome: Improving   RT PROGRESS NOTE     DATA:     CURRENT SETTINGS:              TRACH TYPE / SIZE:#7 BIVONA TTS CHANGED ON 1/4/22             MODE: Trach  Capped                FIO2:  RA to 2LNC     ACTION:             THERAPIES:               SUCTION:                           FREQUENCY: none                        AMOUNT:                        CONSISTENCY:                        COLOR:               SPONTANEOUS COUGH EFFORT/STRENGTH OF EFFORT (not elicited by suctioning): Strong                              WEANING PHASE:3                        WEAN MODE:Trach Capped on RA  and 2LNC                        WEAN TIME: Since 1/7/22                        END WEAN REASON:                   RESPONSE:             BS: Clear             VITAL SIGNS:Sat 97-98%, HR  82-92, RR 22             EMOTIONAL NEEDS / CONCERNS: Confused                RISK FOR SELF DECANNULATION: yes                        RISK DUE TO: confused                         INTERVENTION/S IN PLACE IS/ARE:4Point restraints + 1:1 staff supervision     NOTE / PLAN:  Cont. Current plan of care / Patient desaturates when  asleep-using  2LNC

## 2022-01-11 NOTE — PROGRESS NOTES
Addiction Medicine     HPI:  47 yo with prolonged ventilation due to COVID pneumonia, possible sepsis, now extubated and maintaining oxygenation.   Previous hx of opioid use disorder, maintained on buprenorphine.    Was getting very large doses of lorazepam and oxycodone due to agitation, confusion and hallucinations, which have been ongoing.   Has had encephalopathy with agitation and hallucinations since about mid Dec.   This resulted in him getting very large doses of lorazepam, oxycodone and antipsychotics.   As these are being tapered off, he is again agitatied and confused.       However, he has also been oversedated at time.   Was kicking yesterday and very agitated yesterday.   Evette Matt was called.   Mostly awake all night.    Today he is somnolent and hard to arouse.       Current Facility-Administered Medications   Medication     acetaminophen (TYLENOL) solution 650 mg     acetaminophen (TYLENOL) Suppository 650 mg     ARIPiprazole (ABILIFY) solution 7 mg     bisacodyl (DULCOLAX) Suppository 10 mg     buprenorphine (SUBUTEX) sublingual tablet 8 mg     buprenorphine (SUBUTEX) sublingual tablet 8 mg     chlorhexidine (PERIDEX) 0.12 % solution 15 mL     dextrose 10% infusion     glucose gel 15-30 g    Or     dextrose 50 % injection 25-50 mL    Or     glucagon injection 1 mg     docusate (COLACE) 50 MG/5ML liquid 100 mg    And     sennosides (SENOKOT) syrup 10 mL     enoxaparin ANTICOAGULANT (LOVENOX) injection 40 mg     gabapentin (NEURONTIN) solution 900 mg     hydrALAZINE (APRESOLINE) injection 10 mg     melatonin tablet 5 mg     metoprolol (LOPRESSOR) injection 5-10 mg     metoprolol tartrate (LOPRESSOR) tablet 50 mg     midodrine (PROAMATINE) tablet 2.5 mg     mineral oil-hydrophilic petrolatum (AQUAPHOR)     ondansetron (ZOFRAN-ODT) ODT tab 4 mg    Or     ondansetron (ZOFRAN) injection 4 mg     Patient is already receiving anticoagulation with heparin, enoxaparin (LOVENOX), warfarin (COUMADIN)  or  other anticoagulant medication     PHENobarbital (LUMINAL) tablet 243 mg     polyethylene glycol (MIRALAX) Packet 17 g     prochlorperazine (COMPAZINE) injection 10 mg    Or     prochlorperazine (COMPAZINE) tablet 10 mg    Or     prochlorperazine (COMPAZINE) suppository 25 mg     QUEtiapine (SEROquel) tablet 25 mg     QUEtiapine (SEROquel) tablet 50 mg     simethicone (MYLICON) suspension 40 mg     sodium chloride (PF) 0.9% PF flush 10-40 mL     sodium chloride (PF) 0.9% PF flush 10-40 mL     traZODone (DESYREL) tablet 100 mg     Assessment:   Acute encephalopathy  - tends to be awake at night and sleeping during the day.   May be multifactorial, related to prolonged hospitalization, possibly related to BDZ withdrawal, or underlying personality disorder.       Plan:  Getting quetiapine prn if needed for aguatuib,   Trazodone and melatonin as hs.. gabapentin 900 mg tid.      Will begin taper of phenobarbital, as it does not appear that holding at same dose has stabilized his mood.     Hold at buprenorphine 8 mg bid. sublinqual      Maria Del Carmen Dangelo MD  Addiction Medicine Service  [facility]   Page me (click here for Hannah Dangelo)

## 2022-01-11 NOTE — PROGRESS NOTES
Pulmonary Note:    Attempted to see patient x 2. Busy working with other therapies.  No changes to clinical plan from pulmonary perspective today. Will see tomorrow.      No Lloyd MD  Pulmonary and Critical Care Medicine  [facility]  Office: [phone]

## 2022-01-12 ENCOUNTER — APPOINTMENT (OUTPATIENT)
Dept: SPEECH THERAPY | Facility: CLINIC | Age: 47
End: 2022-01-12
Attending: INTERNAL MEDICINE
Payer: COMMERCIAL

## 2022-01-12 ENCOUNTER — APPOINTMENT (OUTPATIENT)
Dept: OCCUPATIONAL THERAPY | Facility: CLINIC | Age: 47
End: 2022-01-12
Attending: INTERNAL MEDICINE
Payer: COMMERCIAL

## 2022-01-12 ENCOUNTER — APPOINTMENT (OUTPATIENT)
Dept: PHYSICAL THERAPY | Facility: CLINIC | Age: 47
End: 2022-01-12
Attending: INTERNAL MEDICINE
Payer: COMMERCIAL

## 2022-01-12 LAB
ALBUMIN UR-MCNC: NEGATIVE MG/DL
APPEARANCE UR: CLEAR
BILIRUB UR QL STRIP: NEGATIVE
COLOR UR AUTO: NORMAL
GLUCOSE UR STRIP-MCNC: NEGATIVE MG/DL
HGB UR QL STRIP: NEGATIVE
KETONES UR STRIP-MCNC: NEGATIVE MG/DL
LEUKOCYTE ESTERASE UR QL STRIP: NEGATIVE
NITRATE UR QL: NEGATIVE
PH UR STRIP: 7 [PH] (ref 5–7)
SP GR UR STRIP: 1.01 (ref 1–1.03)
UROBILINOGEN UR STRIP-MCNC: <2 MG/DL

## 2022-01-12 PROCEDURE — 250N000013 HC RX MED GY IP 250 OP 250 PS 637: Performed by: HOSPITALIST

## 2022-01-12 PROCEDURE — 97530 THERAPEUTIC ACTIVITIES: CPT | Mod: GO | Performed by: OCCUPATIONAL THERAPIST

## 2022-01-12 PROCEDURE — 250N000013 HC RX MED GY IP 250 OP 250 PS 637: Performed by: FAMILY MEDICINE

## 2022-01-12 PROCEDURE — 97530 THERAPEUTIC ACTIVITIES: CPT | Mod: GP

## 2022-01-12 PROCEDURE — 120N000018 HC R&B RESPIRATORY CARE WITH ATTENDANT

## 2022-01-12 PROCEDURE — 999N000157 HC STATISTIC RCP TIME EA 10 MIN

## 2022-01-12 PROCEDURE — 250N000013 HC RX MED GY IP 250 OP 250 PS 637: Performed by: INTERNAL MEDICINE

## 2022-01-12 PROCEDURE — 999N000009 HC STATISTIC AIRWAY CARE

## 2022-01-12 PROCEDURE — 81003 URINALYSIS AUTO W/O SCOPE: CPT | Performed by: INTERNAL MEDICINE

## 2022-01-12 PROCEDURE — 250N000011 HC RX IP 250 OP 636: Performed by: NURSE PRACTITIONER

## 2022-01-12 PROCEDURE — 250N000013 HC RX MED GY IP 250 OP 250 PS 637: Performed by: NURSE PRACTITIONER

## 2022-01-12 PROCEDURE — 99233 SBSQ HOSP IP/OBS HIGH 50: CPT | Performed by: INTERNAL MEDICINE

## 2022-01-12 PROCEDURE — 99232 SBSQ HOSP IP/OBS MODERATE 35: CPT | Performed by: INTERNAL MEDICINE

## 2022-01-12 PROCEDURE — 92526 ORAL FUNCTION THERAPY: CPT | Mod: GN | Performed by: SPEECH-LANGUAGE PATHOLOGIST

## 2022-01-12 RX ADMIN — BUPRENORPHINE HYDROCHLORIDE 8 MG: 8 TABLET SUBLINGUAL at 08:05

## 2022-01-12 RX ADMIN — PHENOBARBITAL 243 MG: 16.2 TABLET ORAL at 21:14

## 2022-01-12 RX ADMIN — SENNOSIDES 10 ML: 8.8 LIQUID ORAL at 13:28

## 2022-01-12 RX ADMIN — GABAPENTIN 900 MG: 250 SOLUTION ORAL at 13:28

## 2022-01-12 RX ADMIN — GABAPENTIN 900 MG: 250 SOLUTION ORAL at 05:50

## 2022-01-12 RX ADMIN — SENNOSIDES 10 ML: 8.8 LIQUID ORAL at 21:14

## 2022-01-12 RX ADMIN — PHENOBARBITAL 243 MG: 16.2 TABLET ORAL at 13:29

## 2022-01-12 RX ADMIN — PHENOBARBITAL 243 MG: 16.2 TABLET ORAL at 05:49

## 2022-01-12 RX ADMIN — POLYETHYLENE GLYCOL 3350 17 G: 17 POWDER, FOR SOLUTION ORAL at 13:28

## 2022-01-12 RX ADMIN — QUETIAPINE FUMARATE 50 MG: 50 TABLET ORAL at 03:29

## 2022-01-12 RX ADMIN — PHENOBARBITAL 48.6 MG: 16.2 TABLET ORAL at 18:38

## 2022-01-12 RX ADMIN — TRAZODONE HYDROCHLORIDE 100 MG: 50 TABLET ORAL at 21:15

## 2022-01-12 RX ADMIN — ARIPIPRAZOLE 7 MG: 1 SOLUTION ORAL at 08:07

## 2022-01-12 RX ADMIN — ENOXAPARIN SODIUM 40 MG: 100 INJECTION SUBCUTANEOUS at 08:06

## 2022-01-12 RX ADMIN — WHITE PETROLATUM: 1.75 OINTMENT TOPICAL at 08:09

## 2022-01-12 RX ADMIN — GABAPENTIN 900 MG: 250 SOLUTION ORAL at 21:14

## 2022-01-12 RX ADMIN — METOPROLOL TARTRATE 50 MG: 50 TABLET, FILM COATED ORAL at 21:15

## 2022-01-12 RX ADMIN — ACETAMINOPHEN ORAL SOLUTION 650 MG: 650 SOLUTION ORAL at 05:51

## 2022-01-12 RX ADMIN — METOPROLOL TARTRATE 50 MG: 50 TABLET, FILM COATED ORAL at 08:06

## 2022-01-12 RX ADMIN — QUETIAPINE 25 MG: 25 TABLET ORAL at 21:15

## 2022-01-12 RX ADMIN — CHLORHEXIDINE GLUCONATE 0.12% ORAL RINSE 15 ML: 1.2 LIQUID ORAL at 08:06

## 2022-01-12 RX ADMIN — Medication 5 MG: at 21:15

## 2022-01-12 RX ADMIN — BUPRENORPHINE HYDROCHLORIDE 8 MG: 8 TABLET SUBLINGUAL at 16:04

## 2022-01-12 ASSESSMENT — ACTIVITIES OF DAILY LIVING (ADL)
ADLS_ACUITY_SCORE: 21
ADLS_ACUITY_SCORE: 19
ADLS_ACUITY_SCORE: 19
ADLS_ACUITY_SCORE: 21
ADLS_ACUITY_SCORE: 19
ADLS_ACUITY_SCORE: 21
ADLS_ACUITY_SCORE: 19
ADLS_ACUITY_SCORE: 21
ADLS_ACUITY_SCORE: 19
ADLS_ACUITY_SCORE: 21
ADLS_ACUITY_SCORE: 19
ADLS_ACUITY_SCORE: 19
ADLS_ACUITY_SCORE: 21
ADLS_ACUITY_SCORE: 19
ADLS_ACUITY_SCORE: 21
ADLS_ACUITY_SCORE: 19
ADLS_ACUITY_SCORE: 19
ADLS_ACUITY_SCORE: 21

## 2022-01-12 ASSESSMENT — MIFFLIN-ST. JEOR: SCORE: 1707.25

## 2022-01-12 NOTE — PROGRESS NOTES
formerly [facility]    Medicine Progress Note - Hospitalist Service       Date of Admission:  12/31/2021    Summary:    Jared North is a 46 year old male who is unvaccinated against COVID-19, with history of polysubstance abuse and chronic pain/lower back pain on Suboxone prior to admission (had been on methadone in the past but not in recent past), mood disorder/depression, HTN, ROSSY and mild intermittent asthma. Patient was diagnosed with COVID-19 viral infection on 11/19/2021. He presented to [facility] on 11/24/2021 with worsening shortness of breath. He was admitted to the ICU with COVID-19 viral pneumonia and severe acute respiratory failure with hypoxia requiring noninvasive ventilation.  He received treatment with Decadron, remdesivir, and tocilizumab. He needed supplemental oxygen alternating with NIPPV and HFNC.  He had an episode of afib during this time that eventually resolved and has not recurred after briefly being treated with amiodarone.  He did not improve and was eventually intubated on 11/28/21 and then proned and started on veletri. On 11/30/21, he was started on cefepime and vanco for VAP. Culture grew E. Coli and Proteus mirabilis.  He did develop hypotension possibly from septic shock vs sedation vasoplegia and was treated with norepi intemittently.  Vanco was stopped on 12/5/21.  He was then transferred to [facility] on 12/5/21.       At [facility] he was followed in ICU and continued on vent/paralysis/proning/pressors.  Cefepime switched to rocephin 12/6/21.  He continued to be very encephalopathic and psychiatry was consulted. He was weaned off pressors and didn't require proning by 12/10/21.  He has been on large doses of anti-psychotics and sedatives.  He developed fevers again 12/14 but had clinically improved somewhat and was extubated on 12/15/21. On 12/16/21 he had severely increased work of breathing and so was re-intubated. Blood culture grew Streptococcus constellatus and sputum  culture grew that as well but also had E. Coli/Proteus m./staph epi/candida parapsilosis. Vanco/Zosyn was started on 12/16/21.  He then underwent trach/PEG on 12/17/21.  Vanco was stopped after 3 days and Zosyn switched to rocephin on 12/21/21 to complete on 1/2/21.       During this time he had significant issues with encephalopathy and agitation. As above he was started on multiple meds for this. Prior to transfer to [facility], they had increased his lorazepam up to 12 mg every 4 hours and oxycodone 20 mg every 4 hours along with abilify 7 mg (home med), gabapentin 800 mg every 8 hours, keppra 500 mg twice daily (for behavior), zyprexa 5 mg twice daily, and seroquel 75 mg TID with 100 mg HS.  With this regimen, he improved reportedly in the ICU but was still awake and agitated.  He was transferred to [facility] on 12/31/21.      Assessment & Plan      Acute respiratory failure with hypoxia: due to acute respiratory distress syndrome (ARDS) secondary to COVID-19 pneumonia. Initially intubated on 11/28. Failed extubation and reintubated on 12/15/21. S/p tracheostomy on 12/17/21. Capped since 1/7/22. Currently stable on room air.   - Patient has high risk of aspiration due to oral pharyngeal dysphagia and overall cognitive dysfunction. Per pulmonary, will continue to monitor closely and decannulate when appropriate.   - Continue nebs prn    Acute metabolic encephalopathy: was on high dose of sedating medication prior to transfer to [facility]. Psych and addiction medicine have been involved in adjusting the medication. Lorazepam, Keppra and oxycodone were tapered off. Currently on Abilify 7 mg daily (home dose), subutex 8 mg qam and 8 mg qpm for chronic pain and opioid use, neurontin 900 mg tid, phenobarbital 243 mg tid for benzo withdrawal, seroquel 12.5 mg bid, and trazodone 100 mg qhs.  Per discussed with addiction medicine, would be ideal to hold further Ativan given increasing somnolence. Patient remains confused,  agitated.  - Continue soft restraints and bedside sitter. Wean as able  - Continue seroquel 25 mg at bedtime and 50 mg qid prn  - Continue melatonin 5 mg and trazodone 100 mg at bedtime.     Oropharyngeal dysphagia: started diet and on back up bolus tube feed  - Further diet modification and tube feed per speech therapy and dietitian    Mild intermittent dysconjugate gaze: his ex-wife states she has noted this in the past when he is on certain medications like ativan but not if he is off all meds.     Chronic back pain: Patient was on Suboxone prior to admission.  Currently on suboxone as above. Also on gabapentin 900 mg tid as at home.     Essential hypertension: BP controlled. On metoprolol. Monitor BP.      Major depressive disorder, recurrent episode, severe: continue abilify, seroquel, trazodone.  Off zyprexa     Opioid type dependence: Per addiction medicine service, on phenobarb and resumed subutex.      Polysubstance abuse as above     ROSSY (obstructive sleep apnea): management per respiratory failure as above     Mild intermittent asthma: nebs as needed     Recent Ventilator associated pneumonia: Completed antibiotics on 1/3/22.     Thrush - On nystatin     Severe Malnutrition, POA: based on nutrition assessment             Diet: Adult Formula Drip Feeding: Continuous Promote w fiber; Gastrostomy; Goal Rate: 150 ml/hr x 2 hrs after meals if eats <50% of meal; mL/hr; Medication - Feeding Tube Flush Frequency: At least 15-30 mL water before and after medication administratio...  Minced & Moist Diet (level 5) Mildly Thick (level 2)    DVT Prophylaxis: Enoxaparin (Lovenox) SQ  Wei Catheter: Not present  Central Lines: None  Code Status: Full Code      Disposition Plan   Expected Discharge: 01/25/2022     Anticipated discharge location: home with family    Delays:     Complex Care  1:1 Sitter            The patient's care was discussed with the Bedside Nurse and Care Coordinator/.    Plan of care  discussed with his ex-wife by the bedside today.    Kenn Leal MD  Hospitalist Service  [facility]  Securely message with the [employer] Web Console (learn more here)  Text page via [facility] Paging/Directory      ______________________________________________________________________    Interval History   Patient was agitated yesterday and the dose of seroquel was increased. He was able to sleep more last night. When seen and examined this morning, his ex-wife was by the bedside. Patient was lethargy.     Data reviewed today: I reviewed all medications, new labs and imaging results over the last 24 hours.     Physical Exam   Vital Signs: Temp: 99.5  F (37.5  C) Temp src: Oral BP: 118/82 Pulse: 91   Resp: 22 SpO2: 96 % O2 Device: Nasal cannula Oxygen Delivery: 2 LPM  Weight: 178 lbs 9.6 oz    General appearance: not in acute distress  HEENT: PERRL, EOMI  Lungs: Clear breath sounds in bilateral lung fields  Cardiovascular: Regular rate and rhythm, normal S1-S2  Abdomen: Soft, non tender, no distension.   Musculoskeletal: No joint swelling  Skin: No rash and no edema  Neurology: Awake and alert. Confused and agitated.  Cranial nerves II - XII normal.  Normal muscle strength in all four extremities.    Data   Recent Labs   Lab 01/11/22  0639 01/10/22  0612 01/09/22  0728 01/07/22  0622 01/06/22  1256 01/06/22  0814   PLT  --  203  --   --   --   --    NA  --   --   --   --   --  140   POTASSIUM 3.9 3.8 4.2   < >  --  3.8  3.8   CHLORIDE  --   --   --   --   --  106   CO2  --   --   --   --   --  22   BUN  --   --   --   --   --  12   CR  --  0.59*  --   --  0.63* 0.63*   ANIONGAP  --   --   --   --   --  12   TRUONG  --   --   --   --   --  9.8   GLC  --   --   --   --   --  111    < > = values in this interval not displayed.         Restraint face-to-face    Within an hour after restraint an in person face to face assessment was completed, including an evaluation of the patient's immediate reaction to the intervention, behavioral  assessment and review/assessment of history, drugs and medications, recent labs, etc., and behavioral condition.  The patient experienced: No adverse physical outcome from seclusion/restraint initiation.  The intervention of restraint or seclusion needs to continue.

## 2022-01-12 NOTE — PLAN OF CARE
Continues to be very impulsive. Angry at times and then weepy. Broken thought process and changes subject abruptly and frequently.    This writer was able to help patient eat 50% of meal and encouraged to use elimination product in bed but no success.  Hope to walk to bathroom with therapy to see if able to void and eliminate bowels at that time.    Presented this AM at change of shift with restlessness and agitation. Able to sooth a bit with music and conversation but still elevated. Napping in bed after breakfast.    Continue to monitor.      Addendum:  Up to commode x 1-2 attempts with no success to voiding or BM.  Therapies assist and we needed 3 people to get patient  on commode. Also, was very sleepy most of AM and then become more agitated and restless  in the afternoon.   Very little participation with therapies.    This writer straight cathed for ~500 after scan of ~460. Did catch a sample due to complains of pain and difficulty voiding. Will request UA from MD if needed.    Music and massage calming a little bit but not successful every time.    Continue to monitor.    1458 UA sent from straight cath midstream    Jennie Guzman RN

## 2022-01-12 NOTE — PLAN OF CARE
Care Management Progression of Care Update        DR GLOS - Target D/C Date 22        PLAN/GOALS  Pulmonary following. Phase 3 wean~TracH CAPPED  On 2L 02.      2.  Nutrition management.  Diet minced & moist. Tube feeding via PEG placed on 21 if PO intake of meal is <50%.  Registered Dietician to monitor intake, wieght and labs.    3.  Addiction Medicine following.    4.  Psychiatry following.    5.  PT 6x/week.  OT 5x/week.    6.  Speech therapy 5x/week.      BARRIERS  1.  Acute on chronic respiratory failure with hypoxia due to COVID-19.  Vent / trach wean.    2.  Acute metabolic encephalopathy.    3.  Oropharyngeal dysphagia.    4.  Bilateral soft wrist restraints.    5.  Severe malnutrition >5% wieght loss in 1 month,  Albumin 2.5.      Disposition:  TCU vs. Acute Rehab    Care Manager Name:  Chidi Gomez RN,BSN, HNB-BC    Date/Time:  2022 @ 4:20 PM

## 2022-01-12 NOTE — PLAN OF CARE
Problem: Communication Impairment (Artificial Airway)  Goal: Effective Communication  Outcome: Improving     Problem: Device-Related Complication Risk (Artificial Airway)  Goal: Optimal Device Function  Outcome: Improving  Intervention: Optimize Device Care and Function  Recent Flowsheet Documentation  Taken 1/11/2022 2200 by Carri Moise RN  Oral Care:   mouth wash rinse   oral rinse provided   swabbed with antiseptic solution   tongue brushed  Taken 1/11/2022 1546 by Carri Moise RN  Airway Safety Measures:   all equipment/monitors on and audible   suction regulator   suction equipment   oxygen flowmeter   suction at bedside   Patient's vital signs were stable the whole evening shift. He denied pains and seemed comfortableat the beginning of this shift. . He was on capped trache tube with Nasal cannula on 2 liters of O2 support. His O2 saturations ranged between 95 - 98% this evening. No suctioning per trache tube was done by either RT or RN.     Problem: Adult Inpatient Plan of Care  Goal: Absence of Hospital-Acquired Illness or Injury  Intervention: Identify and Manage Fall Risk  Recent Flowsheet Documentation  Taken 1/11/2022 1546 by Carri Moise RN  Safety Promotion/Fall Prevention: (1:1 Sitter at bedside.)   activity supervised   assistive device/personal items within reach   bed alarm on   clutter free environment maintained   fall prevention program maintained   increased rounding and observation   lighting adjusted   increase visualization of patient   patient and family education   room door open   room near nurse's station   safety round/check completed    No harm or fall was noted or reported for this patient this shift.    Intervention: Prevent Skin Injury  Recent Flowsheet Documentation  Taken 1/11/2022 2200 by Carri Moise RN  Body Position:   right   turned  Taken 1/11/2022 1930 by Carri Moise RN  Body Position: supine, head elevated  Taken 1/11/2022  1730 by Carri Moise RN  Body Position:   left   turned  Taken 1/11/2022 1546 by Carri Moise RN  Body Position: supine, head elevated    Some reddened abrasion at patient's left ankle was noted possibly from constantly rubbing against the soft ankle restraint on agitated /restless episodes before.  Intervention: Prevent Infection  Recent Flowsheet Documentation  Taken 1/11/2022 1546 by Carri Moise RN  Infection Prevention:   environmental surveillance performed   equipment surfaces disinfected   hand hygiene promoted   personal protective equipment utilized   rest/sleep promoted   single patient room provided   visitors restricted/screened   He had no urine output this evening. He was bladder scanned at 2200 with maximum urine volume sitting at the bladder around 140 ml. No straight catheterization was done per MD parameter. Scheduled water flushes per feeding tube was done as ordered.    Problem: Violence Risk or Actual  Goal: Anger and Impulse Control  Outcome: No Change   He continued to be impulsive  and  changed position  like shifting his upper torso  to his left/right sides without regard whether he will hit his face/head against the side rails. He even kicked his legs in the air once released from bilateral soft ankle restraints which pose danger to his caregivers if they got hit or to himself if he injures himself against the bed rails or the foot  board of the said bed. Patient was maintained on 4-point soft limb restraints per MD's order.    Problem: Adult Inpatient Plan of Care  Goal: Optimal Comfort and Wellbeing  Outcome: No Change  Intervention: Provide Person-Centered Care  Recent Flowsheet Documentation  Taken 1/11/2022 1546 by Carri Moise RN  Trust Relationship/Rapport:   emotional support provided   reassurance provided     He ate greater than 50% of supper with 1:1 Sitter feeding him. His Feeding tube has scheduled water flushes as ordered by Providers to  meet his hydration requirements. Will keep monitoring patient's progress and safety.

## 2022-01-12 NOTE — PROGRESS NOTES
"RT PROGRESS NOTE     DATA:     CURRENT SETTINGS:             TRACH TYPE / SIZE:  #7 bivona changed 1/4             MODE:   capped continuous, RA days and 2lnc at night                  ACTION:             THERAPIES:   non2             SUCTION:  none required this shift                                   SPONTANEOUS COUGH EFFORT/STRENGTH OF EFFORT (not elicited by suctioning): not witnessed                              WEANING PHASE:   3                        WEAN MODE:    capped continuous                                                   RESPONSE:             BS:   clear             VITAL SIGNS:   Blood pressure 134/73, pulse 110, temperature 99.4  F (37.4  C), temperature source Oral, resp. rate 24, height 1.803 m (5' 11\"), weight 81 kg (178 lb 9.6 oz), SpO2 95 %.                 EMOTIONAL NEEDS / CONCERNS:  no                RISK FOR SELF DECANNULATION:  yes                        RISK DUE TO:  impulsive                        INTERVENTION/S IN PLACE IS/ARE:  1:1 sitter       NOTE / PLAN:   continue current plan.    "

## 2022-01-12 NOTE — PROGRESS NOTES
Pulmonary/CC  Medicine - Ventilator Management :    46 year old male, unvaccinated against COVID-19, with history of polysubstance abuse and chronic pain/lower back pain on suboxone, mood disorder/depression, HTN, ROSSY, mild intermittent asthma, presented with respiratory failure/COVID ARDS intubated on 11/28. Initially requiring proning/paralysis, veletri, now down trending vent needs, issues with agitation, treated for VAP. Not tolerating weaning trials, failed extubation 12/15, re-intubated, S/p trach and PEG tube 12/17.    Diagnosis:   Patient Active Problem List   Diagnosis     Chronic back pain     Essential hypertension     Elevated LFTs     Acute on chronic respiratory failure with hypoxia (H)     Pneumonia due to 2019 novel coronavirus     Atrial fibrillation with rapid ventricular response (H)     Acute respiratory distress syndrome (ARDS) due to COVID-19 virus (H)     Major depressive disorder, recurrent episode, severe (H)     Opioid use disorder, severe, dependence (H)     Polysubstance abuse (H)     ROSSY (obstructive sleep apnea)     Mild intermittent asthma     Acute metabolic encephalopathy     Ventilator associated pneumonia (H)     Oropharyngeal dysphagia       Chief complaint: Ongoing respiratory failure    Objective:  Significant change in clinical status during past 24 hours? - No    Tracheal secretions: x1, thin    Capped since 1/7, currently on 2-3 LPM O2  Ventilator weaning results from yesterday: off vent since 1/4      Subjective:  Restful, unable to obtain    Physical exam:   General: presently in sedated  HEENMT: #7 bivona in place; site clean.   Lungs: Diminished, clear  Heart: RRR, S1 and S2            Abdomen: Soft, non-tender.   Skin: skin color normal, texture normal, no rashes or lesions.   Extremities:  No edema noted.   Neuro:  PURDY interactive when not sleeping    LABS:  Reviewed    IMAGING/STUDIES:    1/7/21 VFSS:  1.  Adequate oral phase.  2.  Delay in initiation of swallow reflex  with pour over to the pyriform sinuses during swallowing of thin liquid.  3.  Complete epiglottic inversion.  4.  Aspiration of thin liquid.  5.  No significant stasis.      Recommendations/Plans:  1. Respiratory failure:    Capped since 1/7. Am VBG 7.44/44    Chart history ROSSY, no AHI reported. Requiring O2 at night    Ongoing taper/adjustment of buprenorphine and phenobarb for withdrawal, with alternating agitation and encephalopathy/sedation, concern for dysphagia as below. Would monitor for now and consider decannulation after more time/improvement in mental status.     2. Tracheostomy:    1/4 changed to  # 7 Bivona     Routine care and changes    3. SLP:  Patient presented with mild oral pharyngeal dysphagia functionally. However patient's distractibility and overall cognitive dysfunction places patient at significantly high risk for aspiration due to inattention of bolus.  Risk is particularly high with thin liquids and more advanced food textures that require chewing prior to swallow.   - 1/7 VFSS: aspiration of thin liquids      Discussed with RT.      No Lloyd MD  Pulmonary and Critical Care Medicine  [facility]  Office: [phone]

## 2022-01-13 ENCOUNTER — APPOINTMENT (OUTPATIENT)
Dept: PHYSICAL THERAPY | Facility: CLINIC | Age: 47
End: 2022-01-13
Attending: INTERNAL MEDICINE
Payer: COMMERCIAL

## 2022-01-13 ENCOUNTER — APPOINTMENT (OUTPATIENT)
Dept: OCCUPATIONAL THERAPY | Facility: CLINIC | Age: 47
End: 2022-01-13
Attending: INTERNAL MEDICINE
Payer: COMMERCIAL

## 2022-01-13 ENCOUNTER — APPOINTMENT (OUTPATIENT)
Dept: SPEECH THERAPY | Facility: CLINIC | Age: 47
End: 2022-01-13
Attending: INTERNAL MEDICINE
Payer: COMMERCIAL

## 2022-01-13 PROCEDURE — 99232 SBSQ HOSP IP/OBS MODERATE 35: CPT | Performed by: INTERNAL MEDICINE

## 2022-01-13 PROCEDURE — 250N000013 HC RX MED GY IP 250 OP 250 PS 637: Performed by: NURSE PRACTITIONER

## 2022-01-13 PROCEDURE — 92526 ORAL FUNCTION THERAPY: CPT | Mod: GN | Performed by: SPEECH-LANGUAGE PATHOLOGIST

## 2022-01-13 PROCEDURE — 250N000013 HC RX MED GY IP 250 OP 250 PS 637: Performed by: INTERNAL MEDICINE

## 2022-01-13 PROCEDURE — 250N000013 HC RX MED GY IP 250 OP 250 PS 637: Performed by: HOSPITALIST

## 2022-01-13 PROCEDURE — 97530 THERAPEUTIC ACTIVITIES: CPT | Mod: GP

## 2022-01-13 PROCEDURE — 250N000011 HC RX IP 250 OP 636: Performed by: NURSE PRACTITIONER

## 2022-01-13 PROCEDURE — 97129 THER IVNTJ 1ST 15 MIN: CPT | Mod: GO | Performed by: OCCUPATIONAL THERAPIST

## 2022-01-13 PROCEDURE — 120N000018 HC R&B RESPIRATORY CARE WITH ATTENDANT

## 2022-01-13 PROCEDURE — 250N000013 HC RX MED GY IP 250 OP 250 PS 637: Performed by: FAMILY MEDICINE

## 2022-01-13 PROCEDURE — 999N000157 HC STATISTIC RCP TIME EA 10 MIN

## 2022-01-13 PROCEDURE — 999N000009 HC STATISTIC AIRWAY CARE

## 2022-01-13 PROCEDURE — 92507 TX SP LANG VOICE COMM INDIV: CPT | Mod: GN | Performed by: SPEECH-LANGUAGE PATHOLOGIST

## 2022-01-13 RX ORDER — BUPRENORPHINE 2 MG/1
4 TABLET SUBLINGUAL 3 TIMES DAILY
Status: DISCONTINUED | OUTPATIENT
Start: 2022-01-13 | End: 2022-01-24 | Stop reason: HOSPADM

## 2022-01-13 RX ORDER — PHENOBARBITAL 16.2 MG/1
16.2 TABLET ORAL
Status: DISCONTINUED | OUTPATIENT
Start: 2022-01-13 | End: 2022-01-13

## 2022-01-13 RX ORDER — QUETIAPINE FUMARATE 25 MG/1
25 TABLET, FILM COATED ORAL 4 TIMES DAILY PRN
Status: DISCONTINUED | OUTPATIENT
Start: 2022-01-13 | End: 2022-01-15

## 2022-01-13 RX ORDER — BUPRENORPHINE 2 MG/1
4 TABLET SUBLINGUAL 2 TIMES DAILY
Status: DISCONTINUED | OUTPATIENT
Start: 2022-01-13 | End: 2022-01-13

## 2022-01-13 RX ADMIN — BUPRENORPHINE HYDROCHLORIDE 8 MG: 8 TABLET SUBLINGUAL at 08:46

## 2022-01-13 RX ADMIN — BUPRENORPHINE 4 MG: 2 TABLET SUBLINGUAL at 17:15

## 2022-01-13 RX ADMIN — CHLORHEXIDINE GLUCONATE 0.12% ORAL RINSE 15 ML: 1.2 LIQUID ORAL at 20:43

## 2022-01-13 RX ADMIN — METOPROLOL TARTRATE 50 MG: 50 TABLET, FILM COATED ORAL at 08:20

## 2022-01-13 RX ADMIN — PHENOBARBITAL 243 MG: 16.2 TABLET ORAL at 05:38

## 2022-01-13 RX ADMIN — ENOXAPARIN SODIUM 40 MG: 100 INJECTION SUBCUTANEOUS at 08:33

## 2022-01-13 RX ADMIN — GABAPENTIN 900 MG: 250 SOLUTION ORAL at 14:03

## 2022-01-13 RX ADMIN — PHENOBARBITAL 243 MG: 16.2 TABLET ORAL at 14:03

## 2022-01-13 RX ADMIN — QUETIAPINE 25 MG: 25 TABLET ORAL at 20:43

## 2022-01-13 RX ADMIN — SENNOSIDES 10 ML: 8.8 LIQUID ORAL at 20:43

## 2022-01-13 RX ADMIN — BUPRENORPHINE 4 MG: 2 TABLET SUBLINGUAL at 22:06

## 2022-01-13 RX ADMIN — ACETAMINOPHEN ORAL SOLUTION 650 MG: 650 SOLUTION ORAL at 08:26

## 2022-01-13 RX ADMIN — ARIPIPRAZOLE 7 MG: 1 SOLUTION ORAL at 08:20

## 2022-01-13 RX ADMIN — POLYETHYLENE GLYCOL 3350 17 G: 17 POWDER, FOR SOLUTION ORAL at 08:33

## 2022-01-13 RX ADMIN — SENNOSIDES 10 ML: 8.8 LIQUID ORAL at 08:33

## 2022-01-13 RX ADMIN — PHENOBARBITAL 243 MG: 16.2 TABLET ORAL at 23:16

## 2022-01-13 RX ADMIN — CHLORHEXIDINE GLUCONATE 0.12% ORAL RINSE 15 ML: 1.2 LIQUID ORAL at 08:33

## 2022-01-13 RX ADMIN — QUETIAPINE 12.5 MG: 25 TABLET ORAL at 14:04

## 2022-01-13 RX ADMIN — QUETIAPINE FUMARATE 50 MG: 50 TABLET ORAL at 02:57

## 2022-01-13 RX ADMIN — TRAZODONE HYDROCHLORIDE 100 MG: 50 TABLET ORAL at 20:43

## 2022-01-13 RX ADMIN — Medication 5 MG: at 20:43

## 2022-01-13 RX ADMIN — METOPROLOL TARTRATE 50 MG: 50 TABLET, FILM COATED ORAL at 20:43

## 2022-01-13 RX ADMIN — QUETIAPINE FUMARATE 50 MG: 50 TABLET ORAL at 08:25

## 2022-01-13 RX ADMIN — GABAPENTIN 900 MG: 250 SOLUTION ORAL at 22:07

## 2022-01-13 RX ADMIN — QUETIAPINE 25 MG: 25 TABLET ORAL at 14:20

## 2022-01-13 RX ADMIN — GABAPENTIN 900 MG: 250 SOLUTION ORAL at 05:31

## 2022-01-13 ASSESSMENT — ACTIVITIES OF DAILY LIVING (ADL)
ADLS_ACUITY_SCORE: 17
ADLS_ACUITY_SCORE: 22
ADLS_ACUITY_SCORE: 17
ADLS_ACUITY_SCORE: 22
ADLS_ACUITY_SCORE: 17
ADLS_ACUITY_SCORE: 17
ADLS_ACUITY_SCORE: 22
ADLS_ACUITY_SCORE: 22
ADLS_ACUITY_SCORE: 17
ADLS_ACUITY_SCORE: 22
ADLS_ACUITY_SCORE: 22
ADLS_ACUITY_SCORE: 17
ADLS_ACUITY_SCORE: 17
ADLS_ACUITY_SCORE: 22
ADLS_ACUITY_SCORE: 22
ADLS_ACUITY_SCORE: 19
ADLS_ACUITY_SCORE: 17
ADLS_ACUITY_SCORE: 17
ADLS_ACUITY_SCORE: 22

## 2022-01-13 NOTE — PROGRESS NOTES
Addiction Medicine Progress Note    HPI:    47 yo male in [facility] after prolonged hospitalization for COVID pneumonia requiring ventilatory support.   He has also had problems with delirium, agitation and hallucinations, resulting in large amounts of anti-psychotics, benzodiazepines and opiates.   We were asked to see him to address the latter.       He has a hx of opioid use disorder prior to admission and was on Suboxone 8 mg daily before this hospitalization in Nov. 2021.    Although ex-wife told me today that he used to cut the film in half and take twice a day because he thought it worked better for pain.   On transfer to [facility], the patient was receiving 20 mg oxycodone q 4 hrs and lorazepam over 70 mg per day.   He was transitioned back to buprenorphine using microinduction starting on 1/3 and has been on 8 mg bid since 1/6.      He was started on phenobarbital 243 mg tid to get patient off the lorazepam on 1/4/22, and this is now being tapered.      O:   Ex-wife is concerned about oversedation.   He is breathing OK and  VS stable but is hard to arouse.  O2 sats > 97% on RA    He has been agitated at times and awake at night and sleepy during the day.     Per hospitalist:  Lungs and Heart clear.       Agitated and confused when awake.        Moves all 4 extremities spontaneously    Disconjugate gaze, with R eye lagging.    He is in soft restraints because he was flailing at nurses, trying to climb out of bed.      He is unable to express any complaints of pain.      Did not respond to calling his name, gently shaking his arm or a sternal rub.  R eye dysconjugate gaze.       Assessment:     1.  Acute delirium -  As noted in past notes, the etiology is unclear - and alertness is fluctuating.   However, currently oversedated.        2. Hx of opioid Use Disorder, on Suboxone    3. Hx of chronic pain    4. Prescription benzodiazepine use      Plan:     1. Discussed with hospitalist, who is worried about the  phenobarbital dose.    Also talked to ex wife who visits him everyday.    She is also concerned about the phenobarbital and thinks his oversedation has worsened in the past week, since this was started.    In contrast, he is also intermittently agitated, with hallucinations, which are concerning for BDZ withdrawal.   The ex-wife thinks he has some of this at home and does not think he uses BDZ at home.     I agree that he is very sedated at this time and that this is most prominent.        - Will decrease buprenorphine to 12 mg daily and split dose for better pain control = 4 mg tid   -  Will increase taper of phenobarbital, as patient has been on it for 9 days and dose has been large.   Plan to taper more slowly when down to the lower doses, if sedation is better.   Give higher doses at night.                 -   Monitor VS and especially oxygen saturations and respiratory rate      Maria Del Carmen Dangelo MD  Addiction Medicine Service  [facility]   Page me (click here for Hannah Dangelo)

## 2022-01-13 NOTE — PROGRESS NOTES
Social Work Note:  Patient chart reviewed.  Patient discussed in morning rounds.  Barriers to discharge: SLRx4.  Pelvic restraint, 1:1 sitter.  Trach.    Writer called and spoke with patient's family contact Jama [phone] to answer questions/concerns.    Patient's exact discharge date, time, disposition TBD.  SW will continue to follow for psychosocial and emotional support of patient and family. SW to facilitate discharge to TCU when pt no longer requires LTACH level of care.    Fermin Carroll, [facility]  [phone]

## 2022-01-13 NOTE — PLAN OF CARE
Problem: Restraint for Non-Violent/Non-Self-Destructive Behavior  Goal: Prevent/Manage Potential Problems  Description: Maintain safety of patient and others during period of restraint.  Promote psychological and physical wellbeing.  Prevent injury to skin and involved body parts.  Promote nutrition, hydration, and elimination.  Outcome: No Change     Problem: Violence Risk or Actual  Goal: Anger and Impulse Control  Outcome: No Change   Pt slept through from 1900 to 2300. No s/sx of discomfort noted.  Pt shifting position from time to time, Bilateral wrist and bilateral ankle restraints, bedside sitter continued for safety. Will continue monitoring.

## 2022-01-13 NOTE — PROGRESS NOTES
[facility]    Medicine Progress Note - Hospitalist Service       Date of Admission:  12/31/2021    Summary:    Jared North is a 46 year old male who is unvaccinated against COVID-19, with history of polysubstance abuse and chronic pain/lower back pain on Suboxone prior to admission (had been on methadone in the past but not in recent past), mood disorder/depression, HTN, ROSSY and mild intermittent asthma. Patient was diagnosed with COVID-19 viral infection on 11/19/2021. He presented to [facility] on 11/24/2021 with worsening shortness of breath. He was admitted to the ICU with COVID-19 viral pneumonia and severe acute respiratory failure with hypoxia requiring noninvasive ventilation.  He received treatment with Decadron, remdesivir, and tocilizumab. He needed supplemental oxygen alternating with NIPPV and HFNC.  He had an episode of afib during this time that eventually resolved and has not recurred after briefly being treated with amiodarone.  He did not improve and was eventually intubated on 11/28/21 and then proned and started on veletri. On 11/30/21, he was started on cefepime and vanco for VAP. Culture grew E. Coli and Proteus mirabilis.  He did develop hypotension possibly from septic shock vs sedation vasoplegia and was treated with norepi intemittently.  Vanco was stopped on 12/5/21.  He was then transferred to [place] [facility]'s ICU on 12/5/21.       At [place] he was followed in ICU and continued on vent/paralysis/proning/pressors.  Cefepime switched to rocephin 12/6/21.  He continued to be very encephalopathic and psychiatry was consulted. He was weaned off pressors and didn't require proning by 12/10/21.  He has been on large doses of anti-psychotics and sedatives.  He developed fevers again 12/14 but had clinically improved somewhat and was extubated on 12/15/21. On 12/16/21 he had severely increased work of breathing and so was re-intubated. Blood culture grew Streptococcus constellatus and sputum  culture grew that as well but also had E. Coli/Proteus m./staph epi/candida parapsilosis. Vanco/Zosyn was started on 12/16/21.  He then underwent trach/PEG on 12/17/21.  Vanco was stopped after 3 days and Zosyn switched to rocephin on 12/21/21 to complete on 1/2/21.       During this time he had significant issues with encephalopathy and agitation. As above he was started on multiple meds for this. Prior to transfer to [facility], they had increased his lorazepam up to 12 mg every 4 hours and oxycodone 20 mg every 4 hours along with abilify 7 mg (home med), gabapentin 800 mg every 8 hours, keppra 500 mg twice daily (for behavior), zyprexa 5 mg twice daily, and seroquel 75 mg TID with 100 mg HS.  With this regimen, he improved reportedly in the ICU but was still awake and agitated.  He was transferred to [facility] on 12/31/21.      Assessment & Plan      Acute respiratory failure with hypoxia: due to acute respiratory distress syndrome (ARDS) secondary to COVID-19 pneumonia. Initially intubated on 11/28. Failed extubation and reintubated on 12/15/21. S/p tracheostomy on 12/17/21. Capped since 1/7/22. Currently stable on room air.   - Patient has high risk of aspiration due to oral pharyngeal dysphagia and overall cognitive dysfunction. Per pulmonary, will continue to monitor closely and decannulate when appropriate.   - Continue nebs prn    Acute metabolic encephalopathy: was on high dose of sedating medication prior to transfer to [facility]. Psych and addiction medicine have been involved in adjusting the medication. Lorazepam, Keppra and oxycodone were tapered off. Currently on Abilify 7 mg daily (home dose), subutex 8 mg qam and 8 mg qpm for chronic pain and opioid use, neurontin 900 mg tid, phenobarbital 243 mg tid for benzo withdrawal, seroquel 12.5 mg bid, and trazodone 100 mg qhs.  Per discussed with addiction medicine, would be ideal to hold further Ativan given increasing somnolence. Patient remains confused,  agitated.  - Continue soft restraints and bedside sitter. Wean as able  - Change seroquel 12.5 mg bid during the day, 25 mg at bedtime and 25 mg qid prn  - Continue melatonin 5 mg and trazodone 100 mg at bedtime.  - Continue phenobarbital 243 mg tid for benzo withdrawal. Family has concerns about the use of this medication. Further taper per addition medicine.      Oropharyngeal dysphagia: started diet and on back up bolus tube feed  - Further diet modification and tube feed per speech therapy and dietitian    Mild intermittent dysconjugate gaze: his ex-wife states she has noted this in the past when he is on certain medications like ativan but not if he is off all meds.     Chronic back pain: Patient was on Suboxone prior to admission.  Currently on suboxone as above. Also on gabapentin 900 mg tid as at home.     Essential hypertension: BP controlled. On metoprolol. Monitor BP.      Major depressive disorder, recurrent episode, severe: continue abilify, seroquel, trazodone.  Off zyprexa     Opioid type dependence: Per addiction medicine service, on phenobarb and resumed subutex.      Polysubstance abuse as above     ROSSY (obstructive sleep apnea): management per respiratory failure as above     Mild intermittent asthma: nebs as needed     Recent Ventilator associated pneumonia: Completed antibiotics on 1/3/22.     Thrush - On nystatin     Severe Malnutrition, POA: based on nutrition assessment             Diet: Adult Formula Drip Feeding: Continuous Promote w fiber; Gastrostomy; Goal Rate: 150 ml/hr x 2 hrs after meals if eats <50% of meal; mL/hr; Medication - Feeding Tube Flush Frequency: At least 15-30 mL water before and after medication administratio...  Minced & Moist Diet (level 5) Mildly Thick (level 2)    DVT Prophylaxis: Enoxaparin (Lovenox) SQ  Wei Catheter: Not present  Central Lines: None  Code Status: Full Code      Disposition Plan   Expected Discharge: 01/26/2022     Anticipated discharge location:  home with family    Delays:     Complex Care  1:1 Sitter            The patient's care was discussed with the Bedside Nurse and Care Coordinator/.    Discussed with plan of care with his ex-wife over the phone today.       Kenn Leal MD  Hospitalist Service  LTACH  Securely message with the Vocera Web Console (learn more here)  Text page via [facility] Paging/Directory      ______________________________________________________________________    Interval History   Patient slept longer throughout the night. He woke up in the middle of the night and became agitated. Additional Seroquel was given. When patient was seen and examined this morning, he opened his eyes when his name was called. He then became agitated and tried to climb out of bed. He needed straight cath once yesterday during the day, but able to urinate later the day.     Data reviewed today: I reviewed all medications, new labs and imaging results over the last 24 hours.     Physical Exam   Vital Signs: Temp: 98  F (36.7  C) Temp src: Axillary BP: 117/77 Pulse: 89   Resp: 20 SpO2: 97 % O2 Device: Nasal cannula Oxygen Delivery: 2 LPM  Weight: 178 lbs 9.6 oz    General appearance: not in acute distress  HEENT: PERRL, EOMI  Lungs: Clear breath sounds in bilateral lung fields  Cardiovascular: Regular rate and rhythm, normal S1-S2  Abdomen: Soft, non tender, no distension.   Musculoskeletal: No joint swelling  Skin: No rash and no edema  Neurology: Awake and alert. Confused and agitated.  Cranial nerves II - XII normal.  Normal muscle strength in all four extremities.    Data   Recent Labs   Lab 01/11/22  0639 01/10/22  0612 01/09/22  0728 01/07/22  0622 01/06/22  1256   PLT  --  203  --   --   --    POTASSIUM 3.9 3.8 4.2   < >  --    CR  --  0.59*  --   --  0.63*    < > = values in this interval not displayed.         Restraint face-to-face    Within an hour after restraint an in person face to face assessment was completed, including an  evaluation of the patient's immediate reaction to the intervention, behavioral assessment and review/assessment of history, drugs and medications, recent labs, etc., and behavioral condition.  The patient experienced: No adverse physical outcome from seclusion/restraint initiation.  The intervention of restraint or seclusion needs to continue.

## 2022-01-13 NOTE — PLAN OF CARE
Problem: Communication Impairment (Artificial Airway)  Goal: Effective Communication  Outcome: Improving     Problem: Device-Related Complication Risk (Artificial Airway)  Goal: Optimal Device Function  Outcome: Improving     Problem: Skin and Tissue Injury (Artificial Airway)  Goal: Absence of Device-Related Skin or Tissue Injury  Outcome: Improving      RT PROGRESS NOTE     DATA:     CURRENT SETTINGS:              TRACH TYPE / SIZE: 7 Bivona 1/04/22             MODE:   Cap              FIO2:   2LNC     ACTION:             THERAPIES:                SUCTION:                           FREQUENCY:                          AMOUNT:                          CONSISTENCY:                        COLOR:              SPONTANEOUS COUGH EFFORT/STRENGTH OF EFFORT (not elicited by suctioning): Yes/strong                              WEANING PHASE:   3                        WEAN MODE:  Cap on RA day                        WEAN TIME:   As tolerated                         END WEAN REASON:      RESPONSE:             BS: Clear              VITAL SIGNS: Sat 92-97%, HR 89-94, RR 18-20             EMOTIONAL NEEDS / CONCERNS:                  RISK FOR SELF DECANNULATION:  Yes                         RISK DUE TO:  Confusion                        INTERVENTION/S IN PLACE IS/ARE:  4.0 bilateral restraints in place     NOTE / PLAN:   Cont with plan. Pt remains 1:1 d/t impulsive behavior.

## 2022-01-13 NOTE — PROGRESS NOTES
NUTRITION THERAPY FOLLOW UP NOTE    Diet order:    MM5 with Mildly Thick Liquids and Tube Feeding   Enteral: via PEG placed 21:     BACK UP BOLUS  Adult Formula: Promote w fiber  Route: Gastrostomy  Goal Rate: 150 ml/hr x 2 hours if eats less than 50% of meal  Medication - Feeding Tube Flush Frequency: At least 15-30 mL water before and after medication administration and with tube clogging   mL q 4 hrs (increased by MD )  Each bolus enteral feeding provides the followin calories, 18 grams protein, 41 g CHO, 4 g fiber, and 249 ml free water.    Current Intake:  Pt eating 0-50% of meals; does better with 1:1 sitter feeding pt; has received 1 bolus feeding each of the past 4 days.    GI:  BM 1/10 per nursing charting    Labs:  Mg and K WNL     Weights:  178.5#   Weights have been quite stable between 176.5-182# since admission     Nutrition Diagnosis  Malnutrition related to acute illness, intubated x1 month as evidenced by weight loss, muscle wasting, subcutaneous fat loss-improving     Swallowing difficulty r/t COVID-19 and mechanical ventilation as evidenced by need for mechanically altered diet  Evaluation: continuing       Goal - diet advancement -continuing    Intervention:  Continue with current plan.       Monitoring and Evaluation:  Diet advance, po intake, need for TF

## 2022-01-13 NOTE — PROGRESS NOTES
Pulmonary/CC  Medicine - Ventilator Management :    46 year old male, unvaccinated against COVID-19, with history of polysubstance abuse and chronic pain/lower back pain on suboxone, mood disorder/depression, HTN, ROSSY, mild intermittent asthma, presented with respiratory failure/COVID ARDS intubated on 11/28. Initially requiring proning/paralysis, veletri, now down trending vent needs, issues with agitation, treated for VAP. Not tolerating weaning trials, failed extubation 12/15, re-intubated, S/p trach and PEG tube 12/17.    Diagnosis:   Patient Active Problem List   Diagnosis     Chronic back pain     Essential hypertension     Elevated LFTs     Acute on chronic respiratory failure with hypoxia (H)     Pneumonia due to 2019 novel coronavirus     Atrial fibrillation with rapid ventricular response (H)     Acute respiratory distress syndrome (ARDS) due to COVID-19 virus (H)     Major depressive disorder, recurrent episode, severe (H)     Opioid use disorder, severe, dependence (H)     Polysubstance abuse (H)     ROSSY (obstructive sleep apnea)     Mild intermittent asthma     Acute metabolic encephalopathy     Ventilator associated pneumonia (H)     Oropharyngeal dysphagia       Chief complaint: Ongoing respiratory failure    Objective:  Significant change in clinical status during past 24 hours? - No    Tracheal secretions: none, and not recorded for next shift    Capped since 1/7, currently on 2-3 LPM O2  Ventilator weaning results from yesterday: off vent since 1/4      Subjective:  Restful, unable to obtain    Physical exam:   General: presently in sedated  HEENMT: #7 bivona in place; site clean.   Lungs: Diminished, clear  Heart: RRR, S1 and S2            Abdomen: Soft, non-tender.   Skin: skin color normal, texture normal, no rashes or lesions.   Extremities:  No edema noted.   Neuro:  PURDY interactive when not sleeping    LABS:  Reviewed    IMAGING/STUDIES:    1/7/21 VFSS:  1.  Adequate oral phase.  2.  Delay in  initiation of swallow reflex with pour over to the pyriform sinuses during swallowing of thin liquid.  3.  Complete epiglottic inversion.  4.  Aspiration of thin liquid.  5.  No significant stasis.      Recommendations/Plans:  1. Respiratory failure:    Capped since 1/7. Am VBG 7.44/44    Chart history ROSSY, no AHI reported. Requiring O2 at night    Ongoing taper/adjustment of buprenorphine and phenobarb for withdrawal, with alternating agitation and encephalopathy/sedation, concern for dysphagia as below. Would monitor for now and consider decannulation after more time/improvement in mental status, swallow.    2. Tracheostomy:    1/4 changed to  # 7 Bivona     Routine care and changes    3. SLP:  Patient presented with mild oral pharyngeal dysphagia functionally. However patient's distractibility and overall cognitive dysfunction places patient at significantly high risk for aspiration due to inattention of bolus.  Risk is particularly high with thin liquids and more advanced food textures that require chewing prior to swallow.   - 1/7 VFSS: aspiration of thin liquids      Discussed with RT.      No Lloyd MD  Pulmonary and Critical Care Medicine  [facility]  Office: [phone]

## 2022-01-13 NOTE — PLAN OF CARE
Problem: Restraint for Non-Violent/Non-Self-Destructive Behavior  Goal: Prevent/Manage Potential Problems  Description: Maintain safety of patient and others during period of restraint.  Promote psychological and physical wellbeing.  Prevent injury to skin and involved body parts.  Promote nutrition, hydration, and elimination.  Outcome: No Change     Problem: Violence Risk or Actual  Goal: Anger and Impulse Control  Outcome: No Change   Patient woke up at around 0800 agitated and calling out; he tried to sit up in bed and was restless and at times weepy.. Still on 4 point soft limb restraint. He ate only a few bites with speech therapist feeding him; he was not too alert to finish his breakfast and does not sometimes open his mouth; bolus tube feeding started. Patient continues to kick in bed and screams obscene expressions. Presence of family on the videocamera not helpful. Seroquel 50 mg and tylenol 650 mg given; patient alternately sleeping and screaming/kicking every few minutes.  Patient sat up on the chair per wife's request. Stood up for a few minutes with physical therapist. Continues to be very restless and agitated especially when the wife left. Seroquel 25 mg prn dose given.  Patient had continent and some incontinent void; had incontinent, medium bowel movement.

## 2022-01-13 NOTE — PLAN OF CARE
"  Problem: Adult Inpatient Plan of Care  Goal: Optimal Comfort and Wellbeing  Outcome: No Change     Problem: Restraint for Non-Violent/Non-Self-Destructive Behavior  Goal: Prevent/Manage Potential Problems  Description: Maintain safety of patient and others during period of restraint.  Promote psychological and physical wellbeing.  Prevent injury to skin and involved body parts.  Promote nutrition, hydration, and elimination.  Outcome: No Change     Problem: Adult Inpatient Plan of Care  Goal: Absence of Hospital-Acquired Illness or Injury  Intervention: Prevent Skin Injury  Recent Flowsheet Documentation  Taken 1/13/2022 0600 by Hanna Carney RN  Body Position:    left    turned    foot of bed elevated  Taken 1/13/2022 0410 by Hanna Carney RN  Body Position:    right    turned    heels elevated  Taken 1/13/2022 0216 by Hanna Carney RN  Body Position: position maintained  Taken 1/13/2022 0005 by Hanna Carney RN  Body Position:    turned    supine    Sleeping at the start of the shift, woke up middle of the night, restless, kicking, prn seroquel given, sleeping after. Was weepy early this morning, calling out \"Jama\", redirected, went back to sleep. Bilateral soft wrists restraints and bilateral soft ankle restraints intact, for safety, monitored. Slept more than 6 hours the whole night.  "

## 2022-01-14 ENCOUNTER — APPOINTMENT (OUTPATIENT)
Dept: OCCUPATIONAL THERAPY | Facility: CLINIC | Age: 47
End: 2022-01-14
Attending: INTERNAL MEDICINE
Payer: COMMERCIAL

## 2022-01-14 PROCEDURE — 999N000157 HC STATISTIC RCP TIME EA 10 MIN

## 2022-01-14 PROCEDURE — 250N000013 HC RX MED GY IP 250 OP 250 PS 637: Performed by: NURSE PRACTITIONER

## 2022-01-14 PROCEDURE — 250N000013 HC RX MED GY IP 250 OP 250 PS 637: Performed by: INTERNAL MEDICINE

## 2022-01-14 PROCEDURE — 120N000018 HC R&B RESPIRATORY CARE WITH ATTENDANT

## 2022-01-14 PROCEDURE — 999N000009 HC STATISTIC AIRWAY CARE

## 2022-01-14 PROCEDURE — 250N000013 HC RX MED GY IP 250 OP 250 PS 637: Performed by: HOSPITALIST

## 2022-01-14 PROCEDURE — 99232 SBSQ HOSP IP/OBS MODERATE 35: CPT | Performed by: INTERNAL MEDICINE

## 2022-01-14 PROCEDURE — 250N000011 HC RX IP 250 OP 636: Performed by: NURSE PRACTITIONER

## 2022-01-14 RX ADMIN — ACETAMINOPHEN ORAL SOLUTION 650 MG: 650 SOLUTION ORAL at 16:31

## 2022-01-14 RX ADMIN — ACETAMINOPHEN ORAL SOLUTION 650 MG: 650 SOLUTION ORAL at 03:32

## 2022-01-14 RX ADMIN — Medication 5 MG: at 21:15

## 2022-01-14 RX ADMIN — ACETAMINOPHEN ORAL SOLUTION 650 MG: 650 SOLUTION ORAL at 22:38

## 2022-01-14 RX ADMIN — CHLORHEXIDINE GLUCONATE 0.12% ORAL RINSE 15 ML: 1.2 LIQUID ORAL at 08:49

## 2022-01-14 RX ADMIN — SENNOSIDES 10 ML: 8.8 LIQUID ORAL at 08:50

## 2022-01-14 RX ADMIN — QUETIAPINE 12.5 MG: 25 TABLET ORAL at 13:36

## 2022-01-14 RX ADMIN — BUPRENORPHINE 4 MG: 2 TABLET SUBLINGUAL at 15:41

## 2022-01-14 RX ADMIN — METOPROLOL TARTRATE 50 MG: 50 TABLET, FILM COATED ORAL at 08:50

## 2022-01-14 RX ADMIN — QUETIAPINE 25 MG: 25 TABLET ORAL at 03:32

## 2022-01-14 RX ADMIN — QUETIAPINE 25 MG: 25 TABLET ORAL at 22:38

## 2022-01-14 RX ADMIN — BUPRENORPHINE 4 MG: 2 TABLET SUBLINGUAL at 22:38

## 2022-01-14 RX ADMIN — GABAPENTIN 900 MG: 250 SOLUTION ORAL at 07:11

## 2022-01-14 RX ADMIN — WHITE PETROLATUM: 1.75 OINTMENT TOPICAL at 08:51

## 2022-01-14 RX ADMIN — TRAZODONE HYDROCHLORIDE 100 MG: 50 TABLET ORAL at 21:15

## 2022-01-14 RX ADMIN — BUPRENORPHINE 4 MG: 2 TABLET SUBLINGUAL at 08:50

## 2022-01-14 RX ADMIN — QUETIAPINE 25 MG: 25 TABLET ORAL at 16:31

## 2022-01-14 RX ADMIN — QUETIAPINE 25 MG: 25 TABLET ORAL at 21:15

## 2022-01-14 RX ADMIN — ENOXAPARIN SODIUM 40 MG: 100 INJECTION SUBCUTANEOUS at 08:50

## 2022-01-14 RX ADMIN — POLYETHYLENE GLYCOL 3350 17 G: 17 POWDER, FOR SOLUTION ORAL at 08:50

## 2022-01-14 RX ADMIN — METOPROLOL TARTRATE 50 MG: 50 TABLET, FILM COATED ORAL at 21:15

## 2022-01-14 RX ADMIN — QUETIAPINE 12.5 MG: 25 TABLET ORAL at 08:50

## 2022-01-14 RX ADMIN — PHENOBARBITAL 243 MG: 16.2 TABLET ORAL at 08:49

## 2022-01-14 RX ADMIN — ARIPIPRAZOLE 7 MG: 1 SOLUTION ORAL at 08:49

## 2022-01-14 RX ADMIN — GABAPENTIN 900 MG: 250 SOLUTION ORAL at 21:15

## 2022-01-14 RX ADMIN — GABAPENTIN 900 MG: 250 SOLUTION ORAL at 13:35

## 2022-01-14 RX ADMIN — PHENOBARBITAL 178.2 MG: 16.2 TABLET ORAL at 16:30

## 2022-01-14 ASSESSMENT — ACTIVITIES OF DAILY LIVING (ADL)
ADLS_ACUITY_SCORE: 24
ADLS_ACUITY_SCORE: 22
ADLS_ACUITY_SCORE: 22
ADLS_ACUITY_SCORE: 24
ADLS_ACUITY_SCORE: 22
ADLS_ACUITY_SCORE: 24
ADLS_ACUITY_SCORE: 24
ADLS_ACUITY_SCORE: 22
ADLS_ACUITY_SCORE: 24
ADLS_ACUITY_SCORE: 22
ADLS_ACUITY_SCORE: 24
ADLS_ACUITY_SCORE: 22
ADLS_ACUITY_SCORE: 22
ADLS_ACUITY_SCORE: 24
ADLS_ACUITY_SCORE: 22
ADLS_ACUITY_SCORE: 24
ADLS_ACUITY_SCORE: 24

## 2022-01-14 NOTE — PROGRESS NOTES
[facility]    Medicine Progress Note - Hospitalist Service       Date of Admission:  12/31/2021    Summary:    Jared North is a 46 year old male who is unvaccinated against COVID-19, with history of polysubstance abuse and chronic pain/lower back pain on Suboxone prior to admission (had been on methadone in the past but not in recent past), mood disorder/depression, HTN, ROSSY and mild intermittent asthma. Patient was diagnosed with COVID-19 viral infection on 11/19/2021. He presented to [facility] on 11/24/2021 with worsening shortness of breath. He was admitted to the ICU with COVID-19 viral pneumonia and severe acute respiratory failure with hypoxia requiring noninvasive ventilation.  He received treatment with Decadron, remdesivir, and tocilizumab. He needed supplemental oxygen alternating with NIPPV and HFNC.  He had an episode of afib during this time that eventually resolved and has not recurred after briefly being treated with amiodarone.  He did not improve and was eventually intubated on 11/28/21 and then proned and started on veletri. On 11/30/21, he was started on cefepime and vanco for VAP. Culture grew E. Coli and Proteus mirabilis.  He did develop hypotension possibly from septic shock vs sedation vasoplegia and was treated with norepi intemittently.  Vanco was stopped on 12/5/21.  He was then transferred to [place]'s ICU on 12/5/21.       At [place] he was followed in ICU and continued on vent/paralysis/proning/pressors.  Cefepime switched to rocephin 12/6/21.  He continued to be very encephalopathic and psychiatry was consulted. He was weaned off pressors and didn't require proning by 12/10/21.  He has been on large doses of anti-psychotics and sedatives.  He developed fevers again 12/14 but had clinically improved somewhat and was extubated on 12/15/21. On 12/16/21 he had severely increased work of breathing and so was re-intubated. Blood culture grew Streptococcus constellatus and sputum  culture grew that as well but also had E. Coli/Proteus m./staph epi/candida parapsilosis. Vanco/Zosyn was started on 12/16/21.  He then underwent trach/PEG on 12/17/21.  Vanco was stopped after 3 days and Zosyn switched to rocephin on 12/21/21 to complete on 1/2/21.       During this time he had significant issues with encephalopathy and agitation. As above he was started on multiple meds for this. Prior to transfer to [facility], they had increased his lorazepam up to 12 mg every 4 hours and oxycodone 20 mg every 4 hours along with abilify 7 mg (home med), gabapentin 800 mg every 8 hours, keppra 500 mg twice daily (for behavior), zyprexa 5 mg twice daily, and seroquel 75 mg TID with 100 mg HS.  With this regimen, he improved reportedly in the ICU but was still awake and agitated.  He was transferred to [facility] on 12/31/21.      Assessment & Plan      Acute respiratory failure with hypoxia: due to acute respiratory distress syndrome (ARDS) secondary to COVID-19 pneumonia. Initially intubated on 11/28. Failed extubation and reintubated on 12/15/21. S/p tracheostomy on 12/17/21. Capped since 1/7/22. Currently stable on room air.   - Patient has high risk of aspiration due to oral pharyngeal dysphagia and overall cognitive dysfunction. Per pulmonary, will continue to monitor closely and decannulate when appropriate.   - Continue nebs prn    Acute metabolic encephalopathy: was on high dose of sedating medication prior to transfer to [facility]. Psych and addiction medicine have been involved in adjusting the medication. Lorazepam, Keppra and oxycodone were tapered off. Currently on Abilify 7 mg daily (home dose), subutex 8 mg qam and 8 mg qpm for chronic pain and opioid use, neurontin 900 mg tid, phenobarbital 243 mg tid for benzo withdrawal, seroquel 12.5 mg bid, and trazodone 100 mg qhs.  Per discussed with addiction medicine, would be ideal to hold further Ativan given increasing somnolence. Patient remains confused,  agitated.  - Continue soft restraints and bedside sitter. Wean as able  - Change seroquel 12.5 mg bid during the day, 25 mg at bedtime and 25 mg qid prn  - Continue melatonin 5 mg and trazodone 100 mg at bedtime.  - On phenobarbital for benzo withdrawal. Family has concerns about the use of this medication. Per addition medicine, start to slowly taper. Currently on 243 mg bid with additional taping dose.     Oropharyngeal dysphagia: started diet and on back up bolus tube feed  - Further diet modification and tube feed per speech therapy and dietitian    Mild intermittent dysconjugate gaze: his ex-wife states she has noted this in the past when he is on certain medications like ativan but not if he is off all meds.     Chronic back pain: Patient was on Suboxone prior to admission.  Currently on suboxone as above. Also on gabapentin 900 mg tid as at home.     Essential hypertension: BP controlled. On metoprolol. Monitor BP.      Major depressive disorder, recurrent episode, severe: continue abilify, seroquel, trazodone.  Off zyprexa     Opioid type dependence: Per addiction medicine service, on phenobarb and resumed subutex.      Polysubstance abuse as above     ROSSY (obstructive sleep apnea): management per respiratory failure as above     Mild intermittent asthma: nebs as needed     Recent Ventilator associated pneumonia: Completed antibiotics on 1/3/22.     Thrush - On nystatin     Severe Malnutrition, POA: based on nutrition assessment             Diet: Adult Formula Drip Feeding: Continuous Promote w fiber; Gastrostomy; Goal Rate: 150 ml/hr x 2 hrs after meals if eats <50% of meal; mL/hr; Medication - Feeding Tube Flush Frequency: At least 15-30 mL water before and after medication administratio...  Minced & Moist Diet (level 5) Mildly Thick (level 2)    DVT Prophylaxis: Enoxaparin (Lovenox) SQ  Wei Catheter: Not present  Central Lines: None  Code Status: Full Code      Disposition Plan   Expected Discharge:  01/27/2022     Anticipated discharge location: home with family    Delays:     Complex Care  1:1 Sitter            The patient's care was discussed with the Bedside Nurse and Care Coordinator/.          Kenn Leal MD  Hospitalist Service  [facility]  Securely message with the Vocera Web Console (learn more here)  Text page via [facility] Paging/Directory      ______________________________________________________________________    Interval History   Patient was awake and alert this morning. He was very agitated and confused. He called out his ex-wife's name loud. He did not report any pain.     Data reviewed today: I reviewed all medications, new labs and imaging results over the last 24 hours.     Physical Exam   Vital Signs: Temp: 97.8  F (36.6  C) Temp src: Axillary BP: 129/81 Pulse: 96   Resp: 22 SpO2: 96 % O2 Device: Nasal cannula Oxygen Delivery: 2 LPM  Weight: 178 lbs 9.6 oz    General appearance: not in acute distress  HEENT: PERRL, EOMI  Lungs: Clear breath sounds in bilateral lung fields  Cardiovascular: Regular rate and rhythm, normal S1-S2  Abdomen: Soft, non tender, no distension.   Musculoskeletal: No joint swelling  Skin: No rash and no edema  Neurology: Awake and alert. Confused and agitated.  Cranial nerves II - XII normal.  Normal muscle strength in all four extremities.    Data   Recent Labs   Lab 01/11/22  0639 01/10/22  0612 01/09/22  0728   PLT  --  203  --    POTASSIUM 3.9 3.8 4.2   CR  --  0.59*  --          Restraint face-to-face    Within an hour after restraint an in person face to face assessment was completed, including an evaluation of the patient's immediate reaction to the intervention, behavioral assessment and review/assessment of history, drugs and medications, recent labs, etc., and behavioral condition.  The patient experienced: No adverse physical outcome from seclusion/restraint initiation.  The intervention of restraint or seclusion needs to continue.

## 2022-01-14 NOTE — PLAN OF CARE
Problem: Restraint for Non-Violent/Non-Self-Destructive Behavior  Goal: Prevent/Manage Potential Problems  Description: Maintain safety of patient and others during period of restraint.  Promote psychological and physical wellbeing.  Prevent injury to skin and involved body parts.  Promote nutrition, hydration, and elimination.  Outcome: No Change     Problem: Violence Risk or Actual  Goal: Anger and Impulse Control  Outcome: No Change   Pt was awake all night, slept on and off for total of approx 2 hours. PRN Tylenol and Seroquel given but not effective. Pt was yelling all night, pulling on restraints and oxygen tubing and putting legs over the side rails, Sitter at bedside continues for safety. Will continue monitoring.

## 2022-01-14 NOTE — PLAN OF CARE
Problem: Communication Impairment (Artificial Airway)  Goal: Effective Communication  Outcome: Improving     Problem: Device-Related Complication Risk (Artificial Airway)  Goal: Optimal Device Function  Outcome: Improving     Problem: Skin and Tissue Injury (Artificial Airway)  Goal: Absence of Device-Related Skin or Tissue Injury  Outcome: Improving      RT PROGRESS NOTE     DATA:     CURRENT SETTINGS:              TRACH TYPE / SIZE: 7 Bivona 1/04/22             MODE:   Cap              FIO2:   2LNC     ACTION:             THERAPIES:                SUCTION:                           FREQUENCY:                          AMOUNT:                          CONSISTENCY:                        COLOR:              SPONTANEOUS COUGH EFFORT/STRENGTH OF EFFORT (not elicited by suctioning): Yes/strong                              WEANING PHASE:   3                        WEAN MODE:  Cap on RA day                        WEAN TIME:   As tolerated                         END WEAN REASON:      RESPONSE:             BS: Clear              VITAL SIGNS: Sat 92-98%, HR 97-99, RR 16-20             EMOTIONAL NEEDS / CONCERNS:                  RISK FOR SELF DECANNULATION:  Yes                         RISK DUE TO:  Confusion                        INTERVENTION/S IN PLACE IS/ARE:  4.0 bilateral restraints in place     NOTE / PLAN:   Cont with plan. Pt remains 1:1 d/t impulsive behavior.

## 2022-01-14 NOTE — PLAN OF CARE
Problem: Restraint for Non-Violent/Non-Self-Destructive Behavior  Goal: Prevent/Manage Potential Problems  Description: Maintain safety of patient and others during period of restraint.  Promote psychological and physical wellbeing.  Prevent injury to skin and involved body parts.  Promote nutrition, hydration, and elimination.  Outcome: Improving     Pt alert but severely confused and agitated. Pt slept for about two hours this morning. When awake, pt talks constantly in an illogical and confused fashion - sometimes yelling, sometimes crying. Pt spent time up in chair.  Pt produced one extremely wet diaper this morning. Did not bladder scan.  Pt's ex wife visited, and is present at time of writing.  All care needs met.

## 2022-01-15 ENCOUNTER — APPOINTMENT (OUTPATIENT)
Dept: PHYSICAL THERAPY | Facility: CLINIC | Age: 47
End: 2022-01-15
Attending: INTERNAL MEDICINE
Payer: COMMERCIAL

## 2022-01-15 PROCEDURE — 250N000011 HC RX IP 250 OP 636: Performed by: NURSE PRACTITIONER

## 2022-01-15 PROCEDURE — 97116 GAIT TRAINING THERAPY: CPT | Mod: GP

## 2022-01-15 PROCEDURE — 250N000013 HC RX MED GY IP 250 OP 250 PS 637: Performed by: HOSPITALIST

## 2022-01-15 PROCEDURE — 250N000011 HC RX IP 250 OP 636: Performed by: INTERNAL MEDICINE

## 2022-01-15 PROCEDURE — 999N000157 HC STATISTIC RCP TIME EA 10 MIN

## 2022-01-15 PROCEDURE — 250N000013 HC RX MED GY IP 250 OP 250 PS 637: Performed by: NURSE PRACTITIONER

## 2022-01-15 PROCEDURE — 999N000009 HC STATISTIC AIRWAY CARE

## 2022-01-15 PROCEDURE — 250N000013 HC RX MED GY IP 250 OP 250 PS 637: Performed by: INTERNAL MEDICINE

## 2022-01-15 PROCEDURE — 99233 SBSQ HOSP IP/OBS HIGH 50: CPT | Performed by: INTERNAL MEDICINE

## 2022-01-15 PROCEDURE — 120N000018 HC R&B RESPIRATORY CARE WITH ATTENDANT

## 2022-01-15 RX ORDER — HALOPERIDOL 5 MG/ML
2 INJECTION INTRAMUSCULAR EVERY 4 HOURS PRN
Status: DISCONTINUED | OUTPATIENT
Start: 2022-01-15 | End: 2022-01-15

## 2022-01-15 RX ORDER — HALOPERIDOL 2 MG/ML
2 SOLUTION ORAL EVERY 4 HOURS PRN
Status: DISCONTINUED | OUTPATIENT
Start: 2022-01-15 | End: 2022-01-16

## 2022-01-15 RX ORDER — HALOPERIDOL 2 MG/ML
2 SOLUTION ORAL EVERY 6 HOURS PRN
Status: DISCONTINUED | OUTPATIENT
Start: 2022-01-15 | End: 2022-01-15

## 2022-01-15 RX ORDER — HALOPERIDOL 5 MG/ML
2 INJECTION INTRAMUSCULAR EVERY 4 HOURS PRN
Status: DISCONTINUED | OUTPATIENT
Start: 2022-01-15 | End: 2022-01-24 | Stop reason: HOSPADM

## 2022-01-15 RX ADMIN — GABAPENTIN 900 MG: 250 SOLUTION ORAL at 13:56

## 2022-01-15 RX ADMIN — BUPRENORPHINE 4 MG: 2 TABLET SUBLINGUAL at 22:35

## 2022-01-15 RX ADMIN — Medication 5 MG: at 20:20

## 2022-01-15 RX ADMIN — PHENOBARBITAL 113.4 MG: 16.2 TABLET ORAL at 16:17

## 2022-01-15 RX ADMIN — TRAZODONE HYDROCHLORIDE 100 MG: 50 TABLET ORAL at 20:23

## 2022-01-15 RX ADMIN — ACETAMINOPHEN ORAL SOLUTION 650 MG: 650 SOLUTION ORAL at 16:17

## 2022-01-15 RX ADMIN — BUPRENORPHINE 4 MG: 2 TABLET SUBLINGUAL at 08:12

## 2022-01-15 RX ADMIN — BUPRENORPHINE 4 MG: 2 TABLET SUBLINGUAL at 14:35

## 2022-01-15 RX ADMIN — ACETAMINOPHEN ORAL SOLUTION 650 MG: 650 SOLUTION ORAL at 04:00

## 2022-01-15 RX ADMIN — METOPROLOL TARTRATE 50 MG: 50 TABLET, FILM COATED ORAL at 08:13

## 2022-01-15 RX ADMIN — PHENOBARBITAL 243 MG: 16.2 TABLET ORAL at 08:27

## 2022-01-15 RX ADMIN — SENNOSIDES 10 ML: 8.8 LIQUID ORAL at 08:14

## 2022-01-15 RX ADMIN — WHITE PETROLATUM: 1.75 OINTMENT TOPICAL at 08:28

## 2022-01-15 RX ADMIN — Medication 2 MG: at 16:17

## 2022-01-15 RX ADMIN — CHLORHEXIDINE GLUCONATE 0.12% ORAL RINSE 15 ML: 1.2 LIQUID ORAL at 08:12

## 2022-01-15 RX ADMIN — PHENOBARBITAL 243 MG: 16.2 TABLET ORAL at 23:04

## 2022-01-15 RX ADMIN — CHLORHEXIDINE GLUCONATE 0.12% ORAL RINSE 15 ML: 1.2 LIQUID ORAL at 20:23

## 2022-01-15 RX ADMIN — QUETIAPINE 25 MG: 25 TABLET ORAL at 11:18

## 2022-01-15 RX ADMIN — HALOPERIDOL 2 MG: 2 SOLUTION ORAL at 12:14

## 2022-01-15 RX ADMIN — GABAPENTIN 900 MG: 250 SOLUTION ORAL at 22:34

## 2022-01-15 RX ADMIN — QUETIAPINE 12.5 MG: 25 TABLET ORAL at 08:13

## 2022-01-15 RX ADMIN — QUETIAPINE 25 MG: 25 TABLET ORAL at 03:59

## 2022-01-15 RX ADMIN — METOPROLOL TARTRATE 50 MG: 50 TABLET, FILM COATED ORAL at 20:20

## 2022-01-15 RX ADMIN — ARIPIPRAZOLE 7 MG: 1 SOLUTION ORAL at 08:13

## 2022-01-15 RX ADMIN — ACETAMINOPHEN ORAL SOLUTION 650 MG: 650 SOLUTION ORAL at 11:18

## 2022-01-15 RX ADMIN — ENOXAPARIN SODIUM 40 MG: 100 INJECTION SUBCUTANEOUS at 08:13

## 2022-01-15 RX ADMIN — HALOPERIDOL LACTATE 2 MG: 5 INJECTION, SOLUTION INTRAMUSCULAR at 19:40

## 2022-01-15 RX ADMIN — SENNOSIDES 10 ML: 8.8 LIQUID ORAL at 20:20

## 2022-01-15 RX ADMIN — GABAPENTIN 900 MG: 250 SOLUTION ORAL at 06:15

## 2022-01-15 ASSESSMENT — ACTIVITIES OF DAILY LIVING (ADL)
ADLS_ACUITY_SCORE: 24
ADLS_ACUITY_SCORE: 24
ADLS_ACUITY_SCORE: 25
ADLS_ACUITY_SCORE: 25
ADLS_ACUITY_SCORE: 24
ADLS_ACUITY_SCORE: 25
ADLS_ACUITY_SCORE: 24
ADLS_ACUITY_SCORE: 24
ADLS_ACUITY_SCORE: 25
ADLS_ACUITY_SCORE: 24
ADLS_ACUITY_SCORE: 24
ADLS_ACUITY_SCORE: 25
ADLS_ACUITY_SCORE: 24
ADLS_ACUITY_SCORE: 24
ADLS_ACUITY_SCORE: 25
ADLS_ACUITY_SCORE: 24
ADLS_ACUITY_SCORE: 25
ADLS_ACUITY_SCORE: 24
ADLS_ACUITY_SCORE: 25
ADLS_ACUITY_SCORE: 25

## 2022-01-15 NOTE — PLAN OF CARE
Problem: Inability to Wean (Mechanical Ventilation, Invasive)  Goal: Mechanical Ventilation Liberation  Outcome: Improving     Problem: Skin and Tissue Injury (Mechanical Ventilation, Invasive)  Goal: Absence of Device-Related Skin and Tissue Injury  Outcome: Improving     Problem: Ventilator-Induced Lung Injury (Mechanical Ventilation, Invasive)  Goal: Absence of Ventilator-Induced Lung Injury  Outcome: Improving     RT PROGRESS NOTE     DATA:     CURRENT SETTINGS:             TRACH TYPE / SIZE:  #7 Bivona changed on 01/04/22             MODE:   Cap             FIO2:   21% -2 lnc     ACTION:             THERAPIES:                SUCTION:                           FREQUENCY:   none                        AMOUNT:                           CONSISTENCY:                           COLOR:                SPONTANEOUS COUGH EFFORT/STRENGTH OF EFFORT (not elicited by suctioning): strong                              WEANING PHASE:   3                        WEAN MODE:    trach capped                         WEAN TIME:   cont                        END WEAN REASON:        RESPONSE:             BS:   clear             VITAL SIGNS:   SPO2 94%, HR 99, RR 20             EMOTIONAL NEEDS / CONCERNS:  yes                RISK FOR SELF DECANNULATION:  yes                        RISK DUE TO:  ams                        INTERVENTION/S IN PLACE IS/ARE:  salo wrist and RLL restrain 1:1       NOTE / PLAN:     Pt placed on 2lnc at night due to desaturation to 86% on ra while sleeping. RA while awake  Pt to cont with current poc.

## 2022-01-15 NOTE — PLAN OF CARE
Problem: Inability to Wean (Mechanical Ventilation, Invasive)  Goal: Mechanical Ventilation Liberation  Outcome: Improving     Problem: Skin and Tissue Injury (Mechanical Ventilation, Invasive)  Goal: Absence of Device-Related Skin and Tissue Injury  Outcome: Improving     Problem: Ventilator-Induced Lung Injury (Mechanical Ventilation, Invasive)  Goal: Absence of Ventilator-Induced Lung Injury  Outcome: Improving     Problem: Restraint for Non-Violent/Non-Self-Destructive Behavior  Goal: Prevent/Manage Potential Problems  Description: Maintain safety of patient and others during period of restraint.  Promote psychological and physical wellbeing.  Prevent injury to skin and involved body parts.  Promote nutrition, hydration, and elimination.  Outcome: Improving   RT PROGRESS NOTE     DATA:     CURRENT SETTINGS:              TRACH TYPE / SIZE:#7 BIVONA TTS CHANGED ON 1/4/22             MODE: Trach  Capped                FIO2:  RA  and 2LNC     ACTION:             THERAPIES:               SUCTION:                           FREQUENCY:none                        AMOUNT:                        CONSISTENCY:                        COLOR:               SPONTANEOUS COUGH EFFORT/STRENGTH OF EFFORT (not elicited by suctioning): Strong                              WEANING PHASE:3                        WEAN MODE:Trach Capped on RA  and 2LNC                        WEAN TIME: Since 1/7/22                        END WEAN REASON:                   RESPONSE:             BS: Clear             VITAL SIGNS:Sat 95-96%, HR  , RR 20-22             EMOTIONAL NEEDS / CONCERNS: Confused                RISK FOR SELF DECANNULATION: yes                        RISK DUE TO: confused                         INTERVENTION/S IN PLACE IS/ARE:4Point restraints + 1:1 staff supervision     NOTE / PLAN:  Cont. Current plan of care

## 2022-01-15 NOTE — PLAN OF CARE
Problem: Adult Inpatient Plan of Care  Goal: Plan of Care Review  Outcome: Improving  Goal: Optimal Comfort and Wellbeing  Outcome: Improving     Problem: Inability to Wean (Mechanical Ventilation, Invasive)  Goal: Mechanical Ventilation Liberation  Outcome: Improving  Intervention: Promote Extubation and Mechanical Ventilation Liberation  Recent Flowsheet Documentation  Taken 1/14/2022 1900 by Bettye Langford RN  Medication Review/Management: medications reviewed     Problem: Restraint for Non-Violent/Non-Self-Destructive Behavior  Goal: Prevent/Manage Potential Problems  Description: Maintain safety of patient and others during period of restraint.  Promote psychological and physical wellbeing.  Prevent injury to skin and involved body parts.  Promote nutrition, hydration, and elimination.  Outcome: Improving   Pt agitated continue yells and confused talk, prn tylenol and Seroquel given med helpful some time pt slept.continue restraints for safety. Trach capped and NC continue.rhea corea.

## 2022-01-15 NOTE — PLAN OF CARE
Problem: Adult Inpatient Plan of Care  Goal: Plan of Care Review  Outcome: Improving  Goal: Optimal Comfort and Wellbeing  1/15/2022 0633 by Bettye Langford RN  Outcome: Improving  1/14/2022 2247 by Bettye Langford RN  Outcome: Improving     Problem: Inability to Wean (Mechanical Ventilation, Invasive)  Goal: Mechanical Ventilation Liberation  Outcome: Improving  Intervention: Promote Extubation and Mechanical Ventilation Liberation  Recent Flowsheet Documentation  Taken 1/15/2022 0400 by Bettye Langford RN  Medication Review/Management: medications reviewed  Taken 1/14/2022 1900 by Bettye Langford RN  Medication Review/Management: medications reviewed     Problem: Restraint for Non-Violent/Non-Self-Destructive Behavior  Goal: Prevent/Manage Potential Problems  Description: Maintain safety of patient and others during period of restraint.  Promote psychological and physical wellbeing.  Prevent injury to skin and involved body parts.  Promote nutrition, hydration, and elimination.  1/15/2022 0633 by Bettye Langford RN  Outcome: Improving  1/14/2022 2247 by Bettye Langford RN  Outcome: Improving     Problem: Communication Impairment (Artificial Airway)  Goal: Effective Communication  Outcome: Improving   Pt slept 4hrs , rest of shift screaming and talking self, prn meds given, meds not help at all.restraints continue for safety.prn tylenol given pt complained foot pain. Cares met.

## 2022-01-15 NOTE — PLAN OF CARE
"  Problem: Restraint for Non-Violent/Non-Self-Destructive Behavior  Goal: Prevent/Manage Potential Problems  Description: Maintain safety of patient and others during period of restraint.  Promote psychological and physical wellbeing.  Prevent injury to skin and involved body parts.  Promote nutrition, hydration, and elimination.  Outcome: No Change   Patient is awake, confuse , restless and agitated, PRN meds  seroquel given and not effective. Ex -wife insisting seroquel is making him more confused. He is combative at times and tried to kick and states\" I want to punch you\" .  On non-violent restriants for safety.  Up in the chair. Singing , yelling and cursing. Ate 75% breakfast but zero lunch. Bolus Tube feed given 300 ml for lunchtime. Awake the whole  shift, slept for 10 minutes then woke up yelling and confuse. Haldol 2 mg given /FT .   Incontinent of urine x2 ,  bladder scanned @ 1432  126 ml.  Mahi Pierre RN   "

## 2022-01-15 NOTE — PROGRESS NOTES
[facility]    Medicine Progress Note - Hospitalist Service       Date of Admission:  12/31/2021    Summary:    Jared North is a 46 year old male who is unvaccinated against COVID-19, with history of polysubstance abuse and chronic pain/lower back pain on Suboxone prior to admission (had been on methadone in the past but not in recent past), mood disorder/depression, HTN, ROSSY and mild intermittent asthma. Patient was diagnosed with COVID-19 viral infection on 11/19/2021. He presented to [facility] on 11/24/2021 with worsening shortness of breath. He was admitted to the ICU with COVID-19 viral pneumonia and severe acute respiratory failure with hypoxia requiring noninvasive ventilation.  He received treatment with Decadron, remdesivir, and tocilizumab. He needed supplemental oxygen alternating with NIPPV and HFNC.  He had an episode of afib during this time that eventually resolved and has not recurred after briefly being treated with amiodarone.  He did not improve and was eventually intubated on 11/28/21 and then proned and started on veletri. On 11/30/21, he was started on cefepime and vanco for VAP. Culture grew E. Coli and Proteus mirabilis.  He did develop hypotension possibly from septic shock vs sedation vasoplegia and was treated with norepi intemittently.  Vanco was stopped on 12/5/21.  He was then transferred to [facility]'s ICU on 12/5/21.       At [place] he was followed in ICU and continued on vent/paralysis/proning/pressors.  Cefepime switched to rocephin 12/6/21.  He continued to be very encephalopathic and psychiatry was consulted. He was weaned off pressors and didn't2/14 but had clinically improved somewhat and was extubated on 12/15/21. On 12/16/21 he had severely increased work o require proning by 12/10/21.  He has been on large doses of anti-psychotics and sedatives.  He developed fevers again 1f breathing and so was re-intubated. Blood culture grew Streptococcus constellatus and sputum  culture grew that as well but also had E. Coli/Proteus m./staph epi/candida parapsilosis. Vanco/Zosyn was started on 12/16/21.  He then underwent trach/PEG on 12/17/21.  Vanco was stopped after 3 days and Zosyn switched to rocephin on 12/21/21 to complete on 1/2/21.       During this time he had significant issues with encephalopathy and agitation. As above he was started on multiple meds for this. Prior to transfer to [facility], they had increased his lorazepam up to 12 mg every 4 hours and oxycodone 20 mg every 4 hours along with abilify 7 mg (home med), gabapentin 800 mg every 8 hours, keppra 500 mg twice daily (for behavior), zyprexa 5 mg twice daily, and seroquel 75 mg TID with 100 mg HS.  With this regimen, he improved reportedly in the ICU but was still awake and agitated.  He was transferred to [facility] on 12/31/21.      Assessment & Plan      Acute respiratory failure with hypoxia: due to acute respiratory distress syndrome (ARDS) secondary to COVID-19 pneumonia. Initially intubated on 11/28. Failed extubation and reintubated on 12/15/21. S/p tracheostomy on 12/17/21. Capped since 1/7/22. Currently stable on room air.   - Patient has high risk of aspiration due to oral pharyngeal dysphagia and overall cognitive dysfunction. Per pulmonary, will continue to monitor closely and decannulate when appropriate.   - Continue nebs prn    Acute metabolic encephalopathy: was on high dose of sedating medication prior to transfer to [facility]. Psych and addiction medicine have been involved in adjusting the medication. Lorazepam, Keppra and oxycodone were tapered off. Currently on Abilify 7 mg daily (home dose), subutex 8 mg qam and 8 mg qpm for chronic pain and opioid use, neurontin 900 mg tid, phenobarbital 243 mg tid for benzo withdrawal, seroquel 12.5 mg bid plus bedtime, and trazodone 100 mg qhs.  Per discussion with addiction medicine, would be ideal to hold further Ativan. Patient remains having significant confusion and  agitation.  - Continue soft restraints and bedside sitter. Wean as able  - Per family request, discontinue seroquel 12.5 mg bid during the day, 25 mg at bedtime and 25 mg qid prn  - Start halo 2 mg q4h oral prn  - Continue melatonin 5 mg and trazodone 100 mg at bedtime.  - On phenobarbital for benzo withdrawal. Family has concerns about the use of this medication. Per addition medicine, start to slowly taper. Currently on 243 mg bid with additional taping dose.     Oropharyngeal dysphagia: started diet and on back up bolus tube feed  - Further diet modification and tube feed per speech therapy and dietitian    Mild intermittent dysconjugate gaze: his ex-wife states she has noted this in the past when he is on certain medications like ativan but not if he is off all meds.     Chronic back pain: Patient was on Suboxone prior to admission.  Currently on suboxone as above. Also on gabapentin 900 mg tid as at home.     Essential hypertension: BP controlled. On metoprolol. Monitor BP.      Major depressive disorder, recurrent episode, severe: continue abilify, seroquel, trazodone.  Off zyprexa     Opioid type dependence: Per addiction medicine service, on phenobarb and resumed subutex.      Polysubstance abuse as above     ROSSY (obstructive sleep apnea): management per respiratory failure as above     Mild intermittent asthma: nebs as needed     Recent Ventilator associated pneumonia: Completed antibiotics on 1/3/22.     Thrush - On nystatin     Severe Malnutrition, POA: based on nutrition assessment             Diet: Adult Formula Drip Feeding: Continuous Promote w fiber; Gastrostomy; Goal Rate: 150 ml/hr x 2 hrs after meals if eats <50% of meal; mL/hr; Medication - Feeding Tube Flush Frequency: At least 15-30 mL water before and after medication administratio...  Minced & Moist Diet (level 5) Mildly Thick (level 2)    DVT Prophylaxis: Enoxaparin (Lovenox) SQ  Wei Catheter: Not present  Central Lines: None  Code Status:  Full Code      Disposition Plan   Expected Discharge: 01/27/2022     Anticipated discharge location: home with family    Delays:     Complex Care  1:1 Sitter            The patient's care was discussed with the Bedside Nurse and Care Coordinator/.          Kenn Leal MD  Hospitalist Service  [facility]  Securely message with the Vocera Web Console (learn more here)  Text page via [employer] Paging/Directory      ______________________________________________________________________    Interval History   Patient was sitting up in the chair with restraint when seen and examined this morning. His ex-wife, annette was watching him over the iPad. Patient was very confused, agitated and aggressive from time to time. He yelled out loud.  Per ex-wife, patient was hospitalized in the past for aggressive behavior after taking seroquel. She believes that seroquel contributes to patient's symptoms and wants it to stop. Explained to her that patient was on Seroquel at a lot higher dose while he was in the ICU. She states that patient responded well to Haldo in the past.    Data reviewed today: I reviewed all medications, new labs and imaging results over the last 24 hours.     Physical Exam   Vital Signs: Temp: 98.3  F (36.8  C) Temp src: Axillary BP: 121/81 Pulse: 99   Resp: 20 SpO2: 95 % O2 Device: None (Room air) Oxygen Delivery: 2 LPM  Weight: 178 lbs 9.6 oz    General appearance: Confused and agitated  HEENT: PERRL, EOMI  Lungs: Clear breath sounds in bilateral lung fields  Cardiovascular: Regular rate and rhythm, normal S1-S2  Abdomen: Soft, non tender, no distension.   Musculoskeletal: No joint swelling  Skin: No rash and no edema  Neurology: Awake and alert. Confused and agitated.  Cranial nerves II - XII normal.  Normal muscle strength in all four extremities.    Data   Recent Labs   Lab 01/11/22  0639 01/10/22  0612 01/09/22  0728   PLT  --  203  --    POTASSIUM 3.9 3.8 4.2   CR  --  0.59*  --          Restraint  face-to-face    Within an hour after restraint an in person face to face assessment was completed, including an evaluation of the patient's immediate reaction to the intervention, behavioral assessment and review/assessment of history, drugs and medications, recent labs, etc., and behavioral condition.  The patient experienced: No adverse physical outcome from seclusion/restraint initiation.  The intervention of restraint or seclusion needs to continue.

## 2022-01-16 PROCEDURE — 999N000123 HC STATISTIC OXYGEN O2DAILY TECH TIME

## 2022-01-16 PROCEDURE — 250N000013 HC RX MED GY IP 250 OP 250 PS 637: Performed by: INTERNAL MEDICINE

## 2022-01-16 PROCEDURE — 999N000157 HC STATISTIC RCP TIME EA 10 MIN

## 2022-01-16 PROCEDURE — 93005 ELECTROCARDIOGRAM TRACING: CPT | Performed by: INTERNAL MEDICINE

## 2022-01-16 PROCEDURE — 250N000013 HC RX MED GY IP 250 OP 250 PS 637: Performed by: HOSPITALIST

## 2022-01-16 PROCEDURE — 99232 SBSQ HOSP IP/OBS MODERATE 35: CPT | Performed by: NURSE PRACTITIONER

## 2022-01-16 PROCEDURE — 250N000011 HC RX IP 250 OP 636: Performed by: INTERNAL MEDICINE

## 2022-01-16 PROCEDURE — 250N000013 HC RX MED GY IP 250 OP 250 PS 637: Performed by: NURSE PRACTITIONER

## 2022-01-16 PROCEDURE — 93005 ELECTROCARDIOGRAM TRACING: CPT

## 2022-01-16 PROCEDURE — 999N000009 HC STATISTIC AIRWAY CARE

## 2022-01-16 PROCEDURE — 250N000011 HC RX IP 250 OP 636: Performed by: NURSE PRACTITIONER

## 2022-01-16 PROCEDURE — 93010 ELECTROCARDIOGRAM REPORT: CPT | Performed by: INTERNAL MEDICINE

## 2022-01-16 PROCEDURE — 99207 PR CDG-MDM COMPONENT: MEETS MODERATE - UP CODED: CPT | Performed by: NURSE PRACTITIONER

## 2022-01-16 PROCEDURE — 120N000018 HC R&B RESPIRATORY CARE WITH ATTENDANT

## 2022-01-16 PROCEDURE — 99233 SBSQ HOSP IP/OBS HIGH 50: CPT | Performed by: INTERNAL MEDICINE

## 2022-01-16 RX ORDER — OLANZAPINE 5 MG/1
5 TABLET ORAL 2 TIMES DAILY
Status: DISCONTINUED | OUTPATIENT
Start: 2022-01-16 | End: 2022-01-18

## 2022-01-16 RX ORDER — OLANZAPINE 5 MG/1
5 TABLET ORAL 2 TIMES DAILY
Status: DISCONTINUED | OUTPATIENT
Start: 2022-01-16 | End: 2022-01-16

## 2022-01-16 RX ADMIN — ACETAMINOPHEN ORAL SOLUTION 650 MG: 650 SOLUTION ORAL at 00:48

## 2022-01-16 RX ADMIN — HALOPERIDOL LACTATE 2 MG: 5 INJECTION, SOLUTION INTRAMUSCULAR at 00:47

## 2022-01-16 RX ADMIN — BUPRENORPHINE 4 MG: 2 TABLET SUBLINGUAL at 08:35

## 2022-01-16 RX ADMIN — BUPRENORPHINE 4 MG: 2 TABLET SUBLINGUAL at 14:14

## 2022-01-16 RX ADMIN — WHITE PETROLATUM: 1.75 OINTMENT TOPICAL at 08:23

## 2022-01-16 RX ADMIN — CHLORHEXIDINE GLUCONATE 0.12% ORAL RINSE 15 ML: 1.2 LIQUID ORAL at 21:08

## 2022-01-16 RX ADMIN — SENNOSIDES 10 ML: 8.8 LIQUID ORAL at 21:09

## 2022-01-16 RX ADMIN — PHENOBARBITAL 243 MG: 16.2 TABLET ORAL at 23:54

## 2022-01-16 RX ADMIN — BUPRENORPHINE 4 MG: 2 TABLET SUBLINGUAL at 22:53

## 2022-01-16 RX ADMIN — ENOXAPARIN SODIUM 40 MG: 100 INJECTION SUBCUTANEOUS at 08:22

## 2022-01-16 RX ADMIN — GABAPENTIN 900 MG: 250 SOLUTION ORAL at 13:57

## 2022-01-16 RX ADMIN — CHLORHEXIDINE GLUCONATE 0.12% ORAL RINSE 15 ML: 1.2 LIQUID ORAL at 08:21

## 2022-01-16 RX ADMIN — SENNOSIDES 10 ML: 8.8 LIQUID ORAL at 08:21

## 2022-01-16 RX ADMIN — METOPROLOL TARTRATE 50 MG: 50 TABLET, FILM COATED ORAL at 08:22

## 2022-01-16 RX ADMIN — HALOPERIDOL LACTATE 2 MG: 5 INJECTION, SOLUTION INTRAMUSCULAR at 13:55

## 2022-01-16 RX ADMIN — GABAPENTIN 900 MG: 250 SOLUTION ORAL at 06:25

## 2022-01-16 RX ADMIN — HALOPERIDOL LACTATE 2 MG: 5 INJECTION, SOLUTION INTRAMUSCULAR at 18:06

## 2022-01-16 RX ADMIN — Medication 5 MG: at 21:14

## 2022-01-16 RX ADMIN — HALOPERIDOL LACTATE 2 MG: 5 INJECTION, SOLUTION INTRAMUSCULAR at 06:20

## 2022-01-16 RX ADMIN — ACETAMINOPHEN ORAL SOLUTION 650 MG: 650 SOLUTION ORAL at 08:21

## 2022-01-16 RX ADMIN — POLYETHYLENE GLYCOL 3350 17 G: 17 POWDER, FOR SOLUTION ORAL at 08:22

## 2022-01-16 RX ADMIN — OLANZAPINE 5 MG: 5 TABLET, FILM COATED ORAL at 21:10

## 2022-01-16 RX ADMIN — GABAPENTIN 900 MG: 250 SOLUTION ORAL at 22:53

## 2022-01-16 RX ADMIN — TRAZODONE HYDROCHLORIDE 100 MG: 50 TABLET ORAL at 21:10

## 2022-01-16 RX ADMIN — METOPROLOL TARTRATE 50 MG: 50 TABLET, FILM COATED ORAL at 21:10

## 2022-01-16 RX ADMIN — OLANZAPINE 5 MG: 5 TABLET, FILM COATED ORAL at 10:12

## 2022-01-16 RX ADMIN — ACETAMINOPHEN ORAL SOLUTION 650 MG: 650 SOLUTION ORAL at 21:10

## 2022-01-16 RX ADMIN — PHENOBARBITAL 48.6 MG: 16.2 TABLET ORAL at 15:38

## 2022-01-16 RX ADMIN — ACETAMINOPHEN ORAL SOLUTION 650 MG: 650 SOLUTION ORAL at 15:39

## 2022-01-16 RX ADMIN — PHENOBARBITAL 243 MG: 16.2 TABLET ORAL at 08:22

## 2022-01-16 RX ADMIN — ARIPIPRAZOLE 7 MG: 1 SOLUTION ORAL at 08:21

## 2022-01-16 ASSESSMENT — ACTIVITIES OF DAILY LIVING (ADL)
ADLS_ACUITY_SCORE: 25
ADLS_ACUITY_SCORE: 25
ADLS_ACUITY_SCORE: 19
ADLS_ACUITY_SCORE: 19
ADLS_ACUITY_SCORE: 25
ADLS_ACUITY_SCORE: 25
ADLS_ACUITY_SCORE: 21
ADLS_ACUITY_SCORE: 25
ADLS_ACUITY_SCORE: 23
ADLS_ACUITY_SCORE: 19
ADLS_ACUITY_SCORE: 21
ADLS_ACUITY_SCORE: 25
ADLS_ACUITY_SCORE: 25
ADLS_ACUITY_SCORE: 19
ADLS_ACUITY_SCORE: 23
ADLS_ACUITY_SCORE: 21
ADLS_ACUITY_SCORE: 19
ADLS_ACUITY_SCORE: 25
ADLS_ACUITY_SCORE: 17
ADLS_ACUITY_SCORE: 19
ADLS_ACUITY_SCORE: 21
ADLS_ACUITY_SCORE: 25

## 2022-01-16 NOTE — PROGRESS NOTES
Patient eventually fell asleep around 2055 and till around 2310, at this point he became slightly restless and drowsy.  Manuelito Burr RN

## 2022-01-16 NOTE — PLAN OF CARE
Problem: Inability to Wean (Mechanical Ventilation, Invasive)  Goal: Mechanical Ventilation Liberation  Outcome: Improving     Problem: Skin and Tissue Injury (Mechanical Ventilation, Invasive)  Goal: Absence of Device-Related Skin and Tissue Injury  Outcome: Improving     Problem: Ventilator-Induced Lung Injury (Mechanical Ventilation, Invasive)  Goal: Absence of Ventilator-Induced Lung Injury  Outcome: Improving     Problem: Restraint for Non-Violent/Non-Self-Destructive Behavior  Goal: Prevent/Manage Potential Problems  Description: Maintain safety of patient and others during period of restraint.  Promote psychological and physical wellbeing.  Prevent injury to skin and involved body parts.  Promote nutrition, hydration, and elimination.  Outcome: Improving   RT PROGRESS NOTE     DATA:     CURRENT SETTINGS:              TRACH TYPE / SIZE:#7 BIVONA TTS CHANGED ON 1/4/22             MODE: Trach  Capped                FIO2:  RA  and 2LNC     ACTION:             THERAPIES:               SUCTION:                           FREQUENCY:none                        AMOUNT:                        CONSISTENCY:                        COLOR:               SPONTANEOUS COUGH EFFORT/STRENGTH OF EFFORT (not elicited by suctioning): Strong                              WEANING PHASE:3                        WEAN MODE:Trach Capped on RA  and 2LNC                        WEAN TIME: Since 1/7/22                        END WEAN REASON:                   RESPONSE:             BS: Clear             VITAL SIGNS:Sat 95-97%, HR  , RR 20-22             EMOTIONAL NEEDS / CONCERNS: Confused                RISK FOR SELF DECANNULATION: yes                        RISK DUE TO: confused                         INTERVENTION/S IN PLACE IS/ARE:4 Point restraints + 1:1 staff supervision     NOTE / PLAN:  Cont. Current plan of care / Patient capped on RA while  awake and 2 LNC  while asleep

## 2022-01-16 NOTE — PROGRESS NOTES
[facility]    Medicine Progress Note - Hospitalist Service       Date of Admission:  12/31/2021    Summary:    Jared North is a 46 year old male who is unvaccinated against COVID-19, with history of polysubstance abuse and chronic pain/lower back pain on Suboxone prior to admission (had been on methadone in the past but not in recent past), mood disorder/depression, HTN, ROSSY and mild intermittent asthma. Patient was diagnosed with COVID-19 viral infection on 11/19/2021. He presented to [facility] on 11/24/2021 with worsening shortness of breath. He was admitted to the ICU with COVID-19 viral pneumonia and severe acute respiratory failure with hypoxia requiring noninvasive ventilation.  He received treatment with Decadron, remdesivir, and tocilizumab. He needed supplemental oxygen alternating with NIPPV and HFNC.  He had an episode of afib during this time that eventually resolved and has not recurred after briefly being treated with amiodarone.  He did not improve and was eventually intubated on 11/28/21 and then proned and started on veletri. On 11/30/21, he was started on cefepime and vanco for VAP. Culture grew E. Coli and Proteus mirabilis.  He did develop hypotension possibly from septic shock vs sedation vasoplegia and was treated with norepi intemittently.  Vanco was stopped on 12/5/21.  He was then transferred to [place]'s ICU on 12/5/21.       At [place] he was followed in ICU and continued on vent/paralysis/proning/pressors.  Cefepime switched to rocephin 12/6/21.  He continued to be very encephalopathic and psychiatry was consulted. He was weaned off pressors and didn't2/14 but had clinically improved somewhat and was extubated on 12/15/21. On 12/16/21 he had severely increased work o require proning by 12/10/21.  He has been on large doses of anti-psychotics and sedatives.  He developed fevers again 1f breathing and so was re-intubated. Blood culture grew Streptococcus constellatus and sputum  culture grew that as well but also had E. Coli/Proteus m./staph epi/candida parapsilosis. Vanco/Zosyn was started on 12/16/21.  He then underwent trach/PEG on 12/17/21.  Vanco was stopped after 3 days and Zosyn switched to rocephin on 12/21/21 to complete on 1/2/21.       During this time he had significant issues with encephalopathy and agitation. As above he was started on multiple meds for this. Prior to transfer to [facility], they had increased his lorazepam up to 12 mg every 4 hours and oxycodone 20 mg every 4 hours along with abilify 7 mg (home med), gabapentin 800 mg every 8 hours, keppra 500 mg twice daily (for behavior), zyprexa 5 mg twice daily, and seroquel 75 mg TID with 100 mg HS.  With this regimen, he improved reportedly in the ICU but was still awake and agitated.  He was transferred to [facility] on 12/31/21.      Assessment & Plan      Acute respiratory failure with hypoxia: due to acute respiratory distress syndrome (ARDS) secondary to COVID-19 pneumonia. Initially intubated on 11/28. Failed extubation and reintubated on 12/15/21. S/p tracheostomy on 12/17/21. Capped since 1/7/22. Currently stable on room air.   - Patient has high risk of aspiration due to oral pharyngeal dysphagia and overall cognitive dysfunction. Per pulmonary, will continue to monitor closely and decannulate when appropriate.   - Continue nebs prn    Acute metabolic encephalopathy: was on high dose of sedating medication prior to transfer to [facility]. Psych and addiction medicine have been involved in adjusting the medication. Lorazepam, Keppra and oxycodone were tapered off. Currently on Abilify, Subutex, gabapentin and phenobarbital. Patient has been agitated, confused and restless.  - Previously on subutex 8 mg qam and 8 mg qpm for chronic pain and opioid use. Changed to 4 mg tid since 1/13   - Benzodiazepine tapering: was on high dose ativan when arriving to [facility]. Ativan stopped and converted to phenobarbital on 1/3. Per addition  medicine, patient needs slow tapering over several weeks. However, per ex-wife, patient became more confused and agitated after phenobarbital started and she would like patient to get off this medication sooner. Per Addition medicine, started to taper since 1/13. Currently on 243 mg bid.   - Previosuly on high dose Seroquel and was tapered down to 12.5 mg bid during the day, 25 mg at bedtime. However, ex-wife reports that Seroquel made patient have significant confusion and agitation in the past, which required hospitalization. She does not want patient to be on Seroquel. It was stopped on 1/15. She is agreeable to start Zyprexa at 5 mg bid on 1/16.  - Started Haldo 2 mg IV q4h prn since 1/15. Monitor QTc.  - Continue PTA Abilify 7 mg daily.   - Continue PTA gabapentin 900 mg tid.   - Continue melatonin 5 mg and trazodone 100 mg at bedtime  - Continue soft restraints and bedside sitter. Wean as able     Oropharyngeal dysphagia: started diet and on back up bolus tube feed  - Further diet modification and tube feed per speech therapy and dietitian    Mild intermittent dysconjugate gaze: his ex-wife states she has noted this in the past when he is on certain medications like ativan but not if he is off all meds.     Chronic back pain: On Subutex and gabapentin as above.     Essential hypertension: BP controlled. On metoprolol. Monitor BP.      Major depressive disorder, recurrent episode, severe: continue abilify, trazodone.       Polysubstance abuse and Opioid type dependence: Per addiction medicine service, on phenobarb and resumed subutex.       ROSSY (obstructive sleep apnea): management per respiratory failure as above     Mild intermittent asthma: nebs as needed     Recent Ventilator associated pneumonia: Completed antibiotics on 1/3/22.     Thrush: completed nystatin     Severe Malnutrition, POA: based on nutrition assessment             Diet: Adult Formula Drip Feeding: Continuous Promote w fiber; Gastrostomy;  Goal Rate: 150 ml/hr x 2 hrs after meals if eats <50% of meal; mL/hr; Medication - Feeding Tube Flush Frequency: At least 15-30 mL water before and after medication administratio...  Minced & Moist Diet (level 5) Mildly Thick (level 2)    DVT Prophylaxis: Enoxaparin (Lovenox) SQ  Wei Catheter: Not present  Central Lines: None  Code Status: Full Code      Disposition Plan   Expected Discharge: 01/27/2022     Anticipated discharge location: home with family    Delays:     Complex Care  1:1 Sitter            The patient's care was discussed with the Bedside Nurse and Care Coordinator/.      I called his ex-wife today. I updated her and we discussed plan of care.      Kenn Leal MD  Hospitalist Service  [facility]  Securely message with the Vocera Web Console (learn more here)  Text page via [employer] Paging/Directory      ______________________________________________________________________    Interval History   Patient has been confused,  agitated and restless all day and all night yesterday. After he took haldo, he calmed down only for a short period of time. I discussed with his ex-wife about the use of zyprexa and she agrees. She still does not want patient to use Seroquel.       Data reviewed today: I reviewed all medications, new labs and imaging results over the last 24 hours.     Physical Exam   Vital Signs: Temp: 98.3  F (36.8  C) Temp src: Oral BP: 138/83 Pulse: 87   Resp: 18 SpO2: 95 % O2 Device: None (Room air) Oxygen Delivery: 2 LPM  Weight: 178 lbs 9.6 oz    General appearance: Confused and agitated  HEENT: PERRL, EOMI  Lungs: Clear breath sounds in bilateral lung fields  Cardiovascular: Regular rate and rhythm, normal S1-S2  Abdomen: Soft, non tender, no distension.   Musculoskeletal: No joint swelling  Skin: No rash and no edema  Neurology: Awake and alert. Confused and agitated.  Cranial nerves II - XII normal.  Normal muscle strength in all four extremities.    Data   Recent Labs   Lab  01/11/22  0639 01/10/22  0612   PLT  --  203   POTASSIUM 3.9 3.8   CR  --  0.59*         Restraint face-to-face    Within an hour after restraint an in person face to face assessment was completed, including an evaluation of the patient's immediate reaction to the intervention, behavioral assessment and review/assessment of history, drugs and medications, recent labs, etc., and behavioral condition.  The patient experienced: No adverse physical outcome from seclusion/restraint initiation.  The intervention of restraint or seclusion needs to continue.

## 2022-01-16 NOTE — PROGRESS NOTES
Patient severely agitated, yelling on top of his voice and continue to be on 4 point restraint.  Haldol oral given at 1617 had no effect.  Pt. Ex wife called for updated and she stated that IV hadol worked for him at previous hospital. House MD paged for possible switching Haldol to IV.  Patient only took 3 bites for dinner and drank about 100 mls of his supplement.  Bolus tube feeding was commenced.  Manuelito Burr RN

## 2022-01-16 NOTE — PLAN OF CARE
Problem: Adult Inpatient Plan of Care  Goal: Patient-Specific Goal (Individualized)  Outcome: No Change  Flowsheets (Taken 1/15/2022 2040)  Individualized Care Needs: total care  Anxieties, Fears or Concerns: Anxiuos, screaming, shouting and very loud     Problem: Skin and Tissue Injury (Mechanical Ventilation, Invasive)  Goal: Absence of Device-Related Skin and Tissue Injury  Outcome: Improving     Problem: Restraint for Non-Violent/Non-Self-Destructive Behavior  Goal: Prevent/Manage Potential Problems  Description: Maintain safety of patient and others during period of restraint.  Promote psychological and physical wellbeing.  Prevent injury to skin and involved body parts.  Promote nutrition, hydration, and elimination.  Outcome: No Change   Continue to be on 4 point soft limb restraint.  Continuously sliding down the bed.      Problem: Violence Risk or Actual  Goal: Anger and Impulse Control  Outcome: No Change   Anxiuos, creaming, shouting and very loud

## 2022-01-16 NOTE — PLAN OF CARE
Problem: Inability to Wean (Mechanical Ventilation, Invasive)  Goal: Mechanical Ventilation Liberation  Outcome: Improving     Problem: Skin and Tissue Injury (Mechanical Ventilation, Invasive)  Goal: Absence of Device-Related Skin and Tissue Injury  Outcome: Improving     Problem: Ventilator-Induced Lung Injury (Mechanical Ventilation, Invasive)  Goal: Absence of Ventilator-Induced Lung Injury  Outcome: Improving     Problem: Communication Impairment (Artificial Airway)  Goal: Effective Communication  Outcome: Improving     Problem: Device-Related Complication Risk (Artificial Airway)  Goal: Optimal Device Function  Outcome: Improving  Intervention: Optimize Device Care and Function  Recent Flowsheet Documentation  Taken 1/15/2022 2354 by Rudolph Coles, RT  Airway Safety Measures:    all equipment/monitors on and audible    suction equipment    oxygen flowmeter  Taken 1/15/2022 2000 by Rudolph Coles, RT  Airway Safety Measures:    all equipment/monitors on and audible    suction equipment    oxygen flowmeter     Problem: Skin and Tissue Injury (Artificial Airway)  Goal: Absence of Device-Related Skin or Tissue Injury  Outcome: Improving     RT PROGRESS NOTE     DATA:     CURRENT SETTINGS:             TRACH TYPE / SIZE:  #7 [place], placed 01/04/22.             MODE:  Capped             FIO2:   2L nc      ACTION:             THERAPIES:                SUCTION:                           FREQUENCY:                           AMOUNT:                           CONSISTENCY:                           COLOR:                SPONTANEOUS COUGH EFFORT/STRENGTH OF EFFORT (not elicited by suctioning):                               WEANING PHASE:   3                        WEAN MODE:    cap to 2-3L                        WEAN TIME:   cont                         END WEAN REASON:        RESPONSE:             BS:   clear             VITAL SIGNS:   /89 (BP Location: Left arm)   Pulse 98   Temp 97  F (36.1  C) (Oral)    "Resp 18   Ht 1.803 m (5' 11\")   Wt 81 kg (178 lb 9.6 oz)   SpO2 96%   BMI 24.91 kg/m               EMOTIONAL NEEDS / CONCERNS: yes               RISK FOR SELF DECANNULATION:  yes                        RISK DUE TO:  Confusion, restless, agitaiton                        INTERVENTION/S IN PLACE IS/ARE:  Bilateral wrist restrains and 1:1.       NOTE / PLAN:   To cont on the current poc.    "

## 2022-01-16 NOTE — PROGRESS NOTES
"Pulmonary Medicine - Progress note:    Clinical status discussed today with  respiratory therapist yes    HPI: 46 year old male, unvaccinated against COVID-19, with history of polysubstance abuse and chronic pain/lower back pain on suboxone, mood disorder/depression, HTN, ROSSY, mild intermittent asthma, presented with respiratory failure/COVID ARDS intubated on 11/28. Initially requiring proning/paralysis, veletri, now down trending vent needs, issues with agitation, treated for VAP. Not tolerating weaning trials, failed extubation 12/15, re-intubated, S/p trach and PEG tube 12/17.  Transferred to LTAC 12/31.  Liberated from vent 1/4.  Trach capping, but decannulation precluded by alternating agitation and sedation.     S: Alert.  Unable to obtain meaningful ROS d/t encephalopathy.  In 4-point soft restraints.  1:1 observation    Chief complaint: Ongoing respiratory failure  Significant change in clinical status during past 24 hours? - No    Vent Settings:   Resp: 18  Capped on 2L NC    Tracheal secretions:  Minimal.  Suctioned none to 1x/shift for small thin    Cough strength: not observed.  Strong per RT.     Current phase of ventilator weaning pathway:  Phase 3.  Capped since 1/7.    Physical exam: (patient personally examined)  Vital signs:  Temp: 98.3  F (36.8  C) Temp src: Oral BP: 138/83 Pulse: 87   Resp: 18 SpO2: 96 % O2 Device: Nasal cannula Oxygen Delivery: 2 LPM Height: 180.3 cm (5' 11\") Weight: 81 kg (178 lb 9.6 oz)  Estimated body mass index is 24.91 kg/m  as calculated from the following:    Height as of this encounter: 1.803 m (5' 11\").    Weight as of this encounter: 81 kg (178 lb 9.6 oz).  General: lying in bed in 4-point restraints.  Slightly restless  HEENT: Trach midline, capped, no stridor over trachea  Lungs: clear anterolaterally; non-labored  CV: RRR without tachycardia or bradycardia; No LEDY  GI: round, soft,BS+  Extremities: able to move all, no edema  Skin:visible skin intact  Neuro: Alert, " oriented only to self, does not follow commands     Lab:  1/11 VBG: pH 7.44, pCO2 44     Imaging: (all imaging personally reviewed)  1/7/21 VFSS:  1.  Adequate oral phase.  2.  Delay in initiation of swallow reflex with pour over to the pyriform sinuses during swallowing of thin liquid.  3.  Complete epiglottic inversion.  4.  Aspiration of thin liquid.  5.  No significant stasis.    Diagnosis:   1. Acute hypoxic respiratory failure due to COVID-19 ARDS  2. Tracheostomy care   - 7 Bivona changed 1/4  3. Dysphagia: high risk for aspiration given #5  4. ROSSY: unclear significance.  No AHI reported.   5. Encephalopathy: On pnenobarb for benzo withdrawal.  Vacillates between being agitated and sedated.     Recommendations/Plans:  1. Weaning orders: Phase 3, cap as tolerated  2. Trach change? Routing monthly changes  3. Diagnostic testing? none  4. Monitor for now and consider decannulation after more time/improvement in mental status, swallow.    Jared Chavez CNP  [facility] Pulmonary Medicine  Pager: [phone]

## 2022-01-16 NOTE — PROGRESS NOTES
Received IV Haldol at 1940 without any positive result as at 2046.  His Mom visited and stayed for more than 1 hour.  She was reminded more than 3 times to keep her mask on as she frequently pull mask down to her chin.  Manuelito Burr RN

## 2022-01-16 NOTE — PLAN OF CARE
Problem: Restraint for Non-Violent/Non-Self-Destructive Behavior  Goal: Prevent/Manage Potential Problems  Description: Maintain safety of patient and others during period of restraint.  Promote psychological and physical wellbeing.  Prevent injury to skin and involved body parts.  Promote nutrition, hydration, and elimination.  Outcome: No Change   Patient was agitated, confused, restless , kicking , yelling ,and trying to bite the nurse at the beginning of the shift, all effort to redirect him , proved abortive, Nurse administer PRN haldol 2 mg and Tylenol, 650 mg at that time and that was effective. patient slept off and   woke up around 0450 and back to behavior. prn Haldol was given the second time with no effect.     Patient continued on restraint soft limb x 4 for safety. Bladder scan  done read 275 ml , patient was straight cath of  300 ml  Zee colored urine.

## 2022-01-16 NOTE — PLAN OF CARE
Problem: Restraint for Non-Violent/Non-Self-Destructive Behavior  Goal: Prevent/Manage Potential Problems  Description: Maintain safety of patient and others during period of restraint.  Promote psychological and physical wellbeing.  Prevent injury to skin and involved body parts.  Promote nutrition, hydration, and elimination.  Outcome: No Change   Awake, alert and confuse , yelling but slept better approx more than an hour , was ab;le to get a better interaction with the spouse intermittently but not consistent and still very confuse and yelling a lot and keep on moving back and forth.  Urine retention, bladder scanned for 261 ml, straight cath 300 ml. PRN Haldol 2 mg IV given  and scheduled Zyprexia started. MD updated.  EKG ordered by MD to check QT.  Mahi Pierre RN

## 2022-01-17 ENCOUNTER — APPOINTMENT (OUTPATIENT)
Dept: SPEECH THERAPY | Facility: CLINIC | Age: 47
End: 2022-01-17
Attending: INTERNAL MEDICINE
Payer: COMMERCIAL

## 2022-01-17 LAB
ANION GAP SERPL CALCULATED.3IONS-SCNC: 11 MMOL/L (ref 5–18)
ATRIAL RATE - MUSE: 111 BPM
BUN SERPL-MCNC: 7 MG/DL (ref 8–22)
CALCIUM SERPL-MCNC: 9.3 MG/DL (ref 8.5–10.5)
CHLORIDE BLD-SCNC: 106 MMOL/L (ref 98–107)
CO2 SERPL-SCNC: 24 MMOL/L (ref 22–31)
CREAT SERPL-MCNC: 0.58 MG/DL (ref 0.7–1.3)
DIASTOLIC BLOOD PRESSURE - MUSE: NORMAL MMHG
ERYTHROCYTE [DISTWIDTH] IN BLOOD BY AUTOMATED COUNT: 15.7 % (ref 10–15)
GFR SERPL CREATININE-BSD FRML MDRD: >90 ML/MIN/1.73M2
GLUCOSE BLD-MCNC: 85 MG/DL (ref 70–125)
HCT VFR BLD AUTO: 43.9 % (ref 40–53)
HGB BLD-MCNC: 14 G/DL (ref 13.3–17.7)
INTERPRETATION ECG - MUSE: NORMAL
MCH RBC QN AUTO: 30.4 PG (ref 26.5–33)
MCHC RBC AUTO-ENTMCNC: 31.9 G/DL (ref 31.5–36.5)
MCV RBC AUTO: 95 FL (ref 78–100)
P AXIS - MUSE: 63 DEGREES
PLATELET # BLD AUTO: 270 10E3/UL (ref 150–450)
POTASSIUM BLD-SCNC: 4.3 MMOL/L (ref 3.5–5)
PR INTERVAL - MUSE: 138 MS
QRS DURATION - MUSE: 86 MS
QT - MUSE: 320 MS
QTC - MUSE: 435 MS
R AXIS - MUSE: -19 DEGREES
RBC # BLD AUTO: 4.6 10E6/UL (ref 4.4–5.9)
SODIUM SERPL-SCNC: 141 MMOL/L (ref 136–145)
SYSTOLIC BLOOD PRESSURE - MUSE: NORMAL MMHG
T AXIS - MUSE: 50 DEGREES
VENTRICULAR RATE- MUSE: 111 BPM
WBC # BLD AUTO: 5.8 10E3/UL (ref 4–11)

## 2022-01-17 PROCEDURE — 92507 TX SP LANG VOICE COMM INDIV: CPT | Mod: GN

## 2022-01-17 PROCEDURE — 99232 SBSQ HOSP IP/OBS MODERATE 35: CPT | Performed by: INTERNAL MEDICINE

## 2022-01-17 PROCEDURE — 999N000157 HC STATISTIC RCP TIME EA 10 MIN

## 2022-01-17 PROCEDURE — 250N000013 HC RX MED GY IP 250 OP 250 PS 637: Performed by: INTERNAL MEDICINE

## 2022-01-17 PROCEDURE — 92526 ORAL FUNCTION THERAPY: CPT | Mod: GN

## 2022-01-17 PROCEDURE — 82310 ASSAY OF CALCIUM: CPT | Performed by: INTERNAL MEDICINE

## 2022-01-17 PROCEDURE — 85027 COMPLETE CBC AUTOMATED: CPT | Performed by: INTERNAL MEDICINE

## 2022-01-17 PROCEDURE — 250N000013 HC RX MED GY IP 250 OP 250 PS 637: Performed by: NURSE PRACTITIONER

## 2022-01-17 PROCEDURE — 36415 COLL VENOUS BLD VENIPUNCTURE: CPT | Performed by: INTERNAL MEDICINE

## 2022-01-17 PROCEDURE — 120N000018 HC R&B RESPIRATORY CARE WITH ATTENDANT

## 2022-01-17 PROCEDURE — 250N000013 HC RX MED GY IP 250 OP 250 PS 637: Performed by: HOSPITALIST

## 2022-01-17 PROCEDURE — 250N000011 HC RX IP 250 OP 636: Performed by: NURSE PRACTITIONER

## 2022-01-17 PROCEDURE — 250N000011 HC RX IP 250 OP 636: Performed by: INTERNAL MEDICINE

## 2022-01-17 PROCEDURE — 999N000123 HC STATISTIC OXYGEN O2DAILY TECH TIME

## 2022-01-17 PROCEDURE — 999N000009 HC STATISTIC AIRWAY CARE

## 2022-01-17 RX ADMIN — TRAZODONE HYDROCHLORIDE 100 MG: 50 TABLET ORAL at 21:20

## 2022-01-17 RX ADMIN — HALOPERIDOL LACTATE 2 MG: 5 INJECTION, SOLUTION INTRAMUSCULAR at 01:21

## 2022-01-17 RX ADMIN — PHENOBARBITAL 243 MG: 16.2 TABLET ORAL at 08:52

## 2022-01-17 RX ADMIN — PHENOBARBITAL 243 MG: 16.2 TABLET ORAL at 21:19

## 2022-01-17 RX ADMIN — OLANZAPINE 5 MG: 5 TABLET, FILM COATED ORAL at 08:52

## 2022-01-17 RX ADMIN — GABAPENTIN 900 MG: 250 SOLUTION ORAL at 13:41

## 2022-01-17 RX ADMIN — POLYETHYLENE GLYCOL 3350 17 G: 17 POWDER, FOR SOLUTION ORAL at 08:52

## 2022-01-17 RX ADMIN — GABAPENTIN 900 MG: 250 SOLUTION ORAL at 21:21

## 2022-01-17 RX ADMIN — METOPROLOL TARTRATE 50 MG: 50 TABLET, FILM COATED ORAL at 08:55

## 2022-01-17 RX ADMIN — SENNOSIDES 10 ML: 8.8 LIQUID ORAL at 21:21

## 2022-01-17 RX ADMIN — BUPRENORPHINE 4 MG: 2 TABLET SUBLINGUAL at 15:10

## 2022-01-17 RX ADMIN — SENNOSIDES 10 ML: 8.8 LIQUID ORAL at 08:52

## 2022-01-17 RX ADMIN — OLANZAPINE 5 MG: 5 TABLET, FILM COATED ORAL at 21:24

## 2022-01-17 RX ADMIN — GABAPENTIN 900 MG: 250 SOLUTION ORAL at 07:01

## 2022-01-17 RX ADMIN — BUPRENORPHINE 4 MG: 2 TABLET SUBLINGUAL at 21:20

## 2022-01-17 RX ADMIN — BUPRENORPHINE 4 MG: 2 TABLET SUBLINGUAL at 08:52

## 2022-01-17 RX ADMIN — Medication 5 MG: at 21:20

## 2022-01-17 RX ADMIN — ARIPIPRAZOLE 7 MG: 1 SOLUTION ORAL at 08:52

## 2022-01-17 RX ADMIN — CHLORHEXIDINE GLUCONATE 0.12% ORAL RINSE 15 ML: 1.2 LIQUID ORAL at 08:52

## 2022-01-17 RX ADMIN — WHITE PETROLATUM: 1.75 OINTMENT TOPICAL at 08:56

## 2022-01-17 RX ADMIN — ENOXAPARIN SODIUM 40 MG: 100 INJECTION SUBCUTANEOUS at 08:52

## 2022-01-17 RX ADMIN — CHLORHEXIDINE GLUCONATE 0.12% ORAL RINSE 15 ML: 1.2 LIQUID ORAL at 21:19

## 2022-01-17 RX ADMIN — METOPROLOL TARTRATE 50 MG: 50 TABLET, FILM COATED ORAL at 21:21

## 2022-01-17 ASSESSMENT — ACTIVITIES OF DAILY LIVING (ADL)
ADLS_ACUITY_SCORE: 15
ADLS_ACUITY_SCORE: 19
ADLS_ACUITY_SCORE: 15
ADLS_ACUITY_SCORE: 19
ADLS_ACUITY_SCORE: 19
ADLS_ACUITY_SCORE: 15
ADLS_ACUITY_SCORE: 17
ADLS_ACUITY_SCORE: 19
ADLS_ACUITY_SCORE: 15
ADLS_ACUITY_SCORE: 19
ADLS_ACUITY_SCORE: 19
ADLS_ACUITY_SCORE: 15
ADLS_ACUITY_SCORE: 19
ADLS_ACUITY_SCORE: 15
ADLS_ACUITY_SCORE: 15
ADLS_ACUITY_SCORE: 17
ADLS_ACUITY_SCORE: 19

## 2022-01-17 ASSESSMENT — MIFFLIN-ST. JEOR: SCORE: 1668.7

## 2022-01-17 NOTE — PLAN OF CARE
Pt was anxious and restless from 0627-8205. He then slept from 0098-3603 after meds started taking affect. At 1400 He became more alert. He ate 100% of breakfast and 50% of the lunch his S.O. brought in for him. He did not void throughout my shift except for a dribble. Bladder scan showed 300cc @ 1500.     Problem: Restraint for Non-Violent/Non-Self-Destructive Behavior  Goal: Prevent/Manage Potential Problems  Description: Maintain safety of patient and others during period of restraint.  Promote psychological and physical wellbeing.  Prevent injury to skin and involved body parts.  Promote nutrition, hydration, and elimination.  Outcome: No Change     Problem: Adult Inpatient Plan of Care  Goal: Optimal Comfort and Wellbeing  Outcome: Improving  Intervention: Provide Person-Centered Care  Recent Flowsheet Documentation  Taken 1/17/2022 0734 by Maame Fuller RN  Trust Relationship/Rapport:   care explained   choices provided   empathic listening provided   thoughts/feelings acknowledged     Problem: Dysrhythmia  Goal: Normalized Cardiac Rhythm  Outcome: Improving  Intervention: Monitor and Manage Cardiac Rhythm Effect  Recent Flowsheet Documentation  Taken 1/17/2022 0734 by Maame Fuller RN  VTE Prevention/Management: anticoagulant therapy maintained     Problem: Communication Impairment (Artificial Airway)  Goal: Effective Communication  Outcome: Improving  Intervention: Ensure Effective Communication  Recent Flowsheet Documentation  Taken 1/17/2022 0734 by Maame Fuller RN  Communication Enhancement Strategies: extra time allowed for response     Problem: Violence Risk or Actual  Goal: Anger and Impulse Control  Outcome: Improving     Problem: Suicide Risk  Goal: Absence of Self-Harm  Outcome: Improving  Intervention: Promote Psychosocial Wellbeing  Recent Flowsheet Documentation  Taken 1/17/2022 0734 by Maame Fuller RN  Family/Support System Care: involvement promoted

## 2022-01-17 NOTE — PLAN OF CARE
Problem: Inability to Wean (Mechanical Ventilation, Invasive)  Goal: Mechanical Ventilation Liberation  Outcome: Improving     Problem: Skin and Tissue Injury (Mechanical Ventilation, Invasive)  Goal: Absence of Device-Related Skin and Tissue Injury  Outcome: Improving     Problem: Ventilator-Induced Lung Injury (Mechanical Ventilation, Invasive)  Goal: Absence of Ventilator-Induced Lung Injury  Outcome: Improving     Problem: Restraint for Non-Violent/Non-Self-Destructive Behavior  Goal: Prevent/Manage Potential Problems  Description: Maintain safety of patient and others during period of restraint.  Promote psychological and physical wellbeing.  Prevent injury to skin and involved body parts.  Promote nutrition, hydration, and elimination.  Outcome: Improving   RT PROGRESS NOTE     DATA:     CURRENT SETTINGS:              TRACH TYPE / SIZE:#7 BIVONA TTS CHANGED ON 1/4/22             MODE: Trach  Capped                FIO2:  RA  and 2LNC     ACTION:             THERAPIES:               SUCTION:                           FREQUENCY:none                        AMOUNT:                        CONSISTENCY:                        COLOR:               SPONTANEOUS COUGH EFFORT/STRENGTH OF EFFORT (not elicited by suctioning): Strong                              WEANING PHASE:3                        WEAN MODE:Trach Capped on RA  and 2LNC                        WEAN TIME: Since 1/7/22                        END WEAN REASON:                   RESPONSE:             BS: Clear             VITAL SIGNS:Sat 93-97%, HR  , RR 20-22             EMOTIONAL NEEDS / CONCERNS: Confused                RISK FOR SELF DECANNULATION: yes                        RISK DUE TO: confused                         INTERVENTION/S IN PLACE IS/ARE:4 Point restraints + 1:1 staff supervision     NOTE / PLAN:  Cont. Current plan of care / Patient capped on RA while  awake and 2 LNC  while asleep

## 2022-01-17 NOTE — PLAN OF CARE
"  Problem: Adult Inpatient Plan of Care  Goal: Plan of Care Review  Outcome: No Change       Pt remains on a 1:1, requires soft limb restraints X4 along with 4 side rails up in the bed for safety. Pt unable to void @ all on eves/nights despite being offered the urinal several times. Bladder scan @ 2301= 325 ml, st cathed per order for 350 dark angelina urine (concentrated). Bladder scan @ 9941=450 ml, no straight cathing warranted. Pt's behavior remains labile, restless majority of waking hours & frequently asking for staff to cut restraints off & asks if he can leave. Pt rambles re: many topics or calls out for his family (wife/dtr). Exhibits sarcasm or swears @ times. Refers to male NA/R as, \"He's sick as fuck, you need to watch out for him.\" Pt tells writer, \"You're so dumb.\" \"You're such a fucking loser.\" At one point becomes tearful & apologizes afterwards. Pt will be briefly quiet @ times with an a blank stare. Complains of being thirsty, enjoys drinking cold thickened water. Mentions how he wants to eat a pizza. Pt refers to himself as being \"a baby\" when fed water & ice chips by staff. Trach capped t/o waking hrs, sats drop to 86-87% when falls asleep. Applied 2 liters NC @ hs & slept with oxygen on all night Sats in mid 90's on oxygen. HR routinely tachy 108-113. Given PRN Tylenol for mid back pain @ hs with relief. Given PRN IV Haldol 2 mg on nights d/t escalating behavior. Pt calms down & falls asleep after receiving Haldol, slept ~ 5 hrs. Applied stockinette underneath ankle restraints to protect skin. Wearing elbow protectors as well.  "

## 2022-01-17 NOTE — PLAN OF CARE
"  Problem: Adult Inpatient Plan of Care  Goal: Optimal Comfort and Wellbeing  Outcome: No Change  Intervention: Provide Person-Centered Care  Recent Flowsheet Documentation  Taken 1/16/2022 1630 by Katheryn Daniels RN  Trust Relationship/Rapport:   care explained   questions answered     Problem: Restraint for Non-Violent/Non-Self-Destructive Behavior  Goal: Prevent/Manage Potential Problems  Description: Maintain safety of patient and others during period of restraint.  Promote psychological and physical wellbeing.  Prevent injury to skin and involved body parts.  Promote nutrition, hydration, and elimination.  Outcome: No Change     Problem: Adult Inpatient Plan of Care  Goal: Absence of Hospital-Acquired Illness or Injury  Intervention: Identify and Manage Fall Risk  Recent Flowsheet Documentation  Taken 1/16/2022 1630 by Katheryn Daniels RN  Safety Promotion/Fall Prevention:   activity supervised   sitter at bedside  Patient confused, and restless, has no sense of safety, constantly sliding down in bed, calling out different names, using vulgar language at times.\" Needs frequent repositioning to prevent from hitting head on bed side rails, both elbows now reddened due to rubbing on the mattress, elbow pads applied. Was given medications for agitation, but no effect. Encouraged and assisted with supper, ate 100%. Hasn't voided, bladder scan 160 ml. Will repeat the scan in a couple hours, and straight cath if necessary.        "

## 2022-01-17 NOTE — PROGRESS NOTES
[facility]    Medicine Progress Note - Hospitalist Service       Date of Admission:  12/31/2021    Summary:    Jared North is a 46 year old male who is unvaccinated against COVID-19, with history of polysubstance abuse and chronic pain/lower back pain on Suboxone prior to admission (had been on methadone in the past but not in recent past), mood disorder/depression, HTN, ROSSY and mild intermittent asthma. Patient was diagnosed with COVID-19 viral infection on 11/19/2021. He presented to [facility] on 11/24/2021 with worsening shortness of breath. He was admitted to the ICU with COVID-19 viral pneumonia and severe acute respiratory failure with hypoxia requiring noninvasive ventilation.  He received treatment with Decadron, remdesivir, and tocilizumab. He needed supplemental oxygen alternating with NIPPV and HFNC.  He had an episode of afib during this time that eventually resolved and has not recurred after briefly being treated with amiodarone.  He did not improve and was eventually intubated on 11/28/21 and then proned and started on veletri. On 11/30/21, he was started on cefepime and vanco for VAP. Culture grew E. Coli and Proteus mirabilis.  He did develop hypotension possibly from septic shock vs sedation vasoplegia and was treated with norepi intemittently.  Vanco was stopped on 12/5/21.  He was then transferred to [place]'s ICU on 12/5/21.       At [place] he was followed in ICU and continued on vent/paralysis/proning/pressors.  Cefepime switched to rocephin 12/6/21.  He continued to be very encephalopathic and psychiatry was consulted. He was weaned off pressors and didn't2/14 but had clinically improved somewhat and was extubated on 12/15/21. On 12/16/21 he had severely increased work o require proning by 12/10/21.  He has been on large doses of anti-psychotics and sedatives.  He developed fevers again 1f breathing and so was re-intubated. Blood culture grew Streptococcus constellatus and sputum  culture grew that as well but also had E. Coli/Proteus m./staph epi/candida parapsilosis. Vanco/Zosyn was started on 12/16/21.  He then underwent trach/PEG on 12/17/21.  Vanco was stopped after 3 days and Zosyn switched to rocephin on 12/21/21 to complete on 1/2/21.       During this time he had significant issues with encephalopathy and agitation. As above he was started on multiple meds for this. Prior to transfer to [facility], they had increased his lorazepam up to 12 mg every 4 hours and oxycodone 20 mg every 4 hours along with abilify 7 mg (home med), gabapentin 800 mg every 8 hours, keppra 500 mg twice daily (for behavior), zyprexa 5 mg twice daily, and seroquel 75 mg TID with 100 mg HS.  With this regimen, he improved reportedly in the ICU but was still awake and agitated.  He was transferred to [facility] on 12/31/21.      Assessment & Plan      Acute respiratory failure with hypoxia: due to acute respiratory distress syndrome (ARDS) secondary to COVID-19 pneumonia. Initially intubated on 11/28. Failed extubation and reintubated on 12/15/21. S/p tracheostomy on 12/17/21. Capped since 1/7/22. Currently stable on room air.   - Patient has high risk of aspiration due to oral pharyngeal dysphagia and overall cognitive dysfunction. Per pulmonary, will continue to monitor closely and decannulate when appropriate.   - Continue nebs prn    Acute metabolic encephalopathy: was on high dose of sedating medication prior to transfer to [facility]. Psych and addiction medicine have been involved in adjusting the medication. Lorazepam, Keppra and oxycodone were tapered off. Currently on Abilify, Subutex, gabapentin and phenobarbital. Patient has been agitated, confused and restless.  - Previously on subutex 8 mg qam and 8 mg qpm for chronic pain and opioid use. Changed to 4 mg tid since 1/13   - Benzodiazepine tapering: was on high dose ativan when arriving to [facility]. Ativan stopped and converted to phenobarbital on 1/3. Per [facility]  medicine, patient needs slow tapering over several weeks. However, per ex-wife, patient became more confused and agitated after phenobarbital started and she would like patient to get off this medication sooner. Per [facility], started to taper since 1/13. Currently on 243 mg bid.   - Previosuly on high dose Seroquel and was tapered down to 12.5 mg bid during the day, 25 mg at bedtime. However, ex-wife reports that Seroquel made patient have significant confusion and agitation in the past, which required hospitalization. She does not want patient to be on Seroquel. It was stopped on 1/15. She is agreeable to start Zyprexa at 5 mg bid on 1/16.  - Started Haldo 2 mg IV q4h prn since 1/15. Monitor QTc.  - Continue PTA Abilify 7 mg daily.   - Continue PTA gabapentin 900 mg tid.   - Continue melatonin 5 mg and trazodone 100 mg at bedtime  - Continue soft restraints and bedside sitter. Wean as able     Oropharyngeal dysphagia: started diet and on back up bolus tube feed  - Further diet modification and tube feed per speech therapy and dietitian    Mild intermittent dysconjugate gaze: his ex-wife states she has noted this in the past when he is on certain medications like ativan but not if he is off all meds.     Chronic back pain: On Subutex and gabapentin as above.     Essential hypertension: BP controlled. On metoprolol. Monitor BP.      Major depressive disorder, recurrent episode, severe: continue abilify, trazodone.       Polysubstance abuse and Opioid type dependence: Per addiction medicine service, on phenobarb and resumed subutex.       ROSSY (obstructive sleep apnea): management per respiratory failure as above     Mild intermittent asthma: nebs as needed     Recent Ventilator associated pneumonia: Completed antibiotics on 1/3/22.     Thrush: completed nystatin     Severe Malnutrition, POA: based on nutrition assessment             Diet: Adult Formula Drip Feeding: Continuous Promote w fiber; Gastrostomy;  Goal Rate: 150 ml/hr x 2 hrs after meals if eats <50% of meal; mL/hr; Medication - Feeding Tube Flush Frequency: At least 15-30 mL water before and after medication administratio...  Minced & Moist Diet (level 5) Mildly Thick (level 2)    DVT Prophylaxis: Enoxaparin (Lovenox) SQ  Wei Catheter: Not present  Central Lines: None  Code Status: Full Code      Disposition Plan   Expected Discharge: 01/28/2022     Anticipated discharge location: home with family    Delays:     Complex Care  1:1 Sitter            The patient's care was discussed with the Bedside Nurse and Care Coordinator/.        Kenn Leal MD  Hospitalist Service  [facility]  Securely message with the Vocera Web Console (learn more here)  Text page via [employer] Paging/Directory      ______________________________________________________________________    Interval History   Patient remains confused,  agitated and restless as soon as he wakes up although he sleeps for longer time than a few days ago. He continues to require restraint and bedside sitter.    Data reviewed today: I reviewed all medications, new labs and imaging results over the last 24 hours.     Physical Exam   Vital Signs: Temp: 98.5  F (36.9  C) Temp src: Oral BP: 135/86 Pulse: 76   Resp: 20 SpO2: 96 % O2 Device: Nasal cannula with humidification Oxygen Delivery: 2 LPM  Weight: 170 lbs 1.6 oz    General appearance: Confused and agitated  HEENT: PERRL, EOMI  Lungs: Clear breath sounds in bilateral lung fields  Cardiovascular: Regular rate and rhythm, normal S1-S2  Abdomen: Soft, non tender, no distension.   Musculoskeletal: No joint swelling  Skin: No rash and no edema  Neurology: Awake and alert. Confused and agitated.  Cranial nerves II - XII normal.  Normal muscle strength in all four extremities.    Data   Recent Labs   Lab 01/17/22  0617 01/11/22  0639   WBC 5.8  --    HGB 14.0  --    MCV 95  --      --      --    POTASSIUM 4.3 3.9   CHLORIDE 106  --    CO2 24   --    BUN 7*  --    CR 0.58*  --    ANIONGAP 11  --    TRUONG 9.3  --    GLC 85  --          Restraint face-to-face    Within an hour after restraint an in person face to face assessment was completed, including an evaluation of the patient's immediate reaction to the intervention, behavioral assessment and review/assessment of history, drugs and medications, recent labs, etc., and behavioral condition.  The patient experienced: No adverse physical outcome from seclusion/restraint initiation.  The intervention of restraint or seclusion needs to continue.

## 2022-01-18 ENCOUNTER — APPOINTMENT (OUTPATIENT)
Dept: SPEECH THERAPY | Facility: CLINIC | Age: 47
End: 2022-01-18
Attending: INTERNAL MEDICINE
Payer: COMMERCIAL

## 2022-01-18 ENCOUNTER — APPOINTMENT (OUTPATIENT)
Dept: OCCUPATIONAL THERAPY | Facility: CLINIC | Age: 47
End: 2022-01-18
Attending: INTERNAL MEDICINE
Payer: COMMERCIAL

## 2022-01-18 ENCOUNTER — APPOINTMENT (OUTPATIENT)
Dept: PHYSICAL THERAPY | Facility: CLINIC | Age: 47
End: 2022-01-18
Attending: INTERNAL MEDICINE
Payer: COMMERCIAL

## 2022-01-18 PROCEDURE — 99232 SBSQ HOSP IP/OBS MODERATE 35: CPT | Performed by: INTERNAL MEDICINE

## 2022-01-18 PROCEDURE — 99233 SBSQ HOSP IP/OBS HIGH 50: CPT | Performed by: INTERNAL MEDICINE

## 2022-01-18 PROCEDURE — 99232 SBSQ HOSP IP/OBS MODERATE 35: CPT | Performed by: NURSE PRACTITIONER

## 2022-01-18 PROCEDURE — 250N000011 HC RX IP 250 OP 636: Performed by: INTERNAL MEDICINE

## 2022-01-18 PROCEDURE — 97116 GAIT TRAINING THERAPY: CPT | Mod: GP

## 2022-01-18 PROCEDURE — 250N000013 HC RX MED GY IP 250 OP 250 PS 637: Performed by: INTERNAL MEDICINE

## 2022-01-18 PROCEDURE — 250N000011 HC RX IP 250 OP 636: Performed by: NURSE PRACTITIONER

## 2022-01-18 PROCEDURE — 250N000013 HC RX MED GY IP 250 OP 250 PS 637: Performed by: NURSE PRACTITIONER

## 2022-01-18 PROCEDURE — 999N000123 HC STATISTIC OXYGEN O2DAILY TECH TIME

## 2022-01-18 PROCEDURE — 250N000013 HC RX MED GY IP 250 OP 250 PS 637: Performed by: HOSPITALIST

## 2022-01-18 PROCEDURE — 92526 ORAL FUNCTION THERAPY: CPT | Mod: GN | Performed by: SPEECH-LANGUAGE PATHOLOGIST

## 2022-01-18 PROCEDURE — 97535 SELF CARE MNGMENT TRAINING: CPT | Mod: GO | Performed by: OCCUPATIONAL THERAPIST

## 2022-01-18 PROCEDURE — 999N000157 HC STATISTIC RCP TIME EA 10 MIN

## 2022-01-18 PROCEDURE — 999N000009 HC STATISTIC AIRWAY CARE

## 2022-01-18 PROCEDURE — 97530 THERAPEUTIC ACTIVITIES: CPT | Mod: GP

## 2022-01-18 PROCEDURE — 120N000018 HC R&B RESPIRATORY CARE WITH ATTENDANT

## 2022-01-18 PROCEDURE — 97530 THERAPEUTIC ACTIVITIES: CPT | Mod: GO | Performed by: OCCUPATIONAL THERAPIST

## 2022-01-18 RX ORDER — OLANZAPINE 5 MG/1
5 TABLET ORAL 3 TIMES DAILY
Status: COMPLETED | OUTPATIENT
Start: 2022-01-18 | End: 2022-01-19

## 2022-01-18 RX ORDER — OLANZAPINE 2.5 MG/1
2.5 TABLET, FILM COATED ORAL 2 TIMES DAILY WITH MEALS
Status: DISCONTINUED | OUTPATIENT
Start: 2022-01-19 | End: 2022-01-24 | Stop reason: HOSPADM

## 2022-01-18 RX ORDER — DIPHENHYDRAMINE HCL 12.5 MG/5ML
50 SOLUTION ORAL 4 TIMES DAILY PRN
Status: DISCONTINUED | OUTPATIENT
Start: 2022-01-18 | End: 2022-01-24 | Stop reason: HOSPADM

## 2022-01-18 RX ORDER — ARIPIPRAZOLE ORAL 1 MG/ML
10 SOLUTION ORAL DAILY
Status: DISCONTINUED | OUTPATIENT
Start: 2022-01-19 | End: 2022-01-22

## 2022-01-18 RX ORDER — TRAZODONE HYDROCHLORIDE 50 MG/1
150 TABLET, FILM COATED ORAL AT BEDTIME
Status: DISCONTINUED | OUTPATIENT
Start: 2022-01-18 | End: 2022-01-22

## 2022-01-18 RX ADMIN — HALOPERIDOL LACTATE 2 MG: 5 INJECTION, SOLUTION INTRAMUSCULAR at 13:31

## 2022-01-18 RX ADMIN — OLANZAPINE 5 MG: 5 TABLET, FILM COATED ORAL at 12:38

## 2022-01-18 RX ADMIN — GABAPENTIN 900 MG: 250 SOLUTION ORAL at 06:53

## 2022-01-18 RX ADMIN — BUPRENORPHINE 4 MG: 2 TABLET SUBLINGUAL at 22:44

## 2022-01-18 RX ADMIN — GABAPENTIN 900 MG: 250 SOLUTION ORAL at 22:44

## 2022-01-18 RX ADMIN — METOPROLOL TARTRATE 50 MG: 50 TABLET, FILM COATED ORAL at 20:50

## 2022-01-18 RX ADMIN — BUPRENORPHINE 4 MG: 2 TABLET SUBLINGUAL at 15:03

## 2022-01-18 RX ADMIN — CHLORHEXIDINE GLUCONATE 0.12% ORAL RINSE 15 ML: 1.2 LIQUID ORAL at 09:23

## 2022-01-18 RX ADMIN — ENOXAPARIN SODIUM 40 MG: 100 INJECTION SUBCUTANEOUS at 09:23

## 2022-01-18 RX ADMIN — PHENOBARBITAL 178.2 MG: 16.2 TABLET ORAL at 09:21

## 2022-01-18 RX ADMIN — OLANZAPINE 5 MG: 5 TABLET, FILM COATED ORAL at 20:50

## 2022-01-18 RX ADMIN — SENNOSIDES 10 ML: 8.8 LIQUID ORAL at 09:22

## 2022-01-18 RX ADMIN — ARIPIPRAZOLE 7 MG: 1 SOLUTION ORAL at 09:22

## 2022-01-18 RX ADMIN — METOPROLOL TARTRATE 50 MG: 50 TABLET, FILM COATED ORAL at 09:22

## 2022-01-18 RX ADMIN — WHITE PETROLATUM: 1.75 OINTMENT TOPICAL at 09:55

## 2022-01-18 RX ADMIN — ACETAMINOPHEN ORAL SOLUTION 650 MG: 650 SOLUTION ORAL at 20:48

## 2022-01-18 RX ADMIN — GABAPENTIN 900 MG: 250 SOLUTION ORAL at 13:40

## 2022-01-18 RX ADMIN — OLANZAPINE 5 MG: 5 TABLET, FILM COATED ORAL at 09:24

## 2022-01-18 RX ADMIN — SENNOSIDES 10 ML: 8.8 LIQUID ORAL at 20:50

## 2022-01-18 RX ADMIN — Medication 5 MG: at 20:50

## 2022-01-18 RX ADMIN — TRAZODONE HYDROCHLORIDE 150 MG: 50 TABLET ORAL at 20:50

## 2022-01-18 RX ADMIN — CHLORHEXIDINE GLUCONATE 0.12% ORAL RINSE 15 ML: 1.2 LIQUID ORAL at 20:50

## 2022-01-18 RX ADMIN — BUPRENORPHINE 4 MG: 2 TABLET SUBLINGUAL at 09:20

## 2022-01-18 RX ADMIN — POLYETHYLENE GLYCOL 3350 17 G: 17 POWDER, FOR SOLUTION ORAL at 09:23

## 2022-01-18 ASSESSMENT — ACTIVITIES OF DAILY LIVING (ADL)
ADLS_ACUITY_SCORE: 15

## 2022-01-18 NOTE — PROGRESS NOTES
"Pulmonary Medicine - Progress note:    Clinical status discussed today with  respiratory therapist yes    HPI: 46 year old male, unvaccinated against COVID-19, with history of polysubstance abuse and chronic pain/lower back pain on suboxone, mood disorder/depression, HTN, ROSSY, mild intermittent asthma, presented with respiratory failure/COVID ARDS intubated on 11/28. Initially requiring proning/paralysis, veletri, now down trending vent needs, issues with agitation, treated for VAP. Not tolerating weaning trials, failed extubation 12/15, re-intubated, S/p trach and PEG tube 12/17.  Transferred to LTAC 12/31.  Liberated from vent 1/4.  Trach capping, but decannulation precluded by alternating agitation and sedation.     S: Alert.  Unable to obtain meaningful ROS d/t encephalopathy.  In 4-point soft restraints.  1:1 observation    Chief complaint: Ongoing respiratory failure  Significant change in clinical status during past 24 hours? - No    Vent Settings:   Resp: 20  Capped on 2L NC    Tracheal secretions:  Minimal.  Suctioned none over past 24 hours.     Cough strength: not observed.       Current phase of ventilator weaning pathway:  Phase 3.  Capped since 1/7.    Physical exam: (patient personally examined)  Vital signs:  Temp: 98.8  F (37.1  C) Temp src: Oral BP: 118/73 Pulse: 90   Resp: 20 SpO2: 97 % O2 Device: Nasal cannula Oxygen Delivery: 2 LPM Height: 180.3 cm (5' 11\") Weight: 77.2 kg (170 lb 1.6 oz)  Estimated body mass index is 23.72 kg/m  as calculated from the following:    Height as of this encounter: 1.803 m (5' 11\").    Weight as of this encounter: 77.2 kg (170 lb 1.6 oz).  General: sitting in chair in 4-point restraints.    HEENT: Trach midline, capped, no stridor over trachea  Lungs: clear anterolaterally; non-labored  CV: RRR without tachycardia or bradycardia  GI: round, soft,BS+  Extremities: able to move all, no edema  Skin:visible skin intact  Neuro: Alert, oriented only to self and location. " Many delusions and hallucinations. Yelling out.     Lab:  1/11 VBG: pH 7.44, pCO2 44     Imaging: (all imaging personally reviewed)  EXAM: XR CHEST PORT 1 VIEW  LOCATION: [facility]  DATE/TIME: 12/29/2021 3:05 PM     INDICATION: Evaluate for worsening infiltrate/new VAP.  COMPARISON: 12/16/2021.                                                                      IMPRESSION:   Endotracheal tube has been removed and a new tracheostomy tube has been placed in good position. Right PICC line remains in good position. Right IJ CVC has been removed. NG tube also removed. Bilateral interstitial infiltrates have improved when compared   to previous. There are no new or acute infiltrates. No pleural effusion.    Diagnosis:   1. Acute hypoxic respiratory failure due to COVID-19 ARDS  2. Tracheostomy care   - 7 Bivona changed 1/4  3. Dysphagia  4. ROSSY: unclear significance.  No AHI reported.   5. Encephalopathy: Despite significant pharmacologic intervention    Recommendations/Plans:  1. Weaning orders: Phase 3, capping  2. Trach change? Routine monthly changes  3. Diagnostic testing? none  4. Likely candidate for decannulation soon--ongoing concerns about aspiration risk in setting of severe agitation. Will consider to monitor as his agitation comes under control.    Tatianna Murillo MD  [facility] Pulmonary Medicine

## 2022-01-18 NOTE — PROGRESS NOTES
"Psychiatric Progress Note    Mood changes likely in the context of a prolonged hospitalization with intermittent anxiety, restlessness.  Cognitive and behavioral changes due to prolonged hospitalization and in the setting of metabolic encephalopathy, ICU delirium and due to COVID.  Major depressive disorder by history.     Recommendations:  Increase Abilify to 10 mg daily.  Gabapentin 900 mg every 8 hours.  Olanzapine 2.5 mg twice a day   Olanzapine 5 mg 3 times a day x3 doses.  Increase trazodone to 250 mg at bedtime.  SUBJECTIVE:  Patient is anxious, trying to shift himself in chair, falls asleep in between being restless.  He is reporting of anxiety and requesting Ativan.  He is saying that he does not take Seroquel without giving any reasoning why.  Patient was on large dose of Ativan, addiction medicine managing tapering.  No psychosis or hallucinations.    MENTAL STATUS EXAMINATION:   General Appearance: Seated in chair anxious restless  Speech: Clear response to short direct questions yes or no  Thought Process: Increased latency  Thought content: No psychosis, NO SI  Thought Formation: Loosening of associations.  Judgment: Depressed  Insight : Depressed  Attention : Sleepy  Memory: Depressed  Fund Of Knowledge: Depressed  Affect: Neutral  Mood: Congruent  Alert : Arousable  Orientation: X 1-2  Comprehension: Depressed  Generative thought content: Reduced, slow  Language: Intact  Gait and Ambulation:Slow.  With assist, needing assist with all ADLs  Musculoskeletal: No tonal abnormalities    /73 (BP Location: Right arm, Patient Position: Supine)   Pulse 90   Temp 98.8  F (37.1  C) (Oral)   Resp 20   Ht 1.803 m (5' 11\")   Wt 77.2 kg (170 lb 1.6 oz)   SpO2 97%   BMI 23.72 kg/m              "

## 2022-01-18 NOTE — PLAN OF CARE
Problem: Restraint for Non-Violent/Non-Self-Destructive Behavior  Goal: Prevent/Manage Potential Problems  Description: Maintain safety of patient and others during period of restraint.  Promote psychological and physical wellbeing.  Prevent injury to skin and involved body parts.  Promote nutrition, hydration, and elimination.  Outcome: No Change     Problem: Violence Risk or Actual  Goal: Anger and Impulse Control  Outcome: No Change   Pt slept all night, woke up around 0600, not violent, pleasant to staff. Pt denied pain. Urinal was offered and pt declined. Bladder scan was done, 150-180 mL results. Pt kept on 4 point soft limb restraint with 4 side rail up and bedside sitter for safety. Will keep monitoring.

## 2022-01-18 NOTE — PLAN OF CARE
Problem: Communication Impairment (Artificial Airway)  Goal: Effective Communication  Outcome: Improving     Problem: Device-Related Complication Risk (Artificial Airway)  Goal: Optimal Device Function  Outcome: Improving     Problem: Skin and Tissue Injury (Artificial Airway)  Goal: Absence of Device-Related Skin or Tissue Injury  Outcome: Improving      RT PROGRESS NOTE     DATA:     CURRENT SETTINGS:              TRACH TYPE / SIZE: 7 [place] 1/04/22             MODE:   Cap              FIO2:   2LNC @ noc     ACTION:             THERAPIES:                SUCTION:                           FREQUENCY:                          AMOUNT:                          CONSISTENCY:                        COLOR:              SPONTANEOUS COUGH EFFORT/STRENGTH OF EFFORT (not elicited by suctioning): Yes/strong                              WEANING PHASE:   3                        WEAN MODE:  Cap on RA day                        WEAN TIME:   As tolerated                         END WEAN REASON:      RESPONSE:             BS: Clear              VITAL SIGNS: Sat 91-98%, HR , RR 20             EMOTIONAL NEEDS / CONCERNS:                  RISK FOR SELF DECANNULATION:  Yes                         RISK DUE TO:  Confusion                        INTERVENTION/S IN PLACE IS/ARE:  4.0 bilateral restraints in place     NOTE / PLAN:   Cont with plan. Pt remains 1:1 d/t impulsive behavior. Pt spent most the of shift sleeping.

## 2022-01-18 NOTE — PLAN OF CARE
Problem: Adult Inpatient Plan of Care  Goal: Optimal Comfort and Wellbeing  Outcome: No Change     Problem: Adult Inpatient Plan of Care  Goal: Patient-Specific Goal (Individualized)  Outcome: No Change   Patient was sleepy but restless and agitated when a wake. Patient was fed Supper at 20:00 when wide awake and ate 100%. No void during the shift, had a bladder scan and a straight cath x 1.

## 2022-01-18 NOTE — PROGRESS NOTES
"Addiction Medicine Follow Up      Principal Problem:    Acute on chronic respiratory failure with hypoxia (H)  Active Problems:    Chronic back pain    Essential hypertension    Pneumonia due to 2019 novel coronavirus    Acute respiratory distress syndrome (ARDS) due to COVID-19 virus (H)    Major depressive disorder, recurrent episode, severe (H)    Opioid use disorder, severe, dependence (H)    Polysubstance abuse (H)    ROSSY (obstructive sleep apnea)    Mild intermittent asthma    Acute metabolic encephalopathy    Ventilator associated pneumonia (H)    Oropharyngeal dysphagia      Subjective  Chief complaint: Intermittent agitation    HPI: Patient is spending less time sedated with head drooping since buprenorphine and phenobarbital have been reduced.   However, he is having more periods of agitation with confusion, trying to get out of bed, calling out.       He cannot reliably answer questions, but he does not complain of any pain.    He is stilll having bladder retention.       Recent Med Changes:    Phenobarb taper continues:   Today  178.2 in am and 243 mg in evening   Olanzapine 5 mg tid and 2.5 mg bid   Abilify stopped  Quetiapine stopped   Haldol 2.5 mg IV q 2 hrs prn  Trazodone increased to 150 mg q hs       Objective    /74 (BP Location: Left arm)   Pulse 102   Temp 98  F (36.7  C) (Oral)   Resp 20   Ht 1.803 m (5' 11\")   Wt 77.2 kg (170 lb 1.6 oz)   SpO2 96%   BMI 23.72 kg/m      Ex-wife Bertram at bedside.   Intermittently Restless and agitated;  Pulling at restraints,   talking but not making sense    Skin:  diaphoretc  Neuro:  Alert, Not oriented to time, place or situation.    Psych:     agitated     Mood:  distressed               Affect:  Congruent               Thought content:  Confused, random, perserating               Thought processes:  scattered, not linear                Speech:  echolalia,                  Motor:  increased, at times random and at times purposeful               "  Insight/judgement:  poor/  poor    Results  BMP and CBC - WNL.      Assessment and Plan:  1.  Opioid Use Disorder, severe - on buprenorphine 4 mg tid and is less stuporous than last week.    No evidence of opiate withdrawal.   Avoid giving patient any opiates    2.  Benzodiazepine withdrawal -  On phenobarb taper - had gotten up to very high dose prior to coming to [facility] due to continued agitation and restlessness.   Would avoid BDZ if possible.     However, if agitation continues to put patient in danger or prevent transfer to next level of care, would try a very low, scheduled dose and do not escalate.   Discontinue if not effective.     3.  Persistent delirium -  in patient who was intubated for over 1 month and hospitalized for almost 3 mos. due to COVID and it's complications.   Management per hospitalist =- would consider head imaging if does not improve and consider Neurology or palliative care consult.        Has code status been addressed with ex-spouse?   Who has decision-making capacity?         Maria Del Carmen Dangelo MD  Addiction Medicine Service  [facility]   Page me (click here for Hannah Dangelo)

## 2022-01-18 NOTE — PROGRESS NOTES
Social Work Note:  Patient chart reviewed.  Patient discussed in morning rounds.  Barriers to discharge: 1:1 sitter, SLRx4.  4 side rails up.  Pelvic restraint while up in chair.  Trach.      Patient's exact discharge date, time, disposition TBD.  SW will continue to follow for psychosocial and emotional support of patient and family. SW to facilitate discharge to TCU vs home  when pt no longer requires LTACH level of care.      Fermin Carroll, [facility] [place]  [phone]

## 2022-01-18 NOTE — PROGRESS NOTES
[facility]    Medicine Progress Note - Hospitalist Service       Date of Admission:  12/31/2021    Summary:    Jared North is a 46 year old male who is unvaccinated against COVID-19, with history of polysubstance abuse and chronic pain/lower back pain on Suboxone prior to admission (had been on methadone in the past but not in recent past), mood disorder/depression, HTN, ROSSY and mild intermittent asthma. Patient was diagnosed with COVID-19 viral infection on 11/19/2021. He presented to [facility] on 11/24/2021 with worsening shortness of breath. He was admitted to the ICU with COVID-19 viral pneumonia and severe acute respiratory failure with hypoxia requiring noninvasive ventilation.  He received treatment with Decadron, remdesivir, and tocilizumab. He needed supplemental oxygen alternating with NIPPV and HFNC.  He had an episode of afib during this time that eventually resolved and has not recurred after briefly being treated with amiodarone.  He did not improve and was eventually intubated on 11/28/21 and then proned and started on veletri. On 11/30/21, he was started on cefepime and vanco for VAP. Culture grew E. Coli and Proteus mirabilis.  He did develop hypotension possibly from septic shock vs sedation vasoplegia and was treated with norepi intemittently.  Vanco was stopped on 12/5/21.  He was then transferred to [facility] on 12/5/21.       At [facility] he was followed in ICU and continued on vent/paralysis/proning/pressors.  Cefepime switched to rocephin 12/6/21.  He continued to be very encephalopathic and psychiatry was consulted. He was weaned off pressors and didn't2/14 but had clinically improved somewhat and was extubated on 12/15/21. On 12/16/21 he had severely increased work o require proning by 12/10/21.  He has been on large doses of anti-psychotics and sedatives.  He developed fevers again 1f breathing and so was re-intubated. Blood culture grew Streptococcus constellatus and sputum  culture grew that as well but also had E. Coli/Proteus m./staph epi/candida parapsilosis. Vanco/Zosyn was started on 12/16/21.  He then underwent trach/PEG on 12/17/21.  Vanco was stopped after 3 days and Zosyn switched to rocephin on 12/21/21 to complete on 1/2/21.       During this time he had significant issues with encephalopathy and agitation. As above he was started on multiple meds for this. Prior to transfer to [facility], they had increased his lorazepam up to 12 mg every 4 hours and oxycodone 20 mg every 4 hours along with abilify 7 mg (home med), gabapentin 800 mg every 8 hours, keppra 500 mg twice daily (for behavior), zyprexa 5 mg twice daily, and seroquel 75 mg TID with 100 mg HS.  With this regimen, he improved reportedly in the ICU but was still awake and agitated.  He was transferred to [facility] on 12/31/21.      Assessment & Plan      Acute respiratory failure with hypoxia: due to acute respiratory distress syndrome (ARDS) secondary to COVID-19 pneumonia. Initially intubated on 11/28. Failed extubation and reintubated on 12/15/21. S/p tracheostomy on 12/17/21. Capped since 1/7/22. Currently stable on room air.   - Patient has high risk of aspiration due to oral pharyngeal dysphagia and overall cognitive dysfunction. Per pulmonary, will continue to monitor closely and decannulate when appropriate.   - Continue nebs prn    Acute metabolic encephalopathy: was on high dose of sedating medication prior to transfer to [facility]. Psych and addiction medicine have been involved in adjusting the medication. Lorazepam, Keppra and oxycodone were tapered off. Currently on Abilify, Subutex, gabapentin and phenobarbital. Patient has been agitated, confused and restless.  - Previously on subutex 8 mg qam and 8 mg qpm for chronic pain and opioid use. Changed to 4 mg tid since 1/13   - Benzodiazepine tapering: was on high dose ativan when arriving to [facility]. Ativan stopped and converted to phenobarbital on 1/3. Per [facility], patient needs slow tapering over several weeks. However, per ex-wife, patient became more confused and agitated after phenobarbital started and she would like patient to get off this medication sooner. Per [facility], started to taper since 1/13. Currently on 243 mg bid.   - Previosuly on high dose Seroquel and was tapered down to 12.5 mg bid during the day, 25 mg at bedtime. However, ex-wife reports that Seroquel made patient have significant confusion and agitation in the past, which required hospitalization. She does not want patient to be on Seroquel. It was stopped on 1/15.   - Started Haldo 2 mg IV q4h prn since 1/15. Monitor QTc.  - Increase Abilify 10 mg daily per psych.   - Continue PTA gabapentin 900 mg tid.   - Continue melatonin 5 mg and increase trazodone 250 mg at bedtime  - Tapering zyprexa per psych  - Continue soft restraints and bedside sitter. Wean as able     Oropharyngeal dysphagia: started diet and on back up bolus tube feed  - Further diet modification and tube feed per speech therapy and dietitian    Mild intermittent dysconjugate gaze: his ex-wife states she has noted this in the past when he is on certain medications like ativan but not if he is off all meds.     Chronic back pain: On Subutex and gabapentin as above.     Essential hypertension: BP controlled. On metoprolol. Monitor BP.      Major depressive disorder, recurrent episode, severe: continue abilify, trazodone.       Polysubstance abuse and Opioid type dependence: Per addiction medicine service, on phenobarb and resumed subutex.       ROSSY (obstructive sleep apnea): management per respiratory failure as above     Mild intermittent asthma: nebs as needed     Recent Ventilator associated pneumonia: Completed antibiotics on 1/3/22.     Thrush: completed nystatin     Severe Malnutrition, POA: based on nutrition assessment             Diet: Adult Formula Drip Feeding: Continuous Promote w fiber; Gastrostomy; Goal Rate:  150 ml/hr x 2 hrs after meals if eats <50% of meal; mL/hr; Medication - Feeding Tube Flush Frequency: At least 15-30 mL water before and after medication administratio...  Minced & Moist Diet (level 5) Mildly Thick (level 2)    DVT Prophylaxis: Enoxaparin (Lovenox) SQ  Wei Catheter: Not present  Central Lines: None  Code Status: Full Code      Disposition Plan   Expected Discharge: 01/31/2022     Anticipated discharge location: home with family    Delays:     Complex Care  1:1 Sitter            The patient's care was discussed with the Bedside Nurse.        Adam Soliman MD  Hospitalist Service  [facility]  Securely message with the Vocera Web Console (learn more here)  Text page via [employer] Paging/Directory      ______________________________________________________________________    Interval History   Patient remains confused,  agitated and restless as soon as he wakes up although he sleeps for longer time than a few days ago. He continues to require restraint and bedside sitter.    Data reviewed today: I reviewed all medications, new labs and imaging results over the last 24 hours.   All medication issues identified within the last24 hours have been addressed and completed as appropriate    Physical Exam   Vital Signs: Temp: 98  F (36.7  C) Temp src: Oral BP: 120/74 Pulse: 102   Resp: 20 SpO2: 96 % O2 Device: Nasal cannula Oxygen Delivery: 2 LPM  Weight: 170 lbs 1.6 oz    General appearance: Confused and agitated  HEENT: PERRL, EOMI  Lungs: Clear breath sounds in bilateral lung fields  Cardiovascular: Regular rate and rhythm, normal S1-S2  Abdomen: Soft, non tender, no distension.   Musculoskeletal: No joint swelling  Skin: No rash and no edema  Neurology: Awake and alert. Confused and agitated.  Cranial nerves II - XII normal.  Normal muscle strength in all four extremities.    Data   Recent Labs   Lab 01/17/22  0617   WBC 5.8   HGB 14.0   MCV 95         POTASSIUM 4.3   CHLORIDE 106   CO2 24   BUN 7*    CR 0.58*   ANIONGAP 11   TRUONG 9.3   GLC 85         Restraint face-to-face    Within an hour after restraint an in person face to face assessment was completed, including an evaluation of the patient's immediate reaction to the intervention, behavioral assessment and review/assessment of history, drugs and medications, recent labs, etc., and behavioral condition.  The patient experienced: No adverse physical outcome from seclusion/restraint initiation.  The intervention of restraint or seclusion needs to continue.

## 2022-01-19 ENCOUNTER — APPOINTMENT (OUTPATIENT)
Dept: SPEECH THERAPY | Facility: CLINIC | Age: 47
End: 2022-01-19
Attending: INTERNAL MEDICINE
Payer: COMMERCIAL

## 2022-01-19 ENCOUNTER — APPOINTMENT (OUTPATIENT)
Dept: OCCUPATIONAL THERAPY | Facility: CLINIC | Age: 47
End: 2022-01-19
Attending: INTERNAL MEDICINE
Payer: COMMERCIAL

## 2022-01-19 PROCEDURE — 250N000013 HC RX MED GY IP 250 OP 250 PS 637: Performed by: NURSE PRACTITIONER

## 2022-01-19 PROCEDURE — 99233 SBSQ HOSP IP/OBS HIGH 50: CPT | Performed by: INTERNAL MEDICINE

## 2022-01-19 PROCEDURE — 999N000009 HC STATISTIC AIRWAY CARE

## 2022-01-19 PROCEDURE — 99232 SBSQ HOSP IP/OBS MODERATE 35: CPT | Performed by: FAMILY MEDICINE

## 2022-01-19 PROCEDURE — 97129 THER IVNTJ 1ST 15 MIN: CPT | Mod: GN | Performed by: SPEECH-LANGUAGE PATHOLOGIST

## 2022-01-19 PROCEDURE — 250N000013 HC RX MED GY IP 250 OP 250 PS 637: Performed by: HOSPITALIST

## 2022-01-19 PROCEDURE — 250N000013 HC RX MED GY IP 250 OP 250 PS 637: Performed by: INTERNAL MEDICINE

## 2022-01-19 PROCEDURE — 250N000011 HC RX IP 250 OP 636: Performed by: NURSE PRACTITIONER

## 2022-01-19 PROCEDURE — 250N000011 HC RX IP 250 OP 636: Performed by: INTERNAL MEDICINE

## 2022-01-19 PROCEDURE — 120N000018 HC R&B RESPIRATORY CARE WITH ATTENDANT

## 2022-01-19 PROCEDURE — 999N000157 HC STATISTIC RCP TIME EA 10 MIN

## 2022-01-19 RX ADMIN — OLANZAPINE 2.5 MG: 2.5 TABLET, FILM COATED ORAL at 16:39

## 2022-01-19 RX ADMIN — ACETAMINOPHEN ORAL SOLUTION 650 MG: 650 SOLUTION ORAL at 11:34

## 2022-01-19 RX ADMIN — GABAPENTIN 900 MG: 250 SOLUTION ORAL at 21:16

## 2022-01-19 RX ADMIN — METOPROLOL TARTRATE 50 MG: 50 TABLET, FILM COATED ORAL at 20:18

## 2022-01-19 RX ADMIN — ACETAMINOPHEN ORAL SOLUTION 650 MG: 650 SOLUTION ORAL at 04:57

## 2022-01-19 RX ADMIN — OLANZAPINE 5 MG: 5 TABLET, FILM COATED ORAL at 07:53

## 2022-01-19 RX ADMIN — SENNOSIDES 10 ML: 8.8 LIQUID ORAL at 08:01

## 2022-01-19 RX ADMIN — ARIPIPRAZOLE 10 MG: 1 SOLUTION ORAL at 08:01

## 2022-01-19 RX ADMIN — BUPRENORPHINE 4 MG: 2 TABLET SUBLINGUAL at 08:13

## 2022-01-19 RX ADMIN — METOPROLOL TARTRATE 50 MG: 50 TABLET, FILM COATED ORAL at 08:21

## 2022-01-19 RX ADMIN — PHENOBARBITAL 113.4 MG: 16.2 TABLET ORAL at 07:52

## 2022-01-19 RX ADMIN — GABAPENTIN 900 MG: 250 SOLUTION ORAL at 05:02

## 2022-01-19 RX ADMIN — ENOXAPARIN SODIUM 40 MG: 100 INJECTION SUBCUTANEOUS at 08:01

## 2022-01-19 RX ADMIN — HALOPERIDOL LACTATE 2 MG: 5 INJECTION, SOLUTION INTRAMUSCULAR at 01:01

## 2022-01-19 RX ADMIN — TRAZODONE HYDROCHLORIDE 150 MG: 50 TABLET ORAL at 20:18

## 2022-01-19 RX ADMIN — POLYETHYLENE GLYCOL 3350 17 G: 17 POWDER, FOR SOLUTION ORAL at 08:01

## 2022-01-19 RX ADMIN — GABAPENTIN 900 MG: 250 SOLUTION ORAL at 13:35

## 2022-01-19 RX ADMIN — Medication 5 MG: at 20:18

## 2022-01-19 RX ADMIN — OLANZAPINE 2.5 MG: 2.5 TABLET, FILM COATED ORAL at 11:57

## 2022-01-19 RX ADMIN — WHITE PETROLATUM: 1.75 OINTMENT TOPICAL at 08:14

## 2022-01-19 RX ADMIN — BUPRENORPHINE 4 MG: 2 TABLET SUBLINGUAL at 21:13

## 2022-01-19 RX ADMIN — CHLORHEXIDINE GLUCONATE 0.12% ORAL RINSE 15 ML: 1.2 LIQUID ORAL at 19:54

## 2022-01-19 RX ADMIN — BUPRENORPHINE 4 MG: 2 TABLET SUBLINGUAL at 15:17

## 2022-01-19 RX ADMIN — CHLORHEXIDINE GLUCONATE 0.12% ORAL RINSE 15 ML: 1.2 LIQUID ORAL at 07:55

## 2022-01-19 ASSESSMENT — ACTIVITIES OF DAILY LIVING (ADL)
ADLS_ACUITY_SCORE: 19
ADLS_ACUITY_SCORE: 19
ADLS_ACUITY_SCORE: 15
ADLS_ACUITY_SCORE: 19
ADLS_ACUITY_SCORE: 15
ADLS_ACUITY_SCORE: 19
ADLS_ACUITY_SCORE: 19
ADLS_ACUITY_SCORE: 15
ADLS_ACUITY_SCORE: 19

## 2022-01-19 ASSESSMENT — MIFFLIN-ST. JEOR: SCORE: 1672.78

## 2022-01-19 NOTE — PROGRESS NOTES
Social Work Note:  Patient chart reviewed.  Patient discussed in morning rounds.  Barriers to discharge: SLRx4.  1:1 sitter. Trach.    Patient's exact discharge date, time, disposition TBD.  Must be off 1:1 sitter and SLRx4 for at least 24 hours.  SW will continue to follow for psychosocial and emotional support of patient and family. SW to facilitate discharge to home vs. TCU when pt no longer requires LTACH level of care.    Fermin Carroll, [facility] [place]  [phone]

## 2022-01-19 NOTE — PLAN OF CARE
Patient was less restless this morning. Patient was up on the chair and tolerated for couple hours. Patient became so agitated,aggressive and verbally abusive after walked with physical therapist. Haldol prn iv given with some effect. Significant other visited. Good appetite for all meals.patient continued on restraint and 1:1 supervision for safety.will continue to monitor.

## 2022-01-19 NOTE — PROGRESS NOTES
Category: Non-violent   Type of Restraint: Soft limb x2.   Behavior: Pulling at IVand lange catheter tubings.   Root cause of behavior: Critical illness.   Less-restrictive methods that have failed: Redirection, reorientation. 1:1 NA at bedside.   Response to restraint: Not actively pulling atcurrent restraints.   Criteria for release from restraint: Responds to redirection. Leaves medical devices in place.

## 2022-01-19 NOTE — PROGRESS NOTES
[facility]    Medicine Progress Note - Hospitalist Service       Date of Admission:  12/31/2021    Summary:    Jared North is a 46 year old male who is unvaccinated against COVID-19, with history of polysubstance abuse and chronic pain/lower back pain on Suboxone prior to admission (had been on methadone in the past but not in recent past), mood disorder/depression, HTN, ROSSY and mild intermittent asthma. Patient was diagnosed with COVID-19 viral infection on 11/19/2021. He presented to [facility] on 11/24/2021 with worsening shortness of breath. He was admitted to the ICU with COVID-19 viral pneumonia and severe acute respiratory failure with hypoxia requiring noninvasive ventilation.  He received treatment with Decadron, remdesivir, and tocilizumab. He needed supplemental oxygen alternating with NIPPV and HFNC.  He had an episode of afib during this time that eventually resolved and has not recurred after briefly being treated with amiodarone.  He did not improve and was eventually intubated on 11/28/21 and then proned and started on veletri. On 11/30/21, he was started on cefepime and vanco for VAP. Culture grew E. Coli and Proteus mirabilis.  He did develop hypotension possibly from septic shock vs sedation vasoplegia and was treated with norepi intemittently.  Vanco was stopped on 12/5/21.  He was then transferred to [place]'s ICU on 12/5/21.       At [place] he was followed in ICU and continued on vent/paralysis/proning/pressors.  Cefepime switched to rocephin 12/6/21.  He continued to be very encephalopathic and psychiatry was consulted. He was weaned off pressors and didn't2/14 but had clinically improved somewhat and was extubated on 12/15/21. On 12/16/21 he had severely increased work o require proning by 12/10/21.  He has been on large doses of anti-psychotics and sedatives.  He developed fevers again 1f breathing and so was re-intubated. Blood culture grew Streptococcus constellatus and sputum  culture grew that as well but also had E. Coli/Proteus m./staph epi/candida parapsilosis. Vanco/Zosyn was started on 12/16/21.  He then underwent trach/PEG on 12/17/21.  Vanco was stopped after 3 days and Zosyn switched to rocephin on 12/21/21 to complete on 1/2/21.       During this time he had significant issues with encephalopathy and agitation. As above he was started on multiple meds for this. Prior to transfer to [facility], they had increased his lorazepam up to 12 mg every 4 hours and oxycodone 20 mg every 4 hours along with abilify 7 mg (home med), gabapentin 800 mg every 8 hours, keppra 500 mg twice daily (for behavior), zyprexa 5 mg twice daily, and seroquel 75 mg TID with 100 mg HS.  With this regimen, he improved reportedly in the ICU but was still awake and agitated.  He was transferred to [facility] on 12/31/21.      Assessment & Plan      Acute respiratory failure with hypoxia: due to acute respiratory distress syndrome (ARDS) secondary to COVID-19 pneumonia. Initially intubated on 11/28. Failed extubation and reintubated on 12/15/21. S/p tracheostomy on 12/17/21. Capped since 1/7/22. Currently stable on room air.   - Patient has high risk of aspiration due to oral pharyngeal dysphagia and overall cognitive dysfunction. Per pulmonary, will continue to monitor closely and decannulate when appropriate.   - Continue nebs prn    Urinary Retention - unclear etiology - may be medication related and due to less mobile as he is so confused.  Will have urology assess.    Acute metabolic encephalopathy: was on high dose of sedating medication prior to transfer to [facility]. Psych and addiction medicine have been involved in adjusting the medication. Lorazepam, Keppra and oxycodone were tapered off. Currently on Abilify, Subutex, gabapentin and phenobarbital. Patient has been agitated, confused and restless.  - Previously on subutex 8 mg qam and 8 mg qpm for chronic pain and opioid use. Changed to 4 mg tid since 1/13   -  Benzodiazepine tapering: was on high dose ativan when arriving to [facility]. Ativan stopped and converted to phenobarbital on 1/3. Per [facility] medicine, patient needs slow tapering over several weeks. However, per ex-wife, patient became more confused and agitated after phenobarbital started and she would like patient to get off this medication sooner. Per [facility] medicine, started to taper since 1/13. Currently on 243 mg bid.   - Previosuly on high dose Seroquel and was tapered down to 12.5 mg bid during the day, 25 mg at bedtime. However, ex-wife reports that Seroquel made patient have significant confusion and agitation in the past, which required hospitalization. She does not want patient to be on Seroquel. It was stopped on 1/15.   - Started Haldo 2 mg IV q4h prn since 1/15. Monitor QTc. (only getting it once a day on average the past few days)  - Increase Abilify 10 mg daily per psych.   - Continue PTA gabapentin 900 mg tid.   - Continue melatonin 5 mg and trazodone 250 mg at bedtime  - Tapering zyprexa per psych  - Continue soft restraints and bedside sitter. Wean as able     Oropharyngeal dysphagia: started diet and on back up bolus tube feed  - Further diet modification and tube feed per speech therapy and dietitian    Mild intermittent dysconjugate gaze: his ex-wife states she has noted this in the past when he is on certain medications like ativan but not if he is off all meds.     Chronic back pain: On Subutex and gabapentin as above.     Essential hypertension: BP controlled. On metoprolol. Monitor BP.      Major depressive disorder, recurrent episode, severe: continue abilify, trazodone.       Polysubstance abuse and Opioid type dependence: Per addiction medicine service, on phenobarb and resumed subutex.       ROSSY (obstructive sleep apnea): management per respiratory failure as above     Mild intermittent asthma: nebs as needed     Recent Ventilator associated pneumonia: Completed antibiotics on  1/3/22.     Thrush: completed nystatin     Severe Malnutrition, POA: based on nutrition assessment             Diet: Adult Formula Drip Feeding: Continuous Promote w fiber; Gastrostomy; Goal Rate: 150 ml/hr x 2 hrs after meals if eats <50% of meal; mL/hr; Medication - Feeding Tube Flush Frequency: At least 15-30 mL water before and after medication administratio...  Minced & Moist Diet (level 5) Mildly Thick (level 2)    DVT Prophylaxis: Enoxaparin (Lovenox) SQ  Wei Catheter: Not present  Central Lines: None  Code Status: Full Code      Disposition Plan   Expected Discharge: 01/31/2022     Anticipated discharge location: home with family    Delays:     Complex Care  1:1 Sitter            The patient's care was discussed with the Bedside Nurse.        Adam Soliman MD  Hospitalist Service  [facility]  Securely message with the Vocera Web Console (learn more here)  Text page via [employer] Paging/Directory      ______________________________________________________________________    Interval History   Patient remains confused,  agitated and restless. He continues to require restraint and bedside sitter.    Data reviewed today: I reviewed all medications, new labs and imaging results over the last 24 hours.   All medication issues identified within the last24 hours have been addressed and completed as appropriate    Physical Exam   Vital Signs: Temp: 97.1  F (36.2  C) Temp src: Axillary BP: 120/82 Pulse: 99   Resp: 18 SpO2: 99 % O2 Device: Nasal cannula Oxygen Delivery: 2 LPM  Weight: 171 lbs 0 oz    General appearance: Confused and agitated  HEENT: PERRL, EOMI  Lungs: Clear breath sounds in bilateral lung fields  Cardiovascular: Regular rate and rhythm, normal S1-S2  Abdomen: Soft, non tender, no distension.   Musculoskeletal: No joint swelling  Skin: No rash and no edema  Neurology: Awake and alert. Oriented to place but not time.  Confused and agitated.  Cranial nerves II - XII normal.  Normal muscle strength in all four  extremities.    Data   Recent Labs   Lab 01/17/22  0617   WBC 5.8   HGB 14.0   MCV 95         POTASSIUM 4.3   CHLORIDE 106   CO2 24   BUN 7*   CR 0.58*   ANIONGAP 11   TRUONG 9.3   GLC 85         Restraint face-to-face    Within an hour after restraint an in person face to face assessment was completed, including an evaluation of the patient's immediate reaction to the intervention, behavioral assessment and review/assessment of history, drugs and medications, recent labs, etc., and behavioral condition.  The patient experienced: No adverse physical outcome from seclusion/restraint initiation.  The intervention of restraint or seclusion needs to continue.

## 2022-01-19 NOTE — PLAN OF CARE
Problem: Adult Inpatient Plan of Care  Goal: Optimal Comfort and Wellbeing  Outcome: Improving  Intervention: Provide Person-Centered Care  Recent Flowsheet Documentation  Taken 1/19/2022 0217 by Hellen Gill, RN  Trust Relationship/Rapport: care explained     Problem: Restraint for Non-Violent/Non-Self-Destructive Behavior  Goal: Prevent/Manage Potential Problems  Description: Maintain safety of patient and others during period of restraint.  Promote psychological and physical wellbeing.  Prevent injury to skin and involved body parts.  Promote nutrition, hydration, and elimination.  Outcome: Improving     Problem: Communication Impairment (Artificial Airway)  Goal: Effective Communication  Outcome: Improving     Problem: Skin and Tissue Injury (Artificial Airway)  Goal: Absence of Device-Related Skin or Tissue Injury  Outcome: Improving   Patient was sleeping for about 1 hour at start of the shift then woke up agitated, scooting down in bed and wants to get out of bed; was trying to bite writer's arms; was not redirectable.  PRN haldol was administered by another RN.  Patient slept for about 1 hour after medication was given; had been calmer, tried to scoot down in bed several times but redirectable.  Bedpan was offered; had a smear of BM; urinal was offered several times; ran faucet.  Patient claimed he has the urge but unable to void.  Bladder scan done, showed 561 ml in bladder.  Straight catheterization was done; 500 ml of clear angelina urine out.  Patient sleeping after the procedure; appears comfortable.

## 2022-01-19 NOTE — PROGRESS NOTES
Pt restless in bed on eves asking to have soft limb restraints cut off & mentions wanting to go. Exhibits sarcasm when talking to NA/R who was assigned to 1:1 for pt. Pt's ex-spouse Jama called over I-pad to talk with pt. He was not able to recall that she came today & brought him lunch. Pt then became loud & verbally abusive to Jama who then logged off the I-pad. Minutes later had another visitor connect with him over the I-pad.Given PRN Tylenol 650 mg for c/o mid back pain with relief. Enjoys drinking thickened lemon water over ice chips. Drank a total of 8 oz. Unable to void in urinal despite running water faucet & giving pt ample time to void. Bladder scan @ end of kimberly = 490ml, straight cathed for 475 ml angelina/concentrated urine using a coude catheter. Placed on bedpan w/o results, incontinent of a large soft bm in pull-up. Remains on a 1:1 for safety. Using soft limb restraints X4 with 4 side rails up in bed for safety/impulsive behavior. Pt aware he's in the hospital, confused to month/date. Gets confused re: time of day. Pt under the impression that it was time for him to get up & go to work @ [facility]. He becomes upset stating he will lose his job.

## 2022-01-19 NOTE — PLAN OF CARE
Problem: Communication Impairment (Artificial Airway)  Goal: Effective Communication  Outcome: Improving     Problem: Device-Related Complication Risk (Artificial Airway)  Goal: Optimal Device Function  Outcome: Improving     Problem: Skin and Tissue Injury (Artificial Airway)  Goal: Absence of Device-Related Skin or Tissue Injury  Outcome: Improving      RT PROGRESS NOTE     DATA:     CURRENT SETTINGS:              TRACH TYPE / SIZE: 7 Bivona 1/04/22             MODE:   Cap              FIO2:   2LNC @ noc     ACTION:             THERAPIES:                SUCTION:                           FREQUENCY:                          AMOUNT:                          CONSISTENCY:                        COLOR:              SPONTANEOUS COUGH EFFORT/STRENGTH OF EFFORT (not elicited by suctioning): Yes/strong                              WEANING PHASE:   3                        WEAN MODE:  Cap on RA day                        WEAN TIME:   As tolerated                         END WEAN REASON:      RESPONSE:             BS: Clear              VITAL SIGNS: Sat 94-99%, HR , RR 10-18             EMOTIONAL NEEDS / CONCERNS:                  RISK FOR SELF DECANNULATION:  Yes                         RISK DUE TO:  Confusion                        INTERVENTION/S IN PLACE IS/ARE:  4.0 bilateral restraints in place     NOTE / PLAN:   Cont with plan. Pt is still 1:1

## 2022-01-19 NOTE — PROGRESS NOTES
Patient under pant was wet this morning but didn't void after. Urinal offered multiple times but unable to void. Bladder scanned at 3 pm for 390 ml and straight cath for 400 ml.

## 2022-01-19 NOTE — CONSULTS
[facility] CONSULTATION    Type of Consult: inpatient  Place of Service: [facility]   Reason for consult: Urinary Retention  Request for consult by: Dr. Soliman    History of present illness:   Jared North is a 46 year old male that was admitted for Acute on chronic respiratory failure with hypoxia (H). Urology is being consulted for Urinary Retention. History obtained through patient and chart review.     Patient is a 46-year-old male that was admitted on 12/31 for acute respiratory failure related to COVID-pneumonia.  Patient has recently had more issues with agitation requiring increases in his antipsychotic medications.  Patient has had difficulties with urination and has required straight catheterization x2 with ongoing urinary retention.  Patient's most recent creatinine was 0.58 on 1/17.  No leukocytosis noted on 1/17.  Patient did have a urinalysis done on 1/12, no signs of blood, infection.  Patient did have Wei catheter placed today without significant issue.  Has had good output since.  Patient denies any past medical history of seeing urologist outside of some patient denies any history of BPH, taking BPH medications.  Patient has seen a urologist in the past for kidney stones, he does not believe he has had a surgery in the past.  Patient currently denies abdominal, flank pain, preceding dysuria, hematuria.  Patient also denies preceding LUTS that include nocturia, urgency, frequency, weak stream.  Patient is currently on a medication regimen of Abilify, buprenorphine, gabapentin, haloperidol, Zyprexa, phenobarbital, trazodone.    Past Medical History:  Past Medical History:   Diagnosis Date     Major depressive disorder, recurrent episode, severe (H) 4/26/2006    Formatting of this note might be different from the original. Epic     Mild intermittent asthma 12/31/2021     Opioid type dependence (H) 4/26/2006    Formatting of this note might be different from the original. Epic      ROSSY (obstructive sleep apnea) 12/31/2021     Pneumonia due to 2019 novel coronavirus 11/24/2021     Polysubstance abuse (H) 12/31/2021       Past Surgical History:  Past Surgical History:   Procedure Laterality Date     GASTROSTOMY  12/17/2021     PICC TRIPLE LUMEN PLACEMENT  11/24/2021          TRACHEOSTOMY  12/17/2021       Social History:  Social History     Socioeconomic History     Marital status:      Spouse name: Not on file     Number of children: Not on file     Years of education: Not on file     Highest education level: Not on file   Occupational History     Not on file   Tobacco Use     Smoking status: Never Smoker     Smokeless tobacco: Current User   Substance and Sexual Activity     Alcohol use: Yes     Alcohol/week: 21.0 standard drinks     Drug use: No     Sexual activity: Not on file   Other Topics Concern     Not on file   Social History Narrative     Not on file     Social Determinants of Health     Financial Resource Strain: Not on file   Food Insecurity: Not on file   Transportation Needs: Not on file   Physical Activity: Not on file   Stress: Not on file   Social Connections: Not on file   Intimate Partner Violence: Not on file   Housing Stability: Not on file       Medications:  Current Facility-Administered Medications   Medication     acetaminophen (TYLENOL) solution 650 mg     acetaminophen (TYLENOL) Suppository 650 mg     ARIPiprazole (ABILIFY) solution 10 mg     bisacodyl (DULCOLAX) Suppository 10 mg     buprenorphine (SUBUTEX) sublingual tablet 4 mg     chlorhexidine (PERIDEX) 0.12 % solution 15 mL     dextrose 10% infusion     glucose gel 15-30 g    Or     dextrose 50 % injection 25-50 mL    Or     glucagon injection 1 mg     diphenhydrAMINE (BENADRYL) liquid 50 mg     docusate (COLACE) 50 MG/5ML liquid 100 mg    And     sennosides (SENOKOT) syrup 10 mL     enoxaparin ANTICOAGULANT (LOVENOX) injection 40 mg     gabapentin (NEURONTIN) solution 900 mg     haloperidol lactate  (HALDOL) injection 2 mg     hydrALAZINE (APRESOLINE) injection 10 mg     melatonin tablet 5 mg     metoprolol (LOPRESSOR) injection 5-10 mg     metoprolol tartrate (LOPRESSOR) tablet 50 mg     midodrine (PROAMATINE) tablet 2.5 mg     mineral oil-hydrophilic petrolatum (AQUAPHOR)     OLANZapine (zyPREXA) tablet 2.5 mg     ondansetron (ZOFRAN-ODT) ODT tab 4 mg    Or     ondansetron (ZOFRAN) injection 4 mg     Patient is already receiving anticoagulation with heparin, enoxaparin (LOVENOX), warfarin (COUMADIN)  or other anticoagulant medication     [START ON 1/20/2022] PHENobarbital (LUMINAL) tablet 48.6 mg     polyethylene glycol (MIRALAX) Packet 17 g     prochlorperazine (COMPAZINE) injection 10 mg    Or     prochlorperazine (COMPAZINE) tablet 10 mg    Or     prochlorperazine (COMPAZINE) suppository 25 mg     simethicone (MYLICON) suspension 40 mg     sodium chloride (PF) 0.9% PF flush 10-40 mL     traZODone (DESYREL) tablet 150 mg       Allergies:   No Known Allergies    Review of Systems:   A full 12 point review of systems was taken and is negative aside from what is noted above in the HPI    Physical Exam:  Temp:  [97.1  F (36.2  C)-98  F (36.7  C)] 97.7  F (36.5  C)  Pulse:  [] 67  Resp:  [10-20] 18  BP: (120-126)/(74-95) 122/81  SpO2:  [94 %-100 %] 100 %  General: NAD, alert, cooperative  Head: normocephalic, without abnormality / atraumatic  Abdomen: soft, non-tender, non-distended. no suprapubic fullness/tenderness. no CVA tenderness noted  Geniturinary: lange catheter in place draining yellow urine without clot or sediment  Skin: no rashes or lesions  Musculoskeletal: moves extremities equally; no calf edema or tenderness  Psychological: alert, currently in restraints, tangential thought      Labs:   Lab Results   Component Value Date    WBC 5.8 01/17/2022    HGB 14.0 01/17/2022    HCT 43.9 01/17/2022     01/17/2022    TRIG 145 12/21/2021    ALT 57 (H) 12/20/2021    AST 29 12/20/2021      01/17/2022    BUN 7 (L) 01/17/2022    CO2 24 01/17/2022    INR 1.23 (H) 12/17/2021        Lab Results   Component Value Date    UROBILINOGEN 0.2 12/10/2002    NITRITE Negative 01/12/2022        Lab Results: personally reviewed     Imaging:  [unfilled]    I have personally reviewed the imaging reports above.     Assessment / Plan : Jared North is being seen by [facility] for Urinary Retention    -Patient is a 46-year-old male with urinary retention.  Patient's urinary retention likely multifactorial related to hospitalization, immobilization, increases in antipsychotic medications.  Would recommend continuing Wei catheter at this time until patient's mentation improves.  When patient is set for discharge, would recommend voiding trial to ensure patient is able to void adequately.  Orders were placed for this.  -In the meantime, would recommend changing Wei catheter monthly.  - urology will sign off. Email has been sent to our office, orders placed in the dc navigator. Please do not hesitate to call or re consult with urological concerns.   Thank you for consulting [facility] regarding this patient's care. Please contact us with questions or concerns.     Gurmeet Reyes PA-C  [facility]   [phone]

## 2022-01-19 NOTE — PLAN OF CARE
Problem: Inability to Wean (Mechanical Ventilation, Invasive)  Goal: Mechanical Ventilation Liberation  Outcome: Improving     Problem: Skin and Tissue Injury (Mechanical Ventilation, Invasive)  Goal: Absence of Device-Related Skin and Tissue Injury  Outcome: Improving     Problem: Ventilator-Induced Lung Injury (Mechanical Ventilation, Invasive)  Goal: Absence of Ventilator-Induced Lung Injury  Outcome: Improving     Problem: Restraint for Non-Violent/Non-Self-Destructive Behavior  Goal: Prevent/Manage Potential Problems  Description: Maintain safety of patient and others during period of restraint.  Promote psychological and physical wellbeing.  Prevent injury to skin and involved body parts.  Promote nutrition, hydration, and elimination.  Outcome: Improving   RT PROGRESS NOTE     DATA:     CURRENT SETTINGS:              TRACH TYPE / SIZE:#7 Bivona, placed 1/4/22             MODE:   capped                FIO2:   2L NC      ACTION:             THERAPIES:               SUCTION:                           FREQUENCY: none                        AMOUNT:                        CONSISTENCY:                        COLOR:               SPONTANEOUS COUGH EFFORT/STRENGTH OF EFFORT (not elicited by suctioning): Strong                              WEANING PHASE:   3                        WEAN MODE: capped/ 2LNC                        WEAN TIME: cont as tolerated                        END WEAN REASON:                   RESPONSE:             BS:    clear T/O             VITAL SIGNS:  sat %, HR , RR 20             EMOTIONAL NEEDS / CONCERNS: Confused                RISK FOR SELF DECANNULATION: yes                        RISK DUE TO: confused                         INTERVENTION/S IN PLACE IS/ARE:4 Point restraints + 1:1 staff supervision       NOTE / PLAN:  cont current plan of care - trach capped cont as tolerated.

## 2022-01-19 NOTE — PLAN OF CARE
Care Management Progression of Care Update        DR GLOS - Target D/C Date 22        PLAN/GOALS  Pulmonary following. Phase 3 wean~TracH CAPPED on RA daytime and Capped  On 2L 02 at night.      2.  Nutrition management.  Diet minced & moist. Tube feeding bolus via PEG placed on 21 if PO intake of meal is <50%.  Registered Dietician to monitor intake, wieght and labs.    3.  Addiction Medicine following.    4.  Psychiatry following.    5.  PT 6x/week.  OT 5x/week.    6.  Speech therapy 5x/week.  Video swallow study.    7.  Urology consult for urinary retention.      BARRIERS  1.  Acute on chronic respiratory failure with hypoxia due to COVID-19.  Vent / trach wean.    2.  Acute metabolic encephalopathy. Many delusions and hallucinations. Yelling out.    3.  Dysphagia-High risk for aspiration- requires to be fed.    4.  Bilateral soft wrist restraints.    5.  1:1 attendant at bedside for safety    6.  Severe malnutrition >5% wieght loss in 1 month,  Albumin 2.5.      Disposition:  TCU vs. Acute Rehab    Care Manager Name:  Chidi Gomez RN,BSN, HNB-BC    Date/Time:  2022 @ 11:27 AM

## 2022-01-19 NOTE — PROGRESS NOTES
Addiction Medicine Progress Note  1/19/2022        Assessment/Plan    Principal Problem:    Acute on chronic respiratory failure with hypoxia (H)  Active Problems:    Chronic back pain    Essential hypertension    Pneumonia due to 2019 novel coronavirus    Acute respiratory distress syndrome (ARDS) due to COVID-19 virus (H)    Major depressive disorder, recurrent episode, severe (H)    Opioid use disorder, severe, dependence (H)    Polysubstance abuse (H)    ROSSY (obstructive sleep apnea)    Mild intermittent asthma    Acute metabolic encephalopathy    Ventilator associated pneumonia (H)    Oropharyngeal dysphagia    Opioid use disorder, severe, dependence- now on 4 mg 3 times daily and is more alert.  There is no evidence of opioid withdrawal-like symptoms apart from agitation which  appears to be his new baseline.  Avoid the use of other opioids and continue the dose of buprenorphine unchanged.    Benzodiazepine withdrawal-on phenobarbital taper and continues to have agitation and restlessness which may not be related to benzodiazepine withdrawal.  Continue the taper unchanged and monitor.  Continue gabapentin    Persistent delirium-psychiatry involved and they are tapering Zyprexa.  Abilify increased to 10 mg daily.  Will defer to hospitalist and psychiatry.      Subjective  Patient is confused and belligerent.  He denies pain and is oriented to self and place.  Unable to obtain further history from him.  Nursing notes reviewed and noted agitation with need for ongoing restraints.    ROS:    Unable to obtain because of confusion    Objective    Vital signs in last 24 hours  Temp:  [97.1  F (36.2  C)-98  F (36.7  C)] 97.7  F (36.5  C)  Pulse:  [] 86  Resp:  [10-20] 20  BP: (120-126)/(74-95) 122/81  SpO2:  [94 %-100 %] 100 %        Physical Exam    Gen: Awake and alert.  Irritable, belligerent  Neuro: speech is loud  Tremor: none  Eyes: EOMI. There is no scleral icterus.  Skin: no jaundice, diaphoresis, goose  bumps  Respiratory: Trach and does not appear to be in respiratory distress  Thought content: Confused, perseverating with delusions  Thought processes: Scattered and nonlinear  Insight: Poor  Judgment: Poor  Affect: Anxious      Pertinent Labs   Lab Results: I have personally reviewed the labs.      Eda Peterson MD  Addiction Medicine

## 2022-01-20 ENCOUNTER — APPOINTMENT (OUTPATIENT)
Dept: PHYSICAL THERAPY | Facility: CLINIC | Age: 47
End: 2022-01-20
Attending: INTERNAL MEDICINE
Payer: COMMERCIAL

## 2022-01-20 ENCOUNTER — APPOINTMENT (OUTPATIENT)
Dept: OCCUPATIONAL THERAPY | Facility: CLINIC | Age: 47
End: 2022-01-20
Attending: INTERNAL MEDICINE
Payer: COMMERCIAL

## 2022-01-20 PROCEDURE — 250N000011 HC RX IP 250 OP 636: Performed by: NURSE PRACTITIONER

## 2022-01-20 PROCEDURE — 99233 SBSQ HOSP IP/OBS HIGH 50: CPT | Performed by: HOSPITALIST

## 2022-01-20 PROCEDURE — 250N000013 HC RX MED GY IP 250 OP 250 PS 637: Performed by: HOSPITALIST

## 2022-01-20 PROCEDURE — 97116 GAIT TRAINING THERAPY: CPT | Mod: GP

## 2022-01-20 PROCEDURE — 250N000013 HC RX MED GY IP 250 OP 250 PS 637: Performed by: NURSE PRACTITIONER

## 2022-01-20 PROCEDURE — 97535 SELF CARE MNGMENT TRAINING: CPT | Mod: GO | Performed by: OCCUPATIONAL THERAPIST

## 2022-01-20 PROCEDURE — 250N000013 HC RX MED GY IP 250 OP 250 PS 637: Performed by: INTERNAL MEDICINE

## 2022-01-20 PROCEDURE — 97129 THER IVNTJ 1ST 15 MIN: CPT | Mod: GO | Performed by: OCCUPATIONAL THERAPIST

## 2022-01-20 PROCEDURE — 999N000123 HC STATISTIC OXYGEN O2DAILY TECH TIME

## 2022-01-20 PROCEDURE — 120N000018 HC R&B RESPIRATORY CARE WITH ATTENDANT

## 2022-01-20 PROCEDURE — 999N000009 HC STATISTIC AIRWAY CARE

## 2022-01-20 PROCEDURE — 999N000157 HC STATISTIC RCP TIME EA 10 MIN

## 2022-01-20 RX ADMIN — BUPRENORPHINE 4 MG: 2 TABLET SUBLINGUAL at 08:10

## 2022-01-20 RX ADMIN — METOPROLOL TARTRATE 50 MG: 50 TABLET, FILM COATED ORAL at 08:12

## 2022-01-20 RX ADMIN — DICLOFENAC 2 G: 10 GEL TOPICAL at 18:07

## 2022-01-20 RX ADMIN — CHLORHEXIDINE GLUCONATE 0.12% ORAL RINSE 15 ML: 1.2 LIQUID ORAL at 08:14

## 2022-01-20 RX ADMIN — ACETAMINOPHEN ORAL SOLUTION 650 MG: 650 SOLUTION ORAL at 05:34

## 2022-01-20 RX ADMIN — BUPRENORPHINE 4 MG: 2 TABLET SUBLINGUAL at 15:26

## 2022-01-20 RX ADMIN — METOPROLOL TARTRATE 50 MG: 50 TABLET, FILM COATED ORAL at 21:04

## 2022-01-20 RX ADMIN — TRAZODONE HYDROCHLORIDE 150 MG: 50 TABLET ORAL at 21:03

## 2022-01-20 RX ADMIN — PHENOBARBITAL 48.6 MG: 16.2 TABLET ORAL at 08:12

## 2022-01-20 RX ADMIN — Medication 5 MG: at 21:04

## 2022-01-20 RX ADMIN — ARIPIPRAZOLE 10 MG: 1 SOLUTION ORAL at 08:11

## 2022-01-20 RX ADMIN — SENNOSIDES 10 ML: 8.8 LIQUID ORAL at 21:06

## 2022-01-20 RX ADMIN — DICLOFENAC 2 G: 10 GEL TOPICAL at 21:39

## 2022-01-20 RX ADMIN — SENNOSIDES 10 ML: 8.8 LIQUID ORAL at 08:13

## 2022-01-20 RX ADMIN — ENOXAPARIN SODIUM 40 MG: 100 INJECTION SUBCUTANEOUS at 08:13

## 2022-01-20 RX ADMIN — ACETAMINOPHEN ORAL SOLUTION 650 MG: 650 SOLUTION ORAL at 17:56

## 2022-01-20 RX ADMIN — OLANZAPINE 2.5 MG: 2.5 TABLET, FILM COATED ORAL at 13:36

## 2022-01-20 RX ADMIN — WHITE PETROLATUM: 1.75 OINTMENT TOPICAL at 08:14

## 2022-01-20 RX ADMIN — POLYETHYLENE GLYCOL 3350 17 G: 17 POWDER, FOR SOLUTION ORAL at 08:12

## 2022-01-20 RX ADMIN — CHLORHEXIDINE GLUCONATE 0.12% ORAL RINSE 15 ML: 1.2 LIQUID ORAL at 21:06

## 2022-01-20 RX ADMIN — OLANZAPINE 2.5 MG: 2.5 TABLET, FILM COATED ORAL at 17:56

## 2022-01-20 RX ADMIN — GABAPENTIN 900 MG: 250 SOLUTION ORAL at 13:36

## 2022-01-20 RX ADMIN — GABAPENTIN 900 MG: 250 SOLUTION ORAL at 21:03

## 2022-01-20 RX ADMIN — GABAPENTIN 900 MG: 250 SOLUTION ORAL at 05:34

## 2022-01-20 RX ADMIN — BUPRENORPHINE 4 MG: 2 TABLET SUBLINGUAL at 21:38

## 2022-01-20 ASSESSMENT — ACTIVITIES OF DAILY LIVING (ADL)
ADLS_ACUITY_SCORE: 19
ADLS_ACUITY_SCORE: 18
ADLS_ACUITY_SCORE: 19
ADLS_ACUITY_SCORE: 14
ADLS_ACUITY_SCORE: 19
ADLS_ACUITY_SCORE: 19
ADLS_ACUITY_SCORE: 18
ADLS_ACUITY_SCORE: 19
ADLS_ACUITY_SCORE: 14
ADLS_ACUITY_SCORE: 19
ADLS_ACUITY_SCORE: 14
ADLS_ACUITY_SCORE: 14
ADLS_ACUITY_SCORE: 19
ADLS_ACUITY_SCORE: 14
ADLS_ACUITY_SCORE: 19
ADLS_ACUITY_SCORE: 14
ADLS_ACUITY_SCORE: 19
ADLS_ACUITY_SCORE: 19

## 2022-01-20 ASSESSMENT — MIFFLIN-ST. JEOR: SCORE: 1683.67

## 2022-01-20 NOTE — PLAN OF CARE
Problem: Communication Impairment (Artificial Airway)  Goal: Effective Communication  Outcome: Improving     Problem: Skin and Tissue Injury (Artificial Airway)  Goal: Absence of Device-Related Skin or Tissue Injury  Outcome: Improving   Patient's trache tube had been capped continuously this evening. He was able to speak and express his concerns. He is alternately confused and lucid, calling out/yelling at times and calm/cooperative next.  His o2 saturations ranged between 95 -98%  with RT titrating his Oxygen support per nasal cannula.    Problem: Restraint for Non-Violent/Non-Self-Destructive Behavior  Goal: Prevent/Manage Potential Problems  Description: Maintain safety of patient and others during period of restraint.  Promote psychological and physical wellbeing.  Prevent injury to skin and involved body parts.  Promote nutrition, hydration, and elimination.  Outcome: No Change   He is very impulsive and didn't care whether his lines or tubes get pulled when he sits up abruptly from lying on bed or rolling to his sides.  He had to be restrained on 4-point , on bilateral soft wrists and bilateral ankles to keep him safe from harm. 4 side rails were up as well.  He did not get out of bed this evening.    Problem: Adult Inpatient Plan of Care  Goal: Absence of Hospital-Acquired Illness or Injury  Outcome: Improving  Intervention: Identify and Manage Fall Risk  Recent Flowsheet Documentation  Taken 1/19/2022 1518 by Carri Moise RN  Safety Promotion/Fall Prevention:   bed alarm on   assistive device/personal items within reach   lighting adjusted   room door open   room near nurse's station  Intervention: Prevent Infection  Recent Flowsheet Documentation  Taken 1/19/2022 1518 by Carri Moise RN  Infection Prevention:   hand hygiene promoted   personal protective equipment utilized   rest/sleep promoted   single patient room provided   visitors restricted/screened   1 : 1 sitter was with patient  the entire evening and helped him turned  to his sides. He ate 100% of his supper. Aspiration precautions observed.  His wife and daughter talked to him on Ipad at bedtime and his ex-wife commented on his being lucid, the closest he's ever been talking sense since his admission to [facility].  He had a large loose BM this evening.  Senokot was withheld at bedtime.Will keep monitoring patient's  progress and safety.

## 2022-01-20 NOTE — PROGRESS NOTES
[facility]    Medicine Progress Note - Hospitalist Service       Date of Admission:  12/31/2021    Summary:    Jared North is a 46 year old male who is unvaccinated against COVID-19, with history of polysubstance abuse and chronic pain/lower back pain on Suboxone prior to admission (had been on methadone in the past but not in recent past), mood disorder/depression, HTN, ROSSY and mild intermittent asthma. Patient was diagnosed with COVID-19 viral infection on 11/19/2021. He presented to [facility] on 11/24/2021 with worsening shortness of breath. He was admitted to the ICU with COVID-19 viral pneumonia and severe acute respiratory failure with hypoxia requiring noninvasive ventilation.  He received treatment with Decadron, remdesivir, and tocilizumab. He needed supplemental oxygen alternating with NIPPV and HFNC.  He had an episode of afib during this time that eventually resolved and has not recurred after briefly being treated with amiodarone.  He did not improve and was eventually intubated on 11/28/21 and then proned and started on veletri. On 11/30/21, he was started on cefepime and vanco for VAP. Culture grew E. Coli and Proteus mirabilis.  He did develop hypotension possibly from septic shock vs sedation vasoplegia and was treated with norepi intemittently.  Vanco was stopped on 12/5/21.  He was then transferred to [facility] ICU on 12/5/21.       At [place] he was followed in ICU and continued on vent/paralysis/proning/pressors.  Cefepime switched to rocephin 12/6/21.  He continued to be very encephalopathic and psychiatry was consulted. He was weaned off pressors and didn't2/14 but had clinically improved somewhat and was extubated on 12/15/21. On 12/16/21 he had severely increased work o require proning by 12/10/21.  He has been on large doses of anti-psychotics and sedatives.  He developed fevers again 1f breathing and so was re-intubated. Blood culture grew Streptococcus constellatus and sputum  culture grew that as well but also had E. Coli/Proteus m./staph epi/candida parapsilosis. Vanco/Zosyn was started on 12/16/21.  He then underwent trach/PEG on 12/17/21.  Vanco was stopped after 3 days and Zosyn switched to rocephin on 12/21/21 to complete on 1/2/21.        During this time he had significant issues with encephalopathy and agitation. As above he was started on multiple meds for this. Prior to transfer to [facility], they had increased his lorazepam up to 12 mg every 4 hours and oxycodone 20 mg every 4 hours along with abilify 7 mg (home med), gabapentin 800 mg every 8 hours, keppra 500 mg twice daily (for behavior), zyprexa 5 mg twice daily, and seroquel 75 mg TID with 100 mg HS.  With this regimen, he improved reportedly in the ICU but was still awake and agitated.  He was transferred to [facility] on 12/31/21.      Assessment & Plan      Acute respiratory failure with hypoxia: due to acute respiratory distress syndrome (ARDS) secondary to COVID-19 pneumonia. Initially intubated on 11/28. Failed extubation and reintubated on 12/15/21. S/p tracheostomy on 12/17/21. Capped since 1/7/22.   -Currently stable on room air.   -Patient has high risk of aspiration due to oral pharyngeal dysphagia and overall cognitive dysfunction.   -Per pulmonary, will continue to monitor closely and decannulate when appropriate.   -Continue nebs prn    Urinary Retention - unclear etiology - may be medication related and due to less mobile as he is so confused.    -Appreciate Urology, recommend continue Wei, and voiding trail at discharge.  -Change Q monthly, follow up urology after discharge.    Acute metabolic encephalopathy: was on high dose of sedating medication prior to transfer to [facility]. Psych and addiction medicine have been involved in adjusting the medication. Lorazepam, Keppra and oxycodone were tapered off. Currently on Abilify, Subutex, gabapentin and phenobarbital tapered off now. Patient has been agitated, confused  and restless.  - Previously on subutex 8 mg qam and 8 mg qpm for chronic pain and opioid use. Changed to 4 mg tid since 1/13   - Benzodiazepine tapering: was on high dose ativan when arriving to [facility]. Ativan stopped and converted to phenobarbital on 1/3. Per Addiction medicine, patient needs slow tapering over several weeks. However, per ex-wife, patient became more confused and agitated after phenobarbital started and she would like patient to get off this medication sooner. Per Addiction medicine, started to taper since 1/13, last dose on 1/20.   - Previosuly on high dose Seroquel and was tapered down to 12.5 mg bid during the day, 25 mg at bedtime. However, ex-wife reports that Seroquel made patient have significant confusion and agitation in the past, which required hospitalization. She does not want patient to be on Seroquel. It was stopped on 1/15.   - Started Haldo 2 mg IV q4h prn since 1/15. Monitor QTc. (only getting it once a day on average the past few days)  - Increase Abilify 10 mg daily per psych.   - Continue PTA gabapentin 900 mg tid.   - Continue melatonin 5 mg and trazodone 150 mg at bedtime  - Tapering zyprexa per psych, currently on 2.5 mg BID  - Continue soft restraints and bedside sitter. Wean as able     Oropharyngeal dysphagia: started diet and on back up bolus tube feed  - Further diet modification and tube feed per speech therapy and dietitian, likely planned for video swallow today    Mild intermittent dysconjugate gaze: his ex-wife states she has noted this in the past when he is on certain medications like ativan but not if he is off all meds.     Chronic back pain: On Subutex and gabapentin as above.     Essential hypertension: BP controlled. On metoprolol. Monitor BP.      Major depressive disorder, recurrent episode, severe: continue abilify, trazodone.       Polysubstance abuse and Opioid type dependence: Per addiction medicine service, on tapering phenobarb and resumed subutex.        ROSSY (obstructive sleep apnea): management for respiratory failure as above     Mild intermittent asthma: nebs as needed     Recent Ventilator associated pneumonia: Completed antibiotics on 1/3/22.     Thrush: completed nystatin     Severe Malnutrition, POA: based on nutrition assessment             Diet: Adult Formula Drip Feeding: Continuous Promote w fiber; Gastrostomy; Goal Rate: 150 ml/hr x 2 hrs after meals if eats <50% of meal; mL/hr; Medication - Feeding Tube Flush Frequency: At least 15-30 mL water before and after medication administratio...  Minced & Moist Diet (level 5) Mildly Thick (level 2)    DVT Prophylaxis: Enoxaparin (Lovenox) SQ  Wei Catheter: PRESENT, indication: Retention  Central Lines: None  Code Status: Full Code      Disposition Plan   Expected Discharge: 01/31/2022     Anticipated discharge location: home with family    Delays:     Complex Care  1:1 Sitter            The patient's care was discussed with bedside RN, ex-wife Jama over the ipad in the room.        Tamika Skinner MD  Hospitalist Service  [facility]  Securely message with the Vocera Web Console (learn more here)  Text page via [employer] Paging/Directory      ______________________________________________________________________    Interval History   Chart reviewed and events noted.  Patient seen and examined.   Patient has tangential speech often,  irrelevant answers to questions asked. Ex wife Jama over the ipad and pt turns to be rude and argumentative with her as well. Denies any chest pain, SOB. States he slept well last night but RN reports pt was up and hardly slept.      Data reviewed today: I reviewed all medications, new labs and imaging results over the last 24 hours.   All medication issues identified within the last24 hours have been addressed and completed as appropriate    Physical Exam   Vital Signs: Temp: 97.6  F (36.4  C) Temp src: Axillary BP: (!) 142/83 Pulse: 111   Resp: 20 SpO2: 95 % O2 Device: Nasal cannula  Oxygen Delivery: 2 LPM  Weight: 171 lbs 0 oz    General appearance: No distress  HEENT: PERRL, EOMI  Lungs: Clear breath sounds in bilateral lung fields  Cardiovascular: Regular rate and rhythm, normal S1-S2  Abdomen: Soft, non tender, no distension.   Musculoskeletal: No joint swelling  Skin: No rash and no edema  Neurology: Awake and alert. Oriented to place.  Confused  Cranial nerves II - XII normal.  Normal muscle strength in all four extremities.  Psychiatry: Unpleasant mood    Data   Recent Labs   Lab 01/17/22  0617   WBC 5.8   HGB 14.0   MCV 95         POTASSIUM 4.3   CHLORIDE 106   CO2 24   BUN 7*   CR 0.58*   ANIONGAP 11   TRUONG 9.3   GLC 85         Restraint face-to-face    Within an hour after restraint an in person face to face assessment was completed, including an evaluation of the patient's immediate reaction to the intervention, behavioral assessment and review/assessment of history, drugs and medications, recent labs, etc., and behavioral condition.  The patient experienced: No adverse physical outcome from seclusion/restraint initiation.  The intervention of restraint or seclusion needs to continue.

## 2022-01-20 NOTE — PLAN OF CARE
"  Problem: Adult Inpatient Plan of Care  Goal: Plan of Care Review  Outcome: No Change       Pt Sleeping @ 2320, abruptly awakens @ 2335 & expresses wanting to get up OOB. Pt frustrated & restless, tells writer, \"Get a gun and shoot me.\" He voices concerns that his family & kids don't care about him & won't want him home. Becomes tearful when he talks about this. Pt said he saw his mom last year for the first time in 10 yrs. At one point worries that he doesn't have anything left going for him. Pt c/o bottom feeling uncomfortable, provided sage care & brief changed. Pt thankful for cares rendered however, exhibits some sexually inappropriate behavior. He c/o meatus being sore where lange inserted with sage care. He then tells writer, \"Now you mise well put it in your mouth\" as he laughs. He says to writer, \"Will you let me know if you leave your .\" He frequently brought up one of the kimberly NA/R's citing that she was young & pretty. He tells writer, \"She has a crush on me & doesn't want me to get back with my wife.\" He randomly uses the word \"dildo\" here & there t/o the night when he talks. Pt mocks people on the television & makes negative comments re: some of the female d/t their race/weight. Pt has insomnia slept a total of < 1 hr t/o the entire night. Sleeps for 2-10\" increments before awakens self & mentions that he dreams a lot. Pt said it's had to try & keep his dreams & reality separate. At times he sings softly to self as to try & put self to sleep. Pt excited to have VSS today & focused on wanting to get dressed & look somewhat nice. He refers to his ex-wife as \"a bitch\" @ times, yet says he loves her because she will do anything for him. He c/o being thirsty & requests frequent spoonfuls of ice-chips. VSS at rest, HR slightly tachy with increased movement or when becomes agitated. Wears 2 liters oxygen t/o the night, occasionally removing or displacing. He c/o oxygen bothering him. Allowed to go w/o " "oxygen for 15\", sats dropped to 89% therefore reapplied. Wei puts out 550 ml angelina colored urine. Pt appreciative of cares from writer & thanks writer for being so nice to him. Given PRN Tylenol 650 mg @ end of night for c/o nerve pain in (R) forearm with some relief. Pt cites having a problem with his nerves & thinks this may play a role in why he can't sleep good. Restraints were loosened/given more slack to allow pt more ROM to help promote sleep as needed. Pt grateful to be able to touch his hands together & stretch his fingers, etc.   "

## 2022-01-20 NOTE — PROGRESS NOTES
NUTRITION THERAPY FOLLOW UP NOTE    Diet order:    MM5 with Mildly Thick Liquids and Tube Feeding   Enteral: via PEG placed 21:     BACK UP BOLUS  Adult Formula: Promote w fiber  Route: Gastrostomy  Goal Rate: 150 ml/hr x 2 hours if eats less than 50% of meal  Medication - Feeding Tube Flush Frequency: At least 15-30 mL water before and after medication administration and with tube clogging   mL q 4 hrs (increased by MD )  Each bolus enteral feeding provides the followin calories, 18 grams protein, 41 g CHO, 4 g fiber, and 249 ml free water.    Current Intake:  Oral intake improved over the past week.  Pt eating 100% of most meals; has a poor meal once in a while.  He received 2 boluses of 240 ml and 300 ml since 22.  He is speaking more with the trach capped and says he wants pizza, also talking about wanting to go outside and when told it's not possible he is getting upset and not understanding rationale.      GI:  Soft brown BM  per nursing charting    Labs:  Noted    Weights:  #  173.5#   170#    178.5#   Weights have been fairly stable since admission     Nutrition Diagnosis  Malnutrition related to acute illness, intubated x1 month as evidenced by weight loss, muscle wasting, subcutaneous fat loss-improving     Swallowing difficulty r/t COVID-19 and mechanical ventilation as evidenced by need for mechanically altered diet  Evaluation: continuing       Goal - diet advancement -continuing    Intervention:  Continue with current plan.       Monitoring and Evaluation:  Diet advance, po intake, need for TF

## 2022-01-20 NOTE — PLAN OF CARE
Problem: Communication Impairment (Artificial Airway)  Goal: Effective Communication  1/20/2022 1526 by Katrin Ruiz, RT  Outcome: Improving  1/20/2022 1524 by Katrin Ruiz, RT  Outcome: Improving     Problem: Device-Related Complication Risk (Artificial Airway)  Goal: Optimal Device Function  1/20/2022 1526 by Katrin Ruiz, RT  Outcome: Improving  1/20/2022 1524 by Katrin Ruiz, RT  Outcome: Improving     Problem: Skin and Tissue Injury (Artificial Airway)  Goal: Absence of Device-Related Skin or Tissue Injury  1/20/2022 1526 by Katrin Ruiz, RT  Outcome: Improving  1/20/2022 1524 by Katrin Ruiz, RT  Outcome: Improving    RT PROGRESS NOTE     DATA:     CURRENT SETTINGS:              TRACH TYPE / SIZE:#7 BIVONA TTS CHANGED ON 1/4/22             MODE: Trach  Capped                FIO2:  RA  and 2LNC     ACTION:             THERAPIES:               SUCTION:                           FREQUENCY:none                        AMOUNT:                        CONSISTENCY:                        COLOR:               SPONTANEOUS COUGH EFFORT/STRENGTH OF EFFORT (not elicited by suctioning): Strong                              WEANING PHASE:3                        WEAN MODE:Trach Capped on RA  and 2LNC                        WEAN TIME: Since 1/7/22                        END WEAN REASON:                   RESPONSE:             BS: Clear             VITAL SIGNS:Sat 95-96%, HR  , RR 20-22              EMOTIONAL NEEDS / CONCERNS: Confused                RISK FOR SELF DECANNULATION: yes                        RISK DUE TO: confused                         INTERVENTION/S IN PLACE IS/ARE:4 Point restraints + 1:1 staff supervision     NOTE / PLAN:  Cont. Current plan of care / Patient capped on RA while  awake and 2 LNC  while asleep

## 2022-01-20 NOTE — PLAN OF CARE
RT PROGRESS NOTE     DATA:     CURRENT SETTINGS:             TRACH TYPE / SIZE:  7 Bivona placed 1-4-22             MODE:   Capped/2 LPM NC    ACTION:            SUCTION:                           FREQUENCY:   None this shift                        AMOUNT:                           CONSISTENCY:                           COLOR:                SPONTANEOUS COUGH EFFORT/STRENGTH OF EFFORT (not elicited by suctioning): Good                              WEANING PHASE:   3    RESPONSE:             BS:   Clear             VITAL SIGNS:   97%, HR 99, RR 20             RISK FOR SELF DECANNULATION:  Yes             RISK DUE TO:  Confusion             INTERVENTION/S IN PLACE IS/ARE:  Bilateral wrist restraints       NOTE / PLAN:   Continue with same.     Problem: Inability to Wean (Mechanical Ventilation, Invasive)  Goal: Mechanical Ventilation Liberation  Outcome: No Change     Problem: Skin and Tissue Injury (Mechanical Ventilation, Invasive)  Goal: Absence of Device-Related Skin and Tissue Injury  Outcome: No Change     Problem: Ventilator-Induced Lung Injury (Mechanical Ventilation, Invasive)  Goal: Absence of Ventilator-Induced Lung Injury  Outcome: No Change     Problem: Communication Impairment (Artificial Airway)  Goal: Effective Communication  Outcome: No Change     Problem: Device-Related Complication Risk (Artificial Airway)  Goal: Optimal Device Function  Outcome: No Change     Problem: Skin and Tissue Injury (Artificial Airway)  Goal: Absence of Device-Related Skin or Tissue Injury  Outcome: No Change

## 2022-01-21 ENCOUNTER — APPOINTMENT (OUTPATIENT)
Dept: OCCUPATIONAL THERAPY | Facility: CLINIC | Age: 47
End: 2022-01-21
Attending: INTERNAL MEDICINE
Payer: COMMERCIAL

## 2022-01-21 ENCOUNTER — APPOINTMENT (OUTPATIENT)
Dept: SPEECH THERAPY | Facility: CLINIC | Age: 47
End: 2022-01-21
Attending: INTERNAL MEDICINE
Payer: COMMERCIAL

## 2022-01-21 ENCOUNTER — APPOINTMENT (OUTPATIENT)
Dept: PHYSICAL THERAPY | Facility: CLINIC | Age: 47
End: 2022-01-21
Attending: INTERNAL MEDICINE
Payer: COMMERCIAL

## 2022-01-21 ENCOUNTER — APPOINTMENT (OUTPATIENT)
Dept: RADIOLOGY | Facility: CLINIC | Age: 47
End: 2022-01-21
Attending: INTERNAL MEDICINE
Payer: COMMERCIAL

## 2022-01-21 PROCEDURE — 999N000157 HC STATISTIC RCP TIME EA 10 MIN

## 2022-01-21 PROCEDURE — 250N000013 HC RX MED GY IP 250 OP 250 PS 637: Performed by: INTERNAL MEDICINE

## 2022-01-21 PROCEDURE — 250N000013 HC RX MED GY IP 250 OP 250 PS 637: Performed by: NURSE PRACTITIONER

## 2022-01-21 PROCEDURE — 92611 MOTION FLUOROSCOPY/SWALLOW: CPT | Mod: GN

## 2022-01-21 PROCEDURE — 97116 GAIT TRAINING THERAPY: CPT | Mod: GP

## 2022-01-21 PROCEDURE — 999N000123 HC STATISTIC OXYGEN O2DAILY TECH TIME

## 2022-01-21 PROCEDURE — 250N000011 HC RX IP 250 OP 636: Performed by: NURSE PRACTITIONER

## 2022-01-21 PROCEDURE — 99232 SBSQ HOSP IP/OBS MODERATE 35: CPT | Performed by: NURSE PRACTITIONER

## 2022-01-21 PROCEDURE — 99232 SBSQ HOSP IP/OBS MODERATE 35: CPT | Performed by: HOSPITALIST

## 2022-01-21 PROCEDURE — 120N000018 HC R&B RESPIRATORY CARE WITH ATTENDANT

## 2022-01-21 PROCEDURE — 97110 THERAPEUTIC EXERCISES: CPT | Mod: GO | Performed by: OCCUPATIONAL THERAPIST

## 2022-01-21 PROCEDURE — 0BP1XFZ REMOVAL OF TRACHEOSTOMY DEVICE FROM TRACHEA, EXTERNAL APPROACH: ICD-10-PCS | Performed by: INTERNAL MEDICINE

## 2022-01-21 PROCEDURE — 999N000009 HC STATISTIC AIRWAY CARE

## 2022-01-21 PROCEDURE — 92526 ORAL FUNCTION THERAPY: CPT | Mod: GN

## 2022-01-21 PROCEDURE — 74230 X-RAY XM SWLNG FUNCJ C+: CPT

## 2022-01-21 PROCEDURE — 250N000011 HC RX IP 250 OP 636: Performed by: INTERNAL MEDICINE

## 2022-01-21 PROCEDURE — 250N000013 HC RX MED GY IP 250 OP 250 PS 637: Performed by: HOSPITALIST

## 2022-01-21 RX ORDER — BARIUM SULFATE 400 MG/ML
SUSPENSION ORAL ONCE
Status: COMPLETED | OUTPATIENT
Start: 2022-01-21 | End: 2022-01-21

## 2022-01-21 RX ADMIN — METOPROLOL TARTRATE 50 MG: 50 TABLET, FILM COATED ORAL at 21:41

## 2022-01-21 RX ADMIN — BARIUM SULFATE 60 ML: 400 SUSPENSION ORAL at 09:44

## 2022-01-21 RX ADMIN — POLYETHYLENE GLYCOL 3350 17 G: 17 POWDER, FOR SOLUTION ORAL at 08:01

## 2022-01-21 RX ADMIN — DICLOFENAC 2 G: 10 GEL TOPICAL at 21:43

## 2022-01-21 RX ADMIN — BUPRENORPHINE 4 MG: 2 TABLET SUBLINGUAL at 08:02

## 2022-01-21 RX ADMIN — ACETAMINOPHEN ORAL SOLUTION 650 MG: 650 SOLUTION ORAL at 00:55

## 2022-01-21 RX ADMIN — SENNOSIDES 10 ML: 8.8 LIQUID ORAL at 08:01

## 2022-01-21 RX ADMIN — ENOXAPARIN SODIUM 40 MG: 100 INJECTION SUBCUTANEOUS at 08:01

## 2022-01-21 RX ADMIN — SENNOSIDES 10 ML: 8.8 LIQUID ORAL at 21:37

## 2022-01-21 RX ADMIN — DICLOFENAC 2 G: 10 GEL TOPICAL at 16:23

## 2022-01-21 RX ADMIN — GABAPENTIN 900 MG: 250 SOLUTION ORAL at 21:40

## 2022-01-21 RX ADMIN — OLANZAPINE 2.5 MG: 2.5 TABLET, FILM COATED ORAL at 12:54

## 2022-01-21 RX ADMIN — OLANZAPINE 2.5 MG: 2.5 TABLET, FILM COATED ORAL at 16:23

## 2022-01-21 RX ADMIN — WHITE PETROLATUM: 1.75 OINTMENT TOPICAL at 08:03

## 2022-01-21 RX ADMIN — CHLORHEXIDINE GLUCONATE 0.12% ORAL RINSE 15 ML: 1.2 LIQUID ORAL at 21:41

## 2022-01-21 RX ADMIN — CHLORHEXIDINE GLUCONATE 0.12% ORAL RINSE 15 ML: 1.2 LIQUID ORAL at 08:01

## 2022-01-21 RX ADMIN — METOPROLOL TARTRATE 50 MG: 50 TABLET, FILM COATED ORAL at 08:02

## 2022-01-21 RX ADMIN — TRAZODONE HYDROCHLORIDE 150 MG: 50 TABLET ORAL at 21:41

## 2022-01-21 RX ADMIN — BUPRENORPHINE 4 MG: 2 TABLET SUBLINGUAL at 14:12

## 2022-01-21 RX ADMIN — Medication 5 MG: at 21:41

## 2022-01-21 RX ADMIN — BUPRENORPHINE 4 MG: 2 TABLET SUBLINGUAL at 21:40

## 2022-01-21 RX ADMIN — GABAPENTIN 900 MG: 250 SOLUTION ORAL at 06:21

## 2022-01-21 RX ADMIN — GABAPENTIN 900 MG: 250 SOLUTION ORAL at 13:12

## 2022-01-21 RX ADMIN — HALOPERIDOL LACTATE 2 MG: 5 INJECTION, SOLUTION INTRAMUSCULAR at 00:58

## 2022-01-21 RX ADMIN — DICLOFENAC 2 G: 10 GEL TOPICAL at 08:03

## 2022-01-21 RX ADMIN — BARIUM SULFATE 20 ML: 400 SUSPENSION ORAL at 09:43

## 2022-01-21 RX ADMIN — ARIPIPRAZOLE 10 MG: 1 SOLUTION ORAL at 08:08

## 2022-01-21 RX ADMIN — DICLOFENAC 2 G: 10 GEL TOPICAL at 12:54

## 2022-01-21 ASSESSMENT — ACTIVITIES OF DAILY LIVING (ADL)
ADLS_ACUITY_SCORE: 13
ADLS_ACUITY_SCORE: 15
ADLS_ACUITY_SCORE: 14
ADLS_ACUITY_SCORE: 15
ADLS_ACUITY_SCORE: 15
ADLS_ACUITY_SCORE: 14
ADLS_ACUITY_SCORE: 14
ADLS_ACUITY_SCORE: 15
ADLS_ACUITY_SCORE: 13
ADLS_ACUITY_SCORE: 15
ADLS_ACUITY_SCORE: 15
ADLS_ACUITY_SCORE: 14
ADLS_ACUITY_SCORE: 15
ADLS_ACUITY_SCORE: 14
ADLS_ACUITY_SCORE: 15
ADLS_ACUITY_SCORE: 13
ADLS_ACUITY_SCORE: 14
ADLS_ACUITY_SCORE: 13
ADLS_ACUITY_SCORE: 14
ADLS_ACUITY_SCORE: 15
ADLS_ACUITY_SCORE: 14

## 2022-01-21 NOTE — PLAN OF CARE
He remains on a 1:1. Patient had a video swallow test done this morning. His diet was advanced to level 6 diet and thin liquids. He was decannulated round 1300 and tolerating it well. He naps for a few minutes at a time throughout the shift. His urine has a small amount of blood in it.   Problem: Restraint for Non-Violent/Non-Self-Destructive Behavior  Goal: Prevent/Manage Potential Problems  Description: Maintain safety of patient and others during period of restraint.  Promote psychological and physical wellbeing.  Prevent injury to skin and involved body parts.  Promote nutrition, hydration, and elimination.  Outcome: Improving     Lidia Bingham RN

## 2022-01-21 NOTE — PLAN OF CARE
Problem: Restraint for Non-Violent/Non-Self-Destructive Behavior  Goal: Prevent/Manage Potential Problems  Description: Maintain safety of patient and others during period of restraint.  Promote psychological and physical wellbeing.  Prevent injury to skin and involved body parts.  Promote nutrition, hydration, and elimination.  Outcome: Improving   Patient has been alert, confused at times but following commands. Agreed to cooperate with cares, bilateral wrists/ankles released. Complained of stiffness in both shoulders. Tylenol given with short term relief, MD updated, order obtained to apply southing cream.  Assisted with range of motion, will continue to monitor.

## 2022-01-21 NOTE — PROGRESS NOTES
Social Work Note:    Patient may need home tube feeds/bolus.  Not sure if patient meet criteria for home tube feeds.  Not sure he will meet insurance coverage criteria for home tube feeds.  Writer sent referral to [facility] [phone].  Writer called and left message for Josefa RN [facility] liaison [phone]; also sent her a message through Teams.    Fermin Carroll, [facility]  [phone]

## 2022-01-21 NOTE — PROGRESS NOTES
[facility]    Medicine Progress Note - Hospitalist Service       Date of Admission:  12/31/2021    Summary:    Jared North is a 46 year old male who is unvaccinated against COVID-19, with history of polysubstance abuse and chronic pain/lower back pain on Suboxone prior to admission (had been on methadone in the past but not in recent past), mood disorder/depression, HTN, ROSSY and mild intermittent asthma. Patient was diagnosed with COVID-19 viral infection on 11/19/2021. He presented to [facility] on 11/24/2021 with worsening shortness of breath. He was admitted to the ICU with COVID-19 viral pneumonia and severe acute respiratory failure with hypoxia requiring noninvasive ventilation.  He received treatment with Decadron, remdesivir, and tocilizumab. He needed supplemental oxygen alternating with NIPPV and HFNC.  He had an episode of afib during this time that eventually resolved and has not recurred after briefly being treated with amiodarone.  He did not improve and was eventually intubated on 11/28/21 and then proned and started on veletri. On 11/30/21, he was started on cefepime and vanco for VAP. Culture grew E. Coli and Proteus mirabilis.  He did develop hypotension possibly from septic shock vs sedation vasoplegia and was treated with norepi intemittently.  Vanco was stopped on 12/5/21.  He was then transferred to [facility] on 12/5/21.       At [place] he was followed in ICU and continued on vent/paralysis/proning/pressors.  Cefepime switched to rocephin 12/6/21.  He continued to be very encephalopathic and psychiatry was consulted. He was weaned off pressors and didn't2/14 but had clinically improved somewhat and was extubated on 12/15/21. On 12/16/21 he had severely increased work o require proning by 12/10/21.  He has been on large doses of anti-psychotics and sedatives.  He developed fevers again 1f breathing and so was re-intubated. Blood culture grew Streptococcus constellatus and sputum  culture grew that as well but also had E. Coli/Proteus m./staph epi/candida parapsilosis. Vanco/Zosyn was started on 12/16/21.  He then underwent trach/PEG on 12/17/21.  Vanco was stopped after 3 days and Zosyn switched to rocephin on 12/21/21 to complete on 1/2/21.        During this time he had significant issues with encephalopathy and agitation. As above he was started on multiple meds for this. Prior to transfer to [facility], they had increased his lorazepam up to 12 mg every 4 hours and oxycodone 20 mg every 4 hours along with abilify 7 mg (home med), gabapentin 800 mg every 8 hours, keppra 500 mg twice daily (for behavior), zyprexa 5 mg twice daily, and seroquel 75 mg TID with 100 mg HS.  With this regimen, he improved reportedly in the ICU but was still awake and agitated.  He was transferred to [facility] on 12/31/21.      Assessment & Plan      Acute respiratory failure with hypoxia: due to acute respiratory distress syndrome (ARDS) secondary to COVID-19 pneumonia. Initially intubated on 11/28. Failed extubation and reintubated on 12/15/21. S/p tracheostomy on 12/17/21. Capped since 1/7/22.   -Currently stable on room air, o2 prn  -Patient has high risk of aspiration due to oral pharyngeal dysphagia and overall cognitive dysfunction.   -Per pulmonary, will continue to monitor closely and decannulate when appropriate.   -Continue nebs prn    Urinary Retention - unclear etiology - may be medication related and due to less mobile as he is so confused.    -Appreciate Urology, recommend continue Wei, and voiding trail at discharge.  -Change Q monthly (next change 2/19) , follow up urology after discharge.    Acute metabolic encephalopathy: was on high dose of sedating medication prior to transfer to [facility]. Psych and addiction medicine have been involved in adjusting the medication. Lorazepam, Keppra and oxycodone were tapered off. Currently on Abilify, Subutex, gabapentin and phenobarbital tapered off now. Patient has  been agitated, confused and restless.  - Previously on subutex 8 mg qam and 8 mg qpm for chronic pain and opioid use. Changed to 4 mg tid since 1/13   - Benzodiazepine tapering: was on high dose ativan when arriving to [facility]. Ativan stopped and converted to phenobarbital on 1/3. Per Addiction medicine, patient needs slow tapering over several weeks. However, per ex-wife, patient became more confused and agitated after phenobarbital started and she would like patient to get off this medication sooner. Per Addiction medicine, started to taper since 1/13, last dose on 1/20.   - Previosuly on high dose Seroquel and was tapered down to 12.5 mg bid during the day, 25 mg at bedtime. However, ex-wife reports that Seroquel made patient have significant confusion and agitation in the past, which required hospitalization. She does not want patient to be on Seroquel. It was stopped on 1/15.   - Started Haldo 2 mg IV q4h prn since 1/15. Monitor QTc. (only getting it once a day on average the past few days)  - Increase Abilify 10 mg daily per psych.   - Continue PTA gabapentin 900 mg tid.   - Continue melatonin 5 mg and trazodone 150 mg at bedtime  - Tapering zyprexa per psych, currently on 2.5 mg BID  - Continue soft restraints and bedside sitter. Wean as able     Oropharyngeal dysphagia: started diet and on back up bolus tube feed  - Further diet modification and tube feed per speech therapy and dietitian, likely planned for video swallow today as it couldn't be done on 1/20.    Mild intermittent dysconjugate gaze: his ex-wife states she has noted this in the past when he is on certain medications like ativan but not if he is off all meds.     Chronic back pain: On Subutex and gabapentin as above.     Essential hypertension: BP controlled. On metoprolol. Monitor BP.      Major depressive disorder, recurrent episode, severe: continue abilify, trazodone.       Polysubstance abuse and Opioid type dependence: Per addiction medicine  service, phenobarb tapered off now and resumed subutex.       ROSSY (obstructive sleep apnea): management for respiratory failure as above     Mild intermittent asthma: nebs as needed     Recent Ventilator associated pneumonia: Completed antibiotics on 1/3/22.     Thrush: completed nystatin     Severe Malnutrition, POA: based on nutrition assessment             Diet: Adult Formula Drip Feeding: Continuous Promote w fiber; Gastrostomy; Goal Rate: 150 ml/hr x 2 hrs after meals if eats <50% of meal; mL/hr; Medication - Feeding Tube Flush Frequency: At least 15-30 mL water before and after medication administratio...  Minced & Moist Diet (level 5) Mildly Thick (level 2)    DVT Prophylaxis: Enoxaparin (Lovenox) SQ  Wei Catheter: PRESENT, indication: Retention  Central Lines: None  Code Status: Full Code      Disposition Plan   Expected Discharge: 02/02/2022     Anticipated discharge location: home with family    Delays:     Complex Care  1:1 Sitter    , pending decannulation       The patient's care was discussed with bedside RN, cheryl.         Tamika Skinner MD  Hospitalist Service  LTACH  Securely message with the Vocera Web Console (learn more here)  Text page via [facility] Paging/Directory      ______________________________________________________________________    Interval History   Chart reviewed and events noted.  Patient seen and examined.   Patient seems to be mor cooperative and answering specifically today. Eating breakfast during my visit, denies any coughing/choking with food. Didn't sleep last night. Sitter in room.      Data reviewed today: I reviewed all medications, new labs and imaging results over the last 24 hours.   All medication issues identified within the last24 hours have been addressed and completed as appropriate    Physical Exam   Vital Signs: Temp: 97.8  F (36.6  C) Temp src: Oral BP: 126/67 Pulse: 91   Resp: 20 SpO2: 96 % O2 Device: Nasal cannula Oxygen Delivery: 2 LPM  Weight: 173 lbs 6.4  oz    General appearance: No distress  HEENT: PERRL, EOMI  Lungs: Clear breath sounds in bilateral lung fields  Cardiovascular: Regular rate and rhythm, normal S1-S2  Abdomen: Soft, non tender, no distension.   Musculoskeletal: No joint swelling  Skin: No rash and no edema  Neurology: Awake and alert. Oriented.Cranial nerves II - XII normal.  Normal muscle strength in all four extremities.  Psychiatry:Calm    Data   Recent Labs   Lab 01/17/22  0617   WBC 5.8   HGB 14.0   MCV 95         POTASSIUM 4.3   CHLORIDE 106   CO2 24   BUN 7*   CR 0.58*   ANIONGAP 11   TRUONG 9.3   GLC 85         Restraint face-to-face    Within an hour after restraint an in person face to face assessment was completed, including an evaluation of the patient's immediate reaction to the intervention, behavioral assessment and review/assessment of history, drugs and medications, recent labs, etc., and behavioral condition.  The patient experienced: No adverse physical outcome from seclusion/restraint initiation.  The intervention of restraint or seclusion needs to continue.

## 2022-01-21 NOTE — PLAN OF CARE
RT PROGRESS NOTE     DATA:     CURRENT SETTINGS:             TRACH TYPE / SIZE:  7 Bivona placed 1-4-22             MODE:   Capped/2 LPM NC    ACTION:            SUCTION:                           FREQUENCY:  None this shift.                         AMOUNT:                           CONSISTENCY:                           COLOR:                SPONTANEOUS COUGH EFFORT/STRENGTH OF EFFORT (not elicited by suctioning): Good                WEANING PHASE:   3    RESPONSE:             BS:  Clear              VITAL SIGNS:   97%, HR 93, RR 20             RISK FOR SELF DECANNULATION:  Yes             RISK DUE TO:  Confusion             INTERVENTION/S IN PLACE IS/ARE:  Bilateral wrist restraints       NOTE / PLAN:   Anticipate pt going for a VFSS at 0915 Friday 1-21-22. Continue with same.     Problem: Inability to Wean (Mechanical Ventilation, Invasive)  Goal: Mechanical Ventilation Liberation  Outcome: No Change     Problem: Skin and Tissue Injury (Mechanical Ventilation, Invasive)  Goal: Absence of Device-Related Skin and Tissue Injury  Outcome: No Change     Problem: Ventilator-Induced Lung Injury (Mechanical Ventilation, Invasive)  Goal: Absence of Ventilator-Induced Lung Injury  Outcome: No Change     Problem: Communication Impairment (Artificial Airway)  Goal: Effective Communication  Outcome: No Change     Problem: Device-Related Complication Risk (Artificial Airway)  Goal: Optimal Device Function  Outcome: No Change     Problem: Skin and Tissue Injury (Artificial Airway)  Goal: Absence of Device-Related Skin or Tissue Injury  Outcome: No Change

## 2022-01-21 NOTE — PROGRESS NOTES
Social Work Note:      Josefa RN Home Infusion liaison [phone] confirmed with writer she received referral and will have their team start to assess and review insurance criteria and coverage.        Fermin Carroll, [facility] [place]  [phone]

## 2022-01-21 NOTE — PROGRESS NOTES
Social Work Note:    Writer met with patient today and called and spoke to his ex-wife Jama [phone].  She was expressing a strong desire to take patient home, when medically stable.  She does not want patient to go to a SNF/TCU.  She stated she could care for him and would be willing to drive him to any out-patient therapy or medical appointments that at are needed.    discharge goal: Patient and Jama are expressing a discharge to home when stable.     Fermin Carroll, [facility] [place]  [phone]

## 2022-01-21 NOTE — PROGRESS NOTES
"Psychiatric Progress Note    Mood changes likely in the context of a prolonged hospitalization with intermittent anxiety, restlessness.  Cognitive and behavioral changes due to prolonged hospitalization and in the setting of metabolic encephalopathy, ICU delirium and due to COVID.  Major depressive disorder by history.     Recommendations:  Increase Abilify to 10 mg daily.  Gabapentin 900 mg every 8 hours.  Olanzapine 2.5 mg twice a day   Olanzapine 5 mg 3 times a day x3 doses.  Increase trazodone to 250 mg at bedtime.  SUBJECTIVE:  Patient is comfortable, seated in chair. No hallucinations.     MENTAL STATUS EXAMINATION:   General Appearance: NAD  Speech: Clear  Thought Process: Increased latency  Thought content: No psychosis, NO SI  Thought Formation: Loosening of associations.  Judgment: Depressed  Insight : Depressed  Attention : Sleepy  Memory: Depressed  Fund Of Knowledge: Depressed  Affect: Neutral  Mood: Congruent  Alert : Arousable  Orientation: X 1-2  Comprehension: Depressed  Generative thought content: Reduced, slow  Language: Intact  Gait and Ambulation:Slow.  With assist, needing assist with all ADLs  Musculoskeletal: No tonal abnormalities    /79 (BP Location: Left arm)   Pulse 77   Temp 97.6  F (36.4  C) (Axillary)   Resp 18   Ht 1.803 m (5' 11\")   Wt 78.7 kg (173 lb 6.4 oz)   SpO2 100%   BMI 24.18 kg/m          "

## 2022-01-21 NOTE — PROGRESS NOTES
Social Work Note:  Patient chart reviewed.  Patient discussed in morning rounds.  Barriers to discharge: 1:1 sitter. 4 side rails up.  Pelvic restraint.  IV Haldol. Trach.     Patient's exact discharge date, time, disposition TBD.  Must be off 1:1 sitter and SLRx4 for at least 24 hours.  SW will continue to follow for psychosocial and emotional support of patient and family. SW to facilitate discharge to home vs. TCU when pt no longer requires LTACH level of care.     Fermin Carroll, [facility] [place]  [phone]

## 2022-01-21 NOTE — PROGRESS NOTES
Ex-wife Jama wanting to bring pt home on Sunday.  Writer spoke with  about this, and at this time, it is not realistic to have everything set up for pt to be home by Sunday - Monday more realistic.  Writer spoke with Jama over the phone and discussed logistics of getting pt home safely with everything in place that he may need.  Jama is frustrated that pt wouldn't be able to come home on Sunday, but by the end of the conversation stated she understood that pt would need to wait until Monday and hopes that pt will be able to leave early on Monday.    Michelle Rivera, RN 1/21/2022

## 2022-01-21 NOTE — PLAN OF CARE
Problem: Adult Inpatient Plan of Care  Goal: Absence of Hospital-Acquired Illness or Injury  Intervention: Identify and Manage Fall Risk  Recent Flowsheet Documentation  Taken 1/21/2022 0000 by Ying Miller RN  Safety Promotion/Fall Prevention:    bed alarm on    fall prevention program maintained    room door open    room near nurse's station    sitter at bedside  Intervention: Prevent Skin Injury  Recent Flowsheet Documentation  Taken 1/21/2022 0000 by Ying Miller RN  Body Position: position changed independently  Intervention: Prevent Infection  Recent Flowsheet Documentation  Taken 1/21/2022 0000 by Ying Miller RN  Infection Prevention: hand hygiene promoted  Goal: Optimal Comfort and Wellbeing  Intervention: Provide Person-Centered Care  Recent Flowsheet Documentation  Taken 1/21/2022 0000 by Ying Miller RN  Trust Relationship/Rapport: care explained     Problem: Inability to Wean (Mechanical Ventilation, Invasive)  Goal: Mechanical Ventilation Liberation  Intervention: Promote Extubation and Mechanical Ventilation Liberation  Recent Flowsheet Documentation  Taken 1/21/2022 0000 by Ying Miller RN  Medication Review/Management: medications reviewed     Problem: Ventilator-Induced Lung Injury (Mechanical Ventilation, Invasive)  Goal: Absence of Ventilator-Induced Lung Injury  Intervention: Prevent Ventilator-Associated Pneumonia  Recent Flowsheet Documentation  Taken 1/21/2022 0000 by Ying Miller RN  Head of Bed (HOB) Positioning: HOB at 30-45 degrees     Problem: Device-Related Complication Risk (Artificial Airway)  Goal: Optimal Device Function  Intervention: Optimize Device Care and Function  Recent Flowsheet Documentation  Taken 1/21/2022 0000 by Ying Miller RN  Airway Safety Measures: all equipment/monitors on and audible   Patient continued in 1:1 with bed side rail x 4 for safety, patient was restless and confused at the beginning of the  "shift, requesting to go outside for exercise, \" stating that he had too much energy and need to burn them,\" patient was assisted to stand up in his room at the bed side move around for some minute .  patient continued to talk non stopping, agitated and complaining of  can't sleep, prn Tylenol  650 mg and haldol 2 mg  was given , patient slept for 3 hours on this shift. Patient was asking for food , Apple sauce and pudding was offered, patient also took ice chips . Patient had a viedo swallow @ 9. 45 this AM  "

## 2022-01-21 NOTE — PROGRESS NOTES
"Pulmonary Medicine - Progress note:    Clinical status discussed today with  respiratory therapist yes    HPI: 46 year old male, unvaccinated against COVID-19, with history of polysubstance abuse and chronic pain/lower back pain on suboxone, mood disorder/depression, HTN, ROSSY, mild intermittent asthma, presented with respiratory failure/COVID ARDS intubated on 11/28. Initially requiring proning/paralysis, veletri, now down trending vent needs, issues with agitation, treated for VAP. Not tolerating weaning trials, failed extubation 12/15, re-intubated, S/p trach and PEG tube 12/17.  Transferred to LTAC 12/31.  Liberated from vent 1/4.  Trach capping, but decannulation precluded by alternating agitation and sedation.     Chief complaint: Ongoing respiratory failure  Significant change in clinical status during past 24 hours? - No    Vent Settings:   Ventilation Mode: Other (see comments) (Cap /NC 2L)  Resp: 18  Capped on RA    Tracheal secretions:  Minimal.  Suctioned none over past 72 hours.     Cough strength: strong       Current phase of ventilator weaning pathway:  Phase 3.  Capped since 1/7.    Physical exam:  Vital signs:  Temp: 97.6  F (36.4  C) Temp src: Axillary BP: 136/79 Pulse: 77   Resp: 18 SpO2: 100 % O2 Device: Nasal cannula Oxygen Delivery: 2 LPM Height: 180.3 cm (5' 11\") Weight: 78.7 kg (173 lb 6.4 oz)  Estimated body mass index is 24.18 kg/m  as calculated from the following:    Height as of this encounter: 1.803 m (5' 11\").    Weight as of this encounter: 78.7 kg (173 lb 6.4 oz).  Unable to personally examine patient.     Lab:  1/11 VBG: pH 7.44, pCO2 44     Imaging: (all imaging personally reviewed)  EXAM: XR CHEST PORT 1 VIEW  LOCATION: [facility]  DATE/TIME: 12/29/2021 3:05 PM     INDICATION: Evaluate for worsening infiltrate/new VAP.  COMPARISON: 12/16/2021.                                                                      IMPRESSION:   Endotracheal tube has been " removed and a new tracheostomy tube has been placed in good position. Right PICC line remains in good position. Right IJ CVC has been removed. NG tube also removed. Bilateral interstitial infiltrates have improved when compared   to previous. There are no new or acute infiltrates. No pleural effusion.    Diagnosis:   1. Acute hypoxic respiratory failure due to COVID-19 ARDS  2. Tracheostomy care   - 7 Bivona changed 1/4  3. Dysphagia--improved  4. ROSSY: unclear significance.  No AHI reported.   5. Encephalopathy: Despite significant pharmacologic intervention    Recommendations/Plans:  1. Weaning orders: Phase 3, capping  2. Trach change? Routine monthly changes  3. Diagnostic testing? Video swallow study today much improved swallow  4. Decannulation today. Swallow study much improved, strong cough, no significant hypercapneic concerns.    Please call pulmonary if questions or concerns, will sign off.     Tatianna Murillo MD  [facility] Pulmonary Medicine

## 2022-01-21 NOTE — PLAN OF CARE
Pt was 100% on 2L NC Capped, decr to RA - 95-98%. Pt had VSS in a.m, his diet was advanced.. pt's behavior stabilized .    Pt was decannulated at 1310, cont on RA 96-98%.    Cont to monitor with Oximeter for 48 hrs afterdecannulation.

## 2022-01-21 NOTE — PROGRESS NOTES
"Speech-Language Pathology: Video Swallow Study     01/21/22 0900   General Information   Onset of Illness/Injury or Date of Surgery 12/24/21   Referring Physician Brody   Pertinent History of Current Problem Per H&P: \"Jared North is a 46 year old male unvaccinated against COVID-19, with history of polysubstance abuse and chronic pain/lower back pain on Suboxone prior to admission (had been on methadone in the past but not in recent past), mood disorder/depression, HTN, ROSSY, mild intermittent asthma, knee pain, and diagnosis of COVID-19 viral infection on 11/19/2021 who originally presented to [facility] on 11/24/2021 with worsening shortness of breath, admitted to the ICU with COVID-19 viral pneumonia and severe acute respiratory failure with hypoxia requiring noninvasive ventilation....At [place] he was followed in ICU and continued on vent/paralysis/proning/pressors.  Cefepime switched to rocephin 12/6/21.  He continued to be very encephalopathic and psychiatry was consulted. He was weaned off pressors and didn't require proning by 12/10/21.  He has been on large doses of anti-psychotics and sedatives.  He developed fevers again 12/14 but had clinically improved somewhat and was extubated on 12/15/21.  By  The next day 12/16/21 he had severely increased work of breathing and so was re-intubated. Blood Cx grew Streptococcus constellatus and sputum Cx grew that as well but also had E. Coli/Proteus m./staph epi/candida parapsilosis. Vanco/Zosyn was started on 12/16/21.  He then underwent trach/PEG on 12/17/21.\"   General Observations Alert and cooperative; Good command following and attention to task   Type of Evaluation   Type of Evaluation Swallow Evaluation   Tracheostomy Assessment (Speaking Valve)   Comment, Tracheostomy (Speaking Valve) Trach capped   Oral Motor   Oral Musculature generally intact  (OME WNL)   General Swallowing Observations   Current Diet/Method of Nutritional Intake (General " Swallowing Observations, NIS) mildly thick liquids (level 2);minced & moist (level 5)   Swallowing Evaluation Videofluoroscopic swallow study (VFSS)   VFSS Evaluation   Views Taken left lateral   VFSS Textures Trialed thin liquids;pureed;solid foods   VFSS Eval: Thin Liquid Texture Trial   Mode of Presentation, Thin Liquid cup;straw;self-fed   Preparatory Phase WFL  (occasional 'swishing' but patient noted and self-corrected)   Oral Phase, Thin Liquid WFL   Pharyngeal Phase, Thin Liquid WFL   Rosenbek's Penetration Aspiration Scale: Thin Liquid Trial Results 2 - contrast enters airway, remains above the vocal cords, no residue remains (penetration)   Diagnostic Statement Isolated episode of trace, transient shallow penetration with continuous drinking from straw only; No other aspiration or penetration with nearly 4 ounces thin trials   VFSS Evaluation: Puree Solid Texture Trial   Mode of Presentation, Puree spoon   Preparatory Phase WFL   Oral Phase, Puree WFL   Pharyngeal Phase, Puree WFL   Rosenbek's Penetration Aspiration Scale: Puree Food Trial Results 1 - no aspiration, contrast does not enter airway   VFSS Evaluation: Solid Food Texture Trial   Mode of Presentation, Solid self-fed   Preparatory Phase WFL   Oral Phase, Solid WFL   Pharyngeal Phase, Solid WFL   Rosenbek's Penetration Aspiration Scale: Solid Food Trial Results 1 - no aspiration, contrast does not enter airway   Diagnostic Statement Mastication was safe and complete. Ongoing concerns for cognition impacting safety but much improved and highly functional.   Swallowing Recommendations   Diet Consistency Recommendations thin liquids (level 0);soft & bite-sized (level 6)   Supervision Level for Intake close supervision needed   Mode of Delivery Recommendations bolus size, small;slow rate of intake   Monitoring/Assistance Required (Eating/Swallowing) monitor for cough or change in vocal quality with intake   Recommended Feeding/Eating Techniques  (Swallow Eval) maintain upright sitting position for eating;set-up and prepare tray;patient is independent, no specific recommendations  (as needed)   Medication Administration Recommendations, Swallowing (SLP) per patient preference; okay to trial with liquids as tolerated   Comment, Swallowing Recommendations Videofluoroscopic Swallow Study completed. Patient had no aspiration with any consistency and trace, transient, shallow penetration with thin liquids via continuous straw drinking only. Swallow response was minimally delayed and epiglottic inversion was complete.  No significant stasis occurred with any consistency . Hyolaryngeal elevation was WFL and hyolaryngeal excursion was largely WFL. Recommend diet of Soft and Bite Sized and Thin liquids.   SLP Therapy Assessment/Plan   Criteria for Skilled Therapeutic Interventions Met (SLP Eval) yes;treatment indicated   SLP Diagnosis oral dysphagia   Rehab Potential (SLP Eval) good, to achieve stated therapy goals   Therapy Frequency (SLP Eval) 5 times/wk   Predicted Duration of Therapy Intervention (SLP Eval) LOS   Comment, Therapy Assessment/Plan (SLP) SLP to continue to follow for diet tolerance and advancement   Therapy Plan Review/Discharge Plan (SLP)   Therapy Plan Review (SLP) evaluation/treatment results reviewed;patient;care plan/treatment goals reviewed;participants voiced agreement with care plan   SLP Discharge Planning    SLP Brief overview of current status  Progress made towards dypshagia goals, continue ST for dysphagia and cognition    Total Evaluation Time   Total Evaluation Time (Minutes) 10   Coping Strategies   Trust Relationship/Rapport care explained

## 2022-01-22 ENCOUNTER — APPOINTMENT (OUTPATIENT)
Dept: SPEECH THERAPY | Facility: CLINIC | Age: 47
End: 2022-01-22
Attending: INTERNAL MEDICINE
Payer: COMMERCIAL

## 2022-01-22 ENCOUNTER — APPOINTMENT (OUTPATIENT)
Dept: OCCUPATIONAL THERAPY | Facility: CLINIC | Age: 47
End: 2022-01-22
Attending: INTERNAL MEDICINE
Payer: COMMERCIAL

## 2022-01-22 ENCOUNTER — HOME INFUSION (PRE-WILLOW HOME INFUSION) (OUTPATIENT)
Dept: PHARMACY | Facility: CLINIC | Age: 47
End: 2022-01-22
Payer: COMMERCIAL

## 2022-01-22 PROCEDURE — 92526 ORAL FUNCTION THERAPY: CPT | Mod: GN | Performed by: SPEECH-LANGUAGE PATHOLOGIST

## 2022-01-22 PROCEDURE — 97130 THER IVNTJ EA ADDL 15 MIN: CPT | Mod: GO

## 2022-01-22 PROCEDURE — 999N000157 HC STATISTIC RCP TIME EA 10 MIN

## 2022-01-22 PROCEDURE — 92507 TX SP LANG VOICE COMM INDIV: CPT | Mod: GN | Performed by: SPEECH-LANGUAGE PATHOLOGIST

## 2022-01-22 PROCEDURE — 250N000013 HC RX MED GY IP 250 OP 250 PS 637: Performed by: INTERNAL MEDICINE

## 2022-01-22 PROCEDURE — 97129 THER IVNTJ 1ST 15 MIN: CPT | Mod: GO

## 2022-01-22 PROCEDURE — 250N000013 HC RX MED GY IP 250 OP 250 PS 637: Performed by: NURSE PRACTITIONER

## 2022-01-22 PROCEDURE — 99232 SBSQ HOSP IP/OBS MODERATE 35: CPT | Performed by: HOSPITALIST

## 2022-01-22 PROCEDURE — 250N000011 HC RX IP 250 OP 636: Performed by: NURSE PRACTITIONER

## 2022-01-22 PROCEDURE — 120N000018 HC R&B RESPIRATORY CARE WITH ATTENDANT

## 2022-01-22 RX ORDER — OLANZAPINE 5 MG/1
5 TABLET ORAL
Status: DISCONTINUED | OUTPATIENT
Start: 2022-01-22 | End: 2022-01-24 | Stop reason: HOSPADM

## 2022-01-22 RX ORDER — ARIPIPRAZOLE 5 MG/1
10 TABLET ORAL DAILY
Status: DISCONTINUED | OUTPATIENT
Start: 2022-01-23 | End: 2022-01-24 | Stop reason: HOSPADM

## 2022-01-22 RX ADMIN — WHITE PETROLATUM: 1.75 OINTMENT TOPICAL at 10:05

## 2022-01-22 RX ADMIN — METOPROLOL TARTRATE 50 MG: 50 TABLET, FILM COATED ORAL at 10:05

## 2022-01-22 RX ADMIN — DIPHENHYDRAMINE HYDROCHLORIDE 50 MG: 25 SOLUTION ORAL at 01:32

## 2022-01-22 RX ADMIN — GABAPENTIN 900 MG: 250 SOLUTION ORAL at 22:06

## 2022-01-22 RX ADMIN — ENOXAPARIN SODIUM 40 MG: 100 INJECTION SUBCUTANEOUS at 10:02

## 2022-01-22 RX ADMIN — OLANZAPINE 5 MG: 5 TABLET, FILM COATED ORAL at 22:09

## 2022-01-22 RX ADMIN — CHLORHEXIDINE GLUCONATE 0.12% ORAL RINSE 15 ML: 1.2 LIQUID ORAL at 22:06

## 2022-01-22 RX ADMIN — OLANZAPINE 2.5 MG: 2.5 TABLET, FILM COATED ORAL at 12:57

## 2022-01-22 RX ADMIN — Medication 5 MG: at 22:06

## 2022-01-22 RX ADMIN — DICLOFENAC 2 G: 10 GEL TOPICAL at 22:12

## 2022-01-22 RX ADMIN — DICLOFENAC 2 G: 10 GEL TOPICAL at 10:05

## 2022-01-22 RX ADMIN — GABAPENTIN 900 MG: 250 SOLUTION ORAL at 16:11

## 2022-01-22 RX ADMIN — DICLOFENAC 2 G: 10 GEL TOPICAL at 12:57

## 2022-01-22 RX ADMIN — OLANZAPINE 2.5 MG: 2.5 TABLET, FILM COATED ORAL at 17:41

## 2022-01-22 RX ADMIN — ACETAMINOPHEN ORAL SOLUTION 650 MG: 650 SOLUTION ORAL at 01:32

## 2022-01-22 RX ADMIN — DICLOFENAC 2 G: 10 GEL TOPICAL at 16:12

## 2022-01-22 RX ADMIN — ARIPIPRAZOLE 10 MG: 1 SOLUTION ORAL at 10:02

## 2022-01-22 RX ADMIN — BUPRENORPHINE 4 MG: 2 TABLET SUBLINGUAL at 10:04

## 2022-01-22 RX ADMIN — CHLORHEXIDINE GLUCONATE 0.12% ORAL RINSE 15 ML: 1.2 LIQUID ORAL at 10:03

## 2022-01-22 RX ADMIN — METOPROLOL TARTRATE 50 MG: 50 TABLET, FILM COATED ORAL at 22:06

## 2022-01-22 RX ADMIN — BUPRENORPHINE 4 MG: 2 TABLET SUBLINGUAL at 16:11

## 2022-01-22 RX ADMIN — GABAPENTIN 900 MG: 250 SOLUTION ORAL at 06:53

## 2022-01-22 RX ADMIN — BUPRENORPHINE 4 MG: 2 TABLET SUBLINGUAL at 22:06

## 2022-01-22 ASSESSMENT — ACTIVITIES OF DAILY LIVING (ADL)
ADLS_ACUITY_SCORE: 13

## 2022-01-22 NOTE — PLAN OF CARE
Problem: Adult Inpatient Plan of Care  Goal: Patient-Specific Goal (Individualized)  Outcome: No Change   Patient only slept 30 minutes last night even with prn benadryl and prn tylenol for comfort. He denied any pain or discomfort. He was not aggressive. He was pleasant and cooperative with cares. Staff wheeled him with his broda chair a couple of times last night. Trial to discontinue 4 side rails will be good idea. Will closely monitor.

## 2022-01-22 NOTE — PLAN OF CARE
Problem: Restraint for Non-Violent/Non-Self-Destructive Behavior  Goal: Prevent/Manage Potential Problems  Description: Maintain safety of patient and others during period of restraint.  Promote psychological and physical wellbeing.  Prevent injury to skin and involved body parts.  Promote nutrition, hydration, and elimination.  Outcome: Improving     Patient's 4-point restraints were discontinued earlier today. He still remained on 4 side rails and pelvic restraints while up on his wheelchair. He had  a 1:1 Sitter the entire shift. He had a shower  and used the toilet to have his BM. His  stool was noted to be large and soft-formed. This Sitter wheeled him around the Unit at bedtime and he was pleased with the diversion.  He was mostly cooperative with cares this evening. His ex-wife brought  fish burger and Quarter Pounder burger when she visited and he ate 75% of them. Will keep monitoring patient's progress and safety.

## 2022-01-22 NOTE — PROGRESS NOTES
Therapy: Enteral  Insurance: [facility]   Patient would have coverage for Enteral Nutrition through the [facility] Medicaid Plan as long as the following criterion is met:    ENTERAL NUTRITION MUST BE ADMINISTERED THROUGH A TUBE FOR INSURANCE COVERAGE    Benefit details listed as follows:    Patient will have coverage at 100%    Please contact Intake with any questions, [phone] or In Basket pool, [facility] ([phone]).

## 2022-01-22 NOTE — PROGRESS NOTES
RT NOTE: Pt resting on RA, BS: Clear and dim, SATs 97%, HR 87, RR 18-20. Pt denies SOB or WOB. Pt was de-cannulated Friday, 1-21-22 and seems to be tolerating.

## 2022-01-22 NOTE — PLAN OF CARE
Pt alert with mild confusion. Easily redirectable. VSS. Denies pain. Pt has been calm and cooperative through out shift. No restraints in use. Distant 1:1 with close observation. Pt continues mildly impulsive and forgetful, requiring observation and direction as he remains unstable on his feet.

## 2022-01-22 NOTE — PROGRESS NOTES
[facility]    Medicine Progress Note - Hospitalist Service       Date of Admission:  12/31/2021    Summary:    Jared North is a 46 year old male who is unvaccinated against COVID-19, with history of polysubstance abuse and chronic pain/lower back pain on Suboxone prior to admission (had been on methadone in the past but not in recent past), mood disorder/depression, HTN, ROSSY and mild intermittent asthma. Patient was diagnosed with COVID-19 viral infection on 11/19/2021. He presented to [facility] on 11/24/2021 with worsening shortness of breath. He was admitted to the ICU with COVID-19 viral pneumonia and severe acute respiratory failure with hypoxia requiring noninvasive ventilation.  He received treatment with Decadron, remdesivir, and tocilizumab. He needed supplemental oxygen alternating with NIPPV and HFNC.  He had an episode of afib during this time that eventually resolved and has not recurred after briefly being treated with amiodarone.  He did not improve and was eventually intubated on 11/28/21 and then proned and started on veletri. On 11/30/21, he was started on cefepime and vanco for VAP. Culture grew E. Coli and Proteus mirabilis.  He did develop hypotension possibly from septic shock vs sedation vasoplegia and was treated with norepi intemittently.  Vanco was stopped on 12/5/21.  He was then transferred to [facility] ICU on 12/5/21.       At [place] he was followed in ICU and continued on vent/paralysis/proning/pressors.  Cefepime switched to rocephin 12/6/21.  He continued to be very encephalopathic and psychiatry was consulted. He was weaned off pressors and didn't2/14 but had clinically improved somewhat and was extubated on 12/15/21. On 12/16/21 he had severely increased work o require proning by 12/10/21.  He has been on large doses of anti-psychotics and sedatives.  He developed fevers again 1f breathing and so was re-intubated. Blood culture grew Streptococcus constellatus and sputum  culture grew that as well but also had E. Coli/Proteus m./staph epi/candida parapsilosis. Vanco/Zosyn was started on 12/16/21.  He then underwent trach/PEG on 12/17/21.  Vanco was stopped after 3 days and Zosyn switched to rocephin on 12/21/21 to complete on 1/2/21.        During this time he had significant issues with encephalopathy and agitation. As above he was started on multiple meds for this. Prior to transfer to [facility], they had increased his lorazepam up to 12 mg every 4 hours and oxycodone 20 mg every 4 hours along with abilify 7 mg (home med), gabapentin 800 mg every 8 hours, keppra 500 mg twice daily (for behavior), zyprexa 5 mg twice daily, and seroquel 75 mg TID with 100 mg HS.  With this regimen, he improved reportedly in the ICU but was still awake and agitated.  He was transferred to [facility] on 12/31/21.      Assessment & Plan      Acute respiratory failure with hypoxia: due to acute respiratory distress syndrome (ARDS) secondary to COVID-19 pneumonia. Initially intubated on 11/28. Failed extubation and reintubated on 12/15/21. S/p tracheostomy on 12/17/21. Capped since 1/7/22, de cannulated on 1/21.   -Currently stable on room air, o2 prn  -Continue nebs prn    Urinary Retention - unclear etiology - may be medication related and due to less mobile as he is so confused.    -Appreciate Urology, recommend continue Wei, and voiding trail at discharge.  -Change Q monthly (next change 2/19) , follow up urology after discharge.    Acute metabolic encephalopathy: was on high dose of sedating medication prior to transfer to [facility]. Psych and addiction medicine have been involved in adjusting the medication. Lorazepam, Keppra and oxycodone were tapered off. Currently on Abilify, Subutex, gabapentin and phenobarbital tapered off now. Patient has been agitated, confused and restless.  - Previously on subutex 8 mg qam and 8 mg qpm for chronic pain and opioid use. Changed to 4 mg tid since 1/13   - Benzodiazepine  tapering: was on high dose ativan when arriving to [facility]. Ativan stopped and converted to phenobarbital on 1/3. Per Addiction medicine, patient needs slow tapering over several weeks. However, per ex-wife, patient became more confused and agitated after phenobarbital started and she would like patient to get off this medication sooner. Per Addiction medicine, started to taper since 1/13, last dose on 1/20.   - Previosuly on high dose Seroquel and was tapered down to 12.5 mg bid during the day, 25 mg at bedtime. However, ex-wife reports that Seroquel made patient have significant confusion and agitation in the past, which required hospitalization. She does not want patient to be on Seroquel. It was stopped on 1/15.   - Started Haldo 2 mg IV q4h prn since 1/15. Monitor QTc. (only getting it once a day on average the past few days)  - Increase Abilify 10 mg daily per psych.   - Continue PTA gabapentin 900 mg tid.   - Continue melatonin 5 mg and trazodone 150 mg at bedtime, holing today.  - Tapering zyprexa per psych, currently on 2.5 mg BID  - Off restraints, on 1:1 sitter. Wean as able     Oropharyngeal dysphagia: started diet and on back up bolus tube feed  -Further diet modification and tube feed per speech therapy and dietitian  -VFSS on 1/21 shows improvement.    Mild intermittent dysconjugate gaze: his ex-wife states she has noted this in the past when he is on certain medications like ativan but not if he is off all meds.     Chronic back pain: On Subutex and gabapentin as above.     Essential hypertension: BP controlled. On metoprolol. Monitor BP.      Major depressive disorder, recurrent episode, severe: continue abilify, trazodone.       Polysubstance abuse and Opioid type dependence: Per addiction medicine service, phenobarb tapered off now and on subutex.       ROSSY (obstructive sleep apnea): management for respiratory failure as above     Mild intermittent asthma: nebs as needed     Recent Ventilator  associated pneumonia: Completed antibiotics on 1/3/22.     Thrush: completed nystatin     Severe Malnutrition, POA: based on nutrition assessment             Diet: Adult Formula Drip Feeding: Continuous Promote w fiber; Gastrostomy; Goal Rate: 150 ml/hr x 2 hrs after meals if eats <50% of meal; mL/hr; Medication - Feeding Tube Flush Frequency: At least 15-30 mL water before and after medication administratio...  Combination Diet Soft and Bite Sized Diet (level 6); Thin Liquids (level 0)    DVT Prophylaxis: Enoxaparin (Lovenox) SQ  Wei Catheter: PRESENT, indication: Retention  Central Lines: None  Code Status: Full Code      Disposition Plan   Expected Discharge in 2-3 days  Anticipated discharge location: home with family    Delays:     Complex Care  1:1 Sitter   , monitoring post decannuation       The patient's care was discussed with patient, bedside RN, sitter.         Tamika Skinner MD  Hospitalist Service  [facility]  Securely message with the Vocera Web Console (learn more here)  Text page via [employer] Paging/Directory      ______________________________________________________________________    Interval History   Chart reviewed and events noted.  Patient seen and examined.  No new complaints. Doesn't sleep well at night. States doesn't like Trazodone as this makes him having crazy dreams.          Data reviewed today: I reviewed all medications, new labs and imaging results over the last 24 hours.   All medication issues identified within the last24 hours have been addressed and completed as appropriate    Physical Exam   Vital Signs: Temp: 98.4  F (36.9  C) Temp src: Oral BP: 125/85 Pulse: 87   Resp: 20 SpO2: 97 % O2 Device: None (Room air) Oxygen Delivery: 2 LPM  Weight: 173 lbs 6.4 oz    General appearance: No distress  HEENT: PERRL, EOMI  Lungs: Clear breath sounds in bilateral lung fields  Cardiovascular: Regular rate and rhythm, normal S1-S2  Abdomen: Soft, non tender, no distension.   Musculoskeletal: No  joint swelling  Skin: No rash and no edema  Neurology: Awake and alert. Oriented. Normal muscle strength in all four extremities.  Psychiatry:Calm and cooperative    Data   Recent Labs   Lab 01/17/22  0617   WBC 5.8   HGB 14.0   MCV 95         POTASSIUM 4.3   CHLORIDE 106   CO2 24   BUN 7*   CR 0.58*   ANIONGAP 11   TRUONG 9.3   GLC 85

## 2022-01-23 ENCOUNTER — APPOINTMENT (OUTPATIENT)
Dept: SPEECH THERAPY | Facility: CLINIC | Age: 47
End: 2022-01-23
Attending: INTERNAL MEDICINE
Payer: COMMERCIAL

## 2022-01-23 PROCEDURE — 250N000013 HC RX MED GY IP 250 OP 250 PS 637: Performed by: INTERNAL MEDICINE

## 2022-01-23 PROCEDURE — 250N000013 HC RX MED GY IP 250 OP 250 PS 637: Performed by: NURSE PRACTITIONER

## 2022-01-23 PROCEDURE — 92526 ORAL FUNCTION THERAPY: CPT | Mod: GN | Performed by: SPEECH-LANGUAGE PATHOLOGIST

## 2022-01-23 PROCEDURE — 120N000018 HC R&B RESPIRATORY CARE WITH ATTENDANT

## 2022-01-23 PROCEDURE — 99232 SBSQ HOSP IP/OBS MODERATE 35: CPT | Performed by: HOSPITALIST

## 2022-01-23 PROCEDURE — 250N000013 HC RX MED GY IP 250 OP 250 PS 637: Performed by: HOSPITALIST

## 2022-01-23 PROCEDURE — 250N000011 HC RX IP 250 OP 636: Performed by: NURSE PRACTITIONER

## 2022-01-23 RX ORDER — SENNOSIDES 8.6 MG
8.6 TABLET ORAL 2 TIMES DAILY PRN
Status: DISCONTINUED | OUTPATIENT
Start: 2022-01-23 | End: 2022-01-24 | Stop reason: HOSPADM

## 2022-01-23 RX ADMIN — SENNOSIDES 10 ML: 8.8 LIQUID ORAL at 08:19

## 2022-01-23 RX ADMIN — GABAPENTIN 900 MG: 250 SOLUTION ORAL at 14:21

## 2022-01-23 RX ADMIN — DICLOFENAC 2 G: 10 GEL TOPICAL at 09:11

## 2022-01-23 RX ADMIN — DICLOFENAC 2 G: 10 GEL TOPICAL at 16:59

## 2022-01-23 RX ADMIN — METOPROLOL TARTRATE 50 MG: 50 TABLET, FILM COATED ORAL at 21:31

## 2022-01-23 RX ADMIN — ACETAMINOPHEN ORAL SOLUTION 650 MG: 650 SOLUTION ORAL at 05:16

## 2022-01-23 RX ADMIN — GABAPENTIN 900 MG: 250 SOLUTION ORAL at 21:31

## 2022-01-23 RX ADMIN — Medication 5 MG: at 21:31

## 2022-01-23 RX ADMIN — CHLORHEXIDINE GLUCONATE 0.12% ORAL RINSE 15 ML: 1.2 LIQUID ORAL at 21:31

## 2022-01-23 RX ADMIN — ENOXAPARIN SODIUM 40 MG: 100 INJECTION SUBCUTANEOUS at 08:22

## 2022-01-23 RX ADMIN — DICLOFENAC 2 G: 10 GEL TOPICAL at 21:33

## 2022-01-23 RX ADMIN — GABAPENTIN 900 MG: 250 SOLUTION ORAL at 05:16

## 2022-01-23 RX ADMIN — OLANZAPINE 2.5 MG: 2.5 TABLET, FILM COATED ORAL at 16:59

## 2022-01-23 RX ADMIN — DICLOFENAC 2 G: 10 GEL TOPICAL at 12:24

## 2022-01-23 RX ADMIN — BUPRENORPHINE 4 MG: 2 TABLET SUBLINGUAL at 14:21

## 2022-01-23 RX ADMIN — POLYETHYLENE GLYCOL 3350 17 G: 17 POWDER, FOR SOLUTION ORAL at 08:22

## 2022-01-23 RX ADMIN — BUPRENORPHINE 4 MG: 2 TABLET SUBLINGUAL at 08:19

## 2022-01-23 RX ADMIN — BUPRENORPHINE 4 MG: 2 TABLET SUBLINGUAL at 21:31

## 2022-01-23 RX ADMIN — METOPROLOL TARTRATE 50 MG: 50 TABLET, FILM COATED ORAL at 08:19

## 2022-01-23 RX ADMIN — CHLORHEXIDINE GLUCONATE 0.12% ORAL RINSE 15 ML: 1.2 LIQUID ORAL at 08:19

## 2022-01-23 RX ADMIN — ARIPIPRAZOLE 10 MG: 5 TABLET ORAL at 08:19

## 2022-01-23 RX ADMIN — OLANZAPINE 2.5 MG: 2.5 TABLET, FILM COATED ORAL at 12:24

## 2022-01-23 RX ADMIN — WHITE PETROLATUM: 1.75 OINTMENT TOPICAL at 09:12

## 2022-01-23 ASSESSMENT — ACTIVITIES OF DAILY LIVING (ADL)
ADLS_ACUITY_SCORE: 13
ADLS_ACUITY_SCORE: 12
ADLS_ACUITY_SCORE: 13
ADLS_ACUITY_SCORE: 12
ADLS_ACUITY_SCORE: 13
ADLS_ACUITY_SCORE: 12
ADLS_ACUITY_SCORE: 13
ADLS_ACUITY_SCORE: 12

## 2022-01-23 NOTE — PLAN OF CARE
Problem: Adult Inpatient Plan of Care  Goal: Patient-Specific Goal (Individualized)  Outcome: No Change   Patient was alert, cooperative and pleasant last night. He did well without restraints and with distant 1:1 sitter. Prn zyprexa was given at bedtime with his bedtime medications to aid with sleep. He slept for a 1.5 hr last night. At 0300, family member called and they talked in the Ipad for a while. Will continue to monitor.     Urine output low about 300 ml for 8 hrs and encourage fluid intake. Urine is angelina and cloudy. Patient seen pulling the catheter line.     Prn tylenol given around 0500 for back pain rating 8/10. Patient states its constant pain and never seems to go away. Reassessment is still at 8/10. Will continue to monitor.

## 2022-01-23 NOTE — PROGRESS NOTES
Pt has been eating well. Typically 100% of meals.  Spoke with dietician, Jennifer, from [facility] about discontinuing tube feeding orders.  Dietician to look at orders and adjust accordingly.    Michelle Rivera RN 1/23/2022

## 2022-01-23 NOTE — PLAN OF CARE
Problem: Adult Inpatient Plan of Care  Goal: Readiness for Transition of Care  Outcome: Improving  Flowsheets (Taken 1/23/2022 1314)  Concerns to be Addressed: compliance issue  Intervention: Mutually Develop Transition Plan  Recent Flowsheet Documentation  Taken 1/23/2022 1314 by Katheryn Daniels RN  Concerns to be Addressed: compliance issue   Patient is alert, and follows directions, initiated distance observation, with the understanding that patient will use the call light when he needs assistance to move around his room, such as ambulating to the toilet, or wanting to get out of bed to sit up in chair. He was made aware of the safety surrounding his lange catheter, and oximeter tubing, and verbalized understanding of making sure that the lange catheter tubing is secure before changing position. He also understood that the attendant will sit outside the door.

## 2022-01-23 NOTE — PROGRESS NOTES
[facility]    Medicine Progress Note - Hospitalist Service       Date of Admission:  12/31/2021    Summary:    Jared North is a 46 year old male who is unvaccinated against COVID-19, with history of polysubstance abuse and chronic pain/lower back pain on Suboxone prior to admission (had been on methadone in the past but not in recent past), mood disorder/depression, HTN, ROSSY and mild intermittent asthma. Patient was diagnosed with COVID-19 viral infection on 11/19/2021. He presented to [facility] on 11/24/2021 with worsening shortness of breath. He was admitted to the ICU with COVID-19 viral pneumonia and severe acute respiratory failure with hypoxia requiring noninvasive ventilation.  He received treatment with Decadron, remdesivir, and tocilizumab. He needed supplemental oxygen alternating with NIPPV and HFNC.  He had an episode of afib during this time that eventually resolved and has not recurred after briefly being treated with amiodarone.  He did not improve and was eventually intubated on 11/28/21 and then proned and started on veletri. On 11/30/21, he was started on cefepime and vanco for VAP. Culture grew E. Coli and Proteus mirabilis.  He did develop hypotension possibly from septic shock vs sedation vasoplegia and was treated with norepi intemittently.  Vanco was stopped on 12/5/21.  He was then transferred to [place]'s ICU on 12/5/21.       At [place] he was followed in ICU and continued on vent/paralysis/proning/pressors.  Cefepime switched to rocephin 12/6/21.  He continued to be very encephalopathic and psychiatry was consulted. He was weaned off pressors and didn't2/14 but had clinically improved somewhat and was extubated on 12/15/21. On 12/16/21 he had severely increased work o require proning by 12/10/21.  He has been on large doses of anti-psychotics and sedatives.  He developed fevers again 1f breathing and so was re-intubated. Blood culture grew Streptococcus constellatus and sputum  culture grew that as well but also had E. Coli/Proteus m./staph epi/candida parapsilosis. Vanco/Zosyn was started on 12/16/21.  He then underwent trach/PEG on 12/17/21.  Vanco was stopped after 3 days and Zosyn switched to rocephin on 12/21/21 to complete on 1/2/21.        During this time he had significant issues with encephalopathy and agitation. As above he was started on multiple meds for this. Prior to transfer to [facility], they had increased his lorazepam up to 12 mg every 4 hours and oxycodone 20 mg every 4 hours along with abilify 7 mg (home med), gabapentin 800 mg every 8 hours, keppra 500 mg twice daily (for behavior), zyprexa 5 mg twice daily, and seroquel 75 mg TID with 100 mg HS.  With this regimen, he improved reportedly in the ICU but was still awake and agitated.  He was transferred to [facility] on 12/31/21.      Assessment & Plan      Acute respiratory failure with hypoxia: due to acute respiratory distress syndrome (ARDS) secondary to COVID-19 pneumonia. Initially intubated on 11/28. Failed extubation and reintubated on 12/15/21. S/p tracheostomy on 12/17/21. Capped since 1/7/22, de cannulated on 1/21.   -Currently stable on room air, o2 prn  -Continue nebs prn    Urinary Retention - unclear etiology - may be medication related and due to less mobile as he is so confused.    -Appreciate Urology, recommend continue Wei, and voiding trail at discharge.  -Change Q monthly (next change 2/19) , follow up urology after discharge.    Acute metabolic encephalopathy: was on high dose of sedating medication prior to transfer to [facility]. Psych and addiction medicine have been involved in adjusting the medication. Lorazepam, Keppra and oxycodone were tapered off. Currently on Abilify, Subutex, gabapentin and phenobarbital tapered off now. Patient has been agitated, confused and restless.  - Previously on subutex 8 mg qam and 8 mg qpm for chronic pain and opioid use. Changed to 4 mg tid since 1/13   - Benzodiazepine  tapering: was on high dose ativan when arriving to [facility]. Ativan stopped and converted to phenobarbital on 1/3. Per Addiction medicine, patient needs slow tapering over several weeks. However, per ex-wife, patient became more confused and agitated after phenobarbital started and she would like patient to get off this medication sooner. Per Addiction medicine, started to taper since 1/13, last dose on 1/20.   - Previosuly on high dose Seroquel and was tapered down to 12.5 mg bid during the day, 25 mg at bedtime. However, ex-wife reports that Seroquel made patient have significant confusion and agitation in the past, which required hospitalization. She does not want patient to be on Seroquel. It was stopped on 1/15.   - Started Haldo 2 mg IV q4h prn since 1/15. Monitor QTc. (only getting it once a day on average the past few days)  - Increase Abilify 10 mg daily per psych.   - Continue PTA gabapentin 900 mg tid.   - Continue melatonin 5 mg and trazodone discontinued 1/22 due to adverse affects.  - Tapering zyprexa per psych, currently on 2.5 mg BID and at bedtime prn  - Off restraints, on 1:1 sitter. Wean as able     Oropharyngeal dysphagia: started diet and on back up bolus tube feed  -Further diet modification and tube feed per speech therapy and dietitian  -VFSS on 1/21 shows improvement.    Mild intermittent dysconjugate gaze: his ex-wife states she has noted this in the past when he is on certain medications like ativan but not if he is off all meds.     Chronic back pain: On Subutex and gabapentin as above.     Essential hypertension: BP controlled. On metoprolol. Monitor BP.      Major depressive disorder, recurrent episode, severe: continue abilify, trazodone.       Polysubstance abuse and Opioid type dependence: Per addiction medicine service, phenobarb tapered off now and on subutex.       ROSSY (obstructive sleep apnea): management for respiratory failure as above     Mild intermittent asthma: nebs as  needed     Recent Ventilator associated pneumonia: Completed antibiotics on 1/3/22.     Thrush: completed nystatin     Severe Malnutrition, POA: based on nutrition assessment             Diet: Adult Formula Drip Feeding: Continuous Promote w fiber; Gastrostomy; Goal Rate: 150 ml/hr x 2 hrs after meals if eats <50% of meal; mL/hr; Medication - Feeding Tube Flush Frequency: At least 15-30 mL water before and after medication administratio...  Combination Diet Soft and Bite Sized Diet (level 6); Thin Liquids (level 0)    DVT Prophylaxis: Enoxaparin (Lovenox) SQ  Wei Catheter: PRESENT, indication: Retention  Central Lines: None  Code Status: Full Code      Disposition Plan   Expected Discharge in 1-2 days  Anticipated discharge location: home with family    Delays:     Complex Care  1:1 Sitter   , monitoring post decannuation       The patient's care was discussed with patient, bedside RN.         Tamika Skinner MD  Hospitalist Service  [facility]  Securely message with the Vocera Web Console (learn more here)  Text page via BULX Paging/Directory      ______________________________________________________________________    Interval History   Chart reviewed and events noted.  Patient seen and examined.  No new complaints.Feels better now since off Trazodone. He thinks he slept better last night         Data reviewed today: I reviewed all medications, new labs and imaging results over the last 24 hours.   All medication issues identified within the last24 hours have been addressed and completed as appropriate    Physical Exam   Vital Signs: Temp: 98.6  F (37  C) Temp src: Oral BP: 100/70 Pulse: 67   Resp: 18 SpO2: 92 % O2 Device: None (Room air)    Weight: 173 lbs 6.4 oz    General appearance: No distress  HEENT: PERRL, EOMI  Lungs: Clear breath sounds in bilateral lung fields  Cardiovascular: Regular rate and rhythm, normal S1-S2  Neurology: Awake and alert. Oriented. Normal muscle strength in all four  extremities.  Psychiatry:Calm and cooperative    Data   Recent Labs   Lab 01/17/22  0617   WBC 5.8   HGB 14.0   MCV 95         POTASSIUM 4.3   CHLORIDE 106   CO2 24   BUN 7*   CR 0.58*   ANIONGAP 11   TRUONG 9.3   GLC 85

## 2022-01-23 NOTE — PROVIDER NOTIFICATION
Trialed pt off 1:1 this afternoon. Bed alarm set to zone 2.  Pt doing well and calling appropriately.  Spoke with Dr. Skinner on the phone, and was given the ok to discontinue the 1:1 order.    Michelle Rivera RN 1/23/2022

## 2022-01-23 NOTE — PLAN OF CARE
Vital signs stable. Patient's orientation and mood improving. Bedside sitter discontinued at 1430 today. He is appropriately using the call light.   Problem: Adult Inpatient Plan of Care  Goal: Readiness for Transition of Care  Outcome: Improving     Lidia Bingham RN

## 2022-01-23 NOTE — PLAN OF CARE
Pt alert with confusion and anxiety. VSS. Continues beadycardic. High residuals, MD, Dietary and Chg aware. See new orders. Informed MD about high anxiety level. No new orders at this time. Pt has a hard time communicating. Must be urged to nod Y/N. Mouths words that are difficult for this RN to understand.

## 2022-01-23 NOTE — PROGRESS NOTES
"CLINICAL NUTRITION SERVICES - REASSESSMENT NOTE     Nutrition Prescription    RECOMMENDATIONS FOR MDs/PROVIDERS TO ORDER:  Recommend discontinue TF as pt is eating 75% or more of meals  Will have RD review on 1/24 before discontinuing it    Malnutrition Status:    Previously noted severe (1/1/2022)    Recommendations already ordered by Registered Dietitian (RD):  Back up bolus TF: promote with fiber via gastrostomy  150 ml/hr x 2 hrs if eats < 50% of meal   ml q 4 hrs (increased by MD 1/12)  Each bolus provides 300 Calories, 18 g protein, 41 g CHO,   4 g fiber and 249 ml free water.    Future/Additional Recommendations:  RD to review po intake and TF needs on 1/24     EVALUATION OF THE PROGRESS TOWARD GOALS   Diet: Easy to chew (level 7), thin liquids (level 0)  Nutrition Support: Back up bolus feedings as documented above  Intake: % meals (1/20-1/23)      ANTHROPOMETRICS  Height: 180.3 cm (5' 11\")  Most Recent Weight: 78.7 kg (173 lb 6.4 oz)    BMI: Normal BMI  Weight History:   Wt Readings from Last 10 Encounters:   01/20/22 78.7 kg (173 lb 6.4 oz)   12/31/21 80.8 kg (178 lb 1.6 oz)   12/05/21 89.4 kg (197 lb 3.2 oz)   02/22/18 83.3 kg (183 lb 11.2 oz)     GI CONCERNS  Abdomen flat  Last BM 1/21/2022 per nurse  Fecal incontinence noted    LABS  Reviewed    MEDICATIONS  Reviewed: peridex, zyprexa, miralax    MALNUTRITION:  % Weight Loss:  > 5% in 1 month (severe malnutrition)  % Intake:  Decreased intake does not meet criteria for malnutrition   Subcutaneous Fat Loss:  Orbital region moderate depletion and Upper arm region mild depletion  Muscle Loss:  Temporal region severe depletion, Clavicle bone region severe depletion, Dorsal hand region severe depletion, Anterior thigh region moderate to severe depletion and Posterior calf region severe depletion  Fluid Retention:  Trace    Malnutrition Diagnosis: Severe malnutrition  In Context of:  Acute illness or injury    CURRENT NUTRITION " DIAGNOSIS  Swallowing difficulty related to COVID-19 and mechanical ventilation as evidenced by need for mechanically altered diet  Evaluation-continuing    INTERVENTIONS  Implementation  RD to assess po intake on 1/24 (this RD does not have access to health touch for [facility] at [facility])    Goal:  Diet advance-continuing    Monitoring/Evaluation  Progress toward goals will be monitored and evaluated per protocol: oral intake/ TF needs, weight, labs

## 2022-01-24 ENCOUNTER — APPOINTMENT (OUTPATIENT)
Dept: PHYSICAL THERAPY | Facility: CLINIC | Age: 47
End: 2022-01-24
Attending: INTERNAL MEDICINE
Payer: COMMERCIAL

## 2022-01-24 ENCOUNTER — APPOINTMENT (OUTPATIENT)
Dept: SPEECH THERAPY | Facility: CLINIC | Age: 47
End: 2022-01-24
Attending: INTERNAL MEDICINE
Payer: COMMERCIAL

## 2022-01-24 VITALS
OXYGEN SATURATION: 93 % | RESPIRATION RATE: 20 BRPM | HEART RATE: 99 BPM | WEIGHT: 173.4 LBS | SYSTOLIC BLOOD PRESSURE: 143 MMHG | BODY MASS INDEX: 24.27 KG/M2 | TEMPERATURE: 97.8 F | DIASTOLIC BLOOD PRESSURE: 93 MMHG | HEIGHT: 71 IN

## 2022-01-24 LAB
ANION GAP SERPL CALCULATED.3IONS-SCNC: 11 MMOL/L (ref 5–18)
BUN SERPL-MCNC: 6 MG/DL (ref 8–22)
CALCIUM SERPL-MCNC: 9.7 MG/DL (ref 8.5–10.5)
CHLORIDE BLD-SCNC: 107 MMOL/L (ref 98–107)
CO2 SERPL-SCNC: 21 MMOL/L (ref 22–31)
CREAT SERPL-MCNC: 0.57 MG/DL (ref 0.7–1.3)
ERYTHROCYTE [DISTWIDTH] IN BLOOD BY AUTOMATED COUNT: 15.4 % (ref 10–15)
GFR SERPL CREATININE-BSD FRML MDRD: >90 ML/MIN/1.73M2
GLUCOSE BLD-MCNC: 98 MG/DL (ref 70–125)
HCT VFR BLD AUTO: 42.4 % (ref 40–53)
HGB BLD-MCNC: 13.6 G/DL (ref 13.3–17.7)
MCH RBC QN AUTO: 30 PG (ref 26.5–33)
MCHC RBC AUTO-ENTMCNC: 32.1 G/DL (ref 31.5–36.5)
MCV RBC AUTO: 93 FL (ref 78–100)
PLATELET # BLD AUTO: 333 10E3/UL (ref 150–450)
POTASSIUM BLD-SCNC: 4.1 MMOL/L (ref 3.5–5)
RBC # BLD AUTO: 4.54 10E6/UL (ref 4.4–5.9)
SODIUM SERPL-SCNC: 139 MMOL/L (ref 136–145)
WBC # BLD AUTO: 5.4 10E3/UL (ref 4–11)

## 2022-01-24 PROCEDURE — 99232 SBSQ HOSP IP/OBS MODERATE 35: CPT | Performed by: FAMILY MEDICINE

## 2022-01-24 PROCEDURE — 250N000013 HC RX MED GY IP 250 OP 250 PS 637: Performed by: NURSE PRACTITIONER

## 2022-01-24 PROCEDURE — 36415 COLL VENOUS BLD VENIPUNCTURE: CPT | Performed by: INTERNAL MEDICINE

## 2022-01-24 PROCEDURE — 80048 BASIC METABOLIC PNL TOTAL CA: CPT | Performed by: INTERNAL MEDICINE

## 2022-01-24 PROCEDURE — 97112 NEUROMUSCULAR REEDUCATION: CPT | Mod: GP

## 2022-01-24 PROCEDURE — 250N000013 HC RX MED GY IP 250 OP 250 PS 637: Performed by: INTERNAL MEDICINE

## 2022-01-24 PROCEDURE — 97116 GAIT TRAINING THERAPY: CPT | Mod: GP

## 2022-01-24 PROCEDURE — 250N000011 HC RX IP 250 OP 636: Performed by: NURSE PRACTITIONER

## 2022-01-24 PROCEDURE — 250N000013 HC RX MED GY IP 250 OP 250 PS 637: Performed by: HOSPITALIST

## 2022-01-24 PROCEDURE — 99239 HOSP IP/OBS DSCHRG MGMT >30: CPT | Performed by: HOSPITALIST

## 2022-01-24 PROCEDURE — 92526 ORAL FUNCTION THERAPY: CPT | Mod: GN

## 2022-01-24 PROCEDURE — 999N000198 HC STATISTIC WOC PT EDUCATION, 16-30 MIN

## 2022-01-24 PROCEDURE — 92507 TX SP LANG VOICE COMM INDIV: CPT | Mod: GN

## 2022-01-24 PROCEDURE — 85014 HEMATOCRIT: CPT | Performed by: INTERNAL MEDICINE

## 2022-01-24 RX ORDER — BUPRENORPHINE AND NALOXONE 4; 1 MG/1; MG/1
1 FILM, SOLUBLE BUCCAL; SUBLINGUAL 3 TIMES DAILY
Qty: 90 FILM | Refills: 0 | Status: SHIPPED | OUTPATIENT
Start: 2022-01-24 | End: 2022-02-11

## 2022-01-24 RX ORDER — OLANZAPINE 2.5 MG/1
2.5 TABLET, FILM COATED ORAL 2 TIMES DAILY WITH MEALS
Qty: 30 TABLET | Refills: 0 | Status: SHIPPED | OUTPATIENT
Start: 2022-01-24 | End: 2022-02-01

## 2022-01-24 RX ORDER — METOPROLOL TARTRATE 50 MG
50 TABLET ORAL 2 TIMES DAILY
Qty: 30 TABLET | Refills: 0 | Status: SHIPPED | OUTPATIENT
Start: 2022-01-24 | End: 2022-01-24

## 2022-01-24 RX ORDER — ARIPIPRAZOLE 10 MG/1
10 TABLET ORAL DAILY
Qty: 30 TABLET | Refills: 0 | Status: SHIPPED | OUTPATIENT
Start: 2022-01-25 | End: 2022-02-11

## 2022-01-24 RX ORDER — GABAPENTIN 300 MG/1
900 CAPSULE ORAL 3 TIMES DAILY
Qty: 270 CAPSULE | Refills: 0 | Status: SHIPPED | OUTPATIENT
Start: 2022-01-24 | End: 2022-01-24

## 2022-01-24 RX ORDER — OLANZAPINE 2.5 MG/1
2.5 TABLET, FILM COATED ORAL 2 TIMES DAILY WITH MEALS
Qty: 30 TABLET | Refills: 0 | Status: SHIPPED | OUTPATIENT
Start: 2022-01-24 | End: 2022-01-24

## 2022-01-24 RX ORDER — METOPROLOL TARTRATE 50 MG
50 TABLET ORAL 2 TIMES DAILY
Qty: 60 TABLET | Refills: 0 | Status: SHIPPED | OUTPATIENT
Start: 2022-01-24 | End: 2022-07-22

## 2022-01-24 RX ORDER — BUPRENORPHINE 2 MG/1
4 TABLET SUBLINGUAL 3 TIMES DAILY
COMMUNITY
Start: 2022-01-24 | End: 2022-01-24

## 2022-01-24 RX ORDER — GABAPENTIN 300 MG/1
900 CAPSULE ORAL 3 TIMES DAILY
Status: DISCONTINUED | OUTPATIENT
Start: 2022-01-24 | End: 2022-01-24 | Stop reason: HOSPADM

## 2022-01-24 RX ORDER — GABAPENTIN 300 MG/1
900 CAPSULE ORAL 3 TIMES DAILY
Qty: 270 CAPSULE | Refills: 0 | Status: SHIPPED | OUTPATIENT
Start: 2022-01-24 | End: 2022-02-18

## 2022-01-24 RX ADMIN — BUPRENORPHINE 4 MG: 2 TABLET SUBLINGUAL at 09:23

## 2022-01-24 RX ADMIN — ARIPIPRAZOLE 10 MG: 5 TABLET ORAL at 09:23

## 2022-01-24 RX ADMIN — ENOXAPARIN SODIUM 40 MG: 100 INJECTION SUBCUTANEOUS at 09:30

## 2022-01-24 RX ADMIN — CHLORHEXIDINE GLUCONATE 0.12% ORAL RINSE 15 ML: 1.2 LIQUID ORAL at 09:24

## 2022-01-24 RX ADMIN — METOPROLOL TARTRATE 50 MG: 50 TABLET, FILM COATED ORAL at 09:23

## 2022-01-24 RX ADMIN — ACETAMINOPHEN ORAL SOLUTION 650 MG: 650 SOLUTION ORAL at 03:03

## 2022-01-24 RX ADMIN — GABAPENTIN 900 MG: 250 SOLUTION ORAL at 05:59

## 2022-01-24 ASSESSMENT — ACTIVITIES OF DAILY LIVING (ADL)
ADLS_ACUITY_SCORE: 12

## 2022-01-24 NOTE — PROGRESS NOTES
Care Management Discharge Note    Discharge Date: 01/24/2022  Expected Time of Departure: 1300    Discharge Disposition: Home.  Patient will discharge home with ex-wife Jama.  Patient was seen by physical therapy today-independent with ambulation, passed the balance test, and completed stairs.      Discharge Services:  n/a    Discharge DME: Per therapy today,  PT/OT not recommending any DME or assistive device    Discharge Transportation: family or friend will provide.  Jama will drive patient home.    Private pay costs discussed: Not applicable    PAS Confirmation Code:    Patient/family educated on Medicare website which has current facility and service quality ratings:      Education Provided on the Discharge Plan:  yes  Persons Notified of Discharge Plans: met with patient and Jama.  Patient/Family in Agreement with the Plan: yes (discussed with patient and Jama in person today)    Handoff Referral Completed: No    Additional Information:  Patient will discharge home today with Jama.  Patient's discharge discussed in full with attending MD and charge RN.    Patient will not be going home with tube feed infusions, fluid flushes only.  Jama will be taught on fluid flushes.  Patient was seen by physical therapy today-independent with ambulation, passed the balance test, and completed stairs.   Patient will fill discharge medications here before discharge.  MD aware.    After speaking with patient and Jama, writer made patient an appointment for hospital follow up and to establish a pcp at [facility] with Dr. Allen on Tuesday 01/25/22 @ 2:45.      Fermin Carroll [facility]  [phone]            Fermin Carroll [facility]          Fermin Carroll [facility]  [phone]

## 2022-01-24 NOTE — PLAN OF CARE
Speech Language Therapy Discharge Summary    Reason for therapy discharge:    Discharged to home with home or OP Speech Therapy    Progress towards therapy goal(s). See goals on Care Plan in [facility] electronic health record for goal details.  Goals partially met.  Barriers to achieving goals:   discharge from facility.    Therapy recommendation(s):    Continued therapy is recommended.  Rationale/Recommendations:  To further assess and address cognitive linguistic impairments. Pt on diet of regular/thin.

## 2022-01-24 NOTE — DISCHARGE INSTRUCTIONS
Hospital follow up and establish primary care doctor:  [facility] with Dr. Allen on Tuesday 01/25/22 @ 2:45.  Daughter given address and phone number    Please follow up with Urology after discharge.    G tube site cares - daily  Cleanse with water, gently dry.  Use woven gauze around site to contain any drainage which may be present.    Trach site cares - daily  Decanulated 1/21/22. Cleanse with water, gently dry. May cover area with dry gauze until site is healed.

## 2022-01-24 NOTE — PROGRESS NOTES
Social Work Note:    After speaking with patient and Jama, writer made patient an appointment for hospital follow up and to establish a pcp at [facility] with Dr. Allen on Tuesday 01/25/22 @ 2:45.      Fermin Carroll, [facility] [place]  [phone]

## 2022-01-24 NOTE — PROGRESS NOTES
WOC RN Assessment Note     Today's Visit: Discharge chart review.  Date of last photo: NA    Labs:  Albumin   Date/Time Value Ref Range Status   12/20/2021 04:53 AM 2.5 (L) 3.5 - 5.0 g/dL Final     CRP   Date/Time Value Ref Range Status   12/14/2021 04:25 AM 3.3 (H) 0.0-<0.8 mg/dL Final     Recent Labs   Lab Test 12/30/21  0424   HGB 11.7*       Vitals:   The last obtained vitals are:   Temp: 99.5  F (37.5  C)  Pulse: (!) 130  Resp: 16  BP: 117/60    Body mass index is 24.18 kg/m .      Wound Ostomy Continence Assessment/Plan:  Ostomy  Attention to Tracheostomy   Decanulated 1/21/22. May cover area with dry gauze until site is healed. Change daily.    Attention to Feeding Tube:   No concerns  Treatment Plan: Routine cares        Discussed plan of care with [facility].     Outcomes and treatment recommendations are to promote skin integrity, contain exudate and promote wound healing.     WOC RN will sign off, wound/ostomy treatment plan in place. Please re-consult with any new concerns.

## 2022-01-24 NOTE — PLAN OF CARE
Patient alert and oriented. Vss.denied any pain. Patient discharged today. Wife was here and educated how to flush tube feeding and change the dressing for the site. Wife also instructed how to change the trach site dressing. All prescribed medication given to the patient. Discharge instruction explained and given to the patient. Patient accompanied to the the car where wife and daughter is waiting.

## 2022-01-24 NOTE — PROGRESS NOTES
Social Work Note:    Writer met with patient and ex-wife/family contact Jama.  They are extremely anxious/restless to discharge home today.    We spoke about discharge to home with Jama.  It appears patient will not need home tube feeding/infusions, but will need TF flushes.  Spoke to Jama about the RN teaching her.    Patient will need simple/daily dressing change for trach site.  Informed Jama RN will teach her dressing changes.    Patient would like to fill medications here before going home.   Spoke with charge RN about current plan.    Fermin Carroll, [facility] [place]  [phone]

## 2022-01-24 NOTE — DISCHARGE SUMMARY
.    Discharge Summary     Primary Care Physician: Sal Marie  Admission Date: 12/31/2021   Discharge Provider: Tamika Skinner MD Discharge Date: 1/24/2022   Diet: Combination diet, easy to chew and think liquids   Code Status: Full Code   Activity: As tolerated        Condition at Discharge: Stable      REASON FOR PRESENTATION(See Admission Note for Details)     Respiratory failure secondary to covid      PRINCIPAL & ACTIVE DISCHARGE DIAGNOSES     Principal Problem:    Acute on chronic respiratory failure with hypoxia (H)  Active Problems:    Chronic back pain    Essential hypertension    Pneumonia due to 2019 novel coronavirus    Acute respiratory distress syndrome (ARDS) due to COVID-19 virus (H)    Major depressive disorder, recurrent episode, severe (H)    Opioid use disorder, severe, dependence (H)    Polysubstance abuse (H)    ROSSY (obstructive sleep apnea)    Mild intermittent asthma    Acute metabolic encephalopathy    Ventilator associated pneumonia (H)    Oropharyngeal dysphagia      SIGNIFICANT FINDINGS (Imaging, labs):     XR Chest Port 1 View  Result Date: 12/29/2021      IMPRESSION: Endotracheal tube has been removed and a new tracheostomy tube has been placed in good position. Right PICC line remains in good position. Right IJ CVC has been removed. NG tube also removed. Bilateral interstitial infiltrates have improved when compared  to previous. There are no new or acute infiltrates. No pleural effusion.    XR Video Swallow with SLP or OT  Result Date: 1/21/2022      IMPRESSION: 1.  Adequate oral phase. 2.  Inconsistent delay in swallow reflex with a single episode of shallow laryngeal penetration during swallowing of thin liquid. 3.  Complete epiglottic inversion. 4.  No significant stasis. Please refer to speech therapy dysphasia evaluation report for additional information. Examination performed by Carlotta Blackman, DEMAR/WELLINGTON, RT (R) under the general supervision of Dr. Fahrner .     XR Video  Swallow with SLP or OT  Result Date: 1/7/2022      IMPRESSION: 1.  Adequate oral phase. 2.  Delay in initiation of swallow reflex with pour over to the pyriform sinuses during swallowing of thin liquid. 3.  Complete epiglottic inversion. 4.  Aspiration of thin liquid. 5.  No significant stasis. Please refer to speech therapy dysphasia evaluation report for additional information. Examination performed by Carlotta Blackman, RPA/MARCELAA, RT (R) under the general supervision of Dr. Fahrner .       PENDING LABS     [unfilled]    PROCEDURES ( this hospitalization only)          RECOMMENDATIONS TO OUTPATIENT PROVIDER FOR F/U VISIT     Follow up with PCP in 1 week. Needs FT tube to be removed on 1/28  Follow up with  and psychiatry in 1-2 weeks    DISPOSITION     Home    SUMMARY OF HOSPITAL COURSE:      Jared North is a 46 year old male who is unvaccinated against COVID-19, with history of polysubstance abuse and chronic pain/lower back pain on Suboxone prior to admission (had been on methadone in the past but not in recent past), mood disorder/depression, HTN, ROSSY and mild intermittent asthma. Patient was diagnosed with COVID-19 viral infection on 11/19/2021. He presented to [facility] on 11/24/2021 with worsening shortness of breath. He was admitted to the ICU with COVID-19 viral pneumonia and severe acute respiratory failure with hypoxia requiring noninvasive ventilation.  He received treatment with Decadron, remdesivir, and tocilizumab. He needed supplemental oxygen alternating with NIPPV and HFNC.  He had an episode of afib during this time that eventually resolved and has not recurred after briefly being treated with amiodarone.  He did not improve and was eventually intubated on 11/28/21 and then proned and started on veletri. On 11/30/21, he was started on cefepime and vanco for VAP. Culture grew E. Coli and Proteus mirabilis.  He did develop hypotension possibly from septic shock vs  sedation vasoplegia and was treated with norepi intemittently.  Vanco was stopped on 12/5/21.  He was then transferred to [facility] ICU on 12/5/21.       At [place] he was followed in ICU and continued on vent/paralysis/proning/pressors.  Cefepime switched to rocephin 12/6/21.  He continued to be very encephalopathic and psychiatry was consulted. He was weaned off pressors and didn't2/14 but had clinically improved somewhat and was extubated on 12/15/21. On 12/16/21 he had severely increased work o require proning by 12/10/21.  He has been on large doses of anti-psychotics and sedatives.  He developed fevers again 1f breathing and so was re-intubated. Blood culture grew Streptococcus constellatus and sputum culture grew that as well but also had E. Coli/Proteus m./staph epi/candida parapsilosis. Vanco/Zosyn was started on 12/16/21.  He then underwent trach/PEG on 12/17/21.  Vanco was stopped after 3 days and Zosyn switched to rocephin on 12/21/21 to complete on 1/2/21.        During this time he had significant issues with encephalopathy and agitation. As above he was started on multiple meds for this. Prior to transfer to [facility], they had increased his lorazepam up to 12 mg every 4 hours and oxycodone 20 mg every 4 hours along with abilify 7 mg (home med), gabapentin 800 mg every 8 hours, keppra 500 mg twice daily (for behavior), zyprexa 5 mg twice daily, and seroquel 75 mg TID with 100 mg HS.  With this regimen, he improved reportedly in the ICU but was still awake and agitated.  He was transferred to [facility] on 12/31/21.    During his stay at [facility], patient noted to be improving gradually.  Patient underwent decannulation on 1/21 and has been stable on room air.  Patient noted to have urinary retention during his stay, was evaluated by urology.  Patient passed a voiding trial on discharge and Wei catheter was removed.  Patient can follow-up with urology as needed.    When he was transferred to [facility], patient was  on multiple sedating meds for his acute metabolic encephalopathy.  Phenobarb, Seroquel and trazodone have been stopped prior to discharge.  Patient will be on Abilify 10 mg daily, gabapentin 9 mg 3 times daily, melatonin 5 mg at bedtime for insomnia and Zyprexa 2.5 mg twice daily.  Patient was needing restraints and sitter during his stay and have been off of it and doing well and cooperative with his cares.  Patient also underwent swallow study which showed improvement and was started on a diet.  Patient does not need any more tube feeding and can likely be removed on 1/28 (6 weeks from the date of placement).    Patient was followed by addiction medicine and psychiatry during his stay for weaning of his sedating meds and also management of his Suboxone for opioid dependence.  Patient will need close follow-up with both the teams on discharge in 1 to 2 weeks.    Rest of his medical problems remained stable, patient is stable to be discharged home with his family.  Plan was discussed with his ex-wife Jama on the day of discharge by the bedside.  Please refer to discharge medications or instructions for more details.       Discharge Medications with Med changes:     Current Discharge Medication List      START taking these medications    Details   ARIPiprazole (ABILIFY) 10 MG tablet Take 1 tablet (10 mg) by mouth daily  Qty: 30 tablet, Refills: 0    Associated Diagnoses: Severe episode of recurrent major depressive disorder, without psychotic features (H)      buprenorphine HCl-naloxone HCl (SUBOXONE) 4-1 MG per film Place 1 Film under the tongue 3 times daily  Qty: 90 Film, Refills: 0    Comments: MARIE: [id number]  Associated Diagnoses: Opioid use disorder, severe, dependence (H)      metoprolol tartrate (LOPRESSOR) 50 MG tablet Take 1 tablet (50 mg) by mouth 2 times daily  Qty: 60 tablet, Refills: 0    Associated Diagnoses: Essential hypertension      OLANZapine (ZYPREXA) 2.5 MG tablet Take 1 tablet (2.5 mg) by  mouth 2 times daily (with meals)  Qty: 30 tablet, Refills: 0    Associated Diagnoses: Acute metabolic encephalopathy         CONTINUE these medications which have CHANGED    Details   gabapentin (NEURONTIN) 300 MG capsule Take 3 capsules (900 mg) by mouth 3 times daily  Qty: 270 capsule, Refills: 0    Associated Diagnoses: Acute metabolic encephalopathy         CONTINUE these medications which have NOT CHANGED    Details   acetaminophen (TYLENOL) 32 mg/mL liquid 650 mg by Oral or Feeding Tube route every 4 hours as needed for fever or mild pain      albuterol (PROAIR HFA;PROVENTIL HFA;VENTOLIN HFA) 90 mcg/actuation inhaler Inhale 2 puffs into the lungs every 4 hours as needed       melatonin 5 MG tablet 1 tablet (5 mg) by Oral or Feeding Tube route At Bedtime  Qty: 30 tablet, Refills: 0    Associated Diagnoses: Acute metabolic encephalopathy      multivitamin w/minerals (CERTAVITE/ANTIOXIDANTS) tablet Take 1 tablet by mouth daily         STOP taking these medications       ARIPiprazole (ABILIFY) 1 MG/ML SOLN solution Comments:   Reason for Stopping:         bisacodyl (DULCOLAX) 10 MG suppository Comments:   Reason for Stopping:         buprenorphine HCl-naloxone HCl (SUBOXONE) 8-2 MG per film Comments:   Reason for Stopping:         buPROPion (WELLBUTRIN XL) 300 MG 24 hr tablet Comments:   Reason for Stopping:         cefTRIAXone Comments:   Reason for Stopping:         chlorhexidine (CHLORHEXIDINE) 0.12 % solution Comments:   Reason for Stopping:         docusate (COLACE) 50 MG/5ML liquid Comments:   Reason for Stopping:         enoxaparin ANTICOAGULANT (LOVENOX) 40 MG/0.4ML syringe Comments:   Reason for Stopping:         haloperidol lactate (HALDOL) 5 MG/ML injection Comments:   Reason for Stopping:         hydrOXYzine (VISTARIL) 25 MG capsule Comments:   Reason for Stopping:         insulin aspart (NOVOLOG PEN) 100 UNIT/ML pen Comments:   Reason for Stopping:         levETIRAcetam (KEPPRA) 500 MG tablet  Comments:   Reason for Stopping:         lisinopril (ZESTRIL) 10 MG tablet Comments:   Reason for Stopping:         LORazepam (ATIVAN) 2 MG/ML (HIGH CONC) oral solution Comments:   Reason for Stopping:         LORazepam (ATIVAN) 2 MG/ML injection Comments:   Reason for Stopping:         metoprolol (LOPRESSOR) 5 MG/5ML injection Comments:   Reason for Stopping:         midodrine (PROAMATINE) 2.5 MG tablet Comments:   Reason for Stopping:         OLANZapine zydis (ZYPREXA) 5 MG ODT Comments:   Reason for Stopping:         oxyCODONE (ROXICODONE INTENSOL) 20 mg/mL (HIGH CONC) solution Comments:   Reason for Stopping:         QUEtiapine (SEROQUEL) 100 MG tablet Comments:   Reason for Stopping:         QUEtiapine (SEROQUEL) 25 MG tablet Comments:   Reason for Stopping:         sennosides (SENOKOT) 8.8 MG/5ML syrup Comments:   Reason for Stopping:         traZODone (DESYREL) 150 MG tablet Comments:   Reason for Stopping:                   Rationale for medication changes:      See med rec        Consults   Addiction medicine  Psychiatry      Immunizations given this encounter     [unfilled]       Anticoagulation Information      Recent INR results: No results for input(s): INR in the last 168 hours.  Warfarin doses (if applicable) or name of other anticoagulant: N/A      Discharge Orders     Discharge Procedure Orders   Adult Mental Health  Referral   Standing Status: Future   Referral Priority: Routine: Next available opening Referral Type: Mental Health Outpatient   Number of Visits Requested: 1     Reason for your hospital stay   Order Comments: Encephalopathy, hypoxic respiratory failure     Activity   Order Comments: Your activity upon discharge: activity as tolerated     Order Specific Question Answer Comments   Is discharge order? Yes      Follow-up and recommended labs and tests    Order Comments: Follow up with primary care provider, Sal Marie, within 7 days for hospital follow- up.  No  follow up labs or test are needed.  Pt has feeding tube that needs to come out on 1/28 if PO intake adequate.  Follow up with out-pt psychiatry in 1-2 weeks, Urology 2-3 weeks     Diet   Order Comments: Follow this diet upon discharge: Orders Placed This Encounter        Combination Diet Easy to Chew (level 7); Thin Liquids (level 0)     Order Specific Question Answer Comments   Is discharge order? Yes      Examination     Vital Signs in last 24 hours:     General appearance: No distress  HEENT: PERRL, EOMI  Lungs: Clear breath sounds in bilateral lung fields  Cardiovascular: Regular rate and rhythm, normal S1-S2  Neurology: Awake and alert. Oriented. Normal muscle strength in all four extremities.  Psychiatry:Calm and cooperative    Please see EMR for more detailed significant labs, imaging, consultant notes etc.  Total time spent on discharge: 35 minutes    Tamika Skinner MD  [facility]    CC:Sal Marie

## 2022-01-24 NOTE — PLAN OF CARE
Physical Therapy Discharge Summary    Reason for therapy discharge:    Discharged to home.    Progress towards therapy goal(s). See goals on Care Plan in [facility] electronic health record for goal details.  Goals met    Therapy recommendation(s):    Continued therapy is recommended.  Rationale/Recommendations:  OP PT for high level balance.        Problem: PT General Care Plan  Goal: PT Frequency  Outcome: Completed  Goal: Bed Mobility (PT)  Description: PT Bed Mobility  Outcome: Completed  Goal: Transfer (PT)  Description: PT Transfer  Outcome: Completed  Goal: Gait (PT)  Description: PT Gait  Outcome: Completed  Goal: Stairs (PT)  Description: PT Stairs  Outcome: Completed

## 2022-01-24 NOTE — PROGRESS NOTES
NUTRITION BRIEF NOTE:       New findings in last 24 hours:  Diet order: Orders Placed This Encounter      Combination Diet Easy to Chew (level 7); Thin Liquids (level 0)      Diet    Back-up boluses since 1/7    Weight:   01/20/22 0603 78.7 kg (173 lb 6.4 oz)   01/19/22 0646 77.6 kg (171 lb)   01/17/22 0704 77.2 kg (170 lb 1.6 oz)   01/12/22 0600 81 kg (178 lb 9.6 oz)   01/11/22 0550 80.1 kg (176 lb 8 oz)   01/10/22 0600 80.9 kg (178 lb 6.4 oz)   01/09/22 0309 80.6 kg (177 lb 11.2 oz)   01/06/22 0538 81.5 kg (179 lb 9.6 oz)   01/03/22 0614 82.7 kg (182 lb 4.8 oz)   12/31/21 1635 80.7 kg (178 lb)     Fluid: +8.7L in 2 weeks    Labs: reviewed     Electrolytes  Potassium (mmol/L)   Date Value   01/24/2022 4.1   01/17/2022 4.3   01/11/2022 3.9     Phosphorus (mg/dL)   Date Value   12/29/2021 4.0   12/22/2021 3.5   11/26/2021 3.7        Blood Glucose  Glucose (mg/dL)   Date Value   01/24/2022 98   01/17/2022 85   01/06/2022 111   12/30/2021 87   12/29/2021 118     GLUCOSE BY METER POCT (mg/dL)   Date Value   01/01/2022 114 (H)   01/01/2022 132 (H)   01/01/2022 107 (H)   12/31/2021 121 (H)   12/31/2021 109 (H)     Hemoglobin A1C (%)   Date Value   11/24/2021 5.8 (H)        Inflammatory Markers  CRP (mg/dL)   Date Value   12/14/2021 3.3 (H)   11/29/2021 0.6   11/28/2021 1.0 (H)     WBC Count (10e3/uL)   Date Value   01/24/2022 5.4   01/17/2022 5.8   12/30/2021 9.1     Albumin (g/dL)   Date Value   12/20/2021 2.5 (L)   12/18/2021 2.4 (L)   12/07/2021 2.7 (L)        Magnesium (mg/dL)   Date Value   01/11/2022 1.9   01/10/2022 1.8   01/09/2022 1.9     Sodium (mmol/L)   Date Value   01/24/2022 139   01/17/2022 141   01/06/2022 140        Renal  Urea Nitrogen (mg/dL)   Date Value   01/24/2022 6 (L)   01/17/2022 7 (L)   01/06/2022 12     Creatinine (mg/dL)   Date Value   01/24/2022 0.57 (L)   01/17/2022 0.58 (L)   01/10/2022 0.59 (L)         Additional  Triglycerides (mg/dL)   Date Value   12/21/2021 145   12/19/2021 141    12/17/2021 149     Ketones Urine (mg/dL)   Date Value   01/12/2022 Negative   12/10/2002 Negative          Recommendations:    Patient to discharge today, having good appetite but still needed TF boluses x2 on 1/22, recommend oral nutrition supplements at home with severe malnutrition diagnosis. Weight improving, but still needs monitoring    PEG removal per MD, placement date 12/17/21    Continue FWF 60 mL q8 hours for tube patency until tube removed    Interventions:    Discontinued TF orders    Continue FWF until PEG removed    Provided high calorie, high protein recipes and diet education    Encouraged fluid intakes    Entered orders for regimen outlined above      Please page/consult as needed.    Lindsey Miranda RDN, LD  Clinical Dietitian

## 2022-01-24 NOTE — PROGRESS NOTES
Addiction Medicine Progress Note  1/24/2022        Assessment/Plan    Principal Problem:    Acute on chronic respiratory failure with hypoxia (H)  Active Problems:    Chronic back pain    Essential hypertension    Pneumonia due to 2019 novel coronavirus    Acute respiratory distress syndrome (ARDS) due to COVID-19 virus (H)    Major depressive disorder, recurrent episode, severe (H)    Opioid use disorder, severe, dependence (H)    Polysubstance abuse (H)    ROSSY (obstructive sleep apnea)    Mild intermittent asthma    Acute metabolic encephalopathy    Ventilator associated pneumonia (H)    Oropharyngeal dysphagia    Opioid use disorder, severe, dependence- now on 4 mg 3 times daily.  Discussed with patient and his caregiver that although this is an increased dose from what he was taking prior to the hospitalization, he was on high amounts of opioids and I recommend that he not change the dose until he follows up with the outpatient addiction medicine clinic.  A 30-day supply has been sent to the [facility] pharmacy and a referral placed in the discharge navigator for him to follow-up with Dr. Vandana Vázquez within 2-3 weeks.       Benzodiazepine withdrawal- completed phenobarbital taper and is remarkably less agitated, ready for discharge today.       Reiterated with patient and caregiver that he was seriously ill requiring numerous medications at high doses.  Although his goal would be to not be on as many medications, the recommendation is that he continue them unchanged for now until he follows up.      Subjective  Patient feels well and is ready for discharge.  No confusion or hallucinations.    ROS:    Eating now without difficulty  No diffuse body aches    Objective    Vital signs in last 24 hours  Temp:  [97.8  F (36.6  C)-98.3  F (36.8  C)] 97.8  F (36.6  C)  Pulse:  [] 99  Resp:  [20] 20  BP: (131-143)/(76-93) 143/93  SpO2:  [93 %] 93 %        Physical Exam    Gen: Awake and alert. In no acute distress.  Pleasant and cooperative  Neuro: speech is normal  Tremor: none  Eyes: EOMI. There is no scleral icterus.  Skin: no jaundice, diaphoresis, goose bumps  Respiratory: no cough, breathing is non-labored  Speaking in full sentences  No evidence that he is responding to hallucinations        Pertinent Labs   Lab Results: I have personally reviewed the labs.      Eda Peterson MD  Addiction Medicine

## 2022-01-24 NOTE — PLAN OF CARE
Patients lange bag was leaking, just the bag was changed. He continues to have some blood in his urine. Patient reports feeling pressure or sensation of needing to void (he becomes restless).  Bladder scanned for 43 ml. Text paged on call provider.     Lidia Bingham RN

## 2022-01-25 ENCOUNTER — VIRTUAL VISIT (OUTPATIENT)
Dept: FAMILY MEDICINE | Facility: CLINIC | Age: 47
End: 2022-01-25
Payer: COMMERCIAL

## 2022-01-25 ENCOUNTER — PATIENT OUTREACH (OUTPATIENT)
Dept: CARE COORDINATION | Facility: CLINIC | Age: 47
End: 2022-01-25

## 2022-01-25 DIAGNOSIS — F19.10 POLYSUBSTANCE ABUSE (H): ICD-10-CM

## 2022-01-25 DIAGNOSIS — U07.1: ICD-10-CM

## 2022-01-25 DIAGNOSIS — Z71.89 OTHER SPECIFIED COUNSELING: ICD-10-CM

## 2022-01-25 DIAGNOSIS — F33.2 SEVERE EPISODE OF RECURRENT MAJOR DEPRESSIVE DISORDER, WITHOUT PSYCHOTIC FEATURES (H): ICD-10-CM

## 2022-01-25 DIAGNOSIS — Z09 HOSPITAL DISCHARGE FOLLOW-UP: Primary | ICD-10-CM

## 2022-01-25 DIAGNOSIS — R39.9 LOWER URINARY TRACT SYMPTOMS (LUTS): ICD-10-CM

## 2022-01-25 DIAGNOSIS — J12.82 PNEUMONIA DUE TO 2019 NOVEL CORONAVIRUS: ICD-10-CM

## 2022-01-25 DIAGNOSIS — U07.1 PNEUMONIA DUE TO 2019 NOVEL CORONAVIRUS: ICD-10-CM

## 2022-01-25 PROCEDURE — 99214 OFFICE O/P EST MOD 30 MIN: CPT | Mod: GT | Performed by: FAMILY MEDICINE

## 2022-01-25 NOTE — PROGRESS NOTES
Clinic Care Coordination Contact    Background: Care Coordination referral placed from [facility] discharge report for reason of patient meeting criteria for a TCM outreach call by [facility] team.    Assessment: Upon chart review, [facility] Team member will cancel/close the referral for TCM outreach due to reason below:    Patient has a follow up appointment with an appropriate provider today for hospital discharge.    Plan: Care Coordination referral for TCM outreach canceled.    JYOTI Ramos  [facility]

## 2022-01-25 NOTE — PROGRESS NOTES
Jared is a 46 year old who is being evaluated via a billable video visit.    How would you like to obtain your AVS? MyChart  If the video visit is dropped, the invitation should be resent by: Text to cell phone: [phone]  Will anyone else be joining your video visit? No    Video Start Time: 2:46 PM    Assessment & Plan     Hospital discharge follow-up  - Ambulatory referral to Home Health    Disease due to 2019-nCoV  - Ambulatory referral to Home Health    Pneumonia due to 2019 novel coronavirus    Severe episode of recurrent major depressive disorder, without psychotic features (H)  - Adult Mental Health  Referral    Polysubstance abuse (H)    Lower urinary tract symptoms (LUTS)  - Adult Urology Referral  He does have a lot of needs and follow-ups.  But he noted that he is doing slightly better than he had been in the past.  I did put in referrals as we can including referral for mental health psychotherapy specifically, he will continue to follow-up with the.  Mental health physicians for continuing management for substance abuse.  I also put in a referral for urology.  Home health was also referred to for help with occupational therapy as well as physical therapy and nurse visits for medication management.  For the other referrals that he may need I did encourage the ex-wife that it will have to be slowly as he improves then we will consider which when he needs to have.  At this point with the hand pain or musculoskeletal pain, we will continue with the medication that he has in the past which includes Suboxone.  He can also continue to use arthritis cream.  I believe that as he continues to get better his memory as well as dexterity will improve.         Tobacco Cessation:   reports that he has never smoked. He uses smokeless tobacco.      No follow-ups on file.    Giovani Allen MD  [facility]   Jared is a 46 year old who presents for the  following health issues  accompanied by his Ex-wife.    HPI   Here for follow-up.  He is a patient who was diagnosed with COVID and had a very prolonged hospital stay starting from November 19, 2021 into January 24, 2022.  When he was finally discharged home.  He still has a lot of diagnosis and medical concerns that he is at dealing with some of which was actually started before he got Covid.  This includes substance abuse for which he had been seeing psychiatry and psychology and has been substance free at this time.  But while in the hospital he had to be managed with narcotics.  He will follow up with the substance abuse specialist for management.  Part of the concerns that the have at this point is that he still has issues with cognition, memory and the dexterity especially with use of his hand.  He still is deconditioned.  Does have pain especially in the limbs which may be as a result of having been restrained.  He also did have episode of a urinary retention for which he needed to have a urology follow-up.  He was intubated and the have a tracheostomy.  He also had a feeding tube which is still in situ.  This will need to be removed by the 28th of this month.  The wife will let us know if they are unable to do that and will try to figure out where he can have it removed.  He is eating orally and tolerating that very well.  They noted that he is doing well physically a lot better than expected.  He does have mild amount of referrals.    Hospital Follow-up Visit:    Hospital/Nursing Home/IP Rehab Facility: [facility]  Date of Admission: Initial admission was through [facility], he was then transferred to [facility] from where he was transferred to long-term acute care in [place].  Date of initial admission November 19, 2021  Date of Discharge: Date of final discharge from [place] is 24 January 2022.  Reason(s) for Admission: Respiratory failure secondary to COVID-19 infection,  pneumonia due to 2019 novel coronavirus, acute metabolic encephalopathy, ventilator associated pneumonia.    Oropharyngeal dysphagia.  Was your hospitalization related to COVID-19? YES   How are you feeling today? Better  In the past 24 hours have you had shortness of breath when speaking, walking, or climbing stairs? I don't have breathing problems  Do you have a cough? I don't have a cough  When is the last time you had a fever greater than 100?   Are you having any other symptoms? Fatigue and Emotional distress   Do you have any other stressors you would like to discuss with your provider? No             Was the patient in the ICU or did the patient experience delirium during hospitalization?  Yes     No flowsheet data found.          Problems taking medications regularly:  None  Medication changes since discharge: None  Problems adhering to non-medication therapy:  None    Summary of hospitalization:  [facility] discharge summary reviewed  Diagnostic Tests/Treatments reviewed.  Follow up needed: With making sure patient has follow-up as referred  Other Healthcare Providers Involved in Patient s Care:         As in the records  Update since discharge: improved.       Post Discharge Medication Reconciliation: discharge medications reconciled, continue medications without change.  Plan of care communicated with patient and caregiver       Review of Systems   CONSTITUTIONAL:NEGATIVE for fever, chills, change in weight  ENT/MOUTH: NEGATIVE for ear, mouth and throat problems  RESP: NEGATIVE for significant cough or SOB  CV: NEGATIVE for chest pain, palpitations or peripheral edema  MUSCULOSKELETAL: Having arthralgia as noted in the history on the hands and joints.  PSYCHIATRIC: Having a history of psychiatric issues but at this point doing better.      Objective           Vitals:  No vitals were obtained today due to virtual visit.    Physical Exam   GENERAL: frail, fatigued and appears older than  stated age  EYES: Eyes grossly normal to inspection  RESP: Respiration is unlabored, but he does however fatigue notable.  PSYCH: He does have a fairly good affect, but does appear to forgetful.  He is able to make a full statements, and able to remember certain things.    Video-Visit Details    Type of service:  Video Visit    Video End Time:3.26 pm,    Originating Location (pt. Location): Home    Distant Location (provider location):  [facility]     Platform used for Video Visit: Linnea

## 2022-01-25 NOTE — PLAN OF CARE
Occupational Therapy Discharge Summary    Reason for therapy discharge:    All goals and outcomes met, no further needs identified.    Progress towards therapy goal(s). See goals on Care Plan in [facility] electronic health record for goal details.  Goals met    Therapy recommendation(s):    No further therapy is recommended.    Hailey Mejia, OTR/L

## 2022-01-27 ENCOUNTER — TELEPHONE (OUTPATIENT)
Dept: FAMILY MEDICINE | Facility: CLINIC | Age: 47
End: 2022-01-27
Payer: COMMERCIAL

## 2022-01-27 ENCOUNTER — TELEPHONE (OUTPATIENT)
Dept: LAB | Facility: CLINIC | Age: 47
End: 2022-01-27
Payer: COMMERCIAL

## 2022-01-27 DIAGNOSIS — Z97.8 USES FEEDING TUBE: Primary | ICD-10-CM

## 2022-01-27 DIAGNOSIS — F33.2 SEVERE EPISODE OF RECURRENT MAJOR DEPRESSIVE DISORDER, WITHOUT PSYCHOTIC FEATURES (H): ICD-10-CM

## 2022-01-27 DIAGNOSIS — J95.851 VENTILATOR ASSOCIATED PNEUMONIA (H): ICD-10-CM

## 2022-01-27 NOTE — TELEPHONE ENCOUNTER
Reason for Call:  Patient and wife calling regarding patients feeding tube.  Wife states that they need an order to have it taken out but they are unsure where they go to get it taken out.  Wife states that the tube is supposed to be removed by 1/28.     Phone Number Patient can be reached at: Other phone number:  [phone]    Best Time: anytime    Can we leave a detailed message on this number? YES    Call taken on 1/27/2022 at 9:40 AM by Eda Dc

## 2022-01-27 NOTE — TELEPHONE ENCOUNTER
Had virtual visit with Dr Allen on 1/25/22 please advise on g tube removal. TYRONE POWELL on 1/27/2022 at 9:45 AM

## 2022-01-28 ENCOUNTER — NURSE TRIAGE (OUTPATIENT)
Dept: NURSING | Facility: CLINIC | Age: 47
End: 2022-01-28
Payer: COMMERCIAL

## 2022-01-28 ENCOUNTER — TELEPHONE (OUTPATIENT)
Dept: SURGERY | Facility: CLINIC | Age: 47
End: 2022-01-28
Payer: COMMERCIAL

## 2022-01-28 RX ORDER — MULTIVITAMIN WITH FOLIC ACID 400 MCG
1 TABLET ORAL DAILY
COMMUNITY
Start: 2021-07-22 | End: 2022-08-11

## 2022-01-28 RX ORDER — OLANZAPINE 5 MG/1
5 TABLET ORAL 2 TIMES DAILY
Qty: 60 TABLET | Refills: 1 | Status: SHIPPED | OUTPATIENT
Start: 2022-01-28 | End: 2022-02-01

## 2022-01-28 RX ORDER — HYDROXYZINE HYDROCHLORIDE 10 MG/1
TABLET, FILM COATED ORAL
COMMUNITY
Start: 2021-04-29 | End: 2022-05-04

## 2022-01-28 RX ORDER — LISINOPRIL 10 MG/1
1 TABLET ORAL DAILY
COMMUNITY
Start: 2021-03-03 | End: 2022-08-11

## 2022-01-28 RX ORDER — GABAPENTIN 800 MG/1
TABLET ORAL
COMMUNITY
Start: 2021-11-11 | End: 2022-02-11

## 2022-01-28 RX ORDER — TRAZODONE HYDROCHLORIDE 100 MG/1
TABLET ORAL
COMMUNITY
Start: 2021-04-29 | End: 2022-02-11

## 2022-01-28 RX ORDER — DULOXETIN HYDROCHLORIDE 60 MG/1
CAPSULE, DELAYED RELEASE ORAL
COMMUNITY
Start: 2021-10-14 | End: 2022-02-11

## 2022-01-28 NOTE — TELEPHONE ENCOUNTER
Patient wife calling back and states that they need a referral to a radiologist to have G tube removed.      Wife states that it can go to [facility] or [facility].      Eda Dc

## 2022-01-28 NOTE — TELEPHONE ENCOUNTER
Patient and wife notified. Will call [facility].     He is requesting a refill on the following medications:     Outpatient Medication Detail     Disp Refills Start End MARIAH   OLANZapine (ZYPREXA) 2.5 MG tablet 30 tablet 0 1/24/2022  No   Sig - Route: Take 1 tablet (2.5 mg) by mouth 2 times daily (with meals) - Oral   Sent to pharmacy as: OLANZapine 2.5 MG Oral Tablet (zyPREXA)   Class: E-Prescribe   Order: [id number]   E-Prescribing Status: Receipt confirmed by pharmacy (1/24/2022  9:32 AM CST)     He would like it to be switched to 5mg BID since this is what he has been doing and likes the effect.

## 2022-01-28 NOTE — TELEPHONE ENCOUNTER
Call from  [facility]  stating they are unable to take patient due to capacity. See referral below   Call discussed with   for clinic  and routed to PCP and care coordinator with high priority   Aria Mesa RN  FNA     Referral Information    Referral # Creation Date Referral Status Status Update    [id number] 01/25/2022 Referral NOT Required 01/25/2022: Status History     Status Reason Referral Type Referral Reasons Referral Class   none Consultation none Internal     To Specialty To Provider To Location/Place of Service To Department   HOME HEALTH AGENCY (HHC), (HI) none [facility] (Hospice) none     To Vendor Referred By By Location/Place of Service By Department   none Giovani Allen MD [facility] FAMILY MEDICINE/OB     Priority Start Date Expiration Date Referral Entered By   Routine: Next available opening 01/25/2022 01/25/2023 Giovani Allen MD     Visits Requested Visits Authorized Visits Completed Visits Scheduled

## 2022-01-28 NOTE — TELEPHONE ENCOUNTER
Patient had a feeding tube placed 12/17 in [place] by [facility] just got out of the hospital Monday .  Told to have feeding tube removed today.  Hospital put IR tube removal order to [facility], they told him to come back to Dr. Matt office.  Wife calling asking what they need to do to get it removed.    Please call and advise. Patient asking if it can be removed today?    Thanks!

## 2022-01-28 NOTE — TELEPHONE ENCOUNTER
If [facility] is unable to take patient then what is the next step?

## 2022-01-30 ENCOUNTER — OFFICE VISIT (OUTPATIENT)
Dept: FAMILY MEDICINE | Facility: CLINIC | Age: 47
End: 2022-01-30
Payer: COMMERCIAL

## 2022-01-30 VITALS
RESPIRATION RATE: 18 BRPM | TEMPERATURE: 98.8 F | HEART RATE: 99 BPM | SYSTOLIC BLOOD PRESSURE: 129 MMHG | DIASTOLIC BLOOD PRESSURE: 80 MMHG | OXYGEN SATURATION: 96 % | WEIGHT: 173.5 LBS | BODY MASS INDEX: 24.2 KG/M2

## 2022-01-30 DIAGNOSIS — L60.0: ICD-10-CM

## 2022-01-30 DIAGNOSIS — L03.311 CELLULITIS OF ABDOMINAL WALL: Primary | ICD-10-CM

## 2022-01-30 DIAGNOSIS — M25.532 LEFT WRIST PAIN: ICD-10-CM

## 2022-01-30 PROCEDURE — 99215 OFFICE O/P EST HI 40 MIN: CPT | Performed by: STUDENT IN AN ORGANIZED HEALTH CARE EDUCATION/TRAINING PROGRAM

## 2022-01-30 RX ORDER — ACETAMINOPHEN 500 MG
500-1000 TABLET ORAL EVERY 6 HOURS PRN
Qty: 100 TABLET | Refills: 0 | Status: SHIPPED | OUTPATIENT
Start: 2022-01-30

## 2022-01-30 RX ORDER — DOXYCYCLINE HYCLATE 100 MG
100 TABLET ORAL 2 TIMES DAILY
Qty: 14 TABLET | Refills: 0 | Status: SHIPPED | OUTPATIENT
Start: 2022-01-30 | End: 2022-02-11

## 2022-01-30 RX ORDER — AMOXICILLIN 500 MG/1
500 CAPSULE ORAL 2 TIMES DAILY
Qty: 14 CAPSULE | Refills: 0 | Status: SHIPPED | OUTPATIENT
Start: 2022-01-30 | End: 2022-02-11

## 2022-01-30 NOTE — PROGRESS NOTES
Assessment & Plan     Cellulitis of abdominal wall  Area of the G-tube is overall clear with the exception of surrounding erythema and some tenderness around the G-tube site. This is concerning for cellulitis. He is at risk, as it seems as though he may not be diligently taking care of this site and given the appearance of it. I do not think that the fevers are related to this, I think that the fevers are more related to his deconditioning from his prolonged hospital stay. Fortunately today his vital signs are stable and reassuring against sepsis. Moreover, it does appear that he will have this potentially removed in the next 2 days, and I counseled him to have his skin reassessed at that time. I did prescribe oral antibiotics for him to take to help with the cellulitis.  - doxycycline hyclate (VIBRA-TABS) 100 MG tablet; Take 1 tablet (100 mg) by mouth 2 times daily  - amoxicillin (AMOXIL) 500 MG capsule; Take 1 capsule (500 mg) by mouth 2 times daily    Left wrist pain  Left wrist pain is most likely related to potential local nerve involvement from blood draw. He does have full range of motion and full strength in his wrist, and as such, I do not believe that he fractured his wrist, especially as he does not have pinpoint pain, localized swelling, localized erythema that would be concerning for this. I recommended that he continue to use Voltaren gel in that area, and that he stay on top of his pain with Tylenol. I also do think that physical therapy would be useful in helping him with improving the strength and working with the muscles and tendons in that area to also improve the pain.  - acetaminophen (TYLENOL) 500 MG tablet; Take 1-2 tablets (500-1,000 mg) by mouth every 6 hours as needed for mild pain  - diclofenac (VOLTAREN) 1 % topical gel; Apply 2 g topically 4 times daily    Ingrown nail of left middle finger  Patient is at risk for ingrown nail of this finger. Fortunately, there were no findings  consistent with cellulitis-erythema, marked tenderness to palpation. I recommended that they use warm compresses to massage the skin away from the nail 2 times a day, and I encouraged him to not cut his nails short, as this would be a risk factor for ingrown nail. Recommended that they follow-up really closely if the skin starts to appear erythematous or pain starts to worsen. Expressed understanding of this      Review of prior external note(s) from - Discharge summary from hospitalization  40 minutes spent on the date of the encounter doing chart review, history and exam, documentation and further activities per the note       Tobacco Cessation:   reports that he has never smoked. He uses smokeless tobacco.   Patient needs to discuss with his PCP on this    Patient has follow-up for G-tube removal in 2 days  Tammy Lowe MD PGY2  [facility]   Jared North is a 46 year old who presents for the following health issues  accompanied by his family.    HPI     Presents for assessment of skin surrounding G-tube. Patient has had a prolonged hospital stay for approximately 2 months was transferred to [facility] secondary to unvaccinated Covid infection. He was sedated and intubated and had restraints on his wrists during this time. It appears he also had feeding tube placed secondary to this. Patient notes that he is getting this tube removed approximately 2 days; however, he is concerned about infection. He has been using Neosporin ointment on the skin to keep it clear. He has also said that he has been changing the dressing daily around this wound. He also mentions some fevers/chills since leaving the hospital. He denies any purulent drainage from the site, no marked erythema, no marked swelling.    Patient also mentions that the area of skin around his nail on his left middle finger appears to be growing over his nail. He says that he had his nails cut really short while he was  "hospitalized. He is concerned about an ingrown nail. It is mildly tender to palpation, however it is not markedly erythematous or swollen.    He is having bilateral wrist pain, worse on the left. Patient notes that while he was hospitalized, he had \"a bad blood draw\" where he thought the nurse hit his nerve, and he has been having pain since. He has been recommended to use Voltaren gel, which she has and has been not using it up to 4 times a day because he was afraid that he would run out of the gel. He is also been using his children's Tylenol to stand, the pain, which she is also been doing inconsistently. He has not been using ibuprofen, patient notes that he does have a history of alcohol use disorder, and this is the reason why he has not been using ibuprofen. He denies any pinpoint pain, localized swelling, localized redness, inability to move the wrist at all.    Since leaving the hospital, he says that he has been feeling weak, and has been having a hard time regaining his strength. He has not heard from physical therapy yet, and is interested in doing so.    Review of Systems   Complete ROS normal aside noted in HPI      Objective    /80   Pulse 99   Temp 98.8  F (37.1  C) (Tympanic)   Resp 18   Wt 78.7 kg (173 lb 8 oz)   SpO2 96%   BMI 24.20 kg/m    Body mass index is 24.2 kg/m .    Physical Exam   GENERAL: healthy, alert and no distress  RESP: lungs clear to auscultation - no rales, rhonchi or wheezes  CV: regular rate and rhythm, normal S1 S2, no S3 or S4, no murmur, click or rub, no peripheral edema and peripheral pulses strong  ABDOMEN: soft, nontender, no hepatosplenomegaly, no masses and bowel sounds normal, tube is clamped.  MS: no gross musculoskeletal defects noted, no edema, full range of motion and strength in wrists bilaterally  SKIN: Mild erythema around the G-tube site, no purulent drainage noted, mildly tender. Left middle finger skin grown over distal aspect of distal nail. " Mildly tender to palpation, surrounding skin is not erythematous, nonswollen        ----- Service Performed and Documented by Resident or Fellow ------

## 2022-01-30 NOTE — PATIENT INSTRUCTIONS
1. Please take 1 tablet of doxycycline and 1 tablet of the amoxicillin 2 times a day for 7 days for the skin infection    2. Please apply voltaren gel up to 4 times a day to your wrist to help with pain, and you may take tylenol every 6 hours for additional pain relief.    3. Please soak your hands in warm water and massage the skin away from the nails to prevent ingrown nails from forming. Do this 2 times a day.

## 2022-01-31 ENCOUNTER — TELEPHONE (OUTPATIENT)
Dept: FAMILY MEDICINE | Facility: CLINIC | Age: 47
End: 2022-01-31
Payer: COMMERCIAL

## 2022-01-31 NOTE — TELEPHONE ENCOUNTER
Reason for Call:  Other prescription    Detailed comments: Patient calling regarding prescription for OLANZapine (ZYPREXA) 5 MG tablet.  Patient states that the pharmacy told him that this medication needs a PA so he can get 2 tablets a day.      Patient states he is almost out of medication.    Phone Number Patient can be reached at: Cell number on file:    Telephone Information:   Mobile [phone]       Best Time: anytime    Can we leave a detailed message on this number? YES    Call taken on 1/31/2022 at 10:07 AM by Eda Dc

## 2022-02-01 ENCOUNTER — OFFICE VISIT (OUTPATIENT)
Dept: SURGERY | Facility: CLINIC | Age: 47
End: 2022-02-01
Payer: COMMERCIAL

## 2022-02-01 ENCOUNTER — TELEPHONE (OUTPATIENT)
Dept: FAMILY MEDICINE | Facility: CLINIC | Age: 47
End: 2022-02-01

## 2022-02-01 VITALS — WEIGHT: 173.7 LBS | BODY MASS INDEX: 24.23 KG/M2

## 2022-02-01 DIAGNOSIS — F33.2 SEVERE EPISODE OF RECURRENT MAJOR DEPRESSIVE DISORDER, WITHOUT PSYCHOTIC FEATURES (H): ICD-10-CM

## 2022-02-01 DIAGNOSIS — Z93.1 GASTROSTOMY TUBE IN PLACE (H): Primary | ICD-10-CM

## 2022-02-01 PROCEDURE — 99212 OFFICE O/P EST SF 10 MIN: CPT | Performed by: SURGERY

## 2022-02-01 RX ORDER — OLANZAPINE 5 MG/1
5 TABLET ORAL DAILY
Qty: 90 TABLET | Refills: 1 | Status: SHIPPED | OUTPATIENT
Start: 2022-02-01 | End: 2022-02-01

## 2022-02-01 RX ORDER — OLANZAPINE 10 MG/1
10 TABLET ORAL AT BEDTIME
Qty: 90 TABLET | Refills: 1 | Status: SHIPPED | OUTPATIENT
Start: 2022-02-01 | End: 2022-07-21 | Stop reason: DRUGHIGH

## 2022-02-01 NOTE — TELEPHONE ENCOUNTER
Reason for Call:  Other prescription    Detailed comments: Patient calling regarding prescription for OLANZapine (ZYPREXA) 5 MG tablet..  Patient states he is out of medication and is waiting for a prior auth for this medication.  Patient states that insurance will only cover one pill a day so if it is written for 1 pill a day, the pharmacy told him it would be covered.  Patient is wondering if new prescription can be sent.      Phone Number Patient can be reached at: Cell number on file:    Telephone Information:   Mobile [phone]       Best Time: anytime    Can we leave a detailed message on this number? YES    Call taken on 2/1/2022 at 12:17 PM by Eda Dc

## 2022-02-01 NOTE — LETTER
2/1/2022         RE: Jared North  [address] [address]        Dear Colleague,    Thank you for referring your patient, Jared North, to the [facility] [place]. Please see a copy of my visit note below.    Percutaneous gastrostomy tube in place from ICU stay due to Covid 2 months ago.  Patient has fully recovered, is eating and drinking well on his own.  Gastrostomy tube was removed.    Dre Matt MD, FACS  [phone]  [facility]  General and Bariatric Surgery      Again, thank you for allowing me to participate in the care of your patient.        Sincerely,        Dre Matt MD

## 2022-02-01 NOTE — TELEPHONE ENCOUNTER
Called Jared that you sent in a new script.  He states he was taking a higher dose leaving the hospital and the higher dose was helping with depression and pain.  Please advise on the correct dosing that he should be on.  He said that he was taking the script wrong and went through the medicine too fast.   TYRONE POWELL on 2/1/2022 at 4:52 PM

## 2022-02-02 ENCOUNTER — TELEPHONE (OUTPATIENT)
Dept: FAMILY MEDICINE | Facility: CLINIC | Age: 47
End: 2022-02-02
Payer: COMMERCIAL

## 2022-02-02 DIAGNOSIS — U07.1 PNEUMONIA DUE TO 2019 NOVEL CORONAVIRUS: Primary | ICD-10-CM

## 2022-02-02 DIAGNOSIS — G93.41 ACUTE METABOLIC ENCEPHALOPATHY: ICD-10-CM

## 2022-02-02 DIAGNOSIS — J12.82 PNEUMONIA DUE TO 2019 NOVEL CORONAVIRUS: Primary | ICD-10-CM

## 2022-02-02 NOTE — TELEPHONE ENCOUNTER
Prior auth request from the pharmacy that insurance plan does not cover the Olanzapine tablets.  Please initiate prior auth.  [phone]  Patient id # [id number]

## 2022-02-02 NOTE — TELEPHONE ENCOUNTER
Spoke with pharmacy and they will disregard the 5mg Zyprexa.     Spoke with patient and he is aware pharmacy is working on the 10mg right now and should be ready within the hour. Patient aware to take one tab at bedtime.

## 2022-02-03 ENCOUNTER — TELEPHONE (OUTPATIENT)
Dept: FAMILY MEDICINE | Facility: CLINIC | Age: 47
End: 2022-02-03
Payer: COMMERCIAL

## 2022-02-03 ENCOUNTER — PATIENT OUTREACH (OUTPATIENT)
Dept: CARE COORDINATION | Facility: CLINIC | Age: 47
End: 2022-02-03
Payer: COMMERCIAL

## 2022-02-03 DIAGNOSIS — Z09 HOSPITAL DISCHARGE FOLLOW-UP: ICD-10-CM

## 2022-02-03 DIAGNOSIS — J96.21 ACUTE ON CHRONIC RESPIRATORY FAILURE WITH HYPOXIA (H): ICD-10-CM

## 2022-02-03 DIAGNOSIS — G93.41 ACUTE METABOLIC ENCEPHALOPATHY: Primary | ICD-10-CM

## 2022-02-03 NOTE — PROGRESS NOTES
"Clinic Care Coordination Contact    Patient has been without a home care agency.  [facility] RN called the following Home Care Agencies to inquire on availability.  They were all declined due to capacity or due to patient's medical insurance.    [facility]  [facility]  Every[facility]  [facility]    Spoke with [facility].  They stated they do not have any availability for Skilled Nursing.  However if provider re-writes the referral for only PT & OT.  They can \"review the referral and determine if they will accept it\".  [facility] RN is asking if PCP is willing to do this?  Otherwise no other options available for patient.    PCP's team to please fax Face Sheet, most recent H&P, med list, and new order with PT & OT home care order to Fax: [phone]  "

## 2022-02-03 NOTE — PROGRESS NOTES
Percutaneous gastrostomy tube in place from ICU stay due to Covid 2 months ago.  Patient has fully recovered, is eating and drinking well on his own.  Gastrostomy tube was removed.    Dre Matt MD, FACS  [phone]  [facility] and Bariatric Surgery

## 2022-02-03 NOTE — TELEPHONE ENCOUNTER
2-3-22  Reason for Call:  PT,OT, HHA  order    Detailed comments: Roland called from Replaced by [facility] nursing stated they received order from 2-3-22 for :  PT,OT, HHA   Except they have to decline service as not any room available     Phone Number Patient can be reached at: [phone]    Best Time: antyime    Can we leave a detailed message on this number? YES    Call taken on 2/3/2022 at 2:26 PM by Dahiana Michaud

## 2022-02-08 ENCOUNTER — TELEPHONE (OUTPATIENT)
Dept: FAMILY MEDICINE | Facility: CLINIC | Age: 47
End: 2022-02-08
Payer: COMMERCIAL

## 2022-02-08 DIAGNOSIS — R53.81 PHYSICAL DECONDITIONING: Primary | ICD-10-CM

## 2022-02-08 NOTE — TELEPHONE ENCOUNTER
Patient unfortunately will have to attend Outpatient Therapy as there are no other PT/OT options due to capacity with Home Care Agencies.  [facility] RN has called 7 Home Care Agencies without success.    PCP to place outpatient PT/OT therapy orders if appropriate for patient.

## 2022-02-08 NOTE — TELEPHONE ENCOUNTER
Jared's wife called today (2/8/22) to say that they have not had any of the referrals contact them for Jared's care after being discharged from the hospital on 1/24/22.   The only specialty they have heard from & been able to schedule with is Urology, but they are specifically concerned about his Suboxone management referral.     They would also like to figure out his referral for physical therapy, which they have not heard from yet, as Jared continues to have issues since leaving the hospital, particularly with the use of his hands.     After calling [facility] they were transferred to the addiction medicine line at [place] but also requested a note be sent to Dr. Peterson, since they were told someone would reach out to schedule these referrals but they have not heard from anyone.     Jared's wife also requested the number for patient relations to express their frustration at how all this has been handled since his discharge.

## 2022-02-08 NOTE — TELEPHONE ENCOUNTER
Please call the patient to inform him that he will need to do occupational therapy as an outpatient. If he wants to do that please let me know so that I can put in an order for that.

## 2022-02-08 NOTE — TELEPHONE ENCOUNTER
Central Prior Authorization Team   Phone: [phone]    PA Initiation    Medication: OLANZapine 10MG tablets  Insurance Company: [employer] - Phone [phone] Fax [phone]  [facility] the [id number] [place], [place] - [address] [facility] [address] [facility]  Filling Pharmacy Phone: [phone]  Filling Pharmacy Fax:    Start Date: 2/8/2022

## 2022-02-09 NOTE — TELEPHONE ENCOUNTER
[facility] called today and stated they received orders for Home care for PT. [facility] nursing stated the are denying this order. They will not be able to take on patient for these services at this time.    Marya Matt

## 2022-02-10 NOTE — TELEPHONE ENCOUNTER
PA is not needed.   Pharmacy provided information below along with help desk number to contact if they are having issues processing medication.

## 2022-02-11 ENCOUNTER — OFFICE VISIT (OUTPATIENT)
Dept: BEHAVIORAL HEALTH | Facility: CLINIC | Age: 47
End: 2022-02-11
Payer: COMMERCIAL

## 2022-02-11 VITALS
HEART RATE: 91 BPM | BODY MASS INDEX: 27.16 KG/M2 | SYSTOLIC BLOOD PRESSURE: 123 MMHG | WEIGHT: 194 LBS | HEIGHT: 71 IN | DIASTOLIC BLOOD PRESSURE: 78 MMHG

## 2022-02-11 DIAGNOSIS — F10.21 ALCOHOL DEPENDENCE IN REMISSION (H): ICD-10-CM

## 2022-02-11 DIAGNOSIS — F33.2 SEVERE EPISODE OF RECURRENT MAJOR DEPRESSIVE DISORDER, WITHOUT PSYCHOTIC FEATURES (H): ICD-10-CM

## 2022-02-11 DIAGNOSIS — F11.20 OPIOID DEPENDENCE, CONTINUOUS (H): Primary | ICD-10-CM

## 2022-02-11 DIAGNOSIS — F17.220 CHEWING TOBACCO NICOTINE DEPENDENCE WITHOUT COMPLICATION: ICD-10-CM

## 2022-02-11 DIAGNOSIS — F11.20 OPIOID USE DISORDER, SEVERE, DEPENDENCE (H): ICD-10-CM

## 2022-02-11 PROBLEM — F10.20 ALCOHOL DEPENDENCE (H): Status: ACTIVE | Noted: 2022-02-11

## 2022-02-11 LAB
AMPHETAMINES UR QL SCN: ABNORMAL
BARBITURATES UR QL: ABNORMAL
BENZODIAZ UR QL: ABNORMAL
BUPRENORPHINE QUAL URINE LHE: ABNORMAL
CANNABINOIDS UR QL SCN: ABNORMAL
COCAINE UR QL: ABNORMAL
CREAT UR-MCNC: 90 MG/DL
CREAT UR-MCNC: 94 MG/DL
METHADONE UR QL SCN: ABNORMAL
OPIATES UR QL SCN: ABNORMAL
OXYCODONE UR QL: ABNORMAL
PCP UR QL SCN: ABNORMAL

## 2022-02-11 PROCEDURE — 80354 DRUG SCREENING FENTANYL: CPT | Performed by: NURSE PRACTITIONER

## 2022-02-11 PROCEDURE — 99214 OFFICE O/P EST MOD 30 MIN: CPT | Performed by: NURSE PRACTITIONER

## 2022-02-11 PROCEDURE — 80307 DRUG TEST PRSMV CHEM ANLYZR: CPT | Performed by: NURSE PRACTITIONER

## 2022-02-11 PROCEDURE — G0463 HOSPITAL OUTPT CLINIC VISIT: HCPCS

## 2022-02-11 RX ORDER — BUPRENORPHINE AND NALOXONE 4; 1 MG/1; MG/1
1 FILM, SOLUBLE BUCCAL; SUBLINGUAL 3 TIMES DAILY
Status: CANCELLED | OUTPATIENT
Start: 2022-02-11

## 2022-02-11 RX ORDER — BUPRENORPHINE AND NALOXONE 4; 1 MG/1; MG/1
1 FILM, SOLUBLE BUCCAL; SUBLINGUAL 3 TIMES DAILY
Qty: 21 FILM | Refills: 0 | Status: SHIPPED | OUTPATIENT
Start: 2022-02-11 | End: 2022-02-18

## 2022-02-11 RX ORDER — ESCITALOPRAM OXALATE 10 MG/1
10 TABLET ORAL DAILY
Qty: 30 TABLET | Refills: 0 | Status: SHIPPED | OUTPATIENT
Start: 2022-02-11 | End: 2022-02-24

## 2022-02-11 ASSESSMENT — ANXIETY QUESTIONNAIRES
6. BECOMING EASILY ANNOYED OR IRRITABLE: NEARLY EVERY DAY
IF YOU CHECKED OFF ANY PROBLEMS ON THIS QUESTIONNAIRE, HOW DIFFICULT HAVE THESE PROBLEMS MADE IT FOR YOU TO DO YOUR WORK, TAKE CARE OF THINGS AT HOME, OR GET ALONG WITH OTHER PEOPLE: EXTREMELY DIFFICULT
4. TROUBLE RELAXING: MORE THAN HALF THE DAYS
2. NOT BEING ABLE TO STOP OR CONTROL WORRYING: NEARLY EVERY DAY
GAD7 TOTAL SCORE: 18
7. FEELING AFRAID AS IF SOMETHING AWFUL MIGHT HAPPEN: NEARLY EVERY DAY
1. FEELING NERVOUS, ANXIOUS, OR ON EDGE: NEARLY EVERY DAY
5. BEING SO RESTLESS THAT IT IS HARD TO SIT STILL: SEVERAL DAYS
3. WORRYING TOO MUCH ABOUT DIFFERENT THINGS: NEARLY EVERY DAY

## 2022-02-11 ASSESSMENT — PATIENT HEALTH QUESTIONNAIRE - PHQ9: SUM OF ALL RESPONSES TO PHQ QUESTIONS 1-9: 17

## 2022-02-11 ASSESSMENT — MIFFLIN-ST. JEOR: SCORE: 1777.11

## 2022-02-11 NOTE — PROGRESS NOTES
"[facility] ADDICTION MEDICINE  INITIAL VISIT                                                      SUBJECTIVE                                                    CC: Patient presents with:  Intake      Primary care provider: Giovani Allen MD   Psychiatric provider or therapist: None    Visit performed In Person, face-to-face      HPI:    Jared North is a 46 year old male with a history of recent Covid 19 injection, ARDS, Acute respiratory failure with hypoxia, MDD, Chronic back pain, and opioid use disorder who presents for initial visit for management referred by DR Peterson for Opioid Use Disorder (OUD) and Alcohol Use Disorder (AUD).  DR Peterson provided the initial addiction medicine consult during his hospitalization. He was hospitalized from 12/31/21-1/24/22 for Covid 19 infection with complications. He required tracheostomy and ventilator support, and GT tube placement which were discontinued prior to his discharge to home. He is living with his ex-wife and their 2 children. He was living in sober living for 2 years before his hospitalization. He has been has been abstinent from opiates and alcohol use since April 2020. He does endorse using a couple of his mother-in-laws ativan in the past 2 weeks for his \"mental health. He has also used delta 8 once this past week. He reports feeling anxious and overwhelmed by his health status and needs something to help calm him down. He does not have a psychiatrist.     He reports being on methadone maintenance for 10-15 years at [facility]. He states he was able to abstain from heroin for awhile but alcohol consumption increased significantly . He was drinking 1 box of wine daily and using 1/2-1 gram heroin until he went to inpatient treatment in December of 2019. He had a relapse while inpatient and was transferred to [facility] in [place] and was living there until his recent hospitalization. He has not drank or using heroin since April 2020.       He " is experiencing cravings for alcohol at times, but no cravings for opiates. He is taking 12 mg Suboxone daily. He is also struggling with worsening depression and anxiety since his hospitalization, is frustrated about his physical limitations. He becomes easily SOB and has weakness to bilateral upper and lower extremities. He is waiting for home care  PT/OT to start next week.  He would like a referral for individual therapy and psychiatry.  He has had multiple trials of selective serotonin reuptake inhibitor for his depression and anxiety and states he has no found any that work. He does not think he has tried Lexapro, but is not sure. He is taking Zyprexa 5mg BID        CD History:       SUBSTANCE(S)  OF CHOICE  - Opiates and alcohol       Withdrawal symptoms - None  IV drug use - used IV  Previous MAT - Methadone STS. Suboxone.   Previous detox/treatment - Two mentioned above  Current recovery activities: AA, IOP prior to hospital stay.   Longest period of sobriety - 2 years   Significant protective factors: Health and kids  Medical complications from substance use - Hypertension,   History of overdose - No  Narcan currently available - NO    Other Substance Use:  ALCOHOL: None in 2 years drank 1 box daily  OPIATES   Per HPI  KRATOM: NO  BENZODIAZEPINES: Used occasional recreationally, and did take a couple of ativan in the last 2 weeks.   AMPHETAMINES/METHAMPHETAMINES: Tried a couple of times  PRESCRIPTION STIMULANTS: No  MARIJUANA: Tried delta 8 this week  COCAINE/CRACK; NO  HALLUCINOGENS: NO  ANABOLIC STEROIDS: No   OTHER  (Gambling, shopping): No  NICOTINE-Chewing tobacco restarted.     Desire to quit yes  Previous attempts to quit Yes      Hx of medication for smoking cessation   Patches and gum    Infectious Disease Screening:  Hep C: Negative 2/22/2018  HIV: None on file        PAST PSYCHIATRIC HISTORY  Past diagnoses - Depression and anxiety, ADHD  Current or past psychiatrist: Previously at [facility]  shin  Current or past therapist:  None  Hospitalizations/commitment - No  Suicide Attempts - No denies SI  Psychotherapy/ECT/TMS - No  Medication trials - Trazodone, and seroquel, celexa, Zoloft    PHQ-9 scores:  PHQ-9 SCORE 2/11/2022   PHQ-9 Total Score 17     DONALD-7 scores:  DONALD-7 SCORE 2/11/2022   Total Score 18       MEDICAL HISTORY:  Problem list, allergies, and histories reviewed & adjusted, as indicated.  Additional history: as documented     Patient Active Problem List    Diagnosis Date Noted     Alcohol dependence (H) 02/11/2022     Priority: Medium     Oropharyngeal dysphagia 01/01/2022     Priority: Medium     Polysubstance abuse (H) 12/31/2021     Priority: Medium     ROSSY (obstructive sleep apnea) 12/31/2021     Priority: Medium     Mild intermittent asthma 12/31/2021     Priority: Medium     Acute metabolic encephalopathy 12/31/2021     Priority: Medium     Ventilator associated pneumonia (H) 12/31/2021     Priority: Medium     Acute respiratory distress syndrome (ARDS) due to COVID-19 virus (H) 12/05/2021     Priority: Medium     Atrial fibrillation with rapid ventricular response (H) 11/29/2021     Priority: Medium     Acute on chronic respiratory failure with hypoxia (H) 11/24/2021     Priority: Medium     Pneumonia due to 2019 novel coronavirus 11/24/2021     Priority: Medium     Chronic back pain 02/22/2018     Priority: Medium     DDD  [facility]         Essential hypertension 02/22/2018     Priority: Medium     Elevated LFTs 02/22/2018     Priority: Medium     Major depressive disorder, recurrent episode, severe (H) 04/26/2006     Priority: Medium     Formatting of this note might be different from the original.  Epic       Opioid use disorder, severe, dependence (H) 04/26/2006     Priority: Medium     Formatting of this note might be different from the original.  Epic         Past Medical History:   Diagnosis Date     Major depressive disorder,  recurrent episode, severe (H) 4/26/2006    Formatting of this note might be different from the original. Epic     Mild intermittent asthma 12/31/2021     Opioid type dependence (H) 4/26/2006    Formatting of this note might be different from the original. Epic     ROSSY (obstructive sleep apnea) 12/31/2021     Pneumonia due to 2019 novel coronavirus 11/24/2021     Polysubstance abuse (H) 12/31/2021       Past Surgical History:   Procedure Laterality Date     GASTROSTOMY  12/17/2021     PICC TRIPLE LUMEN PLACEMENT  11/24/2021          TRACHEOSTOMY  12/17/2021       Family History   Problem Relation Age of Onset     Diabetes Mother      Heart Disease Mother      Hypertension Mother      Hypertension Maternal Grandmother      Diabetes Maternal Grandfather      Hypertension Maternal Grandfather        SOCIAL HISTORY:    Living situation:  Living ex-wife and kids   Single///partner    Children 2    Support system: Family, ex-wife   Employment: Unemployed   Barriers to Care (transportation, childcare, etc): None   Upcoming Court Date(s): no   Consent to Communicate? NA       ALLERGIES & CURRENT MEDICATIONS:  No Known Allergies    Current Outpatient Medications   Medication Sig Dispense Refill     acetaminophen (TYLENOL) 500 MG tablet Take 1-2 tablets (500-1,000 mg) by mouth every 6 hours as needed for mild pain 100 tablet 0     albuterol (PROAIR HFA;PROVENTIL HFA;VENTOLIN HFA) 90 mcg/actuation inhaler Inhale 2 puffs into the lungs every 4 hours as needed        buprenorphine HCl-naloxone HCl (SUBOXONE) 4-1 MG per film Place 1 Film under the tongue 3 times daily Okay for early refill 21 Film 0     diclofenac (VOLTAREN) 1 % topical gel Apply 2 g topically 4 times daily 350 g 0     escitalopram (LEXAPRO) 10 MG tablet Take 1 tablet (10 mg) by mouth daily Take 1/2 tablet daily for 1 week, then take 10 mg tablet daily. 30 tablet 0     gabapentin (NEURONTIN) 300 MG capsule Take 3 capsules (900 mg) by mouth 3  times daily 270 capsule 0     metoprolol tartrate (LOPRESSOR) 50 MG tablet Take 1 tablet (50 mg) by mouth 2 times daily 60 tablet 0     multivitamin w/minerals (CERTAVITE/ANTIOXIDANTS) tablet Take 1 tablet by mouth daily       naloxone (NARCAN) 4 MG/0.1ML nasal spray Spray 1 spray (4 mg) into one nostril alternating nostrils 2 times daily as needed for opioid reversal every 2-3 minutes until assistance arrives 0.2 mL 0     nicotine polacrilex (NICORETTE) 4 MG gum Place 1 each (4 mg) inside cheek every hour as needed for smoking cessation 160 each 1     OLANZapine (ZYPREXA) 10 MG tablet Take 1 tablet (10 mg) by mouth At Bedtime 90 tablet 1     acetaminophen (TYLENOL) 32 mg/mL liquid 650 mg by Oral or Feeding Tube route every 4 hours as needed for fever or mild pain (Patient not taking: Reported on 2/11/2022)       hydrOXYzine (ATARAX) 10 MG tablet  (Patient not taking: Reported on 2/11/2022)       lisinopril (ZESTRIL) 10 MG tablet Take 1 tablet by mouth daily  (Patient not taking: Reported on 2/11/2022)       Multiple Vitamin (TAB-A-TORIN) TABS Take 1 tablet by mouth daily (Patient not taking: Reported on 2/11/2022)         REVIEW OF SYSTEMS:  General: No  acute withdrawal symptoms.  No recent infections or fever  Eyes:  No vision concerns.  No double vision.    Resp: No coughing, wheezing or shortness of breath  CV: No chest pains or palpitations  GI: No nausea, vomiting, abdominal pain, diarrhea.  No constipation  : No urinary frequency or dysuria    Musculoskeletal: Weakness to bilateral UE/LE, shoulder pain, knee pain, back pain Disc hernication pain L5-S1  Neurologic: No numbness, tingling, weakness, problems with balance or coordination  Psychiatric: No acute concerns other than as above. See mental status exam for more information.   Skin: No rashes or areas of acute infection    OBJECTIVE                                                      LABS:  Labs reviewed.  Pertinent data include:   Urine tox results  "POS: cannabinoids and barbiturates    Results for orders placed or performed in visit on 02/11/22   Drugs of Abuse 1+ Panel, Urine ([facility] Only)     Status: Abnormal   Result Value Ref Range    Amphetamines Urine Screen Negative Screen Negative    Benzodiazepines Urine Screen Negative Screen Negative    Opiates Urine Screen Negative Screen Negative    PCP Urine Screen Negative Screen Negative    Cannabinoids Urine Screen Positive (A) Screen Negative    Barbiturates Urine Screen Positive (A) Screen Negative    Cocaine Urine Screen Negative Screen Negative    Methadone Urine Screen Negative Screen Negative    Oxycodone Urine Screen Negative Screen Negative    Creatinine Urine mg/dL 94 mg/dL    Narrative    Drug                           Screening Threshold    Amphetamines                    1000 ng/mL  Benzodiazepine                   200 ng/mL  Opiates                          300 ng/mL  Phencyclidine                     25 ng/mL  THC Metabolite                    50 ng/mL  Barbiturates                     200 ng/mL  Cocaine Metabolite               150 ng/mL  Methadone                        300 ng/mL  Oxycodone                        100 ng/mL    Screening results are to be used only for medical purposes.  Unconfirmed screening results are not to be used for non-  medical purposes.   Buprenorphine Urine, Qualitative     Status: Abnormal   Result Value Ref Range    Buprenorphine Qual Urine (A) Screen Negative     Screen Positive (Confirmation available on request)    Creatinine Urine mg/dL 90 mg/dL     AST   Date Value Ref Range Status   12/20/2021 29 0 - 40 U/L Final     ALT   Date Value Ref Range Status   12/20/2021 57 (H) 0 - 45 U/L Final       PHYSICAL EXAM:  /78 (BP Location: Right arm, Patient Position: Sitting, Cuff Size: Adult Large)   Pulse 91   Ht 1.803 m (5' 11\")   Wt 88 kg (194 lb)   BMI 27.06 kg/m      GENERAL APPEARANCE: alert, comfortable appearing  EYES: Eyes grossly normal to " inspection  HENT: NOmouth without ulcers or lesions, nose no rhinorrhea  Neck: healing scar from tracheostomy  RESP:NO cough, SOB,  no resp distress  MS: extremities normal- no gross deformities noted, gait normal, peripheral pulses normal and no edema  SKIN: no rashes, no jaundice, no obvious masses.   NEURO: Alert and oriented x 4.   MENTAL STATUS EXAM:  Appearance/Behavior: No appearant distress  Speech: Normal  Mood/Affect: normal affect  Insight: Adequate      [place] Board of Pharmacy Data Base Reviewed.  Is Consistent with patient reports and Epic records.    ASSESSMENT/PLAN                                                          ASSESSMENT/PLAN:  Jared was seen today for intake.    Diagnoses and all orders for this visit:    Opioid dependence, continuous (H)  -     Drugs of Abuse 1+ Panel, Urine ([facility] Only); Standing  -     Buprenorphine Urine, Qualitative; Standing  -     Ethyl Glucuronide Urine; Standing  -     Fentanyl and Metabolite Quantitative, Urine; Standing  -     Drugs of Abuse 1+ Panel, Urine ([facility] Only)  -     Buprenorphine Urine, Qualitative  -     Ethyl Glucuronide Urine  -     Fentanyl and Metabolite Quantitative, Urine  -     naloxone (NARCAN) 4 MG/0.1ML nasal spray; Spray 1 spray (4 mg) into one nostril alternating nostrils 2 times daily as needed for opioid reversal every 2-3 minutes until assistance arrives    Opioid use disorder, severe, dependence (H)  He required Oxycodone 20 mg every 4 hours during his hospitalization, but was successfully weaned off and suboxone induction completed while inpatient. He is stable on 12 mg suboxone. Continue suboxone 4 mg TID.   -     buprenorphine HCl-naloxone HCl (SUBOXONE) 4-1 MG per film; Place 1 Film under the tongue 3 times daily Okay for early refill    Severe episode of recurrent major depressive disorder, without psychotic features (H)  He is struggling with with increased anxiety and depression and has found the need to self  medicate with Delta 8 and ativan x 2 during the past 2 weeks. He is not currenlty on an selective serotonin reuptake inhibitor stating nothing has worked in the [ast. Currently taking zyprexa 5 mg BID. Would like to try escitalopram. Referral placed for psychiatry and individual therapy to support his recovery.   -     Adult Mental Health  Referral; Future  -     Adult Mental Health  Referral; Future  -     escitalopram (LEXAPRO) 10 MG tablet; Take 1 tablet (10 mg) by mouth daily Take 1/2 tablet daily for 1 week, then take 10 mg tablet daily.    Alcohol dependence in remission (H)    Chewing tobacco nicotine dependence without complication  Restarted using chewing tobacco would like NRT. Orders placed.   -     nicotine polacrilex (NICORETTE) 4 MG gum; Place 1 each (4 mg) inside cheek every hour as needed for smoking cessation         ENCOUNTER FOR LONG TERM USE OF HIGH RISK MEDICATION   High Risk Drug Monitoring?  YES   Drug being monitored: Suboxone   Reason for drug: Opioid Use Disorder   What is being monitored?: Dosage, Cravings, Triggers and side effects        Patient counseling completed today:    Addiction Services expectations were reviewed and a copy was provided to patient at the completion of today's visit.  The expectations addressed include; routine urine drug screens, frequency of office visits, cancellations/no-shows, and clinic contact information.  The patient was notified that our clinic has 72 business hours to complete any forms and a minimum of 24 business hours to address any medication refill requests.  Questions regarding these expectations were answered and the patient verbalizes an understanding and acceptance of the above expectations.     Discussed options for treatment of opioid use disorder with medications including Naltrexone/Vivitrol, Suboxone/sublocade, and Methadone.  The risks, benefits, side effects, appointment logistics, and future pain management  considerations with all forms of these medications were reviewed at length.  Risk of overdose following a period of abstinence due to decrease tolerance was discussed including risk of death. Risk of overdose if using Suboxone with other substances particuarly alcohol, benzodiazepines, and gabapentin was reviewed.      Counseled the patient on the importance of having a recovery program in addition to medication assisted treatment (MAT).  Components of a recovery program include having some type of sober network, avoiding isolating, willingness to change, avoiding triggers, and managing cravings.    Recommended to abstain from alcohol, benzodiazepines, THC, opioids, and other drugs of abuse.  Increased risk of relapse for opioids with use of these substances was discussed.     Opioid warning reviewed.  Risk of overdose following a period of abstinence due to decrease tolerance was discussed including risk of death.  Strongly recommended abstain from alcohol, benzodiazepines, THC, opioids and other drugs of abuse.  Increased risk of return to opioid use after use of these substances discussed.  Increased risk of overdose/death with use of other substances particularly benzodiazepines/alcohol reviewed.    RTC:  1 week      Sherry Alvarado CNP  [facility]  [facility]  [phone]

## 2022-02-11 NOTE — PATIENT INSTRUCTIONS
It was so nice to meet you today Bill!    1. Start lexapro 5mg or 1/2 tab daily for 1 week, then increase to 1 tablet or 10 mg daily.     2. Continue suboxone 4 mg three times daily.    3. One of team members will contact you to schedule an appointment for individual therapy and psychiatry.       Be kind and gentle to yourself!

## 2022-02-11 NOTE — PROGRESS NOTES
Reason for visit:  Referral from Dr. Peterson seen inpatient for consult for Opioid dependence.   UA obtained: Yes   Orders pended for provider approval and sign off.     Patient verified allergies, medications and pharmacy verbally with writer    DONALD-7 scores:  extremely difficult   DONALD-7 SCORE 2/11/2022   Total Score 18     PHQ-9 scores: extremely difficult  PHQ-9 SCORE 2/11/2022   PHQ-9 Total Score 17     Patient states he is ready for visit.  [place]  to be reviewed by provider.   Elsa Francisco February 11, 2022 8:07 AM

## 2022-02-11 NOTE — PROGRESS NOTES
[facility] Post-Appointment Cart -check      Correct pharmacy verified with patient and updated in chart? [x] yes []no    Charge captured ? [x] yes  [] no [] n/a-virtual     Medications ordered this visit were e-scribed.  Verified by order class [x] yes  [] no    List Medications: Lexapro 10 mg; Narcan 4 mg Nasal; Nicorette 4 mg gum; Suboxone 4-1 mg    Medication changes or discontinuations were communicated to patient's pharmacy: [] yes  [x] no    UA collected [x] yes  [] no  [] n/a-virtual     Outside referrals / labs, etc support staff to follow up: [x] yes  [] no psychiatry and psychology referrals placed. Pt is aware staff will contact patient to schedule.    Future appointment was made: [x] yes  [] no  [] n/a   02/18/2022  Dictation completed at time of chart check: [] yes  [x] no    I have checked the documentation for today s encounters and the above information has been reviewed and completed.      Elsa Francisco on February 11, 2022 at 1:05 PM

## 2022-02-12 ASSESSMENT — ANXIETY QUESTIONNAIRES: GAD7 TOTAL SCORE: 18

## 2022-02-14 ENCOUNTER — TELEPHONE (OUTPATIENT)
Dept: BEHAVIORAL HEALTH | Facility: CLINIC | Age: 47
End: 2022-02-14
Payer: COMMERCIAL

## 2022-02-14 NOTE — TELEPHONE ENCOUNTER
[facility] Health &Addiction ([facility] (TC):     NURSING Post-Consultation Referral Review  _________________________________________    This RN has consulted with provider & this Medication Management referral to the [facility] Clinic is deemed   [x] Appropriate  [] Inappropriate     Based on the following additional information gathered: provider approved dt long-term psych referral 2/11/22 by BARI Alvarado.    Contact w/ patient/parent: Wtr called pt, educated on 2 referrals & TC services; pt reported he would like to call back to schedule at a later date due to having a lot of appointments right now. This RN provided:    [facility] phone # [phone].    Vale Christian RN on February 14, 2022 at 10:00 AM

## 2022-02-14 NOTE — TELEPHONE ENCOUNTER
M[facility] &Addiction ([facility] (TC):     Provides Patient Support While Waiting to Access Programmatic and Outpatient [facility] Care and Provides [facility] Crisis Assessment Services     NURSING Referral Review  _________________________________________    This RN has reviewed this Medication Management referral to the [facility] and deemed the referral   [] Appropriate  [] Inappropriate   [x]Consulting     Based on the following criteria:    Pt has a psychiatric provider (or pending plan) in place for future prescribing: No     Timeframe until pt's scheduled psychiatry appointment is less than 6 months: No     Pt takes psychiatric medications: Yes: lexapro, zyprexa, neurontin      Pt's goals seem to align with this temporary service: Yes: bridge until long-term psychiatry (referral placed 2/11/22)     Any additional pertinent information regarding this referral: long-term appt not scheduled yet but internal referral - will consult w/ TC provider about scheduling.    Initial contact w/ patient/parent: awaiting provider approval    [facility] phone # is [phone].    Vale Christian, RN on February 14, 2022 at 9:52 AM      Sherry Alvarado, CNP  [facility] Rn Pool  Please help him get scheduled with Elvia. Thanks!

## 2022-02-14 NOTE — TELEPHONE ENCOUNTER
RN spoke with [facility] pharmacist  - They did not have the Suboxone in stock but they have it today so he will be filling it.       RN spoke with patient regarding info       Malini Carrillo RN on 2/14/2022 at 12:33 PM

## 2022-02-14 NOTE — TELEPHONE ENCOUNTER
Reason for call:  Medication   If this is a refill request, has the caller requested the refill from the pharmacy already? Yes  Will the patient be using a [facility]? No  Name of the pharmacy and phone number for the current request:    [id number] [place], [place] - [address] AT [facility]    Name of the medication requested: buprenorphine HCl-naloxone HCl (SUBOXONE)     Other request: pt contacted Pharm they to;ld him clinic would need to send another refill order    Phone number to reach patient:  Home number on file [phone] (home)    Best Time:  any    Can we leave a detailed message on this number?  YES    Travel screening: Not Applicable

## 2022-02-14 NOTE — TELEPHONE ENCOUNTER
I am willing to see at the [facility] since the referral for long term outpatient psychiatry has been placed already.

## 2022-02-15 ENCOUNTER — TRANSFERRED RECORDS (OUTPATIENT)
Dept: HEALTH INFORMATION MANAGEMENT | Facility: CLINIC | Age: 47
End: 2022-02-15
Payer: COMMERCIAL

## 2022-02-15 LAB
FENTANYL UR-MCNC: <1 NG/ML
NORFENTANYL UR-MCNC: <1 NG/ML

## 2022-02-17 LAB
ETHANOL UR QL CFM: NEGATIVE
ETHYL GLUCURONIDE UR QL SCN: NOT DETECTED NG/MG CREAT
ETHYL SULFATE/CREAT UR: NOT DETECTED NG/MG CREAT
LEVEL OF DETECTION (ETHANOL): NORMAL

## 2022-02-18 ENCOUNTER — TELEPHONE (OUTPATIENT)
Dept: BEHAVIORAL HEALTH | Facility: CLINIC | Age: 47
End: 2022-02-18
Payer: COMMERCIAL

## 2022-02-18 ENCOUNTER — VIRTUAL VISIT (OUTPATIENT)
Dept: BEHAVIORAL HEALTH | Facility: CLINIC | Age: 47
End: 2022-02-18
Payer: COMMERCIAL

## 2022-02-18 VITALS — BODY MASS INDEX: 27.06 KG/M2 | WEIGHT: 194 LBS

## 2022-02-18 DIAGNOSIS — G93.41 ACUTE METABOLIC ENCEPHALOPATHY: ICD-10-CM

## 2022-02-18 DIAGNOSIS — F11.20 OPIOID USE DISORDER, SEVERE, DEPENDENCE (H): Primary | ICD-10-CM

## 2022-02-18 DIAGNOSIS — F33.2 SEVERE EPISODE OF RECURRENT MAJOR DEPRESSIVE DISORDER, WITHOUT PSYCHOTIC FEATURES (H): ICD-10-CM

## 2022-02-18 PROCEDURE — 99214 OFFICE O/P EST MOD 30 MIN: CPT | Mod: 95 | Performed by: NURSE PRACTITIONER

## 2022-02-18 RX ORDER — BUPRENORPHINE AND NALOXONE 4; 1 MG/1; MG/1
1 FILM, SOLUBLE BUCCAL; SUBLINGUAL 3 TIMES DAILY
Qty: 21 FILM | Refills: 0 | Status: SHIPPED | OUTPATIENT
Start: 2022-02-18 | End: 2022-02-25

## 2022-02-18 RX ORDER — GABAPENTIN 300 MG/1
900 CAPSULE ORAL 3 TIMES DAILY
Qty: 270 CAPSULE | Refills: 0 | Status: SHIPPED | OUTPATIENT
Start: 2022-02-18 | End: 2022-03-11

## 2022-02-18 ASSESSMENT — ANXIETY QUESTIONNAIRES
GAD7 TOTAL SCORE: 14
3. WORRYING TOO MUCH ABOUT DIFFERENT THINGS: NEARLY EVERY DAY
GAD7 TOTAL SCORE: 14
1. FEELING NERVOUS, ANXIOUS, OR ON EDGE: NEARLY EVERY DAY
5. BEING SO RESTLESS THAT IT IS HARD TO SIT STILL: SEVERAL DAYS
GAD7 TOTAL SCORE: 14
6. BECOMING EASILY ANNOYED OR IRRITABLE: SEVERAL DAYS
4. TROUBLE RELAXING: MORE THAN HALF THE DAYS
2. NOT BEING ABLE TO STOP OR CONTROL WORRYING: NEARLY EVERY DAY
7. FEELING AFRAID AS IF SOMETHING AWFUL MIGHT HAPPEN: SEVERAL DAYS
7. FEELING AFRAID AS IF SOMETHING AWFUL MIGHT HAPPEN: SEVERAL DAYS

## 2022-02-18 NOTE — TELEPHONE ENCOUNTER
Sherry Alvarado, Vale Bull RN Hi Crysil, I met with Bill today. He states he would like to get scheduled with Elvia. He thinks he may have just woke up and was crabby, and did not know who he was scheduling with. He has been having a lot of appointments. Could you reach out to him to help him get scheduled?   Thanks   Sherry     Per provider directive, this RN called pt 2/24/22 at 12:30pm via video; [email or web address]; wtr routed to [facility] to facilitate scheduling.    He indicated he had some tech difficulties at the end of appt today so this RN provided info-  IT support is available 7:00 a.m. to 11:30 p.m. 7 days of the week at [phone]. Please reach out to them prior to the appointment day to ensure device is setup for video.    This Pt will still need to be scheduled for a long-term provider.    Vale Christian RN on 2/18/2022 at 2:06 PM

## 2022-02-18 NOTE — PATIENT INSTRUCTIONS
Please schedule an appointment with Elvia Brown. Nurse Vale will contact you next week via phone.     Continue suboxone 4 mg three times daily.    Increase the dose of lexapro to 10 mg daily.

## 2022-02-18 NOTE — NURSING NOTE
This video/telephone visit will be conducted via a call between you and your physician/provider. We have found that certain health care needs can be provided without the need for an in-person physical exam. This service lets us provide the care you need with a video /telephone conversation. If a prescription is necessary we can send it directly to your pharmacy. If lab work is needed we can place an order for that and you can then stop by our lab to have the test done at a later time.    Just as we bill insurance for in-person visits, we also bill insurance for video/telephone visits. If you have questions about your insurance coverage, we recommend that you speak with your insurance company.    Patient has given verbal consent for video/Telephone visit? yes    Patient would like video visit, please connect : PÃºbliKoharjit  Reason for visit: follow up  Patient verified allergies, medications and pharmacy via phone.     DONALD-7 scores:    DONALD-7 SCORE 2/11/2022 2/18/2022   Total Score - 14 (moderate anxiety)   Total Score 18 14       PHQ-9 scores:   PHQ-9 SCORE 2/11/2022   PHQ-9 Total Score 17       Patient states he is ready for visit. Requesting refills on suboxone and gabapentin.    Juan Manuel Marie MA February 18, 2022 12:44 PM      Medications Phoned  to Pharmacy [] yes [x]no     Medications ordered this visit were e-scribed.  Verified by order class [x] yes  [] no  List medications sent to pharmacy:   Suboxone 4-1mg  Gabapentin 300mg    Medication changes or discontinuations were communicated to patient's pharmacy: [] yes  [x] no     Dictation completed at time of chart check: [x] yes  [] no     I have checked the documentation for today s encounters and the above information has been reviewed and completed.        Juan Manuel Marie MA February 18, 2022 2:05 PM

## 2022-02-19 ASSESSMENT — ANXIETY QUESTIONNAIRES: GAD7 TOTAL SCORE: 14

## 2022-02-22 ENCOUNTER — TELEPHONE (OUTPATIENT)
Dept: BEHAVIORAL HEALTH | Facility: CLINIC | Age: 47
End: 2022-02-22
Payer: COMMERCIAL

## 2022-02-22 ENCOUNTER — TELEPHONE (OUTPATIENT)
Dept: FAMILY MEDICINE | Facility: CLINIC | Age: 47
End: 2022-02-22
Payer: COMMERCIAL

## 2022-02-22 DIAGNOSIS — R53.81 PHYSICAL DECONDITIONING: Primary | ICD-10-CM

## 2022-02-22 DIAGNOSIS — G93.41 ACUTE METABOLIC ENCEPHALOPATHY: ICD-10-CM

## 2022-02-22 DIAGNOSIS — J12.82 PNEUMONIA DUE TO 2019 NOVEL CORONAVIRUS: ICD-10-CM

## 2022-02-22 DIAGNOSIS — U07.1 PNEUMONIA DUE TO 2019 NOVEL CORONAVIRUS: ICD-10-CM

## 2022-02-22 NOTE — TELEPHONE ENCOUNTER
Reason for call:  Other   Patient called regarding (reason for call): call back  Additional comments: pt called to [facility] F/U appt   [facility] 2/25/22   Pt wanted to make sure that Video was OK     Phone number to reach patient:  Home number on file [phone] (home)    Best Time:  any    Can we leave a detailed message on this number?  YES    Travel screening: Not Applicable

## 2022-02-22 NOTE — TELEPHONE ENCOUNTER
Reason for Call: Request for an order or referral: Home care for PT    Order or referral being requested: PT & OT    Date needed: as soon as possible    Has the patient been seen by the PCP for this problem? YES    Additional comments: Patient called and stated that [facility] was not accepting any new patients at this time. Patient is requesting a new referral from PCP. Patient would like a call back to discuss his questions/concerns.    Phone number Patient can be reached at:  Home number on file [phone] (home)    Best Time:  any    Can we leave a detailed message on this number?  YES    Call taken on 2/22/2022 at 11:39 AM by Marya Matt

## 2022-02-23 NOTE — TELEPHONE ENCOUNTER
Spoke to Jeremías and he states on 1/28 he was notified that there was no room for him and the home care coordinator stated that PCP should be finding a spot for pt to get home care access.     Wife states that they did find a place through [facility] (ICU survivor program) and is working with them. They also do mental health as well and referral needs to be for: [facility] [place] [facility] for the ICU survivor program.     Also requesting to be set up with a care coordinator ASAP.

## 2022-02-24 ENCOUNTER — PATIENT OUTREACH (OUTPATIENT)
Dept: NURSING | Facility: CLINIC | Age: 47
End: 2022-02-24
Payer: COMMERCIAL

## 2022-02-24 ENCOUNTER — PATIENT OUTREACH (OUTPATIENT)
Dept: CARE COORDINATION | Facility: CLINIC | Age: 47
End: 2022-02-24

## 2022-02-24 ENCOUNTER — VIRTUAL VISIT (OUTPATIENT)
Dept: BEHAVIORAL HEALTH | Facility: CLINIC | Age: 47
End: 2022-02-24
Attending: NURSE PRACTITIONER
Payer: COMMERCIAL

## 2022-02-24 ENCOUNTER — TELEPHONE (OUTPATIENT)
Dept: PULMONOLOGY | Facility: OTHER | Age: 47
End: 2022-02-24

## 2022-02-24 DIAGNOSIS — U07.1 ACUTE RESPIRATORY DISTRESS SYNDROME (ARDS) DUE TO COVID-19 VIRUS (H): Primary | ICD-10-CM

## 2022-02-24 DIAGNOSIS — F41.1 GAD (GENERALIZED ANXIETY DISORDER): ICD-10-CM

## 2022-02-24 DIAGNOSIS — F43.23 ADJUSTMENT DISORDER WITH MIXED ANXIETY AND DEPRESSED MOOD: ICD-10-CM

## 2022-02-24 DIAGNOSIS — F33.1 MODERATE EPISODE OF RECURRENT MAJOR DEPRESSIVE DISORDER (H): Primary | ICD-10-CM

## 2022-02-24 DIAGNOSIS — J80 ACUTE RESPIRATORY DISTRESS SYNDROME (ARDS) DUE TO COVID-19 VIRUS (H): Primary | ICD-10-CM

## 2022-02-24 PROCEDURE — 99205 OFFICE O/P NEW HI 60 MIN: CPT | Mod: 95 | Performed by: NURSE PRACTITIONER

## 2022-02-24 RX ORDER — ESCITALOPRAM OXALATE 10 MG/1
10 TABLET ORAL DAILY
Qty: 30 TABLET | Refills: 0 | Status: SHIPPED | OUTPATIENT
Start: 2022-02-24 | End: 2022-04-08

## 2022-02-24 RX ORDER — OLANZAPINE 5 MG/1
5 TABLET ORAL EVERY MORNING
Qty: 30 TABLET | Refills: 1 | Status: SHIPPED | OUTPATIENT
Start: 2022-02-24 | End: 2022-04-27

## 2022-02-24 NOTE — PROGRESS NOTES
[facility]      Mental Health & Addiction Service Line    Transition Clinic: Psychiatry Note  Medication Management              Charts/documentation read prior to the appointment:  -2022          VISIT INFORMATION    Date:  2022     Number:  -Initial     Referral source:  -Addiction Medicine      Patient Identifying Information:  Legal name: Jared North  Preferred name: CHANDU  : 1975  Preferred pronouns: He/him      Participants:   -Patient  -Provider          Telehealth visit details:  Type of service:  Video  Patient location:  At home  Provider Location:  [facility]  Platform utilized:  Amwell then changed to TE due to IT issues    Start time: 12:41 pm  End time:    1:33 pm        HPI    Hx:  -OUD  -AUD    -MDD  -DONALD  -ADHD    ------------------------    -Recently hospitalized for Covid-19 with complications from: 21-22   -Depression and anxiety symptoms have been exacerbated since discharge and   used Delta 8 + Lorazepam (script for a family member not the patient)  -Prescribed Lexapro on 2022 but didn't start right away    -Working on getting into a post Covid-19 support group with rehab in [place], [place]      PSYCHIATRIC ROS    Sleep:   -Doing alright   -Getting 8 hours     -Having pain issues but CBD gummies help so joints/hand don't stiffen up ...  this makes it easier to fall and stay asleep      Appetite/Weight Changes:   -Denies unintentional loss or gain > 10 lbs in the past 4 weeks    -Stable, clothes are a little bit bagger  -Lost 50 lbs in the ICU and have gained back 30 lbs      Energy Levels:   -Pretty poor  -2 or 3/10      Trauma and or PTSD:   -P/E in childhood from mother  -Denies P/E/S in adulthood        Depression/Anxiety:   -Force self to do things but get out SOB easily which is depressing, contributes to  irritability and getting frustrated    -Mood is lower, anxious, restless,  racing thoughts, worry bk I can't do much during the day   since saira Covid-19      Eating Disorder:   -No former dx     -Within the past 12 months denies: calorie restriction, bingeing/purging, intentional use   of laxatives or exercising in excess      Cindi/Hypomania:   -Denies hx of 7+ consecutive days of symptoms during periods of sobriety        Thought Disorders:   -Denies hx of:  AH/VH, delusions, paranoia, thought insertion or broadcasting during   periods of sobriety      Suicidal ideations:   -Denies at this time      SIB:  -Denies hx or current engagement of self harm       Side effects:  -Initial nausea since starting the Lexapro but this has subsided         MENTAL HEALTH HISTORY      Suicide attempts:  -None reported       Inpatient psychiatric hospitalizations:  -No reported   -No hx of commitments       ECT:  -None reported         Medication Trials:      SSRIs:  -Celexa  -Zoloft    SNRIs  -Cymbalta     Antipsychotics:  -Abilify  -Seroquel: 1x, couldn't function for 1 to 2 days    Other antidepressants:  -Wellbutrin    Sleep aides:  -Trazodone        Family health, and chemical dependency history:    Maternal side:  +Mental health  +C/D    Paternal side:  -Unknown        SUBSTANCE USE    Prior use:  -See 2/11/2022 encounter by PRAKASH Walsh for full summarization       Current use:    Alcohol:   -None reported       Recreational Drugs:   -Using CBD gummies from a tobacco shop      Medical Marijuana:  -None reported       Cigarettes per day:   -None reported   -Sometimes use nicotine gum      Chewing tobacco:   -Yes  -1 tin lasts 3 to 4 days      Vaping:    -None reported       Caffeine intake per day:    -4 cans Coke  -1 to 3 coffee             SOCIAL HISTORY    -Was reviewed within the EMR per: see 2/11/2022 encounter by PRAKASH Walsh   -The patient denies any changes to this information          MEDICAL HISTORY    Current:  -The problem list was reviewed prior to the  appointment  -The patient denies any concerning physical and or medical symptoms during the interviewing process      Developmental:   -Mother had normal pregnancy: No; had to be in the ICU for several weeks after delivery  -Met age appropriate milestones: Unknown ... probably but not sure  -Participated in special education classes and or had an IEP: No  -Hx of autism spectrum disorder, learning disability, and or other cognitive disorder: No      Neurological:  -Denies any hx of: seizures    -Have had multiple head injuries in childhood and adulthood but have never been  formally assessed or evaluated after they occurred         MEDICATIONS      Current Outpatient Medications:      acetaminophen (TYLENOL) 32 mg/mL liquid, 650 mg by Oral or Feeding Tube route every 4 hours as needed for fever or mild pain , Disp: , Rfl:      acetaminophen (TYLENOL) 500 MG tablet, Take 1-2 tablets (500-1,000 mg) by mouth every 6 hours as needed for mild pain, Disp: 100 tablet, Rfl: 0     albuterol (PROAIR HFA;PROVENTIL HFA;VENTOLIN HFA) 90 mcg/actuation inhaler, Inhale 2 puffs into the lungs every 4 hours as needed , Disp: , Rfl:      buprenorphine HCl-naloxone HCl (SUBOXONE) 4-1 MG per film, Place 1 Film under the tongue 3 times daily Okay for early refill, Disp: 21 Film, Rfl: 0     diclofenac (VOLTAREN) 1 % topical gel, Apply 2 g topically 4 times daily, Disp: 350 g, Rfl: 0     escitalopram (LEXAPRO) 10 MG tablet, Take 1 tablet (10 mg) by mouth daily Take 1/2 tablet daily for 1 week, then take 10 mg tablet daily., Disp: 30 tablet, Rfl: 0     gabapentin (NEURONTIN) 300 MG capsule, Take 3 capsules (900 mg) by mouth 3 times daily, Disp: 270 capsule, Rfl: 0     hydrOXYzine (ATARAX) 10 MG tablet, , Disp: , Rfl:      lisinopril (ZESTRIL) 10 MG tablet, Take 1 tablet by mouth daily , Disp: , Rfl:      metoprolol tartrate (LOPRESSOR) 50 MG tablet, Take 1 tablet (50 mg) by mouth 2 times daily, Disp: 60 tablet, Rfl: 0     Multiple Vitamin  (TAB-A-TORIN) TABS, Take 1 tablet by mouth daily , Disp: , Rfl:      multivitamin w/minerals (CERTAVITE/ANTIOXIDANTS) tablet, Take 1 tablet by mouth daily, Disp: , Rfl:      naloxone (NARCAN) 4 MG/0.1ML nasal spray, Spray 1 spray (4 mg) into one nostril alternating nostrils 2 times daily as needed for opioid reversal every 2-3 minutes until assistance arrives, Disp: 0.2 mL, Rfl: 0     nicotine polacrilex (NICORETTE) 4 MG gum, Place 1 each (4 mg) inside cheek every hour as needed for smoking cessation, Disp: 160 each, Rfl: 1     OLANZapine (ZYPREXA) 10 MG tablet, Take 1 tablet (10 mg) by mouth At Bedtime, Disp: 90 tablet, Rfl: 1          If a controlled substance has been prescribed during the appointment:    -The [place] Prescription Monitoring Program has been reviewed and there are no current concerns with: diversionary activity, early refill requests, and or obtaining the medication from multiple providers.          VITALS    BP Readings from Last 3 Encounters:   02/11/22 123/78   01/30/22 129/80   01/24/22 (!) 143/93       Pulse Readings from Last 3 Encounters:   02/11/22 91   01/30/22 99   01/24/22 99       Wt Readings from Last 3 Encounters:   02/18/22 88 kg (194 lb)   02/11/22 88 kg (194 lb)   02/01/22 78.8 kg (173 lb 11.2 oz)         LABS    The following have been reviewed prior to or during the appointment:    -1/24 and 2/11/2022          SCALES      PHQ 2/11/2022   PHQ-9 Total Score 17   Q9: Thoughts of better off dead/self-harm past 2 weeks Not at all        DONALD-7 SCORE 2/11/2022 2/18/2022   Total Score - 14 (moderate anxiety)   Total Score 18 14                MENTAL STATUS EXAMINATION    Appearance: Adequately Groomed, Attire Appropriate for the Season, Wearing glasses  General Behavior:  Cooperative, Direct Eye Contact  Speech: Fluent, Normal rate and volume  Musculoskeletal:    -Gait not observed during t.h. visit  -No facial tics/tremors observed   -Motor coordination is grossly intact   Mood:   Anxious and Depressed  Affect:  Congruent with mood  Attention: Intact  Orientation:  Person, Place, Time of Day, Date, Situation  Thought Associations:  Intact  Thought Content: Reality based, Without Suicidal Ideations   Thought Processes: Organized, Normal rate  Memory: No overt impairment; no screening or formal testing performed  Language: Intact  Intellect/Fund of Knowledge: Average  Judgement: Fair to Good  Insight: Fair to Good        ASSESSMENT/CLINICAL IMPRESSIONS    Summary:    Shobha North is 45 y/o male with a history of:  MDD, DONALD, ADHD (per patient report), Alcohol Use Disorder and Opioid Use Disorder = currently being treated with Suboxone per PRAKASH Walsh through [facility], Addiction Medicine.    Is having a significant exacerbation of both depressive and anxiety symptoms with thoughts of relapse in the context of a post ICU stay (Dec/2021 to Jan/2022) due to Covid-19.       Lexapro was prescribed on 2/11/2022 with instructions to titrate to 10 mg/day.    Jeremías summarizes   he didn't start it right away and is just now taking the 10 mg.  Since it hasn't been 2 to 4 weeks, no dosing adjustments are recommended for the selective serotonin reuptake inhibitor during today's interview.    Slightly modified Zyprexa from 10 to 15 mg/day to target ongoing anxiety symptoms, although will   be cautious about further increasing (to avoid the adverse effects of sedation, fatigue) since Jeremías states energy levels are low which is frustrating and worsening his mood.    He would like to re-consider possibly re-starting Wellbutrin (isn't sure why it was previously discontinued; denies hx of seizures) at follow up.    During the appointment Jeremías doesn't endorse any immediate safety concerns towards self   or others.          DSM-V and or working diagnosis:    1. Moderate to severe episode of recurrent major depressive disorder (H)    2. DONALD (generalized anxiety disorder)    3. Adjustment disorder with mixed  anxiety and depressed mood    4. Opioid Use Disorder        SAFETY EVALUATION:  Suicidal ideations:  -Denies  Homicidal ideations:  -Denies  Risk factors:  -Male  -Hx of trauma and ELVIN  Protective and mitigating factors:  -Gets along with ex-spouse  -Sober community  -Getting involved in outpatient mental health services  -No prior attempts   Risk assessment:  -Low to medium      TREATMENT PLAN      Medications:  Start:  Zyprexa 5 mg in am + 10 mg at bedtime for TDD: 15 mg/day (increased from 10 mg)    Continue:  Lexapro 10 mg daily    Hydroxyzine 10 to 30 mg as needed for anxiety (states refill isn't needed at this time)      Labs:  -None Obtained        Therapy:  -Will probably be attending a patient support group for long Covid-19        Non-pharmacological modalities:  -Work towards reducing caffeine intake          Return to Clinic or Referrals:  -Please follow up at the Transition Clinic for medication management in: 4 to 6 weeks          Total time:  63 minutes per:    -Review of EMR or paper documentation = 11 minutes  -Appointment time = 52 minutes          Elvia NAGEL-CNP,  [facility]-BC          --------------------------------------------------------------------------------------------------------------------------------        TREATMENT RISK STATEMENT    The risks, benefits, alternatives, and potential adverse effects have been explained and are understood by the patient.  The patient agrees to the treatment plan with their ability to do so.      The patient knows to call the clinic: [phone]  for any problems or concerns until the next psychiatry visit, regardless if it is within or outside of the [facility] system.     If unable to reach clinic staff (via phone call or medical messaging) during the normal business hours: 8:00 am to 4:30 pm then it is recommended accessing the nearest: emergency department, urgent care facility, or utilizing local (varies based on county of residence) and  national crisis #'s or text messaging services for immediate assistance.          --------------------------------------------------------------------------------------------------------------------------------        If applicable the following has been discussed with the patient, parent/guardian, and or attending family member during the appointment:      1. Risks of polypharmacy and possible drug interactions with current medication list + common OTC products, herbs, and supplements.    Moving forward, it is suggested to intermittently check-in with a clinic or retail pharmacist whenever new medications or OTC/h/s are consumed.    2. Recommendation to adhere to CDC guidelines as it relates alcohol consumption.  If taking benzodiazepines, you should abstain from alcohol intake due to increased risks of CNS and respiratory depression, as well as psychomotor impairment.    3. If possible, it is recommended to avoid concurrent use of prescribed:  opioids  +  benzodiazepines due to increased risks of CNS and respiratory depression, as well as the increased risk of overdose.     4. Recommendation to minimize and or abstain from THC use (unless the pt. is prescribed medical marijuana).    5. Recommendation to abstain from illicit substances including but not limited to the following: heroin, street fentanyl, cocaine, methamphetamines, and bath salts.    6. Do not take opioids, stimulants, and or other prescription medications unless they are specifically prescribed for you.    7. Recommendation to abstain from: alcohol,  tobacco/smoking, vaping, THC, and all illicit substances if trying to become or are pregnant.    8. Black Box Warnings associated with the prescribed psychotropic(s).    9. Potential adverse effects of antipsychotics including but not limited to the following: weight gain, metabolic syndrome, EPS/Tardive Dyskinesias.    10. Potential CV and neurological adverse effects of stimulants including but not  limited to the following:  sudden death, MI, stroke, HTN, cardiomyopathy (long term use) as well as seizures.

## 2022-02-24 NOTE — PATIENT INSTRUCTIONS
Start:  Zyprexa 5 mg in am + 10 mg at bedtime for TDD: 15 mg/day (increased from 10 mg)    Continue:  Lexapro 10 mg daily    Hydroxyzine 10 to 30 mg as needed for anxiety    ------------------    -Please schedule the patient with long term outpatient psychiatry: Donny Sousa, or Leigh, etc.   in addition to the T.C. in 4 to 6 weeks

## 2022-02-24 NOTE — PROGRESS NOTES
Clinic Care Coordination Contact  Community Health Worker Initial Outreach    CHW Initial Information Gathering:  Referral Source: PCP  Current living arrangement:: I live in a private home with family  Type of residence:: Town home  Community Resources: Financial/Insurance (Medical Assistance)  Supplies used at home:: None  Equipment Currently Used at Home: none  Informal Support system:: Family  No PCP office visit in Past Year: No (1/25/22 Dr. Allen-Virtual Visit)  Transportation means:: Accessible car  CHW Additional Questions  Tommyhart active?: Yes  Patient sent Social Determinants of Health questionnaire?: Yes    Patient accepts CC: Yes. Patient scheduled for assessment with ERYN Russo on 2/28/22 at 1:00. Patient noted desire to discuss Support with medical conditions.     ** patient let CHW know that he will need help with other social needs but unable to think of exactly what he is needing now. Patient will discuss more on needs at RN Assessment.    ** CHW sent Social Determinants of Health Questionnaire through [employer].    Carina Zimmerman  [facility] Health Worker  [facility]  Clinic Care Coordination   Office: [phone]

## 2022-02-24 NOTE — PROGRESS NOTES
"MH&A TC   NURSING Post-Appointment Chart-check:    Correct pharmacy verified with patient and updated in chart? [x] yes []no    Charge captured ? [] yes  [x] no    Medications ordered this visit were e-scribed.  Verified by order class [x] yes  [] no    List Medications:  OLANZapine (ZYPREXA) 5 MG   escitalopram (LEXAPRO) 10 MG    E-Prescribing Status: Receipt confirmed by pharmacy (2/24/2022  1:27 PM CST)    Medication changes or discontinuations were communicated to patient's pharmacy: [] yes  [x] no    UA collected [] yes  [] no  [x] n/a-virtual     Future appointment was made: [] yes  [x] no  [] n/a    Dictation completed at time of chart check: [] yes  [x] no    I have checked the documentation for today s encounters and the above information has been reviewed and completed.      Vale Christian RN on February 25, 2022 at 11:40 AM        This video/telephone visit will be conducted virtually between you and your provider. We have found that certain health care needs can be provided without the need for an in-person physical exam. This service lets us provide the care you need with a video /telephone conversation. If a prescription is necessary we can send it directly to your pharmacy. If lab work is needed we can place an order for that and you can then stop by our lab to have the test done at a later time.\"   Just as we bill insurance for in-person visits, we also bill insurance for video/telephone visits. If you have questions about your insurance coverage, we recommend that you speak with your insurance company.      Patient/Parent has given verbal consent for video/Telephone visit? yes    Patient would like the video visit invitation sent by: Text to cell phone - too many questions on Noxilizerhart check-in then error    Patient verified allergies, medications and pharmacy via phone. Patient states they are ready for visit.      Mental Health &Addiction (MH&A)Transition Clinic (TC):     Provides Patient " "Support While Waiting to Access Programmatic and Outpatient MH&A Care and Provides [facility] Crisis Assessment Services     NURSING INTAKE  ____________________________________________________    \"The Transition Clinic is a temporary psychiatry service that helps to bridge the time to your next appointment. It is not intended to be a long-term service and you are expected to attend your scheduled appointment with your next provider.\" Reviewed that long-term psych will be scheduled at check-out phone call as well as any return appts.  [x] Patient/Parent verbalized understanding    If you need support between appointments, please call [phone] and let them know you're seen by [facility] Psychiatry. You may also send a Imp[employer] message to reach us.    General-     Most pressing MH needs at this time: pt reports, \"still have a lot of anxiety & my depression is pretty high, too\"     Any physical health conditions or diagnoses we should be aware of or that are impacting you: lost about 40% of muscle from COVID     Medications-     Injectable medications currently prescribed: no   If yes, do you need an appointment for the next injection:     Any Controlled Substances that you are prescribed: yes - suboxone by AM provider       Primary care provider: Giovani Allen MD        DONALD-7 scores:  Declines to do new, reports same answers  DONALD-7 SCORE 2/11/2022 2/18/2022   Total Score - 14 (moderate anxiety)   Total Score 18 14       PHQ-9 scores:   PHQ-9 SCORE 2/11/2022   PHQ-9 Total Score 17           Anything the provider should be aware of for today's appointment: Referral from BARI Alvarado    New (awaiting) Mental health provider or next programming: Will need this scheduled at check-out - please alert [facility] in Wrap up      Date of scheduled apt: CHRISTOPHER Christian RN on February 24, 2022 at 12:09 PM     "

## 2022-02-24 NOTE — PROGRESS NOTES
Clinic Care Coordination Contact    CCC RN was able to locate a home care agency willing to accept patient for Home Care PT/OT next week. [facility] [employer] [facility]. Need the following items faxed to: [phone] ASAP    1.  Home Care Referral from 2/3/22  2.  Face to Face visit from 1/25/22  3.  Face Sheet, medication list

## 2022-02-25 ENCOUNTER — VIRTUAL VISIT (OUTPATIENT)
Dept: BEHAVIORAL HEALTH | Facility: CLINIC | Age: 47
End: 2022-02-25
Payer: COMMERCIAL

## 2022-02-25 VITALS — WEIGHT: 194 LBS | BODY MASS INDEX: 27.06 KG/M2

## 2022-02-25 DIAGNOSIS — F11.20 OPIOID USE DISORDER, SEVERE, DEPENDENCE (H): ICD-10-CM

## 2022-02-25 DIAGNOSIS — F33.2 SEVERE EPISODE OF RECURRENT MAJOR DEPRESSIVE DISORDER, WITHOUT PSYCHOTIC FEATURES (H): Primary | ICD-10-CM

## 2022-02-25 PROCEDURE — 99214 OFFICE O/P EST MOD 30 MIN: CPT | Mod: 95 | Performed by: NURSE PRACTITIONER

## 2022-02-25 RX ORDER — BUPRENORPHINE AND NALOXONE 4; 1 MG/1; MG/1
1 FILM, SOLUBLE BUCCAL; SUBLINGUAL 3 TIMES DAILY
Qty: 41 FILM | Refills: 0 | Status: SHIPPED | OUTPATIENT
Start: 2022-02-25 | End: 2022-03-11

## 2022-02-25 NOTE — PROGRESS NOTES
"     [facility]    A/P                                                    ASSESSMENT/PLAN  Diagnoses and all orders for this visit:  Severe episode of recurrent major depressive disorder, without psychotic features (H)  Was able to be seen in the transitional clinic. Zyprexa was increased at bedtime. States he took dose last night, but may have taken too early, felt sleepy. Encouraged to continue to trial the increased dose at bedtime, plans to take right before bed. He will be starting PT soon, which may help to improve his mood. He is frustrated over his limitation caused by Covid. Has not been able to work out in a long time. He may start attending a covid support group.   Opioid use disorder, severe, dependence (H)  Stable, continue suboxone 12 mg daily. Expresses \"withdrawal symptoms\"   but does not want to increase his suboxone, could consider an increase in future if needed.   -     buprenorphine HCl-naloxone HCl (SUBOXONE) 4-1 MG per film; Place 1 Film under the tongue 3 times daily Okay for early refill    Orders Placed This Encounter   Medications     buprenorphine HCl-naloxone HCl (SUBOXONE) 4-1 MG per film     Sig: Place 1 Film under the tongue 3 times daily Okay for early refill     Dispense:  41 Film     Refill:  0     NADEAN: [id number]       Problem list updated Feb 25, 2022   No problems updated.            PDMP Review       Value Time User    State PDMP site checked  Yes 2/25/2022  3:28 PM Sherry Alvarado, DEEJAY            RTC  No follow-ups on file.      Counseled the patient on the importance of having a recovery program in addition to medication to manage recovery.  Components include avoiding isolating, having willingness to change, avoiding triggers and managing cravings. Encouraged having some type of sober network and practicing honesty with trusted support person(s). Encouraged other services such as counseling, 12 step or other self-help organizations.      Opioid " warning reviewed.  Risk of overdose following a period of abstinence due to decrease tolerance was discussed including risk of death.  Strongly recommended abstain from alcohol, benzodiazepines, THC, opioids and other drugs of abuse.  Increased risk of return to opioid use after use of these substances discussed.  Increased risk of overdose/death with use of other substances particularly benzodiazepines/alcohol reviewed.        SUBJECTIVE                                                    Jared North is a 46 year old male who presents to clinic today for follow up    Visit performed Virtual, via video    Video-Visit Details    Type of service:  Video Visit    Video Start Time: 3:02pm  Video End Time: 3:35PM    Originating Location (pt. Location): Home    Distant Location (provider location):  [facility]     Platform used for Video Visit: [facility] Details:  Jared North is a 46 year old male with a history of recent Covid 19 injection, ARDS, Acute respiratory failure with hypoxia, MDD, Chronic back pain, and opioid use disorder who is being seen today for a follow up. He is taking suboxone 12 mg daily.   -Is feeling pretty good. Waiting for Home PT to start.   -Everything is going well at home with his ex-wife and children  -Denies substance use.   -Mild cravings. Passive thoughts to use.   -He had difficulty  Getting prescription filled, but did he had some left over suboxone from before because he sometimes only takes 2 does versus 3 doses.   -Plans to attend an online meeting, missed this weeks meetings.   -Connecting with peers.  - Triggers: Denies, no close calls.   -Mood: anxious, started on lexapro last week. Taking 5 mg daily,plans to start 10 mg tomorrow.  Encouraged him to schedule with provider at transitions clinic.  -Sleep: sleeping well.   Side effects: denies    Opioid use disorder, severe, dependence (H)  Denies any use of opiates. Reports mild cravings or thoughts.  "Sometimes he takes two doses instead of three. Continue 4mg TID, prescription sent.   -     buprenorphine HCl-naloxone HCl (SUBOXONE) 4-1 MG per film; Place 1 Film under the tongue 3 times daily Okay for early refill  Acute metabolic encephalopathy  Mentation continues to improve. Continues to report periods of increased anxiety related to his limitations. Anxious for PT to start. Continue gabapentin. Refill sent.   -     gabapentin (NEURONTIN) 300 MG capsule; Take 3 capsules (900 mg) by mouth 3 times daily  Recurrent Major Depression disorder, severe:   Started on lexapro last week.. he plans to start taking 10mg tomorrow. He did not get scheduled with transitions clinic. States he was maybe a little \"crabby\" when they called or they woke him up so he did not want to schedule with them. He states he will get scheduled. Message routed to RN at [facility] to help him get scheduled.   -Continue lexapro.   TODAY'S VISIT  HPI Feb 25, 2022  Jared North is a 46 year old male with a history of recent Covid 19 injection, ARDS, Acute respiratory failure with hypoxia, MDD, Chronic back pain, and opioid use disorder who is being seen today for a follow up. He is taking suboxone 12 mg daily.   -Met with Elvia Brown yesterday visit went well. His Zyprexa was increased at bedtime.   -Taking CBD gummies at bedtime as needed to help him sleep.   -Will start PT as outpatient soon. Has not had any therapy since he was discharged from hospital. He used to be very active before he contacted Covid, is unable to engage in any of his prior activities ie. push ups.  Is a source of frustration for him.  -Bad day yesterday, not feeling well physically and emotionally.  Intermittent days.   -Expressing \"withdrawal symptoms: feeling uncomfortable in his own skin.   -Thoughts that it would be nice to have a glass of wine.   Substance use since last visit: No  Cravings: Some alcohol cravings yesterday, having a bad day. Denies " opioid cravings.   Triggers:   Mood:   Sleep: Sleeping okay.   Pain: periods of increased pain.     OBJECTIVE                                                    PHYSICAL EXAM:  Wt 88 kg (194 lb)   BMI 27.06 kg/m      GENERAL: healthy, alert and no distress  EYES: Eyes grossly normal to inspection, PERRL and conjunctivae and sclerae normal  RESP: No respiratory distress  MENTAL STATUS EXAM  Appearance/Behavior: No appearant distress  Speech: Normal  Mood/Affect: normal affect  Insight: Adequate    LAB  No results found for any visits on 02/25/22.      HISTORY                                                    Problem list reviewed & adjusted, as indicated.  Patient Active Problem List   Diagnosis     Chronic back pain     Essential hypertension     Elevated LFTs     Acute on chronic respiratory failure with hypoxia (H)     Pneumonia due to 2019 novel coronavirus     Atrial fibrillation with rapid ventricular response (H)     Acute respiratory distress syndrome (ARDS) due to COVID-19 virus (H)     Major depressive disorder, recurrent episode, severe (H)     Opioid use disorder, severe, dependence (H)     Polysubstance abuse (H)     ROSSY (obstructive sleep apnea)     Mild intermittent asthma     Acute metabolic encephalopathy     Ventilator associated pneumonia (H)     Oropharyngeal dysphagia     Alcohol dependence (H)         MEDICATION LIST (prior to visit)  acetaminophen (TYLENOL) 32 mg/mL liquid, 650 mg by Oral or Feeding Tube route every 4 hours as needed for fever or mild pain   acetaminophen (TYLENOL) 500 MG tablet, Take 1-2 tablets (500-1,000 mg) by mouth every 6 hours as needed for mild pain  albuterol (PROAIR HFA;PROVENTIL HFA;VENTOLIN HFA) 90 mcg/actuation inhaler, Inhale 2 puffs into the lungs every 4 hours as needed   diclofenac (VOLTAREN) 1 % topical gel, Apply 2 g topically 4 times daily  escitalopram (LEXAPRO) 10 MG tablet, Take 1 tablet (10 mg) by mouth daily Take 1/2 tablet daily for 1 week, then take  10 mg tablet daily.  gabapentin (NEURONTIN) 300 MG capsule, Take 3 capsules (900 mg) by mouth 3 times daily  hydrOXYzine (ATARAX) 10 MG tablet,   lisinopril (ZESTRIL) 10 MG tablet, Take 1 tablet by mouth daily   Multiple Vitamin (TAB-A-TORIN) TABS, Take 1 tablet by mouth daily   multivitamin w/minerals (CERTAVITE/ANTIOXIDANTS) tablet, Take 1 tablet by mouth daily  naloxone (NARCAN) 4 MG/0.1ML nasal spray, Spray 1 spray (4 mg) into one nostril alternating nostrils 2 times daily as needed for opioid reversal every 2-3 minutes until assistance arrives  nicotine polacrilex (NICORETTE) 4 MG gum, Place 1 each (4 mg) inside cheek every hour as needed for smoking cessation  OLANZapine (ZYPREXA) 10 MG tablet, Take 1 tablet (10 mg) by mouth At Bedtime  OLANZapine (ZYPREXA) 5 MG tablet, Take 1 tablet (5 mg) by mouth every morning in addition to 10 mg at bedtime for ttl: 15 mg/day  metoprolol tartrate (LOPRESSOR) 50 MG tablet, Take 1 tablet (50 mg) by mouth 2 times daily    No current facility-administered medications on file prior to visit.      MEDICATION LIST (after visit)  Current Outpatient Medications   Medication     acetaminophen (TYLENOL) 32 mg/mL liquid     acetaminophen (TYLENOL) 500 MG tablet     albuterol (PROAIR HFA;PROVENTIL HFA;VENTOLIN HFA) 90 mcg/actuation inhaler     buprenorphine HCl-naloxone HCl (SUBOXONE) 4-1 MG per film     diclofenac (VOLTAREN) 1 % topical gel     escitalopram (LEXAPRO) 10 MG tablet     gabapentin (NEURONTIN) 300 MG capsule     hydrOXYzine (ATARAX) 10 MG tablet     lisinopril (ZESTRIL) 10 MG tablet     Multiple Vitamin (TAB-A-TORIN) TABS     multivitamin w/minerals (CERTAVITE/ANTIOXIDANTS) tablet     naloxone (NARCAN) 4 MG/0.1ML nasal spray     nicotine polacrilex (NICORETTE) 4 MG gum     OLANZapine (ZYPREXA) 10 MG tablet     OLANZapine (ZYPREXA) 5 MG tablet     metoprolol tartrate (LOPRESSOR) 50 MG tablet     No current facility-administered medications for this visit.         No Known  Allergies    Additional MDM Details:  none    Sherry Alvarado CNP  [facility]  [facility]  [phone]

## 2022-02-25 NOTE — NURSING NOTE
This video/telephone visit will be conducted via a call between you and your physician/provider. We have found that certain health care needs can be provided without the need for an in-person physical exam. This service lets us provide the care you need with a video /telephone conversation. If a prescription is necessary we can send it directly to your pharmacy. If lab work is needed we can place an order for that and you can then stop by our lab to have the test done at a later time.    Just as we bill insurance for in-person visits, we also bill insurance for video/telephone visits. If you have questions about your insurance coverage, we recommend that you speak with your insurance company.    Patient has given verbal consent for video/Telephone visit? yes    Patient would like video visit, please connect : Vibrynt  Reason for visit: follow up  Patient verified allergies, medications and pharmacy via phone.     DONALD-7 scores:    DONALD-7 SCORE 2/11/2022 2/18/2022   Total Score - 14 (moderate anxiety)   Total Score 18 14       PHQ-9 scores:   PHQ-9 SCORE 2/11/2022   PHQ-9 Total Score 17       Patient states he is ready for visit. Requesting refills on suboxone     Juan Manuel Marie MA February 25, 2022 2:35 PM

## 2022-02-28 ENCOUNTER — PATIENT OUTREACH (OUTPATIENT)
Dept: CARE COORDINATION | Facility: CLINIC | Age: 47
End: 2022-02-28

## 2022-02-28 ENCOUNTER — PATIENT OUTREACH (OUTPATIENT)
Dept: NURSING | Facility: CLINIC | Age: 47
End: 2022-02-28
Payer: COMMERCIAL

## 2022-02-28 DIAGNOSIS — J95.851 VENTILATOR ASSOCIATED PNEUMONIA (H): ICD-10-CM

## 2022-02-28 DIAGNOSIS — R53.81 PHYSICAL DECONDITIONING: ICD-10-CM

## 2022-02-28 DIAGNOSIS — U07.1 PNEUMONIA DUE TO 2019 NOVEL CORONAVIRUS: ICD-10-CM

## 2022-02-28 DIAGNOSIS — G93.41 ACUTE METABOLIC ENCEPHALOPATHY: Primary | ICD-10-CM

## 2022-02-28 DIAGNOSIS — J12.82 PNEUMONIA DUE TO 2019 NOVEL CORONAVIRUS: ICD-10-CM

## 2022-02-28 DIAGNOSIS — G93.41 ACUTE METABOLIC ENCEPHALOPATHY: ICD-10-CM

## 2022-02-28 SDOH — ECONOMIC STABILITY: TRANSPORTATION INSECURITY
IN THE PAST 12 MONTHS, HAS THE LACK OF TRANSPORTATION KEPT YOU FROM MEDICAL APPOINTMENTS OR FROM GETTING MEDICATIONS?: NO

## 2022-02-28 SDOH — ECONOMIC STABILITY: FOOD INSECURITY: WITHIN THE PAST 12 MONTHS, YOU WORRIED THAT YOUR FOOD WOULD RUN OUT BEFORE YOU GOT MONEY TO BUY MORE.: OFTEN TRUE

## 2022-02-28 SDOH — ECONOMIC STABILITY: FOOD INSECURITY: WITHIN THE PAST 12 MONTHS, THE FOOD YOU BOUGHT JUST DIDN'T LAST AND YOU DIDN'T HAVE MONEY TO GET MORE.: OFTEN TRUE

## 2022-02-28 SDOH — ECONOMIC STABILITY: TRANSPORTATION INSECURITY
IN THE PAST 12 MONTHS, HAS LACK OF TRANSPORTATION KEPT YOU FROM MEETINGS, WORK, OR FROM GETTING THINGS NEEDED FOR DAILY LIVING?: NO

## 2022-02-28 ASSESSMENT — ACTIVITIES OF DAILY LIVING (ADL): DEPENDENT_IADLS:: INDEPENDENT

## 2022-02-28 ASSESSMENT — SOCIAL DETERMINANTS OF HEALTH (SDOH): HOW HARD IS IT FOR YOU TO PAY FOR THE VERY BASICS LIKE FOOD, HOUSING, MEDICAL CARE, AND HEATING?: VERY HARD

## 2022-02-28 NOTE — PROGRESS NOTES
Clinic Care Coordination Contact    Clinic Care Coordination Contact  OUTREACH    Referral Information:  Referral Source: PCP    Primary Diagnosis: Respiratory Disorders - other    Chief Complaint   Patient presents with     Clinic Care Coordination - Initial        Difficulty keeping appointments:: No  Compliance Concerns: No  No-Show Concerns: No  No PCP office visit in Past Year: No  Utilization    Hospital Admissions  3             ED Visits  1             No Show Count (past year)  4                Current as of: 2/28/2022 11:45 AM              Clinical Concerns:  Current Medical Concerns:  Opioid use severe dependence, covid, ards, ventilator associated pneumonia,htn, chronic back pain, metabolic encephalopathy,     Current Behavioral Concerns:     Education Provided to patient: yes   Pain  Pain (GOAL):: No  Health Maintenance Reviewed:    Clinical Pathway: None    Medication Management:  Medication review status: Medications reviewed and no changes reported per patient.        Currently takes his medications independently out of the bottles on a daily basis     Functional Status:  Dependent ADLs:: Independent  Dependent IADLs:: Independent  Bed or wheelchair confined:: No  Mobility Status: Independent  Fallen 2 or more times in the past year?: No  Any fall with injury in the past year?: No    Living Situation:  Current living arrangement:: I live in a private home with family  Type of residence:: Town home    Lifestyle & Psychosocial Needs:    Social Determinants of Health     Tobacco Use: High Risk     Smoking Tobacco Use: Never Smoker     Smokeless Tobacco Use: Current User   Alcohol Use: Not At Risk     Frequency of Alcohol Consumption: Never     Average Number of Drinks: Not on file     Frequency of Binge Drinking: Not on file   Financial Resource Strain: High Risk     Difficulty of Paying Living Expenses: Very hard   Food Insecurity: Food Insecurity Present     Worried About Running Out of Food in the  Last Year: Often true     Ran Out of Food in the Last Year: Often true   Transportation Needs: No Transportation Needs     Lack of Transportation (Medical): No     Lack of Transportation (Non-Medical): No   Physical Activity: Insufficiently Active     Days of Exercise per Week: 1 day     Minutes of Exercise per Session: 30 min   Stress: Stress Concern Present     Feeling of Stress : Very much   Social Connections: Socially Isolated     Frequency of Communication with Friends and Family: Once a week     Frequency of Social Gatherings with Friends and Family: Never     Attends Nondenominational Services: Never     Active Member of Clubs or Organizations: No     Attends Club or Organization Meetings: 1 to 4 times per year     Marital Status:    Intimate Partner Violence: Not At Risk     Fear of Current or Ex-Partner: No     Emotionally Abused: No     Physically Abused: No     Sexually Abused: No   Depression: At risk     PHQ-2 Score: 6   Housing Stability: Unknown     Unable to Pay for Housing in the Last Year: Patient refused     Number of Places Lived in the Last Year: 2     Unstable Housing in the Last Year: No     Diet:: Regular  Inadequate nutrition (GOAL):: No  Tube Feeding: No  Inadequate activity/exercise (GOAL):: No  Significant changes in sleep pattern (GOAL): No  Transportation means:: Regular car     Nondenominational or spiritual beliefs that impact treatment:: No  Mental health DX:: No  Mental health management concern (GOAL):: No  Chemical Dependency Status: Past Concern  Chemical Dependency Management: Other (see comment), Previous treatment (mental health counseling)  Informal Support system:: Family           Resources and Interventions:  Current Resources:      Community Resources: Financial/Insurance (Medical Assistance)  Supplies Currently Used at Home: None  Equipment Currently Used at Home: none  Employment Status: disabled       Referrals Placed: None    46 yr M PMH:   Opioid use severe dependence,  covid, ards, ventilator associated pneumonia,htn, chronic back pain, metabolic encephalopathy.  Hospitalized 12/5-1/24 due to Respiratory failure secondary to covid.  Prior to hospital admission patient was living in a Sober House for drug and ETOH.  Since hospital discharge patient has been able to return to his ex-wife's home.  He is able to pay rent and has been able to obtain Disability per his report.  He drives to doctor's appointments or is able to get a ride from his ex-wife.  He manages his medications independently.  He struggles with his memory.  His ex-wife Jama has been very supportive at assisting with scheduling appointments and following up.  She was able to locate the ICU survivorship program program for Bill.  His G-tube was removed 2/1/22.  [facility] RN has been able to locate a home care agency for Bill.  [place] back from agency today.  See patient outreach dated 2/28.  Gave patient and ex-spouse the phone number for [facility].  They should be hearing from [facility] once the new home care orders are signed.  Patient Enrolled for Goals as listed below.       Goals:   Goals        General     Other (pt-stated)      Notes - Note edited  2/28/2022  1:44 PM by Rafaela Lieberman, RN     # 2 Goal Statement: I would like to get an appointment with the ICU Survivorship Program in the next 30-60 days.  Date Goal set: 2/28/22  Barriers: numerous appointments, referrals  Strengths: motivated, family support  Date to Achieve By: 30-60 days  Patient expressed understanding of goal: yes  Action steps to achieve this goal:  1. I will call the ICU Survivorship Program to schedule an appointment [phone].         Other (pt-stated)      Notes - Note edited  2/28/2022  1:48 PM by Rafaela Lieberman, RN     #1 Goal Statement: I would like to have Home Care for PT in the next 30 days.  Date Goal set: 2/28/22  Barriers: home care shortage  Strengths: motivated, family support  Date to Achieve By: 30 days  Patient expressed understanding  of goal: yes  Action steps to achieve this goal:  1. [facility] RN gave patient and Jama the phone number to [facility] [phone].  Instructed that I should hear from them by this week.  I will call [facility] if I do not hear anything by Wed.  I will call Carina and/or Rafaela if I have any additional questions regarding my home care referral.              Patient/Caregiver understanding: Patient stated an understanding.    Outreach Frequency: monthly  Future Appointments              In 1 week Sherry Alvarado CNP M [facility], [facility] SJO    In 2 weeks Calista Burks OT M [facility], [facility] MPLW    In 2 months Gurmeet Sevilla M [facility], [facility]          Plan: [facility] PLEASE REACH OUT TO PATIENT AND EX-SPOUSE JAMA NEXT WEEK TO ENSURE HOME CARE [facility] WAS ABLE TO START.

## 2022-02-28 NOTE — LETTER
[facility]  Patient Centered Plan of Care  About Me:        Patient Name:  Jared North    YOB: 1975  Age:         46 year old   Tristan MRN:    [id number] Telephone Information:  Home Phone [phone]   Mobile [phone]   Home Phone [phone]       Address:  [address] [address] Email address:  [email or web address]      Emergency Contact(s)    Name Relationship Lgl Grd Work Phone Home Phone Mobile Phone   1. VIKASH NORTH Ex-Spouse    [phone]           Primary language:  English     needed? No   [place] [employer]:  [phone] op. 1  Other communication barriers:None    Preferred Method of Communication:     Current living arrangement: I live in a private home with family    Mobility Status/ Medical Equipment: Independent        Health Maintenance  Health Maintenance Reviewed: No data recorded    My Access Plan  Medical Emergency 911   Primary Clinic Line [facility] [phone]   24 Hour Appointment Line [phone] or  [phone] ([phone]) (toll-free)   24 Hour Nurse Line [phone] (toll-free)   Preferred Urgent Care No data recorded   Preferred Hospital No data recorded   Preferred Pharmacy [facility] [id number] [place] [address] [address] AT [facility]     Behavioral Health Crisis Line The National Suicide Prevention Lifeline at 1-702.488.9137 or 911             My Care Team Members  Patient Care Team       Relationship Specialty Notifications Start End    Giovani Allen MD PCP - General Family Medicine  1/30/22     Phone: [phone] Fax: [phone]         [address] [facility] [address]    Giovani Allen MD Assigned PCP   12/30/21     Phone: [phone] Fax: [phone]         [address] [facility] [address]    Dre Matt MD Assigned Surgical Provider   2/6/22     Phone: [phone] Fax: [phone] [address] [address]    Rafaela Lieberman, RN Clinic Care Coordinator Primary Care - [facility] Admissions 2/24/22     Phone: [phone]         [facility] Zimmerman Community Health Worker  Admissions 2/28/22     [phone]            My Care Plans  Self Management and Treatment Plan  Goals and (Comments)  Goals        General     Other (pt-stated)      Notes - Note edited  2/28/2022  1:44 PM by Rafaela Lieberman, RN     # 2 Goal Statement: I would like to get an appointment with the ICU Survivorship Program in the next 30-60 days.  Date Goal set: 2/28/22  Barriers: numerous appointments, referrals  Strengths: motivated, family support  Date to Achieve By: 30-60 days  Patient expressed understanding of goal: yes  Action steps to achieve this goal:  1. I will call the ICU Survivorship Program to schedule an appointment [phone].         Other (pt-stated)      Notes - Note edited  2/28/2022  1:48 PM by Rafaela Lieberman, RN     #1 Goal Statement: I would like to have Home Care for PT in the next 30 days.  Date Goal set: 2/28/22  Barriers: home care shortage  Strengths: motivated, family support  Date to Achieve By: 30 days  Patient expressed understanding of goal: yes  Action steps to achieve this goal:  1. [facility] RN gave patient and Jama the phone number to [facility] [phone].  Instructed that I should hear from them by this week.  I will call Renetta if I do not hear anything by Wed.  I will call Carina and/or Rafaela if I have any additional questions regarding my home care referral.               Action Plans on File:                       Advance Care Plans/Directives Type:   No data recorded    My Medical and Care Information  Problem List   Patient Active Problem List   Diagnosis     Chronic back pain     Essential hypertension     Elevated LFTs     Acute on chronic respiratory failure with hypoxia (H)     Pneumonia due to 2019 novel coronavirus     Atrial fibrillation with rapid ventricular  response (H)     Acute respiratory distress syndrome (ARDS) due to COVID-19 virus (H)     Major depressive disorder, recurrent episode, severe (H)     Opioid use disorder, severe, dependence (H)     Polysubstance abuse (H)     ROSSY (obstructive sleep apnea)     Mild intermittent asthma     Acute metabolic encephalopathy     Ventilator associated pneumonia (H)     Oropharyngeal dysphagia     Alcohol dependence (H)      Current Medications and Allergies:  See printed Medication Report.    Care Coordination Start Date: 2/28/2022   Frequency of Care Coordination: monthly     Form Last Updated: 02/28/2022

## 2022-02-28 NOTE — LETTER
[facility] CARE COORDINATION  [address] [facility] [address]    February 28, 2022    Jared North  [address] [facility] [address]      Dear Jraed,    I am a clinic care coordinator who works with Giovani Allen MD at [facility]. I wanted to thank you for spending the time to talk with me.  Below is a description of clinic care coordination and how I can further assist you.      The clinic care coordination team is made up of a registered nurse,  and community health worker who understand the health care system. The goal of clinic care coordination is to help you manage your health and improve access to the health care system in the most efficient manner. The team can assist you in meeting your health care goals by providing education, coordinating services, strengthening the communication among your providers and supporting you with any resource needs.    Please feel free to contact the Community Health Worker Carina Erasmo at [phone] with any questions or concerns. We are focused on providing you with the highest-quality healthcare experience possible and that all starts with you.     Sincerely,     Rafaela Lieberman RN  Clinic Care Coordination    Enclosed: I have enclosed a copy of the Patient Centered Plan of Care. This has helpful information and goals that we have talked about. Please keep this in an easy to access place to use as needed.

## 2022-03-01 ENCOUNTER — TELEPHONE (OUTPATIENT)
Dept: FAMILY MEDICINE | Facility: CLINIC | Age: 47
End: 2022-03-01
Payer: COMMERCIAL

## 2022-03-01 DIAGNOSIS — J12.82 PNEUMONIA DUE TO 2019 NOVEL CORONAVIRUS: ICD-10-CM

## 2022-03-01 DIAGNOSIS — R53.81 PHYSICAL DECONDITIONING: Primary | ICD-10-CM

## 2022-03-01 DIAGNOSIS — U07.1 PNEUMONIA DUE TO 2019 NOVEL CORONAVIRUS: ICD-10-CM

## 2022-03-01 NOTE — TELEPHONE ENCOUNTER
3-1-22  Reason for Call:  Home care update    Detailed comments:  Yuridia  Called from [facility] and stated they received a order to see pt for home care.  yuridia  went to see patient today 3-1-22 and states pt doesn't need  Home care, pt is fully able to function at home/drive/ do daily activities and movement .  Yuridia states pt would benefit more from outpatient PT.  Please place new order for physical therapy .  Yuridia did leave pt some home exercises to do    Phone Number Patient can be reached at: [phone]    Best Time: ravi    Can we leave a detailed message on this number? YES    Call taken on 3/1/2022 at 12:19 PM by Dahiana Michaud

## 2022-03-11 ENCOUNTER — VIRTUAL VISIT (OUTPATIENT)
Dept: PULMONOLOGY | Facility: OTHER | Age: 47
End: 2022-03-11
Attending: INTERNAL MEDICINE
Payer: COMMERCIAL

## 2022-03-11 ENCOUNTER — PATIENT OUTREACH (OUTPATIENT)
Dept: NURSING | Facility: CLINIC | Age: 47
End: 2022-03-11

## 2022-03-11 ENCOUNTER — OFFICE VISIT (OUTPATIENT)
Dept: BEHAVIORAL HEALTH | Facility: CLINIC | Age: 47
End: 2022-03-11
Payer: COMMERCIAL

## 2022-03-11 VITALS
DIASTOLIC BLOOD PRESSURE: 87 MMHG | SYSTOLIC BLOOD PRESSURE: 142 MMHG | HEART RATE: 102 BPM | BODY MASS INDEX: 27.34 KG/M2 | WEIGHT: 196 LBS

## 2022-03-11 DIAGNOSIS — F11.20 OPIOID USE DISORDER, SEVERE, DEPENDENCE (H): Primary | ICD-10-CM

## 2022-03-11 DIAGNOSIS — G93.41 ACUTE METABOLIC ENCEPHALOPATHY: ICD-10-CM

## 2022-03-11 DIAGNOSIS — U07.1 ACUTE RESPIRATORY DISTRESS SYNDROME (ARDS) DUE TO COVID-19 VIRUS (H): ICD-10-CM

## 2022-03-11 DIAGNOSIS — J80 ACUTE RESPIRATORY DISTRESS SYNDROME (ARDS) DUE TO COVID-19 VIRUS (H): ICD-10-CM

## 2022-03-11 DIAGNOSIS — F41.9 ANXIETY: ICD-10-CM

## 2022-03-11 LAB
AMPHETAMINES UR QL SCN: ABNORMAL
BARBITURATES UR QL: ABNORMAL
BENZODIAZ UR QL: ABNORMAL
BUPRENORPHINE QUAL URINE LHE: ABNORMAL
CANNABINOIDS UR QL SCN: ABNORMAL
COCAINE UR QL: ABNORMAL
CREAT UR-MCNC: 290 MG/DL
CREAT UR-MCNC: 295 MG/DL
OPIATES UR QL SCN: ABNORMAL
OXYCODONE UR QL: ABNORMAL
PCP UR QL SCN: ABNORMAL

## 2022-03-11 PROCEDURE — 99214 OFFICE O/P EST MOD 30 MIN: CPT | Performed by: NURSE PRACTITIONER

## 2022-03-11 PROCEDURE — G0463 HOSPITAL OUTPT CLINIC VISIT: HCPCS

## 2022-03-11 PROCEDURE — 80307 DRUG TEST PRSMV CHEM ANLYZR: CPT | Performed by: NURSE PRACTITIONER

## 2022-03-11 PROCEDURE — 99204 OFFICE O/P NEW MOD 45 MIN: CPT | Mod: 95 | Performed by: INTERNAL MEDICINE

## 2022-03-11 RX ORDER — GABAPENTIN 300 MG/1
900 CAPSULE ORAL 3 TIMES DAILY
Qty: 270 CAPSULE | Refills: 0 | Status: SHIPPED | OUTPATIENT
Start: 2022-03-11 | End: 2022-04-08

## 2022-03-11 RX ORDER — GABAPENTIN 300 MG/1
900 CAPSULE ORAL 3 TIMES DAILY
Qty: 270 CAPSULE | Refills: 0 | Status: CANCELLED | OUTPATIENT
Start: 2022-03-11

## 2022-03-11 RX ORDER — BUPRENORPHINE AND NALOXONE 4; 1 MG/1; MG/1
1 FILM, SOLUBLE BUCCAL; SUBLINGUAL 3 TIMES DAILY
Qty: 84 FILM | Refills: 0 | Status: SHIPPED | OUTPATIENT
Start: 2022-03-11 | End: 2022-04-08

## 2022-03-11 RX ORDER — PROPRANOLOL HYDROCHLORIDE 10 MG/1
10 TABLET ORAL DAILY PRN
Qty: 15 TABLET | Refills: 0 | Status: SHIPPED | OUTPATIENT
Start: 2022-03-11 | End: 2022-03-28

## 2022-03-11 RX ORDER — BUPRENORPHINE AND NALOXONE 4; 1 MG/1; MG/1
1 FILM, SOLUBLE BUCCAL; SUBLINGUAL 3 TIMES DAILY
Qty: 41 FILM | Refills: 0 | Status: CANCELLED | OUTPATIENT
Start: 2022-03-11

## 2022-03-11 NOTE — PROGRESS NOTES
CHANDU is a 46 year old who is being evaluated via a billable video visit.      How would you like to obtain your AVS? MyChart  If the video visit is dropped, the invitation should be resent by: Text to cell phone: [phone]  Will anyone else be joining your video visit? No      Video Start Time: 8:57 AM     Video-Visit Details    Type of service:  Video Visit    Video End Time: 9:45 AM    Originating Location (pt. Location): Home    Distant Location (provider location):  [facility] [employer]     Platform used for Video Visit: [facility] ICU Survivorship [facility] - Initial Consult    Summary of ICU Admission/HPI:  46 y M with a history of asthma by report, substance abuse/opioid dependence and alcohol abuse, chronic pain, recurrent depression, who was admitted to [facility] in November 2021 with COVID pneumonia and severe ARDS. He had a prolonged course with polymicrobial VAP, encephalopathy, significant sedation needs, and required tracheostomy/PEG placement. He was discharged to [facility] where he was decannulated and weaned off sedation. He was discharged home 1/24/22. Has since had G tube removed. Is not requiring supplemental oxygen. Close follow up with mental health providers.     Today reports he is very weak in the hands and shoulders, painful, with paresthesias and limited ROM. Has had difficulty getting outpatient PT arranged, has OT evaluation scheduled next week.     Has not had to use albuterol inhaler.  No memory of hospital stay, other than odd vivid dreams  Walks in stores daily, trying to get more physical activity      Pertinent positives alluded to in the HPI. Remainder of 10 point ROS is negative.     Medication Reconciliation:  Medications reviewed in detail  Albuterol prn    PICS/Recovery ROS:  Shortness of breath? Yes, exertional, mild  Cough? No  Have you returned to work? Was not working before, was in treatment facility, worked as an   in the past, on disability now  Weight change/loss? Yes, cannot quantify  Tobacco abuse? Never smoker  Have you resumed driving? Yes  Brain Fog? Yes  Memory issues? Yes  Basic arithmetic? No  Writing difficulties? No  Vision issues? No   Dysphonia? No  Dysphagia? No    Paraesthesias? Hands, arms, shoulders  Anosmia, Ageusia? No  Headaches? No  Dermatologic issues? No   ADL difficulties? No  Sleep issues? No  Nightmares? No  Anxiety? Yes, under active treatment  Depression? Yes, under active treatment  Do you have an advanced directive on file? No  Were any changes made to advanced directives? No    PMH:  Past Medical History:   Diagnosis Date     Major depressive disorder, recurrent episode, severe (H) 4/26/2006    Formatting of this note might be different from the original. Epic     Mild intermittent asthma 12/31/2021     Opioid type dependence (H) 4/26/2006    Formatting of this note might be different from the original. [facility]     ROSSY (obstructive sleep apnea) 12/31/2021     Pneumonia due to 2019 novel coronavirus 11/24/2021     Polysubstance abuse (H) 12/31/2021     No Known Allergies    Family HX:  No known FH of lung problems     Social Hx:  Marital status: , ex-wife Jama involved in his care/life  Number of children: 1 daughter   Resides in [place] with support from ex-wife  Occupational history: Worked as , not working prior to illness due to chronic back pain, substance abuse   service: None  Pets: 1 dog, 1 cat  Smoking history: Never smoker  Alcohol use: Currently in remission  Recreational drug use: Hx opioid dependence, in remission  Hobbies: 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 Right PICC line remains in good position.  Right IJ CVC has been removed. NG tube also removed. Bilateral interstitial infiltrates have improved when compared to previous. There are no new or acute infiltrates. No pleural effusion.    Assessment and Plan:  46 y M with a history of asthma by report, substance abuse/opioid dependence and alcohol abuse, chronic pain, recurrent depression, who was admitted to [facility] in November 2021 with COVID pneumonia and severe ARDS. He had a prolonged course with polymicrobial VAP, encephalopathy, significant sedation needs, and required tracheostomy/PEG placement. He was discharged to [facility] where he was decannulated and weaned off sedation. He was discharged home 1/24/22. Has since had G tube removed. Is not requiring supplemental oxygen. Close follow up with mental health providers.     Today reports he is very weak in the hands and shoulders, painful, with paresthesias and limited ROM. Has had difficulty getting outpatient PT arranged, has OT evaluation scheduled next week.     Has not had to use albuterol inhaler.    1. Post ICU Syndrome:  - Some ongoing brain fog and memory limitation, slowly improving  - Pre-existing diagnosis of severe depression, substance abuse, currently being closely managed by mental health provider  - Ongoing physical deconditioning, with specific weakness in the hands and shoulders, painful, with paresthesias and limited ROM    OT evaluation is scheduled for next week. PT referral placed by PCP 3/1.    Will place Adult post COVID Clinic referral given multitude of ongoing symptoms, will likely benefit from PM&R evaluation    2.   Respiratory:  - Recovering from COVID ARDS. Not requiring supplemental oxygen. No cough, exertional dyspnea, mild    His physical symptoms right now seem to predominate over respiratory. PT/OT will be most beneficial at this time. Pending completion of these and his status we will consider subsequent pulmonary rehab referral    Will do PFTs 6  months out from acute illness    3.   General Care Needs:  PT/OT, pulmonary rehab referrals: Unclear status of PT referral, OT referral appointment 3/16  Vaccinations: Declines vaccination at this time, formally reviewed and recommended  DME needs? (trach care/supplies, oxygen, NIPPV): None  POLST paperwork: Encouraged formal completion  Additional referrals? (palliative care, MTM): PM&R/Adult Post COVID Referral    4.   Follow-Up Labs/Studies: PFTs at 6 months (~May-June)      5.   Follow-up: In lung clinic with PFTs this summer        No Lloyd MD  Pulmonary and Critical Care Medicine  [facility]  Office: [phone]

## 2022-03-11 NOTE — NURSING NOTE
[facility] Post-Appointment Cart -check      Correct pharmacy verified with patient and updated in chart? [x] yes []no    Charge captured ? [x] yes  [] no [] n/a-virtual     Medications ordered this visit were e-scribed.  Verified by order class [x] yes  [] no    List Medications: Suboxone 4-1mg, gabapentin 300mg, propranolol 10g    Medication changes or discontinuations were communicated to patient's pharmacy: [] yes  [x] no    UA collected [x] yes  [] no  [] n/a-virtual     Outside referrals / labs, etc support staff to follow up: [] yes  [x] no    Future appointment was made: [x] yes  [] no  [] n/a   4/8/2022    Dictation completed at time of chart check: [x] yes  [] no    I have checked the documentation for today s encounters and the above information has been reviewed and completed.      Juan Manuel Marie MA on March 11, 2022 at 2:01 PM

## 2022-03-11 NOTE — PATIENT INSTRUCTIONS
CHEST: Portable 12/18/2021 at 9:04 PM

 

CLINICAL HISTORY:Feeling ill

 

COMPARISON:11/6/2020

 

FINDINGS:  There is less than optimal inspiration. Heart is enlarged. Pulmonary

vascular areas normal. No infiltrate effusion or pneumothorax is seen..

 

Impression: Mild cardiac megaly

 

Less than optimal inspiration

 

No definite infiltrates are seen.

 

If clinical symptomatology persists or worsens a repeat exam is recommended. It was nice to see you today, Jeremías.    1. Propanolol is used for anxiety associated with speaking at meetings. Take 10 mg 30-60 minutes before meeting.

## 2022-03-11 NOTE — PATIENT INSTRUCTIONS
It was a pleasure seeing you in the Post-Intensive Care Unit (ICU) Clinic today. You were seen by an ICU provider. The following follow-up recommendations were made:    1. Attend occupational therapy appointment on 3/16  2. I placed referral to Adult Post-COVID Clinic - this is where the PMR (physical medicine & rehabilitation) program is, for your ongoing hand, shoulder symptoms.  3. Lung function tests in June, follow up in the lung clinic and we will review the results. You will be called to arrange these appointments.      If you would like more information on Post-ICU Syndrome (PICS), please visit the following websites:    Post Intensive Care Syndrome ([facility])   [email or web address] (search  post intensive care syndrome )     ICU information ([facility])   [email or web address] (search  intensive care )     After the ICU   [email or web address]        No Lloyd MD  Pulmonary and Critical Care Medicine  [facility]  Office: [phone]

## 2022-03-11 NOTE — PROGRESS NOTES
[facility] Addiction [facility]    A/P                                                    ASSESSMENT/PLAN  Diagnoses and all orders for this visit:  Opioid use disorder, severe, dependence (H)  Jeremías is stable on suboxone 12 mg  for the pas 2 years. He denies substance use, cravings, or triggers. Continue suboxone 4 mg TID.   -     Urine Drugs of Abuse Screen Panel 1 - Drug Screen (Full); Standing  -     Buprenorphine Urine, Qualitative; Future  -     buprenorphine HCl-naloxone HCl (SUBOXONE) 4-1 MG per film; Place 1 Film under the tongue 3 times daily Okay for early refill  Acute metabolic encephalopathy  He has neuropathic pain to right leg 2/2 reports relief using gabapentin. Refill provided.   -     gabapentin (NEURONTIN) 300 MG capsule; Take 3 capsules (900 mg) by mouth 3 times daily  Anxiety  Jeremías attends 12 step meetings weekly, but endorse performance anxiety. States he is nervous to talk, he reports rapid heart rate, sweats, and shallow breaths when he knows he will have to talk in front of others. Discussed risks and benefits of propanolol. He would like to give it a try to see of he gets any relief.   -     propranolol (INDERAL) 10 MG tablet; Take 1 tablet (10 mg) by mouth daily as needed (anxiety)    Orders Placed This Encounter   Medications     buprenorphine HCl-naloxone HCl (SUBOXONE) 4-1 MG per film     Sig: Place 1 Film under the tongue 3 times daily Okay for early refill     Dispense:  84 Film     Refill:  0     NADEAN: [id number]     gabapentin (NEURONTIN) 300 MG capsule     Sig: Take 3 capsules (900 mg) by mouth 3 times daily     Dispense:  270 capsule     Refill:  0     propranolol (INDERAL) 10 MG tablet     Sig: Take 1 tablet (10 mg) by mouth daily as needed (anxiety)     Dispense:  15 tablet     Refill:  0       Problem list updated Mar 11, 2022   Problem   Anxiety           PDMP Review       Value Time User    State PDMP site checked  Yes 3/11/2022  1:03 PM Sherry Alvarado, CNP             RTC  Return in about 4 weeks (around 4/8/2022) for Follow up, using a video visit, with me.      Counseled the patient on the importance of having a recovery program in addition to medication to manage recovery.  Components include avoiding isolating, having willingness to change, avoiding triggers and managing cravings. Encouraged having some type of sober network and practicing honesty with trusted support person(s). Encouraged other services such as counseling, 12 step or other self-help organizations.      Opioid warning reviewed.  Risk of overdose following a period of abstinence due to decrease tolerance was discussed including risk of death.  Strongly recommended abstain from alcohol, benzodiazepines, THC, opioids and other drugs of abuse.  Increased risk of return to opioid use after use of these substances discussed.  Increased risk of overdose/death with use of other substances particularly benzodiazepines/alcohol reviewed.        SUBJECTIVE                                                    Jared North is a 46 year old male who presents to clinic today for follow up    Visit performed In Person, face-to-face    Recent HPI Details: 2/25/22  Jared North is a 46 year old male with a history of recent Covid 19 injection, ARDS, Acute respiratory failure with hypoxia, MDD, Chronic back pain, and opioid use disorder who is being seen today for a follow up. He is taking suboxone 12 mg daily.   -Met with Elvia Brown yesterday visit went well. His Zyprexa was increased at bedtime.   -Taking CBD gummies at bedtime as needed to help him sleep.   -Will start PT as outpatient soon. Has not had any therapy since he was discharged from hospital. He used to be very active before he contacted Covid, is unable to engage in any of his prior activities ie. push ups.  Is a source of frustration for him.  -Bad day yesterday, not feeling well physically and emotionally.  Intermittent days.   -Expressing  "\"withdrawal symptoms: feeling uncomfortable in his own skin.   -Thoughts that it would be nice to have a glass of wine.   Substance use since last visit: No  Cravings: Some alcohol cravings yesterday, having a bad day. Denies opioid cravings.   Triggers:   Mood:   Sleep: Sleeping okay.   Pain: periods of increased pain.   Severe episode of recurrent major depressive disorder, without psychotic features (H)  Was able to be seen in the transitional clinic. Zyprexa was increased at bedtime. States he took dose last night, but may have taken too early, felt sleepy. Encouraged to continue to trial the increased dose at bedtime, plans to take right before bed. He will be starting PT soon, which may help to improve his mood. He is frustrated over his limitation caused by Covid. Has not been able to work out in a long time. He may start attending a covid support group.   Opioid use disorder, severe, dependence (H)  Stable, continue suboxone 12 mg daily. Expresses \"withdrawal symptoms\"   but does not want to increase his suboxone, could consider an increase in future if needed.   -     buprenorphine HCl-naloxone HCl (SUBOXONE) 4-1 MG per film; Place 1 Film under the tongue 3 times daily Okay for early refill  TODAY'S VISIT  HPI Mar 11, 2022     Jared North is a 46 year old male with a history of recent Covid 19 injection, ARDS, Acute respiratory failure with hypoxia, MDD, Chronic back pain, and opioid use disorder who is being seen today for a follow up. He is taking suboxone 12 mg daily.     -Denies substance use. Except for CBD gummies. Take for pain.   -May be moving soon, wife wants to move to [place]. Busy packing and cleaning.  -Rent is going up 200.00, so they are looking to move.   -Feeling a little better, working on endurance  -Waiting for outpatient PT and OT to start. Getting around very well.   -Neuropathic pain to right quad. On gabapentin, provides pain relief. States he exceeded his gabapentin dose due " to pain. Normally does not take more than prescribed  -Pinched nerve in L5-s1 causing pain to right leg.   -Discussed how naloxone works in suboxone.   -Reports anxiety at meetings due to speaking in front of others symptoms include racing heart, sweats, shallow breaths.   -Mood is improving taking full dose of olanzapine as prescribed.   -Plans to visit niece in [place] this weekend.           OBJECTIVE                                                    PHYSICAL EXAM:  BP (!) 142/87   Pulse 102   Wt 88.9 kg (196 lb)   BMI 27.34 kg/m      GENERAL: healthy, alert and no distress  EYES: Eyes grossly normal to inspection, PERRL and conjunctivae and sclerae normal  RESP: No respiratory distress  MENTAL STATUS EXAM  Appearance/Behavior: No appearant distress  Speech: Normal  Mood/Affect: normal affect  Insight: Adequate    LAB  Results for orders placed or performed in visit on 03/11/22   Drugs of Abuse 1 Panel, Urine ([facility] Only)     Status: Abnormal   Result Value Ref Range    Amphetamines Urine Screen Negative Screen Negative    Benzodiazepines Urine Screen Negative Screen Negative    Opiates Urine Screen Negative Screen Negative    PCP Urine Screen Negative Screen Negative    Cannabinoids Urine Screen Positive (A) Screen Negative    Barbiturates Urine Screen Negative Screen Negative    Cocaine Urine Screen Negative Screen Negative    Oxycodone Urine Screen Negative Screen Negative    Creatinine Urine mg/dL 290 mg/dL    Narrative    Drug                  Screening Threshold    Amphetamines           1000 ng/mL  Benzodiazepine          200 ng/mL  Opiates                 300 ng/mL  Phencyclidine            25 ng/mL  THC Metabolite           50 ng/mL  Barbiturates            200 ng/mL  Cocaine Metabolite      150 ng/mL  Oxycodone               100 ng/mL    Screening results are to be used only for medical purposes.  Unconfirmed screening results must not be used for non-  medical purposes.   Urine Drugs of Abuse  Screen Panel 1 - Drug Screen (Full)     Status: Abnormal    Narrative    The following orders were created for panel order Urine Drugs of Abuse Screen Panel 1 - Drug Screen (Full).  Procedure                               Abnormality         Status                     ---------                               -----------         ------                     Drugs of Abuse 1 Panel, ...[[id number]]  Abnormal            Final result                 Please view results for these tests on the individual orders.         HISTORY                                                    Problem list reviewed & adjusted, as indicated.  Patient Active Problem List   Diagnosis     Chronic back pain     Essential hypertension     Elevated LFTs     Acute on chronic respiratory failure with hypoxia (H)     Pneumonia due to 2019 novel coronavirus     Atrial fibrillation with rapid ventricular response (H)     Acute respiratory distress syndrome (ARDS) due to COVID-19 virus (H)     Major depressive disorder, recurrent episode, severe (H)     Opioid use disorder, severe, dependence (H)     Polysubstance abuse (H)     ROSSY (obstructive sleep apnea)     Mild intermittent asthma     Acute metabolic encephalopathy     Ventilator associated pneumonia (H)     Oropharyngeal dysphagia     Alcohol dependence (H)     Anxiety         MEDICATION LIST (prior to visit)  acetaminophen (TYLENOL) 32 mg/mL liquid, 650 mg by Oral or Feeding Tube route every 4 hours as needed for fever or mild pain   acetaminophen (TYLENOL) 500 MG tablet, Take 1-2 tablets (500-1,000 mg) by mouth every 6 hours as needed for mild pain  albuterol (PROAIR HFA;PROVENTIL HFA;VENTOLIN HFA) 90 mcg/actuation inhaler, Inhale 2 puffs into the lungs every 4 hours as needed   diclofenac (VOLTAREN) 1 % topical gel, Apply 2 g topically 4 times daily  escitalopram (LEXAPRO) 10 MG tablet, Take 1 tablet (10 mg) by mouth daily Take 1/2 tablet daily for 1 week, then take 10 mg tablet  daily.  hydrOXYzine (ATARAX) 10 MG tablet,   lisinopril (ZESTRIL) 10 MG tablet, Take 1 tablet by mouth daily   Multiple Vitamin (TAB-A-TORIN) TABS, Take 1 tablet by mouth daily   multivitamin w/minerals (CERTAVITE/ANTIOXIDANTS) tablet, Take 1 tablet by mouth daily  naloxone (NARCAN) 4 MG/0.1ML nasal spray, Spray 1 spray (4 mg) into one nostril alternating nostrils 2 times daily as needed for opioid reversal every 2-3 minutes until assistance arrives  nicotine polacrilex (NICORETTE) 4 MG gum, Place 1 each (4 mg) inside cheek every hour as needed for smoking cessation  OLANZapine (ZYPREXA) 10 MG tablet, Take 1 tablet (10 mg) by mouth At Bedtime  OLANZapine (ZYPREXA) 5 MG tablet, Take 1 tablet (5 mg) by mouth every morning in addition to 10 mg at bedtime for ttl: 15 mg/day  metoprolol tartrate (LOPRESSOR) 50 MG tablet, Take 1 tablet (50 mg) by mouth 2 times daily    No current facility-administered medications on file prior to visit.      MEDICATION LIST (after visit)  Current Outpatient Medications   Medication     acetaminophen (TYLENOL) 32 mg/mL liquid     acetaminophen (TYLENOL) 500 MG tablet     albuterol (PROAIR HFA;PROVENTIL HFA;VENTOLIN HFA) 90 mcg/actuation inhaler     buprenorphine HCl-naloxone HCl (SUBOXONE) 4-1 MG per film     diclofenac (VOLTAREN) 1 % topical gel     escitalopram (LEXAPRO) 10 MG tablet     gabapentin (NEURONTIN) 300 MG capsule     hydrOXYzine (ATARAX) 10 MG tablet     lisinopril (ZESTRIL) 10 MG tablet     Multiple Vitamin (TAB-A-TORIN) TABS     multivitamin w/minerals (CERTAVITE/ANTIOXIDANTS) tablet     naloxone (NARCAN) 4 MG/0.1ML nasal spray     nicotine polacrilex (NICORETTE) 4 MG gum     OLANZapine (ZYPREXA) 10 MG tablet     OLANZapine (ZYPREXA) 5 MG tablet     propranolol (INDERAL) 10 MG tablet     metoprolol tartrate (LOPRESSOR) 50 MG tablet     No current facility-administered medications for this visit.         No Known Allergies    Additional MDM Details:  none    Sherry Alvarado,  [facility]  [facility]  [phone]

## 2022-03-11 NOTE — PROGRESS NOTES
Clinic Care Coordination Contact    Community Health Worker Follow Up    Care Gaps:     Health Maintenance Due   Topic Date Due     PREVENTIVE CARE VISIT  Never done     URINE DRUG SCREEN  Never done     ASTHMA ACTION PLAN  Never done     ASTHMA CONTROL TEST  Never done     ADVANCE CARE PLANNING  Never done     DEPRESSION ACTION PLAN  Never done     COVID-19 Vaccine (1) Never done     Pneumococcal Vaccine: Pediatrics (0 to 5 Years) and At-Risk Patients (6 to 64 Years) (1 of 2 - PPSV23) Never done     COLORECTAL CANCER SCREENING  Never done     HIV SCREENING  Never done     HEPATITIS B IMMUNIZATION (1 of 3 - Risk 3-dose series) Never done     LIPID  Never done       Patient accepted scheduling phone number for [facility]  to schedule independently     Goals:   Goals Addressed as of 3/11/2022 at 10:50 AM                    Today       Other (pt-stated)   100%    Added 2/28/22 by Rafaela Lieberman, RN      I have accomplished having an appointment with the ICU Survivorship Program.    Personal Plan  If I have any questions for the ICU Survivorship Program I will call [phone].           Other (pt-stated)   50%    Added 2/28/22 by Rafaela Lieberman, RN      #1 Goal Statement: I would like to have Home Care for PT in the next 30 days.  Date Goal set: 2/28/22  Barriers: home care shortage  Strengths: motivated, family support  Date to Achieve By: 30 days  Patient expressed understanding of goal: yes  Action steps to achieve this goal:  1. [facility] RN gave patient and Jama the phone number to [employer] [phone].  Instructed that I should hear from them by this week.  I will call Renetta if I do not hear anything by Wed.  I will call Carina and/or Rafaela if I have any additional questions regarding my home care referral.-Home Care came out and did an assessment and I was referred to out patient PT. I have my first out patient PT appt March 16th.      Goal Updated: 3/11/22            Plan: DELEGATION-CHW PLEASE  REACH OUT TO PATIENT AND EX-SPOUSE VIKASH NEXT WEEK TO ENSURE HOME CARE [facility] WAS ABLE TO START.--Home Care came out and did an assessment and patient was referred to out patient PT. Patient has my first out patient PT appt March 16th.    Intervention and Education during outreach: N/A    CHW Plan: CHW will follow up with patient in 1 month on 4/12/22.    Carina Zimmerman  Community Health Worker  [facility] Care Coordination   Office: [phone]

## 2022-03-23 ENCOUNTER — PATIENT OUTREACH (OUTPATIENT)
Dept: CARE COORDINATION | Facility: CLINIC | Age: 47
End: 2022-03-23
Payer: COMMERCIAL

## 2022-03-23 NOTE — PROGRESS NOTES
"Clinic Care Coordination Contact    Follow Up Progress Note      Assessment: Upon completing a chart review it was noted that patient \"No Showed\" his OT appointment and has not scheduled his outpatient PT appointment.  He had discussed with both Pulmonology and his mental health professional that he was looking forward to attending this appointment.      Care Gaps:    Health Maintenance Due   Topic Date Due     PREVENTIVE CARE VISIT  Never done     ASTHMA ACTION PLAN  Never done     ASTHMA CONTROL TEST  Never done     ADVANCE CARE PLANNING  Never done     DEPRESSION ACTION PLAN  Never done     COVID-19 Vaccine (1) Never done     Pneumococcal Vaccine: Pediatrics (0 to 5 Years) and At-Risk Patients (6 to 64 Years) (1 of 2 - PPSV23) Never done     COLORECTAL CANCER SCREENING  Never done     HIV SCREENING  Never done     HEPATITIS B IMMUNIZATION (1 of 3 - Risk 3-dose series) Never done     LIPID  Never done           Goals addressed this encounter:   Goals Addressed                    This Visit's Progress       Other (pt-stated)         #1 Goal Statement: I would like to have Home Care for PT in the next 30-60 days.  Date Goal set: 2/28/22  Barriers: home care shortage  Strengths: motivated, family support  Date to Achieve By: 30 days  Patient expressed understanding of goal: yes  Action steps to achieve this goal:  1.  I will call to reschedule my PT & OT appointments [phone].  2.     Goal Updated: 3/23/22              Intervention/Education provided during outreach: CCC RN left a VM for patient instructing him to call outpatient therapy and reschedule \"both PT and OT at [phone] ASAP\"   Outreach Frequency: monthly        Plan:   CCC RN will forward note to mental health providers who speak with patient more often for additional support.  Care Coordinator will follow up in 1 month.  "

## 2022-03-28 DIAGNOSIS — F41.9 ANXIETY: ICD-10-CM

## 2022-03-28 RX ORDER — PROPRANOLOL HYDROCHLORIDE 10 MG/1
TABLET ORAL
Qty: 30 TABLET | Refills: 0 | Status: SHIPPED | OUTPATIENT
Start: 2022-03-28 | End: 2022-05-04

## 2022-03-28 NOTE — TELEPHONE ENCOUNTER
Date of Last Office Visit: 3/11/2022  Date of Next Office Visit: 4/8/2022  No shows since last visit: 0  Cancellations since last visit: 0    Medication requested: Propranolol 10 mg Date last ordered: 3/11/2022 Qty: 15 Refills: 0    Last filled for #15, consider increasing quantity.      Lapse in medication adherence greater than 5 days?: no  If yes, call patient and gather details:     Medication refill request verified as identical to current order?: yes  Result of Last DAM, VPA, Li+ Level, CBC, or Carbamazepine Level (at or since last visit): N/A    Last visit treatment plan:     Anxiety  Bill attends 12 step meetings weekly, but endorse performance anxiety. States he is nervous to talk, he reports rapid heart rate, sweats, and shallow breaths when he knows he will have to talk in front of others. Discussed risks and benefits of propanolol. He would like to give it a try to see of he gets any relief.   -     propranolol (INDERAL) 10 MG tablet; Take 1 tablet (10 mg) by mouth daily as needed     []Medication refilled per  Medication Refill in Ambulatory Care  policy.  [x]Medication unable to be refilled by RN due to criteria not met as indicated below:    []Eligibility - not seen in the last year   []Supervision - no future appointment   []Compliance - no shows, cancellations or lapse in therapy   []Verification - order discrepancy   []Controlled medication   []Medication not included in policy   []90-day supply request   [x]Other RN in orientation

## 2022-04-08 ENCOUNTER — VIRTUAL VISIT (OUTPATIENT)
Dept: BEHAVIORAL HEALTH | Facility: CLINIC | Age: 47
End: 2022-04-08
Attending: FAMILY MEDICINE
Payer: COMMERCIAL

## 2022-04-08 DIAGNOSIS — F41.1 GAD (GENERALIZED ANXIETY DISORDER): ICD-10-CM

## 2022-04-08 DIAGNOSIS — F33.1 MODERATE EPISODE OF RECURRENT MAJOR DEPRESSIVE DISORDER (H): ICD-10-CM

## 2022-04-08 DIAGNOSIS — G89.29 CHRONIC LOW BACK PAIN WITH RIGHT-SIDED SCIATICA, UNSPECIFIED BACK PAIN LATERALITY: ICD-10-CM

## 2022-04-08 DIAGNOSIS — M54.41 CHRONIC LOW BACK PAIN WITH RIGHT-SIDED SCIATICA, UNSPECIFIED BACK PAIN LATERALITY: ICD-10-CM

## 2022-04-08 DIAGNOSIS — F11.20 OPIOID USE DISORDER, SEVERE, DEPENDENCE (H): Primary | ICD-10-CM

## 2022-04-08 PROCEDURE — 99214 OFFICE O/P EST MOD 30 MIN: CPT | Mod: 95 | Performed by: NURSE PRACTITIONER

## 2022-04-08 RX ORDER — BUPRENORPHINE AND NALOXONE 4; 1 MG/1; MG/1
1 FILM, SOLUBLE BUCCAL; SUBLINGUAL 3 TIMES DAILY
Qty: 90 FILM | Refills: 0 | Status: SHIPPED | OUTPATIENT
Start: 2022-04-08 | End: 2022-06-24 | Stop reason: DRUGHIGH

## 2022-04-08 RX ORDER — GABAPENTIN 300 MG/1
900 CAPSULE ORAL 3 TIMES DAILY
Qty: 270 CAPSULE | Refills: 0 | Status: SHIPPED | OUTPATIENT
Start: 2022-04-08 | End: 2022-05-04

## 2022-04-08 RX ORDER — ESCITALOPRAM OXALATE 10 MG/1
10 TABLET ORAL DAILY
Qty: 90 TABLET | Refills: 1 | Status: SHIPPED | OUTPATIENT
Start: 2022-04-08 | End: 2022-05-04

## 2022-04-08 ASSESSMENT — ANXIETY QUESTIONNAIRES
4. TROUBLE RELAXING: SEVERAL DAYS
6. BECOMING EASILY ANNOYED OR IRRITABLE: MORE THAN HALF THE DAYS
7. FEELING AFRAID AS IF SOMETHING AWFUL MIGHT HAPPEN: SEVERAL DAYS
1. FEELING NERVOUS, ANXIOUS, OR ON EDGE: MORE THAN HALF THE DAYS
7. FEELING AFRAID AS IF SOMETHING AWFUL MIGHT HAPPEN: SEVERAL DAYS
3. WORRYING TOO MUCH ABOUT DIFFERENT THINGS: MORE THAN HALF THE DAYS
5. BEING SO RESTLESS THAT IT IS HARD TO SIT STILL: NOT AT ALL
GAD7 TOTAL SCORE: 9
2. NOT BEING ABLE TO STOP OR CONTROL WORRYING: SEVERAL DAYS
GAD7 TOTAL SCORE: 9
GAD7 TOTAL SCORE: 9

## 2022-04-08 ASSESSMENT — PATIENT HEALTH QUESTIONNAIRE - PHQ9
SUM OF ALL RESPONSES TO PHQ QUESTIONS 1-9: 11
SUM OF ALL RESPONSES TO PHQ QUESTIONS 1-9: 11
10. IF YOU CHECKED OFF ANY PROBLEMS, HOW DIFFICULT HAVE THESE PROBLEMS MADE IT FOR YOU TO DO YOUR WORK, TAKE CARE OF THINGS AT HOME, OR GET ALONG WITH OTHER PEOPLE: VERY DIFFICULT

## 2022-04-08 NOTE — PROGRESS NOTES
[facility] Addiction Medicine    A/P                                                    ASSESSMENT/PLAN  Diagnoses and all orders for this visit:  Opioid use disorder, severe, dependence (H)  Bill denies any substance use, he does take CBD gummies for pain and anxiety. He denies any cravings or triggers to use. He attends weekly AA meetings online. He is taking suboxone 4 mg TID, continue.   -     buprenorphine HCl-naloxone HCl (SUBOXONE) 4-1 MG per film; Place 1 Film under the tongue 3 times daily Okay for early refill  Moderate to severe episode of recurrent major depressive disorder (H)  Feels down a lot, would benefit from individual therapy. Referral placed a prior visit. Encouraged to call and schedule an appointment. Continue lexapro.   -     escitalopram (LEXAPRO) 10 MG tablet; Take 1 tablet (10 mg) by mouth daily Take 1/2 tablet daily for 1 week, then take 10 mg tablet daily.  DONALD (generalized anxiety disorder)  Continues to endorse anxiety, CBD gummies help. Encouaraged to schedule a follow up with Elvia in [facility]. Referral for long term psych placed at prior visit, encouraged him to schedule.   -     escitalopram (LEXAPRO) 10 MG tablet; Take 1 tablet (10 mg) by mouth daily Take 1/2 tablet daily for 1 week, then take 10 mg tablet daily.  Chronic low back pain with right-sided sciatica, unspecified back pain laterality  He has been taking gabapentin long-term for chronic back pain with sciatica, diagnosis updated for medication. Continue.   -     gabapentin (NEURONTIN) 300 MG capsule; Take 3 capsules (900 mg) by mouth 3 times daily    Orders Placed This Encounter   Medications     buprenorphine HCl-naloxone HCl (SUBOXONE) 4-1 MG per film     Sig: Place 1 Film under the tongue 3 times daily Okay for early refill     Dispense:  90 Film     Refill:  0     NADEAN: [id number]     gabapentin (NEURONTIN) 300 MG capsule     Sig: Take 3 capsules (900 mg) by mouth 3 times daily     Dispense:   270 capsule     Refill:  0     escitalopram (LEXAPRO) 10 MG tablet     Sig: Take 1 tablet (10 mg) by mouth daily Take 1/2 tablet daily for 1 week, then take 10 mg tablet daily.     Dispense:  90 tablet     Refill:  1       Problem list updated Apr 8, 2022   Problem   Moderate to severe episode of recurrent major depressive disorder (H)   Nito (Generalized Anxiety Disorder)         PDMP Review       Value Time User    State PDMP site checked  Yes 4/8/2022 11:09 AM Sherry Alvarado CNP            RTC  Return in about 26 days (around 5/4/2022), or Please schedule at 1 pm, for Follow up, in person, with me.      Counseled the patient on the importance of having a recovery program in addition to medication to manage recovery.  Components include avoiding isolating, having willingness to change, avoiding triggers and managing cravings. Encouraged having some type of sober network and practicing honesty with trusted support person(s). Encouraged other services such as counseling, 12 step or other self-help organizations.      Opioid warning reviewed.  Risk of overdose following a period of abstinence due to decrease tolerance was discussed including risk of death.  Strongly recommended abstain from alcohol, benzodiazepines, THC, opioids and other drugs of abuse.  Increased risk of return to opioid use after use of these substances discussed.  Increased risk of overdose/death with use of other substances particularly benzodiazepines/alcohol reviewed.        SUBJECTIVE                                                    Jared North is a 46 year old male who presents to clinic today for follow up    Visit performed Virtual, via video    Video-Visit Details    Type of service:  Video Visit    Video Start Time: 11:10 AM  Video End Time: 11:34 AM    Originating Location (pt. Location): Home    Distant Location (provider location):  [place] [facility]     Platform used for Video Visit: Homer WALDEN  Details:3/11/22  Jared North is a 46 year old male with a history of recent Covid 19 injection, ARDS, Acute respiratory failure with hypoxia, MDD, Chronic back pain, and opioid use disorder who is being seen today for a follow up. He is taking suboxone 12 mg daily.      -Denies substance use. Except for CBD gummies. Take for pain.   -May be moving soon, wife wants to move to [place]. Busy packing and cleaning.  -Rent is going up 200.00, so they are looking to move.   -Feeling a little better, working on endurance  -Waiting for outpatient PT and OT to start. Getting around very well.   -Neuropathic pain to right quad. On gabapentin, provides pain relief. States he exceeded his gabapentin dose due to pain. Normally does not take more than prescribed  -Pinched nerve in L5-s1 causing pain to right leg.   -Discussed how naloxone works in suboxone.   -Reports anxiety at meetings due to speaking in front of others symptoms include racing heart, sweats, shallow breaths.   -Mood is improving taking full dose of olanzapine as prescribed.   -Plans to visit niece in [place] this weekend.   Opioid use disorder, severe, dependence (H)  Jeremías is stable on suboxone 12 mg  for the pas 2 years. He denies substance use, cravings, or triggers. Continue suboxone 4 mg TID.   -     Urine Drugs of Abuse Screen Panel 1 - Drug Screen (Full); Standing  -     Buprenorphine Urine, Qualitative; Future  -     buprenorphine HCl-naloxone HCl (SUBOXONE) 4-1 MG per film; Place 1 Film under the tongue 3 times daily Okay for early refill  Acute metabolic encephalopathy  He has neuropathic pain to right leg 2/2 reports relief using gabapentin. Refill provided.   -     gabapentin (NEURONTIN) 300 MG capsule; Take 3 capsules (900 mg) by mouth 3 times daily  Anxiety  Jeremías attends 12 step meetings weekly, but endorse performance anxiety. States he is nervous to talk, he reports rapid heart rate, sweats, and shallow breaths when he knows he will have to  talk in front of others. Discussed risks and benefits of propanolol. He would like to give it a try to see of he gets any relief.   -     propranolol (INDERAL) 10 MG tablet; Take 1 tablet (10 mg) by mouth daily as needed (anxiety)  TODAY'S VISIT  HPI Apr 8, 2022  Jared North is a 46 year old male with a history of recent Covid 19 injection, ARDS, Acute respiratory failure with hypoxia, MDD, Chronic back pain, and opioid use disorder who is being seen today for a follow up. He is taking suboxone 12 mg daily.    Brief History: He was hospitalized from 12/31/21-1/24/22 for Covid 19 infection with complications. He required tracheostomy and ventilator support, and GT tube placement which were discontinued prior to his discharge to home. He reports being on methadone maintenance for 10-15 years at [facility]. He states he was able to abstain from heroin for awhile but alcohol consumption increased significantly . He was drinking 1 box of wine daily and using 1/2-1 gram heroin until he went to inpatient treatment in December of 2019.  He has not drank or using heroin since April 2020.       -still looking for a place to move to, needs to move this Summer.  -Stopped PT/OT. Doing things on his own. Endurance and strength slowly better.   -Did not try propranolol, has not needed.   -Attending online AA meetings once per week.   -Denies any substance use. Takes CBD gummies for pain/anxiety. Helps a little.   -Sleeping okay.  -Appetite is good.   -Feels down a lot, and anxious. Would like therapy.   -Denies cravings or side effects.  -Taking suboxone 12 mg daily, working well.   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       OBJECTIVE                                                    PHYSICAL EXAM:  There were no vitals taken for this visit.    GENERAL: healthy, alert and no distress  EYES: Eyes grossly normal to inspection, PERRL and conjunctivae and sclerae  normal  RESP: No respiratory distress  MENTAL STATUS EXAM  Appearance/Behavior: No appearant distress  Speech: Normal  Mood/Affect: normal affect  Insight: Adequate    LAB  No results found for any visits on 04/08/22.  PHQ 2/11/2022 4/8/2022   PHQ-9 Total Score 17 11   Q9: Thoughts of better off dead/self-harm past 2 weeks Not at all Not at all     DONALD-7 SCORE 2/11/2022 2/18/2022 4/8/2022   Total Score - 14 (moderate anxiety) 9 (mild anxiety)   Total Score 18 14 9           HISTORY                                                    Problem list reviewed & adjusted, as indicated.  Patient Active Problem List   Diagnosis     Chronic back pain     Essential hypertension     Elevated LFTs     Acute on chronic respiratory failure with hypoxia (H)     Pneumonia due to 2019 novel coronavirus     Atrial fibrillation with rapid ventricular response (H)     Acute respiratory distress syndrome (ARDS) due to COVID-19 virus (H)     Major depressive disorder, recurrent episode, severe (H)     Opioid use disorder, severe, dependence (H)     Polysubstance abuse (H)     ROSSY (obstructive sleep apnea)     Mild intermittent asthma     Acute metabolic encephalopathy     Ventilator associated pneumonia (H)     Oropharyngeal dysphagia     Alcohol dependence (H)     Anxiety     Moderate to severe episode of recurrent major depressive disorder (H)     DONALD (generalized anxiety disorder)         MEDICATION LIST (prior to visit)  naloxone (NARCAN) 4 MG/0.1ML nasal spray, Spray 1 spray (4 mg) into one nostril alternating nostrils 2 times daily as needed for opioid reversal every 2-3 minutes until assistance arrives  OLANZapine (ZYPREXA) 10 MG tablet, Take 1 tablet (10 mg) by mouth At Bedtime  OLANZapine (ZYPREXA) 5 MG tablet, Take 1 tablet (5 mg) by mouth every morning in addition to 10 mg at bedtime for ttl: 15 mg/day  propranolol (INDERAL) 10 MG tablet, TAKE 1 TABLET(10 MG) BY MOUTH DAILY AS NEEDED FOR ANXIETY  acetaminophen (TYLENOL) 32  mg/mL liquid, 650 mg by Oral or Feeding Tube route every 4 hours as needed for fever or mild pain   acetaminophen (TYLENOL) 500 MG tablet, Take 1-2 tablets (500-1,000 mg) by mouth every 6 hours as needed for mild pain  albuterol (PROAIR HFA;PROVENTIL HFA;VENTOLIN HFA) 90 mcg/actuation inhaler, Inhale 2 puffs into the lungs every 4 hours as needed   diclofenac (VOLTAREN) 1 % topical gel, Apply 2 g topically 4 times daily  hydrOXYzine (ATARAX) 10 MG tablet,   lisinopril (ZESTRIL) 10 MG tablet, Take 1 tablet by mouth daily   metoprolol tartrate (LOPRESSOR) 50 MG tablet, Take 1 tablet (50 mg) by mouth 2 times daily  Multiple Vitamin (TAB-A-TORIN) TABS, Take 1 tablet by mouth daily   multivitamin w/minerals (CERTAVITE/ANTIOXIDANTS) tablet, Take 1 tablet by mouth daily  nicotine polacrilex (NICORETTE) 4 MG gum, Place 1 each (4 mg) inside cheek every hour as needed for smoking cessation    No current facility-administered medications on file prior to visit.      MEDICATION LIST (after visit)  Current Outpatient Medications   Medication     buprenorphine HCl-naloxone HCl (SUBOXONE) 4-1 MG per film     escitalopram (LEXAPRO) 10 MG tablet     gabapentin (NEURONTIN) 300 MG capsule     naloxone (NARCAN) 4 MG/0.1ML nasal spray     OLANZapine (ZYPREXA) 10 MG tablet     OLANZapine (ZYPREXA) 5 MG tablet     propranolol (INDERAL) 10 MG tablet     acetaminophen (TYLENOL) 32 mg/mL liquid     acetaminophen (TYLENOL) 500 MG tablet     albuterol (PROAIR HFA;PROVENTIL HFA;VENTOLIN HFA) 90 mcg/actuation inhaler     diclofenac (VOLTAREN) 1 % topical gel     hydrOXYzine (ATARAX) 10 MG tablet     lisinopril (ZESTRIL) 10 MG tablet     metoprolol tartrate (LOPRESSOR) 50 MG tablet     Multiple Vitamin (TAB-A-TORIN) TABS     multivitamin w/minerals (CERTAVITE/ANTIOXIDANTS) tablet     nicotine polacrilex (NICORETTE) 4 MG gum     No current facility-administered medications for this visit.         No Known Allergies    Additional MDM  Details:  none    Sherry Alvarado CNP  [facility]  [facility]  [phone]        Answers for HPI/ROS submitted by the patient on 4/8/2022  If you checked off any problems, how difficult have these problems made it for you to do your work, take care of things at home, or get along with other people?: Very difficult  PHQ9 TOTAL SCORE: 11  DONALD 7 TOTAL SCORE: 9

## 2022-04-08 NOTE — PROGRESS NOTES
This video/telephone visit will be conducted via a call between you and your physician/provider. We have found that certain health care needs can be provided without the need for an in-person physical exam. This service lets us provide the care you need with a video /telephone conversation. If a prescription is necessary we can send it directly to your pharmacy. If lab work is needed we can place an order for that and you can then stop by our lab to have the test done at a later time.    Just as we bill insurance for in-person visits, we also bill insurance for video/telephone visits. If you have questions about your insurance coverage, we recommend that you speak with your insurance company.    Patient has given verbal consent for video/Telephone visit? yes    Patient would like video visit, please connect : [employer]  Reason for visit: routine follow up  Patient verified allergies, medications and pharmacy via [employer]     Patient states he is ready for visit.    Meena Lee April 8, 2022 11:06 AM    [facility] Post-Appointment Cart -check      Correct pharmacy verified with patient and updated in chart? [x] yes []no    Charge captured ? [] yes  [x] no [] n/a-virtual     Medications ordered this visit were e-scribed.  Verified by order class [x] yes  [] no    List Medications:    SUBOXONE) 4-1 MG per film  gabapentin (NEURONTIN) 300 MG capsule   escitalopram (LEXAPRO) 10 MG tablet  Class: E-Prescribe    Medication changes or discontinuations were communicated to patient's pharmacy: [] yes  [x] no    UA collected [] yes  [x] no  [] n/a-virtual     Outside referrals / labs, etc support staff to follow up: [] yes  [x] no    Future appointment was made: [] yes  [x] no  [] n/a    Dictation completed at time of chart check: [x] yes  [] no    I have checked the documentation for today s encounters and the above information has been reviewed and completed.      Meena Lee on April 11, 2022 at 9:01 AM

## 2022-04-09 ASSESSMENT — PATIENT HEALTH QUESTIONNAIRE - PHQ9: SUM OF ALL RESPONSES TO PHQ QUESTIONS 1-9: 11

## 2022-04-09 ASSESSMENT — ANXIETY QUESTIONNAIRES: GAD7 TOTAL SCORE: 9

## 2022-04-15 ENCOUNTER — PATIENT OUTREACH (OUTPATIENT)
Dept: CARE COORDINATION | Facility: CLINIC | Age: 47
End: 2022-04-15
Payer: COMMERCIAL

## 2022-04-15 NOTE — PROGRESS NOTES
Clinic Care Coordination Contact  [facility]/Voicemail       Clinical Data: Care Coordinator Outreach  Outreach attempted x 1.  Left message on patient's voicemail with call back information and requested return call.  Plan: Care Coordinator will try to reach patient again in 10 business days.    ** PT and OT at [phone]     Next CHW outreach date: 4/27/22

## 2022-04-27 ENCOUNTER — TELEPHONE (OUTPATIENT)
Dept: BEHAVIORAL HEALTH | Facility: CLINIC | Age: 47
End: 2022-04-27
Payer: COMMERCIAL

## 2022-04-27 DIAGNOSIS — F33.1 MODERATE EPISODE OF RECURRENT MAJOR DEPRESSIVE DISORDER (H): ICD-10-CM

## 2022-04-27 DIAGNOSIS — F41.1 GAD (GENERALIZED ANXIETY DISORDER): ICD-10-CM

## 2022-04-27 RX ORDER — OLANZAPINE 5 MG/1
5 TABLET ORAL EVERY MORNING
Qty: 30 TABLET | Refills: 0 | Status: SHIPPED | OUTPATIENT
Start: 2022-04-27 | End: 2022-05-26

## 2022-04-27 NOTE — TELEPHONE ENCOUNTER
Elvia Brown, APRN CNP 7 minutes ago (3:58 PM)     AK       This patient hasn't followed up at the [facility] again since 2/2022, please coordinate rescheduling.          Wtr notes that pt has also not been scheduled for long-term psychiatry from order submitted 2/11/22 by BARI Alvarado.     Wtr sent pt Jack msg to call [facility] to schedule long-term psychiatry & to call TC to schedule return appt PRN.    Vale Christian RN on 4/27/2022 at 4:09 PM

## 2022-04-27 NOTE — TELEPHONE ENCOUNTER
Date of Last Office Visit: 4/8/22  Date of Next Office Visit: None (with Elvia Brown or Long term care provider)  No shows since last visit: 0  Cancellations since last visit: 0    Medication requested: OLANZapine (ZYPREXA) 5 MG tablet Date last ordered: 2/24/22 Qty: 30 Refills: 1       Review of MN ?: NA      Lapse in medication adherence greater than 5 days?: NO  Medication refill request verified as identical to current order?: Yes  Result of Last DAM, VPA, Li+ Level, CBC, or Carbamazepine Level (at or since last visit): N/A    Last visit treatment plan:      Medications:  Start:  Zyprexa 5 mg in am + 10 mg at bedtime for TDD: 15 mg/day (increased from 10 mg)     Continue:  Lexapro 10 mg daily     Hydroxyzine 10 to 30 mg as needed for anxiety (states refill isn't needed at this time)     Return to Clinic or Referrals:  -Please follow up at the [facility] for medication management in: 4 to 6 weeks         []Medication refilled per  Medication Refill in Ambulatory Care  policy.  [x]Medication unable to be refilled by RN due to criteria not met as indicated below:    []Eligibility - not seen in the last year   []Supervision - no future appointment   []Compliance - no shows, cancellations or lapse in therapy   []Verification - order discrepancy   []Controlled medication   [x]Medication not included in policy   []90-day supply request   []Other

## 2022-04-27 NOTE — TELEPHONE ENCOUNTER
This patient hasn't followed up at the [facility] again since 2/2022, please coordinate rescheduling.    The patient also needs a referral to long term outpatient psychiatry, please assist.

## 2022-04-28 ENCOUNTER — TELEPHONE (OUTPATIENT)
Dept: BEHAVIORAL HEALTH | Facility: CLINIC | Age: 47
End: 2022-04-28
Payer: COMMERCIAL

## 2022-04-28 NOTE — TELEPHONE ENCOUNTER
First attempt to reach patient to offer scheduling assistance for long term community Psychiatry (per request of Elvia Singh). Left voicemail requesting call back to TC.    Concepcion Zavala  [facility] Coordinator  Date and Time: 04/28/22 2:54 PM

## 2022-05-02 ENCOUNTER — TELEPHONE (OUTPATIENT)
Dept: BEHAVIORAL HEALTH | Facility: CLINIC | Age: 47
End: 2022-05-02
Payer: COMMERCIAL

## 2022-05-02 NOTE — TELEPHONE ENCOUNTER
Second attempt to reach patient to offer scheduling assistance for long term community Psychiatry (per request of Elvia Singh). Left voicemail requesting call back to [facility].    Concepcion Zavala  [facility] Coordinator  Date and Time: 05/02/22 10:20 AM

## 2022-05-03 ENCOUNTER — TELEPHONE (OUTPATIENT)
Dept: BEHAVIORAL HEALTH | Facility: CLINIC | Age: 47
End: 2022-05-03
Payer: COMMERCIAL

## 2022-05-03 NOTE — TELEPHONE ENCOUNTER
Third (and final) attempt to reach patient to offer scheduling assistance for long term community Psychiatry (per request of Elvia Singh). Left voicemail requesting call back to TC.    [employer] message also sent to patient.     Note will be routed to TC RN and Elvia Singh as FYI that patient was not reached to schedule.    Concepcion Zavala  [facility] Coordinator  Date and Time: 05/03/22 10:28 AM

## 2022-05-04 ENCOUNTER — PATIENT OUTREACH (OUTPATIENT)
Dept: CARE COORDINATION | Facility: CLINIC | Age: 47
End: 2022-05-04
Payer: COMMERCIAL

## 2022-05-04 ENCOUNTER — OFFICE VISIT (OUTPATIENT)
Dept: BEHAVIORAL HEALTH | Facility: CLINIC | Age: 47
End: 2022-05-04
Payer: COMMERCIAL

## 2022-05-04 VITALS
DIASTOLIC BLOOD PRESSURE: 84 MMHG | SYSTOLIC BLOOD PRESSURE: 125 MMHG | BODY MASS INDEX: 28.87 KG/M2 | HEART RATE: 92 BPM | WEIGHT: 207 LBS

## 2022-05-04 DIAGNOSIS — Z72.0 NICOTINE USE: ICD-10-CM

## 2022-05-04 DIAGNOSIS — G89.29 CHRONIC LOW BACK PAIN WITH RIGHT-SIDED SCIATICA, UNSPECIFIED BACK PAIN LATERALITY: ICD-10-CM

## 2022-05-04 DIAGNOSIS — J45.20 MILD INTERMITTENT ASTHMA WITHOUT COMPLICATION: ICD-10-CM

## 2022-05-04 DIAGNOSIS — M54.41 CHRONIC LOW BACK PAIN WITH RIGHT-SIDED SCIATICA, UNSPECIFIED BACK PAIN LATERALITY: ICD-10-CM

## 2022-05-04 DIAGNOSIS — F11.20 OPIOID USE DISORDER, SEVERE, DEPENDENCE (H): Primary | ICD-10-CM

## 2022-05-04 DIAGNOSIS — F11.20 OPIOID DEPENDENCE, CONTINUOUS (H): ICD-10-CM

## 2022-05-04 DIAGNOSIS — F41.1 GAD (GENERALIZED ANXIETY DISORDER): ICD-10-CM

## 2022-05-04 DIAGNOSIS — F33.1 MODERATE EPISODE OF RECURRENT MAJOR DEPRESSIVE DISORDER (H): ICD-10-CM

## 2022-05-04 DIAGNOSIS — F41.9 ANXIETY: ICD-10-CM

## 2022-05-04 LAB
AMPHETAMINES UR QL SCN: ABNORMAL
BARBITURATES UR QL: ABNORMAL
BENZODIAZ UR QL: ABNORMAL
BUPRENORPHINE QUAL URINE LHE: ABNORMAL
CANNABINOIDS UR QL SCN: ABNORMAL
COCAINE UR QL: ABNORMAL
CREAT UR-MCNC: 236 MG/DL
CREAT UR-MCNC: 241 MG/DL
OPIATES UR QL SCN: ABNORMAL
OXYCODONE UR QL: ABNORMAL
PCP UR QL SCN: ABNORMAL

## 2022-05-04 PROCEDURE — 80307 DRUG TEST PRSMV CHEM ANLYZR: CPT | Performed by: NURSE PRACTITIONER

## 2022-05-04 PROCEDURE — 99214 OFFICE O/P EST MOD 30 MIN: CPT | Performed by: NURSE PRACTITIONER

## 2022-05-04 RX ORDER — BUPROPION HYDROCHLORIDE 150 MG/1
150 TABLET ORAL EVERY MORNING
Qty: 30 TABLET | Refills: 1 | Status: SHIPPED | OUTPATIENT
Start: 2022-05-04 | End: 2022-07-06

## 2022-05-04 RX ORDER — HYDROXYZINE HYDROCHLORIDE 10 MG/1
10 TABLET, FILM COATED ORAL EVERY 6 HOURS PRN
Qty: 90 TABLET | Refills: 3 | Status: SHIPPED | OUTPATIENT
Start: 2022-05-04 | End: 2022-10-14

## 2022-05-04 RX ORDER — PROPRANOLOL HYDROCHLORIDE 10 MG/1
TABLET ORAL
Qty: 60 TABLET | Refills: 0 | Status: SHIPPED | OUTPATIENT
Start: 2022-05-04 | End: 2022-06-02

## 2022-05-04 RX ORDER — BUPRENORPHINE AND NALOXONE 8; 2 MG/1; MG/1
1 FILM, SOLUBLE BUCCAL; SUBLINGUAL 2 TIMES DAILY
Qty: 60 FILM | Refills: 1 | Status: SHIPPED | OUTPATIENT
Start: 2022-05-04 | End: 2022-06-24

## 2022-05-04 RX ORDER — ALBUTEROL SULFATE 90 UG/1
2 AEROSOL, METERED RESPIRATORY (INHALATION) EVERY 4 HOURS PRN
Qty: 18 G | Refills: 1 | Status: SHIPPED | OUTPATIENT
Start: 2022-05-04

## 2022-05-04 RX ORDER — ESCITALOPRAM OXALATE 10 MG/1
10 TABLET ORAL DAILY
Qty: 90 TABLET | Refills: 1 | Status: SHIPPED | OUTPATIENT
Start: 2022-05-04 | End: 2022-07-21

## 2022-05-04 RX ORDER — BUPRENORPHINE AND NALOXONE 4; 1 MG/1; MG/1
1 FILM, SOLUBLE BUCCAL; SUBLINGUAL 3 TIMES DAILY
Qty: 90 FILM | Refills: 0 | Status: CANCELLED | OUTPATIENT
Start: 2022-05-04

## 2022-05-04 RX ORDER — GABAPENTIN 300 MG/1
900 CAPSULE ORAL 3 TIMES DAILY
Qty: 270 CAPSULE | Refills: 1 | Status: SHIPPED | OUTPATIENT
Start: 2022-05-04 | End: 2022-06-24

## 2022-05-04 ASSESSMENT — ANXIETY QUESTIONNAIRES
2. NOT BEING ABLE TO STOP OR CONTROL WORRYING: NEARLY EVERY DAY
7. FEELING AFRAID AS IF SOMETHING AWFUL MIGHT HAPPEN: MORE THAN HALF THE DAYS
6. BECOMING EASILY ANNOYED OR IRRITABLE: NEARLY EVERY DAY
5. BEING SO RESTLESS THAT IT IS HARD TO SIT STILL: SEVERAL DAYS
4. TROUBLE RELAXING: SEVERAL DAYS
1. FEELING NERVOUS, ANXIOUS, OR ON EDGE: NEARLY EVERY DAY
3. WORRYING TOO MUCH ABOUT DIFFERENT THINGS: NEARLY EVERY DAY
IF YOU CHECKED OFF ANY PROBLEMS ON THIS QUESTIONNAIRE, HOW DIFFICULT HAVE THESE PROBLEMS MADE IT FOR YOU TO DO YOUR WORK, TAKE CARE OF THINGS AT HOME, OR GET ALONG WITH OTHER PEOPLE: EXTREMELY DIFFICULT
GAD7 TOTAL SCORE: 16

## 2022-05-04 ASSESSMENT — PATIENT HEALTH QUESTIONNAIRE - PHQ9: SUM OF ALL RESPONSES TO PHQ QUESTIONS 1-9: 19

## 2022-05-04 NOTE — PROGRESS NOTES
Patient in clinic for medication follow up  [place]  to be reviewed by provider.   Medication refill is pended for review and approval by provider.    DONALD-7 scores:    DONALD-7 SCORE 4/8/2022 5/4/2022   Total Score 9 (mild anxiety) -   Total Score 9 16       PHQ-9 scores:   PHQ-9 SCORE 4/8/2022 5/4/2022   PHQ-9 Total Score MyChart 11 (Moderate depression) -   PHQ-9 Total Score 11 19         Alma Knox CMA on 5/4/2022 at 1:08 PM

## 2022-05-04 NOTE — PROGRESS NOTES
Clinic Care Coordination Contact  [facility]/Voicemail       Clinical Data: Care Coordinator Outreach  Outreach attempted x 1.  Left message on patient's voicemail with call back information and requested return call.  Previous unable to contact by CHW on 4/15/22  Plan: Care Coordinator will send Dis-enrollment letter with care coordinator contact information via [employer]. Care Coordinator will do no further outreaches at this time.

## 2022-05-04 NOTE — PROGRESS NOTES
MH&A Post-Appointment Cart -check      Correct pharmacy verified with patient and updated in chart? [x] yes []no    Charge captured ? [x] yes  [] no [] n/a-virtual     Medications ordered this visit were e-scribed.  Verified by order class [x] yes  [] no    List Medications: Propranolol 10mg, Suboxone 8-2mg, Wellbutrin 150mg, lexapro 10mg, gabapentin 300mg, albuterol inhaler,     Medication changes or discontinuations were communicated to patient's pharmacy: [] yes  [x] no    UA collected [x] yes  [] no  [] n/a-virtual     Outside referrals / labs, etc support staff to follow up: [] yes  [x] no    Future appointment was made: [x] yes  [] no  [] n/a    Dictation completed at time of chart check: [x] yes  [] no    I have checked the documentation for today s encounters and the above information has been reviewed and completed.      Alma Knox CMA on May 4, 2022 at 3:22 PM

## 2022-05-04 NOTE — PROGRESS NOTES
[facility] Addiction Medicine    A/P                                                    ASSESSMENT/PLAN  Diagnoses and all orders for this visit:    Jeremías wright just celebrated 2 years of sobriety around [facility].  He is experiencing some withdrawal symptoms in the middle of the night, he also does not think Suboxone is covering his pain at the current dose any longer.  Plan to increase Suboxone to 8 mg twice daily.  He may split second dose into 2 doses if needed.  Discussed side effects of Suboxone and tooth problems, he is concerned about tooth decay and has a few teeth that need to be pulled.  Encouraged him to rinse mouth after dissolving films and brushing teeth.  Also encouraged drinking water throughout the day.  He is interested in Sublocade injections in future.  Discussed risks and benefits.  He is experiencing ongoing depression and anxiety symptoms.  He was previously on Wellbutrin 300 mg daily this was discontinued in the hospital and he is not sure completely sure why.  He felt Wellbutrin was more effective than Lexapro.  Plan to add Wellbutrin 150 mg, and continue Lexapro to augment therapy.  He has not established with psychiatry has had difficulty logging into [employer] to get recommendations for therapy.     He continues to take gabapentin 900 mg 3 times daily for nerve pain.  Referral placed for pain clinic.  Number provided for him to call and schedule appointment.  Plan to change prescription at next visit to 600 mg 4 times daily.    Opioid use disorder, severe, dependence (H)  -     buprenorphine HCl-naloxone HCl (SUBOXONE) 8-2 MG per film; Place 1 Film under the tongue 2 times daily  -     Urine Drugs of Abuse Screen Panel 1 - Drug Screen (Full); Future  -     Buprenorphine Urine, Qualitative; Future  Anxiety  -     propranolol (INDERAL) 10 MG tablet; TAKE 1-2 TABLET BY MOUTH DAILY AS NEEDED FOR ANXIETY  -     hydrOXYzine (ATARAX) 10 MG tablet; Take 1 tablet (10 mg) by mouth every 6  hours as needed for itching  Chronic low back pain with right-sided sciatica, unspecified back pain laterality  -     gabapentin (NEURONTIN) 300 MG capsule; Take 3 capsules (900 mg) by mouth 3 times daily  -     diclofenac (VOLTAREN) 1 % topical gel; Apply 2 g topically 4 times daily  -     Pain Management Referral; Future  Moderate to severe episode of recurrent major depressive disorder (H)  -     escitalopram (LEXAPRO) 10 MG tablet; Take 1 tablet (10 mg) by mouth daily Take 1/2 tablet daily for 1 week, then take 10 mg tablet daily.  -     buPROPion (WELLBUTRIN XL) 150 MG 24 hr tablet; Take 1 tablet (150 mg) by mouth every morning  DONALD (generalized anxiety disorder)  -     escitalopram (LEXAPRO) 10 MG tablet; Take 1 tablet (10 mg) by mouth daily Take 1/2 tablet daily for 1 week, then take 10 mg tablet daily.  Nicotine use  -     nicotine (NICOTROL) 10 MG inhaler; Use 1 cartridge as needed for urge to smoke by puffing over course of 20min.  Use 6-16 cart/day; reduce number of cart/day over 6-12 weeks.  Mild intermittent asthma without complication  -     albuterol (PROAIR HFA/PROVENTIL HFA/VENTOLIN HFA) 108 (90 Base) MCG/ACT inhaler; Inhale 2 puffs into the lungs every 4 hours as needed for shortness of breath / dyspnea    Orders Placed This Encounter   Medications     propranolol (INDERAL) 10 MG tablet     Sig: TAKE 1-2 TABLET BY MOUTH DAILY AS NEEDED FOR ANXIETY     Dispense:  60 tablet     Refill:  0     gabapentin (NEURONTIN) 300 MG capsule     Sig: Take 3 capsules (900 mg) by mouth 3 times daily     Dispense:  270 capsule     Refill:  1     diclofenac (VOLTAREN) 1 % topical gel     Sig: Apply 2 g topically 4 times daily     Dispense:  350 g     Refill:  0     hydrOXYzine (ATARAX) 10 MG tablet     Sig: Take 1 tablet (10 mg) by mouth every 6 hours as needed for itching     Dispense:  90 tablet     Refill:  3     escitalopram (LEXAPRO) 10 MG tablet     Sig: Take 1 tablet (10 mg) by mouth daily Take 1/2 tablet  daily for 1 week, then take 10 mg tablet daily.     Dispense:  90 tablet     Refill:  1     buprenorphine HCl-naloxone HCl (SUBOXONE) 8-2 MG per film     Sig: Place 1 Film under the tongue 2 times daily     Dispense:  60 Film     Refill:  1     NADEAN: [id number]     nicotine (NICOTROL) 10 MG inhaler     Sig: Use 1 cartridge as needed for urge to smoke by puffing over course of 20min.  Use 6-16 cart/day; reduce number of cart/day over 6-12 weeks.     Dispense:  168 each     Refill:  1     albuterol (PROAIR HFA/PROVENTIL HFA/VENTOLIN HFA) 108 (90 Base) MCG/ACT inhaler     Sig: Inhale 2 puffs into the lungs every 4 hours as needed for shortness of breath / dyspnea     Dispense:  18 g     Refill:  1     Pharmacy may dispense brand covered by insurance (Proair, or proventil or ventolin or generic albuterol inhaler)     buPROPion (WELLBUTRIN XL) 150 MG 24 hr tablet     Sig: Take 1 tablet (150 mg) by mouth every morning     Dispense:  30 tablet     Refill:  1       Problem list updated May 4, 2022   No problems updated.      PDMP Review       Value Time User    State PDMP site checked  Yes 5/4/2022  2:22 PM Sherry Alvarado CNP            RTC  Return in about 2 months (around 7/4/2022) for Follow up, using a video visit, with me.      Counseled the patient on the importance of having a recovery program in addition to medication to manage recovery.  Components include avoiding isolating, having willingness to change, avoiding triggers and managing cravings. Encouraged having some type of sober network and practicing honesty with trusted support person(s). Encouraged other services such as counseling, 12 step or other self-help organizations.      Opioid warning reviewed.  Risk of overdose following a period of abstinence due to decrease tolerance was discussed including risk of death.  Strongly recommended abstain from alcohol, benzodiazepines, THC, opioids and other drugs of abuse.  Increased risk of return to opioid  use after use of these substances discussed.  Increased risk of overdose/death with use of other substances particularly benzodiazepines/alcohol reviewed.        SUBJECTIVE                                                    Jared North is a 46 year old male who presents to clinic today for follow up    Visit performed In Person, face-to-face     Recent HPI Details: 4/8/22  Jared North is a 46 year old male with a history of recent Covid 19 injection, ARDS, Acute respiratory failure with hypoxia, MDD, Chronic back pain, and opioid use disorder who is being seen today for a follow up. He is taking suboxone 12 mg daily.     Brief History: He was hospitalized from 12/31/21-1/24/22 for Covid 19 infection with complications. He required tracheostomy and ventilator support, and GT tube placement which were discontinued prior to his discharge to home. He reports being on methadone maintenance for 10-15 years at [facility]. He states he was able to abstain from heroin for awhile but alcohol consumption increased significantly . He was drinking 1 box of wine daily and using 1/2-1 gram heroin until he went to inpatient treatment in December of 2019.  He has not drank or using heroin since April 2020.        -still looking for a place to move to, needs to move this Summer.  -Stopped PT/OT. Doing things on his own. Endurance and strength slowly better.   -Did not try propranolol, has not needed.   -Attending online AA meetings once per week.   -Denies any substance use. Takes CBD gummies for pain/anxiety. Helps a little.   -Sleeping okay.  -Appetite is good.   -Feels down a lot, and anxious. Would like therapy.   -Denies cravings or side effects.  -Taking suboxone 12 mg daily, working well.   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      Opioid use disorder, severe, dependence (H)  Bill denies any substance use, he does take CBD gummies for pain and anxiety. He denies  any cravings or triggers to use. He attends weekly AA meetings online. He is taking suboxone 4 mg TID, continue.   -     buprenorphine HCl-naloxone HCl (SUBOXONE) 4-1 MG per film; Place 1 Film under the tongue 3 times daily Okay for early refill  Moderate to severe episode of recurrent major depressive disorder (H)  Feels down a lot, would benefit from individual therapy. Referral placed a prior visit. Encouraged to call and schedule an appointment. Continue lexapro.   -     escitalopram (LEXAPRO) 10 MG tablet; Take 1 tablet (10 mg) by mouth daily Take 1/2 tablet daily for 1 week, then take 10 mg tablet daily.  DONALD (generalized anxiety disorder)  Continues to endorse anxiety, CBD gummies help. Encouaraged to schedule a follow up with El[facility]. Referral for long term psych placed at prior visit, encouraged him to schedule.   -     escitalopram (LEXAPRO) 10 MG tablet; Take 1 tablet (10 mg) by mouth daily Take 1/2 tablet daily for 1 week, then take 10 mg tablet daily.  Chronic low back pain with right-sided sciatica, unspecified back pain laterality  He has been taking gabapentin long-term for chronic back pain with sciatica, diagnosis updated for medication. Continue.   -     gabapentin (NEURONTIN) 300 MG capsule; Take 3 capsules (900 mg) by mouth 3 times daily     TODAY'S VISIT  HPI May 4, 2022  Jared North is a 46 year old male with a history of recent Covid 19 injection, ARDS, Acute respiratory failure with hypoxia, MDD, Chronic back pain, and opioid use disorder who is being seen today for a follow up. He is taking suboxone 12 mg daily.     Brief History: He was hospitalized from 12/31/21-1/24/22 for Covid 19 infection with complications. He required tracheostomy and ventilator support, and GT tube placement which were discontinued prior to his discharge to home. He reports being on methadone maintenance for 10-15 years at [facility]. He states he was able to abstain from heroin for awhile but  alcohol consumption increased significantly . He was drinking 1 box of wine daily and using 1/2-1 gram heroin until he went to inpatient treatment in December of 2019.  He has not drank or using heroin since April 2020.       Jeremías continues to do well in his recovery, he just celebrated 2-year sobriety on Easter.  He has been taking Suboxone 4 mg 3 times daily for opioid use disorder as well as chronic pain.  He does not feel his pain is well controlled, states pain used to be controlled with 4 mg and he is concerned that now he is requiring more Suboxone for pain.  He also endorses waking in the night with withdrawal symptoms i.e. sweats.  He is asking about Sublocade injections.  Discussed risks and benefits.  He is not interested in doing the Sublocade just yet would like to continue Suboxone for now at an increased dose.    He is reporting low mood with difficulty getting motivated during the day.  He is also experiencing periods of increased anxiety.  States propranolol is helpful but would like to have the option to take an additional dose if needed.    He plans to establish with a primary care provider.    OBJECTIVE                                                    PHYSICAL EXAM:  /84 (BP Location: Right arm, Patient Position: Sitting, Cuff Size: Adult Large)   Pulse 92   Wt 93.9 kg (207 lb)   BMI 28.87 kg/m      GENERAL: healthy, alert and no distress  EYES: Eyes grossly normal to inspection, PERRL and conjunctivae and sclerae normal  RESP: No respiratory distress  MENTAL STATUS EXAM  Appearance/Behavior: No appearant distress  Speech: Normal  Mood/Affect: normal affect  Insight: Adequate    LAB  No results found for any visits on 05/04/22.      HISTORY                                                    Problem list reviewed & adjusted, as indicated.  Patient Active Problem List   Diagnosis     Chronic back pain     Essential hypertension     Elevated LFTs     Acute on chronic respiratory failure  with hypoxia (H)     Pneumonia due to 2019 novel coronavirus     Atrial fibrillation with rapid ventricular response (H)     Acute respiratory distress syndrome (ARDS) due to COVID-19 virus (H)     Major depressive disorder, recurrent episode, severe (H)     Opioid use disorder, severe, dependence (H)     Polysubstance abuse (H)     ROSSY (obstructive sleep apnea)     Mild intermittent asthma     Acute metabolic encephalopathy     Ventilator associated pneumonia (H)     Oropharyngeal dysphagia     Alcohol dependence (H)     Anxiety     Moderate to severe episode of recurrent major depressive disorder (H)     DONALD (generalized anxiety disorder)         MEDICATION LIST (prior to visit)  buprenorphine HCl-naloxone HCl (SUBOXONE) 4-1 MG per film, Place 1 Film under the tongue 3 times daily Okay for early refill  OLANZapine (ZYPREXA) 10 MG tablet, Take 1 tablet (10 mg) by mouth At Bedtime  OLANZapine (ZYPREXA) 5 MG tablet, Take 1 tablet (5 mg) by mouth every morning in addition to 10 mg at bedtime for ttl: 15 mg/day  acetaminophen (TYLENOL) 32 mg/mL liquid, 650 mg by Oral or Feeding Tube route every 4 hours as needed for fever or mild pain   acetaminophen (TYLENOL) 500 MG tablet, Take 1-2 tablets (500-1,000 mg) by mouth every 6 hours as needed for mild pain  lisinopril (ZESTRIL) 10 MG tablet, Take 1 tablet by mouth daily  (Patient not taking: Reported on 5/4/2022)  metoprolol tartrate (LOPRESSOR) 50 MG tablet, Take 1 tablet (50 mg) by mouth 2 times daily  Multiple Vitamin (TAB-A-TORIN) TABS, Take 1 tablet by mouth daily   multivitamin w/minerals (CERTAVITE/ANTIOXIDANTS) tablet, Take 1 tablet by mouth daily  naloxone (NARCAN) 4 MG/0.1ML nasal spray, Spray 1 spray (4 mg) into one nostril alternating nostrils 2 times daily as needed for opioid reversal every 2-3 minutes until assistance arrives    No current facility-administered medications on file prior to visit.      MEDICATION LIST (after visit)  Current Outpatient  Medications   Medication     albuterol (PROAIR HFA/PROVENTIL HFA/VENTOLIN HFA) 108 (90 Base) MCG/ACT inhaler     buprenorphine HCl-naloxone HCl (SUBOXONE) 4-1 MG per film     buprenorphine HCl-naloxone HCl (SUBOXONE) 8-2 MG per film     buPROPion (WELLBUTRIN XL) 150 MG 24 hr tablet     diclofenac (VOLTAREN) 1 % topical gel     escitalopram (LEXAPRO) 10 MG tablet     gabapentin (NEURONTIN) 300 MG capsule     hydrOXYzine (ATARAX) 10 MG tablet     nicotine (NICOTROL) 10 MG inhaler     OLANZapine (ZYPREXA) 10 MG tablet     OLANZapine (ZYPREXA) 5 MG tablet     propranolol (INDERAL) 10 MG tablet     acetaminophen (TYLENOL) 32 mg/mL liquid     acetaminophen (TYLENOL) 500 MG tablet     lisinopril (ZESTRIL) 10 MG tablet     metoprolol tartrate (LOPRESSOR) 50 MG tablet     Multiple Vitamin (TAB-A-TORIN) TABS     multivitamin w/minerals (CERTAVITE/ANTIOXIDANTS) tablet     naloxone (NARCAN) 4 MG/0.1ML nasal spray     No current facility-administered medications for this visit.         No Known Allergies    Additional MDM Details:  none    Sherry Alvarado CNP  [facility] Addiction Medicine  [facility]  [phone]

## 2022-05-04 NOTE — PATIENT INSTRUCTIONS
It was nice to see you today, Bill!    Increase your suboxone to 8 mg twice daily.    Wellbutrin was added, take 150 mg daily.     A referral was placed for pain clinic, please call  [phone] to schedule.    Congratulations on 2 years!

## 2022-05-04 NOTE — LETTER
[facility]ORDIN[facility]  [address] [facility] [address]    May 4, 2022    Jared North  [address] [address]      Dear Jared,    I have been unsuccessful in reaching you since our last contact. At this time the Care Coordination team will make no further attempts to reach you, however this does not change your ability to continue receiving care from your providers at your primary care clinic. If you need additional support from a care coordinator in the future please contact Carina Zimmerman at [phone].    All of us at [facility] are invested in your health and are here to assist you in meeting your goals.     Sincerely,    Rafaela Lieberman RN  Clinic Care Coordination

## 2022-05-05 ENCOUNTER — TELEPHONE (OUTPATIENT)
Dept: BEHAVIORAL HEALTH | Facility: CLINIC | Age: 47
End: 2022-05-05
Payer: COMMERCIAL

## 2022-05-05 ASSESSMENT — ANXIETY QUESTIONNAIRES: GAD7 TOTAL SCORE: 16

## 2022-05-05 NOTE — TELEPHONE ENCOUNTER
Prior Authorization Retail Medication Request    Medication/Dose: nicotine (NICOTROL) 10 MG inhaler  ICD code (if different than what is on RX):  Nicotine use [Z72.0]   Previously Tried and Failed:    Rationale:     Insurance Name:  [facility]  Insurance ID:  [id number]    Pharmacy Information (if different than what is on RX)  Name:  [id number] [place], [place] - [address] AT [facility] [address]  Phone:  [phone]

## 2022-05-10 NOTE — TELEPHONE ENCOUNTER
Central Prior Authorization Team   Phone: [phone]      PA Initiation    Medication: nicotine (NICOTROL) 10 MG inhaler--initiated  Insurance Company: [employer] - Phone [phone] Fax [phone]  Pharmacy Filling the Rx: [facility] #[id number] [place], [place] - [address] AT [facility] [address]  Filling Pharmacy Phone: [phone]  Filling Pharmacy Fax:    Start Date: 5/10/2022

## 2022-05-10 NOTE — TELEPHONE ENCOUNTER
Central Prior Authorization Team   Phone: [phone]      Prior Authorization Approval    Authorization Effective Date: 2/9/2022  Authorization Expiration Date: 5/10/2023  Medication: nicotine (NICOTROL) 10 MG inhaler--APPROVED  Approved Dose/Quantity:    Reference #:     Insurance Company: [employer] - Phone [phone] Fax [phone]  Expected CoPay:       CoPay Card Available:      Foundation Assistance Needed:    Which Pharmacy is filling the prescription (Not needed for infusion/clinic administered): [id number] [place], [place] - [facility] [address] [facility] [address] [facility]  Pharmacy Notified: Yes  Patient Notified: Yes PHARMACY WILL CONTACT WHEN FILLED

## 2022-05-19 ENCOUNTER — TELEPHONE (OUTPATIENT)
Dept: BEHAVIORAL HEALTH | Facility: CLINIC | Age: 47
End: 2022-05-19
Payer: COMMERCIAL

## 2022-05-19 NOTE — TELEPHONE ENCOUNTER
Left a VM to remind pt about their Monday 5/23/22 video appt at 11 am.     Writer mentioned, pt will need to connect virtual/video appt via My Chart and finish e-check in question 15-30 min prior to appt time.

## 2022-05-26 DIAGNOSIS — F41.1 GAD (GENERALIZED ANXIETY DISORDER): ICD-10-CM

## 2022-05-26 DIAGNOSIS — F33.1 MODERATE EPISODE OF RECURRENT MAJOR DEPRESSIVE DISORDER (H): ICD-10-CM

## 2022-05-26 RX ORDER — OLANZAPINE 5 MG/1
5 TABLET ORAL EVERY MORNING
Qty: 30 TABLET | Refills: 0 | Status: SHIPPED | OUTPATIENT
Start: 2022-05-26 | End: 2022-07-21 | Stop reason: DRUGHIGH

## 2022-05-26 NOTE — TELEPHONE ENCOUNTER
Date of Last Office Visit: 5/4/22  Date of Next Office Visit: 7/6/22  No shows since last visit: no  Cancellations since last visit: no    Medication requested:     OLANZapine (ZYPREXA) 5 MG tablet 30 tablet 0 4/27/2022  No   Sig - Route: Take 1 tablet (5 mg) by mouth every morning in addition to 10 mg at bedtime for ttl: 15 mg/day - Oral   Sent to pharmacy as: OLANZapine 5 MG Oral Tablet (zyPREXA)   Class: E-Prescribe   Order: [id number]   E-Prescribing Status: Receipt confirmed by pharmacy (4/27/2022  3:58 PM CDT)          Review of [place] ?: na    Lapse in medication adherence greater than 5 days?: no  If yes, call patient and gather details: na  Medication refill request verified as identical to current order?: yes  Result of Last DAM, VPA, Li+ Level, CBC, or Carbamazepine Level (at or since last visit): na    Last visit treatment plan:   Instructions       Return in about 2 months (around 7/4/2022) for Follow up, using a video visit, with me.  MEDICATION LIST (after visit)      Current Outpatient Medications   Medication     albuterol (PROAIR HFA/PROVENTIL HFA/VENTOLIN HFA) 108 (90 Base) MCG/ACT inhaler     buprenorphine HCl-naloxone HCl (SUBOXONE) 4-1 MG per film     buprenorphine HCl-naloxone HCl (SUBOXONE) 8-2 MG per film     buPROPion (WELLBUTRIN XL) 150 MG 24 hr tablet     diclofenac (VOLTAREN) 1 % topical gel     escitalopram (LEXAPRO) 10 MG tablet     gabapentin (NEURONTIN) 300 MG capsule     hydrOXYzine (ATARAX) 10 MG tablet     nicotine (NICOTROL) 10 MG inhaler     OLANZapine (ZYPREXA) 10 MG tablet     OLANZapine (ZYPREXA) 5 MG tablet             []Medication refilled per  Medication Refill in Ambulatory Care  policy.  [x]Medication unable to be refilled by RN due to criteria not met as indicated below:    []Eligibility - not seen in the last year   []Supervision - no future appointment   []Compliance - no shows, cancellations or lapse in therapy   []Verification - order discrepancy   []Controlled  medication   [x]Medication not included in policy   []90-day supply request   []Other

## 2022-06-02 DIAGNOSIS — F41.9 ANXIETY: ICD-10-CM

## 2022-06-02 RX ORDER — PROPRANOLOL HYDROCHLORIDE 10 MG/1
TABLET ORAL
Qty: 60 TABLET | Refills: 0 | Status: SHIPPED | OUTPATIENT
Start: 2022-06-02 | End: 2022-06-24

## 2022-06-02 NOTE — TELEPHONE ENCOUNTER
Date of Last Office Visit: 5/4/22  Date of Next Office Visit: 7/6/22  No shows since last visit: none  Cancellations since last visit: none    Medication requested: propranolol 10mg Date last ordered: 5/4/22 Qty: 60 Refills: 0     Lapse in medication adherence greater than 5 days?: no  If yes, call patient and gather details:    Medication refill request verified as identical to current order?: yes  Result of Last DAM, VPA, Li+ Level, CBC, or Carbamazepine Level (at or since last visit): N/A    Last visit treatment plan:     It was nice to see you today, Bill!     Increase your suboxone to 8 mg twice daily.     Wellbutrin was added, take 150 mg daily.     A referral was placed for pain clinic, please call  [phone] to schedule.     Congratulations on 2 years!  [x]Medication refilled per  Medication Refill in Ambulatory Care  policy.  []Medication unable to be refilled by RN due to criteria not met as indicated below:    []Eligibility - not seen in the last year   []Supervision - no future appointment   []Compliance - no shows, cancellations or lapse in therapy   []Verification - order discrepancy   []Controlled medication   []Medication not included in policy   []90-day supply request   []Other

## 2022-06-24 ENCOUNTER — OFFICE VISIT (OUTPATIENT)
Dept: BEHAVIORAL HEALTH | Facility: CLINIC | Age: 47
End: 2022-06-24
Attending: PSYCHIATRY & NEUROLOGY
Payer: COMMERCIAL

## 2022-06-24 VITALS
BODY MASS INDEX: 29.01 KG/M2 | SYSTOLIC BLOOD PRESSURE: 145 MMHG | HEART RATE: 88 BPM | DIASTOLIC BLOOD PRESSURE: 87 MMHG | WEIGHT: 208 LBS

## 2022-06-24 DIAGNOSIS — M54.41 CHRONIC LOW BACK PAIN WITH RIGHT-SIDED SCIATICA, UNSPECIFIED BACK PAIN LATERALITY: ICD-10-CM

## 2022-06-24 DIAGNOSIS — G89.29 CHRONIC LOW BACK PAIN WITH RIGHT-SIDED SCIATICA, UNSPECIFIED BACK PAIN LATERALITY: ICD-10-CM

## 2022-06-24 DIAGNOSIS — F11.20 OPIOID USE DISORDER, SEVERE, DEPENDENCE (H): ICD-10-CM

## 2022-06-24 DIAGNOSIS — F41.9 ANXIETY: ICD-10-CM

## 2022-06-24 DIAGNOSIS — F33.1 MODERATE EPISODE OF RECURRENT MAJOR DEPRESSIVE DISORDER (H): ICD-10-CM

## 2022-06-24 LAB
AMPHETAMINES UR QL SCN: ABNORMAL
BARBITURATES UR QL: ABNORMAL
BENZODIAZ UR QL: ABNORMAL
CANNABINOIDS UR QL SCN: ABNORMAL
COCAINE UR QL: ABNORMAL
CREAT UR-MCNC: 259 MG/DL
OPIATES UR QL SCN: ABNORMAL
OXYCODONE UR QL: ABNORMAL
PCP UR QL SCN: ABNORMAL

## 2022-06-24 PROCEDURE — G0463 HOSPITAL OUTPT CLINIC VISIT: HCPCS

## 2022-06-24 PROCEDURE — 99214 OFFICE O/P EST MOD 30 MIN: CPT | Performed by: NURSE PRACTITIONER

## 2022-06-24 PROCEDURE — 80307 DRUG TEST PRSMV CHEM ANLYZR: CPT | Performed by: NURSE PRACTITIONER

## 2022-06-24 RX ORDER — BUPROPION HYDROCHLORIDE 150 MG/1
150 TABLET ORAL EVERY MORNING
Qty: 30 TABLET | Refills: 1 | Status: CANCELLED | OUTPATIENT
Start: 2022-06-24

## 2022-06-24 RX ORDER — BUPRENORPHINE AND NALOXONE 8; 2 MG/1; MG/1
1 FILM, SOLUBLE BUCCAL; SUBLINGUAL 2 TIMES DAILY
Qty: 60 FILM | Refills: 1 | Status: CANCELLED | OUTPATIENT
Start: 2022-06-24

## 2022-06-24 RX ORDER — PROPRANOLOL HYDROCHLORIDE 10 MG/1
TABLET ORAL
Qty: 60 TABLET | Refills: 0 | Status: SHIPPED | OUTPATIENT
Start: 2022-06-24 | End: 2022-07-22

## 2022-06-24 RX ORDER — GABAPENTIN 300 MG/1
900 CAPSULE ORAL 3 TIMES DAILY
Qty: 270 CAPSULE | Refills: 1 | Status: CANCELLED | OUTPATIENT
Start: 2022-06-24

## 2022-06-24 RX ORDER — BUPRENORPHINE AND NALOXONE 8; 2 MG/1; MG/1
1 FILM, SOLUBLE BUCCAL; SUBLINGUAL 2 TIMES DAILY
Qty: 60 FILM | Refills: 0 | Status: SHIPPED | OUTPATIENT
Start: 2022-06-24 | End: 2022-07-22

## 2022-06-24 RX ORDER — PROPRANOLOL HYDROCHLORIDE 10 MG/1
TABLET ORAL
Qty: 60 TABLET | Refills: 0 | Status: CANCELLED | OUTPATIENT
Start: 2022-06-24

## 2022-06-24 RX ORDER — GABAPENTIN 300 MG/1
900 CAPSULE ORAL 3 TIMES DAILY
Qty: 270 CAPSULE | Refills: 1 | Status: SHIPPED | OUTPATIENT
Start: 2022-06-24 | End: 2022-07-22

## 2022-06-24 ASSESSMENT — ANXIETY QUESTIONNAIRES
GAD7 TOTAL SCORE: 19
5. BEING SO RESTLESS THAT IT IS HARD TO SIT STILL: SEVERAL DAYS
6. BECOMING EASILY ANNOYED OR IRRITABLE: NEARLY EVERY DAY
1. FEELING NERVOUS, ANXIOUS, OR ON EDGE: NEARLY EVERY DAY
7. FEELING AFRAID AS IF SOMETHING AWFUL MIGHT HAPPEN: NEARLY EVERY DAY
2. NOT BEING ABLE TO STOP OR CONTROL WORRYING: NEARLY EVERY DAY
4. TROUBLE RELAXING: NEARLY EVERY DAY
GAD7 TOTAL SCORE: 19
3. WORRYING TOO MUCH ABOUT DIFFERENT THINGS: NEARLY EVERY DAY

## 2022-06-24 ASSESSMENT — PATIENT HEALTH QUESTIONNAIRE - PHQ9: SUM OF ALL RESPONSES TO PHQ QUESTIONS 1-9: 22

## 2022-06-24 ASSESSMENT — PAIN SCALES - GENERAL: PAINLEVEL: MODERATE PAIN (5)

## 2022-06-24 NOTE — PROGRESS NOTES
Reason for visit: AM follow up and med refill. Pt reports having back pain a 5/10 today, he c/o of increased cravings to get high , they go up to a 5 some days.   Pt reports an increase in depression and anxiety, says he doesn't take Zyprexa, had to stop it because it was making him very tired. He does not take Lexapro as well.     DONALD-7 scores:  19  DONALD-7 SCORE 4/8/2022 5/4/2022   Total Score 9 (mild anxiety) -   Total Score 9 16       PHQ-9 scores: 22 pt denies having any active plan   PHQ-9 SCORE 4/8/2022 5/4/2022   PHQ-9 Total Score MyChart 11 (Moderate depression) -   PHQ-9 Total Score 11 19       Patient states he  is ready for visit.    Meena Lee June 24, 2022 11:28 AM    MH&A Post-Appointment Chart -check      Correct pharmacy verified with patient and updated in chart? [x] yes []no    Charge captured ? [x] yes  [] no [] n/a-virtual     Medications ordered this visit were e-scribed.  Verified by order class [x] yes  [] no    List Medications:    buprenorphine HCl-naloxone HCl (SUBOXONE) 8-2 MG per film  propranolol (INDERAL) 10 MG tablet   gabapentin (NEURONTIN) 300 MG capsule  Class: E-Prescribe    Medication changes or discontinuations were communicated to patient's pharmacy: [] yes  [x] no    UA collected [x] yes  [] no  [] n/a-virtual     Outside referrals / labs, etc support staff to follow up: [] yes  [x] no    Future appointment was made: [x] yes  [] no  [] n/a    Dictation completed at time of chart check: [x] yes  [] no    I have checked the documentation for today s encounters and the above information has been reviewed and completed.      Meena Lee on June 24, 2022 at 3:04 PM

## 2022-06-24 NOTE — PROGRESS NOTES
"     [facility]    A/P                                                    ASSESSMENT/PLAN  Diagnoses and all orders for this visit:  Jeremías denies any substance use.  But is experiencing cravings for\"anything\" .  He has been experiencing increased depression and ongoing anxiety.  He stopped taking his Lexapro, Wellbutrin, and Zyprexa, stating that he did not find them helpful.  Does approximate him sleepy.  He has low motivation and fatigues easily.  He has history of COVID infection requiring long-term hospitalization which may be contributing to his complaints of fatigue and exhaustion.  He has not yet established with a primary care provider.  Encouraged him to schedule an appointment with primary care as fatigue and exhaustion can be associated with other medical conditions such as anemia and vitamin deficiencies.  Assisted patient to schedule a physical with a primary care provider on 7/18/2022.  Referral placed for psychiatry and assisted him to schedule a psychiatry appointment on 12/23/2022 with Hiral Abraham CNP.  Due to length of time for psychiatry, referred to transitions clinic to see Elvia Brown CNP.  Has appointment July 21 to have his medications evaluated.  Encouraged him to resume taking Lexapro and Wellbutrin until he is seen by psychiatry to determine if the medications are effective.  Plan to decrease gabapentin but will defer this until he is seen by psychiatry.     Of note patient stated that he has not been taking his blood pressure medications stating he did not know which medications were continued following his discharge from hospital in January.  Reviewed discharge instructions with him and encouraged him to continue to take metoprolol 50 mg daily until he can be seen by Dr. Kahlil Cevallos in primary care.  Opioid use disorder, severe, dependence (H)  Continue Suboxone 16 mg daily.  -     buprenorphine HCl-naloxone HCl (SUBOXONE) 8-2 MG per film; Place 1 Film " under the tongue 2 times daily  -     Urine Drugs of Abuse Screen Panel 1 - Drug Screen (Full); Future  Moderate to severe episode of recurrent major depressive disorder (H)  -     Adult Mental Health  Referral; Future  Chronic low back pain with right-sided sciatica, unspecified back pain laterality  -     gabapentin (NEURONTIN) 300 MG capsule; Take 3 capsules (900 mg) by mouth 3 times daily  Anxiety  -     Adult Mental Health  Referral; Future  -     propranolol (INDERAL) 10 MG tablet; TAKE 1 TO 2 TABLETS BY MOUTH DAILY AS NEEDED FOR ANXIETY    Orders Placed This Encounter   Medications     buprenorphine HCl-naloxone HCl (SUBOXONE) 8-2 MG per film     Sig: Place 1 Film under the tongue 2 times daily     Dispense:  60 Film     Refill:  0     NADEAN: [id number]     propranolol (INDERAL) 10 MG tablet     Sig: TAKE 1 TO 2 TABLETS BY MOUTH DAILY AS NEEDED FOR ANXIETY     Dispense:  60 tablet     Refill:  0     gabapentin (NEURONTIN) 300 MG capsule     Sig: Take 3 capsules (900 mg) by mouth 3 times daily     Dispense:  270 capsule     Refill:  1       Problem list updated Jun 24, 2022   No problems updated.        PDMP Review       Value Time User    State PDMP site checked  Yes 6/24/2022 12:38 PM Sherry Alvarado CNP            RTC  Return in about 4 weeks (around 7/22/2022) for Follow up, with me, using a video visit .      Counseled the patient on the importance of having a recovery program in addition to medication to manage recovery.  Components include avoiding isolating, having willingness to change, avoiding triggers and managing cravings. Encouraged having some type of sober network and practicing honesty with trusted support person(s). Encouraged other services such as counseling, 12 step or other self-help organizations.      Opioid warning reviewed.  Risk of overdose following a period of abstinence due to decrease tolerance was discussed including risk of death.  Strongly recommended  abstain from alcohol, benzodiazepines, THC, opioids and other drugs of abuse.  Increased risk of return to opioid use after use of these substances discussed.  Increased risk of overdose/death with use of other substances particularly benzodiazepines/alcohol reviewed.        SUBJECTIVE                                                    Jared North is a 46 year old male who presents to clinic today for follow up    Visit performed In Person, face-to-face      Recent HPI Details: 5/4/22  Jared North is a 46 year old male with a history of recent Covid 19 injection, ARDS, Acute respiratory failure with hypoxia, MDD, Chronic back pain, and opioid use disorder who is being seen today for a follow up. He is taking suboxone 12 mg daily.     Brief History: He was hospitalized from 12/31/21-1/24/22 for Covid 19 infection with complications. He required tracheostomy and ventilator support, and GT tube placement which were discontinued prior to his discharge to home. He reports being on methadone maintenance for 10-15 years at [facility]. He states he was able to abstain from heroin for awhile but alcohol consumption increased significantly . He was drinking 1 box of wine daily and using 1/2-1 gram heroin until he went to inpatient treatment in December of 2019.  He has not drank or using heroin since April 2020.        Jeremías continues to do well in his recovery, he just celebrated 2-year sobriety on Easter.  He has been taking Suboxone 4 mg 3 times daily for opioid use disorder as well as chronic pain.  He does not feel his pain is well controlled, states pain used to be controlled with 4 mg and he is concerned that now he is requiring more Suboxone for pain.  He also endorses waking in the night with withdrawal symptoms i.e. sweats.  He is asking about Sublocade injections.  Discussed risks and benefits.  He is not interested in doing the Sublocade just yet would like to continue Suboxone for now at an increased dose.     He  is reporting low mood with difficulty getting motivated during the day.  He is also experiencing periods of increased anxiety.  States propranolol is helpful but would like to have the option to take an additional dose if needed.     He plans to establish with a primary care provider.  Jeremías wright just celebrated 2 years of sobriety around [facility].  He is experiencing some withdrawal symptoms in the middle of the night, he also does not think Suboxone is covering his pain at the current dose any longer.  Plan to increase Suboxone to 8 mg twice daily.  He may split second dose into 2 doses if needed.  Discussed side effects of Suboxone and tooth problems, he is concerned about tooth decay and has a few teeth that need to be pulled.  Encouraged him to rinse mouth after dissolving films and brushing teeth.  Also encouraged drinking water throughout the day.  He is interested in Sublocade injections in future.  Discussed risks and benefits.  He is experiencing ongoing depression and anxiety symptoms.  He was previously on Wellbutrin 300 mg daily this was discontinued in the hospital and he is not sure completely sure why.  He felt Wellbutrin was more effective than Lexapro.  Plan to add Wellbutrin 150 mg, and continue Lexapro to augment therapy.  He has not established with psychiatry has had difficulty logging into [employer] to get recommendations for therapy.      He continues to take gabapentin 900 mg 3 times daily for nerve pain.  Referral placed for pain clinic.  Number provided for him to call and schedule appointment.  Plan to change prescription at next visit to 600 mg 4 times daily.     Opioid use disorder, severe, dependence (H)  -     buprenorphine HCl-naloxone HCl (SUBOXONE) 8-2 MG per film; Place 1 Film under the tongue 2 times daily  -     Urine Drugs of Abuse Screen Panel 1 - Drug Screen (Full); Future  -     Buprenorphine Urine, Qualitative; Future  Anxiety  -     propranolol (INDERAL) 10 MG tablet; TAKE 1-2  TABLET BY MOUTH DAILY AS NEEDED FOR ANXIETY  -     hydrOXYzine (ATARAX) 10 MG tablet; Take 1 tablet (10 mg) by mouth every 6 hours as needed for itching  Chronic low back pain with right-sided sciatica, unspecified back pain laterality  -     gabapentin (NEURONTIN) 300 MG capsule; Take 3 capsules (900 mg) by mouth 3 times daily  -     diclofenac (VOLTAREN) 1 % topical gel; Apply 2 g topically 4 times daily  -     Pain Management Referral; Future  Moderate to severe episode of recurrent major depressive disorder (H)  -     escitalopram (LEXAPRO) 10 MG tablet; Take 1 tablet (10 mg) by mouth daily Take 1/2 tablet daily for 1 week, then take 10 mg tablet daily.  -     buPROPion (WELLBUTRIN XL) 150 MG 24 hr tablet; Take 1 tablet (150 mg) by mouth every morning  DONALD (generalized anxiety disorder)  -     escitalopram (LEXAPRO) 10 MG tablet; Take 1 tablet (10 mg) by mouth daily Take 1/2 tablet daily for 1 week, then take 10 mg tablet daily.  Nicotine use  -     nicotine (NICOTROL) 10 MG inhaler; Use 1 cartridge as needed for urge to smoke by puffing over course of 20min.  Use 6-16 cart/day; reduce number of cart/day over 6-12 weeks.  Mild intermittent asthma without complication  -     albuterol (PROAIR HFA/PROVENTIL HFA/VENTOLIN HFA) 108 (90 Base) MCG/ACT inhaler; Inhale 2 puffs into the lungs every 4 hours as needed for shortness of breath / dyspnea  TODAY'S VISIT  HPI Jun 24, 2022  Jared North is a 46 year old male with a history of recent Covid 19 injection, ARDS, Acute respiratory failure with hypoxia, MDD, Chronic back pain, and opioid use disorder who is being seen today for a follow up. He is taking suboxone 12 mg daily.     Brief History: He was hospitalized from 12/31/21-1/24/22 for Covid 19 infection with complications. He required tracheostomy and ventilator support, and GT tube placement which were discontinued prior to his discharge to home. He reports being on methadone maintenance for 10-15 years at [facility].  "He states he was able to abstain from heroin for awhile but alcohol consumption increased significantly . He was drinking 1 box of wine daily and using 1/2-1 gram heroin until he went to inpatient treatment in December of 2019.  He has not drank or using heroin since April 2020.        -Jeremías is expressing worsening depression and anxiety.    -He has low motivation to do anything stating\" I cannot seem to function\".  States if he manages to do anything he is easily fatigued and exhausted.    -He describes himself as a \"bundle of nerves.\"   - He has not been taking his Wellbutrin or Lexapro stating he did not think it was helpful, and he stopped taking Zyprexa because it made him sleepy.    -As a result of his worsening mood, he is experiencing cravings for \"anything\".  -He has not scheduled with psychiatry, nor establish care with a PCP.     OBJECTIVE                                                    PHYSICAL EXAM:  BP (!) 145/87 (BP Location: Right arm, Patient Position: Sitting, Cuff Size: Adult Regular)   Pulse 88   Wt 94.3 kg (208 lb)   BMI 29.01 kg/m      GENERAL: healthy, alert and no distress  EYES: Eyes grossly normal to inspection, PERRL and conjunctivae and sclerae normal  RESP: No respiratory distress  MENTAL STATUS EXAM  Appearance/Behavior: No appearant distress  Speech: Normal  Mood/Affect: normal affect  Insight: Adequate    LAB  Results for orders placed or performed in visit on 06/24/22   Urine Drugs of Abuse Screen Panel 1 - Drug Screen (Full)     Status: None ()    Narrative    The following orders were created for panel order Urine Drugs of Abuse Screen Panel 1 - Drug Screen (Full).  Procedure                               Abnormality         Status                     ---------                               -----------         ------                     Drugs of Abuse 1 Panel, ...[[id number]]                                                   Please view results for these tests on the " individual orders.         HISTORY                                                    Problem list reviewed & adjusted, as indicated.  Patient Active Problem List   Diagnosis     Chronic back pain     Essential hypertension     Elevated LFTs     Acute on chronic respiratory failure with hypoxia (H)     Pneumonia due to 2019 novel coronavirus     Atrial fibrillation with rapid ventricular response (H)     Acute respiratory distress syndrome (ARDS) due to COVID-19 virus (H)     Major depressive disorder, recurrent episode, severe (H)     Opioid use disorder, severe, dependence (H)     Polysubstance abuse (H)     ROSSY (obstructive sleep apnea)     Mild intermittent asthma     Acute metabolic encephalopathy     Ventilator associated pneumonia (H)     Oropharyngeal dysphagia     Alcohol dependence (H)     Anxiety     Moderate to severe episode of recurrent major depressive disorder (H)     DONALD (generalized anxiety disorder)         MEDICATION LIST (prior to visit)  albuterol (PROAIR HFA/PROVENTIL HFA/VENTOLIN HFA) 108 (90 Base) MCG/ACT inhaler, Inhale 2 puffs into the lungs every 4 hours as needed for shortness of breath / dyspnea  buPROPion (WELLBUTRIN XL) 150 MG 24 hr tablet, Take 1 tablet (150 mg) by mouth every morning  diclofenac (VOLTAREN) 1 % topical gel, Apply 2 g topically 4 times daily  hydrOXYzine (ATARAX) 10 MG tablet, Take 1 tablet (10 mg) by mouth every 6 hours as needed for itching  nicotine (NICOTROL) 10 MG inhaler, Use 1 cartridge as needed for urge to smoke by puffing over course of 20min.  Use 6-16 cart/day; reduce number of cart/day over 6-12 weeks.  acetaminophen (TYLENOL) 32 mg/mL liquid, 650 mg by Oral or Feeding Tube route every 4 hours as needed for fever or mild pain  (Patient not taking: Reported on 6/24/2022)  acetaminophen (TYLENOL) 500 MG tablet, Take 1-2 tablets (500-1,000 mg) by mouth every 6 hours as needed for mild pain (Patient not taking: Reported on 6/24/2022)  escitalopram (LEXAPRO)  10 MG tablet, Take 1 tablet (10 mg) by mouth daily Take 1/2 tablet daily for 1 week, then take 10 mg tablet daily. (Patient not taking: Reported on 6/24/2022)  lisinopril (ZESTRIL) 10 MG tablet, Take 1 tablet by mouth daily  (Patient not taking: Reported on 6/24/2022)  metoprolol tartrate (LOPRESSOR) 50 MG tablet, Take 1 tablet (50 mg) by mouth 2 times daily  Multiple Vitamin (TAB-A-TORIN) TABS, Take 1 tablet by mouth daily  (Patient not taking: Reported on 6/24/2022)  multivitamin w/minerals (THERA-VIT-M) tablet, Take 1 tablet by mouth daily (Patient not taking: Reported on 6/24/2022)  naloxone (NARCAN) 4 MG/0.1ML nasal spray, Spray 1 spray (4 mg) into one nostril alternating nostrils 2 times daily as needed for opioid reversal every 2-3 minutes until assistance arrives  OLANZapine (ZYPREXA) 10 MG tablet, Take 1 tablet (10 mg) by mouth At Bedtime (Patient not taking: Reported on 6/24/2022)  OLANZapine (ZYPREXA) 5 MG tablet, Take 1 tablet (5 mg) by mouth every morning in addition to 10 mg at bedtime for ttl: 15 mg/day (Patient not taking: Reported on 6/24/2022)    No current facility-administered medications on file prior to visit.      MEDICATION LIST (after visit)  Current Outpatient Medications   Medication     albuterol (PROAIR HFA/PROVENTIL HFA/VENTOLIN HFA) 108 (90 Base) MCG/ACT inhaler     buprenorphine HCl-naloxone HCl (SUBOXONE) 8-2 MG per film     buPROPion (WELLBUTRIN XL) 150 MG 24 hr tablet     diclofenac (VOLTAREN) 1 % topical gel     gabapentin (NEURONTIN) 300 MG capsule     hydrOXYzine (ATARAX) 10 MG tablet     nicotine (NICOTROL) 10 MG inhaler     propranolol (INDERAL) 10 MG tablet     acetaminophen (TYLENOL) 32 mg/mL liquid     acetaminophen (TYLENOL) 500 MG tablet     escitalopram (LEXAPRO) 10 MG tablet     lisinopril (ZESTRIL) 10 MG tablet     metoprolol tartrate (LOPRESSOR) 50 MG tablet     Multiple Vitamin (TAB-A-TORIN) TABS     multivitamin w/minerals (THERA-VIT-M) tablet     naloxone (NARCAN) 4  MG/0.1ML nasal spray     OLANZapine (ZYPREXA) 10 MG tablet     OLANZapine (ZYPREXA) 5 MG tablet     No current facility-administered medications for this visit.         No Known Allergies    Additional MDM Details:  none    Sherry Alvarado [facility]  [facility]  [phone]

## 2022-06-24 NOTE — PATIENT INSTRUCTIONS
It was good to see you today, Bill!    We scheduled an appointment with Hiral Abraham Psychiatry on 12/23/22 at 3:00pm.    2. WE scheduled a physical at the [facility] with Dr Kahlil Obrien on 7/18/22 at 11:20am    3. Start taking your lexapro and wellbutrin daily.

## 2022-07-03 DIAGNOSIS — F33.1 MODERATE EPISODE OF RECURRENT MAJOR DEPRESSIVE DISORDER (H): ICD-10-CM

## 2022-07-04 NOTE — PROGRESS NOTES
"Subjective:   Chinyere Priest is a 66 y.o. female who presents for Cough (X 2 days coughing up yellow greenish stuff) and Fatigue (Was sleepy all day yesterday)      Is a pleasant 66-year-old female vaccinated against COVID who presents with her family member for 3 days of symptoms including cough and congestion.  She has she reports more fatigue and a tactile fever yesterday but feels improved energy wise today.  She reports that she has a history of pneumonia and always gets doxycycline prescribed due to her comorbidities and rapid progression in the past.  She denies any difficulty breathing currently, has not required use of an albuterol inhaler.      Review of Systems   Constitutional: Positive for fever and malaise/fatigue. Negative for chills.   HENT: Positive for congestion and sore throat. Negative for ear pain.    Eyes: Negative for pain.   Respiratory: Positive for cough. Negative for shortness of breath.    Cardiovascular: Negative for chest pain.   Gastrointestinal: Negative for abdominal pain, constipation, diarrhea, nausea and vomiting.   Genitourinary: Negative for dysuria.   Musculoskeletal: Negative for myalgias.   Skin: Negative for rash.   Neurological: Negative for headaches.       Medications, Allergies, and current problem list reviewed today in Epic.     Objective:     /80   Pulse 98   Temp 36.6 °C (97.9 °F) (Temporal)   Resp 16   Ht 1.651 m (5' 5\")   Wt 73.5 kg (162 lb)   SpO2 97%     Physical Exam  Vitals reviewed.   Constitutional:       Appearance: Normal appearance. She is not toxic-appearing.   HENT:      Head: Normocephalic and atraumatic.      Right Ear: Tympanic membrane, ear canal and external ear normal.      Left Ear: Tympanic membrane, ear canal and external ear normal.      Nose: Congestion and rhinorrhea present.      Mouth/Throat:      Mouth: Mucous membranes are moist.      Pharynx: No oropharyngeal exudate or posterior oropharyngeal erythema.      " Occupational Therapy     12/21/21 1131   Quick Adds   Type of Visit Initial Occupational Therapy Evaluation   Living Environment   Living Environment Comments see PT eval for specifics   Self-Care   Usual Activity Tolerance excellent   Current Activity Tolerance poor   Activity/Exercise/Self-Care Comment prior to admit, independent with ADLs/IADLs   Disability/Function   Walking or Climbing Stairs Difficulty no   Dressing/Bathing Difficulty no   Toileting issues no   Doing Errands Independently Difficulty (such as shopping) yes   Change in Functional Status Since Onset of Current Illness/Injury yes   General Information   Onset of Illness/Injury or Date of Surgery 12/05/21   Referring Physician Morales Torres   Patient/Family Therapy Goal Statement (OT) none stated   Existing Precautions/Restrictions fall  (trach/vent; currently w/B wrist restraints)   General Observations and Info ARDS d/t COVID; trach/vented.   Cognitive Status Examination   Orientation Status   (responds to his name; nods head yes/no)   Affect/Mental Status (Cognitive)   (alert)   Follows Commands follows one-step commands   Pain Assessment   Patient Currently in Pain   (doesn't appear to have pain)   Range of Motion Comprehensive   Comment, General Range of Motion AAROM WFL B UEs-needing assist rosalia @ shldrs.   Strength Comprehensive (MMT)   Comment, General Manual Muscle Testing (MMT) Assessment generalized weakness B UEs-especially @ shldrs.   Coordination   Upper Extremity Coordination   (demo sufficient B  stength but fatigues.)   Bed Mobility   Comment (Bed Mobility) max A of 2   Transfers   Transfer Comments NT   Balance   Balance Comments max A static EOB sitting   Clinical Impression   Criteria for Skilled Therapeutic Interventions Met (OT) yes;skilled treatment is necessary   OT Diagnosis decreased ADLs   OT Problem List-Impairments impacting ADL activity tolerance impaired;balance;inability to direct their own care;mobility;range of  Comments: POST NASAL DRIP  Eyes:      Conjunctiva/sclera: Conjunctivae normal.   Cardiovascular:      Rate and Rhythm: Normal rate and regular rhythm.   Pulmonary:      Effort: Pulmonary effort is normal.      Breath sounds: Normal breath sounds.   Musculoskeletal:      Cervical back: Normal range of motion.   Lymphadenopathy:      Cervical: No cervical adenopathy.   Skin:     General: Skin is warm and dry.      Capillary Refill: Capillary refill takes less than 2 seconds.   Neurological:      Mental Status: She is alert and oriented to person, place, and time.         Assessment/Plan:     Diagnosis and associated orders:     1. COVID-19  POCT SARS-COV Antigen DANYA (Symptomatic only)   2. Mild intermittent asthma in adult without complication  doxycycline (VIBRAMYCIN) 100 MG Tab    CANCELED: COVID/SARS CoV-2 PCR   3. Decreased right ventricular systolic function  doxycycline (VIBRAMYCIN) 100 MG Tab    CANCELED: COVID/SARS CoV-2 PCR   4. Nonspecific syndrome suggestive of viral illness  doxycycline (VIBRAMYCIN) 100 MG Tab    CANCELED: COVID/SARS CoV-2 PCR      Comments/MDM:     • Point-of-care COVID testing positive, patient without any evidence of hypoxia.  Due to current medication usage not a good candidate for Paxlovid.  Currently she does seem that she is improved today, she is very insistent that she requires doxycycline given her medical history and I provided a contingent printed prescription for doxycycline.  She notes that she is worsening especially more foul sputum production may initiate this antibiotic but I discourage use of it currently as she seems to be doing well.  Discussed supportive care, ER precautions and isolation.         Differential diagnosis, natural history, supportive care, and indications for immediate follow-up discussed.    Advised the patient to follow-up with the primary care physician for recheck, reevaluation, and consideration of further management.    Please note that this  motion (ROM);strength   Assessment of Occupational Performance 3-5 Performance Deficits   Identified Performance Deficits bed mob, balance, activity tolerance, strength, grooming   Planned Therapy Interventions (OT) ADL retraining;balance training;bed mobility training;strengthening;transfer training;progressive activity/exercise   Clinical Decision Making Complexity (OT) high complexity   Therapy Frequency (OT) Daily   Predicted Duration of Therapy 10 days   Risk & Benefits of therapy have been explained evaluation/treatment results reviewed;care plan/treatment goals reviewed;patient   OT Discharge Planning    OT Discharge Recommendation (DC Rec)   (LTACH)   OT Rationale for DC Rec trach/vented; needing A of 2 with bed mob & ADLS; hasn't stood yet.      dictation was created using voice recognition software. I have made a reasonable attempt to correct obvious errors, but I expect that there are errors of grammar and possibly content that I did not discover before finalizing the note.    This note was electronically signed by Jaden Kim PA-C

## 2022-07-06 RX ORDER — BUPROPION HYDROCHLORIDE 150 MG/1
TABLET ORAL
Qty: 30 TABLET | Refills: 1 | Status: SHIPPED | OUTPATIENT
Start: 2022-07-06 | End: 2022-07-21 | Stop reason: DRUGHIGH

## 2022-07-06 NOTE — TELEPHONE ENCOUNTER
"Date of Last Office Visit: 6/24/2022  Date of Next Office Visit: None scheduled -today's appointment was cancelled. Routing to [facility] team to reschedule.   No shows since last visit: 0  Cancellations since last visit: (1) 7/6/22    Medication requested: buPROPion (WELLBUTRIN XL) 150 MG 24 hr tablet Date last ordered: 5/4/22 Qty: 30 Refills: 1     Review of MN ?: See below      Lapse in medication adherence greater than 5 days?: No    Medication refill request verified as identical to current order?: Yes    Result of Last DAM, VPA, Li+ Level, CBC, or Carbamazepine Level (at or since last visit): See below      Last visit treatment plan:   ASSESSMENT/PLAN  Diagnoses and all orders for this visit:  Jeremías denies any substance use.  But is experiencing cravings for\"anything\" .  He has been experiencing increased depression and ongoing anxiety.  He stopped taking his Lexapro, Wellbutrin, and Zyprexa, stating that he did not find them helpful.  Does approximate him sleepy.  He has low motivation and fatigues easily.  He has history of COVID infection requiring long-term hospitalization which may be contributing to his complaints of fatigue and exhaustion.  He has not yet established with a primary care provider.  Encouraged him to schedule an appointment with primary care as fatigue and exhaustion can be associated with other medical conditions such as anemia and vitamin deficiencies.  Assisted patient to schedule a physical with a primary care provider on 7/18/2022.  Referral placed for psychiatry and assisted him to schedule a psychiatry appointment on 12/23/2022 with Hiral Abraham CNP.  Due to length of time for psychiatry, referred to transitions clinic to see Elvia Brown CNP.  Has appointment July 21 to have his medications evaluated.  Encouraged him to resume taking Lexapro and Wellbutrin until he is seen by psychiatry to determine if the medications are effective.  Plan to decrease gabapentin but will defer this " until he is seen by psychiatry      []Medication refilled per  Medication Refill in Ambulatory Care  policy.  [x]Medication unable to be refilled by RN due to criteria not met as indicated below:    []Eligibility - not seen in the last year   []Supervision - no future appointment   []Compliance - no shows, cancellations or lapse in therapy   []Verification - order discrepancy   []Controlled medication   [x]Medication not included in policy   []90-day supply request   []Other

## 2022-07-18 ENCOUNTER — OFFICE VISIT (OUTPATIENT)
Dept: PULMONOLOGY | Facility: OTHER | Age: 47
End: 2022-07-18
Payer: COMMERCIAL

## 2022-07-18 VITALS
DIASTOLIC BLOOD PRESSURE: 88 MMHG | WEIGHT: 210 LBS | BODY MASS INDEX: 29.29 KG/M2 | OXYGEN SATURATION: 95 % | SYSTOLIC BLOOD PRESSURE: 128 MMHG | HEART RATE: 102 BPM

## 2022-07-18 DIAGNOSIS — J80 ACUTE RESPIRATORY DISTRESS SYNDROME (ARDS) DUE TO COVID-19 VIRUS (H): Primary | ICD-10-CM

## 2022-07-18 DIAGNOSIS — U07.1 ACUTE RESPIRATORY DISTRESS SYNDROME (ARDS) DUE TO COVID-19 VIRUS (H): Primary | ICD-10-CM

## 2022-07-18 PROCEDURE — 99214 OFFICE O/P EST MOD 30 MIN: CPT | Performed by: INTERNAL MEDICINE

## 2022-07-18 NOTE — PROGRESS NOTES
[facility] Pulmonary Clinic    HPI:  Mr. North is a 46 year old gentleman with a past medical history significant for asthma by his own admission, substance abuse/opioid dependence and alcohol abuse, chronic pain, recurrent depression with recent COVID-19 ARDS who was evaluated by Dr. Lloyd in the ICU survivorship clinic a few months ago.  He presents today for a follow-up visit and was unfortunately unable to undergo pulmonary function testing that have been ordered as a respiratory therapist felt ill.  Since his last clinic visit he states that he has been using albuterol inhaler once a day but often wakes up in the morning with a cough which is also associated with wheezing.  He continues to endorse pain in his wrists and his shoulders and states that he was unable to get in for his PT/OT appointments.  He is noted that breathing is particularly challenging on more humid days, however, he is otherwise doing all right.  He denies chest pain or chest tightness, endorses cold sweats and denies abdominal pain or lower extremity edema.  He has not returned to work because of ongoing issues.    Summary of ICU Admission  46 y M with a history of asthma by report, substance abuse/opioid dependence and alcohol abuse, chronic pain, recurrent depression, who was admitted to [facility] in November 2021 with COVID pneumonia and severe ARDS. He had a prolonged course with polymicrobial VAP, encephalopathy, significant sedation needs, and required tracheostomy/PEG placement. He was discharged to [facility] where he was decannulated and weaned off sedation. He was discharged home 1/24/22. Has since had G tube removed. Is not requiring supplemental oxygen. Close follow up with mental health providers.     Today reports he is very weak in the hands and shoulders, painful, with paresthesias and limited ROM. Has had difficulty getting outpatient PT arranged, has OT evaluation scheduled next week.     Has not had  to use albuterol inhaler.  No memory of hospital stay, other than odd vivid dreams  Walks in stores daily, trying to get more physical activity      Pertinent positives alluded to in the HPI. Remainder of 10 point ROS is negative.     Medication Reconciliation:  Current Outpatient Medications   Medication Sig Dispense Refill     albuterol (PROAIR HFA/PROVENTIL HFA/VENTOLIN HFA) 108 (90 Base) MCG/ACT inhaler Inhale 2 puffs into the lungs every 4 hours as needed for shortness of breath / dyspnea 18 g 1     buprenorphine HCl-naloxone HCl (SUBOXONE) 8-2 MG per film Place 1 Film under the tongue 2 times daily 60 Film 0     diclofenac (VOLTAREN) 1 % topical gel Apply 2 g topically 4 times daily 350 g 0     gabapentin (NEURONTIN) 300 MG capsule Take 3 capsules (900 mg) by mouth 3 times daily 270 capsule 1     hydrOXYzine (ATARAX) 10 MG tablet Take 1 tablet (10 mg) by mouth every 6 hours as needed for itching 90 tablet 3     propranolol (INDERAL) 10 MG tablet TAKE 1 TO 2 TABLETS BY MOUTH DAILY AS NEEDED FOR ANXIETY 60 tablet 0     acetaminophen (TYLENOL) 32 mg/mL liquid 650 mg by Oral or Feeding Tube route every 4 hours as needed for fever or mild pain       acetaminophen (TYLENOL) 500 MG tablet Take 1-2 tablets (500-1,000 mg) by mouth every 6 hours as needed for mild pain 100 tablet 0     buPROPion (WELLBUTRIN XL) 150 MG 24 hr tablet TAKE 1 TABLET(150 MG) BY MOUTH EVERY MORNING 30 tablet 1     escitalopram (LEXAPRO) 10 MG tablet Take 1 tablet (10 mg) by mouth daily Take 1/2 tablet daily for 1 week, then take 10 mg tablet daily. 90 tablet 1     lisinopril (ZESTRIL) 10 MG tablet Take 1 tablet by mouth daily       metoprolol tartrate (LOPRESSOR) 50 MG tablet Take 1 tablet (50 mg) by mouth 2 times daily 60 tablet 0     Multiple Vitamin (TAB-A-TORIN) TABS Take 1 tablet by mouth daily       multivitamin w/minerals (THERA-VIT-M) tablet Take 1 tablet by mouth daily       naloxone (NARCAN) 4 MG/0.1ML nasal spray Spray 1 spray (4 mg)  into one nostril alternating nostrils 2 times daily as needed for opioid reversal every 2-3 minutes until assistance arrives 0.2 mL 0     nicotine (NICOTROL) 10 MG inhaler Use 1 cartridge as needed for urge to smoke by puffing over course of 20min.  Use 6-16 cart/day; reduce number of cart/day over 6-12 weeks. 168 each 1     OLANZapine (ZYPREXA) 10 MG tablet Take 1 tablet (10 mg) by mouth At Bedtime 90 tablet 1     OLANZapine (ZYPREXA) 5 MG tablet Take 1 tablet (5 mg) by mouth every morning in addition to 10 mg at bedtime for ttl: 15 mg/day 30 tablet 0         PICS/Recovery ROS:  Pertinent positives alluded to in the HPI. Remainder of 10 point ROS is negative.       PMH:  Past Medical History:   Diagnosis Date     Major depressive disorder, recurrent episode, severe (H) 4/26/2006    Formatting of this note might be different from the original. Epic     Mild intermittent asthma 12/31/2021     Opioid type dependence (H) 4/26/2006    Formatting of this note might be different from the original. [facility]     ROSSY (obstructive sleep apnea) 12/31/2021     Pneumonia due to 2019 novel coronavirus 11/24/2021     Polysubstance abuse (H) 12/31/2021     No Known Allergies    Family HX:  No known FH of lung problems     Social Hx:  Marital status: , ex-wife Jama involved in his care/life  Number of children: 1 daughter   Resides in [place] with support from ex-wife  Occupational history: Worked as , not working prior to illness due to chronic back pain, substance abuse   service: None  Pets: 1 dog, 1 cat  Smoking history: Never smoker  Alcohol use: Currently in remission  Recreational drug use: Hx opioid dependence, in remission  Hobbies: 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   HENT:      Head: Normocephalic.      Mouth/Throat:      Mouth: Mucous membranes are moist.   Cardiovascular:      Heart sounds: Normal heart sounds.      Comments: Sinus tachycardia.  S1 and S2 audible.  Pulmonary:      Effort: Pulmonary effort is normal.      Breath sounds: Normal breath sounds.   Abdominal:      General: Abdomen is flat.   Skin:     General: Skin is warm and dry.   Neurological:      General: No focal deficit present.      Mental Status: He is alert and oriented to person, place, and time.   Psychiatric:         Mood and Affect: Mood normal.       Pertinent Labs/Studies:    12/29/21 CXR:  Endotracheal tube has been removed and a new tracheostomy tube has been placed in good position. Right PICC line remains in good position. Right IJ CVC has been removed. NG tube also removed. Bilateral interstitial infiltrates have improved when compared to previous. There are no new or acute infiltrates. No pleural effusion.    Assessment and Plan:  46 y M with a history of asthma by report, substance abuse/opioid dependence and alcohol abuse, chronic pain, recurrent depression, who was admitted to [facility] in November 2021 with COVID pneumonia and severe ARDS. He had a prolonged course with polymicrobial VAP, encephalopathy, significant sedation needs, and required tracheostomy/PEG placement. He was discharged to [facility] where he was decannulated and weaned off sedation. He was discharged home 1/24/22. Has since had G tube removed. Is not requiring supplemental oxygen. Close follow up with mental health providers.     Today reports he is very weak in the hands and shoulders, painful, with paresthesias and limited ROM. Has had difficulty getting outpatient PT arranged, has OT evaluation scheduled next week.     Has not had to use albuterol inhaler.    1. Post ICU Syndrome: Is feeling better since his last clinic visit.  His brain fog and memory limitations have  improved.  He continues to go to mental health counseling regularly.  Unfortunately, he still has weakness in the hands and shoulders, painful, with paresthesias and limited ROM.    We will reach out to his primary care provider about his referrals.    He has not yet been set up to see PM&R.    2.   Respiratory: Endorses early morning symptoms with coughing and wheezing.    We will arrange for his pulmonary function testing to be rescheduled.    I have initiated him on Arnuity Ellipta 50 mcg 1 puff daily.  He was instructed to rinse his mouth after using this.    Still awaiting completion of PT and OT before we consider referral to pulmonary rehab (I do not think that he will benefit from this as he has minimal pulmonary symptoms).    3.    Follow-Up : 3 months with Dr. Lloyd and after pulmonary function testing has been completed.      Delia Esquivel MD  Pulmonary and Critical Care  (p) [phone]

## 2022-07-18 NOTE — PATIENT INSTRUCTIONS
Please use your Srnuity inhaler once a day every day whether or not you are short of breath, this is not a rescue inhaler so do not use this if you are acutely short of breath.  Please be sure to gargle your mouth after using your Arnuity inhaler.  Please use your albuterol inhaler 2 puffs up to 6 times a day for rescue. If you are not short of breath, you do not need to use this.  Asthma or COPD flares/exacerbations can vary by person, but usually present with increased shortness of breath, new/worsening wheezing, chest tightness, increase in cough frequency, and/or increase in amount or color of phlegm. COPD exacerbations are usually triggered by viruses, but can be a result of a bacterial infections, or a reaction to some sort of an environmental trigger.    If you think you are having a Asthma/COPD  exacerbation, do not panic. You should continued taking your inhalers as prescribed. Continue taking all of the maintenance inhalers you are supposed to be using everyday. ADD your quick-relief (rescue) medications:  ALBUTEROL 4 puffs or 1 nebulizer treatment; repeat after 30-60 minutes if needed.    If your symptoms are not better in 2-4 hours, you should start your Emergency Plan medications:  PREDNISONE 40 mg (2 tablets) once daily. Take this medication for 5 days.  Call the Pulmonary clinic to notify nursing staff that you think you are having an exacerbation.     If your symptoms are severe (very hard to breath even with your rescue medications, you do not feel that your medications are working, you have trouble walking or talking, or you notice your lips/fingernails turning grey or bluish):  Use your rescue medication,  Call your health care provider, AND  Go to the Emergency Room OR CALL [phone]    To talk to Pulmonary clinic nursing staff call [phone].

## 2022-07-21 ENCOUNTER — VIRTUAL VISIT (OUTPATIENT)
Dept: BEHAVIORAL HEALTH | Facility: CLINIC | Age: 47
End: 2022-07-21
Attending: FAMILY MEDICINE
Payer: COMMERCIAL

## 2022-07-21 DIAGNOSIS — F33.1 MAJOR DEPRESSIVE DISORDER, RECURRENT EPISODE, MODERATE (H): Primary | ICD-10-CM

## 2022-07-21 DIAGNOSIS — F43.23 ADJUSTMENT DISORDER WITH MIXED ANXIETY AND DEPRESSED MOOD: ICD-10-CM

## 2022-07-21 DIAGNOSIS — F41.1 GAD (GENERALIZED ANXIETY DISORDER): ICD-10-CM

## 2022-07-21 PROCEDURE — 99215 OFFICE O/P EST HI 40 MIN: CPT | Mod: 95 | Performed by: NURSE PRACTITIONER

## 2022-07-21 RX ORDER — OLANZAPINE 5 MG/1
5 TABLET ORAL AT BEDTIME
Qty: 30 TABLET | Refills: 1 | Status: SHIPPED | OUTPATIENT
Start: 2022-07-21 | End: 2022-08-11

## 2022-07-21 RX ORDER — FLUOXETINE 10 MG/1
10 TABLET, FILM COATED ORAL DAILY
Qty: 30 TABLET | Refills: 1 | Status: SHIPPED | OUTPATIENT
Start: 2022-08-05 | End: 2022-08-11

## 2022-07-21 ASSESSMENT — ANXIETY QUESTIONNAIRES
4. TROUBLE RELAXING: NEARLY EVERY DAY
3. WORRYING TOO MUCH ABOUT DIFFERENT THINGS: NEARLY EVERY DAY
7. FEELING AFRAID AS IF SOMETHING AWFUL MIGHT HAPPEN: NEARLY EVERY DAY
GAD7 TOTAL SCORE: 20
GAD7 TOTAL SCORE: 20
6. BECOMING EASILY ANNOYED OR IRRITABLE: NEARLY EVERY DAY
1. FEELING NERVOUS, ANXIOUS, OR ON EDGE: NEARLY EVERY DAY
5. BEING SO RESTLESS THAT IT IS HARD TO SIT STILL: MORE THAN HALF THE DAYS
2. NOT BEING ABLE TO STOP OR CONTROL WORRYING: NEARLY EVERY DAY

## 2022-07-21 ASSESSMENT — PATIENT HEALTH QUESTIONNAIRE - PHQ9: SUM OF ALL RESPONSES TO PHQ QUESTIONS 1-9: 23

## 2022-07-21 NOTE — PROGRESS NOTES
" This video/telephone visit will be conducted virtually between you and your provider. We have found that certain health care needs can be provided without the need for an in-person physical exam. This service lets us provide the care you need with a video /telephone conversation. If a prescription is necessary we can send it directly to your pharmacy. If lab work is needed we can place an order for that and you can then stop by our lab to have the test done at a later time.\"   Just as we bill insurance for in-person visits, we also bill insurance for video/telephone visits. If you have questions about your insurance coverage, we recommend that you speak with your insurance company.      Patient/Parent has given verbal consent for video/Telephone visit? yes    Patient would like the video visit invitation sent by: Text to  [phone]    Patient verified allergies, medications and pharmacy via phone. Patient states they are ready for visit.       Mental Health &Addiction ([facility])Transition [facility] (TC):     Provides Patient Support While Waiting to Access Programmatic and Outpatient MH&A Care and Provides [facility] Crisis Assessment Services     FOLLOW-UP VISIT  ____________________________________________      \"The Transition Clinic is a temporary psychiatry service that helps to bridge the time to your next appointment. It is not intended to be a long-term service and you are expected to attend your scheduled appointment with your next provider.\"  [x] Patient/Parent verbalized understanding    If you need support between appointments, please call [phone] and let them know you're seen by [facility] Psychiatry. You may also send a [employer] message to reach us.    General-     Most pressing needs at this time: \" would like some information on ketamine\"    Mental Health currently: \" has been very low. Anxiety and depression are super high all the time and it's hard to function\"  Pt denies having active thought " to hurt himself    Any changes to your physical health: back pain         Medications-     Injectable medications currently prescribed:none     If yes, do you need an appointment for the next injection: NA    Any Controlled Substances that you are prescribed: Suboxone and Gabapentin     Any refills you are aware that you'll need soon: UNK        Primary care provider: Giovani Allen MD        DONALD-7 scores:  20  DONALD-7 SCORE 5/4/2022 6/24/2022   Total Score - -   Total Score 16 19       PHQ-9 scores: 23  PHQ-9 SCORE 5/4/2022 6/24/2022   PHQ-9 Total Score MyChart - -   PHQ-9 Total Score 19 22         Anything the provider should be aware of for today's appointment: They are in a process of moving and this does not help with a back pain. Pt reports stopping Wellbutrin and Lexapro, said he tried them for a two straight weeks then stopped dt no benefit.     New (awaiting) Mental health provider or next programming: [facility] w/[facility]    Date of scheduled apt: 12/23/22      Meena Lee on July 21, 2022 at 2:48 PM     MH&A TC   NURSING Post-Appointment Chart-check:    Correct pharmacy verified with patient and updated in chart? [x] yes []no    Charge captured ? [] yes  [x] no    Medications ordered this visit were e-scribed.  Verified by order class [x] yes  [] no    List Medications:     Zyprexa 5 mg starting 8/5/22  Prozac 10 mg  Class: E-Prescribe    Medication changes or discontinuations were communicated to patient's pharmacy: [] yes  [x] no    UA collected [] yes  [] no  [x] n/a-virtual     Future appointment was made: [] yes  [x] no  [] n/a    Dictation completed at time of chart check: [x] yes  [] no    I have checked the documentation for today s encounters and the above information has been reviewed and completed.      Meena Lee on July 25, 2022 at 2:55 PM

## 2022-07-21 NOTE — PROGRESS NOTES
"[facility]      Mental Health & Addiction Service Line    Transition Clinic: Psychiatry Progress Note  Medication Management                Charts/documentation read prior to the appointment:  -2022    -2022            VISIT INFORMATION      Date:  2022       Number:  -2nd      Referral source:  -[facility]        Patient Identifying Information:  Legal name: Jared North  Preferred name: CHANDU  : 1975  Preferred pronouns: He/him      Participants:   -Patient  -Provider  -Ex-wife: Bertram      Telehealth visit details:  Type of service:   Video  Patient location:   At home  Provider Location: [facility]  Platform utilized: [employer]    Start time: 3:47 pm  End time:  4:33 pm          INTERIM HX      Copied/Pasted per HKasandra Alvarado CNP 2022 encounter:    Chandu denies any substance use.  But is experiencing cravings for \"anything\".  He has been experiencing increased depression and ongoing anxiety.  He stopped taking his Lexapro, Wellbutrin, and Zyprexa, stating that he did not find them helpful.  Does approximate him sleepy. He has low motivation and fatigues easily.  He has history of COVID infection requiring long-term hospitalization which may be contributing to his complaints of fatigue and exhaustion.  He has not yet established with a primary care provider.  Encouraged him to schedule an appointment with primary care as fatigue and exhaustion can be associated with other medical conditions such as anemia and vitamin deficiencies.  Assisted patient to schedule a physical with a primary care provider on 2022.      -----------------------------    -Will possibly be moving to the [facility]  -Not sure if [place] want [place] I was younger  -Have to make my decision by the end of August if I want to go or if I'm going to stay in the [facility] area  -On disability and can't afford rent " "in [place], even [place] it[place] much [place]  -Daughter and ex-wife are for sure moving up north    -When I was in the hospital for Covid-19 I was strapped down (which contributed to feeling claustrophobic) and now am having a lot of joint pain in: wrists, shoulders   -Yesterday had to put things in storage and now I\"m paying for it today  -It's hard to exercise and get strength back because I feel weak and down from being depressed  -Still having memory, thinking and processing information      PSYCHIATRIC ROS      Sleep:  -For a few months it was pretty good  -Now it's hard to fall and stay asleep because of anxiety  -Often wake up 1 or 2 times to go to the bathroom      Mood/Anxiety:  -See PHQ-9 score    -See DONALD-7 score        Psychosis:    Denies in the interim hx:  -AH/VH  -Paranoia   -Thought insertion or broadcasting      Suicidal ideations:  -Denies passive or active thoughts at this time        SIB:  -Denies engaging in self harm         Side effects:  -Wellbutrin seemed to contribute to urinary retention        PSYCHIATRIC HISTORY      Suicide attempts:  -None reported         Inpatient psychiatric hospitalizations:  -No reported   -No hx of commitments         ECT:  -None reported            Medication Trials:        SSRIs:  -Celexa  -Lexapro 10 mg: ineffective   -Prozac: n/v = 1x  -Zoloft     SNRIs  -Cymbalta    Other antidepressants:  -Wellbutrin 150 mg: seemed to contribute to urinary retention     Antipsychotics:  -Abilify  -Seroquel: 1x, couldn't function for 1 to 2 days  -Zyprexa 15 mg: fatigue, sedation    ADHD/Stimulants:  -Strattera      Sleep aides:  -Trazodone            CURRENT SUBSTANCE USE    Prior use:  -See 2/11/2022 encounter by SHONA Walsh-CNP for full summarization         Current use:     Alcohol:   -None reported         Recreational Drugs:   -CBD gummies from a tobacco shop        Medical Marijuana:  -None reported         Cigarettes per day:   -None reported "   -Intermittently nicotine replacement        Chewing tobacco:   -Yes  -1 tin lasts 3 to 4 days        Vaping:    -None reported         Caffeine intake per day:    -4 cans Coke  -1 to 3 coffee           MEDICAL HISTORY    -The problem list was reviewed via the EMR prior to the appointment    -The patient denies any concerning physical and or medical symptoms during the interviewing process          MEDICATIONS      Current Outpatient Medications:      acetaminophen (TYLENOL) 32 mg/mL liquid, 650 mg by Oral or Feeding Tube route every 4 hours as needed for fever or mild pain, Disp: , Rfl:      acetaminophen (TYLENOL) 500 MG tablet, Take 1-2 tablets (500-1,000 mg) by mouth every 6 hours as needed for mild pain, Disp: 100 tablet, Rfl: 0     albuterol (PROAIR HFA/PROVENTIL HFA/VENTOLIN HFA) 108 (90 Base) MCG/ACT inhaler, Inhale 2 puffs into the lungs every 4 hours as needed for shortness of breath / dyspnea, Disp: 18 g, Rfl: 1     buprenorphine HCl-naloxone HCl (SUBOXONE) 8-2 MG per film, Place 1 Film under the tongue 2 times daily, Disp: 60 Film, Rfl: 0     buPROPion (WELLBUTRIN XL) 150 MG 24 hr tablet, TAKE 1 TABLET(150 MG) BY MOUTH EVERY MORNING, Disp: 30 tablet, Rfl: 1     diclofenac (VOLTAREN) 1 % topical gel, Apply 2 g topically 4 times daily, Disp: 350 g, Rfl: 0     escitalopram (LEXAPRO) 10 MG tablet, Take 1 tablet (10 mg) by mouth daily Take 1/2 tablet daily for 1 week, then take 10 mg tablet daily., Disp: 90 tablet, Rfl: 1     fluticasone (ARNUITY ELLIPTA) 50 MCG/ACT inhaler, Inhale 1 puff into the lungs daily, Disp: 1 each, Rfl: 11     gabapentin (NEURONTIN) 300 MG capsule, Take 3 capsules (900 mg) by mouth 3 times daily, Disp: 270 capsule, Rfl: 1     hydrOXYzine (ATARAX) 10 MG tablet, Take 1 tablet (10 mg) by mouth every 6 hours as needed for itching, Disp: 90 tablet, Rfl: 3     lisinopril (ZESTRIL) 10 MG tablet, Take 1 tablet by mouth daily, Disp: , Rfl:      metoprolol tartrate (LOPRESSOR) 50 MG tablet,  Take 1 tablet (50 mg) by mouth 2 times daily, Disp: 60 tablet, Rfl: 0     Multiple Vitamin (TAB-A-TORIN) TABS, Take 1 tablet by mouth daily, Disp: , Rfl:      multivitamin w/minerals (THERA-VIT-M) tablet, Take 1 tablet by mouth daily, Disp: , Rfl:      naloxone (NARCAN) 4 MG/0.1ML nasal spray, Spray 1 spray (4 mg) into one nostril alternating nostrils 2 times daily as needed for opioid reversal every 2-3 minutes until assistance arrives, Disp: 0.2 mL, Rfl: 0     nicotine (NICOTROL) 10 MG inhaler, Use 1 cartridge as needed for urge to smoke by puffing over course of 20min.  Use 6-16 cart/day; reduce number of cart/day over 6-12 weeks., Disp: 168 each, Rfl: 1     OLANZapine (ZYPREXA) 10 MG tablet, Take 1 tablet (10 mg) by mouth At Bedtime, Disp: 90 tablet, Rfl: 1     OLANZapine (ZYPREXA) 5 MG tablet, Take 1 tablet (5 mg) by mouth every morning in addition to 10 mg at bedtime for ttl: 15 mg/day, Disp: 30 tablet, Rfl: 0     propranolol (INDERAL) 10 MG tablet, TAKE 1 TO 2 TABLETS BY MOUTH DAILY AS NEEDED FOR ANXIETY, Disp: 60 tablet, Rfl: 0      If a controlled substance is prescribed during today's appointment:    -The [place] Prescription Monitoring Program has been reviewed and there are no current concerns with: diversionary activity, early refill requests, and or obtaining the medication from multiple providers.        VITALS    BP Readings from Last 3 Encounters:   07/18/22 128/88   06/24/22 (!) 145/87   05/04/22 125/84       Pulse Readings from Last 3 Encounters:   07/18/22 102   06/24/22 88   05/04/22 92       Wt Readings from Last 3 Encounters:   07/18/22 95.3 kg (210 lb)   06/24/22 94.3 kg (208 lb)   05/04/22 93.9 kg (207 lb)         LABS    The following labs were reviewed prior to or during the appointment:  -5/4 and 6/24/2022        SCALES    PHQ 4/8/2022 5/4/2022 6/24/2022   PHQ-9 Total Score 11 19 22   Q9: Thoughts of better off dead/self-harm past 2 weeks Not at all Not at all Not at all        DONALD-7  SCORE 4/8/2022 5/4/2022 6/24/2022   Total Score 9 (mild anxiety) - -   Total Score 9 16 19               MENTAL STATUS EXAMINATION    Appearance: Adequately Groomed, Attire Appropriate for the Season, Wearing glasses,   has a beard  General Behavior:  Cooperative, Direct Eye Contact  Speech: Fluent, Normal rate and volume  Musculoskeletal:    -Gait not observed during t.h. visit  -No facial tics/tremors observed   -Motor coordination is grossly intact   Mood:  Depressed, Anxious  Affect:  Congruent with mood  Attention: Intact  Orientation:  Person, Place, Time, Situation  Thought Associations:  Intact  Thought Content: Reality based  Thought Processes: Organized, Normal rate  Memory:   -Poor short term memory per pt. report  -No screening or formal testing performed  Language: Intact  Judgement: Fair to Good  Insight: Fair to Good               ASSESSMENT/CLINICAL IMPRESSIONS    Summary:    Shobha North is 47 y/o male with a history of:  MDD, DONALD, ADHD (per patient report), Alcohol Use Disorder and Opioid Use Disorder = currently being treated with Suboxone per PRAKASH Walsh through [facility], Addiction Medicine.     Experienced a significant exacerbation of both depressive and anxiety symptoms with thoughts   of relapse in the context of a post ICU stay (Dec/2021 to Jan/2022) due to Covid-19.       Established care at the [facility] on 2/24/2022 for management of psychotropics/bridging services until able to establish long term outpatient psychiatry services.      ----------------------    This is the first time Jeremías is following up at the [facility] for management of psychotropics.    He discontinued the following: Lexapro 10 mg + Wellbutrin 150 mg (not initiated by writer) +   Zyprexa 15 mg (due to fatigue, sedation) citing ineffectiveness and or ADRs.    Jeremías would like to retry a lower dosage of Zyprexa + Prozac as he continues to experience high   levels of anhedonia, poor motivation, and  racing thoughts, difficulty falling and staying asleep through the night.    He expresses some frustration with ongoing long Covid-19 symptoms/issues including being forgetful, trouble with memory, difficulty processing and retaining information, and joint pain   making it more challenging to be active/limiting ROMKasandra Veronica denies any suicidal ideations despite above worsening of mental health symptoms.  He is able to commit to safety during the interview and is forward thinking.           DSM-V and or working diagnosis:      1. Moderate to severe episode of recurrent major depressive disorder (H)     2. DONALD (generalized anxiety disorder)     3. Adjustment disorder with mixed anxiety and depressed mood     4. Opioid Use Disorder           SAFETY EVALUATION:  Suicidal ideations:  -Denies  Homicidal ideations:  -Denies  Risk factors:  -Male  -Hx of trauma and ELVIN  Protective and mitigating factors:  -Gets along with ex-spouse  -Sober community  -Getting involved in outpatient mental health services  -No prior attempts   Risk assessment:  -Low to medium          TREATMENT PLAN      Medications:  Start:  -Fluoxetine/Prozac 10 mg daily ... start 8/5/2022    -Olanzapine/Zyprexa 5 mg at bedtime        Continue:    Per PRAKASH Walsh:  -Suboxone 8-2 mg  -Hydroxyzine 10 mg prn  -Propranolol 10 to 20 mg prn      Labs:  -None obtained        Therapy:  -Consider in addition to psychotropics          Non-pharmacological modalities:  -Exercise as able ... follow through with PT/OT         Return to Clinic or Referrals:  -Please follow up at the [facility] for medication management in:   6 to 7 weeks      Total time: 60 minutes per:    -Review of EMR  -Appointment time   -Documentation          Elvia RANDALL, [facility]-BC    --------------------------------------------------------------------------------------------------------------------------        TREATMENT RISK STATEMENT    The risks, benefits, alternatives,  and potential adverse effects have been explained and are understood by the patient.  The patient agrees to the treatment plan with their ability to do so.      The patient knows to call the clinic: [phone]  for any problems or concerns until the next psychiatry visit, regardless if it is within or outside of the [facility] system.     If unable to reach clinic staff (via phone call or medical messaging) during the normal business hours: 8:00 am to 4:30 pm then it is recommended accessing the nearest: emergency department, urgent care facility, or utilizing local (varies based on county of residence) and national crisis #'s or text messaging services for immediate assistance.          --------------------------------------------------------------------------------------------------------------------------        If applicable the following has been discussed with the patient, parent/guardian, and or attending family member during the appointment:      1. Risks of polypharmacy and possible drug interactions with current medication list + common OTC products, herbs, and supplements.    Moving forward, it is suggested to intermittently check-in with a clinic or retail pharmacist whenever new medications or OTC/h/s are consumed.    2. Recommendation to adhere to CDC guidelines as it relates alcohol consumption.  If taking benzodiazepines, you should abstain from alcohol intake due to increased risks of CNS and respiratory depression, as well as psychomotor impairment.    3. If possible, it is recommended to avoid concurrent use of prescribed:  opioids  +  benzodiazepines due to increased risks of CNS and respiratory depression, as well as the increased risk of overdose.     4. Recommendation to minimize and or abstain from THC use (unless the pt. is prescribed medical marijuana).    5. Recommendation to abstain from illicit substances including but not limited to the following: heroin, street fentanyl,  cocaine, methamphetamines, and bath salts.    6. Do not take opioids, stimulants, and or other prescription medications unless they are specifically prescribed for you.    7. Recommendation to abstain from tobacco/smoking, alcohol, THC, and all illicit substances if trying to become or are pregnant.    8. Black Box Warnings associated with the prescribed psychotropic(s).    9. Potential adverse effects of antipsychotics including but not limited to the following: weight gain, metabolic syndrome, EPS/Tardive Dyskinesias.    10. Potential CV and neurological adverse effects of stimulants including but not limited to the following:  sudden death, MI, stroke, HTN, cardiomyopathy (long term use) as well as seizures.        EMILI (acute kidney injury)

## 2022-07-21 NOTE — Clinical Note
Just FYI ... Jeremías requested to retry a lower dosage of Zyprexa.  Additionally he asked about Ketamine which I outlined isn't a great choice given drug + drug interaction with Suboxone, hx of ELVIN.   I also pointed out it requires monitoring during and post administration and this might be tricky to [place]  Thanks for your coordination in care.  Elvia

## 2022-07-22 ENCOUNTER — VIRTUAL VISIT (OUTPATIENT)
Dept: ADDICTION MEDICINE | Facility: CLINIC | Age: 47
End: 2022-07-22
Attending: FAMILY MEDICINE
Payer: COMMERCIAL

## 2022-07-22 VITALS — WEIGHT: 210 LBS | HEIGHT: 71 IN | BODY MASS INDEX: 29.4 KG/M2

## 2022-07-22 DIAGNOSIS — I10 ESSENTIAL HYPERTENSION: ICD-10-CM

## 2022-07-22 DIAGNOSIS — M54.41 CHRONIC LOW BACK PAIN WITH RIGHT-SIDED SCIATICA, UNSPECIFIED BACK PAIN LATERALITY: ICD-10-CM

## 2022-07-22 DIAGNOSIS — Z71.6 ENCOUNTER FOR SMOKING CESSATION COUNSELING: ICD-10-CM

## 2022-07-22 DIAGNOSIS — G89.29 CHRONIC LOW BACK PAIN WITH RIGHT-SIDED SCIATICA, UNSPECIFIED BACK PAIN LATERALITY: ICD-10-CM

## 2022-07-22 DIAGNOSIS — F41.9 ANXIETY: ICD-10-CM

## 2022-07-22 DIAGNOSIS — F11.20 OPIOID USE DISORDER, SEVERE, DEPENDENCE (H): Primary | ICD-10-CM

## 2022-07-22 PROCEDURE — 99214 OFFICE O/P EST MOD 30 MIN: CPT | Mod: 95 | Performed by: NURSE PRACTITIONER

## 2022-07-22 RX ORDER — METOPROLOL TARTRATE 50 MG
50 TABLET ORAL 2 TIMES DAILY
Qty: 60 TABLET | Refills: 0 | Status: SHIPPED | OUTPATIENT
Start: 2022-07-22

## 2022-07-22 RX ORDER — PROPRANOLOL HYDROCHLORIDE 20 MG/1
20 TABLET ORAL 2 TIMES DAILY PRN
Qty: 60 TABLET | Refills: 0 | Status: SHIPPED | OUTPATIENT
Start: 2022-07-22 | End: 2022-08-22

## 2022-07-22 RX ORDER — BUPRENORPHINE AND NALOXONE 8; 2 MG/1; MG/1
1 FILM, SOLUBLE BUCCAL; SUBLINGUAL 2 TIMES DAILY
Qty: 60 FILM | Refills: 0 | Status: SHIPPED | OUTPATIENT
Start: 2022-07-22 | End: 2022-08-22

## 2022-07-22 RX ORDER — PROPRANOLOL HYDROCHLORIDE 10 MG/1
10 TABLET ORAL 2 TIMES DAILY PRN
Qty: 60 TABLET | Refills: 0 | Status: SHIPPED | OUTPATIENT
Start: 2022-07-22 | End: 2022-08-22

## 2022-07-22 RX ORDER — GABAPENTIN 300 MG/1
900 CAPSULE ORAL 3 TIMES DAILY
Qty: 270 CAPSULE | Refills: 1 | Status: SHIPPED | OUTPATIENT
Start: 2022-07-22 | End: 2022-08-22

## 2022-07-22 NOTE — PROGRESS NOTES
This video/telephone visit will be conducted via a call between you and your physician/provider. We have found that certain health care needs can be provided without the need for an in-person physical exam. This service lets us provide the care you need with a video /telephone conversation. If a prescription is necessary we can send it directly to your pharmacy. If lab work is needed we can place an order for that and you can then stop by our lab to have the test done at a later time.    Just as we bill insurance for in-person visits, we also bill insurance for video/telephone visits. If you have questions about your insurance coverage, we recommend that you speak with your insurance company.    Patient has given verbal consent for video/Telephone visit? Yes     Patient would like video visit, please connect: Send SMS: [phone]  Reason for visit: Follow up  Patient verified allergies, medications and pharmacy via telephone verbally with writer.   Medication refill is pended for review and approval by provider.  Denies any use of alcohol or substances since last visit.   DONALD-7 scores:    DONALD-7 SCORE 6/24/2022 7/21/2022   Total Score - -   Total Score 19 20   PHQ-9 scores:   PHQ-9 SCORE 6/24/2022 7/21/2022   PHQ-9 Total Score MyChart - -   PHQ-9 Total Score 22 23   Patient states he is ready for visit.  [place]  to be reviewed by provider.   Elsa Francisco July 22, 2022 10:52 AM

## 2022-07-22 NOTE — PATIENT INSTRUCTIONS
It was good to see you today, Bill!    Per our discussion ,     Call [facility] to determine if they would need a referral.     2. Call and reschedule an appointment to establish care with a new provider. [phone]    3. Increasing propranolol to 30 mg up to two times daily as needed for anxiety.

## 2022-07-22 NOTE — PROGRESS NOTES
[facility] Post-Appointment Chart -check      Correct pharmacy verified with patient and updated in chart? [x] yes []no    Medications ordered this visit were e-scribed.  Verified by order class [x] yes  [] no    List Medications: nicotine polacrilex (NICORETTE) 4 MG gum; gabapentin (NEURONTIN) 300 MG capsule;   propranolol (INDERAL) 10 MG tablet; propranolol (INDERAL) 20 MG tablet; buprenorphine HCl-naloxone HCl (SUBOXONE) 8-2 MG per film; metoprolol tartrate (LOPRESSOR) 50 MG tablet  Medication changes or discontinuations were communicated to patient's pharmacy: [] yes  [x] no    UA collected [] yes  [] no  [x] n/a-virtual     Outside referrals / labs, etc support staff to follow up: [] yes  [x] no    Future appointment was made: [x] yes  [] no  [] n/a  Future Appointments 7/22/2022 - 1/18/2023              Date Visit Type Length Department Provider     8/22/2022  1:30 PM ADDICTION MED RETURN 30 min [facility] ADDICTION MEDICINE Sherry Alvarado CNP              12/23/2022  3:00 PM ADULT PSYCHIATRY NEW 60 min [facility] PSYCHIATRY Hiral Abraham, NP                   Dictation completed at time of chart check: [x] yes  [] no    I have checked the documentation for today s encounters and the above information has been reviewed and completed.      Elsa Francisco on July 22, 2022 at 4:22 PM

## 2022-07-22 NOTE — PROGRESS NOTES
[facility]    A/P                                                    ASSESSMENT/PLAN  Diagnoses and all orders for this visit:  Opioid use disorder, severe, dependence (H)  Suboxone was increased at last visit to 16 mg daily.  He reports an improvement in withdrawal symptoms and cravings.  Pain continues to be an issue but likely related to him being more active as he complains of soreness.  Plan to continue Suboxone 8 mg twice daily.  -     buprenorphine HCl-naloxone HCl (SUBOXONE) 8-2 MG per film; Place 1 Film under the tongue 2 times daily  Chronic low back pain with right-sided sciatica, unspecified back pain laterality  He is experiencing some increased lower back pain secondary to increased activity.  He has been busy cleaning his home and getting ready to move.  Suboxone is helping some as well as gabapentin.  Plan to continue gabapentin 900 mg 3 times daily.  -     gabapentin (NEURONTIN) 300 MG capsule; Take 3 capsules (900 mg) by mouth 3 times daily  Essential hypertension  He missed his appointment to establish care with a PCP.  His appointment was 7/18/2022.  He will call to get this appointment rescheduled.  Phone number provided for him to schedule appointment.  Needs refill for metoprolol.  We will provide 1 more month of medication until he can be seen by PCP for evaluation of his blood pressure.  He saw pulmonology yesterday blood pressure was 128/88 at that appointment.  -     metoprolol tartrate (LOPRESSOR) 50 MG tablet; Take 1 tablet (50 mg) by mouth 2 times daily  Anxiety  Continues to experience increased anxiety at times.  He states when he is really anxious he does pull at his hair.  Plan to increase propanolol to 30 mg twice daily as needed.   Psych saw him yesterday in transitional clinic.  Olanzapine 5 mg at bedtime was ordered with plans to start Prozac 10 mg in 2 weeks.  He has psychiatry appointment in December.  -     propranolol (INDERAL) 10 MG tablet;  Take 1 tablet (10 mg) by mouth 2 times daily as needed (anxiety) Take with 20 mg  -     propranolol (INDERAL) 20 MG tablet; Take 1 tablet (20 mg) by mouth 2 times daily as needed  Encounter for smoking cessation counseling  He chews tobacco but has been able to decrease.  Previously provided Nicotrol inhaler which he says is too strong for him and he is concerned about his lungs.  Nicotine gum ordered.  -     nicotine polacrilex (NICORETTE) 4 MG gum; Place 1 each (4 mg) inside cheek every hour as needed for smoking cessation Cinnamon    Orders Placed This Encounter   Medications     buprenorphine HCl-naloxone HCl (SUBOXONE) 8-2 MG per film     Sig: Place 1 Film under the tongue 2 times daily     Dispense:  60 Film     Refill:  0     NADEAN: [id number]     metoprolol tartrate (LOPRESSOR) 50 MG tablet     Sig: Take 1 tablet (50 mg) by mouth 2 times daily     Dispense:  60 tablet     Refill:  0     propranolol (INDERAL) 10 MG tablet     Sig: Take 1 tablet (10 mg) by mouth 2 times daily as needed (anxiety) Take with 20 mg     Dispense:  60 tablet     Refill:  0     propranolol (INDERAL) 20 MG tablet     Sig: Take 1 tablet (20 mg) by mouth 2 times daily as needed     Dispense:  60 tablet     Refill:  0     gabapentin (NEURONTIN) 300 MG capsule     Sig: Take 3 capsules (900 mg) by mouth 3 times daily     Dispense:  270 capsule     Refill:  1     nicotine polacrilex (NICORETTE) 4 MG gum     Sig: Place 1 each (4 mg) inside cheek every hour as needed for smoking cessation Cinnamon     Dispense:  160 each     Refill:  0       Problem list updated Jul 22, 2022   No problems updated.      PDMP Review       Value Time User    State PDMP site checked  Yes 7/22/2022 10:58 AM Sherry Alvarado CNP            RTC  Return in about 1 month (around 8/22/2022) for Follow up, with me, using a video visit schedule at 1:30 pm, in person.      Counseled the patient on the importance of having a recovery program in addition to medication to  manage recovery.  Components include avoiding isolating, having willingness to change, avoiding triggers and managing cravings. Encouraged having some type of sober network and practicing honesty with trusted support person(s). Encouraged other services such as counseling, 12 step or other self-help organizations.      Opioid warning reviewed.  Risk of overdose following a period of abstinence due to decrease tolerance was discussed including risk of death.  Strongly recommended abstain from alcohol, benzodiazepines, THC, opioids and other drugs of abuse.  Increased risk of return to opioid use after use of these substances discussed.  Increased risk of overdose/death with use of other substances particularly benzodiazepines/alcohol reviewed.        SUBJECTIVE                                                    Jared North is a 46 year old male who presents to clinic today for follow up    Visit performed Virtual, via video    Video-Visit Details    Type of service:  Video Visit    Video Start Time: 10:56am  Video End Time: 11:34am    Originating Location (pt. Location): [place]    Distant Location (provider location):  [place] [facility]     Platform used for Video Visit: [facility] Details:5/4/22  Jared North is a 46 year old male with a history of recent Covid 19 injection, ARDS, Acute respiratory failure with hypoxia, MDD, Chronic back pain, and opioid use disorder who is being seen today for a follow up. He is taking suboxone 12 mg daily.     Brief History: He was hospitalized from 12/31/21-1/24/22 for Covid 19 infection with complications. He required tracheostomy and ventilator support, and GT tube placement which were discontinued prior to his discharge to home. He reports being on methadone maintenance for 10-15 years at [facility]. He states he was able to abstain from heroin for awhile but alcohol consumption increased significantly . He was drinking 1 box of wine daily and using 1/2-1  gram heroin until he went to inpatient treatment in December of 2019.  He has not drank or using heroin since April 2020.        Jeremías continues to do well in his recovery, he just celebrated 2-year sobriety on [facility].  He has been taking Suboxone 4 mg 3 times daily for opioid use disorder as well as chronic pain.  He does not feel his pain is well controlled, states pain used to be controlled with 4 mg and he is concerned that now he is requiring more Suboxone for pain.  He also endorses waking in the night with withdrawal symptoms i.e. sweats.  He is asking about Sublocade injections.  Discussed risks and benefits.  He is not interested in doing the Sublocade just yet would like to continue Suboxone for now at an increased dose.     He is reporting low mood with difficulty getting motivated during the day.  He is also experiencing periods of increased anxiety.  States propranolol is helpful but would like to have the option to take an additional dose if needed.     He plans to establish with a primary care provider.  Jeremías wright just celebrated 2 years of sobriety around [facility].  He is experiencing some withdrawal symptoms in the middle of the night, he also does not think Suboxone is covering his pain at the current dose any longer.  Plan to increase Suboxone to 8 mg twice daily.  He may split second dose into 2 doses if needed.  Discussed side effects of Suboxone and tooth problems, he is concerned about tooth decay and has a few teeth that need to be pulled.  Encouraged him to rinse mouth after dissolving films and brushing teeth.  Also encouraged drinking water throughout the day.  He is interested in Sublocade injections in future.  Discussed risks and benefits.  He is experiencing ongoing depression and anxiety symptoms.  He was previously on Wellbutrin 300 mg daily this was discontinued in the hospital and he is not sure completely sure why.  He felt Wellbutrin was more effective than Lexapro.  Plan to add  Wellbutrin 150 mg, and continue Lexapro to augment therapy.  He has not established with psychiatry has had difficulty logging into [employer] to get recommendations for therapy.      He continues to take gabapentin 900 mg 3 times daily for nerve pain.  Referral placed for pain clinic.  Number provided for him to call and schedule appointment.  Plan to change prescription at next visit to 600 mg 4 times daily.     Opioid use disorder, severe, dependence (H)  -     buprenorphine HCl-naloxone HCl (SUBOXONE) 8-2 MG per film; Place 1 Film under the tongue 2 times daily  -     Urine Drugs of Abuse Screen Panel 1 - Drug Screen (Full); Future  -     Buprenorphine Urine, Qualitative; Future  Anxiety  -     propranolol (INDERAL) 10 MG tablet; TAKE 1-2 TABLET BY MOUTH DAILY AS NEEDED FOR ANXIETY  -     hydrOXYzine (ATARAX) 10 MG tablet; Take 1 tablet (10 mg) by mouth every 6 hours as needed for itching  Chronic low back pain with right-sided sciatica, unspecified back pain laterality  -     gabapentin (NEURONTIN) 300 MG capsule; Take 3 capsules (900 mg) by mouth 3 times daily  -     diclofenac (VOLTAREN) 1 % topical gel; Apply 2 g topically 4 times daily  -     Pain Management Referral; Future  Moderate to severe episode of recurrent major depressive disorder (H)  -     escitalopram (LEXAPRO) 10 MG tablet; Take 1 tablet (10 mg) by mouth daily Take 1/2 tablet daily for 1 week, then take 10 mg tablet daily.  -     buPROPion (WELLBUTRIN XL) 150 MG 24 hr tablet; Take 1 tablet (150 mg) by mouth every morning  DONALD (generalized anxiety disorder)  -     escitalopram (LEXAPRO) 10 MG tablet; Take 1 tablet (10 mg) by mouth daily Take 1/2 tablet daily for 1 week, then take 10 mg tablet daily.  Nicotine use  -     nicotine (NICOTROL) 10 MG inhaler; Use 1 cartridge as needed for urge to smoke by puffing over course of 20min.  Use 6-16 cart/day; reduce number of cart/day over 6-12 weeks.  Mild intermittent asthma without complication  -      "albuterol (PROAIR HFA/PROVENTIL HFA/VENTOLIN HFA) 108 (90 Base) MCG/ACT inhaler; Inhale 2 puffs into the lungs every 4 hours as needed for shortness of breath / dyspnea     TODAY'S VISIT  HPI Jul 22, 2022  Jared North is a 46 year old male with a history of recent Covid 19 injection, ARDS, Acute respiratory failure with hypoxia, MDD, Chronic back pain, and opioid use disorder who is being seen today for a follow up. He is taking suboxone 16 mg daily.     Brief History: He was hospitalized from 12/31/21-1/24/22 for Covid 19 infection with complications. He required tracheostomy and ventilator support, and GT tube placement which were discontinued prior to his discharge to home. He reports being on methadone maintenance for 10-15 years at [facility]. He states he was able to abstain from heroin for awhile but alcohol consumption increased significantly . He was drinking 1 box of wine daily and using 1/2-1 gram heroin until he went to inpatient treatment in December of 2019.  He has not drank or using heroin since April 2020.        -Jeremías reports that he is been doing okay.  He is reporting soreness related to increased activity.  He is getting his place clean and ready to move.  He is still looking for a place to rent.  [place] and ex-wife.  -Recovery is going okay.  He has not been attending meetings lately due to being busy with the house.    -Suboxone was increased at last visit to 16 mg daily.  He is taking 16 mg most days but will occasionally take less depending on how he is feeling.  He feels that he is \"testing himself\" to see if he can takes smaller doses.  He has experienced overall improvement in his withdrawal symptoms since the increased dose.  -Cravings have improved with the increased dose of Suboxone.  States he used to desire \"checking out\" of reality.  Now he just has thoughts that it would be nice to have a drink.  -Pain is still an issue, is not sure that the  " "increase Suboxone dose is helping, \"maybe a little\" he has tried to get into the pain clinic but has been unsuccessful in getting scheduled within [place].  He plans to contact [facility] pain to see if he can start being seen there and if he would need a referral.  -Saw Elvia Brown CNP in the transition clinic.  He is planning to restart olanzapine 5 mg at bedtime and will start Prozac 10 mg in 2 weeks.  -He missed his appointment to establish care with a primary care provider.  He thought his appointment was in August but was earlier this week.  He had a pulmonology appointment that same day.  -Anxiety continues continues to be an issue he is wanting an increase to propranolol.  States when he is anxious he does pull his hair.      OBJECTIVE                                                    PHYSICAL EXAM:  Ht 1.803 m (5' 11\")   Wt 95.3 kg (210 lb)   BMI 29.29 kg/m      GENERAL: healthy, alert and no distress  EYES: Eyes grossly normal to inspection, PERRL and conjunctivae and sclerae normal  RESP: No respiratory distress  MENTAL STATUS EXAM  Appearance/Behavior: No appearant distress  Speech: Normal  Mood/Affect: normal affect  Insight: Adequate    LAB  No results found for any visits on 07/22/22.      HISTORY                                                    Problem list reviewed & adjusted, as indicated.  Patient Active Problem List   Diagnosis     Chronic back pain     Essential hypertension     Elevated LFTs     Acute on chronic respiratory failure with hypoxia (H)     Pneumonia due to 2019 novel coronavirus     Atrial fibrillation with rapid ventricular response (H)     Acute respiratory distress syndrome (ARDS) due to COVID-19 virus (H)     Major depressive disorder, recurrent episode, severe (H)     Opioid use disorder, severe, dependence (H)     Polysubstance abuse (H)     ROSSY (obstructive sleep apnea)     Mild intermittent asthma     Acute metabolic encephalopathy     Ventilator associated " pneumonia (H)     Oropharyngeal dysphagia     Alcohol dependence (H)     Anxiety     Moderate to severe episode of recurrent major depressive disorder (H)     DONALD (generalized anxiety disorder)         MEDICATION LIST (prior to visit)  acetaminophen (TYLENOL) 32 mg/mL liquid, 650 mg by Oral or Feeding Tube route every 4 hours as needed for fever or mild pain  acetaminophen (TYLENOL) 500 MG tablet, Take 1-2 tablets (500-1,000 mg) by mouth every 6 hours as needed for mild pain  albuterol (PROAIR HFA/PROVENTIL HFA/VENTOLIN HFA) 108 (90 Base) MCG/ACT inhaler, Inhale 2 puffs into the lungs every 4 hours as needed for shortness of breath / dyspnea  diclofenac (VOLTAREN) 1 % topical gel, Apply 2 g topically 4 times daily  [START ON 8/5/2022] FLUoxetine (PROZAC) 10 MG tablet, Take 1 tablet (10 mg) by mouth daily  fluticasone (ARNUITY ELLIPTA) 50 MCG/ACT inhaler, Inhale 1 puff into the lungs daily  hydrOXYzine (ATARAX) 10 MG tablet, Take 1 tablet (10 mg) by mouth every 6 hours as needed for itching  lisinopril (ZESTRIL) 10 MG tablet, Take 1 tablet by mouth daily  Multiple Vitamin (TAB-A-TORIN) TABS, Take 1 tablet by mouth daily  multivitamin w/minerals (THERA-VIT-M) tablet, Take 1 tablet by mouth daily  naloxone (NARCAN) 4 MG/0.1ML nasal spray, Spray 1 spray (4 mg) into one nostril alternating nostrils 2 times daily as needed for opioid reversal every 2-3 minutes until assistance arrives  nicotine (NICOTROL) 10 MG inhaler, Use 1 cartridge as needed for urge to smoke by puffing over course of 20min.  Use 6-16 cart/day; reduce number of cart/day over 6-12 weeks.  OLANZapine (ZYPREXA) 5 MG tablet, Take 1 tablet (5 mg) by mouth At Bedtime    No current facility-administered medications on file prior to visit.      MEDICATION LIST (after visit)  Current Outpatient Medications   Medication     acetaminophen (TYLENOL) 32 mg/mL liquid     acetaminophen (TYLENOL) 500 MG tablet     albuterol (PROAIR HFA/PROVENTIL HFA/VENTOLIN HFA) 108  (90 Base) MCG/ACT inhaler     buprenorphine HCl-naloxone HCl (SUBOXONE) 8-2 MG per film     diclofenac (VOLTAREN) 1 % topical gel     [START ON 8/5/2022] FLUoxetine (PROZAC) 10 MG tablet     fluticasone (ARNUITY ELLIPTA) 50 MCG/ACT inhaler     gabapentin (NEURONTIN) 300 MG capsule     hydrOXYzine (ATARAX) 10 MG tablet     lisinopril (ZESTRIL) 10 MG tablet     metoprolol tartrate (LOPRESSOR) 50 MG tablet     Multiple Vitamin (TAB-A-TORIN) TABS     multivitamin w/minerals (THERA-VIT-M) tablet     naloxone (NARCAN) 4 MG/0.1ML nasal spray     nicotine (NICOTROL) 10 MG inhaler     nicotine polacrilex (NICORETTE) 4 MG gum     OLANZapine (ZYPREXA) 5 MG tablet     propranolol (INDERAL) 10 MG tablet     propranolol (INDERAL) 20 MG tablet     No current facility-administered medications for this visit.         No Known Allergies    Additional MDM Details:  none      Sherry Alvarado, DNP,MSN, AGNP-C  [facility]   [address] [place] [address], [address] [place] [id number]   Phone # [phone]

## 2022-07-28 DIAGNOSIS — J45.909 ASTHMA: Primary | ICD-10-CM

## 2022-08-09 ENCOUNTER — OFFICE VISIT (OUTPATIENT)
Dept: FAMILY MEDICINE | Facility: CLINIC | Age: 47
End: 2022-08-09
Payer: COMMERCIAL

## 2022-08-09 VITALS
TEMPERATURE: 98.4 F | DIASTOLIC BLOOD PRESSURE: 74 MMHG | BODY MASS INDEX: 29.49 KG/M2 | OXYGEN SATURATION: 96 % | WEIGHT: 211.44 LBS | HEART RATE: 63 BPM | RESPIRATION RATE: 16 BRPM | SYSTOLIC BLOOD PRESSURE: 114 MMHG

## 2022-08-09 DIAGNOSIS — R09.81 CONGESTION OF PARANASAL SINUS: Primary | ICD-10-CM

## 2022-08-09 LAB — SARS-COV-2 RNA RESP QL NAA+PROBE: POSITIVE

## 2022-08-09 PROCEDURE — U0005 INFEC AGEN DETEC AMPLI PROBE: HCPCS | Performed by: PHYSICIAN ASSISTANT

## 2022-08-09 PROCEDURE — 99213 OFFICE O/P EST LOW 20 MIN: CPT | Mod: CS | Performed by: PHYSICIAN ASSISTANT

## 2022-08-09 PROCEDURE — U0003 INFECTIOUS AGENT DETECTION BY NUCLEIC ACID (DNA OR RNA); SEVERE ACUTE RESPIRATORY SYNDROME CORONAVIRUS 2 (SARS-COV-2) (CORONAVIRUS DISEASE [COVID-19]), AMPLIFIED PROBE TECHNIQUE, MAKING USE OF HIGH THROUGHPUT TECHNOLOGIES AS DESCRIBED BY CMS-2020-01-R: HCPCS | Performed by: PHYSICIAN ASSISTANT

## 2022-08-09 RX ORDER — FLUTICASONE PROPIONATE 50 MCG
2 SPRAY, SUSPENSION (ML) NASAL DAILY
Qty: 9.9 ML | Refills: 0 | Status: SHIPPED | OUTPATIENT
Start: 2022-08-09 | End: 2022-09-06

## 2022-08-09 NOTE — PATIENT INSTRUCTIONS
You were seen today for sinus congestion and/or pain. This is likely due to a viral illness.    Symptoms management:  - May use Tylenol or Ibuprofen for discomfort and/or fever if present  - May try saline irrigation to relieve congestion (see instructions below)  - Use of nasal steroids (Flonase) as prescribed    Reasons to come back for re-evaluation:  - Develop a fever of 100.4F or current fever worsens  - Sudden and severe pain in the face and head  - Troubles seeing or double vision  - Swelling or redness around one or both eyes  - Sfiff neck  - Symptoms have not improved after 7 days    Buffered normal saline nasal irrigation   The benefits   1. Saline (saltwater) washes the mucus and irritants from your nose.   2. The sinus passages are moisturized.   3. Studies have also shown that a nasal irrigation improves cell function (the cells that move the mucus work better).   The recipe   Use a one-quart glass jar that is thoroughly cleansed.   You may use a large medical syringe (30 cc), water pick with an irrigation tip (preferred method), squeeze bottle, or Neti pot. Do not use a baby bulb syringe. The syringe or pick should be sterilized frequently or replaced every two to three weeks to avoid contamination and infection.   Fill with water that has been distilled, previously boiled, or otherwise sterilized. Plain tap water is not recommended, because it is not necessarily sterile.   Add 1 to 1  heaping teaspoons of pickling/lizzie salt. Do not use table salt, because it contains a large number of additives.   Add 1 teaspoon of baking soda (pure bicarbonate).   Mix ingredients together, and store at room temperature. Discard after one week.   You may also make up a solution from premixed packets that are commercially prepared specifically for nasal irrigation.   The instructions   Irrigate your nose with saline one to two times per day.   If you have been told to use nasal medication, you should always use your  saline solution first. The nasal medication is much more effective when sprayed onto clean nasal membranes, and the spray will reach deeper into the nose.   Pour the amount of fluid you plan to use into a clean bowl. Do not put your used syringe back into the storage container, because it contaminates your solution.   You may warm the solution slightly in the microwave, but be sure that the solution is not hot.   Bend over the sink (some people do this in the shower) and squirt the solution into each side of your nose, aiming the stream toward the back of your head, not the top of your head. The solution should flow into one nostril and out of the other, but it will not harm you if you swallow a little.   Some people experience a little burning sensation the first few times they use buffered saline solution, but this usually goes away after they adapt to it.

## 2022-08-09 NOTE — PROGRESS NOTES
Assessment & Plan:      Problem List Items Addressed This Visit    None     Visit Diagnoses     Congestion of paranasal sinus    -  Primary    Relevant Medications    fluticasone (FLONASE) 50 MCG/ACT nasal spray    Other Relevant Orders    Symptomatic; Yes; 8/2/2022 COVID-19 Virus (Coronavirus) by PCR Nose        Medical Decision Making  Patient presents with ongoing sinus congestion and cough for 1 to 2 weeks.  Low concern for bacterial sinusitis given pain and pressure throughout the sinuses consistent with sinus congestion.  Symptoms possibly due to recent treatment of tooth infection.  Patient will continue to follow-up with dentist as needed.  Also concern for new viral upper respiratory infection.  COVID-19 test is in process.  Recommend trial of nasal steroids and continuing to push plenty of fluids.  No signs of respiratory distress on today's exam.  Discussed signs of worsening symptoms and when to follow-up with PCP if no symptom improvement.     Subjective:      Jared North is a 46 year old male here for evaluation of sinus congestion and cough.  Onset of symptoms was 1 to 2 weeks ago.  Patient has also had a painful tooth in the left upper mouth.  He was prescribed oral amoxicillin and has follow-up with a dentist for tooth removal this evening.  Patient does note some subjective fevers/chills.  Sinus congestion is throughout the forehead and below the eyes bilaterally.     The following portions of the patient's history were reviewed and updated as appropriate: allergies, current medications, and problem list.     Review of Systems  Pertinent items are noted in HPI.    Allergies  No Known Allergies    Family History   Problem Relation Age of Onset     Diabetes Mother      Heart Disease Mother      Hypertension Mother      Hypertension Maternal Grandmother      Diabetes Maternal Grandfather      Hypertension Maternal Grandfather        Social History     Tobacco Use     Smoking status: Never Smoker      Smokeless tobacco: Current User     Types: Chew     Tobacco comment: long cut loose chew 2 yahaira/week   Substance Use Topics     Alcohol use: Not Currently     Alcohol/week: 21.0 standard drinks     Comment: 2 years sober        Objective:      /74   Pulse 63   Temp 98.4  F (36.9  C) (Oral)   Resp 16   Wt 95.9 kg (211 lb 7 oz)   SpO2 96%   BMI 29.49 kg/m    General appearance - alert, well appearing, and in no distress and non-toxic  Ears - TMs intact with moderate serous fluid and bulging bilaterally, no erythema  Nose - Sinuses mildly tender to percussion throughout  Mouth - mucous membranes moist, pharynx normal without lesions  Neck - supple, no significant adenopathy  Chest - clear to auscultation, no wheezes, rales or rhonchi, symmetric air entry  Heart - normal rate, regular rhythm, normal S1, S2, no murmurs, rubs, clicks or gallops     Lab & Imaging Results    No results found for any visits on 08/09/22.    I personally reviewed these results and discussed findings with the patient.    The use of Dragon/Blogic dictation services was used to construct the content of this note; any grammatical errors are non-intentional. Please contact the author directly if you are in need of any clarification.

## 2022-08-10 ENCOUNTER — TELEPHONE (OUTPATIENT)
Dept: NURSING | Facility: CLINIC | Age: 47
End: 2022-08-10

## 2022-08-10 NOTE — TELEPHONE ENCOUNTER
Coronavirus (COVID-19) Notification    Caller Name (Patient, parent, daughter/son, grandparent, etc)  Jared North     Reason for call  Notify of Positive Coronavirus (COVID-19) lab results, assess symptoms,  review [facility] recommendations    Lab Result    Lab test:  2019-nCoV rRt-PCR or SARS-CoV-2 PCR    Oropharyngeal AND/OR nasopharyngeal swabs is POSITIVE for 2019-nCoV RNA/SARS-COV-2 PCR (COVID-19 virus)      Gather patient reported symptoms   Assessment   Current Symptoms at time of phone call, reported by patient: (if no symptoms, document: No symptoms] Weak, congestion, sinus infection, use of inhaler   Date of symptom(s) onset (if applicable) 8/5/22 or 8/6/22     If at time of call, Patients symptoms have worsened, the Patient should contact 911 or have someone drive them to Emergency Dept promptly:      If Patient calling 911, inform 911 personal that you have tested positive for the Coronavirus (COVID-19).  Place mask on and await 911 to arrive.    If Emergency Dept, If possible, please have another adult drive you to the Emergency Dept but you need to wear mask when in contact with other people.      Treatment Options:   Patient classified as COVID treatment eligible by Epic high risk algorithm: No  You may be eligible to receive a new treatment with a monoclonal antibody for preventing hospitalization in patients at high risk for complications from COVID-19.  This medication is still experimental and available on a limited basis; it is given through an IV and must be given at an infusion center.  Please note that not all people who are eligible will receive the medication since it is in limited supply.   Is the patient symptomatic and onset of symptoms within the last 7 days?  Yes  Is the patient interested in a visit with a provider to discuss treatment options?: No.  Reason patient declined:  Other: Pt is scheduled for treatment  options     Review information with Patient    Your result was  positive. This means you have COVID-19 (coronavirus).    How can I protect others?    These guidelines are for isolating before returning to work, school or .    If you DO have symptoms    Stay home and away from others     For at least 5 days after your symptoms started, AND    You are fever free for 24 hours (with no medicine that reduces fever), AND    Your other symptoms are better    Wear a mask for 10 full days anytime you are around others    If you DON'T have symptoms    Stay home and away from others for at least 5 days after your positive test    Wear a mask for 10 full days anytime you are around others    There may be different guidelines for healthcare facilities.  Please check with the specific sites before arriving.    If you have been told by a doctor that you were severely ill with COVID-19 or are immunocompromised, you should isolate for at least 10 days.    You should not go back to work until you meet the guidelines above for ending your home isolation. You don't need to be retested for COVID-19 before going back to work--studies show that you won't spread the virus if it's been at least 10 days since your symptoms started (or 20 days, if you have a weak immune system).    Employers, schools, and daycares: This is an official notice for this person's medical guidelines for returning in-person.  They must meet the above guidelines before going back to work, school or  in person.    You will receive a positive COVID-19 letter via [employer] or the mail soon with additional self-care information.    Would you like me to review some of that information with you now?  Yes    How can I take care of myself?      Get lots of rest. Drink extra fluids (unless a doctor has told you not to).      Take Tylenol (acetaminophen) for fever or pain. If you have liver or kidney problems, ask your family doctor if it's okay to take Tylenol.     Take either:     650 mg (two 325 mg pills) every 4 to 6  hours, or     1,000 mg (two 500 mg pills) every 8 hours as needed.     Note: Do not take more than 3,000 mg in one day. Acetaminophen is found in many medicines (both prescribed and over-the-counter medicines). Read all labels to be sure you don't take too much.    For children, check the Tylenol bottle for the right dose (based on their age or weight).      If you have other health problems (like cancer, heart failure, an organ transplant or severe kidney disease): Call your specialty clinic if you don't feel better in the next 2 days.      Know when to call 911: Emergency warning signs include:    Trouble breathing or shortness of breath    Pain or pressure in the chest that doesn't go away    Feeling confused like you haven't felt before, or not being able to wake up    Bluish-colored lips or face        If you were tested for an upcoming procedure, please contact your provider for next steps.    Leonela Schmid

## 2022-08-11 ENCOUNTER — VIRTUAL VISIT (OUTPATIENT)
Dept: FAMILY MEDICINE | Facility: CLINIC | Age: 47
End: 2022-08-11
Payer: COMMERCIAL

## 2022-08-11 DIAGNOSIS — U07.1 INFECTION DUE TO 2019 NOVEL CORONAVIRUS: Primary | ICD-10-CM

## 2022-08-11 PROBLEM — M17.11 PRIMARY OSTEOARTHRITIS OF RIGHT KNEE: Status: ACTIVE | Noted: 2021-10-21

## 2022-08-11 PROCEDURE — 99213 OFFICE O/P EST LOW 20 MIN: CPT | Mod: 95 | Performed by: FAMILY MEDICINE

## 2022-08-11 ASSESSMENT — ASTHMA QUESTIONNAIRES
QUESTION_4 LAST FOUR WEEKS HOW OFTEN HAVE YOU USED YOUR RESCUE INHALER OR NEBULIZER MEDICATION (SUCH AS ALBUTEROL): THREE OR MORE TIMES PER DAY
ACT_TOTALSCORE: 12
QUESTION_1 LAST FOUR WEEKS HOW MUCH OF THE TIME DID YOUR ASTHMA KEEP YOU FROM GETTING AS MUCH DONE AT WORK, SCHOOL OR AT HOME: MOST OF THE TIME
QUESTION_3 LAST FOUR WEEKS HOW OFTEN DID YOUR ASTHMA SYMPTOMS (WHEEZING, COUGHING, SHORTNESS OF BREATH, CHEST TIGHTNESS OR PAIN) WAKE YOU UP AT NIGHT OR EARLIER THAN USUAL IN THE MORNING: NOT AT ALL
ACT_TOTALSCORE: 12
QUESTION_2 LAST FOUR WEEKS HOW OFTEN HAVE YOU HAD SHORTNESS OF BREATH: MORE THAN ONCE A DAY
QUESTION_5 LAST FOUR WEEKS HOW WOULD YOU RATE YOUR ASTHMA CONTROL: SOMEWHAT CONTROLLED
HOSPITALIZATION_OVERNIGHT_LAST_YEAR_TOTAL: ONE

## 2022-08-11 NOTE — PROGRESS NOTES
"CHANDU is a 46 year old who is being evaluated via a billable video visit.      How would you like to obtain your AVS? MyChart  If the video visit is dropped, the invitation should be resent by: Text to cell phone: [phone]  Will anyone else be joining your video visit? No          Assessment & Plan     ICD-10-CM    1. Infection due to 2019 novel coronavirus  U07.1      Patient with COVID infection needs to be seen and treated.  I spent a total of 25 minutes trying to reach the patient and calling him and trying to help him with a video visit.  We could not connect and ultimately I could not even make a telephone visit.  I have left a message on my nursing staff to inform him that he needs to reconnect and be reevaluated.    Did not send Paxlovid as I was unable to discuss potential interactions.  I have included potential interactions in this note for future reference  BMI:   Estimated body mass index is 29.49 kg/m  as calculated from the following:    Height as of 22: 1.803 m (5' 11\").    Weight as of 22: 95.9 kg (211 lb 7 oz).           No follow-ups on file.    Paulette Sanders MD  [facility]          Coadministration with nirmatrelvir/ritonavir has not been studied. Buprenorphine is mainly metabolized by CY to form the active metabolite norbuprenorphine, but is also glucuronidated by UGT2B7 and UGT1A1. Coadministration with ritonavir (100 mg twice daily) increased exposure of buprenorphine (~57%) and its active metabolite, but this did not lead to clinically significant pharmacodynamic changes in a population of opioid tolerant patients. Dose adjustment of either drug may therefore not be necessary, but should be guided by clinical monitoring. After stopping nirmatrelvir/ritonavir, the CY inhibitory effect of nirmatrelvir/ritonavir is predicted to mostly disappear after 3 days     Coadministration has not been studied. Hydroxyzine is partly metabolized by " alcohol dehydrogenase and partly by CY. Coadministration could potentially increase hydroxyzine exposure due to inhibition of CY and patients should be advised of the risk of increased sedation. Particular caution is advised if hydroxyzine is used for sedation at high doses in COVID-19 patients. After stopping nirmatrelvir/ritonavir, the CY inhibitory effect of nirmatrelvir/ritonavir is predicted to mostly disappear after 3 days         Subjective   CHANDU is a 46 year old accompanied by his N/A, presenting for the following health issues:  Covid Concern (Pt is positive for covid, tested positive Tuesday night. Pt mentioned he is currently on amoxacillin for a tooth that was pulled. )      HPI         Review of Systems   Unable to review      Objective           Vitals:  No vitals were obtained today due to virtual visit.    Physical Exam   Not evaluated              Video-Visit Details    Video Start Time: Not connected on video    Type of service:  Video Visit    Video End Time:NA    Originating Location (pt. Location):     Distant Location (provider location):  [facility]     Platform used for Video Visit: Unable to complete video visit    .  ..

## 2022-08-20 ENCOUNTER — HEALTH MAINTENANCE LETTER (OUTPATIENT)
Age: 47
End: 2022-08-20

## 2022-08-20 DIAGNOSIS — I10 ESSENTIAL HYPERTENSION: ICD-10-CM

## 2022-08-20 DIAGNOSIS — F41.9 ANXIETY: ICD-10-CM

## 2022-08-22 ENCOUNTER — OFFICE VISIT (OUTPATIENT)
Dept: ADDICTION MEDICINE | Facility: CLINIC | Age: 47
End: 2022-08-22
Payer: COMMERCIAL

## 2022-08-22 ENCOUNTER — NURSE TRIAGE (OUTPATIENT)
Dept: NURSING | Facility: CLINIC | Age: 47
End: 2022-08-22

## 2022-08-22 VITALS
BODY MASS INDEX: 29.23 KG/M2 | SYSTOLIC BLOOD PRESSURE: 119 MMHG | WEIGHT: 209.6 LBS | DIASTOLIC BLOOD PRESSURE: 98 MMHG | HEART RATE: 107 BPM

## 2022-08-22 DIAGNOSIS — F41.9 ANXIETY: ICD-10-CM

## 2022-08-22 DIAGNOSIS — Z71.6 ENCOUNTER FOR SMOKING CESSATION COUNSELING: ICD-10-CM

## 2022-08-22 DIAGNOSIS — G89.29 CHRONIC LOW BACK PAIN WITH RIGHT-SIDED SCIATICA, UNSPECIFIED BACK PAIN LATERALITY: ICD-10-CM

## 2022-08-22 DIAGNOSIS — J45.20 MILD INTERMITTENT ASTHMA WITHOUT COMPLICATION: ICD-10-CM

## 2022-08-22 DIAGNOSIS — F11.20 OPIOID USE DISORDER, SEVERE, DEPENDENCE (H): ICD-10-CM

## 2022-08-22 DIAGNOSIS — M54.41 CHRONIC LOW BACK PAIN WITH RIGHT-SIDED SCIATICA, UNSPECIFIED BACK PAIN LATERALITY: ICD-10-CM

## 2022-08-22 PROCEDURE — 99214 OFFICE O/P EST MOD 30 MIN: CPT | Performed by: NURSE PRACTITIONER

## 2022-08-22 RX ORDER — PROPRANOLOL HYDROCHLORIDE 10 MG/1
10 TABLET ORAL 2 TIMES DAILY PRN
Qty: 60 TABLET | Refills: 0 | Status: SHIPPED | OUTPATIENT
Start: 2022-08-22 | End: 2022-09-23

## 2022-08-22 RX ORDER — BUPRENORPHINE AND NALOXONE 8; 2 MG/1; MG/1
1 FILM, SOLUBLE BUCCAL; SUBLINGUAL 2 TIMES DAILY
Qty: 60 FILM | Refills: 1 | Status: SHIPPED | OUTPATIENT
Start: 2022-08-22 | End: 2022-10-14

## 2022-08-22 RX ORDER — METOPROLOL TARTRATE 50 MG
TABLET ORAL
Qty: 60 TABLET | Refills: 0 | OUTPATIENT
Start: 2022-08-22

## 2022-08-22 RX ORDER — ALBUTEROL SULFATE 90 UG/1
2 AEROSOL, METERED RESPIRATORY (INHALATION) EVERY 4 HOURS PRN
Qty: 18 G | Refills: 1 | Status: CANCELLED | OUTPATIENT
Start: 2022-08-22

## 2022-08-22 RX ORDER — PROPRANOLOL HYDROCHLORIDE 20 MG/1
TABLET ORAL
Qty: 60 TABLET | Refills: 0 | OUTPATIENT
Start: 2022-08-22

## 2022-08-22 RX ORDER — PROPRANOLOL HYDROCHLORIDE 20 MG/1
20 TABLET ORAL 2 TIMES DAILY PRN
Qty: 60 TABLET | Refills: 0 | Status: SHIPPED | OUTPATIENT
Start: 2022-08-22 | End: 2022-09-23

## 2022-08-22 RX ORDER — GABAPENTIN 300 MG/1
900 CAPSULE ORAL 3 TIMES DAILY
Qty: 270 CAPSULE | Refills: 1 | Status: SHIPPED | OUTPATIENT
Start: 2022-08-22 | End: 2022-10-14

## 2022-08-22 ASSESSMENT — ANXIETY QUESTIONNAIRES
3. WORRYING TOO MUCH ABOUT DIFFERENT THINGS: NEARLY EVERY DAY
7. FEELING AFRAID AS IF SOMETHING AWFUL MIGHT HAPPEN: NEARLY EVERY DAY
1. FEELING NERVOUS, ANXIOUS, OR ON EDGE: NEARLY EVERY DAY
GAD7 TOTAL SCORE: 19
5. BEING SO RESTLESS THAT IT IS HARD TO SIT STILL: SEVERAL DAYS
GAD7 TOTAL SCORE: 19
2. NOT BEING ABLE TO STOP OR CONTROL WORRYING: NEARLY EVERY DAY
6. BECOMING EASILY ANNOYED OR IRRITABLE: NEARLY EVERY DAY
IF YOU CHECKED OFF ANY PROBLEMS ON THIS QUESTIONNAIRE, HOW DIFFICULT HAVE THESE PROBLEMS MADE IT FOR YOU TO DO YOUR WORK, TAKE CARE OF THINGS AT HOME, OR GET ALONG WITH OTHER PEOPLE: EXTREMELY DIFFICULT

## 2022-08-22 ASSESSMENT — PATIENT HEALTH QUESTIONNAIRE - PHQ9
SUM OF ALL RESPONSES TO PHQ QUESTIONS 1-9: 21
5. POOR APPETITE OR OVEREATING: NEARLY EVERY DAY

## 2022-08-22 NOTE — PROGRESS NOTES
[facility] Addiction Medicine    A/P                                                    ASSESSMENT/PLAN  Diagnoses and all orders for this visit:  Opioid use disorder, severe, dependence (H)  Jeremías is doing well on Suboxone 8 mg twice daily.  He denies use of opiates, cravings, or side effects.  Plan to continue Suboxone 8 mg twice daily.  -     buprenorphine HCl-naloxone HCl (SUBOXONE) 8-2 MG per film; Place 1 Film under the tongue 2 times daily  Chronic low back pain with right-sided sciatica, unspecified back pain laterality  Continue gabapentin 900 mg 3 times daily.  Takes for neuropathic pain and anxiety.  Plan to continue for now.  -     gabapentin (NEURONTIN) 300 MG capsule; Take 3 capsules (900 mg) by mouth 3 times daily  Encounter for smoking cessation counseling  -     nicotine polacrilex (NICORETTE) 4 MG gum; Place 1 each (4 mg) inside cheek every hour as needed for smoking cessation Cinnamon  Anxiety  Continue propranolol 30 mg up to twice daily as needed for anxiety.  Needs to schedule follow-up with Elvia Brown CMP and has an appointment to establish care with Madelyn in December.  -     propranolol (INDERAL) 10 MG tablet; Take 1 tablet (10 mg) by mouth 2 times daily as needed (anxiety) Take with 20 mg  -     propranolol (INDERAL) 20 MG tablet; Take 1 tablet (20 mg) by mouth 2 times daily as needed  Mild intermittent asthma without complication  Requested albuterol inhaler today recommend that he contact pulmonology to have this medication refilled.  Orders Placed This Encounter   Medications     buprenorphine HCl-naloxone HCl (SUBOXONE) 8-2 MG per film     Sig: Place 1 Film under the tongue 2 times daily     Dispense:  60 Film     Refill:  1     NADEAN: [id number]     gabapentin (NEURONTIN) 300 MG capsule     Sig: Take 3 capsules (900 mg) by mouth 3 times daily     Dispense:  270 capsule     Refill:  1     nicotine polacrilex (NICORETTE) 4 MG gum     Sig: Place 1 each (4 mg) inside  cheek every hour as needed for smoking cessation Cinnamon     Dispense:  160 each     Refill:  0     propranolol (INDERAL) 10 MG tablet     Sig: Take 1 tablet (10 mg) by mouth 2 times daily as needed (anxiety) Take with 20 mg     Dispense:  60 tablet     Refill:  0     propranolol (INDERAL) 20 MG tablet     Sig: Take 1 tablet (20 mg) by mouth 2 times daily as needed     Dispense:  60 tablet     Refill:  0       Problem list updated Aug 22, 2022   No problems updated.        PDMP Review       Value Time User    State PDMP site checked  Yes 8/22/2022  1:15 PM Sherry Alvarado CNP            RTC  Return in about 2 months (around 10/22/2022) for Follow up, with me, using a video visit.      Counseled the patient on the importance of having a recovery program in addition to medication to manage recovery.  Components include avoiding isolating, having willingness to change, avoiding triggers and managing cravings. Encouraged having some type of sober network and practicing honesty with trusted support person(s). Encouraged other services such as counseling, 12 step or other self-help organizations.      Opioid warning reviewed.  Risk of overdose following a period of abstinence due to decrease tolerance was discussed including risk of death.  Strongly recommended abstain from alcohol, benzodiazepines, THC, opioids and other drugs of abuse.  Increased risk of return to opioid use after use of these substances discussed.  Increased risk of overdose/death with use of other substances particularly benzodiazepines/alcohol reviewed.        SUBJECTIVE                                                    Jared North is a 46 year old male who presents to clinic today for follow up    Visit performed In Person, face-to-face        Recent HPI Details: 7/22/22  Jared North is a 46 year old male with a history of recent Covid 19 injection, ARDS, Acute respiratory failure with hypoxia, MDD, Chronic back pain, and opioid use  "disorder who is being seen today for a follow up. He is taking suboxone 16 mg daily.     Brief History: He was hospitalized from 12/31/21-1/24/22 for Covid 19 infection with complications. He required tracheostomy and ventilator support, and GT tube placement which were discontinued prior to his discharge to home. He reports being on methadone maintenance for 10-15 years at [facility]. He states he was able to abstain from heroin for awhile but alcohol consumption increased significantly . He was drinking 1 box of wine daily and using 1/2-1 gram heroin until he went to inpatient treatment in December of 2019.  He has not drank or using heroin since April 2020.        -Jeremías reports that he is been doing okay.  He is reporting soreness related to increased activity.  He is getting his place clean and ready to move.  He is still looking for a place to rent.  [place] ex-wife.  -Recovery is going okay.  He has not been attending meetings lately due to being busy with the house.    -Suboxone was increased at last visit to 16 mg daily.  He is taking 16 mg most days but will occasionally take less depending on how he is feeling.  He feels that he is \"testing himself\" to see if he can takes smaller doses.  He has experienced overall improvement in his withdrawal symptoms since the increased dose.  -Cravings have improved with the increased dose of Suboxone.  States he used to desire \"checking out\" of reality.  Now he just has thoughts that it would be nice to have a drink.  -Pain is still an issue, is not sure that the  increase Suboxone dose is helping, \"maybe a little\" he has tried to get into the pain clinic but has been unsuccessful in getting scheduled within [place].  He plans to contact [facility] pain to see if he can start being seen there and if he would need a referral.  -Saw Elvia Brown CNP in the transition clinic.  He is planning to restart olanzapine 5 mg at bedtime " and will start Prozac 10 mg in 2 weeks.  -He missed his appointment to establish care with a primary care provider.  He thought his appointment was in August but was earlier this week.  He had a pulmonology appointment that same day.  -Anxiety continues continues to be an issue he is wanting an increase to propranolol.  States when he is anxious he does pull his hair.  Opioid use disorder, severe, dependence (H)  Suboxone was increased at last visit to 16 mg daily.  He reports an improvement in withdrawal symptoms and cravings.  Pain continues to be an issue but likely related to him being more active as he complains of soreness.  Plan to continue Suboxone 8 mg twice daily.  -     buprenorphine HCl-naloxone HCl (SUBOXONE) 8-2 MG per film; Place 1 Film under the tongue 2 times daily  Chronic low back pain with right-sided sciatica, unspecified back pain laterality  He is experiencing some increased lower back pain secondary to increased activity.  He has been busy cleaning his home and getting ready to move.  Suboxone is helping some as well as gabapentin.  Plan to continue gabapentin 900 mg 3 times daily.  -     gabapentin (NEURONTIN) 300 MG capsule; Take 3 capsules (900 mg) by mouth 3 times daily  Essential hypertension  He missed his appointment to establish care with a PCP.  His appointment was 7/18/2022.  He will call to get this appointment rescheduled.  Phone number provided for him to schedule appointment.  Needs refill for metoprolol.  We will provide 1 more month of medication until he can be seen by PCP for evaluation of his blood pressure.  He saw pulmonology yesterday blood pressure was 128/88 at that appointment.  -     metoprolol tartrate (LOPRESSOR) 50 MG tablet; Take 1 tablet (50 mg) by mouth 2 times daily  Anxiety  Continues to experience increased anxiety at times.  He states when he is really anxious he does pull at his hair.  Plan to increase propanolol to 30 mg twice daily as needed.   Psych saw  him yesterday in transitional clinic.  Olanzapine 5 mg at bedtime was ordered with plans to start Prozac 10 mg in 2 weeks.  He has psychiatry appointment in December.  -     propranolol (INDERAL) 10 MG tablet; Take 1 tablet (10 mg) by mouth 2 times daily as needed (anxiety) Take with 20 mg  -     propranolol (INDERAL) 20 MG tablet; Take 1 tablet (20 mg) by mouth 2 times daily as needed  Encounter for smoking cessation counseling  He chews tobacco but has been able to decrease.  Previously provided Nicotrol inhaler which he says is too strong for him and he is concerned about his lungs.  Nicotine gum ordered.  -     nicotine polacrilex (NICORETTE) 4 MG gum; Place 1 each (4 mg) inside cheek every hour as needed for smoking cessation Cinnamon  TODAY'S VISIT  HPI Aug 22, 2022     Jared North is a 46 year old male with a history of recent Covid 19 injection, ARDS, Acute respiratory failure with hypoxia, MDD, Chronic back pain, and opioid use disorder who is being seen today for a follow up. He is taking suboxone 16 mg daily.     Brief History: He was hospitalized from 12/31/21-1/24/22 for Covid 19 infection with complications. He required tracheostomy and ventilator support, and GT tube placement which were discontinued prior to his discharge to home. He reports being on methadone maintenance for 10-15 years at [facility]. He states he was able to abstain from heroin for awhile but alcohol consumption increased significantly . He was drinking 1 box of wine daily and using 1/2-1 gram heroin until he went to inpatient treatment in December of 2019.  He has not drank or using heroin since April 2020.        Jeremías is feeling better after his second COVID 19 infection.  He tested positive 8/2.  He has not been vaccinated states he was not sure when he could get vaccinated following his COVID-19 infection late last year.  -Him and his family have been busy packing belongings, they are moving to [facility] 8/31.  -He reports  his mood has shown some improvement.   - He has stopped taking Wellbutrin per recommendations of Elvia Brown.    He is taking olanzapine 2.5 mg at bedtime, 5 mg every at bedtime at is ordered.  States he is weaning onto the medication.  He has not yet started Prozac 10 mg states he is worried it will make him sleepy and that along with olanzapine he feels he may be too sleepy.  -He needs to establish care with a new PCP once he is relocated to [facility]  -He needs to schedule a follow-up with Elvia Brown, recommended follow-up 9/1.  -He is taking Suboxone 8 mg twice daily, he denies any substance use or cravings.  -Does have positive for cannabinoids and buprenorphine today.  He does endorse using delta 8 Gummies twice weekly for pain.  -There is no significant change to pain.  Buprenorphine helps.  He has had increased activity related to getting ready to move and is experiencing an increase to soreness.      OBJECTIVE                                                    PHYSICAL EXAM:  BP (!) 119/98 (BP Location: Right arm, Patient Position: Sitting, Cuff Size: Adult Regular)   Pulse 107   Wt 95.1 kg (209 lb 9.6 oz)   BMI 29.23 kg/m      GENERAL: healthy, alert and no distress  EYES: Eyes grossly normal to inspection, PERRL and conjunctivae and sclerae normal  RESP: Lung sounds clear, no cough, shortness of breath.  No respiratory distress.  CV: regular rate and rhythm, no peripheral edema and peripheral pulses strong  MENTAL STATUS EXAM  Appearance/Behavior: No appearant distress  Speech: Normal  Mood/Affect: normal affect  Insight: Adequate    LAB  No results found for any visits on 08/22/22.      HISTORY                                                    Problem list reviewed & adjusted, as indicated.  Patient Active Problem List   Diagnosis     Chronic back pain     Essential hypertension     Elevated LFTs     Acute on chronic respiratory failure with hypoxia (H)     Pneumonia due to 2019 novel  coronavirus     Atrial fibrillation with rapid ventricular response (H)     Acute respiratory distress syndrome (ARDS) due to COVID-19 virus (H)     Major depressive disorder, recurrent episode, severe (H)     Opioid use disorder, severe, dependence (H)     Polysubstance abuse (H)     ROSSY (obstructive sleep apnea)     Mild intermittent asthma     Acute metabolic encephalopathy     Ventilator associated pneumonia (H)     Oropharyngeal dysphagia     Alcohol dependence (H)     Anxiety     Moderate to severe episode of recurrent major depressive disorder (H)     DONALD (generalized anxiety disorder)     Primary osteoarthritis of right knee         MEDICATION LIST (prior to visit)  acetaminophen (TYLENOL) 500 MG tablet, Take 1-2 tablets (500-1,000 mg) by mouth every 6 hours as needed for mild pain  albuterol (PROAIR HFA/PROVENTIL HFA/VENTOLIN HFA) 108 (90 Base) MCG/ACT inhaler, Inhale 2 puffs into the lungs every 4 hours as needed for shortness of breath / dyspnea  diclofenac (VOLTAREN) 1 % topical gel, Apply 2 g topically 4 times daily  fluticasone (FLOVENT DISKUS) 50 MCG/BLIST inhaler, Inhale 1 puff into the lungs every 12 hours  hydrOXYzine (ATARAX) 10 MG tablet, Take 1 tablet (10 mg) by mouth every 6 hours as needed for itching  metoprolol tartrate (LOPRESSOR) 50 MG tablet, Take 1 tablet (50 mg) by mouth 2 times daily  naloxone (NARCAN) 4 MG/0.1ML nasal spray, Spray 1 spray (4 mg) into one nostril alternating nostrils 2 times daily as needed for opioid reversal every 2-3 minutes until assistance arrives    No current facility-administered medications on file prior to visit.      MEDICATION LIST (after visit)  Current Outpatient Medications   Medication     acetaminophen (TYLENOL) 500 MG tablet     albuterol (PROAIR HFA/PROVENTIL HFA/VENTOLIN HFA) 108 (90 Base) MCG/ACT inhaler     buprenorphine HCl-naloxone HCl (SUBOXONE) 8-2 MG per film     diclofenac (VOLTAREN) 1 % topical gel     fluticasone (FLOVENT DISKUS) 50  MCG/BLIST inhaler     gabapentin (NEURONTIN) 300 MG capsule     hydrOXYzine (ATARAX) 10 MG tablet     metoprolol tartrate (LOPRESSOR) 50 MG tablet     naloxone (NARCAN) 4 MG/0.1ML nasal spray     nicotine polacrilex (NICORETTE) 4 MG gum     propranolol (INDERAL) 10 MG tablet     propranolol (INDERAL) 20 MG tablet     No current facility-administered medications for this visit.         No Known Allergies        Sherry Alvarado, BARI,MSN, AGNP-C  [facility]   [address] [place] [address], [address] [place] [address]   Phone # [phone]

## 2022-08-22 NOTE — PATIENT INSTRUCTIONS
It was good to see you today, Bill!    Per our discussion ,     Continue suboxone 8 mg twice daily.  2.  Schedule Covid vaccine  3. Establish care with new provider in [place]  4. Schedule follow up with Elvia Brown CNP  5. Call Pulmonology for albuterol Inhaler

## 2022-08-22 NOTE — PROGRESS NOTES
[facility] - Addiction Medicine       Rooming information:  Approximate last use: 2.5 years substance alcohol, heroin, fentanyl   Side effects related to buprenorphine, naltrexone, acamprosate, or antabuse (constipation, dry mouth, HA, GI upset, sedation?) Yes headaches  Narcan currently available: Yes  Other recent substance use:    Cannabis    NICOTINE-Yes  If using nicotine, ready to quit? Yes: nicotine uploaded for approval per pts request    Primary care provider: Giovani Allen MD     Mental health provider: denies (follow up on MH referral if needed)    Any housing, insurance deficits?: stable    Contact information up to date? yes    3rd Party Involvement none today (please obtain SHALOM if pt would like to include)      OBJECTIVE     OBJECTIVE     UA collected today - Yes    Point of care urine drug screen positive for:     *POC urine drug screen does not screen for Fentanyl    PHQ-9 Score:     PHQ Assesment Total Score(s) 8/22/2022   PHQ-9 Score 21   Some recent data might be hidden       DONALD-7 Score:    DONALD-7 SCORE 6/24/2022 7/21/2022 8/22/2022   Total Score - - -   Total Score 19 20 19         Any current suicidal ideation? No          Juan Manuel Marie MA  August 22, 2022  1:01 PM

## 2022-08-22 NOTE — TELEPHONE ENCOUNTER
Sherry Staley CNP is calling from clinic and has patient with her currently and is wanting to know where the vaccines are given out at and has questions on vaccine.  Patient is currently with CNP.  No triage.      Reason for Disposition    Information only question and nurse able to answer    Additional Information    Negative: Nursing judgment    Negative: Nursing judgment    Negative: Nursing judgment    Negative: Nursing judgment    Protocols used: INFORMATION ONLY CALL - NO TRIAGE-A-OH

## 2022-09-20 DIAGNOSIS — F41.9 ANXIETY: ICD-10-CM

## 2022-09-21 NOTE — TELEPHONE ENCOUNTER
Date of Last Office Visit: 10/17 Jenny  Date of Next Office Visit: 12/23 Leigh  No shows since last visit: 0  Cancellations since last visit: 0    Medication requested: propranolol (INDERAL) 10 MG tablet Date last ordered: 8/22/22 Qty: 60 Refills: 0    Medication requested: propranolol (INDERAL) 20 MG tablet Date last ordered: 8/22/22 Qty: 60 Refills: 0    Review of [place] ?: N/A    Lapse in medication adherence greater than 5 days?: No. PRN    Medication refill request verified as identical to current order?: Yes  Result of Last DAM, VPA, Li+ Level, CBC, or Carbamazepine Level (at or since last visit): N/A    Last visit treatment plan:   Anxiety  Continue propranolol 30 mg up to twice daily as needed for anxiety.  Needs to schedule follow-up with Elvia Brown CMP and has an appointment to establish care with Madelyn in December.  -     propranolol (INDERAL) 10 MG tablet; Take 1 tablet (10 mg) by mouth 2 times daily as needed (anxiety) Take with 20 mg  -     propranolol (INDERAL) 20 MG tablet; Take 1 tablet (20 mg) by mouth 2 times daily as needed    []Medication refilled per  Medication Refill in Ambulatory Care  policy.  [x]Medication unable to be refilled by RN due to criteria not met as indicated below:    []Eligibility - not seen in the last year   []Supervision - no future appointment   []Compliance - no shows, cancellations or lapse in therapy   []Verification - order discrepancy   []Controlled medication   [x]Medication not included in policy   []90-day supply request   []Other

## 2022-09-23 RX ORDER — PROPRANOLOL HYDROCHLORIDE 20 MG/1
TABLET ORAL
Qty: 60 TABLET | Refills: 0 | Status: SHIPPED | OUTPATIENT
Start: 2022-09-23 | End: 2022-10-14

## 2022-09-23 RX ORDER — PROPRANOLOL HYDROCHLORIDE 10 MG/1
TABLET ORAL
Qty: 60 TABLET | Refills: 0 | Status: SHIPPED | OUTPATIENT
Start: 2022-09-23 | End: 2022-10-14

## 2022-10-14 ENCOUNTER — VIRTUAL VISIT (OUTPATIENT)
Dept: ADDICTION MEDICINE | Facility: CLINIC | Age: 47
End: 2022-10-14
Payer: COMMERCIAL

## 2022-10-14 DIAGNOSIS — M54.41 CHRONIC LOW BACK PAIN WITH RIGHT-SIDED SCIATICA, UNSPECIFIED BACK PAIN LATERALITY: ICD-10-CM

## 2022-10-14 DIAGNOSIS — G89.29 CHRONIC LOW BACK PAIN WITH RIGHT-SIDED SCIATICA, UNSPECIFIED BACK PAIN LATERALITY: ICD-10-CM

## 2022-10-14 DIAGNOSIS — F11.20 OPIOID USE DISORDER, SEVERE, DEPENDENCE (H): Primary | ICD-10-CM

## 2022-10-14 DIAGNOSIS — Z71.6 ENCOUNTER FOR SMOKING CESSATION COUNSELING: ICD-10-CM

## 2022-10-14 DIAGNOSIS — F33.1 MODERATE EPISODE OF RECURRENT MAJOR DEPRESSIVE DISORDER (H): ICD-10-CM

## 2022-10-14 DIAGNOSIS — F41.9 ANXIETY: ICD-10-CM

## 2022-10-14 PROCEDURE — 99214 OFFICE O/P EST MOD 30 MIN: CPT | Mod: 95 | Performed by: NURSE PRACTITIONER

## 2022-10-14 RX ORDER — BUPRENORPHINE AND NALOXONE 8; 2 MG/1; MG/1
1 FILM, SOLUBLE BUCCAL; SUBLINGUAL 2 TIMES DAILY
Qty: 60 FILM | Refills: 1 | Status: SHIPPED | OUTPATIENT
Start: 2022-10-14 | End: 2022-12-06

## 2022-10-14 RX ORDER — PROPRANOLOL HYDROCHLORIDE 10 MG/1
TABLET ORAL
Qty: 60 TABLET | Refills: 0 | Status: SHIPPED | OUTPATIENT
Start: 2022-10-14 | End: 2022-12-06 | Stop reason: DRUGHIGH

## 2022-10-14 RX ORDER — PROPRANOLOL HYDROCHLORIDE 20 MG/1
TABLET ORAL
Qty: 60 TABLET | Refills: 0 | Status: SHIPPED | OUTPATIENT
Start: 2022-10-14 | End: 2022-12-06

## 2022-10-14 RX ORDER — GABAPENTIN 300 MG/1
900 CAPSULE ORAL 3 TIMES DAILY
Qty: 270 CAPSULE | Refills: 1 | Status: SHIPPED | OUTPATIENT
Start: 2022-10-14 | End: 2022-12-06 | Stop reason: DRUGHIGH

## 2022-10-14 RX ORDER — HYDROXYZINE HYDROCHLORIDE 10 MG/1
10 TABLET, FILM COATED ORAL EVERY 6 HOURS PRN
Qty: 90 TABLET | Refills: 1 | Status: SHIPPED | OUTPATIENT
Start: 2022-10-14 | End: 2022-12-06

## 2022-10-14 RX ORDER — BUPROPION HYDROCHLORIDE 150 MG/1
150 TABLET ORAL EVERY MORNING
Qty: 30 TABLET | Refills: 1 | Status: SHIPPED | OUTPATIENT
Start: 2022-10-14 | End: 2023-01-30 | Stop reason: DRUGHIGH

## 2022-10-14 ASSESSMENT — PATIENT HEALTH QUESTIONNAIRE - PHQ9
10. IF YOU CHECKED OFF ANY PROBLEMS, HOW DIFFICULT HAVE THESE PROBLEMS MADE IT FOR YOU TO DO YOUR WORK, TAKE CARE OF THINGS AT HOME, OR GET ALONG WITH OTHER PEOPLE: EXTREMELY DIFFICULT
SUM OF ALL RESPONSES TO PHQ QUESTIONS 1-9: 21
SUM OF ALL RESPONSES TO PHQ QUESTIONS 1-9: 21

## 2022-10-14 ASSESSMENT — ANXIETY QUESTIONNAIRES
7. FEELING AFRAID AS IF SOMETHING AWFUL MIGHT HAPPEN: NEARLY EVERY DAY
7. FEELING AFRAID AS IF SOMETHING AWFUL MIGHT HAPPEN: NEARLY EVERY DAY
6. BECOMING EASILY ANNOYED OR IRRITABLE: NEARLY EVERY DAY
2. NOT BEING ABLE TO STOP OR CONTROL WORRYING: NEARLY EVERY DAY
4. TROUBLE RELAXING: NEARLY EVERY DAY
GAD7 TOTAL SCORE: 19
8. IF YOU CHECKED OFF ANY PROBLEMS, HOW DIFFICULT HAVE THESE MADE IT FOR YOU TO DO YOUR WORK, TAKE CARE OF THINGS AT HOME, OR GET ALONG WITH OTHER PEOPLE?: EXTREMELY DIFFICULT
GAD7 TOTAL SCORE: 19
IF YOU CHECKED OFF ANY PROBLEMS ON THIS QUESTIONNAIRE, HOW DIFFICULT HAVE THESE PROBLEMS MADE IT FOR YOU TO DO YOUR WORK, TAKE CARE OF THINGS AT HOME, OR GET ALONG WITH OTHER PEOPLE: EXTREMELY DIFFICULT
5. BEING SO RESTLESS THAT IT IS HARD TO SIT STILL: SEVERAL DAYS
3. WORRYING TOO MUCH ABOUT DIFFERENT THINGS: NEARLY EVERY DAY
GAD7 TOTAL SCORE: 19
1. FEELING NERVOUS, ANXIOUS, OR ON EDGE: NEARLY EVERY DAY

## 2022-10-14 NOTE — PROGRESS NOTES
[facility] - Addiction Medicine       Rooming information:    Point of care urine drug screen positive for: NA - virtual     *POC urine drug screen does not screen for Fentanyl    PHQ-9 Scores: 21  PHQ 7/21/2022 8/22/2022 10/14/2022   PHQ-9 Total Score 23 21 21   Q9: Thoughts of better off dead/self-harm past 2 weeks More than half the days Not at all Not at all     DONALD-7 Scores: 19  DONALD-7 SCORE 7/21/2022 8/22/2022 10/14/2022   Total Score - - 19 (severe anxiety)   Total Score 20 19 19       Any other recent substance use:      SUBSTANCES:THC gummies    NICOTINE-Yes: - chew  If using nicotine, ready to quit? No    Side effects related to medications pt would like to discuss with provider (constipation, dry mouth, HA, GI upset, sedation?) No     Narcan currently available: Yes    Primary care provider: Giovani Allen MD     Mental health provider:  Not currently (follow up on MH referral if needed)    Any housing, insurance deficits?: it is for rnow    Contact information up to date? yes    3rd Party Involvement NA (please obtain SHALOM if pt would like to include)     This video/telephone visit will be conducted via a call between you and your physician/provider. We have found that certain health care needs can be provided without the need for an in-person physical exam. This service lets us provide the care you need with a video /telephone conversation. If a prescription is necessary we can send it directly to your pharmacy. If lab work is needed we can place an order for that and you can then stop by our lab to have the test done at a later time.    Just as we bill insurance for in-person visits, we also bill insurance for video/telephone visits. If you have questions about your insurance coverage, we recommend that you speak with your insurance company.    Patient has given verbal consent for video/Telephone visit? yes    Patient would like a vide visit, please connect/call : Jack  Reason for visit:  AM follow up and med refills  Anything the provider should be aware of for today's appointment: nothing new reported    Patient verified allergies, medications and pharmacy via [employer].       Patient states they are ready for visit.    Meena Lee October 14, 2022 3:33 PM    Meena Lee  October 14, 2022  3:31 PM

## 2022-10-14 NOTE — Clinical Note
JOSE Herrera, could you review this patient and his medications. He has been experiencing increased anxiety and depression since ICU 12/2021-1/2022 for covid. Has been abstinent since 2020. He is on suboxone 16 mg, gabapentin 900 TID, propranolol as needed (provides little benefit), and prn hydroxyzine.   He stopped olanzapine and prozac recommended by [facility]. He is scheduled to establish care 12/23 with Hiral who is leaving early Dec. Working on getting him rescheduled.  I would be happy to hear any concerns or recommendations for him. He recently moved to [facility] and having increased stress and anxiety, and is now experiencing some cravings. I am concerned about high doses of gabapentin and suboxone but reluctant to change anything right now due to his mood. Appreciate any feedback you could give.  Sherry

## 2022-10-14 NOTE — PROGRESS NOTES
[facility]    A/P                                                    ASSESSMENT/PLAN  Diagnoses and all orders for this visit:  Opioid use disorder, severe, dependence (H)  Denies use, some cravings since moving to [place]. Moved was stressful for him. Encouraged meeting attendance to help him cope with stress. Plan to continue suboxone 8 mg BID.  -     buprenorphine HCl-naloxone HCl (SUBOXONE) 8-2 MG per film; Place 1 Film under the tongue 2 times daily  Chronic low back pain with right-sided sciatica, unspecified back pain laterality  Continue gabapentin to help with pain and anxiety.  -     gabapentin (NEURONTIN) 300 MG capsule; Take 3 capsules (900 mg) by mouth 3 times daily  Anxiety  Stopped taking olanzapine and prozac recommended by [facility] provider. Has appointment to establish care with Hiral Leigh 12/23. Attempted to reschedule appointment today 2/2 provider leaving [facility], unable to reschedule at this time. Plan to continue propranolol and hydroxyzine.    -     hydrOXYzine (ATARAX) 10 MG tablet; Take 1 tablet (10 mg) by mouth every 6 hours as needed for itching  -     propranolol (INDERAL) 10 MG tablet; TAKE 1 TABLET BY MOUTH TWICE DAILY AS NEEDED FOR ANXIETY ALONG WITH 20MG Strength: 10 mg  -     propranolol (INDERAL) 20 MG tablet; TAKE 1 TABLET(20 MG) BY MOUTH TWICE DAILY AS NEEDED. Take with 10 mg, total 30 mg  Encounter for smoking cessation counseling  -     nicotine polacrilex (NICORETTE) 4 MG gum; Place 1 each (4 mg) inside cheek every hour as needed for smoking cessation Cinnamon  Moderate to severe episode of recurrent major depressive disorder (H)  Did not tolerate prozac or olanzapine, requested bupropion be resumed. Discussed benefits of wellbutrin for his depression, but may contribute to his anxiety. States he felt best while taking this medication. He was started on olanzapine while in ICU for covid, and does not like side effects. Plan to restart  wellbutrin XL.   -     buPROPion (WELLBUTRIN XL) 150 MG 24 hr tablet; Take 1 tablet (150 mg) by mouth every morning    Orders Placed This Encounter   Medications     gabapentin (NEURONTIN) 300 MG capsule     Sig: Take 3 capsules (900 mg) by mouth 3 times daily     Dispense:  270 capsule     Refill:  1     hydrOXYzine (ATARAX) 10 MG tablet     Sig: Take 1 tablet (10 mg) by mouth every 6 hours as needed for itching     Dispense:  90 tablet     Refill:  1     nicotine polacrilex (NICORETTE) 4 MG gum     Sig: Place 1 each (4 mg) inside cheek every hour as needed for smoking cessation Cinnamon     Dispense:  160 each     Refill:  0     propranolol (INDERAL) 10 MG tablet     Sig: TAKE 1 TABLET BY MOUTH TWICE DAILY AS NEEDED FOR ANXIETY ALONG WITH 20MG Strength: 10 mg     Dispense:  60 tablet     Refill:  0     propranolol (INDERAL) 20 MG tablet     Sig: TAKE 1 TABLET(20 MG) BY MOUTH TWICE DAILY AS NEEDED. Take with 10 mg, total 30 mg     Dispense:  60 tablet     Refill:  0     buprenorphine HCl-naloxone HCl (SUBOXONE) 8-2 MG per film     Sig: Place 1 Film under the tongue 2 times daily     Dispense:  60 Film     Refill:  1     NADEAN: [id number]     buPROPion (WELLBUTRIN XL) 150 MG 24 hr tablet     Sig: Take 1 tablet (150 mg) by mouth every morning     Dispense:  30 tablet     Refill:  1       Problem list updated Oct 14, 2022   No problems updated.          PDMP Review       Value Time User    State PDMP site checked  Yes 10/14/2022  3:47 PM Sherry Alvarado CNP            RTC  Return in about 2 months (around 12/14/2022) for Follow up, with me, using a video visit schedule at 1:00pm.      Counseled the patient on the importance of having a recovery program in addition to medication to manage recovery.  Components include avoiding isolating, having willingness to change, avoiding triggers and managing cravings. Encouraged having some type of sober network and practicing honesty with trusted support person(s).  Encouraged other services such as counseling, 12 step or other self-help organizations.      Opioid warning reviewed.  Risk of overdose following a period of abstinence due to decrease tolerance was discussed including risk of death.  Strongly recommended abstain from alcohol, benzodiazepines, THC, opioids and other drugs of abuse.  Increased risk of return to opioid use after use of these substances discussed.  Increased risk of overdose/death with use of other substances particularly benzodiazepines/alcohol reviewed.        SUBJECTIVE                                                    Jared North is a 46 year old male who presents to clinic today for follow up    Visit performed Virtual, via video    Video-Visit Details    Type of service:  Video Visit    Video Start Time: 1555  Video End Time: 1627    Originating Location (pt. Location): Home    Distant Location (provider location):  [facility] on site      Platform used for Video Visit: [facility] Details: 8/22/22  Jared North is a 46 year old male with a history of recent Covid 19 injection, ARDS, Acute respiratory failure with hypoxia, MDD, Chronic back pain, and opioid use disorder who is being seen today for a follow up. He is taking suboxone 16 mg daily.     Brief History: He was hospitalized from 12/31/21-1/24/22 for Covid 19 infection with complications. He required tracheostomy and ventilator support, and GT tube placement which were discontinued prior to his discharge to home. He reports being on methadone maintenance for 10-15 years at [facility]. He states he was able to abstain from heroin for awhile but alcohol consumption increased significantly . He was drinking 1 box of wine daily and using 1/2-1 gram heroin until he went to inpatient treatment in December of 2019.  He has not drank or using heroin since April 2020.        Jeremías is feeling better after his second COVID 19 infection.  He tested positive 8/2.  He has not  been vaccinated states he was not sure when he could get vaccinated following his COVID-19 infection late last year.  -Him and his family have been busy packing belongings, they are moving to [facility] 8/31.  -He reports his mood has shown some improvement.   - He has stopped taking Wellbutrin per recommendations of Elvia Brown.    He is taking olanzapine 2.5 mg at bedtime, 5 mg every at bedtime at is ordered.  States he is weaning onto the medication.  He has not yet started Prozac 10 mg states he is worried it will make him sleepy and that along with olanzapine he feels he may be too sleepy.  -He needs to establish care with a new PCP once he is relocated to [facility]  -He needs to schedule a follow-up with Elvia Brown, recommended follow-up 9/1.  -He is taking Suboxone 8 mg twice daily, he denies any substance use or cravings.  -Does have positive for cannabinoids and buprenorphine today.  He does endorse using delta 8 Gummies twice weekly for pain.  -There is no significant change to pain.  Buprenorphine helps.  He has had increased activity related to getting ready to move and is experiencing an increase to soreness.  Opioid use disorder, severe, dependence (H)  Bill is doing well on Suboxone 8 mg twice daily.  He denies use of opiates, cravings, or side effects.  Plan to continue Suboxone 8 mg twice daily.  -     buprenorphine HCl-naloxone HCl (SUBOXONE) 8-2 MG per film; Place 1 Film under the tongue 2 times daily  Chronic low back pain with right-sided sciatica, unspecified back pain laterality  Continue gabapentin 900 mg 3 times daily.  Takes for neuropathic pain and anxiety.  Plan to continue for now.  -     gabapentin (NEURONTIN) 300 MG capsule; Take 3 capsules (900 mg) by mouth 3 times daily  Encounter for smoking cessation counseling  -     nicotine polacrilex (NICORETTE) 4 MG gum; Place 1 each (4 mg) inside cheek every hour as needed for smoking cessation Cinnamon  Anxiety  Continue  "propranolol 30 mg up to twice daily as needed for anxiety.  Needs to schedule follow-up with Elvia Brown CMP and has an appointment to establish care with Madelyn in December.  -     propranolol (INDERAL) 10 MG tablet; Take 1 tablet (10 mg) by mouth 2 times daily as needed (anxiety) Take with 20 mg  -     propranolol (INDERAL) 20 MG tablet; Take 1 tablet (20 mg) by mouth 2 times daily as needed  Mild intermittent asthma without complication  Requested albuterol inhaler today recommend that he contact pulmonology to have this medication refilled  TODAY'S VISIT  HPI Oct 14, 2022    Jared North is a 46 year old male with a history of recent Covid 19 injection, ARDS, Acute respiratory failure with hypoxia, MDD, Chronic back pain, and opioid use disorder who is being seen today for a follow up. He is taking suboxone 16 mg daily.     Brief History: He was hospitalized from 12/31/21-1/24/22 for Covid 19 infection with complications. He required tracheostomy and ventilator support, and GT tube placement which were discontinued prior to his discharge to home. He reports being on methadone maintenance for 10-15 years at [facility]. He states he was able to abstain from heroin for awhile but alcohol consumption increased significantly . He was drinking 1 box of wine daily and using 1/2-1 gram heroin until he went to inpatient treatment in December of 2019.  He has not drank or using heroin since April 2020.        -[place]  -Move was difficult for him. Adjusting to new environment.   -Busy working on improvements on house.  -No meetings since moved.  -Denies substance use, \"small cravings for anything that will relieve my nerves\"  -CBD gummies for anxiety and pain, helps with sleep  -No significant changes in mood, \"I don't know why I don't feel better with all my sobriety.\"           OBJECTIVE                                                    PHYSICAL EXAM:  There were no vitals " taken for this visit.    GENERAL: healthy, alert and no distress  EYES: Eyes grossly normal to inspection, PERRL and conjunctivae and sclerae normal  RESP: No respiratory distress  MENTAL STATUS EXAM  Appearance/Behavior: No appearant distress  Speech: Normal  Mood/Affect: normal affect  Insight: Adequate    PHQ 7/21/2022 8/22/2022 10/14/2022   PHQ-9 Total Score 23 21 21   Q9: Thoughts of better off dead/self-harm past 2 weeks More than half the days Not at all Not at all     DONALD-7 SCORE 7/21/2022 8/22/2022 10/14/2022   Total Score - - 19 (severe anxiety)   Total Score 20 19 19         LAB  No results found for any visits on 10/14/22.      HISTORY                                                    Problem list reviewed & adjusted, as indicated.  Patient Active Problem List   Diagnosis     Chronic back pain     Essential hypertension     Elevated LFTs     Acute on chronic respiratory failure with hypoxia (H)     Pneumonia due to 2019 novel coronavirus     Atrial fibrillation with rapid ventricular response (H)     Acute respiratory distress syndrome (ARDS) due to COVID-19 virus (H)     Major depressive disorder, recurrent episode, severe (H)     Opioid use disorder, severe, dependence (H)     Polysubstance abuse (H)     ROSSY (obstructive sleep apnea)     Mild intermittent asthma     Acute metabolic encephalopathy     Ventilator associated pneumonia (H)     Oropharyngeal dysphagia     Alcohol dependence (H)     Anxiety     Moderate to severe episode of recurrent major depressive disorder (H)     DONALD (generalized anxiety disorder)     Primary osteoarthritis of right knee         MEDICATION LIST (prior to visit)  acetaminophen (TYLENOL) 500 MG tablet, Take 1-2 tablets (500-1,000 mg) by mouth every 6 hours as needed for mild pain  albuterol (PROAIR HFA/PROVENTIL HFA/VENTOLIN HFA) 108 (90 Base) MCG/ACT inhaler, Inhale 2 puffs into the lungs every 4 hours as needed for shortness of breath / dyspnea  diclofenac (VOLTAREN) 1  % topical gel, Apply 2 g topically 4 times daily  fluticasone (FLONASE) 50 MCG/ACT nasal spray, SHAKE LIQUID AND USE 2 SPRAYS IN EACH NOSTRIL DAILY FOR 10 DAYS  fluticasone (FLOVENT DISKUS) 50 MCG/BLIST inhaler, Inhale 1 puff into the lungs every 12 hours  metoprolol tartrate (LOPRESSOR) 50 MG tablet, Take 1 tablet (50 mg) by mouth 2 times daily  naloxone (NARCAN) 4 MG/0.1ML nasal spray, Spray 1 spray (4 mg) into one nostril alternating nostrils 2 times daily as needed for opioid reversal every 2-3 minutes until assistance arrives    No current facility-administered medications on file prior to visit.      MEDICATION LIST (after visit)  Current Outpatient Medications   Medication     buprenorphine HCl-naloxone HCl (SUBOXONE) 8-2 MG per film     buPROPion (WELLBUTRIN XL) 150 MG 24 hr tablet     gabapentin (NEURONTIN) 300 MG capsule     hydrOXYzine (ATARAX) 10 MG tablet     nicotine polacrilex (NICORETTE) 4 MG gum     propranolol (INDERAL) 10 MG tablet     propranolol (INDERAL) 20 MG tablet     acetaminophen (TYLENOL) 500 MG tablet     albuterol (PROAIR HFA/PROVENTIL HFA/VENTOLIN HFA) 108 (90 Base) MCG/ACT inhaler     diclofenac (VOLTAREN) 1 % topical gel     fluticasone (FLONASE) 50 MCG/ACT nasal spray     fluticasone (FLOVENT DISKUS) 50 MCG/BLIST inhaler     metoprolol tartrate (LOPRESSOR) 50 MG tablet     naloxone (NARCAN) 4 MG/0.1ML nasal spray     No current facility-administered medications for this visit.         No Known Allergies        Sherry Alvarado, BARI,MSN, AGNP-C  [facility]   [address] [place] [address], [address]   [place] [address]   Phone # [phone]         Answers for HPI/ROS submitted by the patient on 10/14/2022  If you checked off any problems, how difficult have these problems made it for you to do your work, take care of things at home, or get along with other people?: Extremely difficult  PHQ9 TOTAL SCORE: 21  DONALD 7 TOTAL SCORE: 19

## 2022-11-20 ENCOUNTER — HEALTH MAINTENANCE LETTER (OUTPATIENT)
Age: 47
End: 2022-11-20

## 2022-12-06 ENCOUNTER — VIRTUAL VISIT (OUTPATIENT)
Dept: ADDICTION MEDICINE | Facility: CLINIC | Age: 47
End: 2022-12-06
Payer: COMMERCIAL

## 2022-12-06 ENCOUNTER — PATIENT OUTREACH (OUTPATIENT)
Dept: CARE COORDINATION | Facility: CLINIC | Age: 47
End: 2022-12-06

## 2022-12-06 DIAGNOSIS — F33.1 MODERATE EPISODE OF RECURRENT MAJOR DEPRESSIVE DISORDER (H): ICD-10-CM

## 2022-12-06 DIAGNOSIS — F11.20 OPIOID USE DISORDER, SEVERE, DEPENDENCE (H): Primary | ICD-10-CM

## 2022-12-06 DIAGNOSIS — G89.29 CHRONIC LOW BACK PAIN WITH RIGHT-SIDED SCIATICA, UNSPECIFIED BACK PAIN LATERALITY: ICD-10-CM

## 2022-12-06 DIAGNOSIS — F41.9 ANXIETY: ICD-10-CM

## 2022-12-06 DIAGNOSIS — M54.41 CHRONIC LOW BACK PAIN WITH RIGHT-SIDED SCIATICA, UNSPECIFIED BACK PAIN LATERALITY: ICD-10-CM

## 2022-12-06 DIAGNOSIS — Z59.00 HOUSING LACK: ICD-10-CM

## 2022-12-06 PROCEDURE — 99215 OFFICE O/P EST HI 40 MIN: CPT | Mod: 95 | Performed by: NURSE PRACTITIONER

## 2022-12-06 RX ORDER — GABAPENTIN 800 MG/1
800 TABLET ORAL 3 TIMES DAILY
Qty: 90 TABLET | Refills: 1 | Status: SHIPPED | OUTPATIENT
Start: 2022-12-06 | End: 2023-01-30

## 2022-12-06 RX ORDER — PROPRANOLOL HYDROCHLORIDE 10 MG/1
TABLET ORAL
Qty: 60 TABLET | Refills: 0 | Status: CANCELLED | OUTPATIENT
Start: 2022-12-06

## 2022-12-06 RX ORDER — HYDROXYZINE HYDROCHLORIDE 10 MG/1
10 TABLET, FILM COATED ORAL EVERY 6 HOURS PRN
Qty: 90 TABLET | Refills: 1 | Status: SHIPPED | OUTPATIENT
Start: 2022-12-06 | End: 2023-03-24 | Stop reason: DRUGHIGH

## 2022-12-06 RX ORDER — GABAPENTIN 300 MG/1
900 CAPSULE ORAL 3 TIMES DAILY
Qty: 270 CAPSULE | Refills: 1 | Status: CANCELLED | OUTPATIENT
Start: 2022-12-06

## 2022-12-06 RX ORDER — BUPROPION HYDROCHLORIDE 300 MG/1
300 TABLET ORAL EVERY MORNING
Qty: 30 TABLET | Refills: 1 | Status: SHIPPED | OUTPATIENT
Start: 2022-12-06 | End: 2023-01-30

## 2022-12-06 RX ORDER — BUPROPION HYDROCHLORIDE 150 MG/1
150 TABLET ORAL EVERY MORNING
Qty: 30 TABLET | Refills: 1 | Status: CANCELLED | OUTPATIENT
Start: 2022-12-06

## 2022-12-06 RX ORDER — BUPRENORPHINE AND NALOXONE 8; 2 MG/1; MG/1
1 FILM, SOLUBLE BUCCAL; SUBLINGUAL 2 TIMES DAILY
Qty: 60 FILM | Refills: 1 | Status: SHIPPED | OUTPATIENT
Start: 2022-12-06 | End: 2023-01-30

## 2022-12-06 RX ORDER — PROPRANOLOL HYDROCHLORIDE 20 MG/1
TABLET ORAL
Qty: 60 TABLET | Refills: 0 | Status: SHIPPED | OUTPATIENT
Start: 2022-12-06 | End: 2023-03-24

## 2022-12-06 SDOH — ECONOMIC STABILITY - HOUSING INSECURITY: HOMELESSNESS UNSPECIFIED: Z59.00

## 2022-12-06 NOTE — PROGRESS NOTES
Clinic Care Coordination Contact    Clinic Care Coordination Contact  OUTREACH    Referral Information:  Clinician to ALCIDES DAVIDSON     Chief Complaint   Patient presents with     Clinic Care Coordination - Initial        Universal Utilization: [facility]  Clinic Utilization  Compliance Concerns: No  Utilization    Hospital Admissions  2             ED Visits  0             No Show Count (past year)  8                Current as of: 12/6/2022 11:46 AM              Clinical Concerns:  Current Medical Concerns:  Did not discuss    Current Behavioral Concerns: ALCIDES DAVIDSON spoke to pt regarding referral for housing. Pt reports that he is looking for new housing in the [facility] as he is currently living in a month to month lease but needs to get out asap and by May. Pt reports he may be able to get a place with his son as well. ALCIDES DAVIDSON spoke to pt about search engines she can send and the process and other helpful info for section 8.Pt reports he is worried about gettign denied and if he needs help applying. ALCIDES DAVIDSON said she can also include resources that help with application of applying.  ALCIDES DAVIDSON also spoke about HSS and CADI waiver and pt asked for more info on this.      Education Provided to patient: CADI, HSS, Housing search engines, section 8   Pain  Pain (GOAL):: No  Health Maintenance Reviewed:    Clinical Pathway:     Medication Management:  Medication review status: Did not discuss    Functional Status:       Living Situation:       Lifestyle & Psychosocial Needs:    Social Determinants of Health     Tobacco Use: High Risk     Smoking Tobacco Use: Never     Smokeless Tobacco Use: Current     Passive Exposure: Not on file   Alcohol Use: Not At Risk     Frequency of Alcohol Consumption: Never     Average Number of Drinks: Not on file     Frequency of Binge Drinking: Not on file   Financial Resource Strain: High Risk     Difficulty of Paying Living Expenses: Very hard   Food Insecurity: Food Insecurity  Present     Worried About Running Out of Food in the Last Year: Often true     Ran Out of Food in the Last Year: Often true   Transportation Needs: No Transportation Needs     Lack of Transportation (Medical): No     Lack of Transportation (Non-Medical): No   Physical Activity: Insufficiently Active     Days of Exercise per Week: 1 day     Minutes of Exercise per Session: 30 min   Stress: Stress Concern Present     Feeling of Stress : Very much   Social Connections: Socially Isolated     Frequency of Communication with Friends and Family: Once a week     Frequency of Social Gatherings with Friends and Family: Never     Attends Roman Catholic Services: Never     Active Member of Clubs or Organizations: No     Attends Club or Organization Meetings: 1 to 4 times per year     Marital Status:    Intimate Partner Violence: Not At Risk     Fear of Current or Ex-Partner: No     Emotionally Abused: No     Physically Abused: No     Sexually Abused: No   Depression: At risk     PHQ-2 Score: 6   Housing Stability: Unknown     Unable to Pay for Housing in the Last Year: Patient refused     Number of Places Lived in the Last Year: 2     Unstable Housing in the Last Year: No     Inadequate nutrition (GOAL):: No  Tube Feeding: No  Inadequate activity/exercise (GOAL):: No  Significant changes in sleep pattern (GOAL): No     Resources and Interventions:  Current Resources:      Housing search engines:    Https://mytheresa.comlink.org/    https://Tutor Trove/housing-search/[place]    https://www.Estoreify/[facility]/[place]    [facility] or For Rent By Owner:   -Websites for rental opportunities with private landlords.  https://[place].TransCure bioServices.org/  https://www.Georgina Goodman/    Commonbond:   -View and apply for affordable housing opportunities throughout the [facility] area.  https://commonbond.org/    Life Style, Inc.:  -The U.S. Department of Agriculture maintains a rural vacancy list and many of the  properties offer project-based subsidies (where the tenant pays 30 percent of their monthly income toward rent).   http://www.lifestyleinc.net/vacancy.asp      Section 8:  https://www.housinglink.org/SubsidizedHousing/HousingAuthorityWaitingList  -this link will show you which areas have open section 8     -you will then contact that area's housing authority. More info here: https://www.housinglink.org/SubsidizedHousing/Xdeqjxo2Fxffjnb    -YES, you can apply to more than one and should if you can    Help with application:  https://vm-kghxkva-8-housing.org/  - free guide    https://disabilitybmn.org/chat/?page-eexwg=%20[place]%20-%20Section%208%20Housing%20Choice%20Voucher%20(HCV)%20Program&page-url=https%3A%2F%2Fmn.hb101.org%2Fa%2F12%2F  -live chat     -Call the Senior LinkAge Line  (SLL) at 1-672.777.2486      Housing Stabilization Services (HSS) info:  https://[place].gov/dhs/partners-and-providers/policies-procedures/housing-and-homelessness/clpvwwo-nfzymhxmeduay-lmtlanla/qroyndv-xmsdditegetzz-aehooklw.jsp      CADI Waiver info:  https://[place].gov/dhs/people-we-serve/people-with-disabilities/services/home-community/programs-and-services/cadi-waiver.jsp  -look through eligibility requirements & possible services you could receive     Care Plan:      Patient/Caregiver understanding: Patient verbalized understanding, engaged in AIDET communication during patient encounter.    Outreach Frequency: 2 weeks  Future Appointments              In 1 month Sherry Alvarado CNP [facility] [place]          Plan: ALCIDES CC will follow up with pt in 2-3 weeks    ALBERTO Barton? Social Work Care Coordinator   [facility]  [email or web address]? [email or web address]    Phone: [phone]  she/her

## 2022-12-06 NOTE — PROGRESS NOTES
[facility]    A/P                                                    ASSESSMENT/PLAN  Diagnoses and all orders for this visit:  Opioid use disorder, severe, dependence (H)  Stable. Denies opiate use or cravings. Provides some relief for pain, plan to continue suboxone 8 mg BID, refill provides.   -     buprenorphine HCl-naloxone HCl (SUBOXONE) 8-2 MG per film; Place 1 Film under the tongue 2 times daily  Moderate to severe episode of recurrent major depressive disorder (H)  Asking for an increase to wellbutrin, feels wellbutrin is helping some. Used to take 300 mg daily. Plan to increase wellbutrin XL to 300 mg daily. Psychiatry appointment was cancelled and not rescheduled due to provider leaving organization. Message sent to schedulers to assist patient in scheduling appointment.   -     buPROPion (WELLBUTRIN XL) 300 MG 24 hr tablet; Take 1 tablet (300 mg) by mouth every morning  Anxiety  Jared reports  gabapentin was initially prescribed for alcohol cravings and anxiety while in treatment, He states it helps his back pain.  Discussed blunting affects of gabapentin and propranolol. Reducing propranolol to 20 mg BID prn, and decreasing gabapentin to 800 mg TID.  Also using hydroxyzine as needed. Offered referral for therapy, however he declined. Did not have a good experience in past.   -     hydrOXYzine (ATARAX) 10 MG tablet; Take 1 tablet (10 mg) by mouth every 6 hours as needed for itching  -     propranolol (INDERAL) 20 MG tablet; TAKE 1 TABLET(20 MG) BY MOUTH TWICE DAILY AS NEEDED.  -     gabapentin (NEURONTIN) 800 MG tablet; Take 1 tablet (800 mg) by mouth 3 times daily  Chronic low back pain with right-sided sciatica, unspecified back pain laterality  -     gabapentin (NEURONTIN) 800 MG tablet; Take 1 tablet (800 mg) by mouth 3 times daily  Housing lack  Is living with his ex and kids. Does not get along well with ex-wife. He feels that he was used to [place].  Would like to move into his own place in cities, will need subsidized housing, on disability. Referral placed for SW.   -     Specialty Care Coordination Referral; Future    Orders Placed This Encounter   Medications     buprenorphine HCl-naloxone HCl (SUBOXONE) 8-2 MG per film     Sig: Place 1 Film under the tongue 2 times daily     Dispense:  60 Film     Refill:  1     NADEAN: [id number]     hydrOXYzine (ATARAX) 10 MG tablet     Sig: Take 1 tablet (10 mg) by mouth every 6 hours as needed for itching     Dispense:  90 tablet     Refill:  1     propranolol (INDERAL) 20 MG tablet     Sig: TAKE 1 TABLET(20 MG) BY MOUTH TWICE DAILY AS NEEDED.     Dispense:  60 tablet     Refill:  0     buPROPion (WELLBUTRIN XL) 300 MG 24 hr tablet     Sig: Take 1 tablet (300 mg) by mouth every morning     Dispense:  30 tablet     Refill:  1     gabapentin (NEURONTIN) 800 MG tablet     Sig: Take 1 tablet (800 mg) by mouth 3 times daily     Dispense:  90 tablet     Refill:  1       Problem list updated Dec 6, 2022   No problems updated.          PDMP Review       Value Time User    State PDMP site checked  Yes 12/6/2022  2:40 PM Sherry Alvarado CNP            RTC  Return in about 8 weeks (around 2/3/2023) for Follow up, with me, using a video visit schedule at 100pm.      Counseled the patient on the importance of having a recovery program in addition to medication to manage recovery.  Components include avoiding isolating, having willingness to change, avoiding triggers and managing cravings. Encouraged having some type of sober network and practicing honesty with trusted support person(s). Encouraged other services such as counseling, 12 step or other self-help organizations.      Opioid warning reviewed.  Risk of overdose following a period of abstinence due to decrease tolerance was discussed including risk of death.  Strongly recommended abstain from alcohol, benzodiazepines, THC, opioids and other drugs of abuse.  Increased risk  "of return to opioid use after use of these substances discussed.  Increased risk of overdose/death with use of other substances particularly benzodiazepines/alcohol reviewed.        SUBJECTIVE                                                    Jared North is a 47 year old male who presents to clinic today for follow up    Visit performed Virtual, via video    Video-Visit Details    Type of service:  Video Visit    Video Start Time: 1300  Video End Time: 1343    Originating Location (pt. Location): Home    Distant Location (provider location):  Home     Platform used for Video Visit: [facility] Details: 10/14/22  Jared North is a 46 year old male with a history of recent Covid 19 injection, ARDS, Acute respiratory failure with hypoxia, MDD, Chronic back pain, and opioid use disorder who is being seen today for a follow up. He is taking suboxone 16 mg daily.     Brief History: He was hospitalized from 12/31/21-1/24/22 for Covid 19 infection with complications. He required tracheostomy and ventilator support, and GT tube placement which were discontinued prior to his discharge to home. He reports being on methadone maintenance for 10-15 years at [facility]. He states he was able to abstain from heroin for awhile but alcohol consumption increased significantly . He was drinking 1 box of wine daily and using 1/2-1 gram heroin until he went to inpatient treatment in December of 2019.  He has not drank or using heroin since April 2020.        -[place]  -Move was difficult for him. Adjusting to new environment.   -Busy working on improvements on house.  -No meetings since moved.  -Denies substance use, \"small cravings for anything that will relieve my nerves\"  -CBD gummies for anxiety and pain, helps with sleep  -No significant changes in mood, \"I don't know why I don't feel better with all my sobriety.\"   Opioid use disorder, severe, dependence (H)  Denies use, some " cravings since moving to [place]. Moved was stressful for him. Encouraged meeting attendance to help him cope with stress. Plan to continue suboxone 8 mg BID.  -     buprenorphine HCl-naloxone HCl (SUBOXONE) 8-2 MG per film; Place 1 Film under the tongue 2 times daily  Chronic low back pain with right-sided sciatica, unspecified back pain laterality  Continue gabapentin to help with pain and anxiety.  -     gabapentin (NEURONTIN) 300 MG capsule; Take 3 capsules (900 mg) by mouth 3 times daily  Anxiety  Stopped taking olanzapine and prozac recommended by Transition clinic provider. Has appointment to establish care with Hiral Abraham 12/23. Attempted to reschedule appointment today 2/2 provider leaving [facility], unable to reschedule at this time. Plan to continue propranolol and hydroxyzine.     -     hydrOXYzine (ATARAX) 10 MG tablet; Take 1 tablet (10 mg) by mouth every 6 hours as needed for itching  -     propranolol (INDERAL) 10 MG tablet; TAKE 1 TABLET BY MOUTH TWICE DAILY AS NEEDED FOR ANXIETY ALONG WITH 20MG Strength: 10 mg  -     propranolol (INDERAL) 20 MG tablet; TAKE 1 TABLET(20 MG) BY MOUTH TWICE DAILY AS NEEDED. Take with 10 mg, total 30 mg  Encounter for smoking cessation counseling  -     nicotine polacrilex (NICORETTE) 4 MG gum; Place 1 each (4 mg) inside cheek every hour as needed for smoking cessation Cinnamon  Moderate to severe episode of recurrent major depressive disorder (H)  Did not tolerate prozac or olanzapine, requested bupropion be resumed. Discussed benefits of wellbutrin for his depression, but may contribute to his anxiety. States he felt best while taking this medication. He was started on olanzapine while in ICU for covid, and does not like side effects. Plan to restart wellbutrin XL.   -     buPROPion (WELLBUTRIN XL) 150 MG 24 hr tablet; Take 1 tablet (150 mg) by mouth every morning     TODAY'S VISIT  HPI Dec 6, 2022    Jared SOLA North is a 46 year old male with a history of recent Covid 19  "injection, ARDS, Acute respiratory failure with hypoxia, MDD, Chronic back pain, and opioid use disorder who is being seen today for a follow up. He is taking suboxone 16 mg daily.     Brief History: He was hospitalized from 12/31/21-1/24/22 for Covid 19 infection with complications. He required tracheostomy and ventilator support, and GT tube placement which were discontinued prior to his discharge to home. He reports being on methadone maintenance for 10-15 years at [facility]. He states he was able to abstain from heroin for awhile but alcohol consumption increased significantly . He was drinking 1 box of wine daily and using 1/2-1 gram heroin until he went to inpatient treatment in December of 2019.  He has not drank or using heroin since April 2020.        Fells \"stuck\" living in [facility]  -Increased stressors with living with ex-wife, [place], and [place] stressors  -Needs to find housing in cities. Feels he was used to help his ex move  -Did drink a glass of wine due to increased pain following 10 hours in car  -denies other substance use. Completed drug screen 11/23 at primary care clinic, positive for bup and ETG    OBJECTIVE                                                    PHYSICAL EXAM:  There were no vitals taken for this visit.    GENERAL: healthy, alert and no distress  EYES: Eyes grossly normal to inspection, PERRL and conjunctivae and sclerae normal  RESP: No respiratory distress  MENTAL STATUS EXAM  Appearance/Behavior: No appearant distress  Speech: Normal  Mood/Affect: depressed affect  Insight: Adequate    LAB  No results found for any visits on 12/06/22.  11/23 positive for buprenorphine and alcohol.      HISTORY                                                    Problem list reviewed & adjusted, as indicated.  Patient Active Problem List   Diagnosis     Chronic back pain     Essential hypertension     Elevated LFTs     Acute on chronic respiratory failure with hypoxia (H)     " Pneumonia due to 2019 novel coronavirus     Atrial fibrillation with rapid ventricular response (H)     Acute respiratory distress syndrome (ARDS) due to COVID-19 virus (H)     Major depressive disorder, recurrent episode, severe (H)     Opioid use disorder, severe, dependence (H)     Polysubstance abuse (H)     ROSSY (obstructive sleep apnea)     Mild intermittent asthma     Acute metabolic encephalopathy     Ventilator associated pneumonia (H)     Oropharyngeal dysphagia     Alcohol dependence (H)     Anxiety     Moderate to severe episode of recurrent major depressive disorder (H)     DONALD (generalized anxiety disorder)     Primary osteoarthritis of right knee         MEDICATION LIST (prior to visit)  acetaminophen (TYLENOL) 500 MG tablet, Take 1-2 tablets (500-1,000 mg) by mouth every 6 hours as needed for mild pain  albuterol (PROAIR HFA/PROVENTIL HFA/VENTOLIN HFA) 108 (90 Base) MCG/ACT inhaler, Inhale 2 puffs into the lungs every 4 hours as needed for shortness of breath / dyspnea  buPROPion (WELLBUTRIN XL) 150 MG 24 hr tablet, Take 1 tablet (150 mg) by mouth every morning  diclofenac (VOLTAREN) 1 % topical gel, Apply 2 g topically 4 times daily  fluticasone (FLONASE) 50 MCG/ACT nasal spray, SHAKE LIQUID AND USE 2 SPRAYS IN EACH NOSTRIL DAILY FOR 10 DAYS  fluticasone (FLOVENT DISKUS) 50 MCG/BLIST inhaler, Inhale 1 puff into the lungs every 12 hours  metoprolol tartrate (LOPRESSOR) 50 MG tablet, Take 1 tablet (50 mg) by mouth 2 times daily  naloxone (NARCAN) 4 MG/0.1ML nasal spray, Spray 1 spray (4 mg) into one nostril alternating nostrils 2 times daily as needed for opioid reversal every 2-3 minutes until assistance arrives  nicotine polacrilex (NICORETTE) 4 MG gum, Place 1 each (4 mg) inside cheek every hour as needed for smoking cessation Cinnamon    No current facility-administered medications on file prior to visit.      MEDICATION LIST (after visit)  Current Outpatient Medications   Medication      acetaminophen (TYLENOL) 500 MG tablet     albuterol (PROAIR HFA/PROVENTIL HFA/VENTOLIN HFA) 108 (90 Base) MCG/ACT inhaler     buprenorphine HCl-naloxone HCl (SUBOXONE) 8-2 MG per film     buPROPion (WELLBUTRIN XL) 150 MG 24 hr tablet     buPROPion (WELLBUTRIN XL) 300 MG 24 hr tablet     diclofenac (VOLTAREN) 1 % topical gel     fluticasone (FLONASE) 50 MCG/ACT nasal spray     fluticasone (FLOVENT DISKUS) 50 MCG/BLIST inhaler     gabapentin (NEURONTIN) 800 MG tablet     hydrOXYzine (ATARAX) 10 MG tablet     metoprolol tartrate (LOPRESSOR) 50 MG tablet     naloxone (NARCAN) 4 MG/0.1ML nasal spray     nicotine polacrilex (NICORETTE) 4 MG gum     propranolol (INDERAL) 20 MG tablet     No current facility-administered medications for this visit.         No Known Allergies        Sherry Alvarado, BARI,MSN, AGNP-C  [facility]   [address] [place] [address], [address] [place] [id number]   Phone # [phone]

## 2022-12-06 NOTE — LETTER
[facility] CARE COORDINATION  December 6, 2022    Dear Jared,        I am a clinic care coordinator who works with the [facility]. I wanted to thank you for spending the time to talk with me.  Below are some resources we spoke about on the phone that I think would be helpful to you.     Housing search engines:    Https://Q.MElink.org/    https://GaN Systems/housing-search/[place]    https://SunRise Group of International Technology.[employer] Pharma/[facility]/[place]    CraOtelicsMotomotives or For Rent By Owner:   -Websites for rental opportunities with private landlords.  https://[place].HighFive Mobile.Buzzoola/  https://www.Quippo Infrastructure/    Petflow:   -View and apply for affordable housing opportunities throughout the [facility] area.  https://"iReTron, Inc".Buzzoola/    Life Style, Inc.:  -The U.S. Department of Agriculture maintains a rural vacancy list and many of the properties offer project-based subsidies (where the tenant pays 30 percent of their monthly income toward rent).   http://www.Interbank FX.net/vacancy.asp      Section 8:  https://www.Q.MElink.org/SubsidizedHousing/HousingAuthorityWaitingList  -this link will show you which areas have open section 8     -you will then contact that area's housing authority. More info here: https://www.Q.MElink.org/SubsidizedHousing/Qxvytqu4Tkasnvj    -YES, you can apply to more than one and should if you can    Help with application:  https://ie-zbxrycn-0-housing.org/  - free guide    https://disabilityhubmn.org/chat/?page-tqpzz=%20[place]%20-%20Section%208%20Housing%20Choice%20Voucher%20(HCV)%20Program&page-url=https%3A%2F%2Fmn.hb101.org%2Fa%2F12%2F  -live chat     -Call the Senior LinkAge Line  (SLL) at 1-456.266.5179      Housing Stabilization Services (HSS) info:  https://[place].gov/dhs/partners-and-providers/policies-procedures/housing-and-homelessness/wrmxgel-opmiehjpccnnx-gsdudgei/fmsygfg-mmxuvppagghvd-wcvjqcfa.jsp      CADI Waiver  info:  https://[place].gov/dhs/people-we-serve/people-with-disabilities/services/home-community/programs-and-services/cadi-waiver.jsp  -look through eligibility requirements & possible services you could receive      Please feel free to contact me with any questions or concerns regarding care coordination and what we can offer.      We are focused on providing you with the highest-quality healthcare experience possible.    Sincerely,     ALBERTO Barton? Social Work Care Coordinator   [facility]  [email or web address]? [email or web address]    Phone: [phone]  she/her

## 2022-12-06 NOTE — PATIENT INSTRUCTIONS
It was good to see you today Jared!    Wellbutrin XL was increased to 300 mg daily  Propranolol was decreased to 20 mg twice daily as needed for anxiety  Gabapentin was decreased to 800 mg three times daily  4. One of my team member should contact you to schedule with psychiatry  5. Sw will contact you to provide resources for subsidized housing

## 2022-12-06 NOTE — PROGRESS NOTES
[facility] - Addiction Medicine       Rooming information:      Point of care urine drug screen positive for: N/A-virtual visit     *POC urine drug screen does not screen for Fentanyl    PHQ-9 Scores:   PHQ 7/21/2022 8/22/2022 10/14/2022   PHQ-9 Total Score 23 21 21   Q9: Thoughts of better off dead/self-harm past 2 weeks More than half the days Not at all Not at all     DONALD-7 Scores:  DONALD-7 SCORE 7/21/2022 8/22/2022 10/14/2022   Total Score - - 19 (severe anxiety)   Total Score 20 19 19       Any other recent substance use:     Alcohol - One glass of wine 11/23/22   NICOTINE-Yes: chewing tobacco  If using nicotine, ready to quit?     Side effects related to medications pt would like to discuss with provider (constipation, dry mouth, HA, GI upset, sedation?) No     Narcan currently available: Yes    Primary care provider: No primary care provider on file.     Mental health provider: none (follow up on MH referral if needed)    Any housing, insurance deficits?: No    Contact information up to date? Yes    3rd Party Involvement none (please obtain SHALOM if pt would like to include)     This video/telephone visit will be conducted via a call between you and your physician/provider. We have found that certain health care needs can be provided without the need for an in-person physical exam. This service lets us provide the care you need with a video /telephone conversation. If a prescription is necessary we can send it directly to your pharmacy. If lab work is needed we can place an order for that and you can then stop by our lab to have the test done at a later time.    Just as we bill insurance for in-person visits, we also bill insurance for video/telephone visits. If you have questions about your insurance coverage, we recommend that you speak with your insurance company.    Patient has given verbal consent for video/Telephone visit? Yes    Patient would like a video visit, please connect/call : rajan Santiago  unable to connect call [phone]  Reason for visit: Med refills  Anything the provider should be aware of for today's appointment: no    Patient verified allergies, medications and pharmacy via telephone.     DONALD-7 scores:    DONALD-7 SCORE 8/22/2022 10/14/2022   Total Score - 19 (severe anxiety)   Total Score 19 19       PHQ-9 scores:   PHQ-9 SCORE 8/22/2022 10/14/2022   PHQ-9 Total Score MyChart - 21 (Severe depression)   PHQ-9 Total Score 21 21       Patient states they are ready for visit.    Alma Knox, ALEK December 6, 2022 12:40 PM    Alma Knox CMA  December 6, 2022  11:13 AM

## 2022-12-08 ENCOUNTER — TELEPHONE (OUTPATIENT)
Dept: ADDICTION MEDICINE | Facility: CLINIC | Age: 47
End: 2022-12-08

## 2022-12-08 NOTE — TELEPHONE ENCOUNTER
----- Message from Sherry Alvarado CNP sent at 12/6/2022 12:15 PM CST -----  Sorry I forgot to put in patient on previous message  ----- Message -----  From: Sherry Alvarado CNP  Sent: 12/6/2022  12:14 PM CST  To: Keli Guaman Pool    Please fax lab order urine drug screen and buprenorphine to [facility] in [facility]. Patient states he completed labs, but we have not received any results. Can you call and get results?     Also patient had appointment with Hiral on 12/23 which was cancelled without another appointment scheduled. Please follow up to see when he will be scheduled    Thanks  Sherry

## 2022-12-08 NOTE — TELEPHONE ENCOUNTER
Called [facility] in [place] [phone] and they are going to fax results from 11/23 to [facility] fax.     Faxed urine drug screen lab and buprenorphine lab orders to [facility] lab dept at [phone]    We are working on setting up new psychiatry visit.     Tamiko Linares RN on 12/8/2022 at 10:20 AM

## 2022-12-23 ENCOUNTER — PATIENT OUTREACH (OUTPATIENT)
Dept: CARE COORDINATION | Facility: CLINIC | Age: 47
End: 2022-12-23

## 2022-12-23 NOTE — PROGRESS NOTES
Clinic Care Coordination Contact  [facility]/Voicemail       Clinical Data: Care Coordinator Outreach  Outreach attempted x 1.  Left message on patient's voicemail with call back information and requested return call.  Plan: Care Coordinator will try to reach patient again in 3-5 business days.    ALBERTO Barton? Social Work Care Coordinator   [facility]  [email or web address]? [email or web address]    Phone: [phone]  she/her

## 2022-12-29 ENCOUNTER — PATIENT OUTREACH (OUTPATIENT)
Dept: CARE COORDINATION | Facility: CLINIC | Age: 47
End: 2022-12-29

## 2022-12-29 NOTE — PROGRESS NOTES
Clinic Care Coordination Contact  [facility]/Voicemail       Clinical Data: Care Coordinator Outreach  Outreach attempted x 2.  Left message on patient's voicemail with call back information and requested return call.  Plan: Care Coordinator will send unable to contact letter with care coordinator contact information via [facility]. Care Coordinator will try to reach patient again in 3-5 business days.    ALBERTO Barton? Social Work Care Coordinator   [facility]  [email or web address]? [email or web address]    Phone: [phone]  she/her

## 2022-12-29 NOTE — LETTER
[facility] CARE COORDINATION  December 29, 2022      Dear Jared,    I have been attempting to reach you since our last contact. I would like to continue to work with you and provide any additional support you may need on achieving your health care related goals. I would appreciate if you would give me a call at [phone] to let me know if you would like to continue working together. I know that there are many things that can affect our ability to communicate and I hope we can continue to work together.    All of us at the [facility] are invested in your health and are here to assist you in meeting your goals.     Sincerely,    ALBERTO Barton

## 2023-01-03 ENCOUNTER — PATIENT OUTREACH (OUTPATIENT)
Dept: CARE COORDINATION | Facility: CLINIC | Age: 48
End: 2023-01-03

## 2023-01-03 NOTE — PROGRESS NOTES
Clinic Care Coordination Contact  [facility]/Voicemail       Clinical Data: Care Coordinator Outreach  Outreach attempted x 3.  Left message on patient's voicemail with call back information and requested return call.  Plan: Care Coordinator will do no further outreaches at this time.    ALBERTO Barton? Social Work Care Coordinator   [facility]  [email or web address]? [email or web address]    Phone: [phone]  she/her

## 2023-01-30 ENCOUNTER — VIRTUAL VISIT (OUTPATIENT)
Dept: ADDICTION MEDICINE | Facility: CLINIC | Age: 48
End: 2023-01-30
Payer: COMMERCIAL

## 2023-01-30 DIAGNOSIS — Z71.6 ENCOUNTER FOR SMOKING CESSATION COUNSELING: ICD-10-CM

## 2023-01-30 DIAGNOSIS — M54.41 CHRONIC LOW BACK PAIN WITH RIGHT-SIDED SCIATICA, UNSPECIFIED BACK PAIN LATERALITY: ICD-10-CM

## 2023-01-30 DIAGNOSIS — F33.1 MODERATE EPISODE OF RECURRENT MAJOR DEPRESSIVE DISORDER (H): ICD-10-CM

## 2023-01-30 DIAGNOSIS — F11.20 OPIOID USE DISORDER, SEVERE, DEPENDENCE (H): Primary | ICD-10-CM

## 2023-01-30 DIAGNOSIS — G89.29 CHRONIC LOW BACK PAIN WITH RIGHT-SIDED SCIATICA, UNSPECIFIED BACK PAIN LATERALITY: ICD-10-CM

## 2023-01-30 DIAGNOSIS — F41.9 ANXIETY: ICD-10-CM

## 2023-01-30 PROCEDURE — 99214 OFFICE O/P EST MOD 30 MIN: CPT | Mod: 95 | Performed by: NURSE PRACTITIONER

## 2023-01-30 RX ORDER — ESCITALOPRAM OXALATE 10 MG/1
10 TABLET ORAL DAILY
Qty: 30 TABLET | Refills: 1 | Status: SHIPPED | OUTPATIENT
Start: 2023-01-30 | End: 2023-03-24

## 2023-01-30 RX ORDER — BUPRENORPHINE HYDROCHLORIDE, NALOXONE HYDROCHLORIDE 8; 2 MG/1; MG/1
1 FILM, SOLUBLE BUCCAL; SUBLINGUAL 2 TIMES DAILY
Qty: 60 FILM | Refills: 1 | Status: SHIPPED | OUTPATIENT
Start: 2023-01-30 | End: 2023-03-24

## 2023-01-30 RX ORDER — GABAPENTIN 800 MG/1
800 TABLET ORAL 3 TIMES DAILY
Qty: 90 TABLET | Refills: 1 | Status: SHIPPED | OUTPATIENT
Start: 2023-01-30 | End: 2023-03-24

## 2023-01-30 RX ORDER — BUPROPION HYDROCHLORIDE 300 MG/1
300 TABLET ORAL EVERY MORNING
Qty: 30 TABLET | Refills: 1 | Status: SHIPPED | OUTPATIENT
Start: 2023-01-30 | End: 2023-03-24

## 2023-01-30 RX ORDER — BUPRENORPHINE AND NALOXONE 8; 2 MG/1; MG/1
1 FILM, SOLUBLE BUCCAL; SUBLINGUAL 2 TIMES DAILY
Qty: 60 FILM | Refills: 1 | Status: SHIPPED | OUTPATIENT
Start: 2023-01-30 | End: 2023-01-30

## 2023-01-30 ASSESSMENT — PATIENT HEALTH QUESTIONNAIRE - PHQ9
SUM OF ALL RESPONSES TO PHQ QUESTIONS 1-9: 16
SUM OF ALL RESPONSES TO PHQ QUESTIONS 1-9: 16
10. IF YOU CHECKED OFF ANY PROBLEMS, HOW DIFFICULT HAVE THESE PROBLEMS MADE IT FOR YOU TO DO YOUR WORK, TAKE CARE OF THINGS AT HOME, OR GET ALONG WITH OTHER PEOPLE: VERY DIFFICULT

## 2023-01-30 ASSESSMENT — ANXIETY QUESTIONNAIRES
IF YOU CHECKED OFF ANY PROBLEMS ON THIS QUESTIONNAIRE, HOW DIFFICULT HAVE THESE PROBLEMS MADE IT FOR YOU TO DO YOUR WORK, TAKE CARE OF THINGS AT HOME, OR GET ALONG WITH OTHER PEOPLE: EXTREMELY DIFFICULT
7. FEELING AFRAID AS IF SOMETHING AWFUL MIGHT HAPPEN: MORE THAN HALF THE DAYS
7. FEELING AFRAID AS IF SOMETHING AWFUL MIGHT HAPPEN: MORE THAN HALF THE DAYS
3. WORRYING TOO MUCH ABOUT DIFFERENT THINGS: MORE THAN HALF THE DAYS
GAD7 TOTAL SCORE: 14
2. NOT BEING ABLE TO STOP OR CONTROL WORRYING: MORE THAN HALF THE DAYS
6. BECOMING EASILY ANNOYED OR IRRITABLE: NEARLY EVERY DAY
5. BEING SO RESTLESS THAT IT IS HARD TO SIT STILL: NOT AT ALL
4. TROUBLE RELAXING: MORE THAN HALF THE DAYS
8. IF YOU CHECKED OFF ANY PROBLEMS, HOW DIFFICULT HAVE THESE MADE IT FOR YOU TO DO YOUR WORK, TAKE CARE OF THINGS AT HOME, OR GET ALONG WITH OTHER PEOPLE?: EXTREMELY DIFFICULT
1. FEELING NERVOUS, ANXIOUS, OR ON EDGE: NEARLY EVERY DAY
GAD7 TOTAL SCORE: 14
GAD7 TOTAL SCORE: 14

## 2023-01-30 NOTE — PATIENT INSTRUCTIONS
It was good to see you today, Jared    Start escitalopram (lexapro) 10 mg daily. For depression and anxiety

## 2023-01-30 NOTE — PROGRESS NOTES
[facility] Addiction Medicine    A/P                                                    ASSESSMENT/PLAN  Diagnoses and all orders for this visit:  Opioid use disorder, severe, dependence (H)  Stable. Denies side effects, No cravings. Refills provided. Plan to continue. Has drank a glass of wine over the holidays.  Expressed concern about alcohol use and lack of recovery supports. Encouraged meeting attendance to avoid isolation. Did not complete labs, encouraged him to schedule a lb appointment and complete.   -     SUBOXONE 8-2 MG per film; Place 1 Film under the tongue 2 times daily  Encounter for smoking cessation counseling  Refill provided.   -     nicotine polacrilex (NICORETTE) 4 MG gum; Place 1 each (4 mg) inside cheek every hour as needed for smoking cessation Cinnamon  Moderate to severe episode of recurrent major depressive disorder (H)  Is reluctant to schedule with psychiatry. Expresses inability to feel shayla, and find pleasure. He moved to [facility] in [facility] with ex wife and is very isolated. Adding escitalopram to help with anxiety. Recommend that he schedule with psychiatry if not improvement.   -     escitalopram (LEXAPRO) 10 MG tablet; Take 1 tablet (10 mg) by mouth daily  -     buPROPion (WELLBUTRIN XL) 300 MG 24 hr tablet; Take 1 tablet (300 mg) by mouth every morning  Anxiety  Adding escitalopram to help with his anxiety.   -     escitalopram (LEXAPRO) 10 MG tablet; Take 1 tablet (10 mg) by mouth daily  -     gabapentin (NEURONTIN) 800 MG tablet; Take 1 tablet (800 mg) by mouth 3 times daily  Chronic low back pain with right-sided sciatica, unspecified back pain laterality  Continue gabapentin, refill provided.   -     gabapentin (NEURONTIN) 800 MG tablet; Take 1 tablet (800 mg) by mouth 3 times daily    Orders Placed This Encounter   Medications     escitalopram (LEXAPRO) 10 MG tablet     Sig: Take 1 tablet (10 mg) by mouth daily     Dispense:  30 tablet     Refill:  1     nicotine  polacrilex (NICORETTE) 4 MG gum     Sig: Place 1 each (4 mg) inside cheek every hour as needed for smoking cessation Cinnamon     Dispense:  160 each     Refill:  1     DISCONTD: buprenorphine HCl-naloxone HCl (SUBOXONE) 8-2 MG per film     Sig: Place 1 Film under the tongue 2 times daily     Dispense:  60 Film     Refill:  1     buPROPion (WELLBUTRIN XL) 300 MG 24 hr tablet     Sig: Take 1 tablet (300 mg) by mouth every morning     Dispense:  30 tablet     Refill:  1     gabapentin (NEURONTIN) 800 MG tablet     Sig: Take 1 tablet (800 mg) by mouth 3 times daily     Dispense:  90 tablet     Refill:  1     SUBOXONE 8-2 MG per film     Sig: Place 1 Film under the tongue 2 times daily     Dispense:  60 Film     Refill:  1       Problem list updated Jan 30, 2023   No problems updated.          PDMP Review       Value Time User    State PDMP site checked  Yes 1/30/2023  3:20 PM Sherry Alvarado, DEEJAY            RTC  No follow-ups on file.      Counseled the patient on the importance of having a recovery program in addition to medication to manage recovery.  Components include avoiding isolating, having willingness to change, avoiding triggers and managing cravings. Encouraged having some type of sober network and practicing honesty with trusted support person(s). Encouraged other services such as counseling, 12 step or other self-help organizations.      Opioid warning reviewed.  Risk of overdose following a period of abstinence due to decrease tolerance was discussed including risk of death.  Strongly recommended abstain from alcohol, benzodiazepines, THC, opioids and other drugs of abuse.  Increased risk of return to opioid use after use of these substances discussed.  Increased risk of overdose/death with use of other substances particularly benzodiazepines/alcohol reviewed.        SUBJECTIVE                                                    Jared North is a 47 year old male who presents to clinic today for  "follow up    Visit performed Virtual, via video    Video-Visit Details    Type of service:  Video Visit    Video Start Time: 1332  Video End Time: 1406    Originating Location (pt. Location): Home    Distant Location (provider location):  [facility]     Platform used for Video Visit: [facility] Details: 12/6/22  Jared North is a 46 year old male with a history of recent Covid 19 injection, ARDS, Acute respiratory failure with hypoxia, MDD, Chronic back pain, and opioid use disorder who is being seen today for a follow up. He is taking suboxone 16 mg daily.     Brief History: He was hospitalized from 12/31/21-1/24/22 for Covid 19 infection with complications. He required tracheostomy and ventilator support, and GT tube placement which were discontinued prior to his discharge to home. He reports being on methadone maintenance for 10-15 years at [facility]. He states he was able to abstain from heroin for awhile but alcohol consumption increased significantly . He was drinking 1 box of wine daily and using 1/2-1 gram heroin until he went to inpatient treatment in December of 2019.  He has not drank or using heroin since April 2020.        Fells \"stuck\" living in [facility]  -Increased stressors with living with ex-wife, [place], and financial stressors  -[place] in [place]. Feels he was used to help his ex move  -Did drink a glass of wine due to increased pain following 10 hours in car  -denies other substance use. Completed drug screen 11/23 at primary care clinic, positive for bup and ETG  Stable. Denies opiate use or cravings. Provides some relief for pain, plan to continue suboxone 8 mg BID, refill provides.   -     buprenorphine HCl-naloxone HCl (SUBOXONE) 8-2 MG per film; Place 1 Film under the tongue 2 times daily  Moderate to severe episode of recurrent major depressive disorder (H)  Asking for an increase to wellbutrin, feels wellbutrin is helping some. Used to take " 300 mg daily. Plan to increase wellbutrin XL to 300 mg daily. Psychiatry appointment was cancelled and not rescheduled due to provider leaving organization. Message sent to schedulers to assist patient in scheduling appointment.   -     buPROPion (WELLBUTRIN XL) 300 MG 24 hr tablet; Take 1 tablet (300 mg) by mouth every morning  Anxiety  Jared reports  gabapentin was initially prescribed for alcohol cravings and anxiety while in treatment, He states it helps his back pain.  Discussed blunting affects of gabapentin and propranolol. Reducing propranolol to 20 mg BID prn, and decreasing gabapentin to 800 mg TID.  Also using hydroxyzine as needed. Offered referral for therapy, however he declined. Did not have a good experience in past.   -     hydrOXYzine (ATARAX) 10 MG tablet; Take 1 tablet (10 mg) by mouth every 6 hours as needed for itching  -     propranolol (INDERAL) 20 MG tablet; TAKE 1 TABLET(20 MG) BY MOUTH TWICE DAILY AS NEEDED.  -     gabapentin (NEURONTIN) 800 MG tablet; Take 1 tablet (800 mg) by mouth 3 times daily  Chronic low back pain with right-sided sciatica, unspecified back pain laterality  -     gabapentin (NEURONTIN) 800 MG tablet; Take 1 tablet (800 mg) by mouth 3 times daily  Housing lack  Is living with his ex and kids. Does not get along well with ex-wife. He feels that he was used to help [place] move [place]. Would like to move into his own place in cities, will need subsidized housing, on disability. Referral placed for .   -     Specialty Care Coordination Referral; Future    TODAY'S VISIT  HPI Jan 30, 2023    Jared North is a 46 year old male with a history of recent Covid 19 injection, ARDS, Acute respiratory failure with hypoxia, MDD, Chronic back pain, and opioid use disorder who is being seen today for a follow up. He is taking suboxone 16 mg daily.     Brief History: He was hospitalized from 12/31/21-1/24/22 for Covid 19 infection with complications. He required tracheostomy and  "ventilator support, and GT tube placement which were discontinued prior to his discharge to home. He reports being on methadone maintenance for 10-15 years at [email or web address] He states he was able to abstain from heroin for awhile but alcohol consumption increased significantly . He was drinking 1 box of wine daily and using 1/2-1 gram heroin until he went to inpatient treatment in December of 2019.  He has used heroin since April 2020.        -Drank a glass of wine a few times when son was visiting over holidays  -Denies opiate use  -is not attending support groups  -Is isolated in [facility]  -Living with ex wife  -Car issues   -Has not scheduled with psychiatry, not sure if he is interested  -No changes to mood, anxiety still a problem \"I feel like I am crawling out of my skin\"  -Difficulty feeling shayla \" Will I ever be able to enjoy anything again.\"  -Chronic back and knee pain, worse with shoveling snow  -PT on hold until weather improves    PHQ 8/22/2022 10/14/2022 1/30/2023   PHQ-9 Total Score 21 21 16   Q9: Thoughts of better off dead/self-harm past 2 weeks Not at all Not at all Not at all     DONALD-7 SCORE 8/22/2022 10/14/2022 1/30/2023   Total Score - 19 (severe anxiety) 14 (moderate anxiety)   Total Score 19 19 14           OBJECTIVE                                                    PHYSICAL EXAM:  There were no vitals taken for this visit.    GENERAL: healthy, alert and no distress  EYES: Eyes grossly normal to inspection, PERRL and conjunctivae and sclerae normal  RESP: No respiratory distress  MENTAL STATUS EXAM  Appearance/Behavior: No appearant distress  Speech: Normal  Mood/Affect: flat  Insight: Fair    LAB  No results found for any visits on 01/30/23.      HISTORY                                                    Problem list reviewed & adjusted, as indicated.  Patient Active Problem List   Diagnosis     Chronic back pain     Essential hypertension     Elevated LFTs     Acute on chronic respiratory failure " with hypoxia (H)     Pneumonia due to 2019 novel coronavirus     Atrial fibrillation with rapid ventricular response (H)     Acute respiratory distress syndrome (ARDS) due to COVID-19 virus (H)     Major depressive disorder, recurrent episode, severe (H)     Opioid use disorder, severe, dependence (H)     Polysubstance abuse (H)     ROSSY (obstructive sleep apnea)     Mild intermittent asthma     Acute metabolic encephalopathy     Ventilator associated pneumonia (H)     Oropharyngeal dysphagia     Alcohol dependence (H)     Anxiety     Moderate to severe episode of recurrent major depressive disorder (H)     DONALD (generalized anxiety disorder)     Primary osteoarthritis of right knee         MEDICATION LIST (prior to visit)  acetaminophen (TYLENOL) 500 MG tablet, Take 1-2 tablets (500-1,000 mg) by mouth every 6 hours as needed for mild pain  albuterol (PROAIR HFA/PROVENTIL HFA/VENTOLIN HFA) 108 (90 Base) MCG/ACT inhaler, Inhale 2 puffs into the lungs every 4 hours as needed for shortness of breath / dyspnea  diclofenac (VOLTAREN) 1 % topical gel, Apply 2 g topically 4 times daily  fluticasone (FLONASE) 50 MCG/ACT nasal spray, SHAKE LIQUID AND USE 2 SPRAYS IN EACH NOSTRIL DAILY FOR 10 DAYS  fluticasone (FLOVENT DISKUS) 50 MCG/BLIST inhaler, Inhale 1 puff into the lungs every 12 hours  hydrOXYzine (ATARAX) 10 MG tablet, Take 1 tablet (10 mg) by mouth every 6 hours as needed for itching  naloxone (NARCAN) 4 MG/0.1ML nasal spray, Spray 1 spray (4 mg) into one nostril alternating nostrils 2 times daily as needed for opioid reversal every 2-3 minutes until assistance arrives  propranolol (INDERAL) 20 MG tablet, TAKE 1 TABLET(20 MG) BY MOUTH TWICE DAILY AS NEEDED.  metoprolol tartrate (LOPRESSOR) 50 MG tablet, Take 1 tablet (50 mg) by mouth 2 times daily (Patient not taking: Reported on 1/30/2023)    No current facility-administered medications on file prior to visit.      MEDICATION LIST (after visit)  Current Outpatient  Medications   Medication     acetaminophen (TYLENOL) 500 MG tablet     albuterol (PROAIR HFA/PROVENTIL HFA/VENTOLIN HFA) 108 (90 Base) MCG/ACT inhaler     buPROPion (WELLBUTRIN XL) 300 MG 24 hr tablet     diclofenac (VOLTAREN) 1 % topical gel     escitalopram (LEXAPRO) 10 MG tablet     fluticasone (FLONASE) 50 MCG/ACT nasal spray     fluticasone (FLOVENT DISKUS) 50 MCG/BLIST inhaler     gabapentin (NEURONTIN) 800 MG tablet     hydrOXYzine (ATARAX) 10 MG tablet     naloxone (NARCAN) 4 MG/0.1ML nasal spray     nicotine polacrilex (NICORETTE) 4 MG gum     propranolol (INDERAL) 20 MG tablet     SUBOXONE 8-2 MG per film     metoprolol tartrate (LOPRESSOR) 50 MG tablet     No current facility-administered medications for this visit.         No Known Allergies        Sherry Alvarado, BARI,MSN, AGNP-C  [facility]   [address] [place] [address], [address]   [place] [address]   Phone # -[phone]           Answers for HPI/ROS submitted by the patient on 1/30/2023  If you checked off any problems, how difficult have these problems made it for you to do your work, take care of things at home, or get along with other people?: Very difficult  PHQ9 TOTAL SCORE: 16  DONALD 7 TOTAL SCORE: 14

## 2023-01-30 NOTE — PROGRESS NOTES
Jared is a 47 year old who is being evaluated via a billable video visit.      How would you like to obtain your AVS? [employer]  If the video visit is dropped, the invitation should be resent by: Text to cell phone: [phone]  Will anyone else be joining your video visit? Sofía Tilley      Video-Visit Details    Type of service:  Video Visit   Video Start Time: 1332  Video End Time:1406    Originating Location (pt. Location): Home    Distant Location (provider location):  On-site  Platform used for Video Visit: [employer]

## 2023-03-24 ENCOUNTER — VIRTUAL VISIT (OUTPATIENT)
Dept: ADDICTION MEDICINE | Facility: CLINIC | Age: 48
End: 2023-03-24
Payer: COMMERCIAL

## 2023-03-24 DIAGNOSIS — F33.1 MODERATE EPISODE OF RECURRENT MAJOR DEPRESSIVE DISORDER (H): ICD-10-CM

## 2023-03-24 DIAGNOSIS — G89.29 CHRONIC LOW BACK PAIN WITH RIGHT-SIDED SCIATICA, UNSPECIFIED BACK PAIN LATERALITY: ICD-10-CM

## 2023-03-24 DIAGNOSIS — M54.41 CHRONIC LOW BACK PAIN WITH RIGHT-SIDED SCIATICA, UNSPECIFIED BACK PAIN LATERALITY: ICD-10-CM

## 2023-03-24 DIAGNOSIS — F41.1 GAD (GENERALIZED ANXIETY DISORDER): ICD-10-CM

## 2023-03-24 DIAGNOSIS — F11.20 OPIOID USE DISORDER, SEVERE, DEPENDENCE (H): Primary | ICD-10-CM

## 2023-03-24 PROCEDURE — 99215 OFFICE O/P EST HI 40 MIN: CPT | Mod: VID | Performed by: NURSE PRACTITIONER

## 2023-03-24 PROCEDURE — 99417 PROLNG OP E/M EACH 15 MIN: CPT | Mod: VID | Performed by: NURSE PRACTITIONER

## 2023-03-24 RX ORDER — HYDROXYZINE HYDROCHLORIDE 25 MG/1
25 TABLET, FILM COATED ORAL 3 TIMES DAILY PRN
Qty: 90 TABLET | Refills: 2 | Status: SHIPPED | OUTPATIENT
Start: 2023-03-24

## 2023-03-24 RX ORDER — BUPRENORPHINE HYDROCHLORIDE, NALOXONE HYDROCHLORIDE 8; 2 MG/1; MG/1
1 FILM, SOLUBLE BUCCAL; SUBLINGUAL 2 TIMES DAILY
Qty: 60 FILM | Refills: 2 | Status: SHIPPED | OUTPATIENT
Start: 2023-03-24 | End: 2023-06-13

## 2023-03-24 RX ORDER — BUPROPION HYDROCHLORIDE 300 MG/1
300 TABLET ORAL EVERY MORNING
Qty: 30 TABLET | Refills: 2 | Status: SHIPPED | OUTPATIENT
Start: 2023-03-24 | End: 2023-06-13

## 2023-03-24 RX ORDER — GABAPENTIN 800 MG/1
800 TABLET ORAL 3 TIMES DAILY
Qty: 90 TABLET | Refills: 2 | Status: SHIPPED | OUTPATIENT
Start: 2023-03-24 | End: 2023-06-13

## 2023-03-24 ASSESSMENT — PATIENT HEALTH QUESTIONNAIRE - PHQ9
SUM OF ALL RESPONSES TO PHQ QUESTIONS 1-9: 22
10. IF YOU CHECKED OFF ANY PROBLEMS, HOW DIFFICULT HAVE THESE PROBLEMS MADE IT FOR YOU TO DO YOUR WORK, TAKE CARE OF THINGS AT HOME, OR GET ALONG WITH OTHER PEOPLE: EXTREMELY DIFFICULT
SUM OF ALL RESPONSES TO PHQ QUESTIONS 1-9: 22

## 2023-03-24 ASSESSMENT — ANXIETY QUESTIONNAIRES
7. FEELING AFRAID AS IF SOMETHING AWFUL MIGHT HAPPEN: MORE THAN HALF THE DAYS
7. FEELING AFRAID AS IF SOMETHING AWFUL MIGHT HAPPEN: MORE THAN HALF THE DAYS
3. WORRYING TOO MUCH ABOUT DIFFERENT THINGS: NEARLY EVERY DAY
8. IF YOU CHECKED OFF ANY PROBLEMS, HOW DIFFICULT HAVE THESE MADE IT FOR YOU TO DO YOUR WORK, TAKE CARE OF THINGS AT HOME, OR GET ALONG WITH OTHER PEOPLE?: EXTREMELY DIFFICULT
2. NOT BEING ABLE TO STOP OR CONTROL WORRYING: NEARLY EVERY DAY
GAD7 TOTAL SCORE: 17
5. BEING SO RESTLESS THAT IT IS HARD TO SIT STILL: NOT AT ALL
1. FEELING NERVOUS, ANXIOUS, OR ON EDGE: NEARLY EVERY DAY
GAD7 TOTAL SCORE: 17
IF YOU CHECKED OFF ANY PROBLEMS ON THIS QUESTIONNAIRE, HOW DIFFICULT HAVE THESE PROBLEMS MADE IT FOR YOU TO DO YOUR WORK, TAKE CARE OF THINGS AT HOME, OR GET ALONG WITH OTHER PEOPLE: EXTREMELY DIFFICULT
4. TROUBLE RELAXING: NEARLY EVERY DAY
GAD7 TOTAL SCORE: 17
6. BECOMING EASILY ANNOYED OR IRRITABLE: NEARLY EVERY DAY

## 2023-03-24 NOTE — NURSING NOTE
.Is the patient currently in the state of [place]? YES    Visit mode:VIDEO    If the visit is dropped, the patient can be reconnected by: VIDEO VISIT: Text to cell phone:   Telephone Information:   Mobile [phone]       Will anyone else be joining the visit? No  (If patient encounters technical issues they should call [phone])    How would you like to obtain your AVS? MyChart    Are changes needed to the allergy or medication list? NO    Rooming Documentation: Patient will complete qnrs in mychart    Reason for visit: Follow Up    FIDE Albarado

## 2023-03-24 NOTE — PROGRESS NOTES
Virtual Visit Details    Type of service:  Video Visit   Video Start Time: 1114  Video End Time:1241    Originating Location (pt. Location): Home    Distant Location (provider location):  Off-site  Platform used for Video Visit: [employer] [facility]    A/P                                                    ASSESSMENT/PLAN  Diagnoses and all orders for this visit:  Opioid use disorder, severe, dependence (H)  Controlled on suboxone 16 mg TDD. Denies side effects, cravings, use. Plan to continue.   -     SUBOXONE 8-2 MG per film; Place 1 Film under the tongue 2 times daily  Moderate to severe episode of recurrent major depressive disorder (H)  Persistent mood disorder despite wellbutrin. Was unsuccessful with lexapro, caused nausea and therapy was stopped. Has  Hx of childhood trauma, and severe Covid infection 2021 requiring many months in ICU that is triggering worsening anxiety and low mood. Previously placed referrals for pscyhiatry and he has not followed thru with appointments. Recommended he schedule with [facility] to stablize medications, and he is agreeable to schedule.   -     Adult Mental Health  Referral; Future  -     Adult Mental Health  Referral; Future  -     buPROPion (WELLBUTRIN XL) 300 MG 24 hr tablet; Take 1 tablet (300 mg) by mouth every morning  DONALD (generalized anxiety disorder)  Has lengthy discussion about how his mood is impacting his wellbeing. He feels stuck. Describes severe anxiety that is preventing his participating in activities he used to find pleasure like working out and AA meetings. Did not tolerate lexapro, and propranolol was ineffective. Increase hydroxyzine to 25 mg TID prn. Referral for psychiatry and therapy placed.   -     Adult Mental Health  Referral; Future  -     Adult Mental Health  Referral; Future  -     hydrOXYzine (ATARAX) 25 MG tablet; Take 1 tablet (25 mg) by mouth 3 times daily as needed for  "itching  Chronic low back pain with right-sided sciatica, unspecified back pain laterality  Explained that high dose of gabapentin may be contributed to his blunted mood, but is helping with both pain and anxiety and wants to continue. Encouraged him to watch \"Chronic pain-is it all in their head\" and to discuss changing gabapentin to Lyrica with his PCP.   -     gabapentin (NEURONTIN) 800 MG tablet; Take 1 tablet (800 mg) by mouth 3 times daily  -     diclofenac (VOLTAREN) 1 % topical gel; Apply 2 g topically 4 times daily    Orders Placed This Encounter   Medications     SUBOXONE 8-2 MG per film     Sig: Place 1 Film under the tongue 2 times daily     Dispense:  60 Film     Refill:  2     buPROPion (WELLBUTRIN XL) 300 MG 24 hr tablet     Sig: Take 1 tablet (300 mg) by mouth every morning     Dispense:  30 tablet     Refill:  2     gabapentin (NEURONTIN) 800 MG tablet     Sig: Take 1 tablet (800 mg) by mouth 3 times daily     Dispense:  90 tablet     Refill:  2     diclofenac (VOLTAREN) 1 % topical gel     Sig: Apply 2 g topically 4 times daily     Dispense:  350 g     Refill:  0     hydrOXYzine (ATARAX) 25 MG tablet     Sig: Take 1 tablet (25 mg) by mouth 3 times daily as needed for itching     Dispense:  90 tablet     Refill:  2       Problem list updated Mar 24, 2023   No problems updated.          PDMP Review       Value Time User    State PDMP site checked  Yes 1/30/2023  3:20 PM Sherry Alvarado CNP            RTC  Return in about 3 months (around 6/13/2023) for Follow up, with me, using a video visit at 1130.      Counseled the patient on the importance of having a recovery program in addition to medication to manage recovery.  Components include avoiding isolating, having willingness to change, avoiding triggers and managing cravings. Encouraged having some type of sober network and practicing honesty with trusted support person(s). Encouraged other services such as counseling, 12 step or other self-help " "organizations.      Opioid warning reviewed.  Risk of overdose following a period of abstinence due to decrease tolerance was discussed including risk of death.  Strongly recommended abstain from alcohol, benzodiazepines, THC, opioids and other drugs of abuse.  Increased risk of return to opioid use after use of these substances discussed.  Increased risk of overdose/death with use of other substances particularly benzodiazepines/alcohol reviewed.        SUBJECTIVE                                                    Jared oNrth is a 47 year old male who presents to clinic today for follow up        Recent HPI Details: 1/30/23  Jared North is a 46 year old male with a history of recent Covid 19 injection, ARDS, Acute respiratory failure with hypoxia, MDD, Chronic back pain, and opioid use disorder who is being seen today for a follow up. He is taking suboxone 16 mg daily.     Brief History: He was hospitalized from 12/31/21-1/24/22 for Covid 19 infection with complications. He required tracheostomy and ventilator support, and GT tube placement which were discontinued prior to his discharge to home. He reports being on methadone maintenance for 10-15 years at [facility]. He states he was able to abstain from heroin for awhile but alcohol consumption increased significantly . He was drinking 1 box of wine daily and using 1/2-1 gram heroin until he went to inpatient treatment in December of 2019.  He has used heroin since April 2020.        -Drank a glass of wine a few times when son was visiting over holidays  -Denies opiate use  -is not attending support groups  -Is isolated in [facility]  -Living with ex wife  -Car issues   -Has not scheduled with psychiatry, not sure if he is interested  -No changes to mood, anxiety still a problem \"I feel like I am crawling out of my skin\"  -Difficulty feeling shayla \" Will I ever be able to enjoy anything again.\"  -Chronic back and knee pain, worse with shoveling snow  -PT on hold " until weather improves  Opioid use disorder, severe, dependence (H)  Stable. Denies side effects, No cravings. Refills provided. Plan to continue. Has drank a glass of wine over the holidays.  Expressed concern about alcohol use and lack of recovery supports. Encouraged meeting attendance to avoid isolation. Did not complete labs, encouraged him to schedule a lb appointment and complete.   -     SUBOXONE 8-2 MG per film; Place 1 Film under the tongue 2 times daily  Encounter for smoking cessation counseling  Refill provided.   -     nicotine polacrilex (NICORETTE) 4 MG gum; Place 1 each (4 mg) inside cheek every hour as needed for smoking cessation Cinnamon  Moderate to severe episode of recurrent major depressive disorder (H)  Is reluctant to schedule with psychiatry. Expresses inability to feel shayla, and find pleasure. He moved to [facility] in [facility] with ex wife and is very isolated. Adding escitalopram to help with anxiety. Recommend that he schedule with psychiatry if not improvement.   -     escitalopram (LEXAPRO) 10 MG tablet; Take 1 tablet (10 mg) by mouth daily  -     buPROPion (WELLBUTRIN XL) 300 MG 24 hr tablet; Take 1 tablet (300 mg) by mouth every morning  Anxiety  Adding escitalopram to help with his anxiety.   -     escitalopram (LEXAPRO) 10 MG tablet; Take 1 tablet (10 mg) by mouth daily  -     gabapentin (NEURONTIN) 800 MG tablet; Take 1 tablet (800 mg) by mouth 3 times daily  Chronic low back pain with right-sided sciatica, unspecified back pain laterality  Continue gabapentin, refill provided.   -     gabapentin (NEURONTIN) 800 MG tablet; Take 1 tablet (800 mg) by mouth 3 times daily     TODAY'S VISIT  HPI Mar 24, 2023  Jared North is a 46 year old male with a history of recent Covid 19 injection, ARDS, Acute respiratory failure with hypoxia, MDD, Chronic back pain, and opioid use disorder who is being seen today for a follow up. He is taking suboxone 16 mg daily.     Brief History: He was  "hospitalized from 12/31/21-1/24/22 for Covid 19 infection with complications. He required tracheostomy and ventilator support, and GT tube placement which were discontinued prior to his discharge to home. He reports being on methadone maintenance for 10-15 years at [facility]. He states he was able to abstain from heroin for awhile but alcohol consumption increased significantly . He was drinking 1 box of wine daily and using 1/2-1 gram heroin until he went to inpatient treatment in December of 2019.  He has used heroin since April 2020.       -Needs to move to another house temporary by 5/31. Current house is a 9 month lease Aug-May, ise [facility] in peak summer months. Will be able to move to another location from June to August.   -Feels landlord takes advantage of the work he puts in the house, does not take money off if rent.  -Has low motivation and energy to do much except chores at home.   -\"I feel too old to try to make changes\"  -No longer attends meetings or other activities, avoids people due to anxiety racing heart, feeling of doom, sweats  -No relief from propanolol, Nausea from lexapro stopped taking  -Pain worse some day due to all the shoveling  -Does not where his place is, staying with ex wife to be close to his daughter.   -     OBJECTIVE                                                    PHYSICAL EXAM:  There were no vitals taken for this visit.    GENERAL: healthy, alert and no distress  EYES: Eyes grossly normal to inspection, PERRL and conjunctivae and sclerae normal  RESP: No respiratory distress  MENTAL STATUS EXAM  Appearance/Behavior: No appearant distress  Speech: Normal  Mood/Affect: anxiety and Blunted  Insight: Fair    LAB  2/24/23  Positive Bup and THC    HISTORY                                                    Problem list reviewed & adjusted, as indicated.  Patient Active Problem List   Diagnosis     Chronic back pain     Essential hypertension     Elevated LFTs     Acute on chronic " respiratory failure with hypoxia (H)     Pneumonia due to 2019 novel coronavirus     Atrial fibrillation with rapid ventricular response (H)     Acute respiratory distress syndrome (ARDS) due to COVID-19 virus (H)     Major depressive disorder, recurrent episode, severe (H)     Opioid use disorder, severe, dependence (H)     Polysubstance abuse (H)     ROSSY (obstructive sleep apnea)     Mild intermittent asthma     Acute metabolic encephalopathy     Ventilator associated pneumonia (H)     Oropharyngeal dysphagia     Alcohol dependence (H)     Anxiety     Moderate to severe episode of recurrent major depressive disorder (H)     DONALD (generalized anxiety disorder)     Primary osteoarthritis of right knee         MEDICATION LIST (prior to visit)  acetaminophen (TYLENOL) 500 MG tablet, Take 1-2 tablets (500-1,000 mg) by mouth every 6 hours as needed for mild pain  albuterol (PROAIR HFA/PROVENTIL HFA/VENTOLIN HFA) 108 (90 Base) MCG/ACT inhaler, Inhale 2 puffs into the lungs every 4 hours as needed for shortness of breath / dyspnea  fluticasone (FLONASE) 50 MCG/ACT nasal spray, SHAKE LIQUID AND USE 2 SPRAYS IN EACH NOSTRIL DAILY FOR 10 DAYS  fluticasone (FLOVENT DISKUS) 50 MCG/BLIST inhaler, Inhale 1 puff into the lungs every 12 hours  metoprolol tartrate (LOPRESSOR) 50 MG tablet, Take 1 tablet (50 mg) by mouth 2 times daily (Patient not taking: Reported on 1/30/2023)  naloxone (NARCAN) 4 MG/0.1ML nasal spray, Spray 1 spray (4 mg) into one nostril alternating nostrils 2 times daily as needed for opioid reversal every 2-3 minutes until assistance arrives  nicotine polacrilex (NICORETTE) 4 MG gum, Place 1 each (4 mg) inside cheek every hour as needed for smoking cessation Cinnamon    No current facility-administered medications on file prior to visit.      MEDICATION LIST (after visit)  Current Outpatient Medications   Medication     buPROPion (WELLBUTRIN XL) 300 MG 24 hr tablet     diclofenac (VOLTAREN) 1 % topical gel      gabapentin (NEURONTIN) 800 MG tablet     hydrOXYzine (ATARAX) 25 MG tablet     SUBOXONE 8-2 MG per film     acetaminophen (TYLENOL) 500 MG tablet     albuterol (PROAIR HFA/PROVENTIL HFA/VENTOLIN HFA) 108 (90 Base) MCG/ACT inhaler     fluticasone (FLONASE) 50 MCG/ACT nasal spray     fluticasone (FLOVENT DISKUS) 50 MCG/BLIST inhaler     metoprolol tartrate (LOPRESSOR) 50 MG tablet     naloxone (NARCAN) 4 MG/0.1ML nasal spray     nicotine polacrilex (NICORETTE) 4 MG gum     No current facility-administered medications for this visit.         No Known Allergies       90 minutes were spent in review of medical record,  review, obtaining histories, reviewing symptoms, discussing pt's goals for treatment, counseling noted above on day of encounter.        Sherry Alvarado, DNP,MSN, AGNP-C  [facility]   [address] [place] [address], [address] [place] [id number]   Phone # [phone]

## 2023-06-13 ENCOUNTER — TELEPHONE (OUTPATIENT)
Dept: ADDICTION MEDICINE | Facility: CLINIC | Age: 48
End: 2023-06-13
Payer: COMMERCIAL

## 2023-06-13 ENCOUNTER — VIRTUAL VISIT (OUTPATIENT)
Dept: ADDICTION MEDICINE | Facility: CLINIC | Age: 48
End: 2023-06-13
Payer: COMMERCIAL

## 2023-06-13 DIAGNOSIS — M54.41 CHRONIC LOW BACK PAIN WITH RIGHT-SIDED SCIATICA, UNSPECIFIED BACK PAIN LATERALITY: ICD-10-CM

## 2023-06-13 DIAGNOSIS — F33.1 MODERATE EPISODE OF RECURRENT MAJOR DEPRESSIVE DISORDER (H): ICD-10-CM

## 2023-06-13 DIAGNOSIS — Z51.81 ENCOUNTER FOR MONITORING OPIOID MAINTENANCE THERAPY: ICD-10-CM

## 2023-06-13 DIAGNOSIS — Z79.891 ENCOUNTER FOR MONITORING OPIOID MAINTENANCE THERAPY: ICD-10-CM

## 2023-06-13 DIAGNOSIS — G89.29 CHRONIC LOW BACK PAIN WITH RIGHT-SIDED SCIATICA, UNSPECIFIED BACK PAIN LATERALITY: ICD-10-CM

## 2023-06-13 DIAGNOSIS — F11.20 OPIOID USE DISORDER, SEVERE, DEPENDENCE (H): Primary | ICD-10-CM

## 2023-06-13 PROCEDURE — 99214 OFFICE O/P EST MOD 30 MIN: CPT | Mod: VID | Performed by: NURSE PRACTITIONER

## 2023-06-13 RX ORDER — GABAPENTIN 800 MG/1
800 TABLET ORAL 3 TIMES DAILY
Qty: 90 TABLET | Refills: 2 | Status: SHIPPED | OUTPATIENT
Start: 2023-06-13 | End: 2023-09-05

## 2023-06-13 RX ORDER — BUPROPION HYDROCHLORIDE 300 MG/1
300 TABLET ORAL EVERY MORNING
Qty: 30 TABLET | Refills: 2 | Status: SHIPPED | OUTPATIENT
Start: 2023-06-13 | End: 2023-09-05

## 2023-06-13 RX ORDER — BUPRENORPHINE HYDROCHLORIDE, NALOXONE HYDROCHLORIDE 8; 2 MG/1; MG/1
1 FILM, SOLUBLE BUCCAL; SUBLINGUAL 2 TIMES DAILY
Qty: 60 FILM | Refills: 2 | Status: SHIPPED | OUTPATIENT
Start: 2023-06-13 | End: 2023-09-05

## 2023-06-13 ASSESSMENT — ANXIETY QUESTIONNAIRES
2. NOT BEING ABLE TO STOP OR CONTROL WORRYING: NEARLY EVERY DAY
5. BEING SO RESTLESS THAT IT IS HARD TO SIT STILL: SEVERAL DAYS
4. TROUBLE RELAXING: NEARLY EVERY DAY
8. IF YOU CHECKED OFF ANY PROBLEMS, HOW DIFFICULT HAVE THESE MADE IT FOR YOU TO DO YOUR WORK, TAKE CARE OF THINGS AT HOME, OR GET ALONG WITH OTHER PEOPLE?: EXTREMELY DIFFICULT
IF YOU CHECKED OFF ANY PROBLEMS ON THIS QUESTIONNAIRE, HOW DIFFICULT HAVE THESE PROBLEMS MADE IT FOR YOU TO DO YOUR WORK, TAKE CARE OF THINGS AT HOME, OR GET ALONG WITH OTHER PEOPLE: EXTREMELY DIFFICULT
GAD7 TOTAL SCORE: 19
1. FEELING NERVOUS, ANXIOUS, OR ON EDGE: NEARLY EVERY DAY
GAD7 TOTAL SCORE: 19
3. WORRYING TOO MUCH ABOUT DIFFERENT THINGS: NEARLY EVERY DAY
GAD7 TOTAL SCORE: 19
6. BECOMING EASILY ANNOYED OR IRRITABLE: NEARLY EVERY DAY
7. FEELING AFRAID AS IF SOMETHING AWFUL MIGHT HAPPEN: NEARLY EVERY DAY
7. FEELING AFRAID AS IF SOMETHING AWFUL MIGHT HAPPEN: NEARLY EVERY DAY

## 2023-06-13 ASSESSMENT — PATIENT HEALTH QUESTIONNAIRE - PHQ9
SUM OF ALL RESPONSES TO PHQ QUESTIONS 1-9: 20
10. IF YOU CHECKED OFF ANY PROBLEMS, HOW DIFFICULT HAVE THESE PROBLEMS MADE IT FOR YOU TO DO YOUR WORK, TAKE CARE OF THINGS AT HOME, OR GET ALONG WITH OTHER PEOPLE: EXTREMELY DIFFICULT
SUM OF ALL RESPONSES TO PHQ QUESTIONS 1-9: 20

## 2023-06-13 NOTE — TELEPHONE ENCOUNTER
1) RN reviewed provider message:         2) RN spoke with [facility] staff who gave RN Lab Fax # of [phone]. RN faxed Buprenorphine & Metabolites and Drug Confirmation lab orders to this fax.     3) Hardcopy given to OBC's to hold on to in case fax error.     4) Routing to provider as FYI.     Lidia Patel RN on 6/13/2023 at 4:06 PM

## 2023-06-13 NOTE — NURSING NOTE
Is the patient currently in the state of [place]? YES    Visit mode:VIDEO    If the visit is dropped, the patient can be reconnected by: VIDEO VISIT: Text to cell phone:   Telephone Information:   Mobile [phone]       Will anyone else be joining the visit? No  (If patient encounters technical issues they should call [phone])    How would you like to obtain your AVS? MyChart    Are changes needed to the allergy or medication list? NO    Rooming Documentation: Patient will complete qnrs in mychart.    Reason for visit: FIDE Wilks

## 2023-06-13 NOTE — PROGRESS NOTES
Virtual Visit Details    Type of service:  Video Visit   Video Start Time: 1140  Video End Time:12:11 PM    Originating Location (pt. Location): Home    Distant Location (provider location):  On-site  Platform used for Video Visit: [facility]    A/P                                                    ASSESSMENT/PLAN  1. Opioid use disorder, severe, dependence (H)  Stable and in sustained remission since 2020.  -continue suboxone 16 mg TDD  -Confirms access to narcan  -Needs to complete labs at [facility] in [facility]  - SUBOXONE 8-2 MG per film; Place 1 Film under the tongue 2 times daily  Dispense: 60 Film; Refill: 2    2. Chronic low back pain with right-sided sciatica, unspecified back pain laterality  Has not followed up with PCP has recommended to discuss alternatives such as Lyrica for pain. Refills provided today.   - gabapentin (NEURONTIN) 800 MG tablet; Take 1 tablet (800 mg) by mouth 3 times daily  Dispense: 90 tablet; Refill: 2    3. Encounter for monitoring opioid maintenance therapy  RN to fax lab orders to [facility] in [place].   - Drug Confirmation Panel Urine with Creat - lab collect; Future  - Buprenorphine & Metabolite Screen; Future    4. Moderate to severe episode of recurrent major depressive disorder (H)  Reports some improvement to mood since resuming bupropion.  -Continue bupropion w/o changes  -Encouraged him to schedule psychiatry and therapy, referrals placed last visit.   - buPROPion (WELLBUTRIN XL) 300 MG 24 hr tablet; Take 1 tablet (300 mg) by mouth every morning  Dispense: 30 tablet; Refill: 2      Orders Placed This Encounter   Medications     buPROPion (WELLBUTRIN XL) 300 MG 24 hr tablet     Sig: Take 1 tablet (300 mg) by mouth every morning     Dispense:  30 tablet     Refill:  2     gabapentin (NEURONTIN) 800 MG tablet     Sig: Take 1 tablet (800 mg) by mouth 3 times daily     Dispense:  90 tablet     Refill:  2     SUBOXONE 8-2 MG  per film     Sig: Place 1 Film under the tongue 2 times daily     Dispense:  60 Film     Refill:  2       Problem list updated Jun 13, 2023   No problems updated.          PDMP Review       Value Time User    State PDMP site checked  Yes 6/13/2023 11:50 AM Sherry Alvarado CNP        05/16/2023 03/24/2023   3  Gabapentin 800 Mg Tablet 90.00  30  He Jelani  [id number]   Ait (9548)  2/2   Comm Ins  [place]    05/16/2023 03/24/2023   3  Suboxone 8 Mg-2 Mg Sl Film 60.00  30  He Bat                  RTC  Return in about 3 months (around 9/11/2023) for Follow up, with me, using a video visit at 11am .      Counseled the patient on the importance of having a recovery program in addition to medication to manage recovery.  Components include avoiding isolating, having willingness to change, avoiding triggers and managing cravings. Encouraged having some type of sober network and practicing honesty with trusted support person(s). Encouraged other services such as counseling, 12 step or other self-help organizations.      Opioid warning reviewed.  Risk of overdose following a period of abstinence due to decrease tolerance was discussed including risk of death.  Strongly recommended abstain from alcohol, benzodiazepines, THC, opioids and other drugs of abuse.  Increased risk of return to opioid use after use of these substances discussed.  Increased risk of overdose/death with use of other substances particularly benzodiazepines/alcohol reviewed.        SUBJECTIVE                                                      Jared North is a 46 year old male with a history of recent Covid 19 injection, ARDS, Acute respiratory failure with hypoxia, MDD, Chronic back pain, and opioid use disorder who is being seen today for a follow up. He is taking suboxone 16 mg daily.     Brief History: Initial visit 2/11/2022. He was hospitalized from 12/31/21-1/24/22 for Covid 19 infection with complications. He required tracheostomy and ventilator  support, and GT tube placement which were discontinued prior to his discharge to home. He reports being on methadone maintenance for 10-15 years at [facility]. He states he was able to abstain from heroin for awhile but alcohol consumption increased significantly . He was drinking 1 box of wine daily and using 1/2-1 gram heroin until he went to inpatient treatment in December of 2019.  He has not used heroin or alcohol since April 2020.     CD History:       SUBSTANCE(S)  OF CHOICE  - Opiates and alcohol       Withdrawal symptoms - None  IV drug use - used IV  Previous MAT - Methadone [facility]. Suboxone.   Previous detox/treatment - Two mentioned above  Current recovery activities: AA, IOP prior to hospital stay.   Longest period of sobriety - 2 years              Significant protective factors: Health and kids  Medical complications from substance use - Hypertension,   History of overdose - No  Narcan currently available - NO     Other Substance Use:  ALCOHOL: None in 2 years drank 1 box daily  OPIATES   Per HPI  KRATOM: NO  BENZODIAZEPINES: Used occasional recreationally, and did take a couple of ativan in the last 2 weeks.   AMPHETAMINES/METHAMPHETAMINES: Tried a couple of times  PRESCRIPTION STIMULANTS: No  MARIJUANA: Tried delta 8 this week  COCAINE/CRACK; NO  HALLUCINOGENS: NO  ANABOLIC STEROIDS: No              OTHER  (Gambling, shopping): No  NICOTINE-Chewing tobacco restarted.                           Desire to quit yes  Previous attempts to quit Yes                            Hx of medication for smoking cessation   Patches and gum     Recent HPI Details: 3/24/2023  Needs to move to another house temporary by 5/31. Current house is a 9 month lease Aug-May, [facility] in peak summer months. Will be able to move to another location from June to August.   -Feels landlord takes advantage of the work he puts in the house, does not take money off if rent.  -Has low motivation and energy to do much except chores at home.  "  -\"I feel too old to try to make changes\"  -No longer attends meetings or other activities, avoids people due to anxiety racing heart, feeling of doom, sweats  -No relief from propanolol, Nausea from lexapro stopped taking  -Pain worse some day due to all the shoveling  -Does not where his place is, staying with ex wife to be close to his daughter.     Opioid use disorder, severe, dependence (H)  Controlled on suboxone 16 mg TDD. Denies side effects, cravings, use. Plan to continue.   -     SUBOXONE 8-2 MG per film; Place 1 Film under the tongue 2 times daily  Moderate to severe episode of recurrent major depressive disorder (H)  Persistent mood disorder despite wellbutrin. Was unsuccessful with lexapro, caused nausea and therapy was stopped. Has  Hx of childhood trauma, and severe Covid infection 2021 requiring many months in ICU that is triggering worsening anxiety and low mood. Previously placed referrals for pscyhiatry and he has not followed thru with appointments. Recommended he schedule with [facility] to stablize medications, and he is agreeable to schedule.   -     Adult Mental Health  Referral; Future  -     Adult Mental Health  Referral; Future  -     buPROPion (WELLBUTRIN XL) 300 MG 24 hr tablet; Take 1 tablet (300 mg) by mouth every morning  DONALD (generalized anxiety disorder)  Has lengthy discussion about how his mood is impacting his wellbeing. He feels stuck. Describes severe anxiety that is preventing his participating in activities he used to find pleasure like working out and AA meetings. Did not tolerate lexapro, and propranolol was ineffective. Increase hydroxyzine to 25 mg TID prn. Referral for psychiatry and therapy placed.   -     Adult Mental Health  Referral; Future  -     Adult Mental Health  Referral; Future  -     hydrOXYzine (ATARAX) 25 MG tablet; Take 1 tablet (25 mg) by mouth 3 times daily as needed for itching  Chronic low back pain with right-sided " "sciatica, unspecified back pain laterality  Explained that high dose of gabapentin may be contributed to his blunted mood, but is helping with both pain and anxiety and wants to continue. Encouraged him to watch \"Chronic pain-is it all in their head\" and to discuss changing gabapentin to Lyrica with his PCP.   -     gabapentin (NEURONTIN) 800 MG tablet; Take 1 tablet (800 mg) by mouth 3 times daily  -     diclofenac (VOLTAREN) 1 % topical gel; Apply 2 g topically 4 times daily    TODAY'S VISIT   HPI Jun 13, 2023    -Moved to [facility] house temporarily until end of August. Rents a summer [employer] vacation home and needs to stay some where else during summer months.   -Increase stress related to cleaning and moving  -Did not schedule with psychiatry or therapy  -Drank a glass of wine once since last visit  -uses THC gummies 3-4 times weekly  -Suboxone 16 mg TDD. Craving controlled  -Son is visiting now, talking about moving together in cities.   -On disability, needs to apply to low income housing  -Back pain getting worse, needs to have evaluated by surgeon.     OBJECTIVE                                                    PHYSICAL EXAM:  There were no vitals taken for this visit.    GENERAL: healthy, alert and no distress  EYES: Eyes grossly normal to inspection, PERRL and conjunctivae and sclerae normal  RESP: No respiratory distress  MENTAL STATUS EXAM  Appearance/Behavior: No appearant distress  Speech: Normal  Mood/Affect: normal affect  Insight: Adequate    LAB  No results found for any visits on 06/13/23.      HISTORY                                                    Problem list reviewed & adjusted, as indicated.  Patient Active Problem List   Diagnosis     Chronic back pain     Essential hypertension     Elevated LFTs     Acute on chronic respiratory failure with hypoxia (H)     Pneumonia due to 2019 novel coronavirus     Atrial fibrillation with rapid ventricular response (H)     Acute respiratory distress " syndrome (ARDS) due to COVID-19 virus (H)     Major depressive disorder, recurrent episode, severe (H)     Opioid use disorder, severe, dependence (H)     Polysubstance abuse (H)     ROSSY (obstructive sleep apnea)     Mild intermittent asthma     Acute metabolic encephalopathy     Ventilator associated pneumonia (H)     Oropharyngeal dysphagia     Alcohol dependence (H)     Anxiety     Moderate to severe episode of recurrent major depressive disorder (H)     DONALD (generalized anxiety disorder)     Primary osteoarthritis of right knee         MEDICATION LIST (prior to visit)  acetaminophen (TYLENOL) 500 MG tablet, Take 1-2 tablets (500-1,000 mg) by mouth every 6 hours as needed for mild pain  albuterol (PROAIR HFA/PROVENTIL HFA/VENTOLIN HFA) 108 (90 Base) MCG/ACT inhaler, Inhale 2 puffs into the lungs every 4 hours as needed for shortness of breath / dyspnea  diclofenac (VOLTAREN) 1 % topical gel, Apply 2 g topically 4 times daily  fluticasone (FLONASE) 50 MCG/ACT nasal spray, SHAKE LIQUID AND USE 2 SPRAYS IN EACH NOSTRIL DAILY FOR 10 DAYS  fluticasone (FLOVENT DISKUS) 50 MCG/BLIST inhaler, Inhale 1 puff into the lungs every 12 hours  hydrOXYzine (ATARAX) 25 MG tablet, Take 1 tablet (25 mg) by mouth 3 times daily as needed for itching  metoprolol tartrate (LOPRESSOR) 50 MG tablet, Take 1 tablet (50 mg) by mouth 2 times daily (Patient not taking: Reported on 1/30/2023)  naloxone (NARCAN) 4 MG/0.1ML nasal spray, Spray 1 spray (4 mg) into one nostril alternating nostrils 2 times daily as needed for opioid reversal every 2-3 minutes until assistance arrives  nicotine polacrilex (NICORETTE) 4 MG gum, Place 1 each (4 mg) inside cheek every hour as needed for smoking cessation Cinnamon    No current facility-administered medications on file prior to visit.      MEDICATION LIST (after visit)  Current Outpatient Medications   Medication     buPROPion (WELLBUTRIN XL) 300 MG 24 hr tablet     gabapentin (NEURONTIN) 800 MG tablet      SUBOXONE 8-2 MG per film     acetaminophen (TYLENOL) 500 MG tablet     albuterol (PROAIR HFA/PROVENTIL HFA/VENTOLIN HFA) 108 (90 Base) MCG/ACT inhaler     diclofenac (VOLTAREN) 1 % topical gel     fluticasone (FLONASE) 50 MCG/ACT nasal spray     fluticasone (FLOVENT DISKUS) 50 MCG/BLIST inhaler     hydrOXYzine (ATARAX) 25 MG tablet     metoprolol tartrate (LOPRESSOR) 50 MG tablet     naloxone (NARCAN) 4 MG/0.1ML nasal spray     nicotine polacrilex (NICORETTE) 4 MG gum     No current facility-administered medications for this visit.         No Known Allergies        Sherry Alvarado, BARI,MSN, AGNP-C  [facility]   [address] [place] [address], [address] [place] [id number]   Phone # [phone]

## 2023-06-13 NOTE — PATIENT INSTRUCTIONS
Patient Education   Addiction Medicine  What to Expect  Here's what to expect from our Addiction Medicine program.  About Addiction Medicine  Addiction Medicine clinics help you with substance use problems. You set your own goals. We try to help you reach your goals. Your care plan can include:  Medicine  Creating a recovery plan  Helping you find local resources  Helping with treatment options  Clinic phone number and addresses  Clinic Phone: [phone]  Mental Health and Addiction Clinic  [facility]  [address], [address]  [place] [address]  [facility]  [address] [facility], [address]  [place] [address]  Walk-in services  We offer walk-in care for patients at the Recovery Clinic. This is only for patients with Opioid Use Disorder (OUD). Anyone with OUD is welcome. Our providers will refer you to the Recovery Clinic if you're struggling to keep up with your medicines or appointments.  Recovery Clinic ([facility])  [address], [address]  [place] [address]  Phone: [phone]  The Recovery Clinic is open for walk-ins Monday to Friday 9 a.m. to 11:30 a.m. and 12:30 p.m. to 3 p.m.  How it works  Come to your visits every time. The treatment works better when you do.   You can have as many visits as you need. When you're better, we'll refer you back to being cared for by your family doctor.   If you need it, we'll send you to doctors, psychiatrists, therapists, and other providers. We focus on treating addiction. We don't treat other problems, like managing other medicines or non-addiction issues.  About visits  Urine drug testing  We'll often test your pee (urine) for drugs. This is the only way we can know for sure whether or not you're using drugs. It helps us treat you without judgement.   Suboxone (buprenorphine)  If you're taking buprenorphine, you'll have a lot of visits at first. If your problem is getting worse, or you're  "using substances, we may schedule you for extra visits.   Cancelling visits  If you can't come to your visit, please call us right away at [phone]. If you don't cancel at least 24 hours (1 full day) before your visit, that's \"late cancellation.\"  Being late to visits  If you come late, you may not be seen. This will count as a \"no-show.\"  Please call the clinic if you're running late. This will help us plan, but it doesn't mean you'll be seen.   Being late is:  More than 15 minutes late for a return visit.  More than 30 minutes late for your first visit.  If you cancel late or don't show up 2 times within 6 months, we may transfer you to another clinic.   Getting help between visits  If you need help between visits, you can call us Monday to Friday from 8 a.m. to 4:30 p.m. at [phone]. You can also send us a message on [employer].  Medicine refills  If you miss or cancel a visit, you can still ask for a refill. But we can only refill your medicines if you've made a new appointment.  Please call your pharmacy for medicine refills. If you have a question about your refill, call us at [phone].  It takes up to 2 business days to refill your drugs. Let us know 2 to 3 days before you run out. Don't call more than 1 week before you run out. That's too early.   Please make sure we have your right phone number.  If we have a problem with your refill, we'll call you. If we call you, please call us back right away. If you don't, you may not get your medicines quickly.   Call your pharmacy to find out if your medicines are ready.   Keep your medicines in a safe place. Keep them away from pets and children. If your medicines are lost or stolen, we usually don't replace them. We recommend you file a police report if your medicines are stolen. Your insurance may not pay for early refills, even if you have a prescription.  Forms  Please give us at least 3 business days to fill out any forms. Bring the forms to your " visits if you can. We may refer you to other members of your care team to complete the forms.   Emergency care   Call 911 or go to the nearest emergency room if your life or someone else's life is in danger.  Call 988 anytime for the Suicide and Crisis Lifeline.  If you need care when we're closed, call your family doctor to see if they can help. You can also go to urgent care or an emergency room. [facility] emergency rooms may be able to give you buprenorphine or other medicine refills.  Thank you for choosing us for your care.  For informational purposes only. Not to replace the advice of your health care provider. Copyright   2023 [facility]. All rights reserved. [employer] [id number] - REV 05/23.

## 2023-08-23 ENCOUNTER — MYC MEDICAL ADVICE (OUTPATIENT)
Dept: ADDICTION MEDICINE | Facility: CLINIC | Age: 48
End: 2023-08-23
Payer: COMMERCIAL

## 2023-08-23 ENCOUNTER — TELEPHONE (OUTPATIENT)
Dept: ADDICTION MEDICINE | Facility: CLINIC | Age: 48
End: 2023-08-23

## 2023-08-23 ENCOUNTER — TELEPHONE (OUTPATIENT)
Dept: BEHAVIORAL HEALTH | Facility: CLINIC | Age: 48
End: 2023-08-23
Payer: COMMERCIAL

## 2023-08-23 NOTE — TELEPHONE ENCOUNTER
Pt called to schedule sooner appt. No sooner appts with provider shown via book it. Transferred pt to [phone], listed in notes for appts to be completed to for said provider.    Chelo Espinal  Transition Clinic Coordinator  08/23/23 3:43 PM

## 2023-08-23 NOTE — TELEPHONE ENCOUNTER
Incoming call from patient. RN spoke with patient about making a sooner appointment with Sherry Alvarado CNP. Patient denied that he was in any sort of distress. Stated that he was going to be out of town during his scheduled appointment and requested to be seen sooner.     RN explained that I am not able to schedule appointments. RN explained the process for scheduling appointments and provided the number to schedule [phone]. RN encouraged patient to request to be added to Sherry's wait-list.     JOSUÉ WINCHESTER RN on 8/23/2023 at 3:54 PM

## 2023-08-24 NOTE — TELEPHONE ENCOUNTER
RN reviewed patient's [employer] message.     Patient is requesting an appointment sooner than his appointment on 9/11/23 He will be out of town during his virtual appointment and not in the state of [place]. He is on the cancellation list.     Patient is wondering if he is up to date on his urine drug screening. Last UDS was on 8/3/23.     JOSUÉ WINCHESTER RN on 8/24/2023 at 1:02 PM

## 2023-08-25 NOTE — TELEPHONE ENCOUNTER
Spoke with Jared he completed labs 8/3 which which were reviewed in care everywhere. He states he has enough suboxone but may need gabapentin refill before his appointment 9/11. He will request through his pharmacy if needed.

## 2023-09-05 ENCOUNTER — VIRTUAL VISIT (OUTPATIENT)
Dept: ADDICTION MEDICINE | Facility: CLINIC | Age: 48
End: 2023-09-05
Payer: COMMERCIAL

## 2023-09-05 ENCOUNTER — MYC MEDICAL ADVICE (OUTPATIENT)
Dept: ADDICTION MEDICINE | Facility: CLINIC | Age: 48
End: 2023-09-05

## 2023-09-05 DIAGNOSIS — Z71.6 ENCOUNTER FOR SMOKING CESSATION COUNSELING: ICD-10-CM

## 2023-09-05 DIAGNOSIS — F11.21 OPIOID USE DISORDER, SEVERE, IN SUSTAINED REMISSION (H): ICD-10-CM

## 2023-09-05 DIAGNOSIS — M54.41 CHRONIC LOW BACK PAIN WITH RIGHT-SIDED SCIATICA, UNSPECIFIED BACK PAIN LATERALITY: ICD-10-CM

## 2023-09-05 DIAGNOSIS — F33.1 MODERATE EPISODE OF RECURRENT MAJOR DEPRESSIVE DISORDER (H): ICD-10-CM

## 2023-09-05 DIAGNOSIS — G89.29 CHRONIC LOW BACK PAIN WITH RIGHT-SIDED SCIATICA, UNSPECIFIED BACK PAIN LATERALITY: ICD-10-CM

## 2023-09-05 PROCEDURE — 99214 OFFICE O/P EST MOD 30 MIN: CPT | Mod: VID | Performed by: NURSE PRACTITIONER

## 2023-09-05 RX ORDER — GABAPENTIN 800 MG/1
800 TABLET ORAL 3 TIMES DAILY
Qty: 90 TABLET | Refills: 2 | Status: SHIPPED | OUTPATIENT
Start: 2023-09-05 | End: 2023-11-20

## 2023-09-05 RX ORDER — BUPRENORPHINE HYDROCHLORIDE, NALOXONE HYDROCHLORIDE 8; 2 MG/1; MG/1
1 FILM, SOLUBLE BUCCAL; SUBLINGUAL 2 TIMES DAILY
Qty: 60 FILM | Refills: 2 | Status: SHIPPED | OUTPATIENT
Start: 2023-09-05 | End: 2023-11-20

## 2023-09-05 RX ORDER — BUPROPION HYDROCHLORIDE 300 MG/1
300 TABLET ORAL EVERY MORNING
Qty: 30 TABLET | Refills: 2 | Status: SHIPPED | OUTPATIENT
Start: 2023-09-05 | End: 2023-11-20

## 2023-09-05 ASSESSMENT — PATIENT HEALTH QUESTIONNAIRE - PHQ9
10. IF YOU CHECKED OFF ANY PROBLEMS, HOW DIFFICULT HAVE THESE PROBLEMS MADE IT FOR YOU TO DO YOUR WORK, TAKE CARE OF THINGS AT HOME, OR GET ALONG WITH OTHER PEOPLE: VERY DIFFICULT
SUM OF ALL RESPONSES TO PHQ QUESTIONS 1-9: 20
SUM OF ALL RESPONSES TO PHQ QUESTIONS 1-9: 20

## 2023-09-05 ASSESSMENT — ANXIETY QUESTIONNAIRES
2. NOT BEING ABLE TO STOP OR CONTROL WORRYING: NEARLY EVERY DAY
3. WORRYING TOO MUCH ABOUT DIFFERENT THINGS: NEARLY EVERY DAY
GAD7 TOTAL SCORE: 17
4. TROUBLE RELAXING: MORE THAN HALF THE DAYS
GAD7 TOTAL SCORE: 17
5. BEING SO RESTLESS THAT IT IS HARD TO SIT STILL: NOT AT ALL
6. BECOMING EASILY ANNOYED OR IRRITABLE: NEARLY EVERY DAY
1. FEELING NERVOUS, ANXIOUS, OR ON EDGE: NEARLY EVERY DAY
7. FEELING AFRAID AS IF SOMETHING AWFUL MIGHT HAPPEN: NEARLY EVERY DAY
IF YOU CHECKED OFF ANY PROBLEMS ON THIS QUESTIONNAIRE, HOW DIFFICULT HAVE THESE PROBLEMS MADE IT FOR YOU TO DO YOUR WORK, TAKE CARE OF THINGS AT HOME, OR GET ALONG WITH OTHER PEOPLE: VERY DIFFICULT

## 2023-09-05 NOTE — PATIENT INSTRUCTIONS
Patient Education   Addiction Medicine  What to Expect  Here's what to expect from our Addiction Medicine program.  About Addiction Medicine  Addiction Medicine clinics help you with substance use problems. You set your own goals. We try to help you reach your goals. Your care plan can include:  Medicine  Creating a recovery plan  Helping you find local resources  Helping with treatment options  Clinic phone number and addresses  Clinic Phone: [phone]  Mental Health and Addiction Clinic  [facility]  [address], [address]  [place] [address]  [facility]  [address] [facility], [address]  [place] [address]  Walk-in services  We offer walk-in care for patients at the Recovery Clinic. This is only for patients with Opioid Use Disorder (OUD). Anyone with OUD is welcome. Our providers will refer you to the Recovery Clinic if you're struggling to keep up with your medicines or appointments.  Recovery Clinic ([facility])  [address] [facility] [address] [address], [address]  [place] [address]  Phone: [phone]  The Recovery Clinic is open for walk-ins Monday to Friday 9 a.m. to 11:30 a.m. and 12:30 p.m. to 3 p.m.  How it works  Come to your visits every time. The treatment works better when you do.   You can have as many visits as you need. When you're better, we'll refer you back to being cared for by your family doctor.   If you need it, we'll send you to doctors, psychiatrists, therapists, and other providers. We focus on treating addiction. We don't treat other problems, like managing other medicines or non-addiction issues.  About visits  Urine drug testing  We'll often test your pee (urine) for drugs. This is the only way we can know for sure whether or not you're using drugs. It helps us treat you without judgement.   Suboxone (buprenorphine)  If you're taking buprenorphine, you'll have a lot of visits at first. If your problem is getting worse, or you're  "using substances, we may schedule you for extra visits.   Cancelling visits  If you can't come to your visit, please call us right away at [phone]. If you don't cancel at least 24 hours (1 full day) before your visit, that's \"late cancellation.\"  Being late to visits  If you come late, you may not be seen. This will count as a \"no-show.\"  Please call the clinic if you're running late. This will help us plan, but it doesn't mean you'll be seen.   Being late is:  More than 15 minutes late for a return visit.  More than 30 minutes late for your first visit.  If you cancel late or don't show up 2 times within 6 months, we may transfer you to another clinic.   Getting help between visits  If you need help between visits, you can call us Monday to Friday from 8 a.m. to 4:30 p.m. at [phone]. You can also send us a message on [employer].  Medicine refills  If you miss or cancel a visit, you can still ask for a refill. But we can only refill your medicines if you've made a new appointment.  Please call your pharmacy for medicine refills. If you have a question about your refill, call us at [phone].  It takes up to 2 business days to refill your drugs. Let us know 2 to 3 days before you run out. Don't call more than 1 week before you run out. That's too early.   Please make sure we have your right phone number.  If we have a problem with your refill, we'll call you. If we call you, please call us back right away. If you don't, you may not get your medicines quickly.   Call your pharmacy to find out if your medicines are ready.   Keep your medicines in a safe place. Keep them away from pets and children. If your medicines are lost or stolen, we usually don't replace them. We recommend you file a police report if your medicines are stolen. Your insurance may not pay for early refills, even if you have a prescription.  Forms  Please give us at least 3 business days to fill out any forms. Bring the forms to your " visits if you can. We may refer you to other members of your care team to complete the forms.   Emergency care   Call 911 or go to the nearest emergency room if your life or someone else's life is in danger.  Call 988 anytime for the Suicide and Crisis Lifeline.  If you need care when we're closed, call your family doctor to see if they can help. You can also go to urgent care or an emergency room. [facility] emergency rooms may be able to give you buprenorphine or other medicine refills.  Thank you for choosing us for your care.  For informational purposes only. Not to replace the advice of your health care provider. Copyright   2023 [facility]. All rights reserved. [employer] [id number] - REV 05/23.

## 2023-09-05 NOTE — NURSING NOTE
Is the patient currently in the state of [place]? YES    Visit mode:VIDEO    If the visit is dropped, the patient can be reconnected by: VIDEO VISIT: Text to cell phone:   Telephone Information:   Mobile [phone]       Will anyone else be joining the visit? NO  (If patient encounters technical issues they should call [phone]    How would you like to obtain your AVS? MyChart    Are changes needed to the allergy or medication list? Pt stated no changes to allergies and Pt stated no med changes    Reason for visit: YANI ELIZALDE

## 2023-09-05 NOTE — PROGRESS NOTES
Virtual Visit Details    Type of service:  Video Visit   Video Start Time:  1628  Video End Time:4:57 PM    Originating Location (pt. Location): Home    Distant Location (provider location):  On-site  Platform used for Video Visit: [facility]    A/P                                                    ASSESSMENT/PLAN  1. Opioid use disorder, severe, in sustained remission (H)  Stable in sustained remission. Taking suboxone 16 mg TDD. Plan to continue suboxone without changes.  Confirms narcan access.   - SUBOXONE 8-2 MG per film; Place 1 Film under the tongue 2 times daily  Dispense: 60 Film; Refill: 2    2. Chronic low back pain with right-sided sciatica, unspecified back pain laterality  Refills provided.   - gabapentin (NEURONTIN) 800 MG tablet; Take 1 tablet (800 mg) by mouth 3 times daily  Dispense: 90 tablet; Refill: 2    3. Moderate to severe episode of recurrent major depressive disorder (H)  Reports mood is the best it has been in while. Continue bupropion.   - buPROPion (WELLBUTRIN XL) 300 MG 24 hr tablet; Take 1 tablet (300 mg) by mouth every morning  Dispense: 30 tablet; Refill: 2    4. Encounter for smoking cessation counseling  Making progress. Reports he is smoking very little. Refills provided.   - nicotine polacrilex (NICORETTE) 4 MG gum; Place 1 each (4 mg) inside cheek every hour as needed for smoking cessation Cinnamon  Dispense: 160 each; Refill: 1      Orders Placed This Encounter   Medications    gabapentin (NEURONTIN) 800 MG tablet     Sig: Take 1 tablet (800 mg) by mouth 3 times daily     Dispense:  90 tablet     Refill:  2    SUBOXONE 8-2 MG per film     Sig: Place 1 Film under the tongue 2 times daily     Dispense:  60 Film     Refill:  2    buPROPion (WELLBUTRIN XL) 300 MG 24 hr tablet     Sig: Take 1 tablet (300 mg) by mouth every morning     Dispense:  30 tablet     Refill:  2    nicotine polacrilex (NICORETTE) 4 MG gum     Sig: Place 1 each (4 mg)  inside cheek every hour as needed for smoking cessation Cinnamon     Dispense:  160 each     Refill:  1       Problem list updated Sep 5, 2023   No problems updated.          PDMP Review         Value Time User    State PDMP site checked  Yes 9/5/2023  4:58 PM Sherry Alvarado CNP          08/08/2023 06/13/2023 1 Gabapentin 800 Mg Tablet 90.00 30 He Bat [id number] Ait (9548) 2/2  Comm Ins [place]   08/08/2023 06/13/2023 1 Suboxone 8 Mg-2 Mg Sl Film 60.00 30 He Bat             RTC  Return in about 2 months (around 11/20/2023) for Follow up, with me, in person at 1 pm.      Counseled the patient on the importance of having a recovery program in addition to medication to manage recovery.  Components include avoiding isolating, having willingness to change, avoiding triggers and managing cravings. Encouraged having some type of sober network and practicing honesty with trusted support person(s). Encouraged other services such as counseling, 12 step or other self-help organizations.      Opioid warning reviewed.  Risk of overdose following a period of abstinence due to decrease tolerance was discussed including risk of death.  Strongly recommended abstain from alcohol, benzodiazepines, THC, opioids and other drugs of abuse.  Increased risk of return to opioid use after use of these substances discussed.  Increased risk of overdose/death with use of other substances particularly benzodiazepines/alcohol reviewed.        SUBJECTIVE                                                      Jared North is a 46 year old male with a history of recent Covid 19 injection, ARDS, Acute respiratory failure with hypoxia, MDD, Chronic back pain, and opioid use disorder who is being seen today for a follow up. He is taking suboxone 16 mg daily.     Brief History: Initial visit 2/11/2022. He was hospitalized from 12/31/21-1/24/22 for Covid 19 infection with complications. He required tracheostomy and ventilator support, and GT tube placement  which were discontinued prior to his discharge to home. He reports being on methadone maintenance for 10-15 years at [facility]. He states he was able to abstain from heroin for awhile but alcohol consumption increased significantly . He was drinking 1 box of wine daily and using 1/2-1 gram heroin until he went to inpatient treatment in December of 2019.  He has not used heroin or alcohol since April 2020.      CD History:       SUBSTANCE(S)  OF CHOICE  - Opiates and alcohol       Withdrawal symptoms - None  IV drug use - used IV  Previous MAT - Methadone [facility]. Suboxone.   Previous detox/treatment - Two mentioned above  Current recovery activities: AA, IOP prior to hospital stay.   Longest period of sobriety - 2 years              Significant protective factors: Health and kids  Medical complications from substance use - Hypertension,   History of overdose - No  Narcan currently available - NO     Other Substance Use:  ALCOHOL: None in 2 years drank 1 box daily  OPIATES   Per HPI  KRATOM: NO  BENZODIAZEPINES: Used occasional recreationally, and did take a couple of ativan in the last 2 weeks.   AMPHETAMINES/METHAMPHETAMINES: Tried a couple of times  PRESCRIPTION STIMULANTS: No  MARIJUANA: Tried delta 8 this week  COCAINE/CRACK; NO  HALLUCINOGENS: NO  ANABOLIC STEROIDS: No              OTHER  (Gambling, shopping): No  NICOTINE-Chewing tobacco restarted.                           Desire to quit yes  Previous attempts to quit Yes                            Hx of medication for smoking cessation   Patches and gum       Recent HPI Details:6/13/23    -Moved to [facility] house temporarily until end of August. Rents a summer Air BnB vacation home and needs to stay some where else during summer months.   -Increase stress related to cleaning and moving  -Did not schedule with psychiatry or therapy  -Drank a glass of wine once since last visit  -uses THC gummies 3-4 times weekly  -Suboxone 16 mg TDD. Craving controlled  -Son is  visiting now, talking about moving together in cities.   -On disability, needs to apply to low income housing  -Back pain getting worse, needs to have evaluated by surgeon.   1. Opioid use disorder, severe, dependence (H)  Stable and in sustained remission since 2020.  -continue suboxone 16 mg TDD  -Confirms access to narcan  -Needs to complete labs at [facility] in [facility]  - SUBOXONE 8-2 MG per film; Place 1 Film under the tongue 2 times daily  Dispense: 60 Film; Refill: 2     2. Chronic low back pain with right-sided sciatica, unspecified back pain laterality  Has not followed up with PCP has recommended to discuss alternatives such as Lyrica for pain. Refills provided today.   - gabapentin (NEURONTIN) 800 MG tablet; Take 1 tablet (800 mg) by mouth 3 times daily  Dispense: 90 tablet; Refill: 2     3. Encounter for monitoring opioid maintenance therapy  RN to fax lab orders to [facility] in [place].   - Drug Confirmation Panel Urine with Creat - lab collect; Future  - Buprenorphine & Metabolite Screen; Future     4. Moderate to severe episode of recurrent major depressive disorder (H)  Reports some improvement to mood since resuming bupropion.  -Continue bupropion w/o changes  -Encouraged him to schedule psychiatry and therapy, referrals placed last visit.   - buPROPion (WELLBUTRIN XL) 300 MG 24 hr tablet; Take 1 tablet (300 mg) by mouth every morning  Dispense: 30 tablet; Refill: 2    TODAY'S VISIT  HPI Sep 5, 2023  -Summer has been fine. Moved back into the [facility] home, will stay there until Spring. May try to move out before Winter. Not sure they want to stay there all winter.   -Mood has been alright, taking bupropion   -Increased anxiety at times, gabapentin helps some. Needs refill.   -Using less THC gummies, too expensive.   -Talking about moving back to the [facility].   -Had appointment with spine surgeon at [facility], MRI is ordered. Needs to schedule in the [facility]. Has chronic back pain  with sciatica right side. Pain impacts his ability to stand or sit for too long.    - Suboxone 16 mg TDD    OBJECTIVE                                                    PHYSICAL EXAM:  There were no vitals taken for this visit.    GENERAL: healthy, alert and no distress  EYES: Eyes grossly normal to inspection, PERRL and conjunctivae and sclerae normal  RESP: No respiratory distress  MENTAL STATUS EXAM  Appearance/Behavior: No appearant distress  Speech: Normal  Mood/Affect: normal affect  Insight: Adequate    LAB  No results found for any visits on 09/05/23.  Recent UDS Labs (may not contain today's lab data)  8/3/23 positive for buprenorphine and THC     HISTORY                                                    Problem list reviewed & adjusted, as indicated.  Patient Active Problem List   Diagnosis    Chronic back pain    Essential hypertension    Elevated LFTs    Acute on chronic respiratory failure with hypoxia (H)    Pneumonia due to 2019 novel coronavirus    Atrial fibrillation with rapid ventricular response (H)    Acute respiratory distress syndrome (ARDS) due to COVID-19 virus (H)    Major depressive disorder, recurrent episode, severe (H)    Opioid use disorder, severe, dependence (H)    Polysubstance abuse (H)    ROSSY (obstructive sleep apnea)    Mild intermittent asthma    Acute metabolic encephalopathy    Ventilator associated pneumonia (H)    Oropharyngeal dysphagia    Alcohol dependence (H)    Anxiety    Moderate to severe episode of recurrent major depressive disorder (H)    DONALD (generalized anxiety disorder)    Primary osteoarthritis of right knee         MEDICATION LIST (prior to visit)  acetaminophen (TYLENOL) 500 MG tablet, Take 1-2 tablets (500-1,000 mg) by mouth every 6 hours as needed for mild pain  albuterol (PROAIR HFA/PROVENTIL HFA/VENTOLIN HFA) 108 (90 Base) MCG/ACT inhaler, Inhale 2 puffs into the lungs every 4 hours as needed for shortness of breath / dyspnea  diclofenac (VOLTAREN) 1 %  topical gel, Apply 2 g topically 4 times daily  fluticasone (FLONASE) 50 MCG/ACT nasal spray, SHAKE LIQUID AND USE 2 SPRAYS IN EACH NOSTRIL DAILY FOR 10 DAYS  fluticasone (FLOVENT DISKUS) 50 MCG/BLIST inhaler, Inhale 1 puff into the lungs every 12 hours  hydrOXYzine (ATARAX) 25 MG tablet, Take 1 tablet (25 mg) by mouth 3 times daily as needed for itching  metoprolol tartrate (LOPRESSOR) 50 MG tablet, Take 1 tablet (50 mg) by mouth 2 times daily (Patient not taking: Reported on 1/30/2023)  naloxone (NARCAN) 4 MG/0.1ML nasal spray, Spray 1 spray (4 mg) into one nostril alternating nostrils 2 times daily as needed for opioid reversal every 2-3 minutes until assistance arrives    No current facility-administered medications on file prior to visit.      MEDICATION LIST (after visit)  Current Outpatient Medications   Medication    buPROPion (WELLBUTRIN XL) 300 MG 24 hr tablet    gabapentin (NEURONTIN) 800 MG tablet    nicotine polacrilex (NICORETTE) 4 MG gum    SUBOXONE 8-2 MG per film    acetaminophen (TYLENOL) 500 MG tablet    albuterol (PROAIR HFA/PROVENTIL HFA/VENTOLIN HFA) 108 (90 Base) MCG/ACT inhaler    diclofenac (VOLTAREN) 1 % topical gel    fluticasone (FLONASE) 50 MCG/ACT nasal spray    fluticasone (FLOVENT DISKUS) 50 MCG/BLIST inhaler    hydrOXYzine (ATARAX) 25 MG tablet    metoprolol tartrate (LOPRESSOR) 50 MG tablet    naloxone (NARCAN) 4 MG/0.1ML nasal spray     No current facility-administered medications for this visit.         No Known Allergies    At least 40 min spent on day of encounter in review of medical record,  review, obtaining histories, discussing recommendations, counseling/coordination of care    Sherry Alvarado, DNP,MSN, AGNP-C  [facility]   [address] , [address]   [place] [address]   Phone # [phone]

## 2023-09-05 NOTE — TELEPHONE ENCOUNTER
Patient sent [facility] message that he is in need of gabapentin (NEURONTIN) 800 MG tablet refill (and all medications).     Per chart review. Appointment was moved to 4:00 pm today.     RN placed a message in appointment notes to address refill request.     RN acknowledged patients request and change in appointment via MyC and encouraged patient to reach back for refills if appointment does not occur.     Noelle Whalen RN on 9/5/2023 at 2:22 PM

## 2023-09-16 ENCOUNTER — HEALTH MAINTENANCE LETTER (OUTPATIENT)
Age: 48
End: 2023-09-16

## 2023-10-12 ENCOUNTER — HOSPITAL ENCOUNTER (OUTPATIENT)
Dept: MRI IMAGING | Facility: OTHER | Age: 48
Discharge: HOME | End: 2023-10-12
Attending: PHYSICIAN ASSISTANT | Admitting: PHYSICIAN ASSISTANT
Payer: COMMERCIAL

## 2023-10-12 DIAGNOSIS — M48.062 SPINAL STENOSIS OF LUMBAR REGION WITH NEUROGENIC CLAUDICATION: ICD-10-CM

## 2023-10-12 DIAGNOSIS — M43.16 SPONDYLOLISTHESIS, LUMBAR REGION: ICD-10-CM

## 2023-10-12 PROCEDURE — 72148 MRI LUMBAR SPINE W/O DYE: CPT

## 2023-11-16 NOTE — TELEPHONE ENCOUNTER
Patient has an appt 11/20/23. He is wondering if it has to be in-person this time? He has a 2 and 1/2 hour drive.     Last visit on 9/5/23:    RTC  Return in about 2 months (around 11/20/2023) for Follow up, with me, in person at 1 pm.    Tamiko Linares RN on 11/16/2023 at 3:56 PM

## 2023-11-20 ENCOUNTER — VIRTUAL VISIT (OUTPATIENT)
Dept: ADDICTION MEDICINE | Facility: CLINIC | Age: 48
End: 2023-11-20
Payer: COMMERCIAL

## 2023-11-20 ENCOUNTER — TELEPHONE (OUTPATIENT)
Dept: ADDICTION MEDICINE | Facility: CLINIC | Age: 48
End: 2023-11-20
Payer: COMMERCIAL

## 2023-11-20 DIAGNOSIS — M54.41 CHRONIC LOW BACK PAIN WITH RIGHT-SIDED SCIATICA, UNSPECIFIED BACK PAIN LATERALITY: ICD-10-CM

## 2023-11-20 DIAGNOSIS — F33.1 MODERATE EPISODE OF RECURRENT MAJOR DEPRESSIVE DISORDER (H): ICD-10-CM

## 2023-11-20 DIAGNOSIS — Z71.6 ENCOUNTER FOR SMOKING CESSATION COUNSELING: ICD-10-CM

## 2023-11-20 DIAGNOSIS — F11.21 OPIOID USE DISORDER, SEVERE, IN SUSTAINED REMISSION (H): Primary | ICD-10-CM

## 2023-11-20 DIAGNOSIS — Z79.891 ENCOUNTER FOR MONITORING OPIOID MAINTENANCE THERAPY: ICD-10-CM

## 2023-11-20 DIAGNOSIS — Z51.81 ENCOUNTER FOR MONITORING OPIOID MAINTENANCE THERAPY: ICD-10-CM

## 2023-11-20 DIAGNOSIS — G89.29 CHRONIC LOW BACK PAIN WITH RIGHT-SIDED SCIATICA, UNSPECIFIED BACK PAIN LATERALITY: ICD-10-CM

## 2023-11-20 PROCEDURE — 99214 OFFICE O/P EST MOD 30 MIN: CPT | Mod: VID | Performed by: NURSE PRACTITIONER

## 2023-11-20 RX ORDER — BUPRENORPHINE HYDROCHLORIDE, NALOXONE HYDROCHLORIDE 8; 2 MG/1; MG/1
1 FILM, SOLUBLE BUCCAL; SUBLINGUAL 2 TIMES DAILY
Qty: 60 FILM | Refills: 2 | Status: SHIPPED | OUTPATIENT
Start: 2023-11-20 | End: 2024-02-09

## 2023-11-20 RX ORDER — BUPROPION HYDROCHLORIDE 300 MG/1
300 TABLET ORAL EVERY MORNING
Qty: 30 TABLET | Refills: 2 | Status: SHIPPED | OUTPATIENT
Start: 2023-11-20 | End: 2024-02-09

## 2023-11-20 RX ORDER — GABAPENTIN 800 MG/1
800 TABLET ORAL 3 TIMES DAILY
Qty: 90 TABLET | Refills: 2 | Status: SHIPPED | OUTPATIENT
Start: 2023-11-20 | End: 2024-02-09

## 2023-11-20 ASSESSMENT — ANXIETY QUESTIONNAIRES
1. FEELING NERVOUS, ANXIOUS, OR ON EDGE: NEARLY EVERY DAY
GAD7 TOTAL SCORE: 16
3. WORRYING TOO MUCH ABOUT DIFFERENT THINGS: NEARLY EVERY DAY
7. FEELING AFRAID AS IF SOMETHING AWFUL MIGHT HAPPEN: MORE THAN HALF THE DAYS
2. NOT BEING ABLE TO STOP OR CONTROL WORRYING: NEARLY EVERY DAY
6. BECOMING EASILY ANNOYED OR IRRITABLE: NEARLY EVERY DAY
4. TROUBLE RELAXING: MORE THAN HALF THE DAYS
GAD7 TOTAL SCORE: 16
IF YOU CHECKED OFF ANY PROBLEMS ON THIS QUESTIONNAIRE, HOW DIFFICULT HAVE THESE PROBLEMS MADE IT FOR YOU TO DO YOUR WORK, TAKE CARE OF THINGS AT HOME, OR GET ALONG WITH OTHER PEOPLE: EXTREMELY DIFFICULT
5. BEING SO RESTLESS THAT IT IS HARD TO SIT STILL: NOT AT ALL

## 2023-11-20 ASSESSMENT — PATIENT HEALTH QUESTIONNAIRE - PHQ9
10. IF YOU CHECKED OFF ANY PROBLEMS, HOW DIFFICULT HAVE THESE PROBLEMS MADE IT FOR YOU TO DO YOUR WORK, TAKE CARE OF THINGS AT HOME, OR GET ALONG WITH OTHER PEOPLE: EXTREMELY DIFFICULT
SUM OF ALL RESPONSES TO PHQ QUESTIONS 1-9: 20
SUM OF ALL RESPONSES TO PHQ QUESTIONS 1-9: 20

## 2023-11-20 NOTE — PATIENT INSTRUCTIONS
Patient Education   Addiction Medicine  What to Expect  Here's what to expect from our Addiction Medicine program.  About Addiction Medicine  Addiction Medicine clinics help you with substance use problems. You set your own goals. We try to help you reach your goals. Your care plan can include:  Medicine  Creating a recovery plan  Helping you find local resources  Helping with treatment options  Clinic phone number and addresses  Clinic Phone: [phone]  Mental Health and Addiction Clinic  [facility]  [address], [address]  [place] [address]  [facility]  [address], [address]  [place] [address]  Walk-in services  We offer walk-in care for patients at the Recovery Clinic. This is only for patients with Opioid Use Disorder (OUD). Anyone with OUD is welcome. Our providers will refer you to the Recovery Clinic if you're struggling to keep up with your medicines or appointments.  Recovery Clinic ([facility])  [address], [address]  [place] [address]  Phone: [phone]  The Recovery Clinic is open for walk-ins Monday to Friday 9 a.m. to 11:30 a.m. and 12:30 p.m. to 3 p.m.  How it works  Come to your visits every time. The treatment works better when you do.   You can have as many visits as you need. When you're better, we'll refer you back to being cared for by your family doctor.   If you need it, we'll send you to doctors, psychiatrists, therapists, and other providers. We focus on treating addiction. We don't treat other problems, like managing other medicines or non-addiction issues.  About visits  Urine drug testing  We'll often test your pee (urine) for drugs. This is the only way we can know for sure whether or not you're using drugs. It helps us treat you without judgement.   Suboxone (buprenorphine)  If you're taking buprenorphine, you'll have a lot of visits at first. If your problem is getting worse, or you're  "using substances, we may schedule you for extra visits.   Cancelling visits  If you can't come to your visit, please call us right away at [phone]. If you don't cancel at least 24 hours (1 full day) before your visit, that's \"late cancellation.\"  Being late to visits  If you come late, you may not be seen. This will count as a \"no-show.\"  Please call the clinic if you're running late. This will help us plan, but it doesn't mean you'll be seen.   Being late is:  More than 15 minutes late for a return visit.  More than 30 minutes late for your first visit.  If you cancel late or don't show up 2 times within 6 months, we may transfer you to another clinic.   Getting help between visits  If you need help between visits, you can call us Monday to Friday from 8 a.m. to 4:30 p.m. at [phone]. You can also send us a message on [employer].  Medicine refills  If you miss or cancel a visit, you can still ask for a refill. But we can only refill your medicines if you've made a new appointment.  Please call your pharmacy for medicine refills. If you have a question about your refill, call us at [phone].  It takes up to 2 business days to refill your drugs. Let us know 2 to 3 days before you run out. Don't call more than 1 week before you run out. That's too early.   Please make sure we have your right phone number.  If we have a problem with your refill, we'll call you. If we call you, please call us back right away. If you don't, you may not get your medicines quickly.   Call your pharmacy to find out if your medicines are ready.   Keep your medicines in a safe place. Keep them away from pets and children. If your medicines are lost or stolen, we usually don't replace them. We recommend you file a police report if your medicines are stolen. Your insurance may not pay for early refills, even if you have a prescription.  Forms  Please give us at least 3 business days to fill out any forms. Bring the forms to your " visits if you can. We may refer you to other members of your care team to complete the forms.   Emergency care   Call 911 or go to the nearest emergency room if your life or someone else's life is in danger.  Call 988 anytime for the Suicide and Crisis Lifeline.  If you need care when we're closed, call your family doctor to see if they can help. You can also go to urgent care or an emergency room. [facility] emergency rooms may be able to give you buprenorphine or other medicine refills.  Thank you for choosing us for your care.  For informational purposes only. Not to replace the advice of your health care provider. Copyright   2023 [facility]. All rights reserved. AFTER-MOUSE 006647 - REV 05/23.

## 2023-11-20 NOTE — NURSING NOTE
Is the patient currently in the state of [place]? YES    Visit mode:VIDEO    If the visit is dropped, the patient can be reconnected by: VIDEO VISIT: Text to cell phone:   Telephone Information:   Mobile [phone]       Will anyone else be joining the visit? NO  (If patient encounters technical issues they should call [phone]    How would you like to obtain your AVS? MyChart    Are changes needed to the allergy or medication list? No    Reason for visit: RECHECK    Rose ELIZALDE

## 2023-11-20 NOTE — PROGRESS NOTES
Virtual Visit Details    Type of service:  Video Visit   Video Start Time:  1447  Video End Time:3:10 PM    Originating Location (pt. Location): Home    Distant Location (provider location):  Off-site  Platform used for Video Visit: [facility]    A/P                                                    ASSESSMENT/PLAN  1. Opioid use disorder, severe, in sustained remission (H)  Stable in sustained remission on suboxone 16 mg TDD.   Continue suboxone without changes.   Confirms narcan.  - SUBOXONE 8-2 MG per film; Place 1 Film under the tongue 2 times daily  Dispense: 60 Film; Refill: 2    2. Encounter for monitoring opioid maintenance therapy  - Drug Confirmation Panel Urine with Creat - lab collect; Future  - Buprenorphine & Metabolite Screen; Future    3. Moderate to severe episode of recurrent major depressive disorder (H)  Periods of increased depression, denies SI. Refills provided.   - buPROPion (WELLBUTRIN XL) 300 MG 24 hr tablet; Take 1 tablet (300 mg) by mouth every morning  Dispense: 30 tablet; Refill: 2    4. Chronic low back pain with right-sided sciatica, unspecified back pain laterality  Refills provided.   - gabapentin (NEURONTIN) 800 MG tablet; Take 1 tablet (800 mg) by mouth 3 times daily  Dispense: 90 tablet; Refill: 2    5. Encounter for smoking cessation counseling  Chewing less tobacco. Refill provided.   - nicotine polacrilex (NICORETTE) 4 MG gum; Place 1 each (4 mg) inside cheek every hour as needed for smoking cessation Cinnamon  Dispense: 160 each; Refill: 1    Orders Placed This Encounter   Medications    buPROPion (WELLBUTRIN XL) 300 MG 24 hr tablet     Sig: Take 1 tablet (300 mg) by mouth every morning     Dispense:  30 tablet     Refill:  2    gabapentin (NEURONTIN) 800 MG tablet     Sig: Take 1 tablet (800 mg) by mouth 3 times daily     Dispense:  90 tablet     Refill:  2    SUBOXONE 8-2 MG per film     Sig: Place 1 Film under the tongue 2 times  daily     Dispense:  60 Film     Refill:  2    nicotine polacrilex (NICORETTE) 4 MG gum     Sig: Place 1 each (4 mg) inside cheek every hour as needed for smoking cessation Cinnamon     Dispense:  160 each     Refill:  1       Problem list updated Nov 20, 2023   No problems updated.          PDMP Review         Value Time User    State PDMP site checked  Yes 11/20/2023  2:44 PM Sherry Alvarado CNP          11/12/2023 09/05/2023 1 Suboxone 8 Mg-2 Mg Sl Film 60.00 30 He Bat [id number] Ait (9548) 2/2 16.00 mg [place] Ins [place]   10/29/2023 09/05/2023 1 Gabapentin 800 Mg Tablet 90.00 30 He Bat [id number] Ait (9548) 2/2  [place]   10/06/2023 09/05/2023 1 Suboxone 8 Mg-2 Mg Sl Film 60.00 30 He Bat [id number] Ait (9548) 1/2 16           RTC  Return in about 3 months (around 2/16/2024) for Follow up, with me, using a video visit at 1100 am.      Counseled the patient on the importance of having a recovery program in addition to medication to manage recovery.  Components include avoiding isolating, having willingness to change, avoiding triggers and managing cravings. Encouraged having some type of sober network and practicing honesty with trusted support person(s). Encouraged other services such as counseling, 12 step or other self-help organizations.      Opioid warning reviewed.  Risk of overdose following a period of abstinence due to decrease tolerance was discussed including risk of death.  Strongly recommended abstain from alcohol, benzodiazepines, THC, opioids and other drugs of abuse.  Increased risk of return to opioid use after use of these substances discussed.  Increased risk of overdose/death with use of other substances particularly benzodiazepines/alcohol reviewed.        SUBJECTIVE                                                      Jared North is a 46 year old male with a history of recent Covid 19 injection, ARDS, Acute respiratory failure with hypoxia, MDD, Chronic back pain, and opioid use disorder who is  being seen today for a follow up. He is taking suboxone 16 mg daily.     Brief History: Initial visit 2/11/2022. He was hospitalized from 12/31/21-1/24/22 for Covid 19 infection with complications. He required tracheostomy and ventilator support, and GT tube placement which were discontinued prior to his discharge to home. He reports being on methadone maintenance for 10-15 years at [facility]. He states he was able to abstain from heroin for awhile but alcohol consumption increased significantly . He was drinking 1 box of wine daily and using 1/2-1 gram heroin until he went to inpatient treatment in December of 2019.  He has not used heroin or alcohol since April 2020.      CD History:       SUBSTANCE(S)  OF CHOICE  - Opiates and alcohol       Withdrawal symptoms - None  IV drug use - used IV  Previous MAT - Methadone [facility]. Suboxone.   Previous detox/treatment - Two mentioned above  Current recovery activities: AA, IOP prior to hospital stay.   Longest period of sobriety - 2 years              Significant protective factors: Health and kids  Medical complications from substance use - Hypertension,   History of overdose - No  Narcan currently available - NO     Other Substance Use:  ALCOHOL: None in 2 years drank 1 box daily  OPIATES   Per HPI  KRATOM: NO  BENZODIAZEPINES: Used occasional recreationally, and did take a couple of ativan in the last 2 weeks.   AMPHETAMINES/METHAMPHETAMINES: Tried a couple of times  PRESCRIPTION STIMULANTS: No  MARIJUANA: Tried delta 8 this week  COCAINE/CRACK; NO  HALLUCINOGENS: NO  ANABOLIC STEROIDS: No              OTHER  (Gambling, shopping): No  NICOTINE-Chewing tobacco restarted.                           Desire to quit yes  Previous attempts to quit Yes                            Hx of medication for smoking cessation   Patches and gum       Recent HPI Details: 9/5/23  -Summer has been fine. Moved back into the [facility] home, will stay there until Spring. May try to move out before Winter.  "Not sure they want to stay there all winter.   -Mood has been alright, taking bupropion   -Increased anxiety at times, gabapentin helps some. Needs refill.   -Using less THC gummies, too expensive.   -[place] cities[place]   -Had appointment with spine surgeon at [facility], MRI is ordered. Needs to schedule in the [facility]. Has chronic back pain with sciatica right side. Pain impacts his ability to stand or sit for too long.    - Suboxone 16 mg TDD  1. Opioid use disorder, severe, in sustained remission (H)  Stable in sustained remission. Taking suboxone 16 mg TDD. Plan to continue suboxone without changes.  Confirms narcan access.   - SUBOXONE 8-2 MG per film; Place 1 Film under the tongue 2 times daily  Dispense: 60 Film; Refill: 2     2. Chronic low back pain with right-sided sciatica, unspecified back pain laterality  Refills provided.   - gabapentin (NEURONTIN) 800 MG tablet; Take 1 tablet (800 mg) by mouth 3 times daily  Dispense: 90 tablet; Refill: 2     3. Moderate to severe episode of recurrent major depressive disorder (H)  Reports mood is the best it has been in while. Continue bupropion.   - buPROPion (WELLBUTRIN XL) 300 MG 24 hr tablet; Take 1 tablet (300 mg) by mouth every morning  Dispense: 30 tablet; Refill: 2     4. Encounter for smoking cessation counseling  Making progress. Reports he is smoking very little. Refills provided.   - nicotine polacrilex (NICORETTE) 4 MG gum; Place 1 each (4 mg) inside cheek every hour as needed for smoking cessation Cinnamon  Dispense: 160 each; Refill: 1          TODAY'S VISIT  HPI Nov 20, 2023  -Things have been going okay. Still living in [place]  -Wanting to move closer to the [facility].   -Suboxone 16 mg TDD. Denies cravings or urges. Denies SE or constipation.   -Drinking \"here and there\" a glass of wine.   -Using THC gummies occasionally.   -Mood has been okay, but feels like the longer he is \"stuck\" in [place] his mood is getting worse. " Especially as it gets closer to Winter.   -Went to dental appointment was treated rudely.   -Is trying to quit chewing to improve his teeth.   -Spending time with family for Thanksgiving.           6/13/2023    11:23 AM 9/5/2023     3:56 PM 11/20/2023     2:09 PM   PHQ   PHQ-9 Total Score 20 20 20   Q9: Thoughts of better off dead/self-harm past 2 weeks Not at all Not at all Not at all         6/13/2023    11:24 AM 9/5/2023     3:57 PM 11/20/2023     2:10 PM   DONALD-7 SCORE   Total Score 19 (severe anxiety) 17 (severe anxiety) 16 (severe anxiety)   Total Score 19 17 16           OBJECTIVE                                                    PHYSICAL EXAM:  There were no vitals taken for this visit.    GENERAL: healthy, alert and no distress  EYES: Eyes grossly normal to inspection, PERRL and conjunctivae and sclerae normal  RESP: No respiratory distress  MENTAL STATUS EXAM  Appearance/Behavior: No appearant distress  Speech: Normal  Mood/Affect: normal affect  Insight: Adequate    LAB  No results found for any visits on 11/20/23.      HISTORY                                                    Problem list reviewed & adjusted, as indicated.  Patient Active Problem List   Diagnosis    Chronic back pain    Essential hypertension    Elevated LFTs    Acute on chronic respiratory failure with hypoxia (H)    Pneumonia due to 2019 novel coronavirus    Atrial fibrillation with rapid ventricular response (H)    Acute respiratory distress syndrome (ARDS) due to COVID-19 virus (H)    Major depressive disorder, recurrent episode, severe (H)    Opioid use disorder, severe, dependence (H)    Polysubstance abuse (H)    ROSSY (obstructive sleep apnea)    Mild intermittent asthma    Acute metabolic encephalopathy    Ventilator associated pneumonia (H)    Oropharyngeal dysphagia    Alcohol dependence (H)    Anxiety    Moderate to severe episode of recurrent major depressive disorder (H)    DONALD (generalized anxiety disorder)    Primary  osteoarthritis of right knee         MEDICATION LIST (prior to visit)  acetaminophen (TYLENOL) 500 MG tablet, Take 1-2 tablets (500-1,000 mg) by mouth every 6 hours as needed for mild pain  albuterol (PROAIR HFA/PROVENTIL HFA/VENTOLIN HFA) 108 (90 Base) MCG/ACT inhaler, Inhale 2 puffs into the lungs every 4 hours as needed for shortness of breath / dyspnea  diclofenac (VOLTAREN) 1 % topical gel, Apply 2 g topically 4 times daily  fluticasone (FLONASE) 50 MCG/ACT nasal spray, SHAKE LIQUID AND USE 2 SPRAYS IN EACH NOSTRIL DAILY FOR 10 DAYS  fluticasone (FLOVENT DISKUS) 50 MCG/BLIST inhaler, Inhale 1 puff into the lungs every 12 hours  hydrOXYzine (ATARAX) 25 MG tablet, Take 1 tablet (25 mg) by mouth 3 times daily as needed for itching  metoprolol tartrate (LOPRESSOR) 50 MG tablet, Take 1 tablet (50 mg) by mouth 2 times daily (Patient not taking: Reported on 1/30/2023)  naloxone (NARCAN) 4 MG/0.1ML nasal spray, Spray 1 spray (4 mg) into one nostril alternating nostrils 2 times daily as needed for opioid reversal every 2-3 minutes until assistance arrives    No current facility-administered medications on file prior to visit.      MEDICATION LIST (after visit)  Current Outpatient Medications   Medication    buPROPion (WELLBUTRIN XL) 300 MG 24 hr tablet    gabapentin (NEURONTIN) 800 MG tablet    nicotine polacrilex (NICORETTE) 4 MG gum    SUBOXONE 8-2 MG per film    acetaminophen (TYLENOL) 500 MG tablet    albuterol (PROAIR HFA/PROVENTIL HFA/VENTOLIN HFA) 108 (90 Base) MCG/ACT inhaler    diclofenac (VOLTAREN) 1 % topical gel    fluticasone (FLONASE) 50 MCG/ACT nasal spray    fluticasone (FLOVENT DISKUS) 50 MCG/BLIST inhaler    hydrOXYzine (ATARAX) 25 MG tablet    metoprolol tartrate (LOPRESSOR) 50 MG tablet    naloxone (NARCAN) 4 MG/0.1ML nasal spray     No current facility-administered medications for this visit.         No Known Allergies    Sherry Alvarado, DNP,MSN, AGNP-C  [facility]  [facility]   [address], [address]   [place] [address]   Phone # [phone]         No

## 2023-11-20 NOTE — TELEPHONE ENCOUNTER
"Per provider: \"Can fax orders for the labs drug confirmation and buprenorphine metabolites to [facility]. The phone number to the clinic is [phone].\"      RN called [facility] and obtained the fax number for labs ([phone]).     RN printed the lab order off and faxed to [facility]. Successful transmission confirmed.     JOSUÉ WINCHESTER RN on 11/20/2023 at 3:49 PM      "

## 2024-01-29 ENCOUNTER — TRANSFERRED RECORDS (OUTPATIENT)
Dept: ADDICTION MEDICINE | Facility: CLINIC | Age: 49
End: 2024-01-29
Payer: COMMERCIAL

## 2024-01-30 NOTE — TELEPHONE ENCOUNTER
1) Clinic received fax from [facility] with results from Controlled Substance Monitoring Enhanced Profile with Reflex, 21 Drug Classes, Random Urine test collected on 1/24/24.     2) Out of range values:    - Tetrahydrocannabinol: Presumptive Positive (A)    - Naloxone-3-beta-glucuronide: Present (A)     - Buprenorphine: Present (A)    - Norbuprenorphine: Present (A)    - Norbuprenorphine glucuronide: Present (A)    3) Document sent to provider email, labeled, and sent to scanning.    Lidia Patel RN on 1/30/2024 at 11:21 AM

## 2024-02-09 ENCOUNTER — VIRTUAL VISIT (OUTPATIENT)
Dept: ADDICTION MEDICINE | Facility: CLINIC | Age: 49
End: 2024-02-09
Payer: COMMERCIAL

## 2024-02-09 DIAGNOSIS — F11.21 OPIOID USE DISORDER, SEVERE, IN SUSTAINED REMISSION (H): Primary | ICD-10-CM

## 2024-02-09 DIAGNOSIS — F33.1 MODERATE EPISODE OF RECURRENT MAJOR DEPRESSIVE DISORDER (H): ICD-10-CM

## 2024-02-09 DIAGNOSIS — G47.00 INSOMNIA, UNSPECIFIED TYPE: ICD-10-CM

## 2024-02-09 DIAGNOSIS — M54.41 CHRONIC LOW BACK PAIN WITH RIGHT-SIDED SCIATICA, UNSPECIFIED BACK PAIN LATERALITY: ICD-10-CM

## 2024-02-09 DIAGNOSIS — G89.29 CHRONIC LOW BACK PAIN WITH RIGHT-SIDED SCIATICA, UNSPECIFIED BACK PAIN LATERALITY: ICD-10-CM

## 2024-02-09 DIAGNOSIS — Z71.6 ENCOUNTER FOR SMOKING CESSATION COUNSELING: ICD-10-CM

## 2024-02-09 PROCEDURE — 99214 OFFICE O/P EST MOD 30 MIN: CPT | Mod: 95 | Performed by: NURSE PRACTITIONER

## 2024-02-09 RX ORDER — BUPRENORPHINE HYDROCHLORIDE, NALOXONE HYDROCHLORIDE 8; 2 MG/1; MG/1
1 FILM, SOLUBLE BUCCAL; SUBLINGUAL 2 TIMES DAILY
Qty: 60 FILM | Refills: 2 | Status: SHIPPED | OUTPATIENT
Start: 2024-02-09 | End: 2024-04-30

## 2024-02-09 RX ORDER — BUPROPION HYDROCHLORIDE 300 MG/1
300 TABLET ORAL EVERY MORNING
Qty: 30 TABLET | Refills: 2 | Status: SHIPPED | OUTPATIENT
Start: 2024-02-09 | End: 2024-04-30

## 2024-02-09 RX ORDER — GABAPENTIN 800 MG/1
800 TABLET ORAL 3 TIMES DAILY
Qty: 90 TABLET | Refills: 2 | Status: SHIPPED | OUTPATIENT
Start: 2024-02-09 | End: 2024-04-30

## 2024-02-09 ASSESSMENT — ANXIETY QUESTIONNAIRES
4. TROUBLE RELAXING: NEARLY EVERY DAY
IF YOU CHECKED OFF ANY PROBLEMS ON THIS QUESTIONNAIRE, HOW DIFFICULT HAVE THESE PROBLEMS MADE IT FOR YOU TO DO YOUR WORK, TAKE CARE OF THINGS AT HOME, OR GET ALONG WITH OTHER PEOPLE: EXTREMELY DIFFICULT
5. BEING SO RESTLESS THAT IT IS HARD TO SIT STILL: SEVERAL DAYS
6. BECOMING EASILY ANNOYED OR IRRITABLE: NEARLY EVERY DAY
2. NOT BEING ABLE TO STOP OR CONTROL WORRYING: NEARLY EVERY DAY
1. FEELING NERVOUS, ANXIOUS, OR ON EDGE: NEARLY EVERY DAY
7. FEELING AFRAID AS IF SOMETHING AWFUL MIGHT HAPPEN: NEARLY EVERY DAY
8. IF YOU CHECKED OFF ANY PROBLEMS, HOW DIFFICULT HAVE THESE MADE IT FOR YOU TO DO YOUR WORK, TAKE CARE OF THINGS AT HOME, OR GET ALONG WITH OTHER PEOPLE?: EXTREMELY DIFFICULT
7. FEELING AFRAID AS IF SOMETHING AWFUL MIGHT HAPPEN: NEARLY EVERY DAY
3. WORRYING TOO MUCH ABOUT DIFFERENT THINGS: NEARLY EVERY DAY
GAD7 TOTAL SCORE: 19

## 2024-02-09 ASSESSMENT — PATIENT HEALTH QUESTIONNAIRE - PHQ9
SUM OF ALL RESPONSES TO PHQ QUESTIONS 1-9: 21
SUM OF ALL RESPONSES TO PHQ QUESTIONS 1-9: 21
10. IF YOU CHECKED OFF ANY PROBLEMS, HOW DIFFICULT HAVE THESE PROBLEMS MADE IT FOR YOU TO DO YOUR WORK, TAKE CARE OF THINGS AT HOME, OR GET ALONG WITH OTHER PEOPLE: EXTREMELY DIFFICULT

## 2024-02-09 NOTE — NURSING NOTE
Is the patient currently in the state of [place]? YES    Visit mode:VIDEO    If the visit is dropped, the patient can be reconnected by: VIDEO VISIT: Text to cell phone:   Telephone Information:   Mobile [phone]       Will anyone else be joining the visit? No  (If patient encounters technical issues they should call [phone])    How would you like to obtain your AVS? MyChart    Are changes needed to the allergy or medication list? No    Rooming Documentation: Patient will complete qnrs in mychart.    Reason for visit: FIDE Wilks

## 2024-02-09 NOTE — PROGRESS NOTES
Virtual Visit Details    Type of service:  Video Visit   Video Start Time:  1133  Video End Time:12:10 PM    Originating Location (pt. Location): Home    Distant Location (provider location):  Off-site  Platform used for Video Visit: [employer] [facility]    A/P                                                    ASSESSMENT/PLAN  1. Opioid use disorder, severe, in sustained remission (H)  Stable in sustained remission, taking suboxone 12-16 mg TDD, continue suboxone without changes  Confirms narcan  - SUBOXONE 8-2 MG per film; Place 1 Film under the tongue 2 times daily  Dispense: 60 Film; Refill: 2    2. Chronic low back pain with right-sided sciatica, unspecified back pain laterality  Gabapentin helps to control symptoms. Met with surgeon and is considering a laminectomy procedure.   Continue gabapentin without changes  - gabapentin (NEURONTIN) 800 MG tablet; Take 1 tablet (800 mg) by mouth 3 times daily  Dispense: 90 tablet; Refill: 2    3. Moderate to severe episode of recurrent major depressive disorder (H)  Mood has been stable, continue bupropion  - buPROPion (WELLBUTRIN XL) 300 MG 24 hr tablet; Take 1 tablet (300 mg) by mouth every morning  Dispense: 30 tablet; Refill: 2    4. Encounter for smoking cessation counseling  Has reduced chewing tobacco, continue NRT  - nicotine polacrilex (NICORETTE) 4 MG gum; Place 1 each (4 mg) inside cheek every hour as needed for nicotine withdrawal symptoms Cinnamon  Dispense: 160 each; Refill: 2  5. Insomnia  Reports difficulty sleeping, has trazodone available if needed. Does not like the SE. States Ambien has been helpful in the past.   Cautioned use of multiple CNS depressants due to risk of respiratory depression, overdose, and death.   Encouraged to discuss with his PCP.   Orders Placed This Encounter   Medications    SUBOXONE 8-2 MG per film     Sig: Place 1 Film under the tongue 2 times daily     Dispense:  60 Film     Refill:  2     buPROPion (WELLBUTRIN XL) 300 MG 24 hr tablet     Sig: Take 1 tablet (300 mg) by mouth every morning     Dispense:  30 tablet     Refill:  2    gabapentin (NEURONTIN) 800 MG tablet     Sig: Take 1 tablet (800 mg) by mouth 3 times daily     Dispense:  90 tablet     Refill:  2    nicotine polacrilex (NICORETTE) 4 MG gum     Sig: Place 1 each (4 mg) inside cheek every hour as needed for nicotine withdrawal symptoms Cinnamon     Dispense:  160 each     Refill:  2       Problem list updated Feb 9, 2024   No problems updated.          PDMP Review         Value Time User    State PDMP site checked  Yes 2/9/2024 11:50 AM Sherry Alvarado CNP          02/05/2024 11/20/2023 1 Suboxone 8 Mg-2 Mg Sl Film 60.00 30 He Bat [id number] Ait (9548) 2/2 16.00 mg [place] Ins [place]   01/15/2024 11/20/2023 1 Gabapentin 800 Mg Tablet 90.00 30 He Bat [id number] Ait (9548) 2/2  [place]   01/07/2024 11/20/2023 1 Suboxone 8 Mg-2 Mg Sl Film 60.00 30 He Bat [id number] Ait (9548) 1/2 16.00 mg [place]       RTC  Return in about 3 months (around 4/30/2024) for Follow up, with me, in person 1130.      Counseled the patient on the importance of having a recovery program in addition to medication to manage recovery.  Components include avoiding isolating, having willingness to change, avoiding triggers and managing cravings. Encouraged having some type of sober network and practicing honesty with trusted support person(s). Encouraged other services such as counseling, 12 step or other self-help organizations.      Opioid warning reviewed.  Risk of overdose following a period of abstinence due to decrease tolerance was discussed including risk of death.  Strongly recommended abstain from alcohol, benzodiazepines, THC, opioids and other drugs of abuse.  Increased risk of return to opioid use after use of these substances discussed.  Increased risk of overdose/death with use of other substances particularly benzodiazepines/alcohol reviewed.        SUBJECTIVE                                                     Jared North is a 46 year old male with a history of recent Covid 19 injection, ARDS, Acute respiratory failure with hypoxia, MDD, Chronic back pain, and opioid use disorder who is being seen today for a follow up. He is taking suboxone 16 mg daily.     Brief History: Initial visit 2/11/2022. He was hospitalized from 12/31/21-1/24/22 for Covid 19 infection with complications. He required tracheostomy and ventilator support, and GT tube placement which were discontinued prior to his discharge to home. He reports being on methadone maintenance for 10-15 years at [facility]. He states he was able to abstain from heroin for awhile but alcohol consumption increased significantly . He was drinking 1 box of wine daily and using 1/2-1 gram heroin until he went to inpatient treatment in December of 2019.  He has not used heroin or alcohol since April 2020.      CD History:       SUBSTANCE(S)  OF CHOICE  - Opiates and alcohol       Withdrawal symptoms - None  IV drug use - used IV  Previous MAT - Methadone [facility]. Suboxone.   Previous detox/treatment - Two mentioned above  Current recovery activities: AA, IOP prior to hospital stay.   Longest period of sobriety - 2 years              Significant protective factors: Health and kids  Medical complications from substance use - Hypertension,   History of overdose - No  Narcan currently available - NO     Other Substance Use:  ALCOHOL: None in 2 years drank 1 box daily  OPIATES   Per HPI  KRATOM: NO  BENZODIAZEPINES: Used occasional recreationally, and did take a couple of ativan in the last 2 weeks.   AMPHETAMINES/METHAMPHETAMINES: Tried a couple of times  PRESCRIPTION STIMULANTS: No  MARIJUANA: Tried delta 8 this week  COCAINE/CRACK; NO  HALLUCINOGENS: NO  ANABOLIC STEROIDS: No              OTHER  (Gambling, shopping): No  NICOTINE-Chewing tobacco restarted.                           Desire to quit yes  Previous attempts to quit Yes        "                     Hx of medication for smoking cessation   Patches and gum       Recent HPI Details: 11/20/23  -Things have been going okay. Still living in [place]  -[place] the cities[place]   -Suboxone 16 mg TDD. Denies cravings or urges. Denies SE or constipation.   -Drinking \"here and there\" a glass of wine.   -Using THC gummies occasionally.   -Mood has been okay, but feels like the longer he is \"stuck\" in [place] his mood is getting worse. Especially as it gets closer to Winter.   -Went to dental appointment was treated rudely.   -Is trying to quit chewing to improve his teeth.   -Spending time with family for Thanksgiving.      1. Opioid use disorder, severe, in sustained remission (H)  Stable in sustained remission on suboxone 16 mg TDD.   Continue suboxone without changes.   Confirms narcan.  - SUBOXONE 8-2 MG per film; Place 1 Film under the tongue 2 times daily  Dispense: 60 Film; Refill: 2     2. Encounter for monitoring opioid maintenance therapy  - Drug Confirmation Panel Urine with Creat - lab collect; Future  - Buprenorphine & Metabolite Screen; Future     3. Moderate to severe episode of recurrent major depressive disorder (H)  Periods of increased depression, denies SI. Refills provided.   - buPROPion (WELLBUTRIN XL) 300 MG 24 hr tablet; Take 1 tablet (300 mg) by mouth every morning  Dispense: 30 tablet; Refill: 2     4. Chronic low back pain with right-sided sciatica, unspecified back pain laterality  Refills provided.   - gabapentin (NEURONTIN) 800 MG tablet; Take 1 tablet (800 mg) by mouth 3 times daily  Dispense: 90 tablet; Refill: 2     5. Encounter for smoking cessation counseling  Chewing less tobacco. Refill provided.   - nicotine polacrilex (NICORETTE) 4 MG gum; Place 1 each (4 mg) inside cheek every hour as needed for smoking cessation Cinnamon  Dispense: 160 each; Refill: 1       TODAY'S VISIT  HPI Feb 9, 2024    Things have been going well for the most part, has not " had to plow the snow like last year which is good.   Does not like small town living, and is more expensive  [place] in the next month or so with [place] son.   [place] surgeon to follow up with his back. Wants to get another opinion.   Taking suboxone 12-16 mg daily. Taking 16 mg most days. Not sure it helps with pain anymore.   Having difficulty sleeping due to pain, plans to talk to his PCP about ambien which has worked well in past.   Has trazodone at home he can take if needed, does not like it because he is too groggy.           9/5/2023     3:56 PM 11/20/2023     2:09 PM 2/9/2024    11:20 AM   PHQ   PHQ-9 Total Score 20 20 21   Q9: Thoughts of better off dead/self-harm past 2 weeks Not at all Not at all Not at all       OBJECTIVE                                                    PHYSICAL EXAM:  There were no vitals taken for this visit.      Physical Exam  Pulmonary:      Effort: Pulmonary effort is normal. No respiratory distress.   Neurological:      General: No focal deficit present.      Mental Status: He is alert and oriented to person, place, and time.   Psychiatric:         Attention and Perception: Attention normal.         Mood and Affect: Mood normal.         Speech: Speech normal.         Behavior: Behavior is cooperative.         Thought Content: Thought content normal.         Judgment: Judgment normal.         LAB      HISTORY                                                    Problem list reviewed & adjusted, as indicated.  Patient Active Problem List   Diagnosis    Chronic back pain    Essential hypertension    Elevated LFTs    Acute on chronic respiratory failure with hypoxia (H)    Pneumonia due to 2019 novel coronavirus    Atrial fibrillation with rapid ventricular response (H)    Acute respiratory distress syndrome (ARDS) due to COVID-19 virus (H)    Major depressive disorder, recurrent episode, severe (H)     Opioid use disorder, severe, dependence (H)    Polysubstance abuse (H)    ROSSY (obstructive sleep apnea)    Mild intermittent asthma    Acute metabolic encephalopathy    Ventilator associated pneumonia (H)    Oropharyngeal dysphagia    Alcohol dependence (H)    Anxiety    Moderate to severe episode of recurrent major depressive disorder (H)    DONALD (generalized anxiety disorder)    Primary osteoarthritis of right knee         MEDICATION LIST (prior to visit)  acetaminophen (TYLENOL) 500 MG tablet, Take 1-2 tablets (500-1,000 mg) by mouth every 6 hours as needed for mild pain  albuterol (PROAIR HFA/PROVENTIL HFA/VENTOLIN HFA) 108 (90 Base) MCG/ACT inhaler, Inhale 2 puffs into the lungs every 4 hours as needed for shortness of breath / dyspnea  diclofenac (VOLTAREN) 1 % topical gel, Apply 2 g topically 4 times daily  fluticasone (FLONASE) 50 MCG/ACT nasal spray, SHAKE LIQUID AND USE 2 SPRAYS IN EACH NOSTRIL DAILY FOR 10 DAYS  fluticasone (FLOVENT DISKUS) 50 MCG/BLIST inhaler, Inhale 1 puff into the lungs every 12 hours  hydrOXYzine (ATARAX) 25 MG tablet, Take 1 tablet (25 mg) by mouth 3 times daily as needed for itching  metoprolol tartrate (LOPRESSOR) 50 MG tablet, Take 1 tablet (50 mg) by mouth 2 times daily (Patient not taking: Reported on 1/30/2023)  naloxone (NARCAN) 4 MG/0.1ML nasal spray, Spray 1 spray (4 mg) into one nostril alternating nostrils 2 times daily as needed for opioid reversal every 2-3 minutes until assistance arrives    No current facility-administered medications on file prior to visit.      MEDICATION LIST (after visit)  Current Outpatient Medications   Medication    buPROPion (WELLBUTRIN XL) 300 MG 24 hr tablet    gabapentin (NEURONTIN) 800 MG tablet    nicotine polacrilex (NICORETTE) 4 MG gum    SUBOXONE 8-2 MG per film    acetaminophen (TYLENOL) 500 MG tablet    albuterol (PROAIR HFA/PROVENTIL HFA/VENTOLIN HFA) 108 (90 Base) MCG/ACT inhaler    diclofenac (VOLTAREN) 1 % topical gel     fluticasone (FLONASE) 50 MCG/ACT nasal spray    fluticasone (FLOVENT DISKUS) 50 MCG/BLIST inhaler    hydrOXYzine (ATARAX) 25 MG tablet    metoprolol tartrate (LOPRESSOR) 50 MG tablet    naloxone (NARCAN) 4 MG/0.1ML nasal spray     No current facility-administered medications for this visit.         No Known Allergies        Sherry Alvarado DNP,MSN, AGNP-C  [facility]   [address] [place] [address], [address] [place] [id number]   Phone # [phone]

## 2024-02-09 NOTE — PATIENT INSTRUCTIONS
Patient Education   Addiction Medicine  What to Expect  Here's what to expect from our Addiction Medicine program.  About Addiction Medicine  Addiction Medicine clinics help you with substance use problems. You set your own goals. We try to help you reach your goals. Your care plan can include:  Medicine  Creating a recovery plan  Helping you find local resources  Helping with treatment options  Clinic phone number and addresses  Clinic Phone: [phone]  Mental Health and Addiction Clinic  [facility]  [address], [address]  [place] [address]  [facility]  [address] [facility], [address]  [place] [address]  Walk-in services  We offer walk-in care for patients at the Recovery Clinic. This is only for patients with Opioid Use Disorder (OUD). Anyone with OUD is welcome. Our providers will refer you to the Recovery Clinic if you're struggling to keep up with your medicines or appointments.  Recovery Clinic ([facility])  [address], [address]  [place] [address]  Phone: [phone]  The Recovery Clinic is open for walk-ins Monday to Friday 9 a.m. to 11:30 a.m. and 12:30 p.m. to 3 p.m.  How it works  Come to your visits every time. The treatment works better when you do.   You can have as many visits as you need. When you're better, we'll refer you back to being cared for by your family doctor.   If you need it, we'll send you to doctors, psychiatrists, therapists, and other providers. We focus on treating addiction. We don't treat other problems, like managing other medicines or non-addiction issues.  About visits  Urine drug testing  We'll often test your pee (urine) for drugs. This is the only way we can know for sure whether or not you're using drugs. It helps us treat you without judgement.   Suboxone (buprenorphine)  If you're taking buprenorphine, you'll have a lot of visits at first. If your problem is getting worse, or you're  "using substances, we may schedule you for extra visits.   Cancelling visits  If you can't come to your visit, please call us right away at [phone]. If you don't cancel at least 24 hours (1 full day) before your visit, that's \"late cancellation.\"  Being late to visits  If you come late, you may not be seen. This will count as a \"no-show.\"  Please call the clinic if you're running late. This will help us plan, but it doesn't mean you'll be seen.   Being late is:  More than 15 minutes late for a return visit.  More than 30 minutes late for your first visit.  If you cancel late or don't show up 2 times within 6 months, we may transfer you to another clinic.   Getting help between visits  If you need help between visits, you can call us Monday to Friday from 8 a.m. to 4:30 p.m. at [phone]. You can also send us a message on [employer].  Medicine refills  If you miss or cancel a visit, you can still ask for a refill. But we can only refill your medicines if you've made a new appointment.  Please call your pharmacy for medicine refills. If you have a question about your refill, call us at [phone].  It takes up to 2 business days to refill your drugs. Let us know 2 to 3 days before you run out. Don't call more than 1 week before you run out. That's too early.   Please make sure we have your right phone number.  If we have a problem with your refill, we'll call you. If we call you, please call us back right away. If you don't, you may not get your medicines quickly.   Call your pharmacy to find out if your medicines are ready.   Keep your medicines in a safe place. Keep them away from pets and children. If your medicines are lost or stolen, we usually don't replace them. We recommend you file a police report if your medicines are stolen. Your insurance may not pay for early refills, even if you have a prescription.  Forms  Please give us at least 3 business days to fill out any forms. Bring the forms to your " visits if you can. We may refer you to other members of your care team to complete the forms.   Emergency care   Call 911 or go to the nearest emergency room if your life or someone else's life is in danger.  Call 988 anytime for the Suicide and Crisis Lifeline.  If you need care when we're closed, call your family doctor to see if they can help. You can also go to urgent care or an emergency room. [facility] emergency rooms may be able to give you buprenorphine or other medicine refills.  Thank you for choosing us for your care.  For informational purposes only. Not to replace the advice of your health care provider. Copyright   2023 [facility]. All rights reserved. [employer] 714632 - REV 05/23.

## 2024-04-05 ENCOUNTER — TELEPHONE (OUTPATIENT)
Dept: ADDICTION MEDICINE | Facility: CLINIC | Age: 49
End: 2024-04-05

## 2024-04-05 NOTE — TELEPHONE ENCOUNTER
THC confirmation lab results received via fax from [facility].  Forwarded to provider and [facility] via fax .

## 2024-04-30 ENCOUNTER — OFFICE VISIT (OUTPATIENT)
Dept: ADDICTION MEDICINE | Facility: CLINIC | Age: 49
End: 2024-04-30
Payer: COMMERCIAL

## 2024-04-30 VITALS — BODY MASS INDEX: 29.26 KG/M2 | WEIGHT: 209 LBS | HEIGHT: 71 IN | RESPIRATION RATE: 16 BRPM

## 2024-04-30 DIAGNOSIS — Z51.81 ENCOUNTER FOR MONITORING OPIOID MAINTENANCE THERAPY: ICD-10-CM

## 2024-04-30 DIAGNOSIS — F11.21 OPIOID USE DISORDER, SEVERE, IN SUSTAINED REMISSION (H): Primary | ICD-10-CM

## 2024-04-30 DIAGNOSIS — Z79.891 ENCOUNTER FOR MONITORING OPIOID MAINTENANCE THERAPY: ICD-10-CM

## 2024-04-30 DIAGNOSIS — G89.29 CHRONIC LOW BACK PAIN WITH RIGHT-SIDED SCIATICA, UNSPECIFIED BACK PAIN LATERALITY: ICD-10-CM

## 2024-04-30 DIAGNOSIS — F33.1 MODERATE EPISODE OF RECURRENT MAJOR DEPRESSIVE DISORDER (H): ICD-10-CM

## 2024-04-30 DIAGNOSIS — M54.41 CHRONIC LOW BACK PAIN WITH RIGHT-SIDED SCIATICA, UNSPECIFIED BACK PAIN LATERALITY: ICD-10-CM

## 2024-04-30 LAB
AMPHETAMINE QUAL URINE POCT: NEGATIVE
BARBITURATE QUAL URINE POCT: NEGATIVE
BENZODIAZEPINE QUAL URINE POCT: NEGATIVE
BUPRENORPHINE QUAL URINE POCT: ABNORMAL
COCAINE QUAL URINE POCT: NEGATIVE
CREAT UR-MCNC: 352 MG/DL
CREATININE QUAL URINE POCT: ABNORMAL
INTERNAL QC QUAL URINE POCT: ABNORMAL
MDMA QUAL URINE POCT: NEGATIVE
METHADONE QUAL URINE POCT: NEGATIVE
METHAMPHETAMINE QUAL URINE POCT: NEGATIVE
OPIATE QUAL URINE POCT: NEGATIVE
OXYCODONE QUAL URINE POCT: NEGATIVE
PH QUAL URINE POCT: ABNORMAL
PHENCYCLIDINE QUAL URINE POCT: NEGATIVE
POCT KIT EXPIRATION DATE: ABNORMAL
POCT KIT LOT NUMBER: ABNORMAL
SPECIFIC GRAVITY POCT: 1.01
TEMPERATURE URINE POCT: ABNORMAL
THC QUAL URINE POCT: ABNORMAL

## 2024-04-30 PROCEDURE — G0480 DRUG TEST DEF 1-7 CLASSES: HCPCS | Performed by: NURSE PRACTITIONER

## 2024-04-30 PROCEDURE — 99214 OFFICE O/P EST MOD 30 MIN: CPT | Performed by: NURSE PRACTITIONER

## 2024-04-30 PROCEDURE — 80305 DRUG TEST PRSMV DIR OPT OBS: CPT | Performed by: NURSE PRACTITIONER

## 2024-04-30 PROCEDURE — G2211 COMPLEX E/M VISIT ADD ON: HCPCS | Performed by: NURSE PRACTITIONER

## 2024-04-30 RX ORDER — BUPROPION HYDROCHLORIDE 300 MG/1
300 TABLET ORAL EVERY MORNING
Qty: 30 TABLET | Refills: 1 | Status: SHIPPED | OUTPATIENT
Start: 2024-04-30 | End: 2024-06-11

## 2024-04-30 RX ORDER — GABAPENTIN 800 MG/1
800 TABLET ORAL 3 TIMES DAILY
Qty: 90 TABLET | Refills: 1 | Status: SHIPPED | OUTPATIENT
Start: 2024-04-30 | End: 2024-06-11

## 2024-04-30 RX ORDER — BUPRENORPHINE HYDROCHLORIDE, NALOXONE HYDROCHLORIDE 8; 2 MG/1; MG/1
1 FILM, SOLUBLE BUCCAL; SUBLINGUAL 2 TIMES DAILY
Qty: 60 FILM | Refills: 1 | Status: SHIPPED | OUTPATIENT
Start: 2024-04-30 | End: 2024-06-11

## 2024-04-30 ASSESSMENT — ANXIETY QUESTIONNAIRES
1. FEELING NERVOUS, ANXIOUS, OR ON EDGE: NEARLY EVERY DAY
4. TROUBLE RELAXING: MORE THAN HALF THE DAYS
IF YOU CHECKED OFF ANY PROBLEMS ON THIS QUESTIONNAIRE, HOW DIFFICULT HAVE THESE PROBLEMS MADE IT FOR YOU TO DO YOUR WORK, TAKE CARE OF THINGS AT HOME, OR GET ALONG WITH OTHER PEOPLE: VERY DIFFICULT
7. FEELING AFRAID AS IF SOMETHING AWFUL MIGHT HAPPEN: NEARLY EVERY DAY
GAD7 TOTAL SCORE: 18
8. IF YOU CHECKED OFF ANY PROBLEMS, HOW DIFFICULT HAVE THESE MADE IT FOR YOU TO DO YOUR WORK, TAKE CARE OF THINGS AT HOME, OR GET ALONG WITH OTHER PEOPLE?: VERY DIFFICULT
7. FEELING AFRAID AS IF SOMETHING AWFUL MIGHT HAPPEN: NEARLY EVERY DAY
5. BEING SO RESTLESS THAT IT IS HARD TO SIT STILL: SEVERAL DAYS
GAD7 TOTAL SCORE: 18
3. WORRYING TOO MUCH ABOUT DIFFERENT THINGS: NEARLY EVERY DAY
2. NOT BEING ABLE TO STOP OR CONTROL WORRYING: NEARLY EVERY DAY
6. BECOMING EASILY ANNOYED OR IRRITABLE: NEARLY EVERY DAY
GAD7 TOTAL SCORE: 18

## 2024-04-30 ASSESSMENT — PATIENT HEALTH QUESTIONNAIRE - PHQ9
SUM OF ALL RESPONSES TO PHQ QUESTIONS 1-9: 21
SUM OF ALL RESPONSES TO PHQ QUESTIONS 1-9: 21
10. IF YOU CHECKED OFF ANY PROBLEMS, HOW DIFFICULT HAVE THESE PROBLEMS MADE IT FOR YOU TO DO YOUR WORK, TAKE CARE OF THINGS AT HOME, OR GET ALONG WITH OTHER PEOPLE: VERY DIFFICULT

## 2024-04-30 NOTE — PATIENT INSTRUCTIONS
Plan for suboxone perioperatively    Continue suboxone to 16 mg daily until day of surgery. . Day of surgery, start 8 mg daily. Continue suboxone 8 mg daily post op un til opioid pain  medications have been discontinued.

## 2024-04-30 NOTE — PROGRESS NOTES
[facility] - Addiction Medicine       Rooming information:    Point of care urine drug screen positive for:  Lab Results   Component Value Date    BUP Screen Positive (A) 04/30/2024    BZO Negative 04/30/2024    BAR Negative 04/30/2024    CHHAYA Negative 04/30/2024    MAMP Negative 04/30/2024    AMP Negative 04/30/2024    MDMA Negative 04/30/2024    MTD Negative 04/30/2024    [id number] Negative 04/30/2024    OXY Negative 04/30/2024    PCP Negative 04/30/2024    THC Screen Positive (A) 04/30/2024    TEMP 92 F 04/30/2024    SGPOCT 1.015 04/30/2024       *POC urine drug screen does not screen for Fentanyl    PHQ-9 Scores:       11/20/2023     2:09 PM 2/9/2024    11:20 AM 4/30/2024     9:35 AM   PHQ   PHQ-9 Total Score 20 21 21   Q9: Thoughts of better off dead/self-harm past 2 weeks Not at all Not at all Not at all     DONALD-7 Scores:      11/20/2023     2:10 PM 2/9/2024    11:22 AM 4/30/2024     9:40 AM   DONALD-7 SCORE   Total Score 16 (severe anxiety) 19 (severe anxiety) 18 (severe anxiety)   Total Score 16 19    19 18       Provider Sherry Alvarado CNP indicated that she would like to initiate the visit ASAP and that nursing need not complete the rooming dot phrase.      Les Martel RN  April 30, 2024  8:44 AM

## 2024-04-30 NOTE — PROGRESS NOTES
[facility] Addiction Medicine    A/P                                                    ASSESSMENT/PLAN  1. Opioid use disorder, severe, in sustained remission (H)  Stable in sustained remission. Taking suboxone 16 mg TDD.   Continue suboxone without changes. Reviewed perioperative plan for suboxone today. Plan to continue suboxone 16 mg daily until day of surgery then reduce to 8 mg daily.   SHALOM signed for Dr Villareal at [facility]. Surgery planned for mid July.   Confirms narcan access  Encouraged meeting attendance for socialization now that he is back in [place]  - SUBOXONE 8-2 MG per film; Place 1 Film under the tongue 2 times daily  Dispense: 60 Film; Refill: 1    2. Encounter for monitoring opioid maintenance therapy  - Drugs of Abuse Screen Urine (POC CUPS) POCT; Standing  - Drugs of Abuse Screen Urine (POC CUPS) POCT; Standing  - Drug Confirmation Panel Urine with Creat - lab collect; Future  - Drugs of Abuse Screen Urine (POC CUPS) POCT    3. Moderate to severe episode of recurrent major depressive disorder (H)  Mood is mostly stable,  periods of increased depression  and anxiety triggered by stress. continue bupropion unchanged.   - buPROPion (WELLBUTRIN XL) 300 MG 24 hr tablet; Take 1 tablet (300 mg) by mouth every morning  Dispense: 30 tablet; Refill: 1    4. Chronic low back pain with right-sided sciatica, unspecified back pain laterality  Gabapentin helping some, is scheduled for a spinal fusion Mid-July.   - gabapentin (NEURONTIN) 800 MG tablet; Take 1 tablet (800 mg) by mouth 3 times daily  Dispense: 90 tablet; Refill: 1 ed    Orders Placed This Encounter   Medications    SUBOXONE 8-2 MG per film     Sig: Place 1 Film under the tongue 2 times daily     Dispense:  60 Film     Refill:  1    buPROPion (WELLBUTRIN XL) 300 MG 24 hr tablet     Sig: Take 1 tablet (300 mg) by mouth every morning     Dispense:  30 tablet     Refill:  1    gabapentin (NEURONTIN) 800 MG tablet     Sig: Take 1  tablet (800 mg) by mouth 3 times daily     Dispense:  90 tablet     Refill:  1       Problem list updated Apr 30, 2024   No problems updated.          PDMP Review         Value Time User    State PDMP site checked  Yes 2/9/2024 11:50 AM Sherry Alvarado CNP              RTC  Return in about 2 months (around 7/1/2024) for Follow up, with me, using a video visit 1100.      Counseled the patient on the importance of having a recovery program in addition to medication to manage recovery.  Components include avoiding isolating, having willingness to change, avoiding triggers and managing cravings. Encouraged having some type of sober network and practicing honesty with trusted support person(s). Encouraged other services such as counseling, 12 step or other self-help organizations.      Opioid warning reviewed.  Risk of overdose following a period of abstinence due to decrease tolerance was discussed including risk of death.  Strongly recommended abstain from alcohol, benzodiazepines, THC, opioids and other drugs of abuse.  Increased risk of return to opioid use after use of these substances discussed.  Increased risk of overdose/death with use of other substances particularly benzodiazepines/alcohol reviewed.        SUBJECTIVE                                                    Jared North is a 46 year old male with a history of recent Covid 19 injection, ARDS, Acute respiratory failure with hypoxia, MDD, Chronic back pain, and opioid use disorder who is being seen today for a follow up. He is taking suboxone 16 mg daily.     Brief History: Initial visit 2/11/2022. He was hospitalized from 12/31/21-1/24/22 for Covid 19 infection with complications. He required tracheostomy and ventilator support, and GT tube placement which were discontinued prior to his discharge to home. He reports being on methadone maintenance for 10-15 years at [facility]. He states he was able to abstain from heroin for awhile but alcohol  consumption increased significantly . He was drinking 1 box of wine daily and using 1/2-1 gram heroin until he went to inpatient treatment in December of 2019.  He has not used heroin or alcohol since April 2020.      CD History:       SUBSTANCE(S)  OF CHOICE  - Opiates and alcohol       Withdrawal symptoms - None  IV drug use - used IV  Previous MAT - Methadone STS. Suboxone.   Previous detox/treatment - Two mentioned above  Current recovery activities: AA, IOP prior to hospital stay.   Longest period of sobriety - 2 years              Significant protective factors: Health and kids  Medical complications from substance use - Hypertension,   History of overdose - No  Narcan currently available - NO     Other Substance Use:  ALCOHOL: None in 2 years drank 1 box daily  OPIATES   Per HPI  KRATOM: NO  BENZODIAZEPINES: Used occasional recreationally, and did take a couple of ativan in the last 2 weeks.   AMPHETAMINES/METHAMPHETAMINES: Tried a couple of times  PRESCRIPTION STIMULANTS: No  MARIJUANA: Tried delta 8 this week  COCAINE/CRACK; NO  HALLUCINOGENS: NO  ANABOLIC STEROIDS: No              OTHER  (Gambling, shopping): No  NICOTINE-Chewing tobacco restarted.                           Desire to quit yes  Previous attempts to quit Yes                            Hx of medication for smoking cessation   Patches and gu    Recent HPI Details: 2/9/2024  Things have been going well for the most part, has not had to plow the snow like last year which is good.   Does not like small town living, and is more expensive  [place] in the next month or so with [place] son.   [place] to follow up with his back. Wants to get another opinion.   Taking suboxone 12-16 mg daily. Taking 16 mg most days. Not sure it helps with pain anymore.   Having difficulty sleeping due to pain, plans to talk to his PCP about ambien which has worked well in past.   Has  trazodone at home he can take if needed, does not like it because he is too groggy.      1. Opioid use disorder, severe, in sustained remission (H)  Stable in sustained remission, taking suboxone 12-16 mg TDD, continue suboxone without changes  Confirms narcan  - SUBOXONE 8-2 MG per film; Place 1 Film under the tongue 2 times daily  Dispense: 60 Film; Refill: 2     2. Chronic low back pain with right-sided sciatica, unspecified back pain laterality  Gabapentin helps to control symptoms. Met with surgeon and is considering a laminectomy procedure.   Continue gabapentin without changes  - gabapentin (NEURONTIN) 800 MG tablet; Take 1 tablet (800 mg) by mouth 3 times daily  Dispense: 90 tablet; Refill: 2     3. Moderate to severe episode of recurrent major depressive disorder (H)  Mood has been stable, continue bupropion  - buPROPion (WELLBUTRIN XL) 300 MG 24 hr tablet; Take 1 tablet (300 mg) by mouth every morning  Dispense: 30 tablet; Refill: 2     4. Encounter for smoking cessation counseling  Has reduced chewing tobacco, continue NRT  - nicotine polacrilex (NICORETTE) 4 MG gum; Place 1 each (4 mg) inside cheek every hour as needed for nicotine withdrawal symptoms Cinnamon  Dispense: 160 each; Refill: 2  5. Insomnia  Reports difficulty sleeping, has trazodone available if needed. Does not like the SE. States Ambien has been helpful in the past.   Cautioned use of multiple CNS depressants due to risk of respiratory depression, overdose, and death.   Encouraged to discuss with his PCP.     TODAY'S VISIT  HPI Apr 30, 2024    Moved back to [facility] [place] [facility], is renting a lower half of house from an older retired vet.   Moved his ex and daughter to [place]  Rescheduled his back surgery from 5/1 to mid-July. Is having a spinal fusion. Is having bad neck pain.   Has follow up with his surgeon this week.   Has been taking suboxone 24 mg daily, cravings controlled.   Reports intermittent alcohol use 2 drinks.   Mood has been okay.  "            11/20/2023     2:09 PM 2/9/2024    11:20 AM 4/30/2024     9:35 AM   PHQ   PHQ-9 Total Score 20 21 21   Q9: Thoughts of better off dead/self-harm past 2 weeks Not at all Not at all Not at all       OBJECTIVE                                                    PHYSICAL EXAM:  Resp 16   Ht 1.803 m (5' 11\")   Wt 94.8 kg (209 lb)   BMI 29.15 kg/m        Physical Exam  Pulmonary:      Effort: Pulmonary effort is normal. No respiratory distress.   Neurological:      General: No focal deficit present.      Mental Status: He is alert and oriented to person, place, and time.   Psychiatric:         Attention and Perception: Attention normal.         Mood and Affect: Mood is depressed.         Speech: Speech normal.         Behavior: Behavior is cooperative.         Thought Content: Thought content normal. Thought content does not include suicidal ideation.         Judgment: Judgment normal.         LAB  Results for orders placed or performed in visit on 04/30/24   Drugs of Abuse Screen Urine (POC CUPS) POCT     Status: Abnormal   Result Value Ref Range    POCT Kit Lot Number [id number]     POCT Kit Expiration Date 8/22/2025     Temperature Urine POCT 92 F 90 F, 92 F, 94 F, 96 F, 98 F, 100 F    Specific [place] POCT 1.015 1.005, 1.015, 1.025    pH Qual Urine POCT 5 pH 4 pH, 5 pH, 7 pH, 9 pH    Creatinine Qual Urine POCT 100 mg/dL 20 mg/dL, 50 mg/dL, 100 mg/dL, 200 mg/dL    Internal QC Qual Urine POCT Valid Valid    Amphetamine Qual Urine POCT Negative Negative    Barbiturate Qual Urine POCT Negative Negative    Buprenorphine Qual Urine POCT Screen Positive (A) Negative    Benzodiazepine Qual Urine POCT Negative Negative    Cocaine Qual Urine POCT Negative Negative    Methamphetamine Qual Urine POCT Negative Negative    MDMA Qual Urine POCT Negative Negative    Methadone Qual Urine POCT Negative Negative    Opiate Qual Urine POCT Negative Negative    Oxycodone Qual Urine POCT Negative Negative    Phencyclidine Qual " Urine POCT Negative Negative    THC Qual Urine POCT Screen Positive (A) Negative         HISTORY                                                    Problem list reviewed & adjusted, as indicated.  Patient Active Problem List   Diagnosis    Chronic back pain    Essential hypertension    Elevated LFTs    Acute on chronic respiratory failure with hypoxia (H)    Pneumonia due to 2019 novel coronavirus    Atrial fibrillation with rapid ventricular response (H)    Acute respiratory distress syndrome (ARDS) due to COVID-19 virus (H)    Major depressive disorder, recurrent episode, severe (H)    Opioid use disorder, severe, dependence (H)    Polysubstance abuse (H)    ROSSY (obstructive sleep apnea)    Mild intermittent asthma    Acute metabolic encephalopathy    Ventilator associated pneumonia (H)    Oropharyngeal dysphagia    Alcohol dependence (H)    Anxiety    Moderate to severe episode of recurrent major depressive disorder (H)    DONALD (generalized anxiety disorder)    Primary osteoarthritis of right knee         MEDICATION LIST (prior to visit)  Current Outpatient Medications   Medication Sig Dispense Refill    buPROPion (WELLBUTRIN XL) 300 MG 24 hr tablet Take 1 tablet (300 mg) by mouth every morning 30 tablet 1    gabapentin (NEURONTIN) 800 MG tablet Take 1 tablet (800 mg) by mouth 3 times daily 90 tablet 1    SUBOXONE 8-2 MG per film Place 1 Film under the tongue 2 times daily 60 Film 1    acetaminophen (TYLENOL) 500 MG tablet Take 1-2 tablets (500-1,000 mg) by mouth every 6 hours as needed for mild pain 100 tablet 0    albuterol (PROAIR HFA/PROVENTIL HFA/VENTOLIN HFA) 108 (90 Base) MCG/ACT inhaler Inhale 2 puffs into the lungs every 4 hours as needed for shortness of breath / dyspnea 18 g 1    diclofenac (VOLTAREN) 1 % topical gel Apply 2 g topically 4 times daily 350 g 0    fluticasone (FLONASE) 50 MCG/ACT nasal spray SHAKE LIQUID AND USE 2 SPRAYS IN EACH NOSTRIL DAILY FOR 10 DAYS 16 g 3    fluticasone (FLOVENT  DISKUS) 50 MCG/BLIST inhaler Inhale 1 puff into the lungs every 12 hours 60 each 11    hydrOXYzine (ATARAX) 25 MG tablet Take 1 tablet (25 mg) by mouth 3 times daily as needed for itching 90 tablet 2    metoprolol tartrate (LOPRESSOR) 50 MG tablet Take 1 tablet (50 mg) by mouth 2 times daily (Patient not taking: Reported on 1/30/2023) 60 tablet 0    naloxone (NARCAN) 4 MG/0.1ML nasal spray Spray 1 spray (4 mg) into one nostril alternating nostrils 2 times daily as needed for opioid reversal every 2-3 minutes until assistance arrives 0.2 mL 0    nicotine polacrilex (NICORETTE) 4 MG gum Place 1 each (4 mg) inside cheek every hour as needed for nicotine withdrawal symptoms Cinnamon 160 each 2     No current facility-administered medications for this visit.       MEDICATION LIST (after visit)  Current Outpatient Medications   Medication Sig Dispense Refill    buPROPion (WELLBUTRIN XL) 300 MG 24 hr tablet Take 1 tablet (300 mg) by mouth every morning 30 tablet 1    gabapentin (NEURONTIN) 800 MG tablet Take 1 tablet (800 mg) by mouth 3 times daily 90 tablet 1    SUBOXONE 8-2 MG per film Place 1 Film under the tongue 2 times daily 60 Film 1    acetaminophen (TYLENOL) 500 MG tablet Take 1-2 tablets (500-1,000 mg) by mouth every 6 hours as needed for mild pain 100 tablet 0    albuterol (PROAIR HFA/PROVENTIL HFA/VENTOLIN HFA) 108 (90 Base) MCG/ACT inhaler Inhale 2 puffs into the lungs every 4 hours as needed for shortness of breath / dyspnea 18 g 1    diclofenac (VOLTAREN) 1 % topical gel Apply 2 g topically 4 times daily 350 g 0    fluticasone (FLONASE) 50 MCG/ACT nasal spray SHAKE LIQUID AND USE 2 SPRAYS IN EACH NOSTRIL DAILY FOR 10 DAYS 16 g 3    fluticasone (FLOVENT DISKUS) 50 MCG/BLIST inhaler Inhale 1 puff into the lungs every 12 hours 60 each 11    hydrOXYzine (ATARAX) 25 MG tablet Take 1 tablet (25 mg) by mouth 3 times daily as needed for itching 90 tablet 2    metoprolol tartrate (LOPRESSOR) 50 MG tablet Take 1  tablet (50 mg) by mouth 2 times daily (Patient not taking: Reported on 1/30/2023) 60 tablet 0    naloxone (NARCAN) 4 MG/0.1ML nasal spray Spray 1 spray (4 mg) into one nostril alternating nostrils 2 times daily as needed for opioid reversal every 2-3 minutes until assistance arrives 0.2 mL 0    nicotine polacrilex (NICORETTE) 4 MG gum Place 1 each (4 mg) inside cheek every hour as needed for nicotine withdrawal symptoms Cinnamon 160 each 2     No current facility-administered medications for this visit.         No Known Allergies     Continued Complex Management  The longitudinal plan of care for Opioid Use Disorder (OUD) was addressed during this visit. Due to the added complexity in care, I will continue to support Jared in the subsequent management and with ongoing continuity of care.      At least 30 min spent on day of encounter in review of medical record,  review, obtaining histories, discussing recommendations, counseling/coordination of care    Sherry Alvarado DNP,MSN, AGNP-C  [facility]   [address], [address] [address]   Phone # [phone]

## 2024-05-06 LAB
7AMINOCLONAZEPAM UR QL CFM: PRESENT
BUPRENORPHINE UR CFM-MCNC: 705 NG/ML
BUPRENORPHINE/CREAT UR: 200 NG/MG {CREAT}
GABAPENTIN UR QL CFM: PRESENT
NALOXONE UR CFM-MCNC: 1680 NG/ML
NALOXONE: 477 NG/MG {CREAT}
NORBUPRENORPHINE UR CFM-MCNC: 1272 NG/ML
NORBUPRENORPHINE/CREAT UR: 361 NG/MG {CREAT}

## 2024-06-11 ENCOUNTER — VIRTUAL VISIT (OUTPATIENT)
Dept: ADDICTION MEDICINE | Facility: CLINIC | Age: 49
End: 2024-06-11
Payer: COMMERCIAL

## 2024-06-11 DIAGNOSIS — Z71.6 ENCOUNTER FOR SMOKING CESSATION COUNSELING: ICD-10-CM

## 2024-06-11 DIAGNOSIS — F33.1 MODERATE EPISODE OF RECURRENT MAJOR DEPRESSIVE DISORDER (H): ICD-10-CM

## 2024-06-11 DIAGNOSIS — F17.200 NICOTINE USE DISORDER: ICD-10-CM

## 2024-06-11 DIAGNOSIS — F11.21 OPIOID USE DISORDER, SEVERE, IN SUSTAINED REMISSION (H): Primary | ICD-10-CM

## 2024-06-11 DIAGNOSIS — G89.29 CHRONIC LOW BACK PAIN WITH RIGHT-SIDED SCIATICA, UNSPECIFIED BACK PAIN LATERALITY: ICD-10-CM

## 2024-06-11 DIAGNOSIS — M54.41 CHRONIC LOW BACK PAIN WITH RIGHT-SIDED SCIATICA, UNSPECIFIED BACK PAIN LATERALITY: ICD-10-CM

## 2024-06-11 PROCEDURE — G2211 COMPLEX E/M VISIT ADD ON: HCPCS | Mod: 95 | Performed by: NURSE PRACTITIONER

## 2024-06-11 PROCEDURE — 99215 OFFICE O/P EST HI 40 MIN: CPT | Mod: 95 | Performed by: NURSE PRACTITIONER

## 2024-06-11 PROCEDURE — 99417 PROLNG OP E/M EACH 15 MIN: CPT | Mod: 95 | Performed by: NURSE PRACTITIONER

## 2024-06-11 RX ORDER — GABAPENTIN 800 MG/1
800 TABLET ORAL 3 TIMES DAILY
Qty: 90 TABLET | Refills: 2 | Status: SHIPPED | OUTPATIENT
Start: 2024-06-11 | End: 2024-09-09

## 2024-06-11 RX ORDER — BUPRENORPHINE HYDROCHLORIDE, NALOXONE HYDROCHLORIDE 8; 2 MG/1; MG/1
1 FILM, SOLUBLE BUCCAL; SUBLINGUAL 2 TIMES DAILY
Qty: 60 FILM | Refills: 2 | Status: SHIPPED | OUTPATIENT
Start: 2024-06-11 | End: 2024-09-09

## 2024-06-11 RX ORDER — BUPROPION HYDROCHLORIDE 300 MG/1
300 TABLET ORAL EVERY MORNING
Qty: 30 TABLET | Refills: 2 | Status: SHIPPED | OUTPATIENT
Start: 2024-06-11 | End: 2024-09-09

## 2024-06-11 ASSESSMENT — PATIENT HEALTH QUESTIONNAIRE - PHQ9
SUM OF ALL RESPONSES TO PHQ QUESTIONS 1-9: 22
10. IF YOU CHECKED OFF ANY PROBLEMS, HOW DIFFICULT HAVE THESE PROBLEMS MADE IT FOR YOU TO DO YOUR WORK, TAKE CARE OF THINGS AT HOME, OR GET ALONG WITH OTHER PEOPLE: VERY DIFFICULT
SUM OF ALL RESPONSES TO PHQ QUESTIONS 1-9: 22

## 2024-06-11 ASSESSMENT — ANXIETY QUESTIONNAIRES
1. FEELING NERVOUS, ANXIOUS, OR ON EDGE: NEARLY EVERY DAY
7. FEELING AFRAID AS IF SOMETHING AWFUL MIGHT HAPPEN: NEARLY EVERY DAY
7. FEELING AFRAID AS IF SOMETHING AWFUL MIGHT HAPPEN: NEARLY EVERY DAY
5. BEING SO RESTLESS THAT IT IS HARD TO SIT STILL: SEVERAL DAYS
2. NOT BEING ABLE TO STOP OR CONTROL WORRYING: NEARLY EVERY DAY
4. TROUBLE RELAXING: NEARLY EVERY DAY
GAD7 TOTAL SCORE: 19
6. BECOMING EASILY ANNOYED OR IRRITABLE: NEARLY EVERY DAY
3. WORRYING TOO MUCH ABOUT DIFFERENT THINGS: NEARLY EVERY DAY
GAD7 TOTAL SCORE: 19
GAD7 TOTAL SCORE: 19

## 2024-06-11 NOTE — PROGRESS NOTES
Virtual Visit Details    Type of service:  Video Visit   Video Start Time:  1102  Video End Time:11:51 AM    Originating Location (pt. Location): Home    Distant Location (provider location):  On-site  Platform used for Video Visit: [facility]    A/P                                                    ASSESSMENT/PLAN  1. Opioid use disorder, severe, in sustained remission (H)  Stable in remission. Taking suboxone 16 mg/day.   Continue suboxone unchanged.  Reviewed plans for suboxone dosing for upcoming planned lumbar fusion surgery scheduled for 7/17. Patient should continue buprenorphine 16 mg daily until day of surgery then reduce to 8 mg daily. Then resume suboxone 8 mg BID after he completes full opioid agonist therapy.   Providing more narcan.   - SUBOXONE 8-2 MG per film; Place 1 Film under the tongue 2 times daily  Dispense: 60 Film; Refill: 2  - naloxone (NARCAN) 4 MG/0.1ML nasal spray; Spray 1 spray (4 mg) into one nostril alternating nostrils 2 times daily as needed for opioid reversal every 2-3 minutes until assistance arrives  Dispense: 0.2 mL; Refill: 2    2. Moderate to severe episode of recurrent major depressive disorder (H)  Overall patient is doing okay. Has periods of increased depression and anxiety triggered by stress.   Is taking bupropion 300 mg daily, continue.   - buPROPion (WELLBUTRIN XL) 300 MG 24 hr tablet; Take 1 tablet (300 mg) by mouth every morning  Dispense: 30 tablet; Refill: 2    3. Encounter for smoking cessation counseling  4. Nicotine use disorder  Encouraged his efforts to reduce his use of chewing tobacco. Is interested in trying the lozenges, RX sent.   - nicotine (NICORETTE) 4 MG lozenge; Place 1 lozenge (4 mg) inside cheek every hour as needed for nicotine withdrawal symptoms  Dispense: 108 lozenge; Refill: 2    5. Chronic low back pain with right-sided sciatica, unspecified back pain laterality  Has appointment to establish care with  pain provider. Is taking gabapentin 800 mg TID, continue .,   - gabapentin (NEURONTIN) 800 MG tablet; Take 1 tablet (800 mg) by mouth 3 times daily  Dispense: 90 tablet; Refill: 2    Orders Placed This Encounter   Medications    SUBOXONE 8-2 MG per film     Sig: Place 1 Film under the tongue 2 times daily     Dispense:  60 Film     Refill:  2    buPROPion (WELLBUTRIN XL) 300 MG 24 hr tablet     Sig: Take 1 tablet (300 mg) by mouth every morning     Dispense:  30 tablet     Refill:  2    gabapentin (NEURONTIN) 800 MG tablet     Sig: Take 1 tablet (800 mg) by mouth 3 times daily     Dispense:  90 tablet     Refill:  2    nicotine (NICORETTE) 4 MG lozenge     Sig: Place 1 lozenge (4 mg) inside cheek every hour as needed for nicotine withdrawal symptoms     Dispense:  108 lozenge     Refill:  2    naloxone (NARCAN) 4 MG/0.1ML nasal spray     Sig: Spray 1 spray (4 mg) into one nostril alternating nostrils 2 times daily as needed for opioid reversal every 2-3 minutes until assistance arrives     Dispense:  0.2 mL     Refill:  2       Problem list updated Jun 11, 2024   No problems updated.          PDMP Review         Value Time User    State PDMP site checked  Yes 6/11/2024 11:48 AM Sherry Alvarado CNP          05/29/2024 04/30/2024 2 Suboxone 8 Mg-2 Mg Sl Film 60.00 30 He Bat [id number] [facility] (4722) 1/1 16.00 mg Medicaid [place]   05/28/2024 04/30/2024 2 Gabapentin 800 Mg Tablet 90.00 30 He Bat [id number] [address]           RTC  Return in about 3 months (around 9/9/2024) for Follow up.      Counseled the patient on the importance of having a recovery program in addition to medication to manage recovery.  Components include avoiding isolating, having willingness to change, avoiding triggers and managing cravings. Encouraged having some type of sober network and practicing honesty with trusted support person(s). Encouraged other services such as counseling, 12 step or other self-help organizations.      Opioid warning reviewed.  Risk of  overdose following a period of abstinence due to decrease tolerance was discussed including risk of death.  Strongly recommended abstain from alcohol, benzodiazepines, THC, opioids and other drugs of abuse.  Increased risk of return to opioid use after use of these substances discussed.  Increased risk of overdose/death with use of other substances particularly benzodiazepines/alcohol reviewed.        SUBJECTIVE                                                    Jared North is a 46 year old male with a history of recent Covid 19 injection, ARDS, Acute respiratory failure with hypoxia, MDD, Chronic back pain, and opioid use disorder who is being seen today for a follow up. He is taking suboxone 16 mg daily.     Brief History: Initial visit 2/11/2022. He was hospitalized from 12/31/21-1/24/22 for Covid 19 infection with complications. He required tracheostomy and ventilator support, and GT tube placement which were discontinued prior to his discharge to home. He reports being on methadone maintenance for 10-15 years at [facility]. He states he was able to abstain from heroin for awhile but alcohol consumption increased significantly . He was drinking 1 box of wine daily and using 1/2-1 gram heroin until he went to inpatient treatment in December of 2019.  He has not used heroin or alcohol since April 2020.      CD History:       SUBSTANCE(S)  OF CHOICE  - Opiates and alcohol       Withdrawal symptoms - None  IV drug use - used IV  Previous MAT - Methadone STS. Suboxone.   Previous detox/treatment - Two mentioned above  Current recovery activities: AA, IOP prior to hospital stay.   Longest period of sobriety - 2 years              Significant protective factors: Health and kids  Medical complications from substance use - Hypertension,   History of overdose - No  Narcan currently available - NO     Other Substance Use:  ALCOHOL: None in 2 years drank 1 box daily  OPIATES   Per HPI  KRATOM: NO  BENZODIAZEPINES: Used  occasional recreationally, and did take a couple of ativan in the last 2 weeks.   AMPHETAMINES/METHAMPHETAMINES: Tried a couple of times  PRESCRIPTION STIMULANTS: No  MARIJUANA: Tried delta 8 this week  COCAINE/CRACK; NO  HALLUCINOGENS: NO  ANABOLIC STEROIDS: No              OTHER  (Gambling, shopping): No  NICOTINE-Chewing tobacco restarted.                           Desire to quit yes  Previous attempts to quit Yes                            Hx of medication for smoking cessation   Patches and gu    Recent HPI Details:4/30/2024  Moved back to [facility], is renting a lower half of house from an older retired vet.   Moved his ex and daughter to [place]  Rescheduled his back surgery from 5/1 to mid-July. Is having a spinal fusion. Is having bad neck pain.   Has follow up with his surgeon this week.   Has been taking suboxone 24 mg daily, cravings controlled.   Reports intermittent alcohol use 2 drinks.   Mood has been okay.   1. Opioid use disorder, severe, in sustained remission (H)  Stable in sustained remission. Taking suboxone 16 mg TDD.   Continue suboxone without changes. Reviewed perioperative plan for suboxone today. Plan to continue suboxone 16 mg daily until day of surgery then reduce to 8 mg daily.   SHALOM signed for Dr Villareal at [facility]. Surgery planned for mid July.   Confirms narcan access  Encouraged meeting attendance for socialization now that he is back in [facility]  - SUBOXONE 8-2 MG per film; Place 1 Film under the tongue 2 times daily  Dispense: 60 Film; Refill: 1     2. Encounter for monitoring opioid maintenance therapy  - Drugs of Abuse Screen Urine (POC CUPS) POCT; Standing  - Drugs of Abuse Screen Urine (POC CUPS) POCT; Standing  - Drug Confirmation Panel Urine with Creat - lab collect; Future  - Drugs of Abuse Screen Urine (POC CUPS) POCT     3. Moderate to severe episode of recurrent major depressive disorder (H)  Mood is mostly stable,  periods of increased depression  and anxiety  triggered by stress. continue bupropion unchanged.   - buPROPion (WELLBUTRIN XL) 300 MG 24 hr tablet; Take 1 tablet (300 mg) by mouth every morning  Dispense: 30 tablet; Refill: 1     4. Chronic low back pain with right-sided sciatica, unspecified back pain laterality  Gabapentin helping some, is scheduled for a spinal fusion Mid-July.   - gabapentin (NEURONTIN) 800 MG tablet; Take 1 tablet (800 mg) by mouth 3 times daily  Dispense: 90 tablet; Refill: 1 ed       TODAY'S VISIT  HPI Jun 11, 2024    -Is looking for a new place to live, the sarah he lives with has mental health issues and is not a good fit.   -Met with  Spine provider, has surgery scheduled for 7/17/2024 for a lumbar L4-L5 and S1 fusion. Still trying to decide if he will follow through with surgery, as he has concerns about the procedure.   -Has appointment to meet with pain provider in October for new pain to his hip.   -Is trying to quit nicotine, (chews) because he knows it impairs healing. Has been using nicotine gum.   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    -Visited his daughter up [place].   -Has been working on his car.   -Looking for a new affordable housing for him and hs son.                   2/9/2024    11:20 AM 4/30/2024     9:35 AM 6/11/2024     1:03 AM   PHQ   PHQ-9 Total Score 21 21 22   Q9: Thoughts of better off dead/self-harm past 2 weeks Not at all Not at all Not at all       OBJECTIVE                                                    PHYSICAL EXAM:  There were no vitals taken for this visit.      Physical Exam  Pulmonary:      Effort: Pulmonary effort is normal. No respiratory distress.   Neurological:      General: No focal deficit present.      Mental Status: He is alert and oriented to person, place, and time.   Psychiatric:         Attention and Perception: Attention normal.         Mood and Affect: Mood is depressed.          Speech: Speech normal.         Behavior: Behavior is cooperative.         Thought Content: Thought content normal.         Judgment: Judgment normal.         LAB      HISTORY                                                    Problem list reviewed & adjusted, as indicated.  Patient Active Problem List   Diagnosis    Chronic back pain    Essential hypertension    Elevated LFTs    Acute on chronic respiratory failure with hypoxia (H)    Pneumonia due to 2019 novel coronavirus    Atrial fibrillation with rapid ventricular response (H)    Acute respiratory distress syndrome (ARDS) due to COVID-19 virus (H)    Major depressive disorder, recurrent episode, severe (H)    Opioid use disorder, severe, dependence (H)    Polysubstance abuse (H)    ROSSY (obstructive sleep apnea)    Mild intermittent asthma    Acute metabolic encephalopathy    Ventilator associated pneumonia (H)    Oropharyngeal dysphagia    Alcohol dependence (H)    Anxiety    Moderate to severe episode of recurrent major depressive disorder (H)    DONALD (generalized anxiety disorder)    Primary osteoarthritis of right knee         MEDICATION LIST (prior to visit)  Current Outpatient Medications   Medication Sig Dispense Refill    buPROPion (WELLBUTRIN XL) 300 MG 24 hr tablet Take 1 tablet (300 mg) by mouth every morning 30 tablet 2    gabapentin (NEURONTIN) 800 MG tablet Take 1 tablet (800 mg) by mouth 3 times daily 90 tablet 2    naloxone (NARCAN) 4 MG/0.1ML nasal spray Spray 1 spray (4 mg) into one nostril alternating nostrils 2 times daily as needed for opioid reversal every 2-3 minutes until assistance arrives 0.2 mL 2    nicotine (NICORETTE) 4 MG lozenge Place 1 lozenge (4 mg) inside cheek every hour as needed for nicotine withdrawal symptoms 108 lozenge 2    SUBOXONE 8-2 MG per film Place 1 Film under the tongue 2 times daily 60 Film 2    acetaminophen (TYLENOL) 500 MG tablet Take 1-2 tablets (500-1,000 mg) by mouth every 6 hours as needed for mild  pain 100 tablet 0    albuterol (PROAIR HFA/PROVENTIL HFA/VENTOLIN HFA) 108 (90 Base) MCG/ACT inhaler Inhale 2 puffs into the lungs every 4 hours as needed for shortness of breath / dyspnea 18 g 1    diclofenac (VOLTAREN) 1 % topical gel Apply 2 g topically 4 times daily 350 g 0    fluticasone (FLONASE) 50 MCG/ACT nasal spray SHAKE LIQUID AND USE 2 SPRAYS IN EACH NOSTRIL DAILY FOR 10 DAYS 16 g 3    fluticasone (FLOVENT DISKUS) 50 MCG/BLIST inhaler Inhale 1 puff into the lungs every 12 hours 60 each 11    hydrOXYzine (ATARAX) 25 MG tablet Take 1 tablet (25 mg) by mouth 3 times daily as needed for itching 90 tablet 2    metoprolol tartrate (LOPRESSOR) 50 MG tablet Take 1 tablet (50 mg) by mouth 2 times daily (Patient not taking: Reported on 1/30/2023) 60 tablet 0    nicotine polacrilex (NICORETTE) 4 MG gum Place 1 each (4 mg) inside cheek every hour as needed for nicotine withdrawal symptoms Cinnamon 160 each 2     No current facility-administered medications for this visit.       MEDICATION LIST (after visit)  Current Outpatient Medications   Medication Sig Dispense Refill    buPROPion (WELLBUTRIN XL) 300 MG 24 hr tablet Take 1 tablet (300 mg) by mouth every morning 30 tablet 2    gabapentin (NEURONTIN) 800 MG tablet Take 1 tablet (800 mg) by mouth 3 times daily 90 tablet 2    naloxone (NARCAN) 4 MG/0.1ML nasal spray Spray 1 spray (4 mg) into one nostril alternating nostrils 2 times daily as needed for opioid reversal every 2-3 minutes until assistance arrives 0.2 mL 2    nicotine (NICORETTE) 4 MG lozenge Place 1 lozenge (4 mg) inside cheek every hour as needed for nicotine withdrawal symptoms 108 lozenge 2    SUBOXONE 8-2 MG per film Place 1 Film under the tongue 2 times daily 60 Film 2    acetaminophen (TYLENOL) 500 MG tablet Take 1-2 tablets (500-1,000 mg) by mouth every 6 hours as needed for mild pain 100 tablet 0    albuterol (PROAIR HFA/PROVENTIL HFA/VENTOLIN HFA) 108 (90 Base) MCG/ACT inhaler Inhale 2 puffs  into the lungs every 4 hours as needed for shortness of breath / dyspnea 18 g 1    diclofenac (VOLTAREN) 1 % topical gel Apply 2 g topically 4 times daily 350 g 0    fluticasone (FLONASE) 50 MCG/ACT nasal spray SHAKE LIQUID AND USE 2 SPRAYS IN EACH NOSTRIL DAILY FOR 10 DAYS 16 g 3    fluticasone (FLOVENT DISKUS) 50 MCG/BLIST inhaler Inhale 1 puff into the lungs every 12 hours 60 each 11    hydrOXYzine (ATARAX) 25 MG tablet Take 1 tablet (25 mg) by mouth 3 times daily as needed for itching 90 tablet 2    metoprolol tartrate (LOPRESSOR) 50 MG tablet Take 1 tablet (50 mg) by mouth 2 times daily (Patient not taking: Reported on 1/30/2023) 60 tablet 0    nicotine polacrilex (NICORETTE) 4 MG gum Place 1 each (4 mg) inside cheek every hour as needed for nicotine withdrawal symptoms Cinnamon 160 each 2     No current facility-administered medications for this visit.         No Known Allergies     Continued Complex Management  The longitudinal plan of care for Opioid Use Disorder (OUD) and Alcohol Use Disorder (AUD) was addressed during this visit. Due to the added complexity in care, I will continue to support Jared in the subsequent management and with ongoing continuity of care.      At least 55 min spent on day of encounter in review of medical record,  review, obtaining histories, discussing recommendations, counseling/coordination of care    Sherry Alvarado DNP,MSN, AGNP-C  [facility]   [address] [place] [address], [address]   [place] [address]   Phone # [phone]

## 2024-06-11 NOTE — NURSING NOTE
Is the patient currently in the state of [place]? YES    Visit mode:VIDEO    If the visit is dropped, the patient can be reconnected by: VIDEO VISIT: Text to cell phone:   Telephone Information:   Mobile [phone]       Will anyone else be joining the visit? No  (If patient encounters technical issues they should call [phone])    How would you like to obtain your AVS? MyChart    Are changes needed to the allergy or medication list? No    Rooming Documentation: Assigned questionnaire(s) completed .    Reason for visit: RECHFIDE Dee

## 2024-06-21 ENCOUNTER — TELEPHONE (OUTPATIENT)
Dept: ADDICTION MEDICINE | Facility: CLINIC | Age: 49
End: 2024-06-21
Payer: COMMERCIAL

## 2024-06-21 NOTE — TELEPHONE ENCOUNTER
Retail Pharmacy Prior Authorization Team   Phone: [phone]      Prior Authorization Not Needed per Insurance            Medication: NALOXONE HCL 4 MG/0.1ML NA LIQD  Insurance Company: [employer]AP - Phone [phone] Fax [phone]  Expected CoPay: $    Pharmacy Filling the Rx: [id number] [place] [id number] AT [facility]  Pharmacy Notified: Yes - Call placed to the pharmacy to relay that Brand Narcan is preferred and covered without a PA.  Bong informed me that they were aware and already made the switch over to the Brand.  Will disregard the CMM Key and not send as they are using the insurance preferred Brand Narcan.  Patient Notified: No. **Instructed pharmacy to notify patient when script is ready to /ship.**

## 2024-06-21 NOTE — TELEPHONE ENCOUNTER
Prior Authorization Retail Medication Request    1) RN received a Formulary Exception Form via RightFax for provider to complete for naloxone (NARCAN) 4 MG/0.1ML nasal spray.     2) Forwarding RightFax to PA team for review.     Lidia Patel RN on 6/21/2024 at 12:39 PM      Medication/Dose: naloxone (NARCAN) 4 MG/0.1ML nasal spray  Diagnosis and ICD code (if different than what is on RX):    New/renewal/insurance change PA/secondary ins. PA:  Previously Tried and Failed:    Rationale:      Insurance   Primary:   Insurance ID:      Secondary (if applicable):  Insurance ID:      Pharmacy Information (if different than what is on RX)  Name:    Phone:    Fax:

## 2024-08-08 NOTE — PLAN OF CARE
Problem: Communication Impairment (Artificial Airway)  Goal: Effective Communication  Outcome: Improving     Problem: Device-Related Complication Risk (Artificial Airway)  Goal: Optimal Device Function  Outcome: Improving     Problem: Skin and Tissue Injury (Artificial Airway)  Goal: Absence of Device-Related Skin or Tissue Injury  Outcome: Improving   RT PROGRESS NOTE     DATA:     CURRENT SETTINGS:             TRACH TYPE / SIZE:  #7 bivona, placed 01/04             MODE:  CAP/2L             FIO2:        ACTION:             THERAPIES:               SUCTION:                           FREQUENCY:   X1                        AMOUNT:   SMALL                        CONSISTENCY:   thin                        COLOR:   PALE/YELLOW             SPONTANEOUS COUGH EFFORT/STRENGTH OF EFFORT (not elicited by suctioning):                              WEANING PHASE:  3                        WEAN MODE:    CAP/2L                        WEAN TIME:   comt                        END WEAN REASON:   cont     RESPONSE:             BS:   coarse             VITAL SIGNS:   SPO2 95-97%, RR 20-26             EMOTIONAL NEEDS / CONCERNS:                RISK FOR SELF DECANNULATION:  YES                        RISK DUE TO:  CONFUSE                        INTERVENTION/S IN PLACE IS/ARE:  RESTRAINED      NOTE / PLAN:     Cont current POC   Initiate Plan of care

## 2024-09-09 ENCOUNTER — OFFICE VISIT (OUTPATIENT)
Dept: ADDICTION MEDICINE | Facility: CLINIC | Age: 49
End: 2024-09-09
Payer: COMMERCIAL

## 2024-09-09 VITALS
OXYGEN SATURATION: 95 % | SYSTOLIC BLOOD PRESSURE: 131 MMHG | HEIGHT: 71 IN | HEART RATE: 84 BPM | WEIGHT: 205 LBS | BODY MASS INDEX: 28.7 KG/M2 | DIASTOLIC BLOOD PRESSURE: 95 MMHG

## 2024-09-09 DIAGNOSIS — F41.1 GAD (GENERALIZED ANXIETY DISORDER): ICD-10-CM

## 2024-09-09 DIAGNOSIS — M54.41 CHRONIC LOW BACK PAIN WITH RIGHT-SIDED SCIATICA, UNSPECIFIED BACK PAIN LATERALITY: ICD-10-CM

## 2024-09-09 DIAGNOSIS — M54.2 CHRONIC NECK PAIN: ICD-10-CM

## 2024-09-09 DIAGNOSIS — Z71.6 ENCOUNTER FOR SMOKING CESSATION COUNSELING: ICD-10-CM

## 2024-09-09 DIAGNOSIS — G89.29 CHRONIC NECK PAIN: ICD-10-CM

## 2024-09-09 DIAGNOSIS — F11.21 OPIOID USE DISORDER, SEVERE, IN SUSTAINED REMISSION (H): Primary | ICD-10-CM

## 2024-09-09 DIAGNOSIS — G89.29 CHRONIC LOW BACK PAIN WITH RIGHT-SIDED SCIATICA, UNSPECIFIED BACK PAIN LATERALITY: ICD-10-CM

## 2024-09-09 DIAGNOSIS — I10 ESSENTIAL HYPERTENSION: ICD-10-CM

## 2024-09-09 DIAGNOSIS — Z79.891 ENCOUNTER FOR MONITORING OPIOID MAINTENANCE THERAPY: ICD-10-CM

## 2024-09-09 DIAGNOSIS — F33.1 MODERATE EPISODE OF RECURRENT MAJOR DEPRESSIVE DISORDER (H): ICD-10-CM

## 2024-09-09 DIAGNOSIS — Z51.81 ENCOUNTER FOR MONITORING OPIOID MAINTENANCE THERAPY: ICD-10-CM

## 2024-09-09 LAB
AMPHETAMINE QUAL URINE POCT: NEGATIVE
BARBITURATE QUAL URINE POCT: NEGATIVE
BENZODIAZEPINE QUAL URINE POCT: NEGATIVE
BUPRENORPHINE QUAL URINE POCT: ABNORMAL
COCAINE QUAL URINE POCT: NEGATIVE
CREAT UR-MCNC: 246 MG/DL
CREATININE QUAL URINE POCT: ABNORMAL
INTERNAL QC QUAL URINE POCT: ABNORMAL
MDMA QUAL URINE POCT: NEGATIVE
METHADONE QUAL URINE POCT: NEGATIVE
METHAMPHETAMINE QUAL URINE POCT: NEGATIVE
OPIATE QUAL URINE POCT: NEGATIVE
OXYCODONE QUAL URINE POCT: NEGATIVE
PH QUAL URINE POCT: ABNORMAL
PHENCYCLIDINE QUAL URINE POCT: NEGATIVE
POCT KIT EXPIRATION DATE: ABNORMAL
POCT KIT LOT NUMBER: ABNORMAL
SPECIFIC GRAVITY POCT: 1.02
TEMPERATURE URINE POCT: ABNORMAL
THC QUAL URINE POCT: NEGATIVE

## 2024-09-09 PROCEDURE — 99214 OFFICE O/P EST MOD 30 MIN: CPT | Performed by: NURSE PRACTITIONER

## 2024-09-09 PROCEDURE — G0481 DRUG TEST DEF 8-14 CLASSES: HCPCS | Performed by: NURSE PRACTITIONER

## 2024-09-09 PROCEDURE — 80305 DRUG TEST PRSMV DIR OPT OBS: CPT | Performed by: NURSE PRACTITIONER

## 2024-09-09 RX ORDER — BUPRENORPHINE HYDROCHLORIDE, NALOXONE HYDROCHLORIDE 8; 2 MG/1; MG/1
1 FILM, SOLUBLE BUCCAL; SUBLINGUAL 2 TIMES DAILY
Qty: 60 FILM | Refills: 2 | Status: SHIPPED | OUTPATIENT
Start: 2024-09-09

## 2024-09-09 RX ORDER — LISINOPRIL 10 MG/1
10 TABLET ORAL DAILY
Qty: 30 TABLET | Refills: 2 | Status: SHIPPED | OUTPATIENT
Start: 2024-09-09

## 2024-09-09 RX ORDER — ESCITALOPRAM OXALATE 10 MG/1
TABLET ORAL
Qty: 30 TABLET | Refills: 2 | Status: SHIPPED | OUTPATIENT
Start: 2024-09-09

## 2024-09-09 RX ORDER — GABAPENTIN 800 MG/1
800 TABLET ORAL 3 TIMES DAILY
Qty: 90 TABLET | Refills: 2 | Status: SHIPPED | OUTPATIENT
Start: 2024-09-09

## 2024-09-09 ASSESSMENT — ANXIETY QUESTIONNAIRES
8. IF YOU CHECKED OFF ANY PROBLEMS, HOW DIFFICULT HAVE THESE MADE IT FOR YOU TO DO YOUR WORK, TAKE CARE OF THINGS AT HOME, OR GET ALONG WITH OTHER PEOPLE?: VERY DIFFICULT
GAD7 TOTAL SCORE: 16
7. FEELING AFRAID AS IF SOMETHING AWFUL MIGHT HAPPEN: MORE THAN HALF THE DAYS

## 2024-09-09 ASSESSMENT — PATIENT HEALTH QUESTIONNAIRE - PHQ9
SUM OF ALL RESPONSES TO PHQ QUESTIONS 1-9: 21
10. IF YOU CHECKED OFF ANY PROBLEMS, HOW DIFFICULT HAVE THESE PROBLEMS MADE IT FOR YOU TO DO YOUR WORK, TAKE CARE OF THINGS AT HOME, OR GET ALONG WITH OTHER PEOPLE: VERY DIFFICULT
SUM OF ALL RESPONSES TO PHQ QUESTIONS 1-9: 21

## 2024-09-09 ASSESSMENT — PAIN SCALES - GENERAL: PAINLEVEL: MODERATE PAIN (4)

## 2024-09-09 NOTE — PROGRESS NOTES
[facility]    A/P                                                    ASSESSMENT/PLAN    1. Opioid use disorder, severe, in sustained remission (H)  Stable in remission since 2020 with suboxone 16 mg TDD.   Continue suboxone unchanged.   RX for narcan  - naloxone (NARCAN) 4 MG/0.1ML nasal spray; Spray 1 spray (4 mg) into one nostril alternating nostrils 2 times daily as needed for opioid reversal. every 2-3 minutes until assistance arrives  Dispense: 0.2 mL; Refill: 2  - SUBOXONE 8-2 MG per film; Place 1 Film under the tongue 2 times daily.  Dispense: 60 Film; Refill: 2    2. Encounter for monitoring opioid maintenance therapy  - Drug Confirmation Panel Urine with Creat - lab collect  - Drugs of Abuse Screen Urine (POC CUPS) POCT    3. Chronic low back pain with right-sided sciatica, unspecified back pain laterality  4. Chronic neck pain  Cancelled lumbar fusion  that was scheduled for 7/17 due to ongoing concerns about the procedure. Is interested in establishing with pain  clinic or spine specialist within  for less evasive interventions. Referral placed for pain clinic.   Encouraged to split suboxone dose for analgesic benefits.   Continue gabapentin unchanged  - Pain Management  Referral; Future  - gabapentin (NEURONTIN) 800 MG tablet; Take 1 tablet (800 mg) by mouth 3 times daily.  Dispense: 90 tablet; Refill: 2      5. Moderate to severe episode of recurrent major depressive disorder (H)      Generalized anxiety disorder  Patient stopped bupropion due to side effects, stating he does not tolerate medications with the presence of sulfur. Denies mood changes since stopping. PHQ-9 remains elevated without SI.   Recommend starting an selective serotonin reuptake inhibitor, patient previously tolerating escitalopram and would like to resume.   Start escitalopram 10 mg daily.   Referral for psychiatry placed and phone number provided for him to schedule.       6. Encounter for  smoking cessation counseling  Quit tobacco use would like rx for gum, does not like the lozenges.   - nicotine polacrilex (NICORETTE) 4 MG gum; Place 1 each (4 mg) inside cheek every hour as needed for nicotine withdrawal symptoms. Cinnamon  Dispense: 160 each; Refill: 2    7. Essential hypertension  States he was started on lisinopril by his PCP in [place], needs to find a new provider. Providing rx of lisinopril and encouraged to schedule appointment with primary care.   - lisinopril (ZESTRIL) 10 MG tablet; Take 1 tablet (10 mg) by mouth daily.  Dispense: 30 tablet; Refill: 2  Consider checking CMP at follow up    Orders Placed This Encounter   Medications    naloxone (NARCAN) 4 MG/0.1ML nasal spray     Sig: Spray 1 spray (4 mg) into one nostril alternating nostrils 2 times daily as needed for opioid reversal. every 2-3 minutes until assistance arrives     Dispense:  0.2 mL     Refill:  2     Dispense as brand or generic based on insurance preference    SUBOXONE 8-2 MG per film     Sig: Place 1 Film under the tongue 2 times daily.     Dispense:  60 Film     Refill:  2    gabapentin (NEURONTIN) 800 MG tablet     Sig: Take 1 tablet (800 mg) by mouth 3 times daily.     Dispense:  90 tablet     Refill:  2    nicotine polacrilex (NICORETTE) 4 MG gum     Sig: Place 1 each (4 mg) inside cheek every hour as needed for nicotine withdrawal symptoms. Cinnamon     Dispense:  160 each     Refill:  2    lisinopril (ZESTRIL) 10 MG tablet     Sig: Take 1 tablet (10 mg) by mouth daily.     Dispense:  30 tablet     Refill:  2       Problem list updated Sep 9, 2024   No problems updated.          PDMP Review         Value Time User    State PDMP site checked  Yes 6/11/2024 11:48 AM Sherry Alvarado CNP              RTC  Return in about 12 weeks (around 12/2/2024) for Follow up, using a video visit 1100.      Counseled the patient on the importance of having a recovery program in addition to medication to manage recovery.   Components include avoiding isolating, having willingness to change, avoiding triggers and managing cravings. Encouraged having some type of sober network and practicing honesty with trusted support person(s). Encouraged other services such as counseling, 12 step or other self-help organizations.      Opioid warning reviewed.  Risk of overdose following a period of abstinence due to decrease tolerance was discussed including risk of death.  Strongly recommended abstain from alcohol, benzodiazepines, THC, opioids and other drugs of abuse.  Increased risk of return to opioid use after use of these substances discussed.  Increased risk of overdose/death with use of other substances particularly benzodiazepines/alcohol reviewed.        SUBJECTIVE                                                    Jared North is a 46 year old male with a history of recent Covid 19 injection, ARDS, Acute respiratory failure with hypoxia, MDD, Chronic back pain, and opioid use disorder who is being seen today for a follow up. He is taking suboxone 16 mg daily.     Brief History: Initial visit 2/11/2022. He was hospitalized from 12/31/21-1/24/22 for Covid 19 infection with complications. He required tracheostomy and ventilator support, and GT tube placement which were discontinued prior to his discharge to home. He reports being on methadone maintenance for 10-15 years at [facility]. He states he was able to abstain from heroin for awhile but alcohol consumption increased significantly . He was drinking 1 box of wine daily and using 1/2-1 gram heroin until he went to inpatient treatment in December of 2019.  He has not used heroin or alcohol since April 2020.      CD History:       SUBSTANCE(S)  OF CHOICE  - Opiates and alcohol       Withdrawal symptoms - None  IV drug use - used IV  Previous MAT - Methadone [facility]. Suboxone.   Previous detox/treatment - Two mentioned above  Current recovery activities: AA, IOP prior to hospital stay.   Longest  period of sobriety - 2 years              Significant protective factors: Health and kids  Medical complications from substance use - Hypertension,   History of overdose - No  Narcan currently available - NO     Other Substance Use:  ALCOHOL: None in 2 years drank 1 box daily  OPIATES   Per HPI  KRATOM: NO  BENZODIAZEPINES: Used occasional recreationally, and did take a couple of ativan in the last 2 weeks.   AMPHETAMINES/METHAMPHETAMINES: Tried a couple of times  PRESCRIPTION STIMULANTS: No  MARIJUANA: Tried delta 8 this week  COCAINE/CRACK; NO  HALLUCINOGENS: NO  ANABOLIC STEROIDS: No              OTHER  (Gambling, shopping): No  NICOTINE-Chewing tobacco restarted.                           Desire to quit yes  Previous attempts to quit Yes                            Hx of medication for smoking cessation   Patches and gu    Recent HPI Details: 6/11/2024  -Is looking for a new place to live, the sarah he lives with has mental health issues and is not a good fit.   -Met with  Spine provider, has surgery scheduled for 7/17/2024 for a lumbar L4-L5 and S1 fusion. Still trying to decide if he will follow through with surgery, as he has concerns about the procedure.   -Has appointment to meet with pain provider in October for new pain to his hip.   -Is trying to quit nicotine, (chews) because he knows it impairs healing. Has been using nicotine gum.   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    -Visited his daughter up [place].   -Has been working on his car.   -Looking for a new affordable housing for him and hs son.         1. Opioid use disorder, severe, in sustained remission (H)  Stable in remission. Taking suboxone 16 mg/day.   Continue suboxone unchanged.  Reviewed plans for suboxone dosing for upcoming planned lumbar fusion surgery scheduled for 7/17. Patient should continue buprenorphine 16 mg daily until  day of surgery then reduce to 8 mg daily. Then resume suboxone 8 mg BID after he completes full opioid agonist therapy.   Providing more narcan.   - SUBOXONE 8-2 MG per film; Place 1 Film under the tongue 2 times daily  Dispense: 60 Film; Refill: 2  - naloxone (NARCAN) 4 MG/0.1ML nasal spray; Spray 1 spray (4 mg) into one nostril alternating nostrils 2 times daily as needed for opioid reversal every 2-3 minutes until assistance arrives  Dispense: 0.2 mL; Refill: 2     2. Moderate to severe episode of recurrent major depressive disorder (H)  Overall patient is doing okay. Has periods of increased depression and anxiety triggered by stress.   Is taking bupropion 300 mg daily, continue.   - buPROPion (WELLBUTRIN XL) 300 MG 24 hr tablet; Take 1 tablet (300 mg) by mouth every morning  Dispense: 30 tablet; Refill: 2     3. Encounter for smoking cessation counseling  4. Nicotine use disorder  Encouraged his efforts to reduce his use of chewing tobacco. Is interested in trying the lozenges, RX sent.   - nicotine (NICORETTE) 4 MG lozenge; Place 1 lozenge (4 mg) inside cheek every hour as needed for nicotine withdrawal symptoms  Dispense: 108 lozenge; Refill: 2     5. Chronic low back pain with right-sided sciatica, unspecified back pain laterality  Has appointment to establish care with pain provider. Is taking gabapentin 800 mg TID, continue .,   - gabapentin (NEURONTIN) 800 MG tablet; Take 1 tablet (800 mg) by mouth 3 times daily  Dispense: 90 tablet; Refill: 2    TODAY'S VISIT  HPI Sep 9, 2024    Decided not to pursue the spinal surgery. Was scheduled for lumbar fusion 7/17.   States he felt they were disorganized. A few days before surgery was told he needed a pre op. Had asked to be referred to pain clinic prior to surgery.   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 Initial site of pain  was thoracic region, now his whole spine and neck with pain radiating to right thigh  "and butt.   He was diagnosed with degenerative disc disease.   Occasional alcohol use, drinking 1-2 beers. The most he drinks is 2 beers.   Stopped bupropion, does not tolerate sulfar based medication. No significant changes to mood                    4/30/2024     9:35 AM 6/11/2024     1:03 AM 9/9/2024    10:37 AM   PHQ   PHQ-9 Total Score 21 22 21   Q9: Thoughts of better off dead/self-harm past 2 weeks Not at all Not at all Not at all       OBJECTIVE                                                    PHYSICAL EXAM:  BP (!) 131/95 (BP Location: Right arm, Patient Position: Sitting, Cuff Size: Adult Large)   Pulse 84   Ht 1.803 m (5' 11\")   Wt 93 kg (205 lb)   SpO2 95%   BMI 28.59 kg/m        Physical Exam  Pulmonary:      Effort: Pulmonary effort is normal. No respiratory distress.   Neurological:      General: No focal deficit present.      Mental Status: He is alert and oriented to person, place, and time.   Psychiatric:         Attention and Perception: Attention normal.         Mood and Affect: Mood is anxious and depressed.         Speech: Speech normal.         Behavior: Behavior is cooperative.         Thought Content: Thought content normal. Thought content does not include suicidal ideation. Thought content does not include suicidal plan.         Judgment: Judgment normal.         LAB  Results for orders placed or performed in visit on 09/09/24   Drugs of Abuse Screen Urine (POC CUPS) POCT     Status: Abnormal   Result Value Ref Range    POCT Kit Lot Number [id number]     POCT Kit Expiration Date 2026-02-28     Temperature Urine POCT 92 F 90 F, 92 F, 94 F, 96 F, 98 F, 100 F    Specific Sacul POCT 1.025 1.005, 1.015, 1.025    pH Qual Urine POCT 7 pH 4 pH, 5 pH, 7 pH, 9 pH    Creatinine Qual Urine POCT 100 mg/dL 20 mg/dL, 50 mg/dL, 100 mg/dL, 200 mg/dL    Internal QC Qual Urine POCT Valid Valid    Amphetamine Qual Urine POCT Negative Negative    Barbiturate Qual Urine POCT Negative Negative    " Buprenorphine Qual Urine POCT Screen Positive (A) Negative    Benzodiazepine Qual Urine POCT Negative Negative    Cocaine Qual Urine POCT Negative Negative    Methamphetamine Qual Urine POCT Negative Negative    MDMA Qual Urine POCT Negative Negative    Methadone Qual Urine POCT Negative Negative    Opiate Qual Urine POCT Negative Negative    Oxycodone Qual Urine POCT Negative Negative    Phencyclidine Qual Urine POCT Negative Negative    THC Qual Urine POCT Negative Negative   Drug Confirmation Panel Urine with Creat - lab collect     Status: None (In process)    Narrative    The following orders were created for panel order Drug Confirmation Panel Urine with Creat - lab collect.  Procedure                               Abnormality         Status                     ---------                               -----------         ------                     Urine Drug Confirmation ...[[id number]]                      In process                 Urine Creatinine for Dany...[[id number]]                      In process                   Please view results for these tests on the individual orders.         HISTORY                                                    Problem list reviewed & adjusted, as indicated.  Patient Active Problem List   Diagnosis    Chronic back pain    Essential hypertension    Elevated LFTs    Acute on chronic respiratory failure with hypoxia (H)    Pneumonia due to 2019 novel coronavirus    Atrial fibrillation with rapid ventricular response (H)    Acute respiratory distress syndrome (ARDS) due to COVID-19 virus (H)    Major depressive disorder, recurrent episode, severe (H)    Opioid use disorder, severe, dependence (H)    Polysubstance abuse (H)    ROSSY (obstructive sleep apnea)    Mild intermittent asthma    Acute metabolic encephalopathy    Ventilator associated pneumonia (H)    Oropharyngeal dysphagia    Alcohol dependence (H)    Anxiety    Moderate to severe episode of recurrent major depressive  disorder (H)    DONALD (generalized anxiety disorder)    Primary osteoarthritis of right knee         MEDICATION LIST (prior to visit)  Current Outpatient Medications   Medication Sig Dispense Refill    gabapentin (NEURONTIN) 800 MG tablet Take 1 tablet (800 mg) by mouth 3 times daily. 90 tablet 2    lisinopril (ZESTRIL) 10 MG tablet Take 1 tablet (10 mg) by mouth daily. 30 tablet 2    naloxone (NARCAN) 4 MG/0.1ML nasal spray Spray 1 spray (4 mg) into one nostril alternating nostrils 2 times daily as needed for opioid reversal. every 2-3 minutes until assistance arrives 0.2 mL 2    nicotine polacrilex (NICORETTE) 4 MG gum Place 1 each (4 mg) inside cheek every hour as needed for nicotine withdrawal symptoms. Cinnamon 160 each 2    SUBOXONE 8-2 MG per film Place 1 Film under the tongue 2 times daily. 60 Film 2    acetaminophen (TYLENOL) 500 MG tablet Take 1-2 tablets (500-1,000 mg) by mouth every 6 hours as needed for mild pain 100 tablet 0    albuterol (PROAIR HFA/PROVENTIL HFA/VENTOLIN HFA) 108 (90 Base) MCG/ACT inhaler Inhale 2 puffs into the lungs every 4 hours as needed for shortness of breath / dyspnea 18 g 1    diclofenac (VOLTAREN) 1 % topical gel Apply 2 g topically 4 times daily 350 g 0    fluticasone (FLONASE) 50 MCG/ACT nasal spray SHAKE LIQUID AND USE 2 SPRAYS IN EACH NOSTRIL DAILY FOR 10 DAYS 16 g 3    fluticasone (FLOVENT DISKUS) 50 MCG/BLIST inhaler Inhale 1 puff into the lungs every 12 hours 60 each 11    hydrOXYzine (ATARAX) 25 MG tablet Take 1 tablet (25 mg) by mouth 3 times daily as needed for itching 90 tablet 2    metoprolol tartrate (LOPRESSOR) 50 MG tablet Take 1 tablet (50 mg) by mouth 2 times daily (Patient not taking: Reported on 1/30/2023) 60 tablet 0    nicotine (NICORETTE) 4 MG lozenge Place 1 lozenge (4 mg) inside cheek every hour as needed for nicotine withdrawal symptoms 108 lozenge 2     No current facility-administered medications for this visit.       MEDICATION LIST (after  visit)  Current Outpatient Medications   Medication Sig Dispense Refill    gabapentin (NEURONTIN) 800 MG tablet Take 1 tablet (800 mg) by mouth 3 times daily. 90 tablet 2    lisinopril (ZESTRIL) 10 MG tablet Take 1 tablet (10 mg) by mouth daily. 30 tablet 2    naloxone (NARCAN) 4 MG/0.1ML nasal spray Spray 1 spray (4 mg) into one nostril alternating nostrils 2 times daily as needed for opioid reversal. every 2-3 minutes until assistance arrives 0.2 mL 2    nicotine polacrilex (NICORETTE) 4 MG gum Place 1 each (4 mg) inside cheek every hour as needed for nicotine withdrawal symptoms. Cinnamon 160 each 2    SUBOXONE 8-2 MG per film Place 1 Film under the tongue 2 times daily. 60 Film 2    acetaminophen (TYLENOL) 500 MG tablet Take 1-2 tablets (500-1,000 mg) by mouth every 6 hours as needed for mild pain 100 tablet 0    albuterol (PROAIR HFA/PROVENTIL HFA/VENTOLIN HFA) 108 (90 Base) MCG/ACT inhaler Inhale 2 puffs into the lungs every 4 hours as needed for shortness of breath / dyspnea 18 g 1    diclofenac (VOLTAREN) 1 % topical gel Apply 2 g topically 4 times daily 350 g 0    fluticasone (FLONASE) 50 MCG/ACT nasal spray SHAKE LIQUID AND USE 2 SPRAYS IN EACH NOSTRIL DAILY FOR 10 DAYS 16 g 3    fluticasone (FLOVENT DISKUS) 50 MCG/BLIST inhaler Inhale 1 puff into the lungs every 12 hours 60 each 11    hydrOXYzine (ATARAX) 25 MG tablet Take 1 tablet (25 mg) by mouth 3 times daily as needed for itching 90 tablet 2    metoprolol tartrate (LOPRESSOR) 50 MG tablet Take 1 tablet (50 mg) by mouth 2 times daily (Patient not taking: Reported on 1/30/2023) 60 tablet 0    nicotine (NICORETTE) 4 MG lozenge Place 1 lozenge (4 mg) inside cheek every hour as needed for nicotine withdrawal symptoms 108 lozenge 2     No current facility-administered medications for this visit.         No Known Allergies     Continued Complex Management  The longitudinal plan of care for Opioid Use Disorder (OUD) was addressed during this visit. Due  to the added complexity in care, I will continue to support Jared in the subsequent management and with ongoing continuity of care.      Sherry Alvarado DNP,MSN, AGNP-C  [facility]   [address] , [address]   [place] [address]   Phone # [phone]

## 2024-09-09 NOTE — PROGRESS NOTES
[facility]       Rooming information: Recheck  BP is elevated, pt says he is out of his antihypertensive medications  Back issues are gradually getting worse    Point of care urine drug screen positive for:  Lab Results   Component Value Date    BUP Screen Positive (A) 09/09/2024    BZO Negative 09/09/2024    BAR Negative 09/09/2024    CHHAYA Negative 09/09/2024    MAMP Negative 09/09/2024    AMP Negative 09/09/2024    MDMA Negative 09/09/2024    MTD Negative 09/09/2024    WAD325 Negative 09/09/2024    OXY Negative 09/09/2024    PCP Negative 09/09/2024    THC Negative 09/09/2024    TEMP 92 F 09/09/2024    SGPOCT 1.025 09/09/2024       *POC urine drug screen does not screen for Fentanyl    PHQ-9 Scores:       4/30/2024     9:35 AM 6/11/2024     1:03 AM 9/9/2024    10:37 AM   PHQ   PHQ-9 Total Score 21 22 21   Q9: Thoughts of better off dead/self-harm past 2 weeks Not at all Not at all Not at all     DONALD-7 Scores:      4/30/2024     9:40 AM 6/11/2024     1:04 AM 9/9/2024    10:42 AM   DONALD-7 SCORE   Total Score 18 (severe anxiety) 19 (severe anxiety) 16 (severe anxiety)   Total Score 18 19 16       Any other recent substance use:     NA   NICOTINE-NA  If using nicotine, ready to quit? Using nicotine gum    Side effects related to medications pt would like to discuss with provider (constipation, dry mouth, HA, GI upset, sedation?) N/A     Narcan currently available: N/A    Primary care provider: Paulette Sanders MD     Mental health provider: NA (follow up on MH referral if needed)    Any housing, insurance deficits?: NA    Contact information up to date? NA    3rd Party Involvement NA (please obtain SHALOM if pt would like to include)        Meena Lee LPN  September 9, 2024  11:22 AM

## 2024-09-11 LAB
7AMINOCLONAZEPAM UR QL CFM: PRESENT
BUPRENORPHINE UR CFM-MCNC: 450 NG/ML
BUPRENORPHINE/CREAT UR: 183 NG/MG {CREAT}
GABAPENTIN UR QL CFM: PRESENT
NALOXONE UR CFM-MCNC: 1201 NG/ML
NALOXONE: 488 NG/MG {CREAT}
NORBUPRENORPHINE UR CFM-MCNC: 429 NG/ML
NORBUPRENORPHINE/CREAT UR: 174 NG/MG {CREAT}

## 2024-09-13 ENCOUNTER — TELEPHONE (OUTPATIENT)
Dept: ADDICTION MEDICINE | Facility: CLINIC | Age: 49
End: 2024-09-13
Payer: COMMERCIAL

## 2024-09-13 NOTE — TELEPHONE ENCOUNTER
Prior Authorization Retail Medication Request    Medication/Dose: naloxone (NARCAN) 4 MG/0.1ML nasal spray  Diagnosis and ICD code (if different than what is on RX):    New/renewal/insurance change PA/secondary ins. PA:  Previously Tried and Failed:    Rationale:      Insurance   Primary:   Insurance ID:      Secondary (if applicable):  Insurance ID:      Pharmacy Information (if different than what is on RX)  Name:    Phone:    Fax:

## 2024-09-17 NOTE — TELEPHONE ENCOUNTER
Prior Authorization Not Needed per Insurance    Medication: naloxone (NARCAN) 4 MG/0.1ML nasal spray is covered under patients formulary plan.  Brand is covered by plan.    Pharmacy Notified:  Yes- pharmacy received a paid claim for brand.  Patient Notified:  No    Please close encounter when finished    Thank you,  Central Prior Authorization Team  [phone]

## 2024-09-17 NOTE — RESULT ENCOUNTER NOTE
Spoke with patient about positive results of clonazepam. Patient states he found an old rx of clonazepam and took a pill once to help with sleep. Reviewed risk of combining multiple CBS depressants increasing risk for respiratory depression, overdose, and sleep. Offered sleep, prefers to hold off on sleep study for now.

## 2024-09-18 NOTE — TELEPHONE ENCOUNTER
RN received notification from the Prior Authorization Team that this patients naloxone (NARCAN) 4 MG/0.1ML nasal spray is covered by this patients insurance and that it does not require a Prior Authorization.       RN sent this patient a Locality message regarding this.  Please refer to 9/18/24 Locality Medical Advice encounter.      Les Martel RN on 9/18/2024 at 9:40 AM

## 2024-11-03 ENCOUNTER — HEALTH MAINTENANCE LETTER (OUTPATIENT)
Age: 49
End: 2024-11-03

## 2024-12-02 ENCOUNTER — VIRTUAL VISIT (OUTPATIENT)
Dept: ADDICTION MEDICINE | Facility: CLINIC | Age: 49
End: 2024-12-02
Payer: COMMERCIAL

## 2024-12-02 DIAGNOSIS — I10 ESSENTIAL HYPERTENSION: ICD-10-CM

## 2024-12-02 DIAGNOSIS — M54.41 CHRONIC LOW BACK PAIN WITH RIGHT-SIDED SCIATICA, UNSPECIFIED BACK PAIN LATERALITY: ICD-10-CM

## 2024-12-02 DIAGNOSIS — F11.21 OPIOID USE DISORDER, SEVERE, IN SUSTAINED REMISSION (H): ICD-10-CM

## 2024-12-02 DIAGNOSIS — G89.29 CHRONIC LOW BACK PAIN WITH RIGHT-SIDED SCIATICA, UNSPECIFIED BACK PAIN LATERALITY: ICD-10-CM

## 2024-12-02 RX ORDER — GABAPENTIN 800 MG/1
800 TABLET ORAL 3 TIMES DAILY
Qty: 90 TABLET | Refills: 1 | Status: SHIPPED | OUTPATIENT
Start: 2025-01-05 | End: 2024-12-02

## 2024-12-02 RX ORDER — BUPRENORPHINE HYDROCHLORIDE, NALOXONE HYDROCHLORIDE 8; 2 MG/1; MG/1
1 FILM, SOLUBLE BUCCAL; SUBLINGUAL 2 TIMES DAILY
Qty: 60 FILM | Refills: 1 | Status: SHIPPED | OUTPATIENT
Start: 2024-12-02

## 2024-12-02 RX ORDER — BUPRENORPHINE HYDROCHLORIDE, NALOXONE HYDROCHLORIDE 8; 2 MG/1; MG/1
1 FILM, SOLUBLE BUCCAL; SUBLINGUAL 2 TIMES DAILY
Qty: 60 FILM | Refills: 1 | Status: SHIPPED | OUTPATIENT
Start: 2024-12-02 | End: 2024-12-02

## 2024-12-02 RX ORDER — GABAPENTIN 800 MG/1
800 TABLET ORAL 3 TIMES DAILY
Qty: 90 TABLET | Refills: 0 | Status: SHIPPED | OUTPATIENT
Start: 2024-12-02

## 2024-12-02 RX ORDER — BUPRENORPHINE HYDROCHLORIDE, NALOXONE HYDROCHLORIDE 8; 2 MG/1; MG/1
1 FILM, SOLUBLE BUCCAL; SUBLINGUAL 2 TIMES DAILY
Qty: 60 FILM | Refills: 0 | Status: SHIPPED | OUTPATIENT
Start: 2024-12-02 | End: 2024-12-02

## 2024-12-02 RX ORDER — LISINOPRIL 10 MG/1
10 TABLET ORAL DAILY
Qty: 30 TABLET | Refills: 0 | Status: SHIPPED | OUTPATIENT
Start: 2024-12-02

## 2024-12-02 RX ORDER — GABAPENTIN 800 MG/1
800 TABLET ORAL 3 TIMES DAILY
Qty: 90 TABLET | Refills: 0 | Status: SHIPPED | OUTPATIENT
Start: 2024-12-02 | End: 2024-12-02

## 2024-12-02 RX ORDER — LISINOPRIL 10 MG/1
10 TABLET ORAL DAILY
Qty: 30 TABLET | Refills: 0 | Status: SHIPPED | OUTPATIENT
Start: 2024-12-02 | End: 2024-12-02

## 2024-12-02 ASSESSMENT — ANXIETY QUESTIONNAIRES
GAD7 TOTAL SCORE: 15
GAD7 TOTAL SCORE: 15
1. FEELING NERVOUS, ANXIOUS, OR ON EDGE: NEARLY EVERY DAY
6. BECOMING EASILY ANNOYED OR IRRITABLE: MORE THAN HALF THE DAYS
7. FEELING AFRAID AS IF SOMETHING AWFUL MIGHT HAPPEN: MORE THAN HALF THE DAYS
7. FEELING AFRAID AS IF SOMETHING AWFUL MIGHT HAPPEN: MORE THAN HALF THE DAYS
IF YOU CHECKED OFF ANY PROBLEMS ON THIS QUESTIONNAIRE, HOW DIFFICULT HAVE THESE PROBLEMS MADE IT FOR YOU TO DO YOUR WORK, TAKE CARE OF THINGS AT HOME, OR GET ALONG WITH OTHER PEOPLE: VERY DIFFICULT
5. BEING SO RESTLESS THAT IT IS HARD TO SIT STILL: NOT AT ALL
2. NOT BEING ABLE TO STOP OR CONTROL WORRYING: NEARLY EVERY DAY
3. WORRYING TOO MUCH ABOUT DIFFERENT THINGS: NEARLY EVERY DAY
8. IF YOU CHECKED OFF ANY PROBLEMS, HOW DIFFICULT HAVE THESE MADE IT FOR YOU TO DO YOUR WORK, TAKE CARE OF THINGS AT HOME, OR GET ALONG WITH OTHER PEOPLE?: VERY DIFFICULT
GAD7 TOTAL SCORE: 15
4. TROUBLE RELAXING: MORE THAN HALF THE DAYS

## 2024-12-02 ASSESSMENT — PAIN SCALES - GENERAL: PAINLEVEL_OUTOF10: NO PAIN (0)

## 2024-12-02 NOTE — NURSING NOTE
Is the patient currently in the state of [place]? YES    Current patient location:  Pt in [facility]    Visit mode:VIDEO    If the visit is dropped, the patient can be reconnected by: VIDEO VISIT: Text to cell phone:   Telephone Information:   Mobile [phone]       Will anyone else be joining the visit? No  (If patient encounters technical issues they should call [phone])    Are changes needed to the allergy or medication list? No    Are refills needed on medications prescribed by this physician? Discuss with Provider    Rooming Documentation: Questionnaire(s) completed.    Reason for visit: RECHFIDE Dorsey

## 2024-12-02 NOTE — PROGRESS NOTES
Virtual Visit Details    Type of service:  Video Visit   Video Start Time:  1102  Video End Time:11:44 AM    Originating Location (pt. Location): [facility]    Distant Location (provider location):  On-site  Platform used for Video Visit: [employer]         [facility]    A/P                                                    ASSESSMENT/PLAN  1. Opioid use disorder, severe, in sustained remission (H)  Stable in remission. Is taking 16 mg of suboxone daily. May split doses for analgesic benefits.   Continue suboxone unchanged.   Confirms narcan access  - SUBOXONE 8-2 MG per film; Place 1 Film under the tongue 2 times daily.  Dispense: 60 Film; Refill: 1    2. Chronic low back pain with right-sided sciatica, unspecified back pain laterality  Symptoms managed with gabapentin. Continue unchanged.  - gabapentin (NEURONTIN) 800 MG tablet; Take 1 tablet (800 mg) by mouth 3 times daily.  Dispense: 90 tablet; Refill: 0    3. Essential hypertension  Providing refill of lisinopril and encouraged to scheduled with PCP for further refills.   - lisinopril (ZESTRIL) 10 MG tablet; Take 1 tablet (10 mg) by mouth daily.  Dispense: 30 tablet; Refill: 0    Orders Placed This Encounter   Medications    DISCONTD: SUBOXONE 8-2 MG per film     Sig: Place 1 Film under the tongue 2 times daily.     Dispense:  60 Film     Refill:  0    DISCONTD: gabapentin (NEURONTIN) 800 MG tablet     Sig: Take 1 tablet (800 mg) by mouth 3 times daily.     Dispense:  90 tablet     Refill:  0    DISCONTD: lisinopril (ZESTRIL) 10 MG tablet     Sig: Take 1 tablet (10 mg) by mouth daily.     Dispense:  30 tablet     Refill:  0    DISCONTD: SUBOXONE 8-2 MG per film     Sig: Place 1 Film under the tongue 2 times daily.     Dispense:  60 Film     Refill:  1    DISCONTD: gabapentin (NEURONTIN) 800 MG tablet     Sig: Take 1 tablet (800 mg) by mouth 3 times daily.     Dispense:  90 tablet     Refill:  1    DISCONTD: gabapentin (NEURONTIN) 800  MG tablet     Sig: Take 1 tablet (800 mg) by mouth 3 times daily.     Dispense:  90 tablet     Refill:  0    lisinopril (ZESTRIL) 10 MG tablet     Sig: Take 1 tablet (10 mg) by mouth daily.     Dispense:  30 tablet     Refill:  0    SUBOXONE 8-2 MG per film     Sig: Place 1 Film under the tongue 2 times daily.     Dispense:  60 Film     Refill:  1    DISCONTD: gabapentin (NEURONTIN) 800 MG tablet     Sig: Take 1 tablet (800 mg) by mouth 3 times daily.     Dispense:  90 tablet     Refill:  1    DISCONTD: gabapentin (NEURONTIN) 800 MG tablet     Sig: Take 1 tablet (800 mg) by mouth 3 times daily.     Dispense:  90 tablet     Refill:  0    gabapentin (NEURONTIN) 800 MG tablet     Sig: Take 1 tablet (800 mg) by mouth 3 times daily.     Dispense:  90 tablet     Refill:  0       Problem list updated Dec 2, 2024   No problems updated.          PDMP Review         Value Time User    State PDMP site checked  Yes 12/2/2024 11:17 AM Sherry Alvarado CNP          11/08/2024 09/09/2024 2 Suboxone 8 Mg-2 Mg Sl Film 60.00 30 [facility] [id number] [facility] (2642) 1/2 16.00 mg [place]   11/06/2024 09/09/2024 2 Gabapentin 800 Mg Tablet 90.00 30 [facility] [id number] [facility] (2642) 2/2  [place]icaid [place]   10/11/2024 09/09/2024 2 Gabapentin 800 Mg Tablet 90.00 30 [facility] [id number] [facility] (2642) 1/2  [place]icaid [place]   10/10/2024 09/09/2024 2 Suboxone 8 Mg-2 Mg Sl Film 60.00 30 [facility] [id number] [facility] (2642) 0/2 16.00 mg [place]   09/14/2024 09/09/2024 2 Gabapentin 800 Mg Tablet 90.00 30 [facility]               RTC  Return in about 11 weeks (around 2/17/2025) for Follow up, in person 0930.      Counseled the patient on the importance of having a recovery program in addition to medication to manage recovery.  Components include avoiding isolating, having willingness to change, avoiding triggers and managing cravings. Encouraged having some type of sober network and practicing honesty with trusted support person(s). Encouraged other services such as counseling, 12 step or  other self-help organizations.      Opioid warning reviewed.  Risk of overdose following a period of abstinence due to decrease tolerance was discussed including risk of death.  Strongly recommended abstain from alcohol, benzodiazepines, THC, opioids and other drugs of abuse.  Increased risk of return to opioid use after use of these substances discussed.  Increased risk of overdose/death with use of other substances particularly benzodiazepines/alcohol reviewed.        SUBJECTIVE                                                    Jared North is a 46 year old male with a history of recent Covid 19 injection, ARDS, Acute respiratory failure with hypoxia, MDD, Chronic back pain, and opioid use disorder who is being seen today for a follow up. He is taking suboxone 16 mg daily.     Brief History: Initial visit 2/11/2022. He was hospitalized from 12/31/21-1/24/22 for Covid 19 infection with complications. He required tracheostomy and ventilator support, and GT tube placement which were discontinued prior to his discharge to home. He reports being on methadone maintenance for 10-15 years at [facility]. He states he was able to abstain from heroin for awhile but alcohol consumption increased significantly . He was drinking 1 box of wine daily and using 1/2-1 gram heroin until he went to inpatient treatment in December of 2019.  He has not used heroin or alcohol since April 2020.      CD History:       SUBSTANCE(S)  OF CHOICE  - Opiates and alcohol       Withdrawal symptoms - None  IV drug use - used IV  Previous MAT - Methadone [facility]. Suboxone.   Previous detox/treatment - Two mentioned above  Current recovery activities: AA, IOP prior to hospital stay.   Longest period of sobriety - 2 years              Significant protective factors: Health and kids  Medical complications from substance use - Hypertension,   History of overdose - No  Narcan currently available - NO     Other Substance Use:  ALCOHOL: None in 2 years drank 1  box daily  OPIATES   Per HPI  KRATOM: NO  BENZODIAZEPINES: Used occasional recreationally, and did take a couple of ativan in the last 2 weeks.   AMPHETAMINES/METHAMPHETAMINES: Tried a couple of times  PRESCRIPTION STIMULANTS: No  MARIJUANA: Tried delta 8 this week  COCAINE/CRACK; NO  HALLUCINOGENS: NO  ANABOLIC STEROIDS: No              OTHER  (Gambling, shopping): No  NICOTINE-Chewing tobacco restarted.                           Desire to quit yes  Previous attempts to quit Yes                            Hx of medication for smoking cessation   Patches and gu    Recent HPI Details: 9/23/2024  1. Opioid use disorder, severe, in sustained remission (H)  Stable in remission since 2020 with suboxone 16 mg TDD.   Continue suboxone unchanged.   RX for narcan  - naloxone (NARCAN) 4 MG/0.1ML nasal spray; Spray 1 spray (4 mg) into one nostril alternating nostrils 2 times daily as needed for opioid reversal. every 2-3 minutes until assistance arrives  Dispense: 0.2 mL; Refill: 2  - SUBOXONE 8-2 MG per film; Place 1 Film under the tongue 2 times daily.  Dispense: 60 Film; Refill: 2     2. Encounter for monitoring opioid maintenance therapy  - Drug Confirmation Panel Urine with Creat - lab collect  - Drugs of Abuse Screen Urine (POC CUPS) POCT     3. Chronic low back pain with right-sided sciatica, unspecified back pain laterality  4. Chronic neck pain  Cancelled lumbar fusion  that was scheduled for 7/17 due to ongoing concerns about the procedure. Is interested in establishing with pain  clinic or spine specialist within  for less evasive interventions. Referral placed for pain clinic.   Encouraged to split suboxone dose for analgesic benefits.   Continue gabapentin unchanged  - Pain Management  Referral; Future  - gabapentin (NEURONTIN) 800 MG tablet; Take 1 tablet (800 mg) by mouth 3 times daily.  Dispense: 90 tablet; Refill: 2        5. Moderate to severe episode of recurrent major depressive disorder (H)       Generalized anxiety disorder  Patient stopped bupropion due to side effects, stating he does not tolerate medications with the presence of sulfur. Denies mood changes since stopping. PHQ-9 remains elevated without SI.   Recommend starting an selective serotonin reuptake inhibitor, patient previously tolerating escitalopram and would like to resume.   Start escitalopram 10 mg daily.   Referral for psychiatry placed and phone number provided for him to schedule.         6. Encounter for smoking cessation counseling  Quit tobacco use would like rx for gum, does not like the lozenges.   - nicotine polacrilex (NICORETTE) 4 MG gum; Place 1 each (4 mg) inside cheek every hour as needed for nicotine withdrawal symptoms. Cinnamon  Dispense: 160 each; Refill: 2     7. Essential hypertension  States he was started on lisinopril by his PCP in [place], needs to find a new provider. Providing rx of lisinopril and encouraged to schedule appointment with primary care.   - lisinopril (ZESTRIL) 10 MG tablet; Take 1 tablet (10 mg) by mouth daily.  Dispense: 30 tablet; Refill: 2  Consider checking CMP at follow up       TODAY'S VISIT  HPI Dec 2, 2024    Is visiting daughter in [place] with son.    Still living [place], renting lower level.   Mood has been alright. Stopped escitalopram due to fatigue. Still has not called to schedule an appointment with psychiatry.   Has not scheduled appointment with his PCP, will call when he gets back from to the [facility].   Has been having a difficult time getting his prescriptions, was shorted 5 capsules of gabapentin. Staff were rude to him at the window, yelled at him.               6/11/2024     1:03 AM 9/9/2024    10:37 AM 12/2/2024    10:57 AM   PHQ   PHQ-9 Total Score 22 21 21    Q9: Thoughts of better off dead/self-harm past 2 weeks Not at all  Not at all  Not at all        Patient-reported       OBJECTIVE                                                    PHYSICAL EXAM:  There were no  vitals taken for this visit.      Physical Exam  Pulmonary:      Effort: Pulmonary effort is normal. No respiratory distress.   Neurological:      General: No focal deficit present.      Mental Status: He is alert and oriented to person, place, and time.   Psychiatric:         Attention and Perception: Attention normal.         Mood and Affect: Mood normal.         Speech: Speech normal.         Behavior: Behavior is cooperative.         Thought Content: Thought content normal. Thought content does not include suicidal ideation.         Judgment: Judgment normal.         LAB      HISTORY                                                    Problem list reviewed & adjusted, as indicated.  Patient Active Problem List   Diagnosis    Chronic back pain    Essential hypertension    Elevated LFTs    Acute on chronic respiratory failure with hypoxia (H)    Pneumonia due to 2019 novel coronavirus    Atrial fibrillation with rapid ventricular response (H)    Acute respiratory distress syndrome (ARDS) due to COVID-19 virus (H)    Major depressive disorder, recurrent episode, severe (H)    Opioid use disorder, severe, dependence (H)    Polysubstance abuse (H)    ROSSY (obstructive sleep apnea)    Mild intermittent asthma    Acute metabolic encephalopathy    Ventilator associated pneumonia (H)    Oropharyngeal dysphagia    Alcohol dependence (H)    Anxiety    Moderate to severe episode of recurrent major depressive disorder (H)    DONALD (generalized anxiety disorder)    Primary osteoarthritis of right knee         MEDICATION LIST (prior to visit)  Current Outpatient Medications   Medication Sig Dispense Refill    acetaminophen (TYLENOL) 500 MG tablet Take 1-2 tablets (500-1,000 mg) by mouth every 6 hours as needed for mild pain 100 tablet 0    albuterol (PROAIR HFA/PROVENTIL HFA/VENTOLIN HFA) 108 (90 Base) MCG/ACT inhaler Inhale 2 puffs into the lungs every 4 hours as needed for shortness of breath / dyspnea 18 g 1    diclofenac  (VOLTAREN) 1 % topical gel Apply 2 g topically 4 times daily 350 g 0    escitalopram (LEXAPRO) 10 MG tablet Take 5 mg daily for 7 days then take 10 mg daily 30 tablet 2    fluticasone (FLONASE) 50 MCG/ACT nasal spray SHAKE LIQUID AND USE 2 SPRAYS IN EACH NOSTRIL DAILY FOR 10 DAYS 16 g 3    fluticasone (FLOVENT DISKUS) 50 MCG/BLIST inhaler Inhale 1 puff into the lungs every 12 hours 60 each 11    gabapentin (NEURONTIN) 800 MG tablet Take 1 tablet (800 mg) by mouth 3 times daily. 90 tablet 0    hydrOXYzine (ATARAX) 25 MG tablet Take 1 tablet (25 mg) by mouth 3 times daily as needed for itching 90 tablet 2    lisinopril (ZESTRIL) 10 MG tablet Take 1 tablet (10 mg) by mouth daily. 30 tablet 0    naloxone (NARCAN) 4 MG/0.1ML nasal spray Spray 1 spray (4 mg) into one nostril alternating nostrils 2 times daily as needed for opioid reversal. every 2-3 minutes until assistance arrives 0.2 mL 2    nicotine (NICORETTE) 4 MG lozenge Place 1 lozenge (4 mg) inside cheek every hour as needed for nicotine withdrawal symptoms 108 lozenge 2    nicotine polacrilex (NICORETTE) 4 MG gum Place 1 each (4 mg) inside cheek every hour as needed for nicotine withdrawal symptoms. Cinnamon 160 each 2    SUBOXONE 8-2 MG per film Place 1 Film under the tongue 2 times daily. 60 Film 1    metoprolol tartrate (LOPRESSOR) 50 MG tablet Take 1 tablet (50 mg) by mouth 2 times daily (Patient not taking: Reported on 12/2/2024) 60 tablet 0     No current facility-administered medications for this visit.       MEDICATION LIST (after visit)  Current Outpatient Medications   Medication Sig Dispense Refill    acetaminophen (TYLENOL) 500 MG tablet Take 1-2 tablets (500-1,000 mg) by mouth every 6 hours as needed for mild pain 100 tablet 0    albuterol (PROAIR HFA/PROVENTIL HFA/VENTOLIN HFA) 108 (90 Base) MCG/ACT inhaler Inhale 2 puffs into the lungs every 4 hours as needed for shortness of breath / dyspnea 18 g 1    diclofenac (VOLTAREN) 1 % topical gel Apply 2  g topically 4 times daily 350 g 0    escitalopram (LEXAPRO) 10 MG tablet Take 5 mg daily for 7 days then take 10 mg daily 30 tablet 2    fluticasone (FLONASE) 50 MCG/ACT nasal spray SHAKE LIQUID AND USE 2 SPRAYS IN EACH NOSTRIL DAILY FOR 10 DAYS 16 g 3    fluticasone (FLOVENT DISKUS) 50 MCG/BLIST inhaler Inhale 1 puff into the lungs every 12 hours 60 each 11    gabapentin (NEURONTIN) 800 MG tablet Take 1 tablet (800 mg) by mouth 3 times daily. 90 tablet 0    hydrOXYzine (ATARAX) 25 MG tablet Take 1 tablet (25 mg) by mouth 3 times daily as needed for itching 90 tablet 2    lisinopril (ZESTRIL) 10 MG tablet Take 1 tablet (10 mg) by mouth daily. 30 tablet 0    naloxone (NARCAN) 4 MG/0.1ML nasal spray Spray 1 spray (4 mg) into one nostril alternating nostrils 2 times daily as needed for opioid reversal. every 2-3 minutes until assistance arrives 0.2 mL 2    nicotine (NICORETTE) 4 MG lozenge Place 1 lozenge (4 mg) inside cheek every hour as needed for nicotine withdrawal symptoms 108 lozenge 2    nicotine polacrilex (NICORETTE) 4 MG gum Place 1 each (4 mg) inside cheek every hour as needed for nicotine withdrawal symptoms. Cinnamon 160 each 2    SUBOXONE 8-2 MG per film Place 1 Film under the tongue 2 times daily. 60 Film 1    metoprolol tartrate (LOPRESSOR) 50 MG tablet Take 1 tablet (50 mg) by mouth 2 times daily (Patient not taking: Reported on 12/2/2024) 60 tablet 0     No current facility-administered medications for this visit.         No Known Allergies     Continued Complex Management  The longitudinal plan of care for Opioid Use Disorder (OUD) was addressed during this visit. Due to the added complexity in care, I will continue to support Jared in the subsequent management and with ongoing continuity of care.          Sherry Alvarado, DNP,MSN, AGNP-C  [facility]   [address] , [address]   [place] [address]   Phone # [phone]

## 2024-12-03 RX ORDER — LISINOPRIL 10 MG/1
10 TABLET ORAL DAILY
Qty: 90 TABLET | OUTPATIENT
Start: 2024-12-03

## 2024-12-03 NOTE — TELEPHONE ENCOUNTER
Received refill request for a 90 day supply of lisinopril.     Patient encouraged to schedule with PCP for further refills of this medication.     90 day refused.     Tamkio Linares RN on 12/3/2024 at 9:53 AM

## 2024-12-03 NOTE — TELEPHONE ENCOUNTER
Duplicate request for a 90 day supply of lisinopril.     This should be filled by primary in the future.     90 day refused.     Tamiko Linares RN on 12/3/2024 at 9:56 AM

## 2024-12-24 ENCOUNTER — MYC MEDICAL ADVICE (OUTPATIENT)
Dept: ADDICTION MEDICINE | Facility: CLINIC | Age: 49
End: 2024-12-24
Payer: COMMERCIAL

## 2024-12-24 DIAGNOSIS — G89.29 CHRONIC LOW BACK PAIN WITH RIGHT-SIDED SCIATICA, UNSPECIFIED BACK PAIN LATERALITY: ICD-10-CM

## 2024-12-24 DIAGNOSIS — M54.41 CHRONIC LOW BACK PAIN WITH RIGHT-SIDED SCIATICA, UNSPECIFIED BACK PAIN LATERALITY: ICD-10-CM

## 2024-12-26 RX ORDER — GABAPENTIN 800 MG/1
800 TABLET ORAL 3 TIMES DAILY
Qty: 90 TABLET | Refills: 0 | Status: SHIPPED | OUTPATIENT
Start: 2024-12-26

## 2024-12-26 NOTE — TELEPHONE ENCOUNTER
Date of Last Office Visit: 12/2/24  Date of Next Office Visit:  2/17/25  No shows since last visit: No  More than one patient-initiated cancellation (with reschedule) since last seen in clinic? No    []Medication refilled per  Medication Refill in Ambulatory Care  policy.  [x]Medication unable to be refilled by RN due to criteria not met as indicated below:    []Eligibility: has not had a provider visit within last 6 months   []Supervision: no future appointment; < 7 days before next appointment   []Compliance: no shows; cancellations; lapse in therapy   []Verification: order discrepancy; may need modification...   [] > 30-day supply request   []Advanced refill request: > 7 days before refill date   []Controlled medication   []Medication not included in policy   []Review: new med; med adjusted <= 30 days; safety alert; requires lab monitoring...   []Scope of Practice: refill request processed by LPN/MA   [x]Other:      Medication(s) requested:     -  gabapentin (NEURONTIN) 800 MG tablet   Date last ordered: 12/2/24  Qty: 90  Refills: 0  Appropriate for refill? Provider to review.      Any Controlled Substance(s)? Yes   [place]  checked? N/A      Requested medication(s) verified as identical to current order? Yes    Any lapse in adherence to medication(s) greater than 5 days? No      Additional action taken? cued up medication/order(s) and routed encounter to provider for review.      Last visit treatment plan:   1. Opioid use disorder, severe, in sustained remission (H)  Stable in remission. Is taking 16 mg of suboxone daily. May split doses for analgesic benefits.   Continue suboxone unchanged.   Confirms narcan access  - SUBOXONE 8-2 MG per film; Place 1 Film under the tongue 2 times daily.  Dispense: 60 Film; Refill: 1     2. Chronic low back pain with right-sided sciatica, unspecified back pain laterality  Symptoms managed with gabapentin. Continue unchanged.  - gabapentin (NEURONTIN) 800 MG tablet; Take 1 tablet  (800 mg) by mouth 3 times daily.  Dispense: 90 tablet; Refill: 0     3. Essential hypertension  Providing refill of lisinopril and encouraged to scheduled with PCP for further refills.   - lisinopril (ZESTRIL) 10 MG tablet; Take 1 tablet (10 mg) by mouth daily.  Dispense: 30 tablet; Refill: 0  Is visiting daughter in [place] with son.    Still living [place], renting lower level.   Mood has been alright. Stopped escitalopram due to fatigue. Still has not called to schedule an appointment with psychiatry.   Has not scheduled appointment with his PCP, will call when he gets back from to the [facility].   Has been having a difficult time getting his prescriptions, was shorted 5 capsules of gabapentin. Staff were rude to him at the window, yelled at him.    Any medication(s) require lab monitoring? No

## 2025-01-17 ENCOUNTER — MYC MEDICAL ADVICE (OUTPATIENT)
Dept: ADDICTION MEDICINE | Facility: CLINIC | Age: 50
End: 2025-01-17
Payer: COMMERCIAL

## 2025-01-17 DIAGNOSIS — M54.41 CHRONIC LOW BACK PAIN WITH RIGHT-SIDED SCIATICA, UNSPECIFIED BACK PAIN LATERALITY: ICD-10-CM

## 2025-01-17 DIAGNOSIS — I10 ESSENTIAL HYPERTENSION: ICD-10-CM

## 2025-01-17 DIAGNOSIS — G89.29 CHRONIC LOW BACK PAIN WITH RIGHT-SIDED SCIATICA, UNSPECIFIED BACK PAIN LATERALITY: ICD-10-CM

## 2025-01-20 NOTE — TELEPHONE ENCOUNTER
Date of Last Office Visit: 12/2/24  Date of Next Office Visit:  2/17/25  No shows since last visit: No  More than one patient-initiated cancellation (with reschedule) since last seen in clinic? No    []Medication refilled per  Medication Refill in Ambulatory Care  policy.  [x]Medication unable to be refilled by RN due to criteria not met as indicated below:    []Eligibility: has not had a provider visit within last 6 months   []Supervision: no future appointment; < 7 days before next appointment   []Compliance: no shows; cancellations; lapse in therapy   []Verification: order discrepancy; may need modification...   [] > 30-day supply request   [x]Advanced refill request: > 7 days before refill date   [x]Controlled medication   [x]Medication not included in policy   []Review: new med; med adjusted <= 30 days; safety alert; requires lab monitoring...   []Scope of Practice: refill request processed by LPN/MA   []Other:      Medication(s) requested:     -  gabapentin (NEURONTIN) 800 MG tablet   Date last ordered: 12/26/24  Qty: 90  Refills: 0        Any Controlled Substance(s)? Yes  Gabapentin 800mg sold on 12/28/24 #90.       Requested medication(s) verified as identical to current order? Yes    Any lapse in adherence to medication(s) greater than 5 days? Yes     Additional action taken? routed encounter to provider for review.      Last visit treatment plan:     A/P                                                    ASSESSMENT/PLAN  1. Opioid use disorder, severe, in sustained remission (H)  Stable in remission. Is taking 16 mg of suboxone daily. May split doses for analgesic benefits.   Continue suboxone unchanged.   Confirms narcan access  - SUBOXONE 8-2 MG per film; Place 1 Film under the tongue 2 times daily.  Dispense: 60 Film; Refill: 1     2. Chronic low back pain with right-sided sciatica, unspecified back pain laterality  Symptoms managed with gabapentin. Continue unchanged.  - gabapentin (NEURONTIN) 800 MG  tablet; Take 1 tablet (800 mg) by mouth 3 times daily.  Dispense: 90 tablet; Refill: 0     3. Essential hypertension  Providing refill of lisinopril and encouraged to scheduled with PCP for further refills.   - lisinopril (ZESTRIL) 10 MG tablet; Take 1 tablet (10 mg) by mouth daily.  Dispense: 30 tablet; Refill: 0    Any medication(s) require lab monitoring? No

## 2025-01-21 RX ORDER — GABAPENTIN 800 MG/1
800 TABLET ORAL 3 TIMES DAILY
Qty: 90 TABLET | Refills: 0 | Status: SHIPPED | OUTPATIENT
Start: 2025-01-21

## 2025-01-21 RX ORDER — GABAPENTIN 800 MG/1
800 TABLET ORAL 3 TIMES DAILY
Qty: 90 TABLET | Refills: 0 | Status: SHIPPED | OUTPATIENT
Start: 2025-01-21 | End: 2025-01-21

## 2025-01-21 RX ORDER — LISINOPRIL 10 MG/1
10 TABLET ORAL DAILY
Qty: 30 TABLET | Refills: 0 | Status: SHIPPED | OUTPATIENT
Start: 2025-01-21

## 2025-01-21 NOTE — TELEPHONE ENCOUNTER
Patient wanted Gabapentin cancelled at the [facility] in [place] and resent to the [facility] at [facility].     Called and cancelled prescription at the [facility]. Resending to [facility].     Tamiko Linares RN on 1/21/2025 at 3:30 PM

## 2025-02-12 ENCOUNTER — MYC MEDICAL ADVICE (OUTPATIENT)
Dept: ADDICTION MEDICINE | Facility: CLINIC | Age: 50
End: 2025-02-12
Payer: COMMERCIAL

## 2025-02-12 DIAGNOSIS — F11.21 OPIOID USE DISORDER, SEVERE, IN SUSTAINED REMISSION (H): ICD-10-CM

## 2025-02-12 RX ORDER — BUPRENORPHINE HYDROCHLORIDE, NALOXONE HYDROCHLORIDE 8; 2 MG/1; MG/1
1 FILM, SOLUBLE BUCCAL; SUBLINGUAL 2 TIMES DAILY
Qty: 10 FILM | Refills: 0 | Status: SHIPPED | OUTPATIENT
Start: 2025-02-12

## 2025-02-12 NOTE — TELEPHONE ENCOUNTER
Date of Last Office Visit: 12/2/24  Date of Next Office Visit:  2/17/25  No shows since last visit: No  More than one patient-initiated cancellation (with reschedule) since last seen in clinic? No    []Medication refilled per  Medication Refill in Ambulatory Care  policy.  [x]Medication unable to be refilled by RN due to criteria not met as indicated below:    []Eligibility: has not had a provider visit within last 6 months   []Supervision: no future appointment; < 7 days before next appointment   []Compliance: no shows; cancellations; lapse in therapy   []Verification: order discrepancy; may need modification...   [] > 30-day supply request   []Advanced refill request: > 7 days before refill date   [x]Controlled medication   []Medication not included in policy   []Review: new med; med adjusted <= 30 days; safety alert; requires lab monitoring...   []Scope of Practice: refill request processed by LPN/MA   []Other:      Medication(s) requested:     -  SUBOXONE 8-2 MG per film   Date last ordered: 12/2/24  Qty: 60  Refills: 1  Appropriate for refill? Yes    Any Controlled Substance(s)? Yes   [place]  checked? N/A      Requested medication(s) verified as identical to current order? Yes    Any lapse in adherence to medication(s) greater than 5 days? No      Additional action taken? cued up medication/order(s).      Last visit treatment plan:   1. Opioid use disorder, severe, in sustained remission (H)  Stable in remission. Is taking 16 mg of suboxone daily. May split doses for analgesic benefits.   Continue suboxone unchanged.   Confirms narcan access  - SUBOXONE 8-2 MG per film; Place 1 Film under the tongue 2 times daily.  Dispense: 60 Film; Refill: 1     2. Chronic low back pain with right-sided sciatica, unspecified back pain laterality  Symptoms managed with gabapentin. Continue unchanged.  - gabapentin (NEURONTIN) 800 MG tablet; Take 1 tablet (800 mg) by mouth 3 times daily.  Dispense: 90 tablet; Refill: 0     3.  "Essential hypertension  Providing refill of lisinopril and encouraged to scheduled with PCP for further refills.   - lisinopril (ZESTRIL) 10 MG tablet; Take 1 tablet (10 mg) by mouth daily.  Dispense: 30 tablet; Refill: 0  Return in about 11 weeks (around 2/17/2025) for Follow up, in person 0930   Is visiting daughter in [place] with son.    Still living [place], renting lower level.   Mood has been alright. Stopped escitalopram due to fatigue. Still has not called to schedule an appointment with psychiatry.   Has not scheduled appointment with his PCP, will call when he gets back from to the [facility].   Has been having a difficult time getting his prescriptions, was shorted 5 capsules of gabapentin. Staff were rude to him at the window, yelled at him.    Any medication(s) require lab monitoring? Yes   ADDICTION MED   Last POC UDS Results:   Lab Results   Component Value Date    BUP Screen Positive (A) 09/09/2024    BZO Negative 09/09/2024    BAR Negative 09/09/2024    CHHAYA Negative 09/09/2024    MAMP Negative 09/09/2024    AMP Negative 09/09/2024    MDMA Negative 09/09/2024    MTD Negative 09/09/2024    UJQ243 Negative 09/09/2024    OXY Negative 09/09/2024    PCP Negative 09/09/2024    THC Negative 09/09/2024    TEMP 92 F 09/09/2024    SGPOCT 1.025 09/09/2024         Last Drug Confirmation Results:   Creatinine Urine for Drug Screen   Date Value Ref Range Status   09/09/2024 246 mg/dL Final     Comment:     The reference range has not been established for creatinine in random urines. The results should be integrated into the clinical context for interpretation.     Gabapentin (Neurontin)   Date Value Ref Range Status   09/09/2024 Present (A) Absent Final     Comment:     Sources of gabapentin are prescription medications.       Last Drugs of Abuse Screening: No results found for: \"THC13\", \"PCP13\", \"COC13\", \"MAMP13\", \"OPI13\", \"AMP13\", \"BZO13\", \"TCA13\", \"MTD13\", \"BAR13\", \"OXY13\", \"PPX13\", \"BUP13\"     Last " "Fentanyl Qualitative Urine: No results found for: \"UFENT\"     Last Ethyl Alcohol Level: No results found for: \"ETOH\"    Tamiko Linares RN on 2/12/2025 at 11:22 AM               "

## 2025-02-17 ENCOUNTER — VIRTUAL VISIT (OUTPATIENT)
Dept: ADDICTION MEDICINE | Facility: CLINIC | Age: 50
End: 2025-02-17
Payer: COMMERCIAL

## 2025-02-17 DIAGNOSIS — Z71.6 ENCOUNTER FOR SMOKING CESSATION COUNSELING: ICD-10-CM

## 2025-02-17 DIAGNOSIS — M54.41 CHRONIC LOW BACK PAIN WITH RIGHT-SIDED SCIATICA, UNSPECIFIED BACK PAIN LATERALITY: ICD-10-CM

## 2025-02-17 DIAGNOSIS — F41.1 GAD (GENERALIZED ANXIETY DISORDER): ICD-10-CM

## 2025-02-17 DIAGNOSIS — G89.29 CHRONIC LOW BACK PAIN WITH RIGHT-SIDED SCIATICA, UNSPECIFIED BACK PAIN LATERALITY: ICD-10-CM

## 2025-02-17 DIAGNOSIS — F11.21 OPIOID USE DISORDER, SEVERE, IN SUSTAINED REMISSION (H): Primary | ICD-10-CM

## 2025-02-17 RX ORDER — GABAPENTIN 800 MG/1
800 TABLET ORAL 3 TIMES DAILY
Qty: 90 TABLET | Refills: 2 | Status: SHIPPED | OUTPATIENT
Start: 2025-02-17

## 2025-02-17 RX ORDER — BUPRENORPHINE HYDROCHLORIDE, NALOXONE HYDROCHLORIDE 8; 2 MG/1; MG/1
1 FILM, SOLUBLE BUCCAL; SUBLINGUAL 2 TIMES DAILY
Qty: 60 FILM | Refills: 2 | Status: SHIPPED | OUTPATIENT
Start: 2025-02-17

## 2025-02-17 RX ORDER — PROPRANOLOL HYDROCHLORIDE 10 MG/1
10 TABLET ORAL DAILY PRN
Qty: 30 TABLET | Refills: 2 | Status: SHIPPED | OUTPATIENT
Start: 2025-02-17

## 2025-02-17 RX ORDER — HYDROXYZINE HYDROCHLORIDE 25 MG/1
25 TABLET, FILM COATED ORAL 3 TIMES DAILY PRN
Qty: 90 TABLET | Refills: 2 | Status: SHIPPED | OUTPATIENT
Start: 2025-02-17

## 2025-02-17 ASSESSMENT — ANXIETY QUESTIONNAIRES
4. TROUBLE RELAXING: MORE THAN HALF THE DAYS
1. FEELING NERVOUS, ANXIOUS, OR ON EDGE: NEARLY EVERY DAY
7. FEELING AFRAID AS IF SOMETHING AWFUL MIGHT HAPPEN: MORE THAN HALF THE DAYS
5. BEING SO RESTLESS THAT IT IS HARD TO SIT STILL: SEVERAL DAYS
GAD7 TOTAL SCORE: 16
IF YOU CHECKED OFF ANY PROBLEMS ON THIS QUESTIONNAIRE, HOW DIFFICULT HAVE THESE PROBLEMS MADE IT FOR YOU TO DO YOUR WORK, TAKE CARE OF THINGS AT HOME, OR GET ALONG WITH OTHER PEOPLE: VERY DIFFICULT
2. NOT BEING ABLE TO STOP OR CONTROL WORRYING: NEARLY EVERY DAY
8. IF YOU CHECKED OFF ANY PROBLEMS, HOW DIFFICULT HAVE THESE MADE IT FOR YOU TO DO YOUR WORK, TAKE CARE OF THINGS AT HOME, OR GET ALONG WITH OTHER PEOPLE?: VERY DIFFICULT
6. BECOMING EASILY ANNOYED OR IRRITABLE: MORE THAN HALF THE DAYS
GAD7 TOTAL SCORE: 16
7. FEELING AFRAID AS IF SOMETHING AWFUL MIGHT HAPPEN: MORE THAN HALF THE DAYS
GAD7 TOTAL SCORE: 16
3. WORRYING TOO MUCH ABOUT DIFFERENT THINGS: NEARLY EVERY DAY

## 2025-02-17 ASSESSMENT — PATIENT HEALTH QUESTIONNAIRE - PHQ9
SUM OF ALL RESPONSES TO PHQ QUESTIONS 1-9: 22
SUM OF ALL RESPONSES TO PHQ QUESTIONS 1-9: 22
10. IF YOU CHECKED OFF ANY PROBLEMS, HOW DIFFICULT HAVE THESE PROBLEMS MADE IT FOR YOU TO DO YOUR WORK, TAKE CARE OF THINGS AT HOME, OR GET ALONG WITH OTHER PEOPLE: EXTREMELY DIFFICULT

## 2025-02-17 ASSESSMENT — PAIN SCALES - GENERAL: PAINLEVEL_OUTOF10: MODERATE PAIN (6)

## 2025-02-17 NOTE — NURSING NOTE
Is the patient currently in the state of [place]? YES    Current patient location: [address] [place] [id number]    Visit mode:Video    If the visit is dropped, the patient can be reconnected by: VIDEO VISIT: Text to cell phone:   Telephone Information:   Mobile [phone]    and VIDEO VISIT:  Send e-mail to at [email or web address]    Will anyone else be joining the visit? No  (If patient encounters technical issues they should call [phone])    Are changes needed to the allergy or medication list? Pt stated no changes to allergies and Pt stated no med changes    Are refills needed on medications prescribed by this physician? Yes, discuss with provider. Pt requesting refill for propranolol as well.    Rooming Documentation: Questionnaire(s) completed.    Reason for visit: RECHECK     FIDE Pepe

## 2025-02-17 NOTE — PROGRESS NOTES
Virtual Visit Details    Type of service:  Video Visit   Video Start Time:  0947  Video End Time:10:15 AM    Originating Location (pt. Location): Home    Distant Location (provider location):  Off-site  Platform used for Video Visit: [facility]    A/P                                                    ASSESSMENT/PLAN  1. Opioid use disorder, severe, in sustained remission (H) (Primary)  Stable in sustained remission since 2020. Is taking 16 mg of suboxone. Discussed sublocade today, patient is considering. Discuss at follow up.  Continue suboxone unchanged  - SUBOXONE 8-2 MG per film; Place 1 Film under the tongue 2 times daily.  Dispense: 60 Film; Refill: 2    2. Chronic low back pain with right-sided sciatica, unspecified back pain laterality  Symptoms managed with gabapentin, continue  - gabapentin (NEURONTIN) 800 MG tablet; Take 1 tablet (800 mg) by mouth 3 times daily.  Dispense: 90 tablet; Refill: 2    3. Encounter for smoking cessation counseling  Requesting refill of NRT.   - nicotine polacrilex (NICORETTE) 4 MG gum; Place 1 each (4 mg) inside cheek every hour as needed for nicotine withdrawal symptoms. Cinnamon  Dispense: 160 each; Refill: 2    4. DONALD (generalized anxiety disorder)  Symptoms managed with hydroxyzine and propranolol. Patient resumed propranolol from old rx and would like to continue.   - hydrOXYzine HCl (ATARAX) 25 MG tablet; Take 1 tablet (25 mg) by mouth 3 times daily as needed for itching.  Dispense: 90 tablet; Refill: 2  - propranolol (INDERAL) 10 MG tablet; Take 1 tablet (10 mg) by mouth daily as needed (anxiety).  Dispense: 30 tablet; Refill: 2    Orders Placed This Encounter   Medications    gabapentin (NEURONTIN) 800 MG tablet     Sig: Take 1 tablet (800 mg) by mouth 3 times daily.     Dispense:  90 tablet     Refill:  2    SUBOXONE 8-2 MG per film     Sig: Place 1 Film under the tongue 2 times daily.     Dispense:  60 Film     Refill:  2     nicotine polacrilex (NICORETTE) 4 MG gum     Sig: Place 1 each (4 mg) inside cheek every hour as needed for nicotine withdrawal symptoms. Cinnamon     Dispense:  160 each     Refill:  2    hydrOXYzine HCl (ATARAX) 25 MG tablet     Sig: Take 1 tablet (25 mg) by mouth 3 times daily as needed for itching.     Dispense:  90 tablet     Refill:  2    propranolol (INDERAL) 10 MG tablet     Sig: Take 1 tablet (10 mg) by mouth daily as needed (anxiety).     Dispense:  30 tablet     Refill:  2       Problem list updated Feb 17, 2025   No problems updated.          PDMP Review         Value Time User    State PDMP site checked  Yes 2/17/2025  9:56 AM Sherry Alvarado CNP          01/21/2025 01/21/2025 3 Gabapentin 800 Mg Tablet 90.00 30  Bat [id number] Sup (1579) 0/0  [place]icaid [place]   01/13/2025 12/02/2024 2 Suboxone 8 Mg-2 Mg Sl Film 60.00 30  Bat [id number] [facility] (5192) 1/1 16.00 mg [place]   12/28/2024 12/26/2024 2 Gabapentin 800 Mg Tablet 90.00 30  Bat [id number] [facility] (3563) 0/0  [place]   12/09/2024 12/02/2024 2 Suboxone 8 Mg-2 Mg Sl Film 60.00 30  Bat 11            RTC  Return in about 12 weeks (around 5/12/2025) for Follow up, in person 1 pm.      Counseled the patient on the importance of having a recovery program in addition to medication to manage recovery.  Components include avoiding isolating, having willingness to change, avoiding triggers and managing cravings. Encouraged having some type of sober network and practicing honesty with trusted support person(s). Encouraged other services such as counseling, 12 step or other self-help organizations.      Opioid warning reviewed.  Risk of overdose following a period of abstinence due to decrease tolerance was discussed including risk of death.  Strongly recommended abstain from alcohol, benzodiazepines, THC, opioids and other drugs of abuse.  Increased risk of return to opioid use after use of these substances discussed.  Increased risk of overdose/death with  use of other substances particularly benzodiazepines/alcohol reviewed.        SUBJECTIVE                                                    Jared North is a 49 year old male with a history of recent Covid 19 injection, ARDS, Acute respiratory failure with hypoxia, MDD, Chronic back pain, and opioid use disorder who is being seen today for a follow up. He is taking suboxone 16 mg daily.     Brief History: Initial visit 2/11/2022. He was hospitalized from 12/31/21-1/24/22 for Covid 19 infection with complications. He required tracheostomy and ventilator support, and GT tube placement which were discontinued prior to his discharge to home. He reports being on methadone maintenance for 10-15 years at [facility]. He states he was able to abstain from heroin for awhile but alcohol consumption increased significantly . He was drinking 1 box of wine daily and using 1/2-1 gram heroin until he went to inpatient treatment in December of 2019.  He has not used heroin or alcohol since April 2020.      CD History:       SUBSTANCE(S)  OF CHOICE  - Opiates and alcohol       Withdrawal symptoms - None  IV drug use - used IV  Previous MAT - Methadone [facility]. Suboxone.   Previous detox/treatment - Two mentioned above  Current recovery activities: AA, IOP prior to hospital stay.   Longest period of sobriety - 2 years              Significant protective factors: Health and kids  Medical complications from substance use - Hypertension,   History of overdose - No  Narcan currently available - NO     Other Substance Use:  ALCOHOL: None in 2 years drank 1 box daily  OPIATES   Per HPI  KRATOM: NO  BENZODIAZEPINES: Used occasional recreationally, and did take a couple of ativan in the last 2 weeks.   AMPHETAMINES/METHAMPHETAMINES: Tried a couple of times  PRESCRIPTION STIMULANTS: No  MARIJUANA: Tried delta 8 this week  COCAINE/CRACK; NO  HALLUCINOGENS: NO  ANABOLIC STEROIDS: No              OTHER  (Gambling, shopping): No  NICOTINE-Chewing tobacco  restarted.                           Desire to quit yes  Previous attempts to quit Yes                            Hx of medication for smoking cessation   Patches and gu       Recent HPI Details: 12/2/24  1. Opioid use disorder, severe, in sustained remission (H)  Stable in remission. Is taking 16 mg of suboxone daily. May split doses for analgesic benefits.   Continue suboxone unchanged.   Confirms narcan access  - SUBOXONE 8-2 MG per film; Place 1 Film under the tongue 2 times daily.  Dispense: 60 Film; Refill: 1     2. Chronic low back pain with right-sided sciatica, unspecified back pain laterality  Symptoms managed with gabapentin. Continue unchanged.  - gabapentin (NEURONTIN) 800 MG tablet; Take 1 tablet (800 mg) by mouth 3 times daily.  Dispense: 90 tablet; Refill: 0     3. Essential hypertension  Providing refill of lisinopril and encouraged to scheduled with PCP for further refills.   - lisinopril (ZESTRIL) 10 MG tablet; Take 1 tablet (10 mg) by mouth daily.  Dispense: 30 tablet; Refill: 0       TODAY'S VISIT  HPI Feb 17, 2025    Last couple of months have been alright. Has been hanging out with his son a lot.   [place], but daughter and ex are not talking to him anymore.  Has not established with a PCP, states he has not tried to get scheduled. Needs his BP medications refilled.   Things are going well in his place  Has been taking suboxone 16 mg, did not end up needing to  bridge   Is interested transferring to [facility], would like to wait until his next follow up  Requesting to resume propranolol as needed, has been taking from old rx to treat anxiety.         9/9/2024    10:37 AM 12/2/2024    10:57 AM 2/17/2025     9:18 AM   PHQ   PHQ-9 Total Score 21 21  22    Q9: Thoughts of better off dead/self-harm past 2 weeks Not at all Not at all Not at all       Patient-reported       OBJECTIVE                                                    PHYSICAL EXAM:  There were no vitals  taken for this visit.      Physical Exam  Pulmonary:      Effort: Pulmonary effort is normal. No respiratory distress.   Neurological:      General: No focal deficit present.      Mental Status: He is alert and oriented to person, place, and time.   Psychiatric:         Attention and Perception: Attention normal.         Mood and Affect: Mood is depressed. Affect is flat.         Speech: Speech normal.         Behavior: Behavior is cooperative.         Thought Content: Thought content normal. Thought content does not include suicidal ideation. Thought content does not include suicidal plan.         Judgment: Judgment normal.         LAB      HISTORY                                                    Problem list reviewed & adjusted, as indicated.  Patient Active Problem List   Diagnosis    Chronic back pain    Essential hypertension    Elevated LFTs    Acute on chronic respiratory failure with hypoxia (H)    Pneumonia due to 2019 novel coronavirus    Atrial fibrillation with rapid ventricular response (H)    Acute respiratory distress syndrome (ARDS) due to COVID-19 virus (H)    Major depressive disorder, recurrent episode, severe (H)    Opioid use disorder, severe, dependence (H)    Polysubstance abuse (H)    ROSSY (obstructive sleep apnea)    Mild intermittent asthma    Acute metabolic encephalopathy    Ventilator associated pneumonia (H)    Oropharyngeal dysphagia    Alcohol dependence (H)    Anxiety    Moderate to severe episode of recurrent major depressive disorder (H)    DONALD (generalized anxiety disorder)    Primary osteoarthritis of right knee         MEDICATION LIST (prior to visit)  Current Outpatient Medications   Medication Sig Dispense Refill    gabapentin (NEURONTIN) 800 MG tablet Take 1 tablet (800 mg) by mouth 3 times daily. 90 tablet 2    hydrOXYzine HCl (ATARAX) 25 MG tablet Take 1 tablet (25 mg) by mouth 3 times daily as needed for itching. 90 tablet 2    nicotine polacrilex (NICORETTE) 4 MG gum  Place 1 each (4 mg) inside cheek every hour as needed for nicotine withdrawal symptoms. Cinnamon 160 each 2    propranolol (INDERAL) 10 MG tablet Take 1 tablet (10 mg) by mouth daily as needed (anxiety). 30 tablet 2    SUBOXONE 8-2 MG per film Place 1 Film under the tongue 2 times daily. 60 Film 2    acetaminophen (TYLENOL) 500 MG tablet Take 1-2 tablets (500-1,000 mg) by mouth every 6 hours as needed for mild pain 100 tablet 0    albuterol (PROAIR HFA/PROVENTIL HFA/VENTOLIN HFA) 108 (90 Base) MCG/ACT inhaler Inhale 2 puffs into the lungs every 4 hours as needed for shortness of breath / dyspnea 18 g 1    diclofenac (VOLTAREN) 1 % topical gel Apply 2 g topically 4 times daily 350 g 0    escitalopram (LEXAPRO) 10 MG tablet Take 5 mg daily for 7 days then take 10 mg daily 30 tablet 2    fluticasone (FLONASE) 50 MCG/ACT nasal spray SHAKE LIQUID AND USE 2 SPRAYS IN EACH NOSTRIL DAILY FOR 10 DAYS 16 g 3    fluticasone (FLOVENT DISKUS) 50 MCG/BLIST inhaler Inhale 1 puff into the lungs every 12 hours 60 each 11    lisinopril (ZESTRIL) 10 MG tablet Take 1 tablet (10 mg) by mouth daily. 30 tablet 0    metoprolol tartrate (LOPRESSOR) 50 MG tablet Take 1 tablet (50 mg) by mouth 2 times daily (Patient not taking: Reported on 12/2/2024) 60 tablet 0    naloxone (NARCAN) 4 MG/0.1ML nasal spray Spray 1 spray (4 mg) into one nostril alternating nostrils 2 times daily as needed for opioid reversal. every 2-3 minutes until assistance arrives 0.2 mL 2    nicotine (NICORETTE) 4 MG lozenge Place 1 lozenge (4 mg) inside cheek every hour as needed for nicotine withdrawal symptoms 108 lozenge 2     No current facility-administered medications for this visit.       MEDICATION LIST (after visit)  Current Outpatient Medications   Medication Sig Dispense Refill    gabapentin (NEURONTIN) 800 MG tablet Take 1 tablet (800 mg) by mouth 3 times daily. 90 tablet 2    hydrOXYzine HCl (ATARAX) 25 MG tablet Take 1 tablet (25 mg) by mouth 3 times daily  as needed for itching. 90 tablet 2    nicotine polacrilex (NICORETTE) 4 MG gum Place 1 each (4 mg) inside cheek every hour as needed for nicotine withdrawal symptoms. Cinnamon 160 each 2    propranolol (INDERAL) 10 MG tablet Take 1 tablet (10 mg) by mouth daily as needed (anxiety). 30 tablet 2    SUBOXONE 8-2 MG per film Place 1 Film under the tongue 2 times daily. 60 Film 2    acetaminophen (TYLENOL) 500 MG tablet Take 1-2 tablets (500-1,000 mg) by mouth every 6 hours as needed for mild pain 100 tablet 0    albuterol (PROAIR HFA/PROVENTIL HFA/VENTOLIN HFA) 108 (90 Base) MCG/ACT inhaler Inhale 2 puffs into the lungs every 4 hours as needed for shortness of breath / dyspnea 18 g 1    diclofenac (VOLTAREN) 1 % topical gel Apply 2 g topically 4 times daily 350 g 0    escitalopram (LEXAPRO) 10 MG tablet Take 5 mg daily for 7 days then take 10 mg daily 30 tablet 2    fluticasone (FLONASE) 50 MCG/ACT nasal spray SHAKE LIQUID AND USE 2 SPRAYS IN EACH NOSTRIL DAILY FOR 10 DAYS 16 g 3    fluticasone (FLOVENT DISKUS) 50 MCG/BLIST inhaler Inhale 1 puff into the lungs every 12 hours 60 each 11    lisinopril (ZESTRIL) 10 MG tablet Take 1 tablet (10 mg) by mouth daily. 30 tablet 0    metoprolol tartrate (LOPRESSOR) 50 MG tablet Take 1 tablet (50 mg) by mouth 2 times daily (Patient not taking: Reported on 12/2/2024) 60 tablet 0    naloxone (NARCAN) 4 MG/0.1ML nasal spray Spray 1 spray (4 mg) into one nostril alternating nostrils 2 times daily as needed for opioid reversal. every 2-3 minutes until assistance arrives 0.2 mL 2    nicotine (NICORETTE) 4 MG lozenge Place 1 lozenge (4 mg) inside cheek every hour as needed for nicotine withdrawal symptoms 108 lozenge 2     No current facility-administered medications for this visit.         No Known Allergies     Continued Complex Management  The longitudinal plan of care for Opioid Use Disorder (OUD) was addressed during this visit. Due to the added complexity in care, I will  continue to support Jared in the subsequent management and with ongoing continuity of care.      Sherry Alvarado DNP,MSN, AGNP-C  [facility]   [address], [address]   [place] [address]   Phone # [phone]        Answers submitted by the patient for this visit:  Patient Health Questionnaire (Submitted on 2/17/2025)  If you checked off any problems, how difficult have these problems made it for you to do your work, take care of things at home, or get along with other people?: Extremely difficult  PHQ9 TOTAL SCORE: 22  Patient Health Questionnaire (G7) (Submitted on 2/17/2025)  DONALD 7 TOTAL SCORE: 16

## 2025-05-05 ENCOUNTER — MYC MEDICAL ADVICE (OUTPATIENT)
Dept: ADDICTION MEDICINE | Facility: CLINIC | Age: 50
End: 2025-05-05
Payer: COMMERCIAL

## 2025-05-05 DIAGNOSIS — F11.20 OPIOID USE DISORDER, SEVERE, DEPENDENCE (H): Primary | ICD-10-CM

## 2025-05-05 NOTE — TELEPHONE ENCOUNTER
Received [employer] message regarding interest in Sublocade.     Last visit note from 2/17/2025:    ASSESSMENT/PLAN  1. Opioid use disorder, severe, in sustained remission (H) (Primary)  Stable in sustained remission since 2020. Is taking 16 mg of suboxone. Discussed sublocade today, patient is considering. Discuss at follow up.  Continue suboxone unchanged  - SUBOXONE 8-2 MG per film; Place 1 Film under the tongue 2 times daily.  Dispense: 60 Film; Refill: 2    Referral/PA for Sublocade not yet in place.     RN acknowledged message via [employer].     Routing to provider for next steps for Sublocade.     Noelle Whalen RN on 5/5/2025 at 9:48 AM

## 2025-05-06 RX ORDER — ALBUTEROL SULFATE 0.83 MG/ML
2.5 SOLUTION RESPIRATORY (INHALATION)
OUTPATIENT
Start: 2025-05-06

## 2025-05-06 RX ORDER — DIPHENHYDRAMINE HYDROCHLORIDE 50 MG/ML
25 INJECTION, SOLUTION INTRAMUSCULAR; INTRAVENOUS
Start: 2025-05-06

## 2025-05-06 RX ORDER — ALBUTEROL SULFATE 90 UG/1
1-2 INHALANT RESPIRATORY (INHALATION)
Start: 2025-05-06

## 2025-05-06 RX ORDER — LIDOCAINE HYDROCHLORIDE 10 MG/ML
2 INJECTION, SOLUTION EPIDURAL; INFILTRATION; INTRACAUDAL; PERINEURAL ONCE
OUTPATIENT
Start: 2025-05-06 | End: 2025-05-06

## 2025-05-06 RX ORDER — EPINEPHRINE 1 MG/ML
0.3 INJECTION, SOLUTION, CONCENTRATE INTRAVENOUS EVERY 5 MIN PRN
OUTPATIENT
Start: 2025-05-06

## 2025-05-06 RX ORDER — METHYLPREDNISOLONE SODIUM SUCCINATE 40 MG/ML
40 INJECTION INTRAMUSCULAR; INTRAVENOUS
Start: 2025-05-06

## 2025-05-06 RX ORDER — MEPERIDINE HYDROCHLORIDE 25 MG/ML
25 INJECTION INTRAMUSCULAR; INTRAVENOUS; SUBCUTANEOUS
OUTPATIENT
Start: 2025-05-06

## 2025-05-06 RX ORDER — DIPHENHYDRAMINE HYDROCHLORIDE 50 MG/ML
50 INJECTION, SOLUTION INTRAMUSCULAR; INTRAVENOUS
Start: 2025-05-06

## 2025-05-06 NOTE — TELEPHONE ENCOUNTER
- I have reviewed recommendations for comprehensive treatment plan with the patient  - I have reviewed the patient's medications comprehensively  and provided education to the patient on risks associated with concurrent use of benzodiazepines, alcohol, other sedatives with opioids  - I have recommended concomitant psychosocial support  - I have complied with all aspects of REMS program for Sublocade. [facility] where Sublocade will be administered is in compliance with all aspects of REMS program.   - Patient meets DSM-5 criteria for moderate or severe opioid use disorder  - Patient has been prescribed buprenorphine 8-24mg/day for at least one 1 week, demonstrating control of cravings and withdrawal symptoms as well as tolerance of buprenorphine.   - Patient will discontinue sublingual buprenorphine upon initiation of Sublocade  - Patient does not have evidence of tampering or attempts to remove the depot at the injection site.   - Patient does not have severe hepatic impairment.   - Patient does not have adrenal insufficiency.   - Patient does not have a history of long QT syndrome  - Patient does not take any antiarrhythmic medications or other medications known to significantly prolong QT interval   - Urine Drug Screen on 9/9/2024 was positive for buprenorphine. Patient will complete a UDS 5/12/25  - Patient will not be receiving methadone while on Sublocade  - Patient will not be receiving any other long acting buprenorphine products or long acting naltrexone while on Sublocade    - [place] including strengths and dates:    Filled  Written  ID  Drug  QTY  Days  Prescriber  RX #  Dispenser  Refill  Daily Dose*  Pymt Type      04/14/2025 02/17/2025 3 Suboxone 8 Mg-2 Mg Sl Film 60.00 30 He Bat [id number] Sup (1551) 2/2 16.00 mg [place]   04/14/2025 02/17/2025 3 Gabapentin 800 Mg Tablet 90.00 30 He Bat [id number] Sup (6674) 2/2  [place]   03/18/2025  02/17/2025 3 Suboxone 8 Mg-2 Mg Sl Film 60.00 30 He Bat [id number] Sup (1579) 1/2 16.00 mg [place]   03/17/2025 02/17/2025 3 Gabapentin 800 Mg Tablet 69.00 23 He Bat [id number] Sup (1579) 1/2  [place]   03/17/2025 02/17/2025 3 Gabapentin 800 Mg Tablet 21.00 7 He Bat [id number] Sup (1579) 1/2  Private [place]   02/19/2025 02/17/2025 3 Gabapentin 800 Mg Tablet 90.00 30 He Bat [id number] Sup (1579) 0/2  [place]   02/17/2025 02/17/2025 3 Suboxone 8 Mg-2 Mg Sl Film 60.00 30 He Bat [id number] Sup (1579) 0/2 16.00 mg [place]   01/21/2025 01/21/2025 2 Gabapentin 800 Mg Tablet 90.00 30 He Bat [id number] Sup (1579) 0/0  [place]   01/13/2025 12/02/2024 1 Suboxone 8 Mg-2 Mg Sl Film 60.00 30 He Bat [id number] Wal (2642) 1/1 16.00 mg [place]   12/28/2024 12/26/2024 1 Gabapentin 800 Mg Tablet 90.00 30 He Bat [id number] [facility] (3033) 0/0  [place]   12/09/2024 12/02/2024 1 Suboxone 8 Mg-2 Mg Sl Film 60.00 30 He Bat [id number] Wal (2642) 0/1 16.00 mg [place]   12/02/2024 12/02/2024 1 Gabapentin 800 Mg Tablet 90.00 30 He Bat [id number] Wal (3033) 0/0  [place]   11/08/2024 09/09/2024 1 Suboxone 8 Mg-2 Mg Sl Film 60.00 30 He Bat [id number] Wal (2642) 1/2 16.00 mg [place]   11/06/2024 09/09/2024 1 Gabapentin 800 Mg Tablet 90.00 30 He Bat [id number] Wal (2642) 2/2  [place]   10/11/2024 09/09/2024 1 Gabapentin 800 Mg Tablet 90.00 30 He Bat [id number] Wal (2642) 1/2  [place]

## 2025-05-07 ENCOUNTER — THERAPY VISIT (OUTPATIENT)
Dept: PHYSICAL THERAPY | Facility: CLINIC | Age: 50
End: 2025-05-07
Payer: COMMERCIAL

## 2025-05-07 DIAGNOSIS — M54.2 NECK PAIN: ICD-10-CM

## 2025-05-07 PROCEDURE — 97110 THERAPEUTIC EXERCISES: CPT | Mod: GP

## 2025-05-07 PROCEDURE — 97161 PT EVAL LOW COMPLEX 20 MIN: CPT | Mod: GP

## 2025-05-07 NOTE — PROGRESS NOTES
PHYSICAL THERAPY EVALUATION  Type of Visit: Evaluation       Fall Risk Screen:  Have you fallen 2 or more times in the past year?: No  Have you fallen and had an injury in the past year?: No  Is patient receiving Physical Therapy Services?: No    Subjective         Presenting condition or subjective complaint:    Date of onset: 05/07/25    Relevant medical history:     Dates & types of surgery:      Prior diagnostic imaging/testing results:       Prior therapy history for the same diagnosis, illness or injury:        Prior Level of Function  Transfers:   Ambulation:   ADL:   IADL:     Living Environment  Social support:     Type of home:     Stairs to enter the home:         Ramp:     Stairs inside the home:         Help at home:    Equipment owned:       Employment:      Hobbies/Interests:      Patient goals for therapy:      Physical Therapist Assessment    KEY PT FINDINGS:  1) Negative radicular symptoms  2) Postural Dysfunction  3) Right cervical rotation limited and painful    Signs and symptoms are consistent with neck pain with mobility deficits.      Subjective History    Patient was referred by Louisa Callahan for Neck pain [M54.2]   Referring provider plan: (M54.2) Neck pain  (primary encounter diagnosis)  Comment: chronic condition, appears to be musculoskeletal, muscle tension noted. History of DDD. Patient is concerned about a cyst or other mass, I don't appreciate a mass on exam today. I discussed my findings, muscle tension was noted, I don't recommend imaging. He didn't seem wholely convinced.  Plan: Physical Therapy  Referral    Right neck pain  Jared reports onset of pain when he turned his head to the right, developed pain in the right side that is aggravated by turning his head. Using a roller reduces pain somewhat but symptoms keep reoccuring. He reports pain that radiates up the right side of his face, and is severe, interferes with sleep.      He reports multiple aches and  "pain. He also has left shoulder pain, started when he was in a coma with covid for 2 years. He reports a hx of \"back back and knees\", he reports that he was scheduled for a disc fusion last summer, but he cancelled because he was worried about the surgery.  He's been seen by [facility] but he's concerned about going straight to surgery. He hasn't had physical therapy for his neck.    PT Subjective:   Patient is a 49 year old male presenting to outpatient physical therapy with neck pain. He was hospitalized 2 years ago with COVID for 2 months and noticed when he turns his head to the right he has right sided neck pain, concerned about a chronic knot that has gotten worse over time. Also dealing with left shoulder pain that is not getting any better. He was concerned about the \"bones in his neck\" due to stiffness and cracking. Direct pressure and massage seem to help. It is worse in the mornings when he gets up and the pain does wake him up at night. Feels tight. The pain does radiate down into the right shoulder and up into his right ear. Denies any numbness or tingling from the neck. Denies any significant weakness. He does get some headaches that go into his jaw as well.   Pain Level at Rest: 2/10  Pain Level with Use: 8/10  Pain Location: cervical spine  Pain Quality: Aching and tight  Pain Frequency: intermittent  Pain is Worst: in the mornings  Pain is Exacerbated By: turning head to the right, sleeping on it in a weird position  Pain is Relieved By: cold and massage  Pain Progression: Worsened     PMH:   Patient Active Problem List   Diagnosis    Chronic back pain    Essential hypertension    Elevated LFTs    Acute on chronic respiratory failure with hypoxia (H)    Pneumonia due to 2019 novel coronavirus    Atrial fibrillation with rapid ventricular response (H)    Acute respiratory distress syndrome (ARDS) due to COVID-19 virus (H)    Major depressive disorder, recurrent episode, severe (H) "    Opioid use disorder, severe, dependence (H)    Polysubstance abuse (H)    ROSSY (obstructive sleep apnea)    Mild intermittent asthma    Acute metabolic encephalopathy    Ventilator associated pneumonia (H)    Oropharyngeal dysphagia    Alcohol dependence (H)    Anxiety    Moderate to severe episode of recurrent major depressive disorder (H)    DONLAD (generalized anxiety disorder)    Primary osteoarthritis of right knee     Medications:   Current Outpatient Medications   Medication Sig Dispense Refill    acetaminophen (TYLENOL) 500 MG tablet Take 1-2 tablets (500-1,000 mg) by mouth every 6 hours as needed for mild pain 100 tablet 0    albuterol (PROAIR HFA/PROVENTIL HFA/VENTOLIN HFA) 108 (90 Base) MCG/ACT inhaler Inhale 2 puffs into the lungs every 4 hours as needed for shortness of breath / dyspnea 18 g 1    diclofenac (VOLTAREN) 1 % topical gel Apply 2 g topically 4 times daily 350 g 0    escitalopram (LEXAPRO) 10 MG tablet Take 5 mg daily for 7 days then take 10 mg daily 30 tablet 2    fluticasone (FLONASE) 50 MCG/ACT nasal spray SHAKE LIQUID AND USE 2 SPRAYS IN EACH NOSTRIL DAILY FOR 10 DAYS 16 g 3    fluticasone (FLOVENT DISKUS) 50 MCG/BLIST inhaler Inhale 1 puff into the lungs every 12 hours 60 each 11    gabapentin (NEURONTIN) 800 MG tablet Take 1 tablet (800 mg) by mouth 3 times daily. 90 tablet 2    hydrOXYzine HCl (ATARAX) 25 MG tablet Take 1 tablet (25 mg) by mouth 3 times daily as needed for itching. 90 tablet 2    lisinopril (ZESTRIL) 10 MG tablet Take 1 tablet (10 mg) by mouth daily. 30 tablet 0    metoprolol tartrate (LOPRESSOR) 50 MG tablet Take 1 tablet (50 mg) by mouth 2 times daily 60 tablet 0    naloxone (NARCAN) 4 MG/0.1ML nasal spray Spray 1 spray (4 mg) into one nostril alternating nostrils 2 times daily as needed for opioid reversal. every 2-3 minutes until assistance arrives 0.2 mL 2    nicotine (NICORETTE) 4 MG lozenge Place 1 lozenge (4 mg) inside cheek every hour as needed for nicotine  withdrawal symptoms 108 lozenge 2    nicotine polacrilex (NICORETTE) 4 MG gum Place 1 each (4 mg) inside cheek every hour as needed for nicotine withdrawal symptoms. Cinnamon 160 each 2    propranolol (INDERAL) 10 MG tablet Take 1 tablet (10 mg) by mouth daily as needed (anxiety). 30 tablet 2    SUBOXONE 8-2 MG per film Place 1 Film under the tongue 2 times daily. 60 Film 2     No current facility-administered medications for this visit.           Imaging: None  Red Flag Screening: negative  Prior Treatment Includes: None  Lifestyle History:  Occupation: Unknown  Experience with physical activity: Daily activities; basketball with his kid  General Health Assessment: NT.  Referral recommended? No  Additional Considerations: Future evaluation of L Shoulder    Patient Goals for Physical Therapy: Get rid of the neck pain.         Objective   Objective Examination    Posture/Observation: Rounded shoulders, Forward head, Thoracic kyphosis increased    Shoulder Screen: WFL    Thoracic Screen: Postural kyphosis moderate    Cervical AROM: (Major, Moderate, Minimal or Nil loss)  Movement Loss Reji Mod Min Nil Pain   Protrusion        Flexion   X     Retraction        Extension  X   + right side   Left Rotation   X     Right Rotation  X   + right side   Left Side Bending        Right Side bending        Quadrant Testing:      Palpation: Hypertonicity of R Levator Scapulae    Dermatomes:WNL    Myotomes:WNL    DTR S: WNL    CORD SIGNS: WNL    Special tests: WNL          Neural Tension:    ULTT1 (median): Not Tested  ULTT2b (radial): Not Tested  ULTT3 (ulnar): Not Tested    Joint Mobility:    Atlantooccipital Joint   C0-C1     Atlantoaxial Joint (CFRT)    L ROT R ROT   C1-C2      Lower Cervical Spine (PIVM)    L SG R SG   C2-C3     C3-C4 WNL WNL   C4-C5 WNL WNL   C5-C6 WNL WNL   C6-C7 WNL WNL   C7-T1 WNL WNL    Cervical Rotation Lateral Flexion Test (Kinga)    L ROT R ROT   1st Rib          Strength Testing:  Resisted  Isometrics C-Spine: N/A  Latissimus Dorsi MMT: Not Tested  Middle Trapezius/Rhomboids MMT: Not Tested  Lower Trapezius MMT: Not Tested    Flexibility: Not Tested    Craniocervical Flexion Endurance Test: N/A    Sensorimotor testing:   Cervical joint position sense (Norms <7.1 cm): N/A        Assessment & Plan   CLINICAL IMPRESSIONS  Medical Diagnosis: Neck pain    Treatment Diagnosis: Neck pain with mobility deficits   Impression/Assessment: Patient is a 49 year old male with neck complaints.  The following significant findings have been identified: Pain, Decreased ROM/flexibility, Decreased joint mobility, Decreased strength, Impaired muscle performance, Decreased activity tolerance, and Impaired posture. These impairments interfere with their ability to perform self care tasks, work tasks, recreational activities, household chores, driving , household mobility, and community mobility as compared to previous level of function.     Clinical Decision Making (Complexity):  Clinical Presentation: Stable/Uncomplicated  Clinical Presentation Rationale: based on medical and personal factors listed in PT evaluation  Clinical Decision Making (Complexity): Low complexity    PLAN OF CARE  Treatment Interventions:  Interventions: Manual Therapy, Neuromuscular Re-education, Therapeutic Activity, Therapeutic Exercise, Self-Care/Home Management    Long Term Goals     PT Goal 1  Goal Identifier: LTG1  Goal Description: Patient will be able to turn head 80 degrees to the right with 0/10 neck pain  Rationale: to maximize safety and independence with performance of ADLs and functional tasks;to maximize safety and independence within the home;to maximize safety and independence within the community;to maximize safety and independence with transportation;to maximize safety and independence with self cares  Target Date: 06/18/25  PT Goal 3  Goal Identifier: LTG2  Goal Description: Patient will report at least a 11.75 point improvement  on the NDI outcome measure  Rationale: to maximize safety and independence with performance of ADLs and functional tasks;to maximize safety and independence within the home;to maximize safety and independence within the community;to maximize safety and independence with transportation;to maximize safety and independence with self cares      Frequency of Treatment: 1x/wk  Duration of Treatment: 6 weeks    Recommended Referrals to Other Professionals:   Education Assessment:   Learner/Method: Patient    Risks and benefits of evaluation/treatment have been explained.   Patient/Family/caregiver agrees with Plan of Care.     Evaluation Time:     PT Eval, Low Complexity Minutes (36253): 20       Signing Clinician: KIA Acevedo [facility]                                                                                   OUTPATIENT PHYSICAL THERAPY      PLAN OF TREATMENT FOR OUTPATIENT REHABILITATION   Patient's Last Name, First Name, Jared Arce YOB: 1975   Provider's Name   [facility]   Medical Record No.  [id number]     Onset Date: 05/07/25  Start of Care Date: 05/07/25     Medical Diagnosis:  Neck pain      PT Treatment Diagnosis:  Neck pain with mobility deficits Plan of Treatment  Frequency/Duration: 1x/wk/ 6 weeks    Certification date from 05/07/25 to 06/18/25         See note for plan of treatment details and functional goals     Kwadwo Pryor PT                         I CERTIFY THE NEED FOR THESE SERVICES FURNISHED UNDER        THIS PLAN OF TREATMENT AND WHILE UNDER MY CARE     (Physician attestation of this document indicates review and certification of the therapy plan).              Referring Provider:  Louisa Callahan    Initial Assessment  See Epic Evaluation- Start of Care Date: 05/07/25

## 2025-05-12 ENCOUNTER — OFFICE VISIT (OUTPATIENT)
Dept: ADDICTION MEDICINE | Facility: CLINIC | Age: 50
End: 2025-05-12
Payer: COMMERCIAL

## 2025-05-12 VITALS
HEIGHT: 71 IN | DIASTOLIC BLOOD PRESSURE: 98 MMHG | OXYGEN SATURATION: 96 % | BODY MASS INDEX: 28.14 KG/M2 | WEIGHT: 201 LBS | SYSTOLIC BLOOD PRESSURE: 141 MMHG | HEART RATE: 87 BPM

## 2025-05-12 DIAGNOSIS — Z79.891 ENCOUNTER FOR MONITORING OPIOID MAINTENANCE THERAPY: ICD-10-CM

## 2025-05-12 DIAGNOSIS — Z71.6 ENCOUNTER FOR SMOKING CESSATION COUNSELING: ICD-10-CM

## 2025-05-12 DIAGNOSIS — I10 ESSENTIAL HYPERTENSION: ICD-10-CM

## 2025-05-12 DIAGNOSIS — G89.29 CHRONIC LOW BACK PAIN WITH RIGHT-SIDED SCIATICA, UNSPECIFIED BACK PAIN LATERALITY: ICD-10-CM

## 2025-05-12 DIAGNOSIS — F11.21 OPIOID USE DISORDER, SEVERE, IN SUSTAINED REMISSION (H): Primary | ICD-10-CM

## 2025-05-12 DIAGNOSIS — Z51.81 ENCOUNTER FOR MONITORING OPIOID MAINTENANCE THERAPY: ICD-10-CM

## 2025-05-12 DIAGNOSIS — M54.41 CHRONIC LOW BACK PAIN WITH RIGHT-SIDED SCIATICA, UNSPECIFIED BACK PAIN LATERALITY: ICD-10-CM

## 2025-05-12 DIAGNOSIS — F41.1 GAD (GENERALIZED ANXIETY DISORDER): ICD-10-CM

## 2025-05-12 LAB
AMPHETAMINE QUAL URINE POCT: NEGATIVE
BARBITURATE QUAL URINE POCT: NEGATIVE
BENZODIAZEPINE QUAL URINE POCT: NEGATIVE
BUPRENORPHINE QUAL URINE POCT: ABNORMAL
COCAINE QUAL URINE POCT: NEGATIVE
CREATININE QUAL URINE POCT: ABNORMAL
INTERNAL QC QUAL URINE POCT: ABNORMAL
MDMA QUAL URINE POCT: NEGATIVE
METHADONE QUAL URINE POCT: NEGATIVE
METHAMPHETAMINE QUAL URINE POCT: NEGATIVE
OPIATE QUAL URINE POCT: NEGATIVE
OXYCODONE QUAL URINE POCT: NEGATIVE
PH QUAL URINE POCT: ABNORMAL
PHENCYCLIDINE QUAL URINE POCT: NEGATIVE
POCT KIT EXPIRATION DATE: ABNORMAL
POCT KIT LOT NUMBER: ABNORMAL
SPECIFIC GRAVITY POCT: 1
TEMPERATURE URINE POCT: ABNORMAL
THC QUAL URINE POCT: NEGATIVE

## 2025-05-12 RX ORDER — GABAPENTIN 800 MG/1
800 TABLET ORAL 3 TIMES DAILY
Qty: 90 TABLET | Refills: 2 | Status: SHIPPED | OUTPATIENT
Start: 2025-05-12

## 2025-05-12 RX ORDER — BUPRENORPHINE HYDROCHLORIDE, NALOXONE HYDROCHLORIDE 8; 2 MG/1; MG/1
1 FILM, SOLUBLE BUCCAL; SUBLINGUAL 2 TIMES DAILY
Qty: 60 FILM | Refills: 0 | Status: SHIPPED | OUTPATIENT
Start: 2025-05-12

## 2025-05-12 RX ORDER — HYDROXYZINE HYDROCHLORIDE 25 MG/1
25 TABLET, FILM COATED ORAL 3 TIMES DAILY PRN
Qty: 90 TABLET | Refills: 2 | Status: SHIPPED | OUTPATIENT
Start: 2025-05-12

## 2025-05-12 RX ORDER — LISINOPRIL 10 MG/1
10 TABLET ORAL DAILY
Qty: 30 TABLET | Refills: 0 | Status: SHIPPED | OUTPATIENT
Start: 2025-05-12

## 2025-05-12 RX ORDER — IBUPROFEN 600 MG/1
600 TABLET, FILM COATED ORAL EVERY 6 HOURS PRN
COMMUNITY

## 2025-05-12 RX ORDER — PROPRANOLOL HYDROCHLORIDE 10 MG/1
10 TABLET ORAL 2 TIMES DAILY PRN
Qty: 30 TABLET | Refills: 2 | Status: SHIPPED | OUTPATIENT
Start: 2025-05-12

## 2025-05-12 ASSESSMENT — ANXIETY QUESTIONNAIRES
6. BECOMING EASILY ANNOYED OR IRRITABLE: NEARLY EVERY DAY
5. BEING SO RESTLESS THAT IT IS HARD TO SIT STILL: NOT AT ALL
8. IF YOU CHECKED OFF ANY PROBLEMS, HOW DIFFICULT HAVE THESE MADE IT FOR YOU TO DO YOUR WORK, TAKE CARE OF THINGS AT HOME, OR GET ALONG WITH OTHER PEOPLE?: VERY DIFFICULT
4. TROUBLE RELAXING: MORE THAN HALF THE DAYS
IF YOU CHECKED OFF ANY PROBLEMS ON THIS QUESTIONNAIRE, HOW DIFFICULT HAVE THESE PROBLEMS MADE IT FOR YOU TO DO YOUR WORK, TAKE CARE OF THINGS AT HOME, OR GET ALONG WITH OTHER PEOPLE: VERY DIFFICULT
2. NOT BEING ABLE TO STOP OR CONTROL WORRYING: MORE THAN HALF THE DAYS
GAD7 TOTAL SCORE: 14
GAD7 TOTAL SCORE: 14
7. FEELING AFRAID AS IF SOMETHING AWFUL MIGHT HAPPEN: NEARLY EVERY DAY
GAD7 TOTAL SCORE: 14
1. FEELING NERVOUS, ANXIOUS, OR ON EDGE: MORE THAN HALF THE DAYS
7. FEELING AFRAID AS IF SOMETHING AWFUL MIGHT HAPPEN: NEARLY EVERY DAY
3. WORRYING TOO MUCH ABOUT DIFFERENT THINGS: MORE THAN HALF THE DAYS

## 2025-05-12 ASSESSMENT — PATIENT HEALTH QUESTIONNAIRE - PHQ9
SUM OF ALL RESPONSES TO PHQ QUESTIONS 1-9: 20
SUM OF ALL RESPONSES TO PHQ QUESTIONS 1-9: 20
10. IF YOU CHECKED OFF ANY PROBLEMS, HOW DIFFICULT HAVE THESE PROBLEMS MADE IT FOR YOU TO DO YOUR WORK, TAKE CARE OF THINGS AT HOME, OR GET ALONG WITH OTHER PEOPLE: VERY DIFFICULT

## 2025-05-12 ASSESSMENT — PAIN SCALES - GENERAL: PAINLEVEL_OUTOF10: MODERATE PAIN (4)

## 2025-05-12 NOTE — PROGRESS NOTES
[facility] - Addiction Medicine       Rooming information:  Pain is getting worse  Pt reports that he needs a knee replacement   Depression is worse, c/o of having hard time concentrating   Mild-Moderate shakes in hands observed    Point of care urine drug screen positive for: BUP pos     Lab Results   Component Value Date    BUP Screen Positive (A) 05/12/2025    BZO Negative 05/12/2025    BAR Negative 05/12/2025    CHAHYA Negative 05/12/2025    MAMP Negative 05/12/2025    AMP Negative 05/12/2025    MDMA Negative 05/12/2025    MTD Negative 05/12/2025    OPH464 Negative 05/12/2025    OXY Negative 05/12/2025    PCP Negative 05/12/2025    THC Negative 05/12/2025    TEMP 90 F 05/12/2025    SGPOCT 1.005 05/12/2025       *POC urine drug screen does not screen for Fentanyl    PHQ-9 Scores:       2/17/2025     9:18 AM 4/18/2025     8:04 AM 5/12/2025    11:25 AM   PHQ   PHQ-9 Total Score 22  16  20    Q9: Thoughts of better off dead/self-harm past 2 weeks Not at all Not at all Not at all       Patient-reported     DONALD-7 Scores:      12/2/2024    10:59 AM 2/17/2025     9:24 AM 5/12/2025    11:30 AM   DONALD-7 SCORE   Total Score 15 (severe anxiety) 16 (severe anxiety) 14 (moderate anxiety)   Total Score 15  16  14        Patient-reported       Any other recent substance use:     Denies    NICOTINE- chews  If using nicotine, ready to quit? Yes: has been chewing Nicotine gum more often but he doesn't know if this helps    Side effects related to medications pt would like to discuss with provider (constipation, dry mouth, HA, GI upset, sedation?) No     Narcan currently available: UNK    Primary care provider: Paulette Sanders MD     Mental health provider: not at the moment (follow up on MH referral if needed)    Any housing, insurance deficits?: denies    Contact information up to date? yes    3rd Party Involvement NA (please obtain SHALOM if pt would like to include)      Meena Lee LPN  May 12, 2025  12:51 PM

## 2025-05-12 NOTE — PATIENT INSTRUCTIONS
Continue suboxone 8 mg BID on day of injection, then change to 4-8 mg daily as needed.      Sublocade Scheduling at Infusion Center     Your provider has ordered the injectable medication, Sublocade, as part of your treatment plan. This medication must be approved by your insurance company before it can be administered. That process is called a prior authorization. To start the prior authorization process, you need to schedule a Sublocade appointment at the [facility].     Please call the [facility] at [phone] to schedule your Sublocade injection as soon as possible.     If your insurance does not approve the Sublocade injection before your appointment, you will be contacted to reschedule your appointment at the [facility].     [facility]   [address], [address]nd [place] [address]   [phone]     Other potential sites for Sublocade administration:  [facility]): [phone]   [facility] ([place]): [phone]   [facility]): [phone]     Copays and Deductibles     If you have private or employer-based insurance, your insurance company may approve the Sublocade prior authorization, however may not cover any copay or deductible costs. Please contact your insurance directly regarding copay/deductible costs.     If you need copay/deductible assistance, contact [employer] at [email or web address] or [phone].     Eligibility requirements for copay/deductible assistance for patients with private insurance are as follows:      Private health insurance not funded by a government organization    At least 18 years of age and less than 65 years of age    Resident of the [place] or [place] [facility]    Resident of a state where copay assistance is not prohibited    Private insurance does not prohibit coupons/copay assistance for SUBLOCADE    Prescribed SUBLOCADE  for an indication approved by the Food and Drug    Administration

## 2025-05-12 NOTE — PROGRESS NOTES
burrp![facility]    A/P                                                    ASSESSMENT/PLAN    1. Opioid use disorder, severe, in sustained remission (H) (Primary)  Stable in remission since 2020. Is taking 16 mg of suboxone daily. Sublocade was ordered last week on a previous encounter, assisted with scheduling injection for 5/22/2025. Reviewed mechanism of action, potential side effects, and administration. Questions answered.   Continue suboxone 8 mg BID until sublocade is initiated. Then may take as needed for breakthrough symptoms. Explained that it can take multiple injections until steady state is achieved.   Providing RX for narcan  - naloxone (NARCAN) 4 MG/0.1ML nasal spray; Spray 1 spray (4 mg) into one nostril alternating nostrils 2 times daily as needed for opioid reversal. every 2-3 minutes until assistance arrives  Dispense: 0.2 mL; Refill: 2  - SUBOXONE 8-2 MG per film; Place 1 Film under the tongue 2 times daily.  Dispense: 60 Film; Refill: 0    2. Encounter for monitoring opioid maintenance therapy  - Drug Confirmation Panel Urine with Creat - lab collect; Future  - Drugs of Abuse Screen Urine (POC CUPS) POCT; Standing  - Drugs of Abuse Screen Urine (POC CUPS) POCT  - Drug Confirmation Panel Urine with Creat - lab collect    3. Encounter for smoking cessation counseling  Is abstaining from nicotine use  - nicotine polacrilex (NICORETTE) 4 MG gum; Place 1 each (4 mg) inside cheek every hour as needed for nicotine withdrawal symptoms. Cinnamon  Dispense: 160 each; Refill: 2    4. Chronic low back pain with right-sided sciatica, unspecified back pain laterality  Suboxone providing analgesic benefits. Also taking gabapentin 800 mg TID, continue. Was scheduled to establish care with external pain clinic but missed his appointment. Is considering whether or not he wants to reschedule this appointment.   - gabapentin (NEURONTIN) 800 MG tablet; Take 1 tablet (800 mg) by mouth 3  times daily.  Dispense: 90 tablet; Refill: 2    5. DONALD (generalized anxiety disorder)  Periods of increased anxiety, prn hydroxyzine and propranolol helping. Continue unchanged.   - hydrOXYzine HCl (ATARAX) 25 MG tablet; Take 1 tablet (25 mg) by mouth 3 times daily as needed for anxiety.  Dispense: 90 tablet; Refill: 2  - propranolol (INDERAL) 10 MG tablet; Take 1 tablet (10 mg) by mouth 2 times daily as needed (anxiety).  Dispense: 30 tablet; Refill: 2    6. Essential hypertension  Has appointment 6/10 to establish care with PCP. Providing 30 day refill until his appointment.   - lisinopril (ZESTRIL) 10 MG tablet; Take 1 tablet (10 mg) by mouth daily.  Dispense: 30 tablet; Refill: 0    No orders of the defined types were placed in this encounter.      Problem list updated Feb 17, 2025   No problems updated.          PDMP Review         Value Time User    State PDMP site checked  Yes 5/12/2025  1:05 PM Sherry Alvarado CNP                         04/14/2025 02/17/2025 3 Suboxone 8 Mg-2 Mg Sl Film 60.00 30 He Bat [id number] Sup (1579) 2/2 16.00 mg Medicaid [place]   04/14/2025 02/17/2025 3 Gabapentin 800 Mg Tablet 90.00 30 He Bat [id number] Sup (1579) 2/2  Medicaid [place]   03/18/2025 02/17/2025 3 Suboxone 8 Mg-2 Mg Sl Film 60.00 30 He Bat [id number] Sup (1579) 1/2 16.00 mg [place]   03/17/2025 02/17/2025 3 Gabapentin 800 Mg Tablet 69.00 23  Bat [id number] Sup (1579) 1/2  Medicaid [place]   03/17/2025 02/17/2025 3 Gabapentin 800 Mg Tablet 21.00 7  Bat [id number] Sup (1579) 1/2  Private Pay [place]   02/19/2025 02/17/2025 3 Gabapentin 800 Mg Tablet 90.00 30  Bat [id number] Sup (1579) 0/2  Medicaid [place]   02/17/2025 02/17/2025 3 Suboxone 8 Mg-2 Mg Sl Film 60.00 30 [facility]  No follow-ups on file.      Counseled the patient on the importance of having a recovery program in addition to medication to manage recovery.  Components include avoiding isolating, having willingness to change, avoiding triggers and managing cravings.  Encouraged having some type of sober network and practicing honesty with trusted support person(s). Encouraged other services such as counseling, 12 step or other self-help organizations.      Opioid warning reviewed.  Risk of overdose following a period of abstinence due to decrease tolerance was discussed including risk of death.  Strongly recommended abstain from alcohol, benzodiazepines, THC, opioids and other drugs of abuse.  Increased risk of return to opioid use after use of these substances discussed.  Increased risk of overdose/death with use of other substances particularly benzodiazepines/alcohol reviewed.        SUBJECTIVE                                                    Jared North is a 49 year old male with a history of recent Covid 19 injection, ARDS, Acute respiratory failure with hypoxia, MDD, Chronic back pain, and opioid use disorder who is being seen today for a follow up. He is taking suboxone 16 mg daily.     Brief History: Initial visit 2/11/2022. He was hospitalized from 12/31/21-1/24/22 for Covid 19 infection with complications. He required tracheostomy and ventilator support, and GT tube placement which were discontinued prior to his discharge to home. He reports being on methadone maintenance for 10-15 years at [facility]. He states he was able to abstain from heroin for awhile but alcohol consumption increased significantly . He was drinking 1 box of wine daily and using 1/2-1 gram heroin until he went to inpatient treatment in December of 2019.  He has not used heroin or alcohol since April 2020.      CD History:       SUBSTANCE(S)  OF CHOICE  - Opiates and alcohol       Withdrawal symptoms - None  IV drug use - used IV  Previous MAT - Methadone [facility]. Suboxone.   Previous detox/treatment - Two mentioned above  Current recovery activities: AA, IOP prior to hospital stay.   Longest period of sobriety - 2 years              Significant protective factors: Health and kids  Medical  complications from substance use - Hypertension,   History of overdose - No  Narcan currently available - NO     Other Substance Use:  ALCOHOL: None in 2 years drank 1 box daily  OPIATES   Per HPI  KRATOM: NO  BENZODIAZEPINES: Used occasional recreationally, and did take a couple of ativan in the last 2 weeks.   AMPHETAMINES/METHAMPHETAMINES: Tried a couple of times  PRESCRIPTION STIMULANTS: No  MARIJUANA: Tried delta 8 this week  COCAINE/CRACK; NO  HALLUCINOGENS: NO  ANABOLIC STEROIDS: No              OTHER  (Gambling, shopping): No  NICOTINE-Chewing tobacco restarted.                           Desire to quit yes  Previous attempts to quit Yes                            Hx of medication for smoking cessation   Patches and gu       Recent HPI Details: 2/17/2025  1. Opioid use disorder, severe, in sustained remission (H) (Primary)  Stable in sustained remission since 2020. Is taking 16 mg of suboxone. Discussed sublocade today, patient is considering. Discuss at follow up.  Continue suboxone unchanged  - SUBOXONE 8-2 MG per film; Place 1 Film under the tongue 2 times daily.  Dispense: 60 Film; Refill: 2    2. Chronic low back pain with right-sided sciatica, unspecified back pain laterality  Symptoms managed with gabapentin, continue  - gabapentin (NEURONTIN) 800 MG tablet; Take 1 tablet (800 mg) by mouth 3 times daily.  Dispense: 90 tablet; Refill: 2    3. Encounter for smoking cessation counseling  Requesting refill of NRT.   - nicotine polacrilex (NICORETTE) 4 MG gum; Place 1 each (4 mg) inside cheek every hour as needed for nicotine withdrawal symptoms. Cinnamon  Dispense: 160 each; Refill: 2    4. DONALD (generalized anxiety disorder)  Symptoms managed with hydroxyzine and propranolol. Patient resumed propranolol from old rx and would like to continue.   - hydrOXYzine HCl (ATARAX) 25 MG tablet; Take 1 tablet (25 mg) by mouth 3 times daily as needed for itching.  Dispense: 90 tablet; Refill: 2  - propranolol  (INDERAL) 10 MG tablet; Take 1 tablet (10 mg) by mouth daily as needed (anxiety).  Dispense: 30 tablet; Refill: 2       TODAY'S VISIT  HPI May 12, 2025    No significant changes. Has been looking for ways to keep busy, part time work.   Still living in the same place, son visits frequently.   Saw orthopedist about his chronic knee pain, pain is bilateral R > L.   Saw a PCP for his neck pain, PT ordered.   Still needs to be seen for his blood pressure, he was hoping she would address at his appointment for his neck pain.   Missed his appointment at pain clinic last weeks, was worried about being on suboxone and needing other pain medications.   Is taking suboxone 16 mg daily. Denies cravings/ use.   No longer taking THC for pain, stopped due to high costs.   Drinks occasionally, consumes 2 beers.                   2/17/2025     9:18 AM 4/18/2025     8:04 AM 5/12/2025    11:25 AM   PHQ   PHQ-9 Total Score 22  16  20    Q9: Thoughts of better off dead/self-harm past 2 weeks Not at all Not at all Not at all       Patient-reported       OBJECTIVE                                                    PHYSICAL EXAM:  There were no vitals taken for this visit.      Physical Exam  Pulmonary:      Effort: Pulmonary effort is normal. No respiratory distress.   Neurological:      General: No focal deficit present.      Mental Status: He is alert and oriented to person, place, and time.   Psychiatric:         Attention and Perception: Attention normal.         Mood and Affect: Mood is depressed.         Speech: Speech normal.         Behavior: Behavior is cooperative.         Thought Content: Thought content normal. Thought content does not include suicidal ideation. Thought content does not include suicidal plan.         Judgment: Judgment normal.         LAB      HISTORY                                                    Problem list reviewed & adjusted, as indicated.  Patient Active Problem List   Diagnosis    Chronic back pain     Essential hypertension    Elevated LFTs    Acute on chronic respiratory failure with hypoxia (H)    Pneumonia due to 2019 novel coronavirus    Atrial fibrillation with rapid ventricular response (H)    Acute respiratory distress syndrome (ARDS) due to COVID-19 virus (H)    Major depressive disorder, recurrent episode, severe (H)    Opioid use disorder, severe, dependence (H)    Polysubstance abuse (H)    ROSSY (obstructive sleep apnea)    Mild intermittent asthma    Acute metabolic encephalopathy    Ventilator associated pneumonia (H)    Oropharyngeal dysphagia    Alcohol dependence (H)    Anxiety    Moderate to severe episode of recurrent major depressive disorder (H)    DONALD (generalized anxiety disorder)    Primary osteoarthritis of right knee         MEDICATION LIST (prior to visit)  Current Outpatient Medications   Medication Sig Dispense Refill    nicotine polacrilex (NICORETTE) 4 MG gum Place 1 each (4 mg) inside cheek every hour as needed for nicotine withdrawal symptoms. Cinnamon 160 each 2    SUBOXONE 8-2 MG per film Place 1 Film under the tongue 2 times daily. 60 Film 2    acetaminophen (TYLENOL) 500 MG tablet Take 1-2 tablets (500-1,000 mg) by mouth every 6 hours as needed for mild pain 100 tablet 0    albuterol (PROAIR HFA/PROVENTIL HFA/VENTOLIN HFA) 108 (90 Base) MCG/ACT inhaler Inhale 2 puffs into the lungs every 4 hours as needed for shortness of breath / dyspnea 18 g 1    diclofenac (VOLTAREN) 1 % topical gel Apply 2 g topically 4 times daily 350 g 0    escitalopram (LEXAPRO) 10 MG tablet Take 5 mg daily for 7 days then take 10 mg daily 30 tablet 2    fluticasone (FLONASE) 50 MCG/ACT nasal spray SHAKE LIQUID AND USE 2 SPRAYS IN EACH NOSTRIL DAILY FOR 10 DAYS 16 g 3    fluticasone (FLOVENT DISKUS) 50 MCG/BLIST inhaler Inhale 1 puff into the lungs every 12 hours 60 each 11    gabapentin (NEURONTIN) 800 MG tablet Take 1 tablet (800 mg) by mouth 3 times daily. 90 tablet 2    hydrOXYzine HCl (ATARAX) 25  MG tablet Take 1 tablet (25 mg) by mouth 3 times daily as needed for itching. 90 tablet 2    lisinopril (ZESTRIL) 10 MG tablet Take 1 tablet (10 mg) by mouth daily. 30 tablet 0    metoprolol tartrate (LOPRESSOR) 50 MG tablet Take 1 tablet (50 mg) by mouth 2 times daily 60 tablet 0    naloxone (NARCAN) 4 MG/0.1ML nasal spray Spray 1 spray (4 mg) into one nostril alternating nostrils 2 times daily as needed for opioid reversal. every 2-3 minutes until assistance arrives 0.2 mL 2    nicotine (NICORETTE) 4 MG lozenge Place 1 lozenge (4 mg) inside cheek every hour as needed for nicotine withdrawal symptoms 108 lozenge 2    propranolol (INDERAL) 10 MG tablet Take 1 tablet (10 mg) by mouth daily as needed (anxiety). 30 tablet 2     No current facility-administered medications for this visit.       MEDICATION LIST (after visit)  Current Outpatient Medications   Medication Sig Dispense Refill    nicotine polacrilex (NICORETTE) 4 MG gum Place 1 each (4 mg) inside cheek every hour as needed for nicotine withdrawal symptoms. Cinnamon 160 each 2    SUBOXONE 8-2 MG per film Place 1 Film under the tongue 2 times daily. 60 Film 2    acetaminophen (TYLENOL) 500 MG tablet Take 1-2 tablets (500-1,000 mg) by mouth every 6 hours as needed for mild pain 100 tablet 0    albuterol (PROAIR HFA/PROVENTIL HFA/VENTOLIN HFA) 108 (90 Base) MCG/ACT inhaler Inhale 2 puffs into the lungs every 4 hours as needed for shortness of breath / dyspnea 18 g 1    diclofenac (VOLTAREN) 1 % topical gel Apply 2 g topically 4 times daily 350 g 0    escitalopram (LEXAPRO) 10 MG tablet Take 5 mg daily for 7 days then take 10 mg daily 30 tablet 2    fluticasone (FLONASE) 50 MCG/ACT nasal spray SHAKE LIQUID AND USE 2 SPRAYS IN EACH NOSTRIL DAILY FOR 10 DAYS 16 g 3    fluticasone (FLOVENT DISKUS) 50 MCG/BLIST inhaler Inhale 1 puff into the lungs every 12 hours 60 each 11    gabapentin (NEURONTIN) 800 MG tablet Take 1 tablet (800 mg) by mouth 3 times daily. 90 tablet  2    hydrOXYzine HCl (ATARAX) 25 MG tablet Take 1 tablet (25 mg) by mouth 3 times daily as needed for itching. 90 tablet 2    lisinopril (ZESTRIL) 10 MG tablet Take 1 tablet (10 mg) by mouth daily. 30 tablet 0    metoprolol tartrate (LOPRESSOR) 50 MG tablet Take 1 tablet (50 mg) by mouth 2 times daily 60 tablet 0    naloxone (NARCAN) 4 MG/0.1ML nasal spray Spray 1 spray (4 mg) into one nostril alternating nostrils 2 times daily as needed for opioid reversal. every 2-3 minutes until assistance arrives 0.2 mL 2    nicotine (NICORETTE) 4 MG lozenge Place 1 lozenge (4 mg) inside cheek every hour as needed for nicotine withdrawal symptoms 108 lozenge 2    propranolol (INDERAL) 10 MG tablet Take 1 tablet (10 mg) by mouth daily as needed (anxiety). 30 tablet 2     No current facility-administered medications for this visit.         No Known Allergies     Continued Complex Management  The longitudinal plan of care for Opioid Use Disorder (OUD) was addressed during this visit. Due to the added complexity in care, I will continue to support Jared in the subsequent management and with ongoing continuity of care.      Sherry Alvarado, BARI,MSN, AGNP-C  [facility]   [address] , [address]   [place] [address]   Phone # [phone]

## 2025-05-13 LAB — CREAT UR-MCNC: 125 MG/DL

## 2025-05-15 LAB
BUPRENORPHINE UR CFM-MCNC: 162 NG/ML
BUPRENORPHINE/CREAT UR: 130 NG/MG {CREAT}
GABAPENTIN UR QL CFM: PRESENT
NALOXONE UR CFM-MCNC: 548 NG/ML
NALOXONE: 438 NG/MG {CREAT}
NORBUPRENORPHINE UR CFM-MCNC: 185 NG/ML
NORBUPRENORPHINE/CREAT UR: 148 NG/MG {CREAT}

## 2025-05-27 ENCOUNTER — THERAPY VISIT (OUTPATIENT)
Dept: PHYSICAL THERAPY | Facility: CLINIC | Age: 50
End: 2025-05-27
Payer: COMMERCIAL

## 2025-05-27 DIAGNOSIS — M54.2 NECK PAIN: Primary | ICD-10-CM

## 2025-05-27 PROCEDURE — 97110 THERAPEUTIC EXERCISES: CPT | Mod: GP | Performed by: PHYSICAL THERAPIST

## 2025-05-27 PROCEDURE — 97140 MANUAL THERAPY 1/> REGIONS: CPT | Mod: GP | Performed by: PHYSICAL THERAPIST

## 2025-06-10 ENCOUNTER — OFFICE VISIT (OUTPATIENT)
Dept: FAMILY MEDICINE | Facility: CLINIC | Age: 50
End: 2025-06-10
Payer: COMMERCIAL

## 2025-06-10 ENCOUNTER — ORDERS ONLY (AUTO-RELEASED) (OUTPATIENT)
Dept: FAMILY MEDICINE | Facility: CLINIC | Age: 50
End: 2025-06-10

## 2025-06-10 VITALS
SYSTOLIC BLOOD PRESSURE: 120 MMHG | WEIGHT: 200.6 LBS | OXYGEN SATURATION: 97 % | TEMPERATURE: 97.3 F | DIASTOLIC BLOOD PRESSURE: 81 MMHG | RESPIRATION RATE: 16 BRPM | HEART RATE: 68 BPM | HEIGHT: 70 IN | BODY MASS INDEX: 28.72 KG/M2

## 2025-06-10 DIAGNOSIS — M25.512 CHRONIC LEFT SHOULDER PAIN: ICD-10-CM

## 2025-06-10 DIAGNOSIS — Z00.00 ROUTINE GENERAL MEDICAL EXAMINATION AT A HEALTH CARE FACILITY: Primary | ICD-10-CM

## 2025-06-10 DIAGNOSIS — M25.561 CHRONIC PAIN OF RIGHT KNEE: ICD-10-CM

## 2025-06-10 DIAGNOSIS — Z12.5 SCREENING FOR PROSTATE CANCER: ICD-10-CM

## 2025-06-10 DIAGNOSIS — G89.29 CHRONIC RIGHT SHOULDER PAIN: ICD-10-CM

## 2025-06-10 DIAGNOSIS — M25.511 CHRONIC RIGHT SHOULDER PAIN: ICD-10-CM

## 2025-06-10 DIAGNOSIS — Z13.1 SCREENING FOR DIABETES MELLITUS: ICD-10-CM

## 2025-06-10 DIAGNOSIS — J45.20 MILD INTERMITTENT ASTHMA WITHOUT COMPLICATION: ICD-10-CM

## 2025-06-10 DIAGNOSIS — Z12.11 SCREEN FOR COLON CANCER: ICD-10-CM

## 2025-06-10 DIAGNOSIS — I10 ESSENTIAL HYPERTENSION: ICD-10-CM

## 2025-06-10 DIAGNOSIS — Z13.6 SCREENING FOR CARDIOVASCULAR CONDITION: ICD-10-CM

## 2025-06-10 DIAGNOSIS — G89.29 CHRONIC LEFT SHOULDER PAIN: ICD-10-CM

## 2025-06-10 DIAGNOSIS — G89.29 CHRONIC PAIN OF RIGHT KNEE: ICD-10-CM

## 2025-06-10 PROBLEM — I48.91 ATRIAL FIBRILLATION WITH RAPID VENTRICULAR RESPONSE (H): Status: RESOLVED | Noted: 2021-11-29 | Resolved: 2025-06-10

## 2025-06-10 LAB
ANION GAP SERPL CALCULATED.3IONS-SCNC: 8 MMOL/L (ref 7–15)
BUN SERPL-MCNC: 17.9 MG/DL (ref 6–20)
CALCIUM SERPL-MCNC: 9.7 MG/DL (ref 8.8–10.4)
CHLORIDE SERPL-SCNC: 105 MMOL/L (ref 98–107)
CHOLEST SERPL-MCNC: 277 MG/DL
CREAT SERPL-MCNC: 0.96 MG/DL (ref 0.67–1.17)
EGFRCR SERPLBLD CKD-EPI 2021: >90 ML/MIN/1.73M2
FASTING STATUS PATIENT QL REPORTED: NO
FASTING STATUS PATIENT QL REPORTED: NO
GLUCOSE SERPL-MCNC: 96 MG/DL (ref 70–99)
HCO3 SERPL-SCNC: 26 MMOL/L (ref 22–29)
HDLC SERPL-MCNC: 63 MG/DL
LDLC SERPL CALC-MCNC: 199 MG/DL
NONHDLC SERPL-MCNC: 214 MG/DL
POTASSIUM SERPL-SCNC: 4.8 MMOL/L (ref 3.4–5.3)
PSA SERPL DL<=0.01 NG/ML-MCNC: 1.05 NG/ML (ref 0–3.5)
SODIUM SERPL-SCNC: 139 MMOL/L (ref 135–145)
TRIGL SERPL-MCNC: 77 MG/DL

## 2025-06-10 PROCEDURE — G0103 PSA SCREENING: HCPCS | Performed by: NURSE PRACTITIONER

## 2025-06-10 PROCEDURE — 80061 LIPID PANEL: CPT | Performed by: NURSE PRACTITIONER

## 2025-06-10 PROCEDURE — 36415 COLL VENOUS BLD VENIPUNCTURE: CPT | Performed by: NURSE PRACTITIONER

## 2025-06-10 PROCEDURE — 99214 OFFICE O/P EST MOD 30 MIN: CPT | Mod: 25 | Performed by: NURSE PRACTITIONER

## 2025-06-10 PROCEDURE — 80048 BASIC METABOLIC PNL TOTAL CA: CPT | Performed by: NURSE PRACTITIONER

## 2025-06-10 PROCEDURE — G2211 COMPLEX E/M VISIT ADD ON: HCPCS | Performed by: NURSE PRACTITIONER

## 2025-06-10 PROCEDURE — 99396 PREV VISIT EST AGE 40-64: CPT | Performed by: NURSE PRACTITIONER

## 2025-06-10 RX ORDER — LISINOPRIL 10 MG/1
10 TABLET ORAL DAILY
Qty: 90 TABLET | Refills: 3 | Status: SHIPPED | OUTPATIENT
Start: 2025-06-10

## 2025-06-10 RX ORDER — ALBUTEROL SULFATE 90 UG/1
2 INHALANT RESPIRATORY (INHALATION) EVERY 4 HOURS PRN
Qty: 18 G | Refills: 1 | Status: SHIPPED | OUTPATIENT
Start: 2025-06-10

## 2025-06-10 SDOH — HEALTH STABILITY: PHYSICAL HEALTH: ON AVERAGE, HOW MANY DAYS PER WEEK DO YOU ENGAGE IN MODERATE TO STRENUOUS EXERCISE (LIKE A BRISK WALK)?: 2 DAYS

## 2025-06-10 ASSESSMENT — ANXIETY QUESTIONNAIRES
7. FEELING AFRAID AS IF SOMETHING AWFUL MIGHT HAPPEN: NEARLY EVERY DAY
6. BECOMING EASILY ANNOYED OR IRRITABLE: MORE THAN HALF THE DAYS
1. FEELING NERVOUS, ANXIOUS, OR ON EDGE: NEARLY EVERY DAY
4. TROUBLE RELAXING: NEARLY EVERY DAY
GAD7 TOTAL SCORE: 18
2. NOT BEING ABLE TO STOP OR CONTROL WORRYING: NEARLY EVERY DAY
5. BEING SO RESTLESS THAT IT IS HARD TO SIT STILL: SEVERAL DAYS
IF YOU CHECKED OFF ANY PROBLEMS ON THIS QUESTIONNAIRE, HOW DIFFICULT HAVE THESE PROBLEMS MADE IT FOR YOU TO DO YOUR WORK, TAKE CARE OF THINGS AT HOME, OR GET ALONG WITH OTHER PEOPLE: VERY DIFFICULT
GAD7 TOTAL SCORE: 18
GAD7 TOTAL SCORE: 18
7. FEELING AFRAID AS IF SOMETHING AWFUL MIGHT HAPPEN: NEARLY EVERY DAY
3. WORRYING TOO MUCH ABOUT DIFFERENT THINGS: NEARLY EVERY DAY
8. IF YOU CHECKED OFF ANY PROBLEMS, HOW DIFFICULT HAVE THESE MADE IT FOR YOU TO DO YOUR WORK, TAKE CARE OF THINGS AT HOME, OR GET ALONG WITH OTHER PEOPLE?: VERY DIFFICULT

## 2025-06-10 ASSESSMENT — ASTHMA QUESTIONNAIRES
QUESTION_1 LAST FOUR WEEKS HOW MUCH OF THE TIME DID YOUR ASTHMA KEEP YOU FROM GETTING AS MUCH DONE AT WORK, SCHOOL OR AT HOME: SOME OF THE TIME
QUESTION_2 LAST FOUR WEEKS HOW OFTEN HAVE YOU HAD SHORTNESS OF BREATH: ONCE A DAY
ACT_TOTALSCORE: 15
QUESTION_5 LAST FOUR WEEKS HOW WOULD YOU RATE YOUR ASTHMA CONTROL: SOMEWHAT CONTROLLED
QUESTION_3 LAST FOUR WEEKS HOW OFTEN DID YOUR ASTHMA SYMPTOMS (WHEEZING, COUGHING, SHORTNESS OF BREATH, CHEST TIGHTNESS OR PAIN) WAKE YOU UP AT NIGHT OR EARLIER THAN USUAL IN THE MORNING: NOT AT ALL
QUESTION_4 LAST FOUR WEEKS HOW OFTEN HAVE YOU USED YOUR RESCUE INHALER OR NEBULIZER MEDICATION (SUCH AS ALBUTEROL): ONE OR TWO TIMES PER DAY

## 2025-06-10 ASSESSMENT — SOCIAL DETERMINANTS OF HEALTH (SDOH): HOW OFTEN DO YOU GET TOGETHER WITH FRIENDS OR RELATIVES?: NEVER

## 2025-06-10 ASSESSMENT — PAIN SCALES - GENERAL: PAINLEVEL_OUTOF10: SEVERE PAIN (7)

## 2025-06-10 NOTE — PROGRESS NOTES
Preventive Care Visit  [facility]  Elly Barnes, BARI, Nurse Practitioner Primary Care  Erick 10, 2025      Assessment & Plan     Routine general medical examination at a health care facility  Reviewed medical/social/family history and health maintenance   Declined all vaccines today.    Essential hypertension   Stable, tolerating med, no changes at this time  - BASIC METABOLIC PANEL; Future  - lisinopril (ZESTRIL) 10 MG tablet; Take 1 tablet (10 mg) by mouth daily.  - BASIC METABOLIC PANEL    Mild intermittent asthma without complication  Stable overall, using albuterol 4-7 times a week due to air quality issues.  If symptoms worsen, will refer to allergy clinic vs pulmonology given history of prolonged intubation.   - albuterol (PROAIR HFA/PROVENTIL HFA/VENTOLIN HFA) 108 (90 Base) MCG/ACT inhaler; Inhale 2 puffs into the lungs every 4 hours as needed for shortness of breath.    Chronic left shoulder pain  Working with PT for right shoulder issues, will add on therapy of left.  He is already established with an ortho managing knee pain, but would like referral to discuss bilateral shoulders.  He is requesting imaging studies, will defer to ortho.   - Physical Therapy  Referral; Future  - Orthopedic  Referral; Future    Chronic right shoulder pain  See above.  Concerned about weakness in arm which might be a cervical spin issue.  He was going to have surgery through [facility], but cancelled.  Recommend continuing PT and will refer to ortho for consideration of additional work up.   - Orthopedic  Referral; Future    Chronic pain of right knee  Following with an orthopedic provider, getting knee injections.  He is interested in new referral within the system.    - Orthopedic  Referral; Future    Screen for colon cancer  - ASIF([employer]); Future    Screening for cardiovascular condition  - Lipid panel reflex to direct LDL  "Non-fasting; Future  - Lipid panel reflex to direct LDL Non-fasting    Screening for prostate cancer  - PSA, screen; Future  - PSA, screen    Screening for diabetes mellitus  BMP ordered    Patient has been advised of split billing requirements and indicates understanding: Yes    The longitudinal plan of care for the diagnosis(es)/condition(s) as documented were addressed during this visit. Due to the added complexity in care, I will continue to support Jared in the subsequent management and with ongoing continuity of care.    BMI  Estimated body mass index is 28.78 kg/m  as calculated from the following:    Height as of this encounter: 1.778 m (5' 10\").    Weight as of this encounter: 91 kg (200 lb 9.6 oz).   Weight management plan: Discussed healthy diet and exercise guidelines    Counseling  Appropriate preventive services were addressed with this patient via screening, questionnaire, or discussion as appropriate for fall prevention, nutrition, physical activity, Tobacco-use cessation, social engagement, weight loss and cognition.  Checklist reviewing preventive services available has been given to the patient.  Reviewed patient's diet, addressing concerns and/or questions.   He is at risk for lack of exercise and has been provided with information to increase physical activity for the benefit of his well-being.   Patient is at risk for social isolation and has been provided with information about the benefit of social connection.   The patient was instructed to see the dentist every 6 months.   He is at risk for psychosocial distress and has been provided with information to reduce risk.   The patient's PHQ-9 score is consistent with severe depression. He was provided with information regarding depression.             Len Coleman is a 49 year old, presenting for the following:  Physical and Establish Care        6/10/2025     2:39 PM   Additional Questions   Roomed by Renee EARL- " was intubated for 6 weeks  Currently on disability for back pain and knee problems.    Working with PT for right shoulder pain.      Following with addiction med- this stemmed from back problems.  Pain is getting worse.    Following with ortho for knee- they are doing injections, not helping.  Might have knee replacement.  Was supposed to have surgery on his back with [facility], but decided not to proceed.     Currently on suboxone, sober for 5-6 years.        Strength feels like it is diminishing fast- wondering if testosterone is off.    Albuterol, using 4-7 times a week.  .  Breathing feels a bit stuffy/wheezy.    Was on methadone for a long time after getting off the pain meds, that was worse.      Used to work as an .    Shoulder pain, when intubated in restraints.  Left doesn't feel like it healed.  When moving, working, feels like it tears a lot.                Advance Care Planning    Discussed advance care planning with patient; informed AVS has link to [employer].        6/10/2025   General Health   How would you rate your overall physical health? (!) FAIR   Feel stress (tense, anxious, or unable to sleep) To some extent   (!) STRESS CONCERN      6/10/2025   Nutrition   Three or more servings of calcium each day? (!) NO   Diet: Regular (no restrictions)   How many servings of fruit and vegetables per day? (!) 2-3   How many sweetened beverages each day? 0-1         6/10/2025   Exercise   Days per week of moderate/strenous exercise 2 days   (!) EXERCISE CONCERN      6/10/2025   Social Factors   Frequency of gathering with friends or relatives Never   Worry food won't last until get money to buy more No   Food not last or not have enough money for food? Yes   Do you have housing? (Housing is defined as stable permanent housing and does not include staying outside in a car, in a tent, in an abandoned building, in an overnight shelter, or couch-surfing.) Yes   Are you worried  "about losing your housing? Yes   Lack of transportation? Yes   Unable to get utilities (heat,electricity)? No   Want help with housing or utility concern? No   (!) FOOD SECURITY CONCERN PRESENT (!) TRANSPORTATION CONCERN PRESENT(!) HOUSING CONCERN PRESENT(!) SOCIAL CONNECTIONS CONCERN      6/10/2025   Fall Risk   Fallen 2 or more times in the past year? No   Trouble with walking or balance? No          6/10/2025   Dental   Dentist two times every year? (!) NO       Today's PHQ-9 Score:       6/10/2025     2:27 PM   PHQ-9 SCORE   PHQ-9 Total Score MyChart 21 (Severe depression)   PHQ-9 Total Score 21        Patient-reported         6/10/2025   Substance Use   Alcohol more than 3/day or more than 7/wk No   Do you use any other substances recreationally? No     Social History     Tobacco Use    Smoking status: Never    Smokeless tobacco: Current     Types: Chew    Tobacco comments:     long cut loose chew 2 yahaira/week   Vaping Use    Vaping status: Never Used   Substance Use Topics    Alcohol use: Not Currently     Alcohol/week: 21.0 standard drinks of alcohol     Types: 21 Standard drinks or equivalent per week     Comment: 11/24/22    Drug use: No             6/10/2025   One time HIV Screening   Previous HIV test? No         6/10/2025   STI Screening   New sexual partner(s) since last STI/HIV test? No   ASCVD Risk   The ASCVD Risk score (Shad LOCO, et al., 2019) failed to calculate for the following reasons:    Cannot find a previous HDL lab    Cannot find a previous total cholesterol lab            6/10/2025   Contraception/Family Planning   Questions about contraception or family planning No        Reviewed and updated as needed this visit by Provider                             Objective    Exam  /81 (BP Location: Right arm, Patient Position: Sitting, Cuff Size: Adult Large)   Pulse 68   Temp 97.3  F (36.3  C) (Temporal)   Resp 16   Ht 1.778 m (5' 10\")   Wt 91 kg (200 lb 9.6 oz)   SpO2 97%   " "BMI 28.78 kg/m     Estimated body mass index is 28.78 kg/m  as calculated from the following:    Height as of this encounter: 1.778 m (5' 10\").    Weight as of this encounter: 91 kg (200 lb 9.6 oz).    Physical Exam  GENERAL: alert and no distress  EYES: Eyes grossly normal to inspection, PERRL and conjunctivae and sclerae normal  HENT: ear canals and TM's normal, nose and mouth without ulcers or lesions  NECK: no adenopathy, no asymmetry, masses, or scars  RESP: lungs clear to auscultation - no rales, rhonchi or wheezes  CV: regular rate and rhythm, normal S1 S2, no S3 or S4, no murmur, click or rub, no peripheral edema  ABDOMEN: soft, nontender, no hepatosplenomegaly, no masses and bowel sounds normal  MS: no gross musculoskeletal defects noted, no edema  SKIN: no suspicious lesions or rashes  NEURO: Normal strength and tone, mentation intact and speech normal  PSYCH: mentation appears normal, affect normal/bright        Signed Electronically by: Elly Barnes DNP    Answers submitted by the patient for this visit:  Patient Health Questionnaire (Submitted on 6/10/2025)  If you checked off any problems, how difficult have these problems made it for you to do your work, take care of things at home, or get along with other people?: Very difficult  PHQ9 TOTAL SCORE: 21  Patient Health Questionnaire (G7) (Submitted on 6/10/2025)  DONALD 7 TOTAL SCORE: 18    "

## 2025-06-10 NOTE — PROGRESS NOTES
Preventive Care Visit  [facility]  Elly Barnes DNP, Nurse Practitioner Primary Care  Erick 10, 2025  {Provider  Link to [facility] :592546}    {PROVIDER CHARTING PREFERENCE:572468}    Len Coleman is a 49 year old, presenting for the following:  Physical and Establish Care        6/10/2025     2:39 PM   Additional Questions   Roomed by Renee WALDEN  ***   {MA/LPN/RN Pre-Provider Visit Orders- hCG/UA/Strep (Optional):744448}  {SUPERLIST (Optional):205914}  {additonal problems for provider to add (Optional):999760}  Advance Care Planning  {The storyboard will display whether the patient has ACP docs on file. Hover over the Code section in the storyboard to access the ACP documents. :506535}  Document on file is a Health Care Directive or POLST.        6/10/2025   General Health   How would you rate your overall physical health? (!) FAIR   Feel stress (tense, anxious, or unable to sleep) To some extent   (!) STRESS CONCERN      6/10/2025   Nutrition   Three or more servings of calcium each day? (!) NO   Diet: Regular (no restrictions)   How many servings of fruit and vegetables per day? (!) 2-3   How many sweetened beverages each day? 0-1         6/10/2025   Exercise   Days per week of moderate/strenous exercise 2 days   (!) EXERCISE CONCERN      6/10/2025   Social Factors   Frequency of gathering with friends or relatives Never   Worry food won't last until get money to buy more No   Food not last or not have enough money for food? Yes   Do you have housing? (Housing is defined as stable permanent housing and does not include staying outside in a car, in a tent, in an abandoned building, in an overnight shelter, or couch-surfing.) Yes   Are you worried about losing your housing? Yes   Lack of transportation? Yes   Unable to get utilities (heat,electricity)? No   Want help with housing or utility concern? No   (!) FOOD SECURITY CONCERN PRESENT (!) TRANSPORTATION CONCERN  PRESENT(!) HOUSING CONCERN PRESENT(!) SOCIAL CONNECTIONS CONCERN      6/10/2025   Fall Risk   Fallen 2 or more times in the past year? No   Trouble with walking or balance? No          6/10/2025   Dental   Dentist two times every year? (!) NO       Today's PHQ-9 Score:       6/10/2025     2:27 PM   PHQ-9 SCORE   PHQ-9 Total Score MyChart 21 (Severe depression)   PHQ-9 Total Score 21        Patient-reported         6/10/2025   Substance Use   Alcohol more than 3/day or more than 7/wk No   Do you use any other substances recreationally? No     Social History     Tobacco Use    Smoking status: Never    Smokeless tobacco: Current     Types: Chew    Tobacco comments:     long cut loose chew 2 yahaira/week   Vaping Use    Vaping status: Never Used   Substance Use Topics    Alcohol use: Not Currently     Alcohol/week: 21.0 standard drinks of alcohol     Types: 21 Standard drinks or equivalent per week     Comment: 11/24/22    Drug use: No     {Provider  If there are gaps in the social history shown above, please follow the link to update and then refresh the note Link to Social and Substance History :146767}        6/10/2025   One time HIV Screening   Previous HIV test? No         6/10/2025   STI Screening   New sexual partner(s) since last STI/HIV test? No   ASCVD Risk   The ASCVD Risk score (Shad LOCO, et al., 2019) failed to calculate for the following reasons:    Cannot find a previous HDL lab    Cannot find a previous total cholesterol lab    {Link to Fracture Risk Assessment Tool (Optional):444410}        6/10/2025   Contraception/Family Planning   Questions about contraception or family planning No     {Provider  REQUIRED FOR AWV Use the storyboard to review patient history, after sections have been marked as reviewed, refresh note to capture documentation:016815}   Reviewed and updated as needed this visit by Provider                    {HISTORY OPTIONS (Optional):076523}    {ODALIS Taylor  "(Optional):550859}     Objective    Exam  There were no vitals taken for this visit.   Estimated body mass index is 28.03 kg/m  as calculated from the following:    Height as of 5/12/25: 1.803 m (5' 11\").    Weight as of 5/12/25: 91.2 kg (201 lb).    Physical Exam  {Exam Choices (Optional):734052}        Signed Electronically by: Elly Barnes DNP  {Email feedback regarding this note to [email or web address]   :890530}  Answers submitted by the patient for this visit:  Patient Health Questionnaire (Submitted on 6/10/2025)  If you checked off any problems, how difficult have these problems made it for you to do your work, take care of things at home, or get along with other people?: Very difficult  PHQ9 TOTAL SCORE: 21  Patient Health Questionnaire (G7) (Submitted on 6/10/2025)  DONALD 7 TOTAL SCORE: 18    "

## 2025-06-10 NOTE — PATIENT INSTRUCTIONS
Patient Education   Preventive Care Advice   This is general advice given by our system to help you stay healthy. However, your care team may have specific advice just for you. Please talk to your care team about your preventive care needs.  Nutrition  Eat 5 or more servings of fruits and vegetables each day.  Try wheat bread, brown rice and whole grain pasta (instead of white bread, rice, and pasta).  Get enough calcium and vitamin D. Check the label on foods and aim for 100% of the RDA (recommended daily allowance).  Lifestyle  Exercise at least 150 minutes each week  (30 minutes a day, 5 days a week).  Do muscle strengthening activities 2 days a week. These help control your weight and prevent disease.  No smoking.  Wear sunscreen to prevent skin cancer.  Have a dental exam and cleaning every 6 months.  Yearly exams  See your health care team every year to talk about:  Any changes in your health.  Any medicines your care team has prescribed.  Preventive care, family planning, and ways to prevent chronic diseases.  Shots (vaccines)   HPV shots (up to age 26), if you've never had them before.  Hepatitis B shots (up to age 59), if you've never had them before.  COVID-19 shot: Get this shot when it's due.  Flu shot: Get a flu shot every year.  Tetanus shot: Get a tetanus shot every 10 years.  Pneumococcal, hepatitis A, and RSV shots: Ask your care team if you need these based on your risk.  Shingles shot (for age 50 and up)  General health tests  Diabetes screening:  Starting at age 35, Get screened for diabetes at least every 3 years.  If you are younger than age 35, ask your care team if you should be screened for diabetes.  Cholesterol test: At age 39, start having a cholesterol test every 5 years, or more often if advised.  Bone density scan (DEXA): At age 50, ask your care team if you should have this scan for osteoporosis (brittle bones).  Hepatitis C: Get tested at least once in your life.  STIs (sexually  transmitted infections)  Before age 24: Ask your care team if you should be screened for STIs.  After age 24: Get screened for STIs if you're at risk. You are at risk for STIs (including HIV) if:  You are sexually active with more than one person.  You don't use condoms every time.  You or a partner was diagnosed with a sexually transmitted infection.  If you are at risk for HIV, ask about PrEP medicine to prevent HIV.  Get tested for HIV at least once in your life, whether you are at risk for HIV or not.  Cancer screening tests  Cervical cancer screening: If you have a cervix, begin getting regular cervical cancer screening tests starting at age 21.  Breast cancer scan (mammogram): If you've ever had breasts, begin having regular mammograms starting at age 40. This is a scan to check for breast cancer.  Colon cancer screening: It is important to start screening for colon cancer at age 45.  Have a colonoscopy test every 10 years (or more often if you're at risk) Or, ask your provider about stool tests like a FIT test every year or Cologuard test every 3 years.  To learn more about your testing options, visit:   .  For help making a decision, visit:   https://bit.ly/cw58916.  Prostate cancer screening test: If you have a prostate, ask your care team if a prostate cancer screening test (PSA) at age 55 is right for you.  Lung cancer screening: If you are a current or former smoker ages 50 to 80, ask your care team if ongoing lung cancer screenings are right for you.  For informational purposes only. Not to replace the advice of your health care provider. Copyright   2023 [facility]. All rights reserved. Clinically reviewed by the [facility]. [employer] [id number] - REV 01/24.  Relationships for Good Health  Relationships are important for our health and happiness. Social isolation, loneliness and lack of support are bad for your health. Studies show that loneliness can harm health  and limit your life span as much as high blood pressure and smoking.   Take some time to reflect on your relationships. Then answer these questions:  Are there people in your life that cause you stress or drain your energy? What can you do to set limits?  ________________________________________________________________________________________________________________________________________________________________________________________________________________________________________________________________________________________________________________________________________________  Who do you enjoy spending time with? Who can you go to for support?  ________________________________________________________________________________________________________________________________________________________________________________________________________________________________________________________________________________________________________________________________________________  What can you do to improve your relationships with others?  __________________________________________________________________________________________________________________________________________________________________________________________________________________  ______________________________________________________________________________________________________________________________  What do you like most about your relationships with others?  ________________________________________________________________________________________________________________________________________________________________________________________________________________________________________________________________________________________________________________________________________________  My goal: ______________________________________________________________________  I will:  ______________________________________________________________________________________________________________________________________________________________________________________________    For informational purposes only. Not to replace the advice of your health care provider. Copyright   2018 [facility]. All rights reserved. Clinically reviewed by [facility]  [facility]. [employer] [id number] - Rev 06/24.  Learning About Stress  What is stress?     Stress is your body's response to a hard situation. Your body can have a physical, emotional, or mental response. Stress is a fact of life for most people, and it affects everyone differently. What causes stress for you may not be stressful for someone else.  A lot of things can cause stress. You may feel stress when you go on a job interview, take a test, or run a race. This kind of short-term stress is normal and even useful. It can help you if you need to work hard or react quickly. For example, stress can help you finish an important job on time.  Long-term stress is caused by ongoing stressful situations or events. Examples of long-term stress include long-term health problems, ongoing problems at work, or conflicts in your family. Long-term stress can harm your health.  How does stress affect your health?  When you are stressed, your body responds as though you are in danger. It makes hormones that speed up your heart, make you breathe faster, and give you a burst of energy. This is called the fight-or-flight stress response. If the stress is over quickly, your body goes back to normal and no harm is done.  But if stress happens too often or lasts too long, it can have bad effects. Long-term stress can make you more likely to get sick, and it can make symptoms of some diseases worse. If you tense up when you are stressed, you may develop neck, shoulder, or low back pain. Stress is linked to high blood pressure and heart disease.  Stress also  harms your emotional health. It can make you xiong, tense, or depressed. Your relationships may suffer, and you may not do well at work or school.  What can you do to manage stress?  You can try these things to help manage stress:   Do something active. Exercise or activity can help reduce stress. Walking is a great way to get started. Even everyday activities such as housecleaning or yard work can help.  Try yoga or lito chi. These techniques combine exercise and meditation. You may need some training at first to learn them.  Do something you enjoy. For example, listen to music or go to a movie. Practice your hobby or do volunteer work.  Meditate. This can help you relax, because you are not worrying about what happened before or what may happen in the future.  Do guided imagery. Imagine yourself in any setting that helps you feel calm. You can use online videos, books, or a teacher to guide you.  Do breathing exercises. For example:  From a standing position, bend forward from the waist with your knees slightly bent. Let your arms dangle close to the floor.  Breathe in slowly and deeply as you return to a standing position. Roll up slowly and lift your head last.  Hold your breath for just a few seconds in the standing position.  Breathe out slowly and bend forward from the waist.  Let your feelings out. Talk, laugh, cry, and express anger when you need to. Talking with supportive friends or family, a counselor, or a mario alberto leader about your feelings is a healthy way to relieve stress. Avoid discussing your feelings with people who make you feel worse.  Write. It may help to write about things that are bothering you. This helps you find out how much stress you feel and what is causing it. When you know this, you can find better ways to cope.  What can you do to prevent stress?  You might try some of these things to help prevent stress:  Manage your time. This helps you find time to do the things you want and need to  "do.  Get enough sleep. Your body recovers from the stresses of the day while you are sleeping.  Get support. Your family, friends, and community can make a difference in how you experience stress.  Limit your news feed. Avoid or limit time on social media or news that may make you feel stressed.  Do something active. Exercise or activity can help reduce stress. Walking is a great way to get started.  Where can you learn more?  Go to https://www.[employer]/patiented  Enter N032 in the search box to learn more about \"Learning About Stress.\"  Current as of: October 24, 2024  Content Version: 14.5    [id number] [employer].   Care instructions adapted under license by your healthcare professional. If you have questions about a medical condition or this instruction, always ask your healthcare professional. [employer] disclaims any warranty or liability for your use of this information.    Learning About Depression Screening  What is depression screening?  Depression screening is a way to see if you have depression symptoms. It may be done by a doctor or counselor. It's often part of a routine checkup. That's because your mental health is just as important as your physical health.  Depression is a mental health condition that affects how you feel, think, and act. You may:  Have less energy.  Lose interest in your daily activities.  Feel sad and grouchy for a long time.  Depression is very common. It affects people of all ages.  Many things can lead to depression. Some people become depressed after they have a stroke or find out they have a major illness like cancer or heart disease. The death of a loved one or a breakup may lead to depression. It can run in families. Most experts believe that a combination of inherited genes and stressful life events can cause it.  What happens during screening?  You may be asked to fill out a form about your depression symptoms. You and the doctor will discuss " "your answers. The doctor may ask you more questions to learn more about how you think, act, and feel.  What happens after screening?  If you have symptoms of depression, your doctor will talk to you about your options.  Doctors usually treat depression with medicines or counseling. Often, combining the two works best. Many people don't get help because they think that they'll get over the depression on their own. But people with depression may not get better unless they get treatment.  The cause of depression is not well understood. There may be many factors involved. But if you have depression, it's not your fault.  A serious symptom of depression is thinking about death or suicide. If you or someone you care about talks about this or about feeling hopeless, get help right away.  It's important to know that depression can be treated. Medicine, counseling, and self-care may help.  Where can you learn more?  Go to https://www.[employer].net/patiented  Enter T185 in the search box to learn more about \"Learning About Depression Screening.\"  Current as of: July 31, 2024  Content Version: 14.5    [id number] [employer].   Care instructions adapted under license by your healthcare professional. If you have questions about a medical condition or this instruction, always ask your healthcare professional. [employer] disclaims any warranty or liability for your use of this information.       "

## 2025-06-11 ENCOUNTER — PATIENT OUTREACH (OUTPATIENT)
Dept: CARE COORDINATION | Facility: CLINIC | Age: 50
End: 2025-06-11
Payer: COMMERCIAL

## 2025-06-12 ENCOUNTER — RESULTS FOLLOW-UP (OUTPATIENT)
Dept: FAMILY MEDICINE | Facility: CLINIC | Age: 50
End: 2025-06-12

## 2025-06-17 ENCOUNTER — MYC MEDICAL ADVICE (OUTPATIENT)
Dept: FAMILY MEDICINE | Facility: CLINIC | Age: 50
End: 2025-06-17
Payer: COMMERCIAL

## 2025-06-17 DIAGNOSIS — M25.512 CHRONIC LEFT SHOULDER PAIN: Primary | ICD-10-CM

## 2025-06-17 DIAGNOSIS — M54.2 NECK PAIN: ICD-10-CM

## 2025-06-17 DIAGNOSIS — G89.29 CHRONIC LEFT SHOULDER PAIN: Primary | ICD-10-CM

## 2025-06-19 NOTE — TELEPHONE ENCOUNTER
Patent was referred to ortho at our last visit- will defer imaging to them.  It is ok to continue PT until seen.

## 2025-06-24 ENCOUNTER — VIRTUAL VISIT (OUTPATIENT)
Dept: ADDICTION MEDICINE | Facility: CLINIC | Age: 50
End: 2025-06-24
Payer: COMMERCIAL

## 2025-06-24 DIAGNOSIS — F11.21 OPIOID USE DISORDER, SEVERE, IN SUSTAINED REMISSION (H): Primary | ICD-10-CM

## 2025-06-24 DIAGNOSIS — F41.1 GAD (GENERALIZED ANXIETY DISORDER): ICD-10-CM

## 2025-06-24 DIAGNOSIS — G89.29 CHRONIC NECK PAIN: ICD-10-CM

## 2025-06-24 DIAGNOSIS — M54.41 CHRONIC LOW BACK PAIN WITH RIGHT-SIDED SCIATICA, UNSPECIFIED BACK PAIN LATERALITY: ICD-10-CM

## 2025-06-24 DIAGNOSIS — G89.29 CHRONIC LOW BACK PAIN WITH RIGHT-SIDED SCIATICA, UNSPECIFIED BACK PAIN LATERALITY: ICD-10-CM

## 2025-06-24 DIAGNOSIS — M54.2 CHRONIC NECK PAIN: ICD-10-CM

## 2025-06-24 RX ORDER — BUPRENORPHINE HYDROCHLORIDE, NALOXONE HYDROCHLORIDE 8; 2 MG/1; MG/1
1 FILM, SOLUBLE BUCCAL; SUBLINGUAL 2 TIMES DAILY
Qty: 60 FILM | Refills: 0 | Status: SHIPPED | OUTPATIENT
Start: 2025-06-24

## 2025-06-24 ASSESSMENT — ANXIETY QUESTIONNAIRES
4. TROUBLE RELAXING: NEARLY EVERY DAY
GAD7 TOTAL SCORE: 19
2. NOT BEING ABLE TO STOP OR CONTROL WORRYING: NEARLY EVERY DAY
GAD7 TOTAL SCORE: 19
8. IF YOU CHECKED OFF ANY PROBLEMS, HOW DIFFICULT HAVE THESE MADE IT FOR YOU TO DO YOUR WORK, TAKE CARE OF THINGS AT HOME, OR GET ALONG WITH OTHER PEOPLE?: VERY DIFFICULT
3. WORRYING TOO MUCH ABOUT DIFFERENT THINGS: NEARLY EVERY DAY
7. FEELING AFRAID AS IF SOMETHING AWFUL MIGHT HAPPEN: NEARLY EVERY DAY
1. FEELING NERVOUS, ANXIOUS, OR ON EDGE: NEARLY EVERY DAY
5. BEING SO RESTLESS THAT IT IS HARD TO SIT STILL: SEVERAL DAYS
IF YOU CHECKED OFF ANY PROBLEMS ON THIS QUESTIONNAIRE, HOW DIFFICULT HAVE THESE PROBLEMS MADE IT FOR YOU TO DO YOUR WORK, TAKE CARE OF THINGS AT HOME, OR GET ALONG WITH OTHER PEOPLE: VERY DIFFICULT
6. BECOMING EASILY ANNOYED OR IRRITABLE: NEARLY EVERY DAY

## 2025-06-24 NOTE — NURSING NOTE
Current patient location: [address] [place] [id number]    Is the patient currently in the state of [place]? YES    Visit mode: CONVERT TO TELEPHONE    If the visit is dropped, the patient can be reconnected by:TELEPHONE VISIT: Phone number:   Telephone Information:   Mobile [phone]       Will anyone else be joining the visit? NO  (If patient encounters technical issues they should call [phone]    Are changes needed to the allergy or medication list? No    Are refills needed on medications prescribed by this physician? Discuss with provider    Rooming Documentation:  Questionnaire(s) completed    Reason for visit: RECHECK    [place] ELIZALDE

## 2025-06-24 NOTE — PROGRESS NOTES
"Virtual Visit Details    Type of service:  Video Visit   Video Start Time: {video visit start/end time for provider to select:578770}  Video End Time:{video visit start/end time for provider to select:228033}    Originating Location (pt. Location): {video visit patient location:672966::\"Home\"}  {PROVIDER LOCATION On-site should be selected for visits conducted from your clinic location or adjoining [facility] hospital, academic office, or other nearby [facility] building. Off-site should be selected for all other provider locations, including home:490701}  Distant Location (provider location):  {virtual location provider:506330}  Platform used for Video Visit: {Virtual Visit Platforms:185381::\"[employer]\"}  "

## 2025-06-24 NOTE — PROGRESS NOTES
Time spent on phone call: [phone] minutes  Originating Location (pt. Location): Home    Distant Location (provider location):  Home    Patient is unable to complete video visit due to lack of access to remote video technology.           [facility]    A/P                                                    ASSESSMENT/PLAN    1. Opioid use disorder, severe, in sustained remission (H) (Primary)  Stable in sustained remission on 16 mg of suboxone daily. Has decided not to pursue sublocade at this time, and would like to continue with sl buprenorphine.   Continue suboxone 8 mg BID unchanged. Recommended split dosing for analgesic benefits.   Confirms narcan access  - SUBOXONE 8-2 MG per film; Place 1 Film under the tongue 2 times daily.  Dispense: 60 Film; Refill: 0    2. Chronic low back pain with right-sided sciatica, unspecified back pain laterality  3. Chronic neck pain  Started PT for chronic pain and experience worsening symptoms after treatment. Saw PCP who referred him to ortho, and is scheduled nest week. Is taking gabapentin 800 mg TID. Continue no refill needed  Buprenorphine providing some analgesic benefits.       4. DONALD (generalized anxiety disorder)  Anxioety managed with propranolol, hydroxyzine and gabapentin, continue. No refill needed.           Orders Placed This Encounter   Medications    SUBOXONE 8-2 MG per film     Sig: Place 1 Film under the tongue 2 times daily.     Dispense:  60 Film     Refill:  0       Problem list updated Feb 17, 2025   No problems updated.          PDMP Review         Value Time User    State PDMP site checked  Yes 6/24/2025  3:54 PM Sherry Alvarado CNP                RTC  Return in about 5 weeks (around 7/28/2025) for Follow up, in person 1130.      Counseled the patient on the importance of having a recovery program in addition to medication to manage recovery.  Components include avoiding isolating, having willingness to change, avoiding  triggers and managing cravings. Encouraged having some type of sober network and practicing honesty with trusted support person(s). Encouraged other services such as counseling, 12 step or other self-help organizations.      Opioid warning reviewed.  Risk of overdose following a period of abstinence due to decrease tolerance was discussed including risk of death.  Strongly recommended abstain from alcohol, benzodiazepines, THC, opioids and other drugs of abuse.  Increased risk of return to opioid use after use of these substances discussed.  Increased risk of overdose/death with use of other substances particularly benzodiazepines/alcohol reviewed.        SUBJECTIVE                                                    Jared North is a 49 year old male with a history of recent Covid 19 injection, ARDS, Acute respiratory failure with hypoxia, MDD, Chronic back pain, and opioid use disorder who is being seen today for a follow up. He is taking suboxone 16 mg daily.     Brief History: Initial visit 2/11/2022. He was hospitalized from 12/31/21-1/24/22 for Covid 19 infection with complications. He required tracheostomy and ventilator support, and GT tube placement which were discontinued prior to his discharge to home. He reports being on methadone maintenance for 10-15 years at [facility]. He states he was able to abstain from heroin for awhile but alcohol consumption increased significantly . He was drinking 1 box of wine daily and using 1/2-1 gram heroin until he went to inpatient treatment in December of 2019.  He has not used heroin or alcohol since April 2020.      CD History:       SUBSTANCE(S)  OF CHOICE  - Opiates and alcohol       Withdrawal symptoms - None  IV drug use - used IV  Previous MAT - Methadone [facility]. Suboxone.   Previous detox/treatment - Two mentioned above  Current recovery activities: AA, IOP prior to hospital stay.   Longest period of sobriety - 2 years              Significant protective factors:  Health and kids  Medical complications from substance use - Hypertension,   History of overdose - No  Narcan currently available - NO     Other Substance Use:  ALCOHOL: None in 2 years drank 1 box daily  OPIATES   Per HPI  KRATOM: NO  BENZODIAZEPINES: Used occasional recreationally, and did take a couple of ativan in the last 2 weeks.   AMPHETAMINES/METHAMPHETAMINES: Tried a couple of times  PRESCRIPTION STIMULANTS: No  MARIJUANA: Tried delta 8 this week  COCAINE/CRACK; NO  HALLUCINOGENS: NO  ANABOLIC STEROIDS: No              OTHER  (Gambling, shopping): No  NICOTINE-Chewing tobacco restarted.                           Desire to quit yes  Previous attempts to quit Yes                            Hx of medication for smoking cessation   Patches and gu       Recent HPI Details: 2/17/2025  1. Opioid use disorder, severe, in sustained remission (H) (Primary)  Stable in remission since 2020. Is taking 16 mg of suboxone daily. Sublocade was ordered last week on a previous encounter, assisted with scheduling injection for 5/22/2025. Reviewed mechanism of action, potential side effects, and administration. Questions answered.   Continue suboxone 8 mg BID until sublocade is initiated. Then may take as needed for breakthrough symptoms. Explained that it can take multiple injections until steady state is achieved.   Providing RX for narcan  - naloxone (NARCAN) 4 MG/0.1ML nasal spray; Spray 1 spray (4 mg) into one nostril alternating nostrils 2 times daily as needed for opioid reversal. every 2-3 minutes until assistance arrives  Dispense: 0.2 mL; Refill: 2  - SUBOXONE 8-2 MG per film; Place 1 Film under the tongue 2 times daily.  Dispense: 60 Film; Refill: 0    2. Encounter for monitoring opioid maintenance therapy  - Drug Confirmation Panel Urine with Creat - lab collect; Future  - Drugs of Abuse Screen Urine (POC CUPS) POCT; Standing  - Drugs of Abuse Screen Urine (POC CUPS) POCT  - Drug Confirmation Panel Urine with Creat -  lab collect    3. Encounter for smoking cessation counseling  Is abstaining from nicotine use  - nicotine polacrilex (NICORETTE) 4 MG gum; Place 1 each (4 mg) inside cheek every hour as needed for nicotine withdrawal symptoms. Cinnamon  Dispense: 160 each; Refill: 2    4. Chronic low back pain with right-sided sciatica, unspecified back pain laterality  Suboxone providing analgesic benefits. Also taking gabapentin 800 mg TID, continue. Was scheduled to establish care with external pain clinic but missed his appointment. Is considering whether or not he wants to reschedule this appointment.   - gabapentin (NEURONTIN) 800 MG tablet; Take 1 tablet (800 mg) by mouth 3 times daily.  Dispense: 90 tablet; Refill: 2    5. DONALD (generalized anxiety disorder)  Periods of increased anxiety, prn hydroxyzine and propranolol helping. Continue unchanged.   - hydrOXYzine HCl (ATARAX) 25 MG tablet; Take 1 tablet (25 mg) by mouth 3 times daily as needed for anxiety.  Dispense: 90 tablet; Refill: 2  - propranolol (INDERAL) 10 MG tablet; Take 1 tablet (10 mg) by mouth 2 times daily as needed (anxiety).  Dispense: 30 tablet; Refill: 2    6. Essential hypertension  Has appointment 6/10 to establish care with PCP. Providing 30 day refill until his appointment.   - lisinopril (ZESTRIL) 10 MG tablet; Take 1 tablet (10 mg) by mouth daily.  Dispense: 30 tablet; Refill: 0    TODAY'S VISIT  HPI Jun 24, 2025    Needs refills of his suboxone, did not have any refills of his suboxone. He was scheduled for sublocade but decided not to pursue the injection.  Has been having a lot of phone and tech issues, and was unable to do his appointment on 6/10.  Denies cravings or return use.   Is experiencing increased pain. Started PT and symptoms worsened after receiving treatment.   Experiencing bilateral shoulder pain. Has appointment with ortho next week for evaluation.   Has estbablished with a [facility] PCP 6/10/25          4/18/2025     8:04 AM 5/12/2025     11:25 AM 6/10/2025     2:27 PM   PHQ   PHQ-9 Total Score 16  20  21    Q9: Thoughts of better off dead/self-harm past 2 weeks Not at all Not at all Not at all       Patient-reported       OBJECTIVE                                                    PHYSICAL EXAM:  There were no vitals taken for this visit.      Physical Exam  Pulmonary:      Effort: Pulmonary effort is normal. No respiratory distress.   Neurological:      General: No focal deficit present.      Mental Status: He is alert and oriented to person, place, and time.   Psychiatric:         Attention and Perception: Attention normal.         Mood and Affect: Mood is depressed.         Speech: Speech normal.         Behavior: Behavior is cooperative.         Thought Content: Thought content normal. Thought content does not include suicidal ideation. Thought content does not include suicidal plan.         Judgment: Judgment normal.         LAB      HISTORY                                                    Problem list reviewed & adjusted, as indicated.  Patient Active Problem List   Diagnosis    Chronic back pain    Essential hypertension    Elevated LFTs    Acute on chronic respiratory failure with hypoxia (H)    Pneumonia due to 2019 novel coronavirus    Acute respiratory distress syndrome (ARDS) due to COVID-19 virus (H)    Major depressive disorder, recurrent episode, severe (H)    Opioid use disorder, severe, dependence (H)    Polysubstance abuse (H)    ROSSY (obstructive sleep apnea)    Mild intermittent asthma    Acute metabolic encephalopathy    Ventilator associated pneumonia (H)    Oropharyngeal dysphagia    Alcohol dependence (H)    Anxiety    Moderate to severe episode of recurrent major depressive disorder (H)    DONALD (generalized anxiety disorder)    Primary osteoarthritis of right knee         MEDICATION LIST (prior to visit)  Current Outpatient Medications   Medication Sig Dispense Refill    SUBOXONE 8-2 MG per film Place 1 Film under the  tongue 2 times daily. 60 Film 0    albuterol (PROAIR HFA/PROVENTIL HFA/VENTOLIN HFA) 108 (90 Base) MCG/ACT inhaler Inhale 2 puffs into the lungs every 4 hours as needed for shortness of breath. 18 g 1    diclofenac (VOLTAREN) 1 % topical gel Apply 2 g topically 4 times daily (Patient taking differently: Apply 2 g topically as needed.) 350 g 0    escitalopram (LEXAPRO) 10 MG tablet Take 5 mg daily for 7 days then take 10 mg daily (Patient not taking: Reported on 6/10/2025) 30 tablet 2    gabapentin (NEURONTIN) 800 MG tablet Take 1 tablet (800 mg) by mouth 3 times daily. 90 tablet 2    hydrOXYzine HCl (ATARAX) 25 MG tablet Take 1 tablet (25 mg) by mouth 3 times daily as needed for anxiety. 90 tablet 2    lisinopril (ZESTRIL) 10 MG tablet Take 1 tablet (10 mg) by mouth daily. 90 tablet 3    naloxone (NARCAN) 4 MG/0.1ML nasal spray Spray 1 spray (4 mg) into one nostril alternating nostrils 2 times daily as needed for opioid reversal. every 2-3 minutes until assistance arrives 0.2 mL 2    nicotine polacrilex (NICORETTE) 4 MG gum Place 1 each (4 mg) inside cheek every hour as needed for nicotine withdrawal symptoms. Cinnamon 160 each 2    propranolol (INDERAL) 10 MG tablet Take 1 tablet (10 mg) by mouth 2 times daily as needed (anxiety). 30 tablet 2     No current facility-administered medications for this visit.       MEDICATION LIST (after visit)  Current Outpatient Medications   Medication Sig Dispense Refill    SUBOXONE 8-2 MG per film Place 1 Film under the tongue 2 times daily. 60 Film 0    albuterol (PROAIR HFA/PROVENTIL HFA/VENTOLIN HFA) 108 (90 Base) MCG/ACT inhaler Inhale 2 puffs into the lungs every 4 hours as needed for shortness of breath. 18 g 1    diclofenac (VOLTAREN) 1 % topical gel Apply 2 g topically 4 times daily (Patient taking differently: Apply 2 g topically as needed.) 350 g 0    escitalopram (LEXAPRO) 10 MG tablet Take 5 mg daily for 7 days then take 10 mg daily (Patient not taking: Reported on  6/10/2025) 30 tablet 2    gabapentin (NEURONTIN) 800 MG tablet Take 1 tablet (800 mg) by mouth 3 times daily. 90 tablet 2    hydrOXYzine HCl (ATARAX) 25 MG tablet Take 1 tablet (25 mg) by mouth 3 times daily as needed for anxiety. 90 tablet 2    lisinopril (ZESTRIL) 10 MG tablet Take 1 tablet (10 mg) by mouth daily. 90 tablet 3    naloxone (NARCAN) 4 MG/0.1ML nasal spray Spray 1 spray (4 mg) into one nostril alternating nostrils 2 times daily as needed for opioid reversal. every 2-3 minutes until assistance arrives 0.2 mL 2    nicotine polacrilex (NICORETTE) 4 MG gum Place 1 each (4 mg) inside cheek every hour as needed for nicotine withdrawal symptoms. Cinnamon 160 each 2    propranolol (INDERAL) 10 MG tablet Take 1 tablet (10 mg) by mouth 2 times daily as needed (anxiety). 30 tablet 2     No current facility-administered medications for this visit.         No Known Allergies     Continued Complex Management  The longitudinal plan of care for Opioid Use Disorder (OUD) was addressed during this visit. Due to the added complexity in care, I will continue to support Jared in the subsequent management and with ongoing continuity of care.      Sherry Alvarado, BARI,MSN, AGNP-C  [facility]   [address] , [address]   [place] [address]   Phone # [phone]

## 2025-06-30 ENCOUNTER — OFFICE VISIT (OUTPATIENT)
Dept: ORTHOPEDICS | Facility: CLINIC | Age: 50
End: 2025-06-30
Payer: COMMERCIAL

## 2025-06-30 ENCOUNTER — ANCILLARY PROCEDURE (OUTPATIENT)
Dept: GENERAL RADIOLOGY | Facility: CLINIC | Age: 50
End: 2025-06-30
Attending: STUDENT IN AN ORGANIZED HEALTH CARE EDUCATION/TRAINING PROGRAM
Payer: COMMERCIAL

## 2025-06-30 DIAGNOSIS — R20.2 PARESTHESIA OF ARM: ICD-10-CM

## 2025-06-30 DIAGNOSIS — M25.511 CHRONIC RIGHT SHOULDER PAIN: ICD-10-CM

## 2025-06-30 DIAGNOSIS — G89.29 CHRONIC NECK PAIN: ICD-10-CM

## 2025-06-30 DIAGNOSIS — G89.29 CHRONIC PAIN OF RIGHT KNEE: ICD-10-CM

## 2025-06-30 DIAGNOSIS — M25.512 CHRONIC LEFT SHOULDER PAIN: ICD-10-CM

## 2025-06-30 DIAGNOSIS — G89.29 CHRONIC RIGHT SHOULDER PAIN: ICD-10-CM

## 2025-06-30 DIAGNOSIS — M62.81 MUSCLE WEAKNESS OF RIGHT UPPER EXTREMITY: ICD-10-CM

## 2025-06-30 DIAGNOSIS — G89.29 CHRONIC LEFT SHOULDER PAIN: ICD-10-CM

## 2025-06-30 DIAGNOSIS — M54.2 CHRONIC NECK PAIN: Primary | ICD-10-CM

## 2025-06-30 DIAGNOSIS — M54.2 CHRONIC NECK PAIN: ICD-10-CM

## 2025-06-30 DIAGNOSIS — M25.561 CHRONIC PAIN OF RIGHT KNEE: ICD-10-CM

## 2025-06-30 DIAGNOSIS — G89.29 CHRONIC NECK PAIN: Primary | ICD-10-CM

## 2025-06-30 PROCEDURE — 72040 X-RAY EXAM NECK SPINE 2-3 VW: CPT | Mod: TC | Performed by: RADIOLOGY

## 2025-06-30 PROCEDURE — 99203 OFFICE O/P NEW LOW 30 MIN: CPT | Performed by: STUDENT IN AN ORGANIZED HEALTH CARE EDUCATION/TRAINING PROGRAM

## 2025-06-30 NOTE — PROGRESS NOTES
ASSESSMENT & PLAN    Jared was seen today for pain and pain.    Diagnoses and all orders for this visit:    Chronic neck pain  -     XR Cervical Spine 2/3 vws; Future    Muscle weakness of right upper extremity  -     EMG; Future    Paresthesia of arm  -     EMG; Future    Chronic left shoulder pain  -     Orthopedic  Referral  -     XR Cervical Spine 2/3 vws; Future    Chronic right shoulder pain  -     Orthopedic  Referral    Chronic pain of right knee  -     Orthopedic  Referral      Patient is experiencing cervical radiculopathy in the setting of chronic neck pain. He has weaknes in the upper extremity compared to CL. He reports a previous MRI from [facility] so will try and get access to that. Will order an EMG to assess for if there is a nerve compression. Lower suspicion for TOS based on symptoms but still in the differential.               Arash Sierra MD  [facility]    -----------------------------------------------------------------------------------------      SUBJECTIVE  Jared North is a/an 49 year old who has bilateral shoulder pain.      Reason for Visit:  Injured/painful/body part: Bilateral shoulder weakness/pain  What are your symptoms: Constant burning pain  Date of injury/Onset: 2 year  Cause: Patient describes injury as turning head while sleeping and now lacks ROM in neck and has discomfort/weakness in the right upper extremity.   History of similar pain: Previous back related pain/symptoms  What makes it better: Massage, Ice Packs  What makes it worse: Lifting, sleeping, endurance  What have you done for this problem: [facility] Treatments Tried : Topicals (ice/heat, voltaren), PT/OT (2 visits), and Home exercises    Previous surgeries: None  Social History/Occupation:  - Currently on disability      REVIEW OF SYSTEMS:  Positive ROS was noted in the HPI, otherwise negative.       OBJECTIVE:  Gen: no acute  distress  Cervical:   Observation: normal without ecchymosis or rashes    TTP:  Bony: negative  Paraspinal: positive  Trigger point: positive    ROM:  Flexion: abnormal,   Extension: abnormal,   Sidebending: abnormal,   Rotation: abnormal,     Dermatomes: at neutral position  C5: lateral arm: normal  C6: medial and posterior thumbs: normal  C7: middle finger: normal  C8: lateral 5th digit: normal  T1: medial forearm: normal    Strength:  Flexion at elbow (C6 thumbs up): 5/5  Extension at elbow( C7): 4/5  Flexion at wrist (C7): 4/5  Extension at wrist 4/5    Special tests:  - Tinnels: negative  - Phalens: negative  - Finger to nose: negative  - Rapid alternating movements: negative  - Spurling: negative        RADIOLOGY:  Reviewed images completed today and listed are my findings: degenerative changes particularly in the mid to low region.  final results and radiologist's interpretation will be available when completed.

## 2025-06-30 NOTE — LETTER
6/30/2025      Jared North  [address] [address]      Dear Colleague,    Thank you for referring your patient, Jared North, to the [facility] [place]. Please see a copy of my visit note below.    ASSESSMENT & PLAN    Jared was seen today for pain and pain.    Diagnoses and all orders for this visit:    Chronic neck pain  -     XR Cervical Spine 2/3 vws; Future    Muscle weakness of right upper extremity  -     EMG; Future    Paresthesia of arm  -     EMG; Future    Chronic left shoulder pain  -     Orthopedic  Referral  -     XR Cervical Spine 2/3 vws; Future    Chronic right shoulder pain  -     Orthopedic  Referral    Chronic pain of right knee  -     Orthopedic  Referral      Patient is experiencing cervical radiculopathy in the setting of chronic neck pain. He has weaknes in the upper extremity compared to CL. He reports a previous MRI from [facility] so will try and get access to that. Will order an EMG to assess for if there is a nerve compression. Lower suspicion for TOS based on symptoms but still in the differential.               Arash Sierra MD  [facility] [place]    -----------------------------------------------------------------------------------------      SUBJECTIVE  Jared North is a/an 49 year old who has bilateral shoulder pain.      Reason for Visit:  Injured/painful/body part: Bilateral shoulder weakness/pain  What are your symptoms: Constant burning pain  Date of injury/Onset: 2 year  Cause: Patient describes injury as turning head while sleeping and now lacks ROM in neck and has discomfort/weakness in the right upper extremity.   History of similar pain: Previous back related pain/symptoms  What makes it better: Massage, Ice Packs  What makes it worse: Lifting, sleeping, endurance  What have you done for this problem: MSK Treatments Tried : Topicals (ice/heat,  voltaren), PT/OT (2 visits), and Home exercises    Previous surgeries: None  Social History/Occupation:  - Currently on disability      REVIEW OF SYSTEMS:  Positive ROS was noted in the HPI, otherwise negative.       OBJECTIVE:  Gen: no acute distress  Cervical:   Observation: normal without ecchymosis or rashes    TTP:  Bony: negative  Paraspinal: positive  Trigger point: positive    ROM:  Flexion: abnormal,   Extension: abnormal,   Sidebending: abnormal,   Rotation: abnormal,     Dermatomes: at neutral position  C5: lateral arm: normal  C6: medial and posterior thumbs: normal  C7: middle finger: normal  C8: lateral 5th digit: normal  T1: medial forearm: normal    Strength:  Flexion at elbow (C6 thumbs up): 5/5  Extension at elbow( C7): 4/5  Flexion at wrist (C7): 4/5  Extension at wrist 4/5    Special tests:  - Tinnels: negative  - Phalens: negative  - Finger to nose: negative  - Rapid alternating movements: negative  - Spurling: negative        RADIOLOGY:  Reviewed images completed today and listed are my findings: degenerative changes particularly in the mid to low region.  final results and radiologist's interpretation will be available when completed.    Again, thank you for allowing me to participate in the care of your patient.        Sincerely,        Arash Sierra MD    Electronically signed

## 2025-07-01 ENCOUNTER — RESULTS FOLLOW-UP (OUTPATIENT)
Dept: FAMILY MEDICINE | Facility: CLINIC | Age: 50
End: 2025-07-01

## 2025-07-15 ENCOUNTER — MYC MEDICAL ADVICE (OUTPATIENT)
Dept: ADDICTION MEDICINE | Facility: CLINIC | Age: 50
End: 2025-07-15

## 2025-07-15 DIAGNOSIS — G89.29 CHRONIC LOW BACK PAIN WITH RIGHT-SIDED SCIATICA, UNSPECIFIED BACK PAIN LATERALITY: ICD-10-CM

## 2025-07-15 DIAGNOSIS — F11.21 OPIOID USE DISORDER, SEVERE, IN SUSTAINED REMISSION (H): ICD-10-CM

## 2025-07-15 DIAGNOSIS — M54.41 CHRONIC LOW BACK PAIN WITH RIGHT-SIDED SCIATICA, UNSPECIFIED BACK PAIN LATERALITY: ICD-10-CM

## 2025-07-15 RX ORDER — GABAPENTIN 800 MG/1
800 TABLET ORAL 3 TIMES DAILY
Qty: 90 TABLET | Refills: 0 | Status: SHIPPED | OUTPATIENT
Start: 2025-08-08

## 2025-07-15 RX ORDER — BUPRENORPHINE HYDROCHLORIDE, NALOXONE HYDROCHLORIDE 8; 2 MG/1; MG/1
1 FILM, SOLUBLE BUCCAL; SUBLINGUAL 2 TIMES DAILY
Qty: 60 FILM | Refills: 0 | Status: SHIPPED | OUTPATIENT
Start: 2025-07-24

## 2025-07-15 NOTE — TELEPHONE ENCOUNTER
Patient is scheduled for 8/18 at 1100 am. Bridge refill of gabapentin and suboxone sent to [facility] in [facility].

## 2025-07-16 ENCOUNTER — MEDICAL CORRESPONDENCE (OUTPATIENT)
Dept: HEALTH INFORMATION MANAGEMENT | Facility: CLINIC | Age: 50
End: 2025-07-16

## 2025-07-16 ENCOUNTER — TELEPHONE (OUTPATIENT)
Dept: FAMILY MEDICINE | Facility: CLINIC | Age: 50
End: 2025-07-16

## 2025-07-16 ENCOUNTER — MYC MEDICAL ADVICE (OUTPATIENT)
Dept: ORTHOPEDICS | Facility: CLINIC | Age: 50
End: 2025-07-16
Payer: COMMERCIAL

## 2025-07-16 NOTE — TELEPHONE ENCOUNTER
Other: Pt is requesting a EMG order be faxed to the [facility] in [facility] to be seen sooner. Please call regarding  [phone]     Could we send this information to you in [employer] [place] or would you prefer to receive a phone call?:   Patient would prefer a phone call   Okay to leave a detailed message?: Yes at Other phone number:  [phone]

## 2025-07-16 NOTE — TELEPHONE ENCOUNTER
Found the following information for [facility]:  Main and Clinical Phone: [phone]  Main and Clinical Fax: [phone]     Address:  [address], [address]  [place] [address]    EMG order sent.    Return call to patient. Mobile number listed in chart ([phone]) is not in service. Called number listed below ([phone]). No answer, nondescript VM so left generic message requesting call back.    Ayleen Jefferson, ATC

## 2025-07-16 NOTE — TELEPHONE ENCOUNTER
EMG order faxed to [facility].    Return call to patient. Left detailed message with above information. Call back number provided if he has further questions.    Ayleen Jefferson, ATC

## 2025-07-16 NOTE — TELEPHONE ENCOUNTER
Other: Patient would like the EMG referral sent to [facility]. Fax: [phone]. [facility] is booking until September that's why patient would like the referral sent to [facility].      Could we send this information to you in [employer]omat or would you prefer to receive a phone call?:   Patient would prefer a phone call   Okay to leave a detailed message?: Yes at Cell number on file:    Telephone Information:   Mobile [phone]

## 2025-07-16 NOTE — TELEPHONE ENCOUNTER
Called and spoke with patient. Informed him order was sent, but can resend it. Asked if he had a phone number writer could call to confirm fax number. He provided the following number [phone] and requested a call back once the order has been faxed. Ok to leave detailed message on new number.    Called and spoke with clinic staff at [facility]. She provided the following fax number for referrals: [phone].    EMG order refaxed.    Called and spoke with patient. Informed him of above. He verbalized understanding and was appreciative of call back.    Ayleen Jefferson ATC

## 2025-07-16 NOTE — TELEPHONE ENCOUNTER
Other: Patient would like a call back from Ayleen Jefferson ATC.  Patient called [facility] a few minutes ago and they told patient they have not received the fax.     Could we send this information to you in [employer] or would you prefer to receive a phone call?:   Patient would prefer a phone call   Okay to leave a detailed message?: Yes at Cell number on file:    Telephone Information:   Mobile [phone]

## 2025-08-18 ENCOUNTER — OFFICE VISIT (OUTPATIENT)
Dept: ADDICTION MEDICINE | Facility: CLINIC | Age: 50
End: 2025-08-18
Payer: COMMERCIAL

## 2025-08-18 VITALS
HEART RATE: 85 BPM | WEIGHT: 201 LBS | BODY MASS INDEX: 28.84 KG/M2 | SYSTOLIC BLOOD PRESSURE: 141 MMHG | DIASTOLIC BLOOD PRESSURE: 95 MMHG | OXYGEN SATURATION: 96 %

## 2025-08-18 DIAGNOSIS — Z71.6 ENCOUNTER FOR SMOKING CESSATION COUNSELING: ICD-10-CM

## 2025-08-18 DIAGNOSIS — F33.1 MODERATE EPISODE OF RECURRENT MAJOR DEPRESSIVE DISORDER (H): ICD-10-CM

## 2025-08-18 DIAGNOSIS — G89.29 CHRONIC LOW BACK PAIN WITH RIGHT-SIDED SCIATICA, UNSPECIFIED BACK PAIN LATERALITY: ICD-10-CM

## 2025-08-18 DIAGNOSIS — M54.41 CHRONIC LOW BACK PAIN WITH RIGHT-SIDED SCIATICA, UNSPECIFIED BACK PAIN LATERALITY: ICD-10-CM

## 2025-08-18 DIAGNOSIS — F41.1 GAD (GENERALIZED ANXIETY DISORDER): ICD-10-CM

## 2025-08-18 DIAGNOSIS — M54.2 CHRONIC NECK PAIN: ICD-10-CM

## 2025-08-18 DIAGNOSIS — Z79.891 ENCOUNTER FOR MONITORING OPIOID MAINTENANCE THERAPY: ICD-10-CM

## 2025-08-18 DIAGNOSIS — Z51.81 ENCOUNTER FOR MONITORING OPIOID MAINTENANCE THERAPY: ICD-10-CM

## 2025-08-18 DIAGNOSIS — G89.29 CHRONIC NECK PAIN: ICD-10-CM

## 2025-08-18 DIAGNOSIS — F11.21 OPIOID USE DISORDER, SEVERE, IN SUSTAINED REMISSION (H): Primary | ICD-10-CM

## 2025-08-18 LAB
AMPHETAMINE QUAL URINE POCT: NEGATIVE
BARBITURATE QUAL URINE POCT: NEGATIVE
BENZODIAZEPINE QUAL URINE POCT: NEGATIVE
BUPRENORPHINE QUAL URINE POCT: ABNORMAL
COCAINE QUAL URINE POCT: NEGATIVE
CREATININE QUAL URINE POCT: ABNORMAL
INTERNAL QC QUAL URINE POCT: ABNORMAL
MDMA QUAL URINE POCT: NEGATIVE
METHADONE QUAL URINE POCT: NEGATIVE
METHAMPHETAMINE QUAL URINE POCT: NEGATIVE
OPIATE QUAL URINE POCT: NEGATIVE
OXYCODONE QUAL URINE POCT: NEGATIVE
PH QUAL URINE POCT: ABNORMAL
PHENCYCLIDINE QUAL URINE POCT: NEGATIVE
POCT KIT EXPIRATION DATE: ABNORMAL
POCT KIT LOT NUMBER: ABNORMAL
SPECIFIC GRAVITY POCT: 1.02
TEMPERATURE URINE POCT: ABNORMAL
THC QUAL URINE POCT: NEGATIVE

## 2025-08-18 PROCEDURE — 99214 OFFICE O/P EST MOD 30 MIN: CPT | Performed by: NURSE PRACTITIONER

## 2025-08-18 PROCEDURE — G2211 COMPLEX E/M VISIT ADD ON: HCPCS | Performed by: NURSE PRACTITIONER

## 2025-08-18 PROCEDURE — 80305 DRUG TEST PRSMV DIR OPT OBS: CPT | Performed by: NURSE PRACTITIONER

## 2025-08-18 RX ORDER — GABAPENTIN 800 MG/1
800 TABLET ORAL 3 TIMES DAILY
Qty: 90 TABLET | Refills: 1 | Status: SHIPPED | OUTPATIENT
Start: 2025-08-18

## 2025-08-18 RX ORDER — BUPRENORPHINE HYDROCHLORIDE, NALOXONE HYDROCHLORIDE 8; 2 MG/1; MG/1
1 FILM, SOLUBLE BUCCAL; SUBLINGUAL 2 TIMES DAILY
Qty: 60 FILM | Refills: 1 | Status: SHIPPED | OUTPATIENT
Start: 2025-08-18

## 2025-08-18 ASSESSMENT — ANXIETY QUESTIONNAIRES
2. NOT BEING ABLE TO STOP OR CONTROL WORRYING: MORE THAN HALF THE DAYS
GAD7 TOTAL SCORE: 14
GAD7 TOTAL SCORE: 14
6. BECOMING EASILY ANNOYED OR IRRITABLE: NEARLY EVERY DAY
1. FEELING NERVOUS, ANXIOUS, OR ON EDGE: NEARLY EVERY DAY
7. FEELING AFRAID AS IF SOMETHING AWFUL MIGHT HAPPEN: MORE THAN HALF THE DAYS
7. FEELING AFRAID AS IF SOMETHING AWFUL MIGHT HAPPEN: MORE THAN HALF THE DAYS
IF YOU CHECKED OFF ANY PROBLEMS ON THIS QUESTIONNAIRE, HOW DIFFICULT HAVE THESE PROBLEMS MADE IT FOR YOU TO DO YOUR WORK, TAKE CARE OF THINGS AT HOME, OR GET ALONG WITH OTHER PEOPLE: VERY DIFFICULT
5. BEING SO RESTLESS THAT IT IS HARD TO SIT STILL: NOT AT ALL
3. WORRYING TOO MUCH ABOUT DIFFERENT THINGS: NEARLY EVERY DAY
8. IF YOU CHECKED OFF ANY PROBLEMS, HOW DIFFICULT HAVE THESE MADE IT FOR YOU TO DO YOUR WORK, TAKE CARE OF THINGS AT HOME, OR GET ALONG WITH OTHER PEOPLE?: VERY DIFFICULT
4. TROUBLE RELAXING: SEVERAL DAYS
GAD7 TOTAL SCORE: 14

## 2025-08-18 ASSESSMENT — PATIENT HEALTH QUESTIONNAIRE - PHQ9
SUM OF ALL RESPONSES TO PHQ QUESTIONS 1-9: 20
10. IF YOU CHECKED OFF ANY PROBLEMS, HOW DIFFICULT HAVE THESE PROBLEMS MADE IT FOR YOU TO DO YOUR WORK, TAKE CARE OF THINGS AT HOME, OR GET ALONG WITH OTHER PEOPLE: VERY DIFFICULT
SUM OF ALL RESPONSES TO PHQ QUESTIONS 1-9: 20

## 2025-08-18 ASSESSMENT — PAIN SCALES - GENERAL: PAINLEVEL_OUTOF10: MODERATE PAIN (5)

## 2025-08-19 ENCOUNTER — PATIENT OUTREACH (OUTPATIENT)
Dept: CARE COORDINATION | Facility: CLINIC | Age: 50
End: 2025-08-19
Payer: COMMERCIAL

## 2025-08-21 ENCOUNTER — PATIENT OUTREACH (OUTPATIENT)
Dept: CARE COORDINATION | Facility: CLINIC | Age: 50
End: 2025-08-21
Payer: COMMERCIAL